# Patient Record
Sex: FEMALE | Race: WHITE | NOT HISPANIC OR LATINO | Employment: OTHER | URBAN - METROPOLITAN AREA
[De-identification: names, ages, dates, MRNs, and addresses within clinical notes are randomized per-mention and may not be internally consistent; named-entity substitution may affect disease eponyms.]

---

## 2017-01-10 ENCOUNTER — PATIENT MESSAGE (OUTPATIENT)
Dept: INTERNAL MEDICINE | Facility: CLINIC | Age: 77
End: 2017-01-10

## 2017-01-10 ENCOUNTER — OFFICE VISIT (OUTPATIENT)
Dept: INTERNAL MEDICINE | Facility: CLINIC | Age: 77
End: 2017-01-10
Payer: MEDICARE

## 2017-01-10 VITALS
HEART RATE: 66 BPM | BODY MASS INDEX: 31.89 KG/M2 | TEMPERATURE: 97 F | WEIGHT: 173.31 LBS | DIASTOLIC BLOOD PRESSURE: 80 MMHG | HEIGHT: 62 IN | SYSTOLIC BLOOD PRESSURE: 120 MMHG

## 2017-01-10 DIAGNOSIS — I10 ESSENTIAL HYPERTENSION: Primary | ICD-10-CM

## 2017-01-10 DIAGNOSIS — R73.09 ABNORMAL GLUCOSE: ICD-10-CM

## 2017-01-10 DIAGNOSIS — E78.5 HYPERLIPIDEMIA, UNSPECIFIED HYPERLIPIDEMIA TYPE: ICD-10-CM

## 2017-01-10 PROCEDURE — 3074F SYST BP LT 130 MM HG: CPT | Mod: S$GLB,,, | Performed by: FAMILY MEDICINE

## 2017-01-10 PROCEDURE — 1126F AMNT PAIN NOTED NONE PRSNT: CPT | Mod: S$GLB,,, | Performed by: FAMILY MEDICINE

## 2017-01-10 PROCEDURE — 1159F MED LIST DOCD IN RCRD: CPT | Mod: S$GLB,,, | Performed by: FAMILY MEDICINE

## 2017-01-10 PROCEDURE — 99999 PR PBB SHADOW E&M-EST. PATIENT-LVL III: CPT | Mod: PBBFAC,,, | Performed by: FAMILY MEDICINE

## 2017-01-10 PROCEDURE — 1160F RVW MEDS BY RX/DR IN RCRD: CPT | Mod: S$GLB,,, | Performed by: FAMILY MEDICINE

## 2017-01-10 PROCEDURE — 3079F DIAST BP 80-89 MM HG: CPT | Mod: S$GLB,,, | Performed by: FAMILY MEDICINE

## 2017-01-10 PROCEDURE — 99214 OFFICE O/P EST MOD 30 MIN: CPT | Mod: S$GLB,,, | Performed by: FAMILY MEDICINE

## 2017-01-10 PROCEDURE — 1157F ADVNC CARE PLAN IN RCRD: CPT | Mod: S$GLB,,, | Performed by: FAMILY MEDICINE

## 2017-01-10 RX ORDER — ATORVASTATIN CALCIUM 10 MG/1
10 TABLET, FILM COATED ORAL DAILY
Qty: 90 TABLET | Refills: 3 | Status: SHIPPED | OUTPATIENT
Start: 2017-01-10 | End: 2017-05-23 | Stop reason: SDUPTHER

## 2017-01-10 RX ORDER — NAPROXEN SODIUM 220 MG/1
81 TABLET, FILM COATED ORAL DAILY
Start: 2017-01-10 | End: 2019-01-25 | Stop reason: CLARIF

## 2017-01-10 NOTE — PROGRESS NOTES
Subjective:      Patient ID: Irlanda Lopez is a 76 y.o. female.    Chief Complaint: Follow-up (5w blood pressure, possible TIA)    HPI Comments: Patient is coming in today for a five-week follow-up.  This time she brings her daughter Faiza in.  She's actually been doing very well with the blood pressure medication.  She's not had any problems.  Blood pressures have actually been more controlled now with systolics in the 120s and 130s and diastolics in the 70s.  She's had no problems with the blood pressure medication of the low-dose diuretic.  She wants to come in and explained what happened at the event at [facility].  Most likely it was either a elevated blood pressure event or possibly even a TIA however she's not had any further issues.  She now is willing to start only checking her blood pressure once a week.    Because of the possibility of her having a TIA we discussed her 10 year risk for acute cardiovascular diseases.  We discussed her best regimen reduce this would be to actually get her on a low-dose cholesterol medicine as well as an aspirin.  At this time because of the last year she's willing to try.  Her 10 year risk is currently at 19%.  She reports that her  had a TIA in the past as well.    The 10-year ASCVD risk score (Candy DC Jr, et al., 2013) is: 19.1%    Values used to calculate the score:      Age: 76 years      Sex: Female      Is Non- : No      Diabetic: No      Tobacco smoker: No      Systolic Blood Pressure: 120 mmHg      Is BP treated: Yes      HDL Cholesterol: 41 mg/dL      Total Cholesterol: 259 mg/dL    Otherwise she's been doing very well with the [place] Thyroid.  She's not had any problems with this medication.  Altered Mental Status   This is a new problem. The current episode started yesterday. The problem occurs rarely. The problem has been resolved. Pertinent negatives include no abdominal pain, anorexia, chest pain, chills, congestion,  coughing, fatigue, fever, headaches, myalgias, nausea, neck pain, numbness or weakness. Nothing aggravates the symptoms. She has tried nothing for the symptoms. The treatment provided significant relief.       Lab Results   Component Value Date    WBC 7.02 10/04/2016    HGB 14.8 10/04/2016    HCT 44.1 10/04/2016     10/04/2016    CHOL 259 (H) 10/04/2016    TRIG 201 (H) 10/04/2016    HDL 41 10/04/2016    ALT 19 10/04/2016    AST 24 10/04/2016     10/04/2016    K 4.0 10/04/2016     10/04/2016    CREATININE 0.9 10/04/2016    BUN 14 10/04/2016    CO2 27 10/04/2016    TSH 1.644 10/04/2016       Review of Systems   Constitutional: Negative for chills, fatigue and fever.   HENT: Negative for congestion.    Respiratory: Negative for cough.    Cardiovascular: Negative for chest pain.   Gastrointestinal: Negative for abdominal pain, anorexia and nausea.   Musculoskeletal: Negative for myalgias and neck pain.   Neurological: Negative for weakness, numbness and headaches.   Psychiatric/Behavioral: Positive for confusion. Negative for decreased concentration and dysphoric mood. The patient is nervous/anxious.      Objective:     Physical Exam   Constitutional: She appears well-developed and well-nourished.   HENT:   Head: Normocephalic and atraumatic.   Right Ear: Tympanic membrane normal.   Left Ear: Tympanic membrane normal.   Mouth/Throat: Oropharynx is clear and moist.   Eyes: Conjunctivae and EOM are normal.   Neck: Normal range of motion. Neck supple.   Cardiovascular: Normal rate and regular rhythm.    Pulmonary/Chest: Effort normal and breath sounds normal.   Psychiatric: She has a normal mood and affect. Her behavior is normal.   Nursing note and vitals reviewed.    Assessment:     1. Essential hypertension    2. Hyperlipidemia, unspecified hyperlipidemia type    3. Abnormal glucose      Plan:   Irlanda was seen today for follow-up.    Diagnoses and all orders for this visit:    Essential  hypertension  Comments:  improved with hctz 12.5mg daily. add asa 81mg daily. suspect prior event was tia.   Orders:  -     Lipid panel; Future  -     Hemoglobin A1c; Future  -     ALT (SGPT); Future  -     AST (SGOT); Future  -     Basic metabolic panel; Future    Hyperlipidemia, unspecified hyperlipidemia type  Comments:  high risk of 19%. start statin.   Orders:  -     Lipid panel; Future  -     Hemoglobin A1c; Future  -     ALT (SGPT); Future  -     AST (SGOT); Future  -     Basic metabolic panel; Future    Abnormal glucose  Comments:  to screen for diabetes due to high risk. Will do prior to next visit.   Orders:  -     Lipid panel; Future  -     Hemoglobin A1c; Future  -     ALT (SGPT); Future  -     AST (SGOT); Future  -     Basic metabolic panel; Future    Other orders  -     aspirin 81 MG Chew; Take 1 tablet (81 mg total) by mouth once daily.  -     atorvastatin (LIPITOR) 10 MG tablet; Take 1 tablet (10 mg total) by mouth once daily.               Return in about 4 months (around 5/10/2017) for chol, bp, .

## 2017-01-10 NOTE — MR AVS SNAPSHOT
[facility]  [address] [place] [address]  Phone: [phone]  Fax: [phone]                  Irlanda Lopez   1/10/2017 10:40 AM   Office Visit    Description:  Female : 1940   Provider:  Myla Arias MD   [facility]           Reason for Visit     Follow-up           Diagnoses this Visit        Comments    Essential hypertension    -  Primary improved with hctz 12.5mg daily. add asa 81mg daily. suspect prior event was tia.     Hyperlipidemia, unspecified hyperlipidemia type     high risk of 19%. start statin.     Abnormal glucose     to screen for diabetes due to high risk.            To Do List           Future Appointments        Provider Department Dept Phone    2017 8:30 AM LABORATORY, [place] [facility] [phone]    2017 10:40 AM Myla Arias MD [facility] [phone]      Goals (5 Years of Data)     None      Follow-Up and Disposition     Return in about 4 months (around 5/10/2017) for chol, bp, .       These Medications        Disp Refills Start End    aspirin 81 MG Chew   1/10/2017     Take 1 tablet (81 mg total) by mouth once daily. - Oral    Pharmacy: [facility]/pharmacy #[id number]  [place] - [address] [address] Ph #: [phone]       atorvastatin (LIPITOR) 10 MG tablet 90 tablet 3 1/10/2017 1/10/2018    Take 1 tablet (10 mg total) by mouth once daily. - Oral    Pharmacy: [facility]/pharmacy #[id number]  [place] - [address] [address] Ph #: [phone]         [facility]     [facility] On [facility] Nurse Care Line -  Assistance  Registered nurses in the [facility] On Call Center provide clinical advisement, health education, appointment booking, and other advisory services.  Call for this free service at [phone].             Medications           Message regarding Medications     Verify the changes and/or additions to your medication regime  "listed below are the same as discussed with your clinician today.  If any of these changes or additions are incorrect, please notify your healthcare provider.        START taking these NEW medications        Refills    aspirin 81 MG Chew     Sig: Take 1 tablet (81 mg total) by mouth once daily.    Class: No Print    Route: Oral    atorvastatin (LIPITOR) 10 MG tablet 3    Sig: Take 1 tablet (10 mg total) by mouth once daily.    Class: Normal    Route: Oral           Verify that the below list of medications is an accurate representation of the medications you are currently taking.  If none reported, the list may be blank. If incorrect, please contact your healthcare provider. Carry this list with you in case of emergency.           Current Medications     aspirin 81 MG Chew Take 1 tablet (81 mg total) by mouth once daily.    atorvastatin (LIPITOR) 10 MG tablet Take 1 tablet (10 mg total) by mouth once daily.    hydrochlorothiazide (MICROZIDE) 12.5 mg capsule Take 1 capsule (12.5 mg total) by mouth once daily.    thyroid (ARMOUR THYROID) 30 mg Tab Take 1 tablet (30 mg total) by mouth once daily.           Clinical Reference Information           Vital Signs - Last Recorded  Most recent update: 1/10/2017 10:57 AM by Ken Camacho MA    BP Pulse Temp Ht Wt BMI    120/80 66 97 °F (36.1 °C) 5' 2" (1.575 m) 78.6 kg (173 lb 4.5 oz) 31.69 kg/m2      Blood Pressure          Most Recent Value    BP  120/80      Allergies as of 1/10/2017     No Known Allergies      Immunizations Administered on Date of Encounter - 1/10/2017     None      Orders Placed During Today's Visit     Future Labs/Procedures Expected by Expires    ALT (SGPT)  5/10/2017 (Approximate) 3/11/2018    AST (SGOT)  5/10/2017 1/10/2018    Basic metabolic panel  5/10/2017 1/10/2018    Hemoglobin A1c  5/10/2017 (Approximate) 7/9/2017    Lipid panel  5/10/2017 (Approximate) 1/10/2018      "

## 2017-01-23 ENCOUNTER — TELEPHONE (OUTPATIENT)
Dept: INTERNAL MEDICINE | Facility: CLINIC | Age: 77
End: 2017-01-23

## 2017-01-23 DIAGNOSIS — L98.9 SKIN LESION: Primary | ICD-10-CM

## 2017-01-23 DIAGNOSIS — R41.3 MEMORY LOSS: Primary | ICD-10-CM

## 2017-01-23 NOTE — TELEPHONE ENCOUNTER
Called and left message explaining that she would need to be referred to neuro and that she can call and get scheduled.

## 2017-01-23 NOTE — TELEPHONE ENCOUNTER
Called pts daughter back, she stated that they are noticing that she is having a little bit of memory issues and coping issues since her  has past. She is wanting to know if there is any testing that they can do to determine if this is just age related memory loss or if this is early stages of dementia? She stated that the pt did tell her son at one point that she is concerned with this herself as she seems to be noticing it at some times....

## 2017-01-23 NOTE — TELEPHONE ENCOUNTER
Called pt, she would like to see a Dermatologist, for some red areas around nose and cheeks and inside her nose. Would like to see one out here.

## 2017-01-23 NOTE — TELEPHONE ENCOUNTER
Would need to see neurologist for more formalized testing. Can put in order for Dr Cobian or Dr Keller.

## 2017-01-23 NOTE — TELEPHONE ENCOUNTER
----- Message from Leia Arora sent at 1/23/2017  3:10 PM CST -----  Contact: self [phone]  States that she is calling to get a recommendation for dermatology in the [facility]. Please call back at [phone]//thank you acc

## 2017-01-23 NOTE — TELEPHONE ENCOUNTER
----- Message from Enriqueta Silverio sent at 1/23/2017  8:30 AM CST -----  Contact: zaheer/daughter   Call caller regarding questions about pt getting memory loss.       [phone]

## 2017-01-24 NOTE — TELEPHONE ENCOUNTER
See if she is willing to see derm at [facility] location? If so, I'll put in for there, because I would prefer to send to [facility]

## 2017-01-26 ENCOUNTER — TELEPHONE (OUTPATIENT)
Dept: INTERNAL MEDICINE | Facility: CLINIC | Age: 77
End: 2017-01-26

## 2017-01-26 NOTE — TELEPHONE ENCOUNTER
----- Message from Susanna Ramirez sent at 1/26/2017  1:23 PM CST -----  Contact: patient  Calling concerning a rash on face possibly from taking Hydrochlorothiazide 12.5 mg CP. Please call patient @ [phone]. Thanks, jorge

## 2017-01-26 NOTE — TELEPHONE ENCOUNTER
I don't see a rash on the eyes and face from the statin drugs. She can see derm to evaluate the rash as she requested. Yes the cholesterol medication is to help reduce her cholesterol levels and prevent a stroke.

## 2017-01-26 NOTE — TELEPHONE ENCOUNTER
Pt states that she has been experiencing a raised red area under the eyes and on the side and then around the nose. She states she has been using olive oil on it. She states that it doesn't itch or bother her much since then, but she thinks that it might be from the statin drug that she takes. She states that she feels like it started around that time. When she gave me the name of the medication she told me it was hydrochlorothiazide. Explained to her that, that is not a statin drug that it is for either blood pressure or used for fluid. She then found another bottle and reported that it is the atorvastatin. She reports that she hasn't been on it long and that she wants to know if she has to have a statin drug or can she has something else? She is concerned with taking the statin.

## 2017-02-03 ENCOUNTER — TELEPHONE (OUTPATIENT)
Dept: INTERNAL MEDICINE | Facility: CLINIC | Age: 77
End: 2017-02-03

## 2017-02-03 NOTE — TELEPHONE ENCOUNTER
----- Message from Atiya Thakur sent at 2/3/2017 11:06 AM CST -----  [phone]      Cell: [phone] Patient stopped in clinic today to see either one of the medications would affect the skin.Having redness, pealing, slight burning when cleaning face.  Hydrochlorothiazide 12.5 mg 1 tab daily. Atorvastatin 10 mg 1 tab daily.tc

## 2017-02-03 NOTE — TELEPHONE ENCOUNTER
Informed pt from previous message Dr. Arias recommended her to see Derm. Pt stated she has not seen them yet but she will call for an  Apt.

## 2017-02-10 ENCOUNTER — TELEPHONE (OUTPATIENT)
Dept: INTERNAL MEDICINE | Facility: CLINIC | Age: 77
End: 2017-02-10

## 2017-02-10 NOTE — TELEPHONE ENCOUNTER
----- Message from Jessica Noel sent at 2/10/2017 11:33 AM CST -----  Pt is calling Nurse staff regarding the last medication atorvastatin /calcium . Pt is having a possible allergic reaction to the medication. Pt has developed a rash or skin irritation that is possible from the medication.  Pt call back to be advise  thanks

## 2017-02-11 NOTE — TELEPHONE ENCOUNTER
Patient states she saw a product on tv to help with incont to help with keegle exercise it's something that you insert into the vaginal area. Also she would like to know what you recc for incont at her age.

## 2017-02-11 NOTE — TELEPHONE ENCOUNTER
----- Message from Shakira Borjas sent at 2/10/2017  3:05 PM CST -----  Contact: Pt  Pt is requesting to speak to the nurse regarding treatment for bladder incontinence. Pls call pt back at [phone].

## 2017-05-09 ENCOUNTER — LAB VISIT (OUTPATIENT)
Dept: LAB | Facility: HOSPITAL | Age: 77
End: 2017-05-09
Attending: FAMILY MEDICINE
Payer: MEDICARE

## 2017-05-09 DIAGNOSIS — R73.09 ABNORMAL GLUCOSE: ICD-10-CM

## 2017-05-09 DIAGNOSIS — E78.5 HYPERLIPIDEMIA, UNSPECIFIED HYPERLIPIDEMIA TYPE: ICD-10-CM

## 2017-05-09 DIAGNOSIS — I10 ESSENTIAL HYPERTENSION: ICD-10-CM

## 2017-05-09 LAB
ALT SERPL W/O P-5'-P-CCNC: 20 U/L
ANION GAP SERPL CALC-SCNC: 11 MMOL/L
AST SERPL-CCNC: 23 U/L
BUN SERPL-MCNC: 17 MG/DL
CALCIUM SERPL-MCNC: 9.6 MG/DL
CHLORIDE SERPL-SCNC: 103 MMOL/L
CHOLEST/HDLC SERPL: 3.1 {RATIO}
CO2 SERPL-SCNC: 26 MMOL/L
CREAT SERPL-MCNC: 0.9 MG/DL
EST. GFR  (AFRICAN AMERICAN): >60 ML/MIN/1.73 M^2
EST. GFR  (NON AFRICAN AMERICAN): >60 ML/MIN/1.73 M^2
GLUCOSE SERPL-MCNC: 103 MG/DL
HDL/CHOLESTEROL RATIO: 32.4 %
HDLC SERPL-MCNC: 170 MG/DL
HDLC SERPL-MCNC: 55 MG/DL
LDLC SERPL CALC-MCNC: 95.4 MG/DL
NONHDLC SERPL-MCNC: 115 MG/DL
POTASSIUM SERPL-SCNC: 4 MMOL/L
SODIUM SERPL-SCNC: 140 MMOL/L
TRIGL SERPL-MCNC: 98 MG/DL

## 2017-05-09 PROCEDURE — 84450 TRANSFERASE (AST) (SGOT): CPT

## 2017-05-09 PROCEDURE — 36415 COLL VENOUS BLD VENIPUNCTURE: CPT | Mod: PO

## 2017-05-09 PROCEDURE — 83036 HEMOGLOBIN GLYCOSYLATED A1C: CPT

## 2017-05-09 PROCEDURE — 80048 BASIC METABOLIC PNL TOTAL CA: CPT

## 2017-05-09 PROCEDURE — 80061 LIPID PANEL: CPT

## 2017-05-09 PROCEDURE — 84460 ALANINE AMINO (ALT) (SGPT): CPT

## 2017-05-10 LAB
ESTIMATED AVG GLUCOSE: 114 MG/DL
HBA1C MFR BLD HPLC: 5.6 %

## 2017-05-11 ENCOUNTER — OFFICE VISIT (OUTPATIENT)
Dept: INTERNAL MEDICINE | Facility: CLINIC | Age: 77
End: 2017-05-11
Payer: MEDICARE

## 2017-05-11 VITALS
SYSTOLIC BLOOD PRESSURE: 140 MMHG | DIASTOLIC BLOOD PRESSURE: 80 MMHG | BODY MASS INDEX: 31 KG/M2 | TEMPERATURE: 98 F | HEIGHT: 62 IN | WEIGHT: 168.44 LBS | HEART RATE: 64 BPM

## 2017-05-11 DIAGNOSIS — I10 ESSENTIAL HYPERTENSION: ICD-10-CM

## 2017-05-11 DIAGNOSIS — E07.9 THYROID DISORDER: ICD-10-CM

## 2017-05-11 DIAGNOSIS — R41.89 COGNITIVE DECLINE: Primary | ICD-10-CM

## 2017-05-11 DIAGNOSIS — E78.5 HYPERLIPIDEMIA, UNSPECIFIED HYPERLIPIDEMIA TYPE: ICD-10-CM

## 2017-05-11 PROCEDURE — 99215 OFFICE O/P EST HI 40 MIN: CPT | Mod: S$GLB,,, | Performed by: FAMILY MEDICINE

## 2017-05-11 PROCEDURE — 1126F AMNT PAIN NOTED NONE PRSNT: CPT | Mod: S$GLB,,, | Performed by: FAMILY MEDICINE

## 2017-05-11 PROCEDURE — 3077F SYST BP >= 140 MM HG: CPT | Mod: S$GLB,,, | Performed by: FAMILY MEDICINE

## 2017-05-11 PROCEDURE — 1157F ADVNC CARE PLAN IN RCRD: CPT | Mod: 8P,S$GLB,, | Performed by: FAMILY MEDICINE

## 2017-05-11 PROCEDURE — 99999 PR PBB SHADOW E&M-EST. PATIENT-LVL III: CPT | Mod: PBBFAC,,, | Performed by: FAMILY MEDICINE

## 2017-05-11 PROCEDURE — 1160F RVW MEDS BY RX/DR IN RCRD: CPT | Mod: S$GLB,,, | Performed by: FAMILY MEDICINE

## 2017-05-11 PROCEDURE — 3079F DIAST BP 80-89 MM HG: CPT | Mod: S$GLB,,, | Performed by: FAMILY MEDICINE

## 2017-05-11 PROCEDURE — 99499 UNLISTED E&M SERVICE: CPT | Mod: S$GLB,,, | Performed by: FAMILY MEDICINE

## 2017-05-11 PROCEDURE — 1159F MED LIST DOCD IN RCRD: CPT | Mod: S$GLB,,, | Performed by: FAMILY MEDICINE

## 2017-05-11 NOTE — PROGRESS NOTES
"Subjective:      Patient ID: Irlanda Lopez is a 76 y.o. female.    Chief Complaint: Follow-up (4m)    HPI Comments: Patient is coming in today for a 4 month follow-up for probable TIA.  This time she brings her daughter Faiza in.  She's actually been doing very well with the blood pressure medication.  She's not had any problems.  Blood pressures have actually been more controlled now with systolics in the 120s and 130s and diastolics in the 70s.  She's had no problems with the blood pressure medication of the low-dose diuretic.She now is checking her blood pressure once a week.    Because of the possibility of her having a TIA we discussed her 10 year risk for acute cardiovascular diseases.  We discussed her best regimen reduce this would be to actually get her on a low-dose cholesterol medicine as well as an aspirin.  At this time because of the last year she's willing to try and done well with atorvastatin 10mg daily.  Her 10 year risk was  at 19%.   Her numbers appear better as well with the meds. lfts are normal.     Otherwise she's been doing very well with the [place] Thyroid.  She's not had any problems with this medication.    Her daughter is very concerned today about her mother's memory loss.  She reports that she is extremely forgetful even with her measles recent and remote events.  She does a lot of it still stems from the fact that she still grieving the loss of her  from 2 years ago.  Patient's enrolled in counseling but is not willing to take medicines at this time.  She is morbidly .  The patient herself feels that she's doing fairly well.  In this conversation today she reported within the first 5 minutes of the conversation how her has been "took very good care of her aunts pulled her".  She repeats things multiple times.  Her daughter Faiza is getting frustrated because she is the one that the patient leans on a lot and she is noticing that her memory is getting extremely " bad.  The patient's mother had Alzheimer's.  We have not done any imaging studies of her head at this time thus far.  They were interested in possibly pursuing a workup for the memory loss as well.  I discussed the most likely the best all inclusive care would be performed at the [facility] and this would include possibly doing an MRI, carotid ultrasounds possibly a heart echo in seeing a neurologist in doing some additional lab work.  They be interested in doing so.  Today we did perform a Mini-Mental Status exam which showed that she was 26 out of 30.  She did not know today's date and she had no object recall.    Altered Mental Status   This is a new problem. The current episode started yesterday. The problem occurs rarely. The problem has been resolved. Pertinent negatives include no abdominal pain, anorexia, chest pain, chills, congestion, coughing, fatigue, fever, headaches, myalgias, nausea, neck pain, numbness or weakness. Nothing aggravates the symptoms. She has tried nothing for the symptoms. The treatment provided significant relief.       Lab Results   Component Value Date    WBC 7.02 10/04/2016    HGB 14.8 10/04/2016    HCT 44.1 10/04/2016     10/04/2016    CHOL 170 05/09/2017    TRIG 98 05/09/2017    HDL 55 05/09/2017    ALT 20 05/09/2017    AST 23 05/09/2017     05/09/2017    K 4.0 05/09/2017     05/09/2017    CREATININE 0.9 05/09/2017    BUN 17 05/09/2017    CO2 26 05/09/2017    TSH 1.644 10/04/2016    HGBA1C 5.6 05/09/2017       Review of Systems   Constitutional: Negative for activity change, appetite change, chills, fatigue and fever.   HENT: Negative for congestion.    Respiratory: Negative for cough.    Cardiovascular: Negative for chest pain.   Gastrointestinal: Negative for abdominal pain, anorexia and nausea.   Musculoskeletal: Negative for myalgias and neck pain.   Neurological: Negative for weakness, numbness and headaches.   Psychiatric/Behavioral: Positive for  confusion, decreased concentration and dysphoric mood. Negative for sleep disturbance. The patient is not nervous/anxious.      Objective:     Physical Exam   Constitutional: She is oriented to person, place, and time. She appears well-developed and well-nourished.   HENT:   Head: Normocephalic and atraumatic.   Right Ear: Tympanic membrane normal.   Left Ear: Tympanic membrane normal.   Mouth/Throat: Oropharynx is clear and moist.   Eyes: Conjunctivae and EOM are normal.   Neck: Normal range of motion. Neck supple.   Cardiovascular: Normal rate and regular rhythm.    Pulmonary/Chest: Effort normal and breath sounds normal.   Neurological: She is alert and oriented to person, place, and time. No cranial nerve deficit or sensory deficit.   Psychiatric: Her speech is normal and behavior is normal. Judgment and thought content normal. Cognition and memory are impaired. She exhibits a depressed mood. She exhibits abnormal recent memory and abnormal remote memory.   Nursing note and vitals reviewed.    Assessment:     1. Cognitive decline    2. Essential hypertension    3. Thyroid disorder    4. Hyperlipidemia, unspecified hyperlipidemia type      Plan:   Irlanda was seen today for follow-up.    Diagnoses and all orders for this visit:    Cognitive decline-new problem, discussed with her medications as well as the initial workup.  This was most likely can involve doing MRI of the brain, carotid ultrasounds echo and additional lab work.  At this time recommend she go to see the [facility] to see a neurologist to have this further evaluated for treatable causes of cognitive decline.  If there are none that I highly recommend she get involved with Dr. Osuna or the neuropsych team over there to consider not only the counseling but also medication management.  In the meantime she's willing to try Louis's wort for some of the depression symptoms.  -     Ambulatory referral to Neurology    Essential hypertension -  stable, Continue with current medications and interventions. Labs reviewed.     -     Ambulatory referral to Neurology    Thyroid disorder- stable, Continue with current medications and interventions. Labs reviewed.   -     Ambulatory referral to Neurology    Hyperlipidemia, unspecified hyperlipidemia type- stable, Continue with current medications and interventions. Labs reviewed.   -     Ambulatory referral to Neurology      Time spent: 45 minutes in face to face discussion concerning diagnosis, prognosis, review of lab and test results, benefits of treatment as well as management of disease, counseling of patient and coordination of care between various health care providers . Greater than half the time spent was used for coordination of care and counseling of patient.         Return in about 4 months (around 9/11/2017) for neuro followup.

## 2017-05-11 NOTE — MR AVS SNAPSHOT
[facility]  [address] [place] [address]  Phone: [phone]  Fax: [phone]                  Irlanda Lopez   2017 10:40 AM   Office Visit    Description:  Female : 1940   Provider:  Myla Arias MD   [facility]           Reason for Visit     Follow-up           Diagnoses this Visit        Comments    Cognitive decline    -  Primary     Essential hypertension         Thyroid disorder         Hyperlipidemia, unspecified hyperlipidemia type                To Do List           Future Appointments        Provider Department Dept Phone    2017 10:00 AM Myla Arias MD [facility] [phone]      Goals (5 Years of Data)     None      Follow-Up and Disposition     Return in about 4 months (around 2017) for neuro followup.      [facility] On [facility]     [facility] On Call Nurse Care Line -  Assistance  Unless otherwise directed by your provider, please contact [facility], our nurse care line that is available for  assistance.     Registered nurses in the [facility] provide: appointment scheduling, clinical advisement, health education, and other advisory services.  Call: [phone] (toll free)               Medications           Message regarding Medications     Verify the changes and/or additions to your medication regime listed below are the same as discussed with your clinician today.  If any of these changes or additions are incorrect, please notify your healthcare provider.             Verify that the below list of medications is an accurate representation of the medications you are currently taking.  If none reported, the list may be blank. If incorrect, please contact your healthcare provider. Carry this list with you in case of emergency.           Current Medications     aspirin 81 MG Chew Take 1 tablet (81 mg total) by mouth once daily.    atorvastatin (LIPITOR) 10  "MG tablet Take 1 tablet (10 mg total) by mouth once daily.    hydrochlorothiazide (MICROZIDE) 12.5 mg capsule Take 1 capsule (12.5 mg total) by mouth once daily.    thyroid (ARMOUR THYROID) 30 mg Tab Take 1 tablet (30 mg total) by mouth once daily.           Clinical Reference Information           Your Vitals Were     BP Pulse Temp Height Weight BMI    140/80 64 98 °F (36.7 °C) 5' 2" (1.575 m) 76.4 kg (168 lb 6.9 oz) 30.81 kg/m2      Blood Pressure          Most Recent Value    BP  (!)  140/80      Allergies as of 5/11/2017     No Known Allergies      Immunizations Administered on Date of Encounter - 5/11/2017     None      Orders Placed During Today's Visit      Normal Orders This Visit    Ambulatory referral to Neurology       [employer] Sign-Up     Activating your [facility] account is as easy as 1-2-3!     1) Visit [email or web address], select Sign Up Now, enter this activation code and your date of birth, then select Next.  Activation code not generated  Current Patient Portal Status: Account disabled      2) Create a username and password to use when you visit [facility] in the future and select a security question in case you lose your password and select Next.    3) Enter your e-mail address and click Sign Up!    Additional Information  If you have questions, please e-mail [email or web address] or call [phone] to talk to our [facility] staff. Remember, [facility] is NOT to be used for urgent needs. For medical emergencies, dial 911.         Instructions    Dr Caren Holloway, Dr Emely Bonilla,        Language Assistance Services     ATTENTION: Language assistance services are available, free of charge. Please call [phone].      ATENCIÓN: Si habla español, tiene a mosquera disposición servicios gratuitos de asistencia lingüística. Llame al [phone].     CHÚ Ý: N?u b?n nói Ti?ng Vi?t, có các d?ch v? h? tr? ngôn ng? mi?n phí dành cho b?n. G?i s? [phone].         [facility] complies " with applicable Federal civil rights laws and does not discriminate on the basis of race, color, national origin, age, disability, or sex.

## 2017-05-16 ENCOUNTER — TELEPHONE (OUTPATIENT)
Dept: INTERNAL MEDICINE | Facility: CLINIC | Age: 77
End: 2017-05-16

## 2017-05-23 DIAGNOSIS — I10 ESSENTIAL HYPERTENSION: ICD-10-CM

## 2017-05-23 RX ORDER — HYDROCHLOROTHIAZIDE 12.5 MG/1
12.5 CAPSULE ORAL DAILY
Qty: 90 CAPSULE | Refills: 3 | Status: SHIPPED | OUTPATIENT
Start: 2017-05-23 | End: 2017-09-14

## 2017-05-23 RX ORDER — ATORVASTATIN CALCIUM 10 MG/1
10 TABLET, FILM COATED ORAL DAILY
Qty: 90 TABLET | Refills: 3 | Status: SHIPPED | OUTPATIENT
Start: 2017-05-23 | End: 2018-04-30 | Stop reason: SDUPTHER

## 2017-06-15 ENCOUNTER — TELEPHONE (OUTPATIENT)
Dept: INTERNAL MEDICINE | Facility: CLINIC | Age: 77
End: 2017-06-15

## 2017-06-15 NOTE — TELEPHONE ENCOUNTER
----- Message from Karen Clarke sent at 6/15/2017  8:50 AM CDT -----  Contact: pt  She's calling to get further instructions after her referral, pt stated that dr that she was referred to would like her to complete lab work, please advise, [phone] (home)  [phone] (cell)

## 2017-06-15 NOTE — TELEPHONE ENCOUNTER
----- Message from Zack Osorio sent at 6/15/2017  9:00 AM CDT -----  Contact: Pt daughter - zaheer [phone]  states she is calling rg being given some labs to have done but wants to know if they're any duplicate labs that are being ordered by the Neurologist from labs recently done and can be reached at [phone]//anny/dbw

## 2017-06-16 ENCOUNTER — LAB VISIT (OUTPATIENT)
Dept: LAB | Facility: HOSPITAL | Age: 77
End: 2017-06-16
Attending: PSYCHIATRY & NEUROLOGY
Payer: MEDICARE

## 2017-06-16 DIAGNOSIS — F01.50 VASCULAR DEMENTIA, UNCOMPLICATED: Primary | ICD-10-CM

## 2017-06-16 LAB
25(OH)D3+25(OH)D2 SERPL-MCNC: 129 NG/ML
FOLATE SERPL-MCNC: 11.2 NG/ML
VIT B12 SERPL-MCNC: 905 PG/ML

## 2017-06-16 PROCEDURE — 82607 VITAMIN B-12: CPT

## 2017-06-16 PROCEDURE — 82746 ASSAY OF FOLIC ACID SERUM: CPT

## 2017-06-16 PROCEDURE — 82306 VITAMIN D 25 HYDROXY: CPT

## 2017-06-16 PROCEDURE — 84425 ASSAY OF VITAMIN B-1: CPT

## 2017-06-16 PROCEDURE — 36415 COLL VENOUS BLD VENIPUNCTURE: CPT | Mod: PO

## 2017-06-16 PROCEDURE — 86592 SYPHILIS TEST NON-TREP QUAL: CPT

## 2017-06-17 LAB — RPR SER QL: NORMAL

## 2017-06-19 LAB — VIT B1 SERPL-MCNC: 58 UG/L (ref 38–122)

## 2017-07-06 ENCOUNTER — TELEPHONE (OUTPATIENT)
Dept: INTERNAL MEDICINE | Facility: CLINIC | Age: 77
End: 2017-07-06

## 2017-07-06 NOTE — TELEPHONE ENCOUNTER
----- Message from Alley Ramos LPN sent at 7/6/2017 10:05 AM CDT -----  Contact: Ntorqetf-sjrugrw-[phone]      ----- Message -----  From: Kahlil Menard  Sent: 7/6/2017   9:48 AM  To: Lindsay CARLSON [facility] Staff    Pt would like to know test results, please call back at [phone].  Thanks-[facility]

## 2017-07-12 ENCOUNTER — TELEPHONE (OUTPATIENT)
Dept: INTERNAL MEDICINE | Facility: CLINIC | Age: 77
End: 2017-07-12

## 2017-07-12 NOTE — TELEPHONE ENCOUNTER
----- Message from Blaire Mitchell sent at 7/12/2017  3:27 PM CDT -----  Contact: daughter  Request a call concerning a MRI appt, can be reached at [phone]///thxMW

## 2017-07-18 ENCOUNTER — TELEPHONE (OUTPATIENT)
Dept: INTERNAL MEDICINE | Facility: CLINIC | Age: 77
End: 2017-07-18

## 2017-07-18 NOTE — TELEPHONE ENCOUNTER
----- Message from Zack Osorio sent at 7/18/2017  8:10 AM CDT -----  Contact: Pt daughter - zaheer saini   States she's calling to be referred to an imaging center for pt to have her MRI and can be reached at [phone]//thanks/dbw

## 2017-07-28 ENCOUNTER — HOSPITAL ENCOUNTER (OUTPATIENT)
Dept: RADIOLOGY | Facility: HOSPITAL | Age: 77
Discharge: HOME | End: 2017-07-28
Attending: PSYCHIATRY & NEUROLOGY
Payer: MEDICARE

## 2017-07-28 DIAGNOSIS — F01.50 VASCULAR DEMENTIA WITHOUT BEHAVIORAL DISTURBANCE: ICD-10-CM

## 2017-07-28 PROCEDURE — 70551 MRI BRAIN STEM W/O DYE: CPT | Mod: TC

## 2017-08-29 ENCOUNTER — OFFICE VISIT (OUTPATIENT)
Dept: INTERNAL MEDICINE | Facility: CLINIC | Age: 77
End: 2017-08-29
Payer: MEDICARE

## 2017-08-29 ENCOUNTER — TELEPHONE (OUTPATIENT)
Dept: INTERNAL MEDICINE | Facility: CLINIC | Age: 77
End: 2017-08-29

## 2017-08-29 VITALS
HEIGHT: 62 IN | SYSTOLIC BLOOD PRESSURE: 110 MMHG | DIASTOLIC BLOOD PRESSURE: 78 MMHG | WEIGHT: 175.5 LBS | HEART RATE: 70 BPM | TEMPERATURE: 98 F | BODY MASS INDEX: 32.3 KG/M2

## 2017-08-29 DIAGNOSIS — L30.9 DERMATITIS: ICD-10-CM

## 2017-08-29 DIAGNOSIS — T78.40XA ALLERGIC REACTION CAUSED BY A DRUG, INITIAL ENCOUNTER: Primary | ICD-10-CM

## 2017-08-29 PROCEDURE — 96372 THER/PROPH/DIAG INJ SC/IM: CPT | Mod: S$GLB,,, | Performed by: FAMILY MEDICINE

## 2017-08-29 PROCEDURE — 3078F DIAST BP <80 MM HG: CPT | Mod: S$GLB,,, | Performed by: FAMILY MEDICINE

## 2017-08-29 PROCEDURE — 3008F BODY MASS INDEX DOCD: CPT | Mod: S$GLB,,, | Performed by: FAMILY MEDICINE

## 2017-08-29 PROCEDURE — 99214 OFFICE O/P EST MOD 30 MIN: CPT | Mod: 25,S$GLB,, | Performed by: FAMILY MEDICINE

## 2017-08-29 PROCEDURE — 99999 PR PBB SHADOW E&M-EST. PATIENT-LVL IV: CPT | Mod: PBBFAC,,, | Performed by: FAMILY MEDICINE

## 2017-08-29 PROCEDURE — 1159F MED LIST DOCD IN RCRD: CPT | Mod: S$GLB,,, | Performed by: FAMILY MEDICINE

## 2017-08-29 PROCEDURE — 3074F SYST BP LT 130 MM HG: CPT | Mod: S$GLB,,, | Performed by: FAMILY MEDICINE

## 2017-08-29 PROCEDURE — 1126F AMNT PAIN NOTED NONE PRSNT: CPT | Mod: S$GLB,,, | Performed by: FAMILY MEDICINE

## 2017-08-29 RX ORDER — MEMANTINE HYDROCHLORIDE 10 MG/1
TABLET ORAL
COMMUNITY
Start: 2017-08-21 | End: 2017-08-29

## 2017-08-29 RX ORDER — LORATADINE 10 MG/1
10 TABLET ORAL 2 TIMES DAILY
Qty: 10 TABLET | Refills: 0 | Status: SHIPPED | OUTPATIENT
Start: 2017-08-29 | End: 2017-10-12 | Stop reason: ALTCHOICE

## 2017-08-29 RX ORDER — BETAMETHASONE SODIUM PHOSPHATE AND BETAMETHASONE ACETATE 3; 3 MG/ML; MG/ML
12 INJECTION, SUSPENSION INTRA-ARTICULAR; INTRALESIONAL; INTRAMUSCULAR; SOFT TISSUE
Status: COMPLETED | OUTPATIENT
Start: 2017-08-29 | End: 2017-08-29

## 2017-08-29 RX ORDER — TRIAMCINOLONE ACETONIDE 1 MG/G
OINTMENT TOPICAL 4 TIMES DAILY
Qty: 80 G | Refills: 1 | Status: SHIPPED | OUTPATIENT
Start: 2017-08-29 | End: 2017-09-08

## 2017-08-29 RX ADMIN — BETAMETHASONE SODIUM PHOSPHATE AND BETAMETHASONE ACETATE 12 MG: 3; 3 INJECTION, SUSPENSION INTRA-ARTICULAR; INTRALESIONAL; INTRAMUSCULAR; SOFT TISSUE at 11:08

## 2017-08-29 NOTE — TELEPHONE ENCOUNTER
----- Message from Carmen Evans sent at 8/29/2017 11:35 AM CDT -----  Contact: daughter/Myla Mora  States her mom was seen this morning, she was having an allergic reaction to a medicine. States she didn't know her mother picked up the medicine and started taking it. Please call Myla Mora at [phone]. Thank you

## 2017-08-29 NOTE — PROGRESS NOTES
Subjective:      Patient ID: Irlanda Lopez is a 76 y.o. female.    Chief Complaint: Allergic Reaction    Patient reports she started breaking out with a rash on her face on both sides of her temples around her eyes and in her nose and even on her chest.  She states she was just trying to cover it up she was uncertain if it might of been a medication she has taken are not.  She believes is been ongoing now for the last 7 days.  The newest medication is Namenda.  This was prescribed by the neurologist Dr. Lopez for cognitive decline.  She is supposed be taking a half a tablet twice a day however she's uncertain if she's actually been taking a half or if she's been taking a full tablet twice a day.  She reports that it is itchy.  She basically which is allowing it to continue until this morning when she looked in the mirror she was very frustrated with the way it looked and decided to come on in to get it checked out.  She is not been putting anything on it thus far.  Of note the medication was filled on August 21.  That is 6 days of medication currently on board for the patient.  All of her other medications have been in place for several months now.  No new exposures to detergents or facial creams.  No new makeups either.  She has not taken any Benadryl .  No trouble breathing.  No chest pain.      Allergic Reaction   This is a new problem. The current episode started 5 to 7 days ago. The problem has been gradually worsening since onset. The problem is severe. The patient was exposed to a prescription drug. Time of exposure: last 7 days. Associated symptoms include itching, a rash and skin blistering. Pertinent negatives include no abdominal pain, chest pain, chest pressure, coughing, diarrhea, difficulty breathing, drooling, eye itching, eye redness, eye watering, globus sensation, hyperventilation, trouble swallowing or wheezing. Her rash is characterized by: diffuse, raised, pruritic and blistering.Past  treatments include nothing. The treatment provided no relief. Swelling is present on the face.       Lab Results   Component Value Date    WBC 7.02 10/04/2016    HGB 14.8 10/04/2016    HCT 44.1 10/04/2016     10/04/2016    CHOL 170 05/09/2017    TRIG 98 05/09/2017    HDL 55 05/09/2017    ALT 20 05/09/2017    AST 23 05/09/2017     05/09/2017    K 4.0 05/09/2017     05/09/2017    CREATININE 0.9 05/09/2017    BUN 17 05/09/2017    CO2 26 05/09/2017    TSH 1.644 10/04/2016    HGBA1C 5.6 05/09/2017       Review of Systems   Constitutional: Negative for activity change, appetite change and chills.   HENT: Negative for drooling and trouble swallowing.    Eyes: Negative for redness and itching.   Respiratory: Negative for cough, shortness of breath and wheezing.    Cardiovascular: Negative for chest pain.   Gastrointestinal: Negative for abdominal pain and diarrhea.   Skin: Positive for color change, itching and rash.     Objective:     Physical Exam   Constitutional: She appears well-developed and well-nourished. She appears distressed.   HENT:   Head: Normocephalic and atraumatic.       Mouth/Throat: Oropharynx is clear and moist and mucous membranes are normal. No oropharyngeal exudate, posterior oropharyngeal edema or posterior oropharyngeal erythema.   Cardiovascular: Normal rate, regular rhythm and normal heart sounds.    Pulmonary/Chest: Effort normal and breath sounds normal.   Skin: Skin is warm. Rash noted. Rash is urticarial. There is erythema.        Vitals reviewed.    Assessment:     1. Allergic reaction caused by a drug, initial encounter    2. Dermatitis      Plan:   Irlanda was seen today for allergic reaction.    Diagnoses and all orders for this visit:    Allergic reaction caused by a drug, initial encounter- suspected at time of visit related to namenda, however based on additional information from daughter most likely a topical cream.   Comments:  newest medication was namenda started by  Dr Omalley on 8/21.17 will stop. celestone 12mg, claritin bid and steroid ointment today. Will inform daughter and dr omalley. Pt denied any shortness of breath or chest pain. No lip swelling. If not improving in 24-48 hrs will need to go to ED.     Dermatitis- new, suspect drug reaction. Stopping namenda stop topical creams.  celestone 12mg, claritin bid and steroid ointment today.     Other orders  -     betamethasone acetate-betamethasone sodium phosphate injection 12 mg; Inject 2 mLs (12 mg total) into the muscle one time.  -     triamcinolone acetonide 0.1% (KENALOG) 0.1 % ointment; Apply topically 4 (four) times daily. To rash on face and chest and nose  -     loratadine (CLARITIN) 10 mg tablet; Take 1 tablet (10 mg total) by mouth 2 (two) times daily. Use for 3-5 days for allergic reaction            Return if symptoms worsen or fail to improve.        Spoke with patient's daughter. Pt's daughter reports that the rash has been there for months. Her daughter reports that she has been using topical creams from some Internet cream. Advised to stop creams. Continue with the treatments. Still can hold the namenda for 1 week, then once rash is gone, can restart at half dosing. Pt's daughter is in agreement with plan.

## 2017-08-29 NOTE — TELEPHONE ENCOUNTER
"Called and spoke with daughter. Adjusted note. Can give retry to namenda in 1 week once rash is gone. Pt was using some "internet' cream.   "

## 2017-08-29 NOTE — PATIENT INSTRUCTIONS
You should take claritin twice a day for 3-5 days to help reduce the itching/rash/reaction as well.

## 2017-08-30 ENCOUNTER — TELEPHONE (OUTPATIENT)
Dept: INTERNAL MEDICINE | Facility: CLINIC | Age: 77
End: 2017-08-30

## 2017-08-30 NOTE — TELEPHONE ENCOUNTER
----- Message from Irlanda Adamson sent at 8/30/2017 10:52 AM CDT -----  Contact: Patients daughter, Myla Lanza [facility] please call Ms Lanza back at [phone]. Thank you

## 2017-09-14 ENCOUNTER — OFFICE VISIT (OUTPATIENT)
Dept: INTERNAL MEDICINE | Facility: CLINIC | Age: 77
End: 2017-09-14
Payer: MEDICARE

## 2017-09-14 VITALS
TEMPERATURE: 97 F | HEIGHT: 62 IN | SYSTOLIC BLOOD PRESSURE: 138 MMHG | DIASTOLIC BLOOD PRESSURE: 86 MMHG | BODY MASS INDEX: 31.28 KG/M2 | WEIGHT: 170 LBS | HEART RATE: 70 BPM

## 2017-09-14 DIAGNOSIS — I10 ESSENTIAL HYPERTENSION: ICD-10-CM

## 2017-09-14 DIAGNOSIS — R79.89 HIGH SERUM VITAMIN D: ICD-10-CM

## 2017-09-14 DIAGNOSIS — L72.3 SEBACEOUS CYST: ICD-10-CM

## 2017-09-14 DIAGNOSIS — Z00.00 ROUTINE GENERAL MEDICAL EXAMINATION AT A HEALTH CARE FACILITY: Primary | ICD-10-CM

## 2017-09-14 DIAGNOSIS — E07.9 THYROID DISORDER: ICD-10-CM

## 2017-09-14 DIAGNOSIS — F01.50 VASCULAR DEMENTIA WITHOUT BEHAVIORAL DISTURBANCE: ICD-10-CM

## 2017-09-14 PROCEDURE — 3075F SYST BP GE 130 - 139MM HG: CPT | Mod: S$GLB,,, | Performed by: FAMILY MEDICINE

## 2017-09-14 PROCEDURE — 99999 PR PBB SHADOW E&M-EST. PATIENT-LVL IV: CPT | Mod: PBBFAC,,, | Performed by: FAMILY MEDICINE

## 2017-09-14 PROCEDURE — 3079F DIAST BP 80-89 MM HG: CPT | Mod: S$GLB,,, | Performed by: FAMILY MEDICINE

## 2017-09-14 PROCEDURE — 99499 UNLISTED E&M SERVICE: CPT | Mod: S$GLB,,, | Performed by: FAMILY MEDICINE

## 2017-09-14 PROCEDURE — 1126F AMNT PAIN NOTED NONE PRSNT: CPT | Mod: S$GLB,,, | Performed by: FAMILY MEDICINE

## 2017-09-14 PROCEDURE — 3008F BODY MASS INDEX DOCD: CPT | Mod: S$GLB,,, | Performed by: FAMILY MEDICINE

## 2017-09-14 PROCEDURE — 99397 PER PM REEVAL EST PAT 65+ YR: CPT | Mod: 25,S$GLB,, | Performed by: FAMILY MEDICINE

## 2017-09-14 PROCEDURE — 99214 OFFICE O/P EST MOD 30 MIN: CPT | Mod: 25,S$GLB,, | Performed by: FAMILY MEDICINE

## 2017-09-14 PROCEDURE — 1159F MED LIST DOCD IN RCRD: CPT | Mod: S$GLB,,, | Performed by: FAMILY MEDICINE

## 2017-09-14 RX ORDER — LISINOPRIL AND HYDROCHLOROTHIAZIDE 10; 12.5 MG/1; MG/1
1 TABLET ORAL DAILY
Qty: 30 TABLET | Refills: 0 | Status: SHIPPED | OUTPATIENT
Start: 2017-09-14 | End: 2017-10-11 | Stop reason: SDUPTHER

## 2017-09-14 RX ORDER — MEMANTINE HYDROCHLORIDE 10 MG/1
TABLET ORAL
Qty: 180 TABLET | Refills: 3
Start: 2017-09-14 | End: 2018-12-21 | Stop reason: SDUPTHER

## 2017-09-14 RX ORDER — LISINOPRIL AND HYDROCHLOROTHIAZIDE 10; 12.5 MG/1; MG/1
1 TABLET ORAL DAILY
Qty: 90 TABLET | Refills: 3 | Status: SHIPPED | OUTPATIENT
Start: 2017-09-14 | End: 2017-09-14 | Stop reason: SDUPTHER

## 2017-09-14 NOTE — PROGRESS NOTES
"Irlanda Lopez presented for a follow-up Medicare AWV today. The following components were reviewed and updated:    · Medical history  · Family History  · Social history  · Allergies and Current Medications  · Health Maintenance  · Patient Care Team:  · Myla Arias MD as PCP - General (Family Medicine)  · Dr. Yumi Kevin neurology [facility]  ·     **See Completed Assessments for Annual Wellness visit with in the encounter summary    · Medicare wellness assessment:  ·   Depression screening  · 1. In the past 2 weeks she has the patient felt down, depressed or hopeless? No  · 2. Over the past 2 weeks as the patient felt little interest or pleasure in doing things?  No  ·  Functional Ability/ Safety Screening  · 1. Was the  Patient's timed up and go test unsteady or longer than 30 seconds?  No   · 2. Has the patient needed help with transportation shopping preparing meals housework laundry medications are managing money?  Yes, daughter assists due to memory issues.   · 3.  If the patient's home have rugs in the hallway, back grab bars in the bathroom, or poor lighting? No, not necessary per patient.   · 4.has there been any hearing difficulties?  No  · Advance Directives:  · 1. Patient does not have a living will in place.     Vitals:    09/14/17 1018 09/14/17 1025   BP: 112/88 138/86   Pulse: (!) 56 70   Temp: 96.5 °F (35.8 °C)    TempSrc: Tympanic    Weight: 77.1 kg (169 lb 15.6 oz)    Height: 5' 2" (1.575 m)      Body mass index is 31.09 kg/m².   ]      Medicare Annual Wellness Visit, Subsequent   Patient Active Problem List   Diagnosis    Thyroid disorder    Essential hypertension    Vascular dementia without behavioral disturbance    High serum vitamin D    Sebaceous cyst         Provided Irlanda with a 5-10 year written screening schedule and personal prevention plan. Recommendations were developed using the USPSTF age appropriate recommendations. Education, counseling, and referrals were " provided as needed.  After Visit Summary printed and given to patient which includes a list of additional screenings\tests needed.    Health Maintenance   Topic Date Due    Influenza Vaccine  08/01/2017    Lipid Panel  05/09/2018    DEXA SCAN  10/04/2019    TETANUS VACCINE  10/04/2026    Colonoscopy  10/04/2026    Zoster Vaccine  Addressed    Pneumococcal (65+)  Excluded       Return in about 4 months (around 1/14/2018), or with Dr Juana cortes, for F/u STEFFANIE MAY , blood pressure 4 week.  Patient will obtain flu shot at the local pharmacy.  She'll continue daily exercises this is benefiting her.  She declines getting pneumonia vaccines.    Myla Arias MD

## 2017-09-14 NOTE — PROGRESS NOTES
Subjective:      Patient ID: Irlanda Lopez is a 76 y.o. female.    Chief Complaint: labs, BP Memory Loss; and Medication Problem    Patient is coming in today for a  follow-up for chronic since I last saw her she thought initially she was having a drug reaction to the Namenda however after discussing with her daughter it ended up being a topical cream.  Since stopping the topical cream and using only steroid cream for her rash is improved.  She does have a large sebaceous cyst proximal by 2 cm on her anterior chest that she like to have removed.  For this reason I believe she needs to see the dermatologist.    She's actually doing well with the atorvastatin 10 mg.  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  For this reason she needs to stay on this medication at least to the age of 86.  She's also currently on aspirin 81 mg therapy.    Today her blood pressure is elevated today.  She just recent started exercising about 3 times a week doing walking on the treadmill and some machines.  She was uncertain if she might ever be able to get off medication however today we actually need to increase her medication.  I believe lisinopril would be a good choice with her hydrochlorothiazide to help reduce her risk for heart attacks and strokes.    Her daughter also wants her to consider getting back on the Namenda.  They find that her short-term memory is very poor.  Being more schedule has helped some but she is met with the neurologist Dr. Holloway and that is still medication that they recommended the most.  They're going to go see a  later today but mainly discussed with them that she needs to stay on the aspirin she needs sample cholesterol medicine she needs to stay on her thyroid medication she needs to stay on her blood pressure medicine and get that better under control she is to work on her exercise and activity levels and consider getting back on  Franndzane.  She's willing to do so.     From a thyroid standpoint she's doing well with the [place] Thyroid.              Lab Results   Component Value Date    WBC 7.02 10/04/2016    HGB 14.8 10/04/2016    HCT 44.1 10/04/2016     10/04/2016    CHOL 170 05/09/2017    TRIG 98 05/09/2017    HDL 55 05/09/2017    ALT 20 05/09/2017    AST 23 05/09/2017     05/09/2017    K 4.0 05/09/2017     05/09/2017    CREATININE 0.9 05/09/2017    BUN 17 05/09/2017    CO2 26 05/09/2017    TSH 1.644 10/04/2016    HGBA1C 5.6 05/09/2017       Review of Systems   Constitutional: Positive for activity change. Negative for chills, fatigue and fever.   HENT: Negative for ear pain and trouble swallowing.    Eyes: Negative for pain and visual disturbance.   Respiratory: Negative for cough and shortness of breath.    Cardiovascular: Negative for chest pain and leg swelling.   Gastrointestinal: Negative for abdominal pain, blood in stool, nausea and vomiting.   Endocrine: Negative for cold intolerance and heat intolerance.   Genitourinary: Negative for dysuria and frequency.   Musculoskeletal: Negative for joint swelling, myalgias and neck pain.   Skin: Negative for color change and rash.        Skin lesion on chest   Neurological: Negative for dizziness, weakness, light-headedness and headaches.   Psychiatric/Behavioral: Positive for confusion. Negative for behavioral problems, decreased concentration, dysphoric mood and sleep disturbance. The patient is not nervous/anxious.      Objective:     Physical Exam   Constitutional: She is oriented to person, place, and time. She appears well-developed and well-nourished.   HENT:   Head: Normocephalic and atraumatic.   Right Ear: External ear normal.   Left Ear: External ear normal.   Mouth/Throat: Oropharynx is clear and moist.   Eyes: EOM are normal.   Neck: Normal range of motion. Neck supple. No thyromegaly present.   Cardiovascular: Normal rate and regular rhythm.  Exam reveals no  gallop and no friction rub.    No murmur heard.  Pulmonary/Chest: Effort normal. No respiratory distress. She has no wheezes. She has no rales.   Abdominal: Soft. Bowel sounds are normal. She exhibits no distension. There is no tenderness. There is no rebound.   Musculoskeletal: Normal range of motion. She exhibits no edema.   Lymphadenopathy:     She has no cervical adenopathy.   Neurological: She is alert and oriented to person, place, and time.   Skin: Skin is warm and dry. Lesion noted. No rash noted.        Psychiatric: She has a normal mood and affect. Her speech is normal and behavior is normal. Judgment and thought content normal. Cognition and memory are impaired. She exhibits abnormal recent memory and abnormal remote memory.     Assessment:          Essential hypertension     Thyroid disorder     Vascular dementia without behavioral disturbance     Sebaceous cyst     High serum vitamin D      Plan:   Irlanda was seen today for  memory loss and medication , high BP, referral for derm.    Diagnoses and all orders for this visit:    Essential hypertension-not controlled will add in lisinopril 10 with Hydrocort thiazide 12.5.  Follow-up with my physician assistant in 4 weeks for recheck of blood pressure.  Discussed benefits of the lisinopril to assist and reoccurrences of TIAs.  -     Lipid panel; Future  -     Comprehensive metabolic panel; Future  -     TSH; Future  -     T4, free; Future  -     CBC auto differential; Future  -     Vitamin D; Future    Thyroid disorder-controlled with [place] Thyroid at 30 mg continue labs reviewed  -     Lipid panel; Future  -     Comprehensive metabolic panel; Future  -     TSH; Future  -     T4, free; Future  -     CBC auto differential; Future  -     Vitamin D; Future    Vascular dementia without behavioral disturbance-continue with aspirin continue with atorvastatin 10, add lisinopril, begin back with Namenda at 5 mg twice daily for one month then 10 mg twice a day  -     " Lipid panel; Future  -     Comprehensive metabolic panel; Future  -     TSH; Future  -     T4, free; Future  -     CBC auto differential; Future  -     Vitamin D; Future    Sebaceous cyst-will refer to [facility] for possible removal  -     Ambulatory referral to Dermatology  -     Lipid panel; Future  -     Comprehensive metabolic panel; Future  -     TSH; Future  -     T4, free; Future  -     CBC auto differential; Future  -     Vitamin D; Future    High serum vitamin D-patient is now only on 1000 IUs daily.  Repeat labs next month  -     Vitamin D; Future    Other orders  -     memantine (NAMENDA) 10 MG Tab; 5mg po bid x 1 month, then 10mg po bid.  -     Discontinue: lisinopril-hydrochlorothiazide (PRINZIDE,ZESTORETIC) 10-12.5 mg per tablet; Take 1 tablet by mouth once daily.  -     lisinopril-hydrochlorothiazide (PRINZIDE,ZESTORETIC) 10-12.5 mg per tablet; Take 1 tablet by mouth once daily. For blood pressure "new one"            Return in about 4 months (around 1/14/2018), or with Dr Juana cortes, for F/u STEFFANIE IGNACIO , blood pressure 4 week.          "

## 2017-09-19 ENCOUNTER — TELEPHONE (OUTPATIENT)
Dept: INTERNAL MEDICINE | Facility: CLINIC | Age: 77
End: 2017-09-19

## 2017-09-19 DIAGNOSIS — R73.09 ABNORMAL GLUCOSE: Primary | ICD-10-CM

## 2017-09-21 ENCOUNTER — LAB VISIT (OUTPATIENT)
Dept: LAB | Facility: HOSPITAL | Age: 77
End: 2017-09-21
Attending: FAMILY MEDICINE
Payer: MEDICARE

## 2017-09-21 DIAGNOSIS — R73.09 ABNORMAL GLUCOSE: ICD-10-CM

## 2017-09-21 LAB
ANION GAP SERPL CALC-SCNC: 13 MMOL/L
BUN SERPL-MCNC: 17 MG/DL
CALCIUM SERPL-MCNC: 9.1 MG/DL
CHLORIDE SERPL-SCNC: 107 MMOL/L
CO2 SERPL-SCNC: 24 MMOL/L
CREAT SERPL-MCNC: 0.9 MG/DL
EST. GFR  (AFRICAN AMERICAN): >60 ML/MIN/1.73 M^2
EST. GFR  (NON AFRICAN AMERICAN): >60 ML/MIN/1.73 M^2
GLUCOSE SERPL-MCNC: 81 MG/DL
POTASSIUM SERPL-SCNC: 4.2 MMOL/L
SODIUM SERPL-SCNC: 144 MMOL/L

## 2017-09-21 PROCEDURE — 36415 COLL VENOUS BLD VENIPUNCTURE: CPT | Mod: PO

## 2017-09-21 PROCEDURE — 80048 BASIC METABOLIC PNL TOTAL CA: CPT

## 2017-09-21 PROCEDURE — 83036 HEMOGLOBIN GLYCOSYLATED A1C: CPT

## 2017-09-21 PROCEDURE — 83525 ASSAY OF INSULIN: CPT

## 2017-09-22 LAB
ESTIMATED AVG GLUCOSE: 111 MG/DL
HBA1C MFR BLD HPLC: 5.5 %
INSULIN COLLECTION INTERVAL: NORMAL
INSULIN SERPL-ACNC: 12.1 UU/ML

## 2017-10-11 RX ORDER — LISINOPRIL AND HYDROCHLOROTHIAZIDE 10; 12.5 MG/1; MG/1
TABLET ORAL
Qty: 30 TABLET | Refills: 11 | Status: SHIPPED | OUTPATIENT
Start: 2017-10-11 | End: 2018-10-22 | Stop reason: SDUPTHER

## 2017-10-12 ENCOUNTER — OFFICE VISIT (OUTPATIENT)
Dept: INTERNAL MEDICINE | Facility: CLINIC | Age: 77
End: 2017-10-12
Payer: MEDICARE

## 2017-10-12 VITALS
HEART RATE: 60 BPM | HEIGHT: 62 IN | TEMPERATURE: 98 F | DIASTOLIC BLOOD PRESSURE: 60 MMHG | BODY MASS INDEX: 31.65 KG/M2 | SYSTOLIC BLOOD PRESSURE: 106 MMHG | WEIGHT: 172 LBS

## 2017-10-12 DIAGNOSIS — F01.50 VASCULAR DEMENTIA WITHOUT BEHAVIORAL DISTURBANCE: ICD-10-CM

## 2017-10-12 DIAGNOSIS — I10 ESSENTIAL HYPERTENSION: Primary | ICD-10-CM

## 2017-10-12 PROCEDURE — 99213 OFFICE O/P EST LOW 20 MIN: CPT | Mod: S$GLB,,, | Performed by: PHYSICIAN ASSISTANT

## 2017-10-12 PROCEDURE — 99999 PR PBB SHADOW E&M-EST. PATIENT-LVL III: CPT | Mod: PBBFAC,,, | Performed by: PHYSICIAN ASSISTANT

## 2017-10-12 PROCEDURE — 99499 UNLISTED E&M SERVICE: CPT | Mod: S$GLB,,, | Performed by: PHYSICIAN ASSISTANT

## 2017-10-12 NOTE — PROGRESS NOTES
"Subjective:       Patient ID: Irlanda Lopez is a 77 y.o. female.    Chief Complaint: Follow-up    Patient comes in today for follow up BP. BP elevated at last visit.   Doing well, exercising regularly at home. She enjoys the exercise.    BP is well controlled today. Still having memory issues. Her daughter is not with her today.  She is cheerful today.    She is taking her BP medications regularly.  She has no complaints today.           Past Medical History:   Diagnosis Date    Hypertension     Thyroid disease        Current Outpatient Prescriptions   Medication Sig Dispense Refill    aspirin 81 MG Chew Take 1 tablet (81 mg total) by mouth once daily.      atorvastatin (LIPITOR) 10 MG tablet Take 1 tablet (10 mg total) by mouth once daily. 90 tablet 3    lisinopril-hydrochlorothiazide (PRINZIDE,ZESTORETIC) 10-12.5 mg per tablet TAKE 1 TABLET BY MOUTH DAILY FOR BLOOD PRESSURE 30 tablet 11    memantine (NAMENDA) 10 MG Tab 5mg po bid x 1 month, then 10mg po bid. 180 tablet 3    thyroid (ARMOUR THYROID) 30 mg Tab Take 1 tablet (30 mg total) by mouth once daily. 90 tablet 3    triamcinolone acetonide 0.1% (KENALOG) 0.1 % ointment Apply topically 4 (four) times daily. To rash on face and chest and nose 80 g 1     No current facility-administered medications for this visit.        Review of Systems   Constitutional: Negative.    HENT: Negative.    Eyes: Negative.    Respiratory: Negative.    Endocrine: Negative.    Genitourinary: Negative.    Neurological: Negative for dizziness, seizures, facial asymmetry and weakness.   Psychiatric/Behavioral: Positive for confusion. Negative for agitation and behavioral problems.       Objective:   /60   Pulse 60   Temp 97.9 °F (36.6 °C) (Tympanic)   Ht 5' 1.5" (1.562 m)   Wt 78 kg (172 lb)   BMI 31.97 kg/m²      Physical Exam   Constitutional: She is oriented to person, place, and time. She appears well-developed and well-nourished. No distress.   HENT:   Head: " Normocephalic and atraumatic.   Right Ear: Hearing, tympanic membrane and ear canal normal.   Left Ear: Hearing, tympanic membrane and ear canal normal.   Nose: No sinus tenderness. Right sinus exhibits no maxillary sinus tenderness and no frontal sinus tenderness. Left sinus exhibits no maxillary sinus tenderness and no frontal sinus tenderness.   Mouth/Throat: Uvula is midline and mucous membranes are normal. No posterior oropharyngeal edema, posterior oropharyngeal erythema or tonsillar abscesses.   Eyes: Conjunctivae are normal. Pupils are equal, round, and reactive to light.   Neck: Normal range of motion. Neck supple.   Cardiovascular: Normal rate, regular rhythm and normal heart sounds.    Neurological: She is alert and oriented to person, place, and time.   Skin: Skin is warm.   Psychiatric: She has a normal mood and affect. Her behavior is normal.   Vitals reviewed.        Lab Results   Component Value Date    WBC 7.02 10/04/2016    HGB 14.8 10/04/2016    HCT 44.1 10/04/2016     10/04/2016    CHOL 170 05/09/2017    TRIG 98 05/09/2017    HDL 55 05/09/2017    ALT 20 05/09/2017    AST 23 05/09/2017     09/21/2017    K 4.2 09/21/2017     09/21/2017    CREATININE 0.9 09/21/2017    BUN 17 09/21/2017    CO2 24 09/21/2017    TSH 1.644 10/04/2016    HGBA1C 5.5 09/21/2017       Assessment:       1. Essential hypertension    2. Vascular dementia without behavioral disturbance        Plan:   Essential hypertension- BP doing great. Patient seems to be taking medication   No changes today     Vascular dementia without behavioral disturbance- in good spirits, no acute issues

## 2017-10-13 ENCOUNTER — TELEPHONE (OUTPATIENT)
Dept: INTERNAL MEDICINE | Facility: CLINIC | Age: 77
End: 2017-10-13

## 2017-10-13 NOTE — TELEPHONE ENCOUNTER
----- Message from Susanna Ramirez sent at 10/13/2017  9:01 AM CDT -----  Contact: daughter Myla  Calling concerning patient's BP. states patient does not remember and caller wants an update on patient. Please call Myla @ [phone]. Thanks, jorge

## 2017-10-13 NOTE — TELEPHONE ENCOUNTER
"BP is ok on current meds. I can't recommend the "natural supplement" because I would need to see what are the ingredients in it.   "

## 2017-10-13 NOTE — TELEPHONE ENCOUNTER
pts daughter called to find out how visit on yesterday went for the follow up with her blood pressure. I have message into Angela about that.     She wanted me to also ask you if you think it is okay for her to take this natural medicine along with the namenda. It is ReVen - natural supplement? She stated that pt did not want to get back on the namenda after we agreed to put her back on it and she got this natural supplement from someone that she is seeing and has been using it. The daughter wants her to get back on the namenda and wanted to know your thoughts on this and if okay to take together? The reven is something that is suppose to help with her memory.

## 2017-10-13 NOTE — TELEPHONE ENCOUNTER
pts daughter would like to know how the visit went on yesterday. Stated that her mom came home very confused stating she didn't know what her blood pressure readings were and that she was told that she wasn't taking one of the meds that she should be.     I gave her the reading of blood pressure and that per note everything at visit was fine.     She stated that she does not take the namenda as pt declined wanting to get on this again.

## 2017-10-17 NOTE — TELEPHONE ENCOUNTER
Spoke with daughter   Explained BP looked great at visit   daughter states mom is now taking a supplement for memory. She cannot recall the name but will give us a call back with the name

## 2017-10-20 ENCOUNTER — TELEPHONE (OUTPATIENT)
Dept: INTERNAL MEDICINE | Facility: CLINIC | Age: 77
End: 2017-10-20

## 2017-10-20 NOTE — TELEPHONE ENCOUNTER
I called daughter back and advised her of dr colon say, and she states that she will bring a copy of whats in the supplement so  can see.aa

## 2017-11-04 RX ORDER — THYROID, PORCINE 30 MG/1
30 TABLET ORAL DAILY
Qty: 90 TABLET | Refills: 3 | Status: SHIPPED | OUTPATIENT
Start: 2017-11-04 | End: 2017-11-14 | Stop reason: SDUPTHER

## 2017-11-14 RX ORDER — THYROID 30 MG/1
30 TABLET ORAL DAILY
Qty: 90 TABLET | Refills: 3 | Status: SHIPPED | OUTPATIENT
Start: 2017-11-14 | End: 2017-11-30 | Stop reason: SDUPTHER

## 2017-11-14 NOTE — TELEPHONE ENCOUNTER
----- Message from Enriqueta Silverio sent at 11/14/2017  3:45 PM CST -----  Contact: zaheer /byran daughter   Call caller regarding if Tyroid med can be sent to [facility] so it can be sent to pt house.   ..[phone]

## 2017-11-30 RX ORDER — THYROID 30 MG/1
30 TABLET ORAL DAILY
Qty: 90 TABLET | Refills: 3 | Status: SHIPPED | OUTPATIENT
Start: 2017-11-30 | End: 2018-10-16 | Stop reason: SDUPTHER

## 2017-11-30 NOTE — TELEPHONE ENCOUNTER
----- Message from Blaire Mitchell sent at 11/30/2017 11:55 AM CST -----  Contact: pt  The pt states [employer] is asking that a response is sent for the pt thyroid medication, pt can be reached at [phone]///[id number]

## 2018-01-04 DIAGNOSIS — E07.9 THYROID DISORDER: Primary | ICD-10-CM

## 2018-01-08 ENCOUNTER — TELEPHONE (OUTPATIENT)
Dept: INTERNAL MEDICINE | Facility: CLINIC | Age: 78
End: 2018-01-08

## 2018-01-08 NOTE — TELEPHONE ENCOUNTER
----- Message from Linda Birch MA sent at 1/8/2018  4:18 PM CST -----  Pt. Would like to make sure she has Power of /Living Will in her file...Thanks for signing up for [employer]!    To access your account, go to [email or web address] and enter your [facility] Username and password.    If you have forgotten your Username and/or password, click the Forgot listedplaces Username? or Forgot Password? links to recover your login information.    If you have any further difficulties accessing your account, please call [employer]PL[employer] REAL # or email [email or web address].    listedplaces Username: _____________________    listedplaces Password: _____________________   you

## 2018-01-11 ENCOUNTER — LAB VISIT (OUTPATIENT)
Dept: LAB | Facility: HOSPITAL | Age: 78
End: 2018-01-11
Attending: FAMILY MEDICINE
Payer: MEDICARE

## 2018-01-11 DIAGNOSIS — Z00.00 ROUTINE GENERAL MEDICAL EXAMINATION AT A HEALTH CARE FACILITY: ICD-10-CM

## 2018-01-11 DIAGNOSIS — E07.9 THYROID DISORDER: ICD-10-CM

## 2018-01-11 DIAGNOSIS — R79.89 HIGH SERUM VITAMIN D: ICD-10-CM

## 2018-01-11 DIAGNOSIS — I10 ESSENTIAL HYPERTENSION: ICD-10-CM

## 2018-01-11 DIAGNOSIS — F01.50 VASCULAR DEMENTIA WITHOUT BEHAVIORAL DISTURBANCE: ICD-10-CM

## 2018-01-11 DIAGNOSIS — L72.3 SEBACEOUS CYST: ICD-10-CM

## 2018-01-11 LAB
25(OH)D3+25(OH)D2 SERPL-MCNC: 57 NG/ML
ALBUMIN SERPL BCP-MCNC: 3.6 G/DL
ALP SERPL-CCNC: 64 U/L
ALT SERPL W/O P-5'-P-CCNC: 20 U/L
ANION GAP SERPL CALC-SCNC: 12 MMOL/L
AST SERPL-CCNC: 23 U/L
BASOPHILS # BLD AUTO: 0.05 K/UL
BASOPHILS NFR BLD: 0.6 %
BILIRUB SERPL-MCNC: 1 MG/DL
BUN SERPL-MCNC: 15 MG/DL
CALCIUM SERPL-MCNC: 9.8 MG/DL
CHLORIDE SERPL-SCNC: 109 MMOL/L
CHOLEST SERPL-MCNC: 166 MG/DL
CHOLEST/HDLC SERPL: 3.4 {RATIO}
CO2 SERPL-SCNC: 23 MMOL/L
CREAT SERPL-MCNC: 0.9 MG/DL
DIFFERENTIAL METHOD: NORMAL
EOSINOPHIL # BLD AUTO: 0.4 K/UL
EOSINOPHIL NFR BLD: 5.6 %
ERYTHROCYTE [DISTWIDTH] IN BLOOD BY AUTOMATED COUNT: 14.1 %
EST. GFR  (AFRICAN AMERICAN): >60 ML/MIN/1.73 M^2
EST. GFR  (NON AFRICAN AMERICAN): >60 ML/MIN/1.73 M^2
GLUCOSE SERPL-MCNC: 89 MG/DL
HCT VFR BLD AUTO: 43.4 %
HDLC SERPL-MCNC: 49 MG/DL
HDLC SERPL: 29.5 %
HGB BLD-MCNC: 14.1 G/DL
IMM GRANULOCYTES # BLD AUTO: 0.04 K/UL
IMM GRANULOCYTES NFR BLD AUTO: 0.5 %
LDLC SERPL CALC-MCNC: 100.6 MG/DL
LYMPHOCYTES # BLD AUTO: 2.4 K/UL
LYMPHOCYTES NFR BLD: 30.8 %
MCH RBC QN AUTO: 28.3 PG
MCHC RBC AUTO-ENTMCNC: 32.5 G/DL
MCV RBC AUTO: 87 FL
MONOCYTES # BLD AUTO: 0.8 K/UL
MONOCYTES NFR BLD: 9.5 %
NEUTROPHILS # BLD AUTO: 4.2 K/UL
NEUTROPHILS NFR BLD: 53 %
NONHDLC SERPL-MCNC: 117 MG/DL
NRBC BLD-RTO: 0 /100 WBC
PLATELET # BLD AUTO: 234 K/UL
PMV BLD AUTO: 10.7 FL
POTASSIUM SERPL-SCNC: 4 MMOL/L
PROT SERPL-MCNC: 6.9 G/DL
RBC # BLD AUTO: 4.99 M/UL
SODIUM SERPL-SCNC: 144 MMOL/L
T3FREE SERPL-MCNC: 2.7 PG/ML
T4 FREE SERPL-MCNC: 1.01 NG/DL
TRIGL SERPL-MCNC: 82 MG/DL
TSH SERPL DL<=0.005 MIU/L-ACNC: 1.54 UIU/ML
WBC # BLD AUTO: 7.92 K/UL

## 2018-01-11 PROCEDURE — 85025 COMPLETE CBC W/AUTO DIFF WBC: CPT

## 2018-01-11 PROCEDURE — 80061 LIPID PANEL: CPT

## 2018-01-11 PROCEDURE — 82306 VITAMIN D 25 HYDROXY: CPT

## 2018-01-11 PROCEDURE — 84439 ASSAY OF FREE THYROXINE: CPT

## 2018-01-11 PROCEDURE — 36415 COLL VENOUS BLD VENIPUNCTURE: CPT | Mod: PO

## 2018-01-11 PROCEDURE — 84481 FREE ASSAY (FT-3): CPT

## 2018-01-11 PROCEDURE — 80053 COMPREHEN METABOLIC PANEL: CPT

## 2018-01-11 PROCEDURE — 84443 ASSAY THYROID STIM HORMONE: CPT

## 2018-01-18 ENCOUNTER — OFFICE VISIT (OUTPATIENT)
Dept: INTERNAL MEDICINE | Facility: CLINIC | Age: 78
End: 2018-01-18
Payer: MEDICARE

## 2018-01-18 VITALS
BODY MASS INDEX: 31.16 KG/M2 | SYSTOLIC BLOOD PRESSURE: 130 MMHG | HEIGHT: 62 IN | TEMPERATURE: 98 F | WEIGHT: 169.31 LBS | DIASTOLIC BLOOD PRESSURE: 82 MMHG

## 2018-01-18 DIAGNOSIS — E07.9 THYROID DISORDER: ICD-10-CM

## 2018-01-18 DIAGNOSIS — I10 ESSENTIAL HYPERTENSION: ICD-10-CM

## 2018-01-18 DIAGNOSIS — L30.9 DERMATITIS: ICD-10-CM

## 2018-01-18 DIAGNOSIS — Z00.00 ROUTINE GENERAL MEDICAL EXAMINATION AT A HEALTH CARE FACILITY: Primary | ICD-10-CM

## 2018-01-18 DIAGNOSIS — F01.50 VASCULAR DEMENTIA WITHOUT BEHAVIORAL DISTURBANCE: ICD-10-CM

## 2018-01-18 PROCEDURE — 99499 UNLISTED E&M SERVICE: CPT | Mod: S$GLB,,, | Performed by: FAMILY MEDICINE

## 2018-01-18 PROCEDURE — 99999 PR PBB SHADOW E&M-EST. PATIENT-LVL III: CPT | Mod: PBBFAC,,, | Performed by: FAMILY MEDICINE

## 2018-01-18 PROCEDURE — 99397 PER PM REEVAL EST PAT 65+ YR: CPT | Mod: S$GLB,,, | Performed by: FAMILY MEDICINE

## 2018-01-18 NOTE — PROGRESS NOTES
Subjective:      Patient ID: Irlanda Lopez is a 77 y.o. female.    Chief Complaint: Annual Exam    Disclaimer:  This note is prepared using voice recognition software and as such is likely to have errors and has not been proof read. Please contact me for questions.     Patient is coming in today for a  follow-up for chronic issues.  Since I last saw her she is now back on the Namenda 10 mg twice daily.  Does once again have a rash but the daughter swears it's not related to this medication.  They believe that she was already on the Namenda before the rash started last time.  This time she has a rash mainly behind her ears on her temples and on her extensor surfaces of her hands.  It's not itchy at this time.  They're uncertain however she might be using some new hand cream.  She does still have some steroid cream at home.  She was referred in the past to the dermatologist but did not go.    From a memory standpoint she is on the Namenda 10 mg twice daily.  They did see Dr. Lopez at [facility].  They currently don't have any follow-up.  The daughter Faiza is frustrated because the patient's very forgetful about even appointments about taking her medicine or even if she eats.  At this time I did discuss with her that basically the next step is to consider some interventions from the family side.  This would be either having accompany such as visiting [place], out and assess her or help out each and every day or possibly having some rotation of family members to come to help the check in on the patient.  Also was advise to do exercise get good rest heat good well and is socializing as she can to help prevent further memory loss.  We also discussed medication additions such as Aricept.  I'm happy to initiate this for her but I want the rash to be cleared before starting another new medication.  For this reason they were okay with that.      She's actually doing well with the atorvastatin 10 mg.  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  For this reason she needs to stay on this medication at least to the age of 86.  She's also currently on aspirin 81 mg therapy.    Today her blood pressure is controlled.  She's on the lisinopril hydrochlorothiazide    From a thyroid standpoint she's doing well with [place] Thyroid and labs are normal.    From a vitamin D standpoint she is now not over supplemented.  Only taking thousand IUs daily.             Lab Results   Component Value Date    WBC 7.92 01/11/2018    HGB 14.1 01/11/2018    HCT 43.4 01/11/2018     01/11/2018    CHOL 166 01/11/2018    TRIG 82 01/11/2018    HDL 49 01/11/2018    ALT 20 01/11/2018    AST 23 01/11/2018     01/11/2018    K 4.0 01/11/2018     01/11/2018    CREATININE 0.9 01/11/2018    BUN 15 01/11/2018    CO2 23 01/11/2018    TSH 1.539 01/11/2018    HGBA1C 5.5 09/21/2017       Review of Systems   Constitutional: Negative for activity change, appetite change, fatigue and fever.   Respiratory: Negative for cough and shortness of breath.    Cardiovascular: Negative for chest pain and palpitations.   Gastrointestinal: Negative for abdominal distention and abdominal pain.   Skin: Positive for color change and rash.   Neurological: Negative for tremors and headaches.   Psychiatric/Behavioral: Positive for confusion. Negative for behavioral problems, decreased concentration, dysphoric mood and sleep disturbance.     Objective:     Vitals:    01/18/18 1417   BP: 130/82   Temp: 97.9 °F (36.6 °C)   TempSrc: Tympanic   Weight: 76.8 kg (169 lb 5 oz)   Height: 5' 2" (1.575 m)     Physical Exam   Constitutional: She appears well-developed and well-nourished.   HENT:   Head: Normocephalic and atraumatic.   Right Ear: Tympanic membrane normal.   Left Ear: Tympanic membrane normal.   Mouth/Throat: Oropharynx is clear and moist.   Eyes: Conjunctivae and EOM are normal.   Neck: Normal range of motion. " Neck supple.   Cardiovascular: Normal rate and regular rhythm.    Pulmonary/Chest: Effort normal and breath sounds normal.   Skin: Skin is warm and dry. Rash noted. Rash is macular.        Psychiatric: She has a normal mood and affect. Her speech is normal and behavior is normal. Cognition and memory are impaired. She exhibits abnormal recent memory and abnormal remote memory.   Nursing note and vitals reviewed.         1. Routine general medical examination at a health care facility    2. Dermatitis    3. Essential hypertension    4. Thyroid disorder    5. Vascular dementia without behavioral disturbance      Plan:   Irlanda was seen today for annual exam.    Diagnoses and all orders for this visit:    Routine general medical examination at a health care facility- - labs reviewed. Discussed Health Maintenance issues.       Dermatitis- new.   Comments:  stop all topicals. add steroid and lubriderm. if not improved in 1 week, then see [facility].   Orders:  -     Ambulatory referral to Dermatology    Essential hypertension - stable, Continue with current medications and interventions. Labs reviewed.       Thyroid disorder - stable, Continue with current medications and interventions. Labs reviewed.       Vascular dementia without behavioral disturbance  Comments:  on namenda, once dermatitis is cleared, will call in aricept 5mg to start at night.  Discussed ways for her family to intervene such as including a sitter or possibly even getting some additional aid.  Also did advise exercise.  Can continue to follow-up with the neurologist as scheduled.            Follow-up in about 6 months (around 7/18/2018) for chronic issues Dr Arias.

## 2018-02-28 ENCOUNTER — OFFICE VISIT (OUTPATIENT)
Dept: URGENT CARE | Facility: CLINIC | Age: 78
End: 2018-02-28
Payer: MEDICARE

## 2018-02-28 ENCOUNTER — LAB VISIT (OUTPATIENT)
Dept: LAB | Facility: HOSPITAL | Age: 78
End: 2018-02-28
Attending: NURSE PRACTITIONER
Payer: MEDICARE

## 2018-02-28 VITALS
HEART RATE: 60 BPM | BODY MASS INDEX: 31.64 KG/M2 | RESPIRATION RATE: 16 BRPM | OXYGEN SATURATION: 98 % | HEIGHT: 62 IN | WEIGHT: 171.94 LBS | TEMPERATURE: 98 F

## 2018-02-28 DIAGNOSIS — T14.8XXA BRUISING: ICD-10-CM

## 2018-02-28 DIAGNOSIS — T14.8XXA BRUISING: Primary | ICD-10-CM

## 2018-02-28 LAB
ALBUMIN SERPL BCP-MCNC: 4 G/DL
ALP SERPL-CCNC: 61 U/L
ALT SERPL W/O P-5'-P-CCNC: 25 U/L
ANION GAP SERPL CALC-SCNC: 12 MMOL/L
AST SERPL-CCNC: 24 U/L
BASOPHILS # BLD AUTO: 0.03 K/UL
BASOPHILS NFR BLD: 0.3 %
BILIRUB SERPL-MCNC: 0.5 MG/DL
BUN SERPL-MCNC: 13 MG/DL
CALCIUM SERPL-MCNC: 9.5 MG/DL
CHLORIDE SERPL-SCNC: 104 MMOL/L
CO2 SERPL-SCNC: 22 MMOL/L
CREAT SERPL-MCNC: 0.9 MG/DL
DIFFERENTIAL METHOD: ABNORMAL
EOSINOPHIL # BLD AUTO: 0.4 K/UL
EOSINOPHIL NFR BLD: 4.4 %
ERYTHROCYTE [DISTWIDTH] IN BLOOD BY AUTOMATED COUNT: 13.8 %
EST. GFR  (AFRICAN AMERICAN): >60 ML/MIN/1.73 M^2
EST. GFR  (NON AFRICAN AMERICAN): >60 ML/MIN/1.73 M^2
GLUCOSE SERPL-MCNC: 94 MG/DL
HCT VFR BLD AUTO: 44.1 %
HGB BLD-MCNC: 14.5 G/DL
LYMPHOCYTES # BLD AUTO: 3.4 K/UL
LYMPHOCYTES NFR BLD: 35.1 %
MCH RBC QN AUTO: 28.8 PG
MCHC RBC AUTO-ENTMCNC: 32.9 G/DL
MCV RBC AUTO: 88 FL
MONOCYTES # BLD AUTO: 1.1 K/UL
MONOCYTES NFR BLD: 11.1 %
NEUTROPHILS # BLD AUTO: 4.7 K/UL
NEUTROPHILS NFR BLD: 49.1 %
PLATELET # BLD AUTO: 218 K/UL
PMV BLD AUTO: 10.4 FL
POTASSIUM SERPL-SCNC: 3.7 MMOL/L
PROT SERPL-MCNC: 6.9 G/DL
RBC # BLD AUTO: 5.04 M/UL
SODIUM SERPL-SCNC: 138 MMOL/L
WBC # BLD AUTO: 9.54 K/UL

## 2018-02-28 PROCEDURE — 99214 OFFICE O/P EST MOD 30 MIN: CPT | Mod: S$GLB,,, | Performed by: NURSE PRACTITIONER

## 2018-02-28 PROCEDURE — 99999 PR PBB SHADOW E&M-EST. PATIENT-LVL III: CPT | Mod: PBBFAC,,, | Performed by: NURSE PRACTITIONER

## 2018-02-28 PROCEDURE — 85025 COMPLETE CBC W/AUTO DIFF WBC: CPT | Mod: PO

## 2018-02-28 PROCEDURE — 80053 COMPREHEN METABOLIC PANEL: CPT | Mod: PO

## 2018-02-28 PROCEDURE — 36415 COLL VENOUS BLD VENIPUNCTURE: CPT | Mod: PO

## 2018-02-28 NOTE — PROGRESS NOTES
Subjective:       Patient ID: Irlanda Lopez is a 77 y.o. female.    Chief Complaint: Bleeding/Bruising    Pt is a 77 year old female to clinic today with complaints of bruising to her bilateral upper extremities that began 2-3 months ago.       Review of Systems   Constitutional: Negative for chills, diaphoresis, fatigue and fever.   HENT: Negative for congestion, sinus pressure and sore throat.    Eyes: Negative for pain.   Respiratory: Negative for cough, chest tightness, shortness of breath and wheezing.    Cardiovascular: Negative for chest pain and palpitations.   Gastrointestinal: Negative for abdominal pain, diarrhea, nausea and vomiting.   Genitourinary: Negative for dysuria.   Musculoskeletal: Negative for back pain, myalgias and neck pain.   Skin: Positive for color change (bilateral UE). Negative for rash.   Neurological: Negative for dizziness, light-headedness and headaches.       Objective:      Physical Exam   Constitutional: She is oriented to person, place, and time. She appears well-developed and well-nourished. No distress.   HENT:   Head: Normocephalic.   Right Ear: External ear normal.   Left Ear: External ear normal.   Nose: Nose normal.   Eyes: Pupils are equal, round, and reactive to light.   Neurological: She is alert and oriented to person, place, and time.   Skin: Skin is warm and dry. Capillary refill takes less than 2 seconds. Bruising noted. No rash noted. She is not diaphoretic. No erythema.        Psychiatric: She has a normal mood and affect. Her speech is normal and behavior is normal. Thought content normal.   Confusion noted as pts asks same question multiple times during visit.    Nursing note and vitals reviewed.      Assessment:       1. Bruising        Plan:   Bruising  -     CBC auto differential; Future; Expected date: 02/28/2018  -     Comprehensive metabolic panel; Future; Expected date: 02/28/2018      Therapeutic CBC/CMP. Will notify of abnormal results.   Recommend  warm compresses to affected area.    Follow prescribed treatment plan as directed.  Stay hydrated and rest.  Report to ER if symptoms worsen.  Follow up with PCP in 2-3 days or sooner if symptoms do not improve.

## 2018-02-28 NOTE — PATIENT INSTRUCTIONS
Using Blood Thinners (Anticoagulants)  Blood thinners or anticoagulants are medicines that help prevent blood clots from forming. They include warfarin, heparin, dabigatran, rivaroxaban, apixaban,and edoxaban. Your health care provider will help you decide which medicine is best for you.    Taking an anticoagulant safely  When you are taking a blood thinner, you will need to take certain steps to stay safe. Too much blood thinner puts you at risk for bleeding. Too little puts you at risk for stroke. Follow these guidelines. Also follow any others that your health care provider gives you.  · You may be told you need regular lab testing while taking these medicines. Warfarin requires routine testing to blood-thinning level testing while the other medicines do not.  · Tell your doctor about all medicines you take. This includes over the counter medicines, supplements, or herbal remedies. Do not take any medicines (including ones you buy over the counter) that your doctor doesnt know about. Some medicines can interact with blood thinners and cause serious problems.  · Tell any health care provider that you see for care (such as doctors, dentists, chiropractors, home health nurses) that you take a blood thinner.  · Carry a medical ID card or wear a medical-alert bracelet that says you take an anticoagulant.  · Before taking aspirin, check with your doctor. Aspirin can significantly increase your risk of bleeding.  · This medicine makes bleeding harder to stop. To protect yourself:  ¨ Avoid activities that may cause injury. If you fall or are injured, contact your health care provider right away. Blood thinners prevent clotting, so you could be bleeding inside without realizing it.  ¨ Use a soft-bristle toothbrush and waxed dental floss. Shave with an electric razor rather than a blade.  ¨ Dont go barefoot. Dont trim corns or calluses yourself.  Warfarin: Other important information  Several precautions are  especially important when you are taking warfarin. Always keep these points in mind:  · Be sure to follow your health care provider's instructions for taking warfarin.  · Take this medicine at the same time each day. Take it with a full glass of water, with or without food. If you miss a dose, contact your doctor to find out how much to take. Avoid takinga double dose.  · Warfarin is an effective drug, but it can be dangerous if not taken properly. It makes your blood less likely to form clots. If you take too much, it can cause serious internal or external bleeding.  · You will need to have regular monitoring while you are taking warfarin. This includes blood tests to check your international normalized ratio (INR) and prothrombin time (PT). These tests show how quickly your blood clots. You will also have a complete blood count (CBC) periodically. This looks at your blood and platelet levels. Both of these need to be followed while you're on warfarin. Talk with your health care provider about whether you need to visit the clinic every week, or if services are available for monitoring in your home.  · Certain medicines can affect your INR and PT levels. Tell your health care provider if there are any changes in your medicines. This includes any over-the-counter medicines, supplements like vitamin K, or herbal remedies.  · Your diet can also affect your INR and PT levels. Because of this, it's important to eat a consistent diet. It is especially important to eat a consistent amount of foods that are high in vitamin K. Be sure to talk with your health care provider before making any big changes in your diet.  · Remember that warfarin increases your risk of bleeding. Be careful not to injure yourself. If you have a significant injury, contact your health care provider right away. It's important to alert your doctor if you've fallen or hurt yourself, even if you don't break your skin. You could be bleeding inside your  body without realizing it.     Warfarin: Watch your INR/PT blood levels  Two tests are used to find out how your blood is clotting. One is protime (PT), the other is the international normalized ratio (INR).  · Go for your blood tests (INR/PT) as often as directed. Your diet and the other medicines you take can affect your INR/PT levels.  · Your INR was between ___ and ___.  · Ask your doctor what your goal INR is. My goal INR is between ___ and ___.  · My next INR/PT blood draw is due on _____________ (date) at ___________ (time) by ___________ (name of doctor or clinic).  · The name of the doctor who is monitoring my anticoagulation therapy is _____________________ and the phone number is _________________.  · Follow up with your doctor or as advised by his or her staff. It usually takes a few hours for your doctor to get the results of your clotting tests. Call to get your lab results to find out if your doctor needs to make further changes to your warfarin dose.  · If your blood is drawn for these tests at a location other than your doctor's office, tell your doctor as soon as you get your lab results.   Warfarin: Watch what you eat  Vitamin K helps your blood clot. So you have to watch how much you eat of foods that contain vitamin K. These foods can affect the way warfarin works. They do not affect the other non-warfarin blood thinners.Here are some specific tips:  · Try to keep your diet about the same each day. If you change your diet for any reason, such as for illness or to lose weight, be sure to tell your doctor.  · Each day, eat the same amount of foods that are high in vitamin K. These include asparagus, avocado, broccoli, cabbage, kale, spinach, and some other leafy green vegetables. Oils, such as soybean, canola, and olive oils, are also high in vitamin K.  · Limit fats to 2 to 4 tablespoons a day.  · Ask your health care provider if you should avoid alcohol while you are taking a blood  thinner.  · Avoid teas that contain sweet clover, sweet luciano, or tonka beans. These can affect how your medicine works.  · Talk with your health care provider and pharmacist about specific foods or special diets that can affect anticoagulant levels. These include grapefruit juice, cranberries and cranberry juice, fish oil supplements, garlic, trey, licorice, turmeric, and herbal teas and supplements.  Talk with your health care provider if you have concerns about these or other food products and their effects on warfarin.     When to call your doctor if youre taking an anticoagulant  Call your doctor right away if you have any of these:  · Bleeding that doesnt stop in 10 minutes  · A heavier-than-normal menstrual period or bleeding between periods  · Coughing or throwing up blood  · Bloody diarrhea or bleeding hemorrhoids   · Dark-colored urine or black stools  · Red or black-and-blue marks on the skin that get larger  · Dizziness or fatigue  · Chest pain or trouble breathing  Allergic reactions:  · Rash  · Itching  · Swelling  · Trouble swallowing or breathing   Medical conditions and anticoagulants  Before starting a blood thinner, be sure your doctor knows if you have any of these conditions:  · Stomach ulcer now or in the past  · Vomited blood or had bloody stools (black or red color)  · Aneurysm, pericarditis, or pericardial effusion  · Blood disorder  · Recent surgery, stroke, mini-stroke, or spinal puncture  · Kidney or liver disease, uncontrolled high blood pressure, diabetes, vasculitis, heart failure, lupus, or other collagen-vascular disease, or high cholesterol  · Pregnancy or breastfeeding  · Younger than 18 years old  · Recent or planned dental procedure  Drug interactions and anticoagulants  Many medicines interfere with the effect of blood thinners. Before starting these medicines, be sure your doctor knows about any prescription, over-the-counter, or herbal supplements you take. In  particular, tell your provider about:  · Antibiotics  · Heart medicines  · Cimetidine  · Aspirin or other anti-inflammatory drugs such as ibuprofen, naproxen, ketoprofen, or other arthritis medicines  · Drugs for depression, cancer HIV (protease inhibitors), diabetes, seizures, gout, high cholesterol, or thyroid replacement  · Vitamins containing vitamin K or herbal products such as ginkgo, Co-Q10, garlic, or [place]'s wort     Note: This information topic may not include all directions, precautions, medical conditions, drug/food interactions, and warnings for these medicines. Check with your doctor, nurse, or pharmacist for any questions you have.    Date Last Reviewed: 6/8/2015  © 2076-3063 [employer]. [address] [place] [address], [place] [address]. All rights reserved. This information is not intended as a substitute for professional medical care. Always follow your healthcare professional's instructions.

## 2018-03-01 ENCOUNTER — TELEPHONE (OUTPATIENT)
Dept: URGENT CARE | Facility: CLINIC | Age: 78
End: 2018-03-01

## 2018-03-01 NOTE — TELEPHONE ENCOUNTER
Spoke with pt daughter informed her that pt labs is normal. Pt daughter zaheer stated understanding. Thanks for the call.

## 2018-03-01 NOTE — TELEPHONE ENCOUNTER
----- Message from Mana Mosqueda sent at 3/1/2018 10:17 AM CST -----  Contact: pt  Calling in regards to lab results and please advise [phone]

## 2018-05-01 RX ORDER — ATORVASTATIN CALCIUM 10 MG/1
TABLET, FILM COATED ORAL
Qty: 90 TABLET | Refills: 3 | Status: SHIPPED | OUTPATIENT
Start: 2018-05-01 | End: 2019-01-10 | Stop reason: SDUPTHER

## 2018-06-27 ENCOUNTER — PES CALL (OUTPATIENT)
Dept: ADMINISTRATIVE | Facility: CLINIC | Age: 78
End: 2018-06-27

## 2018-07-17 ENCOUNTER — TELEPHONE (OUTPATIENT)
Dept: INTERNAL MEDICINE | Facility: CLINIC | Age: 78
End: 2018-07-17

## 2018-07-17 NOTE — TELEPHONE ENCOUNTER
Would need to go to ED for further evaluation which would include ct scan or MRI Brain which we cannot do here urgently. Also I don't have any availabilities the rest of this week, so ED is recommended.

## 2018-07-17 NOTE — TELEPHONE ENCOUNTER
Spoke with phone desk rep. Stated that pt daughter was on the line think pt had a stroke. Spoke with pt daughter stated that she was calling to speak with dr colon. Ms saini stated that her mom was driving going to [employer] and end up at the [facility] in [place]. Ms saini stated that her mother called her son to come pick her up because she do not remember how she got to the [facility]. Ms saini stated that her mom was confused and could not remember anything. Ms saini stated that they think there mom had a stroke. Ms saini stated that she did not know what to do. Ms saini stated that her brother is with her mom at this time. Ms saini is in route to her mothers house. Ms saini stated that he mom have an appt tomorrow with dr colon. informed ms saini if they think her mom had a stroke to bring her mother to the nearest emergency room. heriberto stated that she is not sure if she need to go to the er at this point. Informed ms saini that if this is a stroke the er is where the pt would need to be. Ms saini informed that are different test that can be done for her mother.ms saini was informed that time is as very important with these diagnosis.  Informed ms saini that message will be sent to dr colon. Ms saini can be reach at  [phone]

## 2018-07-18 ENCOUNTER — OFFICE VISIT (OUTPATIENT)
Dept: INTERNAL MEDICINE | Facility: CLINIC | Age: 78
End: 2018-07-18
Payer: MEDICARE

## 2018-07-18 ENCOUNTER — LAB VISIT (OUTPATIENT)
Dept: LAB | Facility: HOSPITAL | Age: 78
End: 2018-07-18
Attending: FAMILY MEDICINE
Payer: MEDICARE

## 2018-07-18 VITALS
SYSTOLIC BLOOD PRESSURE: 122 MMHG | HEIGHT: 62 IN | TEMPERATURE: 98 F | WEIGHT: 167.56 LBS | DIASTOLIC BLOOD PRESSURE: 68 MMHG | HEART RATE: 64 BPM | BODY MASS INDEX: 30.83 KG/M2

## 2018-07-18 DIAGNOSIS — R41.0 CONFUSION: ICD-10-CM

## 2018-07-18 DIAGNOSIS — F01.50 VASCULAR DEMENTIA WITHOUT BEHAVIORAL DISTURBANCE: Primary | ICD-10-CM

## 2018-07-18 DIAGNOSIS — R73.09 ABNORMAL GLUCOSE: ICD-10-CM

## 2018-07-18 DIAGNOSIS — E07.9 THYROID DISORDER: ICD-10-CM

## 2018-07-18 DIAGNOSIS — I10 ESSENTIAL HYPERTENSION: ICD-10-CM

## 2018-07-18 DIAGNOSIS — F01.50 VASCULAR DEMENTIA WITHOUT BEHAVIORAL DISTURBANCE: ICD-10-CM

## 2018-07-18 DIAGNOSIS — E55.9 VITAMIN D DEFICIENCY: ICD-10-CM

## 2018-07-18 LAB
25(OH)D3+25(OH)D2 SERPL-MCNC: 70 NG/ML
ALBUMIN SERPL BCP-MCNC: 3.8 G/DL
ALP SERPL-CCNC: 66 U/L
ALT SERPL W/O P-5'-P-CCNC: 17 U/L
ANION GAP SERPL CALC-SCNC: 10 MMOL/L
AST SERPL-CCNC: 22 U/L
BASOPHILS # BLD AUTO: 0.05 K/UL
BASOPHILS NFR BLD: 0.5 %
BILIRUB SERPL-MCNC: 0.7 MG/DL
BUN SERPL-MCNC: 19 MG/DL
CALCIUM SERPL-MCNC: 9.8 MG/DL
CHLORIDE SERPL-SCNC: 103 MMOL/L
CHOLEST SERPL-MCNC: 212 MG/DL
CHOLEST/HDLC SERPL: 4.6 {RATIO}
CO2 SERPL-SCNC: 26 MMOL/L
CREAT SERPL-MCNC: 1.4 MG/DL
DIFFERENTIAL METHOD: ABNORMAL
EOSINOPHIL # BLD AUTO: 0.4 K/UL
EOSINOPHIL NFR BLD: 4.3 %
ERYTHROCYTE [DISTWIDTH] IN BLOOD BY AUTOMATED COUNT: 13.4 %
EST. GFR  (AFRICAN AMERICAN): 42.1 ML/MIN/1.73 M^2
EST. GFR  (NON AFRICAN AMERICAN): 36.5 ML/MIN/1.73 M^2
GLUCOSE SERPL-MCNC: 89 MG/DL
HCT VFR BLD AUTO: 44.9 %
HDLC SERPL-MCNC: 46 MG/DL
HDLC SERPL: 21.7 %
HGB BLD-MCNC: 14.7 G/DL
IMM GRANULOCYTES # BLD AUTO: 0.08 K/UL
IMM GRANULOCYTES NFR BLD AUTO: 0.9 %
LDLC SERPL CALC-MCNC: 122.2 MG/DL
LYMPHOCYTES # BLD AUTO: 2.5 K/UL
LYMPHOCYTES NFR BLD: 27.5 %
MCH RBC QN AUTO: 29.3 PG
MCHC RBC AUTO-ENTMCNC: 32.7 G/DL
MCV RBC AUTO: 89 FL
MONOCYTES # BLD AUTO: 0.7 K/UL
MONOCYTES NFR BLD: 7.5 %
NEUTROPHILS # BLD AUTO: 5.5 K/UL
NEUTROPHILS NFR BLD: 59.3 %
NONHDLC SERPL-MCNC: 166 MG/DL
NRBC BLD-RTO: 0 /100 WBC
PLATELET # BLD AUTO: 255 K/UL
PMV BLD AUTO: 10.7 FL
POTASSIUM SERPL-SCNC: 3.9 MMOL/L
PROT SERPL-MCNC: 6.8 G/DL
RBC # BLD AUTO: 5.02 M/UL
SODIUM SERPL-SCNC: 139 MMOL/L
T3FREE SERPL-MCNC: 2.9 PG/ML
T4 FREE SERPL-MCNC: 0.98 NG/DL
TRIGL SERPL-MCNC: 219 MG/DL
TSH SERPL DL<=0.005 MIU/L-ACNC: 1.21 UIU/ML
WBC # BLD AUTO: 9.2 K/UL

## 2018-07-18 PROCEDURE — 85025 COMPLETE CBC W/AUTO DIFF WBC: CPT

## 2018-07-18 PROCEDURE — 80061 LIPID PANEL: CPT

## 2018-07-18 PROCEDURE — 80053 COMPREHEN METABOLIC PANEL: CPT

## 2018-07-18 PROCEDURE — 84443 ASSAY THYROID STIM HORMONE: CPT

## 2018-07-18 PROCEDURE — 99999 PR PBB SHADOW E&M-EST. PATIENT-LVL III: CPT | Mod: PBBFAC,,, | Performed by: FAMILY MEDICINE

## 2018-07-18 PROCEDURE — 36415 COLL VENOUS BLD VENIPUNCTURE: CPT | Mod: PO

## 2018-07-18 PROCEDURE — 99215 OFFICE O/P EST HI 40 MIN: CPT | Mod: S$GLB,,, | Performed by: FAMILY MEDICINE

## 2018-07-18 PROCEDURE — 3074F SYST BP LT 130 MM HG: CPT | Mod: CPTII,S$GLB,, | Performed by: FAMILY MEDICINE

## 2018-07-18 PROCEDURE — 84481 FREE ASSAY (FT-3): CPT

## 2018-07-18 PROCEDURE — 83036 HEMOGLOBIN GLYCOSYLATED A1C: CPT

## 2018-07-18 PROCEDURE — 84439 ASSAY OF FREE THYROXINE: CPT

## 2018-07-18 PROCEDURE — 82306 VITAMIN D 25 HYDROXY: CPT

## 2018-07-18 PROCEDURE — 3078F DIAST BP <80 MM HG: CPT | Mod: CPTII,S$GLB,, | Performed by: FAMILY MEDICINE

## 2018-07-18 NOTE — PROGRESS NOTES
Subjective:      Patient ID: Irlanda Lopez is a 77 y.o. female.    Chief Complaint: Follow-up (6 months )    Disclaimer:  This note is prepared using voice recognition software and as such is likely to have errors and has not been proof read. Please contact me for questions.     Patient is coming in today for a  follow-up for chronic issues.  She is here with her daughter Faiza.  We did receive a phone call yesterday regarding the patient getting disoriented and having difficulty when driving.  She got confused and fell out of it.  Got mixed up on her directions [place].  Her son was made aware and got to the patient.  He brought her home.  Then the daughter Faiza stay with her from 1-4.  Asked her several questions.  She did not seem disoriented.  She did not have any focal weakness or motor changes.  No neurologic symptoms at that time.  Basically just got confused in her directions.  They decided not to go to the emergency room.    Of note over the past 1 month she had not been taking her atorvastatin.  She had spoken with her herbalist who had recommended that she get off the cholesterol medicine per report from the patient's daughter.  They discussed as a family and decided to take her off her medication.  This is on a started noticing that she has been having more these episodes.  She has had a few times where she has gotten distracted or easily confused with driving outside of her normal [place].  She also has extensive short-term memory loss.  At times she will forget to take her medicines as well.  Her daughter Faiza just recently started her back on a cholesterol medicine last night.  Blood pressure is very well controlled today.  She is still on [place] Thyroid.  She is also on a baby aspirin.  Her son has given her an herbal supplement for memory but she has not yet taken it.    We reviewed extensively her MRI findings from prior.  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  We discussed the microvascular changes.  She has known issues with vascular dementia.  She is currently on the Namenda tolerating 10 mg twice a day.  We did discuss the potential possibly starting Aricept that like to hold off.  I did explain in detail how the [place] Aricept work.  We discussed in detail treatment options for vascular dementia.  We did discuss in detail about increasing cholesterol medicines increasing anti-platelet medicines and controlling blood pressure.  We discussed signs and symptoms of a stroke or mini stroke.  At this time they prefer not to proceed for with doing any imaging studies since she is not having any neurologic deficits.  They would like to do blood work today to make sure there has not been any abnormalities noted with her electrolytes were thyroid.  She is still taking oral liquid vitamin-D but only 1 drop per day.    In the past she has seen Dr. Kevin at [facility] but not recently.  She confirmed the vascular dementia diagnosis.        Lab Results   Component Value Date    WBC 9.54 02/28/2018    HGB 14.5 02/28/2018    HCT 44.1 02/28/2018     02/28/2018    CHOL 166 01/11/2018    TRIG 82 01/11/2018    HDL 49 01/11/2018    ALT 25 02/28/2018    AST 24 02/28/2018     02/28/2018    K 3.7 02/28/2018     02/28/2018    CREATININE 0.9 02/28/2018    BUN 13 02/28/2018    CO2 22 (L) 02/28/2018    TSH 1.539 01/11/2018    HGBA1C 5.5 09/21/2017       Review of Systems   Constitutional: Positive for activity change. Negative for appetite change, fatigue and unexpected weight change.   HENT: Negative for trouble swallowing and voice change.    Respiratory: Negative for cough and shortness of breath.    Cardiovascular: Negative for chest pain and palpitations.   Gastrointestinal: Negative for abdominal distention, abdominal pain, constipation and diarrhea.   Genitourinary: Negative for difficulty urinating and dysuria.   Musculoskeletal: Negative for  "arthralgias and back pain.   Neurological: Negative for dizziness, syncope, speech difficulty, weakness, light-headedness and numbness.   Psychiatric/Behavioral: Positive for confusion. Negative for agitation, decreased concentration and sleep disturbance. The patient is nervous/anxious.      Objective:     Vitals:    07/18/18 1259   BP: 122/68   Pulse: 64   Temp: 97.6 °F (36.4 °C)   TempSrc: Tympanic   Weight: 76 kg (167 lb 8.8 oz)   Height: 5' 2" (1.575 m)     Physical Exam   Constitutional: She appears well-developed and well-nourished.   HENT:   Head: Normocephalic and atraumatic.   Right Ear: Tympanic membrane normal.   Left Ear: Tympanic membrane normal.   Mouth/Throat: Oropharynx is clear and moist.   Eyes: Conjunctivae and EOM are normal.   Neck: Normal range of motion. Neck supple.   Cardiovascular: Normal rate and regular rhythm.    Pulmonary/Chest: Effort normal and breath sounds normal.   Abdominal: Soft. Bowel sounds are normal. She exhibits no distension. There is no tenderness.   Psychiatric: Her speech is normal and behavior is normal. Judgment and thought content normal. Her mood appears anxious. Cognition and memory are impaired. She exhibits abnormal recent memory. She exhibits normal remote memory.   Repeated multiple times that she drives okay if she stays within her small [place] around airline.  She repeated this 4 times during the office visit today. She is attentive.   Nursing note and vitals reviewed.    Assessment:     1. Vascular dementia without behavioral disturbance    2. Essential hypertension    3. Thyroid disorder    4. Vitamin D deficiency    5. Confusion    6. Abnormal glucose      Plan:   Irlanda was seen today for follow-up.    Diagnoses and all orders for this visit:    Vascular dementia without behavioral disturbance-with recent disorientation and confusion with driving.  Patient and family members had recently stopped her atorvastatin for 1 month.  They restarted yesterday.  " Obtain blood work to rule out any electrolyte abnormalities.  She is still on her baby aspirin.  Blood pressure is controlled.  At this time the decline wanting to do further imaging.  Will check lab work to see about any etiologies for possible causes for acute delirium.  If not then recommend continue with aspirin atorvastatin 10 and lisinopril hydrochlorothiazide.  We discussed the potential of also adding Aricept but they would like to hold off at this time.  We discussed the potential she has any worsening symptoms over the next 1 month that consideration for an additional imaging study may be warranted as it may encourage us to do either higher dose aspirin or Plavix or higher dose cholesterol medicine or all of the above.  Annie,  the daughter was in agreement with the plan of action.  -     TSH; Future  -     T4, free; Future  -     Lipid panel; Future  -     Comprehensive metabolic panel; Future  -     CBC auto differential; Future  -     Vitamin D; Future  -     Hemoglobin A1c; Future    Essential hypertension - stable, Continue with current medications and interventions. Labs ordered.     -     TSH; Future  -     T4, free; Future  -     Lipid panel; Future  -     Comprehensive metabolic panel; Future  -     CBC auto differential; Future  -     Vitamin D; Future  -     Hemoglobin A1c; Future    Thyroid disorder - stable, Continue with current medications and interventions. Labs ordered. -     TSH; Future  -     T4, free; Future  -     Lipid panel; Future  -     Comprehensive metabolic panel; Future  -     CBC auto differential; Future  -     Vitamin D; Future  -     Hemoglobin A1c; Future  -     T3, free; Future    Vitamin D deficiency - stable, Continue with current medications and interventions. Labs ordered.   -     TSH; Future  -     T4, free; Future  -     Lipid panel; Future  -     Comprehensive metabolic panel; Future  -     CBC auto differential; Future  -     Vitamin D; Future  -     Hemoglobin  A1c; Future    Confusion- new. Check labs. Suspect tia vs worsening of vascular dementia. Restart statin.   -     TSH; Future  -     T4, free; Future  -     Lipid panel; Future  -     Comprehensive metabolic panel; Future  -     CBC auto differential; Future  -     Vitamin D; Future  -     Hemoglobin A1c; Future    Abnormal glucose- screen for diabetes due to pt being on atorvastatin.   -     TSH; Future  -     T4, free; Future  -     Lipid panel; Future  -     Comprehensive metabolic panel; Future  -     CBC auto differential; Future  -     Vitamin D; Future  -     Hemoglobin A1c; Future            Follow-up in about 1 month (around 8/18/2018) for f/u labs, .        Time spent: 45 minutes in face to face discussion concerning diagnosis, prognosis, review of lab and test results, benefits of treatment as well as management of disease, counseling of patient and coordination of care between various health care providers . Greater than half the time spent was used for coordination of care and counseling of patient.

## 2018-07-19 LAB
ESTIMATED AVG GLUCOSE: 111 MG/DL
HBA1C MFR BLD HPLC: 5.5 %

## 2018-08-15 ENCOUNTER — OFFICE VISIT (OUTPATIENT)
Dept: INTERNAL MEDICINE | Facility: CLINIC | Age: 78
End: 2018-08-15
Payer: MEDICARE

## 2018-08-15 ENCOUNTER — LAB VISIT (OUTPATIENT)
Dept: LAB | Facility: HOSPITAL | Age: 78
End: 2018-08-15
Attending: FAMILY MEDICINE
Payer: MEDICARE

## 2018-08-15 VITALS
HEIGHT: 62 IN | BODY MASS INDEX: 31.48 KG/M2 | SYSTOLIC BLOOD PRESSURE: 104 MMHG | DIASTOLIC BLOOD PRESSURE: 50 MMHG | WEIGHT: 171.06 LBS | HEART RATE: 58 BPM | TEMPERATURE: 97 F

## 2018-08-15 DIAGNOSIS — N17.9 AKI (ACUTE KIDNEY INJURY): ICD-10-CM

## 2018-08-15 DIAGNOSIS — I95.9 HYPOTENSION, UNSPECIFIED HYPOTENSION TYPE: ICD-10-CM

## 2018-08-15 DIAGNOSIS — N17.9 AKI (ACUTE KIDNEY INJURY): Primary | ICD-10-CM

## 2018-08-15 DIAGNOSIS — F01.50 VASCULAR DEMENTIA WITHOUT BEHAVIORAL DISTURBANCE: ICD-10-CM

## 2018-08-15 DIAGNOSIS — E07.9 THYROID DISORDER: ICD-10-CM

## 2018-08-15 LAB
ANION GAP SERPL CALC-SCNC: 12 MMOL/L
BUN SERPL-MCNC: 18 MG/DL
CALCIUM SERPL-MCNC: 9.1 MG/DL
CHLORIDE SERPL-SCNC: 106 MMOL/L
CO2 SERPL-SCNC: 22 MMOL/L
CREAT SERPL-MCNC: 0.9 MG/DL
EST. GFR  (AFRICAN AMERICAN): >60 ML/MIN/1.73 M^2
EST. GFR  (NON AFRICAN AMERICAN): >60 ML/MIN/1.73 M^2
GLUCOSE SERPL-MCNC: 121 MG/DL
POTASSIUM SERPL-SCNC: 3.7 MMOL/L
SODIUM SERPL-SCNC: 140 MMOL/L

## 2018-08-15 PROCEDURE — 99214 OFFICE O/P EST MOD 30 MIN: CPT | Mod: S$GLB,,, | Performed by: FAMILY MEDICINE

## 2018-08-15 PROCEDURE — 80048 BASIC METABOLIC PNL TOTAL CA: CPT

## 2018-08-15 PROCEDURE — 3074F SYST BP LT 130 MM HG: CPT | Mod: CPTII,S$GLB,, | Performed by: FAMILY MEDICINE

## 2018-08-15 PROCEDURE — 99999 PR PBB SHADOW E&M-EST. PATIENT-LVL III: CPT | Mod: PBBFAC,,, | Performed by: FAMILY MEDICINE

## 2018-08-15 PROCEDURE — 99499 UNLISTED E&M SERVICE: CPT | Mod: S$GLB,,, | Performed by: FAMILY MEDICINE

## 2018-08-15 PROCEDURE — 36415 COLL VENOUS BLD VENIPUNCTURE: CPT | Mod: PO

## 2018-08-15 PROCEDURE — 3078F DIAST BP <80 MM HG: CPT | Mod: CPTII,S$GLB,, | Performed by: FAMILY MEDICINE

## 2018-08-15 RX ORDER — DONEPEZIL HYDROCHLORIDE 5 MG/1
5 TABLET, FILM COATED ORAL NIGHTLY
Qty: 30 TABLET | Refills: 11 | Status: SHIPPED | OUTPATIENT
Start: 2018-08-15 | End: 2018-09-26

## 2018-08-15 NOTE — PROGRESS NOTES
Subjective:      Patient ID: Irlanda Lopez is a 77 y.o. female.    Chief Complaint: Follow-up (1 month )    Disclaimer:  This note is prepared using voice recognition software and as such is likely to have errors and has not been proof read. Please contact me for questions.     Patient is coming in today for a 1mo follow-up for chronic issues.  She is here with her daughter Faiza.  Since I have last seen her she has been doing much better.  No issues with getting confused.  She has not seemed disoriented.  She is sleeping well.  She does report that she back in this morning.  Her blood pressure however is on the lower side.  She is currently taking lisinopril hydrochlorothiazide low dose as well as now back on the Lipitor 10 mg.    Cholesterol levels do show where her numbers were higher when she was off atorvastatin.  She is back on it now.    Creatinine was at 1.4 but BUN was normal.  This was a change from her as previously was 1.1.  Only new change was that she was doing some type of powder to help with memory that with some type of herbal supplement but since then she stopped it.  She does report that she does not drink enough water.  She is willing to try to improve this.  She believes she has a blood pressure cuff at home just needs to find it.    Is bruising some but is now taking aspirin 81 mg daily.    Is currently on the Namenda 10 mg b.i.d..  They did discuss with her family and they are willing to add on the Aricept if it will help her.  We discussed the benefits of the medication.  We discussed that it will slow down some of the memory loss in the rate of the memory loss.  Does potentially have a side effect of nausea or vivid dreams.  Willing to start at 5 mg dosing initially.  Does not need to be renally adjusted.    Still on [place] Thyroid.  No problems with the medication.  Thyroid labs are normal.            Lab Results   Component Value Date    WBC 9.20 07/18/2018    HGB 14.7 07/18/2018     "HCT 44.9 07/18/2018     07/18/2018    CHOL 212 (H) 07/18/2018    TRIG 219 (H) 07/18/2018    HDL 46 07/18/2018    ALT 17 07/18/2018    AST 22 07/18/2018     07/18/2018    K 3.9 07/18/2018     07/18/2018    CREATININE 1.4 07/18/2018    BUN 19 07/18/2018    CO2 26 07/18/2018    TSH 1.208 07/18/2018    HGBA1C 5.5 07/18/2018       Review of Systems   Constitutional: Negative for activity change, appetite change, fatigue and unexpected weight change.   Respiratory: Negative for cough and shortness of breath.    Cardiovascular: Negative for chest pain and palpitations.   Genitourinary: Negative for flank pain and frequency.   Skin: Positive for color change.   Psychiatric/Behavioral: Negative for agitation, confusion, decreased concentration and dysphoric mood.     Objective:     Vitals:    08/15/18 1255 08/15/18 1327   BP: (!) 100/50 (!) 104/50   Pulse: (!) 58    Temp: 97.2 °F (36.2 °C)    TempSrc: Tympanic    Weight: 77.6 kg (171 lb 1.2 oz)    Height: 5' 2" (1.575 m)      Physical Exam   Constitutional: She appears well-developed and well-nourished.   HENT:   Head: Normocephalic and atraumatic.   Right Ear: Tympanic membrane normal.   Left Ear: Tympanic membrane normal.   Mouth/Throat: Oropharynx is clear and moist.   Eyes: Conjunctivae and EOM are normal.   Neck: Normal range of motion. Neck supple.   Cardiovascular: Normal rate and regular rhythm.   Pulmonary/Chest: Effort normal and breath sounds normal.   Skin:        Psychiatric: She has a normal mood and affect. Her behavior is normal.   Nursing note and vitals reviewed.    Assessment:     1. JUAN (acute kidney injury)    2. Hypotension, unspecified hypotension type    3. Thyroid disorder    4. Vascular dementia without behavioral disturbance      Plan:   Irlanda was seen today for follow-up.    Diagnoses and all orders for this visit:    JUAN (acute kidney injury)  Comments:  new from july labs repeat today. may need to adjust bp. on lisinopril " /hctz. lower bp readings today in office. check daily in am. Creatinine last at 1.4. Repeat today. Message in AM.   Orders:  -     Basic metabolic panel; Future    Hypotension, unspecified hypotension type  Comments:  new from july labs repeat today. may need to adjust bp. on lisinopril /hctz. lower bp readings today in office. check daily in am. May need to decrease from lisinopril 10/hctz 12.5 , however labs are stable from prior when meds were started back in OCT 2017.   Orders:  -     Basic metabolic panel; Future    Thyroid disorder  Comments:  on armour, stable. Labs reviewed.     Vascular dementia without behavioral disturbance  Comments:  adding aricept 5mg to namenda 10mg bid.   Orders:  -     Basic metabolic panel; Future  -     donepezil (ARICEPT) 5 MG tablet; Take 1 tablet (5 mg total) by mouth every evening.            Follow-up in about 6 weeks (around 9/26/2018) for f/u aricept, and kidney function, bp.

## 2018-08-16 ENCOUNTER — PATIENT MESSAGE (OUTPATIENT)
Dept: INTERNAL MEDICINE | Facility: CLINIC | Age: 78
End: 2018-08-16

## 2018-08-24 ENCOUNTER — TELEPHONE (OUTPATIENT)
Dept: INTERNAL MEDICINE | Facility: CLINIC | Age: 78
End: 2018-08-24

## 2018-08-24 NOTE — TELEPHONE ENCOUNTER
Called and notified pts daughter of this and she verbalized understanding. She will get back with us about the readings after next week.

## 2018-08-24 NOTE — TELEPHONE ENCOUNTER
"pts daughter called. She stated that she was asked to call in with blood pressure readings.     Wednesday  118/79  63  Thursday       131/77  62  Friday    130/81  61  Saturday   140/88  58  Sunday   163/93  57  Monday   149/88  58  Tuesday   131/79  59  Wednesday   142/99  62    Those are readings from the pt herself taking her blood pressure. While daughter was there now at her house she took her blood pressure and got 143/89 and pulse at 63. When she put the cuff on the pt the pt made the comment "oh I didn't realize it had to go up on my arm so far, I don't remember if I did it that away".   "

## 2018-09-05 ENCOUNTER — TELEPHONE (OUTPATIENT)
Dept: INTERNAL MEDICINE | Facility: CLINIC | Age: 78
End: 2018-09-05

## 2018-09-05 NOTE — TELEPHONE ENCOUNTER
Called and spoke with pts daughter. Notified of what Dr. Arias stated. She has appt scheduled with Dr. Arias on 26th. They want to keep that appt.

## 2018-09-05 NOTE — TELEPHONE ENCOUNTER
Those readings are much higher than what we got in the clinic. Please put her on the schedule for Dipti for when she comes in to see about adjusting the BP or we could sign her up for digital htn program whichever they prefer.

## 2018-09-05 NOTE — TELEPHONE ENCOUNTER
----- Message from Linda Silverio sent at 9/5/2018  9:01 AM CDT -----  Contact: Myla/daughter  Myla is calling to leave the blood pressure readings for this week: on 8/25 -139/89, 8/26- 138/82, 8/27- 148/88, 8/28- 142/83, 8/29- 156/93, 8/30- 135/86, 8/31- 146/83, and 9/1-134/79. Please call her at [phone].     Thanks  Td

## 2018-09-26 ENCOUNTER — OFFICE VISIT (OUTPATIENT)
Dept: INTERNAL MEDICINE | Facility: CLINIC | Age: 78
End: 2018-09-26
Payer: MEDICARE

## 2018-09-26 VITALS
HEART RATE: 60 BPM | DIASTOLIC BLOOD PRESSURE: 64 MMHG | WEIGHT: 168 LBS | SYSTOLIC BLOOD PRESSURE: 122 MMHG | TEMPERATURE: 98 F | HEIGHT: 62 IN | BODY MASS INDEX: 30.91 KG/M2

## 2018-09-26 DIAGNOSIS — I10 ESSENTIAL HYPERTENSION: ICD-10-CM

## 2018-09-26 DIAGNOSIS — F01.50 VASCULAR DEMENTIA WITHOUT BEHAVIORAL DISTURBANCE: ICD-10-CM

## 2018-09-26 DIAGNOSIS — R73.09 ABNORMAL GLUCOSE: ICD-10-CM

## 2018-09-26 DIAGNOSIS — F01.50 VASCULAR DEMENTIA WITHOUT BEHAVIORAL DISTURBANCE: Primary | ICD-10-CM

## 2018-09-26 DIAGNOSIS — E07.9 THYROID DISORDER: ICD-10-CM

## 2018-09-26 PROCEDURE — 99214 OFFICE O/P EST MOD 30 MIN: CPT | Mod: S$PBB,,, | Performed by: FAMILY MEDICINE

## 2018-09-26 PROCEDURE — 90662 IIV NO PRSV INCREASED AG IM: CPT | Mod: PBBFAC,PO

## 2018-09-26 PROCEDURE — 99999 PR PBB SHADOW E&M-EST. PATIENT-LVL IV: CPT | Mod: PBBFAC,,, | Performed by: FAMILY MEDICINE

## 2018-09-26 PROCEDURE — 99214 OFFICE O/P EST MOD 30 MIN: CPT | Mod: PBBFAC,PO,25 | Performed by: FAMILY MEDICINE

## 2018-09-26 PROCEDURE — 1101F PT FALLS ASSESS-DOCD LE1/YR: CPT | Mod: CPTII,,, | Performed by: FAMILY MEDICINE

## 2018-09-26 PROCEDURE — 3078F DIAST BP <80 MM HG: CPT | Mod: CPTII,,, | Performed by: FAMILY MEDICINE

## 2018-09-26 PROCEDURE — 3074F SYST BP LT 130 MM HG: CPT | Mod: CPTII,,, | Performed by: FAMILY MEDICINE

## 2018-09-26 RX ORDER — DONEPEZIL HYDROCHLORIDE 10 MG/1
10 TABLET, FILM COATED ORAL NIGHTLY
Qty: 90 TABLET | Refills: 3 | Status: SHIPPED | OUTPATIENT
Start: 2018-09-26 | End: 2019-01-10 | Stop reason: SDUPTHER

## 2018-09-26 NOTE — PROGRESS NOTES
Subjective:      Patient ID: Irlanda Lopez is a 78 y.o. female.    Chief Complaint: Follow-up (6 week follow up medication and kidney function and blood pressure )    Disclaimer:  This note is prepared using voice recognition software and as such is likely to have errors and has not been proof read. Please contact me for questions.     Patient is coming in today for a 6 wk follow-up for chronic issues.  She is here with her daughter Faiza.  Since I have last seen her she has been doing better from her standpoint however the daughter seems to think she might be getting slightly worse.  The patient does not have much insight into her memory loss.  She does feel like her family seems to neck her about doing exercise.  She consistently states how they think she just watches TV all day long.  She does have a bicycle.  It is near her computer.  She does not recall knowing how to turn on the computer to watch anything.  She is interested when it gets cooler outside about possibly walking her rescue dog.  Since we last saw her she has been on the Namenda at the 10 mg and we started low-dose Aricept 5 mg.  She has had some appetite suppression with 4 lb of weight loss but otherwise doing well.  No nausea.  Last time she saw a neurologist was Dr. Valdez at [facility].  They were uncertain how soon they would see any improvement.  Once again I explained to them that these medications helped to slow down the rate of memory loss and not improved her actual ability for memory retention.  They would be willing to see a new neurologist.    Blood pressure at this time is currently controlled.  Kidney function is now back to normal.  Still doing well with lisinopril hydrochlorothiazide.    Is also on aspirin and Lipitor due to the vascular dementia issues.  No problems with these medications.    Normally does not take a flu shot but is willing to do so at this time today.    Is still on [place] Thyroid.  Doing well with  "the current medication.            Lab Results   Component Value Date    WBC 9.20 07/18/2018    HGB 14.7 07/18/2018    HCT 44.9 07/18/2018     07/18/2018    CHOL 212 (H) 07/18/2018    TRIG 219 (H) 07/18/2018    HDL 46 07/18/2018    ALT 17 07/18/2018    AST 22 07/18/2018     08/15/2018    K 3.7 08/15/2018     08/15/2018    CREATININE 0.9 08/15/2018    BUN 18 08/15/2018    CO2 22 (L) 08/15/2018    TSH 1.208 07/18/2018    HGBA1C 5.5 07/18/2018       Review of Systems   Constitutional: Positive for appetite change. Negative for activity change, fatigue and unexpected weight change.   Respiratory: Negative for cough and shortness of breath.    Cardiovascular: Negative for chest pain and palpitations.   Gastrointestinal: Negative for abdominal distention and abdominal pain.   Psychiatric/Behavioral: Positive for confusion and decreased concentration. Negative for dysphoric mood and sleep disturbance. The patient is not nervous/anxious.      Objective:     Vitals:    09/26/18 1228   BP: 122/64   Pulse: 60   Temp: 97.7 °F (36.5 °C)   TempSrc: Tympanic   Weight: 76.2 kg (167 lb 15.9 oz)   Height: 5' 2" (1.575 m)     Physical Exam   Constitutional: She appears well-developed and well-nourished.   HENT:   Head: Normocephalic and atraumatic.   Right Ear: Tympanic membrane normal.   Left Ear: Tympanic membrane normal.   Mouth/Throat: Oropharynx is clear and moist.   Eyes: Conjunctivae and EOM are normal.   Neck: Normal range of motion. Neck supple.   Cardiovascular: Normal rate and regular rhythm.   Pulmonary/Chest: Effort normal and breath sounds normal.   Neurological: She is alert. No cranial nerve deficit or sensory deficit. Coordination and gait normal.   Psychiatric: She has a normal mood and affect. Her speech is normal and behavior is normal. Judgment normal. Cognition and memory are impaired. She exhibits abnormal recent memory.   Nursing note and vitals reviewed.    Assessment:     1. Vascular " dementia without behavioral disturbance    2. Vascular dementia without behavioral disturbance    3. Essential hypertension    4. Thyroid disorder    5. Abnormal glucose      Plan:   Irlanda was seen today for follow-up.    Diagnoses and all orders for this visit:      Vascular dementia without behavioral disturbance  Comments:  Not worse, needing increase in medications,  increase aricept 10mg to namenda 10mg bid. followup with dr omalley at 1 yr or refer to new neurologist Dr Friedman at [facility] for cognitive assessment.  Discussed importance of daily exercise, socialization, and blood pressure control.  Orders:  -     donepezil (ARICEPT) 10 MG tablet; Take 1 tablet (10 mg total) by mouth every evening.  -     Cancel: Ambulatory referral to Neurology  -     Comprehensive metabolic panel; Future  -     Hemoglobin A1c; Future  -     CBC auto differential; Future  -     TSH; Future  -     T4, free; Future  -     Lipid panel; Future  -     Ambulatory Referral to Neurology    Essential hypertension-controlled kidney function stable continue lisinopril hydrochlorothiazide  -     Comprehensive metabolic panel; Future  -     Hemoglobin A1c; Future  -     CBC auto differential; Future  -     TSH; Future  -     T4, free; Future  -     Lipid panel; Future    Thyroid disorder-stable with [place] thyroid labs reviewed  -     Comprehensive metabolic panel; Future  -     Hemoglobin A1c; Future  -     CBC auto differential; Future  -     TSH; Future  -     T4, free; Future  -     Lipid panel; Future    Abnormal glucose-stable continue with  exercise  -     Comprehensive metabolic panel; Future  -     Hemoglobin A1c; Future  -     CBC auto differential; Future  -     TSH; Future  -     T4, free; Future  -     Lipid panel; Future    Other orders  -     Influenza - High Dose (65+) (PF) (IM)            Follow-up in about 6 months (around 3/26/2019) for physical with Dr MANCIA.

## 2018-10-03 ENCOUNTER — OFFICE VISIT (OUTPATIENT)
Dept: URGENT CARE | Facility: CLINIC | Age: 78
End: 2018-10-03
Payer: MEDICARE

## 2018-10-03 ENCOUNTER — HOSPITAL ENCOUNTER (OUTPATIENT)
Dept: RADIOLOGY | Facility: HOSPITAL | Age: 78
Discharge: HOME | End: 2018-10-03
Attending: NURSE PRACTITIONER
Payer: MEDICARE

## 2018-10-03 VITALS
RESPIRATION RATE: 18 BRPM | DIASTOLIC BLOOD PRESSURE: 60 MMHG | WEIGHT: 164.44 LBS | OXYGEN SATURATION: 98 % | TEMPERATURE: 98 F | HEART RATE: 54 BPM | SYSTOLIC BLOOD PRESSURE: 122 MMHG | BODY MASS INDEX: 30.08 KG/M2

## 2018-10-03 DIAGNOSIS — R93.5 ABNORMAL X-RAY OF ABDOMEN: ICD-10-CM

## 2018-10-03 DIAGNOSIS — N39.0 URINARY TRACT INFECTION WITH HEMATURIA, SITE UNSPECIFIED: ICD-10-CM

## 2018-10-03 DIAGNOSIS — R10.9 ABDOMINAL PAIN, UNSPECIFIED ABDOMINAL LOCATION: Primary | ICD-10-CM

## 2018-10-03 DIAGNOSIS — R10.9 ABDOMINAL PAIN, UNSPECIFIED ABDOMINAL LOCATION: ICD-10-CM

## 2018-10-03 DIAGNOSIS — R31.9 URINARY TRACT INFECTION WITH HEMATURIA, SITE UNSPECIFIED: ICD-10-CM

## 2018-10-03 LAB
BILIRUB SERPL-MCNC: ABNORMAL MG/DL
BLOOD URINE, POC: ABNORMAL
COLOR, POC UA: ABNORMAL
GLUCOSE UR QL STRIP: NORMAL
KETONES UR QL STRIP: NEGATIVE
LEUKOCYTE ESTERASE URINE, POC: ABNORMAL
NITRITE, POC UA: POSITIVE
PH, POC UA: 7
PROTEIN, POC: 30
SPECIFIC GRAVITY, POC UA: 1
UROBILINOGEN, POC UA: 1

## 2018-10-03 PROCEDURE — 74019 RADEX ABDOMEN 2 VIEWS: CPT | Mod: TC,FY,PO

## 2018-10-03 PROCEDURE — 3074F SYST BP LT 130 MM HG: CPT | Mod: CPTII,,, | Performed by: NURSE PRACTITIONER

## 2018-10-03 PROCEDURE — 99215 OFFICE O/P EST HI 40 MIN: CPT | Mod: PBBFAC,25,PO | Performed by: NURSE PRACTITIONER

## 2018-10-03 PROCEDURE — 1101F PT FALLS ASSESS-DOCD LE1/YR: CPT | Mod: CPTII,,, | Performed by: NURSE PRACTITIONER

## 2018-10-03 PROCEDURE — 74019 RADEX ABDOMEN 2 VIEWS: CPT | Mod: 26,,, | Performed by: RADIOLOGY

## 2018-10-03 PROCEDURE — 87147 CULTURE TYPE IMMUNOLOGIC: CPT

## 2018-10-03 PROCEDURE — 99214 OFFICE O/P EST MOD 30 MIN: CPT | Mod: S$PBB,,, | Performed by: NURSE PRACTITIONER

## 2018-10-03 PROCEDURE — 99999 PR PBB SHADOW E&M-EST. PATIENT-LVL V: CPT | Mod: PBBFAC,,, | Performed by: NURSE PRACTITIONER

## 2018-10-03 PROCEDURE — 81001 URINALYSIS AUTO W/SCOPE: CPT | Mod: PBBFAC,PO | Performed by: NURSE PRACTITIONER

## 2018-10-03 PROCEDURE — 3078F DIAST BP <80 MM HG: CPT | Mod: CPTII,,, | Performed by: NURSE PRACTITIONER

## 2018-10-03 PROCEDURE — 87086 URINE CULTURE/COLONY COUNT: CPT

## 2018-10-03 PROCEDURE — 87088 URINE BACTERIA CULTURE: CPT

## 2018-10-03 RX ORDER — SULFAMETHOXAZOLE AND TRIMETHOPRIM 800; 160 MG/1; MG/1
1 TABLET ORAL 2 TIMES DAILY
Qty: 14 TABLET | Refills: 0 | Status: SHIPPED | OUTPATIENT
Start: 2018-10-03 | End: 2018-10-04 | Stop reason: ALTCHOICE

## 2018-10-03 RX ORDER — TRIAMCINOLONE ACETONIDE 1 MG/G
OINTMENT TOPICAL
Refills: 1 | COMMUNITY
Start: 2018-08-23 | End: 2019-10-09 | Stop reason: SDUPTHER

## 2018-10-03 NOTE — PATIENT INSTRUCTIONS
· Increase fluids (avoid caffeine or sugary beverages as these will irritate the bladder).   · Take your antibiotics exactly as prescribed and make sure to complete entire course even if you begin to feel better. This will prevent recurrence of infection and antibiotic resistance.   · We have prescribed an antibiotic that covers most bacteria that cause urinary tract infections, however, if your urine culture shows that you have any bacterial resistance to the antibiotic you were prescribed or an unusual bacterial strain, we will notify you and make any necessary changes. Urine cultures usually result in about three days.   · Please follow up with your primary care provider if your symptoms do not improve within 2-3 days or sooner for any new or worsening symptoms.  · For any severe pain, bright red blood in urine, difficulty urinating, fever that does not improve with Tylenol or Ibuprofen, inability to urinate, incontinence of urine or bowels, or for any other urgent concerns, please go to the ER for immediate evaluation.     Urinary Tract Infections in Women    Urinary tract infections (UTIs) are most often caused by bacteria (germs). These bacteria enter the urinary tract. The bacteria may come from outside the body. Or they may travel from the skin outside the rectum or vagina into the urethra. Female anatomy makes it easy for bacteria from the bowel to enter a womans urinary tract, which is the most common source of UTI. This means women develop UTIs more often than men. Pain in or around the urinary tract is a common UTI symptom. But the only way to know for sure if you have a UTI for the healthcare provider to test your urine. The two tests that may be done are the urinalysis and urine culture.  Types of UTIs  · Cystitis: A bladder infection (cystitis) is the most common UTI in women. You may have urgent or frequent urination. You may also have pain, burning when you urinate, and bloody urine.  · Urethritis:  This is an inflamed urethra, which is the tube that carries urine from the bladder to outside the body. You may have lower stomach or back pain. You may also have urgent or frequent urination.  · Pyelonephritis: This is a kidney infection. If not treated, it can be serious and damage your kidneys. In severe cases, you may be hospitalized. You may have a fever and lower back pain.  Medicines to treat a UTI  Most UTIs are treated with antibiotics. These kill the bacteria. The length of time you need to take them depends on the type of infection. It may be as short as 3 days. If you have repeated UTIs, a low-dose antibiotic may be needed for several months. Take antibiotics exactly as directed. Dont stop taking them until all of the medicine is gone. If you stop taking the antibiotic too soon, the infection may not go away, and you may develop a resistance to the antibiotic. This can make it much harder to treat.  Lifestyle changes to treat and prevent UTIs  The lifestyle changes below will help get rid of your UTI. They may also help prevent future UTIs.  · Drink plenty of fluids. This includes water, juice, or other caffeine-free drinks. Fluids help flush bacteria out of your body.  · Empty your bladder. Always empty your bladder when you feel the urge to urinate. And always urinate before going to sleep. Urine that stays in your bladder can lead to infection. Try to urinate before and after sex as well.  · Practice good personal hygiene. Wipe yourself from front to back after using the toilet. This helps keep bacteria from getting into the urethra.  · Use condoms during sex. These help prevent UTIs caused by sexually transmitted bacteria. Also, avoid using spermicides during sex. These can increase the risk of UTIs. Choose other forms of birth control instead. For women who tend to get UTIs after sex, a low-dose of a preventive antibiotic may be used. Be sure to discuss this option with your healthcare  provider.  · Follow up with your healthcare provider as directed. He or she may test to make sure the infection has cleared. If needed, more treatment may be started.  Date Last Reviewed: 1/1/2017 © 2000-2017 [employer]. [address] [place] [address], [place] [address]. All rights reserved. This information is not intended as a substitute for professional medical care. Always follow your healthcare professional's instructions.        Abdominal Pain    Abdominal pain is pain in the stomach or belly area. Everyone has this pain from time to time. In many cases it goes away on its own. But abdominal pain can sometimes be due to a serious problem, such as appendicitis. So its important to know when to seek help.  Causes of abdominal pain  There are many possible causes of abdominal pain. Common causes in adults include:  · Constipation, diarrhea, or gas  · Stomach acid flowing back up into the esophagus (acid reflux or heartburn)  · Severe acid reflux, called GERD (gastroesophageal reflux disease)  · A sore in the lining of the stomach or small intestine (peptic ulcer)  · Inflammation of the gallbladder, liver, or pancreas  · Gallstones or kidney stones  · Appendicitis   · Intestinal blockage   · An internal organ pushing through a muscle or other tissue (hernia)  · Urinary tract infections  · In women, menstrual cramps, fibroids, or endometriosis  · Inflammation or infection of the intestines  Diagnosing the cause of abdominal pain  Your healthcare provider will do a physical exam help find the cause of your pain. If needed, tests will be ordered. Belly pain has many possible causes. So it can be hard to find the reason for your pain. Giving details about your pain can help. Tell your provider where and when you feel the pain, and what makes it better or worse. Also let your provider know if you have other symptoms such as:  · Fever  · Tiredness  · Upset stomach (nausea)  · Vomiting  · Changes in bathroom  habits  Treating abdominal pain  Some causes of pain need emergency medical treatment right away. These include appendicitis or a bowel blockage. Other problems can be treated with rest, fluids, or medicines. Your healthcare provider can give you specific instructions for treatment or self-care based on what is causing your pain.  If you have vomiting or diarrhea, sip water or other clear fluids. When you are ready to eat solid foods again, start with small amounts of easy-to-digest, low-fat foods. These include apple sauce, toast, or crackers.   When to seek medical care  Call 911 or go to the hospital right away if you:  · Cant pass stool and are vomiting  · Are vomiting blood or have bloody diarrhea or black, tarry diarrhea  · Have chest, neck, or shoulder pain  · Feel like you might pass out  · Have pain in your shoulder blades with nausea  · Have sudden, severe belly pain  · Have new, severe pain unlike any you have felt before  · Have a belly that is rigid, hard, and tender to touch  Call your healthcare provider if you have:  · Pain for more than 5 days  · Bloating for more than 2 days  · Diarrhea for more than 5 days  · A fever of 100.4°F (38.0°C) or higher, or as directed by your provider  · Pain that gets worse  · Weight loss for no reason  · Continued lack of appetite  · Blood in your stool  How to prevent abdominal pain  Here are some tips to help prevent abdominal pain:  · Eat smaller amounts of food at one time.  · Avoid greasy, fried, or other high-fat foods.  · Avoid foods that give you gas.  · Exercise regularly.  · Drink plenty of fluids.  To help prevent GERD symptoms:  · Quit smoking.  · Reduce alcohol and certain foods that increase stomach acid.  · Avoid aspirin and over-the-counter pain and fever medicines (NSAIDS or nonsteroidal anti-inflammatory drugs), if possible  · Lose extra weight.  · Finish eating at least 2 hours before you go to bed or lie down.  · Raise the head of your bed.  Date  Last Reviewed: 7/1/2016  © 6207-4508 [employer]. [address] [place] [address], [place] [address]. All rights reserved. This information is not intended as a substitute for professional medical care. Always follow your healthcare professional's instructions.

## 2018-10-03 NOTE — PROGRESS NOTES
Subjective:      Patient ID: Irlanda Lopez is a 78 y.o. female.    Chief Complaint: Abdominal Pain    Ms. Lopez presents to Urgent Care today with complaints of lower abdominal pain. Pain started yesterday. She is unsure when her last bowel movement was as she has problems with memory. Thinks that she might be constipated, but wanted to come get checked before taking any over the counter medications. No fever or chills. Appetite is slightly decreased. She admits to not drinking enough fluid as she forgets to drink water. No diarrhea, nausea, vomiting, or urinary symptoms.       Abdominal Pain   This is a new problem. The current episode started yesterday. The onset quality is gradual. The problem occurs constantly. The problem has been unchanged. The pain is located in the suprapubic region. The pain is moderate. The quality of the pain is aching and cramping. The abdominal pain does not radiate. Pertinent negatives include no diarrhea, dysuria, fever, frequency, headaches, hematochezia, hematuria, myalgias, nausea or vomiting. Constipation: can't remember last BM, definitely hadn't had a BM yesterday or today. Nothing aggravates the pain. The pain is relieved by nothing. She has tried nothing for the symptoms. The treatment provided no relief.     Review of Systems   Constitutional: Negative for fever.   HENT: Negative.    Respiratory: Negative.    Cardiovascular: Negative.    Gastrointestinal: Positive for abdominal pain. Negative for diarrhea, hematochezia, nausea and vomiting. Constipation: can't remember last BM, definitely hadn't had a BM yesterday or today.   Genitourinary: Negative.  Negative for dysuria, flank pain, frequency and hematuria.   Musculoskeletal: Negative.  Negative for myalgias.   Skin: Negative.    Neurological: Negative.  Negative for headaches.   Hematological: Negative.        Objective:   /60 (BP Location: Right arm, Patient Position: Sitting)   Pulse (!) 54   Temp 98 °F (36.7  °C) (Tympanic)   Resp 18   Wt 74.6 kg (164 lb 7.4 oz)   SpO2 98%   BMI 30.08 kg/m²   Physical Exam   Constitutional: She is oriented to person, place, and time. She appears well-developed and well-nourished. No distress.   Neck: Normal range of motion. Neck supple.   Cardiovascular: Regular rhythm, S1 normal and S2 normal.   Pulmonary/Chest: Effort normal and breath sounds normal. No respiratory distress.   Abdominal: Soft. Normal appearance and bowel sounds are normal. There is no hepatosplenomegaly. There is no tenderness. There is no rigidity, no rebound, no guarding, no CVA tenderness, no tenderness at McBurney's point and negative Rojas's sign.   Neurological: She is alert and oriented to person, place, and time.   Skin: Skin is warm and dry. She is not diaphoretic.   Psychiatric: She has a normal mood and affect. Her behavior is normal. Cognition and memory are impaired.   Nursing note and vitals reviewed.    Assessment:      1. Abdominal pain, unspecified abdominal location    2. Urinary tract infection with hematuria, site unspecified       Plan:   Abdominal pain, unspecified abdominal location  -     X-Ray Abdomen Flat And Erect; Future; Expected date: 10/03/2018  -     POCT urinalysis, dipstick or tablet reag    Urinary tract infection with hematuria, site unspecified  -     sulfamethoxazole-trimethoprim 800-160mg (BACTRIM DS) 800-160 mg Tab; Take 1 tablet by mouth 2 (two) times daily. for 7 days  Dispense: 14 tablet; Refill: 0  -     Urine culture    Instructions, follow up, and supportive care as per AVS.  Follow up with PCP if not improved or for any new or worsening symptoms.  Discussed adequate fluid intake. Write down a check list and check off every glass to help keep track since memory has been an issue. Also could fill a large pitcher of 64 oz of water each morning and drink that throughout the day.  Write down bowel movements too keep track. If no bowel movement in the next 24 hrs, try  Miralax (1 capful in 8 oz juice or water daily as needed until soft daily BM's).   PCP if abdominal pain or bloating doesn't resolve with treatment of the UTI or for any new or worsening symptoms.    NOTE: X-ray shows a round object just right of midline in abdomen, possible foreign body ingestion. Ms. Lopez has trouble with memory and didn't recall eating anything out of the ordinary. Discussed with Dr. Arias. Will have Ms. Lopez drink one bottle of Magnesium Citrate today and repeat x-ray tomorrow to see if there has been any change. ER for any worsening abdominal pain, fever, vomiting, etc. I discussed instructions with Ms. Lopez's daughter, Myla Mora, since Ms. Lopez has difficulty with memory.

## 2018-10-04 ENCOUNTER — HOSPITAL ENCOUNTER (OUTPATIENT)
Dept: RADIOLOGY | Facility: HOSPITAL | Age: 78
Discharge: HOME | End: 2018-10-04
Attending: NURSE PRACTITIONER
Payer: MEDICARE

## 2018-10-04 ENCOUNTER — TELEPHONE (OUTPATIENT)
Dept: INTERNAL MEDICINE | Facility: CLINIC | Age: 78
End: 2018-10-04

## 2018-10-04 ENCOUNTER — PATIENT MESSAGE (OUTPATIENT)
Dept: INTERNAL MEDICINE | Facility: CLINIC | Age: 78
End: 2018-10-04

## 2018-10-04 DIAGNOSIS — K57.92 DIVERTICULITIS: Primary | ICD-10-CM

## 2018-10-04 DIAGNOSIS — R10.84 GENERALIZED ABDOMINAL PAIN: ICD-10-CM

## 2018-10-04 DIAGNOSIS — N20.0 KIDNEY STONE ON RIGHT SIDE: ICD-10-CM

## 2018-10-04 DIAGNOSIS — R31.9 URINARY TRACT INFECTION WITH HEMATURIA, SITE UNSPECIFIED: ICD-10-CM

## 2018-10-04 DIAGNOSIS — R93.5 ABNORMAL X-RAY OF ABDOMEN: ICD-10-CM

## 2018-10-04 DIAGNOSIS — N39.0 URINARY TRACT INFECTION WITH HEMATURIA, SITE UNSPECIFIED: ICD-10-CM

## 2018-10-04 PROCEDURE — 25500020 PHARM REV CODE 255: Performed by: NURSE PRACTITIONER

## 2018-10-04 PROCEDURE — 74178 CT ABD&PLV WO CNTR FLWD CNTR: CPT | Mod: TC

## 2018-10-04 PROCEDURE — 74019 RADEX ABDOMEN 2 VIEWS: CPT | Mod: 26,,, | Performed by: RADIOLOGY

## 2018-10-04 PROCEDURE — 74019 RADEX ABDOMEN 2 VIEWS: CPT | Mod: TC,FY,PO

## 2018-10-04 RX ORDER — SULFAMETHOXAZOLE AND TRIMETHOPRIM 800; 160 MG/1; MG/1
1 TABLET ORAL 2 TIMES DAILY
Qty: 6 TABLET | Refills: 0 | Status: SHIPPED | OUTPATIENT
Start: 2018-10-04 | End: 2018-10-14

## 2018-10-04 RX ORDER — METRONIDAZOLE 500 MG/1
500 TABLET ORAL 3 TIMES DAILY
Qty: 30 TABLET | Refills: 0 | Status: SHIPPED | OUTPATIENT
Start: 2018-10-04 | End: 2018-10-14

## 2018-10-04 RX ADMIN — IOHEXOL 30 ML: 350 INJECTION, SOLUTION INTRAVENOUS at 05:10

## 2018-10-04 RX ADMIN — IOHEXOL 75 ML: 350 INJECTION, SOLUTION INTRAVENOUS at 06:10

## 2018-10-04 NOTE — PROGRESS NOTES
I called and spoke with Ms. Lopez's daughter regarding repeat x-ray results. No change on x-ray despite having 2-3 bowel movements with the magnesium citrate. Ms. Lopez complained of an upset stomach this morning and daughter realized that she had taken 2-3 extra doses of the Bactrim that was prescribed yesterday. She ate some yogurt and crackers earlier today, but nothing recently. CT scan with contrast and BMP orders placed since the possible foreign body on x-ray is still present and hasn't shown any change in location. Nursing staff attempted to schedule as soon as possible (today) as Ms. Lopez's daughter lives about an hour away, however, next soonest appointment was midday tomorrow which isn't feasible for them. Discussed ER for completion of imaging as an alternative option. Ms. Mora states that she will call back to reschedule at a different time. I will follow up with them tomorrow when I'm back in the clinic to check on the progress of symptoms and CT appointment.

## 2018-10-05 ENCOUNTER — PATIENT MESSAGE (OUTPATIENT)
Dept: INTERNAL MEDICINE | Facility: CLINIC | Age: 78
End: 2018-10-05

## 2018-10-05 LAB — BACTERIA UR CULT: NORMAL

## 2018-10-05 NOTE — PROGRESS NOTES
Spoke with Ms. Myla Mora regarding CT findings and new plan as follows:      CT shows a very large kidney stone in the right kidney (3 cm in diameter). There is also significant scarring in the right kidney, however, no swelling of the kidney or ureter (passage tube that connects the bladder and the kidneys). We are going to call urology first thing in the morning to obtain a close follow up appointment.     There is also evidence of severe diverticulitis (an infection that can develop in small pouches of the intestinal walls). There doesn't appear to be any evidence of perforation. The diverticulitis is treated with two different antibiotics: continue the Bactrim twice daily as prescribed, however, instead of 7 days total, we'll have you take the Bactrim for 10 days total. I sent the additional doses to [facility] in [place]. We will also add an antibiotic called Medronidazole (Flagyl). This medication should be taken three times daily for 10 days and I sent this one as well to the [facility] in [place]. A liquid diet may be helpful for the first 48-72 hours. Advance slowly to soft, bland foods as tolerated.  If there are any worsening symptoms (fever, increased confusion, severe abdominal pain, inability to tolerate fluids due to vomiting, decreased urine output, or weakness) please go to the ER as these symptoms could indicate treatment failure and necessitate IV antibiotics, or these symptoms could indicate complications that can occur with diverticulitis such as perforation of the colon, which is a surgical emergency.     Ensure that you are drinking plenty of fluids. This is important to flush the kidneys after the contrast dye from the CT scan and is also important to prevent dehydration with the presence of the kidney stone and the medications used to treat the UTI and diverticulitis.      Will call Mrs. Lopez in the morning to check her status and nursing staff will see about urology follow up appointment.

## 2018-10-05 NOTE — TELEPHONE ENCOUNTER
"I have reviewed the CT scan findings. Ms. Fournier did present to urgent care initially because of abdominal pain. I believe the pain is from the diverticulitis which is affecting the lower left part of her abdomen/ belly. The kidney stone is rather large, 3 cm (1.5inches) and is possibly the cause of the "abnormality" on the xray. The right kidney is reported as "rather scarred" so it appears that urology is going to work her in for Monday. I do want to have her continue with the antibiotics, and Latanya, the NP from urgent care did add metronidazole to the bactrim ( so total of two antibiotics) to cover her for the diverticulitis.  She needs to take these two antibiotics and eat a bland diet for the rest of the weekend.  Please let me know if you have further questions. I am out of the office today, but will be checking messages this Monday.  Myla Arias MD  "

## 2018-10-08 ENCOUNTER — OFFICE VISIT (OUTPATIENT)
Dept: UROLOGY | Facility: CLINIC | Age: 78
End: 2018-10-08
Payer: MEDICARE

## 2018-10-08 VITALS
DIASTOLIC BLOOD PRESSURE: 78 MMHG | WEIGHT: 164.25 LBS | BODY MASS INDEX: 30.22 KG/M2 | SYSTOLIC BLOOD PRESSURE: 116 MMHG | HEIGHT: 62 IN

## 2018-10-08 DIAGNOSIS — N20.0 RENAL STONE: Primary | ICD-10-CM

## 2018-10-08 LAB
BILIRUB SERPL-MCNC: NORMAL MG/DL
BLOOD URINE, POC: NORMAL
COLOR, POC UA: NORMAL
GLUCOSE UR QL STRIP: NORMAL
KETONES UR QL STRIP: NORMAL
LEUKOCYTE ESTERASE URINE, POC: NORMAL
NITRITE, POC UA: NORMAL
PH, POC UA: 5
PROTEIN, POC: NORMAL
SPECIFIC GRAVITY, POC UA: 1.02
UROBILINOGEN, POC UA: NORMAL

## 2018-10-08 PROCEDURE — 3074F SYST BP LT 130 MM HG: CPT | Mod: CPTII,,, | Performed by: UROLOGY

## 2018-10-08 PROCEDURE — 87086 URINE CULTURE/COLONY COUNT: CPT

## 2018-10-08 PROCEDURE — 99204 OFFICE O/P NEW MOD 45 MIN: CPT | Mod: S$PBB,,, | Performed by: UROLOGY

## 2018-10-08 PROCEDURE — 99999 PR PBB SHADOW E&M-EST. PATIENT-LVL III: CPT | Mod: PBBFAC,,, | Performed by: UROLOGY

## 2018-10-08 PROCEDURE — 99213 OFFICE O/P EST LOW 20 MIN: CPT | Mod: PBBFAC | Performed by: UROLOGY

## 2018-10-08 PROCEDURE — 1101F PT FALLS ASSESS-DOCD LE1/YR: CPT | Mod: CPTII,,, | Performed by: UROLOGY

## 2018-10-08 PROCEDURE — 81001 URINALYSIS AUTO W/SCOPE: CPT

## 2018-10-08 PROCEDURE — 3078F DIAST BP <80 MM HG: CPT | Mod: CPTII,,, | Performed by: UROLOGY

## 2018-10-08 PROCEDURE — 81002 URINALYSIS NONAUTO W/O SCOPE: CPT | Mod: PBBFAC | Performed by: UROLOGY

## 2018-10-08 NOTE — PROGRESS NOTES
Chief Complaint: Left renal stone    HPI:   10/8/18: 77 yo woman referred for renal stone after workup for UTI/diverticulitis.  Having diarrhea on meds for diverticulitis.  No abd/pelvic pain and no exac/rel factors.  No hematuria.  No prior urolithiasis.  No urinary bother.  No  history.  Normal sexual function but inactive.  Some memory problems but functional at home.    Allergies:  Patient has no known allergies.    Medications: has a current medication list which includes the following prescription(s): aspirin, atorvastatin, donepezil, lisinopril-hydrochlorothiazide, memantine, metronidazole, sulfamethoxazole-trimethoprim 800-160mg, thyroid (pork), and triamcinolone acetonide 0.1%.    Review of Systems:  General: No fever, chills, fatigability, or weight loss.  Skin: No rashes, itching, or changes in color or texture of skin.  Chest: Denies JESUS, cyanosis, wheezing, cough, and sputum production.  Abdomen: Appetite fine. No weight loss. Denies diarrhea, abdominal pain, hematemesis, or blood in stool.  Musculoskeletal: No joint stiffness or swelling. Denies back pain.  : As above.  All other review of systems negative.    PMH:   has a past medical history of Hypertension and Thyroid disease.    PSH:   has a past surgical history that includes Cataract extraction.    FamHx: family history includes Alzheimer's disease in her mother; Aneurysm in her father; Stomach cancer in her brother.    SocHx:  reports that she has quit smoking. she has never used smokeless tobacco. She reports that she does not drink alcohol or use drugs.     Physical Exam:  Vitals:   Vitals:    10/08/18 1502   BP: 116/78     General: A&Ox3. No apparent distress. No deformities.  Neck: No masses. Normal thyroid.  Lungs: normal inspiration. No use of accessory muscles.  Heart: normal pulse. No arrhythmias.  Abdomen: Soft. NT. ND. No masses. No hernias. No hepatosplenomegaly.  Lymphatic: Neck and groin nodes negative.  Skin: The skin is warm  and dry. No jaundice.  Ext: No c/c/e.  : External genitalia normal.    Labs/Studies: Urinalysis performed in clinic, summary: UA normal    Impression/Plan:   1. Cath UA/UCx today to confirm UTI resolved  2. Right kidney severely atrophied and stone management not indicated most likely.  Mag3 and RTC.

## 2018-10-08 NOTE — PROGRESS NOTES
Patient's daughter states she will call our office once she gets home to look at her calendar to schedule patient's Mag3.

## 2018-10-08 NOTE — PROGRESS NOTES
Cath u/a and c/s ordered per Dr. Montoya. Using sterile technique, inserted pediatric catheter into bladder and obtained 10ml of clear, yellow urine. Patient tolerated well. Specimen sent to lab.

## 2018-10-09 ENCOUNTER — PATIENT MESSAGE (OUTPATIENT)
Dept: UROLOGY | Facility: CLINIC | Age: 78
End: 2018-10-09

## 2018-10-09 LAB
HYALINE CASTS UR QL AUTO: 1 /LPF
MICROSCOPIC COMMENT: ABNORMAL
RBC #/AREA URNS AUTO: 1 /HPF (ref 0–4)
SQUAMOUS #/AREA URNS AUTO: 4 /HPF
WBC #/AREA URNS AUTO: 7 /HPF (ref 0–5)

## 2018-10-10 ENCOUNTER — PATIENT MESSAGE (OUTPATIENT)
Dept: UROLOGY | Facility: CLINIC | Age: 78
End: 2018-10-10

## 2018-10-10 LAB — BACTERIA UR CULT: NO GROWTH

## 2018-10-11 ENCOUNTER — TELEPHONE (OUTPATIENT)
Dept: UROLOGY | Facility: CLINIC | Age: 78
End: 2018-10-11

## 2018-10-11 ENCOUNTER — PATIENT MESSAGE (OUTPATIENT)
Dept: INTERNAL MEDICINE | Facility: CLINIC | Age: 78
End: 2018-10-11

## 2018-10-11 ENCOUNTER — HOSPITAL ENCOUNTER (OUTPATIENT)
Dept: RADIOLOGY | Facility: HOSPITAL | Age: 78
Discharge: HOME | End: 2018-10-11
Attending: UROLOGY
Payer: MEDICARE

## 2018-10-11 DIAGNOSIS — N20.0 RENAL STONE: ICD-10-CM

## 2018-10-11 PROCEDURE — A9562 TC99M MERTIATIDE: HCPCS | Mod: PO

## 2018-10-11 PROCEDURE — 78708 K FLOW/FUNCT IMAGE W/DRUG: CPT | Mod: 26,,, | Performed by: RADIOLOGY

## 2018-10-11 NOTE — TELEPHONE ENCOUNTER
Spoke with patient's daughter, Myla, and scheduled appt with Dr. Montoya to discuss Mag 3 results.

## 2018-10-12 ENCOUNTER — PATIENT MESSAGE (OUTPATIENT)
Dept: UROLOGY | Facility: CLINIC | Age: 78
End: 2018-10-12

## 2018-10-16 ENCOUNTER — TELEPHONE (OUTPATIENT)
Dept: INTERNAL MEDICINE | Facility: CLINIC | Age: 78
End: 2018-10-16

## 2018-10-16 NOTE — TELEPHONE ENCOUNTER
Called Myla and she said that pt took her Atorvastatin 10 mg, lisinopril/hctz 10/12.5mg and Thyroid twice yesterday    She didn't know if she needed to do anything about this, or is this okay?  Please advise?

## 2018-10-16 NOTE — TELEPHONE ENCOUNTER
----- Message from Karen Clarke sent at 10/16/2018  8:20 AM CDT -----  Contact: Myla, pt's daughter  She's calling stating that the pt has set pills that she takes everyday/morning and she thinks the pt took pills set up for Monday and Tuesday all at one time yesterday, she wants to know what she should do, please advise [phone] (home)

## 2018-10-16 NOTE — TELEPHONE ENCOUNTER
Called Myla and let her know that Dr. Mc said Should not cause any major issues.  Just stay well hydrated for possible low blood pressure.

## 2018-10-17 ENCOUNTER — TELEPHONE (OUTPATIENT)
Dept: UROLOGY | Facility: CLINIC | Age: 78
End: 2018-10-17

## 2018-10-17 ENCOUNTER — OFFICE VISIT (OUTPATIENT)
Dept: UROLOGY | Facility: CLINIC | Age: 78
End: 2018-10-17
Payer: MEDICARE

## 2018-10-17 ENCOUNTER — PATIENT MESSAGE (OUTPATIENT)
Dept: INTERNAL MEDICINE | Facility: CLINIC | Age: 78
End: 2018-10-17

## 2018-10-17 ENCOUNTER — PATIENT MESSAGE (OUTPATIENT)
Dept: UROLOGY | Facility: CLINIC | Age: 78
End: 2018-10-17

## 2018-10-17 VITALS
HEART RATE: 59 BPM | SYSTOLIC BLOOD PRESSURE: 126 MMHG | DIASTOLIC BLOOD PRESSURE: 76 MMHG | BODY MASS INDEX: 29.62 KG/M2 | HEIGHT: 62 IN | WEIGHT: 160.94 LBS

## 2018-10-17 DIAGNOSIS — N20.1 OBSTRUCTION OF RIGHT URETEROPELVIC JUNCTION (UPJ) DUE TO STONE: Primary | ICD-10-CM

## 2018-10-17 PROCEDURE — 99214 OFFICE O/P EST MOD 30 MIN: CPT | Mod: S$PBB,,, | Performed by: UROLOGY

## 2018-10-17 PROCEDURE — 3074F SYST BP LT 130 MM HG: CPT | Mod: CPTII,,, | Performed by: UROLOGY

## 2018-10-17 PROCEDURE — 3078F DIAST BP <80 MM HG: CPT | Mod: CPTII,,, | Performed by: UROLOGY

## 2018-10-17 PROCEDURE — 99212 OFFICE O/P EST SF 10 MIN: CPT | Mod: PBBFAC | Performed by: UROLOGY

## 2018-10-17 PROCEDURE — 99999 PR PBB SHADOW E&M-EST. PATIENT-LVL II: CPT | Mod: PBBFAC,,, | Performed by: UROLOGY

## 2018-10-17 PROCEDURE — 1101F PT FALLS ASSESS-DOCD LE1/YR: CPT | Mod: CPTII,,, | Performed by: UROLOGY

## 2018-10-17 RX ORDER — THYROID, PORCINE 30 MG/1
TABLET ORAL
Qty: 90 TABLET | Refills: 3 | Status: SHIPPED | OUTPATIENT
Start: 2018-10-17 | End: 2018-10-23 | Stop reason: SDUPTHER

## 2018-10-17 NOTE — PROGRESS NOTES
Chief Complaint: Left renal stone    HPI:   10/17/18: Mag3 shows 72/38 L/R function. -UCx last visit.  PCNL is the only viable treatment option; observation not recommended.  10/8/18: 79 yo woman referred for renal stone after workup for UTI/diverticulitis.  Having diarrhea on meds for diverticulitis.  No abd/pelvic pain and no exac/rel factors.  No hematuria.  No prior urolithiasis.  No urinary bother.  No  history.  Normal sexual function but inactive.  Some memory problems but functional at home.    Allergies:  Patient has no known allergies.    Medications: has a current medication list which includes the following prescription(s): aspirin, atorvastatin, donepezil, lisinopril-hydrochlorothiazide, memantine, thyroid (pork), and triamcinolone acetonide 0.1%.    Review of Systems:  General: No fever, chills, fatigability, or weight loss.  Skin: No rashes, itching, or changes in color or texture of skin.  Chest: Denies JESUS, cyanosis, wheezing, cough, and sputum production.  Abdomen: Appetite fine. No weight loss. Denies diarrhea, abdominal pain, hematemesis, or blood in stool.  Musculoskeletal: No joint stiffness or swelling. Denies back pain.  : As above.  All other review of systems negative.    PMH:   has a past medical history of Hypertension and Thyroid disease.    PSH:   has a past surgical history that includes Cataract extraction.    FamHx: family history includes Alzheimer's disease in her mother; Aneurysm in her father; Stomach cancer in her brother.    SocHx:  reports that she has quit smoking. she has never used smokeless tobacco. She reports that she does not drink alcohol or use drugs.     Physical Exam:  Vitals:   Vitals:    10/17/18 1046   BP: 126/76   Pulse: (!) 59     General: A&Ox3. No apparent distress. No deformities.  Neck: No masses. Normal thyroid.  Lungs: normal inspiration. No use of accessory muscles.  Heart: normal pulse. No arrhythmias.  Abdomen: Soft. NT. ND.   Skin: The skin is  warm and dry. No jaundice.  Ext: No c/c/e.  :   10/8/18: External genitalia normal.    Labs/Studies:     Impression/Plan:   1. Recc pt have PCNL and all risks/benefits reviewed.  Will RTC 2 wks to discuss again.   2. PCP clearance for surgery.

## 2018-10-17 NOTE — LETTER
October 17, 2018      Latanya Davis, NP  [address] [facility] [address]           [facility]  [address] [place] [address]  Phone: [phone]  Fax: [phone]          Patient: Irlanda Lopez   MR Number: [id number]   YOB: 1940   Date of Visit: 10/17/2018       Dear Latanya Davis:    Thank you for referring Irlanda Lopez to me for evaluation. Attached you will find relevant portions of my assessment and plan of care.    If you have questions, please do not hesitate to call me. I look forward to following Irlanda Lopez along with you.    Sincerely,    Robbin Montoya IV, MD    Enclosure  CC:  No Recipients    If you would like to receive this communication electronically, please contact [email or web address] or [phone] to request more information on [employer] Link access.    For providers and/or their staff who would like to refer a patient to [facility], please contact us through our one-stop-shop provider referral line, [facility], at [phone].    If you feel you have received this communication in error or would no longer like to receive these types of communications, please e-mail [email or web address]

## 2018-10-17 NOTE — TELEPHONE ENCOUNTER
Please schedule appt prior to Nov 8, 2018 to see Dr. Arias; Dr. Montoya is requesting surgery clearance; then notify pt please.  Thanks

## 2018-10-17 NOTE — TELEPHONE ENCOUNTER
pts daughter sent a [facility] regarding the surgery. Before I scheduled the pre op I wanted to make sure the pt was going to go through with the surgery.

## 2018-10-18 ENCOUNTER — PATIENT MESSAGE (OUTPATIENT)
Dept: UROLOGY | Facility: CLINIC | Age: 78
End: 2018-10-18

## 2018-10-18 ENCOUNTER — PATIENT MESSAGE (OUTPATIENT)
Dept: INTERNAL MEDICINE | Facility: CLINIC | Age: 78
End: 2018-10-18

## 2018-10-22 ENCOUNTER — PATIENT MESSAGE (OUTPATIENT)
Dept: INTERNAL MEDICINE | Facility: CLINIC | Age: 78
End: 2018-10-22

## 2018-10-22 RX ORDER — LISINOPRIL AND HYDROCHLOROTHIAZIDE 10; 12.5 MG/1; MG/1
1 TABLET ORAL DAILY
Qty: 90 TABLET | Refills: 3 | Status: SHIPPED | OUTPATIENT
Start: 2018-10-22 | End: 2018-10-22 | Stop reason: SDUPTHER

## 2018-10-23 ENCOUNTER — HOSPITAL ENCOUNTER (OUTPATIENT)
Dept: RADIOLOGY | Facility: HOSPITAL | Age: 78
Discharge: HOME | End: 2018-10-23
Attending: PHYSICIAN ASSISTANT
Payer: MEDICARE

## 2018-10-23 ENCOUNTER — HOSPITAL ENCOUNTER (EMERGENCY)
Facility: HOSPITAL | Age: 78
Discharge: HOME | End: 2018-10-23
Attending: EMERGENCY MEDICINE
Payer: MEDICARE

## 2018-10-23 ENCOUNTER — OFFICE VISIT (OUTPATIENT)
Dept: INTERNAL MEDICINE | Facility: CLINIC | Age: 78
End: 2018-10-23
Payer: MEDICARE

## 2018-10-23 VITALS
DIASTOLIC BLOOD PRESSURE: 94 MMHG | HEART RATE: 64 BPM | SYSTOLIC BLOOD PRESSURE: 157 MMHG | RESPIRATION RATE: 18 BRPM | OXYGEN SATURATION: 98 % | HEIGHT: 63 IN | TEMPERATURE: 99 F | BODY MASS INDEX: 29.44 KG/M2 | WEIGHT: 166.13 LBS

## 2018-10-23 VITALS
BODY MASS INDEX: 29.98 KG/M2 | SYSTOLIC BLOOD PRESSURE: 110 MMHG | DIASTOLIC BLOOD PRESSURE: 72 MMHG | HEIGHT: 62 IN | TEMPERATURE: 98 F | HEART RATE: 56 BPM | WEIGHT: 162.94 LBS

## 2018-10-23 DIAGNOSIS — F01.50 VASCULAR DEMENTIA WITHOUT BEHAVIORAL DISTURBANCE: ICD-10-CM

## 2018-10-23 DIAGNOSIS — K57.92 DIVERTICULITIS: Primary | ICD-10-CM

## 2018-10-23 DIAGNOSIS — R10.32 LLQ PAIN: ICD-10-CM

## 2018-10-23 DIAGNOSIS — Z01.818 PRE-OP EVALUATION: ICD-10-CM

## 2018-10-23 DIAGNOSIS — R19.7 DIARRHEA, UNSPECIFIED TYPE: ICD-10-CM

## 2018-10-23 DIAGNOSIS — K57.32 SIGMOID DIVERTICULITIS: Primary | ICD-10-CM

## 2018-10-23 PROCEDURE — 96375 TX/PRO/DX INJ NEW DRUG ADDON: CPT

## 2018-10-23 PROCEDURE — 99999 PR PBB SHADOW E&M-EST. PATIENT-LVL III: CPT | Mod: PBBFAC,,, | Performed by: PHYSICIAN ASSISTANT

## 2018-10-23 PROCEDURE — 74177 CT ABD & PELVIS W/CONTRAST: CPT | Mod: TC

## 2018-10-23 PROCEDURE — S0030 INJECTION, METRONIDAZOLE: HCPCS | Performed by: EMERGENCY MEDICINE

## 2018-10-23 PROCEDURE — 99213 OFFICE O/P EST LOW 20 MIN: CPT | Mod: PBBFAC,PO,25 | Performed by: PHYSICIAN ASSISTANT

## 2018-10-23 PROCEDURE — 1101F PT FALLS ASSESS-DOCD LE1/YR: CPT | Mod: CPTII,,, | Performed by: PHYSICIAN ASSISTANT

## 2018-10-23 PROCEDURE — 3078F DIAST BP <80 MM HG: CPT | Mod: CPTII,,, | Performed by: PHYSICIAN ASSISTANT

## 2018-10-23 PROCEDURE — 99284 EMERGENCY DEPT VISIT MOD MDM: CPT | Mod: 27

## 2018-10-23 PROCEDURE — 3074F SYST BP LT 130 MM HG: CPT | Mod: CPTII,,, | Performed by: PHYSICIAN ASSISTANT

## 2018-10-23 PROCEDURE — 25500020 PHARM REV CODE 255: Performed by: PHYSICIAN ASSISTANT

## 2018-10-23 PROCEDURE — 63600175 PHARM REV CODE 636 W HCPCS: Performed by: EMERGENCY MEDICINE

## 2018-10-23 PROCEDURE — 25000003 PHARM REV CODE 250: Performed by: EMERGENCY MEDICINE

## 2018-10-23 PROCEDURE — 99215 OFFICE O/P EST HI 40 MIN: CPT | Mod: S$PBB,,, | Performed by: PHYSICIAN ASSISTANT

## 2018-10-23 PROCEDURE — 96374 THER/PROPH/DIAG INJ IV PUSH: CPT

## 2018-10-23 RX ORDER — MAGNESIUM CITRATE
POWDER (GRAM) MISCELLANEOUS
COMMUNITY
End: 2019-01-25 | Stop reason: CLARIF

## 2018-10-23 RX ORDER — SULFAMETHOXAZOLE AND TRIMETHOPRIM 800; 160 MG/1; MG/1
1 TABLET ORAL
Status: COMPLETED | OUTPATIENT
Start: 2018-10-23 | End: 2018-10-23

## 2018-10-23 RX ORDER — SULFAMETHOXAZOLE AND TRIMETHOPRIM 800; 160 MG/1; MG/1
1 TABLET ORAL 2 TIMES DAILY
Qty: 14 TABLET | Refills: 0 | Status: SHIPPED | OUTPATIENT
Start: 2018-10-23 | End: 2018-10-24

## 2018-10-23 RX ORDER — THYROID 30 MG/1
30 TABLET ORAL DAILY
Qty: 90 TABLET | Refills: 3 | Status: SHIPPED | OUTPATIENT
Start: 2018-10-23 | End: 2019-01-31 | Stop reason: SDUPTHER

## 2018-10-23 RX ORDER — CIPROFLOXACIN 2 MG/ML
400 INJECTION, SOLUTION INTRAVENOUS
Status: DISCONTINUED | OUTPATIENT
Start: 2018-10-23 | End: 2018-10-23

## 2018-10-23 RX ORDER — LISINOPRIL AND HYDROCHLOROTHIAZIDE 10; 12.5 MG/1; MG/1
TABLET ORAL
Qty: 90 TABLET | Refills: 3 | Status: ON HOLD | OUTPATIENT
Start: 2018-10-23 | End: 2019-01-02 | Stop reason: HOSPADM

## 2018-10-23 RX ORDER — METRONIDAZOLE 500 MG/1
500 TABLET ORAL 3 TIMES DAILY
Qty: 30 TABLET | Refills: 0 | Status: SHIPPED | OUTPATIENT
Start: 2018-10-23 | End: 2018-10-24

## 2018-10-23 RX ORDER — METRONIDAZOLE 500 MG/100ML
500 INJECTION, SOLUTION INTRAVENOUS
Status: COMPLETED | OUTPATIENT
Start: 2018-10-23 | End: 2018-10-23

## 2018-10-23 RX ORDER — LOPERAMIDE HYDROCHLORIDE 2 MG/1
2 CAPSULE ORAL 4 TIMES DAILY PRN
COMMUNITY
End: 2018-11-15

## 2018-10-23 RX ADMIN — SULFAMETHOXAZOLE AND TRIMETHOPRIM 1 TABLET: 800; 160 TABLET ORAL at 08:10

## 2018-10-23 RX ADMIN — IOHEXOL 75 ML: 350 INJECTION, SOLUTION INTRAVENOUS at 06:10

## 2018-10-23 RX ADMIN — IOHEXOL 30 ML: 350 INJECTION, SOLUTION INTRAVENOUS at 04:10

## 2018-10-23 RX ADMIN — METRONIDAZOLE 500 MG: 500 INJECTION, SOLUTION INTRAVENOUS at 08:10

## 2018-10-23 RX ADMIN — CIPROFLOXACIN 400 MG: 2 INJECTION, SOLUTION INTRAVENOUS at 07:10

## 2018-10-23 NOTE — PROGRESS NOTES
Subjective:       Patient ID: Irlanda Lopez is a 78 y.o. female.    Chief Complaint: Pre-op Exam    HPI  Patient comes in today for pre op exam   She has a large renal stone that is possibly causing UPJ obstruction     On 10.4 had CT done for abd pain and was shown to have severe pocket of sigmoid divertiuclitis without abscess or perferation   Placed on ABX    Today as she is coming in for pre op she also complains of diarrhea x 3 days with abdominal pain, bloating, cramping, malaise, chills, and loss of appetite   Patient was on combo of bactrim/flagyl x 10 days         There are no preventive care reminders to display for this patient.    Past Medical History:   Diagnosis Date    Hypertension     Thyroid disease        Current Outpatient Medications   Medication Sig Dispense Refill    aspirin 81 MG Chew Take 1 tablet (81 mg total) by mouth once daily.      atorvastatin (LIPITOR) 10 MG tablet TAKE 1 TABLET EVERY DAY 90 tablet 3    donepezil (ARICEPT) 10 MG tablet Take 1 tablet (10 mg total) by mouth every evening. 90 tablet 3    lisinopril-hydrochlorothiazide (PRINZIDE,ZESTORETIC) 10-12.5 mg per tablet TAKE 1 TABLET EVERY DAY 90 tablet 3    loperamide (IMODIUM) 2 mg capsule Take 2 mg by mouth 4 (four) times daily as needed for Diarrhea.      magnesium citrate, bulk, Powd by Misc.(Non-Drug; Combo Route) route.      memantine (NAMENDA) 10 MG Tab 5mg po bid x 1 month, then 10mg po bid. 180 tablet 3    PEPPERMINT OIL MISC by Misc.(Non-Drug; Combo Route) route.      triamcinolone acetonide 0.1% (KENALOG) 0.1 % ointment APPLY TOPICALLY 4 (FOUR) TIMES DAILY. TO RASH ON FACE AND CHEST AND NOSE  1    amoxicillin-clavulanate 875-125mg (AUGMENTIN) 875-125 mg per tablet Take 1 tablet by mouth 2 (two) times daily. for 10 days 20 tablet 0    thyroid, pork, (ARMOUR THYROID) 30 mg Tab Take 1 tablet (30 mg total) by mouth once daily. 90 tablet 3     No current facility-administered medications for this visit.   "      Review of Systems   Gastrointestinal: Positive for abdominal distention, abdominal pain, diarrhea and nausea. Negative for anal bleeding, blood in stool, constipation, rectal pain and vomiting.   Psychiatric/Behavioral: Positive for confusion (dementia ).       Objective:   /72   Pulse (!) 56   Temp 98.4 °F (36.9 °C) (Oral)   Ht 5' 2" (1.575 m)   Wt 73.9 kg (162 lb 14.7 oz)   BMI 29.80 kg/m²      Physical Exam   Constitutional: She is oriented to person, place, and time. She appears well-developed and well-nourished. No distress.   HENT:   Head: Normocephalic and atraumatic.   Right Ear: External ear normal.   Left Ear: External ear normal.   Eyes: EOM are normal. Pupils are equal, round, and reactive to light.   Neck: Normal range of motion. Neck supple.   Cardiovascular: Normal rate and regular rhythm.   Pulmonary/Chest: Effort normal and breath sounds normal.   Abdominal: Soft. She exhibits distension. She exhibits no mass. There is tenderness (mid lower, radiating to LLQ ). There is no rebound and no guarding. No hernia.   Musculoskeletal: She exhibits no edema.   Neurological: She is alert and oriented to person, place, and time.   Skin: Skin is warm. Capillary refill takes less than 2 seconds.   Psychiatric: She has a normal mood and affect. Her behavior is normal.         Lab Results   Component Value Date    WBC 15.11 (H) 10/23/2018    HGB 13.9 10/23/2018    HCT 41.5 10/23/2018     10/23/2018    CHOL 212 (H) 07/18/2018    TRIG 219 (H) 07/18/2018    HDL 46 07/18/2018    ALT 17 07/18/2018    AST 22 07/18/2018     10/23/2018    K 3.8 10/23/2018     10/23/2018    CREATININE 0.7 10/23/2018    BUN 16 10/23/2018    CO2 24 10/23/2018    TSH 1.208 07/18/2018    HGBA1C 5.5 07/18/2018       Assessment:       1. Sigmoid diverticulitis    2. Pre-op evaluation    3. Diarrhea, unspecified type    4. Vascular dementia without behavioral disturbance    5. LLQ pain        Plan:       Pre-op " evaluation  -     Basic metabolic panel; Future; Expected date: 10/23/2018    Diarrhea, unspecified type  Likely still having diverticulitis   Vascular dementia without behavioral disturbance  See below  LLQ pain  -     CT Abdomen Pelvis With Contrast; Future; Expected date: 10/23/2018    Other orders  -     amoxicillin-clavulanate 875-125mg (AUGMENTIN) 875-125 mg per tablet; Take 1 tablet by mouth 2 (two) times daily. for 10 days  Dispense: 20 tablet; Refill: 0      Given upcoming proposed surgical procedure, and pts continual colitis symptoms, want to re scan abd promptly and also get labs, it is likely she has failed abx tx. Also patient is poor historian due to symptoms, daugther is with her today   If still present will need her to see GI prior to surgery, as we need to make sure surgical intervention is not needed for diverticulitis     Patient ambulatory in [facility]    No Follow-up on file.

## 2018-10-24 ENCOUNTER — TELEPHONE (OUTPATIENT)
Dept: URGENT CARE | Facility: CLINIC | Age: 78
End: 2018-10-24

## 2018-10-24 ENCOUNTER — TELEPHONE (OUTPATIENT)
Dept: EMERGENCY MEDICINE | Facility: HOSPITAL | Age: 78
End: 2018-10-24

## 2018-10-24 RX ORDER — AMOXICILLIN AND CLAVULANATE POTASSIUM 875; 125 MG/1; MG/1
1 TABLET, FILM COATED ORAL 2 TIMES DAILY
Qty: 20 TABLET | Refills: 0 | Status: SHIPPED | OUTPATIENT
Start: 2018-10-24 | End: 2018-11-03

## 2018-10-24 NOTE — TELEPHONE ENCOUNTER
Called and notified pts daughter. Did not see a note about med change. She verbalized understanding.

## 2018-10-24 NOTE — ED PROVIDER NOTES
SCRIBE #1 NOTE: I, Hakeem Kincaid, am scribing for, and in the presence of, Govind Roberson MD. I have scribed the HPI, ROS, and PEx.         History      Chief Complaint   Patient presents with    Abdominal Pain     pt had outpt CT and was told to come to ER for admission       Review of patient's allergies indicates:  No Known Allergies     HPI   HPI    10/23/2018, 7:19 PM   History obtained from the patient      History of Present Illness: Irlanda Lopez is a 78 y.o. female patient with a PMHx of HTN and thyroid disease who presents to the Emergency Department for an evaluation of intermittent LLQ abdominal pain which onset suddenly x2 weeks ago. Pt reports she was advised to come in for further evaluation and admission following an out pt CT of her abdomen and pelvis which showed diverticulosis of the sigmoid colon with surrounding inflammatory changes. Pt reports she recently completed a x10 day course of bactrim and flagyl after being dx with diverticulitis. Symptoms are intermittent and moderate in severity. Exacerbated by nothing and relieved by nothing. No associated sxs. Patient denies any fever, chills, CP, SOB, N/V/D, blood in stool, dysuria, hematuria, HA, weakness, and all other sxs at this time. Pt denies tx PTA. No further complaints or concerns at this time.     Arrival mode: Personal vehicle    PCP: Myla Arias MD       Past Medical History:  Past Medical History:   Diagnosis Date    Hypertension     Thyroid disease        Past Surgical History:  Past Surgical History:   Procedure Laterality Date    CATARACT EXTRACTION      Dr Raygoza         Family History:  Family History   Problem Relation Age of Onset    Alzheimer's disease Mother     Aneurysm Father         brain    Stomach cancer Brother         50s       Social History:  Social History     Tobacco Use    Smoking status: Former Smoker    Smokeless tobacco: Never Used   Substance and Sexual Activity    Alcohol use: No     Drug use: No    Sexual activity: No     Comment:        ROS   Review of Systems   Constitutional: Negative for appetite change, chills and fever.   HENT: Negative for congestion, sore throat and trouble swallowing.    Respiratory: Negative for cough and shortness of breath.    Cardiovascular: Negative for chest pain and leg swelling.   Gastrointestinal: Positive for abdominal pain. Negative for blood in stool, constipation, diarrhea, nausea and vomiting.   Genitourinary: Negative for dysuria, frequency, hematuria, urgency, vaginal bleeding and vaginal discharge.   Musculoskeletal: Negative for back pain and neck pain.   Skin: Negative for rash.   Neurological: Negative for dizziness, weakness, light-headedness and headaches.   Hematological: Does not bruise/bleed easily.   All other systems reviewed and are negative.    Physical Exam      Initial Vitals [10/23/18 1855]   BP Pulse Resp Temp SpO2   (!) 157/94 64 18 98.6 °F (37 °C) 98 %      MAP       --          Physical Exam  Nursing Notes and Vital Signs Reviewed.  Constitutional: Patient is in no acute distress. Well-developed and well-nourished.  Head: Atraumatic. Normocephalic.  Eyes: PERRL. EOM intact. Conjunctivae are not pale. No scleral icterus.  ENT: Mucous membranes are moist. Oropharynx is clear and symmetric.    Neck: Supple. Full ROM. No lymphadenopathy.  Cardiovascular: Regular rate. Regular rhythm.  Pulmonary/Chest: No respiratory distress. Clear to auscultation bilaterally. No wheezing or rales.  Abdominal: Soft and non-distended.  There is no tenderness.  No rebound, guarding, or rigidity.  Genitourinary: No CVA tenderness  Musculoskeletal: Moves all extremities. No obvious deformities. No edema. No calf tenderness.  Skin: Warm and dry.  Neurological:  Alert, awake, and appropriate.  Normal speech.  No acute focal neurological deficits are appreciated.  Psychiatric: Normal affect. Good eye contact. Appropriate in content.    ED Course   "  Procedures  ED Vital Signs:  Vitals:    10/23/18 1855   BP: (!) 157/94   Pulse: 64   Resp: 18   Temp: 98.6 °F (37 °C)   TempSrc: Oral   SpO2: 98%   Weight: 75.4 kg (166 lb 2 oz)   Height: 5' 2.5" (1.588 m)       Imaging Results:  CT Abdomen Pelvis With Contrast   Order: [id number]   Status:  Final result   Visible to patient:  No (Not Released) Next appt:  11/08/2018 at 01:20 PM in Urology (Robbin Montoya IV, MD) Dx:  LLQ pain   Details     Reading Physician Reading Date Result Priority   Brandon Gabriel MD 10/23/2018       Narrative     EXAMINATION:  CT ABDOMEN PELVIS WITH CONTRAST    CLINICAL HISTORY:  Diverticulitis, known, follow up;LLQ pain, suspect diverticulitis; Left lower quadrant pain    TECHNIQUE:  Low dose axial images, sagittal and coronal reformations were obtained from the lung bases to the pubic symphysis following the IV administration of 75 mL of Omnipaque 350.  Oral contrast was also administered.    COMPARISON:  10/04/2018    FINDINGS:  Lung bases are clear.    The liver is normal.  The gallbladder is normal.    The spleen is normal in size and appearance.  The pancreas is normal.  The adrenal glands are normal.  The aorta and IVC are normal.  No retroperitoneal adenopathy.    Scarring of the lateral and upper left kidney is present but otherwise the left kidney is normal.  Left ureter is normal.    Scarring and cortical thinning identified throughout the right kidney.  Large calculus is identified within the left renal pelvis measuring    3.0 cm.  Mild right hydronephrosis which is stable.  The right ureter is nondilated and normal.    Small hiatal hernia otherwise the stomach is normal.  The small intestine is normal.  Appendix surgically absent.    Diverticulosis of the sigmoid colon is identified with colonic wall thickening and surrounding inflammatory changes.  Findings compatible acute diverticulitis.  Negative for pneumoperitoneum or abscess.  The rectum is normal.    The pelvis " demonstrates normal appearing bladder.  Uterus is unremarkable.  Again calcifications are seen associated with the right ovary.  Nonemergent pelvic ultrasound is recommended for further characterization.    Bones demonstrate a stable compression fracture of T11.  No suspicious bony abnormality detected.  Abdominal and pelvic wall soft tissues are normal.      Impression       Diverticulosis of the sigmoid colon is identified with colonic wall thickening and surrounding inflammatory changes.  Findings compatible acute diverticulitis.  Negative for pneumoperitoneum or abscess.  The patient was referred to the emergency room given CT findings.    All CT scans at this facility use dose modulation, iterative reconstruction, and/or weight based dosing when appropriate to reduce radiation dose to as low as reasonable achievable.      Electronically signed by: Brandon Gabriel MD  Date: 10/23/2018  Time: 18:34             Last Resulted: 10/23/18 18:34                      The Emergency Provider reviewed the vital signs and test results, which are outlined above.    ED Discussion     7:51 PM: Dr. Roberson discussed the pt's case with Dr. Maier (GI) who recommends sending pt home if she looks good in ED. She also recommends advising pt to practice supportive care of her sxs and adopt a low fiber diet.    7:53 PM: Reassessed pt at this time. Pt is awake, alert, and in NAD. Discussed with pt all pertinent ED information and results. Discussed pt dx and plan of tx. Gave pt all f/u and return to the ED instructions. All questions and concerns were addressed at this time. Pt expresses understanding of information and instructions, and is comfortable with plan to discharge. Pt is stable for discharge.    I discussed with patient and/or family/caretaker that evaluation in the ED does not suggest any emergent or life threatening medical conditions requiring immediate intervention beyond what was provided in the ED, and I believe  patient is safe for discharge.  Regardless, an unremarkable evaluation in the ED does not preclude the development or presence of a serious of life threatening condition. As such, patient was instructed to return immediately for any worsening or change in current symptoms.      ED Medication(s):  Medications   metronidazole IVPB 500 mg (500 mg Intravenous New Bag 10/23/18 2003)   sulfamethoxazole-trimethoprim 800-160mg per tablet 1 tablet (1 tablet Oral Given 10/23/18 2017)       Follow-up Information     Myla Arias MD. Call in 2 days.    Specialty:  Family Medicine  Contact information:  [address]  [address]  [facility] [address]  [phone]             [facility] - .    Specialty:  Emergency Medicine  Why:  If symptoms worsen  Contact information:  [address]  [facility] [address] [phone]           GI. Call in 1 week.    Why:  [phone]                   Medical Decision Making    Medical Decision Making:   Clinical Tests:   Radiological Study: Reviewed           Scribe Attestation:   Scribe #1: I performed the above scribed service and the documentation accurately describes the services I performed. I attest to the accuracy of the note.    Attending:   Physician Attestation Statement for Scribe #1: I, Govind Roberson,*, personally performed the services described in this documentation, as scribed by Hakeem Kincaid, in my presence, and it is both accurate and complete.          Clinical Impression       ICD-10-CM ICD-9-CM   1. Diverticulitis K57.92 562.11       Disposition:   Disposition: Discharged  Condition: Stable         Govind Roberson MD  10/24/18 1617

## 2018-10-24 NOTE — TELEPHONE ENCOUNTER
Per my result note,  Augmentin was called in bc bactrim and flaygl were given to her earlier this month and she still has diverticulitis, so they are not working it seems.  I do not want to put her on the exact same medication that did not help her infection so I am changing the abx

## 2018-10-24 NOTE — TELEPHONE ENCOUNTER
Patients daughter called. She is very upset and concerned about pt and her medicine. Pt was at ED last night. They prescribed two medicines for her. Flagyl and then bactrim. They got to the pharmacy and they did not have flagyl so the pharmacy is trying to find it for her but in the meantime when trying to get the abx the pharmacy gave her Augmentin stating that BOUBACAR Gutiérrez sent in for her, so they did not give her the bactrim that the ED stated that she needed. So, now they don't have either med that the ED prescribed but have an abx that was sent in this morning. She is very confused and upset about this. She stated that she lives 35 minutes away and that her brother is trying to help by going  medicines and such but now they aren't even getting the right medicines. pts daughter stated that pt has appt tomorrow with us, is it okay for her to just not take any medicine until tomorrow? And she would like to know why the Augmentin was called in this morning?

## 2018-10-24 NOTE — TELEPHONE ENCOUNTER
Daughter Myla requesting prescription be called into [facility] pharmacy for convince purposes. [facility] states flagyl on back order. Call placed to daughter and new pharmacy selected to [facility]  [address] [place] [address] [phone]. Prescription called to [facility].

## 2018-10-24 NOTE — ADDENDUM NOTE
Addended by: STEFFANIE IGNACIO on: 10/24/2018 09:52 AM     Modules accepted: Orders, Level of Service

## 2018-10-25 ENCOUNTER — TELEPHONE (OUTPATIENT)
Dept: UROLOGY | Facility: CLINIC | Age: 78
End: 2018-10-25

## 2018-10-25 ENCOUNTER — OFFICE VISIT (OUTPATIENT)
Dept: INTERNAL MEDICINE | Facility: CLINIC | Age: 78
End: 2018-10-25
Payer: MEDICARE

## 2018-10-25 VITALS
TEMPERATURE: 98 F | HEIGHT: 63 IN | DIASTOLIC BLOOD PRESSURE: 94 MMHG | SYSTOLIC BLOOD PRESSURE: 140 MMHG | BODY MASS INDEX: 28.95 KG/M2 | HEART RATE: 62 BPM | WEIGHT: 163.38 LBS | OXYGEN SATURATION: 98 %

## 2018-10-25 DIAGNOSIS — F01.50 VASCULAR DEMENTIA WITHOUT BEHAVIORAL DISTURBANCE: Primary | ICD-10-CM

## 2018-10-25 DIAGNOSIS — K57.32 SIGMOID DIVERTICULITIS: ICD-10-CM

## 2018-10-25 PROCEDURE — 1101F PT FALLS ASSESS-DOCD LE1/YR: CPT | Mod: CPTII,HCNC,, | Performed by: PHYSICIAN ASSISTANT

## 2018-10-25 PROCEDURE — 3077F SYST BP >= 140 MM HG: CPT | Mod: CPTII,HCNC,, | Performed by: PHYSICIAN ASSISTANT

## 2018-10-25 PROCEDURE — 99214 OFFICE O/P EST MOD 30 MIN: CPT | Mod: PBBFAC,HCNC,PO | Performed by: PHYSICIAN ASSISTANT

## 2018-10-25 PROCEDURE — 99999 PR PBB SHADOW E&M-EST. PATIENT-LVL IV: CPT | Mod: PBBFAC,HCNC,, | Performed by: PHYSICIAN ASSISTANT

## 2018-10-25 PROCEDURE — 99213 OFFICE O/P EST LOW 20 MIN: CPT | Mod: S$PBB,HCNC,, | Performed by: PHYSICIAN ASSISTANT

## 2018-10-25 PROCEDURE — 3080F DIAST BP >= 90 MM HG: CPT | Mod: CPTII,HCNC,, | Performed by: PHYSICIAN ASSISTANT

## 2018-10-25 NOTE — TELEPHONE ENCOUNTER
----- Message from Humphrey Kern LPN sent at 10/25/2018  3:16 PM CDT -----  Contact: Pt Daughter Myla Mora   Pt daughter requested to be called back to discuss is pt still need the appointment on 11- callback number is [phone]

## 2018-10-25 NOTE — PROGRESS NOTES
Subjective:       Patient ID: Irlanda Lopez is a 78 y.o. female.    Chief Complaint: Hospital Follow Up    HPI  Patient comes in today for ER follow up   Had diverticulitis earlier this month and she was still having symptoms after oral abx   New CT showed significant diverticulitis still present with white count   ER placed patient on abx however these were the same abx she had prior so I sent in Augmentin     Patient remains afebrile   Still has some cramping but notes BMs are less frequent and pain is improving some   There are no preventive care reminders to display for this patient.    Past Medical History:   Diagnosis Date    Hypertension     Thyroid disease        Current Outpatient Medications   Medication Sig Dispense Refill    amoxicillin-clavulanate 875-125mg (AUGMENTIN) 875-125 mg per tablet Take 1 tablet by mouth 2 (two) times daily. for 10 days 20 tablet 0    aspirin 81 MG Chew Take 1 tablet (81 mg total) by mouth once daily.      atorvastatin (LIPITOR) 10 MG tablet TAKE 1 TABLET EVERY DAY 90 tablet 3    donepezil (ARICEPT) 10 MG tablet Take 1 tablet (10 mg total) by mouth every evening. 90 tablet 3    lisinopril-hydrochlorothiazide (PRINZIDE,ZESTORETIC) 10-12.5 mg per tablet TAKE 1 TABLET EVERY DAY 90 tablet 3    loperamide (IMODIUM) 2 mg capsule Take 2 mg by mouth 4 (four) times daily as needed for Diarrhea.      magnesium citrate, bulk, Powd by Misc.(Non-Drug; Combo Route) route.      memantine (NAMENDA) 10 MG Tab 5mg po bid x 1 month, then 10mg po bid. 180 tablet 3    PEPPERMINT OIL MISC by Misc.(Non-Drug; Combo Route) route.      thyroid, pork, (ARMOUR THYROID) 30 mg Tab Take 1 tablet (30 mg total) by mouth once daily. 90 tablet 3    triamcinolone acetonide 0.1% (KENALOG) 0.1 % ointment APPLY TOPICALLY 4 (FOUR) TIMES DAILY. TO RASH ON FACE AND CHEST AND NOSE  1     No current facility-administered medications for this visit.        Review of Systems   Constitutional: Negative for  "fatigue, fever and unexpected weight change.   HENT: Negative for sore throat and trouble swallowing.    Respiratory: Negative for cough and shortness of breath.    Cardiovascular: Negative for chest pain.   Gastrointestinal: Negative for abdominal pain.        Loose stools, intermittent abd cramping    Hematological: Negative for adenopathy. Does not bruise/bleed easily.   Psychiatric/Behavioral: Positive for agitation, confusion and dysphoric mood.       Objective:   BP (!) 140/94   Pulse 62   Temp 98.2 °F (36.8 °C) (Oral)   Ht 5' 2.5" (1.588 m)   Wt 74.1 kg (163 lb 5.8 oz)   SpO2 98%   BMI 29.40 kg/m²      Physical Exam   Constitutional: She is oriented to person, place, and time. She appears well-developed and well-nourished. No distress.   HENT:   Head: Normocephalic and atraumatic.   Right Ear: External ear normal.   Left Ear: External ear normal.   Eyes: EOM are normal. Pupils are equal, round, and reactive to light.   Neck: Normal range of motion. Neck supple.   Cardiovascular: Normal rate and regular rhythm.   Pulmonary/Chest: Effort normal and breath sounds normal.   Abdominal: Soft. She exhibits no distension and no mass. There is tenderness (mid lower, radiating to LLQ ). There is no rebound and no guarding. No hernia.   Musculoskeletal: She exhibits no edema.   Neurological: She is alert and oriented to person, place, and time.   Skin: Skin is warm. Capillary refill takes less than 2 seconds.   Psychiatric: She has a normal mood and affect. Her behavior is normal.   Patient crying and upset about daughter, she feels like a burden          Lab Results   Component Value Date    WBC 15.11 (H) 10/23/2018    HGB 13.9 10/23/2018    HCT 41.5 10/23/2018     10/23/2018    CHOL 212 (H) 07/18/2018    TRIG 219 (H) 07/18/2018    HDL 46 07/18/2018    ALT 17 07/18/2018    AST 22 07/18/2018     10/23/2018    K 3.8 10/23/2018     10/23/2018    CREATININE 0.7 10/23/2018    BUN 16 10/23/2018    " CO2 24 10/23/2018    TSH 1.208 07/18/2018    HGBA1C 5.5 07/18/2018       Assessment:       1. Vascular dementia without behavioral disturbance    2. Sigmoid diverticulitis        Plan:   Vascular dementia without behavioral disturbance    Sigmoid diverticulitis    CT again shows infection, patient was seen in ER last night   I changed abx in hopes that a different abx helps   She is also to see GI   ER if pain worsens     No Follow-up on file.

## 2018-10-27 ENCOUNTER — PATIENT MESSAGE (OUTPATIENT)
Dept: UROLOGY | Facility: CLINIC | Age: 78
End: 2018-10-27

## 2018-10-29 ENCOUNTER — PATIENT MESSAGE (OUTPATIENT)
Dept: INTERNAL MEDICINE | Facility: CLINIC | Age: 78
End: 2018-10-29

## 2018-11-07 ENCOUNTER — OFFICE VISIT (OUTPATIENT)
Dept: INTERNAL MEDICINE | Facility: CLINIC | Age: 78
DRG: 392 | End: 2018-11-07
Payer: MEDICARE

## 2018-11-07 ENCOUNTER — PATIENT MESSAGE (OUTPATIENT)
Dept: INTERNAL MEDICINE | Facility: CLINIC | Age: 78
End: 2018-11-07

## 2018-11-07 ENCOUNTER — HOSPITAL ENCOUNTER (INPATIENT)
Facility: HOSPITAL | Age: 78
LOS: 4 days | Discharge: HOME | DRG: 392 | End: 2018-11-11
Attending: EMERGENCY MEDICINE | Admitting: INTERNAL MEDICINE
Payer: MEDICARE

## 2018-11-07 ENCOUNTER — HOSPITAL ENCOUNTER (OUTPATIENT)
Dept: RADIOLOGY | Facility: HOSPITAL | Age: 78
Discharge: HOME | DRG: 392 | End: 2018-11-07
Attending: FAMILY MEDICINE
Payer: MEDICARE

## 2018-11-07 VITALS
HEIGHT: 63 IN | SYSTOLIC BLOOD PRESSURE: 110 MMHG | DIASTOLIC BLOOD PRESSURE: 80 MMHG | BODY MASS INDEX: 28.13 KG/M2 | TEMPERATURE: 100 F | HEART RATE: 72 BPM | WEIGHT: 158.75 LBS

## 2018-11-07 DIAGNOSIS — K57.32 DIVERTICULITIS OF SIGMOID COLON: Primary | ICD-10-CM

## 2018-11-07 DIAGNOSIS — K57.92 DIVERTICULITIS: ICD-10-CM

## 2018-11-07 DIAGNOSIS — R50.9 FEVER, UNSPECIFIED FEVER CAUSE: Primary | ICD-10-CM

## 2018-11-07 DIAGNOSIS — N20.0 KIDNEY STONE: ICD-10-CM

## 2018-11-07 DIAGNOSIS — R50.9 FEVER, UNSPECIFIED FEVER CAUSE: ICD-10-CM

## 2018-11-07 PROBLEM — E87.1 HYPONATREMIA: Status: ACTIVE | Noted: 2018-11-07

## 2018-11-07 LAB
ALBUMIN SERPL BCP-MCNC: 3.7 G/DL
ALP SERPL-CCNC: 61 U/L
ALT SERPL W/O P-5'-P-CCNC: 16 U/L
ANION GAP SERPL CALC-SCNC: 11 MMOL/L
AST SERPL-CCNC: 26 U/L
BASOPHILS # BLD AUTO: 0.02 K/UL
BASOPHILS NFR BLD: 0.2 %
BILIRUB SERPL-MCNC: 1.9 MG/DL
BILIRUB SERPL-MCNC: NORMAL MG/DL
BILIRUB UR QL STRIP: NEGATIVE
BLOOD URINE, POC: 250
BUN SERPL-MCNC: 10 MG/DL
CALCIUM SERPL-MCNC: 9.4 MG/DL
CHLORIDE SERPL-SCNC: 97 MMOL/L
CLARITY UR: CLEAR
CO2 SERPL-SCNC: 21 MMOL/L
COLOR UR: YELLOW
COLOR, POC UA: YELLOW
CREAT SERPL-MCNC: 0.8 MG/DL
DIFFERENTIAL METHOD: ABNORMAL
EOSINOPHIL # BLD AUTO: 0 K/UL
EOSINOPHIL NFR BLD: 0.2 %
ERYTHROCYTE [DISTWIDTH] IN BLOOD BY AUTOMATED COUNT: 13.7 %
EST. GFR  (AFRICAN AMERICAN): >60 ML/MIN/1.73 M^2
EST. GFR  (NON AFRICAN AMERICAN): >60 ML/MIN/1.73 M^2
GLUCOSE SERPL-MCNC: 107 MG/DL
GLUCOSE UR QL STRIP: NEGATIVE
GLUCOSE UR QL STRIP: NORMAL
HCT VFR BLD AUTO: 42.1 %
HGB BLD-MCNC: 14.5 G/DL
HGB UR QL STRIP: ABNORMAL
KETONES UR QL STRIP: NEGATIVE
KETONES UR QL STRIP: NORMAL
LACTATE SERPL-SCNC: 1.2 MMOL/L
LEUKOCYTE ESTERASE UR QL STRIP: ABNORMAL
LEUKOCYTE ESTERASE URINE, POC: NORMAL
LIPASE SERPL-CCNC: 19 U/L
LYMPHOCYTES # BLD AUTO: 1.3 K/UL
LYMPHOCYTES NFR BLD: 9.9 %
MCH RBC QN AUTO: 29.4 PG
MCHC RBC AUTO-ENTMCNC: 34.4 G/DL
MCV RBC AUTO: 85 FL
MICROSCOPIC COMMENT: NORMAL
MONOCYTES # BLD AUTO: 1.2 K/UL
MONOCYTES NFR BLD: 9.3 %
NEUTROPHILS # BLD AUTO: 10.6 K/UL
NEUTROPHILS NFR BLD: 80.4 %
NITRITE UR QL STRIP: NEGATIVE
NITRITE, POC UA: NORMAL
PH UR STRIP: 6 [PH] (ref 5–8)
PH, POC UA: 6
PLATELET # BLD AUTO: 243 K/UL
PMV BLD AUTO: 9.7 FL
POTASSIUM SERPL-SCNC: 3.9 MMOL/L
PROT SERPL-MCNC: 7.5 G/DL
PROT UR QL STRIP: NEGATIVE
PROTEIN, POC: NORMAL
RBC # BLD AUTO: 4.93 M/UL
RBC #/AREA URNS HPF: 2 /HPF (ref 0–4)
SODIUM SERPL-SCNC: 129 MMOL/L
SP GR UR STRIP: 1.01 (ref 1–1.03)
SPECIFIC GRAVITY, POC UA: 1.01
SQUAMOUS #/AREA URNS HPF: 5 /HPF
URN SPEC COLLECT METH UR: ABNORMAL
UROBILINOGEN UR STRIP-ACNC: NEGATIVE EU/DL
UROBILINOGEN, POC UA: NORMAL
WBC # BLD AUTO: 13.13 K/UL
WBC #/AREA URNS HPF: 5 /HPF (ref 0–5)
WBC CLUMPS URNS QL MICRO: NORMAL

## 2018-11-07 PROCEDURE — 96375 TX/PRO/DX INJ NEW DRUG ADDON: CPT | Mod: HCNC

## 2018-11-07 PROCEDURE — 81000 URINALYSIS NONAUTO W/SCOPE: CPT | Mod: HCNC

## 2018-11-07 PROCEDURE — G0378 HOSPITAL OBSERVATION PER HR: HCPCS | Mod: HCNC

## 2018-11-07 PROCEDURE — 25000003 PHARM REV CODE 250: Mod: HCNC | Performed by: INTERNAL MEDICINE

## 2018-11-07 PROCEDURE — 87040 BLOOD CULTURE FOR BACTERIA: CPT | Mod: HCNC

## 2018-11-07 PROCEDURE — 3074F SYST BP LT 130 MM HG: CPT | Mod: CPTII,HCNC,S$GLB, | Performed by: FAMILY MEDICINE

## 2018-11-07 PROCEDURE — 83690 ASSAY OF LIPASE: CPT | Mod: HCNC

## 2018-11-07 PROCEDURE — 63600175 PHARM REV CODE 636 W HCPCS: Mod: HCNC | Performed by: INTERNAL MEDICINE

## 2018-11-07 PROCEDURE — 21400001 HC TELEMETRY ROOM: Mod: HCNC

## 2018-11-07 PROCEDURE — 3079F DIAST BP 80-89 MM HG: CPT | Mod: CPTII,HCNC,S$GLB, | Performed by: FAMILY MEDICINE

## 2018-11-07 PROCEDURE — 74019 RADEX ABDOMEN 2 VIEWS: CPT | Mod: TC,FY,HCNC,PO

## 2018-11-07 PROCEDURE — 85025 COMPLETE CBC W/AUTO DIFF WBC: CPT | Mod: HCNC

## 2018-11-07 PROCEDURE — 36415 COLL VENOUS BLD VENIPUNCTURE: CPT | Mod: HCNC

## 2018-11-07 PROCEDURE — 25000003 PHARM REV CODE 250: Mod: HCNC | Performed by: NURSE PRACTITIONER

## 2018-11-07 PROCEDURE — 99999 PR PBB SHADOW E&M-EST. PATIENT-LVL III: CPT | Mod: PBBFAC,HCNC,, | Performed by: FAMILY MEDICINE

## 2018-11-07 PROCEDURE — 87088 URINE BACTERIA CULTURE: CPT | Mod: HCNC

## 2018-11-07 PROCEDURE — 99215 OFFICE O/P EST HI 40 MIN: CPT | Mod: HCNC,25,S$GLB, | Performed by: FAMILY MEDICINE

## 2018-11-07 PROCEDURE — 87086 URINE CULTURE/COLONY COUNT: CPT | Mod: HCNC

## 2018-11-07 PROCEDURE — 87186 SC STD MICRODIL/AGAR DIL: CPT | Mod: HCNC

## 2018-11-07 PROCEDURE — 80053 COMPREHEN METABOLIC PANEL: CPT | Mod: HCNC

## 2018-11-07 PROCEDURE — 96365 THER/PROPH/DIAG IV INF INIT: CPT | Mod: HCNC

## 2018-11-07 PROCEDURE — 87077 CULTURE AEROBIC IDENTIFY: CPT | Mod: 59,HCNC

## 2018-11-07 PROCEDURE — 25500020 PHARM REV CODE 255: Mod: HCNC | Performed by: EMERGENCY MEDICINE

## 2018-11-07 PROCEDURE — 74019 RADEX ABDOMEN 2 VIEWS: CPT | Mod: 26,HCNC,, | Performed by: RADIOLOGY

## 2018-11-07 PROCEDURE — 96367 TX/PROPH/DG ADDL SEQ IV INF: CPT | Mod: HCNC

## 2018-11-07 PROCEDURE — S0030 INJECTION, METRONIDAZOLE: HCPCS | Mod: HCNC | Performed by: NURSE PRACTITIONER

## 2018-11-07 PROCEDURE — 96366 THER/PROPH/DIAG IV INF ADDON: CPT | Mod: HCNC

## 2018-11-07 PROCEDURE — 1101F PT FALLS ASSESS-DOCD LE1/YR: CPT | Mod: CPTII,HCNC,S$GLB, | Performed by: FAMILY MEDICINE

## 2018-11-07 PROCEDURE — 99285 EMERGENCY DEPT VISIT HI MDM: CPT | Mod: 25,HCNC

## 2018-11-07 PROCEDURE — 83605 ASSAY OF LACTIC ACID: CPT | Mod: HCNC

## 2018-11-07 PROCEDURE — 63600175 PHARM REV CODE 636 W HCPCS: Mod: HCNC | Performed by: NURSE PRACTITIONER

## 2018-11-07 PROCEDURE — 81002 URINALYSIS NONAUTO W/O SCOPE: CPT | Mod: HCNC,S$GLB,, | Performed by: FAMILY MEDICINE

## 2018-11-07 RX ORDER — SODIUM CHLORIDE 9 MG/ML
500 INJECTION, SOLUTION INTRAVENOUS
Status: COMPLETED | OUTPATIENT
Start: 2018-11-07 | End: 2018-11-07

## 2018-11-07 RX ORDER — ONDANSETRON 2 MG/ML
4 INJECTION INTRAMUSCULAR; INTRAVENOUS
Status: COMPLETED | OUTPATIENT
Start: 2018-11-07 | End: 2018-11-07

## 2018-11-07 RX ORDER — THYROID 30 MG/1
30 TABLET ORAL DAILY
Status: DISCONTINUED | OUTPATIENT
Start: 2018-11-08 | End: 2018-11-07

## 2018-11-07 RX ORDER — MORPHINE SULFATE 4 MG/ML
2 INJECTION, SOLUTION INTRAMUSCULAR; INTRAVENOUS EVERY 6 HOURS PRN
Status: DISCONTINUED | OUTPATIENT
Start: 2018-11-07 | End: 2018-11-08

## 2018-11-07 RX ORDER — MORPHINE SULFATE 4 MG/ML
2 INJECTION, SOLUTION INTRAMUSCULAR; INTRAVENOUS
Status: COMPLETED | OUTPATIENT
Start: 2018-11-07 | End: 2018-11-07

## 2018-11-07 RX ORDER — METRONIDAZOLE 500 MG/100ML
500 INJECTION, SOLUTION INTRAVENOUS
Status: COMPLETED | OUTPATIENT
Start: 2018-11-07 | End: 2018-11-07

## 2018-11-07 RX ORDER — CIPROFLOXACIN 2 MG/ML
400 INJECTION, SOLUTION INTRAVENOUS ONCE
Status: COMPLETED | OUTPATIENT
Start: 2018-11-07 | End: 2018-11-07

## 2018-11-07 RX ORDER — POLYETHYLENE GLYCOL 3350 17 G/17G
17 POWDER, FOR SOLUTION ORAL DAILY
Status: DISCONTINUED | OUTPATIENT
Start: 2018-11-08 | End: 2018-11-08

## 2018-11-07 RX ORDER — CIPROFLOXACIN 2 MG/ML
400 INJECTION, SOLUTION INTRAVENOUS
Status: DISCONTINUED | OUTPATIENT
Start: 2018-11-07 | End: 2018-11-07

## 2018-11-07 RX ORDER — SODIUM CHLORIDE 9 MG/ML
INJECTION, SOLUTION INTRAVENOUS CONTINUOUS
Status: DISCONTINUED | OUTPATIENT
Start: 2018-11-07 | End: 2018-11-11 | Stop reason: HOSPADM

## 2018-11-07 RX ORDER — CIPROFLOXACIN 2 MG/ML
400 INJECTION, SOLUTION INTRAVENOUS
Status: DISCONTINUED | OUTPATIENT
Start: 2018-11-08 | End: 2018-11-11 | Stop reason: HOSPADM

## 2018-11-07 RX ORDER — SYRING-NEEDL,DISP,INSUL,0.3 ML 29 G X1/2"
148 SYRINGE, EMPTY DISPOSABLE MISCELLANEOUS ONCE
Status: COMPLETED | OUTPATIENT
Start: 2018-11-07 | End: 2018-11-07

## 2018-11-07 RX ORDER — METRONIDAZOLE 500 MG/100ML
500 INJECTION, SOLUTION INTRAVENOUS
Status: DISCONTINUED | OUTPATIENT
Start: 2018-11-08 | End: 2018-11-10

## 2018-11-07 RX ORDER — ATORVASTATIN CALCIUM 10 MG/1
10 TABLET, FILM COATED ORAL DAILY
Status: DISCONTINUED | OUTPATIENT
Start: 2018-11-08 | End: 2018-11-11 | Stop reason: HOSPADM

## 2018-11-07 RX ORDER — METRONIDAZOLE 500 MG/100ML
500 INJECTION, SOLUTION INTRAVENOUS
Status: DISCONTINUED | OUTPATIENT
Start: 2018-11-07 | End: 2018-11-07 | Stop reason: SDUPTHER

## 2018-11-07 RX ORDER — NAPROXEN SODIUM 220 MG/1
81 TABLET, FILM COATED ORAL DAILY
Status: DISCONTINUED | OUTPATIENT
Start: 2018-11-08 | End: 2018-11-11 | Stop reason: HOSPADM

## 2018-11-07 RX ORDER — THYROID 30 MG/1
30 TABLET ORAL DAILY
Status: DISCONTINUED | OUTPATIENT
Start: 2018-11-07 | End: 2018-11-08

## 2018-11-07 RX ADMIN — ONDANSETRON 4 MG: 2 INJECTION, SOLUTION INTRAMUSCULAR; INTRAVENOUS at 02:11

## 2018-11-07 RX ADMIN — Medication 148 ML: at 08:11

## 2018-11-07 RX ADMIN — METRONIDAZOLE 500 MG: 500 INJECTION, SOLUTION INTRAVENOUS at 03:11

## 2018-11-07 RX ADMIN — SODIUM CHLORIDE: 0.9 INJECTION, SOLUTION INTRAVENOUS at 07:11

## 2018-11-07 RX ADMIN — SODIUM CHLORIDE 500 ML: 0.9 INJECTION, SOLUTION INTRAVENOUS at 03:11

## 2018-11-07 RX ADMIN — CIPROFLOXACIN 400 MG: 2 INJECTION, SOLUTION INTRAVENOUS at 05:11

## 2018-11-07 RX ADMIN — MORPHINE SULFATE 2 MG: 4 INJECTION INTRAVENOUS at 10:11

## 2018-11-07 RX ADMIN — IOHEXOL 75 ML: 350 INJECTION, SOLUTION INTRAVENOUS at 04:11

## 2018-11-07 RX ADMIN — MORPHINE SULFATE 2 MG: 4 INJECTION INTRAVENOUS at 02:11

## 2018-11-07 NOTE — PROGRESS NOTES
Subjective:      Patient ID: Irlanda Lopez is a 78 y.o. female.    Chief Complaint: Abdominal Pain (seen for divertic 10/23/18 by jossy menjivar)    Disclaimer:  This note is prepared using voice recognition software and as such is likely to have errors and has not been proof read. Please contact me for questions.     Patient presents urgently with walking in today describing that she is having the most severe diverticulitis flare.  She has been seen multiple times recently in the past few weeks due to abdominal pain.  She has had 2 CT scans both which confirmed diverticulitis which were new for the patient.  She just initially was placed on Cipro and Flagyl for the initial course and then had worsening abdominal pain without improvement and with the 2nd CT scan showing still persistence of the diverticulitis with some slight worsening was changed to Augmentin.  She completed her last course of the antibiotics approximately 1 week ago.  She woke up today with severe abdominal pain severe.  Because of her dementia and living alone she does not really remember if she had any diarrhea this a.m..  She has had very limited appetite.  She is presenting with a fever today of 101.4.  Her son is with her today.  Normally it is her daughter will come with her.  She is actually scheduled to see GI tomorrow for the diverticulitis with Dr. Perez but she states she could not make it.  She has a very large known kidney stone present on the CT scan that is noted to be on the left kidney.  The pain today is more suprapubic and in the right lower quadrant.  She is nauseated but she is not vomiting today.  She has had little to eat in the past few days as well.  Just not feeling well at all.  Is sweaty with chills with her fever also.        Lab Results   Component Value Date    WBC 15.11 (H) 10/23/2018    HGB 13.9 10/23/2018    HCT 41.5 10/23/2018     10/23/2018    CHOL 212 (H) 07/18/2018    TRIG 219 (H) 07/18/2018    HDL 46  07/18/2018    ALT 17 07/18/2018    AST 22 07/18/2018     10/23/2018    K 3.8 10/23/2018     10/23/2018    CREATININE 0.7 10/23/2018    BUN 16 10/23/2018    CO2 24 10/23/2018    TSH 1.208 07/18/2018    HGBA1C 5.5 07/18/2018       CT Abdomen Pelvis With Contrast  Narrative: EXAMINATION:  CT ABDOMEN PELVIS WITH CONTRAST    CLINICAL HISTORY:  Diverticulitis, known, follow up;LLQ pain, suspect diverticulitis; Left lower quadrant pain    TECHNIQUE:  Low dose axial images, sagittal and coronal reformations were obtained from the lung bases to the pubic symphysis following the IV administration of 75 mL of Omnipaque 350.  Oral contrast was also administered.    COMPARISON:  10/04/2018    FINDINGS:  Lung bases are clear.    The liver is normal.  The gallbladder is normal.    The spleen is normal in size and appearance.  The pancreas is normal.  The adrenal glands are normal.  The aorta and IVC are normal.  No retroperitoneal adenopathy.    Scarring of the lateral and upper left kidney is present but otherwise the left kidney is normal.  Left ureter is normal.    Scarring and cortical thinning identified throughout the right kidney.  Large calculus is identified within the left renal pelvis measuring    3.0 cm.  Mild right hydronephrosis which is stable.  The right ureter is nondilated and normal.    Small hiatal hernia otherwise the stomach is normal.  The small intestine is normal.  Appendix surgically absent.    Diverticulosis of the sigmoid colon is identified with colonic wall thickening and surrounding inflammatory changes.  Findings compatible acute diverticulitis.  Negative for pneumoperitoneum or abscess.  The rectum is normal.    The pelvis demonstrates normal appearing bladder.  Uterus is unremarkable.  Again calcifications are seen associated with the right ovary.  Nonemergent pelvic ultrasound is recommended for further characterization.    Bones demonstrate a stable compression fracture of T11.  No  "suspicious bony abnormality detected.  Abdominal and pelvic wall soft tissues are normal.  Impression: Diverticulosis of the sigmoid colon is identified with colonic wall thickening and surrounding inflammatory changes.  Findings compatible acute diverticulitis.  Negative for pneumoperitoneum or abscess.  The patient was referred to the emergency room given CT findings.    All CT scans at this facility use dose modulation, iterative reconstruction, and/or weight based dosing when appropriate to reduce radiation dose to as low as reasonable achievable.    Electronically signed by: Brandon Gabriel MD  Date:    10/23/2018  Time:    18:34        Review of Systems   Constitutional: Positive for activity change, appetite change, chills, diaphoresis, fatigue and fever.   Respiratory: Negative for cough and shortness of breath.    Cardiovascular: Negative for chest pain and palpitations.   Gastrointestinal: Positive for abdominal distention, abdominal pain, nausea and vomiting. Negative for anal bleeding, blood in stool, constipation and diarrhea.   Genitourinary: Positive for flank pain.   Psychiatric/Behavioral: Positive for sleep disturbance.     Objective:     Vitals:    11/07/18 1041 11/07/18 1127   BP: 110/80    Pulse: 72    Temp: (!) 101.4 °F (38.6 °C)  Comment: tympanic 99.5 °F (37.5 °C)  Comment: oral   Weight: 72 kg (158 lb 11.7 oz)    Height: 5' 2.5" (1.588 m)      Physical Exam   Constitutional: She appears well-developed and well-nourished. She appears distressed.   HENT:   Head: Normocephalic and atraumatic.   Right Ear: Tympanic membrane normal.   Left Ear: Tympanic membrane normal.   Mouth/Throat: Oropharynx is clear and moist.   Eyes: Conjunctivae and EOM are normal.   Neck: Normal range of motion. Neck supple.   Cardiovascular: Normal rate and regular rhythm.   Pulmonary/Chest: Effort normal and breath sounds normal.   Abdominal: Bowel sounds are decreased. There is tenderness in the right lower quadrant, " suprapubic area and left lower quadrant. There is guarding. There is no rigidity, no rebound and no CVA tenderness.   Distended abdomen patient rocking and grimacing due to the discomfort 8/10 more on the suprapubic and left lower quadrant today.   Skin: She is diaphoretic.   Psychiatric: She has a normal mood and affect. Her behavior is normal.   Nursing note and vitals reviewed.    Assessment:     1. Fever, unspecified fever cause    2. Diverticulitis    3. Kidney stone      Plan:   Irlanda was seen today for abdominal pain.    Diagnoses and all orders for this visit:    Fever, unspecified fever cause-new suspect related to the diverticulitis with worsening symptoms.  Now failed to antibiotics.  Will obtain point of care urine today in the office while here however my concerns are is for worsening of the diverticulitis.  Repeat temp orally is 99.7 so will do poct urine and kub initially in office.     At this time I have recommended most likely the patient go on into the emergency room because she has failed 2 outpatient antibiotics including Bactrim, Flagyl, and now Augmentin.  Will most likely need repeat CT scan additional blood work and probable IV antibiotics and IV fluids.  The son said was in agreement with the plan.    Diverticulitis- failed outpt therapy.   Comments:  fever, severe abd pain, with recent diverticulitis s/p 2 CT scans and failure of multi regimen oral antibiotics, large known kidney stone on left; failed Bactrim, flagyl, and augmentin.  Point of care urine testing today positive for blood secondary to the kidney stone only trace leukocytes positive for protein.  Negative for nitrates.  X-ray done in the office today shows no evidence of free air however significant amounts of stool backing up with some nonspecific bowel distention.    Kidney stone  Comments:  fever, severe abd pain, with recent diverticulitis s/p 2 oral abx, large known kidney stone on left    Other orders  -     POCT URINE  DIPSTICK WITHOUT MICROSCOPE  -     Urine culture; Future  -     X-Ray Abdomen Flat And Erect; Future    At this time I have spoken with the son and the daughter Faiza her the primary caregivers as well as the patient.  She is visibly uncomfortable.  I believe the best case at this time is to go on into the emergency room which I will place a referral.  She has receive IV antibiotics as well as observation.  Most likely she will need another CT scan of the abdomen to evaluate this further versus GI and or general surgery consultation since she has failed outpatient therapy x2 within the past month.  Family members are agreeable to the plan. They will go by private vehicle to the emergency room.        No Follow-up on file.    There are no Patient Instructions on file for this visit.

## 2018-11-07 NOTE — ED PROVIDER NOTES
Encounter Date: 11/7/2018       History     Chief Complaint   Patient presents with    Abdominal Pain     pt c/o abd pain for several days, was sent by PCP for further workup     78 year old female with complaint of left lower abdominal pain that has worsened over the past few days.  Reports fever of 101 this morning.  Pt diagnosed with diverticulitis recently and finished Flagyl and Bactrim last week.  No vomiting.  Reports occasional diarrhea.   Moderate pain.  Pt reports pain comes and goes.            Review of patient's allergies indicates:  No Known Allergies  Past Medical History:   Diagnosis Date    Hypertension     Thyroid disease      Past Surgical History:   Procedure Laterality Date    CATARACT EXTRACTION      Dr Raygoza     Family History   Problem Relation Age of Onset    Alzheimer's disease Mother     Aneurysm Father         brain    Stomach cancer Brother         50s     Social History     Tobacco Use    Smoking status: Former Smoker    Smokeless tobacco: Never Used   Substance Use Topics    Alcohol use: No    Drug use: No     Review of Systems   Constitutional: Negative for fever.   HENT: Negative for sore throat.    Respiratory: Negative for shortness of breath.    Cardiovascular: Negative for chest pain.   Gastrointestinal: Positive for abdominal pain. Negative for nausea.   Genitourinary: Negative for dysuria.   Musculoskeletal: Negative for back pain.   Skin: Negative for rash.   Neurological: Negative for weakness.   Hematological: Does not bruise/bleed easily.       Physical Exam     Initial Vitals [11/07/18 1302]   BP Pulse Resp Temp SpO2   124/67 70 18 98.2 °F (36.8 °C) 96 %      MAP       --         Physical Exam    Nursing note and vitals reviewed.  Constitutional: She appears well-developed and well-nourished.   HENT:   Head: Normocephalic and atraumatic.   Eyes: Conjunctivae and EOM are normal. Pupils are equal, round, and reactive to light.   Neck: Normal range of motion. Neck  supple.   Cardiovascular: Normal rate, regular rhythm, normal heart sounds and intact distal pulses.   Pulmonary/Chest: Breath sounds normal.   Abdominal: Soft. There is tenderness. There is no rebound and no guarding.   Mild tenderness left lower abdomen, no guarding or rebound   Musculoskeletal: Normal range of motion.   Neurological: She is alert and oriented to person, place, and time. She has normal strength and normal reflexes.   Skin: Skin is warm and dry.   Psychiatric: She has a normal mood and affect. Her behavior is normal. Thought content normal.         ED Course   Procedures  Labs Reviewed   URINALYSIS - Abnormal; Notable for the following components:       Result Value    Occult Blood UA Trace (*)     Leukocytes, UA 1+ (*)     All other components within normal limits   CBC W/ AUTO DIFFERENTIAL - Abnormal; Notable for the following components:    WBC 13.13 (*)     Gran # (ANC) 10.6 (*)     Mono # 1.2 (*)     Gran% 80.4 (*)     Lymph% 9.9 (*)     All other components within normal limits   COMPREHENSIVE METABOLIC PANEL - Abnormal; Notable for the following components:    Sodium 129 (*)     CO2 21 (*)     Total Bilirubin 1.9 (*)     All other components within normal limits   CULTURE, BLOOD   CULTURE, BLOOD   URINALYSIS MICROSCOPIC   LIPASE   LACTIC ACID, PLASMA   URINALYSIS     Labs Reviewed   URINALYSIS - Abnormal; Notable for the following components:       Result Value    Occult Blood UA Trace (*)     Leukocytes, UA 1+ (*)     All other components within normal limits   CBC W/ AUTO DIFFERENTIAL - Abnormal; Notable for the following components:    WBC 13.13 (*)     Gran # (ANC) 10.6 (*)     Mono # 1.2 (*)     Gran% 80.4 (*)     Lymph% 9.9 (*)     All other components within normal limits   COMPREHENSIVE METABOLIC PANEL - Abnormal; Notable for the following components:    Sodium 129 (*)     CO2 21 (*)     Total Bilirubin 1.9 (*)     All other components within normal limits   CULTURE, BLOOD   CULTURE,  BLOOD   URINALYSIS MICROSCOPIC   LIPASE   LACTIC ACID, PLASMA   URINALYSIS     Imaging Results          CT Abdomen Pelvis With Contrast (Final result)  Result time 11/07/18 16:38:03    Final result by Katie Oliver MD (Timothy) (11/07/18 16:38:03)                 Impression:      Stable inflammatory changes involving the sigmoid colon.  No evidence of perforation or abscess.    All CT scans at this facility use dose modulation, iterative reconstructions, and/or weight base dosing when appropriate to reduce radiation dose to as low as reasonably achievable      Electronically signed by: Katie Oliver MD  Date:    11/07/2018  Time:    16:38             Narrative:    EXAMINATION:  CT ABDOMEN PELVIS WITH CONTRAST    CLINICAL HISTORY:  LLQ pain, suspect diverticulitis;    COMPARISON:  Comparison 10/23/2018.    FINDINGS:  Lung bases are clear.  Small hiatal hernia.    The liver, the spleen, and the pancreas appear normal.    There is chronic scarring of the right kidney.  Large narrowed formed calculus is seen as before.  Left kidney is unremarkable.    Mild atherosclerosis of the abdominal aorta.  No evidence of aneurysm.  Once again there are inflammatory changes seen involving the sigmoid colon.  No evidence of perforation or abscess.  Findings are similar to previous exam.    Bladder is normal.No abnormal masses involving the pelvis.    Stable compression fracture of the lower thoracic spine.  Degenerative changes of the lumbar spine.                                     Imaging Results          CT Abdomen Pelvis With Contrast (Final result)  Result time 11/07/18 16:38:03    Final result by Katie Oliver MD (Timothy) (11/07/18 16:38:03)                 Impression:      Stable inflammatory changes involving the sigmoid colon.  No evidence of perforation or abscess.    All CT scans at this facility use dose modulation, iterative reconstructions, and/or weight base dosing when appropriate to reduce radiation dose to  as low as reasonably achievable      Electronically signed by: Katie Oliver MD  Date:    2018  Time:    16:38             Narrative:    EXAMINATION:  CT ABDOMEN PELVIS WITH CONTRAST    CLINICAL HISTORY:  LLQ pain, suspect diverticulitis;    COMPARISON:  Comparison 10/23/2018.    FINDINGS:  Lung bases are clear.  Small hiatal hernia.    The liver, the spleen, and the pancreas appear normal.    There is chronic scarring of the right kidney.  Large narrowed formed calculus is seen as before.  Left kidney is unremarkable.    Mild atherosclerosis of the abdominal aorta.  No evidence of aneurysm.  Once again there are inflammatory changes seen involving the sigmoid colon.  No evidence of perforation or abscess.  Findings are similar to previous exam.    Bladder is normal.No abnormal masses involving the pelvis.    Stable compression fracture of the lower thoracic spine.  Degenerative changes of the lumbar spine.                                 Medical Decision Makin:50 PM  Case discussed with Dr. Arias.  Dr. Arias requesting admission to observation for IV antibiotics.                       Clinical Impression:   The encounter diagnosis was Diverticulitis of sigmoid colon.                             Eliel Meng NP  [id number]

## 2018-11-07 NOTE — ED NOTES
C/o generalized lower abd pain. Hx of diverticulitis. States just completed ABX. Denies fever; denies urinary symptoms. abd tender to palpation

## 2018-11-07 NOTE — ED NOTES
Bed: R 03  Expected date:   Expected time:   Means of arrival:   Comments:     Cynthia Baeza, INGRID  11/07/18 [id number]

## 2018-11-07 NOTE — ED NOTES
Daughter argumentative about bed status. Explained that there were no beds available and that a workup had been performed. Told daughter that once a bed becomes available then we will pull patients back by acuity. Daughter then walked away arguing and waving me off with her arm not listening.

## 2018-11-07 NOTE — PROGRESS NOTES
I called the er at [facility] and spoke with aries Garza and advised that Dr Arias was sending patient to the er for divertic and she wanted to have one of the physicians contact her via personal cell and gave him cell and he expressed understanding. Dr Aviles and Dr Paulino are te Er [facility]

## 2018-11-08 ENCOUNTER — TELEPHONE (OUTPATIENT)
Dept: GASTROENTEROLOGY | Facility: CLINIC | Age: 78
End: 2018-11-08

## 2018-11-08 PROBLEM — E44.0 MODERATE PROTEIN-CALORIE MALNUTRITION: Status: ACTIVE | Noted: 2018-11-08

## 2018-11-08 PROBLEM — E43 SEVERE MALNUTRITION: Status: ACTIVE | Noted: 2018-11-08

## 2018-11-08 PROBLEM — B37.89 CANDIDIASIS OF ANUS: Status: ACTIVE | Noted: 2018-11-08

## 2018-11-08 LAB
ANION GAP SERPL CALC-SCNC: 10 MMOL/L
BASOPHILS # BLD AUTO: 0.02 K/UL
BASOPHILS NFR BLD: 0.2 %
BUN SERPL-MCNC: 10 MG/DL
CALCIUM SERPL-MCNC: 8.5 MG/DL
CHLORIDE SERPL-SCNC: 103 MMOL/L
CO2 SERPL-SCNC: 22 MMOL/L
CREAT SERPL-MCNC: 0.8 MG/DL
DIFFERENTIAL METHOD: ABNORMAL
EOSINOPHIL # BLD AUTO: 0 K/UL
EOSINOPHIL NFR BLD: 0.2 %
ERYTHROCYTE [DISTWIDTH] IN BLOOD BY AUTOMATED COUNT: 13.7 %
EST. GFR  (AFRICAN AMERICAN): >60 ML/MIN/1.73 M^2
EST. GFR  (NON AFRICAN AMERICAN): >60 ML/MIN/1.73 M^2
GLUCOSE SERPL-MCNC: 108 MG/DL
HCT VFR BLD AUTO: 36.7 %
HGB BLD-MCNC: 12.2 G/DL
LYMPHOCYTES # BLD AUTO: 1.4 K/UL
LYMPHOCYTES NFR BLD: 11.1 %
MCH RBC QN AUTO: 28.8 PG
MCHC RBC AUTO-ENTMCNC: 33.2 G/DL
MCV RBC AUTO: 87 FL
MONOCYTES # BLD AUTO: 1.5 K/UL
MONOCYTES NFR BLD: 12.1 %
NEUTROPHILS # BLD AUTO: 9.3 K/UL
NEUTROPHILS NFR BLD: 76.4 %
PLATELET # BLD AUTO: 228 K/UL
PMV BLD AUTO: 9.8 FL
POTASSIUM SERPL-SCNC: 3.2 MMOL/L
RBC # BLD AUTO: 4.24 M/UL
SODIUM SERPL-SCNC: 135 MMOL/L
WBC # BLD AUTO: 12.19 K/UL

## 2018-11-08 PROCEDURE — 36415 COLL VENOUS BLD VENIPUNCTURE: CPT | Mod: HCNC

## 2018-11-08 PROCEDURE — 25000003 PHARM REV CODE 250: Mod: HCNC | Performed by: NURSE PRACTITIONER

## 2018-11-08 PROCEDURE — 99900035 HC TECH TIME PER 15 MIN (STAT): Mod: HCNC

## 2018-11-08 PROCEDURE — 21400001 HC TELEMETRY ROOM: Mod: HCNC

## 2018-11-08 PROCEDURE — 87046 STOOL CULTR AEROBIC BACT EA: CPT | Mod: HCNC

## 2018-11-08 PROCEDURE — 89055 LEUKOCYTE ASSESSMENT FECAL: CPT | Mod: HCNC

## 2018-11-08 PROCEDURE — 27000221 HC OXYGEN, UP TO 24 HOURS: Mod: HCNC

## 2018-11-08 PROCEDURE — S0030 INJECTION, METRONIDAZOLE: HCPCS | Mod: HCNC | Performed by: NURSE PRACTITIONER

## 2018-11-08 PROCEDURE — 85025 COMPLETE CBC W/AUTO DIFF WBC: CPT | Mod: HCNC

## 2018-11-08 PROCEDURE — 87427 SHIGA-LIKE TOXIN AG IA: CPT | Mod: 59,HCNC

## 2018-11-08 PROCEDURE — 63600175 PHARM REV CODE 636 W HCPCS: Mod: HCNC | Performed by: INTERNAL MEDICINE

## 2018-11-08 PROCEDURE — 87045 FECES CULTURE AEROBIC BACT: CPT | Mod: HCNC

## 2018-11-08 PROCEDURE — 97802 MEDICAL NUTRITION INDIV IN: CPT | Mod: HCNC

## 2018-11-08 PROCEDURE — 94761 N-INVAS EAR/PLS OXIMETRY MLT: CPT | Mod: HCNC

## 2018-11-08 PROCEDURE — 87449 NOS EACH ORGANISM AG IA: CPT | Mod: HCNC

## 2018-11-08 PROCEDURE — 99900031 HC PATIENT EDUCATION (STAT): Mod: HCNC

## 2018-11-08 PROCEDURE — 25000003 PHARM REV CODE 250: Mod: HCNC | Performed by: HOSPITALIST

## 2018-11-08 PROCEDURE — 94799 UNLISTED PULMONARY SVC/PX: CPT | Mod: HCNC

## 2018-11-08 PROCEDURE — 25000003 PHARM REV CODE 250: Mod: HCNC | Performed by: INTERNAL MEDICINE

## 2018-11-08 PROCEDURE — 63600175 PHARM REV CODE 636 W HCPCS: Mod: HCNC | Performed by: NURSE PRACTITIONER

## 2018-11-08 PROCEDURE — 80048 BASIC METABOLIC PNL TOTAL CA: CPT | Mod: HCNC

## 2018-11-08 RX ORDER — ACETAMINOPHEN 10 MG/ML
1000 INJECTION, SOLUTION INTRAVENOUS EVERY 8 HOURS
Status: DISCONTINUED | OUTPATIENT
Start: 2018-11-08 | End: 2018-11-08

## 2018-11-08 RX ORDER — PANTOPRAZOLE SODIUM 40 MG/1
40 TABLET, DELAYED RELEASE ORAL DAILY
Status: DISCONTINUED | OUTPATIENT
Start: 2018-11-09 | End: 2018-11-11 | Stop reason: HOSPADM

## 2018-11-08 RX ORDER — HYDROCORTISONE 1 %
CREAM (GRAM) TOPICAL 4 TIMES DAILY PRN
Status: DISCONTINUED | OUTPATIENT
Start: 2018-11-08 | End: 2018-11-11 | Stop reason: HOSPADM

## 2018-11-08 RX ORDER — HEPARIN SODIUM 5000 [USP'U]/ML
5000 INJECTION, SOLUTION INTRAVENOUS; SUBCUTANEOUS EVERY 8 HOURS
Status: DISCONTINUED | OUTPATIENT
Start: 2018-11-08 | End: 2018-11-11 | Stop reason: HOSPADM

## 2018-11-08 RX ORDER — HYDROCODONE BITARTRATE AND ACETAMINOPHEN 5; 325 MG/1; MG/1
1 TABLET ORAL EVERY 6 HOURS PRN
Status: DISCONTINUED | OUTPATIENT
Start: 2018-11-08 | End: 2018-11-11 | Stop reason: HOSPADM

## 2018-11-08 RX ORDER — ONDANSETRON 2 MG/ML
4 INJECTION INTRAMUSCULAR; INTRAVENOUS EVERY 6 HOURS PRN
Status: DISCONTINUED | OUTPATIENT
Start: 2018-11-08 | End: 2018-11-11 | Stop reason: HOSPADM

## 2018-11-08 RX ORDER — POTASSIUM CHLORIDE 20 MEQ/1
40 TABLET, EXTENDED RELEASE ORAL ONCE
Status: COMPLETED | OUTPATIENT
Start: 2018-11-08 | End: 2018-11-08

## 2018-11-08 RX ORDER — NYSTATIN AND TRIAMCINOLONE ACETONIDE 100000; 1 [USP'U]/G; MG/G
OINTMENT TOPICAL 2 TIMES DAILY
Status: DISCONTINUED | OUTPATIENT
Start: 2018-11-08 | End: 2018-11-11 | Stop reason: HOSPADM

## 2018-11-08 RX ORDER — THYROID 30 MG/1
30 TABLET ORAL NIGHTLY
Status: DISCONTINUED | OUTPATIENT
Start: 2018-11-08 | End: 2018-11-11 | Stop reason: HOSPADM

## 2018-11-08 RX ORDER — POTASSIUM CHLORIDE 20 MEQ/1
20 TABLET, EXTENDED RELEASE ORAL ONCE
Status: COMPLETED | OUTPATIENT
Start: 2018-11-08 | End: 2018-11-08

## 2018-11-08 RX ADMIN — ATORVASTATIN CALCIUM 10 MG: 10 TABLET, FILM COATED ORAL at 09:11

## 2018-11-08 RX ADMIN — POTASSIUM CHLORIDE 40 MEQ: 1500 TABLET, EXTENDED RELEASE ORAL at 05:11

## 2018-11-08 RX ADMIN — METRONIDAZOLE 500 MG: 500 INJECTION, SOLUTION INTRAVENOUS at 04:11

## 2018-11-08 RX ADMIN — ASPIRIN 81 MG CHEWABLE TABLET 81 MG: 81 TABLET CHEWABLE at 09:11

## 2018-11-08 RX ADMIN — CIPROFLOXACIN 400 MG: 2 INJECTION, SOLUTION INTRAVENOUS at 04:11

## 2018-11-08 RX ADMIN — METRONIDAZOLE 500 MG: 500 INJECTION, SOLUTION INTRAVENOUS at 09:11

## 2018-11-08 RX ADMIN — CIPROFLOXACIN 400 MG: 2 INJECTION, SOLUTION INTRAVENOUS at 05:11

## 2018-11-08 RX ADMIN — NYSTATIN AND TRIAMCINOLONE ACETONIDE: 100000; 1 OINTMENT TOPICAL at 12:11

## 2018-11-08 RX ADMIN — HEPARIN SODIUM 5000 UNITS: 5000 INJECTION, SOLUTION INTRAVENOUS; SUBCUTANEOUS at 09:11

## 2018-11-08 RX ADMIN — METRONIDAZOLE 500 MG: 500 INJECTION, SOLUTION INTRAVENOUS at 12:11

## 2018-11-08 RX ADMIN — NYSTATIN AND TRIAMCINOLONE ACETONIDE: 100000; 1 OINTMENT TOPICAL at 09:11

## 2018-11-08 RX ADMIN — ACETAMINOPHEN 1000 MG: 10 INJECTION, SOLUTION INTRAVENOUS at 12:11

## 2018-11-08 RX ADMIN — POTASSIUM CHLORIDE 20 MEQ: 1500 TABLET, EXTENDED RELEASE ORAL at 12:11

## 2018-11-08 RX ADMIN — THYROID, PORCINE 30 MG: 30 TABLET ORAL at 12:11

## 2018-11-08 RX ADMIN — HYDROCORTISONE: 1 CREAM TOPICAL at 09:11

## 2018-11-08 RX ADMIN — METRONIDAZOLE 500 MG: 500 INJECTION, SOLUTION INTRAVENOUS at 11:11

## 2018-11-08 RX ADMIN — ONDANSETRON 4 MG: 2 INJECTION, SOLUTION INTRAMUSCULAR; INTRAVENOUS at 12:11

## 2018-11-08 RX ADMIN — THYROID, PORCINE 30 MG: 30 TABLET ORAL at 09:11

## 2018-11-08 NOTE — PROGRESS NOTES
" [facility] -   Adult Nutrition  Progress Note    SUMMARY       Recommendations    Recommendation/Intervention:   1. When medically able, slowly ADAT to low fiber/GI soft diet.   2. If unable to advance diet within 72 hrs, consider PPN:      -Clinimix E 4.25/5 @ 85 mL/hr goal rate w/ 250 mL 20% fat emulsion. Per 24 hrs provides 1194 kcal, 87 gm protein, 2040 mL fluid, GIR=0.99  3. Will continue to monitor    Goals: Diet advancement or initiation of nutrition support by RD f/u  Nutrition Goal Status: new  Communication of RD Recs: (POC review, sticky note)    Reason for Assessment    Reason for Assessment: identified at risk by screening criteria(MST Score & dysphagia/difficulty swallowing)  Dx:  1. Diverticulitis of sigmoid colon      Past Medical History:   Diagnosis Date    Hypertension     Thyroid disease        General Information Comments: Pt reports decreased PO intake (<50% EEN) for 1 week d/t pain, not able to hold down food. Per H&P, pt diagnosed w/ diverticulitis approximately 4 weeks ago. Per Epic records, pt weighed 74.5kg on 10/8/18, 71.3kg on 11/8/18 ( -4.3% body wt in 1 month). Mild muscle wasting noted to temples, clavicles, interosseous muscles. Fluid accumulation noted to abdomen. Pt meets criteria for malnutrition. Pt currently NPO for bowel rest. RD explained the purpose of gradual advancement of the diet. Pt and family verbalized understanding. Per nursing dysphagia screen, pt states she felt her medications getting stuck in her throat. No SLP eval in orders at this time  Nutrition Discharge Planning: Pending medical course    Nutrition Risk Screen    Nutrition Risk Screen: dysphagia or difficulty swallowing(reported by pt )    Nutrition/Diet History    Do you have any cultural, spiritual, Christianity conflicts, given your current situation?: Sabianist  Food Allergies: NKFA    Anthropometrics    Temp: 99.5 °F (37.5 °C)  Height Method: Stated  Height: 5' 2" (157.5 cm)  Height " (inches): 62 in  Weight Method: Bed Scale  Weight: 71.3 kg (157 lb 3 oz)  Weight (lb): 157.19 lb  Ideal Body Weight (IBW), Female: 110 lb  % Ideal Body Weight, Female (lb): 142.9 lb  BMI (Calculated): 28.8  BMI Grade: 25 - 29.9 - overweight       Lab/Procedures/Meds    Pertinent Labs Reviewed: reviewed  BMP  Lab Results   Component Value Date     (L) 11/08/2018    K 3.2 (L) 11/08/2018     11/08/2018    CO2 22 (L) 11/08/2018    BUN 10 11/08/2018    CREATININE 0.8 11/08/2018    CALCIUM 8.5 (L) 11/08/2018    ANIONGAP 10 11/08/2018    ESTGFRAFRICA >60 11/08/2018    EGFRNONAA >60 11/08/2018     Lab Results   Component Value Date    ALBUMIN 3.7 11/07/2018     Lab Results   Component Value Date    HGBA1C 5.5 07/18/2018     Pertinent Medications Reviewed: reviewed  Scheduled Meds:   acetaminophen  1,000 mg Intravenous Q8H    aspirin  81 mg Oral Daily    atorvastatin  10 mg Oral Daily    ciprofloxacin (CIPRO)400mg/200ml D5W IVPB  400 mg Intravenous Q12H    metronidazole  500 mg Intravenous Q8H    nystatin-triamcinolone   Topical (Top) BID    potassium chloride  20 mEq Oral Once    thyroid (pork)  30 mg Oral QHS     Continuous Infusions:   sodium chloride 0.9% 75 mL/hr at 11/08/18 1013     PRN Meds:.hydrocortisone, ondansetron, promethazine (PHENERGAN) IVPB    Physical Findings/Assessment    Overall Physical Appearance: overweight  Oral/Mouth Cavity: dental applicance present (specify)  Skin: ([place]=18)    Estimated/Assessed Needs    Weight Used For Calorie Calculations: 71.3 kg (157 lb 3 oz)  Energy Calorie Requirements (kcal): 1382  Energy Need Method: [place]-St Jeor(x1.2)  Protein Requirements: 57-71  Weight Used For Protein Calculations: 71.3 kg (157 lb 3 oz)(x0.8-1)     Fluid Need Method: RDA Method(or per MD)  RDA Method (mL): 1382  CHO Requirement: n/a      Nutrition Prescription Ordered    Current Diet Order: NPO    Evaluation of Received Nutrient/Fluid Intake       % Intake of Estimated Energy  Needs: 0 - 25 %  % Meal Intake: NPO    Nutrition Risk    Level of Risk/Frequency of Follow-up: (f/u x2 weekly)     Assessment and Plan    Severe malnutrition    Malnutrition in the context of Acute Illness/Injury    Related to (etiology):  Decreased PO intake 2/2 abd pain    Signs and Symptoms (as evidenced by):  Energy Intake: <50% of estimated energy requirement for 1 week  Muscle Mass Depletion: mild depletion of temples and interosseous muscle   Weight Loss: -8.1% x 3 months   Fluid Accumulation: moderate    Interventions/Recommendations (treatment strategy):  Recommend ADAT to low fiber/GI soft when medically able  Consider alternate nutrition support if pt remains NPO for 72 hrs    Nutrition Diagnosis Status:  New              Monitor and Evaluation    Food and Nutrient Intake: energy intake  Food and Nutrient Adminstration: diet order  Anthropometric Measurements: weight  Biochemical Data, Medical Tests and Procedures: electrolyte and renal panel, lipid profile, gastrointestinal profile, glucose/endocrine profile  Nutrition-Focused Physical Findings: overall appearance     Nutrition Follow-Up    RD Follow-up?: Yes

## 2018-11-08 NOTE — ASSESSMENT & PLAN NOTE
-Acute diverticulitis   -Pt failed out pt tx  Cont IV cipro and IV metronidazol  -Cont IVF  -NPO   Stool culture  May need general surgery consult if no improvement

## 2018-11-08 NOTE — ASSESSMENT & PLAN NOTE
-Acute diverticulitis   -Pt failed out pt tx  - start IV cipro and IV metronidazol  -Cont IVF  -NPO

## 2018-11-08 NOTE — PROGRESS NOTES
[facility] Hospital Medicine  Progress Note    Patient Name: Irlanda Lopez  MRN: [id number]  Patient Class: IP- Inpatient   Admission Date: 11/7/2018  Length of Stay: 0 days  Attending Physician: Tyson Maldonado, *  Primary Care Provider: Myla Arias MD        Subjective:     Principal Problem:Diverticulitis of sigmoid colon    HPI:  77 y/o wf with a PMHx of Dementia , Hypothyroidism , HTN and HLD presented to the ER from Her PCP office with a c/o LLQ abdominal pain for the last 4 weeks which has getting worse. She was dx 4 week ago with acute diverticulitis per ct abd  And treated  With bactrim and metronidazole po  For 10 .The pt sx did no improved and was treated  With Augmentin po for 10 days .She describe The pain as intermittent , pressure like , rated 10/10 , radiated to  Middle  Lower abdomen  Wit no aggravating or alleviating sx . She states the pain is associated with  Nausea and  subjective fever . She denies any sob , chest pain , diarrhea , sick contact  Or gu sx . She has a H/O appendectomy .  ER course : CT abdomen show stable inflammatory changes . No abscess or perforation. WBC 13.13 , H/H 14.5/42/1 , Na 129 , co2 21 . Cipro 400 mg iv x1 , metronidazole  500 mg iv x 1 .  ER VS : bp 110/80  Hr 72  rr  18 o2sat  96%    Hospital Course:  The pt is a 77 yo female with Dementia who was placed in observation for acute diverticulitis on IV Cipro and Flagyl. The pt was seen by PCP for diarrhea and LLQ pain. She had a CT scan 10/4/18 showing acute diverticulitis and 3.0 cm calculus right renal pelvis. She was placed on Bactrim and Flagyl for acute diverticulitis. She was evaluated by Urology regarding the right renal stone who recommended Lithotripsy in the near future after PCP clearance. However, the pt had persistent symptoms even after completing antibiotics- per family report (pt has dementia). She had another CT abd on 10/23/18 which showed persistent acute  diverticulitis. Negative for pneumoperitoneum or abscess. She was then given a course of Augmentin which she completed. During this presentation, the pt reports the LLQ pain never went away- it got acutely worse PTA accompanied by 101F fever. Pt was afebrile on arrival Tmax 100.0F.      Today, Currently afebrile.WBC 13>12. Pt still complaining of constant LLQ pain. Mild abd distention. +diarrhea. Xray abd today showed nothing acute. Blood cultures show NGTD. Urine culture pending. Stool culture pending.     Interval History: + diarrhea, abdominal pain   Review of Systems   Unable to perform ROS: Dementia     Objective:     Vital Signs (Most Recent):  Temp: 97.6 °F (36.4 °C) (11/08/18 1430)  Pulse: (!) 54 (11/08/18 1430)  Resp: 18 (11/08/18 1430)  BP: 109/64 (11/08/18 1430)  SpO2: 98 % (11/08/18 1430) Vital Signs (24h Range):  Temp:  [97.2 °F (36.2 °C)-100 °F (37.8 °C)] 97.6 °F (36.4 °C)  Pulse:  [54-72] 54  Resp:  [16-20] 18  SpO2:  [90 %-98 %] 98 %  BP: ()/(51-82) 109/64     Weight: 71.3 kg (157 lb 3 oz)  Body mass index is 28.75 kg/m².    Intake/Output Summary (Last 24 hours) at 11/8/2018 1553  Last data filed at 11/8/2018 0300  Gross per 24 hour   Intake 630 ml   Output --   Net 630 ml      Physical Exam   Constitutional: She is oriented to person, place, and time. She appears well-developed and well-nourished. No distress.   HENT:   Head: Normocephalic and atraumatic.   Nose: Nose normal.   Mouth/Throat: Oropharynx is clear and moist.   Eyes: Conjunctivae and EOM are normal. No scleral icterus.   Neck: Normal range of motion. Neck supple. No tracheal deviation present.   Cardiovascular: Normal rate, regular rhythm, normal heart sounds and intact distal pulses. Exam reveals no gallop and no friction rub.   No murmur heard.  Pulmonary/Chest: Effort normal and breath sounds normal. No stridor. No respiratory distress. She has no wheezes. She has no rales. She exhibits no tenderness.   Abdominal: Soft. Bowel  sounds are normal. She exhibits distension (mild). She exhibits no mass. There is tenderness (LLQ ). There is no rebound.   Musculoskeletal: Normal range of motion. She exhibits no edema, tenderness or deformity.   Neurological: She is alert and oriented to person, place, and time. No cranial nerve deficit. She exhibits normal muscle tone. Coordination normal.   Able to state the year but not the month, day.   Pt has very poor recall   Disoriented to situation at times      Skin: Skin is warm and dry. No rash noted. She is not diaphoretic. No erythema. No pallor.   Psychiatric: She has a normal mood and affect. Her behavior is normal. Thought content normal.   Nursing note and vitals reviewed.      Significant Labs:   BMP:   Recent Labs   Lab 11/08/18  0756      *   K 3.2*      CO2 22*   BUN 10   CREATININE 0.8   CALCIUM 8.5*     CBC:   Recent Labs   Lab 11/07/18  1340 11/08/18  0756   WBC 13.13* 12.19   HGB 14.5 12.2   HCT 42.1 36.7*    228     CMP:   Recent Labs   Lab 11/07/18  1340 11/08/18  0756   * 135*   K 3.9 3.2*   CL 97 103   CO2 21* 22*    108   BUN 10 10   CREATININE 0.8 0.8   CALCIUM 9.4 8.5*   PROT 7.5  --    ALBUMIN 3.7  --    BILITOT 1.9*  --    ALKPHOS 61  --    AST 26  --    ALT 16  --    ANIONGAP 11 10   EGFRNONAA >60 >60     All pertinent labs within the past 24 hours have been reviewed.    Significant Imaging:   Imaging Results          CT Abdomen Pelvis With Contrast (Final result)  Result time 11/07/18 16:38:03    Final result by Katie Oliver MD (Timothy) (11/07/18 16:38:03)                 Impression:      Stable inflammatory changes involving the sigmoid colon.  No evidence of perforation or abscess.    All CT scans at this facility use dose modulation, iterative reconstructions, and/or weight base dosing when appropriate to reduce radiation dose to as low as reasonably achievable      Electronically signed by: Katie Oliver  MD  Date:    11/07/2018  Time:    16:38             Narrative:    EXAMINATION:  CT ABDOMEN PELVIS WITH CONTRAST    CLINICAL HISTORY:  LLQ pain, suspect diverticulitis;    COMPARISON:  Comparison 10/23/2018.    FINDINGS:  Lung bases are clear.  Small hiatal hernia.    The liver, the spleen, and the pancreas appear normal.    There is chronic scarring of the right kidney.  Large narrowed formed calculus is seen as before.  Left kidney is unremarkable.    Mild atherosclerosis of the abdominal aorta.  No evidence of aneurysm.  Once again there are inflammatory changes seen involving the sigmoid colon.  No evidence of perforation or abscess.  Findings are similar to previous exam.    Bladder is normal.No abnormal masses involving the pelvis.    Stable compression fracture of the lower thoracic spine.  Degenerative changes of the lumbar spine.                              Assessment/Plan:      * Diverticulitis of sigmoid colon    -Acute diverticulitis   -Pt failed out pt tx  Cont IV cipro and IV metronidazol  -Cont IVF  -NPO   Stool culture  May need general surgery consult if no improvement        Hyponatremia    - most likely due to volume depletion   -cont IVF       Candidiasis of anus    Nystatin triamcinolone cream        Moderate protein-calorie malnutrition    NPO for now  Boost supplements when able to tolerate po       Vascular dementia without behavioral disturbance    Supportive tx        Essential hypertension    Hold Zestoretic for now       Thyroid disorder    Cont Thyroid pork        VTE Risk Mitigation (From admission, onward)        Ordered     IP VTE HIGH RISK PATIENT  Once      11/07/18 1802     Place SANDIE hose  Until discontinued      11/07/18 1802              Kayley Russell NP  Department of Hospital Medicine   [facility]

## 2018-11-08 NOTE — HPI
79 y/o wf with a PMHx of Dementia , Hypothyroidism , HTN and HLD presented to the ER from Her PCP office with a c/o LLQ abdominal pain for the last 4 weeks which has getting worse. She was dx 4 week ago with acute diverticulitis per ct abd  And treated  With bactrim and metronidazole po  For 10 .The pt sx did no improved and was treated  With Augmentin po for 10 days .She describe The pain as intermittent , pressure like , rated 10/10 , radiated to  Middle  Lower abdomen  Wit no aggravating or alleviating sx . She states the pain is associated with  Nausea and  subjective fever . She denies any sob , chest pain , diarrhea , sick contact  Or gu sx . She has a H/O appendectomy .  ER course : CT abdomen show stable inflammatory changes . No abscess or perforation. WBC 13.13 , H/H 14.5/42/1 , Na 129 , co2 21 . Cipro 400 mg iv x1 , metronidazole  500 mg iv x 1 .  ER VS : bp 110/80  Hr 72  rr  18 o2sat  96%

## 2018-11-08 NOTE — PLAN OF CARE
"CM met with patient and family at bedside to assess discharge needs. Patient lives at home with alone but family calls and visits daily. Daughter Myla usually takes her to the doctor and sets up weekly pill box. Family calls 2 to 4 times per day to check on her. Patient has memory loss due to dementia. CM discussed possible short and long term plans with family. Family is considering options (family living with patient, sitters, long term placement, etc.,) and agrees with discharging home with home health.  Patient ambulates safely independently and denies any recent falls. CM assessment and MOON completed, placed in chart, and copy given to patient.     DC Plan: Home health    Contacts:  Myla Mora, daughter, [phone]  Keri Miranda, daughter, [phone]     11/08/18 1236   Discharge Assessment   Assessment Type Discharge Planning Assessment   Confirmed/corrected address and phone number on facesheet? Yes   Assessment information obtained from? Patient;Caregiver   Communicated expected length of stay with patient/caregiver yes   Prior to hospitilization cognitive status: (Forgetful at times, has dementia. )   Prior to hospitalization functional status: Needs Assistance   Current Functional Status: Needs Assistance   Lives With alone  (Family visits daily and speaks to her several times per day. )   Able to Return to Prior Arrangements yes   Is patient able to care for self after discharge? Unable to determine at this time (comments)   Who are your caregiver(s) and their phone number(s)? (Myla "Faiza" Morgan, daughter, [phone])   Patient's perception of discharge disposition home health   Readmission Within The Last 30 Days no previous admission in last 30 days   Patient currently being followed by outpatient case management? No   Patient currently receives any other outside agency services? No   Equipment Currently Used at Home none   Do you have any problems affording any of your prescribed " medications? No   Is the patient taking medications as prescribed? yes  (Daughter, Myla, prepares pill box weekly and checks on her via phone throughout the day to ensure she has taken meds. Other family members also check on her and visit daily to ensure medication compliance.  )   Does the patient have transportation home? Yes   Transportation Available family or friend will provide   Does the patient receive services at the [facility]? No   Patient/Family In Agreement With Plan yes

## 2018-11-08 NOTE — PLAN OF CARE
11/08/18 1236   GARCIA Message   Medicare Outpatient and Observation Notification regarding financial responsibility Given to patient/caregiver;Explained to patient/caregiver;Signed/date by patient/caregiver   Date GARCIA was signed 11/08/18   Time GARCIA was signed 1237

## 2018-11-08 NOTE — H&P
[facility] Hospital Medicine  History & Physical    Patient Name: Irlanda Lopez  MRN: [id number]  Admission Date: 11/7/2018  Attending Physician: No att. providers found   Primary Care Provider: Myla Arias MD         Patient information was obtained from patient, relative(s) and ER records.     Subjective:     Principal Problem:Diverticulitis of sigmoid colon    Chief Complaint:   Chief Complaint   Patient presents with    Abdominal Pain     pt c/o abd pain for several days, was sent by PCP for further workup        HPI: 79 y/o wf with a PMHx of Dementia , Hypothyroidism , HTN and HLD presented to the ER from Her PCP office with a c/o LLQ abdominal pain for the last 4 weeks which has getting worse. She was dx 4 week ago with acute diverticulitis per ct abd  And treated  With bactrim and metronidazole po  For 10 .The pt sx did no improved and was treated  With Augmentin po for 10 days .She describe The pain as intermittent , pressure like , rated 10/10 , radiated to  Middle  Lower abdomen  Wit no aggravating or alleviating sx . She states the pain is associated with  Nausea and  subjective fever . She denies any sob , chest pain , diarrhea , sick contact  Or gu sx . She has a H/O appendectomy .  ER course : CT abdomen show stable inflammatory changes . No abscess or perforation. WBC 13.13 , H/H 14.5/42/1 , Na 129 , co2 21 . Cipro 400 mg iv x1 , metronidazole  500 mg iv x 1 .  ER VS : bp 110/80  Hr 72  rr  18 o2sat  96%    Past Medical History:   Diagnosis Date    Hypertension     Thyroid disease        Past Surgical History:   Procedure Laterality Date    CATARACT EXTRACTION      Dr Raygoza       Review of patient's allergies indicates:  No Known Allergies    No current facility-administered medications on file prior to encounter.      Current Outpatient Medications on File Prior to Encounter   Medication Sig    aspirin 81 MG Chew Take 1 tablet (81 mg total) by mouth once daily.     atorvastatin (LIPITOR) 10 MG tablet TAKE 1 TABLET EVERY DAY    donepezil (ARICEPT) 10 MG tablet Take 1 tablet (10 mg total) by mouth every evening.    lisinopril-hydrochlorothiazide (PRINZIDE,ZESTORETIC) 10-12.5 mg per tablet TAKE 1 TABLET EVERY DAY    loperamide (IMODIUM) 2 mg capsule Take 2 mg by mouth 4 (four) times daily as needed for Diarrhea.    magnesium citrate, bulk, Powd by Misc.(Non-Drug; Combo Route) route.    memantine (NAMENDA) 10 MG Tab 5mg po bid x 1 month, then 10mg po bid.    PEPPERMINT OIL MISC by Misc.(Non-Drug; Combo Route) route.    thyroid, pork, (ARMOUR THYROID) 30 mg Tab Take 1 tablet (30 mg total) by mouth once daily.    triamcinolone acetonide 0.1% (KENALOG) 0.1 % ointment APPLY TOPICALLY 4 (FOUR) TIMES DAILY. TO RASH ON FACE AND CHEST AND NOSE     Family History     Problem Relation (Age of Onset)    Alzheimer's disease Mother    Aneurysm Father    Stomach cancer Brother        Tobacco Use    Smoking status: Former Smoker    Smokeless tobacco: Never Used   Substance and Sexual Activity    Alcohol use: No    Drug use: No    Sexual activity: No     Comment:      Review of Systems   Constitutional: Positive for activity change, appetite change and fever. Negative for chills, diaphoresis, fatigue and unexpected weight change.   HENT: Negative for congestion, dental problem, drooling, facial swelling, hearing loss, nosebleeds, postnasal drip and rhinorrhea.    Eyes: Negative for photophobia, pain, discharge, redness and itching.   Respiratory: Negative for apnea, cough, choking, chest tightness, shortness of breath and wheezing.    Cardiovascular: Negative for chest pain, palpitations and leg swelling.   Gastrointestinal: Positive for abdominal distention, abdominal pain and nausea. Negative for anal bleeding, blood in stool, constipation, diarrhea, rectal pain and vomiting.   Endocrine: Negative for cold intolerance, heat intolerance, polydipsia, polyphagia and polyuria.    Genitourinary: Negative for decreased urine volume, difficulty urinating, dyspareunia, dysuria, enuresis, flank pain, frequency, genital sores, hematuria, menstrual problem, pelvic pain and urgency.   Musculoskeletal: Negative for arthralgias, back pain, gait problem, joint swelling and myalgias.   Allergic/Immunologic: Negative for environmental allergies, food allergies and immunocompromised state.   Neurological: Negative for dizziness, tremors, seizures, facial asymmetry, speech difficulty, light-headedness, numbness and headaches.   Hematological: Negative.  Negative for adenopathy. Does not bruise/bleed easily.   Psychiatric/Behavioral: Negative for agitation, behavioral problems, confusion, decreased concentration, dysphoric mood and hallucinations. The patient is not nervous/anxious and is not hyperactive.      Objective:     Vital Signs (Most Recent):  Temp: 98.5 °F (36.9 °C) (11/07/18 1735)  Pulse: 72 (11/07/18 1735)  Resp: 19 (11/07/18 1735)  BP: 116/78 (11/07/18 1735)  SpO2: 97 % (11/07/18 1735) Vital Signs (24h Range):  Temp:  [98.2 °F (36.8 °C)-101.4 °F (38.6 °C)] 98.5 °F (36.9 °C)  Pulse:  [59-72] 72  Resp:  [18-19] 19  SpO2:  [96 %-99 %] 97 %  BP: (105-124)/(52-82) 116/78     Weight: 71.9 kg (158 lb 8.2 oz)  Body mass index is 28.99 kg/m².    Physical Exam   Constitutional: She is oriented to person, place, and time. She appears well-developed and well-nourished. No distress.   HENT:   Head: Normocephalic and atraumatic.   Right Ear: External ear normal.   Left Ear: External ear normal.   Eyes: Conjunctivae and EOM are normal. Pupils are equal, round, and reactive to light.   Neck: Normal range of motion. Neck supple. No JVD present. No tracheal deviation present. No thyromegaly present.   Cardiovascular: Normal rate and regular rhythm. Exam reveals no gallop and no friction rub.   No murmur heard.  Pulmonary/Chest: Effort normal and breath sounds normal. No stridor. No respiratory distress. She  has no wheezes. She has no rales.   Abdominal: Soft. Bowel sounds are normal. She exhibits no distension and no mass. There is no tenderness. There is no rebound and no guarding. No hernia.   Musculoskeletal: Normal range of motion. She exhibits no edema, tenderness or deformity.   Neurological: She is alert and oriented to person, place, and time. She displays normal reflexes. No cranial nerve deficit. She exhibits normal muscle tone. Coordination normal.   Skin: Skin is warm. No erythema.   Psychiatric: She has a normal mood and affect. Her behavior is normal. Thought content normal.   Nursing note and vitals reviewed.        CRANIAL NERVES     CN III, IV, VI   Pupils are equal, round, and reactive to light.  Extraocular motions are normal.        Significant Labs:   BMP:   Recent Labs   Lab 11/07/18  1340      *   K 3.9   CL 97   CO2 21*   BUN 10   CREATININE 0.8   CALCIUM 9.4     CBC:   Recent Labs   Lab 11/07/18  1340   WBC 13.13*   HGB 14.5   HCT 42.1        Coagulation: No results for input(s): PT, INR, APTT in the last 48 hours.  Lipase:   Recent Labs   Lab 11/07/18  1340   LIPASE 19     Urine Studies:   Recent Labs   Lab 11/07/18  1305   COLORU Yellow   APPEARANCEUA Clear   PHUR 6.0   SPECGRAV 1.010   PROTEINUA Negative   GLUCUA Negative   KETONESU Negative   BILIRUBINUA Negative   OCCULTUA Trace*   NITRITE Negative   UROBILINOGEN Negative   LEUKOCYTESUR 1+*   RBCUA 2   WBCUA 5   SQUAMEPITHEL 5       Significant Imaging: I have reviewed all pertinent imaging results/findings within the past 24 hours.    Assessment/Plan:     * Diverticulitis of sigmoid colon    -Acute diverticulitis   -Pt failed out pt tx  - start IV cipro and IV metronidazol  -Cont IVF  -NPO        Hyponatremia    - most likely due to volume depletion   -cont IVF       Vascular dementia without behavioral disturbance    Supportive tx        Essential hypertension    Resume home medication      Thyroid disorder    Resume  home medication          VTE Risk Mitigation (From admission, onward)        Ordered     IP VTE HIGH RISK PATIENT  Once      11/07/18 1802     Place SANDIE hose  Until discontinued      11/07/18 1802             Tyson Maldonado MD  Department of Hospital Medicine   [facility]

## 2018-11-08 NOTE — PLAN OF CARE
Problem: Patient Care Overview  Goal: Plan of Care Review  Outcome: Ongoing (interventions implemented as appropriate)  Dx diverticulitis  poc instructed on dbc 10 x q1h wa, instructed on turning q2h. Instructed on iv antibiotics, iv tylenol for pain. Instructed on fall risk and precautions. Bed alarm on. Pt monitor. Family at bedside. Ivf decreased. Ice chips and sips of water with meds.

## 2018-11-08 NOTE — ASSESSMENT & PLAN NOTE
Malnutrition in the context of Acute Illness/Injury    Related to (etiology):  Decreased PO intake 2/2 abd pain    Signs and Symptoms (as evidenced by):  Energy Intake: <50% of estimated energy requirement for 1 week  Muscle Mass Depletion: mild depletion of temples and interosseous muscle   Weight Loss: -8.1% x 3 months   Fluid Accumulation: moderate    Interventions/Recommendations (treatment strategy):  Recommend ADAT to low fiber/GI soft when medically able  Consider alternate nutrition support if pt remains NPO for 72 hrs    Nutrition Diagnosis Status:  New

## 2018-11-08 NOTE — PLAN OF CARE
Problem: Patient Care Overview  Goal: Plan of Care Review  Outcome: Ongoing (interventions implemented as appropriate)  POC reviewed with patient, verbalized understanding. Pt remains free from falls. Fall precautions in place. NS on cardiac monitor. Blood pressure has been low and O2 saturation dipped into the 90's early in the am . Oxygen applied to keep sats above 92.  Pt son is at the bedside. Divya alarm in use. Call bell w/in reach. Reminded to call for assistance. Pt still having severe abdominal cramping unable to get IV pain medication at this time due to low BP. Applied heat packs to help with discomfort. Pt repositioning every 2 hours to prevent skin breakdown. PT complaining of hemoroid  pain when having BM's . Cream was ordered to help with the inflammation and discomfort.

## 2018-11-08 NOTE — HOSPITAL COURSE
The pt is a 77 yo female with Dementia who was placed in observation for acute diverticulitis on IV Cipro and Flagyl. The pt was seen by PCP for diarrhea and LLQ pain. She had a CT scan 10/4/18 showing acute diverticulitis and 3.0 cm calculus right renal pelvis. She was placed on Bactrim and Flagyl for acute diverticulitis. She was evaluated by Urology regarding the right renal stone who recommended Lithotripsy in the near future after PCP clearance. However, the pt had persistent symptoms even after completing antibiotics- per family report (pt has dementia). She had another CT abd on 10/23/18 which showed persistent acute diverticulitis. Negative for pneumoperitoneum or abscess. She was then given a course of Augmentin which she completed. During this presentation, the pt reports the LLQ pain never went away- it got acutely worse PTA accompanied by 101F fever. Pt was afebrile on arrival Tmax 100.0F.    11/9/18 Pt was seen and examined at bedside . She  Cont with  abdominal pain  But better since admission . The Pt states has been having diarrhea on and off .  C.diff and stool culture were collected .There was no acute event overnight     11/10-she reports better .  Son is by bedside .  CT scan of abdomen -Stable inflammatory changes involving the sigmoid colon.  No evidence of perforation or abscess..  Urine culture-   ESCHERICHIA COLI   >100,000 cfu/ml     Urine Culture, Routine --    ENTEROBACTER CLOACAE   50,000 - 99,999 cfu/ml        11/11- 78 year old woman admitted with diverticulitis and UTI.  She had a CT scan 10/4/18 showing acute diverticulitis and 3.0 cm calculus right renal pelvis. She was placed on Bactrim and Flagyl for acute diverticulitis. She had another CT abd on 10/23/18 which showed persistent acute diverticulitis.She was given Augumentin for this .  Since admission,   CT scan of the abdomen done on 11/07- stable inflammatory changes involving the sigmoid colon.    Urine culture done on  11/07-showed  E coli and enterobacter .  She was started on IV flagyl and Cipro with good clinical response. She will be discharged to complete 10 days of cipro and Flagyl but she will need out patient colonoscopy .     The need for outpatient colonoscopy was emphasized to the family . She was seen and examined on day of discharge and deemed suitable for discharge .   She was also  seen by Gen surgery prior to discharge and she will be followed in the clinic by Gen Surgery

## 2018-11-08 NOTE — SUBJECTIVE & OBJECTIVE
Interval History: + diarrhea, abdominal pain   Review of Systems   Unable to perform ROS: Dementia     Objective:     Vital Signs (Most Recent):  Temp: 97.6 °F (36.4 °C) (11/08/18 1430)  Pulse: (!) 54 (11/08/18 1430)  Resp: 18 (11/08/18 1430)  BP: 109/64 (11/08/18 1430)  SpO2: 98 % (11/08/18 1430) Vital Signs (24h Range):  Temp:  [97.2 °F (36.2 °C)-100 °F (37.8 °C)] 97.6 °F (36.4 °C)  Pulse:  [54-72] 54  Resp:  [16-20] 18  SpO2:  [90 %-98 %] 98 %  BP: ()/(51-82) 109/64     Weight: 71.3 kg (157 lb 3 oz)  Body mass index is 28.75 kg/m².    Intake/Output Summary (Last 24 hours) at 11/8/2018 1553  Last data filed at 11/8/2018 0300  Gross per 24 hour   Intake 630 ml   Output --   Net 630 ml      Physical Exam   Constitutional: She is oriented to person, place, and time. She appears well-developed and well-nourished. No distress.   HENT:   Head: Normocephalic and atraumatic.   Nose: Nose normal.   Mouth/Throat: Oropharynx is clear and moist.   Eyes: Conjunctivae and EOM are normal. No scleral icterus.   Neck: Normal range of motion. Neck supple. No tracheal deviation present.   Cardiovascular: Normal rate, regular rhythm, normal heart sounds and intact distal pulses. Exam reveals no gallop and no friction rub.   No murmur heard.  Pulmonary/Chest: Effort normal and breath sounds normal. No stridor. No respiratory distress. She has no wheezes. She has no rales. She exhibits no tenderness.   Abdominal: Soft. Bowel sounds are normal. She exhibits distension (mild). She exhibits no mass. There is tenderness (LLQ ). There is no rebound.   Musculoskeletal: Normal range of motion. She exhibits no edema, tenderness or deformity.   Neurological: She is alert and oriented to person, place, and time. No cranial nerve deficit. She exhibits normal muscle tone. Coordination normal.   Able to state the year but not the month, day.   Pt has very poor recall   Disoriented to situation at times      Skin: Skin is warm and dry. No rash  noted. She is not diaphoretic. No erythema. No pallor.   Psychiatric: She has a normal mood and affect. Her behavior is normal. Thought content normal.   Nursing note and vitals reviewed.      Significant Labs:   BMP:   Recent Labs   Lab 11/08/18  0756      *   K 3.2*      CO2 22*   BUN 10   CREATININE 0.8   CALCIUM 8.5*     CBC:   Recent Labs   Lab 11/07/18  1340 11/08/18  0756   WBC 13.13* 12.19   HGB 14.5 12.2   HCT 42.1 36.7*    228     CMP:   Recent Labs   Lab 11/07/18  1340 11/08/18  0756   * 135*   K 3.9 3.2*   CL 97 103   CO2 21* 22*    108   BUN 10 10   CREATININE 0.8 0.8   CALCIUM 9.4 8.5*   PROT 7.5  --    ALBUMIN 3.7  --    BILITOT 1.9*  --    ALKPHOS 61  --    AST 26  --    ALT 16  --    ANIONGAP 11 10   EGFRNONAA >60 >60     All pertinent labs within the past 24 hours have been reviewed.    Significant Imaging:   Imaging Results          CT Abdomen Pelvis With Contrast (Final result)  Result time 11/07/18 16:38:03    Final result by Katie Oliver MD (Timothy) (11/07/18 16:38:03)                 Impression:      Stable inflammatory changes involving the sigmoid colon.  No evidence of perforation or abscess.    All CT scans at this facility use dose modulation, iterative reconstructions, and/or weight base dosing when appropriate to reduce radiation dose to as low as reasonably achievable      Electronically signed by: Katie Oliver MD  Date:    11/07/2018  Time:    16:38             Narrative:    EXAMINATION:  CT ABDOMEN PELVIS WITH CONTRAST    CLINICAL HISTORY:  LLQ pain, suspect diverticulitis;    COMPARISON:  Comparison 10/23/2018.    FINDINGS:  Lung bases are clear.  Small hiatal hernia.    The liver, the spleen, and the pancreas appear normal.    There is chronic scarring of the right kidney.  Large narrowed formed calculus is seen as before.  Left kidney is unremarkable.    Mild atherosclerosis of the abdominal aorta.  No evidence of aneurysm.  Once again  there are inflammatory changes seen involving the sigmoid colon.  No evidence of perforation or abscess.  Findings are similar to previous exam.    Bladder is normal.No abnormal masses involving the pelvis.    Stable compression fracture of the lower thoracic spine.  Degenerative changes of the lumbar spine.

## 2018-11-08 NOTE — PLAN OF CARE
Problem: Patient Care Overview  Goal: Plan of Care Review  Outcome: Ongoing (interventions implemented as appropriate)  Recommendations    Recommendation/Intervention:   1. When medically able, slowly ADAT to low fiber/GI soft diet.   2. If unable to advance diet within 72 hrs, consider PPN:      -Clinimix E 4.25/5 @ 85 mL/hr goal rate w/ 250 mL 20% fat emulsion. Per 24 hrs provides 1194 kcal, 87 gm protein, 2040 mL fluid, GIR=0.99  3. Will continue to monitor    Goals: Diet advancement or initiation of nutrition support by RD f/u  Nutrition Goal Status: new  Communication of RD Recs: (POC review, sticky note)

## 2018-11-08 NOTE — SUBJECTIVE & OBJECTIVE
Past Medical History:   Diagnosis Date    Hypertension     Thyroid disease        Past Surgical History:   Procedure Laterality Date    CATARACT EXTRACTION      Dr Raygoza       Review of patient's allergies indicates:  No Known Allergies    No current facility-administered medications on file prior to encounter.      Current Outpatient Medications on File Prior to Encounter   Medication Sig    aspirin 81 MG Chew Take 1 tablet (81 mg total) by mouth once daily.    atorvastatin (LIPITOR) 10 MG tablet TAKE 1 TABLET EVERY DAY    donepezil (ARICEPT) 10 MG tablet Take 1 tablet (10 mg total) by mouth every evening.    lisinopril-hydrochlorothiazide (PRINZIDE,ZESTORETIC) 10-12.5 mg per tablet TAKE 1 TABLET EVERY DAY    loperamide (IMODIUM) 2 mg capsule Take 2 mg by mouth 4 (four) times daily as needed for Diarrhea.    magnesium citrate, bulk, Powd by Misc.(Non-Drug; Combo Route) route.    memantine (NAMENDA) 10 MG Tab 5mg po bid x 1 month, then 10mg po bid.    PEPPERMINT OIL MISC by Misc.(Non-Drug; Combo Route) route.    thyroid, pork, (ARMOUR THYROID) 30 mg Tab Take 1 tablet (30 mg total) by mouth once daily.    triamcinolone acetonide 0.1% (KENALOG) 0.1 % ointment APPLY TOPICALLY 4 (FOUR) TIMES DAILY. TO RASH ON FACE AND CHEST AND NOSE     Family History     Problem Relation (Age of Onset)    Alzheimer's disease Mother    Aneurysm Father    Stomach cancer Brother        Tobacco Use    Smoking status: Former Smoker    Smokeless tobacco: Never Used   Substance and Sexual Activity    Alcohol use: No    Drug use: No    Sexual activity: No     Comment:      Review of Systems   Constitutional: Positive for activity change, appetite change and fever. Negative for chills, diaphoresis, fatigue and unexpected weight change.   HENT: Negative for congestion, dental problem, drooling, facial swelling, hearing loss, nosebleeds, postnasal drip and rhinorrhea.    Eyes: Negative for photophobia, pain, discharge,  redness and itching.   Respiratory: Negative for apnea, cough, choking, chest tightness, shortness of breath and wheezing.    Cardiovascular: Negative for chest pain, palpitations and leg swelling.   Gastrointestinal: Positive for abdominal distention, abdominal pain and nausea. Negative for anal bleeding, blood in stool, constipation, diarrhea, rectal pain and vomiting.   Endocrine: Negative for cold intolerance, heat intolerance, polydipsia, polyphagia and polyuria.   Genitourinary: Negative for decreased urine volume, difficulty urinating, dyspareunia, dysuria, enuresis, flank pain, frequency, genital sores, hematuria, menstrual problem, pelvic pain and urgency.   Musculoskeletal: Negative for arthralgias, back pain, gait problem, joint swelling and myalgias.   Allergic/Immunologic: Negative for environmental allergies, food allergies and immunocompromised state.   Neurological: Negative for dizziness, tremors, seizures, facial asymmetry, speech difficulty, light-headedness, numbness and headaches.   Hematological: Negative.  Negative for adenopathy. Does not bruise/bleed easily.   Psychiatric/Behavioral: Negative for agitation, behavioral problems, confusion, decreased concentration, dysphoric mood and hallucinations. The patient is not nervous/anxious and is not hyperactive.      Objective:     Vital Signs (Most Recent):  Temp: 98.5 °F (36.9 °C) (11/07/18 1735)  Pulse: 72 (11/07/18 1735)  Resp: 19 (11/07/18 1735)  BP: 116/78 (11/07/18 1735)  SpO2: 97 % (11/07/18 1735) Vital Signs (24h Range):  Temp:  [98.2 °F (36.8 °C)-101.4 °F (38.6 °C)] 98.5 °F (36.9 °C)  Pulse:  [59-72] 72  Resp:  [18-19] 19  SpO2:  [96 %-99 %] 97 %  BP: (105-124)/(52-82) 116/78     Weight: 71.9 kg (158 lb 8.2 oz)  Body mass index is 28.99 kg/m².    Physical Exam   Constitutional: She is oriented to person, place, and time. She appears well-developed and well-nourished. No distress.   HENT:   Head: Normocephalic and atraumatic.   Right Ear:  External ear normal.   Left Ear: External ear normal.   Eyes: Conjunctivae and EOM are normal. Pupils are equal, round, and reactive to light.   Neck: Normal range of motion. Neck supple. No JVD present. No tracheal deviation present. No thyromegaly present.   Cardiovascular: Normal rate and regular rhythm. Exam reveals no gallop and no friction rub.   No murmur heard.  Pulmonary/Chest: Effort normal and breath sounds normal. No stridor. No respiratory distress. She has no wheezes. She has no rales.   Abdominal: Soft. Bowel sounds are normal. She exhibits no distension and no mass. There is no tenderness. There is no rebound and no guarding. No hernia.   Musculoskeletal: Normal range of motion. She exhibits no edema, tenderness or deformity.   Neurological: She is alert and oriented to person, place, and time. She displays normal reflexes. No cranial nerve deficit. She exhibits normal muscle tone. Coordination normal.   Skin: Skin is warm. No erythema.   Psychiatric: She has a normal mood and affect. Her behavior is normal. Thought content normal.   Nursing note and vitals reviewed.        CRANIAL NERVES     CN III, IV, VI   Pupils are equal, round, and reactive to light.  Extraocular motions are normal.        Significant Labs:   BMP:   Recent Labs   Lab 11/07/18  1340      *   K 3.9   CL 97   CO2 21*   BUN 10   CREATININE 0.8   CALCIUM 9.4     CBC:   Recent Labs   Lab 11/07/18  1340   WBC 13.13*   HGB 14.5   HCT 42.1        Coagulation: No results for input(s): PT, INR, APTT in the last 48 hours.  Lipase:   Recent Labs   Lab 11/07/18  1340   LIPASE 19     Urine Studies:   Recent Labs   Lab 11/07/18  1305   COLORU Yellow   APPEARANCEUA Clear   PHUR 6.0   SPECGRAV 1.010   PROTEINUA Negative   GLUCUA Negative   KETONESU Negative   BILIRUBINUA Negative   OCCULTUA Trace*   NITRITE Negative   UROBILINOGEN Negative   LEUKOCYTESUR 1+*   RBCUA 2   WBCUA 5   SQUAMEPITHEL 5       Significant Imaging: I  have reviewed all pertinent imaging results/findings within the past 24 hours.

## 2018-11-08 NOTE — TELEPHONE ENCOUNTER
Returned patients daughter call, Patients daughter was calling to cancel scheduled appt due to her mother being in the hospital.

## 2018-11-09 ENCOUNTER — PATIENT MESSAGE (OUTPATIENT)
Dept: INTERNAL MEDICINE | Facility: CLINIC | Age: 78
End: 2018-11-09

## 2018-11-09 LAB
ALBUMIN SERPL BCP-MCNC: 2.8 G/DL
ALP SERPL-CCNC: 55 U/L
ALT SERPL W/O P-5'-P-CCNC: 11 U/L
ANION GAP SERPL CALC-SCNC: 9 MMOL/L
AST SERPL-CCNC: 16 U/L
BASOPHILS # BLD AUTO: 0.02 K/UL
BASOPHILS NFR BLD: 0.2 %
BILIRUB SERPL-MCNC: 0.8 MG/DL
BUN SERPL-MCNC: 9 MG/DL
C DIFF GDH STL QL: NEGATIVE
C DIFF TOX A+B STL QL IA: NEGATIVE
CALCIUM SERPL-MCNC: 8.6 MG/DL
CHLORIDE SERPL-SCNC: 108 MMOL/L
CO2 SERPL-SCNC: 19 MMOL/L
CREAT SERPL-MCNC: 0.7 MG/DL
DIFFERENTIAL METHOD: ABNORMAL
EOSINOPHIL # BLD AUTO: 0.2 K/UL
EOSINOPHIL NFR BLD: 1.6 %
ERYTHROCYTE [DISTWIDTH] IN BLOOD BY AUTOMATED COUNT: 13.8 %
EST. GFR  (AFRICAN AMERICAN): >60 ML/MIN/1.73 M^2
EST. GFR  (NON AFRICAN AMERICAN): >60 ML/MIN/1.73 M^2
GLUCOSE SERPL-MCNC: 82 MG/DL
HCT VFR BLD AUTO: 36.2 %
HGB BLD-MCNC: 12.1 G/DL
LYMPHOCYTES # BLD AUTO: 1.3 K/UL
LYMPHOCYTES NFR BLD: 14.3 %
MCH RBC QN AUTO: 28.9 PG
MCHC RBC AUTO-ENTMCNC: 33.4 G/DL
MCV RBC AUTO: 87 FL
MONOCYTES # BLD AUTO: 0.9 K/UL
MONOCYTES NFR BLD: 10.1 %
NEUTROPHILS # BLD AUTO: 6.8 K/UL
NEUTROPHILS NFR BLD: 73.8 %
PLATELET # BLD AUTO: 232 K/UL
PMV BLD AUTO: 10.3 FL
POTASSIUM SERPL-SCNC: 3.6 MMOL/L
PROT SERPL-MCNC: 5.8 G/DL
RBC # BLD AUTO: 4.18 M/UL
SODIUM SERPL-SCNC: 136 MMOL/L
WBC # BLD AUTO: 9.15 K/UL

## 2018-11-09 PROCEDURE — 85025 COMPLETE CBC W/AUTO DIFF WBC: CPT | Mod: HCNC

## 2018-11-09 PROCEDURE — 25000003 PHARM REV CODE 250: Mod: HCNC | Performed by: HOSPITALIST

## 2018-11-09 PROCEDURE — 25000003 PHARM REV CODE 250: Mod: HCNC | Performed by: NURSE PRACTITIONER

## 2018-11-09 PROCEDURE — 36415 COLL VENOUS BLD VENIPUNCTURE: CPT | Mod: HCNC

## 2018-11-09 PROCEDURE — 63600175 PHARM REV CODE 636 W HCPCS: Mod: HCNC | Performed by: NURSE PRACTITIONER

## 2018-11-09 PROCEDURE — 80053 COMPREHEN METABOLIC PANEL: CPT | Mod: HCNC

## 2018-11-09 PROCEDURE — 94799 UNLISTED PULMONARY SVC/PX: CPT | Mod: HCNC

## 2018-11-09 PROCEDURE — S0030 INJECTION, METRONIDAZOLE: HCPCS | Mod: HCNC | Performed by: NURSE PRACTITIONER

## 2018-11-09 PROCEDURE — 25000003 PHARM REV CODE 250: Mod: HCNC | Performed by: INTERNAL MEDICINE

## 2018-11-09 PROCEDURE — 94761 N-INVAS EAR/PLS OXIMETRY MLT: CPT | Mod: HCNC

## 2018-11-09 PROCEDURE — 63600175 PHARM REV CODE 636 W HCPCS: Mod: HCNC | Performed by: INTERNAL MEDICINE

## 2018-11-09 PROCEDURE — 21400001 HC TELEMETRY ROOM: Mod: HCNC

## 2018-11-09 RX ORDER — RAMELTEON 8 MG/1
8 TABLET ORAL NIGHTLY PRN
Status: DISCONTINUED | OUTPATIENT
Start: 2018-11-09 | End: 2018-11-11 | Stop reason: HOSPADM

## 2018-11-09 RX ORDER — ACETAMINOPHEN 650 MG/20.3ML
650 LIQUID ORAL EVERY 4 HOURS PRN
Status: DISCONTINUED | OUTPATIENT
Start: 2018-11-09 | End: 2018-11-11 | Stop reason: HOSPADM

## 2018-11-09 RX ADMIN — RAMELTEON 8 MG: 8 TABLET, FILM COATED ORAL at 11:11

## 2018-11-09 RX ADMIN — ACETAMINOPHEN 650 MG: 650 SOLUTION ORAL at 05:11

## 2018-11-09 RX ADMIN — NYSTATIN AND TRIAMCINOLONE ACETONIDE: 100000; 1 OINTMENT TOPICAL at 10:11

## 2018-11-09 RX ADMIN — METRONIDAZOLE 500 MG: 500 INJECTION, SOLUTION INTRAVENOUS at 11:11

## 2018-11-09 RX ADMIN — METRONIDAZOLE 500 MG: 500 INJECTION, SOLUTION INTRAVENOUS at 05:11

## 2018-11-09 RX ADMIN — RAMELTEON 8 MG: 8 TABLET, FILM COATED ORAL at 01:11

## 2018-11-09 RX ADMIN — CIPROFLOXACIN 400 MG: 2 INJECTION, SOLUTION INTRAVENOUS at 05:11

## 2018-11-09 RX ADMIN — ATORVASTATIN CALCIUM 10 MG: 10 TABLET, FILM COATED ORAL at 09:11

## 2018-11-09 RX ADMIN — PANTOPRAZOLE SODIUM 40 MG: 40 TABLET, DELAYED RELEASE ORAL at 09:11

## 2018-11-09 RX ADMIN — HEPARIN SODIUM 5000 UNITS: 5000 INJECTION, SOLUTION INTRAVENOUS; SUBCUTANEOUS at 05:11

## 2018-11-09 RX ADMIN — THYROID, PORCINE 30 MG: 30 TABLET ORAL at 10:11

## 2018-11-09 RX ADMIN — METRONIDAZOLE 500 MG: 500 INJECTION, SOLUTION INTRAVENOUS at 09:11

## 2018-11-09 RX ADMIN — HEPARIN SODIUM 5000 UNITS: 5000 INJECTION, SOLUTION INTRAVENOUS; SUBCUTANEOUS at 10:11

## 2018-11-09 RX ADMIN — ASPIRIN 81 MG CHEWABLE TABLET 81 MG: 81 TABLET CHEWABLE at 09:11

## 2018-11-09 RX ADMIN — NYSTATIN AND TRIAMCINOLONE ACETONIDE: 100000; 1 OINTMENT TOPICAL at 09:11

## 2018-11-09 NOTE — PLAN OF CARE
Problem: Patient Care Overview  Goal: Plan of Care Review  Outcome: Ongoing (interventions implemented as appropriate)  IVF/ antibiotics administered as ordered. Patient reports intermittent abdominal cramping.  Continues with diarrhea. Stool collected for C.diff, WBC, and culture. Son at bedside. Remains free of injury. Fall precautions maintained with bed low and wheels locked. Chart review for 24 hours completed. Will continue to monitor till discharge.

## 2018-11-09 NOTE — SUBJECTIVE & OBJECTIVE
Interval History:     Review of Systems   Unable to perform ROS: Dementia     Objective:     Vital Signs (Most Recent):  Temp: 98.1 °F (36.7 °C) (11/09/18 0737)  Pulse: (!) 59 (11/09/18 0832)  Resp: 18 (11/09/18 0832)  BP: 109/61 (11/09/18 0737)  SpO2: (S) 97 %(ambulating) (11/09/18 0832) Vital Signs (24h Range):  Temp:  [96.7 °F (35.9 °C)-99.1 °F (37.3 °C)] 98.1 °F (36.7 °C)  Pulse:  [54-60] 59  Resp:  [18-20] 18  SpO2:  [94 %-98 %] 97 %  BP: ()/(45-64) 109/61     Weight: 72.4 kg (159 lb 9.8 oz)  Body mass index is 29.19 kg/m².    Intake/Output Summary (Last 24 hours) at 11/9/2018 1056  Last data filed at 11/9/2018 0600  Gross per 24 hour   Intake 2645.42 ml   Output --   Net 2645.42 ml      Physical Exam   Constitutional: She is oriented to person, place, and time. She appears well-developed and well-nourished. No distress.   HENT:   Head: Normocephalic and atraumatic.   Nose: Nose normal.   Mouth/Throat: Oropharynx is clear and moist.   Eyes: Conjunctivae and EOM are normal. No scleral icterus.   Neck: Normal range of motion. Neck supple. No tracheal deviation present.   Cardiovascular: Normal rate, regular rhythm, normal heart sounds and intact distal pulses. Exam reveals no gallop and no friction rub.   No murmur heard.  Pulmonary/Chest: Effort normal and breath sounds normal. No stridor. No respiratory distress. She has no wheezes. She has no rales. She exhibits no tenderness.   Abdominal: Soft. Bowel sounds are normal. She exhibits distension (mild). She exhibits no mass. There is tenderness (LLQ ). There is no rebound.   Musculoskeletal: Normal range of motion. She exhibits no edema, tenderness or deformity.   Neurological: She is alert and oriented to person, place, and time. No cranial nerve deficit. She exhibits normal muscle tone. Coordination normal.   Able to state the year but not the month, day.   Pt has very poor recall   Disoriented to situation at times      Skin: Skin is warm and dry. No  rash noted. She is not diaphoretic. No erythema. No pallor.   Psychiatric: She has a normal mood and affect. Her behavior is normal. Thought content normal.   Nursing note and vitals reviewed.      Significant Labs: All pertinent labs within the past 24 hours have been reviewed.    Significant Imaging: I have reviewed all pertinent imaging results/findings within the past 24 hours.

## 2018-11-09 NOTE — ASSESSMENT & PLAN NOTE
-Acute diverticulitis   -Pt failed out pt tx  Cont IV cipro and IV metronidazol  -Cont IVF  -full liquid diet    - F/U Stool culture and c diff pending   May need general surgery consult if no improvement

## 2018-11-09 NOTE — NURSING
BP 89/45 while patient resting in bed. Asymptomatic. Notified Cary Wong NP.  No new orders given at this time.

## 2018-11-09 NOTE — PROGRESS NOTES
Instructed pt and family on special contact isolation precautions. Instructed on proper hand washing. Will monitor. Instructed on need for stool specimen. Hat in room.

## 2018-11-09 NOTE — PLAN OF CARE
Problem: Patient Care Overview  Goal: Plan of Care Review  Outcome: Ongoing (interventions implemented as appropriate)  POC reviewed with pt, verbalized understanding. Pt remained free from falls, fall precautions in place. Pt is not on monitor. VS monitored. Pt independent and able to reposition self.  Pt IV intact, infusing saline and flagyl. No other c/o at this time. Call bell and personal belongings within reach. Hourly rounding complete. Reminded to call for assistance. Will continue to monitor.

## 2018-11-09 NOTE — PROGRESS NOTES
[facility] Hospital Medicine  Progress Note    Patient Name: Irlanda Lopez  MRN: [id number]  Patient Class: IP- Inpatient   Admission Date: 11/7/2018  Length of Stay: 1 days  Attending Physician: Tyson Maldonado, *  Primary Care Provider: Myla Arias MD        Subjective:     Principal Problem:Diverticulitis of sigmoid colon    HPI:  79 y/o wf with a PMHx of Dementia , Hypothyroidism , HTN and HLD presented to the ER from Her PCP office with a c/o LLQ abdominal pain for the last 4 weeks which has getting worse. She was dx 4 week ago with acute diverticulitis per ct abd  And treated  With bactrim and metronidazole po  For 10 .The pt sx did no improved and was treated  With Augmentin po for 10 days .She describe The pain as intermittent , pressure like , rated 10/10 , radiated to  Middle  Lower abdomen  Wit no aggravating or alleviating sx . She states the pain is associated with  Nausea and  subjective fever . She denies any sob , chest pain , diarrhea , sick contact  Or gu sx . She has a H/O appendectomy .  ER course : CT abdomen show stable inflammatory changes . No abscess or perforation. WBC 13.13 , H/H 14.5/42/1 , Na 129 , co2 21 . Cipro 400 mg iv x1 , metronidazole  500 mg iv x 1 .  ER VS : bp 110/80  Hr 72  rr  18 o2sat  96%    Hospital Course:  The pt is a 77 yo female with Dementia who was placed in observation for acute diverticulitis on IV Cipro and Flagyl. The pt was seen by PCP for diarrhea and LLQ pain. She had a CT scan 10/4/18 showing acute diverticulitis and 3.0 cm calculus right renal pelvis. She was placed on Bactrim and Flagyl for acute diverticulitis. She was evaluated by Urology regarding the right renal stone who recommended Lithotripsy in the near future after PCP clearance. However, the pt had persistent symptoms even after completing antibiotics- per family report (pt has dementia). She had another CT abd on 10/23/18 which showed persistent acute  diverticulitis. Negative for pneumoperitoneum or abscess. She was then given a course of Augmentin which she completed. During this presentation, the pt reports the LLQ pain never went away- it got acutely worse PTA accompanied by 101F fever. Pt was afebrile on arrival Tmax 100.0F.    11/9/18 Pt was seen and examined at bedside . She  Cont with  abdominal pain  But better since admission . The Pt states has been having diarrhea on and off .  C.diff and stool culture were collected .There was no acute event overnight     Interval History:     Review of Systems   Unable to perform ROS: Dementia     Objective:     Vital Signs (Most Recent):  Temp: 98.1 °F (36.7 °C) (11/09/18 0737)  Pulse: (!) 59 (11/09/18 0832)  Resp: 18 (11/09/18 0832)  BP: 109/61 (11/09/18 0737)  SpO2: (S) 97 %(ambulating) (11/09/18 0832) Vital Signs (24h Range):  Temp:  [96.7 °F (35.9 °C)-99.1 °F (37.3 °C)] 98.1 °F (36.7 °C)  Pulse:  [54-60] 59  Resp:  [18-20] 18  SpO2:  [94 %-98 %] 97 %  BP: ()/(45-64) 109/61     Weight: 72.4 kg (159 lb 9.8 oz)  Body mass index is 29.19 kg/m².    Intake/Output Summary (Last 24 hours) at 11/9/2018 1056  Last data filed at 11/9/2018 0600  Gross per 24 hour   Intake 2645.42 ml   Output --   Net 2645.42 ml      Physical Exam   Constitutional: She is oriented to person, place, and time. She appears well-developed and well-nourished. No distress.   HENT:   Head: Normocephalic and atraumatic.   Nose: Nose normal.   Mouth/Throat: Oropharynx is clear and moist.   Eyes: Conjunctivae and EOM are normal. No scleral icterus.   Neck: Normal range of motion. Neck supple. No tracheal deviation present.   Cardiovascular: Normal rate, regular rhythm, normal heart sounds and intact distal pulses. Exam reveals no gallop and no friction rub.   No murmur heard.  Pulmonary/Chest: Effort normal and breath sounds normal. No stridor. No respiratory distress. She has no wheezes. She has no rales. She exhibits no tenderness.    Abdominal: Soft. Bowel sounds are normal. She exhibits distension (mild). She exhibits no mass. There is tenderness (LLQ ). There is no rebound.   Musculoskeletal: Normal range of motion. She exhibits no edema, tenderness or deformity.   Neurological: She is alert and oriented to person, place, and time. No cranial nerve deficit. She exhibits normal muscle tone. Coordination normal.   Able to state the year but not the month, day.   Pt has very poor recall   Disoriented to situation at times      Skin: Skin is warm and dry. No rash noted. She is not diaphoretic. No erythema. No pallor.   Psychiatric: She has a normal mood and affect. Her behavior is normal. Thought content normal.   Nursing note and vitals reviewed.      Significant Labs: All pertinent labs within the past 24 hours have been reviewed.    Significant Imaging: I have reviewed all pertinent imaging results/findings within the past 24 hours.    Assessment/Plan:      * Diverticulitis of sigmoid colon    -Acute diverticulitis   -Pt failed out pt tx  Cont IV cipro and IV metronidazol  -Cont IVF  -full liquid diet    - F/U Stool culture and c diff pending   May need general surgery consult if no improvement        Candidiasis of anus    Nystatin triamcinolone cream        Moderate protein-calorie malnutrition    Advance diet as tolerate        Hyponatremia    - most likely due to volume depletion   -cont IVF       Vascular dementia without behavioral disturbance    Supportive tx        Essential hypertension    Hold Zestoretic for now       Thyroid disorder    Cont Thyroid pork        VTE Risk Mitigation (From admission, onward)        Ordered     heparin (porcine) injection 5,000 Units  Every 8 hours      11/08/18 1600     IP VTE HIGH RISK PATIENT  Once      11/07/18 1802     Place SANDIE hose  Until discontinued      11/07/18 1802              Tyson Maldonado MD  Department of Hospital Medicine   [facility]

## 2018-11-10 ENCOUNTER — PATIENT MESSAGE (OUTPATIENT)
Dept: INTERNAL MEDICINE | Facility: CLINIC | Age: 78
End: 2018-11-10

## 2018-11-10 PROBLEM — E78.5 HYPERLIPIDEMIA: Status: ACTIVE | Noted: 2018-11-10

## 2018-11-10 LAB
ALBUMIN SERPL BCP-MCNC: 2.7 G/DL
ALP SERPL-CCNC: 44 U/L
ALT SERPL W/O P-5'-P-CCNC: 9 U/L
ANION GAP SERPL CALC-SCNC: 10 MMOL/L
AST SERPL-CCNC: 16 U/L
BACTERIA UR CULT: NORMAL
BACTERIA UR CULT: NORMAL
BILIRUB SERPL-MCNC: 0.4 MG/DL
BUN SERPL-MCNC: 6 MG/DL
CALCIUM SERPL-MCNC: 8.3 MG/DL
CHLORIDE SERPL-SCNC: 110 MMOL/L
CO2 SERPL-SCNC: 18 MMOL/L
CREAT SERPL-MCNC: 0.7 MG/DL
EST. GFR  (AFRICAN AMERICAN): >60 ML/MIN/1.73 M^2
EST. GFR  (NON AFRICAN AMERICAN): >60 ML/MIN/1.73 M^2
GLUCOSE SERPL-MCNC: 104 MG/DL
POTASSIUM SERPL-SCNC: 3.8 MMOL/L
PROT SERPL-MCNC: 5.5 G/DL
SODIUM SERPL-SCNC: 138 MMOL/L
WBC #/AREA STL HPF: ABNORMAL /[HPF]

## 2018-11-10 PROCEDURE — 80053 COMPREHEN METABOLIC PANEL: CPT | Mod: HCNC

## 2018-11-10 PROCEDURE — 63600175 PHARM REV CODE 636 W HCPCS: Mod: HCNC | Performed by: NURSE PRACTITIONER

## 2018-11-10 PROCEDURE — 25000003 PHARM REV CODE 250: Mod: HCNC | Performed by: NURSE PRACTITIONER

## 2018-11-10 PROCEDURE — 94761 N-INVAS EAR/PLS OXIMETRY MLT: CPT | Mod: HCNC

## 2018-11-10 PROCEDURE — 94799 UNLISTED PULMONARY SVC/PX: CPT | Mod: HCNC

## 2018-11-10 PROCEDURE — 63600175 PHARM REV CODE 636 W HCPCS: Mod: HCNC | Performed by: INTERNAL MEDICINE

## 2018-11-10 PROCEDURE — 36415 COLL VENOUS BLD VENIPUNCTURE: CPT | Mod: HCNC

## 2018-11-10 PROCEDURE — 21400001 HC TELEMETRY ROOM: Mod: HCNC

## 2018-11-10 PROCEDURE — 25000003 PHARM REV CODE 250: Mod: HCNC | Performed by: HOSPITALIST

## 2018-11-10 PROCEDURE — S0030 INJECTION, METRONIDAZOLE: HCPCS | Mod: HCNC | Performed by: NURSE PRACTITIONER

## 2018-11-10 PROCEDURE — 27000221 HC OXYGEN, UP TO 24 HOURS: Mod: HCNC

## 2018-11-10 RX ORDER — DIPHENHYDRAMINE HYDROCHLORIDE 50 MG/ML
25 INJECTION INTRAMUSCULAR; INTRAVENOUS ONCE
Status: DISCONTINUED | OUTPATIENT
Start: 2018-11-10 | End: 2018-11-10

## 2018-11-10 RX ORDER — METRONIDAZOLE 500 MG/100ML
500 INJECTION, SOLUTION INTRAVENOUS
Status: DISCONTINUED | OUTPATIENT
Start: 2018-11-11 | End: 2018-11-11 | Stop reason: HOSPADM

## 2018-11-10 RX ORDER — DICYCLOMINE HYDROCHLORIDE 10 MG/1
10 CAPSULE ORAL 3 TIMES DAILY PRN
Status: DISCONTINUED | OUTPATIENT
Start: 2018-11-10 | End: 2018-11-11 | Stop reason: HOSPADM

## 2018-11-10 RX ADMIN — CIPROFLOXACIN 400 MG: 2 INJECTION, SOLUTION INTRAVENOUS at 06:11

## 2018-11-10 RX ADMIN — HEPARIN SODIUM 5000 UNITS: 5000 INJECTION, SOLUTION INTRAVENOUS; SUBCUTANEOUS at 09:11

## 2018-11-10 RX ADMIN — NYSTATIN AND TRIAMCINOLONE ACETONIDE: 100000; 1 OINTMENT TOPICAL at 09:11

## 2018-11-10 RX ADMIN — LORAZEPAM 0.5 MG: 2 INJECTION INTRAMUSCULAR; INTRAVENOUS at 12:11

## 2018-11-10 RX ADMIN — DICYCLOMINE HYDROCHLORIDE 10 MG: 10 CAPSULE ORAL at 05:11

## 2018-11-10 RX ADMIN — THYROID, PORCINE 30 MG: 30 TABLET ORAL at 09:11

## 2018-11-10 RX ADMIN — RAMELTEON 8 MG: 8 TABLET, FILM COATED ORAL at 09:11

## 2018-11-10 RX ADMIN — CIPROFLOXACIN 400 MG: 2 INJECTION, SOLUTION INTRAVENOUS at 05:11

## 2018-11-10 RX ADMIN — ONDANSETRON 4 MG: 2 INJECTION, SOLUTION INTRAMUSCULAR; INTRAVENOUS at 08:11

## 2018-11-10 RX ADMIN — HEPARIN SODIUM 5000 UNITS: 5000 INJECTION, SOLUTION INTRAVENOUS; SUBCUTANEOUS at 03:11

## 2018-11-10 RX ADMIN — HEPARIN SODIUM 5000 UNITS: 5000 INJECTION, SOLUTION INTRAVENOUS; SUBCUTANEOUS at 07:11

## 2018-11-10 NOTE — ASSESSMENT & PLAN NOTE
-Acute diverticulitis   -Pt failed out pt tx  Cont IV cipro and IV metronidazol  -Cont IVF  -full liquid diet    - F/U Stool culture and c diff pending   May need general surgery consult if no improvement     11/10 will continue Cipro/flagyl.   Ct scan of the abdomen /pelvis done on 10/04-  1. Severe acute diverticulitis sigmoid colon.  2. 3.0 cm calculus right renal pelvis.  Extensive scarring of the right kidney  Repeat CT scan 11/07-Stable inflammatory changes involving the sigmoid colon.  No evidence of perforation or abscess.    Will continue IV Cipro and flagyl.  She has not had previous colonoscopy and will need to get colonoscopy after resolution of acute episode.

## 2018-11-10 NOTE — PROGRESS NOTES
[facility] Hospital Medicine  Progress Note    Patient Name: Irlanda Lopez  MRN: [id number]  Patient Class: IP- Inpatient   Admission Date: 11/7/2018  Length of Stay: 2 days  Attending Physician: Bull Lopez MD  Primary Care Provider: Myla Arias MD        Subjective:     Principal Problem:Diverticulitis of sigmoid colon    HPI:  77 y/o wf with a PMHx of Dementia , Hypothyroidism , HTN and HLD presented to the ER from Her PCP office with a c/o LLQ abdominal pain for the last 4 weeks which has getting worse. She was dx 4 week ago with acute diverticulitis per ct abd  And treated  With bactrim and metronidazole po  For 10 .The pt sx did no improved and was treated  With Augmentin po for 10 days .She describe The pain as intermittent , pressure like , rated 10/10 , radiated to  Middle  Lower abdomen  Wit no aggravating or alleviating sx . She states the pain is associated with  Nausea and  subjective fever . She denies any sob , chest pain , diarrhea , sick contact  Or gu sx . She has a H/O appendectomy .  ER course : CT abdomen show stable inflammatory changes . No abscess or perforation. WBC 13.13 , H/H 14.5/42/1 , Na 129 , co2 21 . Cipro 400 mg iv x1 , metronidazole  500 mg iv x 1 .  ER VS : bp 110/80  Hr 72  rr  18 o2sat  96%    Hospital Course:  The pt is a 79 yo female with Dementia who was placed in observation for acute diverticulitis on IV Cipro and Flagyl. The pt was seen by PCP for diarrhea and LLQ pain. She had a CT scan 10/4/18 showing acute diverticulitis and 3.0 cm calculus right renal pelvis. She was placed on Bactrim and Flagyl for acute diverticulitis. She was evaluated by Urology regarding the right renal stone who recommended Lithotripsy in the near future after PCP clearance. However, the pt had persistent symptoms even after completing antibiotics- per family report (pt has dementia). She had another CT abd on 10/23/18 which showed persistent acute diverticulitis.  Negative for pneumoperitoneum or abscess. She was then given a course of Augmentin which she completed. During this presentation, the pt reports the LLQ pain never went away- it got acutely worse PTA accompanied by 101F fever. Pt was afebrile on arrival Tmax 100.0F.    11/9/18 Pt was seen and examined at bedside . She  Cont with  abdominal pain  But better since admission . The Pt states has been having diarrhea on and off .  C.diff and stool culture were collected .There was no acute event overnight     11/10-she reports better .  Son is by bedside .  CT scan of abdomen -Stable inflammatory changes involving the sigmoid colon.  No evidence of perforation or abscess..  Urine culture-   ESCHERICHIA COLI   >100,000 cfu/ml     Urine Culture, Routine --    ENTEROBACTER CLOACAE   50,000 - 99,999 cfu/ml            Interval History:   11/10  She was seen at bedside with her son.  Urine culture -  11/10-   ESCHERICHIA COLI   >100,000 cfu/ml     Urine Culture, Routine --    ENTEROBACTER CLOACAE   50,000 - 99,999 cfu/ml          Review of Systems   Constitutional: Positive for activity change, appetite change and fever. Negative for chills, diaphoresis, fatigue and unexpected weight change.   HENT: Negative for congestion, dental problem, drooling, facial swelling, hearing loss, nosebleeds, postnasal drip and rhinorrhea.    Eyes: Negative for photophobia, pain, discharge, redness and itching.   Respiratory: Negative for apnea, cough, choking, chest tightness, shortness of breath and wheezing.    Cardiovascular: Negative for chest pain, palpitations and leg swelling.   Gastrointestinal: Positive for abdominal distention, abdominal pain and nausea. Negative for anal bleeding, blood in stool, constipation, diarrhea, rectal pain and vomiting.   Endocrine: Negative for cold intolerance, heat intolerance, polydipsia, polyphagia and polyuria.   Genitourinary: Negative for decreased urine volume, difficulty urinating, dyspareunia,  dysuria, enuresis, flank pain, frequency, genital sores, hematuria, menstrual problem, pelvic pain and urgency.   Musculoskeletal: Negative for arthralgias, back pain, gait problem, joint swelling and myalgias.   Allergic/Immunologic: Negative for environmental allergies, food allergies and immunocompromised state.   Neurological: Negative for dizziness, tremors, seizures, facial asymmetry, speech difficulty, light-headedness, numbness and headaches.   Hematological: Negative.  Negative for adenopathy. Does not bruise/bleed easily.   Psychiatric/Behavioral: Negative for agitation, behavioral problems, confusion, decreased concentration, dysphoric mood and hallucinations. The patient is not nervous/anxious and is not hyperactive.      Objective:     Vital Signs (Most Recent):  Temp: 98.1 °F (36.7 °C) (11/10/18 1125)  Pulse: (!) 51 (11/10/18 1125)  Resp: 18 (11/10/18 1125)  BP: 115/71 (11/10/18 1125)  SpO2: (!) 94 % (11/10/18 1125) Vital Signs (24h Range):  Temp:  [96.3 °F (35.7 °C)-98.1 °F (36.7 °C)] 98.1 °F (36.7 °C)  Pulse:  [48-54] 51  Resp:  [16-20] 18  SpO2:  [94 %-97 %] 94 %  BP: ()/(57-71) 115/71     Weight: 72.4 kg (159 lb 9.8 oz)  Body mass index is 29.19 kg/m².  No intake or output data in the 24 hours ending 11/10/18 1526   Physical Exam   Constitutional: She is oriented to person, place, and time. She appears well-developed and well-nourished. No distress.   HENT:   Head: Normocephalic and atraumatic.   Nose: Nose normal.   Mouth/Throat: Oropharynx is clear and moist.   Eyes: Conjunctivae and EOM are normal. No scleral icterus.   Neck: Normal range of motion. Neck supple. No tracheal deviation present.   Cardiovascular: Normal rate, regular rhythm, normal heart sounds and intact distal pulses. Exam reveals no gallop and no friction rub.   No murmur heard.  Pulmonary/Chest: Effort normal and breath sounds normal. No stridor. No respiratory distress. She has no wheezes. She has no rales. She exhibits  no tenderness.   Abdominal: Soft. Bowel sounds are normal. She exhibits distension (mild). She exhibits no mass. There is tenderness (LLQ ). There is no rebound.   Musculoskeletal: Normal range of motion. She exhibits no edema, tenderness or deformity.   Neurological: She is alert and oriented to person, place, and time. No cranial nerve deficit. She exhibits normal muscle tone. Coordination normal.   Able to state the year but not the month, day.   Pt has very poor recall   Disoriented to situation at times      Skin: Skin is warm and dry. No rash noted. She is not diaphoretic. No erythema. No pallor.   Psychiatric: She has a normal mood and affect. Her behavior is normal. Thought content normal.   Nursing note and vitals reviewed.      Significant Labs:   Blood Culture: No results for input(s): LABBLOO in the last 48 hours.  BMP:   Recent Labs   Lab 11/10/18  1124         K 3.8      CO2 18*   BUN 6*   CREATININE 0.7   CALCIUM 8.3*     CBC:   Recent Labs   Lab 11/09/18  0434   WBC 9.15   HGB 12.1   HCT 36.2*        Urine Culture: No results for input(s): LABURIN in the last 48 hours.  Urine Studies: No results for input(s): COLORU, APPEARANCEUA, PHUR, SPECGRAV, PROTEINUA, GLUCUA, KETONESU, BILIRUBINUA, OCCULTUA, NITRITE, UROBILINOGEN, LEUKOCYTESUR, RBCUA, WBCUA, BACTERIA, SQUAMEPITHEL, HYALINECASTS in the last 48 hours.    Invalid input(s): WRIGHTSUR  All pertinent labs within the past 24 hours have been reviewed.    Significant Imaging: I have reviewed all pertinent imaging results/findings within the past 24 hours.    Assessment/Plan:      * Diverticulitis of sigmoid colon    -Acute diverticulitis   -Pt failed out pt tx  Cont IV cipro and IV metronidazol  -Cont IVF  -full liquid diet    - F/U Stool culture and c diff pending   May need general surgery consult if no improvement     11/10 will continue Cipro/flagyl.   Ct scan of the abdomen /pelvis done on 10/04-  1. Severe acute  diverticulitis sigmoid colon.  2. 3.0 cm calculus right renal pelvis.  Extensive scarring of the right kidney  Repeat CT scan 11/07-Stable inflammatory changes involving the sigmoid colon.  No evidence of perforation or abscess.    Will continue IV Cipro and flagyl.  She has not had previous colonoscopy and will need to get colonoscopy after resolution of acute episode.      Hyperlipidemia      11/10 -will continue lipitor      Candidiasis of anus    Nystatin triamcinolone cream     11/10- will continue topical therapy        Moderate protein-calorie malnutrition    Advance diet as tolerate        Hyponatremia    - most likely due to volume depletion   -cont IVF       Vascular dementia without behavioral disturbance    Supportive tx     11/10- will continue supportive care        Essential hypertension    Hold Zestoretic for now      11/10- will continue zestoretic      Thyroid disorder    Cont Thyroid pork     11/10- will continue thyroid pork, continue TSH.       VTE Risk Mitigation (From admission, onward)        Ordered     heparin (porcine) injection 5,000 Units  Every 8 hours      11/08/18 1600     IP VTE HIGH RISK PATIENT  Once      11/07/18 1802     Place SANDIE hose  Until discontinued      11/07/18 1802              Bull Lopez MD  Department of Hospital Medicine   [facility]

## 2018-11-10 NOTE — PLAN OF CARE
Problem: Patient Care Overview  Goal: Plan of Care Review  Outcome: Ongoing (interventions implemented as appropriate)  POC reviewed with pt, verbalized understanding. Pt remained free from falls, fall precautions in place. Pt is not on the monitor. VS monitored. Pt independent and able to reposition self. Pt IV intact, infusing saline. No other c/o at this time. Call bell and personal belongings within reach. Hourly rounding complete. Reminded to call for assistance. Will continue to monitor.

## 2018-11-10 NOTE — PLAN OF CARE
Pt disoriented to situation, daughter was at bedside. Son: Raffi at bedside this morning. POC discussed w/patient, verbalized understanding. No heart monitor. VSS. IV infusing continuously NS@75 with iv antibiotics delivered. Voids per BSC or BRP with +1 assist. Patient turns independently in bed. Fall precautions in place, bed alarm on, bed in lowest, call light and personal items within reach. SKIN: generalized bruising.

## 2018-11-10 NOTE — SUBJECTIVE & OBJECTIVE
Interval History:   11/10  She was seen at bedside with her son.  Urine culture -  11/10-   ESCHERICHIA COLI   >100,000 cfu/ml     Urine Culture, Routine --    ENTEROBACTER CLOACAE   50,000 - 99,999 cfu/ml          Review of Systems   Constitutional: Positive for activity change, appetite change and fever. Negative for chills, diaphoresis, fatigue and unexpected weight change.   HENT: Negative for congestion, dental problem, drooling, facial swelling, hearing loss, nosebleeds, postnasal drip and rhinorrhea.    Eyes: Negative for photophobia, pain, discharge, redness and itching.   Respiratory: Negative for apnea, cough, choking, chest tightness, shortness of breath and wheezing.    Cardiovascular: Negative for chest pain, palpitations and leg swelling.   Gastrointestinal: Positive for abdominal distention, abdominal pain and nausea. Negative for anal bleeding, blood in stool, constipation, diarrhea, rectal pain and vomiting.   Endocrine: Negative for cold intolerance, heat intolerance, polydipsia, polyphagia and polyuria.   Genitourinary: Negative for decreased urine volume, difficulty urinating, dyspareunia, dysuria, enuresis, flank pain, frequency, genital sores, hematuria, menstrual problem, pelvic pain and urgency.   Musculoskeletal: Negative for arthralgias, back pain, gait problem, joint swelling and myalgias.   Allergic/Immunologic: Negative for environmental allergies, food allergies and immunocompromised state.   Neurological: Negative for dizziness, tremors, seizures, facial asymmetry, speech difficulty, light-headedness, numbness and headaches.   Hematological: Negative.  Negative for adenopathy. Does not bruise/bleed easily.   Psychiatric/Behavioral: Negative for agitation, behavioral problems, confusion, decreased concentration, dysphoric mood and hallucinations. The patient is not nervous/anxious and is not hyperactive.      Objective:     Vital Signs (Most Recent):  Temp: 98.1 °F (36.7 °C) (11/10/18  1125)  Pulse: (!) 51 (11/10/18 1125)  Resp: 18 (11/10/18 1125)  BP: 115/71 (11/10/18 1125)  SpO2: (!) 94 % (11/10/18 1125) Vital Signs (24h Range):  Temp:  [96.3 °F (35.7 °C)-98.1 °F (36.7 °C)] 98.1 °F (36.7 °C)  Pulse:  [48-54] 51  Resp:  [16-20] 18  SpO2:  [94 %-97 %] 94 %  BP: ()/(57-71) 115/71     Weight: 72.4 kg (159 lb 9.8 oz)  Body mass index is 29.19 kg/m².  No intake or output data in the 24 hours ending 11/10/18 1526   Physical Exam   Constitutional: She is oriented to person, place, and time. She appears well-developed and well-nourished. No distress.   HENT:   Head: Normocephalic and atraumatic.   Nose: Nose normal.   Mouth/Throat: Oropharynx is clear and moist.   Eyes: Conjunctivae and EOM are normal. No scleral icterus.   Neck: Normal range of motion. Neck supple. No tracheal deviation present.   Cardiovascular: Normal rate, regular rhythm, normal heart sounds and intact distal pulses. Exam reveals no gallop and no friction rub.   No murmur heard.  Pulmonary/Chest: Effort normal and breath sounds normal. No stridor. No respiratory distress. She has no wheezes. She has no rales. She exhibits no tenderness.   Abdominal: Soft. Bowel sounds are normal. She exhibits distension (mild). She exhibits no mass. There is tenderness (LLQ ). There is no rebound.   Musculoskeletal: Normal range of motion. She exhibits no edema, tenderness or deformity.   Neurological: She is alert and oriented to person, place, and time. No cranial nerve deficit. She exhibits normal muscle tone. Coordination normal.   Able to state the year but not the month, day.   Pt has very poor recall   Disoriented to situation at times      Skin: Skin is warm and dry. No rash noted. She is not diaphoretic. No erythema. No pallor.   Psychiatric: She has a normal mood and affect. Her behavior is normal. Thought content normal.   Nursing note and vitals reviewed.      Significant Labs:   Blood Culture: No results for input(s): LABBLOO in  the last 48 hours.  BMP:   Recent Labs   Lab 11/10/18  1124         K 3.8      CO2 18*   BUN 6*   CREATININE 0.7   CALCIUM 8.3*     CBC:   Recent Labs   Lab 11/09/18  0434   WBC 9.15   HGB 12.1   HCT 36.2*        Urine Culture: No results for input(s): LABURIN in the last 48 hours.  Urine Studies: No results for input(s): COLORU, APPEARANCEUA, PHUR, SPECGRAV, PROTEINUA, GLUCUA, KETONESU, BILIRUBINUA, OCCULTUA, NITRITE, UROBILINOGEN, LEUKOCYTESUR, RBCUA, WBCUA, BACTERIA, SQUAMEPITHEL, HYALINECASTS in the last 48 hours.    Invalid input(s): CHARY  All pertinent labs within the past 24 hours have been reviewed.    Significant Imaging: I have reviewed all pertinent imaging results/findings within the past 24 hours.

## 2018-11-10 NOTE — NURSING
Realized that I gave pt 4x the ordered dose of Flagyl yesterday due to packaging issue. Informed JERZY Cortez. Informed pt with Dr. Lopez. John determined to hold today's doses.

## 2018-11-10 NOTE — PROGRESS NOTES
"Patient not observed resting. Episodes of crying. Noted that daughter at bedside is tearful, states "we have both been up since 5am. No sleep all day". Rozerem given to patient has not provided effective relief from insomnia. Reported findings and vitals/results to hospital medicine.    New orders: one time dose of Ativan 0.5mg iv. Continue to monitor patient.   "

## 2018-11-11 ENCOUNTER — PATIENT MESSAGE (OUTPATIENT)
Dept: SURGERY | Facility: CLINIC | Age: 78
End: 2018-11-11

## 2018-11-11 VITALS
TEMPERATURE: 98 F | SYSTOLIC BLOOD PRESSURE: 120 MMHG | OXYGEN SATURATION: 95 % | DIASTOLIC BLOOD PRESSURE: 59 MMHG | WEIGHT: 158.75 LBS | HEIGHT: 62 IN | RESPIRATION RATE: 18 BRPM | HEART RATE: 51 BPM | BODY MASS INDEX: 29.21 KG/M2

## 2018-11-11 PROCEDURE — 25000003 PHARM REV CODE 250: Mod: HCNC | Performed by: INTERNAL MEDICINE

## 2018-11-11 PROCEDURE — 25000003 PHARM REV CODE 250: Mod: HCNC | Performed by: NURSE PRACTITIONER

## 2018-11-11 PROCEDURE — 63600175 PHARM REV CODE 636 W HCPCS: Mod: HCNC | Performed by: INTERNAL MEDICINE

## 2018-11-11 PROCEDURE — S0030 INJECTION, METRONIDAZOLE: HCPCS | Mod: HCNC | Performed by: INTERNAL MEDICINE

## 2018-11-11 PROCEDURE — 94761 N-INVAS EAR/PLS OXIMETRY MLT: CPT | Mod: HCNC

## 2018-11-11 PROCEDURE — 63600175 PHARM REV CODE 636 W HCPCS: Mod: HCNC | Performed by: NURSE PRACTITIONER

## 2018-11-11 RX ORDER — CIPROFLOXACIN 500 MG/1
500 TABLET ORAL 2 TIMES DAILY
Qty: 20 TABLET | Refills: 0 | Status: SHIPPED | OUTPATIENT
Start: 2018-11-11 | End: 2018-11-21

## 2018-11-11 RX ORDER — METRONIDAZOLE 500 MG/1
500 TABLET ORAL 3 TIMES DAILY
Qty: 30 TABLET | Refills: 0 | Status: SHIPPED | OUTPATIENT
Start: 2018-11-11 | End: 2018-11-11 | Stop reason: HOSPADM

## 2018-11-11 RX ORDER — METRONIDAZOLE 500 MG/1
500 TABLET ORAL 3 TIMES DAILY
Qty: 30 TABLET | Refills: 0 | Status: SHIPPED | OUTPATIENT
Start: 2018-11-11 | End: 2018-11-21

## 2018-11-11 RX ADMIN — NYSTATIN AND TRIAMCINOLONE ACETONIDE: 100000; 1 OINTMENT TOPICAL at 08:11

## 2018-11-11 RX ADMIN — PANTOPRAZOLE SODIUM 40 MG: 40 TABLET, DELAYED RELEASE ORAL at 08:11

## 2018-11-11 RX ADMIN — METRONIDAZOLE 500 MG: 500 INJECTION, SOLUTION INTRAVENOUS at 08:11

## 2018-11-11 RX ADMIN — ASPIRIN 81 MG CHEWABLE TABLET 81 MG: 81 TABLET CHEWABLE at 08:11

## 2018-11-11 RX ADMIN — HEPARIN SODIUM 5000 UNITS: 5000 INJECTION, SOLUTION INTRAVENOUS; SUBCUTANEOUS at 05:11

## 2018-11-11 RX ADMIN — CIPROFLOXACIN 400 MG: 2 INJECTION, SOLUTION INTRAVENOUS at 05:11

## 2018-11-11 RX ADMIN — ATORVASTATIN CALCIUM 10 MG: 10 TABLET, FILM COATED ORAL at 08:11

## 2018-11-11 NOTE — PLAN OF CARE
Pt disoriented to situation, daughter was at bedside. POC discussed w/patient, verbalized understanding. No heart monitor. VSS. IV infusing continuously NS@75 with iv antibiotics delivered. Voids per BSC or BRP with +1 assist. Patient turns independently in bed. Fall precautions in place, bed alarm REFUSED, bed in lowest, call light and personal items within reach. SKIN: generalized bruising.

## 2018-11-11 NOTE — PROGRESS NOTES
SW met with patient with son and daughter in law at the bedside.  Family requested information on providers that might answer questions about medicare enrollment, products to assist with medication reminders, and meals on wheels information.    FARZANEH provided the following resources:  Information on [place] Senior [employer] Insurance Information Program obtained from Medicare.gov website; [employer] medication dispensing products ; and a senior resource guide which has contact information for each [place] [place] [employer]/Meals on Wheels.    Yin Booker LMSW, DEDE-FARZANEH, [facility]  11/11/2018

## 2018-11-11 NOTE — PLAN OF CARE
11/11/18 1220   Final Note   Assessment Type Final Discharge Note   Anticipated Discharge Disposition Home   Right Care Referral Info   Post Acute Recommendation No Care   Yin Booker LMSW, ACCARSON-FARZANEH, [facility]  11/11/2018

## 2018-11-11 NOTE — PLAN OF CARE
11/11/18 1130   Medicare Message   Important Message from Medicare regarding Discharge Appeal Rights Given to patient/caregiver;Explained to patient/caregiver;Signed/date by patient/caregiver   Date IMM was signed 11/11/18   Time IMM was signed 1134

## 2018-11-12 LAB
BACTERIA BLD CULT: NORMAL
BACTERIA BLD CULT: NORMAL

## 2018-11-12 NOTE — DISCHARGE SUMMARY
[facility] Hospital Medicine  Discharge Summary      Patient Name: Irlanda Lopez  MRN: [id number]  Admission Date: 11/7/2018  Hospital Length of Stay: 3 days  Discharge Date and Time: 11/11/2018  6:20 PM  Attending Physician: No att. providers found   Discharging Provider: Bull Lopez MD  Primary Care Provider: Myla Arias MD      HPI:   79 y/o wf with a PMHx of Dementia , Hypothyroidism , HTN and HLD presented to the ER from Her PCP office with a c/o LLQ abdominal pain for the last 4 weeks which has getting worse. She was dx 4 week ago with acute diverticulitis per ct abd  And treated  With bactrim and metronidazole po  For 10 .The pt sx did no improved and was treated  With Augmentin po for 10 days .She describe The pain as intermittent , pressure like , rated 10/10 , radiated to  Middle  Lower abdomen  Wit no aggravating or alleviating sx . She states the pain is associated with  Nausea and  subjective fever . She denies any sob , chest pain , diarrhea , sick contact  Or gu sx . She has a H/O appendectomy .  ER course : CT abdomen show stable inflammatory changes . No abscess or perforation. WBC 13.13 , H/H 14.5/42/1 , Na 129 , co2 21 . Cipro 400 mg iv x1 , metronidazole  500 mg iv x 1 .  ER VS : bp 110/80  Hr 72  rr  18 o2sat  96%    * No surgery found *      Hospital Course:   The pt is a 77 yo female with Dementia who was placed in observation for acute diverticulitis on IV Cipro and Flagyl. The pt was seen by PCP for diarrhea and LLQ pain. She had a CT scan 10/4/18 showing acute diverticulitis and 3.0 cm calculus right renal pelvis. She was placed on Bactrim and Flagyl for acute diverticulitis. She was evaluated by Urology regarding the right renal stone who recommended Lithotripsy in the near future after PCP clearance. However, the pt had persistent symptoms even after completing antibiotics- per family report (pt has dementia). She had another CT abd on 10/23/18 which showed  persistent acute diverticulitis. Negative for pneumoperitoneum or abscess. She was then given a course of Augmentin which she completed. During this presentation, the pt reports the LLQ pain never went away- it got acutely worse PTA accompanied by 101F fever. Pt was afebrile on arrival Tmax 100.0F.    11/9/18 Pt was seen and examined at bedside . She  Cont with  abdominal pain  But better since admission . The Pt states has been having diarrhea on and off .  C.diff and stool culture were collected .There was no acute event overnight     11/10-she reports better .  Son is by bedside .  CT scan of abdomen -Stable inflammatory changes involving the sigmoid colon.  No evidence of perforation or abscess..  Urine culture-   ESCHERICHIA COLI   >100,000 cfu/ml     Urine Culture, Routine --    ENTEROBACTER CLOACAE   50,000 - 99,999 cfu/ml        11/11- 78 year old woman admitted with diverticulitis and UTI.  She had a CT scan 10/4/18 showing acute diverticulitis and 3.0 cm calculus right renal pelvis. She was placed on Bactrim and Flagyl for acute diverticulitis. She had another CT abd on 10/23/18 which showed persistent acute diverticulitis.She was given Augumentin for this .  Since admission,   CT scan of the abdomen done on 11/07- stable inflammatory changes involving the sigmoid colon.    Urine culture done on 11/07-showed  E coli and enterobacter .  She was started on IV flagyl and Cipro with good clinical response. She will be discharged to complete 10 days of cipro and Flagyl but she will need out patient colonoscopy .     The need for outpatient colonoscopy was emphasized to the family . She was seen and examined on day of discharge and deemed suitable for discharge .   She was also  seen by Gen surgery prior to discharge and she will be followed in the clinic by Gen Surgery             Consults:   Consults (From admission, onward)        Status Ordering Provider     Inpatient consult to Social Work/Case Management   Once     Provider:  (Not yet assigned)    Completed TARAS PERSON          No new Assessment & Plan notes have been filed under this hospital service since the last note was generated.  Service: Hospital Medicine    Final Active Diagnoses:    Diagnosis Date Noted POA    PRINCIPAL PROBLEM:  Diverticulitis of sigmoid colon [K57.32] 11/07/2018 Yes    Hyperlipidemia [E78.5] 11/10/2018 Yes    Moderate protein-calorie malnutrition [E44.0] 11/08/2018 Yes    Candidiasis of anus [B37.89] 11/08/2018 Yes    Hyponatremia [E87.1] 11/07/2018 Yes    Vascular dementia without behavioral disturbance [F01.50] 09/14/2017 Yes    Essential hypertension [I10] 01/10/2017 Yes    Thyroid disorder [E07.9] 10/04/2016 Yes      Problems Resolved During this Admission:       Discharged Condition: stable    Disposition: Home or Self Care    Follow Up:  Follow-up Information     Myla Arias MD In 3 days.    Specialty:  Family Medicine  Contact information:  [address]  [address]  [place] [address]  [phone]             Zay Perez Iii, MD In 2 weeks.    Specialty:  Gastroenterology  Contact information:  [address] [facility] [place] [id number] [phone]             Reece Hogan MD In 1 week.    Specialties:  General Surgery, Bariatrics  Contact information:  [address] [place] [address]  [phone]                 Patient Instructions:      Diet Cardiac     Activity as tolerated       Significant Diagnostic Studies: Microbiology:     Pending Diagnostic Studies:     Procedure Component Value Units Date/Time    Urinalysis [[id number]] Collected:  11/07/18 1340    Order Status:  Sent Lab Status:  In process Updated:  11/07/18 1359    Specimen:  Urine          Medications:  Reconciled Home Medications:      Medication List      START taking these medications    ciprofloxacin HCl 500 MG tablet  Commonly known as:  CIPRO  Take 1 tablet (500 mg total) by mouth 2 (two) times daily. for 10 days      metroNIDAZOLE 500 MG tablet  Commonly known as:  FLAGYL  Take 1 tablet (500 mg total) by mouth 3 (three) times daily. for 10 days        CONTINUE taking these medications    aspirin 81 MG Chew  Take 1 tablet (81 mg total) by mouth once daily.     atorvastatin 10 MG tablet  Commonly known as:  LIPITOR  TAKE 1 TABLET EVERY DAY     donepezil 10 MG tablet  Commonly known as:  ARICEPT  Take 1 tablet (10 mg total) by mouth every evening.     lisinopril-hydrochlorothiazide 10-12.5 mg per tablet  Commonly known as:  PRINZIDE,ZESTORETIC  TAKE 1 TABLET EVERY DAY     loperamide 2 mg capsule  Commonly known as:  IMODIUM  Take 2 mg by mouth 4 (four) times daily as needed for Diarrhea.     magnesium citrate (bulk) Powd  by Misc.(Non-Drug; Combo Route) route.     memantine 10 MG Tab  Commonly known as:  NAMENDA  5mg po bid x 1 month, then 10mg po bid.     PEPPERMINT OIL MISC  by Misc.(Non-Drug; Combo Route) route.        ASK your doctor about these medications    thyroid (pork) 30 mg Tab  Commonly known as:  ARMOUR THYROID  Take 1 tablet (30 mg total) by mouth once daily.     triamcinolone acetonide 0.1% 0.1 % ointment  Commonly known as:  KENALOG  APPLY TOPICALLY 4 (FOUR) TIMES DAILY. TO RASH ON FACE AND CHEST AND NOSE            Indwelling Lines/Drains at time of discharge:   Lines/Drains/Airways          None          Time spent on the discharge of patient:  45 minutes  Patient was seen and examined on the date of discharge and determined to be suitable for discharge.         Bull Lopez MD  Department of Hospital Medicine  [facility]

## 2018-11-12 NOTE — NURSING
Pt discharged per MD order. Pt instructions and handout given to pt's family. Pt instructed to schedule and keep all f/u appointments. Pt verbalizes understanding. IV removed, tele box returned to monitor tech. Pt taken in a wheelchair with all pt belongings.

## 2018-11-13 ENCOUNTER — TELEPHONE (OUTPATIENT)
Dept: SURGERY | Facility: CLINIC | Age: 78
End: 2018-11-13

## 2018-11-13 ENCOUNTER — OUTPATIENT CASE MANAGEMENT (OUTPATIENT)
Dept: ADMINISTRATIVE | Facility: OTHER | Age: 78
End: 2018-11-13

## 2018-11-13 DIAGNOSIS — K57.40 DIVERTICULITIS OF BOTH LARGE AND SMALL INTESTINE WITH PERFORATION WITHOUT BLEEDING: Primary | ICD-10-CM

## 2018-11-13 LAB — BACTERIA STL CULT: NORMAL

## 2018-11-13 NOTE — TELEPHONE ENCOUNTER
Called the number provided in regards to upcoming procedure (Colonoscopy) per Dr. Hogan, no ans left msg via vm to call back, and call back number was provided.

## 2018-11-13 NOTE — TELEPHONE ENCOUNTER
S/W Myla Mora/Daughter in regards to [facility] Daughter concern about why mother needs to see a surgeon before consult with GI provider. Informed daughter it is okay to keep upcoming appt with GI provider, per Dr. Hogan. Encouraged daughter to call our office with any other questions/concerns. Daughter voiced an understanding

## 2018-11-14 ENCOUNTER — NURSE TRIAGE (OUTPATIENT)
Dept: ADMINISTRATIVE | Facility: CLINIC | Age: 78
End: 2018-11-14

## 2018-11-14 ENCOUNTER — OFFICE VISIT (OUTPATIENT)
Dept: INTERNAL MEDICINE | Facility: CLINIC | Age: 78
End: 2018-11-14
Payer: MEDICARE

## 2018-11-14 ENCOUNTER — TELEPHONE (OUTPATIENT)
Dept: NEUROLOGY | Facility: CLINIC | Age: 78
End: 2018-11-14

## 2018-11-14 ENCOUNTER — OFFICE VISIT (OUTPATIENT)
Dept: URGENT CARE | Facility: CLINIC | Age: 78
End: 2018-11-14
Payer: MEDICARE

## 2018-11-14 ENCOUNTER — PATIENT MESSAGE (OUTPATIENT)
Dept: INTERNAL MEDICINE | Facility: CLINIC | Age: 78
End: 2018-11-14

## 2018-11-14 VITALS
HEIGHT: 62 IN | SYSTOLIC BLOOD PRESSURE: 132 MMHG | WEIGHT: 168.63 LBS | HEART RATE: 57 BPM | TEMPERATURE: 97 F | BODY MASS INDEX: 31.03 KG/M2 | DIASTOLIC BLOOD PRESSURE: 78 MMHG | OXYGEN SATURATION: 98 %

## 2018-11-14 VITALS
BODY MASS INDEX: 30.59 KG/M2 | SYSTOLIC BLOOD PRESSURE: 126 MMHG | TEMPERATURE: 98 F | HEIGHT: 62 IN | WEIGHT: 166.25 LBS | DIASTOLIC BLOOD PRESSURE: 78 MMHG | HEART RATE: 56 BPM

## 2018-11-14 DIAGNOSIS — L53.9 ERYTHEMA OF FACE: ICD-10-CM

## 2018-11-14 DIAGNOSIS — I10 ESSENTIAL HYPERTENSION: ICD-10-CM

## 2018-11-14 DIAGNOSIS — R60.9 EDEMA, UNSPECIFIED TYPE: ICD-10-CM

## 2018-11-14 DIAGNOSIS — R60.0 BILATERAL EDEMA OF LOWER EXTREMITY: Primary | ICD-10-CM

## 2018-11-14 DIAGNOSIS — K57.92 DIVERTICULITIS: Primary | ICD-10-CM

## 2018-11-14 PROCEDURE — 3078F DIAST BP <80 MM HG: CPT | Mod: CPTII,HCNC,S$GLB, | Performed by: NURSE PRACTITIONER

## 2018-11-14 PROCEDURE — 99999 PR PBB SHADOW E&M-EST. PATIENT-LVL IV: CPT | Mod: PBBFAC,HCNC,, | Performed by: NURSE PRACTITIONER

## 2018-11-14 PROCEDURE — 1101F PT FALLS ASSESS-DOCD LE1/YR: CPT | Mod: CPTII,HCNC,S$GLB, | Performed by: NURSE PRACTITIONER

## 2018-11-14 PROCEDURE — 99999 PR PBB SHADOW E&M-EST. PATIENT-LVL III: CPT | Mod: PBBFAC,HCNC,, | Performed by: PHYSICIAN ASSISTANT

## 2018-11-14 PROCEDURE — 99214 OFFICE O/P EST MOD 30 MIN: CPT | Mod: HCNC,S$GLB,, | Performed by: NURSE PRACTITIONER

## 2018-11-14 PROCEDURE — 3075F SYST BP GE 130 - 139MM HG: CPT | Mod: CPTII,HCNC,S$GLB, | Performed by: NURSE PRACTITIONER

## 2018-11-14 PROCEDURE — 99214 OFFICE O/P EST MOD 30 MIN: CPT | Mod: HCNC,S$GLB,, | Performed by: PHYSICIAN ASSISTANT

## 2018-11-14 RX ORDER — FUROSEMIDE 20 MG/1
20 TABLET ORAL DAILY
Qty: 3 TABLET | Refills: 0 | Status: SHIPPED | OUTPATIENT
Start: 2018-11-14 | End: 2018-11-15 | Stop reason: SINTOL

## 2018-11-14 RX ORDER — POTASSIUM CHLORIDE 750 MG/1
10 TABLET, EXTENDED RELEASE ORAL DAILY
Qty: 3 TABLET | Refills: 0 | Status: SHIPPED | OUTPATIENT
Start: 2018-11-14 | End: 2018-11-17

## 2018-11-14 NOTE — TELEPHONE ENCOUNTER
Daughter called to report the following:     -seen in clinic today  -prescribed lasix and potassium for fluid retention   -gave patient Lasix at noon, cheeks got red   -face is red, swollen, and warm to touch   -eating and able to swallow  -not sure about fever   -advised to report to ED     Reason for Disposition   Taking an ACE Inhibitor medication  (e.g., benazepril/LOTENSIN, captopril/CAPOTEN, enalapril/VASOTEC, lisinopril/ZESTRIL)    Protocols used: ST FACE SWELLING-A-AH

## 2018-11-14 NOTE — PROGRESS NOTES
Subjective:       Patient ID: Irlanda Lopez is a 78 y.o. female.    Chief Complaint: Hospital Follow Up    HPI   Patient comes in today for hosp follow up   She was in observation for diverticulitis, IV abx   Pain improved   She is set for scope soon.      She did gain about 5-7 lbs from hosp stay   Not monitoring salt, activity is lower, patient fatigued.     No fever/c/ns   No n/v   Loose BMs, no constipation     There are no preventive care reminders to display for this patient.    Past Medical History:   Diagnosis Date    Hypertension     Thyroid disease        Current Outpatient Medications   Medication Sig Dispense Refill    aspirin 81 MG Chew Take 1 tablet (81 mg total) by mouth once daily.      atorvastatin (LIPITOR) 10 MG tablet TAKE 1 TABLET EVERY DAY 90 tablet 3    ciprofloxacin HCl (CIPRO) 500 MG tablet Take 1 tablet (500 mg total) by mouth 2 (two) times daily. for 10 days 20 tablet 0    donepezil (ARICEPT) 10 MG tablet Take 1 tablet (10 mg total) by mouth every evening. 90 tablet 3    lisinopril-hydrochlorothiazide (PRINZIDE,ZESTORETIC) 10-12.5 mg per tablet TAKE 1 TABLET EVERY DAY 90 tablet 3    magnesium citrate, bulk, Powd by Misc.(Non-Drug; Combo Route) route.      memantine (NAMENDA) 10 MG Tab 5mg po bid x 1 month, then 10mg po bid. 180 tablet 3    metroNIDAZOLE (FLAGYL) 500 MG tablet Take 1 tablet (500 mg total) by mouth 3 (three) times daily. for 10 days 30 tablet 0    PEPPERMINT OIL MISC by Misc.(Non-Drug; Combo Route) route.      thyroid, pork, (ARMOUR THYROID) 30 mg Tab Take 1 tablet (30 mg total) by mouth once daily. 90 tablet 3    triamcinolone acetonide 0.1% (KENALOG) 0.1 % ointment APPLY TOPICALLY 4 (FOUR) TIMES DAILY. TO RASH ON FACE AND CHEST AND NOSE  1    hydroCHLOROthiazide (HYDRODIURIL) 12.5 MG Tab Take 1 tablet (12.5 mg total) by mouth once daily. 30 tablet 11     No current facility-administered medications for this visit.        Review of Systems    Objective:  "  /78   Pulse (!) 56   Temp 97.8 °F (36.6 °C) (Tympanic)   Ht 5' 2" (1.575 m)   Wt 75.4 kg (166 lb 3.6 oz)   BMI 30.40 kg/m²      Physical Exam   Constitutional: She appears well-developed and well-nourished. No distress.   HENT:   Head: Normocephalic and atraumatic.   Right Ear: External ear normal.   Left Ear: External ear normal.   Nose: Nose normal.   Mouth/Throat: Oropharynx is clear and moist. No oropharyngeal exudate.   Cardiovascular: Normal rate, regular rhythm, normal heart sounds and intact distal pulses.   Pulmonary/Chest: Effort normal and breath sounds normal. No stridor.   Abdominal: Soft. Bowel sounds are normal. She exhibits no mass. There is no tenderness. There is no rebound and no guarding.   Musculoskeletal: She exhibits edema.   Skin: Capillary refill takes less than 2 seconds.         Lab Results   Component Value Date    WBC 9.15 11/09/2018    HGB 12.1 11/09/2018    HCT 36.2 (L) 11/09/2018     11/09/2018    CHOL 212 (H) 07/18/2018    TRIG 219 (H) 07/18/2018    HDL 46 07/18/2018    ALT 9 (L) 11/10/2018    AST 16 11/10/2018     11/10/2018    K 3.8 11/10/2018     11/10/2018    CREATININE 0.7 11/10/2018    BUN 6 (L) 11/10/2018    CO2 18 (L) 11/10/2018    TSH 1.208 07/18/2018    HGBA1C 5.5 07/18/2018       Assessment:       1. Diverticulitis    2. Essential hypertension    3. Edema, unspecified type        Plan:   Irlanda was seen today for hospital follow up.    Diagnoses and all orders for this visit:    Diverticulitis  Slowly improving   Gradually increase diet   Essential hypertension  Doing well, without issues   Edema, unspecified type   furosemide (LASIX) 20 MG tablet; Take 1 tablet (20 mg total) by mouth once daily. for 3 days  3 day dose of lasix and follow up in 3 days. . Fluid likely from hosp stay with fluids and immobilization       No Follow-up on file.  "

## 2018-11-15 ENCOUNTER — CLINICAL SUPPORT (OUTPATIENT)
Dept: CARDIOLOGY | Facility: CLINIC | Age: 78
End: 2018-11-15
Attending: INTERNAL MEDICINE
Payer: MEDICARE

## 2018-11-15 ENCOUNTER — TELEPHONE (OUTPATIENT)
Dept: INTERNAL MEDICINE | Facility: CLINIC | Age: 78
End: 2018-11-15

## 2018-11-15 ENCOUNTER — CLINICAL SUPPORT (OUTPATIENT)
Dept: CARDIOLOGY | Facility: CLINIC | Age: 78
End: 2018-11-15
Payer: MEDICARE

## 2018-11-15 ENCOUNTER — PATIENT MESSAGE (OUTPATIENT)
Dept: CARDIOLOGY | Facility: CLINIC | Age: 78
End: 2018-11-15

## 2018-11-15 ENCOUNTER — TELEPHONE (OUTPATIENT)
Dept: URGENT CARE | Facility: CLINIC | Age: 78
End: 2018-11-15

## 2018-11-15 ENCOUNTER — OFFICE VISIT (OUTPATIENT)
Dept: CARDIOLOGY | Facility: CLINIC | Age: 78
End: 2018-11-15
Payer: MEDICARE

## 2018-11-15 VITALS
WEIGHT: 162.69 LBS | HEART RATE: 54 BPM | SYSTOLIC BLOOD PRESSURE: 136 MMHG | BODY MASS INDEX: 29.94 KG/M2 | DIASTOLIC BLOOD PRESSURE: 76 MMHG | HEIGHT: 62 IN

## 2018-11-15 DIAGNOSIS — E78.00 PURE HYPERCHOLESTEROLEMIA: ICD-10-CM

## 2018-11-15 DIAGNOSIS — R63.5 WEIGHT GAIN: ICD-10-CM

## 2018-11-15 DIAGNOSIS — I10 ESSENTIAL HYPERTENSION: Primary | ICD-10-CM

## 2018-11-15 DIAGNOSIS — R60.1 GENERALIZED EDEMA: ICD-10-CM

## 2018-11-15 DIAGNOSIS — I10 ESSENTIAL HYPERTENSION: ICD-10-CM

## 2018-11-15 DIAGNOSIS — R60.9 PITTING EDEMA: Primary | ICD-10-CM

## 2018-11-15 LAB
DIASTOLIC DYSFUNCTION: NO
ESTIMATED PA SYSTOLIC PRESSURE: 31.3
RETIRED EF AND QEF - SEE NOTES: 60 (ref 55–65)
TRICUSPID VALVE REGURGITATION: ABNORMAL

## 2018-11-15 PROCEDURE — 3078F DIAST BP <80 MM HG: CPT | Mod: CPTII,HCNC,S$GLB, | Performed by: INTERNAL MEDICINE

## 2018-11-15 PROCEDURE — 93005 ELECTROCARDIOGRAM TRACING: CPT | Mod: HCNC,S$GLB,, | Performed by: INTERNAL MEDICINE

## 2018-11-15 PROCEDURE — 99999 PR PBB SHADOW E&M-EST. PATIENT-LVL III: CPT | Mod: PBBFAC,HCNC,, | Performed by: INTERNAL MEDICINE

## 2018-11-15 PROCEDURE — 93010 ELECTROCARDIOGRAM REPORT: CPT | Mod: HCNC,S$GLB,, | Performed by: INTERNAL MEDICINE

## 2018-11-15 PROCEDURE — 99204 OFFICE O/P NEW MOD 45 MIN: CPT | Mod: HCNC,S$GLB,, | Performed by: INTERNAL MEDICINE

## 2018-11-15 PROCEDURE — 93306 TTE W/DOPPLER COMPLETE: CPT | Mod: HCNC,S$GLB,, | Performed by: INTERNAL MEDICINE

## 2018-11-15 PROCEDURE — 1101F PT FALLS ASSESS-DOCD LE1/YR: CPT | Mod: CPTII,HCNC,S$GLB, | Performed by: INTERNAL MEDICINE

## 2018-11-15 PROCEDURE — 3075F SYST BP GE 130 - 139MM HG: CPT | Mod: CPTII,HCNC,S$GLB, | Performed by: INTERNAL MEDICINE

## 2018-11-15 RX ORDER — HYDROCHLOROTHIAZIDE 12.5 MG/1
12.5 TABLET ORAL DAILY
Qty: 30 TABLET | Refills: 11 | Status: ON HOLD | OUTPATIENT
Start: 2018-11-15 | End: 2019-01-02 | Stop reason: HOSPADM

## 2018-11-15 NOTE — PROGRESS NOTES
Subjective:   Patient ID:  Irlanda Lopez is a 78 y.o. female who presents for follow-up of Consult (rakan lower ext edema)  Pt with recent LE edema and weight gain ( 6 lbs). Pt started on lasix.Patient denies CP, angina or anginal equivalent.  Pt did take 1 lasix pill and possible reaction.Pt with recent hospitalization for diverticulitis and nephrolithiasis.  EKG is normal    HPI    ROS  Family History   Problem Relation Age of Onset    Alzheimer's disease Mother     Aneurysm Father         brain    Stomach cancer Brother         50s     Past Medical History:   Diagnosis Date    Hypertension     Thyroid disease      Current Outpatient Medications on File Prior to Visit   Medication Sig Dispense Refill    aspirin 81 MG Chew Take 1 tablet (81 mg total) by mouth once daily.      atorvastatin (LIPITOR) 10 MG tablet TAKE 1 TABLET EVERY DAY 90 tablet 3    ciprofloxacin HCl (CIPRO) 500 MG tablet Take 1 tablet (500 mg total) by mouth 2 (two) times daily. for 10 days 20 tablet 0    donepezil (ARICEPT) 10 MG tablet Take 1 tablet (10 mg total) by mouth every evening. 90 tablet 3    lisinopril-hydrochlorothiazide (PRINZIDE,ZESTORETIC) 10-12.5 mg per tablet TAKE 1 TABLET EVERY DAY 90 tablet 3    memantine (NAMENDA) 10 MG Tab 5mg po bid x 1 month, then 10mg po bid. 180 tablet 3    metroNIDAZOLE (FLAGYL) 500 MG tablet Take 1 tablet (500 mg total) by mouth 3 (three) times daily. for 10 days 30 tablet 0    PEPPERMINT OIL MISC by Misc.(Non-Drug; Combo Route) route.      thyroid, pork, (ARMOUR THYROID) 30 mg Tab Take 1 tablet (30 mg total) by mouth once daily. 90 tablet 3    triamcinolone acetonide 0.1% (KENALOG) 0.1 % ointment APPLY TOPICALLY 4 (FOUR) TIMES DAILY. TO RASH ON FACE AND CHEST AND NOSE  1    furosemide (LASIX) 20 MG tablet Take 1 tablet (20 mg total) by mouth once daily. for 3 days 3 tablet 0    magnesium citrate, bulk, Powd by Misc.(Non-Drug; Combo Route) route.      potassium chloride SA  (K-DUR,KLOR-CON) 10 MEQ tablet Take 1 tablet (10 mEq total) by mouth once daily. for 3 days 3 tablet 0    [DISCONTINUED] loperamide (IMODIUM) 2 mg capsule Take 2 mg by mouth 4 (four) times daily as needed for Diarrhea.       No current facility-administered medications on file prior to visit.      Review of patient's allergies indicates:  No Known Allergies    Objective:     Physical Exam    Assessment:     1. Essential hypertension    2. Pure hypercholesterolemia    3. Generalized edema        Plan:     Essential hypertension    Pure hypercholesterolemia    Generalized edema      Continue lisinopril-hctz-htn  Continue statin-hlp

## 2018-11-15 NOTE — TELEPHONE ENCOUNTER
Spoke with Mrs. Lopez daughter. States facial erythema has resolved and weight is down to 162 per her scale. Daughter measured legs and states at least a 1 inch decrease in circumference. Daughter will bring pt to 1100 cards appt today for evaluation. Pt will continue to hold lasix/K until cards visit.

## 2018-11-15 NOTE — TELEPHONE ENCOUNTER
Spoke with Rishabh Varela NP in  this am regarding patient with fluid retention bilateral pitting edema and reaction to lasix/potassium. Pt did receive a substantial amount of IV fluids in the hospital due to diverticulitis and hyponatremia.  Her weight is up 10 lb since discharge. She was also prior to that dehydrated.  Patient does have lisinopril hydrochlorothiazide already.  Based on the urgent care note there was no angioedema in appreciated.  Upon further evaluation she has not had any cardiac evaluation including an echo, EKG or cardiac enzymes.  Discussed case with provider Rishabh.  Appointment is available today with Cardiology Dr. Love here improve ill.  Recommend she see the cardiologist today for further workup.  Rishabh will get in touch with the daughter to inform of the plan.    I put in referral for the cards visit.

## 2018-11-15 NOTE — PROGRESS NOTES
Subjective:       Patient ID: Irlanda Lopez is a 78 y.o. female.    Chief Complaint: Medication Reaction    Pt is a 78 year old female to clinic today with daughter with complaints of bilateral lower extremity swelling and erythema and warmth to her face that began today. Pts daughter states she started both lasix and potassium today after a visit with PCP. Denies anything new other than meds. Lasix was taken at approximately 1200 today. K was taken approximately 30 minutes prior to arrival. Daughter states symptoms began prior to taking K but worsened after.       Review of Systems   Constitutional: Negative for chills, diaphoresis, fatigue and fever.   Respiratory: Negative for cough, chest tightness, shortness of breath and wheezing.    Cardiovascular: Positive for leg swelling (bilateral). Negative for chest pain and palpitations.   Skin: Positive for rash.   Neurological: Negative for dizziness, light-headedness and headaches.       Objective:      Physical Exam   Constitutional: She appears well-developed and well-nourished. No distress.   HENT:   Head: Normocephalic.   Right Ear: External ear normal.   Left Ear: External ear normal.   Nose: Nose normal.   Eyes: Pupils are equal, round, and reactive to light.   Cardiovascular: Normal rate, regular rhythm and normal heart sounds.   Pulmonary/Chest: Effort normal and breath sounds normal. No stridor. No respiratory distress. She has no wheezes. She has no rales.   Musculoskeletal:        Right lower leg: She exhibits edema.        Left lower leg: She exhibits edema.   Right ankle: 12 in  Left ankle: 12 in    Right calf: 17 3/4 in  Left calf: 17 in    Right thigh: 19 1/2 in  Left thigh: 19 1/2 in   Neurological: She is alert.   Skin: Skin is warm and dry. No rash noted. She is not diaphoretic. There is erythema (to left and right cheeks. Right appears more erthematous. ).   Psychiatric: She has a normal mood and affect. Thought content normal.   Nursing note  and vitals reviewed.      Assessment:       1. Bilateral edema of lower extremity    2. Erythema of face        Plan:   Bilateral edema of lower extremity    Erythema of face      Will hold lasix and K until discussion with PCP tomorrow.  Recommend cold compresses to bilateral cheeks.  Recommend monitor pt for further signs of possible drug reaction.   If these signs appear, go to ED immediately for further eval and management.

## 2018-11-15 NOTE — PATIENT INSTRUCTIONS
Leg Swelling in Both Legs    Swelling of the feet, ankles, and legs is called edema. It is caused by excess fluid that has collected in the tissues. Extra fluid in the body settles in the lowest part because of gravity. This is why the legs and feet are most affected.  Some of the causes for edema include:  · Disease of the heart like congestive heart failure  · Standing or sitting for long periods of time  · Infection of the feet or legs  · Blood pooling in the veins of your legs (venous insufficiency)  · Dilated veins in your lower leg (varicose veins)  · Garters or other clothing that is tight on your legs. This will cause blood to pool in your legs because the clothing limits blood flow.  · Some medicines such as hormones like birth control pills, some blood pressure medicines like calcium channel blockers (amlodipine) and steroids, some antidepressants like MAO inhibitors and tricyclics  · Menstrual periods that cause you to retain fluids  · Many types of renal disease  · Liver failure or cirrhosis  · Pregnancy, some swelling is normal, but a sudden increase in leg swelling or weight gain can be a sign of a dangerous complication of pregnancy  · Poor nutrition  · Thyroid disease  Medical treatment will depend on what is causing the swelling in your legs. Your healthcare provider may prescribe water pills (diuretics) to get rid of the extra fluid.  Home care  Follow these guidelines when caring for yourself at home:  · Don't wear clothing like garters that is tight on your legs.  · Keep your legs up while lying or sitting.  · If infection, injury, or recent surgery is causing the swelling, stay off your legs as much as possible until symptoms get better.  · If your healthcare provider says that your leg swelling is caused by venous insufficiency or varicose veins, don't sit or  one place for long periods of time. Take breaks and walk about every few hours. Brisk walking is a good exercise. It helps  circulate the blood that has collected in your leg. Talk with your provider about using support stockings to stop daytime leg swelling.  · If your provider says that heart disease is causing your leg swelling, follow a low-salt diet to stop extra fluid from staying in your body. You may also need medicine.  Follow-up care  Follow up with your healthcare provider, or as advised.  When to seek medical advice  Call your healthcare provider right away if any of these occur:  · New shortness of breath or chest pain  · Shortness of breath or chest pain that gets worse  · Swelling in both legs or ankles that gets worse  · Swelling of the abdomen  · Redness, warmth, or swelling in one leg  · Fever of 100.4ºF (38ºC) or higher, or as directed by your healthcare provider  · Yellow color to your skin or eyes  · Rapid, unexplained weight gain  · Having to sleep upright or use an increased number of pillows  Date Last Reviewed: 3/31/2016  © 1537-8409 [employer]. [address] [place] [address], [place] [address]. All rights reserved. This information is not intended as a substitute for professional medical care. Always follow your healthcare professional's instructions.      Physical Exam   Musculoskeletal:        Right lower leg: She exhibits edema.        Left lower leg: She exhibits edema.   Right ankle: 12 in  Left ankle: 12 in    Right calf: 17 3/4 in  Left calf: 17 in    Right thigh: 19 1/2 in  Left thigh: 19 1/2 in

## 2018-11-15 NOTE — LETTER
November 15, 2018      Myla Arias MD  [address] [facility]  [address]  [place] [address]           [place] - Cardiology  [address] [facility] [address]  Phone: [phone]  Fax: [phone]          Patient: Irlanda Lopez   MR Number: [id number]   YOB: 1940   Date of Visit: 11/15/2018       Dear Dr. Myla Airas:    Thank you for referring Irlanda Lopez to me for evaluation. Attached you will find relevant portions of my assessment and plan of care.    If you have questions, please do not hesitate to call me. I look forward to following Irlanda Lopez along with you.    Sincerely,    Haja Love MD    Enclosure  CC:  No Recipients    If you would like to receive this communication electronically, please contact [email or web address] or [phone] to request more information on [employer] Link access.    For providers and/or their staff who would like to refer a patient to [facility], please contact us through our one-stop-shop provider referral line, [facility], at [phone].    If you feel you have received this communication in error or would no longer like to receive these types of communications, please e-mail [email or web address]

## 2018-11-16 ENCOUNTER — PATIENT MESSAGE (OUTPATIENT)
Dept: CARDIOLOGY | Facility: CLINIC | Age: 78
End: 2018-11-16

## 2018-11-16 ENCOUNTER — PATIENT MESSAGE (OUTPATIENT)
Dept: INTERNAL MEDICINE | Facility: CLINIC | Age: 78
End: 2018-11-16

## 2018-11-20 ENCOUNTER — PATIENT MESSAGE (OUTPATIENT)
Dept: INTERNAL MEDICINE | Facility: CLINIC | Age: 78
End: 2018-11-20

## 2018-11-21 ENCOUNTER — TELEPHONE (OUTPATIENT)
Dept: INTERNAL MEDICINE | Facility: CLINIC | Age: 78
End: 2018-11-21

## 2018-11-21 NOTE — TELEPHONE ENCOUNTER
Spoke with patient's daughter Faiza this am. Explained results of echo and need for daily h tz and lisinopril and hctz. montior weight. If drops 2 lbshold additional hctz 12.5. If bp 110/ on sbp then hold hctz 12.5. followup with Dr Perez to look at gi issues and motility and Dr Newell to review potential for surgery and diverticulitis treatment.     And also spoke about dementia and issues related to the care and treatment plans.

## 2018-11-26 ENCOUNTER — INITIAL CONSULT (OUTPATIENT)
Dept: GASTROENTEROLOGY | Facility: CLINIC | Age: 78
End: 2018-11-26
Payer: MEDICARE

## 2018-11-26 VITALS
SYSTOLIC BLOOD PRESSURE: 128 MMHG | BODY MASS INDEX: 27.99 KG/M2 | WEIGHT: 152.13 LBS | DIASTOLIC BLOOD PRESSURE: 80 MMHG | HEIGHT: 62 IN

## 2018-11-26 DIAGNOSIS — N20.0 NEPHROLITHIASIS: ICD-10-CM

## 2018-11-26 DIAGNOSIS — K57.92 DIVERTICULITIS: Primary | ICD-10-CM

## 2018-11-26 PROCEDURE — 3079F DIAST BP 80-89 MM HG: CPT | Mod: CPTII,HCNC,S$GLB, | Performed by: INTERNAL MEDICINE

## 2018-11-26 PROCEDURE — 99203 OFFICE O/P NEW LOW 30 MIN: CPT | Mod: HCNC,S$GLB,, | Performed by: INTERNAL MEDICINE

## 2018-11-26 PROCEDURE — 3074F SYST BP LT 130 MM HG: CPT | Mod: CPTII,HCNC,S$GLB, | Performed by: INTERNAL MEDICINE

## 2018-11-26 PROCEDURE — 99999 PR PBB SHADOW E&M-EST. PATIENT-LVL II: CPT | Mod: PBBFAC,HCNC,, | Performed by: INTERNAL MEDICINE

## 2018-11-26 PROCEDURE — 1101F PT FALLS ASSESS-DOCD LE1/YR: CPT | Mod: CPTII,HCNC,S$GLB, | Performed by: INTERNAL MEDICINE

## 2018-11-26 RX ORDER — SODIUM, POTASSIUM,MAG SULFATES 17.5-3.13G
SOLUTION, RECONSTITUTED, ORAL ORAL
Qty: 254 ML | Refills: 0 | Status: ON HOLD | OUTPATIENT
Start: 2018-11-26 | End: 2019-01-02 | Stop reason: HOSPADM

## 2018-11-26 NOTE — LETTER
November 29, 2018      No Recipients           [facility]  [address] [place], [place] [address] [place] [address]  Phone: [phone]  Fax: [phone]          Patient: Irlanda Lopez   MR Number: [id number]   YOB: 1940   Date of Visit: 11/26/2018       Dear :    Thank you for referring Irlanda Lopez to me for evaluation. Attached you will find relevant portions of my assessment and plan of care.    If you have questions, please do not hesitate to call me. I look forward to following Irlanda Lopez along with you.    Sincerely,    Dvaon Newton    Enclosure  CC:  No Recipients    If you would like to receive this communication electronically, please contact [email or web address] or [phone] to request more information on [employer] access.    For providers and/or their staff who would like to refer a patient to [facility], please contact us through our one-stop-shop provider referral line, Johnathan Smallwood, at [phone].    If you feel you have received this communication in error or would no longer like to receive these types of communications, please e-mail [email or web address]

## 2018-11-27 ENCOUNTER — PATIENT MESSAGE (OUTPATIENT)
Dept: GASTROENTEROLOGY | Facility: CLINIC | Age: 78
End: 2018-11-27

## 2018-11-27 ENCOUNTER — PATIENT MESSAGE (OUTPATIENT)
Dept: INTERNAL MEDICINE | Facility: CLINIC | Age: 78
End: 2018-11-27

## 2018-11-27 ENCOUNTER — OFFICE VISIT (OUTPATIENT)
Dept: SURGERY | Facility: CLINIC | Age: 78
End: 2018-11-27
Payer: MEDICARE

## 2018-11-27 VITALS
SYSTOLIC BLOOD PRESSURE: 93 MMHG | TEMPERATURE: 98 F | BODY MASS INDEX: 27.59 KG/M2 | DIASTOLIC BLOOD PRESSURE: 59 MMHG | HEART RATE: 65 BPM | HEIGHT: 62 IN | WEIGHT: 149.94 LBS

## 2018-11-27 DIAGNOSIS — K57.20 DIVERTICULITIS OF LARGE INTESTINE WITH PERFORATION WITHOUT ABSCESS OR BLEEDING: Primary | ICD-10-CM

## 2018-11-27 PROCEDURE — 99999 PR PBB SHADOW E&M-EST. PATIENT-LVL IV: CPT | Mod: PBBFAC,HCNC,, | Performed by: SURGERY

## 2018-11-27 PROCEDURE — 1101F PT FALLS ASSESS-DOCD LE1/YR: CPT | Mod: CPTII,HCNC,S$GLB, | Performed by: SURGERY

## 2018-11-27 PROCEDURE — 3074F SYST BP LT 130 MM HG: CPT | Mod: CPTII,HCNC,S$GLB, | Performed by: SURGERY

## 2018-11-27 PROCEDURE — 3078F DIAST BP <80 MM HG: CPT | Mod: CPTII,HCNC,S$GLB, | Performed by: SURGERY

## 2018-11-27 PROCEDURE — 99204 OFFICE O/P NEW MOD 45 MIN: CPT | Mod: HCNC,S$GLB,, | Performed by: SURGERY

## 2018-11-27 RX ORDER — LIDOCAINE HYDROCHLORIDE 10 MG/ML
1 INJECTION, SOLUTION EPIDURAL; INFILTRATION; INTRACAUDAL; PERINEURAL ONCE
Status: DISCONTINUED | OUTPATIENT
Start: 2018-11-27 | End: 2019-06-07 | Stop reason: HOSPADM

## 2018-11-27 RX ORDER — METRONIDAZOLE 500 MG/100ML
500 INJECTION, SOLUTION INTRAVENOUS
Status: CANCELLED | OUTPATIENT
Start: 2018-11-27

## 2018-11-27 RX ORDER — SODIUM CHLORIDE 9 MG/ML
INJECTION, SOLUTION INTRAVENOUS CONTINUOUS
Status: CANCELLED | OUTPATIENT
Start: 2018-11-27

## 2018-11-27 NOTE — H&P
History & Physical    SUBJECTIVE:     History of Present Illness:   Patient is a 78 y.o. female presents with known recurrent diverticulitis.  She had required multiple trips to the emergency room and admitted to the hospital twice.  At each time, the patient was placed on IV antibiotic and the condition improved.  At 1 time she was placed on various antibiotic with some improvement.  She denies of any previous colonoscopy examination.  She was told to follow up for colonoscopy exam but could not complete the procedure because of flare up of diverticulitis.  She was recently seen and admitted for the same issues.  Once she was improved, she was recommended to follow up with me for definitive care.    Chief Complaint   Patient presents with    Follow-up       Review of patient's allergies indicates:  No Known Allergies    Current Outpatient Medications   Medication Sig Dispense Refill    aspirin 81 MG Chew Take 1 tablet (81 mg total) by mouth once daily.      atorvastatin (LIPITOR) 10 MG tablet TAKE 1 TABLET EVERY DAY 90 tablet 3    donepezil (ARICEPT) 10 MG tablet Take 1 tablet (10 mg total) by mouth every evening. 90 tablet 3    lisinopril-hydrochlorothiazide (PRINZIDE,ZESTORETIC) 10-12.5 mg per tablet TAKE 1 TABLET EVERY DAY 90 tablet 3    memantine (NAMENDA) 10 MG Tab 5mg po bid x 1 month, then 10mg po bid. 180 tablet 3    PEPPERMINT OIL MISC by Misc.(Non-Drug; Combo Route) route.      thyroid, pork, (ARMOUR THYROID) 30 mg Tab Take 1 tablet (30 mg total) by mouth once daily. 90 tablet 3    triamcinolone acetonide 0.1% (KENALOG) 0.1 % ointment APPLY TOPICALLY 4 (FOUR) TIMES DAILY. TO RASH ON FACE AND CHEST AND NOSE  1    hydroCHLOROthiazide (HYDRODIURIL) 12.5 MG Tab Take 1 tablet (12.5 mg total) by mouth once daily. 30 tablet 11    magnesium citrate, bulk, Powd by Misc.(Non-Drug; Combo Route) route.      sodium,potassium,mag sulfates (SUPREP BOWEL PREP KIT) 17.5-3.13-1.6 gram SolR As directed for  "colonoscopy 254 mL 0     Current Facility-Administered Medications   Medication Dose Route Frequency Provider Last Rate Last Dose    lidocaine (PF) 10 mg/ml (1%) injection 10 mg  1 mL Intradermal Once Reece Hogan MD           Past Medical History:   Diagnosis Date    Hypertension     Thyroid disease      Past Surgical History:   Procedure Laterality Date    CATARACT EXTRACTION      Dr Raygoza     Family History   Problem Relation Age of Onset    Alzheimer's disease Mother     Aneurysm Father         brain    Stomach cancer Brother         50s     Social History     Tobacco Use    Smoking status: Former Smoker    Smokeless tobacco: Never Used   Substance Use Topics    Alcohol use: No    Drug use: No        Review of Systems:  Review of Systems   Constitutional: Positive for activity change and appetite change. Negative for chills and fever.   HENT: Negative for sore throat and trouble swallowing.    Eyes: Negative.    Respiratory: Negative for cough and shortness of breath.    Cardiovascular: Negative.    Gastrointestinal: Positive for abdominal distention, abdominal pain and diarrhea. Negative for nausea and vomiting.   Endocrine: Negative.    Genitourinary: Negative.    Musculoskeletal: Negative.    Skin: Negative.    Allergic/Immunologic: Negative.    Neurological: Negative.    Hematological: Does not bruise/bleed easily.   Psychiatric/Behavioral: Negative.        OBJECTIVE:     Vital Signs (Most Recent)  Temp: 98.4 °F (36.9 °C) (11/27/18 1435)  Pulse: 65 (11/27/18 1435)  BP: (!) 93/59 (11/27/18 1435)  5' 2" (1.575 m)  68 kg (149 lb 14.6 oz)     Physical Exam:  Physical Exam   Constitutional: She is oriented to person, place, and time. She appears well-developed and well-nourished.   HENT:   Head: Normocephalic.   Right Ear: External ear normal.   Left Ear: External ear normal.   Nose: Nose normal.   Eyes: Pupils are equal, round, and reactive to light. No scleral icterus.   Neck: Normal range of motion. " Neck supple. No thyromegaly present.   Cardiovascular: Normal rate, regular rhythm and normal heart sounds.   No murmur heard.  Pulmonary/Chest: Effort normal and breath sounds normal.   Abdominal: Soft. Bowel sounds are normal. There is no tenderness. There is no guarding.   Musculoskeletal: Normal range of motion.   Lymphadenopathy:     She has no cervical adenopathy.   Neurological: She is alert and oriented to person, place, and time.   Skin: Skin is warm and dry.       Laboratory  Lab Results   Component Value Date    WBC 9.15 11/09/2018    HGB 12.1 11/09/2018    HCT 36.2 (L) 11/09/2018     11/09/2018    CHOL 212 (H) 07/18/2018    TRIG 219 (H) 07/18/2018    HDL 46 07/18/2018    ALT 9 (L) 11/10/2018    AST 16 11/10/2018     11/10/2018    K 3.8 11/10/2018     11/10/2018    CREATININE 0.7 11/10/2018    BUN 6 (L) 11/10/2018    CO2 18 (L) 11/10/2018    TSH 1.208 07/18/2018    HGBA1C 5.5 07/18/2018       Results for orders placed during the hospital encounter of 10/04/18   CT Abdomen Pelvis W Wo Contrast    Narrative EXAMINATION:  CT ABDOMEN PELVIS W WO CONTRAST    CLINICAL HISTORY:  Abdominal pain, unspecified;abnormal abdominal x-ray;.    TECHNIQUE:  Low dose axial images, sagittal and coronal reformations were obtained from the lung bases to the pubic symphysis.  Images were obtained both with and without IV contrast.    COMPARISON:  None    FINDINGS:  Lung bases are clear.    The liver is normal.  The gallbladder is normal.    The spleen is normal in size and appearance.  The pancreas is normal.  The adrenal glands are normal.  The aorta and IVC are normal.  No retroperitoneal adenopathy.    Scarring of the lateral and upper left kidney is present but otherwise the left kidney is normal.  Left ureter is normal.  Severe scarring identified throughout the right kidney.  A very large calculus is identified within the left renal pelvis measuring 3.0 cm.  No significant right hydronephrosis.  The  right ureter is nondilated and normal.    Small hiatal hernia otherwise the stomach is normal.  The small intestine is normal.  Appendix surgically absent.  Diverticulosis of the sigmoid colon is identified with colonic wall thickening and severe surrounding inflammatory changes.  Findings compatible acute diverticulitis.  Negative for pneumoperitoneum or abscess.  The rectum is normal.    The pelvis demonstrates normal appearing bladder.  Uterus is unremarkable.  Left adnexa region is normal.  To check the calcifications identified within an atrophic right ovary, measuring 0.7 cm in maximum dimension.    Regional bones demonstrate a moderate grade compression fracture of T11.  No suspicious bony abnormality detected.  Abdominal and pelvic wall soft tissues are normal.      Impression 1. Severe acute diverticulitis sigmoid colon.  2. 3.0 cm calculus right renal pelvis.  Extensive scarring of the right kidney.  3. Hiatal hernia.  All CT scans at this facility are performed  using dose modulation techniques as appropriate to performed exam including the following:  automated exposure control; adjustment of mA and/or kV according to the patients size (this includes techniques or standardized protocols for targeted exams where dose is matched to indication/reason for exam: i.e. extremities or head);  iterative reconstruction technique.      Electronically signed by: Dangelo Black MD  Date:    10/04/2018  Time:    19:10         Diagnostic Results:  Labs: Reviewed  ECG: Reviewed  X-Ray: Reviewed  CT: Reviewed    None    ASSESSMENT/PLAN:     Recurrent diverticulitis x3.    PLAN:Plan     The recommendation is proceed with sigmoid colon resection in a single stage.  The surgery will be performed by robotic and laparoscopic approach.  The surgery is scheduled for January 2, 2019 at 9:30 a.m..  The risk and the benefit of the procedure were fully addressed with the patient.    Reece Hogan

## 2018-11-27 NOTE — Clinical Note
Irlanda is scheduled for surgery as well as colonoscopy examination on the same day once the colonoscopy exam is done by me, the patient will be taken to the operating room for the procedure. The surgery scheduled for January 2, 2029 theOsmin

## 2018-11-27 NOTE — TELEPHONE ENCOUNTER
I am so sorry to read her message. She needs surgery since she has multiple recurrence or flare ups. I want to give her one surgery as soon as we evaluate her colon fully with scope. Myla, let me know what I could do for the patient?

## 2018-11-28 ENCOUNTER — PATIENT MESSAGE (OUTPATIENT)
Dept: INTERNAL MEDICINE | Facility: CLINIC | Age: 78
End: 2018-11-28

## 2018-12-03 NOTE — PROGRESS NOTES
Subjective:       Patient ID: Irlanda Lopez is a 78 y.o. female.    Chief Complaint: Abdominal Pain (lower)    The patient is here to follow-up regarding to presentation to the hospital with left lower quadrant pain. She was initially diagnosed in early October with diverticulitis.  There was also noted presence of a large right kidney stone along with a scarred right kidney.  She is being followed by Urology for this.    The patient was treated with antibiotics, but she is elderly and she lives alone in a senior housing community, but it is not an assisted care facility.  The patient has some vision limitations and no she is somewhat independent there were concerns with the family that she may not have been able to clearly follow directions and taking her treatment the 1st time around.  She is presently completing the treatment for the 2nd round of therapy for her diverticulitis and seems to be doing much better according to her daughter who is accompanying her for this visit.  Because of the difficulties with the ongoing problems with her infection, the patient was followed in the hospital by the General surgery service and GI was not consulted.  Urology is stated that no intervention will be done regarding her nephrolithiasis until the diverticulitis which has been resolved.    The patient her family had questions regarding a full colonoscopy, the role of surgery in association with diverticular disease complications, in the place of dietary interventions, particularly when high and low-fiber diet should be considered.  All of these were discussed with the patient and her daughter in detail with every question answered to their satisfaction.    All review of the record it appears that the patient already has a scheduled colonoscopy planned with Dr. Hogan.  They had not yet been given a prep for the procedure, so this was taken care of for them today.  We also reviewed the reason for delayed and doing  colonoscopy after the infection, and the actual goal was to rule out the presence of an inflammatory neoplasm and not investigate the diverticulitis itself.  The patient and her family live out of town and are also scheduled for an appointment with Dr. Hogan.  Because all of the above already arranged, they would likely be no need for any further intervention from our department unless requested by Dr. Hogan.      Review of Systems   Constitutional: Positive for unexpected weight change. Negative for activity change, appetite change, chills, diaphoresis, fatigue and fever.   HENT: Negative for congestion, ear discharge, ear pain, hearing loss, nosebleeds, postnasal drip and tinnitus.    Eyes: Negative for photophobia and visual disturbance.   Respiratory: Negative for apnea, cough, choking, chest tightness, shortness of breath and wheezing.    Cardiovascular: Positive for leg swelling. Negative for chest pain and palpitations.   Gastrointestinal: Positive for abdominal pain. Negative for abdominal distention, anal bleeding, blood in stool, constipation, diarrhea, nausea, rectal pain and vomiting.   Genitourinary: Negative for difficulty urinating, dyspareunia, dysuria, flank pain, frequency, hematuria, menstrual problem, pelvic pain, urgency, vaginal bleeding and vaginal discharge.   Musculoskeletal: Negative for arthralgias, back pain, gait problem, joint swelling, myalgias and neck stiffness.   Skin: Negative for pallor and rash.   Neurological: Negative for dizziness, tremors, seizures, syncope, speech difficulty, weakness, numbness and headaches.   Hematological: Negative for adenopathy.   Psychiatric/Behavioral: Negative for agitation, confusion, hallucinations, sleep disturbance and suicidal ideas.       Objective:      Physical Exam    Assessment:       1. Diverticulitis     2.       Nephrolithiasis    Plan:  As outlined above.  Will have the patient keep her appointment as scheduled with General surgery.  The  visit lasted approximately 40 min with 70% of it involved in counseling.

## 2018-12-06 ENCOUNTER — TELEPHONE (OUTPATIENT)
Dept: INTERNAL MEDICINE | Facility: CLINIC | Age: 78
End: 2018-12-06

## 2018-12-06 NOTE — TELEPHONE ENCOUNTER
----- Message from Catrina Lim sent at 12/6/2018  1:56 PM CST -----  Contact: daughter  She's calling in regards to speak with nurse concerning pt loosing weight and low BP pls call pt back at [phone] (cell)

## 2018-12-06 NOTE — TELEPHONE ENCOUNTER
I returned call and daughter states that patient is having some abdominal pain 3/10 pain and still having diarreah and Dr Hogan recc that patient stay lactose free and that the diarreah may continue until after sx.aa

## 2018-12-06 NOTE — TELEPHONE ENCOUNTER
Bp 119/73 pulse 68  At 9am this am weight is 142lbs since being off of the bp meds and she wanted to let you know. Lab appointment rescheduled per daughter.sharon

## 2018-12-06 NOTE — TELEPHONE ENCOUNTER
BP is ok off the meds. Weight going down, not certain if she is not eating due to the diverticulitis issues. Is she having diarrhea anymore? Constipation? Abdominal pain?

## 2018-12-20 ENCOUNTER — LAB VISIT (OUTPATIENT)
Dept: LAB | Facility: HOSPITAL | Age: 78
End: 2018-12-20
Attending: SURGERY
Payer: MEDICARE

## 2018-12-20 DIAGNOSIS — K57.20 DIVERTICULITIS OF LARGE INTESTINE WITH PERFORATION WITHOUT ABSCESS OR BLEEDING: ICD-10-CM

## 2018-12-20 LAB
ANION GAP SERPL CALC-SCNC: 9 MMOL/L
BASOPHILS # BLD AUTO: 0.06 K/UL
BASOPHILS NFR BLD: 0.7 %
BUN SERPL-MCNC: 9 MG/DL
CALCIUM SERPL-MCNC: 9.2 MG/DL
CHLORIDE SERPL-SCNC: 104 MMOL/L
CO2 SERPL-SCNC: 27 MMOL/L
CREAT SERPL-MCNC: 0.7 MG/DL
DIFFERENTIAL METHOD: ABNORMAL
EOSINOPHIL # BLD AUTO: 0.1 K/UL
EOSINOPHIL NFR BLD: 1 %
ERYTHROCYTE [DISTWIDTH] IN BLOOD BY AUTOMATED COUNT: 14.5 %
EST. GFR  (AFRICAN AMERICAN): >60 ML/MIN/1.73 M^2
EST. GFR  (NON AFRICAN AMERICAN): >60 ML/MIN/1.73 M^2
GLUCOSE SERPL-MCNC: 69 MG/DL
HCT VFR BLD AUTO: 39.6 %
HGB BLD-MCNC: 12.2 G/DL
IMM GRANULOCYTES # BLD AUTO: 0.06 K/UL
IMM GRANULOCYTES NFR BLD AUTO: 0.7 %
LYMPHOCYTES # BLD AUTO: 2 K/UL
LYMPHOCYTES NFR BLD: 24.5 %
MCH RBC QN AUTO: 27.2 PG
MCHC RBC AUTO-ENTMCNC: 30.8 G/DL
MCV RBC AUTO: 88 FL
MONOCYTES # BLD AUTO: 0.9 K/UL
MONOCYTES NFR BLD: 10.4 %
NEUTROPHILS # BLD AUTO: 5.1 K/UL
NEUTROPHILS NFR BLD: 62.7 %
NRBC BLD-RTO: 0 /100 WBC
PLATELET # BLD AUTO: 371 K/UL
PMV BLD AUTO: 10 FL
POTASSIUM SERPL-SCNC: 4.1 MMOL/L
RBC # BLD AUTO: 4.49 M/UL
SODIUM SERPL-SCNC: 140 MMOL/L
WBC # BLD AUTO: 8.2 K/UL

## 2018-12-20 PROCEDURE — 85025 COMPLETE CBC W/AUTO DIFF WBC: CPT | Mod: HCNC

## 2018-12-20 PROCEDURE — 80048 BASIC METABOLIC PNL TOTAL CA: CPT | Mod: HCNC

## 2018-12-20 PROCEDURE — 36415 COLL VENOUS BLD VENIPUNCTURE: CPT | Mod: HCNC,PO

## 2018-12-21 RX ORDER — MEMANTINE HYDROCHLORIDE 10 MG/1
TABLET ORAL
Qty: 180 TABLET | Refills: 3
Start: 2018-12-21 | End: 2018-12-28 | Stop reason: SDUPTHER

## 2018-12-21 NOTE — TELEPHONE ENCOUNTER
----- Message from Kim Liao sent at 12/21/2018  9:03 AM CST -----  Contact: pt daughter    1. What is the name of the medication you are requesting? Generic amenda  2. What is the dose?    3. How do you take the medication? Orally, topically, etc?    4. How often do you take this medication?    5. Do you need a 30 day or 90 day supply?    6. How many refills are you requesting?    7. What is your preferred pharmacy and location of the pharmacy?   [facility]/pharmacy #[id number] [place], [place] - [address] [facility]  [address] [address] [address]  Phone: [phone] Fax: [phone]  8. Who can we contact with further questions? [phone] (home)

## 2018-12-26 ENCOUNTER — PATIENT MESSAGE (OUTPATIENT)
Dept: ENDOSCOPY | Facility: HOSPITAL | Age: 78
End: 2018-12-26

## 2018-12-27 ENCOUNTER — TELEPHONE (OUTPATIENT)
Dept: SURGERY | Facility: CLINIC | Age: 78
End: 2018-12-27

## 2018-12-27 ENCOUNTER — PATIENT MESSAGE (OUTPATIENT)
Dept: INTERNAL MEDICINE | Facility: CLINIC | Age: 78
End: 2018-12-27

## 2018-12-27 RX ORDER — PNV NO.95/FERROUS FUM/FOLIC AC 28MG-0.8MG
100 TABLET ORAL DAILY
COMMUNITY
End: 2019-04-24

## 2018-12-27 NOTE — TELEPHONE ENCOUNTER
----- Message from Samira Pickens sent at 12/27/2018  7:29 AM CST -----  Contact: Myla, pts daughter  Myla is calling to discuss pts pain and find out if pt should be seen today.  She can be reached at [phone]

## 2018-12-27 NOTE — PRE-PROCEDURE INSTRUCTIONS
Pre op instructions reviewed with patient's Daughter per phone:    To confirm, Your surgeon has instructed you:  Surgery is scheduled 01/02/19 at 0930.  Pre admit office to call afternoon prior to surgery with final arrival time.  If surgery is on Monday, Pre admit office to call Friday afternoon with with final arrival time      Please report to [facility] 1st floor main lobby by 0800.      INSTRUCTIONS IMPORTANT!!!  ¨ No smoking after 12 midnight, the night before surgery.  ¨ No solid food after 12 midnight, but you may have clear liquids up until 3 hours prior to surgery.  This includes: grape, cranberry, and apple juice (not orange, and no coffee.)   ¨ OK to brush teeth, but no gum, candy or mints!    ¨ Take only these medicines with a small swallow of water-morning of surgery.  Atorvastatin, Aricept, Memantine  Pt's daughter Myla states she has the paperwork with instructions for a clear liquid diet the day prior to surgery and the bowel prep to complete.    ____  Do not wear makeup, including mascara.  ____  No powder, lotions or creams to surgical area.  ____  Please remove all jewelry, including piercings and leave at home.  ____  No money or valuables needed. Please leave at home.  ____  Please bring identification and insurance information to hospital.  ____  If going home the same day, arrange for a ride home. You will not be able to   drive if Anesthesia was used.  ____  Children, under 12 years old, must remain in the waiting room with an adult.  They are not allowed in patient areas.  ____  Wear loose fitting clothing. Allow for dressings, bandages.  ____  Stop Aspirin, Ibuprofen, Motrin and Aleve at least 5-7 days before surgery, unless otherwise instructed by your doctor, or the nurse.   You MAY use Tylenol/acetaminophen until day of surgery.  ____  If you take diabetic medication, do not take am of surgery unless instructed by   Doctor.  ____ Stop taking any Fish Oil  supplement or any Vitamins that contain Vitamin E at least 5 days prior to surgery.          Bathing Instructions-- The night before surgery and the morning prior to coming to the hospital:   -Do not shave the surgical area.   -Shower and wash your hair and body as usual with your regular soap and shampoo.   -Rinse your hair and body completely.   -Use one packet of hibiclens to wash the surgical site (using your hand) gently for 5 minutes.  Do not scrub you skin too hard.   -Do not use hibiclens on your head, face, or genitals.   -Do not wash with regular soap after you use the hibiclens.   -Rinse your body thoroughly.   -Dry with clean, soft towel.  Do not use lotion, cream, deodorant, or powders on   the surgical site.    Use antibacterial soap in place of hibiclens if your surgery is on the head, face or genitals.         Surgical Site Infection    Prevention of surgical site infections:     -Keep incisions clean and dry.   -Do not soak/submerge incisions in water until completely healed.   -Do not apply lotions, powders, creams, or deodorants to site.   -Always make sure hands are cleaned with antibacterial soap/ alcohol-based   prior to touching the surgical site.  (This includes doctors, nurses, staff, and yourself.)    Signs and symptoms:   -Redness and pain around the area where you had surgery   -Drainage of cloudy fluid from your surgical wound   -Fever over 100.4  I have read or had read and explained to me, and understand the above information.

## 2018-12-27 NOTE — TELEPHONE ENCOUNTER
Patient is experiencing more pain. Possibly has been eating some yogurt and has been eating bananas. Daughter is going to get her back on her diet. She will call if she feels that her mother needs to be seen tomorrow

## 2018-12-31 ENCOUNTER — PATIENT MESSAGE (OUTPATIENT)
Dept: INTERNAL MEDICINE | Facility: CLINIC | Age: 78
End: 2018-12-31

## 2018-12-31 ENCOUNTER — ANESTHESIA EVENT (OUTPATIENT)
Dept: SURGERY | Facility: HOSPITAL | Age: 78
DRG: 331 | End: 2018-12-31
Payer: MEDICARE

## 2018-12-31 RX ORDER — MEMANTINE HYDROCHLORIDE 10 MG/1
TABLET ORAL
Qty: 180 TABLET | Refills: 3 | Status: SHIPPED | OUTPATIENT
Start: 2018-12-31 | End: 2019-01-10 | Stop reason: SDUPTHER

## 2019-01-01 ENCOUNTER — NURSE TRIAGE (OUTPATIENT)
Dept: ADMINISTRATIVE | Facility: CLINIC | Age: 79
End: 2019-01-01

## 2019-01-02 ENCOUNTER — ANESTHESIA EVENT (OUTPATIENT)
Dept: ENDOSCOPY | Facility: HOSPITAL | Age: 79
DRG: 331 | End: 2019-01-02
Payer: MEDICARE

## 2019-01-02 ENCOUNTER — ANESTHESIA (OUTPATIENT)
Dept: SURGERY | Facility: HOSPITAL | Age: 79
DRG: 331 | End: 2019-01-02
Payer: MEDICARE

## 2019-01-02 ENCOUNTER — ANESTHESIA (OUTPATIENT)
Dept: ENDOSCOPY | Facility: HOSPITAL | Age: 79
DRG: 331 | End: 2019-01-02
Payer: MEDICARE

## 2019-01-02 ENCOUNTER — HOSPITAL ENCOUNTER (INPATIENT)
Facility: HOSPITAL | Age: 79
LOS: 4 days | Discharge: HOME HEALTH | DRG: 331 | End: 2019-01-06
Attending: SURGERY | Admitting: SURGERY
Payer: MEDICARE

## 2019-01-02 DIAGNOSIS — Z12.11 SPECIAL SCREENING FOR MALIGNANT NEOPLASMS, COLON: ICD-10-CM

## 2019-01-02 DIAGNOSIS — K57.20 DIVERTICULITIS OF LARGE INTESTINE WITH PERFORATION WITHOUT ABSCESS OR BLEEDING: ICD-10-CM

## 2019-01-02 DIAGNOSIS — K57.20 DIVERTICULITIS OF LARGE INTESTINE WITH PERFORATION AND ABSCESS WITHOUT BLEEDING: ICD-10-CM

## 2019-01-02 DIAGNOSIS — K57.32 DIVERTICULITIS OF SIGMOID COLON: Primary | ICD-10-CM

## 2019-01-02 LAB
ABO + RH BLD: NORMAL
BLD GP AB SCN CELLS X3 SERPL QL: NORMAL
GRAM STN SPEC: NORMAL

## 2019-01-02 PROCEDURE — 88307 TISSUE SPECIMEN TO PATHOLOGY - SURGERY: ICD-10-PCS | Mod: 26,,, | Performed by: PATHOLOGY

## 2019-01-02 PROCEDURE — 71000033 HC RECOVERY, INTIAL HOUR: Performed by: SURGERY

## 2019-01-02 PROCEDURE — 87076 CULTURE ANAEROBE IDENT EACH: CPT | Mod: 59

## 2019-01-02 PROCEDURE — 63600175 PHARM REV CODE 636 W HCPCS: Performed by: SURGERY

## 2019-01-02 PROCEDURE — 37000009 HC ANESTHESIA EA ADD 15 MINS: Performed by: SURGERY

## 2019-01-02 PROCEDURE — 25000003 PHARM REV CODE 250: Performed by: ANESTHESIOLOGY

## 2019-01-02 PROCEDURE — 63600175 PHARM REV CODE 636 W HCPCS: Performed by: NURSE ANESTHETIST, CERTIFIED REGISTERED

## 2019-01-02 PROCEDURE — 25000003 PHARM REV CODE 250: Performed by: NURSE ANESTHETIST, CERTIFIED REGISTERED

## 2019-01-02 PROCEDURE — C9290 INJ, BUPIVACAINE LIPOSOME: HCPCS | Performed by: SURGERY

## 2019-01-02 PROCEDURE — 87070 CULTURE OTHR SPECIMN AEROBIC: CPT

## 2019-01-02 PROCEDURE — 25000003 PHARM REV CODE 250: Performed by: SURGERY

## 2019-01-02 PROCEDURE — S0030 INJECTION, METRONIDAZOLE: HCPCS | Performed by: SURGERY

## 2019-01-02 PROCEDURE — 94760 N-INVAS EAR/PLS OXIMETRY 1: CPT

## 2019-01-02 PROCEDURE — 44143 PARTIAL REMOVAL OF COLON: CPT | Mod: 78,,, | Performed by: SURGERY

## 2019-01-02 PROCEDURE — 63600175 PHARM REV CODE 636 W HCPCS: Performed by: ANESTHESIOLOGY

## 2019-01-02 PROCEDURE — 86901 BLOOD TYPING SEROLOGIC RH(D): CPT

## 2019-01-02 PROCEDURE — 36000708 HC OR TIME LEV III 1ST 15 MIN: Performed by: SURGERY

## 2019-01-02 PROCEDURE — 87075 CULTR BACTERIA EXCEPT BLOOD: CPT

## 2019-01-02 PROCEDURE — 37000008 HC ANESTHESIA 1ST 15 MINUTES: Performed by: SURGERY

## 2019-01-02 PROCEDURE — 71000039 HC RECOVERY, EACH ADD'L HOUR: Performed by: SURGERY

## 2019-01-02 PROCEDURE — 45378 PR COLONOSCOPY,DIAGNOSTIC: ICD-10-PCS | Mod: 53,79,, | Performed by: SURGERY

## 2019-01-02 PROCEDURE — C1729 CATH, DRAINAGE: HCPCS | Performed by: SURGERY

## 2019-01-02 PROCEDURE — 45378 DIAGNOSTIC COLONOSCOPY: CPT | Mod: 53,79,, | Performed by: SURGERY

## 2019-01-02 PROCEDURE — 87205 SMEAR GRAM STAIN: CPT

## 2019-01-02 PROCEDURE — 44143 PR PART REMOVAL COLON W END COLOSTOMY: ICD-10-PCS | Mod: 78,,, | Performed by: SURGERY

## 2019-01-02 PROCEDURE — C1765 ADHESION BARRIER: HCPCS | Performed by: SURGERY

## 2019-01-02 PROCEDURE — 11000001 HC ACUTE MED/SURG PRIVATE ROOM

## 2019-01-02 PROCEDURE — 36000709 HC OR TIME LEV III EA ADD 15 MIN: Performed by: SURGERY

## 2019-01-02 PROCEDURE — 25000003 PHARM REV CODE 250: Performed by: NURSE PRACTITIONER

## 2019-01-02 PROCEDURE — 88307 TISSUE EXAM BY PATHOLOGIST: CPT | Mod: 26,,, | Performed by: PATHOLOGY

## 2019-01-02 PROCEDURE — 27201423 OPTIME MED/SURG SUP & DEVICES STERILE SUPPLY: Performed by: SURGERY

## 2019-01-02 PROCEDURE — 45378 DIAGNOSTIC COLONOSCOPY: CPT | Performed by: SURGERY

## 2019-01-02 PROCEDURE — 88307 TISSUE EXAM BY PATHOLOGIST: CPT | Performed by: PATHOLOGY

## 2019-01-02 DEVICE — MEMBRANE SEPRAFILM 5 X 6: Type: IMPLANTABLE DEVICE | Site: ABDOMEN | Status: FUNCTIONAL

## 2019-01-02 RX ORDER — NEOSTIGMINE METHYLSULFATE 1 MG/ML
INJECTION, SOLUTION INTRAVENOUS
Status: DISCONTINUED | OUTPATIENT
Start: 2019-01-02 | End: 2019-01-02

## 2019-01-02 RX ORDER — CIPROFLOXACIN 2 MG/ML
400 INJECTION, SOLUTION INTRAVENOUS
Status: DISCONTINUED | OUTPATIENT
Start: 2019-01-02 | End: 2019-01-05

## 2019-01-02 RX ORDER — HYDROMORPHONE HYDROCHLORIDE 2 MG/ML
0.2 INJECTION, SOLUTION INTRAMUSCULAR; INTRAVENOUS; SUBCUTANEOUS EVERY 5 MIN PRN
Status: DISCONTINUED | OUTPATIENT
Start: 2019-01-02 | End: 2019-01-02 | Stop reason: HOSPADM

## 2019-01-02 RX ORDER — CLONIDINE HYDROCHLORIDE 0.1 MG/1
0.1 TABLET ORAL EVERY 6 HOURS PRN
Status: DISCONTINUED | OUTPATIENT
Start: 2019-01-02 | End: 2019-01-06 | Stop reason: HOSPADM

## 2019-01-02 RX ORDER — PROMETHAZINE HYDROCHLORIDE 25 MG/ML
6.25 INJECTION, SOLUTION INTRAMUSCULAR; INTRAVENOUS ONCE
Status: DISCONTINUED | OUTPATIENT
Start: 2019-01-02 | End: 2019-01-02

## 2019-01-02 RX ORDER — LACTULOSE 10 G/15ML
20 SOLUTION ORAL EVERY 6 HOURS PRN
Status: DISCONTINUED | OUTPATIENT
Start: 2019-01-02 | End: 2019-01-06 | Stop reason: HOSPADM

## 2019-01-02 RX ORDER — ONDANSETRON 2 MG/ML
INJECTION INTRAMUSCULAR; INTRAVENOUS
Status: DISCONTINUED | OUTPATIENT
Start: 2019-01-02 | End: 2019-01-02

## 2019-01-02 RX ORDER — LIDOCAINE HYDROCHLORIDE 10 MG/ML
INJECTION INFILTRATION; PERINEURAL
Status: DISCONTINUED | OUTPATIENT
Start: 2019-01-02 | End: 2019-01-02

## 2019-01-02 RX ORDER — SUCCINYLCHOLINE CHLORIDE 20 MG/ML
INJECTION INTRAMUSCULAR; INTRAVENOUS
Status: DISCONTINUED | OUTPATIENT
Start: 2019-01-02 | End: 2019-01-02

## 2019-01-02 RX ORDER — SODIUM CHLORIDE 0.9 % (FLUSH) 0.9 %
3 SYRINGE (ML) INJECTION
Status: DISCONTINUED | OUTPATIENT
Start: 2019-01-02 | End: 2019-01-02 | Stop reason: HOSPADM

## 2019-01-02 RX ORDER — BUPIVACAINE HYDROCHLORIDE 2.5 MG/ML
INJECTION, SOLUTION EPIDURAL; INFILTRATION; INTRACAUDAL
Status: DISCONTINUED | OUTPATIENT
Start: 2019-01-02 | End: 2019-01-02 | Stop reason: HOSPADM

## 2019-01-02 RX ORDER — SODIUM CHLORIDE 9 MG/ML
INJECTION, SOLUTION INTRAVENOUS CONTINUOUS
Status: DISCONTINUED | OUTPATIENT
Start: 2019-01-02 | End: 2019-01-02

## 2019-01-02 RX ORDER — HYDROCODONE BITARTRATE AND ACETAMINOPHEN 5; 325 MG/1; MG/1
1 TABLET ORAL EVERY 4 HOURS PRN
Status: DISCONTINUED | OUTPATIENT
Start: 2019-01-02 | End: 2019-01-06 | Stop reason: HOSPADM

## 2019-01-02 RX ORDER — HYDROMORPHONE HYDROCHLORIDE 1 MG/ML
1 INJECTION, SOLUTION INTRAMUSCULAR; INTRAVENOUS; SUBCUTANEOUS
Status: DISCONTINUED | OUTPATIENT
Start: 2019-01-02 | End: 2019-01-06 | Stop reason: HOSPADM

## 2019-01-02 RX ORDER — THYROID 30 MG/1
30 TABLET ORAL
Status: DISCONTINUED | OUTPATIENT
Start: 2019-01-03 | End: 2019-01-02

## 2019-01-02 RX ORDER — CHLORHEXIDINE GLUCONATE ORAL RINSE 1.2 MG/ML
10 SOLUTION DENTAL 2 TIMES DAILY
Status: DISCONTINUED | OUTPATIENT
Start: 2019-01-02 | End: 2019-01-06 | Stop reason: HOSPADM

## 2019-01-02 RX ORDER — DIPHENHYDRAMINE HCL 25 MG
25 CAPSULE ORAL EVERY 4 HOURS PRN
Status: DISCONTINUED | OUTPATIENT
Start: 2019-01-02 | End: 2019-01-06 | Stop reason: HOSPADM

## 2019-01-02 RX ORDER — RAMELTEON 8 MG/1
8 TABLET ORAL NIGHTLY PRN
Status: DISCONTINUED | OUTPATIENT
Start: 2019-01-02 | End: 2019-01-06 | Stop reason: HOSPADM

## 2019-01-02 RX ORDER — ACETAMINOPHEN 325 MG/1
650 TABLET ORAL EVERY 6 HOURS PRN
Status: DISCONTINUED | OUTPATIENT
Start: 2019-01-02 | End: 2019-01-06 | Stop reason: HOSPADM

## 2019-01-02 RX ORDER — AMOXICILLIN 250 MG
1 CAPSULE ORAL 2 TIMES DAILY
Status: DISCONTINUED | OUTPATIENT
Start: 2019-01-02 | End: 2019-01-06 | Stop reason: HOSPADM

## 2019-01-02 RX ORDER — HYDROMORPHONE HYDROCHLORIDE 1 MG/ML
1 INJECTION, SOLUTION INTRAMUSCULAR; INTRAVENOUS; SUBCUTANEOUS
Status: DISCONTINUED | OUTPATIENT
Start: 2019-01-02 | End: 2019-01-02

## 2019-01-02 RX ORDER — DONEPEZIL HYDROCHLORIDE 5 MG/1
10 TABLET, FILM COATED ORAL NIGHTLY
Status: DISCONTINUED | OUTPATIENT
Start: 2019-01-02 | End: 2019-01-06 | Stop reason: HOSPADM

## 2019-01-02 RX ORDER — PROPOFOL 10 MG/ML
VIAL (ML) INTRAVENOUS
Status: DISCONTINUED | OUTPATIENT
Start: 2019-01-02 | End: 2019-01-02

## 2019-01-02 RX ORDER — SODIUM CHLORIDE, SODIUM LACTATE, POTASSIUM CHLORIDE, CALCIUM CHLORIDE 600; 310; 30; 20 MG/100ML; MG/100ML; MG/100ML; MG/100ML
INJECTION, SOLUTION INTRAVENOUS CONTINUOUS
Status: DISCONTINUED | OUTPATIENT
Start: 2019-01-02 | End: 2019-01-05

## 2019-01-02 RX ORDER — PHENYLEPHRINE HYDROCHLORIDE 10 MG/ML
INJECTION INTRAVENOUS
Status: DISCONTINUED | OUTPATIENT
Start: 2019-01-02 | End: 2019-01-02

## 2019-01-02 RX ORDER — BISACODYL 10 MG
10 SUPPOSITORY, RECTAL RECTAL DAILY PRN
Status: DISCONTINUED | OUTPATIENT
Start: 2019-01-02 | End: 2019-01-06 | Stop reason: HOSPADM

## 2019-01-02 RX ORDER — THYROID 30 MG/1
30 TABLET ORAL NIGHTLY
Status: DISCONTINUED | OUTPATIENT
Start: 2019-01-02 | End: 2019-01-06 | Stop reason: HOSPADM

## 2019-01-02 RX ORDER — DEXAMETHASONE SODIUM PHOSPHATE 4 MG/ML
INJECTION, SOLUTION INTRA-ARTICULAR; INTRALESIONAL; INTRAMUSCULAR; INTRAVENOUS; SOFT TISSUE
Status: DISCONTINUED | OUTPATIENT
Start: 2019-01-02 | End: 2019-01-02

## 2019-01-02 RX ORDER — GLYCOPYRROLATE 0.2 MG/ML
INJECTION INTRAMUSCULAR; INTRAVENOUS
Status: DISCONTINUED | OUTPATIENT
Start: 2019-01-02 | End: 2019-01-02

## 2019-01-02 RX ORDER — FENTANYL CITRATE 50 UG/ML
INJECTION, SOLUTION INTRAMUSCULAR; INTRAVENOUS
Status: DISCONTINUED | OUTPATIENT
Start: 2019-01-02 | End: 2019-01-02

## 2019-01-02 RX ORDER — HYDROCHLOROTHIAZIDE 12.5 MG/1
12.5 TABLET ORAL DAILY
Status: DISCONTINUED | OUTPATIENT
Start: 2019-01-02 | End: 2019-01-02

## 2019-01-02 RX ORDER — ROCURONIUM BROMIDE 10 MG/ML
INJECTION, SOLUTION INTRAVENOUS
Status: DISCONTINUED | OUTPATIENT
Start: 2019-01-02 | End: 2019-01-02

## 2019-01-02 RX ORDER — ONDANSETRON 8 MG/1
8 TABLET, ORALLY DISINTEGRATING ORAL EVERY 8 HOURS PRN
Status: DISCONTINUED | OUTPATIENT
Start: 2019-01-02 | End: 2019-01-06 | Stop reason: HOSPADM

## 2019-01-02 RX ORDER — METRONIDAZOLE 500 MG/100ML
500 INJECTION, SOLUTION INTRAVENOUS
Status: DISCONTINUED | OUTPATIENT
Start: 2019-01-02 | End: 2019-01-05

## 2019-01-02 RX ORDER — METRONIDAZOLE 500 MG/100ML
500 INJECTION, SOLUTION INTRAVENOUS
Status: COMPLETED | OUTPATIENT
Start: 2019-01-02 | End: 2019-01-02

## 2019-01-02 RX ORDER — SODIUM CHLORIDE, SODIUM LACTATE, POTASSIUM CHLORIDE, CALCIUM CHLORIDE 600; 310; 30; 20 MG/100ML; MG/100ML; MG/100ML; MG/100ML
INJECTION, SOLUTION INTRAVENOUS CONTINUOUS
Status: DISCONTINUED | OUTPATIENT
Start: 2019-01-02 | End: 2019-01-02

## 2019-01-02 RX ADMIN — ROCURONIUM BROMIDE 25 MG: 10 INJECTION, SOLUTION INTRAVENOUS at 11:01

## 2019-01-02 RX ADMIN — METRONIDAZOLE 500 MG: 500 SOLUTION INTRAVENOUS at 11:01

## 2019-01-02 RX ADMIN — SODIUM CHLORIDE, SODIUM LACTATE, POTASSIUM CHLORIDE, AND CALCIUM CHLORIDE: 600; 310; 30; 20 INJECTION, SOLUTION INTRAVENOUS at 11:01

## 2019-01-02 RX ADMIN — RAMELTEON 8 MG: 8 TABLET, FILM COATED ORAL at 08:01

## 2019-01-02 RX ADMIN — PROPOFOL 20 MG: 10 INJECTION, EMULSION INTRAVENOUS at 09:01

## 2019-01-02 RX ADMIN — SODIUM CHLORIDE, SODIUM LACTATE, POTASSIUM CHLORIDE, AND CALCIUM CHLORIDE: .6; .31; .03; .02 INJECTION, SOLUTION INTRAVENOUS at 04:01

## 2019-01-02 RX ADMIN — ROCURONIUM BROMIDE 5 MG: 10 INJECTION, SOLUTION INTRAVENOUS at 11:01

## 2019-01-02 RX ADMIN — ROCURONIUM BROMIDE 10 MG: 10 INJECTION, SOLUTION INTRAVENOUS at 12:01

## 2019-01-02 RX ADMIN — HYDROMORPHONE HYDROCHLORIDE 1 MG: 1 INJECTION, SOLUTION INTRAMUSCULAR; INTRAVENOUS; SUBCUTANEOUS at 09:01

## 2019-01-02 RX ADMIN — FENTANYL CITRATE 50 MCG: 50 INJECTION, SOLUTION INTRAMUSCULAR; INTRAVENOUS at 11:01

## 2019-01-02 RX ADMIN — ONDANSETRON 4 MG: 2 INJECTION, SOLUTION INTRAMUSCULAR; INTRAVENOUS at 01:01

## 2019-01-02 RX ADMIN — CIPROFLOXACIN 400 MG: 2 INJECTION, SOLUTION INTRAVENOUS at 02:01

## 2019-01-02 RX ADMIN — LIDOCAINE HYDROCHLORIDE 50 MG: 10 INJECTION, SOLUTION INFILTRATION; PERINEURAL at 09:01

## 2019-01-02 RX ADMIN — PHENYLEPHRINE HYDROCHLORIDE 100 MCG: 10 INJECTION INTRAVENOUS at 11:01

## 2019-01-02 RX ADMIN — CEFTRIAXONE 2 G: 2 INJECTION, SOLUTION INTRAVENOUS at 11:01

## 2019-01-02 RX ADMIN — ROBINUL 0.4 MG: 0.2 INJECTION INTRAMUSCULAR; INTRAVENOUS at 01:01

## 2019-01-02 RX ADMIN — NEOSTIGMINE METHYLSULFATE 3 MG: 1 INJECTION INTRAVENOUS at 01:01

## 2019-01-02 RX ADMIN — PROPOFOL 80 MG: 10 INJECTION, EMULSION INTRAVENOUS at 09:01

## 2019-01-02 RX ADMIN — PHENYLEPHRINE HYDROCHLORIDE 100 MCG: 10 INJECTION INTRAVENOUS at 12:01

## 2019-01-02 RX ADMIN — STANDARDIZED SENNA CONCENTRATE AND DOCUSATE SODIUM 1 TABLET: 8.6; 5 TABLET, FILM COATED ORAL at 08:01

## 2019-01-02 RX ADMIN — THYROID, PORCINE 30 MG: 30 TABLET ORAL at 09:01

## 2019-01-02 RX ADMIN — PROMETHAZINE HYDROCHLORIDE 6.25 MG: 25 INJECTION INTRAMUSCULAR; INTRAVENOUS at 02:01

## 2019-01-02 RX ADMIN — METRONIDAZOLE 500 MG: 500 INJECTION, SOLUTION INTRAVENOUS at 06:01

## 2019-01-02 RX ADMIN — DEXAMETHASONE SODIUM PHOSPHATE 4 MG: 4 INJECTION, SOLUTION INTRA-ARTICULAR; INTRALESIONAL; INTRAMUSCULAR; INTRAVENOUS; SOFT TISSUE at 11:01

## 2019-01-02 RX ADMIN — LIDOCAINE HYDROCHLORIDE 50 MG: 10 INJECTION, SOLUTION INFILTRATION; PERINEURAL at 11:01

## 2019-01-02 RX ADMIN — BUPIVACAINE 266 MG: 13.3 INJECTION, SUSPENSION, LIPOSOMAL INFILTRATION at 11:01

## 2019-01-02 RX ADMIN — CHLORHEXIDINE GLUCONATE 10 ML: 1.2 RINSE ORAL at 08:01

## 2019-01-02 RX ADMIN — PROPOFOL 80 MG: 10 INJECTION, EMULSION INTRAVENOUS at 11:01

## 2019-01-02 RX ADMIN — DONEPEZIL HYDROCHLORIDE 10 MG: 5 TABLET, FILM COATED ORAL at 08:01

## 2019-01-02 RX ADMIN — SODIUM CHLORIDE, SODIUM LACTATE, POTASSIUM CHLORIDE, AND CALCIUM CHLORIDE: 600; 310; 30; 20 INJECTION, SOLUTION INTRAVENOUS at 08:01

## 2019-01-02 RX ADMIN — SUCCINYLCHOLINE CHLORIDE 120 MG: 20 INJECTION, SOLUTION INTRAMUSCULAR; INTRAVENOUS at 11:01

## 2019-01-02 NOTE — PLAN OF CARE
Escorted to preop via stretcher by lars escobar rn. Report received. Pt moved self from stretcher to stretcher without difficulty.

## 2019-01-02 NOTE — OP NOTE
"Operative Note       SURGERY DATE:  01/02/2019    PRE-OP DIAGNOSIS:  Diverticulitis of large intestine with perforation without abscess or bleeding [K57.20]    POST-OP DIAGNOSIS:  Diverticulitis of large intestine with perforation without abscess or bleeding [K57.20]   Active Hospital Problems    Diagnosis  POA    *Special screening for malignant neoplasms, colon [Z12.11]  Not Applicable    Diverticulitis of large intestine with perforation without abscess or bleeding [K57.20]  Yes      Resolved Hospital Problems   No resolved problems to display.       Procedure(s) (LRB):  COLECTOMY, SIGMOID (Left)  CREATION, COLOSTOMY (Left)  SHAHIDA PROCEDURE (N/A)    Surgeon(s) and Role:     * Reece Hogan MD - Primary     * Zay Londono MD - Assisting    ASSISTANTS: None  ANESTHESIA: General    FINDINGS: Posterior rectal abscess with cristine pus was noted with perforated diverticulum of distal sigmoid colon.     ESTIMATED BLOOD LOSS: 10 mL              COMPLICATIONS:  None    SPECIMEN:  distal sigmoid colon.    Implants: None    INDICATION:  Diverticulitis with perforation and abscess.    DESCRIPTION OF PROCEDURE:    The patient was taken to the operating room and under went general anesthesia with orotracheal intubation. Once the patient was sleep, the patient was prepped and draped in the sterile manner. Once the patient was sleep, a "time-out" was called, the patient's ID, the site of surgery, the names of participants, and the planned surgery were confirmed prior to making the incision. A long midline incision was used obtain access into the peritoneal cavity. Mobilization of adhesion and lysis to gain access into the peritoneal cavity. Mobilization of the sigmoid medially by taking down the lateral adhesion along the left paracolic gutter was completed which was also extended into the pelvis at the confluence of the teniae coli. A dilated loop of sigmoid colon was then completely visualized below the stricture. A " small mesenteric window was then created, and a penrose drain inserted for retraction. Then a curved stapler was inserted and divided the colon distally. Then this repeated above the lesion at the sigmoid colon. Same procedure was repeated to divide the sigmoid colon proximally. Then after the hemostasis good, and irrigated. The distal end of descending colon was then prepped for an end colostomy. The distal end of rectum was then dropped into the pelvic floor which was later covered with Seprafilm and omentum. Then after complete mobilization for colostomy, an opening was made for the stoma in the usual fashion. The colostomy was then brought out through the stoma, then held in place with two wally forceps. The instruments and gauze counts were done to be correct. The abdomen was closed with looped #1 blunt tipped PDS after two Interceed inserted. Prior to closure, total of 100 ml of mixture of Exparel, Marcaine and saline was injected around the incision on both sides. The skin closed with a skin stapler. Once the skin was closed, then colostomy was matured in the usual fashion.           CONDITION: Good    DISPOSITION: PACU - hemodynamically stable.     Reece Hogan

## 2019-01-02 NOTE — ANESTHESIA PREPROCEDURE EVALUATION
01/02/2019  Irlanda Lopez is a 78 y.o., female.    Anesthesia Evaluation    I have reviewed the Patient Summary Reports.    I have reviewed the Nursing Notes.   I have reviewed the Medications.     Review of Systems  Anesthesia Hx:  No problems with previous Anesthesia  Denies Family Hx of Anesthesia complications.   Denies Personal Hx of Anesthesia complications.   Social:  Former Smoker, No Alcohol Use    Hematology/Oncology:  Hematology Normal   Oncology Normal     Cardiovascular:   Hypertension Denies MI.   Denies CABG/stent.      hyperlipidemia ECG has been reviewed. ekg 11/2018:  Sinus bradycardia with Premature supraventricular complexes 54  ST abnormality, possible digitalis effect  Abnormal ECG  No previous ECGs available    Echo 11/2018:    1 - Biatrial enlargement.     2 - Concentric hypertrophy.     3 - Normal left ventricular systolic function (EF 60-65%).     4 - Normal left ventricular diastolic function.     5 - Right ventricular enlargement with normal systolic function.     6 - The estimated PA systolic pressure is 31 mmHg.     7 - Moderate tricuspid regurgitation   Pulmonary:   Denies COPD.  Denies Asthma.  Denies Sleep Apnea.    Renal/:  Renal/ Normal     Hepatic/GI:   Bowel Prep. Denies GERD. Denies Liver Disease.  Denies Hepatitis.    Musculoskeletal:  Musculoskeletal Normal    Neurological:   Denies CVA. Denies Seizures.    Endocrine:   Denies Diabetes. Hypothyroidism    Psych:   Vascular dementia without behavioral disturbance         Physical Exam  General:  Well nourished    Airway/Jaw/Neck:  Airway Findings: Mouth Opening: Normal Tongue: Normal  General Airway Assessment: Adult  Mallampati: II      Dental:  Dental Findings: In tact   Chest/Lungs:  Chest/Lungs Findings: Clear to auscultation, Normal Respiratory Rate     Heart/Vascular:  Heart Findings: Rate: Normal  Rhythm:  Regular Rhythm  Sounds: Normal             Anesthesia Plan  Type of Anesthesia, risks & benefits discussed:  Anesthesia Type:  MAC  Patient's Preference:   Intra-op Monitoring Plan: standard ASA monitors  Intra-op Monitoring Plan Comments:   Post Op Pain Control Plan:   Post Op Pain Control Plan Comments:   Induction:   IV  Beta Blocker:  Patient is not currently on a Beta-Blocker (No further documentation required).       Informed Consent: Patient understands risks and agrees with Anesthesia plan.  Questions answered. Anesthesia consent signed with patient.  ASA Score: 2     Day of Surgery Review of History & Physical: I have interviewed and examined the patient. I have reviewed the patient's H&P dated:  There are no significant changes.  H&P update referred to the surgeon.         Ready For Surgery From Anesthesia Perspective.

## 2019-01-02 NOTE — ASSESSMENT & PLAN NOTE
-S/P sigmoid colon resection.   -Continue IV Flagyl and Cipro.   -Surgical management per General Surgery.

## 2019-01-02 NOTE — ANESTHESIA RELEASE NOTE
"Anesthesia Release from PACU Note    Patient: Irlanda Lopez    Procedure(s) Performed: Procedure(s) (LRB):  COLONOSCOPY (N/A)    Anesthesia type: MAC    Post pain: Adequate analgesia    Post assessment: no apparent anesthetic complications, tolerated procedure well and no evidence of recall    Last Vitals:   Visit Vitals  /69 (BP Location: Left arm, Patient Position: Lying)   Pulse 76   Temp 37.4 °C (99.3 °F) (Temporal)   Resp 18   Ht 5' 4" (1.626 m)   Wt 63 kg (139 lb)   SpO2 (!) 94%   Breastfeeding? No   BMI 23.86 kg/m²       Post vital signs: stable    Level of consciousness: sedated    Nausea/Vomiting: no nausea/no vomiting    Complications: none    Airway Patency: patent    Respiratory: unassisted, spontaneous ventilation, room air    Cardiovascular: stable and blood pressure at baseline    Hydration: euvolemic  "

## 2019-01-02 NOTE — H&P
History & Physical    SUBJECTIVE:     History of Present Illness:   Patient is a 78 y.o. female presents with known recurrent diverticulitis.  She had required multiple trips to the emergency room and admitted to the hospital twice.  At each time, the patient was placed on IV antibiotic and the condition improved.  At 1 time she was placed on various antibiotic with some improvement.  She denies of any previous colonoscopy examination.  She was told to follow up for colonoscopy exam but could not complete the procedure because of flare up of diverticulitis.  She was recently seen and admitted for the same issues.  Once she was improved, she was recommended to follow up with me for definitive care.    No chief complaint on file.      Review of patient's allergies indicates:  No Known Allergies    No current facility-administered medications for this encounter.        Past Medical History:   Diagnosis Date    High cholesterol     Hypertension     Thyroid disease      Past Surgical History:   Procedure Laterality Date    APPENDECTOMY  2008    CATARACT EXTRACTION      Dr Raygoza     Family History   Problem Relation Age of Onset    Alzheimer's disease Mother     Aneurysm Father         brain    Stomach cancer Brother         50s     Social History     Tobacco Use    Smoking status: Former Smoker    Smokeless tobacco: Never Used   Substance Use Topics    Alcohol use: No    Drug use: No        Review of Systems:  Review of Systems   Constitutional: Positive for activity change and appetite change. Negative for chills and fever.   HENT: Negative for sore throat and trouble swallowing.    Eyes: Negative.    Respiratory: Negative for cough and shortness of breath.    Cardiovascular: Negative.    Gastrointestinal: Positive for abdominal distention, abdominal pain and diarrhea. Negative for nausea and vomiting.   Endocrine: Negative.    Genitourinary: Negative.    Musculoskeletal: Negative.    Skin: Negative.   "  Allergic/Immunologic: Negative.    Neurological: Negative.    Hematological: Does not bruise/bleed easily.   Psychiatric/Behavioral: Negative.        OBJECTIVE:     Vital Signs (Most Recent)     5' 2" (1.575 m)  63 kg (139 lb)     Physical Exam:  Physical Exam   Constitutional: She is oriented to person, place, and time. She appears well-developed and well-nourished.   HENT:   Head: Normocephalic.   Right Ear: External ear normal.   Left Ear: External ear normal.   Nose: Nose normal.   Eyes: Pupils are equal, round, and reactive to light. No scleral icterus.   Neck: Normal range of motion. Neck supple. No thyromegaly present.   Cardiovascular: Normal rate, regular rhythm and normal heart sounds.   No murmur heard.  Pulmonary/Chest: Effort normal and breath sounds normal.   Abdominal: Soft. Bowel sounds are normal. There is no tenderness. There is no guarding.   Musculoskeletal: Normal range of motion.   Lymphadenopathy:     She has no cervical adenopathy.   Neurological: She is alert and oriented to person, place, and time.   Skin: Skin is warm and dry.       Laboratory  Lab Results   Component Value Date    WBC 8.20 12/20/2018    HGB 12.2 12/20/2018    HCT 39.6 12/20/2018     (H) 12/20/2018    CHOL 212 (H) 07/18/2018    TRIG 219 (H) 07/18/2018    HDL 46 07/18/2018    ALT 9 (L) 11/10/2018    AST 16 11/10/2018     12/20/2018    K 4.1 12/20/2018     12/20/2018    CREATININE 0.7 12/20/2018    BUN 9 12/20/2018    CO2 27 12/20/2018    TSH 1.208 07/18/2018    HGBA1C 5.5 07/18/2018       Results for orders placed during the hospital encounter of 10/04/18   CT Abdomen Pelvis W Wo Contrast    Narrative EXAMINATION:  CT ABDOMEN PELVIS W WO CONTRAST    CLINICAL HISTORY:  Abdominal pain, unspecified;abnormal abdominal x-ray;.    TECHNIQUE:  Low dose axial images, sagittal and coronal reformations were obtained from the lung bases to the pubic symphysis.  Images were obtained both with and without IV " contrast.    COMPARISON:  None    FINDINGS:  Lung bases are clear.    The liver is normal.  The gallbladder is normal.    The spleen is normal in size and appearance.  The pancreas is normal.  The adrenal glands are normal.  The aorta and IVC are normal.  No retroperitoneal adenopathy.    Scarring of the lateral and upper left kidney is present but otherwise the left kidney is normal.  Left ureter is normal.  Severe scarring identified throughout the right kidney.  A very large calculus is identified within the left renal pelvis measuring 3.0 cm.  No significant right hydronephrosis.  The right ureter is nondilated and normal.    Small hiatal hernia otherwise the stomach is normal.  The small intestine is normal.  Appendix surgically absent.  Diverticulosis of the sigmoid colon is identified with colonic wall thickening and severe surrounding inflammatory changes.  Findings compatible acute diverticulitis.  Negative for pneumoperitoneum or abscess.  The rectum is normal.    The pelvis demonstrates normal appearing bladder.  Uterus is unremarkable.  Left adnexa region is normal.  To check the calcifications identified within an atrophic right ovary, measuring 0.7 cm in maximum dimension.    Regional bones demonstrate a moderate grade compression fracture of T11.  No suspicious bony abnormality detected.  Abdominal and pelvic wall soft tissues are normal.      Impression 1. Severe acute diverticulitis sigmoid colon.  2. 3.0 cm calculus right renal pelvis.  Extensive scarring of the right kidney.  3. Hiatal hernia.  All CT scans at this facility are performed  using dose modulation techniques as appropriate to performed exam including the following:  automated exposure control; adjustment of mA and/or kV according to the patients size (this includes techniques or standardized protocols for targeted exams where dose is matched to indication/reason for exam: i.e. extremities or head);  iterative reconstruction  technique.      Electronically signed by: Dangelo Black MD  Date:    10/04/2018  Time:    19:10         Diagnostic Results:  Labs: Reviewed  ECG: Reviewed  X-Ray: Reviewed  CT: Reviewed    None    ASSESSMENT/PLAN:     Recurrent diverticulitis x3.    PLAN:Plan     The recommendation is proceed with sigmoid colon resection in a single stage.  The surgery will be performed by robotic and laparoscopic approach.  The surgery is scheduled for January 2, 2019 at 9:30 a.m..  The risk and the benefit of the procedure were fully addressed with the patient.    Reece Hogan       No changes noted since seen last.    Reece Hogan

## 2019-01-02 NOTE — PLAN OF CARE
Problem: Adult Inpatient Plan of Care  Goal: Plan of Care Review  Outcome: Ongoing (interventions implemented as appropriate)  Plan of care reviewed with patient; verbalized understanding. Fall precautions maintained, patient remains free from injury. Pain being managed appropriately. Medications being administered as ordered. Vital signs stable with no signs of distress noted. Bed low and locked with call light in reach. Will continue monitor.    Received patient from PACU, vital signs stable. Patient denies pain. Dressing to abdomen is clean, dry and intact. Colostomy to LLQ draining scant amount of bloody drainage.

## 2019-01-02 NOTE — OP NOTE
See my note in Provation: Incomplete due to severe stricture in the sigmoid colon.    Reece Hogan

## 2019-01-02 NOTE — H&P
Short Stay Endoscopy History and Physical    PCP - Myla Arias MD    Procedure - diagnostic colonoscopy for diverticulitis.  ASA - 2  Mallampati - per anesthesia  History of Anesthesia problems - no  Family history Anesthesia problems -  no     HPI:  This is a 78 y.o.female here for evaluation of : diagnostic colonoscopy.    Reflux - no  Dysphagia - no  Abdominal pain - no  Diarrhea - no  Anemia - no  GI bleeding - no  Nausea and vomiting-no  Early satiety-no  aversion to sight or smell of food-no    ROS:  Constitutional: No fevers, chills, No weight loss  ENT: No allergies  CV: No chest pain  Pulm: No cough, No shortness of breath  Ophtho: No vision changes  GI: see HPI  Derm: No rash  Heme: No lymphadenopathy, No bruising  MSK: No arthritis  : No dysuria, No hematuria  Endo: No hot or cold intolerance  Neuro: No syncope, No seizure  Psych: No anxiety, No depression    Medical History:  Past Medical History:   Diagnosis Date    High cholesterol     Hypertension     Thyroid disease        Surgical History:  Past Surgical History:   Procedure Laterality Date    APPENDECTOMY  2008    CATARACT EXTRACTION      Dr Raygoza       Family History:  Family History   Problem Relation Age of Onset    Alzheimer's disease Mother     Aneurysm Father         brain    Stomach cancer Brother         50s       Social History:  Social History     Socioeconomic History    Marital status:      Spouse name: Not on file    Number of children: Not on file    Years of education: Not on file    Highest education level: Not on file   Social Needs    Financial resource strain: Not on file    Food insecurity - worry: Not on file    Food insecurity - inability: Not on file    Transportation needs - medical: Not on file    Transportation needs - non-medical: Not on file   Occupational History     Comment: [employer]   Tobacco Use    Smoking status: Former Smoker    Smokeless tobacco: Never Used   Substance and  Sexual Activity    Alcohol use: No    Drug use: No    Sexual activity: No     Comment:    Other Topics Concern    Not on file   Social History Narrative    Not on file       Allergies: Review of patient's allergies indicates:  No Known Allergies    Medications:   No current facility-administered medications on file prior to encounter.      Current Outpatient Medications on File Prior to Encounter   Medication Sig Dispense Refill    aspirin 81 MG Chew Take 1 tablet (81 mg total) by mouth once daily.      atorvastatin (LIPITOR) 10 MG tablet TAKE 1 TABLET EVERY DAY 90 tablet 3    cyanocobalamin (VITAMIN B-12) 100 MCG tablet Take 100 mcg by mouth once daily.      donepezil (ARICEPT) 10 MG tablet Take 1 tablet (10 mg total) by mouth every evening. 90 tablet 3    PEPPERMINT OIL MISC by Misc.(Non-Drug; Combo Route) route.      thyroid, pork, (ARMOUR THYROID) 30 mg Tab Take 1 tablet (30 mg total) by mouth once daily. 90 tablet 3    lisinopril-hydrochlorothiazide (PRINZIDE,ZESTORETIC) 10-12.5 mg per tablet TAKE 1 TABLET EVERY DAY 90 tablet 3    magnesium citrate, bulk, Powd by Misc.(Non-Drug; Combo Route) route.      triamcinolone acetonide 0.1% (KENALOG) 0.1 % ointment APPLY TOPICALLY 4 (FOUR) TIMES DAILY. TO RASH ON FACE AND CHEST AND NOSE  1       Objective Findings:    Vital Signs:There were no vitals filed for this visit.        Physical Exam:  General Appearance: Well appearing in no acute distress  Eyes:    No scleral icterus  ENT: Neck supple, Lips, mucosa, and tongue normal; teeth and gums normal  Lungs: CTA bilaterally in anterior and posterior fields, no wheezes, no crackles.  Heart:  Regular rate, S1, S2 normal, no murmurs heard.  Abdomen: Soft, non tender, non distended with normal bowel sounds. No hepatosplenomegaly, ascites, or mass.  Extremities: No clubbing, cyanosis or edema  Skin: No rash    Labs:  Reviewed    Plan:  I have explained the risks and benefits of endoscopy procedures to  the patient including but not limited to bleeding, perforation, infection, and death. The patient wishes to proceed.     Reece Hogan

## 2019-01-02 NOTE — TELEPHONE ENCOUNTER
Reason for Disposition   Caller has URGENT medication question about med that PCP prescribed and triager unable to answer question    Protocols used: ST MEDICATION QUESTION CALL-A-AH    Irlanda's daughter is wondering if her mother has to take the second round of prep for scope in the morning if her pain is severe. Irlanda states her stools are brownish yellow right now and pain is 7-8/10 after taking prep. Earlier today before taking prep her pain was minimal and has been minimal this last week due to diverticulitis. She is also scheduled for colectomy tomorrow. On call MD Dr. Connelly advised she should try to take it if she can but it's her judgement call if she can tolerate it. He does not recommend any other prep either. Advised daughter and patient.

## 2019-01-02 NOTE — [employer] PATIENT INSTRUCTIONS
Discharge Summary/Instructions after an Endoscopic Procedure  Patient Name: Irlanda Lopez  Patient MRN: [id number]  Patient YOB: 1940 Wednesday, January 02, 2019 Reece Hogan MD  RESTRICTIONS:  During your procedure today, you received medications for sedation.  These   medications may affect your judgment, balance and coordination.  Therefore,   for 24 hours, you have the following restrictions:   - DO NOT drive a car, operate machinery, make legal/financial decisions,   sign important papers or drink alcohol.    ACTIVITY:  Today: no heavy lifting, straining or running due to procedural   sedation/anesthesia.  The following day: return to full activity including work.  DIET:  Eat and drink normally unless instructed otherwise.     TREATMENT FOR COMMON SIDE EFFECTS:  - Mild abdominal pain, nausea, belching, bloating or excessive gas:  rest,   eat lightly and use a heating pad.  - Sore Throat: treat with throat lozenges and/or gargle with warm salt   water.  - Because air was used during the procedure, expelling large amounts of air   from your rectum or belching is normal.  - If a bowel prep was taken, you may not have a bowel movement for 1-3 days.    This is normal.  SYMPTOMS TO WATCH FOR AND REPORT TO YOUR PHYSICIAN:  1. Abdominal pain or bloating, other than gas cramps.  2. Chest pain.  3. Back pain.  4. Signs of infection such as: chills or fever occurring within 24 hours   after the procedure.  5. Rectal bleeding, which would show as bright red, maroon, or black stools.   (A tablespoon of blood from the rectum is not serious, especially if   hemorrhoids are present.)  6. Vomiting.  7. Weakness or dizziness.  GO DIRECTLY TO THE NEAREST EMERGENCY ROOM IF YOU HAVE ANY OF THE FOLLOWING:      Difficulty breathing              Chills and/or fever over 101 F   Persistent vomiting and/or vomiting blood   Severe abdominal pain   Severe chest pain   Black, tarry stools   Bleeding- more than one tablespoon   Any  other symptom or condition that you feel may need urgent attention  Your doctor recommends these additional instructions:  If any biopsies were taken, your doctors clinic will contact you in 1 to 2   weeks with any results.  - Observe patient in PACU prior to surgery.   - Transport patient to OR.   - No recommendation at this time regarding repeat colonoscopy.  For questions, problems or results please call your physician Reece Hogan MD   at Work:  [phone]  If you have any questions about the above instructions, call the GI   department at [phone] or call the endoscopy unit at [phone]   from 7am until 3 pm.  [facility] [place], EMERGENCY ROOM PHONE NUMBER:   [phone]  IF A COMPLICATION OR EMERGENCY SITUATION ARISES AND YOU ARE UNABLE TO REACH   YOUR PHYSICIAN - GO DIRECTLY TO THE EMERGENCY ROOM.  I have read or have had read to me these discharge instructions for my   procedure and have received a written copy.  I understand these   instructions and will follow-up with my physician if I have any questions.     __________________________________       _____________________________________  Nurse Signature                                          Patient/Designated   Responsible Party Signature  Reece Hogan MD  1/2/2019 9:45:42 AM  This report has been verified and signed electronically.  [employer]

## 2019-01-02 NOTE — DISCHARGE INSTRUCTIONS
Colostomy Pouch Change Instructions:  1. Change once per week and if leaking  2. Remove pouch and discard  3. Clean stoma and surrounding skin with water and clean soft washcloth  4. Pat dry skin around stoma  5. Spray skin around stoma with skin barrier film spray Cavilon  6. Apply Brava Moldable Ring around stoma, covering edge where stoma and skin meet  7. Cut pouch barrier to 45mm, peel backing, and apply to stoma from bottom to top, pressing gently to seal  8. Ensure drainable end of pouch is closed and secured.

## 2019-01-02 NOTE — HPI
Irlanda Lopez is a 78 year old female with hyperlipidemia, hypothyroidism and short term memory difficulties who presented for colonoscopy and colon resection due to recent diverticulitis. The patient was found to have a posterior rectal abscess with cristine pus associated with a perforated diverticulum of the distal sigmoid colon. She underwent left sigmoid colectomy with a Maribel procedure. The patient denies complaint prior to surgery and reports that her pain is well controlled following surgery. Family is concerned about her reaction to having an ostomy pouch and management of the pouch upon discharge. The patient lives alone and performed her ADLs independently prior to presentation. However, family expresses concern regarding short term memory prior to surgery. Code status was discussed with the patient and her family. All are in agreement. She is a full code. Her son, Mundo Moreland, is her surrogate medical decision maker.

## 2019-01-02 NOTE — TRANSFER OF CARE
"Anesthesia Transfer of Care Note    Patient: Irlanda Lopez    Procedure(s) Performed: Procedure(s) (LRB):  COLECTOMY, SIGMOID (Left)  CREATION, COLOSTOMY (Left)  SHAHIDA PROCEDURE (N/A)    Patient location: PACU    Anesthesia Type: general    Transport from OR: Transported from OR on room air with adequate spontaneous ventilation    Post pain: adequate analgesia    Post assessment: no apparent anesthetic complications    Post vital signs: stable    Level of consciousness: awake, alert and oriented    Nausea/Vomiting: no nausea/vomiting    Complications: none    Transfer of care protocol was followed      Last vitals:   Visit Vitals  /87   Pulse 76   Temp 36.8 °C (98.3 °F) (Temporal)   Resp 18   Ht 5' 4" (1.626 m)   Wt 63 kg (139 lb)   SpO2 97%   Breastfeeding? No   BMI 23.86 kg/m²     "

## 2019-01-02 NOTE — ANESTHESIA POSTPROCEDURE EVALUATION
"Anesthesia Post Evaluation    Patient: Irlanda Lopez    Procedure(s) Performed: Procedure(s) (LRB):  COLECTOMY, SIGMOID (Left)  CREATION, COLOSTOMY (Left)  SHAHIDA PROCEDURE (N/A)    Final Anesthesia Type: general  Patient location during evaluation: PACU  Patient participation: Yes- Able to Participate  Level of consciousness: awake and alert and oriented  Post-procedure vital signs: reviewed and stable  Pain management: adequate  Airway patency: patent  PONV status at discharge: No PONV  Anesthetic complications: no      Cardiovascular status: hemodynamically stable  Respiratory status: unassisted, room air and spontaneous ventilation  Hydration status: euvolemic  Follow-up not needed.        Visit Vitals  BP (!) 149/73 (BP Location: Right arm, Patient Position: Lying)   Pulse 60   Temp 36.9 °C (98.5 °F) (Oral)   Resp 14   Ht 5' 4" (1.626 m)   Wt 63 kg (138 lb 14.2 oz)   SpO2 95%   Breastfeeding? No   BMI 23.84 kg/m²       Pain/Babar Score: Babar Score: 9 (1/2/2019  3:00 PM)        "

## 2019-01-02 NOTE — TRANSFER OF CARE
"Anesthesia Transfer of Care Note    Patient: Irlanda Lopez    Procedure(s) Performed: Procedure(s) (LRB):  COLONOSCOPY (N/A)    Patient location: PACU    Anesthesia Type: MAC    Transport from OR: Transported from OR on room air with adequate spontaneous ventilation    Post pain: adequate analgesia    Post assessment: no apparent anesthetic complications and tolerated procedure well    Post vital signs: stable    Level of consciousness: sedated    Nausea/Vomiting: no nausea/vomiting    Complications: none    Transfer of care protocol was followed      Last vitals:   Visit Vitals  /69 (BP Location: Left arm, Patient Position: Lying)   Pulse 76   Temp 37.4 °C (99.3 °F) (Temporal)   Resp 18   Ht 5' 4" (1.626 m)   Wt 63 kg (139 lb)   SpO2 (!) 94%   Breastfeeding? No   BMI 23.86 kg/m²     "

## 2019-01-02 NOTE — DISCHARGE SUMMARY
[facility]  Short Stay  Discharge Summary    Admit Date: 1/2/2019    Discharge Date and Time: 1/2/2019      Discharge Attending Physician: Reece Hogan MD     Reason for Admission: diagnostic colonoscopy.    Hospital Course (synopsis of major diagnoses, care, treatment, and services provided during the course of the hospital stay): Uneventful and full recovery    Final Diagnoses:    Principal Problem: Special screening for malignant neoplasms, colon   Secondary Diagnoses: Special screening for malignant neoplasms, colon    Procedures Performed: Procedure(s) (LRB):  COLONOSCOPY (N/A)      Discharged Condition: good    Disposition: Admitted as an Inpatient     Diet: NPO for surgery.    Discharge Condition: Stable    Follow up/Patient Instructions:      Medications:      Medication List      CONTINUE taking these medications    aspirin 81 MG Chew  Take 1 tablet (81 mg total) by mouth once daily.     atorvastatin 10 MG tablet  Commonly known as:  LIPITOR  TAKE 1 TABLET EVERY DAY     cyanocobalamin 100 MCG tablet  Commonly known as:  VITAMIN B-12     donepezil 10 MG tablet  Commonly known as:  ARICEPT  Take 1 tablet (10 mg total) by mouth every evening.     magnesium citrate (bulk) Powd     memantine 10 MG Tab  Commonly known as:  NAMENDA  10mg po bid.     PEPPERMINT OIL MISC     thyroid (pork) 30 mg Tab  Commonly known as:  ARMOUR THYROID  Take 1 tablet (30 mg total) by mouth once daily.     triamcinolone acetonide 0.1% 0.1 % ointment  Commonly known as:  KENALOG        STOP taking these medications    hydroCHLOROthiazide 12.5 MG Tab  Commonly known as:  HYDRODIURIL     lisinopril-hydrochlorothiazide 10-12.5 mg per tablet  Commonly known as:  PRINZIDE,ZESTORETIC     sodium,potassium,mag sulfates 17.5-3.13-1.6 gram Solr  Commonly known as:  SUPREP BOWEL PREP KIT          Discharge Procedure Orders   Diet general     Call MD for:  temperature >100.4     Call MD for:  persistent nausea and vomiting     Call MD  for:  severe uncontrolled pain     Call MD for:  difficulty breathing, headache or visual disturbances     Call MD for:  redness, tenderness, or signs of infection (pain, swelling, redness, odor or green/yellow discharge around incision site)     Call MD for:  hives     Call MD for:  persistent dizziness or light-headedness     No dressing needed     Reece Hogan

## 2019-01-02 NOTE — CONSULTS
[facility] Hospital Medicine  Consult Note    Patient Name: Irlanda Lopez  MRN: [id number]  Admission Date: 1/2/2019  Hospital Length of Stay: 0 days  Attending Physician: Reece Hogan MD   Primary Care Provider: Myla Arias MD           Patient information was obtained from patient, past medical records and ER records.     Inpatient consult to Internal Medicine  Consult performed by: BOUBACAR Casiano  Consult ordered by: Reece Hogan MD        Subjective:     Principal Problem: Diverticulitis of large intestine with perforation and abscess without bleeding    Chief Complaint:   Chief Complaint   Patient presents with    Abdominal Pain        HPI: Irlanda Lopez is a 78 year old female with hyperlipidemia, hypothyroidism and short term memory difficulties who presented for colonoscopy and colon resection due to recent diverticulitis. The patient was found to have a posterior rectal abscess with cristine pus associated with a perforated diverticulum of the distal sigmoid colon. She underwent left sigmoid colectomy with a Maribel procedure. The patient denies complaint prior to surgery and reports that her pain is well controlled following surgery. Family is concerned about her reaction to having an ostomy pouch and management of the pouch upon discharge. The patient lives alone and performed her ADLs independently prior to presentation. However, family expresses concern regarding short term memory prior to surgery. Code status was discussed with the patient and her family. All are in agreement. She is a full code. Her son, Mundo Moreland, is her surrogate medical decision maker.     Past Medical History:   Diagnosis Date    High cholesterol     Hypertension     Hypothyroidism        Past Surgical History:   Procedure Laterality Date    APPENDECTOMY  2008    CATARACT EXTRACTION      Dr Raygoza       Review of patient's allergies indicates:  No Known Allergies    No current facility-administered  medications on file prior to encounter.      Current Outpatient Medications on File Prior to Encounter   Medication Sig    aspirin 81 MG Chew Take 1 tablet (81 mg total) by mouth once daily.    atorvastatin (LIPITOR) 10 MG tablet TAKE 1 TABLET EVERY DAY    cyanocobalamin (VITAMIN B-12) 100 MCG tablet Take 100 mcg by mouth once daily.    donepezil (ARICEPT) 10 MG tablet Take 1 tablet (10 mg total) by mouth every evening.    PEPPERMINT OIL MISC by Misc.(Non-Drug; Combo Route) route.    thyroid, pork, (ARMOUR THYROID) 30 mg Tab Take 1 tablet (30 mg total) by mouth once daily.    triamcinolone acetonide 0.1% (KENALOG) 0.1 % ointment APPLY TOPICALLY 4 (FOUR) TIMES DAILY. TO RASH ON FACE AND CHEST AND NOSE    magnesium citrate, bulk, Powd by Misc.(Non-Drug; Combo Route) route.    [DISCONTINUED] lisinopril-hydrochlorothiazide (PRINZIDE,ZESTORETIC) 10-12.5 mg per tablet TAKE 1 TABLET EVERY DAY     Family History     Problem Relation (Age of Onset)    Alzheimer's disease Mother    Aneurysm Father    Stomach cancer Brother        Tobacco Use    Smoking status: Former Smoker    Smokeless tobacco: Never Used   Substance and Sexual Activity    Alcohol use: No    Drug use: No    Sexual activity: No     Comment:      Review of Systems   Constitutional: Negative for appetite change, chills, diaphoresis, fatigue and fever.   HENT: Negative for congestion, ear pain, mouth sores, sore throat and trouble swallowing.    Eyes: Negative for pain and visual disturbance.   Respiratory: Negative for cough, chest tightness and shortness of breath.    Cardiovascular: Negative for chest pain, palpitations and leg swelling.   Gastrointestinal: Positive for abdominal pain. Negative for constipation and nausea.   Endocrine: Negative for cold intolerance, heat intolerance, polydipsia and polyuria.   Genitourinary: Negative for dysuria, frequency and hematuria.   Musculoskeletal: Negative for arthralgias, back pain, myalgias and  neck pain.   Skin: Negative for pallor, rash and wound.   Allergic/Immunologic: Negative for environmental allergies and immunocompromised state.   Neurological: Negative for dizziness, seizures, syncope, weakness, numbness and headaches.   Hematological: Negative for adenopathy. Does not bruise/bleed easily.   Psychiatric/Behavioral: Negative for agitation, confusion and sleep disturbance.     Objective:     Vital Signs (Most Recent):  Temp: 98.5 °F (36.9 °C) (01/02/19 1525)  Pulse: 60 (01/02/19 1525)  Resp: 14 (01/02/19 1525)  BP: (!) 149/73 (01/02/19 1525)  SpO2: 95 % (01/02/19 1525) Vital Signs (24h Range):  Temp:  [98.3 °F (36.8 °C)-99.3 °F (37.4 °C)] 98.5 °F (36.9 °C)  Pulse:  [60-78] 60  Resp:  [14-32] 14  SpO2:  [94 %-100 %] 95 %  BP: (113-149)/(69-88) 149/73     Weight: 63 kg (138 lb 14.2 oz)  Body mass index is 23.84 kg/m².    Physical Exam   Constitutional: She is oriented to person, place, and time. She appears well-developed and well-nourished. No distress.   HENT:   Head: Normocephalic and atraumatic.   Eyes: Conjunctivae are normal.   PERRL   Neck: Neck supple. No JVD present.   Cardiovascular: Normal rate, regular rhythm and normal heart sounds.   Pulmonary/Chest: Effort normal and breath sounds normal.   Abdominal: Soft. Bowel sounds are normal. She exhibits no distension. There is tenderness.   Dressing and ostomy in tact to left lower abdomen.    Musculoskeletal: Normal range of motion.   Neurological: She is alert and oriented to person, place, and time.   Skin: Skin is warm and dry.   Psychiatric: She has a normal mood and affect. Her behavior is normal. Thought content normal.   Nursing note and vitals reviewed.      Significant Labs: All pertinent labs within the past 24 hours have been reviewed.    Significant Imaging: I have reviewed all pertinent imaging results/findings within the past 24 hours.    Assessment/Plan:     * Diverticulitis of large intestine with perforation and abscess  without bleeding    -S/P sigmoid colon resection.   -Continue IV Flagyl and Cipro.   -Surgical management per General Surgery.        Hyperlipidemia    Resume statin, once appropriate.        Hypothyroidism    Resume thyroid replacement, once appropriate.          VTE Risk Mitigation (From admission, onward)        Ordered     IP VTE HIGH RISK PATIENT  Once      01/02/19 1528     Place sequential compression device  Until discontinued      01/02/19 1528              Thank you for your consult. I will follow-up with patient. Please contact us if you have any additional questions.    BOUBACAR Casiano  Department of Hospital Medicine   [facility]

## 2019-01-02 NOTE — SUBJECTIVE & OBJECTIVE
Past Medical History:   Diagnosis Date    High cholesterol     Hypertension     Hypothyroidism        Past Surgical History:   Procedure Laterality Date    APPENDECTOMY  2008    CATARACT EXTRACTION      Dr Raygoza       Review of patient's allergies indicates:  No Known Allergies    No current facility-administered medications on file prior to encounter.      Current Outpatient Medications on File Prior to Encounter   Medication Sig    aspirin 81 MG Chew Take 1 tablet (81 mg total) by mouth once daily.    atorvastatin (LIPITOR) 10 MG tablet TAKE 1 TABLET EVERY DAY    cyanocobalamin (VITAMIN B-12) 100 MCG tablet Take 100 mcg by mouth once daily.    donepezil (ARICEPT) 10 MG tablet Take 1 tablet (10 mg total) by mouth every evening.    PEPPERMINT OIL MISC by Misc.(Non-Drug; Combo Route) route.    thyroid, pork, (ARMOUR THYROID) 30 mg Tab Take 1 tablet (30 mg total) by mouth once daily.    triamcinolone acetonide 0.1% (KENALOG) 0.1 % ointment APPLY TOPICALLY 4 (FOUR) TIMES DAILY. TO RASH ON FACE AND CHEST AND NOSE    magnesium citrate, bulk, Powd by Misc.(Non-Drug; Combo Route) route.    [DISCONTINUED] lisinopril-hydrochlorothiazide (PRINZIDE,ZESTORETIC) 10-12.5 mg per tablet TAKE 1 TABLET EVERY DAY     Family History     Problem Relation (Age of Onset)    Alzheimer's disease Mother    Aneurysm Father    Stomach cancer Brother        Tobacco Use    Smoking status: Former Smoker    Smokeless tobacco: Never Used   Substance and Sexual Activity    Alcohol use: No    Drug use: No    Sexual activity: No     Comment:      Review of Systems   Constitutional: Negative for appetite change, chills, diaphoresis, fatigue and fever.   HENT: Negative for congestion, ear pain, mouth sores, sore throat and trouble swallowing.    Eyes: Negative for pain and visual disturbance.   Respiratory: Negative for cough, chest tightness and shortness of breath.    Cardiovascular: Negative for chest pain, palpitations and  leg swelling.   Gastrointestinal: Positive for abdominal pain. Negative for constipation and nausea.   Endocrine: Negative for cold intolerance, heat intolerance, polydipsia and polyuria.   Genitourinary: Negative for dysuria, frequency and hematuria.   Musculoskeletal: Negative for arthralgias, back pain, myalgias and neck pain.   Skin: Negative for pallor, rash and wound.   Allergic/Immunologic: Negative for environmental allergies and immunocompromised state.   Neurological: Negative for dizziness, seizures, syncope, weakness, numbness and headaches.   Hematological: Negative for adenopathy. Does not bruise/bleed easily.   Psychiatric/Behavioral: Negative for agitation, confusion and sleep disturbance.     Objective:     Vital Signs (Most Recent):  Temp: 98.5 °F (36.9 °C) (01/02/19 1525)  Pulse: 60 (01/02/19 1525)  Resp: 14 (01/02/19 1525)  BP: (!) 149/73 (01/02/19 1525)  SpO2: 95 % (01/02/19 1525) Vital Signs (24h Range):  Temp:  [98.3 °F (36.8 °C)-99.3 °F (37.4 °C)] 98.5 °F (36.9 °C)  Pulse:  [60-78] 60  Resp:  [14-32] 14  SpO2:  [94 %-100 %] 95 %  BP: (113-149)/(69-88) 149/73     Weight: 63 kg (138 lb 14.2 oz)  Body mass index is 23.84 kg/m².    Physical Exam   Constitutional: She is oriented to person, place, and time. She appears well-developed and well-nourished. No distress.   HENT:   Head: Normocephalic and atraumatic.   Eyes: Conjunctivae are normal.   PERRL   Neck: Neck supple. No JVD present.   Cardiovascular: Normal rate, regular rhythm and normal heart sounds.   Pulmonary/Chest: Effort normal and breath sounds normal.   Abdominal: Soft. Bowel sounds are normal. She exhibits no distension. There is tenderness.   Dressing and ostomy in tact to left lower abdomen.    Musculoskeletal: Normal range of motion.   Neurological: She is alert and oriented to person, place, and time.   Skin: Skin is warm and dry.   Psychiatric: She has a normal mood and affect. Her behavior is normal. Thought content normal.    Nursing note and vitals reviewed.      Significant Labs: All pertinent labs within the past 24 hours have been reviewed.    Significant Imaging: I have reviewed all pertinent imaging results/findings within the past 24 hours.

## 2019-01-02 NOTE — ANESTHESIA POSTPROCEDURE EVALUATION
"Anesthesia Post Evaluation    Patient: Irlanda Lopez    Procedure(s) Performed: Procedure(s) (LRB):  COLONOSCOPY (N/A)    Final Anesthesia Type: MAC  Patient location during evaluation: PACU  Patient participation: No - Unable to Participate, Sedation  Level of consciousness: sedated  Post-procedure vital signs: reviewed and stable  Pain management: adequate  Airway patency: patent  PONV status at discharge: No PONV  Anesthetic complications: no      Cardiovascular status: blood pressure returned to baseline and hemodynamically stable  Respiratory status: unassisted, room air and spontaneous ventilation  Hydration status: euvolemic  Follow-up not needed.        Visit Vitals  /69 (BP Location: Left arm, Patient Position: Lying)   Pulse 76   Temp 37.4 °C (99.3 °F) (Temporal)   Resp 18   Ht 5' 4" (1.626 m)   Wt 63 kg (139 lb)   SpO2 (!) 94%   Breastfeeding? No   BMI 23.86 kg/m²       Pain/Babar Score: No Data Recorded      "

## 2019-01-03 LAB
ANION GAP SERPL CALC-SCNC: 9 MMOL/L
BUN SERPL-MCNC: 9 MG/DL
CALCIUM SERPL-MCNC: 8.7 MG/DL
CHLORIDE SERPL-SCNC: 101 MMOL/L
CO2 SERPL-SCNC: 26 MMOL/L
CREAT SERPL-MCNC: 0.7 MG/DL
ERYTHROCYTE [DISTWIDTH] IN BLOOD BY AUTOMATED COUNT: 14.6 %
EST. GFR  (AFRICAN AMERICAN): >60 ML/MIN/1.73 M^2
EST. GFR  (NON AFRICAN AMERICAN): >60 ML/MIN/1.73 M^2
GLUCOSE SERPL-MCNC: 131 MG/DL
HCT VFR BLD AUTO: 32.9 %
HGB BLD-MCNC: 10.6 G/DL
MCH RBC QN AUTO: 27.1 PG
MCHC RBC AUTO-ENTMCNC: 32.2 G/DL
MCV RBC AUTO: 84 FL
PLATELET # BLD AUTO: 320 K/UL
PMV BLD AUTO: 9.3 FL
POTASSIUM SERPL-SCNC: 3.8 MMOL/L
RBC # BLD AUTO: 3.91 M/UL
SODIUM SERPL-SCNC: 136 MMOL/L
WBC # BLD AUTO: 14.32 K/UL

## 2019-01-03 PROCEDURE — S0030 INJECTION, METRONIDAZOLE: HCPCS | Performed by: SURGERY

## 2019-01-03 PROCEDURE — 36415 COLL VENOUS BLD VENIPUNCTURE: CPT

## 2019-01-03 PROCEDURE — 25000003 PHARM REV CODE 250: Performed by: NURSE PRACTITIONER

## 2019-01-03 PROCEDURE — 25000003 PHARM REV CODE 250: Performed by: FAMILY MEDICINE

## 2019-01-03 PROCEDURE — 99900031 HC PATIENT EDUCATION (STAT)

## 2019-01-03 PROCEDURE — 94799 UNLISTED PULMONARY SVC/PX: CPT

## 2019-01-03 PROCEDURE — 25000003 PHARM REV CODE 250: Performed by: SURGERY

## 2019-01-03 PROCEDURE — 80048 BASIC METABOLIC PNL TOTAL CA: CPT

## 2019-01-03 PROCEDURE — G8979 MOBILITY GOAL STATUS: HCPCS | Mod: CI

## 2019-01-03 PROCEDURE — G8978 MOBILITY CURRENT STATUS: HCPCS | Mod: CJ

## 2019-01-03 PROCEDURE — 97161 PT EVAL LOW COMPLEX 20 MIN: CPT

## 2019-01-03 PROCEDURE — 63600175 PHARM REV CODE 636 W HCPCS: Performed by: SURGERY

## 2019-01-03 PROCEDURE — 85027 COMPLETE CBC AUTOMATED: CPT

## 2019-01-03 PROCEDURE — 94760 N-INVAS EAR/PLS OXIMETRY 1: CPT

## 2019-01-03 PROCEDURE — 97116 GAIT TRAINING THERAPY: CPT

## 2019-01-03 PROCEDURE — 11000001 HC ACUTE MED/SURG PRIVATE ROOM

## 2019-01-03 RX ORDER — ATORVASTATIN CALCIUM 10 MG/1
10 TABLET, FILM COATED ORAL DAILY
Status: DISCONTINUED | OUTPATIENT
Start: 2019-01-03 | End: 2019-01-06 | Stop reason: HOSPADM

## 2019-01-03 RX ORDER — MEMANTINE HYDROCHLORIDE 10 MG/1
10 TABLET ORAL DAILY
Status: DISCONTINUED | OUTPATIENT
Start: 2019-01-03 | End: 2019-01-06 | Stop reason: HOSPADM

## 2019-01-03 RX ADMIN — MEMANTINE HYDROCHLORIDE 10 MG: 10 TABLET, FILM COATED ORAL at 10:01

## 2019-01-03 RX ADMIN — STANDARDIZED SENNA CONCENTRATE AND DOCUSATE SODIUM 1 TABLET: 8.6; 5 TABLET, FILM COATED ORAL at 08:01

## 2019-01-03 RX ADMIN — DONEPEZIL HYDROCHLORIDE 10 MG: 5 TABLET, FILM COATED ORAL at 08:01

## 2019-01-03 RX ADMIN — THYROID, PORCINE 30 MG: 30 TABLET ORAL at 08:01

## 2019-01-03 RX ADMIN — HYDROCODONE BITARTRATE AND ACETAMINOPHEN 1 TABLET: 5; 325 TABLET ORAL at 08:01

## 2019-01-03 RX ADMIN — METRONIDAZOLE 500 MG: 500 INJECTION, SOLUTION INTRAVENOUS at 07:01

## 2019-01-03 RX ADMIN — SODIUM CHLORIDE 250 ML: 0.9 INJECTION, SOLUTION INTRAVENOUS at 02:01

## 2019-01-03 RX ADMIN — SODIUM CHLORIDE, SODIUM LACTATE, POTASSIUM CHLORIDE, AND CALCIUM CHLORIDE: .6; .31; .03; .02 INJECTION, SOLUTION INTRAVENOUS at 01:01

## 2019-01-03 RX ADMIN — CHLORHEXIDINE GLUCONATE 10 ML: 1.2 RINSE ORAL at 08:01

## 2019-01-03 RX ADMIN — SODIUM CHLORIDE, SODIUM LACTATE, POTASSIUM CHLORIDE, AND CALCIUM CHLORIDE: .6; .31; .03; .02 INJECTION, SOLUTION INTRAVENOUS at 04:01

## 2019-01-03 RX ADMIN — ATORVASTATIN CALCIUM 10 MG: 10 TABLET, FILM COATED ORAL at 10:01

## 2019-01-03 RX ADMIN — RAMELTEON 8 MG: 8 TABLET, FILM COATED ORAL at 10:01

## 2019-01-03 RX ADMIN — HYDROCODONE BITARTRATE AND ACETAMINOPHEN 1 TABLET: 5; 325 TABLET ORAL at 10:01

## 2019-01-03 RX ADMIN — CIPROFLOXACIN 400 MG: 2 INJECTION, SOLUTION INTRAVENOUS at 02:01

## 2019-01-03 RX ADMIN — CIPROFLOXACIN 400 MG: 2 INJECTION, SOLUTION INTRAVENOUS at 01:01

## 2019-01-03 RX ADMIN — METRONIDAZOLE 500 MG: 500 INJECTION, SOLUTION INTRAVENOUS at 10:01

## 2019-01-03 RX ADMIN — METRONIDAZOLE 500 MG: 500 INJECTION, SOLUTION INTRAVENOUS at 03:01

## 2019-01-03 NOTE — PLAN OF CARE
CM met with pt and family at the bedside. Cm explained role/purpose of visit. Pt reports she lives alone and is independent with ADL's. Pt family reports when she is dc'd she will have a sitter at the bedside. Patient denies any post hospital needs or services at this time. Transitional Care Folder, Discharge Planning Begins on Admission pamphlet, [facility], Advance Directive information given to patient along with the contact information given.Instructed patient or family to call with any questions or concerns. CM updated pt white board with dee Hager contact information. CM encouraged pt to phone cm with any discharge questions.     Pharmacy: [facility] in [place]   PCP: Myla Mora  Transportation: family     01/03/19 1132   Discharge Assessment   Assessment Type Discharge Planning Assessment   Confirmed/corrected address and phone number on facesheet? Yes   Assessment information obtained from? Patient   Prior to hospitilization cognitive status: Alert/Oriented   Prior to hospitalization functional status: Independent   Current cognitive status: Alert/Oriented   Current Functional Status: Independent   Lives With alone   Able to Return to Prior Arrangements yes   Is patient able to care for self after discharge? Yes   Who are your caregiver(s) and their phone number(s)? Myla Mora; [facility] [phone]   Equipment Currently Used at Home none   Does the patient have transportation home? Yes   Transportation Anticipated family or friend will provide   Does the patient receive services at the [facility]? No   Discharge Plan A Home   Discharge Plan B Home with family   Patient/Family in Agreement with Plan yes

## 2019-01-03 NOTE — HOSPITAL COURSE
Patient admitted for diverticulitis of large intestine with perforation and abscess. S/p left sigmoid colectomy with a Maribel procedure on 1/2/19 by Dr. Hogan. 1/3/19- POD #1, pain controlled. WBCs elevated most likely reactive. Will continue IV abx. Blood pressure marginally low, will give 250ml NS bolus and continue maintenance fluids. 1/4/19- Blood pressure has stabilized. WBCs normalized. K+3.2, will replete. 1/5/19- Pt verbalized symptom improvement.  Pt able to ambulate successfully without distress in hallways.  H/H stable with Potassium and WBCs normalized.  Colostomy site intanct with stoma pink and liquid stool present.  Diet advanced as tolerated.  Pt seen and examined and noted to be stable for discharge per primary team. 1/6/19- Home health verified per case management. Discharge anticipated today per primary team.

## 2019-01-03 NOTE — CONSULTS
"Consulted on this 77 y/o F patient due to new colostomy. Patient admitted due to perforation of diverticulitis with abscess, and underwent a bowel resection with placement of colostomy overnight. She is currently awake and alert, denies pain and nausea.  Daughter present at bedside.  Abdomen assessed. LLQ colostomy noted with intact one piece flat pouch placed in surgery noted, no leak.  Bowel sweat noted in pouch. Stoma is moist and red and mildly engorged.  Surgical dressing intact to ML abdomen. Patient allowed to view abdomen in mirror at this time.  Colostomy education initiated using teach back method.  Topics covered include but not limited to pouching system, when to change pouch, how to change pouch, when to empty pouch, how to empty pouch, stoma care, dietary recommendations, activity restrictions, and hygiene.  Printed educational material left at bedside.  Demonstrated opening, emptying, and securing pouch to patient, and sample left at bedside for practice.  Supplies left at bedside include 1 box sensura tree pouches, 1 box brava moldable rings, 1 cavilon spray. Will continue to follow closely during hospitalization for continued education and reinforcement.     Please review Maddison's procedure "Pouching : Colostomy or Ileostomy" for instructions on ostomy.stoma care. Change ostomy appliance weekly or IMMEDIATELY IF LEAKING. All ostomy care supplies can be requested from materials management.    OSTOMY CARE SUPPLIES:  One Piece Pouch #1040    Brava Stoma Ring #[id number]    Cavilon Spray #[id number]          "

## 2019-01-03 NOTE — PROGRESS NOTES
[facility] Hospital Medicine  Progress Note    Patient Name: Irlanda Lopez  MRN: [id number]  Patient Class: IP- Inpatient   Admission Date: 1/2/2019  Length of Stay: 1 days  Attending Physician: Reece Hogan MD  Primary Care Provider: Myla Arias MD        Subjective:     Principal Problem:Diverticulitis of large intestine with perforation and abscess without bleeding    HPI:  Irlanda Lopez is a 78 year old female with hyperlipidemia, hypothyroidism and short term memory difficulties who presented for colonoscopy and colon resection due to recent diverticulitis. The patient was found to have a posterior rectal abscess with cristine pus associated with a perforated diverticulum of the distal sigmoid colon. She underwent left sigmoid colectomy with a Maribel procedure. The patient denies complaint prior to surgery and reports that her pain is well controlled following surgery. Family is concerned about her reaction to having an ostomy pouch and management of the pouch upon discharge. The patient lives alone and performed her ADLs independently prior to presentation. However, family expresses concern regarding short term memory prior to surgery. Code status was discussed with the patient and her family. All are in agreement. She is a full code. Her son, Mundo Moreland, is her surrogate medical decision maker.     Hospital Course:  Patient admitted for diverticulitis of large intestine with perforation and abscess. S/p left sigmoid colectomy with a Maribel procedure on 1/2/19 by Dr. Hogan. 1/3/19- POD #1, pain controlled. WBCs elevated most likely reactive. Will continue IV abx. Blood pressure marginally low, will give 250ml NS bolus and continue maintenance fluids.          Interval History: No acute events overnight. S/p sigmoid colon resection and colostomy placement. POD#1, pain controlled. WBCs elevated most likely reactive. Will continue IV abx.       Review of Systems   Constitutional: Negative  for appetite change, chills, diaphoresis, fatigue and fever.   HENT: Negative for congestion, ear pain, mouth sores, sore throat and trouble swallowing.    Eyes: Negative for pain and visual disturbance.   Respiratory: Negative for cough, chest tightness and shortness of breath.    Cardiovascular: Negative for chest pain, palpitations and leg swelling.   Gastrointestinal: Positive for abdominal pain. Negative for constipation and nausea.   Endocrine: Negative for cold intolerance, heat intolerance, polydipsia and polyuria.   Genitourinary: Negative for dysuria, frequency and hematuria.   Musculoskeletal: Negative for arthralgias, back pain, myalgias and neck pain.   Skin: Negative for pallor, rash and wound.   Allergic/Immunologic: Negative for environmental allergies and immunocompromised state.   Neurological: Negative for dizziness, seizures, syncope, weakness, numbness and headaches.   Hematological: Negative for adenopathy. Does not bruise/bleed easily.   Psychiatric/Behavioral: Negative for agitation, confusion and sleep disturbance.     Objective:     Vital Signs (Most Recent):  Temp: 98.4 °F (36.9 °C) (01/03/19 1135)  Pulse: (!) 54 (01/03/19 1135)  Resp: 18 (01/03/19 1135)  BP: 99/62 (01/03/19 1135)  SpO2: 96 % (01/03/19 1135) Vital Signs (24h Range):  Temp:  [97.3 °F (36.3 °C)-99.3 °F (37.4 °C)] 98.4 °F (36.9 °C)  Pulse:  [52-67] 54  Resp:  [14-32] 18  SpO2:  [90 %-100 %] 96 %  BP: ()/(54-80) 99/62     Weight: 63 kg (138 lb 14.2 oz)  Body mass index is 23.84 kg/m².    Intake/Output Summary (Last 24 hours) at 1/3/2019 1342  Last data filed at 1/3/2019 0800  Gross per 24 hour   Intake 2046.67 ml   Output --   Net 2046.67 ml      Physical Exam   Constitutional: She is oriented to person, place, and time. She appears well-developed and well-nourished. No distress.   HENT:   Head: Normocephalic and atraumatic.   Eyes: Conjunctivae are normal.   Neck: Neck supple. No JVD present.   Cardiovascular: Normal  rate, regular rhythm and normal heart sounds.   Pulmonary/Chest: Effort normal and breath sounds normal.   Abdominal: Soft. Bowel sounds are normal. She exhibits no distension. There is tenderness (incisional ).   Dressing and ostomy in tact to left lower abdomen.    Musculoskeletal: Normal range of motion.   Neurological: She is alert and oriented to person, place, and time.   Skin: Skin is warm and dry.   Psychiatric: She has a normal mood and affect. Her behavior is normal. Thought content normal.   Nursing note and vitals reviewed.      Significant Labs:   BMP:   Recent Labs   Lab 01/03/19  0439   *      K 3.8      CO2 26   BUN 9   CREATININE 0.7   CALCIUM 8.7     CBC:   Recent Labs   Lab 01/03/19 0439   WBC 14.32*   HGB 10.6*   HCT 32.9*        CMP:   Recent Labs   Lab 01/03/19  0439      K 3.8      CO2 26   *   BUN 9   CREATININE 0.7   CALCIUM 8.7   ANIONGAP 9   EGFRNONAA >60     All pertinent labs within the past 24 hours have been reviewed.    Significant Imaging:       Assessment/Plan:      * Diverticulitis of large intestine with perforation and abscess without bleeding    -S/P sigmoid colon resection.   -Continue IV Flagyl and Cipro.   -Surgical management per General Surgery.   1/3/1-  -POD #1, pain controlled   -Wound care consult for stoma care        Hyperlipidemia    Resume atorvastatin        Hypothyroidism    Resume thyroid replacement, once appropriate.            VTE Risk Mitigation (From admission, onward)        Ordered     IP VTE HIGH RISK PATIENT  Once      01/02/19 1528     Place sequential compression device  Until discontinued      01/02/19 1528              Vidhi Wooten NP  Department of Hospital Medicine   [facility]

## 2019-01-03 NOTE — ASSESSMENT & PLAN NOTE
-S/P sigmoid colon resection.   -Continue IV Flagyl and Cipro.   -Surgical management per General Surgery.   1/3/1-  -POD #1, pain controlled   -Wound care consult for stoma care

## 2019-01-03 NOTE — PLAN OF CARE
Problem: Adult Inpatient Plan of Care  Goal: Plan of Care Review  Outcome: Ongoing (interventions implemented as appropriate)  Dressing CDI. Ostomy intact. No output. Patient voided within the appropriate time following saldana catheter removal. She ambulated down the seth 3 times today and to the bathroom. Patient's pain is managed with relaxation methods and PRN medication. Safety measures maintained. Call button in reach. Chart check complete. Will continue to monitor.

## 2019-01-03 NOTE — PLAN OF CARE
Problem: Adult Inpatient Plan of Care  Goal: Plan of Care Review  Outcome: Ongoing (interventions implemented as appropriate)  Plan of care discussed with patient . Possible side effects of medications was discussed. No concerns voiced. Pt denies pain at this time. Pain controled with PO medications, positioning, and relaxation techniques. Ambulating well with assistance. Remains free from injuries. Side rails up, nonskid socks placed, call bell within reach, personal area free from clutter. Will continue to monitor

## 2019-01-03 NOTE — PT/OT/SLP EVAL
Physical Therapy Evaluation    Patient Name:  Irlanda Lopez   MRN:  [id number]    Recommendations:     Discharge Recommendations:  other (see comments)(HOME WITH  P.T.)   Discharge Equipment Recommendations: none   Barriers to discharge: None    Assessment:     Irlanda Lopez is a 78 y.o. female admitted with a medical diagnosis of Diverticulitis of large intestine with perforation and abscess without bleeding.  She presents with the following impairments/functional limitations:  weakness, impaired balance, impaired functional mobilty, impaired endurance, gait instability, decreased lower extremity function, pain, decreased ROM .    Rehab Prognosis: Good; patient would benefit from acute skilled PT services to address these deficits and reach maximum level of function.    Recent Surgery: Procedure(s) (LRB):  COLECTOMY, SIGMOID (Left)  CREATION, COLOSTOMY (Left)  SHAHIDA PROCEDURE (N/A)  LYSIS, ADHESIONS (N/A) 1 Day Post-Op    Plan:     During this hospitalization, patient to be seen   to address the identified rehab impairments via gait training, therapeutic activities, therapeutic exercises and progress toward the following goals:    · Plan of Care Expires:  01/10/19    Subjective     Chief Complaint: PAIN  Patient/Family Comments/goals: INC STRENGTH  Pain/Comfort:  · Pain Rating 1: 0/10  · Location 1: abdomen  · Pain Rating Post-Intervention 1: 3/10    Patients cultural, spiritual, Restorationist conflicts given the current situation:      Living Environment:  PT LIVES AT HOME ALONE AND WAS IND   Prior to admission, patients level of function was IND AND DRIVES.  Equipment used at home: none.  DME owned (not currently used): none.  Upon discharge, patient will have assistance from FAMILY.    Objective:     Communicated with NURSE AND Epic CHART REVIEW prior to session.  Patient found SUP IN BED peripheral IV, colostomy  upon PT entry to room.    General Precautions: Standard,     Orthopedic Precautions:N/A    Braces: N/A     Exams:  · RLE ROM: WFL  · RLE Strength: WFL  · LLE ROM: WFL  · LLE Strength: WFL    Functional Mobility:  PT MET IN RM LOG ROLLED TO LEFT AND SEATED EOB WITH SBA. PT STOOD WITH RW AND SBA FOR GT X 150' WITH RW AND SBA PT RETURNED TO RM T/F TO CHAIR WITH RW AND SBA. PT EDUCATED ON ROLE OF P.T. AND LEFT WITH ALL NEEDS MET     AM-PAC 6 CLICK MOBILITY  Total Score:19     Patient left up in chair with call button in reach.    GOALS:   Multidisciplinary Problems     Physical Therapy Goals        Problem: Physical Therapy Goal    Goal Priority Disciplines Outcome Goal Variances Interventions   Physical Therapy Goal     PT, PT/OT      Description:  PT WILL BE SEEN FOR P.T. FOR A MIN OF 5 OUT OF 7 DAYS A WEEK  LT/10/19  1. PT WILL COMPLETE BED MOBILITY IND.  2. PT WILL T/F TO CHAIR WITH OR WITHOUT RW AND S  3. PT WILL GT TRAIN WITH OR WITHOUT RW X 250' AND S  4. PT WILL COMPLETE B LE TE X 20 REPS                      History:     Past Medical History:   Diagnosis Date    High cholesterol     Hypertension     Hypothyroidism        Past Surgical History:   Procedure Laterality Date    APPENDECTOMY      CATARACT EXTRACTION      Dr Raygoza    COLECTOMY, SIGMOID Left 2019    [facility]    COLONOSCOPY N/A 2019    [facility] ENDO    CREATION, COLOSTOMY Left 2019    [facility]    SHAHIDA PROCEDURE N/A 2019    [facility]    LYSIS, ADHESIONS N/A 2019    [facility] at [facility]       Clinical Decision Making:     History  Co-morbidities and personal factors that may impact the plan of care Examination  Body Structures and Functions, activity limitations and participation restrictions that may impact the plan of care Clinical Presentation   Decision Making/ Complexity Score   Co-morbidities:   [] Time since onset of injury / illness / exacerbation  [] Status of  current condition  []Patient's cognitive status and safety concerns    [] Multiple Medical Problems (see med hx)  Personal Factors:   [] Patient's age  [] Prior Level of function   [] Patient's home situation (environment and family support)  [] Patient's level of motivation  [] Expected progression of patient      HISTORY:(criteria)    [] 20631 - no personal factors/history    [] 29958 - has 1-2 personal factor/comorbidity     [] 55751 - has >3 personal factor/comorbidity     Body Regions:  [] Objective examination findings  [] Head     []  Neck  [] Trunk   [] Upper Extremity  [] Lower Extremity    Body Systems:  [] For communication ability, affect, cognition, language, and learning style: the assessment of the ability to make needs known, consciousness, orientation (person, place, and time), expected emotional /behavioral responses, and learning preferences (eg, learning barriers, education  needs)  [] For the neuromuscular system: a general assessment of gross coordinated movement (eg, balance, gait, locomotion, transfers, and transitions) and motor function  (motor control and motor learning)  [] For the musculoskeletal system: the assessment of gross symmetry, gross range of motion, gross strength, height, and weight  [] For the integumentary system: the assessment of pliability(texture), presence of scar formation, skin color, and skin integrity  [] For cardiovascular/pulmonary system: the assessment of heart rate, respiratory rate, blood pressure, and edema     Activity limitations:    [] Patient's cognitive status and saf ety concerns          [] Status of current condition      [] Weight bearing restriction  [] Cardiopulmunary Restriction    Participation Restrictions:   [] Goals and goal agreement with the patient     [] Rehab potential (prognosis) and probable outcome      Examination of Body System: (criteria)    [] 46094 - addressing 1-2 elements    [] 53127 - addressing a total of 3 or more elements      [] 56797 -  Addressing a total of 4 or more elements         Clinical Presentation: (criteria)  Choose one     On examination of body system using standardized tests and measures patient presents with (CHOOSE ONE) elements from any of the following: body structures and functions, activity limitations, and/or participation restrictions.  Leading to a clinical presentation that is considered (CHOOSE ONE)                              Clinical Decision Making  (Eval Complexity):  Choose One     Time Tracking:     PT Received On: 01/03/19  PT Start Time: 1115     PT Stop Time: 1140  PT Total Time (min): 25 min     Billable Minutes: Evaluation 15 and Gait Training 10      Shanice Thapa, PT  01/03/2019

## 2019-01-03 NOTE — SUBJECTIVE & OBJECTIVE
Interval History: No acute events overnight. S/p sigmoid colon resection and colostomy placement. POD#1, pain controlled. WBCs elevated most likely reactive. Will continue IV abx.       Review of Systems   Constitutional: Negative for appetite change, chills, diaphoresis, fatigue and fever.   HENT: Negative for congestion, ear pain, mouth sores, sore throat and trouble swallowing.    Eyes: Negative for pain and visual disturbance.   Respiratory: Negative for cough, chest tightness and shortness of breath.    Cardiovascular: Negative for chest pain, palpitations and leg swelling.   Gastrointestinal: Positive for abdominal pain. Negative for constipation and nausea.   Endocrine: Negative for cold intolerance, heat intolerance, polydipsia and polyuria.   Genitourinary: Negative for dysuria, frequency and hematuria.   Musculoskeletal: Negative for arthralgias, back pain, myalgias and neck pain.   Skin: Negative for pallor, rash and wound.   Allergic/Immunologic: Negative for environmental allergies and immunocompromised state.   Neurological: Negative for dizziness, seizures, syncope, weakness, numbness and headaches.   Hematological: Negative for adenopathy. Does not bruise/bleed easily.   Psychiatric/Behavioral: Negative for agitation, confusion and sleep disturbance.     Objective:     Vital Signs (Most Recent):  Temp: 98.4 °F (36.9 °C) (01/03/19 1135)  Pulse: (!) 54 (01/03/19 1135)  Resp: 18 (01/03/19 1135)  BP: 99/62 (01/03/19 1135)  SpO2: 96 % (01/03/19 1135) Vital Signs (24h Range):  Temp:  [97.3 °F (36.3 °C)-99.3 °F (37.4 °C)] 98.4 °F (36.9 °C)  Pulse:  [52-67] 54  Resp:  [14-32] 18  SpO2:  [90 %-100 %] 96 %  BP: ()/(54-80) 99/62     Weight: 63 kg (138 lb 14.2 oz)  Body mass index is 23.84 kg/m².    Intake/Output Summary (Last 24 hours) at 1/3/2019 1342  Last data filed at 1/3/2019 0800  Gross per 24 hour   Intake 2046.67 ml   Output --   Net 2046.67 ml      Physical Exam   Constitutional: She is oriented  to person, place, and time. She appears well-developed and well-nourished. No distress.   HENT:   Head: Normocephalic and atraumatic.   Eyes: Conjunctivae are normal.   Neck: Neck supple. No JVD present.   Cardiovascular: Normal rate, regular rhythm and normal heart sounds.   Pulmonary/Chest: Effort normal and breath sounds normal.   Abdominal: Soft. Bowel sounds are normal. She exhibits no distension. There is tenderness (incisional ).   Dressing and ostomy in tact to left lower abdomen.    Musculoskeletal: Normal range of motion.   Neurological: She is alert and oriented to person, place, and time.   Skin: Skin is warm and dry.   Psychiatric: She has a normal mood and affect. Her behavior is normal. Thought content normal.   Nursing note and vitals reviewed.      Significant Labs:   BMP:   Recent Labs   Lab 01/03/19  0439   *      K 3.8      CO2 26   BUN 9   CREATININE 0.7   CALCIUM 8.7     CBC:   Recent Labs   Lab 01/03/19 0439   WBC 14.32*   HGB 10.6*   HCT 32.9*        CMP:   Recent Labs   Lab 01/03/19  0439      K 3.8      CO2 26   *   BUN 9   CREATININE 0.7   CALCIUM 8.7   ANIONGAP 9   EGFRNONAA >60     All pertinent labs within the past 24 hours have been reviewed.    Significant Imaging:

## 2019-01-04 ENCOUNTER — PATIENT MESSAGE (OUTPATIENT)
Dept: SURGERY | Facility: CLINIC | Age: 79
End: 2019-01-04

## 2019-01-04 PROBLEM — E87.6 HYPOKALEMIA: Status: ACTIVE | Noted: 2019-01-04

## 2019-01-04 LAB
ANION GAP SERPL CALC-SCNC: 8 MMOL/L
BUN SERPL-MCNC: 7 MG/DL
CALCIUM SERPL-MCNC: 8.3 MG/DL
CHLORIDE SERPL-SCNC: 104 MMOL/L
CO2 SERPL-SCNC: 25 MMOL/L
CREAT SERPL-MCNC: 0.6 MG/DL
ERYTHROCYTE [DISTWIDTH] IN BLOOD BY AUTOMATED COUNT: 15 %
EST. GFR  (AFRICAN AMERICAN): >60 ML/MIN/1.73 M^2
EST. GFR  (NON AFRICAN AMERICAN): >60 ML/MIN/1.73 M^2
GLUCOSE SERPL-MCNC: 92 MG/DL
HCT VFR BLD AUTO: 31.9 %
HGB BLD-MCNC: 10.2 G/DL
MCH RBC QN AUTO: 26.7 PG
MCHC RBC AUTO-ENTMCNC: 32 G/DL
MCV RBC AUTO: 84 FL
PLATELET # BLD AUTO: 318 K/UL
PMV BLD AUTO: 9.4 FL
POTASSIUM SERPL-SCNC: 3.2 MMOL/L
RBC # BLD AUTO: 3.82 M/UL
SODIUM SERPL-SCNC: 137 MMOL/L
WBC # BLD AUTO: 9.73 K/UL

## 2019-01-04 PROCEDURE — 25000003 PHARM REV CODE 250: Performed by: SURGERY

## 2019-01-04 PROCEDURE — 85027 COMPLETE CBC AUTOMATED: CPT

## 2019-01-04 PROCEDURE — 25000003 PHARM REV CODE 250: Performed by: FAMILY MEDICINE

## 2019-01-04 PROCEDURE — 99900035 HC TECH TIME PER 15 MIN (STAT)

## 2019-01-04 PROCEDURE — S0030 INJECTION, METRONIDAZOLE: HCPCS | Performed by: SURGERY

## 2019-01-04 PROCEDURE — 80048 BASIC METABOLIC PNL TOTAL CA: CPT

## 2019-01-04 PROCEDURE — 99024 POSTOP FOLLOW-UP VISIT: CPT | Mod: ,,, | Performed by: SURGERY

## 2019-01-04 PROCEDURE — 25000003 PHARM REV CODE 250: Performed by: NURSE PRACTITIONER

## 2019-01-04 PROCEDURE — 97530 THERAPEUTIC ACTIVITIES: CPT

## 2019-01-04 PROCEDURE — 11000001 HC ACUTE MED/SURG PRIVATE ROOM

## 2019-01-04 PROCEDURE — 36415 COLL VENOUS BLD VENIPUNCTURE: CPT

## 2019-01-04 PROCEDURE — 94799 UNLISTED PULMONARY SVC/PX: CPT

## 2019-01-04 PROCEDURE — 97116 GAIT TRAINING THERAPY: CPT

## 2019-01-04 PROCEDURE — 63600175 PHARM REV CODE 636 W HCPCS: Performed by: SURGERY

## 2019-01-04 PROCEDURE — 99024 PR POST-OP FOLLOW-UP VISIT: ICD-10-PCS | Mod: ,,, | Performed by: SURGERY

## 2019-01-04 RX ORDER — ALPRAZOLAM 0.25 MG/1
0.25 TABLET ORAL 2 TIMES DAILY PRN
Status: DISCONTINUED | OUTPATIENT
Start: 2019-01-04 | End: 2019-01-06 | Stop reason: HOSPADM

## 2019-01-04 RX ORDER — POTASSIUM CHLORIDE 20 MEQ/1
40 TABLET, EXTENDED RELEASE ORAL EVERY 4 HOURS
Status: COMPLETED | OUTPATIENT
Start: 2019-01-04 | End: 2019-01-04

## 2019-01-04 RX ADMIN — METRONIDAZOLE 500 MG: 500 INJECTION, SOLUTION INTRAVENOUS at 06:01

## 2019-01-04 RX ADMIN — HYDROCODONE BITARTRATE AND ACETAMINOPHEN 1 TABLET: 5; 325 TABLET ORAL at 04:01

## 2019-01-04 RX ADMIN — DONEPEZIL HYDROCHLORIDE 10 MG: 5 TABLET, FILM COATED ORAL at 09:01

## 2019-01-04 RX ADMIN — POTASSIUM CHLORIDE 40 MEQ: 1500 TABLET, EXTENDED RELEASE ORAL at 03:01

## 2019-01-04 RX ADMIN — ATORVASTATIN CALCIUM 10 MG: 10 TABLET, FILM COATED ORAL at 09:01

## 2019-01-04 RX ADMIN — CHLORHEXIDINE GLUCONATE 10 ML: 1.2 RINSE ORAL at 09:01

## 2019-01-04 RX ADMIN — HYDROCODONE BITARTRATE AND ACETAMINOPHEN 1 TABLET: 5; 325 TABLET ORAL at 11:01

## 2019-01-04 RX ADMIN — SODIUM CHLORIDE, SODIUM LACTATE, POTASSIUM CHLORIDE, AND CALCIUM CHLORIDE: .6; .31; .03; .02 INJECTION, SOLUTION INTRAVENOUS at 03:01

## 2019-01-04 RX ADMIN — STANDARDIZED SENNA CONCENTRATE AND DOCUSATE SODIUM 1 TABLET: 8.6; 5 TABLET, FILM COATED ORAL at 09:01

## 2019-01-04 RX ADMIN — HYDROCODONE BITARTRATE AND ACETAMINOPHEN 1 TABLET: 5; 325 TABLET ORAL at 09:01

## 2019-01-04 RX ADMIN — CIPROFLOXACIN 400 MG: 2 INJECTION, SOLUTION INTRAVENOUS at 03:01

## 2019-01-04 RX ADMIN — CIPROFLOXACIN 400 MG: 2 INJECTION, SOLUTION INTRAVENOUS at 02:01

## 2019-01-04 RX ADMIN — MEMANTINE HYDROCHLORIDE 10 MG: 10 TABLET, FILM COATED ORAL at 09:01

## 2019-01-04 RX ADMIN — RAMELTEON 8 MG: 8 TABLET, FILM COATED ORAL at 09:01

## 2019-01-04 RX ADMIN — THYROID, PORCINE 30 MG: 30 TABLET ORAL at 09:01

## 2019-01-04 RX ADMIN — METRONIDAZOLE 500 MG: 500 INJECTION, SOLUTION INTRAVENOUS at 03:01

## 2019-01-04 RX ADMIN — METRONIDAZOLE 500 MG: 500 INJECTION, SOLUTION INTRAVENOUS at 11:01

## 2019-01-04 RX ADMIN — POTASSIUM CHLORIDE 40 MEQ: 1500 TABLET, EXTENDED RELEASE ORAL at 09:01

## 2019-01-04 NOTE — PROGRESS NOTES
[facility] Hospital Medicine  Progress Note    Patient Name: Irlanda Lopez  MRN: [id number]  Patient Class: IP- Inpatient   Admission Date: 1/2/2019  Length of Stay: 2 days  Attending Physician: Reece Hogan MD  Primary Care Provider: Myla Arias MD        Subjective:     Principal Problem:Diverticulitis of large intestine with perforation and abscess without bleeding    HPI:  Irlanda Lopez is a 78 year old female with hyperlipidemia, hypothyroidism and short term memory difficulties who presented for colonoscopy and colon resection due to recent diverticulitis. The patient was found to have a posterior rectal abscess with cristine pus associated with a perforated diverticulum of the distal sigmoid colon. She underwent left sigmoid colectomy with a Maribel procedure. The patient denies complaint prior to surgery and reports that her pain is well controlled following surgery. Family is concerned about her reaction to having an ostomy pouch and management of the pouch upon discharge. The patient lives alone and performed her ADLs independently prior to presentation. However, family expresses concern regarding short term memory prior to surgery. Code status was discussed with the patient and her family. All are in agreement. She is a full code. Her son, Mundo Moreland, is her surrogate medical decision maker.     Hospital Course:  Patient admitted for diverticulitis of large intestine with perforation and abscess. S/p left sigmoid colectomy with a Maribel procedure on 1/2/19 by Dr. Hogan. 1/3/19- POD #1, pain controlled. WBCs elevated most likely reactive. Will continue IV abx. Blood pressure marginally low, will give 250ml NS bolus and continue maintenance fluids. 1/4/19- Blood pressure has stabilized. WBCs normalized. K+3.2, will replete.         Interval History: Patient sitting up in chair, pain controlled. BP stable. K+ 3.2, will replete. WBCs normal.     Review of Systems   Constitutional:  Negative for appetite change, chills, diaphoresis, fatigue and fever.   HENT: Negative for congestion, ear pain, mouth sores, sore throat and trouble swallowing.    Eyes: Negative for pain and visual disturbance.   Respiratory: Negative for cough, chest tightness and shortness of breath.    Cardiovascular: Negative for chest pain, palpitations and leg swelling.   Gastrointestinal: Positive for abdominal pain. Negative for constipation and nausea.   Endocrine: Negative for cold intolerance, heat intolerance, polydipsia and polyuria.   Genitourinary: Negative for dysuria, frequency and hematuria.   Musculoskeletal: Negative for arthralgias, back pain, myalgias and neck pain.   Skin: Negative for pallor, rash and wound.   Allergic/Immunologic: Negative for environmental allergies and immunocompromised state.   Neurological: Negative for dizziness, seizures, syncope, weakness, numbness and headaches.   Hematological: Negative for adenopathy. Does not bruise/bleed easily.   Psychiatric/Behavioral: Negative for agitation, confusion and sleep disturbance.     Objective:     Vital Signs (Most Recent):  Temp: 97.5 °F (36.4 °C) (01/04/19 0706)  Pulse: (!) 54 (01/04/19 0706)  Resp: 18 (01/04/19 0706)  BP: 137/79 (01/04/19 0706)  SpO2: 96 % (01/04/19 0706) Vital Signs (24h Range):  Temp:  [97.5 °F (36.4 °C)-98.5 °F (36.9 °C)] 97.5 °F (36.4 °C)  Pulse:  [54-60] 54  Resp:  [14-18] 18  SpO2:  [90 %-96 %] 96 %  BP: (103-137)/(56-79) 137/79     Weight: 63 kg (138 lb 14.2 oz)  Body mass index is 23.84 kg/m².    Intake/Output Summary (Last 24 hours) at 1/4/2019 1402  Last data filed at 1/4/2019 1300  Gross per 24 hour   Intake 3676.67 ml   Output 125 ml   Net 3551.67 ml      Physical Exam   Constitutional: She is oriented to person, place, and time. She appears well-developed and well-nourished. No distress.   Elderly    HENT:   Head: Normocephalic and atraumatic.   Eyes: Conjunctivae are normal.   Neck: Neck supple. No JVD present.    Cardiovascular: Normal rate, regular rhythm and normal heart sounds.   Pulmonary/Chest: Effort normal and breath sounds normal.   Abdominal: Soft. Bowel sounds are normal. She exhibits no distension. There is tenderness (incisional ).   Midline abdominal incision with staples intact, no s/s of infection. Colostomy intact to left lower abdomen.    Musculoskeletal: Normal range of motion.   Neurological: She is alert and oriented to person, place, and time.   Skin: Skin is warm and dry.   Psychiatric: She has a normal mood and affect. Her behavior is normal. Thought content normal.   Nursing note and vitals reviewed.      Significant Labs:   BMP:   Recent Labs   Lab 01/04/19 0457   GLU 92      K 3.2*      CO2 25   BUN 7*   CREATININE 0.6   CALCIUM 8.3*     CBC:   Recent Labs   Lab 01/03/19 0439 01/04/19 0457   WBC 14.32* 9.73   HGB 10.6* 10.2*   HCT 32.9* 31.9*    318       Significant Imaging:       Assessment/Plan:      * Diverticulitis of large intestine with perforation and abscess without bleeding    -S/P sigmoid colon resection.   -Continue IV Flagyl and Cipro.   -Surgical management per General Surgery.   1/3/1-  -POD #1, pain controlled   -Wound care consult for stoma care   1/4/19  -POD #2, WBCs normal   -Continue PT        Hypokalemia    Most likely due to GI loss  K+3.2  Will replete        Hyperlipidemia    Resume atorvastatin        Hypothyroidism    Resume thyroid replacement         VTE Risk Mitigation (From admission, onward)        Ordered     IP VTE HIGH RISK PATIENT  Once      01/02/19 1528     Place sequential compression device  Until discontinued      01/02/19 1528              Vidhi Wooten NP  Department of Hospital Medicine   [facility]

## 2019-01-04 NOTE — PROGRESS NOTES
"Follow up visit with Ms. Lopez for continued ostomy education and management.  Patient's daughter at bedside excused herself when I entered room.  Printed educational material provided to patient. Patient states "I don't think I can do this", and "do I have to look?" when I told her I was going to assess her colostomy and teach her how to change the pouch.  She states many times throughout my visit "If I knew I would end up like this, I would have not had surgery", "I just don't know how I will be able to go anywhere or do anything now", " How am I going to wear clothes", "I won't be able to remember how to do any of this".  Encouragement and assurances provided.  LLQ colostomy noted with pouch intact with moderate amount bilious output noted.  Reviewed steps to empty pouch with patient at this time.  She states she did not practice with clean pouch that I left at bedside yesterday.  She refuses to empty pouch at this time, states "I'll try later".  Educated patient again on pouch changing and stoma care. She at first refused to look today, but Hand mirror provided to patient to watch and she did look, though she continued to refuse to touch or assist despite frequent multiple attempts. I also attempted to get daughter back into room to view and assist with ostomy care, however, daughter stated multiple times to primary nurse INGRID Hager "I just can't"..."I can't see that".."I can't do that".." I do not want to be in there" and refused to come in to view, learn, or assist myself and patient. Pouch removed and caridad stomal skin cleansed with water and 4x4 gauze, patted dry.  Caridad stomal skin sprayed with cavilon.  Stoma is moist and kindra red, measures 43mm.  Brava moldable ring applied around stoma. One piece sensure [place] pouch cut to size and applied, pressing gently to seal. Patient tolerated care well and did watch with mirror, but would not participate or assist with care. She repeated the same questions about the " "care several times and has trouble with short term recall.  Will type out steps for stoma care and appliance change in large print type for patient to refer to at discharge.  After completing care, steps again reviewed. Patient continues to states over an over "  "If I knew I would end up like this, I would have not had surgery", "I just don't know how I will be able to go anywhere or do anything now", " How am I going to wear clothes", "I won't be able to remember how to do any of this".    I discussed with patient how this was temporary, and that she can do it. That it will get easier with time.  I offered tips for clothing to minimize look of the pouch including loose blouses and elastic waist pants and skirts.  I encouraged patient to speak with her Sunday  or  regarding her anxieties and fears as she states she is active in her [employer] and now thinks she cannot even go there because of her ostomy.    Patient seems to have some dementia (with her frequent repeating of questions and short term memory issues). She lives alone currently, and  is being set up, however, patient needs STRONG family support which she does NOT have. She will greatly benefit from skilled care at discharge for continued ostomy care as I am unsure if she is able to recall and perform own care and her family seems unwilling to step in and help.  She also seems depressed over her colostomy.  I made plans to visit patient Monday again and walk her through the steps to change her own ostomy pouch, and she consented at this time.  Will continue to follow.   "

## 2019-01-04 NOTE — PLAN OF CARE
Problem: Adult Inpatient Plan of Care  Goal: Plan of Care Review  Outcome: Ongoing (interventions implemented as appropriate)  Plan of care discussed with patient and daughter. Possible side effects of medications was discussed. No concerns voiced. Pt denies pain at this time. Pain controled with PO medications, positioning, and relaxation techniques. Ambulating well with assistance. Remains free from injuries. Side rails up, nonskid socks placed, call bell within reach, personal area free from clutter. Will continue to monitor

## 2019-01-04 NOTE — SUBJECTIVE & OBJECTIVE
Interval History: Patient sitting up in chair, pain controlled. BP stable. K+ 3.2, will replete. WBCs normal.     Review of Systems   Constitutional: Negative for appetite change, chills, diaphoresis, fatigue and fever.   HENT: Negative for congestion, ear pain, mouth sores, sore throat and trouble swallowing.    Eyes: Negative for pain and visual disturbance.   Respiratory: Negative for cough, chest tightness and shortness of breath.    Cardiovascular: Negative for chest pain, palpitations and leg swelling.   Gastrointestinal: Positive for abdominal pain. Negative for constipation and nausea.   Endocrine: Negative for cold intolerance, heat intolerance, polydipsia and polyuria.   Genitourinary: Negative for dysuria, frequency and hematuria.   Musculoskeletal: Negative for arthralgias, back pain, myalgias and neck pain.   Skin: Negative for pallor, rash and wound.   Allergic/Immunologic: Negative for environmental allergies and immunocompromised state.   Neurological: Negative for dizziness, seizures, syncope, weakness, numbness and headaches.   Hematological: Negative for adenopathy. Does not bruise/bleed easily.   Psychiatric/Behavioral: Negative for agitation, confusion and sleep disturbance.     Objective:     Vital Signs (Most Recent):  Temp: 97.5 °F (36.4 °C) (01/04/19 0706)  Pulse: (!) 54 (01/04/19 0706)  Resp: 18 (01/04/19 0706)  BP: 137/79 (01/04/19 0706)  SpO2: 96 % (01/04/19 0706) Vital Signs (24h Range):  Temp:  [97.5 °F (36.4 °C)-98.5 °F (36.9 °C)] 97.5 °F (36.4 °C)  Pulse:  [54-60] 54  Resp:  [14-18] 18  SpO2:  [90 %-96 %] 96 %  BP: (103-137)/(56-79) 137/79     Weight: 63 kg (138 lb 14.2 oz)  Body mass index is 23.84 kg/m².    Intake/Output Summary (Last 24 hours) at 1/4/2019 1402  Last data filed at 1/4/2019 1300  Gross per 24 hour   Intake 3676.67 ml   Output 125 ml   Net 3551.67 ml      Physical Exam   Constitutional: She is oriented to person, place, and time. She appears well-developed and  well-nourished. No distress.   Elderly    HENT:   Head: Normocephalic and atraumatic.   Eyes: Conjunctivae are normal.   Neck: Neck supple. No JVD present.   Cardiovascular: Normal rate, regular rhythm and normal heart sounds.   Pulmonary/Chest: Effort normal and breath sounds normal.   Abdominal: Soft. Bowel sounds are normal. She exhibits no distension. There is tenderness (incisional ).   Midline abdominal incision with staples intact, no s/s of infection. Colostomy intact to left lower abdomen.    Musculoskeletal: Normal range of motion.   Neurological: She is alert and oriented to person, place, and time.   Skin: Skin is warm and dry.   Psychiatric: She has a normal mood and affect. Her behavior is normal. Thought content normal.   Nursing note and vitals reviewed.      Significant Labs:   BMP:   Recent Labs   Lab 01/04/19  0457   GLU 92      K 3.2*      CO2 25   BUN 7*   CREATININE 0.6   CALCIUM 8.3*     CBC:   Recent Labs   Lab 01/03/19  0439 01/04/19  0457   WBC 14.32* 9.73   HGB 10.6* 10.2*   HCT 32.9* 31.9*    318       Significant Imaging:

## 2019-01-04 NOTE — PT/OT/SLP PROGRESS
Physical Therapy  Treatment    Irlanda Lopez   MRN: [id number]   Admitting Diagnosis: Diverticulitis of large intestine with perforation and abscess without bleeding    PT Received On: 01/04/19  PT Start Time: 1055     PT Stop Time: 1120    PT Total Time (min): 25 min       Billable Minutes:  Gait Training 15 and Therapeutic Activity 10    Treatment Type: Treatment  PT/PTA: PT             General Precautions: Standard, fall  Orthopedic Precautions: N/A   Braces: N/A         Subjective:  Communicated with NURSE HECTOR AND Epic CHART REVIEW prior to session.   PT AGREED TO TX     Pain/Comfort  Pain Rating 1: 0/10  Location 1: abdomen  Pain Rating Post-Intervention 1: 4/10    Objective:   Patient found with: peripheral IV, colostomy    Functional Mobility:  PT MET IN RM SUP>SIT EOB WITH MIN A AND HOB ELEVATED. PT SCOOTED TO EOB AND STOOD WITH NO AD AND GT TRAINED WITH CGA TO BATHROOM FOR TOILETING. PT ON/OFF COMMODE WITH CGA. PT GT TRAINED WITH IV POLE ASSIST X 450' WITH NO REST BREAK. PT RETURNED TO RM T/F TO CHAIR WITH CGA. PT LEFT SEATED IN CHAIR WITH FAMILY PRESENT AND ALL NEEDS MET.     AM-PAC 6 CLICK MOBILITY  How much help from another person does this patient currently need?   1 = Unable, Total/Dependent Assistance  2 = A lot, Maximum/Moderate Assistance  3 = A little, Minimum/Contact Guard/Supervision  4 = None, Modified Hawks/Independent    Turning over in bed (including adjusting bedclothes, sheets and blankets)?: 3  Sitting down on and standing up from a chair with arms (e.g., wheelchair, bedside commode, etc.): 3  Moving from lying on back to sitting on the side of the bed?: 3  Moving to and from a bed to a chair (including a wheelchair)?: 3  Need to walk in hospital room?: 3  Climbing 3-5 steps with a railing?: 1  Basic Mobility Total Score: 16    AM-PAC Raw Score CMS G-Code Modifier Level of Impairment Assistance   6 % Total / Unable   7 - 9 CM 80 - 100% Maximal Assist   10 - 14 CL 60 -  80% Moderate Assist   15 - 19 CK 40 - 60% Moderate Assist   20 - 22 CJ 20 - 40% Minimal Assist   23 CI 1-20% SBA / CGA   24 CH 0% Independent/ Mod I     Patient left up in chair with call button in reach.    Assessment:  PT PROGRESSING WITH GT TRAINING.     Rehab identified problem list/impairments: Rehab identified problem list/impairments: weakness, impaired endurance, impaired functional mobilty, gait instability, decreased lower extremity function, impaired balance, pain    Rehab potential is excellent.    Activity tolerance: Good    Discharge recommendations: Discharge Facility/Level of Care Needs: other (see comments)([facility]     Barriers to discharge:      Equipment recommendations: Equipment Needed After Discharge: none     GOALS:   Multidisciplinary Problems     Physical Therapy Goals        Problem: Physical Therapy Goal    Goal Priority Disciplines Outcome Goal Variances Interventions   Physical Therapy Goal     PT, PT/OT Ongoing (interventions implemented as appropriate)     Description:  PT WILL BE SEEN FOR P.T. FOR A MIN OF 5 OUT OF 7 DAYS A WEEK  LT/10/19  1. PT WILL COMPLETE BED MOBILITY IND.  2. PT WILL T/F TO CHAIR WITH OR WITHOUT RW AND S  3. PT WILL GT TRAIN WITH OR WITHOUT RW X 250' AND S  4. PT WILL COMPLETE B LE TE X 20 REPS                      PLAN:    Patient to be seen    to address the above listed problems via gait training, therapeutic activities, therapeutic exercises  Plan of Care expires: 01/10/19  Plan of Care reviewed with: patient, daughter         Shanice Thapa, PT  2019

## 2019-01-04 NOTE — PLAN OF CARE
Problem: Physical Therapy Goal  Goal: Physical Therapy Goal  PT WILL BE SEEN FOR P.T. FOR A MIN OF 5 OUT OF 7 DAYS A WEEK  LT/10/19  1. PT WILL COMPLETE BED MOBILITY IND.  2. PT WILL T/F TO CHAIR WITH OR WITHOUT RW AND S  3. PT WILL GT TRAIN WITH OR WITHOUT RW X 250' AND S  4. PT WILL COMPLETE B LE TE X 20 REPS     Outcome: Ongoing (interventions implemented as appropriate)  PT GT TRAINED X 450' WITH IV POLE ASSIST AND CGA

## 2019-01-04 NOTE — PLAN OF CARE
Problem: Adult Inpatient Plan of Care  Goal: Plan of Care Review  Outcome: Ongoing (interventions implemented as appropriate)  POC reviewed with pt, verbalized understanding. Pt remained free from falls, fall precautions in place. VS monitored. Pt independent and able to turn self. Ambulating in seth frequently.  Pt IV intact, infusing LR. PRN anxiety medication given due to reaction to ostomy. No other c/o at this time. Call bell and personal belongings within reach. Hourly rounding complete. Reminded to call for assistance. Will continue to monitor.

## 2019-01-04 NOTE — HOSPITAL COURSE
1/3/2019:  Postop day 1.  She is tolerating clear liquid and has been ambulating.  She feels much improved compared to the preop days.  Incisions clean.  Colostomy is not functioning yet.  We will continue to current management.    1/4/2019:  Postop day 2.  She is tolerating clear liquid and is ambulating.  Minimal complains of incisional pain.  Colostomy is functioning.  The dressing is intact open (will remove the dressing.) The lab results are pending.  She is likely expected to be discharged tomorrow.    01/05/2019 - POD# 3 s/p Maribel's.  Tolerating clear liquid diet.  Having ostomy function.  Pain is controlled.    01/06/2019 - POD# 4.  Tolerating regular diet.  Still with ostomy function.  Pain is controlled.  Home Health is arranged.  Ready for discharge.

## 2019-01-04 NOTE — ASSESSMENT & PLAN NOTE
-S/P sigmoid colon resection.   -Continue IV Flagyl and Cipro.   -Surgical management per General Surgery.   1/3/1-  -POD #1, pain controlled   -Wound care consult for stoma care   1/4/19  -POD #2, WBCs normal   -Continue PT

## 2019-01-05 LAB
ANION GAP SERPL CALC-SCNC: 8 MMOL/L
BACTERIA SPEC AEROBE CULT: NORMAL
BUN SERPL-MCNC: 4 MG/DL
CALCIUM SERPL-MCNC: 8.2 MG/DL
CHLORIDE SERPL-SCNC: 106 MMOL/L
CO2 SERPL-SCNC: 23 MMOL/L
CREAT SERPL-MCNC: 0.6 MG/DL
ERYTHROCYTE [DISTWIDTH] IN BLOOD BY AUTOMATED COUNT: 15.2 %
EST. GFR  (AFRICAN AMERICAN): >60 ML/MIN/1.73 M^2
EST. GFR  (NON AFRICAN AMERICAN): >60 ML/MIN/1.73 M^2
GLUCOSE SERPL-MCNC: 97 MG/DL
HCT VFR BLD AUTO: 32.5 %
HGB BLD-MCNC: 10.4 G/DL
MCH RBC QN AUTO: 26.8 PG
MCHC RBC AUTO-ENTMCNC: 32 G/DL
MCV RBC AUTO: 84 FL
PLATELET # BLD AUTO: 320 K/UL
PMV BLD AUTO: 8.9 FL
POTASSIUM SERPL-SCNC: 3.9 MMOL/L
RBC # BLD AUTO: 3.88 M/UL
SODIUM SERPL-SCNC: 137 MMOL/L
WBC # BLD AUTO: 6.8 K/UL

## 2019-01-05 PROCEDURE — 99024 POSTOP FOLLOW-UP VISIT: CPT | Mod: ,,, | Performed by: SURGERY

## 2019-01-05 PROCEDURE — 25000003 PHARM REV CODE 250: Performed by: SURGERY

## 2019-01-05 PROCEDURE — 80048 BASIC METABOLIC PNL TOTAL CA: CPT

## 2019-01-05 PROCEDURE — 85027 COMPLETE CBC AUTOMATED: CPT

## 2019-01-05 PROCEDURE — 63600175 PHARM REV CODE 636 W HCPCS: Performed by: SURGERY

## 2019-01-05 PROCEDURE — 94760 N-INVAS EAR/PLS OXIMETRY 1: CPT

## 2019-01-05 PROCEDURE — 11000001 HC ACUTE MED/SURG PRIVATE ROOM

## 2019-01-05 PROCEDURE — S0030 INJECTION, METRONIDAZOLE: HCPCS | Performed by: SURGERY

## 2019-01-05 PROCEDURE — 25000003 PHARM REV CODE 250: Performed by: FAMILY MEDICINE

## 2019-01-05 PROCEDURE — 99024 PR POST-OP FOLLOW-UP VISIT: ICD-10-PCS | Mod: ,,, | Performed by: SURGERY

## 2019-01-05 PROCEDURE — 25000003 PHARM REV CODE 250: Performed by: NURSE PRACTITIONER

## 2019-01-05 PROCEDURE — 36415 COLL VENOUS BLD VENIPUNCTURE: CPT

## 2019-01-05 RX ADMIN — CHLORHEXIDINE GLUCONATE 10 ML: 1.2 RINSE ORAL at 09:01

## 2019-01-05 RX ADMIN — STANDARDIZED SENNA CONCENTRATE AND DOCUSATE SODIUM 1 TABLET: 8.6; 5 TABLET, FILM COATED ORAL at 08:01

## 2019-01-05 RX ADMIN — RAMELTEON 8 MG: 8 TABLET, FILM COATED ORAL at 09:01

## 2019-01-05 RX ADMIN — ONDANSETRON 8 MG: 8 TABLET, ORALLY DISINTEGRATING ORAL at 03:01

## 2019-01-05 RX ADMIN — CIPROFLOXACIN 400 MG: 2 INJECTION, SOLUTION INTRAVENOUS at 02:01

## 2019-01-05 RX ADMIN — CHLORHEXIDINE GLUCONATE 10 ML: 1.2 RINSE ORAL at 08:01

## 2019-01-05 RX ADMIN — MEMANTINE HYDROCHLORIDE 10 MG: 10 TABLET, FILM COATED ORAL at 08:01

## 2019-01-05 RX ADMIN — THYROID, PORCINE 30 MG: 30 TABLET ORAL at 09:01

## 2019-01-05 RX ADMIN — HYDROCODONE BITARTRATE AND ACETAMINOPHEN 1 TABLET: 5; 325 TABLET ORAL at 09:01

## 2019-01-05 RX ADMIN — STANDARDIZED SENNA CONCENTRATE AND DOCUSATE SODIUM 1 TABLET: 8.6; 5 TABLET, FILM COATED ORAL at 09:01

## 2019-01-05 RX ADMIN — DONEPEZIL HYDROCHLORIDE 10 MG: 5 TABLET, FILM COATED ORAL at 09:01

## 2019-01-05 RX ADMIN — HYDROCODONE BITARTRATE AND ACETAMINOPHEN 1 TABLET: 5; 325 TABLET ORAL at 08:01

## 2019-01-05 RX ADMIN — METRONIDAZOLE 500 MG: 500 INJECTION, SOLUTION INTRAVENOUS at 04:01

## 2019-01-05 RX ADMIN — DIPHENHYDRAMINE HYDROCHLORIDE 25 MG: 25 CAPSULE ORAL at 09:01

## 2019-01-05 RX ADMIN — ATORVASTATIN CALCIUM 10 MG: 10 TABLET, FILM COATED ORAL at 08:01

## 2019-01-05 NOTE — SUBJECTIVE & OBJECTIVE
Interval History: pt stable and sitting up in bed during exam. Pt ambulating successfully in hallway.  Colostomy intact with brown liquid stool noted.  Pt denies any additional complaints with diet advanced as tolerated.  Discharge anticipated by primary team.      Review of Systems   Constitutional: Negative for appetite change, chills, diaphoresis, fatigue and fever.   HENT: Negative for congestion, ear pain, mouth sores, sore throat and trouble swallowing.    Eyes: Negative for pain and visual disturbance.   Respiratory: Negative for cough, chest tightness and shortness of breath.    Cardiovascular: Negative for chest pain, palpitations and leg swelling.   Gastrointestinal: Positive for abdominal pain (mild). Negative for constipation and nausea.   Endocrine: Negative for cold intolerance, heat intolerance, polydipsia and polyuria.   Genitourinary: Negative for dysuria, frequency and hematuria.   Musculoskeletal: Negative for arthralgias, back pain, myalgias and neck pain.   Skin: Negative for pallor, rash and wound.   Allergic/Immunologic: Negative for environmental allergies and immunocompromised state.   Neurological: Negative for dizziness, seizures, syncope, weakness, numbness and headaches.   Hematological: Negative for adenopathy. Does not bruise/bleed easily.   Psychiatric/Behavioral: Negative for agitation, confusion and sleep disturbance.     Objective:     Vital Signs (Most Recent):  Temp: 97.8 °F (36.6 °C) (01/05/19 1214)  Pulse: (!) 56 (01/05/19 1214)  Resp: 18 (01/05/19 1214)  BP: (!) 141/79 (01/05/19 1214)  SpO2: (!) 94 % (01/05/19 1214) Vital Signs (24h Range):  Temp:  [97.8 °F (36.6 °C)-98.5 °F (36.9 °C)] 97.8 °F (36.6 °C)  Pulse:  [53-59] 56  Resp:  [17-20] 18  SpO2:  [91 %-97 %] 94 %  BP: (117-141)/(70-82) 141/79     Weight: 63 kg (138 lb 14.2 oz)  Body mass index is 23.84 kg/m².    Intake/Output Summary (Last 24 hours) at 1/5/2019 1319  Last data filed at 1/5/2019 0600  Gross per 24 hour    Intake 3040 ml   Output --   Net 3040 ml      Physical Exam   Constitutional: She is oriented to person, place, and time. She appears well-developed and well-nourished. No distress.   Elderly    HENT:   Head: Normocephalic and atraumatic.   Eyes: Conjunctivae are normal.   Neck: Neck supple. No JVD present.   Cardiovascular: Normal rate, regular rhythm and normal heart sounds.   Pulmonary/Chest: Effort normal and breath sounds normal.   Abdominal: Soft. Bowel sounds are normal. She exhibits no distension. There is tenderness (incisional ).   Midline abdominal incision with staples intact, no s/s of infection. Colostomy intact to left lower abdomen.    Musculoskeletal: Normal range of motion.   Neurological: She is alert and oriented to person, place, and time.   Skin: Skin is warm and dry.   Psychiatric: She has a normal mood and affect. Her behavior is normal. Thought content normal.   Nursing note and vitals reviewed.      Significant Labs:   CBC:   Recent Labs   Lab 01/04/19  0457 01/05/19  0440   WBC 9.73 6.80   HGB 10.2* 10.4*   HCT 31.9* 32.5*    320     CMP:   Recent Labs   Lab 01/04/19  0457 01/05/19  0440    137   K 3.2* 3.9    106   CO2 25 23   GLU 92 97   BUN 7* 4*   CREATININE 0.6 0.6   CALCIUM 8.3* 8.2*   ANIONGAP 8 8   EGFRNONAA >60 >60       Significant Imaging:

## 2019-01-05 NOTE — PLAN OF CARE
Problem: Physical Therapy Goal  Goal: Physical Therapy Goal  PT WILL BE SEEN FOR P.T. FOR A MIN OF 5 OUT OF 7 DAYS A WEEK  LT/10/19  1. PT WILL COMPLETE BED MOBILITY IND.  2. PT WILL T/F TO CHAIR WITH OR WITHOUT RW AND S  3. PT WILL GT TRAIN WITH OR WITHOUT RW X 250' AND S  4. PT WILL COMPLETE B LE TE X 20 REPS     Outcome: Ongoing (interventions implemented as appropriate)  CHECKED WITH PATIENT 2X THIS DATE BOTH TIMES PATIENT WAS VISITING WITH FAMILY. PATIENT STATES SHE IS AMBULATING IN [place] WITH STAFF AS WELL AS WITH FAMILY. NGS CONFIRMED THAT PATIENT IS DOING WELL AND THAT PATIENT AMBULATED IN [place] BEFORE PTA ATTEMPTED TO SEE PATIENT. WITH THE ABOVE INFORMATION IN CONSIDERATION, PATIENT MAY BE A GOOD CANDIDATE FOR THE [employer] [facility] PROGRAM.

## 2019-01-05 NOTE — ASSESSMENT & PLAN NOTE
POD# 3 s/p Maribel's     Advance to regular diet  Consult dietitian at patient's request  Hep-Lock IV  Ambulate  DC IV antibiotics  Awaiting home health approval prior to discharge

## 2019-01-05 NOTE — ASSESSMENT & PLAN NOTE
-S/P sigmoid colon resection.   -Continue IV Flagyl and Cipro.   -Surgical management per General Surgery.   1/3/1-  -POD #1, pain controlled   -Wound care consult for stoma care   1/4/19  -POD #2, WBCs normal   -Continue PT  -1/5/19  -POD#3- H/H stable- WBCs and K normal  -increased ambulation reported- colostomy functioning with +stool- diet advanced as tolerated

## 2019-01-05 NOTE — SUBJECTIVE & OBJECTIVE
Interval History: POD# 3 s/p Maribel's.  Tolerating clear liquid diet.  Having ostomy function.  Pain is controlled.    Medications:  Continuous Infusions:   lactated ringers 100 mL/hr at 01/04/19 0359     Scheduled Meds:   atorvastatin  10 mg Oral Daily    chlorhexidine  10 mL Mouth/Throat BID    ciprofloxacin  400 mg Intravenous Q12H    donepezil  10 mg Oral QHS    memantine  10 mg Oral Daily    metronidazole  500 mg Intravenous Q8H    nozaseptin   Each Nare BID    senna-docusate 8.6-50 mg  1 tablet Oral BID    thyroid (pork)  30 mg Oral QHS     PRN Meds:acetaminophen, ALPRAZolam, bisacodyl, cloNIDine, diphenhydrAMINE, HYDROcodone-acetaminophen, HYDROmorphone, lactulose, ondansetron, promethazine (PHENERGAN) IVPB, ramelteon     Review of patient's allergies indicates:  No Known Allergies  Objective:     Vital Signs (Most Recent):  Temp: 97.9 °F (36.6 °C) (01/05/19 0709)  Pulse: (!) 57 (01/05/19 0818)  Resp: 18 (01/05/19 0818)  BP: 136/82 (01/05/19 0709)  SpO2: 95 % (01/05/19 0818) Vital Signs (24h Range):  Temp:  [97.8 °F (36.6 °C)-98.5 °F (36.9 °C)] 97.9 °F (36.6 °C)  Pulse:  [53-59] 57  Resp:  [17-20] 18  SpO2:  [91 %-97 %] 95 %  BP: (117-138)/(70-82) 136/82     Weight: 63 kg (138 lb 14.2 oz)  Body mass index is 23.84 kg/m².    Intake/Output - Last 3 Shifts       01/03 0700 - 01/04 0659 01/04 0700 - 01/05 0659 01/05 0700 - 01/06 0659    P.O. 1120 840     I.V. (mL/kg) 2326.7 (36.9) 1100 (17.5)     IV Piggyback 950 1100     Total Intake(mL/kg) 4396.7 (69.8) 3040 (48.3)     Urine (mL/kg/hr) 400 (0.3)      Stool  125     Blood       Total Output 400 125     Net +3996.7 +2915            Urine Occurrence 2 x 2 x           Physical Exam   Constitutional: She is oriented to person, place, and time. She appears well-developed and well-nourished. No distress.   HENT:   Head: Normocephalic and atraumatic.   Eyes: EOM are normal.   Neck: Neck supple.   Cardiovascular: Normal rate and regular rhythm.    Pulmonary/Chest: Effort normal.   Abdominal: Soft. She exhibits no distension. There is no tenderness.   Ostomy viable with output  Incision clean dry and intact with staples   Musculoskeletal: Normal range of motion.   Neurological: She is alert and oriented to person, place, and time.   Skin: Skin is warm.   Vitals reviewed.      Significant Labs:  CBC:   Recent Labs   Lab 01/05/19  0440   WBC 6.80   RBC 3.88*   HGB 10.4*   HCT 32.5*      MCV 84   MCH 26.8*   MCHC 32.0     BMP:   Recent Labs   Lab 01/05/19  0440   GLU 97      K 3.9      CO2 23   BUN 4*   CREATININE 0.6   CALCIUM 8.2*       Significant Diagnostics:  I have reviewed all pertinent imaging results/findings within the past 24 hours.

## 2019-01-05 NOTE — PROGRESS NOTES
[facility] -   General Surgery  Progress Note    Subjective:     History of Present Illness:  No notes on file    Post-Op Info:  Procedure(s) (LRB):  COLECTOMY, SIGMOID (Left)  CREATION, COLOSTOMY (Left)  SHAHIDA PROCEDURE (N/A)  LYSIS, ADHESIONS (N/A)   3 Days Post-Op     Interval History: POD# 3 s/p Shahida's.  Tolerating clear liquid diet.  Having ostomy function.  Pain is controlled.    Medications:  Continuous Infusions:   lactated ringers 100 mL/hr at 01/04/19 0359     Scheduled Meds:   atorvastatin  10 mg Oral Daily    chlorhexidine  10 mL Mouth/Throat BID    ciprofloxacin  400 mg Intravenous Q12H    donepezil  10 mg Oral QHS    memantine  10 mg Oral Daily    metronidazole  500 mg Intravenous Q8H    nozaseptin   Each Nare BID    senna-docusate 8.6-50 mg  1 tablet Oral BID    thyroid (pork)  30 mg Oral QHS     PRN Meds:acetaminophen, ALPRAZolam, bisacodyl, cloNIDine, diphenhydrAMINE, HYDROcodone-acetaminophen, HYDROmorphone, lactulose, ondansetron, promethazine (PHENERGAN) IVPB, ramelteon     Review of patient's allergies indicates:  No Known Allergies  Objective:     Vital Signs (Most Recent):  Temp: 97.9 °F (36.6 °C) (01/05/19 0709)  Pulse: (!) 57 (01/05/19 0818)  Resp: 18 (01/05/19 0818)  BP: 136/82 (01/05/19 0709)  SpO2: 95 % (01/05/19 0818) Vital Signs (24h Range):  Temp:  [97.8 °F (36.6 °C)-98.5 °F (36.9 °C)] 97.9 °F (36.6 °C)  Pulse:  [53-59] 57  Resp:  [17-20] 18  SpO2:  [91 %-97 %] 95 %  BP: (117-138)/(70-82) 136/82     Weight: 63 kg (138 lb 14.2 oz)  Body mass index is 23.84 kg/m².    Intake/Output - Last 3 Shifts       01/03 0700 - 01/04 0659 01/04 0700 - 01/05 0659 01/05 0700 - 01/06 0659    P.O. 1120 840     I.V. (mL/kg) 2326.7 (36.9) 1100 (17.5)     IV Piggyback 950 1100     Total Intake(mL/kg) 4396.7 (69.8) 3040 (48.3)     Urine (mL/kg/hr) 400 (0.3)      Stool  125     Blood       Total Output 400 125     Net +3996.7 +2915            Urine Occurrence 2 x 2 x            Physical Exam   Constitutional: She is oriented to person, place, and time. She appears well-developed and well-nourished. No distress.   HENT:   Head: Normocephalic and atraumatic.   Eyes: EOM are normal.   Neck: Neck supple.   Cardiovascular: Normal rate and regular rhythm.   Pulmonary/Chest: Effort normal.   Abdominal: Soft. She exhibits no distension. There is no tenderness.   Ostomy viable with output  Incision clean dry and intact with staples   Musculoskeletal: Normal range of motion.   Neurological: She is alert and oriented to person, place, and time.   Skin: Skin is warm.   Vitals reviewed.      Significant Labs:  CBC:   Recent Labs   Lab 01/05/19  0440   WBC 6.80   RBC 3.88*   HGB 10.4*   HCT 32.5*      MCV 84   MCH 26.8*   MCHC 32.0     BMP:   Recent Labs   Lab 01/05/19  0440   GLU 97      K 3.9      CO2 23   BUN 4*   CREATININE 0.6   CALCIUM 8.2*       Significant Diagnostics:  I have reviewed all pertinent imaging results/findings within the past 24 hours.    Assessment/Plan:     * Diverticulitis of large intestine with perforation and abscess without bleeding    POD# 3 s/p Maribel's     Advance to regular diet  Consult dietitian at patient's request  Hep-Lock IV  Ambulate  DC IV antibiotics  Awaiting home health approval prior to discharge     Hypokalemia    Resolved     Hyperlipidemia    Home meds     Vascular dementia without behavioral disturbance    Home meds     Essential hypertension    Home meds     Hypothyroidism    Home meds         Suad Peace MD  General Surgery  [facility] -

## 2019-01-05 NOTE — PROGRESS NOTES
[facility] Hospital Medicine  Progress Note    Patient Name: Irlanda Lopez  MRN: [id number]  Patient Class: IP- Inpatient   Admission Date: 1/2/2019  Length of Stay: 3 days  Attending Physician: Reece Hogan MD  Primary Care Provider: Myla Arias MD        Subjective:     Principal Problem:Diverticulitis of large intestine with perforation and abscess without bleeding    HPI:  Irlanda Lopez is a 78 year old female with hyperlipidemia, hypothyroidism and short term memory difficulties who presented for colonoscopy and colon resection due to recent diverticulitis. The patient was found to have a posterior rectal abscess with cristine pus associated with a perforated diverticulum of the distal sigmoid colon. She underwent left sigmoid colectomy with a Maribel procedure. The patient denies complaint prior to surgery and reports that her pain is well controlled following surgery. Family is concerned about her reaction to having an ostomy pouch and management of the pouch upon discharge. The patient lives alone and performed her ADLs independently prior to presentation. However, family expresses concern regarding short term memory prior to surgery. Code status was discussed with the patient and her family. All are in agreement. She is a full code. Her son, Mundo Moreland, is her surrogate medical decision maker.     Hospital Course:  Patient admitted for diverticulitis of large intestine with perforation and abscess. S/p left sigmoid colectomy with a Maribel procedure on 1/2/19 by Dr. Hogan. 1/3/19- POD #1, pain controlled. WBCs elevated most likely reactive. Will continue IV abx. Blood pressure marginally low, will give 250ml NS bolus and continue maintenance fluids. 1/4/19- Blood pressure has stabilized. WBCs normalized. K+3.2, will replete. 1/5/19- Pt verbalized symptom improvement.  Pt able to ambulate successfully without distress in hallways.  H/H stable with Potassium and WBCs normalized.  Colostomy  site intanct with stoma pink and liquid stool present.  Diet advanced as tolerated.  Pt seen and examined and noted to be stable for discharge per primary team.         Interval History: pt stable and sitting up in bed during exam. Pt ambulating successfully in hallway.  Colostomy intact with brown liquid stool noted.  Pt denies any additional complaints with diet advanced as tolerated.  Discharge anticipated by primary team.      Review of Systems   Constitutional: Negative for appetite change, chills, diaphoresis, fatigue and fever.   HENT: Negative for congestion, ear pain, mouth sores, sore throat and trouble swallowing.    Eyes: Negative for pain and visual disturbance.   Respiratory: Negative for cough, chest tightness and shortness of breath.    Cardiovascular: Negative for chest pain, palpitations and leg swelling.   Gastrointestinal: Positive for abdominal pain (mild). Negative for constipation and nausea.   Endocrine: Negative for cold intolerance, heat intolerance, polydipsia and polyuria.   Genitourinary: Negative for dysuria, frequency and hematuria.   Musculoskeletal: Negative for arthralgias, back pain, myalgias and neck pain.   Skin: Negative for pallor, rash and wound.   Allergic/Immunologic: Negative for environmental allergies and immunocompromised state.   Neurological: Negative for dizziness, seizures, syncope, weakness, numbness and headaches.   Hematological: Negative for adenopathy. Does not bruise/bleed easily.   Psychiatric/Behavioral: Negative for agitation, confusion and sleep disturbance.     Objective:     Vital Signs (Most Recent):  Temp: 97.8 °F (36.6 °C) (01/05/19 1214)  Pulse: (!) 56 (01/05/19 1214)  Resp: 18 (01/05/19 1214)  BP: (!) 141/79 (01/05/19 1214)  SpO2: (!) 94 % (01/05/19 1214) Vital Signs (24h Range):  Temp:  [97.8 °F (36.6 °C)-98.5 °F (36.9 °C)] 97.8 °F (36.6 °C)  Pulse:  [53-59] 56  Resp:  [17-20] 18  SpO2:  [91 %-97 %] 94 %  BP: (117-141)/(70-82) 141/79     Weight: 63  kg (138 lb 14.2 oz)  Body mass index is 23.84 kg/m².    Intake/Output Summary (Last 24 hours) at 1/5/2019 1319  Last data filed at 1/5/2019 0600  Gross per 24 hour   Intake 3040 ml   Output --   Net 3040 ml      Physical Exam   Constitutional: She is oriented to person, place, and time. She appears well-developed and well-nourished. No distress.   Elderly    HENT:   Head: Normocephalic and atraumatic.   Eyes: Conjunctivae are normal.   Neck: Neck supple. No JVD present.   Cardiovascular: Normal rate, regular rhythm and normal heart sounds.   Pulmonary/Chest: Effort normal and breath sounds normal.   Abdominal: Soft. Bowel sounds are normal. She exhibits no distension. There is tenderness (incisional ).   Midline abdominal incision with staples intact, no s/s of infection. Colostomy intact to left lower abdomen.    Musculoskeletal: Normal range of motion.   Neurological: She is alert and oriented to person, place, and time.   Skin: Skin is warm and dry.   Psychiatric: She has a normal mood and affect. Her behavior is normal. Thought content normal.   Nursing note and vitals reviewed.      Significant Labs:   CBC:   Recent Labs   Lab 01/04/19  0457 01/05/19  0440   WBC 9.73 6.80   HGB 10.2* 10.4*   HCT 31.9* 32.5*    320     CMP:   Recent Labs   Lab 01/04/19  0457 01/05/19  0440    137   K 3.2* 3.9    106   CO2 25 23   GLU 92 97   BUN 7* 4*   CREATININE 0.6 0.6   CALCIUM 8.3* 8.2*   ANIONGAP 8 8   EGFRNONAA >60 >60       Significant Imaging:         Assessment/Plan:      * Diverticulitis of large intestine with perforation and abscess without bleeding    -S/P sigmoid colon resection.   -Continue IV Flagyl and Cipro.   -Surgical management per General Surgery.   1/3/1-  -POD #1, pain controlled   -Wound care consult for stoma care   1/4/19  -POD #2, WBCs normal   -Continue PT  -1/5/19  -POD#3- H/H stable- WBCs and K normal  -increased ambulation reported- colostomy functioning with +stool- diet  advanced as tolerated       Hypokalemia    -normalized on 1/5/19  Most likely due to GI loss  K+3.2-on 1/4/19  Will replete        Hyperlipidemia    -atorvastatin        Vascular dementia without behavioral disturbance    -pt neuro intact and answers questions appropriately   -will monitor        Essential hypertension    -controlled        Hypothyroidism    Resume thyroid replacement         VTE Risk Mitigation (From admission, onward)        Ordered     IP VTE HIGH RISK PATIENT  Once      01/02/19 1528     Place sequential compression device  Until discontinued      01/02/19 1528              Deborah Floyd NP  Department of Hospital Medicine   [facility]

## 2019-01-05 NOTE — PT/OT/SLP PROGRESS
Physical Therapy      Patient Name:  Irlanda Lopez   MRN:  [id number]    Patient not seen today secondary to  . Will follow-up TOMORROW.    Dheeraj Stapleton, PTA

## 2019-01-05 NOTE — PLAN OF CARE
Problem: Adult Inpatient Plan of Care  Goal: Plan of Care Review  Outcome: Ongoing (interventions implemented as appropriate)  Free from falls and injuries this shift. Pain controlled. Family at bedside. Diet advanced to regular. Patient tolerating at this time. Ambulating in hallway multiple times daily. Chart check completed.

## 2019-01-06 VITALS
SYSTOLIC BLOOD PRESSURE: 131 MMHG | WEIGHT: 138.88 LBS | RESPIRATION RATE: 18 BRPM | HEART RATE: 87 BPM | TEMPERATURE: 98 F | HEIGHT: 64 IN | OXYGEN SATURATION: 95 % | BODY MASS INDEX: 23.71 KG/M2 | DIASTOLIC BLOOD PRESSURE: 75 MMHG

## 2019-01-06 LAB
ANION GAP SERPL CALC-SCNC: 8 MMOL/L
BUN SERPL-MCNC: 5 MG/DL
CALCIUM SERPL-MCNC: 8.1 MG/DL
CHLORIDE SERPL-SCNC: 106 MMOL/L
CO2 SERPL-SCNC: 24 MMOL/L
CREAT SERPL-MCNC: 0.6 MG/DL
ERYTHROCYTE [DISTWIDTH] IN BLOOD BY AUTOMATED COUNT: 15.3 %
EST. GFR  (AFRICAN AMERICAN): >60 ML/MIN/1.73 M^2
EST. GFR  (NON AFRICAN AMERICAN): >60 ML/MIN/1.73 M^2
GLUCOSE SERPL-MCNC: 82 MG/DL
HCT VFR BLD AUTO: 33.2 %
HGB BLD-MCNC: 10.5 G/DL
MCH RBC QN AUTO: 26.7 PG
MCHC RBC AUTO-ENTMCNC: 31.6 G/DL
MCV RBC AUTO: 85 FL
PLATELET # BLD AUTO: 337 K/UL
PMV BLD AUTO: 9.3 FL
POTASSIUM SERPL-SCNC: 3.8 MMOL/L
RBC # BLD AUTO: 3.93 M/UL
SODIUM SERPL-SCNC: 138 MMOL/L
WBC # BLD AUTO: 6.34 K/UL

## 2019-01-06 PROCEDURE — 80048 BASIC METABOLIC PNL TOTAL CA: CPT

## 2019-01-06 PROCEDURE — 99239 PR HOSPITAL DISCHARGE DAY,>30 MIN: ICD-10-PCS | Mod: ,,, | Performed by: SURGERY

## 2019-01-06 PROCEDURE — 99239 HOSP IP/OBS DSCHRG MGMT >30: CPT | Mod: ,,, | Performed by: SURGERY

## 2019-01-06 PROCEDURE — 25000003 PHARM REV CODE 250: Performed by: SURGERY

## 2019-01-06 PROCEDURE — 25000003 PHARM REV CODE 250: Performed by: FAMILY MEDICINE

## 2019-01-06 PROCEDURE — 85027 COMPLETE CBC AUTOMATED: CPT

## 2019-01-06 PROCEDURE — 97530 THERAPEUTIC ACTIVITIES: CPT

## 2019-01-06 PROCEDURE — 36415 COLL VENOUS BLD VENIPUNCTURE: CPT

## 2019-01-06 RX ORDER — AMOXICILLIN 250 MG
1 CAPSULE ORAL DAILY
Qty: 5 TABLET | Refills: 0 | COMMUNITY
Start: 2019-01-06 | End: 2019-01-11

## 2019-01-06 RX ORDER — HYDROCODONE BITARTRATE AND ACETAMINOPHEN 7.5; 325 MG/1; MG/1
1 TABLET ORAL EVERY 6 HOURS PRN
Qty: 20 TABLET | Refills: 0 | Status: SHIPPED | OUTPATIENT
Start: 2019-01-06 | End: 2019-01-11

## 2019-01-06 RX ORDER — HYDROCODONE BITARTRATE AND ACETAMINOPHEN 5; 325 MG/1; MG/1
1 TABLET ORAL EVERY 6 HOURS PRN
Qty: 21 TABLET | Refills: 0 | Status: SHIPPED | OUTPATIENT
Start: 2019-01-06 | End: 2019-01-06 | Stop reason: HOSPADM

## 2019-01-06 RX ORDER — AMOXICILLIN 250 MG
1 CAPSULE ORAL 2 TIMES DAILY
Qty: 10 TABLET | Refills: 0 | Status: SHIPPED | OUTPATIENT
Start: 2019-01-06 | End: 2019-01-06 | Stop reason: HOSPADM

## 2019-01-06 RX ADMIN — MEMANTINE HYDROCHLORIDE 10 MG: 10 TABLET, FILM COATED ORAL at 09:01

## 2019-01-06 RX ADMIN — CHLORHEXIDINE GLUCONATE 10 ML: 1.2 RINSE ORAL at 09:01

## 2019-01-06 RX ADMIN — STANDARDIZED SENNA CONCENTRATE AND DOCUSATE SODIUM 1 TABLET: 8.6; 5 TABLET, FILM COATED ORAL at 09:01

## 2019-01-06 RX ADMIN — ATORVASTATIN CALCIUM 10 MG: 10 TABLET, FILM COATED ORAL at 09:01

## 2019-01-06 NOTE — PLAN OF CARE
PENNY contacted [facility] [phone] and spoke to [facility] on-call representative Little [phone] who reports that the Home Health Agency assigned to patient will be [facility]. Noted to patients AVS.  PENNY faxed d/c orders as requested to Little with [facility] to notify of patients d/c @ [phone] and contacted to confirm receipt of orders.

## 2019-01-06 NOTE — SUBJECTIVE & OBJECTIVE
Interval History: pt stable with no additional complaints.  Pt ambulating in hallway without distress.  CM contacted and home health verified.  Discharge to home anticipated today per primary team.     Review of Systems   Constitutional: Negative for appetite change, chills, diaphoresis, fatigue and fever.   HENT: Negative for congestion, ear pain, mouth sores, sore throat and trouble swallowing.    Eyes: Negative for pain and visual disturbance.   Respiratory: Negative for cough, chest tightness and shortness of breath.    Cardiovascular: Negative for chest pain, palpitations and leg swelling.   Gastrointestinal: Positive for abdominal pain (mild). Negative for constipation and nausea.   Endocrine: Negative for cold intolerance, heat intolerance, polydipsia and polyuria.   Genitourinary: Negative for dysuria, frequency and hematuria.   Musculoskeletal: Negative for arthralgias, back pain, myalgias and neck pain.   Skin: Negative for pallor, rash and wound.   Allergic/Immunologic: Negative for environmental allergies and immunocompromised state.   Neurological: Negative for dizziness, seizures, syncope, weakness, numbness and headaches.   Hematological: Negative for adenopathy. Does not bruise/bleed easily.   Psychiatric/Behavioral: Negative for agitation, confusion and sleep disturbance.     Objective:     Vital Signs (Most Recent):  Temp: 97.8 °F (36.6 °C) (01/06/19 0740)  Pulse: 87 (01/06/19 0740)  Resp: 18 (01/06/19 0740)  BP: 131/75 (01/06/19 0740)  SpO2: 95 % (01/06/19 0740) Vital Signs (24h Range):  Temp:  [97.8 °F (36.6 °C)-98.5 °F (36.9 °C)] 97.8 °F (36.6 °C)  Pulse:  [56-87] 87  Resp:  [14-20] 18  SpO2:  [93 %-97 %] 95 %  BP: (110-161)/(56-83) 131/75     Weight: 63 kg (138 lb 14.2 oz)  Body mass index is 23.84 kg/m².    Intake/Output Summary (Last 24 hours) at 1/6/2019 1204  Last data filed at 1/6/2019 1108  Gross per 24 hour   Intake 870 ml   Output 200 ml   Net 670 ml      Physical Exam   Constitutional:  She is oriented to person, place, and time. She appears well-developed and well-nourished. No distress.   Elderly    HENT:   Head: Normocephalic and atraumatic.   Eyes: Conjunctivae are normal.   Neck: Neck supple. No JVD present.   Cardiovascular: Normal rate, regular rhythm and normal heart sounds.   Pulmonary/Chest: Effort normal and breath sounds normal.   Abdominal: Soft. Bowel sounds are normal. She exhibits no distension. There is tenderness (incisional ).   Midline abdominal incision with staples intact, no s/s of infection. Colostomy intact to left lower abdomen.    Musculoskeletal: Normal range of motion.   Neurological: She is alert and oriented to person, place, and time.   Skin: Skin is warm and dry.   Psychiatric: She has a normal mood and affect. Her behavior is normal. Thought content normal.   Nursing note and vitals reviewed.      Significant Labs:   CBC:   Recent Labs   Lab 01/05/19  0440 01/06/19  0510   WBC 6.80 6.34   HGB 10.4* 10.5*   HCT 32.5* 33.2*    337     CMP:   Recent Labs   Lab 01/05/19  0440 01/06/19  0510    138   K 3.9 3.8    106   CO2 23 24   GLU 97 82   BUN 4* 5*   CREATININE 0.6 0.6   CALCIUM 8.2* 8.1*   ANIONGAP 8 8   EGFRNONAA >60 >60       Significant Imaging:

## 2019-01-06 NOTE — PLAN OF CARE
01/06/19 1142   Medicare Message   Important Message from Medicare regarding Discharge Appeal Rights Given to patient/caregiver;Explained to patient/caregiver;Signed/date by patient/caregiver   Date IMM was signed 01/06/19   Time IMM was signed 1136

## 2019-01-06 NOTE — PLAN OF CARE
01/06/19 1626   Final Note   Assessment Type Final Discharge Note   Anticipated Discharge Disposition Home-Health   Hospital Follow Up  Appt(s) scheduled? Yes   Right Care Referral Info   Post Acute Recommendation Home-care   Referral Type Home Health   Facility Name [facility]

## 2019-01-06 NOTE — PLAN OF CARE
Problem: Adult Inpatient Plan of Care  Goal: Plan of Care Review  Outcome: Ongoing (interventions implemented as appropriate)  Remains free from harm, ambulates in seth, tolerates diet, positions self, pain controlled via PO pain meds, no acute distress noted. 24 hour chart check complete.

## 2019-01-06 NOTE — PROGRESS NOTES
[facility] Hospital Medicine  Progress Note    Patient Name: Irlanda Lopez  MRN: [id number]  Patient Class: IP- Inpatient   Admission Date: 1/2/2019  Length of Stay: 4 days  Attending Physician: Reece Hogan MD  Primary Care Provider: Myla Arias MD        Subjective:     Principal Problem:Diverticulitis of large intestine with perforation and abscess without bleeding    HPI:  Irlanda Lopez is a 78 year old female with hyperlipidemia, hypothyroidism and short term memory difficulties who presented for colonoscopy and colon resection due to recent diverticulitis. The patient was found to have a posterior rectal abscess with cristine pus associated with a perforated diverticulum of the distal sigmoid colon. She underwent left sigmoid colectomy with a Maribel procedure. The patient denies complaint prior to surgery and reports that her pain is well controlled following surgery. Family is concerned about her reaction to having an ostomy pouch and management of the pouch upon discharge. The patient lives alone and performed her ADLs independently prior to presentation. However, family expresses concern regarding short term memory prior to surgery. Code status was discussed with the patient and her family. All are in agreement. She is a full code. Her son, Mundo Moreland, is her surrogate medical decision maker.     Hospital Course:  Patient admitted for diverticulitis of large intestine with perforation and abscess. S/p left sigmoid colectomy with a Maribel procedure on 1/2/19 by Dr. Hogan. 1/3/19- POD #1, pain controlled. WBCs elevated most likely reactive. Will continue IV abx. Blood pressure marginally low, will give 250ml NS bolus and continue maintenance fluids. 1/4/19- Blood pressure has stabilized. WBCs normalized. K+3.2, will replete. 1/5/19- Pt verbalized symptom improvement.  Pt able to ambulate successfully without distress in hallways.  H/H stable with Potassium and WBCs normalized.  Colostomy  site intanct with stoma pink and liquid stool present.  Diet advanced as tolerated.  Pt seen and examined and noted to be stable for discharge per primary team. 1/6/19- Home health verified per case management. Discharge anticipated today per primary team.         Interval History: pt stable with no additional complaints.  Pt ambulating in hallway without distress.  CM contacted and home health verified.  Discharge to home anticipated today per primary team.     Review of Systems   Constitutional: Negative for appetite change, chills, diaphoresis, fatigue and fever.   HENT: Negative for congestion, ear pain, mouth sores, sore throat and trouble swallowing.    Eyes: Negative for pain and visual disturbance.   Respiratory: Negative for cough, chest tightness and shortness of breath.    Cardiovascular: Negative for chest pain, palpitations and leg swelling.   Gastrointestinal: Positive for abdominal pain (mild). Negative for constipation and nausea.   Endocrine: Negative for cold intolerance, heat intolerance, polydipsia and polyuria.   Genitourinary: Negative for dysuria, frequency and hematuria.   Musculoskeletal: Negative for arthralgias, back pain, myalgias and neck pain.   Skin: Negative for pallor, rash and wound.   Allergic/Immunologic: Negative for environmental allergies and immunocompromised state.   Neurological: Negative for dizziness, seizures, syncope, weakness, numbness and headaches.   Hematological: Negative for adenopathy. Does not bruise/bleed easily.   Psychiatric/Behavioral: Negative for agitation, confusion and sleep disturbance.     Objective:     Vital Signs (Most Recent):  Temp: 97.8 °F (36.6 °C) (01/06/19 0740)  Pulse: 87 (01/06/19 0740)  Resp: 18 (01/06/19 0740)  BP: 131/75 (01/06/19 0740)  SpO2: 95 % (01/06/19 0740) Vital Signs (24h Range):  Temp:  [97.8 °F (36.6 °C)-98.5 °F (36.9 °C)] 97.8 °F (36.6 °C)  Pulse:  [56-87] 87  Resp:  [14-20] 18  SpO2:  [93 %-97 %] 95 %  BP: (110-161)/(56-83)  131/75     Weight: 63 kg (138 lb 14.2 oz)  Body mass index is 23.84 kg/m².    Intake/Output Summary (Last 24 hours) at 1/6/2019 1204  Last data filed at 1/6/2019 1108  Gross per 24 hour   Intake 870 ml   Output 200 ml   Net 670 ml      Physical Exam   Constitutional: She is oriented to person, place, and time. She appears well-developed and well-nourished. No distress.   Elderly    HENT:   Head: Normocephalic and atraumatic.   Eyes: Conjunctivae are normal.   Neck: Neck supple. No JVD present.   Cardiovascular: Normal rate, regular rhythm and normal heart sounds.   Pulmonary/Chest: Effort normal and breath sounds normal.   Abdominal: Soft. Bowel sounds are normal. She exhibits no distension. There is tenderness (incisional ).   Midline abdominal incision with staples intact, no s/s of infection. Colostomy intact to left lower abdomen.    Musculoskeletal: Normal range of motion.   Neurological: She is alert and oriented to person, place, and time.   Skin: Skin is warm and dry.   Psychiatric: She has a normal mood and affect. Her behavior is normal. Thought content normal.   Nursing note and vitals reviewed.      Significant Labs:   CBC:   Recent Labs   Lab 01/05/19  0440 01/06/19  0510   WBC 6.80 6.34   HGB 10.4* 10.5*   HCT 32.5* 33.2*    337     CMP:   Recent Labs   Lab 01/05/19  0440 01/06/19  0510    138   K 3.9 3.8    106   CO2 23 24   GLU 97 82   BUN 4* 5*   CREATININE 0.6 0.6   CALCIUM 8.2* 8.1*   ANIONGAP 8 8   EGFRNONAA >60 >60       Significant Imaging:         Assessment/Plan:      * Diverticulitis of large intestine with perforation and abscess without bleeding    -S/P sigmoid colon resection.   -Continue IV Flagyl and Cipro.   -Surgical management per General Surgery.   1/3/1-  -POD #1, pain controlled   -Wound care consult for stoma care   1/4/19  -POD #2, WBCs normal   -Continue PT  -1/5/19  -POD#3- H/H stable- WBCs and K normal  -increased ambulation reported- colostomy functioning  with +stool- diet advanced as tolerated  1/6/19- POD #4- pt stable- discharge per primary team anticipated        Hypokalemia    -within normal limits on 1/6/19  -normalized on 1/5/19  Most likely due to GI loss  K+3.2-on 1/4/19  Will replete        Hyperlipidemia    -atorvastatin        Vascular dementia without behavioral disturbance    -pt neuro intact and answers questions appropriately   -Aricept continued   -will monitor        Essential hypertension    -controlled        Hypothyroidism    Resume thyroid replacement         VTE Risk Mitigation (From admission, onward)        Ordered     IP VTE HIGH RISK PATIENT  Once      01/02/19 1528     Place sequential compression device  Until discontinued      01/02/19 1528        Discharge to home anticipated today.  Hospital Medicine will sign-off.        Deborah Floyd NP  Department of Hospital Medicine   [facility]

## 2019-01-06 NOTE — ASSESSMENT & PLAN NOTE
-S/P sigmoid colon resection.   -Continue IV Flagyl and Cipro.   -Surgical management per General Surgery.   1/3/1-  -POD #1, pain controlled   -Wound care consult for stoma care   1/4/19  -POD #2, WBCs normal   -Continue PT  -1/5/19  -POD#3- H/H stable- WBCs and K normal  -increased ambulation reported- colostomy functioning with +stool- diet advanced as tolerated  1/6/19- POD #4- pt stable- discharge per primary team anticipated

## 2019-01-06 NOTE — PLAN OF CARE
Problem: Physical Therapy Goal  Goal: Physical Therapy Goal  PT WILL BE SEEN FOR P.T. FOR A MIN OF 5 OUT OF 7 DAYS A WEEK  LT/10/19  1. PT WILL COMPLETE BED MOBILITY IND.  2. PT WILL T/F TO CHAIR WITH OR WITHOUT RW AND S  3. PT WILL GT TRAIN WITH OR WITHOUT RW X 250' AND S  4. PT WILL COMPLETE B LE TE X 20 REPS     Outcome: Ongoing (interventions implemented as appropriate)  amb >500ft in seth sup no gross lob; sup bed mobility and transfers; D/C PT services at this time; patient is walking in the halls with family multiple times a day

## 2019-01-06 NOTE — PT/OT/SLP PROGRESS
Physical Therapy  Treatment    Irlanda Lopez   MRN: [id number]   Admitting Diagnosis: Diverticulitis of large intestine with perforation and abscess without bleeding       PT Start Time: 0945     PT Stop Time: 1000    PT Total Time (min): 15 min       Billable Minutes:  Therapeutic Activity 15    Treatment Type: Treatment  PT/PTA: PT             General Precautions: Standard, fall  Orthopedic Precautions: N/A   Braces: N/A         Subjective:  Communicated with Meghan QUINTERO prior to session.      Pain/Comfort  Pain Rating 1: 0/10    Objective:   Patient found with: colostomy    Functional Mobility:  Bed Mobility: sup sup to sit       Transfers: sup sit to stand various surfaces       Gait: >500ft sup no gross lob and no AD       Stairs: nt      Balance:   Sit - indep  Stand - sba/sup      Therapeutic Activities and Exercises:  PT educated patient/family on poc - mobility program for amb and d/c skilled PT services at this time; reviewed le exs and safety/fall prec with mobility    AM-PAC 6 CLICK MOBILITY  How much help from another person does this patient currently need?   1 = Unable, Total/Dependent Assistance  2 = A lot, Maximum/Moderate Assistance  3 = A little, Minimum/Contact Guard/Supervision  4 = None, Modified [place]/Independent    Turning over in bed (including adjusting bedclothes, sheets and blankets)?: 4  Sitting down on and standing up from a chair with arms (e.g., wheelchair, bedside commode, etc.): 4  Moving from lying on back to sitting on the side of the bed?: 4  Moving to and from a bed to a chair (including a wheelchair)?: 4  Need to walk in hospital room?: 4  Climbing 3-5 steps with a railing?: 1  Basic Mobility Total Score: 21    AM-PAC Raw Score CMS G-Code Modifier Level of Impairment Assistance   6 % Total / Unable   7 - 9 CM 80 - 100% Maximal Assist   10 - 14 CL 60 - 80% Moderate Assist   15 - 19 CK 40 - 60% Moderate Assist   20 - 22 CJ 20 - 40% Minimal Assist   23 CI 1-20% SBA /  CGA   24 CH 0% Independent/ Mod I     Patient left up in chair with all lines intact, call button in reach, RN notified and family present.    Assessment:  Irlanda Lopez is a 78 y.o. female with a medical diagnosis of Diverticulitis of large intestine with perforation and abscess without bleeding and presents with improved mobility at a supervision lof. Patient is ready to be d/c'd from PT services.    Rehab identified problem list/impairments:      Rehab potential is n/a - d/c PT and defer to people movers program to maintain amb act tolerance    Activity tolerance: Good    Discharge recommendations: Discharge Facility/Level of Care Needs: (home with family)     Barriers to discharge:      Equipment recommendations: Equipment Needed After Discharge: none     GOALS:   Multidisciplinary Problems     Physical Therapy Goals        Problem: Physical Therapy Goal    Goal Priority Disciplines Outcome Goal Variances Interventions   Physical Therapy Goal     PT, PT/OT Ongoing (interventions implemented as appropriate)     Description:  PT WILL BE SEEN FOR P.T. FOR A MIN OF 5 OUT OF 7 DAYS A WEEK  LT/10/19  1. PT WILL COMPLETE BED MOBILITY IND.  2. PT WILL T/F TO CHAIR WITH OR WITHOUT RW AND S  3. PT WILL GT TRAIN WITH OR WITHOUT RW X 250' AND S  4. PT WILL COMPLETE B LE TE X 20 REPS                      PLAN:    Patient to be seen    to address the above listed problems via gait training, therapeutic activities, therapeutic exercises  Plan of Care expires: 01/10/19  Plan of Care reviewed with: patient, family    PT G-Codes  Functional Assessment Tool Used: [facility]  Score: 21    Jakob Luna, PT  2019

## 2019-01-06 NOTE — NURSING
"Attempted to educate patient and family regarding colostomy care. Pt's daughter states "we are not going to deal with that, we have a sitter." Daughter also did not want the patient to "empty the colostomy because there is a sitter." Discharge instructions, medications, and appointments reviewed with patient and daughter. Both verbalized understanding.    "

## 2019-01-06 NOTE — ASSESSMENT & PLAN NOTE
-within normal limits on 1/6/19  -normalized on 1/5/19  Most likely due to GI loss  K+3.2-on 1/4/19  Will replete

## 2019-01-06 NOTE — DISCHARGE SUMMARY
[facility] -   General Surgery  Discharge Summary      Patient Name: Irlanda Lopez  MRN: [id number]  Admission Date: 1/2/2019  Hospital Length of Stay: 4 days  Discharge Date and Time:  01/06/2019 11:40 AM  Attending Physician: Reece Hogan MD   Discharging Provider: Suad Peace MD  Primary Care Provider: Myla Arias MD    HPI:   No notes on file    Procedure(s) (LRB):  COLECTOMY, SIGMOID (Left)  CREATION, COLOSTOMY (Left)  SHAHIDA PROCEDURE (N/A)  LYSIS, ADHESIONS (N/A)      Indwelling Lines/Drains at time of discharge:   Lines/Drains/Airways     Drain                 Colostomy 01/02/19 1306 Descending/sigmoid LLQ 3 days              Hospital Course: 1/3/2019:  Postop day 1.  She is tolerating clear liquid and has been ambulating.  She feels much improved compared to the preop days.  Incisions clean.  Colostomy is not functioning yet.  We will continue to current management.    1/4/2019:  Postop day 2.  She is tolerating clear liquid and is ambulating.  Minimal complains of incisional pain.  Colostomy is functioning.  The dressing is intact open (will remove the dressing.) The lab results are pending.  She is likely expected to be discharged tomorrow.    01/05/2019 - POD# 3 s/p Shahida's.  Tolerating clear liquid diet.  Having ostomy function.  Pain is controlled.    01/06/2019 - POD# 4.  Tolerating regular diet.  Still with ostomy function.  Pain is controlled.  Home Health is arranged.  Ready for discharge.    Consults:   Consults (From admission, onward)        Status Ordering Provider     Consult to Case Management/Social Work  Once     Provider:  (Not yet assigned)    Completed REECE HOGAN     Inpatient consult to Internal Medicine  Once     Provider:  Tyson Maldonado MD    Completed REECE HOGAN     Inpatient consult to Social Work/Case Management  Once     Provider:  (Not yet assigned)    REECE Moe          Significant Diagnostic Studies: Labs:   BMP:   Recent  "Labs   Lab 01/05/19  0440 01/06/19  0510   GLU 97 82    138   K 3.9 3.8    106   CO2 23 24   BUN 4* 5*   CREATININE 0.6 0.6   CALCIUM 8.2* 8.1*    and CMP   Recent Labs   Lab 01/05/19  0440 01/06/19  0510    138   K 3.9 3.8    106   CO2 23 24   GLU 97 82   BUN 4* 5*   CREATININE 0.6 0.6   CALCIUM 8.2* 8.1*   ANIONGAP 8 8   ESTGFRAFRICA >60 >60   EGFRNONAA >60 >60     Endoscopy: Colonoscopy:  See procedure note    Pending Diagnostic Studies:     None        Final Active Diagnoses:    Diagnosis Date Noted POA    PRINCIPAL PROBLEM:  Diverticulitis of large intestine with perforation and abscess without bleeding [K57.20] 11/27/2018 Yes    Hypokalemia [E87.6] 01/04/2019 No    Hyperlipidemia [E78.5] 11/10/2018 Yes    Vascular dementia without behavioral disturbance [F01.50] 09/14/2017 Yes    Essential hypertension [I10] 01/10/2017 Yes    Hypothyroidism [E03.9] 10/04/2016 Yes      Problems Resolved During this Admission:      Discharged Condition: stable    Disposition: Home or Self Care    Follow Up:  Follow-up Information     [facility].    Specialty:  Home Health Services  Why:  Home [facility] assigned by Joppel. [facility] will contact patient upon d/c  Contact information:  [address]  [place] [address]  [phone]             Reece Hogan MD On 1/18/2019.    Specialties:  General Surgery, Bariatrics  Why:  As scheduled  Contact information:  [address]  [place] [facility] [address]  [phone]                 Patient Instructions:      OSTOMY SUPPLIES FOR HOME USE   Order Comments: OSTOMY CARE SUPPLIES:  Coloplast  Sensura Chouteau One Piece Pouch #[id number]  Brava Moldable Ring #[id number]   Brava Skin Barrier Spray #[id number]  Ostomy Scissors #[id number]     Order Specific Question Answer Comments   Height: 5' 4" (1.626 m)    Weight: 63 kg (138 lb 14.2 oz)    Length of need (1-99 months): 12    Diagnosis Colostomy    Does patient have medical equipment at home? none  "     Diet general     Diet Adult Regular     Call MD for:  temperature >100.4     Call MD for:  persistent nausea and vomiting     Call MD for:  severe uncontrolled pain     Call MD for:  difficulty breathing, headache or visual disturbances     Call MD for:  redness, tenderness, or signs of infection (pain, swelling, redness, odor or green/yellow discharge around incision site)     Call MD for:  hives     Call MD for:  persistent dizziness or light-headedness     No dressing needed     Lifting restrictions   Order Comments: No lifting greater than 10 lb for 6 weeks     No driving until:   Order Comments: Off narcotic medication     No dressing needed   Order Comments: Okay to shower today.  No tub baths for 1 week.     Notify your health care provider if you experience any of the following:  temperature >100.4     Notify your health care provider if you experience any of the following:  persistent nausea and vomiting or diarrhea     Notify your health care provider if you experience any of the following:  severe uncontrolled pain     Notify your health care provider if you experience any of the following:  redness, tenderness, or signs of infection (pain, swelling, redness, odor or green/yellow discharge around incision site)     Activity as tolerated     Medications:  Reconciled Home Medications:      Medication List      START taking these medications    HYDROcodone-acetaminophen 5-325 mg per tablet  Commonly known as:  NORCO  Take 1 tablet by mouth every 6 (six) hours as needed for Pain.     senna-docusate 8.6-50 mg 8.6-50 mg per tablet  Commonly known as:  PERICOLACE  Take 1 tablet by mouth 2 (two) times daily. for 5 days        CONTINUE taking these medications    aspirin 81 MG Chew  Take 1 tablet (81 mg total) by mouth once daily.     atorvastatin 10 MG tablet  Commonly known as:  LIPITOR  TAKE 1 TABLET EVERY DAY     cyanocobalamin 100 MCG tablet  Commonly known as:  VITAMIN B-12  Take 100 mcg by mouth once  daily.     donepezil 10 MG tablet  Commonly known as:  ARICEPT  Take 1 tablet (10 mg total) by mouth every evening.     magnesium citrate (bulk) Powd  by Misc.(Non-Drug; Combo Route) route.     memantine 10 MG Tab  Commonly known as:  NAMENDA  10mg po bid.     PEPPERMINT OIL MISC  by Misc.(Non-Drug; Combo Route) route.     thyroid (pork) 30 mg Tab  Commonly known as:  ARMOUR THYROID  Take 1 tablet (30 mg total) by mouth once daily.     triamcinolone acetonide 0.1% 0.1 % ointment  Commonly known as:  KENALOG  APPLY TOPICALLY 4 (FOUR) TIMES DAILY. TO RASH ON FACE AND CHEST AND NOSE        STOP taking these medications    hydroCHLOROthiazide 12.5 MG Tab  Commonly known as:  HYDRODIURIL     lisinopril-hydrochlorothiazide 10-12.5 mg per tablet  Commonly known as:  PRINZIDE,ZESTORETIC     sodium,potassium,mag sulfates 17.5-3.13-1.6 gram Solr  Commonly known as:  SUPREP BOWEL PREP KIT          Time spent on the discharge of patient: 45 minutes    Suad Peace MD  General Surgery  [facility] -

## 2019-01-07 ENCOUNTER — TELEPHONE (OUTPATIENT)
Dept: SURGERY | Facility: HOSPITAL | Age: 79
End: 2019-01-07

## 2019-01-07 LAB
BACTERIA SPEC ANAEROBE CULT: NORMAL

## 2019-01-08 ENCOUNTER — TELEPHONE (OUTPATIENT)
Dept: SURGERY | Facility: CLINIC | Age: 79
End: 2019-01-08

## 2019-01-08 ENCOUNTER — PATIENT OUTREACH (OUTPATIENT)
Dept: ADMINISTRATIVE | Facility: CLINIC | Age: 79
End: 2019-01-08

## 2019-01-08 NOTE — TELEPHONE ENCOUNTER
----- Message from Carmen Evans sent at 1/8/2019 12:37 PM CST -----  Contact: daughter/Myla Mora   States she had surgery on Wednesday last week and she wants to know if she still needs to adhere the lactose free and is there any other dietary restrictions. Please call Myla Mora at [phone]. Thank you

## 2019-01-08 NOTE — PATIENT INSTRUCTIONS
Discharge Instructions for Colostomy  You just underwent a procedure that required a colostomy. This is a life-saving procedure that involves removing or disconnecting part of your colon (large intestine). If your large intestine was diseased, your healthcare provider may have removed it. If it was injured, your healthcare provider may have disconnected it for a short time so that it can heal. After it heals, your healthcare provider may reconnect it. During a colostomy formation, your healthcare provider reroutes your colon through your abdominal wall. Stool and mucus can then pass out of your body through this opening, called a stoma. The following are general guidelines for home care following a colostomy. Your healthcare provider will go over any information that is specific to your condition.  Home care  Suggestions for home care include the following:  · Take care of your stoma as directed. Your healthcare provider and ostomy nurse discussed how to do this with you before you left the hospital.  · Ask your healthcare provider or ostomy nurse for a patient education sheet about colostomy care before you leave the hospital. This will help remind you how to care for yourself.  · If a partner or significant other will be helping you recover, ask the medical team to educate that person on ostomy care as well.   · Dont lift anything heavier than 5 pounds until your healthcare provider says it is OK.  · Dont drive until after your first healthcare providers appointment after your surgery.  · If you ride in a car for more than short trips, stop often to stretch your legs.  · Ask your healthcare provider when you can expect to return to work. Most patients are able to return to work within 4 to 6 weeks after surgery.  · Increase your activity gradually. Take short walks on a level surface.  · Wash your incision site with soap and water and pat it dry.  · Check your incision every day for redness, drainage,  swelling, or separation of the skin.  · Take your medicines exactly as directed. Dont skip doses.  · Dont take any over-the-counter medicine unless your healthcare provider tells you to do so.  Call your healthcare provider  Call your healthcare provider immediately if you have any of the following:  · Excessive bleeding from your stoma  · Blood in your stool  · Stool that is very hard  · No gas or stool  · Change in the color of your stoma  · Bulging skin around your stoma  · A stoma that looks like its getting longer  · Fever of 100.4°F (38°C) or higher, or chills, or as advised by your healthcare provider   · Redness, swelling, bleeding, or drainage from your incision  · Constipation  · Diarrhea  · Nausea or vomiting  · Increased pain in the belly or around the stoma   Date Last Reviewed: 7/1/2016  © 8927-4214 [employer]. [address] [place] [address], [place] [address]. All rights reserved. This information is not intended as a substitute for professional medical care. Always follow your healthcare professional's instructions.

## 2019-01-08 NOTE — TELEPHONE ENCOUNTER
Spoke with daughter and advised that her questions/concerns have been forwarded to Dr. Hogan  In the meantime to stick with restrictions until advised differently

## 2019-01-10 DIAGNOSIS — F01.50 VASCULAR DEMENTIA WITHOUT BEHAVIORAL DISTURBANCE: ICD-10-CM

## 2019-01-10 RX ORDER — MEMANTINE HYDROCHLORIDE 10 MG/1
TABLET ORAL
Qty: 180 TABLET | Refills: 3 | Status: SHIPPED | OUTPATIENT
Start: 2019-01-10 | End: 2019-01-31 | Stop reason: SDUPTHER

## 2019-01-10 RX ORDER — DONEPEZIL HYDROCHLORIDE 10 MG/1
10 TABLET, FILM COATED ORAL NIGHTLY
Qty: 90 TABLET | Refills: 3 | Status: SHIPPED | OUTPATIENT
Start: 2019-01-10 | End: 2019-01-31 | Stop reason: SDUPTHER

## 2019-01-10 RX ORDER — ATORVASTATIN CALCIUM 10 MG/1
10 TABLET, FILM COATED ORAL DAILY
Qty: 90 TABLET | Refills: 3 | Status: SHIPPED | OUTPATIENT
Start: 2019-01-10 | End: 2019-01-25 | Stop reason: CLARIF

## 2019-01-18 ENCOUNTER — PATIENT MESSAGE (OUTPATIENT)
Dept: SURGERY | Facility: CLINIC | Age: 79
End: 2019-01-18

## 2019-01-18 ENCOUNTER — NURSE TRIAGE (OUTPATIENT)
Dept: ADMINISTRATIVE | Facility: CLINIC | Age: 79
End: 2019-01-18

## 2019-01-18 ENCOUNTER — OFFICE VISIT (OUTPATIENT)
Dept: SURGERY | Facility: CLINIC | Age: 79
End: 2019-01-18
Payer: MEDICARE

## 2019-01-18 VITALS
BODY MASS INDEX: 24.94 KG/M2 | HEART RATE: 54 BPM | DIASTOLIC BLOOD PRESSURE: 82 MMHG | TEMPERATURE: 98 F | WEIGHT: 145.31 LBS | SYSTOLIC BLOOD PRESSURE: 128 MMHG

## 2019-01-18 DIAGNOSIS — Z93.3 COLOSTOMY IN PLACE: ICD-10-CM

## 2019-01-18 DIAGNOSIS — K57.20 DIVERTICULITIS OF LARGE INTESTINE WITH PERFORATION AND ABSCESS WITHOUT BLEEDING: ICD-10-CM

## 2019-01-18 DIAGNOSIS — Z12.11 SPECIAL SCREENING FOR MALIGNANT NEOPLASMS, COLON: Primary | ICD-10-CM

## 2019-01-18 PROCEDURE — 99024 POSTOP FOLLOW-UP VISIT: CPT | Mod: S$GLB,,, | Performed by: PHYSICIAN ASSISTANT

## 2019-01-18 PROCEDURE — 99499 RISK ADDL DX/OHS AUDIT: ICD-10-PCS | Mod: S$GLB,,, | Performed by: PHYSICIAN ASSISTANT

## 2019-01-18 PROCEDURE — 99999 PR PBB SHADOW E&M-EST. PATIENT-LVL III: CPT | Mod: PBBFAC,,, | Performed by: PHYSICIAN ASSISTANT

## 2019-01-18 PROCEDURE — 99499 UNLISTED E&M SERVICE: CPT | Mod: S$GLB,,, | Performed by: PHYSICIAN ASSISTANT

## 2019-01-18 PROCEDURE — 99999 PR PBB SHADOW E&M-EST. PATIENT-LVL III: ICD-10-PCS | Mod: PBBFAC,,, | Performed by: PHYSICIAN ASSISTANT

## 2019-01-18 PROCEDURE — 99024 PR POST-OP FOLLOW-UP VISIT: ICD-10-PCS | Mod: S$GLB,,, | Performed by: PHYSICIAN ASSISTANT

## 2019-01-18 NOTE — PROGRESS NOTES
Irlanda Lopez is status post sigmoid colectomy and jose's procedure on 1/2/19. Tx for recurrent acute diverticulitis She presents today for follow-up care.  She is doing well and has no complaints. Her colostomy is functioning well.    PE: Abdomen is soft, non-tender, non-distended.  Incisions clean, dry, and intact. Colostomy is viable. Staples removed.     Pathology: Sigmoid colon and possible rectum:  Diverticulitis with smooth muscle hypertrophy, transmural inflammation, and subserosal microabscess formation;  Surgical resection margins are viable.    A/P:  Normal post-operative course.    Return to clinic in 3 months with Dr. Hogan

## 2019-01-19 NOTE — TELEPHONE ENCOUNTER
"  Reason for Disposition   [1] Caller requesting NON-URGENT health information AND [2] PCP's office is the best resource    Answer Assessment - Initial Assessment Questions  1. REASON FOR CALL or QUESTION: "What is your reason for calling today?" or "How can I best help you?" or "What question do you have that I can help answer?"      Should patient still stay away from foods with seeds?    Protocols used: ST INFORMATION ONLY CALL-A-AH    "

## 2019-01-21 ENCOUNTER — PATIENT MESSAGE (OUTPATIENT)
Dept: SURGERY | Facility: CLINIC | Age: 79
End: 2019-01-21

## 2019-01-22 ENCOUNTER — PATIENT MESSAGE (OUTPATIENT)
Dept: SURGERY | Facility: CLINIC | Age: 79
End: 2019-01-22

## 2019-01-23 ENCOUNTER — HOSPITAL ENCOUNTER (OUTPATIENT)
Dept: RADIOLOGY | Facility: HOSPITAL | Age: 79
Discharge: HOME | DRG: 862 | End: 2019-01-23
Attending: NURSE PRACTITIONER
Payer: MEDICARE

## 2019-01-23 ENCOUNTER — OFFICE VISIT (OUTPATIENT)
Dept: INTERNAL MEDICINE | Facility: CLINIC | Age: 79
DRG: 862 | End: 2019-01-23
Payer: MEDICARE

## 2019-01-23 ENCOUNTER — PATIENT MESSAGE (OUTPATIENT)
Dept: INTERNAL MEDICINE | Facility: CLINIC | Age: 79
End: 2019-01-23

## 2019-01-23 VITALS
RESPIRATION RATE: 16 BRPM | SYSTOLIC BLOOD PRESSURE: 110 MMHG | HEART RATE: 68 BPM | TEMPERATURE: 102 F | BODY MASS INDEX: 24.33 KG/M2 | DIASTOLIC BLOOD PRESSURE: 70 MMHG | WEIGHT: 141.75 LBS

## 2019-01-23 DIAGNOSIS — R50.9 FEVER, UNSPECIFIED FEVER CAUSE: Primary | ICD-10-CM

## 2019-01-23 DIAGNOSIS — G24.5 EYE TWITCH: ICD-10-CM

## 2019-01-23 DIAGNOSIS — R50.9 FEVER, UNSPECIFIED FEVER CAUSE: ICD-10-CM

## 2019-01-23 PROBLEM — I77.1 TORTUOUS AORTA: Status: ACTIVE | Noted: 2019-01-23

## 2019-01-23 LAB
BILIRUB UR QL STRIP: NEGATIVE
CLARITY UR: CLEAR
COLOR UR: YELLOW
CTP QC/QA: YES
GLUCOSE UR QL STRIP: NEGATIVE
HGB UR QL STRIP: ABNORMAL
KETONES UR QL STRIP: ABNORMAL
LEUKOCYTE ESTERASE UR QL STRIP: NEGATIVE
NITRITE UR QL STRIP: NEGATIVE
PH UR STRIP: 6 [PH] (ref 5–8)
POC MOLECULAR INFLUENZA A AGN: NEGATIVE
POC MOLECULAR INFLUENZA B AGN: NEGATIVE
PROT UR QL STRIP: ABNORMAL
SP GR UR STRIP: 1.02 (ref 1–1.03)
URN SPEC COLLECT METH UR: ABNORMAL

## 2019-01-23 PROCEDURE — 74019 RADEX ABDOMEN 2 VIEWS: CPT | Mod: 26,,, | Performed by: RADIOLOGY

## 2019-01-23 PROCEDURE — 87502 POCT INFLUENZA A/B MOLECULAR: ICD-10-PCS | Mod: QW,S$GLB,, | Performed by: NURSE PRACTITIONER

## 2019-01-23 PROCEDURE — 3074F PR MOST RECENT SYSTOLIC BLOOD PRESSURE < 130 MM HG: ICD-10-PCS | Mod: CPTII,S$GLB,, | Performed by: NURSE PRACTITIONER

## 2019-01-23 PROCEDURE — 99215 PR OFFICE/OUTPT VISIT, EST, LEVL V, 40-54 MIN: ICD-10-PCS | Mod: S$GLB,,, | Performed by: NURSE PRACTITIONER

## 2019-01-23 PROCEDURE — 99999 PR PBB SHADOW E&M-EST. PATIENT-LVL V: ICD-10-PCS | Mod: PBBFAC,,, | Performed by: NURSE PRACTITIONER

## 2019-01-23 PROCEDURE — 71046 XR CHEST PA AND LATERAL: ICD-10-PCS | Mod: 26,,, | Performed by: RADIOLOGY

## 2019-01-23 PROCEDURE — 71046 X-RAY EXAM CHEST 2 VIEWS: CPT | Mod: 26,,, | Performed by: RADIOLOGY

## 2019-01-23 PROCEDURE — 71046 X-RAY EXAM CHEST 2 VIEWS: CPT | Mod: TC,FY,PO

## 2019-01-23 PROCEDURE — 1101F PR PT FALLS ASSESS DOC 0-1 FALLS W/OUT INJ PAST YR: ICD-10-PCS | Mod: CPTII,S$GLB,, | Performed by: NURSE PRACTITIONER

## 2019-01-23 PROCEDURE — 1101F PT FALLS ASSESS-DOCD LE1/YR: CPT | Mod: CPTII,S$GLB,, | Performed by: NURSE PRACTITIONER

## 2019-01-23 PROCEDURE — 74019 RADEX ABDOMEN 2 VIEWS: CPT | Mod: TC,FY,PO

## 2019-01-23 PROCEDURE — 3074F SYST BP LT 130 MM HG: CPT | Mod: CPTII,S$GLB,, | Performed by: NURSE PRACTITIONER

## 2019-01-23 PROCEDURE — 3078F DIAST BP <80 MM HG: CPT | Mod: CPTII,S$GLB,, | Performed by: NURSE PRACTITIONER

## 2019-01-23 PROCEDURE — 74019 XR ABDOMEN FLAT AND ERECT: ICD-10-PCS | Mod: 26,,, | Performed by: RADIOLOGY

## 2019-01-23 PROCEDURE — 99999 PR PBB SHADOW E&M-EST. PATIENT-LVL V: CPT | Mod: PBBFAC,,, | Performed by: NURSE PRACTITIONER

## 2019-01-23 PROCEDURE — 81003 URINALYSIS AUTO W/O SCOPE: CPT

## 2019-01-23 PROCEDURE — 87086 URINE CULTURE/COLONY COUNT: CPT

## 2019-01-23 PROCEDURE — 99215 OFFICE O/P EST HI 40 MIN: CPT | Mod: S$GLB,,, | Performed by: NURSE PRACTITIONER

## 2019-01-23 PROCEDURE — 87502 INFLUENZA DNA AMP PROBE: CPT | Mod: QW,S$GLB,, | Performed by: NURSE PRACTITIONER

## 2019-01-23 PROCEDURE — 3078F PR MOST RECENT DIASTOLIC BLOOD PRESSURE < 80 MM HG: ICD-10-PCS | Mod: CPTII,S$GLB,, | Performed by: NURSE PRACTITIONER

## 2019-01-24 ENCOUNTER — PATIENT MESSAGE (OUTPATIENT)
Dept: INTERNAL MEDICINE | Facility: CLINIC | Age: 79
End: 2019-01-24

## 2019-01-24 ENCOUNTER — TELEPHONE (OUTPATIENT)
Dept: INTERNAL MEDICINE | Facility: CLINIC | Age: 79
End: 2019-01-24

## 2019-01-24 ENCOUNTER — NURSE TRIAGE (OUTPATIENT)
Dept: ADMINISTRATIVE | Facility: CLINIC | Age: 79
End: 2019-01-24

## 2019-01-24 NOTE — TELEPHONE ENCOUNTER
----- Message from VINCENZO Maynard sent at 1/24/2019  9:39 AM CST -----  Dr. Arias has reviewed all labs, xrays, and urine.   xrays and urine look good. Flu was Negative. Wbc count on labs show an elevation which can happen with infection or stress. Everything else looks ok. Dr. Arias wants her to monitor her temperature today orally without fever reducers to see if she is still running fever. Dr. Arias would like her to see me again tomorrow to repeat the labs (wbc count) to see if they are trending up or down. Surgeon Dr. Hogan has been made aware of the results as well and wants us to monitor temp for now. Daughter usually has to pick a convenient time for her as she is her mode of transportation.   VINCENZO Miles    ----- Message -----  From: Myla Arias MD  Sent: 1/24/2019   9:20 AM  To: VINCENZO Maynard,   I looked at the labs and the xrays. I would repeat her labs tomorrow of the cbc and the procalcitonin level tomorrow. Have her monitor her temp orally today and I would see her in clinic again to see if any new symptoms have developed. If not, then the urine culture will be back and if she is still having fever on Friday you can also draw blood cultures too.   Faiza

## 2019-01-25 ENCOUNTER — TELEPHONE (OUTPATIENT)
Dept: SURGERY | Facility: CLINIC | Age: 79
End: 2019-01-25

## 2019-01-25 ENCOUNTER — HOSPITAL ENCOUNTER (INPATIENT)
Facility: HOSPITAL | Age: 79
LOS: 3 days | Discharge: HOME HEALTH | DRG: 862 | End: 2019-01-28
Attending: FAMILY MEDICINE | Admitting: SURGERY
Payer: MEDICARE

## 2019-01-25 ENCOUNTER — NURSE TRIAGE (OUTPATIENT)
Dept: ADMINISTRATIVE | Facility: CLINIC | Age: 79
End: 2019-01-25

## 2019-01-25 DIAGNOSIS — A41.9 SEPSIS, DUE TO UNSPECIFIED ORGANISM: Primary | ICD-10-CM

## 2019-01-25 DIAGNOSIS — K65.1 INTRA-ABDOMINAL ABSCESS: ICD-10-CM

## 2019-01-25 LAB
ALBUMIN SERPL BCP-MCNC: 2.8 G/DL
ALP SERPL-CCNC: 86 U/L
ALT SERPL W/O P-5'-P-CCNC: 27 U/L
ANION GAP SERPL CALC-SCNC: 11 MMOL/L
AST SERPL-CCNC: 42 U/L
BACTERIA UR CULT: NORMAL
BASOPHILS # BLD AUTO: 0.02 K/UL
BASOPHILS NFR BLD: 0.1 %
BILIRUB SERPL-MCNC: 0.7 MG/DL
BILIRUB UR QL STRIP: NEGATIVE
BUN SERPL-MCNC: 9 MG/DL
CALCIUM SERPL-MCNC: 9.2 MG/DL
CHLORIDE SERPL-SCNC: 101 MMOL/L
CLARITY UR: CLEAR
CO2 SERPL-SCNC: 24 MMOL/L
COLOR UR: YELLOW
CREAT SERPL-MCNC: 0.7 MG/DL
DIFFERENTIAL METHOD: ABNORMAL
EOSINOPHIL # BLD AUTO: 0.1 K/UL
EOSINOPHIL NFR BLD: 0.5 %
ERYTHROCYTE [DISTWIDTH] IN BLOOD BY AUTOMATED COUNT: 16.6 %
EST. GFR  (AFRICAN AMERICAN): >60 ML/MIN/1.73 M^2
EST. GFR  (NON AFRICAN AMERICAN): >60 ML/MIN/1.73 M^2
GLUCOSE SERPL-MCNC: 106 MG/DL
GLUCOSE UR QL STRIP: NEGATIVE
HCT VFR BLD AUTO: 37.7 %
HGB BLD-MCNC: 12.2 G/DL
HGB UR QL STRIP: ABNORMAL
KETONES UR QL STRIP: NEGATIVE
LACTATE SERPL-SCNC: 2.2 MMOL/L
LEUKOCYTE ESTERASE UR QL STRIP: NEGATIVE
LIPASE SERPL-CCNC: 40 U/L
LYMPHOCYTES # BLD AUTO: 2.1 K/UL
LYMPHOCYTES NFR BLD: 15.3 %
MCH RBC QN AUTO: 26.9 PG
MCHC RBC AUTO-ENTMCNC: 32.4 G/DL
MCV RBC AUTO: 83 FL
MONOCYTES # BLD AUTO: 1.4 K/UL
MONOCYTES NFR BLD: 9.8 %
NEUTROPHILS # BLD AUTO: 10.3 K/UL
NEUTROPHILS NFR BLD: 74.3 %
NITRITE UR QL STRIP: NEGATIVE
PH UR STRIP: 6 [PH] (ref 5–8)
PLATELET # BLD AUTO: 317 K/UL
PMV BLD AUTO: 9.5 FL
POTASSIUM SERPL-SCNC: 4.3 MMOL/L
PROT SERPL-MCNC: 6.8 G/DL
PROT UR QL STRIP: ABNORMAL
RBC # BLD AUTO: 4.53 M/UL
SODIUM SERPL-SCNC: 136 MMOL/L
SP GR UR STRIP: 1.01 (ref 1–1.03)
URN SPEC COLLECT METH UR: ABNORMAL
UROBILINOGEN UR STRIP-ACNC: NEGATIVE EU/DL
WBC # BLD AUTO: 13.86 K/UL

## 2019-01-25 PROCEDURE — S5010 5% DEXTROSE AND 0.45% SALINE: HCPCS | Performed by: EMERGENCY MEDICINE

## 2019-01-25 PROCEDURE — 96366 THER/PROPH/DIAG IV INF ADDON: CPT

## 2019-01-25 PROCEDURE — 96367 TX/PROPH/DG ADDL SEQ IV INF: CPT

## 2019-01-25 PROCEDURE — 63600175 PHARM REV CODE 636 W HCPCS: Performed by: EMERGENCY MEDICINE

## 2019-01-25 PROCEDURE — 11000001 HC ACUTE MED/SURG PRIVATE ROOM

## 2019-01-25 PROCEDURE — 83605 ASSAY OF LACTIC ACID: CPT

## 2019-01-25 PROCEDURE — 87040 BLOOD CULTURE FOR BACTERIA: CPT | Mod: 59

## 2019-01-25 PROCEDURE — 83690 ASSAY OF LIPASE: CPT

## 2019-01-25 PROCEDURE — 81003 URINALYSIS AUTO W/O SCOPE: CPT

## 2019-01-25 PROCEDURE — 85025 COMPLETE CBC W/AUTO DIFF WBC: CPT

## 2019-01-25 PROCEDURE — 25500020 PHARM REV CODE 255: Performed by: EMERGENCY MEDICINE

## 2019-01-25 PROCEDURE — 99285 EMERGENCY DEPT VISIT HI MDM: CPT | Mod: 25

## 2019-01-25 PROCEDURE — 25000003 PHARM REV CODE 250: Performed by: EMERGENCY MEDICINE

## 2019-01-25 PROCEDURE — 80053 COMPREHEN METABOLIC PANEL: CPT

## 2019-01-25 PROCEDURE — 25000003 PHARM REV CODE 250: Performed by: FAMILY MEDICINE

## 2019-01-25 PROCEDURE — 36415 COLL VENOUS BLD VENIPUNCTURE: CPT

## 2019-01-25 PROCEDURE — 96365 THER/PROPH/DIAG IV INF INIT: CPT

## 2019-01-25 RX ORDER — VANCOMYCIN HCL IN 5 % DEXTROSE 1G/250ML
1000 PLASTIC BAG, INJECTION (ML) INTRAVENOUS
Status: DISCONTINUED | OUTPATIENT
Start: 2019-01-26 | End: 2019-01-26

## 2019-01-25 RX ORDER — VANCOMYCIN HCL IN 5 % DEXTROSE 1G/250ML
1000 PLASTIC BAG, INJECTION (ML) INTRAVENOUS
Status: COMPLETED | OUTPATIENT
Start: 2019-01-25 | End: 2019-01-25

## 2019-01-25 RX ORDER — DEXTROSE MONOHYDRATE, SODIUM CHLORIDE, AND POTASSIUM CHLORIDE 50; .745; 4.5 G/1000ML; G/1000ML; G/1000ML
1000 INJECTION, SOLUTION INTRAVENOUS CONTINUOUS
Status: DISCONTINUED | OUTPATIENT
Start: 2019-01-25 | End: 2019-01-25

## 2019-01-25 RX ORDER — THYROID 30 MG/1
30 TABLET ORAL NIGHTLY
Status: DISCONTINUED | OUTPATIENT
Start: 2019-01-25 | End: 2019-01-28 | Stop reason: HOSPADM

## 2019-01-25 RX ORDER — ACETAMINOPHEN 325 MG/1
650 TABLET ORAL
Status: COMPLETED | OUTPATIENT
Start: 2019-01-25 | End: 2019-01-25

## 2019-01-25 RX ADMIN — PIPERACILLIN AND TAZOBACTAM 4.5 G: 4; .5 INJECTION, POWDER, LYOPHILIZED, FOR SOLUTION INTRAVENOUS; PARENTERAL at 07:01

## 2019-01-25 RX ADMIN — IOHEXOL 75 ML: 350 INJECTION, SOLUTION INTRAVENOUS at 05:01

## 2019-01-25 RX ADMIN — THYROID, PORCINE 30 MG: 30 TABLET ORAL at 11:01

## 2019-01-25 RX ADMIN — VANCOMYCIN HYDROCHLORIDE 1000 MG: 1 INJECTION, POWDER, LYOPHILIZED, FOR SOLUTION INTRAVENOUS at 08:01

## 2019-01-25 RX ADMIN — POTASSIUM CHLORIDE: 2 INJECTION, SOLUTION, CONCENTRATE INTRAVENOUS at 11:01

## 2019-01-25 RX ADMIN — SODIUM CHLORIDE 1000 ML: 0.9 INJECTION, SOLUTION INTRAVENOUS at 03:01

## 2019-01-25 RX ADMIN — ACETAMINOPHEN 650 MG: 325 TABLET ORAL at 06:01

## 2019-01-25 RX ADMIN — SODIUM CHLORIDE 1000 ML: 0.9 INJECTION, SOLUTION INTRAVENOUS at 07:01

## 2019-01-25 NOTE — TELEPHONE ENCOUNTER
----- Message from Padmini Finch sent at 1/25/2019 11:59 AM CST -----  Contact: daughter  caller needs call back rg patient surgery [phone]

## 2019-01-25 NOTE — TELEPHONE ENCOUNTER
Reason for Disposition   Caller has medication question, adult has minor symptoms, caller declines triage, and triager answers question    Protocols used: ST MEDICATION QUESTION CALL-A-AH    Daughter called- states that she was told to hold fever medication today to see if patient developed a fever throughout the day. All day patient has been fever free and then around 1830 patient developed a fever of 101. Was calling to find if they can give her medicine. Advised to go ahead and give her something. Explained that MD probably was asking them to hold off on fever medications to see if patient was still running a fever. Pt has follow up scheduled for tomorrow. Contact daughter to further advise

## 2019-01-25 NOTE — TELEPHONE ENCOUNTER
Reason for Disposition   Sounds like a life-threatening emergency to the triager    Protocols used: ST ABDOMINAL PAIN - FEMALE-A-OH    Mrs. Mora called with concerns her mother is complaining of left thigh pain, eye twitching, abdominal pain, she has a colostomy, and running fever of 100 to 101 last couple of days. bp 90/50 HR 53, not weak, and urinating fine. Advised them to call 911. The patient refused and wants the daughter to bring her to the ED instead. Ms. Mora informed that if her mother becomes symptomatic she should immediately call 911.

## 2019-01-25 NOTE — ED PROVIDER NOTES
SCRIBE #1 NOTE: I, Yesi Mcknight, am scribing for, and in the presence of, Kim Santamaria MD. I have scribed the HPI, ROS, and PEx.     SCRIBE #2 NOTE: I, Dipti Gamboa, am scribing for, and in the presence of,  Brandon Mendez MD. I have scribed the remaining portions of the note not scribed by Scribe #1.     History      Chief Complaint   Patient presents with    Post-op Problem     colon surgery on 1/2, staples removed on 1/18; c/o intermittent fever, abdominal pain, low blood pressure, left leg pain       Review of patient's allergies indicates:  No Known Allergies     HPI   HPI    1/25/2019, 2:19 PM   History obtained from the patient and daughter  HPI limited, pt is a poor historian      History of Present Illness: Irlanda Lopez is a 78 y.o. female patient with a PMHx of HTN, Hypothyroidism who presents to the Emergency Department s/p sigmoid colectomy on 1/2/19 for intermittent fever which onset gradually 2 days ago. Daughter reports a Tmax of 101 F. Symptoms are  moderate in severity. No mitigating or exacerbating factors reported. Associated sxs include chills, lower abd pain, L inner thigh pain. Daughter reports pt's caretaker called her this AM stating pt's BP was low, 90/50. They were told to report to the ED secondary to primary care being unable to fit pt in until 1330 today. Daughter reports pt was evaluated by her PCP 2 days ago for the same sxs. Patient/daughter denies any cough, constipation, hematochezia, melena, n/v/d, CP, SOB, urinary sxs, and all other sxs at this time. Daughter reports they are supposed to schedule a f/u with Dr. Hogan (General Surgery) in mid to late March. Prior Tx includes Tylenol with relief of fever. No further complaints or concerns at this time.       Arrival mode: Personal vehicle    PCP: Myla Arias MD       Past Medical History:  Past Medical History:   Diagnosis Date    High cholesterol     Hypertension     Hypothyroidism        Past Surgical History:  Past  Surgical History:   Procedure Laterality Date    APPENDECTOMY  2008    CATARACT EXTRACTION      Dr Raygoza    COLECTOMY, SIGMOID Left 1/2/2019    [facility]    COLON SURGERY      COLONOSCOPY N/A 1/2/2019    [facility] ENDO    CREATION, COLOSTOMY Left 1/2/2019    [facility] OR    SHAHIDA PROCEDURE N/A 1/2/2019    [facility] OR    LYSIS, ADHESIONS N/A 1/2/2019    [facility] OR    VITRECTOMY Right 09/2013         Family History:  Family History   Problem Relation Age of Onset    Alzheimer's disease Mother     Aneurysm Father         brain    Stomach cancer Brother         50s       Social History:  Social History     Tobacco Use    Smoking status: Former Smoker    Smokeless tobacco: Never Used   Substance and Sexual Activity    Alcohol use: No    Drug use: No    Sexual activity: No     Comment:        ROS   Review of Systems   Constitutional: Positive for chills and fever.   HENT: Negative for congestion and sore throat.    Respiratory: Negative for cough and shortness of breath.    Cardiovascular: Negative for chest pain.   Gastrointestinal: Positive for abdominal pain (lower). Negative for blood in stool, constipation, diarrhea, nausea and vomiting.   Genitourinary: Negative for dysuria, frequency and hematuria.   Musculoskeletal: Positive for myalgias (L inner thigh). Negative for back pain.   Skin: Negative for rash.   Neurological: Negative for weakness.   Hematological: Does not bruise/bleed easily.   All other systems reviewed and are negative.    Physical Exam      Initial Vitals [01/25/19 1255]   BP Pulse Resp Temp SpO2   136/72 68 18 97.9 °F (36.6 °C) 96 %      MAP       --          Physical Exam  Nursing Notes and Vital Signs Reviewed.  Constitutional: Patient is in no acute distress. Well-developed and well-nourished.  Head: Atraumatic. Normocephalic.  Eyes:  PERRL. EOM intact. Conjunctivae are not pale. No scleral icterus.  ENT: Mucous membranes are moist. Oropharynx is clear and symmetric.    Neck: Supple. Full ROM. No lymphadenopathy.  Cardiovascular: Regular rate. Regular rhythm. No murmurs, rubs, or gallops. Distal pulses are 2+ and symmetric.  Pulmonary/Chest: No respiratory distress. Clear to auscultation bilaterally. No wheezing or rales.  Abdominal: Soft and non-distended.  There is generalized tenderness. Well healed midline incision. Ostomy bag site is clean, dry, and intact. No exudate or surrounding erythema noted. No rebound, guarding, or rigidity. Good bowel sounds.  Genitourinary: No CVA tenderness  Musculoskeletal: Moves all extremities. No obvious deformities. No edema. No calf tenderness.  Skin: Warm and dry.  Neurological:  Alert, awake, and appropriate.  Normal speech.  No acute focal neurological deficits are appreciated.  Psychiatric: Normal affect. Good eye contact. Appropriate in content.    ED Course    Procedures  ED Vital Signs:  Vitals:    01/25/19 1255 01/25/19 1814 01/25/19 1825 01/25/19 1844   BP: 136/72  112/68    Pulse: 68  72    Resp: 18  18    Temp: 97.9 °F (36.6 °C) (!) 102 °F (38.9 °C)  (!) 102 °F (38.9 °C)   TempSrc: Oral Oral     SpO2: 96%  96%    Weight: 64.7 kg (142 lb 10.2 oz)          Abnormal Lab Results:  Labs Reviewed   CBC W/ AUTO DIFFERENTIAL - Abnormal; Notable for the following components:       Result Value    WBC 13.86 (*)     MCH 26.9 (*)     RDW 16.6 (*)     Gran # (ANC) 10.3 (*)     Mono # 1.4 (*)     Gran% 74.3 (*)     Lymph% 15.3 (*)     All other components within normal limits   COMPREHENSIVE METABOLIC PANEL - Abnormal; Notable for the following components:    Albumin 2.8 (*)     AST 42 (*)     All other components within normal limits   URINALYSIS, REFLEX TO URINE CULTURE - Abnormal; Notable for the following components:    Protein, UA Trace (*)     Occult Blood UA Trace (*)     All other components within  normal limits    Narrative:     Preferred Collection Type->Urine, Clean Catch   CULTURE, BLOOD   CULTURE, BLOOD   LIPASE   LACTIC ACID, PLASMA        All Lab Results:  Results for orders placed or performed during the hospital encounter of 01/25/19   CBC W/ AUTO DIFFERENTIAL   Result Value Ref Range    WBC 13.86 (H) 3.90 - 12.70 K/uL    RBC 4.53 4.00 - 5.40 M/uL    Hemoglobin 12.2 12.0 - 16.0 g/dL    Hematocrit 37.7 37.0 - 48.5 %    MCV 83 82 - 98 fL    MCH 26.9 (L) 27.0 - 31.0 pg    MCHC 32.4 32.0 - 36.0 g/dL    RDW 16.6 (H) 11.5 - 14.5 %    Platelets 317 150 - 350 K/uL    MPV 9.5 9.2 - 12.9 fL    Gran # (ANC) 10.3 (H) 1.8 - 7.7 K/uL    Lymph # 2.1 1.0 - 4.8 K/uL    Mono # 1.4 (H) 0.3 - 1.0 K/uL    Eos # 0.1 0.0 - 0.5 K/uL    Baso # 0.02 0.00 - 0.20 K/uL    Gran% 74.3 (H) 38.0 - 73.0 %    Lymph% 15.3 (L) 18.0 - 48.0 %    Mono% 9.8 4.0 - 15.0 %    Eosinophil% 0.5 0.0 - 8.0 %    Basophil% 0.1 0.0 - 1.9 %    Differential Method Automated    Comp. Metabolic Panel   Result Value Ref Range    Sodium 136 136 - 145 mmol/L    Potassium 4.3 3.5 - 5.1 mmol/L    Chloride 101 95 - 110 mmol/L    CO2 24 23 - 29 mmol/L    Glucose 106 70 - 110 mg/dL    BUN, Bld 9 8 - 23 mg/dL    Creatinine 0.7 0.5 - 1.4 mg/dL    Calcium 9.2 8.7 - 10.5 mg/dL    Total Protein 6.8 6.0 - 8.4 g/dL    Albumin 2.8 (L) 3.5 - 5.2 g/dL    Total Bilirubin 0.7 0.1 - 1.0 mg/dL    Alkaline Phosphatase 86 55 - 135 U/L    AST 42 (H) 10 - 40 U/L    ALT 27 10 - 44 U/L    Anion Gap 11 8 - 16 mmol/L    eGFR if African American >60 >60 mL/min/1.73 m^2    eGFR if non African American >60 >60 mL/min/1.73 m^2   Lipase   Result Value Ref Range    Lipase 40 4 - 60 U/L   Urinalysis, Reflex to Urine Culture Urine, Clean Catch   Result Value Ref Range    Specimen UA Urine, Clean Catch     Color, UA Yellow Yellow, Straw, Gina    Appearance, UA Clear Clear    pH, UA 6.0 5.0 - 8.0    Specific Gravity, UA 1.015 1.005 - 1.030    Protein, UA Trace (A) Negative    Glucose, UA  Negative Negative    Ketones, UA Negative Negative    Bilirubin (UA) Negative Negative    Occult Blood UA Trace (A) Negative    Nitrite, UA Negative Negative    Urobilinogen, UA Negative <2.0 EU/dL    Leukocytes, UA Negative Negative       Imaging Results:  Imaging Results          CT Abdomen Pelvis With Contrast (Final result)  Result time 01/25/19 17:25:39    Final result by Zoran Banks Jr., MD (01/25/19 17:25:39)                 Impression:      1. Interval partial colonic resection with ostomy placement.  There is an abscess within the central left pelvis as discussed above.  Tiny foci of air adjacent laterally to the collection are not definitely within a loop of bowel.  2. Unchanged large stone within the right renal pelvis with atrophy of the right kidney.      Electronically signed by: Zoran Banks Jr., MD  Date:    01/25/2019  Time:    17:25             Narrative:    EXAMINATION:  CT ABDOMEN PELVIS WITH CONTRAST    CLINICAL HISTORY:  Abd pain, fever, abscess suspected;    TECHNIQUE:  Low dose axial images, sagittal and coronal reformations were obtained from the lung bases to the pubic symphysis following the IV administration of 75 mL of Omnipaque 350 .  Oral contrast was not administered. All CT scans at this facility use dose modulation, iterative reconstruction and/or weight based dosing when appropriate to reduce radiation dose to as low as reasonably achievable.    COMPARISON:  CT from November 7, 2018 was reviewed.    FINDINGS:  Abdomen:    - Lower thorax:Normal heart size.    - Lung bases: No infiltrates and no nodules.    - Liver: No focal mass.    - Gallbladder: No calcified gallstones.    - Bile Ducts: No evidence of intra or extra hepatic biliary ductal dilation.    - Spleen: Negative.    - Kidneys: There is persistent right renal atrophy with a large stone lying within the renal pelvis.  The stone measures up to 3 cm.  No hydronephrosis.    - Adrenals: Unremarkable.    - Pancreas: No mass  or peripancreatic fat stranding.    - Retroperitoneum:  No significant adenopathy.    - Vascular: No abdominal aortic aneurysm.    - Abdominal wall:  Left-sided ostomy is new compared to prior.    Bowel/Mesentery:    There has been an interval partial colon resection.  There is a surgical suture line at the appear aspect of a Maribel's pouch.  Just adjacent superiorly is a fluid collection that contains air consistent with abscess.  It measures 7.3 cm craniocaudal by 6.2 cm transverse by 4.2 cm AP.  There are additional tiny foci of air just adjacent laterally that are not definitely within a loop of bowel.  This is best demonstrated on axial series 2, image 93.    Bones:  Mild compression fracture of T11 is unchanged.                                        The Emergency Provider reviewed the vital signs and test results, which are outlined above.    ED Discussion     4:02 PM: Dr. Santamaria transfers care of pt to Dr. Mendez, pending lab/imaging results.    6:40 PM: Patient handed off from Dr. Santamaria. Reviewed results. CT showed abscess and labs showed leukocytosis. Reviewing orders, will add on fluids 30 mL/kg bolus, cultures, lactic acid, and consult surgery. Will start Vancomycin and zosyn.     6:57 PM: Dr. Mendez evaluated pt. Patient had sigmoid colectomy with Dr. Hogan (General Surgery) on 1/2/19. Daughter reports patient had f/u with PCP 2 days ago and was found to have fever, but workup at that time was negative. Daughter reports this morning patient began c/o abd pain and BP was low.  I have discussed test results, shared treatment plan, and the need for admission with patient and family at bedside. Pt and family express understanding at this time and agree with all information. All questions answered. Pt and family have no further questions or concerns at this time. Pt is ready for admit. Consult has been placed to general surgery; awaiting their call.    7:07 PM: Discussed case with Dr. Garcia (General Surgery)  who agrees with current care and management of pt and accepts admission. Dr. Garcia recommends continue Zosyn and patient remain NPO. Dr. Garcia states possible drainage by IR.  Admitting Service: General Surgery  Admitting Physician: Dr. Garcia  Admit to: Med/surg        ED Medication(s):  Medications   vancomycin in dextrose 5 % 1 gram/250 mL IVPB 1,000 mg (not administered)   piperacillin-tazobactam 4.5 g in dextrose 5 % 100 mL IVPB (ready to mix system) (not administered)   sodium chloride 0.9% bolus 1,000 mL (not administered)   sodium chloride 0.9% bolus 1,000 mL (0 mLs Intravenous Stopped 1/25/19 1819)   omnipaque 350 iohexol 75 mL (75 mLs Intravenous Given 1/25/19 1700)   acetaminophen tablet 650 mg (650 mg Oral Given 1/25/19 1844)             Medical Decision Making    Medical Decision Making:   History:   Old Records Summarized: records from clinic visits.       <> Summary of Records: Results for MARITZA JAMISON (MRN [id number]) as of 1/25/2019 14:47    1/23/2019 15:54  Specimen UA: Urine, Clean Catch  Color, UA: Yellow  pH, UA: 6.0  Specific Gravity, UA: 1.020  Appearance, UA: Clear  Protein, UA: Trace (A)  Glucose, UA: Negative  Ketones, UA: Trace (A)  Occult Blood UA: Trace (A)  Nitrite, UA: Negative  Bilirubin (UA): Negative  Leukocytes, UA: Negative  CULTURE, URINE: Rpt  Urine Culture, Routine: No significant gr...    1/23/2019 16:05  MCH: 26.1 (L)  MCHC: 30.8 (L)  RDW: 16.3 (H)  Platelets: 326  MPV: 10.1  Gran%: 67.2  Gran # (ANC): 9.9 (H)  Immature Granulocytes: 0.5  Immature Grans (Abs): 0.08 (H)  Lymph%: 22.9  Lymph #: 3.4  Mono%: 8.6  Mono #: 1.3 (H)  Eosinophil%: 1.0  Eos #: 0.2  Basophil%: 0.3  Baso #: 0.04  nRBC: 0  Differential Method: Automated  Sodium: 136  Potassium: 4.1  Chloride: 101  CO2: 24  Anion Gap: 11  BUN, Bld: 12  Creatinine: 0.8  eGFR if non : >60  eGFR if African American: >60  Glucose: 107  Calcium: 9.1  Alkaline Phosphatase: 70  Total Protein: 6.9  Albumin: 2.9  (L)  Total Bilirubin: 0.8  AST: 23  ALT: 19  Procalcitonin: 0.05    Flu swab was negative.     Details       Reading Physician Reading Date Result Priority  Lonny Sanders, DO 1/23/2019     Narrative    EXAMINATION:  XR CHEST PA AND LATERAL    CLINICAL HISTORY:  Fever, unspecified    TECHNIQUE:  PA and lateral views of the chest were performed.    COMPARISON:  None    FINDINGS:  The lungs are clear and free of infiltrate.  No pleural effusion or pneumothorax. The heart is borderline enlarged.  There is mild tortuosity of the descending thoracic aorta.  Mild biapical pleural thickening noted.    Impression      1.  No acute cardiopulmonary process.      Electronically signed by: Lonny Sanders,   Date: 01/23/2019  Time: 16:46         Last Resulted: 01/23/19 16:46      Reading Physician Reading Date Result Priority  Lonny ANNABELLEOsmin Sanders, DO 1/23/2019     Narrative    EXAMINATION:  XR ABDOMEN FLAT AND ERECT    CLINICAL HISTORY:  Fever, unspecified    TECHNIQUE:  Flat and erect AP views of the abdomen were preformed.    COMPARISON:  None    FINDINGS:  The bowel gas pattern is nonspecific and nonobstructive.  There is a large calcification seen overlying the right side of the abdomen most consistent with a large staghorn type calculus within the right kidney.  The appearance is stable when compared to the prior exam.  No evidence for free air.    Impression      1.  As above      Electronically signed by: Lonny Sanders,   Date: 01/23/2019  Time: 16:46         Last Resulted: 01/23/19 16:46        Clinical Tests:   Lab Tests: Ordered and Reviewed  Radiological Study: Ordered and Reviewed  Sepsis from intra-abdominal postoperative abscess; patient covered with broad-spectrum antibiotics and given 30 ml per kg of IV fluids; general surgery consulted and patient admitted with orders for Zosyn q6h           Scribe Attestation:   Scribe #1: I performed the above scribed service and the documentation accurately describes the  services I performed. I attest to the accuracy of the note.    Attending:   Physician Attestation Statement for Scribe #1: I, Kim Santamaria MD, personally performed the services described in this documentation, as scribed by Yesi Mcknight, in my presence, and it is both accurate and complete.       Scribe Attestation:   Scribe #2: I performed the above scribed service and the documentation accurately describes the services I performed. I attest to the accuracy of the note.    Attending Attestation:           Physician Attestation for Scribe:    Physician Attestation Statement for Scribe #2: I, Brandon Mendez MD, reviewed documentation, as scribed by Dipti Gamboa in my presence, and it is both accurate and complete. I also acknowledge and confirm the content of the note done by Scribe #1.          Clinical Impression       ICD-10-CM ICD-9-CM   1. Sepsis, due to unspecified organism A41.9 038.9     995.91   2. Intra-abdominal abscess K65.1 567.22       Disposition:   Disposition: Admitted (Med/surg)  Condition: Fair         Brandon Mendez MD  01/26/19 0105

## 2019-01-26 LAB
APTT BLDCRRT: 29.3 SEC
BASOPHILS # BLD AUTO: 0.02 K/UL
BASOPHILS NFR BLD: 0.1 %
DIFFERENTIAL METHOD: ABNORMAL
EOSINOPHIL # BLD AUTO: 0.1 K/UL
EOSINOPHIL NFR BLD: 0.3 %
ERYTHROCYTE [DISTWIDTH] IN BLOOD BY AUTOMATED COUNT: 16.8 %
HCT VFR BLD AUTO: 35.5 %
HGB BLD-MCNC: 11 G/DL
INR PPP: 1.1
LYMPHOCYTES # BLD AUTO: 2.3 K/UL
LYMPHOCYTES NFR BLD: 16.1 %
MCH RBC QN AUTO: 25.8 PG
MCHC RBC AUTO-ENTMCNC: 31 G/DL
MCV RBC AUTO: 83 FL
MONOCYTES # BLD AUTO: 1.4 K/UL
MONOCYTES NFR BLD: 9.7 %
NEUTROPHILS # BLD AUTO: 10.6 K/UL
NEUTROPHILS NFR BLD: 73.8 %
PLATELET # BLD AUTO: 304 K/UL
PMV BLD AUTO: 9.8 FL
PROTHROMBIN TIME: 11.7 SEC
RBC # BLD AUTO: 4.27 M/UL
WBC # BLD AUTO: 14.41 K/UL

## 2019-01-26 PROCEDURE — 87070 CULTURE OTHR SPECIMN AEROBIC: CPT

## 2019-01-26 PROCEDURE — 25000003 PHARM REV CODE 250: Performed by: SURGERY

## 2019-01-26 PROCEDURE — 63600175 PHARM REV CODE 636 W HCPCS: Performed by: RADIOLOGY

## 2019-01-26 PROCEDURE — 36415 COLL VENOUS BLD VENIPUNCTURE: CPT

## 2019-01-26 PROCEDURE — 85730 THROMBOPLASTIN TIME PARTIAL: CPT

## 2019-01-26 PROCEDURE — 63600175 PHARM REV CODE 636 W HCPCS: Performed by: EMERGENCY MEDICINE

## 2019-01-26 PROCEDURE — 25000003 PHARM REV CODE 250: Performed by: EMERGENCY MEDICINE

## 2019-01-26 PROCEDURE — 25000003 PHARM REV CODE 250: Performed by: NURSE PRACTITIONER

## 2019-01-26 PROCEDURE — 11000001 HC ACUTE MED/SURG PRIVATE ROOM

## 2019-01-26 PROCEDURE — S5010 5% DEXTROSE AND 0.45% SALINE: HCPCS | Performed by: EMERGENCY MEDICINE

## 2019-01-26 PROCEDURE — 85610 PROTHROMBIN TIME: CPT

## 2019-01-26 PROCEDURE — 87075 CULTR BACTERIA EXCEPT BLOOD: CPT

## 2019-01-26 PROCEDURE — 87076 CULTURE ANAEROBE IDENT EACH: CPT

## 2019-01-26 PROCEDURE — 63600175 PHARM REV CODE 636 W HCPCS: Performed by: SURGERY

## 2019-01-26 PROCEDURE — 85025 COMPLETE CBC W/AUTO DIFF WBC: CPT

## 2019-01-26 RX ORDER — VANCOMYCIN HCL IN 5 % DEXTROSE 1G/250ML
1000 PLASTIC BAG, INJECTION (ML) INTRAVENOUS
Status: DISCONTINUED | OUTPATIENT
Start: 2019-01-26 | End: 2019-01-27

## 2019-01-26 RX ORDER — FENTANYL CITRATE 50 UG/ML
INJECTION, SOLUTION INTRAMUSCULAR; INTRAVENOUS CODE/TRAUMA/SEDATION MEDICATION
Status: COMPLETED | OUTPATIENT
Start: 2019-01-26 | End: 2019-01-26

## 2019-01-26 RX ORDER — ACETAMINOPHEN 325 MG/1
650 TABLET ORAL EVERY 6 HOURS PRN
Status: DISCONTINUED | OUTPATIENT
Start: 2019-01-26 | End: 2019-01-28 | Stop reason: HOSPADM

## 2019-01-26 RX ORDER — ONDANSETRON 8 MG/1
8 TABLET, ORALLY DISINTEGRATING ORAL EVERY 8 HOURS PRN
Status: DISCONTINUED | OUTPATIENT
Start: 2019-01-26 | End: 2019-01-28 | Stop reason: HOSPADM

## 2019-01-26 RX ORDER — DIPHENHYDRAMINE HYDROCHLORIDE 50 MG/ML
25 INJECTION INTRAMUSCULAR; INTRAVENOUS EVERY 4 HOURS PRN
Status: DISCONTINUED | OUTPATIENT
Start: 2019-01-26 | End: 2019-01-26

## 2019-01-26 RX ORDER — DONEPEZIL HYDROCHLORIDE 5 MG/1
10 TABLET, FILM COATED ORAL NIGHTLY
Status: DISCONTINUED | OUTPATIENT
Start: 2019-01-26 | End: 2019-01-28 | Stop reason: HOSPADM

## 2019-01-26 RX ORDER — THYROID 30 MG/1
30 TABLET ORAL DAILY
Status: DISCONTINUED | OUTPATIENT
Start: 2019-01-26 | End: 2019-01-26 | Stop reason: SDUPTHER

## 2019-01-26 RX ORDER — MIDAZOLAM HYDROCHLORIDE 1 MG/ML
INJECTION INTRAMUSCULAR; INTRAVENOUS CODE/TRAUMA/SEDATION MEDICATION
Status: COMPLETED | OUTPATIENT
Start: 2019-01-26 | End: 2019-01-26

## 2019-01-26 RX ORDER — MEMANTINE HYDROCHLORIDE 10 MG/1
10 TABLET ORAL 2 TIMES DAILY
Status: DISCONTINUED | OUTPATIENT
Start: 2019-01-26 | End: 2019-01-28 | Stop reason: HOSPADM

## 2019-01-26 RX ORDER — RAMELTEON 8 MG/1
8 TABLET ORAL NIGHTLY PRN
Status: DISCONTINUED | OUTPATIENT
Start: 2019-01-26 | End: 2019-01-28 | Stop reason: HOSPADM

## 2019-01-26 RX ADMIN — THYROID, PORCINE 30 MG: 30 TABLET ORAL at 09:01

## 2019-01-26 RX ADMIN — MIDAZOLAM HYDROCHLORIDE 0.5 MG: 1 INJECTION, SOLUTION INTRAMUSCULAR; INTRAVENOUS at 10:01

## 2019-01-26 RX ADMIN — MEMANTINE HYDROCHLORIDE 10 MG: 10 TABLET, FILM COATED ORAL at 09:01

## 2019-01-26 RX ADMIN — PIPERACILLIN AND TAZOBACTAM 4.5 G: 4; .5 INJECTION, POWDER, LYOPHILIZED, FOR SOLUTION INTRAVENOUS; PARENTERAL at 09:01

## 2019-01-26 RX ADMIN — PIPERACILLIN AND TAZOBACTAM 4.5 G: 4; .5 INJECTION, POWDER, LYOPHILIZED, FOR SOLUTION INTRAVENOUS; PARENTERAL at 03:01

## 2019-01-26 RX ADMIN — VANCOMYCIN HYDROCHLORIDE 1000 MG: 1 INJECTION, POWDER, LYOPHILIZED, FOR SOLUTION INTRAVENOUS at 03:01

## 2019-01-26 RX ADMIN — ACETAMINOPHEN 650 MG: 325 TABLET ORAL at 09:01

## 2019-01-26 RX ADMIN — MEMANTINE HYDROCHLORIDE 10 MG: 10 TABLET, FILM COATED ORAL at 01:01

## 2019-01-26 RX ADMIN — FENTANYL CITRATE 25 MCG: 50 INJECTION, SOLUTION INTRAMUSCULAR; INTRAVENOUS at 10:01

## 2019-01-26 RX ADMIN — ACETAMINOPHEN 650 MG: 325 TABLET ORAL at 03:01

## 2019-01-26 RX ADMIN — POTASSIUM CHLORIDE: 2 INJECTION, SOLUTION, CONCENTRATE INTRAVENOUS at 09:01

## 2019-01-26 RX ADMIN — PIPERACILLIN AND TAZOBACTAM 4.5 G: 4; .5 INJECTION, POWDER, LYOPHILIZED, FOR SOLUTION INTRAVENOUS; PARENTERAL at 11:01

## 2019-01-26 RX ADMIN — ACETAMINOPHEN 650 MG: 325 TABLET ORAL at 12:01

## 2019-01-26 RX ADMIN — DONEPEZIL HYDROCHLORIDE 10 MG: 5 TABLET, FILM COATED ORAL at 09:01

## 2019-01-26 NOTE — PROGRESS NOTES
Irlanda Lopez [id number] is a 78 y.o. female who has been consulted for vancomycin dosing for intraabdominal abscess    The patient has the following labs:     Date Creatinine (mg/dl)    BUN WBC Count   1/25/2019 Estimated Creatinine Clearance: 59.4 mL/min (based on SCr of 0.7 mg/dL). Lab Results   Component Value Date    BUN 9 01/25/2019     Lab Results   Component Value Date    WBC 13.86 (H) 01/25/2019      which calculates to an Estimated Creatinine Clearance: 59.4 mL/min (based on SCr of 0.7 mg/dL)..       Current weight is 64.7 kg (142 lb 10.2 oz)    The patient will be started on vancomycin at a dose of 1000 mg iv every 12 hours. Patient will be followed by pharmacy for changes in renal function, toxicity, and efficacy.  The vancomycin trough has been ordered for 1/27 @ 5220    Thank you for allowing us to participate in this patient's care.     Kenyatta Mendoza [facility]

## 2019-01-26 NOTE — SEDATION DOCUMENTATION
Pt in ct on table supine with bilateral arms above head, vs monitor in place, vss.  Pt verbalized understanding of procedure.

## 2019-01-26 NOTE — ED NOTES
Pharmacy called to verify if hospital carries [place] Thyroid.  Per Brandon in pharmacy, hosp does carry this medication.  Dr Mendez informed to place order for the medication

## 2019-01-26 NOTE — SEDATION DOCUMENTATION
Procedure complete, pt tolerated well.  Vss.  fatmata bulb  Placed to left abdomen, secured with sutures, gauze and tegaderm.  Pt denied pain or discomfort at this time.

## 2019-01-26 NOTE — ED NOTES
Patient complains of fever at home since Weds. Patients daughter states that pt has short term memory loss.   Level of Consciousness: The patient is awake, alert, and oriented with appropriate affect and speech; oriented to person, place and time.  Appearance: Sitting up in ED stretcher with no acute distress noted. Clothing and hygiene are clean and worn appropriately.  Skin: Skin is intact; color consistent with ethnicity.  Midline abd incision scar noted, healing well. Colostomy to abd present.  Musculoskeletal: Moves all extremities well in full range of motion. No obvious deformities or swelling noted.  Respiratory: Airway open and patent, respirations spontaneous, even and unlabored. No accessory muscles in use.   Cardiac: Regular rate, no peripheral edema noted..  Abdomen:  No distention noted.  Neurologic: PERRLA, face exhibits symmetrical expression, reports normal sensation to all extremities and face.    Patient verbalized understanding of status and plan of care.

## 2019-01-26 NOTE — HPI
78-year-old female referred for pelvic abscess status post colon resection with Maribel procedure and end colostomy 01/02/2019 with Dr. Hogan.  Patient was having pain and fever at home.  CT done in ER showed pelvic fluid collection.

## 2019-01-26 NOTE — ED NOTES
Pt voiding on bedpan with no difficulty.  Colostomy emptied of liquid yellow brown stool. Pt placed in hospital bed - positioned to comfort with SR up x 2, call light in reach. Son at bedside.

## 2019-01-26 NOTE — ED NOTES
Pt lying in bed AAO x 4. Resp even and unlabored with equal chest rise and fall. Skin warm and dry. Pt reports that she feels extremely hot. Temp 98.1. 20G PIV noted to Right Forearm. Flushes well, drg CDI. Side rails up x 2. Call light within reach. No distress noted.  Pt denies any needs or assist at this time. Family at bedside.

## 2019-01-26 NOTE — HOSPITAL COURSE
HD 1 antibiotics, IR drainage pelvic abscess    HD 2 patient complaining of pain at the IV site, ZINA drain with purulent fluid, good ostomy function    HD 3 pain improving, leukocytosis

## 2019-01-26 NOTE — SUBJECTIVE & OBJECTIVE
No current facility-administered medications on file prior to encounter.      Current Outpatient Medications on File Prior to Encounter   Medication Sig    cyanocobalamin (VITAMIN B-12) 100 MCG tablet Take 100 mcg by mouth once daily.    donepezil (ARICEPT) 10 MG tablet Take 1 tablet (10 mg total) by mouth every evening.    melatonin (MELATIN ORAL) Take 2 tablets by mouth every evening.    memantine (NAMENDA) 10 MG Tab 10mg po bid.    thyroid, pork, (ARMOUR THYROID) 30 mg Tab Take 1 tablet (30 mg total) by mouth once daily.    triamcinolone acetonide 0.1% (KENALOG) 0.1 % ointment APPLY TOPICALLY 4 (FOUR) TIMES DAILY. TO RASH ON FACE AND CHEST AND NOSE       Review of patient's allergies indicates:  No Known Allergies    Past Medical History:   Diagnosis Date    High cholesterol     Hypertension     Hypothyroidism      Past Surgical History:   Procedure Laterality Date    APPENDECTOMY  2008    CATARACT EXTRACTION      Dr Raygoza    COLECTOMY, SIGMOID Left 1/2/2019    [facility]    COLON SURGERY      COLONOSCOPY N/A 1/2/2019    [facility] ENDO    CREATION, COLOSTOMY Left 1/2/2019    [facility]    SHAHIDA PROCEDURE N/A 1/2/2019    [facility]    LYSIS, ADHESIONS N/A 1/2/2019    [facility]    VITRECTOMY Right 09/2013     Family History     Problem Relation (Age of Onset)    Alzheimer's disease Mother    Aneurysm Father    Stomach cancer Brother        Tobacco Use    Smoking status: Former Smoker    Smokeless tobacco: Never Used   Substance and Sexual Activity    Alcohol use: No    Drug use: No    Sexual activity: No     Comment:      Review of Systems   Constitutional: Positive for fever. Negative for chills, fatigue and unexpected weight change.   Respiratory: Negative for cough, shortness of breath, wheezing and stridor.    Cardiovascular: Negative for chest  pain, palpitations and leg swelling.   Gastrointestinal: Positive for abdominal pain. Negative for abdominal distention, constipation, diarrhea, nausea and vomiting.   Genitourinary: Negative for difficulty urinating, dysuria, frequency, hematuria and urgency.   Skin: Negative for color change, pallor, rash and wound.   Hematological: Does not bruise/bleed easily.     Objective:     Vital Signs (Most Recent):  Temp: 98.2 °F (36.8 °C) (01/26/19 0802)  Pulse: 66 (01/26/19 1033)  Resp: (!) 27 (01/26/19 1033)  BP: (!) 104/51 (01/26/19 1033)  SpO2: 99 % (01/26/19 1033) Vital Signs (24h Range):  Temp:  [97.8 °F (36.6 °C)-102.5 °F (39.2 °C)] 98.2 °F (36.8 °C)  Pulse:  [61-80] 66  Resp:  [12-28] 27  SpO2:  [95 %-100 %] 99 %  BP: ()/(43-75) 104/51     Weight: 64.7 kg (142 lb 10.2 oz)  Body mass index is 26.09 kg/m².    Physical Exam   Constitutional: She is oriented to person, place, and time. She appears well-developed and well-nourished.   HENT:   Head: Normocephalic and atraumatic.   Eyes: EOM are normal.   Neck: Neck supple.   Cardiovascular: Normal rate and regular rhythm.   Pulmonary/Chest: Effort normal and breath sounds normal.   Abdominal: Soft. Bowel sounds are normal. She exhibits no distension. There is tenderness (Mild lower).   Well-healed surgical incision colostomy pink viable   Neurological: She is alert and oriented to person, place, and time.   Skin: Skin is warm and dry.   Vitals reviewed.      Significant Labs:  CBC:   Recent Labs   Lab 01/26/19  0835   WBC 14.41*   RBC 4.27   HGB 11.0*   HCT 35.5*      MCV 83   MCH 25.8*   MCHC 31.0*     BMP:   Recent Labs   Lab 01/25/19  1547         K 4.3      CO2 24   BUN 9   CREATININE 0.7   CALCIUM 9.2       Significant Diagnostics:  I have reviewed all pertinent imaging results/findings within the past 24 hours.

## 2019-01-26 NOTE — H&P
[facility]  General Surgery  History & Physical    Patient Name: Irlanda Lopez  MRN: [id number]  Admission Date: 1/25/2019  Attending Physician: Ralf Garcia MD   Primary Care Provider: Myla Arias MD    Patient information was obtained from patient    Subjective:     Chief Complaint/Reason for Admission:  Intra-abdominal abscess    History of Present Illness: 78-year-old female referred for pelvic abscess status post colon resection with Shahida procedure and end colostomy 01/02/2019 with Dr. Hogan.  Patient was having pain and fever at home.  CT done in ER showed pelvic fluid collection.    No current facility-administered medications on file prior to encounter.      Current Outpatient Medications on File Prior to Encounter   Medication Sig    cyanocobalamin (VITAMIN B-12) 100 MCG tablet Take 100 mcg by mouth once daily.    donepezil (ARICEPT) 10 MG tablet Take 1 tablet (10 mg total) by mouth every evening.    melatonin (MELATIN ORAL) Take 2 tablets by mouth every evening.    memantine (NAMENDA) 10 MG Tab 10mg po bid.    thyroid, pork, (ARMOUR THYROID) 30 mg Tab Take 1 tablet (30 mg total) by mouth once daily.    triamcinolone acetonide 0.1% (KENALOG) 0.1 % ointment APPLY TOPICALLY 4 (FOUR) TIMES DAILY. TO RASH ON FACE AND CHEST AND NOSE       Review of patient's allergies indicates:  No Known Allergies    Past Medical History:   Diagnosis Date    High cholesterol     Hypertension     Hypothyroidism      Past Surgical History:   Procedure Laterality Date    APPENDECTOMY  2008    CATARACT EXTRACTION      Dr Raygoza    COLECTOMY, SIGMOID Left 1/2/2019    [facility]    COLON SURGERY      COLONOSCOPY N/A 1/2/2019    [facility] ENDO    CREATION, COLOSTOMY Left 1/2/2019    [facility] OR    SHAHIDA PROCEDURE N/A 1/2/2019    [facility] OR    LYSIS, ADHESIONS N/A 1/2/2019    [facility]    VITRECTOMY Right 09/2013     Family History     Problem Relation (Age of Onset)    Alzheimer's disease Mother    Aneurysm Father    Stomach cancer Brother        Tobacco Use    Smoking status: Former Smoker    Smokeless tobacco: Never Used   Substance and Sexual Activity    Alcohol use: No    Drug use: No    Sexual activity: No     Comment:      Review of Systems   Constitutional: Positive for fever. Negative for chills, fatigue and unexpected weight change.   Respiratory: Negative for cough, shortness of breath, wheezing and stridor.    Cardiovascular: Negative for chest pain, palpitations and leg swelling.   Gastrointestinal: Positive for abdominal pain. Negative for abdominal distention, constipation, diarrhea, nausea and vomiting.   Genitourinary: Negative for difficulty urinating, dysuria, frequency, hematuria and urgency.   Skin: Negative for color change, pallor, rash and wound.   Hematological: Does not bruise/bleed easily.     Objective:     Vital Signs (Most Recent):  Temp: 98.2 °F (36.8 °C) (01/26/19 0802)  Pulse: 66 (01/26/19 1033)  Resp: (!) 27 (01/26/19 1033)  BP: (!) 104/51 (01/26/19 1033)  SpO2: 99 % (01/26/19 1033) Vital Signs (24h Range):  Temp:  [97.8 °F (36.6 °C)-102.5 °F (39.2 °C)] 98.2 °F (36.8 °C)  Pulse:  [61-80] 66  Resp:  [12-28] 27  SpO2:  [95 %-100 %] 99 %  BP: ()/(43-75) 104/51     Weight: 64.7 kg (142 lb 10.2 oz)  Body mass index is 26.09 kg/m².    Physical Exam   Constitutional: She is oriented to person, place, and time. She appears well-developed and well-nourished.   HENT:   Head: Normocephalic and atraumatic.   Eyes: EOM are normal.   Neck: Neck supple.   Cardiovascular: Normal rate and regular rhythm.   Pulmonary/Chest: Effort normal and breath sounds normal.   Abdominal: Soft. Bowel sounds are normal. She exhibits no distension. There is tenderness (Mild lower).   Well-healed surgical incision colostomy pink viable    Neurological: She is alert and oriented to person, place, and time.   Skin: Skin is warm and dry.   Vitals reviewed.      Significant Labs:  CBC:   Recent Labs   Lab 01/26/19  0835   WBC 14.41*   RBC 4.27   HGB 11.0*   HCT 35.5*      MCV 83   MCH 25.8*   MCHC 31.0*     BMP:   Recent Labs   Lab 01/25/19  1547         K 4.3      CO2 24   BUN 9   CREATININE 0.7   CALCIUM 9.2       Significant Diagnostics:  I have reviewed all pertinent imaging results/findings within the past 24 hours.    Assessment/Plan:     Intra-abdominal abscess    IV antibiotics  IR drainage day     Hyperlipidemia    Home medications     Diverticulitis of sigmoid colon    Status post colon resection and Maribel procedure with end colostomy  Healing well       Essential hypertension    Home antihypertensives     Hypothyroidism    Restart home medications       VTE Risk Mitigation (From admission, onward)        Ordered     Place sequential compression device  Until discontinued      01/26/19 0822     IP VTE HIGH RISK PATIENT  Once      01/26/19 0822          Ralf Garcia MD  General Surgery  [facility]

## 2019-01-26 NOTE — PLAN OF CARE
Problem: Adult Inpatient Plan of Care  Goal: Plan of Care Review  Outcome: Ongoing (interventions implemented as appropriate)  Patient remained free from injury. No c/o pain at this time. Calm. Watching TV. Family at bedside.  No distress noted. Oriented x3. Respirations even and non labored. IV infusing patent and infusing. ZINA drain in place. Changed Colostomy 1/26. Bed locked and low. Call light in reach. Safety measures in place. Will continue to monitor. Reviewed plan of care. Patient verbalized understanding and teach back.    12hr chart check complete.

## 2019-01-27 LAB
ANION GAP SERPL CALC-SCNC: 8 MMOL/L
BASOPHILS # BLD AUTO: 0.02 K/UL
BASOPHILS NFR BLD: 0.2 %
BUN SERPL-MCNC: 12 MG/DL
CALCIUM SERPL-MCNC: 9.3 MG/DL
CHLORIDE SERPL-SCNC: 108 MMOL/L
CO2 SERPL-SCNC: 20 MMOL/L
CREAT SERPL-MCNC: 1 MG/DL
DIFFERENTIAL METHOD: ABNORMAL
EOSINOPHIL # BLD AUTO: 0.2 K/UL
EOSINOPHIL NFR BLD: 1.9 %
ERYTHROCYTE [DISTWIDTH] IN BLOOD BY AUTOMATED COUNT: 16.8 %
EST. GFR  (AFRICAN AMERICAN): >60 ML/MIN/1.73 M^2
EST. GFR  (NON AFRICAN AMERICAN): 54 ML/MIN/1.73 M^2
GLUCOSE SERPL-MCNC: 94 MG/DL
HCT VFR BLD AUTO: 34.3 %
HGB BLD-MCNC: 10.9 G/DL
LYMPHOCYTES # BLD AUTO: 2.2 K/UL
LYMPHOCYTES NFR BLD: 21.6 %
MAGNESIUM SERPL-MCNC: 1.6 MG/DL
MCH RBC QN AUTO: 26.2 PG
MCHC RBC AUTO-ENTMCNC: 31.8 G/DL
MCV RBC AUTO: 83 FL
MONOCYTES # BLD AUTO: 0.6 K/UL
MONOCYTES NFR BLD: 6.3 %
NEUTROPHILS # BLD AUTO: 7 K/UL
NEUTROPHILS NFR BLD: 71.1 %
PLATELET # BLD AUTO: 308 K/UL
PLATELET BLD QL SMEAR: ABNORMAL
PMV BLD AUTO: 9.6 FL
POTASSIUM SERPL-SCNC: 3.5 MMOL/L
RBC # BLD AUTO: 4.16 M/UL
SODIUM SERPL-SCNC: 136 MMOL/L
VANCOMYCIN TROUGH SERPL-MCNC: 9.9 UG/ML
WBC # BLD AUTO: 10.01 K/UL

## 2019-01-27 PROCEDURE — 25000003 PHARM REV CODE 250: Performed by: SURGERY

## 2019-01-27 PROCEDURE — 83735 ASSAY OF MAGNESIUM: CPT

## 2019-01-27 PROCEDURE — 36415 COLL VENOUS BLD VENIPUNCTURE: CPT

## 2019-01-27 PROCEDURE — 80202 ASSAY OF VANCOMYCIN: CPT

## 2019-01-27 PROCEDURE — 85025 COMPLETE CBC W/AUTO DIFF WBC: CPT

## 2019-01-27 PROCEDURE — 63600175 PHARM REV CODE 636 W HCPCS: Performed by: EMERGENCY MEDICINE

## 2019-01-27 PROCEDURE — 25000003 PHARM REV CODE 250: Performed by: EMERGENCY MEDICINE

## 2019-01-27 PROCEDURE — 11000001 HC ACUTE MED/SURG PRIVATE ROOM

## 2019-01-27 PROCEDURE — 80048 BASIC METABOLIC PNL TOTAL CA: CPT

## 2019-01-27 PROCEDURE — 25000003 PHARM REV CODE 250: Performed by: NURSE PRACTITIONER

## 2019-01-27 RX ORDER — CIPROFLOXACIN 500 MG/1
500 TABLET ORAL EVERY 12 HOURS
Status: DISCONTINUED | OUTPATIENT
Start: 2019-01-27 | End: 2019-01-28 | Stop reason: HOSPADM

## 2019-01-27 RX ORDER — SIMETHICONE 80 MG
1 TABLET,CHEWABLE ORAL 3 TIMES DAILY PRN
Status: DISCONTINUED | OUTPATIENT
Start: 2019-01-27 | End: 2019-01-28 | Stop reason: HOSPADM

## 2019-01-27 RX ORDER — ATORVASTATIN CALCIUM 10 MG/1
10 TABLET, FILM COATED ORAL DAILY
Status: DISCONTINUED | OUTPATIENT
Start: 2019-01-27 | End: 2019-01-28 | Stop reason: HOSPADM

## 2019-01-27 RX ORDER — METRONIDAZOLE 500 MG/1
500 TABLET ORAL EVERY 8 HOURS
Status: DISCONTINUED | OUTPATIENT
Start: 2019-01-27 | End: 2019-01-28 | Stop reason: HOSPADM

## 2019-01-27 RX ORDER — IBUPROFEN 600 MG/1
600 TABLET ORAL EVERY 6 HOURS PRN
Status: DISCONTINUED | OUTPATIENT
Start: 2019-01-27 | End: 2019-01-28

## 2019-01-27 RX ORDER — LANOLIN ALCOHOL/MO/W.PET/CERES
800 CREAM (GRAM) TOPICAL ONCE
Status: COMPLETED | OUTPATIENT
Start: 2019-01-27 | End: 2019-01-27

## 2019-01-27 RX ADMIN — DONEPEZIL HYDROCHLORIDE 10 MG: 5 TABLET, FILM COATED ORAL at 09:01

## 2019-01-27 RX ADMIN — METRONIDAZOLE 500 MG: 500 TABLET, FILM COATED ORAL at 09:01

## 2019-01-27 RX ADMIN — IBUPROFEN 600 MG: 600 TABLET ORAL at 12:01

## 2019-01-27 RX ADMIN — THYROID, PORCINE 30 MG: 30 TABLET ORAL at 09:01

## 2019-01-27 RX ADMIN — MEMANTINE HYDROCHLORIDE 10 MG: 10 TABLET, FILM COATED ORAL at 09:01

## 2019-01-27 RX ADMIN — ACETAMINOPHEN 650 MG: 325 TABLET ORAL at 02:01

## 2019-01-27 RX ADMIN — CIPROFLOXACIN HYDROCHLORIDE 500 MG: 500 TABLET, FILM COATED ORAL at 09:01

## 2019-01-27 RX ADMIN — ACETAMINOPHEN 650 MG: 325 TABLET ORAL at 09:01

## 2019-01-27 RX ADMIN — MAGNESIUM OXIDE TAB 400 MG (241.3 MG ELEMENTAL MG) 800 MG: 400 (241.3 MG) TAB at 09:01

## 2019-01-27 RX ADMIN — RAMELTEON 8 MG: 8 TABLET, FILM COATED ORAL at 09:01

## 2019-01-27 RX ADMIN — METRONIDAZOLE 500 MG: 500 TABLET, FILM COATED ORAL at 03:01

## 2019-01-27 RX ADMIN — ATORVASTATIN CALCIUM 10 MG: 10 TABLET, FILM COATED ORAL at 11:01

## 2019-01-27 RX ADMIN — CIPROFLOXACIN HYDROCHLORIDE 500 MG: 500 TABLET, FILM COATED ORAL at 11:01

## 2019-01-27 RX ADMIN — PIPERACILLIN AND TAZOBACTAM 4.5 G: 4; .5 INJECTION, POWDER, LYOPHILIZED, FOR SOLUTION INTRAVENOUS; PARENTERAL at 05:01

## 2019-01-27 RX ADMIN — SIMETHICONE CHEW TAB 80 MG 80 MG: 80 TABLET ORAL at 12:01

## 2019-01-27 RX ADMIN — SODIUM CHLORIDE, SODIUM LACTATE, POTASSIUM CHLORIDE, AND CALCIUM CHLORIDE 1000 ML: .6; .31; .03; .02 INJECTION, SOLUTION INTRAVENOUS at 03:01

## 2019-01-27 NOTE — PLAN OF CARE
01/27/19 1030   Discharge Assessment   Assessment Type Discharge Planning Assessment   Confirmed/corrected address and phone number on facesheet? Yes   Assessment information obtained from? Patient;Other  (Son, Mundo Moreland.)   Expected Length of Stay (days) 2   Communicated expected length of stay with patient/caregiver yes   Prior to hospitilization cognitive status: (Mild to moderate dementia.)   Prior to hospitalization functional status: Partially Dependent   Current cognitive status: (Mild to moderate dementia.)   Current Functional Status: Partially Dependent   Facility Arrived From: Home.   Lives With other (see comments)  (Pt currently lives alone but friends and family are rotating staying with her.)   Able to Return to Prior Arrangements yes   Is patient able to care for self after discharge? No   Who are your caregiver(s) and their phone number(s)? Mundo Moreland (son) [phone]   Patient's perception of discharge disposition home health   Readmission Within the Last 30 Days other (see comments)  (Infection secondary to surgical procedure.)   If yes, most recent facility name: [facility].   Patient currently being followed by outpatient case management? Yes   If yes, name of outpatient case management following: other (comments)  ([facility] )   Patient currently receives any other outside agency services? Yes  ([facility] .)   Is it the patient/care giver preference to resume care with the current outside agency? Yes   Equipment Currently Used at Home none   Do you have any problems affording any of your prescribed medications? No   Is the patient taking medications as prescribed? yes   Does the patient have transportation home? Yes   Discharge Plan A Home Health   DME Needed Upon Discharge  none   Patient/Family in Agreement with Plan yes   Readmission Questionnaire   At the time of your discharge, did someone talk to you about what your health problems were? Yes   At the time of discharge, did  someone talk to you about what to watch out for regarding worsening of your health problem? Yes   At the time of discharge, did someone talk to you about what to do if you experienced worsening of your health problem? Yes   At the time of discharge, did someone talk to you about which medication to take when you left the hospital and which ones to stop taking? Yes   At the time of discharge, did someone talk to you about when and where to follow up with a doctor after you left the hospital? Yes   How often do you need to have someone help you when you read instructions, pamphlets, or other written material from your doctor or pharmacy? Never   Do you have problems taking your medications as prescribed? No   Do you have any problems affording any of  your prescribed medications? No   Do you have problems obtaining/receiving your medications? No   Does the patient have transportation to healthcare appointments? Yes   Living Arrangements house   Does the patient have family/friends to help with healtcare needs after discharge? yes   Does your caregiver provide all the help you need? Yes   Are you currently feeling confused? Yes   Are you currently having problems thinking? Yes   Are you currently having memory problems? Yes   Have you felt down, depressed, or hopeless? 0   Have you felt little interest or pleasure in doing things? 1   In the last 7 days, my sleep quality was: good   Met with pt and her son Mundo Moreland at bedside to perform DC assessment. Pt c/o mild to moderate dementia which made it difficult for her to answer questions about her previous DC. Pt's son Mundo was able to provide answers that pt could not. Pt is current with [facility] and would like to resume with them on DC. Requested resume order from Dr. Garcia. Pt reported that her daughter lives on the same block as her and that she has been making use of sitters since she was last DCed. Although she lives alone, pt's son reported that she has  had someone else at her house 24/7 to assist pt. Pt reported independence with ADLs.

## 2019-01-27 NOTE — SUBJECTIVE & OBJECTIVE
Interval History: patient complaining of pain at the IV site, ZINA drain with purulent fluid, good ostomy function    Medications:  Continuous Infusions:   custom IV infusion builder (for pharmacist use only) 100 mL/hr at 01/26/19 2110     Scheduled Meds:   donepezil  10 mg Oral QHS    memantine  10 mg Oral BID    piperacillin-tazobactam (ZOSYN) IVPB  4.5 g Intravenous Q8H    thyroid (pork)  30 mg Oral QHS     PRN Meds:acetaminophen, ondansetron, promethazine (PHENERGAN) IVPB, ramelteon     Review of patient's allergies indicates:  No Known Allergies  Objective:     Vital Signs (Most Recent):  Temp: 98.9 °F (37.2 °C) (01/27/19 0914)  Pulse: (!) 52 (01/27/19 0914)  Resp: 17 (01/27/19 0914)  BP: 113/61 (01/27/19 0914)  SpO2: 98 % (01/27/19 0914) Vital Signs (24h Range):  Temp:  [97.6 °F (36.4 °C)-100.5 °F (38.1 °C)] 98.9 °F (37.2 °C)  Pulse:  [52-73] 52  Resp:  [17-18] 17  SpO2:  [92 %-99 %] 98 %  BP: (100-130)/(55-65) 113/61     Weight: 64.7 kg (142 lb 10.2 oz)  Body mass index is 26.09 kg/m².    Intake/Output - Last 3 Shifts       01/25 0700 - 01/26 0659 01/26 0700 - 01/27 0659 01/27 0700 - 01/28 0659    P.O.  540     I.V. (mL/kg)  1821 (28.1)     IV Piggyback 1450 700     Total Intake(mL/kg) 1450 (22.4) 3061 (47.3)     Urine (mL/kg/hr) 350 350 (0.2)     Drains  100     Stool 350 1570     Total Output 700 2020     Net +750 +1041            Urine Occurrence  3 x           Physical Exam   Constitutional: She is oriented to person, place, and time. She appears well-nourished.   Cardiovascular: Normal rate and regular rhythm.   Pulmonary/Chest: Effort normal.   Abdominal: Soft. She exhibits no distension. There is no tenderness.   ZINA intact serosanguineous an purulent fluid  Colostomy with large volume air and liquid stool   Neurological: She is alert and oriented to person, place, and time.   Skin: Skin is warm and dry.   Vitals reviewed.      Significant Labs:  CBC:   Recent Labs   Lab 01/27/19  0447   WBC 10.01    RBC 4.16   HGB 10.9*   HCT 34.3*      MCV 83   MCH 26.2*   MCHC 31.8*     BMP:   Recent Labs   Lab 01/27/19  0447   GLU 94      K 3.5      CO2 20*   BUN 12   CREATININE 1.0   CALCIUM 9.3   MG 1.6       Significant Diagnostics:  I have reviewed all pertinent imaging results/findings within the past 24 hours.

## 2019-01-27 NOTE — PLAN OF CARE
Problem: Adult Inpatient Plan of Care  Goal: Plan of Care Review  No acute distress noted. Iv fluids infusing. Antibiotics infusing.Colostomy in place draining watery stool. ZINA drain in place.  Safety measures are in place. Call bell within reach. Pain being managed. Pt free of falls. Will continue to monitor. Chart check complete.

## 2019-01-27 NOTE — PROGRESS NOTES
[facility]   General Surgery  Progress Note    Subjective:     History of Present Illness:  78-year-old female referred for pelvic abscess status post colon resection with Maribel procedure and end colostomy 01/02/2019 with Dr. Hogan.  Patient was having pain and fever at home.  CT done in ER showed pelvic fluid collection.    Post-Op Info:  * No surgery found *         Interval History: patient complaining of pain at the IV site, ZINA drain with purulent fluid, good ostomy function    Medications:  Continuous Infusions:   custom IV infusion builder (for pharmacist use only) 100 mL/hr at 01/26/19 2110     Scheduled Meds:   donepezil  10 mg Oral QHS    memantine  10 mg Oral BID    piperacillin-tazobactam (ZOSYN) IVPB  4.5 g Intravenous Q8H    thyroid (pork)  30 mg Oral QHS     PRN Meds:acetaminophen, ondansetron, promethazine (PHENERGAN) IVPB, ramelteon     Review of patient's allergies indicates:  No Known Allergies  Objective:     Vital Signs (Most Recent):  Temp: 98.9 °F (37.2 °C) (01/27/19 0914)  Pulse: (!) 52 (01/27/19 0914)  Resp: 17 (01/27/19 0914)  BP: 113/61 (01/27/19 0914)  SpO2: 98 % (01/27/19 0914) Vital Signs (24h Range):  Temp:  [97.6 °F (36.4 °C)-100.5 °F (38.1 °C)] 98.9 °F (37.2 °C)  Pulse:  [52-73] 52  Resp:  [17-18] 17  SpO2:  [92 %-99 %] 98 %  BP: (100-130)/(55-65) 113/61     Weight: 64.7 kg (142 lb 10.2 oz)  Body mass index is 26.09 kg/m².    Intake/Output - Last 3 Shifts       01/25 0700 - 01/26 0659 01/26 0700 - 01/27 0659 01/27 0700 - 01/28 0659    P.O.  540     I.V. (mL/kg)  1821 (28.1)     IV Piggyback 1450 700     Total Intake(mL/kg) 1450 (22.4) 3061 (47.3)     Urine (mL/kg/hr) 350 350 (0.2)     Drains  100     Stool 350 1570     Total Output 700 2020     Net +750 +1041            Urine Occurrence  3 x           Physical Exam   Constitutional: She is oriented to person, place, and time. She appears well-nourished.   Cardiovascular: Normal rate and regular rhythm.    Pulmonary/Chest: Effort normal.   Abdominal: Soft. She exhibits no distension. There is no tenderness.   ZINA intact serosanguineous an purulent fluid  Colostomy with large volume air and liquid stool   Neurological: She is alert and oriented to person, place, and time.   Skin: Skin is warm and dry.   Vitals reviewed.      Significant Labs:  CBC:   Recent Labs   Lab 01/27/19  0447   WBC 10.01   RBC 4.16   HGB 10.9*   HCT 34.3*      MCV 83   MCH 26.2*   MCHC 31.8*     BMP:   Recent Labs   Lab 01/27/19  0447   GLU 94      K 3.5      CO2 20*   BUN 12   CREATININE 1.0   CALCIUM 9.3   MG 1.6       Significant Diagnostics:  I have reviewed all pertinent imaging results/findings within the past 24 hours.    Assessment/Plan:     * Intra-abdominal abscess    Status post IR drainage yesterday  Transition from IV to p.o. antibiotics     Hyperlipidemia    Home medications     Diverticulitis of sigmoid colon    Status post colon resection and Maribel procedure with end colostomy  Large amount of liquid colostomy output yesterday monitor and replete with IV fluids if continues to avoid dehydration       Essential hypertension    Home antihypertensives     Hypothyroidism    Restart home medications         Ralf Garcia MD  General Surgery  [facility]

## 2019-01-27 NOTE — ASSESSMENT & PLAN NOTE
Status post colon resection and Maribel procedure with end colostomy  Large amount of liquid colostomy output yesterday monitor and replete with IV fluids if continues to avoid dehydration

## 2019-01-27 NOTE — CONSULTS
HH orders and demographics sent to [facility] and [facility].  Case management team to f/u with [facility] to advise of patient's discharge date. Per discharge planning assessment completed by , Patient lives alone but has friends and family rotating staying with her.    Yin Booker LMSw, ACM-SW, CCM

## 2019-01-28 ENCOUNTER — PATIENT MESSAGE (OUTPATIENT)
Dept: SURGERY | Facility: CLINIC | Age: 79
End: 2019-01-28

## 2019-01-28 VITALS
DIASTOLIC BLOOD PRESSURE: 70 MMHG | BODY MASS INDEX: 26.25 KG/M2 | HEIGHT: 62 IN | OXYGEN SATURATION: 98 % | WEIGHT: 142.63 LBS | SYSTOLIC BLOOD PRESSURE: 129 MMHG | HEART RATE: 58 BPM | RESPIRATION RATE: 18 BRPM | TEMPERATURE: 98 F

## 2019-01-28 DIAGNOSIS — K65.1 INTRA-ABDOMINAL ABSCESS: Primary | ICD-10-CM

## 2019-01-28 LAB
BASOPHILS # BLD AUTO: 0.03 K/UL
BASOPHILS NFR BLD: 0.4 %
BURR CELLS BLD QL SMEAR: ABNORMAL
DACRYOCYTES BLD QL SMEAR: ABNORMAL
DIFFERENTIAL METHOD: ABNORMAL
EOSINOPHIL # BLD AUTO: 0.3 K/UL
EOSINOPHIL NFR BLD: 3.8 %
ERYTHROCYTE [DISTWIDTH] IN BLOOD BY AUTOMATED COUNT: 16.9 %
HCT VFR BLD AUTO: 34.9 %
HGB BLD-MCNC: 11.2 G/DL
LYMPHOCYTES # BLD AUTO: 1.9 K/UL
LYMPHOCYTES NFR BLD: 25.9 %
MCH RBC QN AUTO: 26 PG
MCHC RBC AUTO-ENTMCNC: 32.1 G/DL
MCV RBC AUTO: 81 FL
MONOCYTES # BLD AUTO: 0.7 K/UL
MONOCYTES NFR BLD: 9.1 %
NEUTROPHILS # BLD AUTO: 4.5 K/UL
NEUTROPHILS NFR BLD: 62.5 %
PLATELET # BLD AUTO: 344 K/UL
PMV BLD AUTO: 9.3 FL
POIKILOCYTOSIS BLD QL SMEAR: SLIGHT
RBC # BLD AUTO: 4.3 M/UL
WBC # BLD AUTO: 7.44 K/UL

## 2019-01-28 PROCEDURE — 85025 COMPLETE CBC W/AUTO DIFF WBC: CPT

## 2019-01-28 PROCEDURE — 36415 COLL VENOUS BLD VENIPUNCTURE: CPT

## 2019-01-28 PROCEDURE — 25000003 PHARM REV CODE 250: Performed by: SURGERY

## 2019-01-28 RX ORDER — METRONIDAZOLE 500 MG/1
500 TABLET ORAL EVERY 8 HOURS
Qty: 42 TABLET | Refills: 0 | Status: SHIPPED | OUTPATIENT
Start: 2019-01-28 | End: 2019-02-11

## 2019-01-28 RX ORDER — CIPROFLOXACIN 500 MG/1
500 TABLET ORAL EVERY 12 HOURS
Qty: 28 TABLET | Refills: 0 | Status: SHIPPED | OUTPATIENT
Start: 2019-01-28 | End: 2019-02-11

## 2019-01-28 RX ORDER — IBUPROFEN 600 MG/1
600 TABLET ORAL EVERY 6 HOURS PRN
Status: DISCONTINUED | OUTPATIENT
Start: 2019-01-28 | End: 2019-01-28 | Stop reason: HOSPADM

## 2019-01-28 RX ADMIN — METRONIDAZOLE 500 MG: 500 TABLET, FILM COATED ORAL at 06:01

## 2019-01-28 RX ADMIN — CIPROFLOXACIN HYDROCHLORIDE 500 MG: 500 TABLET, FILM COATED ORAL at 08:01

## 2019-01-28 RX ADMIN — METRONIDAZOLE 500 MG: 500 TABLET, FILM COATED ORAL at 03:01

## 2019-01-28 RX ADMIN — MEMANTINE HYDROCHLORIDE 10 MG: 10 TABLET, FILM COATED ORAL at 08:01

## 2019-01-28 RX ADMIN — ATORVASTATIN CALCIUM 10 MG: 10 TABLET, FILM COATED ORAL at 08:01

## 2019-01-28 NOTE — NURSING
Patient received written and verbal discharge instructions. Patient verbalized understanding. Discharging home with home health.

## 2019-01-28 NOTE — PLAN OF CARE
Discharge paperwork faxed to [employer] and [facility].       01/28/19 1600   Post-Acute Status   Post-Acute Authorization Home Health/Hospice

## 2019-01-28 NOTE — DISCHARGE INSTRUCTIONS
Discharge Instructions: Caring for Your Aidan-Mejias Drainage Tube  Your doctor discharges you with a Aidan-Mejias drainage tube. Doctors commonly leave this drain within the abdominal cavity after surgery. It helps drain and collect blood and body fluid after surgery. This can prevent swelling and reduces the risk for infection. The tube is held in place by a few stitches. It is covered with a bandage. Your doctor will remove the drain when he or she determines you no longer need it.  Home care  · Dont sleep on the same side as the tube.  · Secure the tube and bag inside your clothing with a safety pin. This helps keep the tube from being pulled out.  · Empty your drain at least twice a day. Empty it more often if the drain is full. Wash  and dry your hands before emptying the drain.  ¨ Lift the opening on the drain.  ¨ Drain the fluid into a measuring cup.  ¨ Record the amount of fluid each time you empty the drain. Include the date and time it was emptied. Share this information with your doctor on your next visit.  ¨ Squeeze the bulb with your hands until you hear air coming out of the bulb if your doctor has instructed you to do so (sometimes the bulb is used as a reservoir without suction). Check with your doctor about specific drain instructions.  ¨ Close the opening.  · Change the dressing around the tube every day.  ¨ Wash your hands.  ¨ Remove the old bandage.  ¨ Wash your hands again.  ¨ Wet a cotton swab and clean the skin around the incision and tube site. Use normal saline solution (salt and water). Or, you can use warm, soapy water.  ¨ Put a new bandage on the incision and tube site. Make the bandage large enough to cover the whole incision area.  ¨ Tape the bandage in place.  · Keep the bandage and tube site dry when you shower. Ask your healthcare provider about the best way to do this.  · Stripping the tube helps keep blood clots from blocking the tube. Ask your nurse how often you should  strip the tube. Stripping may not be needed, depending on where and why your doctor placed the tube. It may even be dangerous in some cases.   ¨ Hold the tubing where it leaves the skin, with one hand. This keeps it from pulling on the skin.  ¨ Pinch the tubing with the thumb and first finger of your other hand.  ¨ Slowly and firmly pull your thumb and first finger down the tubing. You may find it helpful to hold an alcohol swab between your fingers and the tube to lubricate the tubing.  ¨ If the pulling hurts or feels like its coming out of the skin, stop. Begin again more gently.  Follow-up care  Make a follow-up appointment as directed by our staff.     When to seek medical care  Call your healthcare provider right away if you have any of the following:  · New or increased pain around the tube  · Redness, swelling, or warmth around the incision or tube  · Drainage that is foul-smelling  · Vomiting  · Fever of 100.4°F (38°C)  · Fluid leaking around the tube  · Incision seems not to be healing  · Stitches become loose  · Tube falls out or breaks  · Drainage that changes from light pink to dark red  · Blood clots in the drainage bulb  · A sudden increase or decrease in the amount of drainage (over 30 mL)   Date Last Reviewed: 2/1/2017  © 2159-1704 [employer]. [address] [place] [address], [place] [address]. All rights reserved. This information is not intended as a substitute for professional medical care. Always follow your healthcare professional's instructions.

## 2019-01-28 NOTE — PLAN OF CARE
Problem: Adult Inpatient Plan of Care  Goal: Plan of Care Review  Outcome: Ongoing (interventions implemented as appropriate)  Fall precautions maintained. Pt free from falls/injuries.  Patient denies any complaints of pain at this time.   Antibiotics given as prescribed.  Ambulates and repositions independently.   Plan of care and medications discussed with patient.  Patient verbalized understanding.  Bed locked and low, call bell within reach.  Chart check done. Will continue to monitor.

## 2019-01-28 NOTE — DISCHARGE SUMMARY
[facility] -   General Surgery  Discharge Summary      Patient Name: Irlanda Lopez  MRN: [id number]  Admission Date: 1/25/2019  Hospital Length of Stay: 3 days  Discharge Date and Time:  01/28/2019 3:21 PM  Attending Physician: Brooks Flores MD   Discharging Provider: Brooks Flores MD  Primary Care Provider: Myla Arias MD    HPI:   78-year-old female referred for pelvic abscess status post colon resection with Maribel procedure and end colostomy 01/02/2019 with Dr. Hogan.  Patient was having pain and fever at home.  CT done in ER showed pelvic fluid collection.    * No surgery found *      Indwelling Lines/Drains at time of discharge:   Lines/Drains/Airways     Drain                 Colostomy 01/02/19 1306 Descending/sigmoid LLQ 26 days         Colostomy 01/02/19 Descending/sigmoid LLQ 26 days         Closed/Suction Drain 01/26/19 1034 Left Abdomen Bulb 8.5 Fr. 2 days              Hospital Course: HD 1 antibiotics, IR drainage pelvic abscess    HD 2 patient complaining of pain at the IV site, ZINA drain with purulent fluid, good ostomy function    HD 3 pain improving, leukocytosis    Consults:   Consults (From admission, onward)        Status Ordering Provider     Consult to Case Management/Social Work  Once     Provider:  (Not yet assigned)    Completed BROOKS FLORES     Inpatient consult to Registered Dietitian/Nutritionist  Once     Provider:  (Not yet assigned)    Acknowledged BROOKS FLORES          Significant Diagnostic Studies: none    Pending Diagnostic Studies:     None        Final Active Diagnoses:    Diagnosis Date Noted POA    PRINCIPAL PROBLEM:  Intra-abdominal abscess [K65.1] 01/25/2019 Yes    Hyperlipidemia [E78.5] 11/10/2018 Yes    Diverticulitis of sigmoid colon [K57.32] 11/07/2018 Yes    Essential hypertension [I10] 01/10/2017 Yes    Hypothyroidism [E03.9] 10/04/2016 Yes      Problems Resolved During this Admission:      Discharged Condition: good    Disposition: Home or  Self Care    Follow Up:  Follow-up Information     Reece Hogan MD In 1 week.    Specialties:  General Surgery, Bariatrics  Why:  drain check  Contact information:  [address] [address] [phone]                 Patient Instructions:   No discharge procedures on file.  Medications:  Reconciled Home Medications:      Medication List      START taking these medications    ciprofloxacin HCl 500 MG tablet  Commonly known as:  CIPRO  Take 1 tablet (500 mg total) by mouth every 12 (twelve) hours. for 14 days     metroNIDAZOLE 500 MG tablet  Commonly known as:  FLAGYL  Take 1 tablet (500 mg total) by mouth every 8 (eight) hours. for 14 days        CONTINUE taking these medications    cyanocobalamin 100 MCG tablet  Commonly known as:  VITAMIN B-12  Take 100 mcg by mouth once daily.     donepezil 10 MG tablet  Commonly known as:  ARICEPT  Take 1 tablet (10 mg total) by mouth every evening.     MELATIN ORAL  Take 2 tablets by mouth every evening.     memantine 10 MG Tab  Commonly known as:  NAMENDA  10mg po bid.     thyroid (pork) 30 mg Tab  Commonly known as:  ARMOUR THYROID  Take 1 tablet (30 mg total) by mouth once daily.     triamcinolone acetonide 0.1% 0.1 % ointment  Commonly known as:  KENALOG  APPLY TOPICALLY 4 (FOUR) TIMES DAILY. TO RASH ON FACE AND CHEST AND NOSE          Time spent on the discharge of patient: 30 minutes    Ralf Garcia MD  General Surgery  [facility] -

## 2019-01-28 NOTE — PLAN OF CARE
01/28/19 1530   Medicare Message   Important Message from Medicare regarding Discharge Appeal Rights Given to patient/caregiver;Signed/date by patient/caregiver;Explained to patient/caregiver   Date IMM was signed 01/28/19   Time IMM was signed [id number]

## 2019-01-28 NOTE — NURSING
Was not able to make patient appointment. Messaged 's team to make appointment and call patient back with date and time.

## 2019-01-28 NOTE — PLAN OF CARE
Problem: Adult Inpatient Plan of Care  Goal: Plan of Care Review  Outcome: Ongoing (interventions implemented as appropriate)  Patient remained free from injury. No c/o pain at this time. Calm. Family at bedside. No distress noted. Oriented x3. Respirations even and non labored. IV patent and saline locked. Bed locked and low. Call light in reach. Safety measures in place. Will continue to monitor. Reviewed plan of care. Patient verbalized understanding and teach back.    12hr chart check complete.

## 2019-01-28 NOTE — PLAN OF CARE
Problem: Adult Inpatient Plan of Care  Goal: Plan of Care Review  Outcome: Ongoing (interventions implemented as appropriate)  No acute distress noted.  PO antibiotics administered. IV saline locked. Colostomy draining. Safety measures are in place. Call bell within reach. Pain being managed. Pt free of falls. Will continue to monitor. Chart check complete.

## 2019-01-29 LAB — BACTERIA SPEC AEROBE CULT: NORMAL

## 2019-01-30 ENCOUNTER — PATIENT OUTREACH (OUTPATIENT)
Dept: ADMINISTRATIVE | Facility: CLINIC | Age: 79
End: 2019-01-30

## 2019-01-30 RX ORDER — ATORVASTATIN CALCIUM 10 MG/1
10 TABLET, FILM COATED ORAL DAILY
COMMUNITY
End: 2019-01-31 | Stop reason: SDUPTHER

## 2019-01-30 NOTE — PLAN OF CARE
01/30/19 1623   Final Note   Assessment Type Final Discharge Note   Anticipated Discharge Disposition Home-Health   Right Care Referral Info   Post Acute Recommendation Home-care   Facility Name [facility]

## 2019-01-30 NOTE — PATIENT INSTRUCTIONS
Understanding Sepsis  Sepsis is a severe response the body has to an infection. It is most often caused by bacteria. It is also known as septicemia, or systemic inflammatory response syndrome (SIRS). Sepsis is a medical emergency. It needs to be treated right away.  What is sepsis?  Sepsis is when the body reacts to an infection with a severe inflammatory response. It can be caused by bacteria, fungus, or a virus. Sepsis can cause many kinds of problems around the body. It can lead severe low blood pressure (shock) and organ failure. This can lead to death if not treated.  Sepsis is most common in:  · Infants and older adults  · People with an infection such as pneumonia, meningitis, or a urinary tract infection  · People who have an illness such as cancer, AIDS, or diabetes  · People being treated with chemotherapy medications or radiation  · People who have had a transplant  Symptoms of sepsis  Symptoms of sepsis can include:  · Chills and shaking  · Rapid heartbeat  · Rapid breathing  · Shortness of breath  · Severe nausea or uncontrolled vomiting  · Confusion  · Dizziness  · Decreased urination  · Severe pain, including in the back or joints   Diagnosing sepsis  If your health care provider thinks you may have sepsis, you will be given tests. You may have blood and urine tests. These are done to look for bacteria, viruses, or fungus. You may also have X-rays or other imaging tests. These may be done to look at your organs to locate the source of infection.  Treating sepsis  If you have sepsis, your health care provider will give you antibiotics through a thin, flexible tube put into a vein in your arm (IV). You will also be given fluids through the IV. You may also be given nutrition or other medications through your IV. Your health care provider will talk with you about other treatments you may need. These may include using an oxygen mask or a ventilator to help with breathing. Treatment may last at least 7  to 10 days. Sepsis must be treated in the hospital.  Date Last Reviewed: 7/15/2015  © 0724-6393 [employer]. [address] [place] [address], [place] [address]. All rights reserved. This information is not intended as a substitute for professional medical care. Always follow your healthcare professional's instructions.

## 2019-01-31 ENCOUNTER — TELEPHONE (OUTPATIENT)
Dept: ADMINISTRATIVE | Facility: CLINIC | Age: 79
End: 2019-01-31

## 2019-01-31 ENCOUNTER — PATIENT MESSAGE (OUTPATIENT)
Dept: INTERNAL MEDICINE | Facility: CLINIC | Age: 79
End: 2019-01-31

## 2019-01-31 DIAGNOSIS — F01.50 VASCULAR DEMENTIA WITHOUT BEHAVIORAL DISTURBANCE: ICD-10-CM

## 2019-01-31 LAB
BACTERIA BLD CULT: NORMAL
BACTERIA BLD CULT: NORMAL
BACTERIA SPEC ANAEROBE CULT: NORMAL

## 2019-01-31 RX ORDER — MEMANTINE HYDROCHLORIDE 10 MG/1
TABLET ORAL
Qty: 180 TABLET | Refills: 3 | Status: SHIPPED | OUTPATIENT
Start: 2019-01-31 | End: 2020-01-16 | Stop reason: SDUPTHER

## 2019-01-31 RX ORDER — ATORVASTATIN CALCIUM 10 MG/1
10 TABLET, FILM COATED ORAL DAILY
Qty: 90 TABLET | Refills: 3 | Status: SHIPPED | OUTPATIENT
Start: 2019-01-31 | End: 2020-01-16 | Stop reason: SDUPTHER

## 2019-01-31 RX ORDER — DONEPEZIL HYDROCHLORIDE 10 MG/1
10 TABLET, FILM COATED ORAL NIGHTLY
Qty: 90 TABLET | Refills: 3 | Status: SHIPPED | OUTPATIENT
Start: 2019-01-31 | End: 2019-09-18 | Stop reason: SDUPTHER

## 2019-01-31 RX ORDER — THYROID 30 MG/1
30 TABLET ORAL DAILY
Qty: 90 TABLET | Refills: 3 | Status: SHIPPED | OUTPATIENT
Start: 2019-01-31 | End: 2019-02-18 | Stop reason: SDUPTHER

## 2019-02-07 ENCOUNTER — TELEPHONE (OUTPATIENT)
Dept: RADIOLOGY | Facility: HOSPITAL | Age: 79
End: 2019-02-07

## 2019-02-08 ENCOUNTER — HOSPITAL ENCOUNTER (OUTPATIENT)
Dept: RADIOLOGY | Facility: HOSPITAL | Age: 79
Discharge: HOME | End: 2019-02-08
Attending: SURGERY
Payer: MEDICARE

## 2019-02-08 ENCOUNTER — OFFICE VISIT (OUTPATIENT)
Dept: SURGERY | Facility: CLINIC | Age: 79
End: 2019-02-08
Payer: MEDICARE

## 2019-02-08 VITALS
TEMPERATURE: 98 F | WEIGHT: 142.19 LBS | SYSTOLIC BLOOD PRESSURE: 143 MMHG | DIASTOLIC BLOOD PRESSURE: 75 MMHG | HEART RATE: 48 BPM | BODY MASS INDEX: 26.17 KG/M2 | HEIGHT: 62 IN

## 2019-02-08 DIAGNOSIS — Z93.3 STATUS POST COLOSTOMY: Primary | ICD-10-CM

## 2019-02-08 DIAGNOSIS — K65.1 INTRA-ABDOMINAL ABSCESS: ICD-10-CM

## 2019-02-08 PROCEDURE — 99999 PR PBB SHADOW E&M-EST. PATIENT-LVL III: CPT | Mod: PBBFAC,,, | Performed by: SURGERY

## 2019-02-08 PROCEDURE — 99024 POSTOP FOLLOW-UP VISIT: CPT | Mod: S$GLB,,, | Performed by: SURGERY

## 2019-02-08 PROCEDURE — 99024 PR POST-OP FOLLOW-UP VISIT: ICD-10-PCS | Mod: S$GLB,,, | Performed by: SURGERY

## 2019-02-08 PROCEDURE — 25500020 PHARM REV CODE 255: Performed by: SURGERY

## 2019-02-08 PROCEDURE — 74177 CT ABDOMEN PELVIS WITH CONTRAST: ICD-10-PCS | Mod: 26,,, | Performed by: RADIOLOGY

## 2019-02-08 PROCEDURE — 74177 CT ABD & PELVIS W/CONTRAST: CPT | Mod: 26,,, | Performed by: RADIOLOGY

## 2019-02-08 PROCEDURE — 74177 CT ABD & PELVIS W/CONTRAST: CPT | Mod: TC

## 2019-02-08 PROCEDURE — 99999 PR PBB SHADOW E&M-EST. PATIENT-LVL III: ICD-10-PCS | Mod: PBBFAC,,, | Performed by: SURGERY

## 2019-02-08 RX ADMIN — IOHEXOL 75 ML: 350 INJECTION, SOLUTION INTRAVENOUS at 12:02

## 2019-02-08 RX ADMIN — IOHEXOL 16 ML: 350 INJECTION, SOLUTION INTRAVENOUS at 11:02

## 2019-02-08 NOTE — PROGRESS NOTES
Irlanda Lopez 78 y.o.female  This patient was seen for postoperative visit. Irlanda Lopez has no specific complaints and denies of any issues relevant to the surgery. The wound has healed without any complications.  I have discussed the pathology result with the patient.  The percutaneous drainage was removed today after the evaluation of CT scan of the abdomen and pelvis today.  She is expected to follow up with me in 3-4 months to reverse the colostomy.    Reece Hogan

## 2019-02-12 ENCOUNTER — TELEPHONE (OUTPATIENT)
Dept: INTERNAL MEDICINE | Facility: CLINIC | Age: 79
End: 2019-02-12

## 2019-02-12 NOTE — TELEPHONE ENCOUNTER
Called and spoke with Gladys with [facility]. They received the initial HH referral from Dr. Samira Newell, it was at that time for skilled nursing and PT. She ended up back in the hospital and when d/c they only did new order for skilled nursing. She would like to start the PT back up bc she states pt is in need of the PT. I did go ahead and give verbal to start PT back up through HH.

## 2019-02-12 NOTE — TELEPHONE ENCOUNTER
----- Message from Neyda Goff sent at 2/12/2019  9:50 AM CST -----  Contact: Gladys--[facility]  Patient requesting call back regarding the continuation of physical therapy after hospital stay. Please call back at [phone]/Gladys.      Thanks,  Neyda Goff

## 2019-02-16 ENCOUNTER — PATIENT MESSAGE (OUTPATIENT)
Dept: INTERNAL MEDICINE | Facility: CLINIC | Age: 79
End: 2019-02-16

## 2019-02-18 ENCOUNTER — PATIENT MESSAGE (OUTPATIENT)
Dept: INTERNAL MEDICINE | Facility: CLINIC | Age: 79
End: 2019-02-18

## 2019-02-18 RX ORDER — THYROID 30 MG/1
30 TABLET ORAL DAILY
Qty: 90 TABLET | Refills: 3 | Status: SHIPPED | OUTPATIENT
Start: 2019-02-18 | End: 2020-04-14 | Stop reason: SDUPTHER

## 2019-02-18 NOTE — TELEPHONE ENCOUNTER
Do you need a follow up scheduled with pt? Daughter is asking when you would like to see her back in office?

## 2019-02-19 NOTE — TELEPHONE ENCOUNTER
I could see her In march for followup or after she sees Ivan in April. Whichever is more convienent for them.

## 2019-02-26 ENCOUNTER — PATIENT MESSAGE (OUTPATIENT)
Dept: INTERNAL MEDICINE | Facility: CLINIC | Age: 79
End: 2019-02-26

## 2019-02-26 ENCOUNTER — TELEPHONE (OUTPATIENT)
Dept: INTERNAL MEDICINE | Facility: CLINIC | Age: 79
End: 2019-02-26

## 2019-02-26 NOTE — TELEPHONE ENCOUNTER
We did not cancel the [place] Thyroid prescription.  If anything I just sent it back in recently.  Please contact [facility] to see why they submitted this.  They do not want her on it because of her age based on Medicare guidelines and there is a warning about that but I over-rided the order.  I did not cancel it.

## 2019-03-12 ENCOUNTER — TELEPHONE (OUTPATIENT)
Dept: SURGERY | Facility: CLINIC | Age: 79
End: 2019-03-12

## 2019-03-12 NOTE — TELEPHONE ENCOUNTER
S/W Myla Mora/abril in regards to pt's has small bumps present on stoma, informed her it is okay to send a pic of the stoma via [email or web address]. Daughter voiced an understanding

## 2019-03-12 NOTE — TELEPHONE ENCOUNTER
----- Message from Blaire Mitchell sent at 3/12/2019 12:44 PM CDT -----  Contact: Daughter  States the pt has bump around  her stoma, the pt daughter can be reached at [phone] or [phone]///[email or web address]

## 2019-03-12 NOTE — TELEPHONE ENCOUNTER
Returned call and spoke with Myla Mora/daughter, informed me to pt's home number and speak with caregiver for more information about the stoma area. Called the number provided no ans left msg via vm to call back, and call back number was provided.

## 2019-03-12 NOTE — TELEPHONE ENCOUNTER
----- Message from Jacky Lenz sent at 3/12/2019  3:14 PM CDT -----  Contact: Myla, pt's daughter  She's calling in regards to a missed call, pls call [phone] and and speak with lGadys concerning the pt

## 2019-03-13 ENCOUNTER — TELEPHONE (OUTPATIENT)
Dept: SURGERY | Facility: CLINIC | Age: 79
End: 2019-03-13

## 2019-03-13 NOTE — TELEPHONE ENCOUNTER
Returned call in regards to pic sent via email/Dr Hogan but upload failed. No ans left msg via vm to call back, and call back number was provided.

## 2019-03-13 NOTE — TELEPHONE ENCOUNTER
Patient's caregiver called stating that she is unable to send a picture. She is wanting to know if she can come up here with the patient to show you the picture and if she doesn't need to be seen then they will go home. I explained to her that you are not in the office today. She has the picture on her phone and is wanting to know if she text you a picture of the bumps on the stoma.

## 2019-03-14 ENCOUNTER — OFFICE VISIT (OUTPATIENT)
Dept: SURGERY | Facility: CLINIC | Age: 79
End: 2019-03-14
Payer: MEDICARE

## 2019-03-14 VITALS — WEIGHT: 145.5 LBS | BODY MASS INDEX: 26.78 KG/M2 | HEIGHT: 62 IN

## 2019-03-14 DIAGNOSIS — Z98.890 POST-OPERATIVE STATE: Primary | ICD-10-CM

## 2019-03-14 DIAGNOSIS — Z93.3 COLOSTOMY IN PLACE: ICD-10-CM

## 2019-03-14 PROCEDURE — 99999 PR PBB SHADOW E&M-EST. PATIENT-LVL III: ICD-10-PCS | Mod: PBBFAC,,, | Performed by: PHYSICIAN ASSISTANT

## 2019-03-14 PROCEDURE — 99999 PR PBB SHADOW E&M-EST. PATIENT-LVL III: CPT | Mod: PBBFAC,,, | Performed by: PHYSICIAN ASSISTANT

## 2019-03-14 PROCEDURE — 99024 POSTOP FOLLOW-UP VISIT: CPT | Mod: S$GLB,,, | Performed by: PHYSICIAN ASSISTANT

## 2019-03-14 PROCEDURE — 99024 PR POST-OP FOLLOW-UP VISIT: ICD-10-PCS | Mod: S$GLB,,, | Performed by: PHYSICIAN ASSISTANT

## 2019-03-14 NOTE — PROGRESS NOTES
"Irlanda Lopez is status post sigmoid colectomy and jose's procedure on 1/2/19. She presents today because her caregiver expressed concern about the shape of her stoma. She noticed "bumps" on the stoma. The patient denies bleeding, diarrhea, constipation, fever, chills, abdominal pain, etc. She reports the colostomy is functioning well and has no issues.     PE: Abdomen is soft, non-tender, non-distended. The stoma is pink viable, with no abnormalities. There are small ridges of mucosa which do not appear concerning. The stoma is patent and well healed.     Pathology: Sigmoid colon and possible rectum:  Diverticulitis with smooth muscle hypertrophy, transmural inflammation, and subserosal microabscess formation;  Surgical resection margins are viable.    A/P:  Normal post-operative course.    Return to clinic in 3 months with Dr. Hogan  "

## 2019-03-20 ENCOUNTER — PES CALL (OUTPATIENT)
Dept: ADMINISTRATIVE | Facility: CLINIC | Age: 79
End: 2019-03-20

## 2019-03-25 ENCOUNTER — PATIENT MESSAGE (OUTPATIENT)
Dept: INTERNAL MEDICINE | Facility: CLINIC | Age: 79
End: 2019-03-25

## 2019-03-30 ENCOUNTER — OFFICE VISIT (OUTPATIENT)
Dept: URGENT CARE | Facility: CLINIC | Age: 79
End: 2019-03-30
Payer: MEDICARE

## 2019-03-30 VITALS
HEIGHT: 62 IN | BODY MASS INDEX: 27.47 KG/M2 | TEMPERATURE: 97 F | SYSTOLIC BLOOD PRESSURE: 130 MMHG | WEIGHT: 149.25 LBS | DIASTOLIC BLOOD PRESSURE: 80 MMHG | HEART RATE: 58 BPM | OXYGEN SATURATION: 98 %

## 2019-03-30 DIAGNOSIS — K94.00 COLOSTOMY COMPLICATION, UNSPECIFIED: Primary | ICD-10-CM

## 2019-03-30 PROCEDURE — 99999 PR PBB SHADOW E&M-EST. PATIENT-LVL IV: CPT | Mod: PBBFAC,,, | Performed by: NURSE PRACTITIONER

## 2019-03-30 PROCEDURE — 3079F DIAST BP 80-89 MM HG: CPT | Mod: CPTII,S$GLB,, | Performed by: NURSE PRACTITIONER

## 2019-03-30 PROCEDURE — 3075F PR MOST RECENT SYSTOLIC BLOOD PRESS GE 130-139MM HG: ICD-10-PCS | Mod: CPTII,S$GLB,, | Performed by: NURSE PRACTITIONER

## 2019-03-30 PROCEDURE — 3079F PR MOST RECENT DIASTOLIC BLOOD PRESSURE 80-89 MM HG: ICD-10-PCS | Mod: CPTII,S$GLB,, | Performed by: NURSE PRACTITIONER

## 2019-03-30 PROCEDURE — 1101F PT FALLS ASSESS-DOCD LE1/YR: CPT | Mod: CPTII,S$GLB,, | Performed by: NURSE PRACTITIONER

## 2019-03-30 PROCEDURE — 99214 OFFICE O/P EST MOD 30 MIN: CPT | Mod: S$GLB,,, | Performed by: NURSE PRACTITIONER

## 2019-03-30 PROCEDURE — 99999 PR PBB SHADOW E&M-EST. PATIENT-LVL IV: ICD-10-PCS | Mod: PBBFAC,,, | Performed by: NURSE PRACTITIONER

## 2019-03-30 PROCEDURE — 99214 PR OFFICE/OUTPT VISIT, EST, LEVL IV, 30-39 MIN: ICD-10-PCS | Mod: S$GLB,,, | Performed by: NURSE PRACTITIONER

## 2019-03-30 PROCEDURE — 3075F SYST BP GE 130 - 139MM HG: CPT | Mod: CPTII,S$GLB,, | Performed by: NURSE PRACTITIONER

## 2019-03-30 PROCEDURE — 1101F PR PT FALLS ASSESS DOC 0-1 FALLS W/OUT INJ PAST YR: ICD-10-PCS | Mod: CPTII,S$GLB,, | Performed by: NURSE PRACTITIONER

## 2019-03-30 NOTE — PATIENT INSTRUCTIONS
Discharge Instructions for Colostomy  You just underwent a procedure that required a colostomy. This is a life-saving procedure that involves removing or disconnecting part of your colon (large intestine). If your large intestine was diseased, your healthcare provider may have removed it. If it was injured, your healthcare provider may have disconnected it for a short time so that it can heal. After it heals, your healthcare provider may reconnect it. During a colostomy formation, your healthcare provider reroutes your colon through your abdominal wall. Stool and mucus can then pass out of your body through this opening, called a stoma. The following are general guidelines for home care following a colostomy. Your healthcare provider will go over any information that is specific to your condition.  Home care  Suggestions for home care include the following:  · Take care of your stoma as directed. Your healthcare provider and ostomy nurse discussed how to do this with you before you left the hospital.  · Ask your healthcare provider or ostomy nurse for a patient education sheet about colostomy care before you leave the hospital. This will help remind you how to care for yourself.  · If a partner or significant other will be helping you recover, ask the medical team to educate that person on ostomy care as well.   · Dont lift anything heavier than 5 pounds until your healthcare provider says it is OK.  · Dont drive until after your first healthcare providers appointment after your surgery.  · If you ride in a car for more than short trips, stop often to stretch your legs.  · Ask your healthcare provider when you can expect to return to work. Most patients are able to return to work within 4 to 6 weeks after surgery.  · Increase your activity gradually. Take short walks on a level surface.  · Wash your incision site with soap and water and pat it dry.  · Check your incision every day for redness, drainage,  swelling, or separation of the skin.  · Take your medicines exactly as directed. Dont skip doses.  · Dont take any over-the-counter medicine unless your healthcare provider tells you to do so.  Call your healthcare provider  Call your healthcare provider immediately if you have any of the following:  · Excessive bleeding from your stoma  · Blood in your stool  · Stool that is very hard  · No gas or stool  · Change in the color of your stoma  · Bulging skin around your stoma  · A stoma that looks like its getting longer  · Fever of 100.4°F (38°C) or higher, or chills, or as advised by your healthcare provider   · Redness, swelling, bleeding, or drainage from your incision  · Constipation  · Diarrhea  · Nausea or vomiting  · Increased pain in the belly or around the stoma   Date Last Reviewed: 7/1/2016  © 1755-6234 [employer]. [address] [place] [address], [place] [address]. All rights reserved. This information is not intended as a substitute for professional medical care. Always follow your healthcare professional's instructions.

## 2019-03-30 NOTE — PROGRESS NOTES
Subjective:       Patient ID: Irlanda Lopez is a 78 y.o. female.    Chief Complaint: Colostomy (Patient is having trouble with her colostomy bag.)    Pt is a 78 year old female to clinic today with daughter with complaints of difficulty changing her colostomy bag. Pt states home health changed bag earlier today but pt has had trouble since.     Review of Systems   Constitutional: Negative for chills, diaphoresis, fatigue and fever.   Skin: Negative for rash.   Neurological: Negative for dizziness, light-headedness and headaches.       Objective:      Physical Exam   Constitutional: She is oriented to person, place, and time. She appears well-developed and well-nourished. No distress.   HENT:   Head: Normocephalic.   Right Ear: External ear normal.   Left Ear: External ear normal.   Nose: Nose normal.   Eyes: Pupils are equal, round, and reactive to light.   Abdominal: Soft. Bowel sounds are normal. She exhibits no distension. There is no tenderness.       Neurological: She is alert and oriented to person, place, and time.   Skin: Skin is warm and dry. No rash noted. She is not diaphoretic.   Psychiatric: She has a normal mood and affect. Her behavior is normal. Thought content normal.   Nursing note and vitals reviewed.      Assessment:       1. Colostomy complication, unspecified        Plan:   Colostomy complication, unspecified      Able to seal colostomy bag to skin.  Recommend notify home health of issues and advice.    Follow prescribed treatment plan as directed.  Stay hydrated and rest.  Report to ER if symptoms worsen.  Follow up with PCP in 2-3 days or sooner if symptoms do not improve.

## 2019-04-01 ENCOUNTER — LAB VISIT (OUTPATIENT)
Dept: LAB | Facility: HOSPITAL | Age: 79
End: 2019-04-01
Attending: FAMILY MEDICINE
Payer: MEDICARE

## 2019-04-01 DIAGNOSIS — E07.9 THYROID DISORDER: ICD-10-CM

## 2019-04-01 DIAGNOSIS — F01.50 VASCULAR DEMENTIA WITHOUT BEHAVIORAL DISTURBANCE: ICD-10-CM

## 2019-04-01 DIAGNOSIS — I10 ESSENTIAL HYPERTENSION: ICD-10-CM

## 2019-04-01 DIAGNOSIS — R73.09 ABNORMAL GLUCOSE: ICD-10-CM

## 2019-04-01 LAB
ALBUMIN SERPL BCP-MCNC: 3.6 G/DL (ref 3.5–5.2)
ALP SERPL-CCNC: 61 U/L (ref 55–135)
ALT SERPL W/O P-5'-P-CCNC: 46 U/L (ref 10–44)
ANION GAP SERPL CALC-SCNC: 9 MMOL/L (ref 8–16)
AST SERPL-CCNC: 37 U/L (ref 10–40)
BASOPHILS # BLD AUTO: 0.05 K/UL (ref 0–0.2)
BASOPHILS NFR BLD: 0.7 % (ref 0–1.9)
BILIRUB SERPL-MCNC: 0.7 MG/DL (ref 0.1–1)
BUN SERPL-MCNC: 15 MG/DL (ref 8–23)
CALCIUM SERPL-MCNC: 9.4 MG/DL (ref 8.7–10.5)
CHLORIDE SERPL-SCNC: 109 MMOL/L (ref 95–110)
CHOLEST SERPL-MCNC: 165 MG/DL (ref 120–199)
CHOLEST/HDLC SERPL: 2.7 {RATIO} (ref 2–5)
CO2 SERPL-SCNC: 24 MMOL/L (ref 23–29)
CREAT SERPL-MCNC: 0.8 MG/DL (ref 0.5–1.4)
DIFFERENTIAL METHOD: ABNORMAL
EOSINOPHIL # BLD AUTO: 0.3 K/UL (ref 0–0.5)
EOSINOPHIL NFR BLD: 4.5 % (ref 0–8)
ERYTHROCYTE [DISTWIDTH] IN BLOOD BY AUTOMATED COUNT: 17.4 % (ref 11.5–14.5)
EST. GFR  (AFRICAN AMERICAN): >60 ML/MIN/1.73 M^2
EST. GFR  (NON AFRICAN AMERICAN): >60 ML/MIN/1.73 M^2
GLUCOSE SERPL-MCNC: 79 MG/DL (ref 70–110)
HCT VFR BLD AUTO: 42.8 % (ref 37–48.5)
HDLC SERPL-MCNC: 61 MG/DL (ref 40–75)
HDLC SERPL: 37 % (ref 20–50)
HGB BLD-MCNC: 13.4 G/DL (ref 12–16)
IMM GRANULOCYTES # BLD AUTO: 0.04 K/UL (ref 0–0.04)
IMM GRANULOCYTES NFR BLD AUTO: 0.6 % (ref 0–0.5)
LDLC SERPL CALC-MCNC: 91.6 MG/DL (ref 63–159)
LYMPHOCYTES # BLD AUTO: 2.5 K/UL (ref 1–4.8)
LYMPHOCYTES NFR BLD: 36.9 % (ref 18–48)
MCH RBC QN AUTO: 27.3 PG (ref 27–31)
MCHC RBC AUTO-ENTMCNC: 31.3 G/DL (ref 32–36)
MCV RBC AUTO: 87 FL (ref 82–98)
MONOCYTES # BLD AUTO: 0.7 K/UL (ref 0.3–1)
MONOCYTES NFR BLD: 10.4 % (ref 4–15)
NEUTROPHILS # BLD AUTO: 3.2 K/UL (ref 1.8–7.7)
NEUTROPHILS NFR BLD: 46.9 % (ref 38–73)
NONHDLC SERPL-MCNC: 104 MG/DL
NRBC BLD-RTO: 0 /100 WBC
PLATELET # BLD AUTO: 213 K/UL (ref 150–350)
PMV BLD AUTO: 10.6 FL (ref 9.2–12.9)
POTASSIUM SERPL-SCNC: 4.5 MMOL/L (ref 3.5–5.1)
PROT SERPL-MCNC: 6.7 G/DL (ref 6–8.4)
RBC # BLD AUTO: 4.91 M/UL (ref 4–5.4)
SODIUM SERPL-SCNC: 142 MMOL/L (ref 136–145)
T4 FREE SERPL-MCNC: 0.66 NG/DL (ref 0.71–1.51)
TRIGL SERPL-MCNC: 62 MG/DL (ref 30–150)
TSH SERPL DL<=0.005 MIU/L-ACNC: 1.63 UIU/ML (ref 0.4–4)
WBC # BLD AUTO: 6.85 K/UL (ref 3.9–12.7)

## 2019-04-01 PROCEDURE — 80061 LIPID PANEL: CPT

## 2019-04-01 PROCEDURE — 36415 COLL VENOUS BLD VENIPUNCTURE: CPT | Mod: PO

## 2019-04-01 PROCEDURE — 84443 ASSAY THYROID STIM HORMONE: CPT

## 2019-04-01 PROCEDURE — 85025 COMPLETE CBC W/AUTO DIFF WBC: CPT

## 2019-04-01 PROCEDURE — 80053 COMPREHEN METABOLIC PANEL: CPT

## 2019-04-01 PROCEDURE — 83036 HEMOGLOBIN GLYCOSYLATED A1C: CPT

## 2019-04-01 PROCEDURE — 84439 ASSAY OF FREE THYROXINE: CPT

## 2019-04-02 ENCOUNTER — OFFICE VISIT (OUTPATIENT)
Dept: CARDIOLOGY | Facility: CLINIC | Age: 79
End: 2019-04-02
Payer: MEDICARE

## 2019-04-02 ENCOUNTER — OFFICE VISIT (OUTPATIENT)
Dept: INTERNAL MEDICINE | Facility: CLINIC | Age: 79
End: 2019-04-02
Payer: MEDICARE

## 2019-04-02 VITALS
SYSTOLIC BLOOD PRESSURE: 134 MMHG | BODY MASS INDEX: 27.87 KG/M2 | TEMPERATURE: 98 F | HEART RATE: 68 BPM | DIASTOLIC BLOOD PRESSURE: 70 MMHG | WEIGHT: 151.44 LBS | HEIGHT: 62 IN

## 2019-04-02 VITALS
HEIGHT: 62 IN | HEART RATE: 60 BPM | BODY MASS INDEX: 27.87 KG/M2 | WEIGHT: 151.44 LBS | SYSTOLIC BLOOD PRESSURE: 146 MMHG | DIASTOLIC BLOOD PRESSURE: 78 MMHG

## 2019-04-02 DIAGNOSIS — I10 ESSENTIAL HYPERTENSION: Primary | ICD-10-CM

## 2019-04-02 DIAGNOSIS — R60.1 GENERALIZED EDEMA: ICD-10-CM

## 2019-04-02 DIAGNOSIS — Z93.3 COLOSTOMY IN PLACE: ICD-10-CM

## 2019-04-02 DIAGNOSIS — R79.89 HIGH SERUM VITAMIN D: ICD-10-CM

## 2019-04-02 DIAGNOSIS — I10 ESSENTIAL HYPERTENSION: ICD-10-CM

## 2019-04-02 DIAGNOSIS — E78.00 PURE HYPERCHOLESTEROLEMIA: ICD-10-CM

## 2019-04-02 DIAGNOSIS — L72.3 SEBACEOUS CYST: ICD-10-CM

## 2019-04-02 DIAGNOSIS — I77.1 TORTUOUS AORTA: ICD-10-CM

## 2019-04-02 DIAGNOSIS — E44.0 MODERATE PROTEIN-CALORIE MALNUTRITION: ICD-10-CM

## 2019-04-02 DIAGNOSIS — F01.50 VASCULAR DEMENTIA WITHOUT BEHAVIORAL DISTURBANCE: ICD-10-CM

## 2019-04-02 DIAGNOSIS — E03.9 HYPOTHYROIDISM, UNSPECIFIED TYPE: ICD-10-CM

## 2019-04-02 DIAGNOSIS — K57.20 DIVERTICULITIS OF LARGE INTESTINE WITH PERFORATION AND ABSCESS WITHOUT BLEEDING: Primary | ICD-10-CM

## 2019-04-02 LAB
ESTIMATED AVG GLUCOSE: 97 MG/DL (ref 68–131)
HBA1C MFR BLD HPLC: 5 % (ref 4–5.6)

## 2019-04-02 PROCEDURE — 3075F SYST BP GE 130 - 139MM HG: CPT | Mod: CPTII,S$GLB,, | Performed by: FAMILY MEDICINE

## 2019-04-02 PROCEDURE — 3078F DIAST BP <80 MM HG: CPT | Mod: CPTII,S$GLB,, | Performed by: FAMILY MEDICINE

## 2019-04-02 PROCEDURE — 3074F SYST BP LT 130 MM HG: CPT | Mod: CPTII,S$GLB,, | Performed by: INTERNAL MEDICINE

## 2019-04-02 PROCEDURE — 99214 PR OFFICE/OUTPT VISIT, EST, LEVL IV, 30-39 MIN: ICD-10-PCS | Mod: S$GLB,,, | Performed by: INTERNAL MEDICINE

## 2019-04-02 PROCEDURE — 99215 PR OFFICE/OUTPT VISIT, EST, LEVL V, 40-54 MIN: ICD-10-PCS | Mod: S$GLB,,, | Performed by: FAMILY MEDICINE

## 2019-04-02 PROCEDURE — 99215 OFFICE O/P EST HI 40 MIN: CPT | Mod: S$GLB,,, | Performed by: FAMILY MEDICINE

## 2019-04-02 PROCEDURE — 99999 PR PBB SHADOW E&M-EST. PATIENT-LVL III: ICD-10-PCS | Mod: PBBFAC,,, | Performed by: INTERNAL MEDICINE

## 2019-04-02 PROCEDURE — 3078F PR MOST RECENT DIASTOLIC BLOOD PRESSURE < 80 MM HG: ICD-10-PCS | Mod: CPTII,S$GLB,, | Performed by: FAMILY MEDICINE

## 2019-04-02 PROCEDURE — 99499 RISK ADDL DX/OHS AUDIT: ICD-10-PCS | Mod: S$GLB,,, | Performed by: FAMILY MEDICINE

## 2019-04-02 PROCEDURE — 99999 PR PBB SHADOW E&M-EST. PATIENT-LVL III: ICD-10-PCS | Mod: PBBFAC,,, | Performed by: FAMILY MEDICINE

## 2019-04-02 PROCEDURE — 99214 OFFICE O/P EST MOD 30 MIN: CPT | Mod: S$GLB,,, | Performed by: INTERNAL MEDICINE

## 2019-04-02 PROCEDURE — 3078F DIAST BP <80 MM HG: CPT | Mod: CPTII,S$GLB,, | Performed by: INTERNAL MEDICINE

## 2019-04-02 PROCEDURE — 3074F PR MOST RECENT SYSTOLIC BLOOD PRESSURE < 130 MM HG: ICD-10-PCS | Mod: CPTII,S$GLB,, | Performed by: INTERNAL MEDICINE

## 2019-04-02 PROCEDURE — 99999 PR PBB SHADOW E&M-EST. PATIENT-LVL III: CPT | Mod: PBBFAC,,, | Performed by: FAMILY MEDICINE

## 2019-04-02 PROCEDURE — 1101F PR PT FALLS ASSESS DOC 0-1 FALLS W/OUT INJ PAST YR: ICD-10-PCS | Mod: CPTII,S$GLB,, | Performed by: INTERNAL MEDICINE

## 2019-04-02 PROCEDURE — 99999 PR PBB SHADOW E&M-EST. PATIENT-LVL III: CPT | Mod: PBBFAC,,, | Performed by: INTERNAL MEDICINE

## 2019-04-02 PROCEDURE — 1101F PT FALLS ASSESS-DOCD LE1/YR: CPT | Mod: CPTII,S$GLB,, | Performed by: FAMILY MEDICINE

## 2019-04-02 PROCEDURE — 3075F PR MOST RECENT SYSTOLIC BLOOD PRESS GE 130-139MM HG: ICD-10-PCS | Mod: CPTII,S$GLB,, | Performed by: FAMILY MEDICINE

## 2019-04-02 PROCEDURE — 3078F PR MOST RECENT DIASTOLIC BLOOD PRESSURE < 80 MM HG: ICD-10-PCS | Mod: CPTII,S$GLB,, | Performed by: INTERNAL MEDICINE

## 2019-04-02 PROCEDURE — 99499 UNLISTED E&M SERVICE: CPT | Mod: S$GLB,,, | Performed by: FAMILY MEDICINE

## 2019-04-02 PROCEDURE — 1101F PT FALLS ASSESS-DOCD LE1/YR: CPT | Mod: CPTII,S$GLB,, | Performed by: INTERNAL MEDICINE

## 2019-04-02 PROCEDURE — 1101F PR PT FALLS ASSESS DOC 0-1 FALLS W/OUT INJ PAST YR: ICD-10-PCS | Mod: CPTII,S$GLB,, | Performed by: FAMILY MEDICINE

## 2019-04-02 RX ORDER — FUROSEMIDE 20 MG/1
20 TABLET ORAL DAILY PRN
Qty: 30 TABLET | Refills: 11 | Status: SHIPPED | OUTPATIENT
Start: 2019-04-02 | End: 2020-01-16

## 2019-04-02 NOTE — PROGRESS NOTES
Subjective:      Patient ID: Irlanda Lopez is a 78 y.o. female.    Chief Complaint: Follow-up (surgeries, diverticulitis)    Disclaimer:  This note is prepared using voice recognition software and as such is likely to have errors and has not been proof read. Please contact me for questions.     Patient presents today with her daughter Faiza for followup.     Since I last saw her she has undergone quite some substantial medical treatments.  She ended up having significant amounts of diverticulitis which ultimately ended up needing surgery.  She ended up having portions of her colon removed however afterwards she developed a postop fever which ended up with an abscess.  She ultimately ended up with a colostomy.  She has been seeing Dr. Newell the general surgeon for follow-up.  She is actually doing much better now.  During the incidence of the diverticulitis she ultimately lost about 30 lb.  Since that time our she has gained about 10 back.  She is actually much more clear today however she deal it is still with dementia.  She is eating better.  She does not have home health at this time any further assisting with the colostomy bag.  She was uncertain how long she would need to be using a colostomy bags as well.  She has not had any further infections since that time.  They do have scheduled follow-up once again with Dr. Newell as he is managing the colostomy.    The daughter Faiza is frustrated about the wave vet all of the events took place.  She feels as if there was possibly some type of intervention sooner instead of waiting that some of this could been avoided.  I had a lengthy discussion with the patient and the daughter today in regards to miss Fournier's unfortunate case.  She did have a complicated issue with the diverticulitis but what also complicated the matter was miss Fournier's cognition and mental state of her ability to recall events.  In the setting of the amount of antibiotic treatments and a course  it was never clear if miss Fournier was fully following an oral regimen as instructed.  She does not have caretakers are family members living with her.  Her memory was so substantially declined at that time that she was at times forgetting to take medicines.  We also were not always getting the full picture as well.  It was unfortunate that she did have such a complicated course that did not follow the typical pattern of the diverticulitis however she appears to be much better now.  Her daughter Faiza mainly states that she is just frustrated with the number specialist she had to see in regards to all of the events.  She feels like some of them may have been not needed however at the time it was the information that we had.  Looking back at her situation of course hind site is better and were able to look back and say it would have been nice to have intervened sooner however unfortunately there are some complications as well with surgery.  I did advise them to once again bring up the concerns with the general surgeon as well.  They seem to be appreciative of the discussion of her care.    At this time she is still scheduled to follow up with Dr. Love as well.  This was in relation to during the workup she had significant edema this was after hospitalization for IV fluids and antibiotics during the course of her diverticulitis she ended up with an echo which showed some changes related to the heart related to blood pressure uses but overall normal systolic and diastolic functions.  Her blood pressure is very well controlled today.    She is still on the Lipitor 10 as well.  Her cholesterol levels look good.    From a thyroid standpoint she is on Vanzant Thyroid.  She found out that her [facility] is not going to be covering this brand.  Miss Fournier did come to me on Armor Thyroid and wants to stay with the Armor Thyroid as previously she did not like the way she felt when she was on generic levothyroxine.  For  this reason they want to continue with it at this dosing.    She does still have the fairly large significant kidney stone but has not had any further UTIs.    She is still on the Aricept and the Namenda for her dementia.          Lab Results   Component Value Date    WBC 6.85 04/01/2019    HGB 13.4 04/01/2019    HCT 42.8 04/01/2019     04/01/2019    CHOL 165 04/01/2019    TRIG 62 04/01/2019    HDL 61 04/01/2019    ALT 46 (H) 04/01/2019    AST 37 04/01/2019     04/01/2019    K 4.5 04/01/2019     04/01/2019    CREATININE 0.8 04/01/2019    BUN 15 04/01/2019    CO2 24 04/01/2019    TSH 1.629 04/01/2019    INR 1.1 01/26/2019    HGBA1C 5.0 04/01/2019       CT Abdomen Pelvis With Contrast  Narrative: EXAMINATION:  CT ABDOMEN PELVIS WITH CONTRAST    CLINICAL HISTORY:  Infection, abdomen-pelvis;interval eval abscess s/p IR drainage abscess; Peritoneal abscess    TECHNIQUE:  Routine imaging through the abdomen/pelvis performed post 75 cc Omnipaque IV contrast.    COMPARISON:  01/25/2019 and 11/07/2018 CT exams    FINDINGS:  Visualized lower chest is unremarkable.    Within the abdomen, the liver and spleen are unremarkable. Pancreas is normal. Adrenal glands are unremarkable. Gallbladder normal. No biliary dilatation.    Kidneys are unchanged with large calculus within the right renal pelvis.  Right renal atrophy noted..    Stomach is unremarkable.  Small bowel is nonobstructive.  No free air present.  Surgical drain terminates within the upper midline pelvis near the Ayala pouch with essentially complete resolution of the previously noted fluid collection/abscess.  No significant change in the presacral/perirectal non rim enhancing fluid.  No rim enhancing drainable fluid collection present.  Left lower quadrant ostomy present.  Remaining colon demonstrates no focal abnormality.  No concerning adenopathy.    Intrapelvic structures are otherwise unchanged.    Osseous structures are  "unremarkable.  Impression: Interval drain placement within the upper pelvic midline fluid collection with essentially complete resolution of the abscess/fluid collection.  No detrimental change.    Electronically signed by: Beto Light MD  Date:    02/08/2019  Time:    13:05        Review of Systems   Constitutional: Positive for activity change and appetite change. Negative for chills, fatigue, fever and unexpected weight change.   HENT: Negative for congestion, ear pain, sore throat and trouble swallowing.    Eyes: Negative for pain and visual disturbance.   Respiratory: Negative for cough and shortness of breath.    Cardiovascular: Negative for chest pain and leg swelling.   Gastrointestinal: Negative for abdominal distention, abdominal pain, blood in stool, constipation, diarrhea, nausea, rectal pain and vomiting.        Colostomy present   Endocrine: Negative for cold intolerance and heat intolerance.   Genitourinary: Negative for dysuria and frequency.   Musculoskeletal: Negative for joint swelling, myalgias and neck pain.   Skin: Negative for color change and rash.   Neurological: Negative for dizziness, weakness and headaches.   Psychiatric/Behavioral: Positive for confusion. Negative for behavioral problems, decreased concentration and sleep disturbance. The patient is not nervous/anxious.      Objective:     Vitals:    04/02/19 1204   BP: 134/70   Pulse: 68   Temp: 97.6 °F (36.4 °C)   TempSrc: Oral   Weight: 68.7 kg (151 lb 7.3 oz)   Height: 5' 2" (1.575 m)     Physical Exam   Constitutional: She is oriented to person, place, and time. She appears well-developed and well-nourished.   HENT:   Head: Normocephalic and atraumatic.   Right Ear: Tympanic membrane and external ear normal.   Left Ear: Tympanic membrane and external ear normal.   Mouth/Throat: Oropharynx is clear and moist.   Eyes: EOM are normal.   Neck: Normal range of motion. Neck supple. Carotid bruit is not present. No thyroid mass and " no thyromegaly present.   Cardiovascular: Normal rate and regular rhythm. Exam reveals no gallop and no friction rub.   No murmur heard.  Pulmonary/Chest: Effort normal. No respiratory distress. She has no wheezes. She has no rales.   Abdominal: Soft. Bowel sounds are normal. She exhibits no distension. There is no tenderness. There is no rebound.       Musculoskeletal: Normal range of motion. She exhibits no edema.   Lymphadenopathy:     She has no cervical adenopathy.   Neurological: She is alert and oriented to person, place, and time.   Skin: Skin is warm and dry. No rash noted.   Psychiatric: She has a normal mood and affect. Her speech is normal and behavior is normal. Judgment and thought content normal. Cognition and memory are impaired. She exhibits abnormal recent memory and abnormal remote memory.   Vitals reviewed.    Assessment:     1. Diverticulitis of large intestine with perforation and abscess without bleeding    2. Hypothyroidism, unspecified type    3. Essential hypertension    4. Vascular dementia without behavioral disturbance    5. Moderate protein-calorie malnutrition    6. Pure hypercholesterolemia    7. Colostomy in place    8. Tortuous aorta    9. High serum vitamin D    10. Sebaceous cyst      Plan:   Irlanda was seen today for follow-up.    Diagnoses and all orders for this visit:    Diverticulitis of large intestine with perforation and abscess without bleeding-I had a lengthy discussion today with the patient and her daughter regarding her care her current regimen at this time under current follow-up.  They are appreciative of this summarization of what went down with her treatments her infections and her course of care.  At this time I recommended her to continue to follow up with Dr. Newell in regards to her colostomy bag and any potential surgeries to reverse this and at the timing of when this would occur.  She has not had any further infections at this time.    Hypothyroidism,  unspecified type-at this time we reviewed her thyroid studies which are within goal ranges she would like to stay on the Armor Thyroid.  This is not covered by her [employer] health insurance but she is appreciative of continuation of doing this.    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 Greater than half the time spent was used for coordination of care and counseling of patient.     Follow up in about 6 months (around 10/2/2019) for chronic issues Dr Arias.    There are no Patient Instructions on file for this visit.

## 2019-04-02 NOTE — PROGRESS NOTES
Subjective:   Patient ID:  Irlanda Lopez is a 78 y.o. female who presents for follow-up of Follow-up (6 week)  Echo- nml lv function. BP at home at goal. Pt lost 30 lbs with diverticulitis and has stopped BP meds since then.    Hyperlipidemia   This is a chronic problem. The current episode started more than 1 year ago. The problem is controlled. Recent lipid tests were reviewed and are variable. Pertinent negatives include no chest pain or shortness of breath. Current antihyperlipidemic treatment includes statins. The current treatment provides moderate improvement of lipids. There are no compliance problems.    Hypertension   This is a chronic problem. The current episode started more than 1 year ago. The problem has been resolved since onset. The problem is controlled. Pertinent negatives include no chest pain, palpitations or shortness of breath. Past treatments include calcium channel blockers. The current treatment provides mild improvement. There are no compliance problems.        Review of Systems   Constitution: Negative. Negative for weight gain.   HENT: Negative.    Eyes: Negative.    Cardiovascular: Negative.  Negative for chest pain, leg swelling and palpitations.   Respiratory: Negative.  Negative for shortness of breath.    Endocrine: Negative.    Hematologic/Lymphatic: Negative.    Skin: Negative.    Musculoskeletal: Negative for muscle weakness.   Gastrointestinal: Negative.    Genitourinary: Negative.    Neurological: Negative.  Negative for dizziness.   Psychiatric/Behavioral: Negative.    Allergic/Immunologic: Negative.      Family History   Problem Relation Age of Onset    Alzheimer's disease Mother     Aneurysm Father         brain    Stomach cancer Brother         50s     Past Medical History:   Diagnosis Date    High cholesterol     Hypertension     Hypothyroidism      Social History     Socioeconomic History    Marital status:      Spouse name: Not on file    Number of  children: Not on file    Years of education: Not on file    Highest education level: Not on file   Occupational History     Comment: [employer]   Social Needs    Financial resource strain: Not on file    Food insecurity:     Worry: Not on file     Inability: Not on file    Transportation needs:     Medical: Not on file     Non-medical: Not on file   Tobacco Use    Smoking status: Former Smoker    Smokeless tobacco: Never Used   Substance and Sexual Activity    Alcohol use: No    Drug use: No    Sexual activity: Never     Comment:    Lifestyle    Physical activity:     Days per week: Not on file     Minutes per session: Not on file    Stress: Not on file   Relationships    Social connections:     Talks on phone: Not on file     Gets together: Not on file     Attends Yarsanism service: Not on file     Active member of club or organization: Not on file     Attends meetings of clubs or organizations: Not on file     Relationship status: Not on file    Intimate partner violence:     Fear of current or ex partner: Not on file     Emotionally abused: Not on file     Physically abused: Not on file     Forced sexual activity: Not on file   Other Topics Concern    Not on file   Social History Narrative    Not on file     Current Outpatient Medications on File Prior to Visit   Medication Sig Dispense Refill    atorvastatin (LIPITOR) 10 MG tablet Take 1 tablet (10 mg total) by mouth once daily. 90 tablet 3    cyanocobalamin (VITAMIN B-12) 100 MCG tablet Take 100 mcg by mouth once daily.      donepezil (ARICEPT) 10 MG tablet Take 1 tablet (10 mg total) by mouth every evening. 90 tablet 3    melatonin (MELATIN ORAL) Take 2 tablets by mouth every evening.      memantine (NAMENDA) 10 MG Tab 10mg po bid. 180 tablet 3    thyroid, pork, (ARMOUR THYROID) 30 mg Tab Take 1 tablet (30 mg total) by mouth once daily. 90 tablet 3    triamcinolone acetonide 0.1% (KENALOG) 0.1 % ointment APPLY TOPICALLY 4  (FOUR) TIMES DAILY. TO RASH ON FACE AND CHEST AND NOSE  1     Current Facility-Administered Medications on File Prior to Visit   Medication Dose Route Frequency Provider Last Rate Last Dose    lidocaine (PF) 10 mg/ml (1%) injection 10 mg  1 mL Intradermal Once Reece Hogan MD         Review of patient's allergies indicates:  No Known Allergies    Objective:     Physical Exam   Constitutional: She is oriented to person, place, and time. She appears well-developed and well-nourished.   HENT:   Head: Normocephalic and atraumatic.   Eyes: Pupils are equal, round, and reactive to light. Conjunctivae and EOM are normal.   Neck: Normal range of motion. Neck supple.   Cardiovascular: Normal rate, regular rhythm, normal heart sounds and intact distal pulses.   Pulmonary/Chest: Effort normal and breath sounds normal.   Abdominal: Soft. Bowel sounds are normal.   Musculoskeletal: Normal range of motion.   Neurological: She is alert and oriented to person, place, and time.   Skin: Skin is warm and dry.   Psychiatric: She has a normal mood and affect.   Nursing note and vitals reviewed.      Assessment:     1. Essential hypertension    2. Pure hypercholesterolemia    3. Generalized edema        Plan:     Essential hypertension    Pure hypercholesterolemia    Generalized edema    lasix prn  Continue statin-hlp

## 2019-04-23 ENCOUNTER — OFFICE VISIT (OUTPATIENT)
Dept: SURGERY | Facility: CLINIC | Age: 79
End: 2019-04-23
Payer: MEDICARE

## 2019-04-23 VITALS
HEART RATE: 63 BPM | BODY MASS INDEX: 27.43 KG/M2 | WEIGHT: 149.06 LBS | SYSTOLIC BLOOD PRESSURE: 117 MMHG | TEMPERATURE: 98 F | HEIGHT: 62 IN | DIASTOLIC BLOOD PRESSURE: 66 MMHG

## 2019-04-23 DIAGNOSIS — Z93.3 STATUS POST COLOSTOMY: Primary | ICD-10-CM

## 2019-04-23 PROCEDURE — 99999 PR PBB SHADOW E&M-EST. PATIENT-LVL III: ICD-10-PCS | Mod: PBBFAC,,, | Performed by: SURGERY

## 2019-04-23 PROCEDURE — 99499 UNLISTED E&M SERVICE: CPT | Mod: S$GLB,,, | Performed by: SURGERY

## 2019-04-23 PROCEDURE — 99499 RISK ADDL DX/OHS AUDIT: ICD-10-PCS | Mod: S$GLB,,, | Performed by: SURGERY

## 2019-04-23 PROCEDURE — 3078F PR MOST RECENT DIASTOLIC BLOOD PRESSURE < 80 MM HG: ICD-10-PCS | Mod: CPTII,S$GLB,, | Performed by: SURGERY

## 2019-04-23 PROCEDURE — 3078F DIAST BP <80 MM HG: CPT | Mod: CPTII,S$GLB,, | Performed by: SURGERY

## 2019-04-23 PROCEDURE — 1101F PR PT FALLS ASSESS DOC 0-1 FALLS W/OUT INJ PAST YR: ICD-10-PCS | Mod: CPTII,S$GLB,, | Performed by: SURGERY

## 2019-04-23 PROCEDURE — 1101F PT FALLS ASSESS-DOCD LE1/YR: CPT | Mod: CPTII,S$GLB,, | Performed by: SURGERY

## 2019-04-23 PROCEDURE — 99999 PR PBB SHADOW E&M-EST. PATIENT-LVL III: CPT | Mod: PBBFAC,,, | Performed by: SURGERY

## 2019-04-23 PROCEDURE — 99213 OFFICE O/P EST LOW 20 MIN: CPT | Mod: S$GLB,,, | Performed by: SURGERY

## 2019-04-23 PROCEDURE — 3074F SYST BP LT 130 MM HG: CPT | Mod: CPTII,S$GLB,, | Performed by: SURGERY

## 2019-04-23 PROCEDURE — 99213 PR OFFICE/OUTPT VISIT, EST, LEVL III, 20-29 MIN: ICD-10-PCS | Mod: S$GLB,,, | Performed by: SURGERY

## 2019-04-23 PROCEDURE — 3074F PR MOST RECENT SYSTOLIC BLOOD PRESSURE < 130 MM HG: ICD-10-PCS | Mod: CPTII,S$GLB,, | Performed by: SURGERY

## 2019-04-23 NOTE — PROGRESS NOTES
Irlanda is well known to me from previous encounters.  She is status post colostomy for perforated diverticulitis.  Her course of recovery was complicated with the development of intra-abdominal abscess.  The abscess was managed with percutaneous drainage.  She is doing quite well. She is about 3 months following the surgery. Due to her previous multiple abscesses, my recommendation for the reversal is in June of 2019.  That would be about 5 months following the surgery. Due to severe scarring and dense adhesion, my preference is to delayed time to reverse as much as needed.  The patient understands the plan. But she is expected to follow up with me in June 11, 2019 to discuss the reversal process.  The patient's daughter also asked me several questions about her recent illness which I have no clue what really happened other than she had nausea.  The patient also complained pin pricking sensation at the stoma site.  That is quite common due to skin irritation.  She also complained of passing mucus through her anus which is also expected.  All the questions were properly answered.  I also expressed my failure to deliver the level of care as expected by the family.  Total time approximately 20 min was spent with this patient, more than 50% of that was spent for treatment planning and counseling.    Reece Hogan

## 2019-04-24 ENCOUNTER — TELEPHONE (OUTPATIENT)
Dept: INTERNAL MEDICINE | Facility: CLINIC | Age: 79
End: 2019-04-24

## 2019-04-24 ENCOUNTER — OFFICE VISIT (OUTPATIENT)
Dept: URGENT CARE | Facility: CLINIC | Age: 79
End: 2019-04-24
Payer: MEDICARE

## 2019-04-24 ENCOUNTER — TELEPHONE (OUTPATIENT)
Dept: SURGERY | Facility: CLINIC | Age: 79
End: 2019-04-24

## 2019-04-24 ENCOUNTER — HOSPITAL ENCOUNTER (OUTPATIENT)
Dept: RADIOLOGY | Facility: HOSPITAL | Age: 79
Discharge: HOME | End: 2019-04-24
Attending: FAMILY MEDICINE
Payer: MEDICARE

## 2019-04-24 ENCOUNTER — PATIENT MESSAGE (OUTPATIENT)
Dept: INTERNAL MEDICINE | Facility: CLINIC | Age: 79
End: 2019-04-24

## 2019-04-24 VITALS
BODY MASS INDEX: 26.94 KG/M2 | DIASTOLIC BLOOD PRESSURE: 64 MMHG | OXYGEN SATURATION: 97 % | WEIGHT: 146.38 LBS | TEMPERATURE: 96 F | HEART RATE: 67 BPM | SYSTOLIC BLOOD PRESSURE: 130 MMHG | HEIGHT: 62 IN

## 2019-04-24 DIAGNOSIS — R14.0 ABDOMINAL DISTENSION: ICD-10-CM

## 2019-04-24 DIAGNOSIS — R14.0 ABDOMINAL DISTENSION: Primary | ICD-10-CM

## 2019-04-24 DIAGNOSIS — R11.2 NON-INTRACTABLE VOMITING WITH NAUSEA, UNSPECIFIED VOMITING TYPE: ICD-10-CM

## 2019-04-24 PROCEDURE — 99499 UNLISTED E&M SERVICE: CPT | Mod: S$GLB,,, | Performed by: FAMILY MEDICINE

## 2019-04-24 PROCEDURE — 99499 RISK ADDL DX/OHS AUDIT: ICD-10-PCS | Mod: S$GLB,,, | Performed by: FAMILY MEDICINE

## 2019-04-24 PROCEDURE — 74018 XR ABDOMEN AP 1 VIEW: ICD-10-PCS | Mod: 26,,, | Performed by: RADIOLOGY

## 2019-04-24 PROCEDURE — 74018 RADEX ABDOMEN 1 VIEW: CPT | Mod: TC,FY,PO

## 2019-04-24 PROCEDURE — 1101F PR PT FALLS ASSESS DOC 0-1 FALLS W/OUT INJ PAST YR: ICD-10-PCS | Mod: CPTII,S$GLB,, | Performed by: FAMILY MEDICINE

## 2019-04-24 PROCEDURE — 99999 PR PBB SHADOW E&M-EST. PATIENT-LVL III: CPT | Mod: PBBFAC,,, | Performed by: FAMILY MEDICINE

## 2019-04-24 PROCEDURE — 99214 PR OFFICE/OUTPT VISIT, EST, LEVL IV, 30-39 MIN: ICD-10-PCS | Mod: S$GLB,,, | Performed by: FAMILY MEDICINE

## 2019-04-24 PROCEDURE — 3075F SYST BP GE 130 - 139MM HG: CPT | Mod: CPTII,S$GLB,, | Performed by: FAMILY MEDICINE

## 2019-04-24 PROCEDURE — 3075F PR MOST RECENT SYSTOLIC BLOOD PRESS GE 130-139MM HG: ICD-10-PCS | Mod: CPTII,S$GLB,, | Performed by: FAMILY MEDICINE

## 2019-04-24 PROCEDURE — 1101F PT FALLS ASSESS-DOCD LE1/YR: CPT | Mod: CPTII,S$GLB,, | Performed by: FAMILY MEDICINE

## 2019-04-24 PROCEDURE — 3078F DIAST BP <80 MM HG: CPT | Mod: CPTII,S$GLB,, | Performed by: FAMILY MEDICINE

## 2019-04-24 PROCEDURE — 3078F PR MOST RECENT DIASTOLIC BLOOD PRESSURE < 80 MM HG: ICD-10-PCS | Mod: CPTII,S$GLB,, | Performed by: FAMILY MEDICINE

## 2019-04-24 PROCEDURE — 99214 OFFICE O/P EST MOD 30 MIN: CPT | Mod: S$GLB,,, | Performed by: FAMILY MEDICINE

## 2019-04-24 PROCEDURE — 74018 RADEX ABDOMEN 1 VIEW: CPT | Mod: 26,,, | Performed by: RADIOLOGY

## 2019-04-24 PROCEDURE — 99999 PR PBB SHADOW E&M-EST. PATIENT-LVL III: ICD-10-PCS | Mod: PBBFAC,,, | Performed by: FAMILY MEDICINE

## 2019-04-24 RX ORDER — ONDANSETRON 4 MG/1
4 TABLET, FILM COATED ORAL EVERY 8 HOURS PRN
Qty: 20 TABLET | Refills: 0 | Status: SHIPPED | OUTPATIENT
Start: 2019-04-24 | End: 2019-10-01

## 2019-04-24 NOTE — TELEPHONE ENCOUNTER
Spoke to patients daughter Myla, informed of xray results. Per daughter, pt has always had that kidney stone to right kidney. Daughter also informed family was advised to go to ER per provider during her office visit today with urgent care. Pt daughter verbalized understanding. Pt daughter voiced no further questions at this time.

## 2019-04-24 NOTE — ASSESSMENT & PLAN NOTE
Concern for constipation versus early obstruction. If symptoms worsen, go to ED.  Full liquid diet.    Pt family brought her back to UC saying that she is now having a stabbing pain. Ref pt to ED.

## 2019-04-24 NOTE — TELEPHONE ENCOUNTER
----- Message from Dallas Paz sent at 4/24/2019  4:28 PM CDT -----  Contact: Myla (daughter)  Name of Who is Calling: Myla (daughter)      What is the request in detail: Would like to speak with staff in regards to results of the xray. Please advise      Can the clinic reply by [facility]: yes      What Number to Call Back if not in [facility]: [phone]

## 2019-04-24 NOTE — PROGRESS NOTES
Please call pt with abnormal results.  Pt has a Large Kidney stone on the right.  No bowel obstruction noted.

## 2019-04-24 NOTE — PROGRESS NOTES
Subjective:       Patient ID: Irlanda Lopez is a 78 y.o. female.    Chief Complaint: Emesis; Diarrhea; and Fever    Vomiting x2 today    Diarrhea    This is a new problem. The current episode started today. Episode frequency: 5th fill up of colostomy bag in 2.5 hrs. The problem has been gradually worsening. The stool consistency is described as watery. Associated symptoms include abdominal pain and vomiting. Pertinent negatives include no fever. Nothing aggravates the symptoms. There are no known risk factors. Treatments tried: asa. The treatment provided no relief. There is no history of bowel resection.     Review of Systems   Constitutional: Negative for fever.   Respiratory: Negative for shortness of breath.    Cardiovascular: Negative for chest pain.   Gastrointestinal: Positive for abdominal pain, diarrhea and vomiting.       Objective:      Physical Exam   Constitutional: She appears well-developed and well-nourished. She appears distressed.   HENT:   Head: Normocephalic and atraumatic.   Pulmonary/Chest: Effort normal and breath sounds normal. No respiratory distress. She has no wheezes.   Abdominal: Soft. She exhibits distension. Bowel sounds are decreased. There is tenderness in the left upper quadrant. There is guarding. There is no rigidity.   Colostomy in place with watery stool.   Skin: Skin is warm and dry. No rash noted. She is not diaphoretic. No erythema.   Nursing note and vitals reviewed.      Assessment:       1. Abdominal distension    2. Non-intractable vomiting with nausea, unspecified vomiting type        Plan:     Problem List Items Addressed This Visit        GI    Abdominal distension - Primary    Current Assessment & Plan     Concern for constipation versus early obstruction. If symptoms worsen, go to ED.  Full liquid diet.    Pt family brought her back to UC saying that she is now having a stabbing pain. Ref pt to ED.         Relevant Orders    X-Ray Abdomen AP 1 View (Completed)     Non-intractable vomiting with nausea    Relevant Medications    ondansetron (ZOFRAN) 4 MG tablet

## 2019-04-25 ENCOUNTER — HOSPITAL ENCOUNTER (EMERGENCY)
Facility: HOSPITAL | Age: 79
Discharge: HOME | End: 2019-04-25
Attending: FAMILY MEDICINE
Payer: MEDICARE

## 2019-04-25 ENCOUNTER — TELEPHONE (OUTPATIENT)
Dept: SURGERY | Facility: CLINIC | Age: 79
End: 2019-04-25

## 2019-04-25 VITALS
RESPIRATION RATE: 20 BRPM | WEIGHT: 148.25 LBS | HEART RATE: 58 BPM | BODY MASS INDEX: 27.28 KG/M2 | HEIGHT: 62 IN | DIASTOLIC BLOOD PRESSURE: 68 MMHG | TEMPERATURE: 99 F | SYSTOLIC BLOOD PRESSURE: 126 MMHG | OXYGEN SATURATION: 96 %

## 2019-04-25 DIAGNOSIS — R11.10 VOMITING: Primary | ICD-10-CM

## 2019-04-25 LAB
ALBUMIN SERPL BCP-MCNC: 3.2 G/DL (ref 3.5–5.2)
ALP SERPL-CCNC: 64 U/L (ref 55–135)
ALT SERPL W/O P-5'-P-CCNC: 15 U/L (ref 10–44)
ANION GAP SERPL CALC-SCNC: 9 MMOL/L (ref 8–16)
AST SERPL-CCNC: 19 U/L (ref 10–40)
BASOPHILS # BLD AUTO: 0.02 K/UL (ref 0–0.2)
BASOPHILS NFR BLD: 0.2 % (ref 0–1.9)
BILIRUB SERPL-MCNC: 0.4 MG/DL (ref 0.1–1)
BILIRUB UR QL STRIP: NEGATIVE
BUN SERPL-MCNC: 12 MG/DL (ref 8–23)
CALCIUM SERPL-MCNC: 8.6 MG/DL (ref 8.7–10.5)
CHLORIDE SERPL-SCNC: 103 MMOL/L (ref 95–110)
CLARITY UR: CLEAR
CO2 SERPL-SCNC: 25 MMOL/L (ref 23–29)
COLOR UR: YELLOW
CREAT SERPL-MCNC: 0.8 MG/DL (ref 0.5–1.4)
DIFFERENTIAL METHOD: ABNORMAL
EOSINOPHIL # BLD AUTO: 0.3 K/UL (ref 0–0.5)
EOSINOPHIL NFR BLD: 3 % (ref 0–8)
ERYTHROCYTE [DISTWIDTH] IN BLOOD BY AUTOMATED COUNT: 15.2 % (ref 11.5–14.5)
EST. GFR  (AFRICAN AMERICAN): >60 ML/MIN/1.73 M^2
EST. GFR  (NON AFRICAN AMERICAN): >60 ML/MIN/1.73 M^2
GLUCOSE SERPL-MCNC: 94 MG/DL (ref 70–110)
GLUCOSE UR QL STRIP: NEGATIVE
HCT VFR BLD AUTO: 41.4 % (ref 37–48.5)
HGB BLD-MCNC: 13.8 G/DL (ref 12–16)
HGB UR QL STRIP: ABNORMAL
KETONES UR QL STRIP: NEGATIVE
LACTATE SERPL-SCNC: 1 MMOL/L (ref 0.5–2.2)
LEUKOCYTE ESTERASE UR QL STRIP: NEGATIVE
LIPASE SERPL-CCNC: 52 U/L (ref 4–60)
LYMPHOCYTES # BLD AUTO: 2.6 K/UL (ref 1–4.8)
LYMPHOCYTES NFR BLD: 28.9 % (ref 18–48)
MCH RBC QN AUTO: 27.9 PG (ref 27–31)
MCHC RBC AUTO-ENTMCNC: 33.3 G/DL (ref 32–36)
MCV RBC AUTO: 84 FL (ref 82–98)
MONOCYTES # BLD AUTO: 0.8 K/UL (ref 0.3–1)
MONOCYTES NFR BLD: 8.7 % (ref 4–15)
NEUTROPHILS # BLD AUTO: 5.3 K/UL (ref 1.8–7.7)
NEUTROPHILS NFR BLD: 59.2 % (ref 38–73)
NITRITE UR QL STRIP: NEGATIVE
PH UR STRIP: 6 [PH] (ref 5–8)
PLATELET # BLD AUTO: 265 K/UL (ref 150–350)
PMV BLD AUTO: 9.7 FL (ref 9.2–12.9)
POTASSIUM SERPL-SCNC: 3.9 MMOL/L (ref 3.5–5.1)
PROCALCITONIN SERPL IA-MCNC: 0.05 NG/ML
PROT SERPL-MCNC: 6.1 G/DL (ref 6–8.4)
PROT UR QL STRIP: NEGATIVE
RBC # BLD AUTO: 4.94 M/UL (ref 4–5.4)
SODIUM SERPL-SCNC: 137 MMOL/L (ref 136–145)
SP GR UR STRIP: 1.01 (ref 1–1.03)
TROPONIN I SERPL DL<=0.01 NG/ML-MCNC: 0.01 NG/ML (ref 0–0.03)
URN SPEC COLLECT METH UR: ABNORMAL
UROBILINOGEN UR STRIP-ACNC: NEGATIVE EU/DL
WBC # BLD AUTO: 8.9 K/UL (ref 3.9–12.7)

## 2019-04-25 PROCEDURE — 96361 HYDRATE IV INFUSION ADD-ON: CPT

## 2019-04-25 PROCEDURE — 80053 COMPREHEN METABOLIC PANEL: CPT

## 2019-04-25 PROCEDURE — 96360 HYDRATION IV INFUSION INIT: CPT

## 2019-04-25 PROCEDURE — 93005 ELECTROCARDIOGRAM TRACING: CPT

## 2019-04-25 PROCEDURE — 99285 EMERGENCY DEPT VISIT HI MDM: CPT | Mod: 25

## 2019-04-25 PROCEDURE — 36415 COLL VENOUS BLD VENIPUNCTURE: CPT

## 2019-04-25 PROCEDURE — 85025 COMPLETE CBC W/AUTO DIFF WBC: CPT

## 2019-04-25 PROCEDURE — 25000003 PHARM REV CODE 250: Performed by: FAMILY MEDICINE

## 2019-04-25 PROCEDURE — 84484 ASSAY OF TROPONIN QUANT: CPT

## 2019-04-25 PROCEDURE — 93010 EKG 12-LEAD: ICD-10-PCS | Mod: ,,, | Performed by: INTERNAL MEDICINE

## 2019-04-25 PROCEDURE — 84145 PROCALCITONIN (PCT): CPT

## 2019-04-25 PROCEDURE — 83605 ASSAY OF LACTIC ACID: CPT

## 2019-04-25 PROCEDURE — 81003 URINALYSIS AUTO W/O SCOPE: CPT

## 2019-04-25 PROCEDURE — 87040 BLOOD CULTURE FOR BACTERIA: CPT

## 2019-04-25 PROCEDURE — 83690 ASSAY OF LIPASE: CPT

## 2019-04-25 PROCEDURE — 93010 ELECTROCARDIOGRAM REPORT: CPT | Mod: ,,, | Performed by: INTERNAL MEDICINE

## 2019-04-25 RX ADMIN — SODIUM CHLORIDE 2019 ML: 0.9 INJECTION, SOLUTION INTRAVENOUS at 03:04

## 2019-04-25 NOTE — ED PROVIDER NOTES
SCRIBE #1 NOTE: ITracy, am scribing for, and in the presence of, Bella Trujillo MD. I have scribed the entire note.      History      Chief Complaint   Patient presents with    Emesis     pt sent to ED by Dr. Hogan for further eval       Review of patient's allergies indicates:  No Known Allergies     HPI   HPI    4/25/2019, 3:32 PM   History obtained from the patient      History of Present Illness: Irlanda Lopez is a 78 y.o. female patient with PMHx diverticulitis, HLD, and HTN who presents to the Emergency Department for further evaluation after being sent by Dr. Hogan. Patient states she was seen at Urgent Care yesterday for n/v and L sided abd pain. She was given Zofran and had Xray abdomen. Patient is asymptomatic at this time. Patient currently denies any fever, chills, constipation, hematochezia, dysuria, hematuria, urinary frequency, N/V/D, and all other sxs at this time. No further complaints or concerns at this time.       Arrival mode: Personal vehicle    PCP: Myla Arias MD       Past Medical History:  Past Medical History:   Diagnosis Date    Diverticulitis     High cholesterol     Hypertension     Hypothyroidism        Past Surgical History:  Past Surgical History:   Procedure Laterality Date    APPENDECTOMY  2008    CATARACT EXTRACTION      Dr Raygoza    COLECTOMY, SIGMOID Left 1/2/2019    [facility] at [facility]    COLON SURGERY      COLONOSCOPY N/A 1/2/2019    [facility] at [facility] ENDO    CREATION, COLOSTOMY Left 1/2/2019    [facility] at [facility] OR    SHAHIDA PROCEDURE N/A 1/2/2019    [facility] at [facility] OR    LYSIS, ADHESIONS N/A 1/2/2019    [facility] at [facility] OR    VITRECTOMY Right 09/2013         Family History:  Family History   Problem Relation Age of Onset    Alzheimer's disease Mother     Aneurysm Father         brain    Stomach cancer Brother         50s       Social  History:  Social History     Tobacco Use    Smoking status: Former Smoker    Smokeless tobacco: Never Used   Substance and Sexual Activity    Alcohol use: No    Drug use: No    Sexual activity: Never     Comment:        ROS   Review of Systems   Constitutional: Negative for activity change, appetite change, chills, diaphoresis, fatigue and fever.   HENT: Negative for congestion, ear pain, nosebleeds, rhinorrhea, sinus pain, sore throat and trouble swallowing.    Eyes: Negative for pain and discharge.   Respiratory: Negative for cough, chest tightness, shortness of breath, wheezing and stridor.    Cardiovascular: Negative for chest pain, palpitations and leg swelling.   Gastrointestinal: Negative for abdominal distention, abdominal pain, blood in stool, constipation, diarrhea, nausea and vomiting.   Genitourinary: Negative for difficulty urinating, dysuria, flank pain, frequency, hematuria and urgency.   Musculoskeletal: Negative for arthralgias, back pain, myalgias and neck pain.   Skin: Negative for pallor, rash and wound.   Neurological: Negative for dizziness, syncope, weakness, light-headedness, numbness and headaches.   Hematological: Does not bruise/bleed easily.   Psychiatric/Behavioral: Negative for confusion and self-injury.   All other systems reviewed and are negative.    Physical Exam      Initial Vitals [04/25/19 1420]   BP Pulse Resp Temp SpO2   138/70 62 18 98.5 °F (36.9 °C) 96 %      MAP       --          Physical Exam  Nursing Notes and Vital Signs Reviewed.  Constitutional: Patient is in no acute distress. Well-developed and well-nourished.  Head: Atraumatic. Normocephalic.  Eyes: PERRL. EOM intact. Conjunctivae are not pale. No scleral icterus.  ENT: Mucous membranes are moist. Oropharynx is clear and symmetric.    Neck: Supple. Full ROM. No lymphadenopathy.  Cardiovascular: Regular rate. Regular rhythm. No murmurs, rubs, or gallops. Distal pulses are 2+ and  "symmetric.  Pulmonary/Chest: No respiratory distress. Clear to auscultation bilaterally. No wheezing or rales.  Abdominal: Colostomy bad with green liquid stool noted. Soft and non-distended. There is no tenderness. No rebound, guarding, or rigidity.   Musculoskeletal: Moves all extremities. No obvious deformities. No edema. No calf tenderness.  Skin: Warm and dry.  Neurological:  Alert, awake, and appropriate.  Normal speech.  No acute focal neurological deficits are appreciated.  Psychiatric: Normal affect. Good eye contact. Appropriate in content.    ED Course    Procedures  ED Vital Signs:  Vitals:    04/25/19 1420 04/25/19 1540 04/25/19 1601 04/25/19 1748   BP: 138/70  128/62 126/68   Pulse: 62 63 (!) 56 (!) 58   Resp: 18  17 20   Temp: 98.5 °F (36.9 °C)      TempSrc: Oral      SpO2: 96%  96% 96%   Weight: 67.3 kg (148 lb 4.2 oz)      Height: 5' 2" (1.575 m)          Abnormal Lab Results:  Labs Reviewed   CBC W/ AUTO DIFFERENTIAL - Abnormal; Notable for the following components:       Result Value    RDW 15.2 (*)     All other components within normal limits   URINALYSIS, REFLEX TO URINE CULTURE - Abnormal; Notable for the following components:    Occult Blood UA Trace (*)     All other components within normal limits    Narrative:     Preferred Collection Type->Urine, Clean Catch   COMPREHENSIVE METABOLIC PANEL - Abnormal; Notable for the following components:    Calcium 8.6 (*)     Albumin 3.2 (*)     All other components within normal limits   CULTURE, BLOOD    Narrative:     Aerobic and anaerobic   CULTURE, BLOOD    Narrative:     Aerobic and anaerobic   LACTIC ACID, PLASMA   PROCALCITONIN   LIPASE   TROPONIN I        All Lab Results:  Results for orders placed or performed during the hospital encounter of 04/25/19   Blood culture x two cultures. Draw prior to antibiotics.   Result Value Ref Range    Blood Culture, Routine No Growth to date    Blood culture x two cultures. Draw prior to antibiotics. "   Result Value Ref Range    Blood Culture, Routine No Growth to date    CBC auto differential   Result Value Ref Range    WBC 8.90 3.90 - 12.70 K/uL    RBC 4.94 4.00 - 5.40 M/uL    Hemoglobin 13.8 12.0 - 16.0 g/dL    Hematocrit 41.4 37.0 - 48.5 %    MCV 84 82 - 98 fL    MCH 27.9 27.0 - 31.0 pg    MCHC 33.3 32.0 - 36.0 g/dL    RDW 15.2 (H) 11.5 - 14.5 %    Platelets 265 150 - 350 K/uL    MPV 9.7 9.2 - 12.9 fL    Gran # (ANC) 5.3 1.8 - 7.7 K/uL    Lymph # 2.6 1.0 - 4.8 K/uL    Mono # 0.8 0.3 - 1.0 K/uL    Eos # 0.3 0.0 - 0.5 K/uL    Baso # 0.02 0.00 - 0.20 K/uL    Gran% 59.2 38.0 - 73.0 %    Lymph% 28.9 18.0 - 48.0 %    Mono% 8.7 4.0 - 15.0 %    Eosinophil% 3.0 0.0 - 8.0 %    Basophil% 0.2 0.0 - 1.9 %    Differential Method Automated    Lactic acid, plasma #1   Result Value Ref Range    Lactate (Lactic Acid) 1.0 0.5 - 2.2 mmol/L   Urinalysis, Reflex to Urine Culture Urine, Clean Catch   Result Value Ref Range    Specimen UA Urine, Clean Catch     Color, UA Yellow Yellow, Straw, Gina    Appearance, UA Clear Clear    pH, UA 6.0 5.0 - 8.0    Specific Gravity, UA 1.015 1.005 - 1.030    Protein, UA Negative Negative    Glucose, UA Negative Negative    Ketones, UA Negative Negative    Bilirubin (UA) Negative Negative    Occult Blood UA Trace (A) Negative    Nitrite, UA Negative Negative    Urobilinogen, UA Negative <2.0 EU/dL    Leukocytes, UA Negative Negative   Procalcitonin   Result Value Ref Range    Procalcitonin 0.05 <0.25 ng/mL   Comprehensive metabolic panel   Result Value Ref Range    Sodium 137 136 - 145 mmol/L    Potassium 3.9 3.5 - 5.1 mmol/L    Chloride 103 95 - 110 mmol/L    CO2 25 23 - 29 mmol/L    Glucose 94 70 - 110 mg/dL    BUN, Bld 12 8 - 23 mg/dL    Creatinine 0.8 0.5 - 1.4 mg/dL    Calcium 8.6 (L) 8.7 - 10.5 mg/dL    Total Protein 6.1 6.0 - 8.4 g/dL    Albumin 3.2 (L) 3.5 - 5.2 g/dL    Total Bilirubin 0.4 0.1 - 1.0 mg/dL    Alkaline Phosphatase 64 55 - 135 U/L    AST 19 10 - 40 U/L    ALT 15 10 - 44  U/L    Anion Gap 9 8 - 16 mmol/L    eGFR if African American >60 >60 mL/min/1.73 m^2    eGFR if non African American >60 >60 mL/min/1.73 m^2   Lipase   Result Value Ref Range    Lipase 52 4 - 60 U/L   Troponin I   Result Value Ref Range    Troponin I 0.009 0.000 - 0.026 ng/mL         Imaging Results:  Imaging Results          X-Ray Chest AP Portable (Final result)  Result time 04/25/19 17:20:44    Final result by Donny Michel MD (04/25/19 17:20:44)                 Impression:      No acute abnormality.      Electronically signed by: Chas Michel MD  Date:    04/25/2019  Time:    17:20             Narrative:    EXAMINATION:  XR CHEST AP PORTABLE    CLINICAL HISTORY:  Sepsis;.    TECHNIQUE:  Single frontal portable view of the chest was performed.    COMPARISON:  01/23/2019    FINDINGS:  Support devices: None    The lungs are clear, with normal appearance of pulmonary vasculature and no pleural effusion or pneumothorax.    The cardiac silhouette is normal in size. The hilar and mediastinal contours are unremarkable.    Bones are intact.                               CT Abdomen Pelvis  Without Contrast (Final result)  Result time 04/25/19 15:57:21    Final result by Brandon Gabriel MD (04/25/19 15:57:21)                 Impression:      Evaluation of solid organ and vascular pathology is limited due to lack of IV contrast.    Fluid-filled nondilated loops of small bowel are seen within the left upper quadrant with mild mucosal thickening could reflect local inflammation or enteritis.    All CT scans at this facility use dose modulation, iterative reconstruction and/or weight based dosing when appropriate to reduce radiation dose to as low as reasonably achievable.      Electronically signed by: Brandon Gabriel MD  Date:    04/25/2019  Time:    15:57             Narrative:    EXAMINATION:  CT ABDOMEN PELVIS WITHOUT CONTRAST    CLINICAL HISTORY:  Abdominal pain, unspecified;    TECHNIQUE:  Routine 5 mm  non-contrast images of the abdomen and pelvis were done.  Sagittal and coronal reformats were also submitted for interpretation.    COMPARISON:  2/8/19    FINDINGS:  Evaluation of solid organ and vascular pathology is limited due to lack of IV contrast.    Visualized lungs are unremarkable.    Within the abdomen, the liver and spleen are unremarkable. Pancreas is normal. Adrenal glands are unremarkable. Gallbladder normal. No biliary dilatation.    Kidneys are unchanged with large calculus measuring 2.9 x 2.4 cm within the right renal pelvis.  Right renal atrophy noted.  Left kidney is unremarkable.  Bladder is unremarkable..    Stomach is unremarkable with moderate amount of ingested material.  Fluid-filled nondilated loops of small bowel are seen within the left upper quadrant with mild mucosal thickening could reflect local inflammation or enteritis.  No free air present.    Surgical drain within the left hemiabdomen is no longer demonstrated.  Maribel pouch is seen within the pelvis.  No fluid collections or abscess are seen. Left lower quadrant ostomy present.    No significant adenopathy.  Shotty nodes are seen within the mesentery.    Uterus is normal in size.  Small calcified fibroids are suspected.    Diffuse osteopenia and moderate degenerative changes.                               The EKG was ordered, reviewed, and independently interpreted by the ED provider.  Interpretation time: 15:34  Rate: 63 BPM  Rhythm: Sinus rhythm with premature atrial complexes  Interpretation: Incomplete RBBB. Possible anterior infarct. No STEMI.           The Emergency Provider reviewed the vital signs and test results, which are outlined above.    ED Discussion     5:49 PM: Dr. Trujillo discussed the pt's case with Dr. Hogan (general surgery).    5:50 PM: Reassessed pt at this time. Discussed with pt all pertinent ED information and results. Discussed pt dx and plan of tx. Gave pt all f/u and return to the ED instructions. All  questions and concerns were addressed at this time. Pt expresses understanding of information and instructions, and is comfortable with plan to discharge. Pt is stable for discharge.    I discussed with patient and family that evaluation in the ED does not suggest any emergent or life threatening medical conditions requiring immediate intervention beyond what was provided in the ED, and I believe patient is safe for discharge.  Regardless, an unremarkable evaluation in the ED does not preclude the development or presence of a serious of life threatening condition. As such, patient was instructed to return immediately for any worsening or change in current symptoms.        ED Medication(s):  Medications   sodium chloride 0.9% bolus 2,019 mL (0 mL/kg × 67.3 kg Intravenous Stopped 4/25/19 7209)       Follow-up Information     Myla Arias MD. Schedule an appointment as soon as possible for a visit in 1 week.    Specialty:  Family Medicine  Contact information:  [address]  [address]  [facility] [address]  [phone]                   Discharge Medication List as of 4/25/2019  5:47 PM   none       Medical Decision Making    Medical Decision Making:   Clinical Tests:   Lab Tests: Ordered and Reviewed  Radiological Study: Ordered and Reviewed  Medical Tests: Ordered and Reviewed           Scribe Attestation:   Scribe #1: I performed the above scribed service and the documentation accurately describes the services I performed. I attest to the accuracy of the note.    Attending:   Physician Attestation Statement for Scribe #1: I, Bella Trujillo MD, personally performed the services described in this documentation, as scribed by Tracy Uribe, in my presence, and it is both accurate and complete.          Clinical Impression       ICD-10-CM ICD-9-CM   1. Vomiting R11.10 787.03       Disposition:   Disposition: Discharged  Condition: Stable         Bella Trujillo MD  04/26/19 2011

## 2019-04-25 NOTE — TELEPHONE ENCOUNTER
S/w Myla Mora/daughter informed her to take patient to ED for evaluation per Dr Hogan. Daughter verbalized an understanding

## 2019-04-25 NOTE — TELEPHONE ENCOUNTER
"----- Message from Reece Hogan MD sent at 4/25/2019 12:31 PM CDT -----  Contact: Myla (abril)  Go to the ED for further evaluation.    Reece Hogan    ----- Message -----  From: Mirtha Carter LPN  Sent: 4/24/2019   2:22 PM  To: Reece Hogan MD    Daughter called and stated, "Mother started vomiting (x2), diarrhea (watery stool), and fever." was seen in urgent care today, was told X-ray of abd which showed, Distension under ribs on left side."  Please Advise!!!  Thanks    "

## 2019-04-25 NOTE — ED NOTES
Pt lying in bed in NAD,VSS,RR equal and unlabored. Bed is low, locked, and call light in reach. Side rails up x 2.  Family at bedside

## 2019-04-26 ENCOUNTER — PATIENT MESSAGE (OUTPATIENT)
Dept: INTERNAL MEDICINE | Facility: CLINIC | Age: 79
End: 2019-04-26

## 2019-04-26 ENCOUNTER — NURSE TRIAGE (OUTPATIENT)
Dept: ADMINISTRATIVE | Facility: CLINIC | Age: 79
End: 2019-04-26

## 2019-04-27 NOTE — TELEPHONE ENCOUNTER
No longer needs assistance    Reason for Disposition   Caller has cancelled the call before the first contact    Protocols used: NO CONTACT OR DUPLICATE CONTACT CALL-A-AH

## 2019-04-30 ENCOUNTER — TELEPHONE (OUTPATIENT)
Dept: SURGERY | Facility: CLINIC | Age: 79
End: 2019-04-30

## 2019-04-30 ENCOUNTER — PATIENT MESSAGE (OUTPATIENT)
Dept: SURGERY | Facility: CLINIC | Age: 79
End: 2019-04-30

## 2019-04-30 LAB
BACTERIA BLD CULT: NORMAL
BACTERIA BLD CULT: NORMAL

## 2019-04-30 NOTE — TELEPHONE ENCOUNTER
----- Message from Ashley Ballard sent at 4/30/2019  4:27 PM CDT -----  Contact: Myla-Daughter  Requesting a call to make an appointment. Please call back at [phone].

## 2019-04-30 NOTE — TELEPHONE ENCOUNTER
----- Message from Rishabh Connelly MD sent at 4/30/2019  3:39 PM CDT -----  Can we get this lady in to see me in the next two weeks or so?    Thanks,  KATHY    ----- Message -----  From: Reece Hogan MD  Sent: 4/30/2019   3:33 PM  To: Rishabh Connelly MD, Maricel Keating [facility], this patient that I spoke you about yesterday continues to have rectal discharge after Maribel's procedure complaining of changing and the colors of the discharge from of begie to dark brown.  Would you be able to schedule an appointment with you and also to discuss colostomy reversal?   Are ready sent the masses to her daughter that year staff might be calling to set up an appointment.    Reece Hogan

## 2019-04-30 NOTE — TELEPHONE ENCOUNTER
Spoke to pt's daughter, Myla - She advised that she would have to call back to schedule this appt as she was currently driving and did not have her calendar in front of her - I gave her the number [phone] and my name after she asked for it.     ----- Message from Rishabh Connelly MD sent at 4/30/2019  3:39 PM CDT -----  Can we get this lady in to see me in the next two weeks or so?    Thanks,  KATHY    ----- Message -----  From: Reece Hogan MD  Sent: 4/30/2019   3:33 PM  To: Rishabh Connelly MD, Maricel Keating [facility], this patient that I spoke you about yesterday continues to have rectal discharge after Maribel's procedure complaining of changing and the colors of the discharge from of begie to dark brown.  Would you be able to schedule an appointment with you and also to discuss colostomy reversal?   Are ready sent the masses to her daughter that year staff might be calling to set up an appointment.    Reece Hogan

## 2019-05-01 ENCOUNTER — TELEPHONE (OUTPATIENT)
Dept: SURGERY | Facility: CLINIC | Age: 79
End: 2019-05-01

## 2019-05-09 ENCOUNTER — PATIENT MESSAGE (OUTPATIENT)
Dept: INTERNAL MEDICINE | Facility: CLINIC | Age: 79
End: 2019-05-09

## 2019-05-13 ENCOUNTER — OFFICE VISIT (OUTPATIENT)
Dept: SURGERY | Facility: CLINIC | Age: 79
End: 2019-05-13
Payer: MEDICARE

## 2019-05-13 ENCOUNTER — TELEPHONE (OUTPATIENT)
Dept: ENDOSCOPY | Facility: HOSPITAL | Age: 79
End: 2019-05-13

## 2019-05-13 VITALS
TEMPERATURE: 98 F | SYSTOLIC BLOOD PRESSURE: 129 MMHG | HEART RATE: 55 BPM | DIASTOLIC BLOOD PRESSURE: 74 MMHG | WEIGHT: 149.94 LBS | BODY MASS INDEX: 27.42 KG/M2

## 2019-05-13 DIAGNOSIS — K57.20 DIVERTICULITIS OF LARGE INTESTINE WITH PERFORATION AND ABSCESS WITHOUT BLEEDING: Primary | ICD-10-CM

## 2019-05-13 DIAGNOSIS — K65.1 INTRA-ABDOMINAL ABSCESS: ICD-10-CM

## 2019-05-13 PROCEDURE — 3078F DIAST BP <80 MM HG: CPT | Mod: CPTII,S$GLB,, | Performed by: COLON & RECTAL SURGERY

## 2019-05-13 PROCEDURE — 99204 OFFICE O/P NEW MOD 45 MIN: CPT | Mod: S$GLB,,, | Performed by: COLON & RECTAL SURGERY

## 2019-05-13 PROCEDURE — 3078F PR MOST RECENT DIASTOLIC BLOOD PRESSURE < 80 MM HG: ICD-10-PCS | Mod: CPTII,S$GLB,, | Performed by: COLON & RECTAL SURGERY

## 2019-05-13 PROCEDURE — 99999 PR PBB SHADOW E&M-EST. PATIENT-LVL III: CPT | Mod: PBBFAC,,, | Performed by: COLON & RECTAL SURGERY

## 2019-05-13 PROCEDURE — 3074F SYST BP LT 130 MM HG: CPT | Mod: CPTII,S$GLB,, | Performed by: COLON & RECTAL SURGERY

## 2019-05-13 PROCEDURE — 99499 UNLISTED E&M SERVICE: CPT | Mod: S$GLB,,, | Performed by: COLON & RECTAL SURGERY

## 2019-05-13 PROCEDURE — 99999 PR PBB SHADOW E&M-EST. PATIENT-LVL III: ICD-10-PCS | Mod: PBBFAC,,, | Performed by: COLON & RECTAL SURGERY

## 2019-05-13 PROCEDURE — 99204 PR OFFICE/OUTPT VISIT, NEW, LEVL IV, 45-59 MIN: ICD-10-PCS | Mod: S$GLB,,, | Performed by: COLON & RECTAL SURGERY

## 2019-05-13 PROCEDURE — 99499 RISK ADDL DX/OHS AUDIT: ICD-10-PCS | Mod: S$GLB,,, | Performed by: COLON & RECTAL SURGERY

## 2019-05-13 PROCEDURE — 1101F PR PT FALLS ASSESS DOC 0-1 FALLS W/OUT INJ PAST YR: ICD-10-PCS | Mod: CPTII,S$GLB,, | Performed by: COLON & RECTAL SURGERY

## 2019-05-13 PROCEDURE — 3074F PR MOST RECENT SYSTOLIC BLOOD PRESSURE < 130 MM HG: ICD-10-PCS | Mod: CPTII,S$GLB,, | Performed by: COLON & RECTAL SURGERY

## 2019-05-13 PROCEDURE — 1101F PT FALLS ASSESS-DOCD LE1/YR: CPT | Mod: CPTII,S$GLB,, | Performed by: COLON & RECTAL SURGERY

## 2019-05-13 RX ORDER — SODIUM, POTASSIUM,MAG SULFATES 17.5-3.13G
1 SOLUTION, RECONSTITUTED, ORAL ORAL DAILY
Qty: 1 KIT | Refills: 0 | Status: SHIPPED | OUTPATIENT
Start: 2019-05-13 | End: 2019-05-15

## 2019-05-13 RX ORDER — SODIUM, POTASSIUM,MAG SULFATES 17.5-3.13G
1 SOLUTION, RECONSTITUTED, ORAL ORAL DAILY
Qty: 1 KIT | Refills: 0 | Status: SHIPPED | OUTPATIENT
Start: 2019-05-13 | End: 2019-05-13

## 2019-05-13 NOTE — TELEPHONE ENCOUNTER
Dr. Connelly's nurse called and had the pt in office and asked that I put her on the schedule for 6/7 for a colonoscopy. I will send her instructions via pt portal.

## 2019-05-13 NOTE — LETTER
May 13, 2019      Reece Hogan MD  [address]  [place] [address]            [facility] [place] [facility] General [facility]  [address] [facility] [address]  Phone: [phone]  Fax: [phone]          Patient: Irlanda Lopez   MR Number: [id number]   YOB: 1940   Date of Visit: 5/13/2019       Dear Dr. Reece Hogan:    Thank you for referring Irlanda Lopez to me for evaluation. Attached you will find relevant portions of my assessment and plan of care.    If you have questions, please do not hesitate to call me. I look forward to following Irlanda Lopez along with you.    Sincerely,    Rishabh Connelly MD    Enclosure  CC:  No Recipients    If you would like to receive this communication electronically, please contact [email or web address] or [phone] to request more information on [employer] access.    For providers and/or their staff who would like to refer a patient to [facility], please contact us through our one-stop-shop provider referral line, [facility] , at [phone].    If you feel you have received this communication in error or would no longer like to receive these types of communications, please e-mail [email or web address]

## 2019-05-13 NOTE — PROGRESS NOTES
History & Physical    SUBJECTIVE:     CC: Discuss ostomy reversal  Ref: Dr. Reece Hogan    History of Present Illness:  Patient is a 78 y.o. female presents for discussion of possible ostomy reversal.  Patient previously underwent open sigmoid colectomy with end-colostomy (Maribel's procedure) for perforated sigmoid diverticulitis in January 2019 by Dr. Hogan.  She had a postoperative course complicated by an abscess requiring percutaneous drainage. An initial colonoscopy was attempted be performed in January 2019 prior to surgery but was unable to be completed due to severe inflammation of the sigmoid colon.  She has never had a full colonoscopy and had not had one attempted prior to that one or since that one.  She states that over the past few months since her Maribel's procedure, she has developed some mucus drainage both from the rectum and from the vagina.  She states she wears a pad in her underwear that she sees drainage more from the vagina then from the rectum.  She rarely expelled any mucus from the rectum but does so after sitting on the toilet like a normal bowel movement.  She denies any blood via her stool or via rectum.  Her only previous surgical history prior to this is a laparoscopic appendectomy around 10 years ago.  She has no family history of colorectal cancer or IBD.  She states she has felt well over the past 2 months after recovering from the surgery by eating a regular diet and increasing her physical activity.      Review of patient's allergies indicates:  No Known Allergies    Current Outpatient Medications   Medication Sig Dispense Refill    atorvastatin (LIPITOR) 10 MG tablet Take 1 tablet (10 mg total) by mouth once daily. 90 tablet 3    donepezil (ARICEPT) 10 MG tablet Take 1 tablet (10 mg total) by mouth every evening. 90 tablet 3    furosemide (LASIX) 20 MG tablet Take 1 tablet (20 mg total) by mouth daily as needed. 30 tablet 11    memantine (NAMENDA) 10 MG Tab 10mg po bid. 180  tablet 3    ondansetron (ZOFRAN) 4 MG tablet Take 1 tablet (4 mg total) by mouth every 8 (eight) hours as needed for Nausea. 20 tablet 0    thyroid, pork, (ARMOUR THYROID) 30 mg Tab Take 1 tablet (30 mg total) by mouth once daily. 90 tablet 3    triamcinolone acetonide 0.1% (KENALOG) 0.1 % ointment APPLY TOPICALLY 4 (FOUR) TIMES DAILY. TO RASH ON FACE AND CHEST AND NOSE  1    sodium,potassium,mag sulfates (SUPREP BOWEL PREP KIT) 17.5-3.13-1.6 gram SolR Take 177 mLs by mouth once daily. for 2 days 1 kit 0     Current Facility-Administered Medications   Medication Dose Route Frequency Provider Last Rate Last Dose    lidocaine (PF) 10 mg/ml (1%) injection 10 mg  1 mL Intradermal Once Reece Hogan MD           Past Medical History:   Diagnosis Date    Diverticulitis     High cholesterol     Hypertension     Hypothyroidism      Past Surgical History:   Procedure Laterality Date    APPENDECTOMY  2008    CATARACT EXTRACTION      Dr Raygoza    COLECTOMY, SIGMOID Left 1/2/2019    [facility]    COLON SURGERY      COLONOSCOPY N/A 1/2/2019    [facility] at [facility] ENDO    CREATION, COLOSTOMY Left 1/2/2019    [facility] at [facility] OR    SHAHIDA PROCEDURE N/A 1/2/2019    [facility] at [facility] OR    LYSIS, ADHESIONS N/A 1/2/2019    [facility] at [facility] OR    VITRECTOMY Right 09/2013     Family History   Problem Relation Age of Onset    Alzheimer's disease Mother     Aneurysm Father         brain    Stomach cancer Brother         50s     Social History     Tobacco Use    Smoking status: Former Smoker    Smokeless tobacco: Never Used   Substance Use Topics    Alcohol use: No    Drug use: No        Review of Systems:  Review of Systems   Constitutional: Negative for activity change, appetite change, chills, fatigue, fever and unexpected weight change.   HENT: Negative for congestion, ear pain, sore throat and trouble swallowing.     Eyes: Negative for pain, redness and itching.   Respiratory: Negative for cough, shortness of breath and wheezing.    Cardiovascular: Negative for chest pain, palpitations and leg swelling.   Gastrointestinal: Negative for abdominal distention, abdominal pain, anal bleeding, blood in stool, constipation, diarrhea, nausea, rectal pain and vomiting.        +mucous discharge via anus and vagina   Endocrine: Negative for cold intolerance, heat intolerance and polyuria.   Genitourinary: Negative for dysuria, flank pain, frequency and hematuria.   Musculoskeletal: Negative for gait problem, joint swelling and neck pain.   Skin: Negative for color change, rash and wound.   Allergic/Immunologic: Negative for environmental allergies and immunocompromised state.   Neurological: Negative for dizziness, speech difficulty, weakness and numbness.   Psychiatric/Behavioral: Negative for agitation, confusion and hallucinations.       OBJECTIVE:     Vital Signs (Most Recent)  Temp: 98.1 °F (36.7 °C) (05/13/19 1353)  Pulse: (!) 55 (05/13/19 1353)  BP: 129/74 (05/13/19 1353)     68 kg (149 lb 14.6 oz)     Physical Exam:  Physical Exam   Constitutional: She is oriented to person, place, and time. She appears well-developed.   HENT:   Head: Normocephalic and atraumatic.   Eyes: Conjunctivae and EOM are normal.   Neck: Normal range of motion. No thyromegaly present.   Cardiovascular: Normal rate and regular rhythm.   Pulmonary/Chest: Effort normal. No respiratory distress.   Abdominal: Soft. She exhibits no distension and no mass. There is no tenderness.   Midline incision well-healed; ostomy pink and viable with soft stool in bag   Genitourinary:   Genitourinary Comments: Anorectal: no external lesions; MARIVEL with good tone, no blood, +mucous drainage; no palpable masses or lesions or defects    Vaginal: +mucous drainage in vaginal vault; no posterior mucosal defects or abnormalities   Musculoskeletal: Normal range of motion. She  exhibits no edema or tenderness.   Neurological: She is alert and oriented to person, place, and time.   Skin: Skin is warm and dry. Capillary refill takes less than 2 seconds. No rash noted.   Psychiatric: She has a normal mood and affect.     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 Immediately after anoscope was inserted the 1st time was a large volume of thin mucus discharge the required suctioning and remained this way throughout the examination making visualization difficult.  No definitive anterior or posterior or lateral base defect indicating a rectovaginal fistula, but this was not completely excluded due to the poor visualization. Mildly enlarged internal hemorrhoids.  No other lesions or defects noted.    Patient tolerated procedure well.      Laboratory  Lab Results   Component Value Date    WBC 8.90 04/25/2019    HGB 13.8 04/25/2019    HCT 41.4 04/25/2019     04/25/2019    CHOL 165 04/01/2019    TRIG 62 04/01/2019    HDL 61 04/01/2019    ALT 15 04/25/2019    AST 19 04/25/2019     04/25/2019    K 3.9 04/25/2019     04/25/2019    CREATININE 0.8 04/25/2019    BUN 12 04/25/2019    CO2 25 04/25/2019    TSH 1.629 04/01/2019    INR 1.1 01/26/2019    HGBA1C 5.0 04/01/2019       Results for orders placed during the hospital encounter of 10/04/18   CT Abdomen Pelvis W Wo Contrast    Narrative EXAMINATION:  CT ABDOMEN PELVIS W WO CONTRAST    CLINICAL HISTORY:  Abdominal pain, unspecified;abnormal abdominal x-ray;.    TECHNIQUE:  Low dose axial images, sagittal and coronal reformations were obtained from the lung  bases to the pubic symphysis.  Images were obtained both with and without IV contrast.    COMPARISON:  None    FINDINGS:  Lung bases are clear.    The liver is normal.  The gallbladder is normal.    The spleen is normal in size and appearance.  The pancreas is normal.  The adrenal glands are normal.  The aorta and IVC are normal.  No retroperitoneal adenopathy.    Scarring of the lateral and upper left kidney is present but otherwise the left kidney is normal.  Left ureter is normal.  Severe scarring identified throughout the right kidney.  A very large calculus is identified within the left renal pelvis measuring 3.0 cm.  No significant right hydronephrosis.  The right ureter is nondilated and normal.    Small hiatal hernia otherwise the stomach is normal.  The small intestine is normal.  Appendix surgically absent.  Diverticulosis of the sigmoid colon is identified with colonic wall thickening and severe surrounding inflammatory changes.  Findings compatible acute diverticulitis.  Negative for pneumoperitoneum or abscess.  The rectum is normal.    The pelvis demonstrates normal appearing bladder.  Uterus is unremarkable.  Left adnexa region is normal.  To check the calcifications identified within an atrophic right ovary, measuring 0.7 cm in maximum dimension.    Regional bones demonstrate a moderate grade compression fracture of T11.  No suspicious bony abnormality detected.  Abdominal and pelvic wall soft tissues are normal.      Impression 1. Severe acute diverticulitis sigmoid colon.  2. 3.0 cm calculus right renal pelvis.  Extensive scarring of the right kidney.  3. Hiatal hernia.  All CT scans at this facility are performed  using dose modulation techniques as appropriate to performed exam including the following:  automated exposure control; adjustment of mA and/or kV according to the patients size (this includes techniques or standardized protocols for targeted exams where dose is matched to  indication/reason for exam: i.e. extremities or head);  iterative reconstruction technique.      Electronically signed by: Dangelo Black MD  Date:    10/04/2018  Time:    19:10         Diagnostic Results:  Labs: Reviewed  CT: Reviewed  Independently reviewed the previous CT scan showing acute sigmoid diverticulitis as well as the postoperative scan indicating the postoperative abscess that was subsequently drained appropriately well        ASSESSMENT/PLAN:     78-year-old female with previous Maribel's procedure for acute sigmoid diverticulitis with resultant abscess as well as postoperative abscess requiring IR drainage in January 2019 presents for evaluation of possible reversal with signs and symptoms worrisome for rectovaginal fistula    - patient will require full colonoscopy both the anus and via stoma prior to any consideration of surgical reversal of her ostomy as she has never had a full colonoscopy in her life.  Will evaluate the rectum at that time for any possible rectovaginal fistula.  - if no fistulous found on that and she continues to drain mucus via her vagina, will likely proceed with further imaging to delineate whether to rectal vaginal fistula is present prior to surgical intervention  - she will return to clinic after this colonoscopy for possible discussion of surgical reversal of her colostomy    Diverticulitis of large intestine with perforation and abscess without bleeding  -     Case request GI: COLONOSCOPY  -     Discontinue: sodium,potassium,mag sulfates (SUPREP BOWEL PREP KIT) 17.5-3.13-1.6 gram SolR; Take 177 mLs by mouth once daily. for 2 days  Dispense: 1 kit; Refill: 0  -     sodium,potassium,mag sulfates (SUPREP BOWEL PREP KIT) 17.5-3.13-1.6 gram SolR; Take 177 mLs by mouth once daily. for 2 days  Dispense: 1 kit; Refill: 0      Rishabh Connelly MD  Colon and Rectal Surgery  [facility]

## 2019-05-30 ENCOUNTER — PATIENT MESSAGE (OUTPATIENT)
Dept: ENDOSCOPY | Facility: HOSPITAL | Age: 79
End: 2019-05-30

## 2019-06-03 ENCOUNTER — TELEPHONE (OUTPATIENT)
Dept: SURGERY | Facility: CLINIC | Age: 79
End: 2019-06-03

## 2019-06-03 NOTE — TELEPHONE ENCOUNTER
Spoke to patient's daughter, she stated that the patient is taking vitamin supplements and was not aware when to stop them, she was advised that she can stop the vitamins today, she voiced understanding.

## 2019-06-05 ENCOUNTER — PATIENT MESSAGE (OUTPATIENT)
Dept: INTERNAL MEDICINE | Facility: CLINIC | Age: 79
End: 2019-06-05

## 2019-06-05 DIAGNOSIS — H91.90 HEARING LOSS, UNSPECIFIED HEARING LOSS TYPE, UNSPECIFIED LATERALITY: Primary | ICD-10-CM

## 2019-06-06 ENCOUNTER — PATIENT MESSAGE (OUTPATIENT)
Dept: ENDOSCOPY | Facility: HOSPITAL | Age: 79
End: 2019-06-06

## 2019-06-07 ENCOUNTER — HOSPITAL ENCOUNTER (OUTPATIENT)
Facility: HOSPITAL | Age: 79
Discharge: HOME | End: 2019-06-07
Attending: COLON & RECTAL SURGERY | Admitting: COLON & RECTAL SURGERY
Payer: MEDICARE

## 2019-06-07 ENCOUNTER — ANESTHESIA EVENT (OUTPATIENT)
Dept: ENDOSCOPY | Facility: HOSPITAL | Age: 79
End: 2019-06-07
Payer: MEDICARE

## 2019-06-07 ENCOUNTER — ANESTHESIA (OUTPATIENT)
Dept: ENDOSCOPY | Facility: HOSPITAL | Age: 79
End: 2019-06-07
Payer: MEDICARE

## 2019-06-07 VITALS
RESPIRATION RATE: 18 BRPM | HEART RATE: 52 BPM | WEIGHT: 141 LBS | TEMPERATURE: 98 F | BODY MASS INDEX: 25.95 KG/M2 | DIASTOLIC BLOOD PRESSURE: 66 MMHG | HEIGHT: 62 IN | SYSTOLIC BLOOD PRESSURE: 114 MMHG | OXYGEN SATURATION: 98 %

## 2019-06-07 DIAGNOSIS — Z12.11 SCREENING FOR COLON CANCER: ICD-10-CM

## 2019-06-07 DIAGNOSIS — Z93.3 COLOSTOMY IN PLACE: Primary | ICD-10-CM

## 2019-06-07 PROCEDURE — 44389 COLONOSCOPY WITH BIOPSY: CPT | Mod: PT,,, | Performed by: COLON & RECTAL SURGERY

## 2019-06-07 PROCEDURE — 88305 TISSUE EXAM BY PATHOLOGIST: CPT | Mod: 26,,, | Performed by: PATHOLOGY

## 2019-06-07 PROCEDURE — 63600175 PHARM REV CODE 636 W HCPCS: Performed by: NURSE ANESTHETIST, CERTIFIED REGISTERED

## 2019-06-07 PROCEDURE — 88305 TISSUE SPECIMEN TO PATHOLOGY - SURGERY: ICD-10-PCS | Mod: 26,,, | Performed by: PATHOLOGY

## 2019-06-07 PROCEDURE — 44389 PR COLONOSCOPY THRU STOMA,BIOPSY: ICD-10-PCS | Mod: PT,,, | Performed by: COLON & RECTAL SURGERY

## 2019-06-07 PROCEDURE — 27201012 HC FORCEPS, HOT/COLD, DISP: Performed by: COLON & RECTAL SURGERY

## 2019-06-07 PROCEDURE — 44389 COLONOSCOPY WITH BIOPSY: CPT | Performed by: COLON & RECTAL SURGERY

## 2019-06-07 PROCEDURE — 25000003 PHARM REV CODE 250: Performed by: COLON & RECTAL SURGERY

## 2019-06-07 PROCEDURE — 88305 TISSUE EXAM BY PATHOLOGIST: CPT | Performed by: PATHOLOGY

## 2019-06-07 PROCEDURE — 37000008 HC ANESTHESIA 1ST 15 MINUTES: Performed by: COLON & RECTAL SURGERY

## 2019-06-07 PROCEDURE — 37000009 HC ANESTHESIA EA ADD 15 MINS: Performed by: COLON & RECTAL SURGERY

## 2019-06-07 PROCEDURE — 25000003 PHARM REV CODE 250: Performed by: NURSE ANESTHETIST, CERTIFIED REGISTERED

## 2019-06-07 RX ORDER — SODIUM CHLORIDE, SODIUM LACTATE, POTASSIUM CHLORIDE, CALCIUM CHLORIDE 600; 310; 30; 20 MG/100ML; MG/100ML; MG/100ML; MG/100ML
INJECTION, SOLUTION INTRAVENOUS CONTINUOUS
Status: DISCONTINUED | OUTPATIENT
Start: 2019-06-07 | End: 2019-06-07 | Stop reason: HOSPADM

## 2019-06-07 RX ORDER — PROPOFOL 10 MG/ML
VIAL (ML) INTRAVENOUS
Status: DISCONTINUED | OUTPATIENT
Start: 2019-06-07 | End: 2019-06-07

## 2019-06-07 RX ORDER — LIDOCAINE HYDROCHLORIDE 10 MG/ML
INJECTION, SOLUTION EPIDURAL; INFILTRATION; INTRACAUDAL; PERINEURAL
Status: DISCONTINUED | OUTPATIENT
Start: 2019-06-07 | End: 2019-06-07

## 2019-06-07 RX ADMIN — PROPOFOL 50 MG: 10 INJECTION, EMULSION INTRAVENOUS at 10:06

## 2019-06-07 RX ADMIN — LIDOCAINE HYDROCHLORIDE 50 MG: 10 INJECTION, SOLUTION EPIDURAL; INFILTRATION; INTRACAUDAL; PERINEURAL at 10:06

## 2019-06-07 RX ADMIN — SODIUM CHLORIDE, SODIUM LACTATE, POTASSIUM CHLORIDE, AND CALCIUM CHLORIDE: 600; 310; 30; 20 INJECTION, SOLUTION INTRAVENOUS at 10:06

## 2019-06-07 NOTE — TRANSFER OF CARE
"Anesthesia Transfer of Care Note    Patient: Irlanda Lopez    Procedure(s) Performed: Procedure(s) (LRB):  COLONOSCOPY (N/A)    Patient location: GI    Anesthesia Type: MAC    Transport from OR: Transported from OR on room air with adequate spontaneous ventilation    Post pain: adequate analgesia    Post assessment: no apparent anesthetic complications    Post vital signs: stable    Level of consciousness: awake, alert and oriented    Nausea/Vomiting: no nausea/vomiting    Complications: none    Transfer of care protocol was followed      Last vitals:   Visit Vitals  BP (!) 142/88 (BP Location: Left arm, Patient Position: Lying)   Pulse (!) 57   Temp 36.7 °C (98.1 °F) (Oral)   Resp 18   Ht 5' 2" (1.575 m)   Wt 64 kg (141 lb)   SpO2 98%   Breastfeeding? No   BMI 25.79 kg/m²     "

## 2019-06-07 NOTE — ANESTHESIA PREPROCEDURE EVALUATION
06/07/2019  Irlanda Lopez is a 78 y.o., female.    Anesthesia Evaluation    I have reviewed the Patient Summary Reports.    I have reviewed the Nursing Notes.   I have reviewed the Medications.     Review of Systems  Anesthesia Hx:  No problems with previous Anesthesia  Denies Family Hx of Anesthesia complications.   Denies Personal Hx of Anesthesia complications.   Social:  Former Smoker, No Alcohol Use    Hematology/Oncology:  Hematology Normal   Oncology Normal     Cardiovascular:   Hypertension Denies MI.   Denies CABG/stent.      hyperlipidemia ECG has been reviewed. ekg 4/2019:  sinus rhythm 64 with Blocked Premature atrial complexes  Incomplete right bundle branch block  Possible Anterior infarct ,age undetermined  Abnormal ECG  When compared with ECG of 15-NOV-2018 11:19,  T wave inversion no longer evident in Inferior leads    Echo 11/2018:   1 - Biatrial enlargement.     2 - Concentric hypertrophy.     3 - Normal left ventricular systolic function (EF 60-65%).     4 - Normal left ventricular diastolic function.     5 - Right ventricular enlargement with normal systolic function.     6 - The estimated PA systolic pressure is 31 mmHg.     7 - Moderate tricuspid regurgitation.    Pulmonary:   Denies COPD.  Denies Asthma.  Denies Sleep Apnea.    Renal/:  Renal/ Normal     Hepatic/GI:   Bowel Prep. Denies GERD. Denies Liver Disease.  Denies Hepatitis. Colostomy in place   Neurological:   Denies CVA. Denies Seizures. Vascular dementia without behavioral disturbance   Endocrine:   Denies Diabetes. Hypothyroidism        Physical Exam  General:  Well nourished    Airway/Jaw/Neck:  Airway Findings: Mouth Opening: Normal Tongue: Normal  General Airway Assessment: Adult  Mallampati: II      Dental:  Dental Findings: In tact   Chest/Lungs:  Chest/Lungs Findings: Clear to auscultation, Normal Respiratory  Rate     Heart/Vascular:  Heart Findings: Rate: Normal  Rhythm: Regular Rhythm  Sounds: Normal             Anesthesia Plan  Type of Anesthesia, risks & benefits discussed:  Anesthesia Type:  MAC  Patient's Preference:   Intra-op Monitoring Plan: standard ASA monitors  Intra-op Monitoring Plan Comments:   Post Op Pain Control Plan:   Post Op Pain Control Plan Comments:   Induction:   IV  Beta Blocker:  Patient is not currently on a Beta-Blocker (No further documentation required).       Informed Consent: Patient understands risks and agrees with Anesthesia plan.  Questions answered. Anesthesia consent signed with patient.  ASA Score: 2     Day of Surgery Review of History & Physical: I have interviewed and examined the patient. I have reviewed the patient's H&P dated:  There are no significant changes.  H&P update referred to the surgeon.         Ready For Surgery From Anesthesia Perspective.

## 2019-06-07 NOTE — DISCHARGE INSTRUCTIONS
Diverticulosis    Diverticulosis means that small pouches have formed in the wall of your large intestine (colon). Most often, this problem causes no symptoms and is common as people age. But the pouches in the colon are at risk of becoming infected. When this happens, the condition is called diverticulitis. Although most people with diverticulosis never develop diverticulitis, it is still not uncommon. Rectal bleeding can also occur and in less common situations, a type of colon inflammation called colitis.  While most people do not have symptoms, some people with diverticulosis may have:  · Abdominal cramps and pain  · Bloating  · Constipation  · Change in bowel habits  Causes  The exact cause of diverticulosis (and diverticulitis) has not been proved, but a few things are associated with the condition:  · Low-fiber diet  · Constipation  · Lack of exercise  Your healthcare provider will talk with you about how to manage your condition. Diet changes may be all that are needed to help control diverticulosis and prevent progression to diverticulitis. If you develop diverticulitis, you will likely need other treatments.  Home care  You may be told to take fiber supplements daily. Fiber adds bulk to the stool so that it passes through the colon more easily. Stool softeners may be recommended. You may also be given medications for pain relief. Be sure to take all medications as directed.  In the past, people were told to avoid corn, nuts, and seeds. This is no longer necessary.  Follow these guidelines when caring for yourself at home:  · Eat unprocessed foods that are high in fiber. Whole grains, fruits, and vegetables are good choices.  · Drink 6 to 8 glasses of water every day unless your healthcare provider has you limit how much fluid you should have.  · Watch for changes in your bowel movements. Tell your provider if you notice any changes.  · Begin an exercise program. Ask your provider how to get started.  Generally, walking is the best.  · Get plenty of rest and sleep.  Follow-up care  Follow up with your healthcare provider, or as advised. Regular visits may be needed to check on your health. Sometimes special procedures such as colonoscopy, are needed after an episode of diverticulitis or blooding. Be sure to keep all your appointments.  If a stool sample was taken, or cultures were done, you should be told if they are positive, or if your treatment needs to be changed. You can call as directed for the results.  If X-rays were done, a radiologist will look at them. You will be told if there is a change in your treatment.  If antibiotics were prescribed, be sure to finish them all.  When to seek medical advice  Call your healthcare provider right away if any of these occur:  · Fever of 100.4°F (38°C) or higher, or as directed by your healthcare provider  · Severe cramps in the lower left side of the abdomen or pain that is getting worse  · Tenderness in the lower left side of the abdomen or worsening pain throughout the abdomen  · Diarrhea or constipation that doesn't get better within 24 hours  · Nausea and vomiting  · Bleeding from the rectum  Call 911  Call emergency services if any of the following occur:  · Trouble breathing  · Confusion  · Very drowsy or trouble awakening  · Fainting or loss of consciousness  · Rapid heart rate  · Chest pain  Date Last Reviewed: 12/30/2015 © 2000-2017 [employer]. [address] [address]. All rights reserved. This information is not intended as a substitute for professional medical care. Always follow your healthcare professional's instructions.        Understanding Colon and Rectal Polyps    The colon (also called the large intestine) is a muscular tube that forms the last part of the digestive tract. It absorbs water and stores food waste. The colon is about 4 to 6 feet long. The rectum is the last 6 inches of the colon. The colon and rectum  have a smooth lining composed of millions of cells. Changes in these cells can lead to growths in the colon that can become cancerous and should be removed. Multiple tests are available to screen for colon cancer, but the colonoscopy is the most recommended test. During colonoscopy, these polyps can be removed. How often you need this test depends on many things including your condition, your family history, symptoms, and what the findings were at the previous colonoscopy.   When the colon lining changes  Changes that happen in the cells that line the colon or rectum can lead to growths called polyps. Over a period of years, polyps can turn cancerous. Removing polyps early may prevent cancer from ever forming.  Polyps  Polyps are fleshy clumps of tissue that form on the lining of the colon or rectum. Small polyps are usually benign (not cancerous). However, over time, cells in a polyp can change and become cancerous. Certain types of polyps known as adenomatous polyps are premalignant. The risk for invasive cancer increases with the size of the polyp and certain cell and gene features. This means that they can become cancerous if they're not removed. Hyperplastic polyps are benign. They can grow quite large and not turn cancerous.   Cancer  Almost all colorectal cancers start when polyp cells begin growing abnormally. As a cancerous tumor grows, it may involve more and more of the colon or rectum. In time, cancer can also grow beyond the colon or rectum and spread to nearby organs or to glands called lymph nodes. The cells can also travel to other parts of the body. This is known as metastasis. The earlier a cancerous tumor is removed, the better the chance of preventing its spread.    Date Last Reviewed: 8/1/2016  © 3587-4727 [employer]. [address] [place] [address], [place] [address]. All rights reserved. This information is not intended as a substitute for professional medical care. Always follow your  healthcare professional's instructions.

## 2019-06-07 NOTE — [employer] PATIENT INSTRUCTIONS
Discharge Summary/Instructions after an Endoscopic Procedure  Patient Name: Irlanda Lopez  Patient MRN: [id number]  Patient YOB: 1940 Friday, June 07, 2019 Rishabh Connelly MD  RESTRICTIONS:  During your procedure today, you received medications for sedation.  These   medications may affect your judgment, balance and coordination.  Therefore,   for 24 hours, you have the following restrictions:   - DO NOT drive a car, operate machinery, make legal/financial decisions,   sign important papers or drink alcohol.    ACTIVITY:  Today: no heavy lifting, straining or running due to procedural   sedation/anesthesia.  The following day: return to full activity including work.  DIET:  Eat and drink normally unless instructed otherwise.     TREATMENT FOR COMMON SIDE EFFECTS:  - Mild abdominal pain, nausea, belching, bloating or excessive gas:  rest,   eat lightly and use a heating pad.  - Sore Throat: treat with throat lozenges and/or gargle with warm salt   water.  - Because air was used during the procedure, expelling large amounts of air   from your rectum or belching is normal.  - If a bowel prep was taken, you may not have a bowel movement for 1-3 days.    This is normal.  SYMPTOMS TO WATCH FOR AND REPORT TO YOUR PHYSICIAN:  1. Abdominal pain or bloating, other than gas cramps.  2. Chest pain.  3. Back pain.  4. Signs of infection such as: chills or fever occurring within 24 hours   after the procedure.  5. Rectal bleeding, which would show as bright red, maroon, or black stools.   (A tablespoon of blood from the rectum is not serious, especially if   hemorrhoids are present.)  6. Vomiting.  7. Weakness or dizziness.  GO DIRECTLY TO THE NEAREST EMERGENCY ROOM IF YOU HAVE ANY OF THE FOLLOWING:      Difficulty breathing              Chills and/or fever over 101 F   Persistent vomiting and/or vomiting blood   Severe abdominal pain   Severe chest pain   Black, tarry stools   Bleeding- more than one  tablespoon   Any other symptom or condition that you feel may need urgent attention  Your doctor recommends these additional instructions:  If any biopsies were taken, your doctors clinic will contact you in 1 to 2   weeks with any results.  - Discharge patient to home.   - Resume previous diet.   - Continue present medications.   - Await pathology results.   - Repeat colonoscopy date to be determined after pending pathology results   are reviewed for surveillance based on pathology results.   - Return to primary care physician PRN.  For questions, problems or results please call your physician Rishabh Connelly MD at Work:  [phone]  If you have any questions about the above instructions, call the GI   department at [phone] or call the endoscopy unit at [phone]   from 7am until 3 pm.  [facility] [place], EMERGENCY ROOM PHONE NUMBER:   [phone]  IF A COMPLICATION OR EMERGENCY SITUATION ARISES AND YOU ARE UNABLE TO REACH   YOUR PHYSICIAN - GO DIRECTLY TO THE EMERGENCY ROOM.  I have read or have had read to me these discharge instructions for my   procedure and have received a written copy.  I understand these   instructions and will follow-up with my physician if I have any questions.     __________________________________       _____________________________________  Nurse Signature                                          Patient/Designated   Responsible Party Signature  MD Rishabh Bright MD  6/7/2019 11:07:56 AM  This report has been verified and signed electronically.  [employer]

## 2019-06-07 NOTE — ANESTHESIA POSTPROCEDURE EVALUATION
Anesthesia Post Evaluation    Patient: Irlanda Lopez    Procedure(s) Performed: Procedure(s) (LRB):  COLONOSCOPY (N/A)    Final Anesthesia Type: MAC  Patient location during evaluation: GI PACU  Patient participation: Yes- Able to Participate  Level of consciousness: awake and alert  Post-procedure vital signs: reviewed and stable  Pain management: adequate  Airway patency: patent  PONV status at discharge: No PONV  Anesthetic complications: no      Cardiovascular status: blood pressure returned to baseline  Respiratory status: unassisted and spontaneous ventilation  Hydration status: euvolemic  Follow-up not needed.          Vitals Value Taken Time   /66 6/7/2019 11:24 AM   Temp 36.4 °C (97.5 °F) 6/7/2019 11:04 AM   Pulse 52 6/7/2019 11:24 AM   Resp 18 6/7/2019 11:24 AM   SpO2 98 % 6/7/2019 11:24 AM         Event Time     Out of Recovery 11:35:43          Pain/Babar Score: Babar Score: 10 (6/7/2019 11:24 AM)         15-Nov-2018 14:08

## 2019-06-07 NOTE — BRIEF OP NOTE
[facility] -   Brief Operative Note     SUMMARY     Surgery Date: 6/7/2019     Surgeon(s) and Role:     * Rishabh Connelly MD - Primary    Assisting Surgeon: None    Pre-op Diagnosis:  Diverticulitis of large intestine with perforation and abscess without bleeding [K57.20]    Post-op Diagnosis:  Post-Op Diagnosis Codes:     * Diverticulitis of large intestine with perforation and abscess without bleeding [K57.20]    Procedure(s) (LRB):  COLONOSCOPY (N/A)    Anesthesia: Choice    Description of the findings of the procedure: See Op Note    Findings/Key Components: See Op Note    Estimated Blood Loss: * No values recorded between 6/7/2019 12:00 AM and 6/7/2019 10:59 AM *         Specimens:   Specimen (12h ago, onward)    Start     Ordered    06/07/19 1046  Specimen to Pathology - Surgery  Once     Comments:  #1 cecal colon polyp     Start Status     06/07/19 1046 Collected (06/07/19 1101) Order ID: [id number]       06/07/19 1059          Discharge Note    SUMMARY     Admit Date: 6/7/2019    Discharge Date and Time: 6/7/2019 11:08 AM    Hospital Course Patient was seen in the preoperative area by both myself and anesthesia. All consents were verified and all questions appropriately answered. All risks, benefits and alternatives explained to patient. Patient proceeded to endoscopy suite for colonoscopy and was discharged home postoperative once cleared by anesthesia.    Final Diagnosis: Post-Op Diagnosis Codes:     * Diverticulitis of large intestine with perforation and abscess without bleeding [K57.20]    Disposition: Home or Self Care    Follow Up/Patient Instructions: See Provation report    Medications:  Reconciled Home Medications:      Medication List      CONTINUE taking these medications    atorvastatin 10 MG tablet  Commonly known as:  LIPITOR  Take 1 tablet (10 mg total) by mouth once daily.     donepezil 10 MG tablet  Commonly known as:  ARICEPT  Take 1 tablet (10 mg total) by mouth every  evening.     furosemide 20 MG tablet  Commonly known as:  LASIX  Take 1 tablet (20 mg total) by mouth daily as needed.     memantine 10 MG Tab  Commonly known as:  NAMENDA  10mg po bid.     ondansetron 4 MG tablet  Commonly known as:  ZOFRAN  Take 1 tablet (4 mg total) by mouth every 8 (eight) hours as needed for Nausea.     thyroid (pork) 30 mg Tab  Commonly known as:  ARMOUR THYROID  Take 1 tablet (30 mg total) by mouth once daily.     triamcinolone acetonide 0.1% 0.1 % ointment  Commonly known as:  KENALOG  APPLY TOPICALLY 4 (FOUR) TIMES DAILY. TO RASH ON FACE AND CHEST AND NOSE          Discharge Procedure Orders   Diet general     Call MD for:  temperature >100.4     Call MD for:  persistent nausea and vomiting     Call MD for:  severe uncontrolled pain     Call MD for:  difficulty breathing, headache or visual disturbances     Call MD for:  redness, tenderness, or signs of infection (pain, swelling, redness, odor or green/yellow discharge around incision site)     Call MD for:  hives     Call MD for:  persistent dizziness or light-headedness     Call MD for:  extreme fatigue     Activity as tolerated     Follow-up Information     Myla Arias MD.    Specialty:  Family Medicine  Why:  As needed  Contact information:  [address]  [address]  [facility] [address] [phone]

## 2019-06-07 NOTE — OR NURSING
Final Time Out. All agree pt is sedated and ready for procedure. See anesthesia for vital signs and medication details.   Patient Information     Patient Name  Mita Gabriel MRN  [id number] Sex  Female   1954      Letter by Ella Baptiste DO at      Author:  Ella Baptiste DO Service:  (none) Author Type:  (none)    Filed:   Encounter Date:  2017 Status:  (Other)           Mita Gabriel  [address] [facility] [address]          May 25, 2017    Dear Ms. Gabriel:    Following are the tests completed during your last clinic visit.  The results of these tests are normal and require no further attention unless otherwise noted.    Your labs all returned normal, except a LOW sodium.  This is actually a normal range for you, as you trend between 128 and 134.    No other changes that need to be made at this time.    Results for orders placed or performed in visit on 17      COMPLETE METABOLIC PANEL      Result  Value Ref Range    SODIUM 130 (L) 133 - 143 mmol/L    POTASSIUM 4.0 3.5 - 5.1 mmol/L    CHLORIDE 99 98 - 107 mmol/L    CO2,TOTAL 26 21 - 31 mmol/L    ANION GAP 5 5 - 18                    GLUCOSE 89 70 - 105 mg/dL    CALCIUM 9.5 8.6 - 10.3 mg/dL    BUN 13 7 - 25 mg/dL    CREATININE 0.92 0.70 - 1.30 mg/dL    BUN/CREAT RATIO           14                    GFR if African American >60 >60 ml/min/1.73m2    GFR if not African American >60 >60 ml/min/1.73m2    ALBUMIN 4.4 3.5 - 5.7 g/dL    PROTEIN,TOTAL 7.1 6.4 - 8.9 g/dL    GLOBULIN                  2.7 2.0 - 3.7 g/dL    A/G RATIO 1.6 1.0 - 2.0                    BILIRUBIN,TOTAL 0.9 0.3 - 1.0 mg/dL    ALK PHOSPHATASE 64 34 - 104 IU/L    ALT (SGPT) 12 7 - 52 IU/L    AST (SGOT) 20 13 - 39 IU/L   TSH      Result  Value Ref Range    TSH 1.23 0.34 - 5.60 uIU/mL   LIPID PANEL      Result  Value Ref Range    CHOLESTEROL,TOTAL 139 <200 mg/dL    TRIGLYCERIDES 132 <150 mg/dL    HDL CHOLESTEROL 41 23 - 92 mg/dL    NON-HDL CHOLESTEROL 98 <145 mg/dl    CHOL/HDL RATIO            3.39 <4.50                    LDL CHOLESTEROL 72 <100 mg/dL    PATIENT STATUS             FASTING                   CBC WITH AUTO DIFFERENTIAL      Result  Value Ref Range    WHITE BLOOD COUNT         6.9 4.5 - 11.0 thou/cu mm    RED BLOOD COUNT           4.56 4.00 - 5.20 mil/cu mm    HEMOGLOBIN                12.7 12.0 - 16.0 g/dL    HEMATOCRIT                38.0 33.0 - 51.0 %    MCV                       83 80 - 100 fL    MCH                       27.9 26.0 - 34.0 pg    MCHC                      33.4 32.0 - 36.0 g/dL    RDW                       12.5 11.5 - 15.5 %    PLATELET COUNT            279 140 - 440 thou/cu mm    MPV                       8.9 6.5 - 11.0 fL    NEUTROPHILS               62.3 42.0 - 72.0 %    LYMPHOCYTES               27.4 20.0 - 44.0 %    MONOCYTES                 6.9 <12.0 %    EOSINOPHILS               2.2 <8.0 %    BASOPHILS                 0.6 <3.0 %    ABSOLUTE NEUTROPHILS      4.3 1.7 - 7.0 thou/cu mm    ABSOLUTE LYMPHOCYTES      1.9 0.9 - 2.9 thou/cu mm    ABSOLUTE MONOCYTES        0.5 <0.9 thou/cu mm    ABSOLUTE EOSINOPHILS      0.2 <0.5 thou/cu mm    ABSOLUTE BASOPHILS        0.0 <0.3 thou/cu mm       If you have any further questions or problems contact my office at  [phone].    Thank you,    RENEE MURRIETA, DO

## 2019-06-07 NOTE — ANESTHESIA RELEASE NOTE
"Anesthesia Release from PACU Note    Patient: Irlanda Lopez    Procedure(s) Performed: Procedure(s) (LRB):  COLONOSCOPY (N/A)    Anesthesia type: MAC    Post pain: Adequate analgesia    Post assessment: no apparent anesthetic complications    Last Vitals:   Visit Vitals  /66 (BP Location: Left arm, Patient Position: Lying)   Pulse (!) 52   Temp 36.4 °C (97.5 °F) (Tympanic)   Resp 18   Ht 5' 2" (1.575 m)   Wt 64 kg (141 lb)   SpO2 98%   Breastfeeding? No   BMI 25.79 kg/m²       Post vital signs: stable    Level of consciousness: awake, alert  and oriented    Nausea/Vomiting: no nausea/no vomiting    Complications: none    Airway Patency: patent    Respiratory: unassisted, spontaneous ventilation, room air    Cardiovascular: stable and blood pressure at baseline    Hydration: euvolemic  "

## 2019-06-07 NOTE — H&P
[facility]  Colon and Rectal Surgery  History & Physical    Patient Name: Irlanda Lopez  MRN: [id number]  Admission Date: 6/7/2019  Attending Physician: Rishabh Connelly MD  Primary Care Provider: Myla Arias MD    Patient information was obtained from patient and medical records.    Subjective:     Chief Complaint/Reason for Admission: Here for Colonoscopy    History of Present Illness:  Patient is a 78 y.o. female presents for colonoscopy. Previous Maribel's last yr for diverticulitis. Never had full colonoscopy prior. No current hematochezia, melena or change in stool caliber via colostomy. No personal or fam hx of CRC or IBD.    No current facility-administered medications on file prior to encounter.      Current Outpatient Medications on File Prior to Encounter   Medication Sig    atorvastatin (LIPITOR) 10 MG tablet Take 1 tablet (10 mg total) by mouth once daily.    donepezil (ARICEPT) 10 MG tablet Take 1 tablet (10 mg total) by mouth every evening.    furosemide (LASIX) 20 MG tablet Take 1 tablet (20 mg total) by mouth daily as needed.    memantine (NAMENDA) 10 MG Tab 10mg po bid.    ondansetron (ZOFRAN) 4 MG tablet Take 1 tablet (4 mg total) by mouth every 8 (eight) hours as needed for Nausea.    thyroid, pork, (ARMOUR THYROID) 30 mg Tab Take 1 tablet (30 mg total) by mouth once daily.    triamcinolone acetonide 0.1% (KENALOG) 0.1 % ointment APPLY TOPICALLY 4 (FOUR) TIMES DAILY. TO RASH ON FACE AND CHEST AND NOSE       Review of patient's allergies indicates:  No Known Allergies    Past Medical History:   Diagnosis Date    Diverticulitis     High cholesterol     Hypertension     Hypothyroidism      Past Surgical History:   Procedure Laterality Date    APPENDECTOMY  2008    CATARACT EXTRACTION      Dr Raygoza    COLECTOMY, SIGMOID Left 1/2/2019    [facility]    COLON SURGERY      COLONOSCOPY N/A 1/2/2019    [facility] ENDO     CREATION, COLOSTOMY Left 1/2/2019    [facility] OR    SHAHIDA PROCEDURE N/A 1/2/2019    [facility] OR    LYSIS, ADHESIONS N/A 1/2/2019    [facility] OR    VITRECTOMY Right 09/2013     Family History     Problem Relation (Age of Onset)    Alzheimer's disease Mother    Aneurysm Father    Stomach cancer Brother        Tobacco Use    Smoking status: Former Smoker    Smokeless tobacco: Never Used   Substance and Sexual Activity    Alcohol use: No    Drug use: No    Sexual activity: Never     Comment:      Review of Systems   Constitutional: Negative for activity change, appetite change, chills, fatigue, fever and unexpected weight change.   HENT: Negative for congestion, ear pain, sore throat and trouble swallowing.    Eyes: Negative for pain, redness and itching.   Respiratory: Negative for cough, shortness of breath and wheezing.    Cardiovascular: Negative for chest pain, palpitations and leg swelling.   Gastrointestinal: Negative for abdominal distention, abdominal pain, anal bleeding, blood in stool, constipation, diarrhea, nausea, rectal pain and vomiting.   Endocrine: Negative for cold intolerance, heat intolerance and polyuria.   Genitourinary: Negative for dysuria, flank pain, frequency and hematuria.   Musculoskeletal: Negative for gait problem, joint swelling and neck pain.   Skin: Negative for color change, rash and wound.   Allergic/Immunologic: Negative for environmental allergies and immunocompromised state.   Neurological: Negative for dizziness, speech difficulty, weakness and numbness.   Psychiatric/Behavioral: Negative for agitation, confusion and hallucinations.     Objective:     Vital Signs (Most Recent):  Temp: 98.1 °F (36.7 °C) (06/07/19 0855)  Pulse: (!) 57 (06/07/19 0855)  Resp: 18 (06/07/19 0855)  BP: (!) 142/88 (06/07/19 0855)  SpO2: 98 % (06/07/19 0855) Vital Signs (24h Range):  Temp:  [98.1 °F  (36.7 °C)] 98.1 °F (36.7 °C)  Pulse:  [57] 57  Resp:  [18] 18  SpO2:  [98 %] 98 %  BP: (142)/(88) 142/88     Weight: 64 kg (141 lb)  Body mass index is 25.79 kg/m².    Physical Exam   Constitutional: She is oriented to person, place, and time. She appears well-developed.   HENT:   Head: Normocephalic and atraumatic.   Eyes: Conjunctivae and EOM are normal.   Neck: Normal range of motion. No thyromegaly present.   Cardiovascular: Normal rate and regular rhythm.   Pulmonary/Chest: Effort normal. No respiratory distress.   Abdominal: Soft. She exhibits no distension and no mass. There is no tenderness.   Musculoskeletal: Normal range of motion. She exhibits no edema or tenderness.   Neurological: She is alert and oriented to person, place, and time.   Skin: Skin is warm and dry. Capillary refill takes less than 2 seconds. No rash noted.   Psychiatric: She has a normal mood and affect.         Assessment/Plan:     Patient is a 78 y.o. female who presents for colonoscopy     - Ok to proceed to endoscopy suite for colonoscopy  - Consent obtained. All risks, benefits and alternatives fully explained to patient, including but not limited to bleeding, infection, perforation, and missed polyps. All questions appropriately answered to patient's satisfaction. Consent signed and placed on chart.    Active Diagnoses:    Diagnosis Date Noted POA    Screening for colon cancer [Z12.11] 06/07/2019 Not Applicable      Problems Resolved During this Admission:     VTE Risk Mitigation (From admission, onward)    None          Rishabh Connelly MD  Colon and Rectal Surgery  [facility]

## 2019-06-10 ENCOUNTER — OFFICE VISIT (OUTPATIENT)
Dept: SURGERY | Facility: CLINIC | Age: 79
End: 2019-06-10
Payer: MEDICARE

## 2019-06-10 ENCOUNTER — LAB VISIT (OUTPATIENT)
Dept: LAB | Facility: HOSPITAL | Age: 79
End: 2019-06-10
Attending: COLON & RECTAL SURGERY
Payer: MEDICARE

## 2019-06-10 VITALS
DIASTOLIC BLOOD PRESSURE: 76 MMHG | WEIGHT: 150.56 LBS | HEART RATE: 55 BPM | BODY MASS INDEX: 27.54 KG/M2 | TEMPERATURE: 97 F | SYSTOLIC BLOOD PRESSURE: 156 MMHG

## 2019-06-10 DIAGNOSIS — K57.20 DIVERTICULITIS OF LARGE INTESTINE WITH PERFORATION AND ABSCESS WITHOUT BLEEDING: ICD-10-CM

## 2019-06-10 DIAGNOSIS — K57.20 DIVERTICULITIS OF LARGE INTESTINE WITH PERFORATION AND ABSCESS WITHOUT BLEEDING: Primary | ICD-10-CM

## 2019-06-10 LAB
ALBUMIN SERPL BCP-MCNC: 3.7 G/DL (ref 3.5–5.2)
ALP SERPL-CCNC: 66 U/L (ref 55–135)
ALT SERPL W/O P-5'-P-CCNC: 15 U/L (ref 10–44)
ANION GAP SERPL CALC-SCNC: 10 MMOL/L (ref 8–16)
AST SERPL-CCNC: 26 U/L (ref 10–40)
BASOPHILS # BLD AUTO: 0.03 K/UL (ref 0–0.2)
BASOPHILS NFR BLD: 0.4 % (ref 0–1.9)
BILIRUB DIRECT SERPL-MCNC: 0.1 MG/DL (ref 0.1–0.3)
BILIRUB SERPL-MCNC: 0.3 MG/DL (ref 0.1–1)
BUN SERPL-MCNC: 13 MG/DL (ref 8–23)
CALCIUM SERPL-MCNC: 9.5 MG/DL (ref 8.7–10.5)
CHLORIDE SERPL-SCNC: 103 MMOL/L (ref 95–110)
CO2 SERPL-SCNC: 23 MMOL/L (ref 23–29)
CREAT SERPL-MCNC: 0.8 MG/DL (ref 0.5–1.4)
DIFFERENTIAL METHOD: NORMAL
EOSINOPHIL # BLD AUTO: 0.3 K/UL (ref 0–0.5)
EOSINOPHIL NFR BLD: 4 % (ref 0–8)
ERYTHROCYTE [DISTWIDTH] IN BLOOD BY AUTOMATED COUNT: 14.2 % (ref 11.5–14.5)
EST. GFR  (AFRICAN AMERICAN): >60 ML/MIN/1.73 M^2
EST. GFR  (NON AFRICAN AMERICAN): >60 ML/MIN/1.73 M^2
GLUCOSE SERPL-MCNC: 96 MG/DL (ref 70–110)
HCT VFR BLD AUTO: 40.4 % (ref 37–48.5)
HGB BLD-MCNC: 13.4 G/DL (ref 12–16)
INR PPP: 1 (ref 0.8–1.2)
LYMPHOCYTES # BLD AUTO: 2.9 K/UL (ref 1–4.8)
LYMPHOCYTES NFR BLD: 42.1 % (ref 18–48)
MCH RBC QN AUTO: 28.4 PG (ref 27–31)
MCHC RBC AUTO-ENTMCNC: 33.2 G/DL (ref 32–36)
MCV RBC AUTO: 86 FL (ref 82–98)
MONOCYTES # BLD AUTO: 0.6 K/UL (ref 0.3–1)
MONOCYTES NFR BLD: 8.7 % (ref 4–15)
NEUTROPHILS # BLD AUTO: 3.1 K/UL (ref 1.8–7.7)
NEUTROPHILS NFR BLD: 44.8 % (ref 38–73)
PLATELET # BLD AUTO: 270 K/UL (ref 150–350)
PMV BLD AUTO: 9.8 FL (ref 9.2–12.9)
POTASSIUM SERPL-SCNC: 4.2 MMOL/L (ref 3.5–5.1)
PROT SERPL-MCNC: 7.1 G/DL (ref 6–8.4)
PROTHROMBIN TIME: 10.8 SEC (ref 9–12.5)
RBC # BLD AUTO: 4.72 M/UL (ref 4–5.4)
SODIUM SERPL-SCNC: 136 MMOL/L (ref 136–145)
WBC # BLD AUTO: 6.8 K/UL (ref 3.9–12.7)

## 2019-06-10 PROCEDURE — 1101F PR PT FALLS ASSESS DOC 0-1 FALLS W/OUT INJ PAST YR: ICD-10-PCS | Mod: CPTII,S$GLB,, | Performed by: COLON & RECTAL SURGERY

## 2019-06-10 PROCEDURE — 36415 COLL VENOUS BLD VENIPUNCTURE: CPT

## 2019-06-10 PROCEDURE — 84134 ASSAY OF PREALBUMIN: CPT

## 2019-06-10 PROCEDURE — 99214 OFFICE O/P EST MOD 30 MIN: CPT | Mod: S$GLB,,, | Performed by: COLON & RECTAL SURGERY

## 2019-06-10 PROCEDURE — 80048 BASIC METABOLIC PNL TOTAL CA: CPT

## 2019-06-10 PROCEDURE — 3078F PR MOST RECENT DIASTOLIC BLOOD PRESSURE < 80 MM HG: ICD-10-PCS | Mod: CPTII,S$GLB,, | Performed by: COLON & RECTAL SURGERY

## 2019-06-10 PROCEDURE — 85610 PROTHROMBIN TIME: CPT

## 2019-06-10 PROCEDURE — 1101F PT FALLS ASSESS-DOCD LE1/YR: CPT | Mod: CPTII,S$GLB,, | Performed by: COLON & RECTAL SURGERY

## 2019-06-10 PROCEDURE — 80076 HEPATIC FUNCTION PANEL: CPT

## 2019-06-10 PROCEDURE — 99999 PR PBB SHADOW E&M-EST. PATIENT-LVL III: ICD-10-PCS | Mod: PBBFAC,,, | Performed by: COLON & RECTAL SURGERY

## 2019-06-10 PROCEDURE — 99999 PR PBB SHADOW E&M-EST. PATIENT-LVL III: CPT | Mod: PBBFAC,,, | Performed by: COLON & RECTAL SURGERY

## 2019-06-10 PROCEDURE — 99214 PR OFFICE/OUTPT VISIT, EST, LEVL IV, 30-39 MIN: ICD-10-PCS | Mod: S$GLB,,, | Performed by: COLON & RECTAL SURGERY

## 2019-06-10 PROCEDURE — 99499 RISK ADDL DX/OHS AUDIT: ICD-10-PCS | Mod: S$GLB,,, | Performed by: COLON & RECTAL SURGERY

## 2019-06-10 PROCEDURE — 99499 UNLISTED E&M SERVICE: CPT | Mod: S$GLB,,, | Performed by: COLON & RECTAL SURGERY

## 2019-06-10 PROCEDURE — 3077F SYST BP >= 140 MM HG: CPT | Mod: CPTII,S$GLB,, | Performed by: COLON & RECTAL SURGERY

## 2019-06-10 PROCEDURE — 3078F DIAST BP <80 MM HG: CPT | Mod: CPTII,S$GLB,, | Performed by: COLON & RECTAL SURGERY

## 2019-06-10 PROCEDURE — 85025 COMPLETE CBC W/AUTO DIFF WBC: CPT

## 2019-06-10 PROCEDURE — 3077F PR MOST RECENT SYSTOLIC BLOOD PRESSURE >= 140 MM HG: ICD-10-PCS | Mod: CPTII,S$GLB,, | Performed by: COLON & RECTAL SURGERY

## 2019-06-10 RX ORDER — LIDOCAINE HYDROCHLORIDE 10 MG/ML
1 INJECTION, SOLUTION EPIDURAL; INFILTRATION; INTRACAUDAL; PERINEURAL ONCE
Status: DISCONTINUED | OUTPATIENT
Start: 2019-06-10 | End: 2021-06-02

## 2019-06-10 RX ORDER — NEOMYCIN SULFATE 500 MG/1
1000 TABLET ORAL 3 TIMES DAILY
Qty: 6 TABLET | Refills: 0 | Status: SHIPPED | OUTPATIENT
Start: 2019-06-10 | End: 2019-10-01

## 2019-06-10 RX ORDER — ONDANSETRON 2 MG/ML
4 INJECTION INTRAMUSCULAR; INTRAVENOUS EVERY 12 HOURS PRN
Status: CANCELLED | OUTPATIENT
Start: 2019-06-10

## 2019-06-10 RX ORDER — METRONIDAZOLE 500 MG/1
500 TABLET ORAL 3 TIMES DAILY
Qty: 3 TABLET | Refills: 0 | Status: SHIPPED | OUTPATIENT
Start: 2019-06-10 | End: 2019-10-01

## 2019-06-10 RX ORDER — ACETAMINOPHEN 325 MG/1
1000 TABLET ORAL ONCE
Status: CANCELLED | OUTPATIENT
Start: 2019-06-10 | End: 2019-06-10

## 2019-06-10 RX ORDER — METRONIDAZOLE 500 MG/100ML
500 INJECTION, SOLUTION INTRAVENOUS
Status: CANCELLED | OUTPATIENT
Start: 2019-06-10

## 2019-06-10 RX ORDER — GABAPENTIN 100 MG/1
800 CAPSULE ORAL
Status: SHIPPED | OUTPATIENT
Start: 2019-06-10 | End: 2019-06-11

## 2019-06-10 RX ORDER — SODIUM CHLORIDE, SODIUM LACTATE, POTASSIUM CHLORIDE, CALCIUM CHLORIDE 600; 310; 30; 20 MG/100ML; MG/100ML; MG/100ML; MG/100ML
INJECTION, SOLUTION INTRAVENOUS CONTINUOUS
Status: CANCELLED | OUTPATIENT
Start: 2019-06-10

## 2019-06-10 RX ORDER — CELECOXIB 100 MG/1
400 CAPSULE ORAL
Status: SHIPPED | OUTPATIENT
Start: 2019-06-10 | End: 2019-06-11

## 2019-06-10 RX ORDER — POLYETHYLENE GLYCOL 3350 17 G/17G
238 POWDER, FOR SOLUTION ORAL DAILY
Qty: 1 BOTTLE | Refills: 0 | Status: SHIPPED | OUTPATIENT
Start: 2019-06-10 | End: 2019-10-01

## 2019-06-10 RX ORDER — HEPARIN SODIUM 5000 [USP'U]/ML
5000 INJECTION, SOLUTION INTRAVENOUS; SUBCUTANEOUS
Status: CANCELLED | OUTPATIENT
Start: 2019-06-10 | End: 2019-06-11

## 2019-06-10 NOTE — PROGRESS NOTES
History & Physical    SUBJECTIVE:     CC: Discuss ostomy reversal  Ref: Dr. Reece Hogan    History of Present Illness:  Patient is a 78 y.o. female presents for discussion of possible ostomy reversal.  Patient previously underwent open sigmoid colectomy with end-colostomy (Maribel's procedure) for perforated sigmoid diverticulitis in January 2019 by Dr. Hogan.  She had a postoperative course complicated by an abscess requiring percutaneous drainage. An initial colonoscopy was attempted be performed in January 2019 prior to surgery but was unable to be completed due to severe inflammation of the sigmoid colon.  She has never had a full colonoscopy and had not had one attempted prior to that one or since that one.  She states that over the past few months since her Maribel's procedure, she has developed some mucus drainage both from the rectum and from the vagina.  She states she wears a pad in her underwear that she sees drainage more from the vagina then from the rectum.  She rarely expelled any mucus from the rectum but does so after sitting on the toilet like a normal bowel movement.  She denies any blood via her stool or via rectum.  Her only previous surgical history prior to this is a laparoscopic appendectomy around 10 years ago.  She has no family history of colorectal cancer or IBD.  She states she has felt well over the past 2 months after recovering from the surgery by eating a regular diet and increasing her physical activity.    Interval history:  Since last clinic visit the patient has undergone a colonoscopy which did show some narrowing at the rectosigmoid junction that was from an anoscope from passing to the end of the sigmoid colon.  Some small diverticulum in the descending colon by the ostomy.  No large lesions seen.  Continues to have normal bowel function.  Denies any fever, chills, nausea, vomiting.  No further mucus via her vagina.    Review of patient's allergies indicates:  No Known  Allergies    Current Outpatient Medications   Medication Sig Dispense Refill    atorvastatin (LIPITOR) 10 MG tablet Take 1 tablet (10 mg total) by mouth once daily. 90 tablet 3    donepezil (ARICEPT) 10 MG tablet Take 1 tablet (10 mg total) by mouth every evening. 90 tablet 3    furosemide (LASIX) 20 MG tablet Take 1 tablet (20 mg total) by mouth daily as needed. 30 tablet 11    memantine (NAMENDA) 10 MG Tab 10mg po bid. 180 tablet 3    ondansetron (ZOFRAN) 4 MG tablet Take 1 tablet (4 mg total) by mouth every 8 (eight) hours as needed for Nausea. 20 tablet 0    thyroid, pork, (ARMOUR THYROID) 30 mg Tab Take 1 tablet (30 mg total) by mouth once daily. 90 tablet 3    triamcinolone acetonide 0.1% (KENALOG) 0.1 % ointment APPLY TOPICALLY 4 (FOUR) TIMES DAILY. TO RASH ON FACE AND CHEST AND NOSE  1    metroNIDAZOLE (FLAGYL) 500 MG tablet Take 1 tablet (500 mg total) by mouth 3 (three) times daily. Take initial dose@2pm, second dose@3pm and final dose@10pm day before surgery 3 tablet 0    neomycin (MYCIFRADIN) 500 mg Tab Take 2 tablets (1,000 mg total) by mouth 3 (three) times daily. Take 1st dose@2pm,take 2nd dose@3pm, take last dose@10pm day before surgery 6 tablet 0    polyethylene glycol (GLYCOLAX) 17 gram/dose powder Take 238 g by mouth once daily. Mix with 2L of gatorade, drink all mixed solution starting@12pm day before surgery, finish by 10pm 1 Bottle 0     Current Facility-Administered Medications   Medication Dose Route Frequency Provider Last Rate Last Dose    celecoxib capsule 400 mg  400 mg Oral Once Pre-Op Rishabh Connelly MD        gabapentin capsule 800 mg  800 mg Oral Once Pre-Op Rishabh Connelly MD        lidocaine (PF) 10 mg/ml (1%) injection 10 mg  1 mL Intradermal Once Rishabh Connelly MD           Past Medical History:   Diagnosis Date    Diverticulitis     High cholesterol     Hypertension     Hypothyroidism      Past Surgical History:   Procedure Laterality Date     APPENDECTOMY  2008    CATARACT EXTRACTION      Dr Raygoza    COLECTOMY, SIGMOID Left 1/2/2019    [facility]    COLON SURGERY      COLONOSCOPY N/A 6/7/2019    [facility] at [facility] ENDO    COLONOSCOPY N/A 1/2/2019    [facility] at [facility] END[facility]    CREATION, COLOSTOMY Left 1/2/2019    [facility] OR    SHAHIDA PROCEDURE N/A 1/2/2019    [facility]    LYSIS, ADHESIONS N/A 1/2/2019    [facility] OR    VITRECTOMY Right 09/2013     Family History   Problem Relation Age of Onset    Alzheimer's disease Mother     Aneurysm Father         brain    Stomach cancer Brother         50s     Social History     Tobacco Use    Smoking status: Former Smoker    Smokeless tobacco: Never Used   Substance Use Topics    Alcohol use: No    Drug use: No        Review of Systems:  Review of Systems   Constitutional: Negative for activity change, appetite change, chills, fatigue, fever and unexpected weight change.   HENT: Negative for congestion, ear pain, sore throat and trouble swallowing.    Eyes: Negative for pain, redness and itching.   Respiratory: Negative for cough, shortness of breath and wheezing.    Cardiovascular: Negative for chest pain, palpitations and leg swelling.   Gastrointestinal: Negative for abdominal distention, abdominal pain, anal bleeding, blood in stool, constipation, diarrhea, nausea, rectal pain and vomiting.   Endocrine: Negative for cold intolerance, heat intolerance and polyuria.   Genitourinary: Negative for dysuria, flank pain, frequency and hematuria.   Musculoskeletal: Negative for gait problem, joint swelling and neck pain.   Skin: Negative for color change, rash and wound.   Allergic/Immunologic: Negative for environmental allergies and immunocompromised state.   Neurological: Negative for dizziness, speech difficulty, weakness and numbness.    Psychiatric/Behavioral: Negative for agitation, confusion and hallucinations.       OBJECTIVE:     Vital Signs (Most Recent)  Temp: 96.9 °F (36.1 °C) (06/10/19 1342)  Pulse: (!) 55 (06/10/19 1342)  BP: (!) 156/76 (06/10/19 1342)     68.3 kg (150 lb 9.2 oz)     Physical Exam:  Physical Exam   Constitutional: She is oriented to person, place, and time. She appears well-developed.   HENT:   Head: Normocephalic and atraumatic.   Eyes: Conjunctivae and EOM are normal.   Neck: Normal range of motion. No thyromegaly present.   Cardiovascular: Normal rate and regular rhythm.   Pulmonary/Chest: Effort normal. No respiratory distress.   Abdominal: Soft. She exhibits no distension and no mass. There is no tenderness.   Midline incision well-healed; ostomy pink and viable with soft stool in bag   Genitourinary:   Genitourinary Comments: Anorectal: no external lesions; MARIVEL with good tone, no blood, +mucous drainage; no palpable masses or lesions or defects   Musculoskeletal: Normal range of motion. She exhibits no edema or tenderness.   Neurological: She is alert and oriented to person, place, and time.   Skin: Skin is warm and dry. Capillary refill takes less than 2 seconds. No rash noted.   Psychiatric: She has a normal mood and affect.       Laboratory  Lab Results   Component Value Date    WBC 8.90 04/25/2019    HGB 13.8 04/25/2019    HCT 41.4 04/25/2019     04/25/2019    CHOL 165 04/01/2019    TRIG 62 04/01/2019    HDL 61 04/01/2019    ALT 15 04/25/2019    AST 19 04/25/2019     04/25/2019    K 3.9 04/25/2019     04/25/2019    CREATININE 0.8 04/25/2019    BUN 12 04/25/2019    CO2 25 04/25/2019    TSH 1.629 04/01/2019    INR 1.1 01/26/2019    HGBA1C 5.0 04/01/2019       Results for orders placed during the hospital encounter of 10/04/18   CT Abdomen Pelvis W Wo Contrast    Narrative EXAMINATION:  CT ABDOMEN PELVIS W WO CONTRAST    CLINICAL HISTORY:  Abdominal pain, unspecified;abnormal abdominal  x-ray;.    TECHNIQUE:  Low dose axial images, sagittal and coronal reformations were obtained from the lung bases to the pubic symphysis.  Images were obtained both with and without IV contrast.    COMPARISON:  None    FINDINGS:  Lung bases are clear.    The liver is normal.  The gallbladder is normal.    The spleen is normal in size and appearance.  The pancreas is normal.  The adrenal glands are normal.  The aorta and IVC are normal.  No retroperitoneal adenopathy.    Scarring of the lateral and upper left kidney is present but otherwise the left kidney is normal.  Left ureter is normal.  Severe scarring identified throughout the right kidney.  A very large calculus is identified within the left renal pelvis measuring 3.0 cm.  No significant right hydronephrosis.  The right ureter is nondilated and normal.    Small hiatal hernia otherwise the stomach is normal.  The small intestine is normal.  Appendix surgically absent.  Diverticulosis of the sigmoid colon is identified with colonic wall thickening and severe surrounding inflammatory changes.  Findings compatible acute diverticulitis.  Negative for pneumoperitoneum or abscess.  The rectum is normal.    The pelvis demonstrates normal appearing bladder.  Uterus is unremarkable.  Left adnexa region is normal.  To check the calcifications identified within an atrophic right ovary, measuring 0.7 cm in maximum dimension.    Regional bones demonstrate a moderate grade compression fracture of T11.  No suspicious bony abnormality detected.  Abdominal and pelvic wall soft tissues are normal.      Impression 1. Severe acute diverticulitis sigmoid colon.  2. 3.0 cm calculus right renal pelvis.  Extensive scarring of the right kidney.  3. Hiatal hernia.  All CT scans at this facility are performed  using dose modulation techniques as appropriate to performed exam including the following:  automated exposure control; adjustment of mA and/or kV according to the patients size  (this includes techniques or standardized protocols for targeted exams where dose is matched to indication/reason for exam: i.e. extremities or head);  iterative reconstruction technique.      Electronically signed by: Dangelo Black MD  Date:    10/04/2018  Time:    19:10         Diagnostic Results:  Labs: Reviewed  CT: Reviewed  Independently reviewed the previous CT scan showing acute sigmoid diverticulitis as well as the postoperative scan indicating the postoperative abscess that was subsequently drained appropriately well  Colonoscopy:  Reviewed      ASSESSMENT/PLAN:     78-year-old female with previous Maribel's procedure for acute sigmoid diverticulitis with resultant abscess as well as postoperative abscess requiring IR drainage in January 2019 presents for evaluation of possible reversal     - will obtain CT abdomen pelvis with rectal contrast to evaluate for possible colovaginal or rectovaginal fistula  - assuming CT is negative, will plan for laparoscopic versus open colostomy reversal in the near future.  - All risks, benefits and alternatives fully explained to patient. Risks include, but are not limited to, bleeding, infection, anastomotic leak, damage to ureter, damage to other intra-abdominal organs such as colon, rectum, small bowel, stomach, liver, bladder, reproductive organs, sexual dysfunction, urinary dysfunction, postoperative abscess, conversion to open operation, perioperative MI, CVA and death.  All questions field and appropriately answered to patient's satisfaction.  Consent signed and placed on chart.  - will obtain preoperative lab work today to include CBC, CMP, prealbumin and coags    Diverticulitis of large intestine with perforation and abscess without bleeding  -     metroNIDAZOLE (FLAGYL) 500 MG tablet; Take 1 tablet (500 mg total) by mouth 3 (three) times daily. Take initial dose@2pm, second dose@3pm and final dose@10pm day before surgery  Dispense: 3 tablet; Refill: 0  -      neomycin (MYCIFRADIN) 500 mg Tab; Take 2 tablets (1,000 mg total) by mouth 3 (three) times daily. Take 1st dose@2pm,take 2nd dose@3pm, take last dose@10pm day before surgery  Dispense: 6 tablet; Refill: 0  -     polyethylene glycol (GLYCOLAX) 17 gram/dose powder; Take 238 g by mouth once daily. Mix with 2L of gatorade, drink all mixed solution starting@12pm day before surgery, finish by 10pm  Dispense: 1 Bottle; Refill: 0  -     Basic metabolic panel; Future; Expected date: 06/10/2019  -     CBC auto differential; Future; Expected date: 06/10/2019  -     Hepatic function panel; Future; Expected date: 06/10/2019  -     Protime-INR; Future; Expected date: 06/10/2019  -     Prealbumin; Future; Expected date: 06/10/2019  -     Insert peripheral IV; Standing  -     Case Request Operating Room: LAPAROSCOPIC VERSUS OPEN COLOSTOMY CLOSURE  -     Full code; Standing  -     CT Abdomen Pelvis With Contrast; Future; Expected date: 06/10/2019    Other orders  -     lidocaine (PF) 10 mg/ml (1%) injection 10 mg  -     Progressive Mobility Protocol (mobilize patient to their highest level of functioning at least twice daily); Standing  -     Ambulate; Standing  -     celecoxib capsule 400 mg  -     gabapentin capsule 800 mg      Rishabh Connelly MD  Colon and Rectal Surgery  [facility]

## 2019-06-11 ENCOUNTER — PATIENT MESSAGE (OUTPATIENT)
Dept: SURGERY | Facility: HOSPITAL | Age: 79
End: 2019-06-11

## 2019-06-11 ENCOUNTER — TELEPHONE (OUTPATIENT)
Dept: SURGERY | Facility: CLINIC | Age: 79
End: 2019-06-11

## 2019-06-11 LAB — PREALB SERPL-MCNC: 18 MG/DL (ref 20–43)

## 2019-06-12 ENCOUNTER — OFFICE VISIT (OUTPATIENT)
Dept: OPHTHALMOLOGY | Facility: CLINIC | Age: 79
End: 2019-06-12
Payer: MEDICARE

## 2019-06-12 ENCOUNTER — CLINICAL SUPPORT (OUTPATIENT)
Dept: AUDIOLOGY | Facility: CLINIC | Age: 79
End: 2019-06-12
Payer: MEDICARE

## 2019-06-12 DIAGNOSIS — H90.3 SENSORINEURAL HEARING LOSS, BILATERAL: Primary | ICD-10-CM

## 2019-06-12 DIAGNOSIS — Z96.1 PSEUDOPHAKIA OF BOTH EYES: Primary | ICD-10-CM

## 2019-06-12 DIAGNOSIS — Z13.5 GLAUCOMA SCREENING: ICD-10-CM

## 2019-06-12 DIAGNOSIS — H52.7 REFRACTIVE ERROR: ICD-10-CM

## 2019-06-12 PROCEDURE — 92567 TYMPANOMETRY: CPT | Mod: S$GLB,,, | Performed by: AUDIOLOGIST-HEARING AID FITTER

## 2019-06-12 PROCEDURE — 99999 PR PBB SHADOW E&M-EST. PATIENT-LVL I: ICD-10-PCS | Mod: PBBFAC,,, | Performed by: OPTOMETRIST

## 2019-06-12 PROCEDURE — 92557 COMPREHENSIVE HEARING TEST: CPT | Mod: S$GLB,,, | Performed by: AUDIOLOGIST-HEARING AID FITTER

## 2019-06-12 PROCEDURE — 92004 COMPRE OPH EXAM NEW PT 1/>: CPT | Mod: S$GLB,,, | Performed by: OPTOMETRIST

## 2019-06-12 PROCEDURE — 92557 PR COMPREHENSIVE HEARING TEST: ICD-10-PCS | Mod: S$GLB,,, | Performed by: AUDIOLOGIST-HEARING AID FITTER

## 2019-06-12 PROCEDURE — 92015 DETERMINE REFRACTIVE STATE: CPT | Mod: S$GLB,,, | Performed by: OPTOMETRIST

## 2019-06-12 PROCEDURE — 92567 PR TYMPA2METRY: ICD-10-PCS | Mod: S$GLB,,, | Performed by: AUDIOLOGIST-HEARING AID FITTER

## 2019-06-12 PROCEDURE — 99999 PR PBB SHADOW E&M-EST. PATIENT-LVL I: CPT | Mod: PBBFAC,,, | Performed by: OPTOMETRIST

## 2019-06-12 PROCEDURE — 92015 PR REFRACTION: ICD-10-PCS | Mod: S$GLB,,, | Performed by: OPTOMETRIST

## 2019-06-12 PROCEDURE — 92004 PR EYE EXAM, NEW PATIENT,COMPREHESV: ICD-10-PCS | Mod: S$GLB,,, | Performed by: OPTOMETRIST

## 2019-06-12 NOTE — PROGRESS NOTES
HPI     New Patient  Pseudophakia, OU  Screening for glaucoma  RE  No visual complaints   Left glasses at home     Last edited by Case Silverio MA on 6/12/2019  8:52 AM. (History)            Assessment /Plan     For exam results, see Encounter Report.    Pseudophakia of both eyes    Glaucoma screening    Refractive error      Stable PIOL OU.  OH ok ou.  Spec Rx given.  RTC one year.

## 2019-06-12 NOTE — PROGRESS NOTES
Referring Provider:Dr. Juana Lopez was seen 06/12/2019 for an audiological evaluation.  Patient complains of hearing loss that has become more noticeable within the last year. She denies hearing loss, otalgia, aural fullness, and vertigo. She denies noise exposure and family hx of hearing loss. She has dementia w/o behavioral disturbance.     Otoscopy was unremarkable bilaterally. Results reveal a mild-to-severe sensorineural hearing loss 250-8000 Hz for the right ear, and a mild-to-severe sensorineural hearing loss 250-8000 Hz for the left ear.   Speech Reception Thresholds were  35 dBHL for the right ear and 35 dBHL for the left ear.   Word recognition scores were excellent for the right ear and excellent for the left ear.   Tympanograms were Type A, normal for the right ear and Type A, normal for the left ear.    Patient was counseled on the above findings.    Recommendations:  1. Binaural hearing aids. Patient was counseled regarding potential hearing aid benefits and was encouraged to contact [facility] [employer] regarding hearing aids.She was provided a copy of her audiogram.  2. Annual Audiograms.

## 2019-06-25 ENCOUNTER — TELEPHONE (OUTPATIENT)
Dept: RADIOLOGY | Facility: HOSPITAL | Age: 79
End: 2019-06-25

## 2019-06-26 ENCOUNTER — HOSPITAL ENCOUNTER (OUTPATIENT)
Dept: RADIOLOGY | Facility: HOSPITAL | Age: 79
Discharge: HOME | End: 2019-06-26
Attending: COLON & RECTAL SURGERY
Payer: MEDICARE

## 2019-06-26 ENCOUNTER — PATIENT MESSAGE (OUTPATIENT)
Dept: SURGERY | Facility: HOSPITAL | Age: 79
End: 2019-06-26

## 2019-06-26 DIAGNOSIS — K57.20 DIVERTICULITIS OF LARGE INTESTINE WITH PERFORATION AND ABSCESS WITHOUT BLEEDING: ICD-10-CM

## 2019-06-26 PROCEDURE — 74177 CT ABDOMEN PELVIS WITH CONTRAST: ICD-10-PCS | Mod: 26,,, | Performed by: RADIOLOGY

## 2019-06-26 PROCEDURE — 25500020 PHARM REV CODE 255: Performed by: COLON & RECTAL SURGERY

## 2019-06-26 PROCEDURE — 74177 CT ABD & PELVIS W/CONTRAST: CPT | Mod: 26,,, | Performed by: RADIOLOGY

## 2019-06-26 PROCEDURE — 74177 CT ABD & PELVIS W/CONTRAST: CPT | Mod: TC

## 2019-06-26 RX ADMIN — IOHEXOL 75 ML: 350 INJECTION, SOLUTION INTRAVENOUS at 02:06

## 2019-06-26 RX ADMIN — IOHEXOL 50 ML: 350 INJECTION, SOLUTION INTRAVENOUS at 02:06

## 2019-06-27 ENCOUNTER — PATIENT MESSAGE (OUTPATIENT)
Dept: INTERNAL MEDICINE | Facility: CLINIC | Age: 79
End: 2019-06-27

## 2019-06-28 ENCOUNTER — PATIENT MESSAGE (OUTPATIENT)
Dept: INTERNAL MEDICINE | Facility: CLINIC | Age: 79
End: 2019-06-28

## 2019-06-28 ENCOUNTER — PATIENT MESSAGE (OUTPATIENT)
Dept: SURGERY | Facility: HOSPITAL | Age: 79
End: 2019-06-28

## 2019-07-05 ENCOUNTER — PATIENT MESSAGE (OUTPATIENT)
Dept: SURGERY | Facility: HOSPITAL | Age: 79
End: 2019-07-05

## 2019-07-05 ENCOUNTER — NURSE TRIAGE (OUTPATIENT)
Dept: ADMINISTRATIVE | Facility: CLINIC | Age: 79
End: 2019-07-05

## 2019-07-05 NOTE — TELEPHONE ENCOUNTER
Reason for Disposition   Seeing, hearing, or feeling things that are not there (i.e., visual, auditory, or tactile hallucinations)    Protocols used: CONFUSION - DELIRIUM-A-AH    Daughter was concerned because pt is starting to talk to people that are not present and she believes what she is saying is true. Daughter states pt has dementia but states it has never been this bad, she was curious to see if pt could be placed on a mild sedative, I advised her that she would have to be assessed before any medications could be added and providers usually do not put geriatrics with dementia on sedatives because it increases the risk for falls. Advised her to have pt assessed in the ER, caller states she is going to call the sitter and advise her to take her in if it worsens, advised her to call back with any needs, concerns, or questions, caller agrees

## 2019-07-08 ENCOUNTER — PATIENT MESSAGE (OUTPATIENT)
Dept: SURGERY | Facility: HOSPITAL | Age: 79
End: 2019-07-08

## 2019-07-11 ENCOUNTER — TELEPHONE (OUTPATIENT)
Dept: SURGERY | Facility: CLINIC | Age: 79
End: 2019-07-11

## 2019-07-11 NOTE — TELEPHONE ENCOUNTER
"Spoke to Myla, pt's daughter - Advised Myla that Dr Connelly stated that pt has to be present at the appt for Monday 7/15 - Myla asked "Even though my mom has dementia and won't remember?" - Advised that Dr Connelly is aware and still states that pt has to be present at appt. - Myla then asked "Even though I have medical POA" -Advised that Dr Connelly is also aware of this and still states that pt has to be present during an appt to discuss her care - Myla stated "Okay I will let my brothers and sister know and I guess I will keep the appt for now and will call back if they decide they want me to cancel it." - Advised of understanding  "

## 2019-07-15 ENCOUNTER — OFFICE VISIT (OUTPATIENT)
Dept: SURGERY | Facility: CLINIC | Age: 79
End: 2019-07-15
Payer: MEDICARE

## 2019-07-15 VITALS
SYSTOLIC BLOOD PRESSURE: 142 MMHG | WEIGHT: 153 LBS | DIASTOLIC BLOOD PRESSURE: 80 MMHG | BODY MASS INDEX: 27.98 KG/M2 | HEART RATE: 58 BPM | TEMPERATURE: 98 F

## 2019-07-15 DIAGNOSIS — N82.3 RECTOVAGINAL FISTULA: ICD-10-CM

## 2019-07-15 DIAGNOSIS — N82.4 COLOVAGINAL FISTULA: ICD-10-CM

## 2019-07-15 DIAGNOSIS — K57.20 DIVERTICULITIS OF LARGE INTESTINE WITH PERFORATION AND ABSCESS WITHOUT BLEEDING: Primary | ICD-10-CM

## 2019-07-15 PROCEDURE — 99499 UNLISTED E&M SERVICE: CPT | Mod: S$GLB,,, | Performed by: COLON & RECTAL SURGERY

## 2019-07-15 PROCEDURE — 99999 PR PBB SHADOW E&M-EST. PATIENT-LVL III: ICD-10-PCS | Mod: PBBFAC,,, | Performed by: COLON & RECTAL SURGERY

## 2019-07-15 PROCEDURE — 1101F PT FALLS ASSESS-DOCD LE1/YR: CPT | Mod: CPTII,S$GLB,, | Performed by: COLON & RECTAL SURGERY

## 2019-07-15 PROCEDURE — 99499 RISK ADDL DX/OHS AUDIT: ICD-10-PCS | Mod: S$GLB,,, | Performed by: COLON & RECTAL SURGERY

## 2019-07-15 PROCEDURE — 3079F DIAST BP 80-89 MM HG: CPT | Mod: CPTII,S$GLB,, | Performed by: COLON & RECTAL SURGERY

## 2019-07-15 PROCEDURE — 3077F PR MOST RECENT SYSTOLIC BLOOD PRESSURE >= 140 MM HG: ICD-10-PCS | Mod: CPTII,S$GLB,, | Performed by: COLON & RECTAL SURGERY

## 2019-07-15 PROCEDURE — 3077F SYST BP >= 140 MM HG: CPT | Mod: CPTII,S$GLB,, | Performed by: COLON & RECTAL SURGERY

## 2019-07-15 PROCEDURE — 99214 OFFICE O/P EST MOD 30 MIN: CPT | Mod: S$GLB,,, | Performed by: COLON & RECTAL SURGERY

## 2019-07-15 PROCEDURE — 99214 PR OFFICE/OUTPT VISIT, EST, LEVL IV, 30-39 MIN: ICD-10-PCS | Mod: S$GLB,,, | Performed by: COLON & RECTAL SURGERY

## 2019-07-15 PROCEDURE — 3079F PR MOST RECENT DIASTOLIC BLOOD PRESSURE 80-89 MM HG: ICD-10-PCS | Mod: CPTII,S$GLB,, | Performed by: COLON & RECTAL SURGERY

## 2019-07-15 PROCEDURE — 99999 PR PBB SHADOW E&M-EST. PATIENT-LVL III: CPT | Mod: PBBFAC,,, | Performed by: COLON & RECTAL SURGERY

## 2019-07-15 PROCEDURE — 1101F PR PT FALLS ASSESS DOC 0-1 FALLS W/OUT INJ PAST YR: ICD-10-PCS | Mod: CPTII,S$GLB,, | Performed by: COLON & RECTAL SURGERY

## 2019-07-16 NOTE — PROGRESS NOTES
History & Physical    SUBJECTIVE:     CC: Discuss ostomy reversal  Ref: Dr. Reece Hogan    History of Present Illness:  Patient is a 78 y.o. female presents for discussion of possible ostomy reversal.  Patient previously underwent open sigmoid colectomy with end-colostomy (Maribel's procedure) for perforated sigmoid diverticulitis in January 2019 by Dr. Hogan.  She had a postoperative course complicated by an abscess requiring percutaneous drainage. An initial colonoscopy was attempted be performed in January 2019 prior to surgery but was unable to be completed due to severe inflammation of the sigmoid colon.  She has never had a full colonoscopy and had not had one attempted prior to that one or since that one.  She states that over the past few months since her Maribel's procedure, she has developed some mucus drainage both from the rectum and from the vagina.  She states she wears a pad in her underwear that she sees drainage more from the vagina then from the rectum.  She rarely expelled any mucus from the rectum but does so after sitting on the toilet like a normal bowel movement.  She denies any blood via her stool or via rectum.  Her only previous surgical history prior to this is a laparoscopic appendectomy around 10 years ago.  She has no family history of colorectal cancer or IBD.  She states she has felt well over the past 2 months after recovering from the surgery by eating a regular diet and increasing her physical activity.    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 Pt continues with mucous drainage from vagina. Still with good ostomy output. Returns today with all of her adult children to discuss future management and possible surgical intervention. Does report some issues with diarrhea and difficulty with bowel control prior to diverticutlitis episode prior to ostomy construction.    Review of patient's allergies  indicates:  No Known Allergies    Current Outpatient Medications   Medication Sig Dispense Refill    atorvastatin (LIPITOR) 10 MG tablet Take 1 tablet (10 mg total) by mouth once daily. 90 tablet 3    donepezil (ARICEPT) 10 MG tablet Take 1 tablet (10 mg total) by mouth every evening. 90 tablet 3    furosemide (LASIX) 20 MG tablet Take 1 tablet (20 mg total) by mouth daily as needed. 30 tablet 11    memantine (NAMENDA) 10 MG Tab 10mg po bid. 180 tablet 3    metroNIDAZOLE (FLAGYL) 500 MG tablet Take 1 tablet (500 mg total) by mouth 3 (three) times daily. Take initial dose@2pm, second dose@3pm and final dose@10pm day before surgery 3 tablet 0    neomycin (MYCIFRADIN) 500 mg Tab Take 2 tablets (1,000 mg total) by mouth 3 (three) times daily. Take 1st dose@2pm,take 2nd dose@3pm, take last dose@10pm day before surgery 6 tablet 0    ondansetron (ZOFRAN) 4 MG tablet Take 1 tablet (4 mg total) by mouth every 8 (eight) hours as needed for Nausea. 20 tablet 0    polyethylene glycol (GLYCOLAX) 17 gram/dose powder Take 238 g by mouth once daily. Mix with 2L of gatorade, drink all mixed solution starting@12pm day before surgery, finish by 10pm 1 Bottle 0    thyroid, pork, (ARMOUR THYROID) 30 mg Tab Take 1 tablet (30 mg total) by mouth once daily. 90 tablet 3    triamcinolone acetonide 0.1% (KENALOG) 0.1 % ointment APPLY TOPICALLY 4 (FOUR) TIMES DAILY. TO RASH ON FACE AND CHEST AND NOSE  1     Current Facility-Administered Medications   Medication Dose Route Frequency Provider Last Rate Last Dose    lidocaine (PF) 10 mg/ml (1%) injection 10 mg  1 mL Intradermal Once Rishabh Connelly MD           Past Medical History:   Diagnosis Date    Diverticulitis     High cholesterol     Hypertension     Hypothyroidism      Past Surgical History:   Procedure Laterality Date    APPENDECTOMY  2008    CATARACT EXTRACTION      Dr Raygoza    COLECTOMY, SIGMOID Left 1/2/2019    [facility]    COLON  SURGERY      COLONOSCOPY N/A 6/7/2019    [facility] ENDO    COLONOSCOPY N/A 1/2/2019    [facility] at [facility] ENDO    CREATION, COLOSTOMY Left 1/2/2019    [facility] at [facility] OR    SHAHIDA PROCEDURE N/A 1/2/2019    [facility] at [facility] OR    LYSIS, ADHESIONS N/A 1/2/2019    [facility] at [facility] OR    VITRECTOMY Right 09/2013     Family History   Problem Relation Age of Onset    Alzheimer's disease Mother     Aneurysm Father         brain    Stomach cancer Brother         50s     Social History     Tobacco Use    Smoking status: Former Smoker    Smokeless tobacco: Never Used   Substance Use Topics    Alcohol use: No    Drug use: No        Review of Systems:  Review of Systems   Constitutional: Negative for activity change, appetite change, chills, fatigue, fever and unexpected weight change.   HENT: Negative for congestion, ear pain, sore throat and trouble swallowing.    Eyes: Negative for pain, redness and itching.   Respiratory: Negative for cough, shortness of breath and wheezing.    Cardiovascular: Negative for chest pain, palpitations and leg swelling.   Gastrointestinal: Negative for abdominal distention, abdominal pain, anal bleeding, blood in stool, constipation, diarrhea, nausea, rectal pain and vomiting.   Endocrine: Negative for cold intolerance, heat intolerance and polyuria.   Genitourinary: Positive for vaginal discharge. Negative for dysuria, flank pain, frequency and hematuria.   Musculoskeletal: Negative for gait problem, joint swelling and neck pain.   Skin: Negative for color change, rash and wound.   Allergic/Immunologic: Negative for environmental allergies and immunocompromised state.   Neurological: Negative for dizziness, speech difficulty, weakness and numbness.   Psychiatric/Behavioral: Negative for agitation, confusion and hallucinations.       OBJECTIVE:     Vital Signs (Most  Recent)  Temp: 97.7 °F (36.5 °C) (07/15/19 1628)  Pulse: (!) 58 (07/15/19 1628)  BP: (!) 142/80 (07/15/19 1628)     69.4 kg (153 lb)     Physical Exam:  Physical Exam   Constitutional: She is oriented to person, place, and time. She appears well-developed.   HENT:   Head: Normocephalic and atraumatic.   Eyes: Conjunctivae and EOM are normal.   Neck: Normal range of motion. No thyromegaly present.   Cardiovascular: Normal rate and regular rhythm.   Pulmonary/Chest: Effort normal. No respiratory distress.   Abdominal: Soft. She exhibits no distension and no mass. There is no tenderness.   Midline incision well-healed; ostomy pink and viable with soft stool in bag   Musculoskeletal: Normal range of motion. She exhibits no edema or tenderness.   Neurological: She is alert and oriented to person, place, and time.   Skin: Skin is warm and dry. Capillary refill takes less than 2 seconds. No rash noted.   Psychiatric: She has a normal mood and affect.       Laboratory  Lab Results   Component Value Date    WBC 6.80 06/10/2019    HGB 13.4 06/10/2019    HCT 40.4 06/10/2019     06/10/2019    CHOL 165 04/01/2019    TRIG 62 04/01/2019    HDL 61 04/01/2019    ALT 15 06/10/2019    AST 26 06/10/2019     06/10/2019    K 4.2 06/10/2019     06/10/2019    CREATININE 0.8 06/10/2019    BUN 13 06/10/2019    CO2 23 06/10/2019    TSH 1.629 04/01/2019    INR 1.0 06/10/2019    HGBA1C 5.0 04/01/2019       Results for orders placed during the hospital encounter of 10/04/18   CT Abdomen Pelvis W Wo Contrast    Narrative EXAMINATION:  CT ABDOMEN PELVIS W WO CONTRAST    CLINICAL HISTORY:  Abdominal pain, unspecified;abnormal abdominal x-ray;.    TECHNIQUE:  Low dose axial images, sagittal and coronal reformations were obtained from the lung bases to the pubic symphysis.  Images were obtained both with and without IV contrast.    COMPARISON:  None    FINDINGS:  Lung bases are clear.    The liver is normal.  The gallbladder is  normal.    The spleen is normal in size and appearance.  The pancreas is normal.  The adrenal glands are normal.  The aorta and IVC are normal.  No retroperitoneal adenopathy.    Scarring of the lateral and upper left kidney is present but otherwise the left kidney is normal.  Left ureter is normal.  Severe scarring identified throughout the right kidney.  A very large calculus is identified within the left renal pelvis measuring 3.0 cm.  No significant right hydronephrosis.  The right ureter is nondilated and normal.    Small hiatal hernia otherwise the stomach is normal.  The small intestine is normal.  Appendix surgically absent.  Diverticulosis of the sigmoid colon is identified with colonic wall thickening and severe surrounding inflammatory changes.  Findings compatible acute diverticulitis.  Negative for pneumoperitoneum or abscess.  The rectum is normal.    The pelvis demonstrates normal appearing bladder.  Uterus is unremarkable.  Left adnexa region is normal.  To check the calcifications identified within an atrophic right ovary, measuring 0.7 cm in maximum dimension.    Regional bones demonstrate a moderate grade compression fracture of T11.  No suspicious bony abnormality detected.  Abdominal and pelvic wall soft tissues are normal.      Impression 1. Severe acute diverticulitis sigmoid colon.  2. 3.0 cm calculus right renal pelvis.  Extensive scarring of the right kidney.  3. Hiatal hernia.  All CT scans at this facility are performed  using dose modulation techniques as appropriate to performed exam including the following:  automated exposure control; adjustment of mA and/or kV according to the patients size (this includes techniques or standardized protocols for targeted exams where dose is matched to indication/reason for exam: i.e. extremities or head);  iterative reconstruction technique.      Electronically signed by: Dangelo Black MD  Date:    10/04/2018  Time:    19:10         Diagnostic  Results:  Labs: Reviewed  CT: Reviewed  Independently reviewed the previous CT scan showing acute sigmoid diverticulitis as well as the postoperative scan indicating the postoperative abscess that was subsequently drained appropriately well  Colonoscopy:  Reviewed    CT abd/pelvis with rectal contrast 6/26/19:   FINDINGS:  Visualized Chest: Lung bases are clear    Abdomen:    Liver: Within normal limits.    Gallbladder and biliary: Within normal limits.    Spleen: Within normal limits.    Pancreas: Within normal limits.    Adrenals: Within normal limits.    Kidneys: Large laminated stone again noted in the right renal pelvis measuring up to 3 cm.  Atrophy of the right kidney again noted.  Left kidney and left-sided collecting system appear within normal limits.    Bowel: Postoperative findings related to prior sigmoid colectomy with left lower quadrant colostomy and Ayala's pouch again noted.  The rectum is filled with contrast.  There is some contrast seen extending into the vagina via a small fistulous tract arising from the anterior wall of the mid rectum.  There also appears to be a 2nd fistulous tract arising from the proximal margins of the Maribel's pouch which extends into adjacent loops of small bowel.  Some mild inflammatory stranding noted in the adjacent fat.  No findings to suggest obstruction.    Peritoneum: No ascites or pneumoperitoneum.    Abdominal adenopathy: None.    Vasculature: Within normal limits.    Pelvis:    Urinary bladder: Unremarkable.    Uterus and adnexa: Uterus and left adnexa appear grossly unremarkable.  There are 2 adjacent calcific densities in the right adnexa each measuring approximately 9 mm in diameter which appear closely associated with the right ovary and likely represent phleboliths present within the gonadal vein.  No definite associated fat to suggest dermoid cyst.    Pelvic adenopathy: None.    Bones: Chronic T11 superior endplate compression fracture deformity is  unchanged.    Miscellaneous: None.      Impression       1. 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  2. 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 Postoperative findings related to prior sigmoidectomy with left lower quadrant colostomy.  4. Unchanged atrophic appearance of the right kidney with a large stone present at the renal pelvis.  5. Unchanged calcifications in the right adnexal region, likely representing phleboliths within the gonadal vein.  No definite associated fat to suggest dermoid cyst.         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 Discussed with the patient and her children that resecting the sigmoid and part of the rectum will likely increase the frequency of her bowel movements and may make her preoperative control problems worse, especially with the likelihood of more liquid stool. Also discussed that the likelihood of a diverting loop ileostomy would increase the further down the rectum dissection and resection would be necessary and that loop ileostomy have thinner output that are more prone to leaks than her current colostomy.  - After discussion, the patient and her children would like to postpone any surgical intervention and stay with her current anatomy of an end colostomy as the risk of having an outcome with surgical  intervention that would prevent her from enjoying her current lifestyle is too high to accept, which I think is very reasonable.  - RTC PRN    Rishabh Connelly MD  Colon and Rectal Surgery  [facility]

## 2019-07-17 ENCOUNTER — PATIENT MESSAGE (OUTPATIENT)
Dept: SURGERY | Facility: CLINIC | Age: 79
End: 2019-07-17

## 2019-07-19 ENCOUNTER — PATIENT MESSAGE (OUTPATIENT)
Dept: INTERNAL MEDICINE | Facility: CLINIC | Age: 79
End: 2019-07-19

## 2019-07-19 ENCOUNTER — TELEPHONE (OUTPATIENT)
Dept: INTERNAL MEDICINE | Facility: CLINIC | Age: 79
End: 2019-07-19

## 2019-07-19 DIAGNOSIS — Z93.3 COLOSTOMY IN PLACE: ICD-10-CM

## 2019-07-19 DIAGNOSIS — K57.20 DIVERTICULITIS OF LARGE INTESTINE WITH PERFORATION AND ABSCESS WITHOUT BLEEDING: Primary | ICD-10-CM

## 2019-07-19 NOTE — TELEPHONE ENCOUNTER
----- Message from Chacho Triana sent at 7/19/2019  3:25 PM CDT -----  Contact: colon and rectal Dr. Nuzhat Ken([facility])  Please give Isis a call at [phone] if there are any questions and  she is requesting any imaging notes to be faxed over to [phone]

## 2019-07-23 ENCOUNTER — PATIENT MESSAGE (OUTPATIENT)
Dept: SURGERY | Facility: CLINIC | Age: 79
End: 2019-07-23

## 2019-07-25 ENCOUNTER — PATIENT MESSAGE (OUTPATIENT)
Dept: INTERNAL MEDICINE | Facility: CLINIC | Age: 79
End: 2019-07-25

## 2019-08-12 ENCOUNTER — PATIENT MESSAGE (OUTPATIENT)
Dept: SURGERY | Facility: CLINIC | Age: 79
End: 2019-08-12

## 2019-09-17 ENCOUNTER — PATIENT MESSAGE (OUTPATIENT)
Dept: INTERNAL MEDICINE | Facility: CLINIC | Age: 79
End: 2019-09-17

## 2019-09-17 DIAGNOSIS — F01.50 VASCULAR DEMENTIA WITHOUT BEHAVIORAL DISTURBANCE: ICD-10-CM

## 2019-09-18 ENCOUNTER — PATIENT MESSAGE (OUTPATIENT)
Dept: INTERNAL MEDICINE | Facility: CLINIC | Age: 79
End: 2019-09-18

## 2019-09-18 RX ORDER — DONEPEZIL HYDROCHLORIDE 10 MG/1
10 TABLET, FILM COATED ORAL NIGHTLY
Qty: 30 TABLET | Refills: 0 | Status: SHIPPED | OUTPATIENT
Start: 2019-09-18 | End: 2019-09-19 | Stop reason: SDUPTHER

## 2019-09-18 NOTE — TELEPHONE ENCOUNTER
Sent 30 day today to [facility]. Please send me rx request for 90 day supply for mail order tomorrow so it will not cancel out the local 30 day script.

## 2019-09-19 RX ORDER — DONEPEZIL HYDROCHLORIDE 10 MG/1
10 TABLET, FILM COATED ORAL NIGHTLY
Qty: 90 TABLET | Refills: 3 | Status: SHIPPED | OUTPATIENT
Start: 2019-09-19 | End: 2019-12-26 | Stop reason: SDUPTHER

## 2019-10-01 ENCOUNTER — LAB VISIT (OUTPATIENT)
Dept: LAB | Facility: HOSPITAL | Age: 79
End: 2019-10-01
Attending: FAMILY MEDICINE
Payer: MEDICARE

## 2019-10-01 ENCOUNTER — OFFICE VISIT (OUTPATIENT)
Dept: INTERNAL MEDICINE | Facility: CLINIC | Age: 79
End: 2019-10-01
Payer: MEDICARE

## 2019-10-01 ENCOUNTER — OFFICE VISIT (OUTPATIENT)
Dept: CARDIOLOGY | Facility: CLINIC | Age: 79
End: 2019-10-01
Payer: MEDICARE

## 2019-10-01 VITALS
BODY MASS INDEX: 30.44 KG/M2 | OXYGEN SATURATION: 98 % | HEIGHT: 62 IN | DIASTOLIC BLOOD PRESSURE: 80 MMHG | HEART RATE: 49 BPM | SYSTOLIC BLOOD PRESSURE: 132 MMHG | WEIGHT: 165.38 LBS

## 2019-10-01 VITALS
HEART RATE: 60 BPM | DIASTOLIC BLOOD PRESSURE: 80 MMHG | BODY MASS INDEX: 30.44 KG/M2 | SYSTOLIC BLOOD PRESSURE: 110 MMHG | WEIGHT: 165.38 LBS | HEIGHT: 62 IN | TEMPERATURE: 98 F

## 2019-10-01 DIAGNOSIS — K57.32 DIVERTICULITIS OF SIGMOID COLON: ICD-10-CM

## 2019-10-01 DIAGNOSIS — I77.1 TORTUOUS AORTA: ICD-10-CM

## 2019-10-01 DIAGNOSIS — E78.00 PURE HYPERCHOLESTEROLEMIA: ICD-10-CM

## 2019-10-01 DIAGNOSIS — Z93.3 COLOSTOMY IN PLACE: ICD-10-CM

## 2019-10-01 DIAGNOSIS — E03.9 HYPOTHYROIDISM, UNSPECIFIED TYPE: Primary | ICD-10-CM

## 2019-10-01 DIAGNOSIS — F01.50 VASCULAR DEMENTIA WITHOUT BEHAVIORAL DISTURBANCE: ICD-10-CM

## 2019-10-01 DIAGNOSIS — I10 ESSENTIAL HYPERTENSION: ICD-10-CM

## 2019-10-01 DIAGNOSIS — I10 ESSENTIAL HYPERTENSION: Primary | ICD-10-CM

## 2019-10-01 DIAGNOSIS — E03.9 HYPOTHYROIDISM, UNSPECIFIED TYPE: ICD-10-CM

## 2019-10-01 PROBLEM — E44.0 MODERATE PROTEIN-CALORIE MALNUTRITION: Status: RESOLVED | Noted: 2018-11-08 | Resolved: 2019-10-01

## 2019-10-01 PROCEDURE — 36415 COLL VENOUS BLD VENIPUNCTURE: CPT | Mod: PO

## 2019-10-01 PROCEDURE — G0008 ADMIN INFLUENZA VIRUS VAC: HCPCS | Mod: S$GLB,,, | Performed by: FAMILY MEDICINE

## 2019-10-01 PROCEDURE — 3079F DIAST BP 80-89 MM HG: CPT | Mod: CPTII,S$GLB,, | Performed by: FAMILY MEDICINE

## 2019-10-01 PROCEDURE — 3079F DIAST BP 80-89 MM HG: CPT | Mod: CPTII,S$GLB,, | Performed by: INTERNAL MEDICINE

## 2019-10-01 PROCEDURE — 99999 PR PBB SHADOW E&M-EST. PATIENT-LVL IV: CPT | Mod: PBBFAC,,, | Performed by: INTERNAL MEDICINE

## 2019-10-01 PROCEDURE — 99499 UNLISTED E&M SERVICE: CPT | Mod: S$GLB,,, | Performed by: FAMILY MEDICINE

## 2019-10-01 PROCEDURE — 99214 PR OFFICE/OUTPT VISIT, EST, LEVL IV, 30-39 MIN: ICD-10-PCS | Mod: 25,S$GLB,, | Performed by: FAMILY MEDICINE

## 2019-10-01 PROCEDURE — 99214 PR OFFICE/OUTPT VISIT, EST, LEVL IV, 30-39 MIN: ICD-10-PCS | Mod: S$GLB,,, | Performed by: INTERNAL MEDICINE

## 2019-10-01 PROCEDURE — 99999 PR PBB SHADOW E&M-EST. PATIENT-LVL III: CPT | Mod: PBBFAC,,, | Performed by: FAMILY MEDICINE

## 2019-10-01 PROCEDURE — 99999 PR PBB SHADOW E&M-EST. PATIENT-LVL IV: ICD-10-PCS | Mod: PBBFAC,,, | Performed by: INTERNAL MEDICINE

## 2019-10-01 PROCEDURE — 90662 IIV NO PRSV INCREASED AG IM: CPT | Mod: S$GLB,,, | Performed by: FAMILY MEDICINE

## 2019-10-01 PROCEDURE — 99214 OFFICE O/P EST MOD 30 MIN: CPT | Mod: S$GLB,,, | Performed by: INTERNAL MEDICINE

## 2019-10-01 PROCEDURE — 1101F PT FALLS ASSESS-DOCD LE1/YR: CPT | Mod: CPTII,S$GLB,, | Performed by: FAMILY MEDICINE

## 2019-10-01 PROCEDURE — 99214 OFFICE O/P EST MOD 30 MIN: CPT | Mod: 25,S$GLB,, | Performed by: FAMILY MEDICINE

## 2019-10-01 PROCEDURE — 3075F PR MOST RECENT SYSTOLIC BLOOD PRESS GE 130-139MM HG: ICD-10-PCS | Mod: CPTII,S$GLB,, | Performed by: INTERNAL MEDICINE

## 2019-10-01 PROCEDURE — 3075F SYST BP GE 130 - 139MM HG: CPT | Mod: CPTII,S$GLB,, | Performed by: INTERNAL MEDICINE

## 2019-10-01 PROCEDURE — 3074F SYST BP LT 130 MM HG: CPT | Mod: CPTII,S$GLB,, | Performed by: FAMILY MEDICINE

## 2019-10-01 PROCEDURE — 3079F PR MOST RECENT DIASTOLIC BLOOD PRESSURE 80-89 MM HG: ICD-10-PCS | Mod: CPTII,S$GLB,, | Performed by: FAMILY MEDICINE

## 2019-10-01 PROCEDURE — 1101F PR PT FALLS ASSESS DOC 0-1 FALLS W/OUT INJ PAST YR: ICD-10-PCS | Mod: CPTII,S$GLB,, | Performed by: INTERNAL MEDICINE

## 2019-10-01 PROCEDURE — 1101F PT FALLS ASSESS-DOCD LE1/YR: CPT | Mod: CPTII,S$GLB,, | Performed by: INTERNAL MEDICINE

## 2019-10-01 PROCEDURE — 84443 ASSAY THYROID STIM HORMONE: CPT

## 2019-10-01 PROCEDURE — 84439 ASSAY OF FREE THYROXINE: CPT

## 2019-10-01 PROCEDURE — G0008 FLU VACCINE - HIGH DOSE (65+) PRESERVATIVE FREE IM: ICD-10-PCS | Mod: S$GLB,,, | Performed by: FAMILY MEDICINE

## 2019-10-01 PROCEDURE — 3074F PR MOST RECENT SYSTOLIC BLOOD PRESSURE < 130 MM HG: ICD-10-PCS | Mod: CPTII,S$GLB,, | Performed by: FAMILY MEDICINE

## 2019-10-01 PROCEDURE — 1101F PR PT FALLS ASSESS DOC 0-1 FALLS W/OUT INJ PAST YR: ICD-10-PCS | Mod: CPTII,S$GLB,, | Performed by: FAMILY MEDICINE

## 2019-10-01 PROCEDURE — 99999 PR PBB SHADOW E&M-EST. PATIENT-LVL III: ICD-10-PCS | Mod: PBBFAC,,, | Performed by: FAMILY MEDICINE

## 2019-10-01 PROCEDURE — 3079F PR MOST RECENT DIASTOLIC BLOOD PRESSURE 80-89 MM HG: ICD-10-PCS | Mod: CPTII,S$GLB,, | Performed by: INTERNAL MEDICINE

## 2019-10-01 PROCEDURE — 99499 RISK ADDL DX/OHS AUDIT: ICD-10-PCS | Mod: S$GLB,,, | Performed by: FAMILY MEDICINE

## 2019-10-01 PROCEDURE — 90662 FLU VACCINE - HIGH DOSE (65+) PRESERVATIVE FREE IM: ICD-10-PCS | Mod: S$GLB,,, | Performed by: FAMILY MEDICINE

## 2019-10-01 NOTE — PROGRESS NOTES
Subjective:      Patient ID: Irlanda Lopez is a 79 y.o. female.    Chief Complaint: Follow-up (6m)    Disclaimer:  This note is prepared using voice recognition software and as such is likely to have errors and has not been proof read. Please contact me for questions.     Irlanda Lopez is a 79 y.o. female who presents today for 6 month followup.   Since our last visit she has been meeting with the colorectal surgeon along with her entire family.  Ultimately based on the visit in July they decided to forego any additional surgical interventions at this time in looking for a potential to reverse the colostomy.  They felt that it would affect her quality life at this time.    She does have dementia vascular type which she was  followed by [facility] doctor Jewel. Was having a problem getting [facility] prescription of the aricept.  Now do not think it is an issue.  Is on Namenda 10 mg twice daily.  Not actively seeing the neurologist at this time.  Does have caregivers coming to the house each day to help change her colostomy bag as well as to sit with her for social     thyroid: junior thyroid doing well.  Needing labs today.    HTN- BP is doing well.  Is going to see the cardiologist later today.  Is on Lipitor 10.  Does not have any chest pain. No leg swelling today.    She is willing to do the flu shot today.    In the past she has declined DEXA scans.  Once again brought this up again today as there may be options to treat for any osteopenia or osteoporosis in the event of a fall this would help to reduce any hip fractures are lumbar compression fractures however she declines at this time.  She does report that occasionally she has difficulty bending her right middle finger due to an injury before but she usually squeezes a tennis ball and it seems to help.  She repeated the same comment 3 times during the exam.    Patient Active Problem List:     Hypothyroidism     Essential hypertension      Vascular dementia without behavioral disturbance     High serum vitamin D     Sebaceous cyst     Diverticulitis of sigmoid colon     Hyponatremia     Moderate protein-calorie malnutrition     Candidiasis of anus     Hyperlipidemia     Generalized edema     Diverticulitis of large intestine with perforation and abscess without bleeding     Hypokalemia     Colostomy in place     Tortuous aorta     Intra-abdominal abscess     Abdominal distension     Non-intractable vomiting with nausea     Screening for colon cancer  Wt Readings from Last 5 Encounters:  10/01/19 : 75 kg (165 lb 5.5 oz)  07/15/19 : 69.4 kg (153 lb)  06/10/19 : 68.3 kg (150 lb 9.2 oz)  06/07/19 : 64 kg (141 lb)  05/13/19 : 68 kg (149 lb 14.6 oz)          Lab Results   Component Value Date    WBC 6.80 06/10/2019    HGB 13.4 06/10/2019    HCT 40.4 06/10/2019     06/10/2019    CHOL 165 04/01/2019    TRIG 62 04/01/2019    HDL 61 04/01/2019    ALT 15 06/10/2019    AST 26 06/10/2019     06/10/2019    K 4.2 06/10/2019     06/10/2019    CREATININE 0.8 06/10/2019    BUN 13 06/10/2019    CO2 23 06/10/2019    TSH 1.629 04/01/2019    INR 1.0 06/10/2019    HGBA1C 5.0 04/01/2019       CT Abdomen Pelvis With Contrast  Narrative: EXAMINATION:  CT ABDOMEN PELVIS WITH CONTRAST    CLINICAL HISTORY:  eval for rectovaginal fistula; Diverticulitis of large intestine with perforation and abscess without bleeding    TECHNIQUE:  Low dose axial images, sagittal and coronal reformations were obtained from the lung bases to the pubic symphysis following the IV administration of 75  ML of Omnipaque 350 .  Rectal contrast was also administered.    COMPARISON:  04/25/2019    FINDINGS:  Visualized Chest: Lung bases are clear    Abdomen:    Liver: Within normal limits.    Gallbladder and biliary: Within normal limits.    Spleen: Within normal limits.    Pancreas: Within normal limits.    Adrenals: Within normal limits.    Kidneys: Large laminated stone again noted  in the right renal pelvis measuring up to 3 cm.  Atrophy of the right kidney again noted.  Left kidney and left-sided collecting system appear within normal limits.    Bowel: Postoperative findings related to prior sigmoid colectomy with left lower quadrant colostomy and Ayala's pouch again noted.  The rectum is filled with contrast.  There is some contrast seen extending into the vagina via a small fistulous tract arising from the anterior wall of the mid rectum.  There also appears to be a 2nd fistulous tract arising from the proximal margins of the Maribel's pouch which extends into adjacent loops of small bowel.  Some mild inflammatory stranding noted in the adjacent fat.  No findings to suggest obstruction.    Peritoneum: No ascites or pneumoperitoneum.    Abdominal adenopathy: None.    Vasculature: Within normal limits.    Pelvis:    Urinary bladder: Unremarkable.    Uterus and adnexa: Uterus and left adnexa appear grossly unremarkable.  There are 2 adjacent calcific densities in the right adnexa each measuring approximately 9 mm in diameter which appear closely associated with the right ovary and likely represent phleboliths present within the gonadal vein.  No definite associated fat to suggest dermoid cyst.    Pelvic adenopathy: None.    Bones: Chronic T11 superior endplate compression fracture deformity is unchanged.    Miscellaneous: None.  Impression: 1. 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  2. 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 Postoperative findings related to prior sigmoidectomy with left lower quadrant colostomy.  4. Unchanged atrophic appearance of the right kidney with a large stone present at the renal pelvis.  5. Unchanged calcifications in the right adnexal region, likely representing phleboliths within the gonadal vein.  No definite associated  "fat to suggest dermoid cyst.    Electronically signed by: Rishabh Dougherty MD  Date:    06/26/2019  Time:    15:57        Review of Systems   Constitutional: Positive for appetite change. Negative for activity change, fatigue and unexpected weight change.   Respiratory: Negative for cough and shortness of breath.    Cardiovascular: Negative for chest pain and palpitations.   Gastrointestinal: Negative for abdominal distention and abdominal pain.   Musculoskeletal: Positive for arthralgias.   Psychiatric/Behavioral: Positive for confusion and decreased concentration. Negative for sleep disturbance. The patient is not nervous/anxious.      Objective:     Vitals:    10/01/19 1104   BP: 110/80   Pulse: 60   Temp: 98 °F (36.7 °C)   Weight: 75 kg (165 lb 5.5 oz)   Height: 5' 2" (1.575 m)     Physical Exam   Constitutional: She is oriented to person, place, and time. She appears well-developed and well-nourished.   HENT:   Head: Normocephalic and atraumatic.   Right Ear: Tympanic membrane and external ear normal.   Left Ear: Tympanic membrane and external ear normal.   Mouth/Throat: Oropharynx is clear and moist.   Eyes: EOM are normal.   Neck: Normal range of motion. Neck supple. Carotid bruit is not present. No thyroid mass and no thyromegaly present.   Cardiovascular: Normal rate and regular rhythm. Exam reveals no gallop and no friction rub.   No murmur heard.  Pulmonary/Chest: Effort normal. No respiratory distress. She has no wheezes. She has no rales.   Abdominal: Soft. Bowel sounds are normal. She exhibits no distension. There is no tenderness. There is no rebound.       Musculoskeletal: Normal range of motion. She exhibits no edema.   Lymphadenopathy:     She has no cervical adenopathy.   Neurological: She is alert and oriented to person, place, and time.   Skin: Skin is warm and dry. No rash noted.   Psychiatric: She has a normal mood and affect. Her speech is normal and behavior is normal. Judgment and thought " content normal. Cognition and memory are impaired. She exhibits abnormal recent memory and abnormal remote memory.   Vitals reviewed.    Assessment:     1. Hypothyroidism, unspecified type    2. Essential hypertension    3. Vascular dementia without behavioral disturbance    4. Pure hypercholesterolemia    5. Diverticulitis of sigmoid colon    6. Tortuous aorta    7. Colostomy in place      Plan:   Irlanda was seen today for follow-up.    Diagnoses and all orders for this visit:    Hypothyroidism, unspecified type-on Armor Thyroid repeating labs today.  -     TSH; Future  -     T4, free; Future    Essential hypertension stable at this time off all medications not using Lasix    Vascular dementia without behavioral disturbance no significant change on Aricept and Namenda max dose.  Not currently seeing the neurologist at this time.  Does have daily sitters.  Mood is appropriate    Pure hypercholesterolemia on Lipitor 10 currently seeing Cardiology today    Diverticulitis of sigmoid colon status post colectomy    Tortuous aorta on statin therapy stable    Colostomy in place-currently managed through colorectal surgeon and home health.    Flu shot today.  Declines DEXA scan today.            Follow up in about 6 months (around 4/1/2020) for physical with Dr MANCIA.    There are no Patient Instructions on file for this visit.

## 2019-10-01 NOTE — PROGRESS NOTES
Subjective:   Patient ID:  Irlanda Lopez is a 79 y.o. female who presents for follow-up of Hypertension  Pt has gained her 30 lbs back.Patient denies CP, angina or anginal equivalent.    Hyperlipidemia   This is a chronic problem. The current episode started more than 1 year ago. The problem is controlled. Recent lipid tests were reviewed and are variable. Pertinent negatives include no chest pain or shortness of breath. Current antihyperlipidemic treatment includes statins. The current treatment provides moderate improvement of lipids. There are no compliance problems.  Risk factors for coronary artery disease include dyslipidemia, a sedentary lifestyle and post-menopausal.   Hypertension   This is a chronic problem. The current episode started more than 1 year ago. The problem has been resolved since onset. Pertinent negatives include no chest pain, palpitations or shortness of breath. Risk factors for coronary artery disease include post-menopausal state and family history. The current treatment provides mild improvement.       Review of Systems   Constitution: Negative. Negative for weight gain.   HENT: Negative.    Eyes: Negative.    Cardiovascular: Negative.  Negative for chest pain, leg swelling and palpitations.   Respiratory: Negative.  Negative for shortness of breath.    Endocrine: Negative.    Hematologic/Lymphatic: Negative.    Skin: Negative.    Musculoskeletal: Negative for muscle weakness.   Gastrointestinal: Negative.    Genitourinary: Negative.    Neurological: Negative.  Negative for dizziness.   Psychiatric/Behavioral: Negative.    Allergic/Immunologic: Negative.      Family History   Problem Relation Age of Onset    Alzheimer's disease Mother     Aneurysm Father         brain    Stomach cancer Brother         50s     Past Medical History:   Diagnosis Date    Diverticulitis     High cholesterol     Hypertension     Hypothyroidism      Social History     Socioeconomic History     Marital status:      Spouse name: Not on file    Number of children: Not on file    Years of education: Not on file    Highest education level: Not on file   Occupational History     Comment: [employer]   Social Needs    Financial resource strain: Not on file    Food insecurity:     Worry: Not on file     Inability: Not on file    Transportation needs:     Medical: Not on file     Non-medical: Not on file   Tobacco Use    Smoking status: Former Smoker    Smokeless tobacco: Never Used   Substance and Sexual Activity    Alcohol use: No    Drug use: No    Sexual activity: Never     Comment:    Lifestyle    Physical activity:     Days per week: Not on file     Minutes per session: Not on file    Stress: Not on file   Relationships    Social connections:     Talks on phone: Not on file     Gets together: Not on file     Attends Jewish service: Not on file     Active member of club or organization: Not on file     Attends meetings of clubs or organizations: Not on file     Relationship status: Not on file   Other Topics Concern    Not on file   Social History Narrative    Not on file     Current Outpatient Medications on File Prior to Visit   Medication Sig Dispense Refill    atorvastatin (LIPITOR) 10 MG tablet Take 1 tablet (10 mg total) by mouth once daily. 90 tablet 3    donepezil (ARICEPT) 10 MG tablet Take 1 tablet (10 mg total) by mouth every evening. 90 tablet 3    memantine (NAMENDA) 10 MG Tab 10mg po bid. 180 tablet 3    thyroid, pork, (ARMOUR THYROID) 30 mg Tab Take 1 tablet (30 mg total) by mouth once daily. 90 tablet 3    triamcinolone acetonide 0.1% (KENALOG) 0.1 % ointment APPLY TOPICALLY 4 (FOUR) TIMES DAILY. TO RASH ON FACE AND CHEST AND NOSE  1    furosemide (LASIX) 20 MG tablet Take 1 tablet (20 mg total) by mouth daily as needed. (Patient not taking: Reported on 10/1/2019) 30 tablet 11    [DISCONTINUED] metroNIDAZOLE (FLAGYL) 500 MG tablet Take 1 tablet  (500 mg total) by mouth 3 (three) times daily. Take initial dose@2pm, second dose@3pm and final dose@10pm day before surgery 3 tablet 0    [DISCONTINUED] neomycin (MYCIFRADIN) 500 mg Tab Take 2 tablets (1,000 mg total) by mouth 3 (three) times daily. Take 1st dose@2pm,take 2nd dose@3pm, take last dose@10pm day before surgery 6 tablet 0    [DISCONTINUED] ondansetron (ZOFRAN) 4 MG tablet Take 1 tablet (4 mg total) by mouth every 8 (eight) hours as needed for Nausea. 20 tablet 0    [DISCONTINUED] polyethylene glycol (GLYCOLAX) 17 gram/dose powder Take 238 g by mouth once daily. Mix with 2L of gatorade, drink all mixed solution starting@12pm day before surgery, finish by 10pm 1 Bottle 0     Current Facility-Administered Medications on File Prior to Visit   Medication Dose Route Frequency Provider Last Rate Last Dose    lidocaine (PF) 10 mg/ml (1%) injection 10 mg  1 mL Intradermal Once Rishabh Connelly MD         Review of patient's allergies indicates:  No Known Allergies    Objective:     Physical Exam   Constitutional: She is oriented to person, place, and time. She appears well-developed and well-nourished.   HENT:   Head: Normocephalic and atraumatic.   Eyes: Pupils are equal, round, and reactive to light. Conjunctivae and EOM are normal.   Neck: Normal range of motion. Neck supple.   Cardiovascular: Normal rate, regular rhythm, normal heart sounds and intact distal pulses.   Pulmonary/Chest: Effort normal and breath sounds normal.   Abdominal: Soft. Bowel sounds are normal.   Musculoskeletal: Normal range of motion.   Neurological: She is alert and oriented to person, place, and time.   Skin: Skin is warm and dry.   Psychiatric: She has a normal mood and affect.   Nursing note and vitals reviewed.      Assessment:   1. HLP  2. Hx of HTN      Plan:     Low Na   BP diary

## 2019-10-02 ENCOUNTER — PATIENT MESSAGE (OUTPATIENT)
Dept: INTERNAL MEDICINE | Facility: CLINIC | Age: 79
End: 2019-10-02

## 2019-10-02 LAB
T4 FREE SERPL-MCNC: 0.89 NG/DL (ref 0.71–1.51)
TSH SERPL DL<=0.005 MIU/L-ACNC: 1.09 UIU/ML (ref 0.4–4)

## 2019-10-04 ENCOUNTER — PATIENT MESSAGE (OUTPATIENT)
Dept: INTERNAL MEDICINE | Facility: CLINIC | Age: 79
End: 2019-10-04

## 2019-10-07 ENCOUNTER — PATIENT MESSAGE (OUTPATIENT)
Dept: INTERNAL MEDICINE | Facility: CLINIC | Age: 79
End: 2019-10-07

## 2019-10-09 ENCOUNTER — PATIENT MESSAGE (OUTPATIENT)
Dept: INTERNAL MEDICINE | Facility: CLINIC | Age: 79
End: 2019-10-09

## 2019-10-09 RX ORDER — TRIAMCINOLONE ACETONIDE 1 MG/G
OINTMENT TOPICAL
Qty: 80 G | Refills: 1 | Status: SHIPPED | OUTPATIENT
Start: 2019-10-09 | End: 2021-05-05 | Stop reason: SDUPTHER

## 2019-10-11 DIAGNOSIS — F01.50 VASCULAR DEMENTIA WITHOUT BEHAVIORAL DISTURBANCE: ICD-10-CM

## 2019-10-11 RX ORDER — DONEPEZIL HYDROCHLORIDE 10 MG/1
TABLET, FILM COATED ORAL
Qty: 30 TABLET | Refills: 0 | Status: SHIPPED | OUTPATIENT
Start: 2019-10-11 | End: 2019-12-26

## 2019-12-26 ENCOUNTER — OFFICE VISIT (OUTPATIENT)
Dept: INTERNAL MEDICINE | Facility: CLINIC | Age: 79
End: 2019-12-26
Payer: MEDICARE

## 2019-12-26 ENCOUNTER — PATIENT MESSAGE (OUTPATIENT)
Dept: SURGERY | Facility: CLINIC | Age: 79
End: 2019-12-26

## 2019-12-26 VITALS
SYSTOLIC BLOOD PRESSURE: 120 MMHG | WEIGHT: 173.5 LBS | HEART RATE: 60 BPM | TEMPERATURE: 98 F | DIASTOLIC BLOOD PRESSURE: 80 MMHG | BODY MASS INDEX: 31.93 KG/M2 | HEIGHT: 62 IN

## 2019-12-26 DIAGNOSIS — R60.0 BILATERAL LEG EDEMA: Primary | ICD-10-CM

## 2019-12-26 DIAGNOSIS — M25.562 PAIN IN BOTH KNEES, UNSPECIFIED CHRONICITY: ICD-10-CM

## 2019-12-26 DIAGNOSIS — G56.01 CARPAL TUNNEL SYNDROME ON RIGHT: ICD-10-CM

## 2019-12-26 DIAGNOSIS — F01.50 VASCULAR DEMENTIA WITHOUT BEHAVIORAL DISTURBANCE: ICD-10-CM

## 2019-12-26 DIAGNOSIS — M25.561 PAIN IN BOTH KNEES, UNSPECIFIED CHRONICITY: ICD-10-CM

## 2019-12-26 DIAGNOSIS — Z93.3 COLOSTOMY IN PLACE: ICD-10-CM

## 2019-12-26 PROCEDURE — 3074F SYST BP LT 130 MM HG: CPT | Mod: CPTII,S$GLB,, | Performed by: FAMILY MEDICINE

## 2019-12-26 PROCEDURE — 3074F PR MOST RECENT SYSTOLIC BLOOD PRESSURE < 130 MM HG: ICD-10-PCS | Mod: CPTII,S$GLB,, | Performed by: FAMILY MEDICINE

## 2019-12-26 PROCEDURE — 3079F DIAST BP 80-89 MM HG: CPT | Mod: CPTII,S$GLB,, | Performed by: FAMILY MEDICINE

## 2019-12-26 PROCEDURE — 1101F PR PT FALLS ASSESS DOC 0-1 FALLS W/OUT INJ PAST YR: ICD-10-PCS | Mod: CPTII,S$GLB,, | Performed by: FAMILY MEDICINE

## 2019-12-26 PROCEDURE — 1101F PT FALLS ASSESS-DOCD LE1/YR: CPT | Mod: CPTII,S$GLB,, | Performed by: FAMILY MEDICINE

## 2019-12-26 PROCEDURE — 1159F PR MEDICATION LIST DOCUMENTED IN MEDICAL RECORD: ICD-10-PCS | Mod: S$GLB,,, | Performed by: FAMILY MEDICINE

## 2019-12-26 PROCEDURE — 1126F PR PAIN SEVERITY QUANTIFIED, NO PAIN PRESENT: ICD-10-PCS | Mod: S$GLB,,, | Performed by: FAMILY MEDICINE

## 2019-12-26 PROCEDURE — 99999 PR PBB SHADOW E&M-EST. PATIENT-LVL III: CPT | Mod: PBBFAC,,, | Performed by: FAMILY MEDICINE

## 2019-12-26 PROCEDURE — 3079F PR MOST RECENT DIASTOLIC BLOOD PRESSURE 80-89 MM HG: ICD-10-PCS | Mod: CPTII,S$GLB,, | Performed by: FAMILY MEDICINE

## 2019-12-26 PROCEDURE — 1159F MED LIST DOCD IN RCRD: CPT | Mod: S$GLB,,, | Performed by: FAMILY MEDICINE

## 2019-12-26 PROCEDURE — 99214 OFFICE O/P EST MOD 30 MIN: CPT | Mod: S$GLB,,, | Performed by: FAMILY MEDICINE

## 2019-12-26 PROCEDURE — 1126F AMNT PAIN NOTED NONE PRSNT: CPT | Mod: S$GLB,,, | Performed by: FAMILY MEDICINE

## 2019-12-26 PROCEDURE — 99999 PR PBB SHADOW E&M-EST. PATIENT-LVL III: ICD-10-PCS | Mod: PBBFAC,,, | Performed by: FAMILY MEDICINE

## 2019-12-26 PROCEDURE — 99214 PR OFFICE/OUTPT VISIT, EST, LEVL IV, 30-39 MIN: ICD-10-PCS | Mod: S$GLB,,, | Performed by: FAMILY MEDICINE

## 2019-12-26 RX ORDER — DONEPEZIL HYDROCHLORIDE 10 MG/1
10 TABLET, FILM COATED ORAL NIGHTLY
Qty: 90 TABLET | Refills: 3 | Status: SHIPPED | OUTPATIENT
Start: 2019-12-26 | End: 2020-08-21

## 2019-12-26 NOTE — PATIENT INSTRUCTIONS
"Need to get weight down to a "baseline" which should be around 168lbs. Begin furosemide 20mg every day till then or till leg swelling and knee swelling is better. Then stop the furosemide on a daily basis and start to use on a "as needed" basis based on daily weight on scale next.     Step on scale each am and weigh yourself.   If weight goes up by 2-3 lbs in a 24 hour period the take a furosemide 20mg that am.     Right tingling and hand issues is related to  Arthritis, and additional fluid affecting the median nerve and swelling related to carpal tunnel region and causing tingling into the middle finger and tips of fingers.     To stoma site, with cleaning it, use rubbing alcohol and then let it dry. Then apply lite moisturizer for about 2-3 minutes and then can wash off with water and cloth. Then apply the bag/adhesive again.           "

## 2019-12-26 NOTE — PROGRESS NOTES
Subjective:      Patient ID: Irlanda Lopez is a 79 y.o. female.    Chief Complaint: Knee Pain    Disclaimer:  This note is prepared using voice recognition software and as such is likely to have errors and has not been proof read. Please contact me for questions.     Irlanda Lopez is a 79 y.o. female who presents today for knee pain and swelling.  Weight is fully back up.  In some.  Has a very good appetite.  Eats a good bit now for her meals.  Lost a good bit of weight prior when she had to have a colostomy and all the diverticulitis issues.    Wt Readings from Last 10 Encounters:  12/26/19 : 78.7 kg (173 lb 8 oz)  10/01/19 : 75 kg (165 lb 5.5 oz)  10/01/19 : 75 kg (165 lb 5.5 oz)  07/15/19 : 69.4 kg (153 lb)  06/10/19 : 68.3 kg (150 lb 9.2 oz)  06/07/19 : 64 kg (141 lb)  05/13/19 : 68 kg (149 lb 14.6 oz)  04/25/19 : 67.3 kg (148 lb 4.2 oz)  04/24/19 : 66.4 kg (146 lb 6.2 oz)  04/23/19 : 67.6 kg (149 lb 0.5 oz)    Is wanting to get in touch with Dr Connelly for her stoma area.  She reports that they sent to appointment request but have not yet heard back this was done on December 7th.  However I reviewed with the patient's daughter today using her appetite and we sent message directly today in someone's already responded.  Appears that the type of appointment request message was not yet received from the patient's phone.  She is appreciative of this time and additional information offered to her.    She does have Lasix 20 mg at home but is not currently using.  Does have a scale at home as well.  Able to also measure her blood pressure as well.  Blood pressure today looks great.    Also reports a lot of tingling and numbness at times in the fingertips of the right hand.  She has a lot of arthritis.  Injured it in the past but was concerned that may be was injury that was causing some the numbness and tingling and she also describes that at times as decreased circulation.  We discussed ways to help improve  this we also discussed monitoring her weight and with the weight reduction with some of the decreased swelling then it may help her symptoms further.  If not been they can return back and we can discuss other options as I suspect this is more related to carpal tunnel issues.    Needing a refill of her Aricept as well.  Currently on [facility] that they will be changing to using [facility] next year for their mail order request.          Lab Results   Component Value Date    WBC 6.80 06/10/2019    HGB 13.4 06/10/2019    HCT 40.4 06/10/2019     06/10/2019    CHOL 165 04/01/2019    TRIG 62 04/01/2019    HDL 61 04/01/2019    ALT 15 06/10/2019    AST 26 06/10/2019     06/10/2019    K 4.2 06/10/2019     06/10/2019    CREATININE 0.8 06/10/2019    BUN 13 06/10/2019    CO2 23 06/10/2019    TSH 1.089 10/01/2019    INR 1.0 06/10/2019    HGBA1C 5.0 04/01/2019       CT Abdomen Pelvis With Contrast  Narrative: EXAMINATION:  CT ABDOMEN PELVIS WITH CONTRAST    CLINICAL HISTORY:  eval for rectovaginal fistula; Diverticulitis of large intestine with perforation and abscess without bleeding    TECHNIQUE:  Low dose axial images, sagittal and coronal reformations were obtained from the lung bases to the pubic symphysis following the IV administration of 75  ML of Omnipaque 350 .  Rectal contrast was also administered.    COMPARISON:  04/25/2019    FINDINGS:  Visualized Chest: Lung bases are clear    Abdomen:    Liver: Within normal limits.    Gallbladder and biliary: Within normal limits.    Spleen: Within normal limits.    Pancreas: Within normal limits.    Adrenals: Within normal limits.    Kidneys: Large laminated stone again noted in the right renal pelvis measuring up to 3 cm.  Atrophy of the right kidney again noted.  Left kidney and left-sided collecting system appear within normal limits.    Bowel: Postoperative findings related to prior sigmoid colectomy with left lower quadrant colostomy and Ayala's  pouch again noted.  The rectum is filled with contrast.  There is some contrast seen extending into the vagina via a small fistulous tract arising from the anterior wall of the mid rectum.  There also appears to be a 2nd fistulous tract arising from the proximal margins of the Maribel's pouch which extends into adjacent loops of small bowel.  Some mild inflammatory stranding noted in the adjacent fat.  No findings to suggest obstruction.    Peritoneum: No ascites or pneumoperitoneum.    Abdominal adenopathy: None.    Vasculature: Within normal limits.    Pelvis:    Urinary bladder: Unremarkable.    Uterus and adnexa: Uterus and left adnexa appear grossly unremarkable.  There are 2 adjacent calcific densities in the right adnexa each measuring approximately 9 mm in diameter which appear closely associated with the right ovary and likely represent phleboliths present within the gonadal vein.  No definite associated fat to suggest dermoid cyst.    Pelvic adenopathy: None.    Bones: Chronic T11 superior endplate compression fracture deformity is unchanged.    Miscellaneous: None.  Impression: 1. 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  2. 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 Postoperative findings related to prior sigmoidectomy with left lower quadrant colostomy.  4. Unchanged atrophic appearance of the right kidney with a large stone present at the renal pelvis.  5. Unchanged calcifications in the right adnexal region, likely representing phleboliths within the gonadal vein.  No definite associated fat to suggest dermoid cyst.    Electronically signed by: Rishabh Dougherty MD  Date:    06/26/2019  Time:    15:57        Review of Systems   Constitutional: Positive for unexpected weight change. Negative for activity change, appetite change and fatigue.   Respiratory: Negative for  "cough and shortness of breath.    Cardiovascular: Positive for leg swelling. Negative for chest pain and palpitations.   Gastrointestinal: Negative for abdominal distention, abdominal pain, constipation, diarrhea and nausea.   Musculoskeletal: Positive for gait problem and joint swelling.   Skin: Positive for color change.     Objective:     Vitals:    12/26/19 0935   BP: 120/80   Pulse: 60   Temp: 97.5 °F (36.4 °C)   Weight: 78.7 kg (173 lb 8 oz)   Height: 5' 2" (1.575 m)     Physical Exam   Constitutional: She appears well-developed and well-nourished.   HENT:   Head: Normocephalic and atraumatic.   Cardiovascular:   Plus two pitting leg edema bilaterally to the knees with hyperpigmentation related to venous stasis   Abdominal: Soft. There is no tenderness.       Neurological:   Positive Tinel sign on the right wrist.  Arthritic changes throughout the hand   Psychiatric: She has a normal mood and affect. Her speech is normal and behavior is normal. Cognition and memory are impaired. She exhibits abnormal recent memory and abnormal remote memory.     Assessment:     1. Bilateral leg edema    2. Colostomy in place    3. Pain in both knees, unspecified chronicity    4. Vascular dementia without behavioral disturbance    5. Carpal tunnel syndrome on right      Plan:   Irlanda was seen today for knee pain.    Diagnoses and all orders for this visit:    Bilateral leg edema-suspect related due to increased salt and fluid retention also with venous stasis changes.  Okay to resume back on furosemide 20 mg into weight is at baseline at 168 then can monitor for every 2-3 lb weight adjustment up will restart back on Lasix 20 mg otherwise once at 168 will hold the Lasix    Colostomy in place-reviewed her stoma reviewed instructions on cleaning also helped her send a message to her colorectal surgeon as well    Pain in both knees, unspecified chronicity-suspect related more due to fluid retention written discussed ways to use " "Lasix and written out instructions    Vascular dementia without behavioral disturbance  Comments:  stable, refilled Aricept 10 mg today for the patient.  Has appointment with new neurologist in February  Orders:  -     donepezil (ARICEPT) 10 MG tablet; Take 1 tablet (10 mg total) by mouth every evening.    Carpal tunnel syndrome on right-suspect related more to fluid overload at this time positive Tinel sign okay to use Lasix okay for movement also with arthritic changes as well            Follow up in about 3 months (around 3/26/2020) for chronic issues Dr Arias.    Patient Instructions   Need to get weight down to a "baseline" which should be around 168lbs. Begin furosemide 20mg every day till then or till leg swelling and knee swelling is better. Then stop the furosemide on a daily basis and start to use on a "as needed" basis based on daily weight on scale next.     Step on scale each am and weigh yourself.   If weight goes up by 2-3 lbs in a 24 hour period the take a furosemide 20mg that am.     Right tingling and hand issues is related to  Arthritis, and additional fluid affecting the median nerve and swelling related to carpal tunnel region and causing tingling into the middle finger and tips of fingers.     To stoma site, with cleaning it, use rubbing alcohol and then let it dry. Then apply lite moisturizer for about 2-3 minutes and then can wash off with water and cloth. Then apply the bag/adhesive again.                             "

## 2019-12-28 ENCOUNTER — PATIENT MESSAGE (OUTPATIENT)
Dept: SURGERY | Facility: CLINIC | Age: 79
End: 2019-12-28

## 2019-12-30 ENCOUNTER — TELEPHONE (OUTPATIENT)
Dept: SURGERY | Facility: CLINIC | Age: 79
End: 2019-12-30

## 2019-12-30 NOTE — TELEPHONE ENCOUNTER
Left a message for Faiza the patient's daughter to call us back. Called per Dr Connelly to follow up on patient and see if they need to come in.

## 2020-01-11 ENCOUNTER — PATIENT MESSAGE (OUTPATIENT)
Dept: INTERNAL MEDICINE | Facility: CLINIC | Age: 80
End: 2020-01-11

## 2020-01-13 ENCOUNTER — PATIENT MESSAGE (OUTPATIENT)
Dept: INTERNAL MEDICINE | Facility: CLINIC | Age: 80
End: 2020-01-13

## 2020-01-16 ENCOUNTER — OFFICE VISIT (OUTPATIENT)
Dept: CARDIOLOGY | Facility: CLINIC | Age: 80
End: 2020-01-16
Payer: MEDICARE

## 2020-01-16 VITALS
HEIGHT: 62 IN | WEIGHT: 169.75 LBS | OXYGEN SATURATION: 95 % | HEART RATE: 69 BPM | DIASTOLIC BLOOD PRESSURE: 64 MMHG | SYSTOLIC BLOOD PRESSURE: 118 MMHG | BODY MASS INDEX: 31.24 KG/M2

## 2020-01-16 DIAGNOSIS — R60.1 GENERALIZED EDEMA: ICD-10-CM

## 2020-01-16 DIAGNOSIS — I10 ESSENTIAL HYPERTENSION: ICD-10-CM

## 2020-01-16 DIAGNOSIS — E78.00 PURE HYPERCHOLESTEROLEMIA: Primary | ICD-10-CM

## 2020-01-16 PROCEDURE — 3078F PR MOST RECENT DIASTOLIC BLOOD PRESSURE < 80 MM HG: ICD-10-PCS | Mod: CPTII,S$GLB,, | Performed by: INTERNAL MEDICINE

## 2020-01-16 PROCEDURE — 1101F PT FALLS ASSESS-DOCD LE1/YR: CPT | Mod: CPTII,S$GLB,, | Performed by: INTERNAL MEDICINE

## 2020-01-16 PROCEDURE — 1101F PR PT FALLS ASSESS DOC 0-1 FALLS W/OUT INJ PAST YR: ICD-10-PCS | Mod: CPTII,S$GLB,, | Performed by: INTERNAL MEDICINE

## 2020-01-16 PROCEDURE — 3078F DIAST BP <80 MM HG: CPT | Mod: CPTII,S$GLB,, | Performed by: INTERNAL MEDICINE

## 2020-01-16 PROCEDURE — 99999 PR PBB SHADOW E&M-EST. PATIENT-LVL III: CPT | Mod: PBBFAC,,, | Performed by: INTERNAL MEDICINE

## 2020-01-16 PROCEDURE — 99214 OFFICE O/P EST MOD 30 MIN: CPT | Mod: S$GLB,,, | Performed by: INTERNAL MEDICINE

## 2020-01-16 PROCEDURE — 99214 PR OFFICE/OUTPT VISIT, EST, LEVL IV, 30-39 MIN: ICD-10-PCS | Mod: S$GLB,,, | Performed by: INTERNAL MEDICINE

## 2020-01-16 PROCEDURE — 99999 PR PBB SHADOW E&M-EST. PATIENT-LVL III: ICD-10-PCS | Mod: PBBFAC,,, | Performed by: INTERNAL MEDICINE

## 2020-01-16 PROCEDURE — 1126F PR PAIN SEVERITY QUANTIFIED, NO PAIN PRESENT: ICD-10-PCS | Mod: S$GLB,,, | Performed by: INTERNAL MEDICINE

## 2020-01-16 PROCEDURE — 1159F MED LIST DOCD IN RCRD: CPT | Mod: S$GLB,,, | Performed by: INTERNAL MEDICINE

## 2020-01-16 PROCEDURE — 1159F PR MEDICATION LIST DOCUMENTED IN MEDICAL RECORD: ICD-10-PCS | Mod: S$GLB,,, | Performed by: INTERNAL MEDICINE

## 2020-01-16 PROCEDURE — 1126F AMNT PAIN NOTED NONE PRSNT: CPT | Mod: S$GLB,,, | Performed by: INTERNAL MEDICINE

## 2020-01-16 PROCEDURE — 3074F PR MOST RECENT SYSTOLIC BLOOD PRESSURE < 130 MM HG: ICD-10-PCS | Mod: CPTII,S$GLB,, | Performed by: INTERNAL MEDICINE

## 2020-01-16 PROCEDURE — 3074F SYST BP LT 130 MM HG: CPT | Mod: CPTII,S$GLB,, | Performed by: INTERNAL MEDICINE

## 2020-01-16 RX ORDER — MEMANTINE HYDROCHLORIDE 10 MG/1
TABLET ORAL
Qty: 180 TABLET | Refills: 3 | Status: SHIPPED | OUTPATIENT
Start: 2020-01-16 | End: 2020-03-12 | Stop reason: SDUPTHER

## 2020-01-16 RX ORDER — ATORVASTATIN CALCIUM 10 MG/1
TABLET, FILM COATED ORAL
Qty: 90 TABLET | Refills: 3 | Status: SHIPPED | OUTPATIENT
Start: 2020-01-16 | End: 2020-03-12 | Stop reason: SDUPTHER

## 2020-01-16 RX ORDER — FUROSEMIDE 20 MG/1
20 TABLET ORAL DAILY
Qty: 30 TABLET | Refills: 0 | Status: SHIPPED | OUTPATIENT
Start: 2020-01-16 | End: 2020-01-16 | Stop reason: SDUPTHER

## 2020-01-16 RX ORDER — FUROSEMIDE 20 MG/1
20 TABLET ORAL DAILY
Qty: 90 TABLET | Refills: 3 | Status: SHIPPED | OUTPATIENT
Start: 2020-01-16 | End: 2020-10-27

## 2020-01-16 NOTE — PROGRESS NOTES
Subjective:   Patient ID:  Irlanda Lopez is a 79 y.o. female who presents for follow-up of Essential Hypertension (3 mo f/u, leg swelling)  Pt did lose weight with diuretics- lasix.    Hypertension   This is a chronic problem. The current episode started more than 1 year ago. The problem has been gradually improving since onset. The problem is controlled. Pertinent negatives include no chest pain, palpitations or shortness of breath. Past treatments include diuretics. The current treatment provides moderate improvement. There are no compliance problems.    Hyperlipidemia   This is a chronic problem. The current episode started more than 1 year ago. The problem is controlled. Pertinent negatives include no chest pain or shortness of breath. Current antihyperlipidemic treatment includes statins. The current treatment provides moderate improvement of lipids. There are no compliance problems.  Risk factors for coronary artery disease include hypertension, post-menopausal and dyslipidemia.       Review of Systems   Constitution: Negative. Negative for weight gain.   HENT: Negative.    Eyes: Negative.    Cardiovascular: Negative.  Negative for chest pain, leg swelling and palpitations.   Respiratory: Negative.  Negative for shortness of breath.    Endocrine: Negative.    Hematologic/Lymphatic: Negative.    Skin: Negative.    Musculoskeletal: Negative for muscle weakness.   Gastrointestinal: Negative.    Genitourinary: Negative.    Neurological: Negative.  Negative for dizziness.   Psychiatric/Behavioral: Negative.    Allergic/Immunologic: Negative.      Family History   Problem Relation Age of Onset    Alzheimer's disease Mother     Aneurysm Father         brain    Stomach cancer Brother         50s     Past Medical History:   Diagnosis Date    Diverticulitis     High cholesterol     Hypertension     Hypothyroidism      Social History     Socioeconomic History    Marital status:      Spouse name: Not on  file    Number of children: Not on file    Years of education: Not on file    Highest education level: Not on file   Occupational History     Comment: [employer]   Social Needs    Financial resource strain: Not on file    Food insecurity:     Worry: Not on file     Inability: Not on file    Transportation needs:     Medical: Not on file     Non-medical: Not on file   Tobacco Use    Smoking status: Former Smoker    Smokeless tobacco: Never Used   Substance and Sexual Activity    Alcohol use: No    Drug use: No    Sexual activity: Never     Comment:    Lifestyle    Physical activity:     Days per week: Not on file     Minutes per session: Not on file    Stress: Not on file   Relationships    Social connections:     Talks on phone: Not on file     Gets together: Not on file     Attends [employer] service: Not on file     Active member of club or organization: Not on file     Attends meetings of clubs or organizations: Not on file     Relationship status: Not on file   Other Topics Concern    Not on file   Social History Narrative    Not on file     Current Outpatient Medications on File Prior to Visit   Medication Sig Dispense Refill    donepezil (ARICEPT) 10 MG tablet Take 1 tablet (10 mg total) by mouth every evening. 90 tablet 3    furosemide (LASIX) 20 MG tablet Take 1 tablet (20 mg total) by mouth daily as needed. 30 tablet 11    thyroid, pork, (ARMOUR THYROID) 30 mg Tab Take 1 tablet (30 mg total) by mouth once daily. 90 tablet 3    triamcinolone acetonide 0.1% (KENALOG) 0.1 % ointment APPLY TOPICALLY 4 (FOUR) TIMES DAILY. TO RASH ON FACE AND CHEST AND NOSE 80 g 1    [DISCONTINUED] atorvastatin (LIPITOR) 10 MG tablet Take 1 tablet (10 mg total) by mouth once daily. 90 tablet 3    [DISCONTINUED] memantine (NAMENDA) 10 MG Tab 10mg po bid. 180 tablet 3     Current Facility-Administered Medications on File Prior to Visit   Medication Dose Route Frequency Provider Last Rate Last  Dose    lidocaine (PF) 10 mg/ml (1%) injection 10 mg  1 mL Intradermal Once Rishabh Connelly MD         Review of patient's allergies indicates:  No Known Allergies    Objective:     Physical Exam   Constitutional: She is oriented to person, place, and time. She appears well-developed and well-nourished.   HENT:   Head: Normocephalic and atraumatic.   Eyes: Pupils are equal, round, and reactive to light. Conjunctivae and EOM are normal.   Neck: Normal range of motion. Neck supple.   Cardiovascular: Normal rate, regular rhythm, normal heart sounds and intact distal pulses.   Pulmonary/Chest: Effort normal and breath sounds normal.   Abdominal: Soft. Bowel sounds are normal.   Musculoskeletal: Normal range of motion.   Neurological: She is alert and oriented to person, place, and time.   Skin: Skin is warm and dry.   Psychiatric: She has a normal mood and affect.   Nursing note and vitals reviewed.      Assessment:     1. Pure hypercholesterolemia    2. Essential hypertension    3. Generalized edema        Plan:     Pure hypercholesterolemia    Essential hypertension    Generalized edema      Continue lasix-htn/edema  Continue statin-hlp

## 2020-01-28 ENCOUNTER — PATIENT MESSAGE (OUTPATIENT)
Dept: CARDIOLOGY | Facility: CLINIC | Age: 80
End: 2020-01-28

## 2020-01-30 ENCOUNTER — LAB VISIT (OUTPATIENT)
Dept: LAB | Facility: HOSPITAL | Age: 80
End: 2020-01-30
Attending: INTERNAL MEDICINE
Payer: MEDICARE

## 2020-01-30 DIAGNOSIS — R60.1 GENERALIZED EDEMA: ICD-10-CM

## 2020-01-30 LAB
ANION GAP SERPL CALC-SCNC: 8 MMOL/L (ref 8–16)
BUN SERPL-MCNC: 15 MG/DL (ref 8–23)
CALCIUM SERPL-MCNC: 9.3 MG/DL (ref 8.7–10.5)
CHLORIDE SERPL-SCNC: 106 MMOL/L (ref 95–110)
CO2 SERPL-SCNC: 29 MMOL/L (ref 23–29)
CREAT SERPL-MCNC: 0.8 MG/DL (ref 0.5–1.4)
EST. GFR  (AFRICAN AMERICAN): >60 ML/MIN/1.73 M^2
EST. GFR  (NON AFRICAN AMERICAN): >60 ML/MIN/1.73 M^2
GLUCOSE SERPL-MCNC: 87 MG/DL (ref 70–110)
POTASSIUM SERPL-SCNC: 3.7 MMOL/L (ref 3.5–5.1)
SODIUM SERPL-SCNC: 143 MMOL/L (ref 136–145)

## 2020-01-30 PROCEDURE — 80048 BASIC METABOLIC PNL TOTAL CA: CPT

## 2020-01-30 PROCEDURE — 36415 COLL VENOUS BLD VENIPUNCTURE: CPT | Mod: PO

## 2020-01-31 ENCOUNTER — PATIENT MESSAGE (OUTPATIENT)
Dept: CARDIOLOGY | Facility: CLINIC | Age: 80
End: 2020-01-31

## 2020-02-10 ENCOUNTER — OFFICE VISIT (OUTPATIENT)
Dept: NEUROLOGY | Facility: CLINIC | Age: 80
End: 2020-02-10
Payer: MEDICARE

## 2020-02-10 VITALS
HEART RATE: 53 BPM | SYSTOLIC BLOOD PRESSURE: 116 MMHG | HEIGHT: 62 IN | BODY MASS INDEX: 31.4 KG/M2 | DIASTOLIC BLOOD PRESSURE: 80 MMHG | WEIGHT: 170.63 LBS

## 2020-02-10 DIAGNOSIS — Z93.3 COLOSTOMY IN PLACE: ICD-10-CM

## 2020-02-10 DIAGNOSIS — I10 ESSENTIAL HYPERTENSION: ICD-10-CM

## 2020-02-10 DIAGNOSIS — G31.84 MILD COGNITIVE IMPAIRMENT WITH MEMORY LOSS: Primary | ICD-10-CM

## 2020-02-10 PROCEDURE — 3079F PR MOST RECENT DIASTOLIC BLOOD PRESSURE 80-89 MM HG: ICD-10-PCS | Mod: CPTII,S$GLB,, | Performed by: PSYCHIATRY & NEUROLOGY

## 2020-02-10 PROCEDURE — 99999 PR PBB SHADOW E&M-EST. PATIENT-LVL III: ICD-10-PCS | Mod: PBBFAC,,, | Performed by: PSYCHIATRY & NEUROLOGY

## 2020-02-10 PROCEDURE — 3079F DIAST BP 80-89 MM HG: CPT | Mod: CPTII,S$GLB,, | Performed by: PSYCHIATRY & NEUROLOGY

## 2020-02-10 PROCEDURE — 99204 PR OFFICE/OUTPT VISIT, NEW, LEVL IV, 45-59 MIN: ICD-10-PCS | Mod: S$GLB,,, | Performed by: PSYCHIATRY & NEUROLOGY

## 2020-02-10 PROCEDURE — 99499 UNLISTED E&M SERVICE: CPT | Mod: S$GLB,,, | Performed by: PSYCHIATRY & NEUROLOGY

## 2020-02-10 PROCEDURE — 3074F SYST BP LT 130 MM HG: CPT | Mod: CPTII,S$GLB,, | Performed by: PSYCHIATRY & NEUROLOGY

## 2020-02-10 PROCEDURE — 1159F PR MEDICATION LIST DOCUMENTED IN MEDICAL RECORD: ICD-10-PCS | Mod: S$GLB,,, | Performed by: PSYCHIATRY & NEUROLOGY

## 2020-02-10 PROCEDURE — 3074F PR MOST RECENT SYSTOLIC BLOOD PRESSURE < 130 MM HG: ICD-10-PCS | Mod: CPTII,S$GLB,, | Performed by: PSYCHIATRY & NEUROLOGY

## 2020-02-10 PROCEDURE — 1126F AMNT PAIN NOTED NONE PRSNT: CPT | Mod: S$GLB,,, | Performed by: PSYCHIATRY & NEUROLOGY

## 2020-02-10 PROCEDURE — 1101F PT FALLS ASSESS-DOCD LE1/YR: CPT | Mod: CPTII,S$GLB,, | Performed by: PSYCHIATRY & NEUROLOGY

## 2020-02-10 PROCEDURE — 99499 RISK ADDL DX/OHS AUDIT: ICD-10-PCS | Mod: S$GLB,,, | Performed by: PSYCHIATRY & NEUROLOGY

## 2020-02-10 PROCEDURE — 1101F PR PT FALLS ASSESS DOC 0-1 FALLS W/OUT INJ PAST YR: ICD-10-PCS | Mod: CPTII,S$GLB,, | Performed by: PSYCHIATRY & NEUROLOGY

## 2020-02-10 PROCEDURE — 1126F PR PAIN SEVERITY QUANTIFIED, NO PAIN PRESENT: ICD-10-PCS | Mod: S$GLB,,, | Performed by: PSYCHIATRY & NEUROLOGY

## 2020-02-10 PROCEDURE — 99999 PR PBB SHADOW E&M-EST. PATIENT-LVL III: CPT | Mod: PBBFAC,,, | Performed by: PSYCHIATRY & NEUROLOGY

## 2020-02-10 PROCEDURE — 1159F MED LIST DOCD IN RCRD: CPT | Mod: S$GLB,,, | Performed by: PSYCHIATRY & NEUROLOGY

## 2020-02-10 PROCEDURE — 99204 OFFICE O/P NEW MOD 45 MIN: CPT | Mod: S$GLB,,, | Performed by: PSYCHIATRY & NEUROLOGY

## 2020-02-10 NOTE — PROGRESS NOTES
Subjective:      Patient ID: Irlanda Lopez is a 79 y.o. female.   informant:  Patient's daughter and personal caregiver    Chief Complaint: I have difficulty with my memory    The patient and her daughter are in the clinic today but bring with them a note from the caregiver detail inguinal events that have occurred over the past few months.  As an example, the patient has for gotten where things are and her own home.  She frequently will panic as she forgets where she placed her purse last.  The caregiver notes that she seems to be sleeping more during the day than she had been at night and that she is occasionally waking up in the early morning hours.  Occasionally, she has been somewhat irritable.  She also has her times of voicing depression over the loss of her .  She is not totally aware of all of her medications at times and will ask the same questions repetitively.  The caregiver indicates that in the past the patient would wash her own clothing but now depends upon the caregiver for that task.    The patient's daughter it confirms this history in indicates that the patient does have a tendency to be very repetitious in her comments.  She is also aware of some difficulty with her mood at times.  According to the daughter, the patient is independent for dressing and bathing.  She is independent for eating after food preparation has been completed but that the family and the caregiver do all the cooking.  When asked if she does do any walking type of exercise the patient indicates that she does do so, but the daughter indicates that this is not accurate history.    The daughter is the primary decision maker in her care in the there are 2 sons who are in the family but do not actively participate.  The daughter notes increasing apathy with the patient becoming more withdrawn from her usual activities.          ROS:  GENERAL: NO FEVER, CHILLS, FATIGABILITY OR WEIGHT LOSS.  SKIN: NO RASHES, ITCHING OR  CHANGES IN COLOR OR TEXTURE OF SKIN.  HEAD: NO HEADACHES OR RECENT HEAD TRAUMA.  EYES: VISUAL ACUITY FINE. NO PHOTOPHOBIA, OCULAR PAIN OR DIPLOPIA.  EARS: DENIES EAR PAIN, DISCHARGE OR VERTIGO.  NOSE: NO LOSS OF SMELL, NO EPISTAXIS OR POSTNASAL DRIP.  MOUTH & THROAT: NO HOARSENESS OR CHANGE IN VOICE. NO EXCESSIVE GUM BLEEDING.  NODES: DENIES SWOLLEN GLANDS.  CHEST: DENIES JESUS, CYANOSIS, WHEEZING, COUGH AND SPUTUM PRODUCTION.  CARDIOVASCULAR: DENIES CHEST PAIN, PND, ORTHOPNEA OR REDUCED EXERCISE TOLERANCE.  ABDOMEN: APPETITE FINE. NO WEIGHT LOSS.   THE PATIENT HAS A COLOSTOMY.  URINARY: NO FLANK PAIN, DYSURIA OR HEMATURIA.  PERIPHERAL VASCULAR: NO CLAUDICATION OR CYANOSIS.  MUSCULOSKELETAL: NO JOINT STIFFNESS OR SWELLING. DENIES BACK PAIN.  NEUROLOGIC: NO HISTORY OF SEIZURES, PARALYSIS, ALTERATION OF GAIT OR COORDINATION.    Past Medical History:   Diagnosis Date    Diverticulitis     High cholesterol     Hypertension     Hypothyroidism      Past Surgical History:   Procedure Laterality Date    APPENDECTOMY  2008    CATARACT EXTRACTION      Dr Raygoza    COLON SURGERY      COLONOSCOPY N/A 1/2/2019    Procedure: COLONOSCOPY;  Surgeon: Reece Hogan MD;  Location: [facility] ENDO;  Service: Endoscopy;  Laterality: N/A;    COLONOSCOPY N/A 6/7/2019    Procedure: COLONOSCOPY;  Surgeon: Rishabh Connelly MD;  Location: [facility] ENDO;  Service: General;  Laterality: N/A;    COLOSTOMY Left 1/2/2019    Procedure: CREATION, COLOSTOMY;  Surgeon: Reece Hogan MD;  Location: [facility] OR;  Service: General;  Laterality: Left;    SHAHIDA PROCEDURE N/A 1/2/2019    Procedure: SHAHIDA PROCEDURE;  Surgeon: Reece Hogan MD;  Location: [facility] OR;  Service: General;  Laterality: N/A;    LYSIS OF ADHESIONS N/A 1/2/2019    Procedure: LYSIS, ADHESIONS;  Surgeon: Reece Hogan MD;  Location: [facility] OR;  Service: General;  Laterality: N/A;    VITRECTOMY Right 09/2013     Family History   Problem Relation Age of Onset    Alzheimer's disease Mother      Aneurysm Father         brain    Stomach cancer Brother         50s     Social History     Socioeconomic History    Marital status:      Spouse name: Not on file    Number of children: Not on file    Years of education: Not on file    Highest education level: Not on file   Occupational History     Comment: [employer]   Social Needs    Financial resource strain: Not on file    Food insecurity:     Worry: Not on file     Inability: Not on file    Transportation needs:     Medical: Not on file     Non-medical: Not on file   Tobacco Use    Smoking status: Former Smoker    Smokeless tobacco: Never Used   Substance and Sexual Activity    Alcohol use: No    Drug use: No    Sexual activity: Never     Comment:    Lifestyle    Physical activity:     Days per week: Not on file     Minutes per session: Not on file    Stress: Not on file   Relationships    Social connections:     Talks on phone: Not on file     Gets together: Not on file     Attends [employer] service: Not on file     Active member of club or organization: Not on file     Attends meetings of clubs or organizations: Not on file     Relationship status: Not on file   Other Topics Concern    Not on file   Social History Narrative    Not on file         Objective:   PE:   VITAL SIGNS:   HEIGHT 5 FT 2 IN, WEIGHT 77.4 KG, BMI 31.21  Vitals:    02/10/20 1013   BP: 116/80   Pulse: (!) 53     APPEARANCE: WELL NOURISHED, WELL DEVELOPED, IN NO ACUTE DISTRESS.    HEAD: NORMOCEPHALIC, ATRAUMATIC.  EYES: PERRL. EOMI.  NON-ICTERIC SCLERAE.   NOSE: MUCOSA PINK. AIRWAY CLEAR.  MOUTH & THROAT: NO TONSILLAR ENLARGEMENT. NO PHARYNGEAL ERYTHEMA OR EXUDATE. NO STRIDOR.  NECK: SUPPLE. NO BRUITS.  CHEST: LUNGS CLEAR TO AUSCULTATION.  CARDIOVASCULAR: REGULAR RHYTHM WITH  A SOFT SYSTOLIC MURMUR HEARD ALONG THE RIGHT STERNAL BORDER.  ABDOMEN: BOWEL SOUNDS NORMAL.  COLOSTOMY IN PLACE WITHOUT EVIDENCE OF ERYTHEMA.  MUSCULOSKELETAL:  NO BONY DEFORMITY  SEEN.  MUSCLE TONE IS NORMAL.  MUSCLE MASS IS NORMAL.    NEUROLOGIC:   MENTAL STATUS:  THE PATIENT IS WELL ORIENTED TO PERSON, TIME, PLACE, AND SITUATION.  THE PATIENT WAS ABLE TO NAME THE MONTH AND YEAR AS WELL AS LOCATION CORRECTLY.  SHE WAS UNABLE TO NAME THE DATE OR DAY OF THE WEEK.  SHE WAS ABLE TO REPEAT 3 DIGITS BACKWARDS.  SHE WAS ABLE TO REPEAT SENTENCES VERBATIM WITHOUT DIFFICULTY.  SHE COULD NOT RECALL ANY OF 5 WORDS ON DELAYED RECALL.  SHE WAS REPETITIOUS IN HER COMMENTS.  THE PATIENT IS ATTENTIVE TO THE ENVIRONMENT AND COOPERATIVE FOR THE EXAM.  CRANIAL NERVES: II-XII GROSSLY INTACT. FUNDOSCOPIC EXAM IS NORMAL.  NO HEMORRHAGE, EXUDATE OR PAPILLEDEMA IS PRESENT. THE EXTRAOCULAR MUSCLES ARE INTACT IN THE CARDINAL DIRECTIONS OF GAZE.  NO PTOSIS IS PRESENT. FACIAL FEATURES ARE SYMMETRICAL.  SPEECH IS NORMAL IN FLUENCY, DICTION, AND PHRASING.  TONGUE PROTRUDES IN THE MIDLINE.    GAIT AND STATION:  ROMBERG IS NEGATIVE.  GOOD ALTERNATE ARMSWING WITH NORMAL GAIT.  MOTOR:  NO DOWNDRIFT OF EITHER ARM WHEN HELD AT SHOULDER LEVEL.  MANUAL MUSCLE TESTING OF PROXIMAL AND DISTAL MUSCLES OF BOTH UPPER AND LOWER EXTREMITIES IS NORMAL. MUSCLE MASS IS NORMAL.  MUSCLE TONE IS NORMAL.  SENSORY:  INTACT BOTH UPPER AND LOWER EXTREMITIES TO PIN PRICK, TOUCH, AND VIBRATION.  CEREBELLAR:  FINGER TO NOSE DONE WELL.  ALTERNATING MOVEMENTS INTACT.  NO INVOLUNTARY MOVEMENTS OR TREMOR SEEN.  REFLEXES:  STRETCH REFLEXES ARE 2+ BOTH UPPER AND LOWER EXTREMITIES.  PLANTAR STIMULATION IS FLEXOR BILATERALLY AND NO PATHOLOGICAL REFLEXES ARE SEEN              Assessment:     Encounter Diagnoses   Name Primary?    Mild cognitive impairment with memory loss Yes    Essential hypertension     Colostomy in place        Discussion:  From my evaluation today I do not think the patient has a not features of cognitive dysfunction to label this as dementia.  She certainly does have difficulty with delayed recall but is largely independent for  instrumental activities of daily living.  She does have a caregiver who is assuming more responsibilities time goes by however.  The patient is developing increasing apathy and may benefit from a low-dose SSRI , such as Celexa.  I did not write that prescription today but would indicate to the daughter that if the symptoms of depression appeared to be more severe that this medicine would be added.      Plan:     1.  Continue current medications as prescribed including Aricept and Namenda  2.  Routine follow-up with Neurology in 6 months.      This was a 55 min visit with the patient with over 50% of the time spent counseling the patient and the daughter regarding the findings on today's examination and discussion of mild cognitive impairment and its relationship to dementia.  This note is generated with speech recognition software and is subject to transcription error and sound alike phrases that may be missed by proofreading.

## 2020-02-10 NOTE — LETTER
February 10, 2020      Myla Arias MD  [address]  [address]  [place] [address]           [facility]  [address]  [place] [address]  Phone: [phone]  Fax: [phone]          Patient: Irlanda Lopez   MR Number: [id number]   YOB: 1940   Date of Visit: 2/10/2020       Dear Dr. Myla Arias:    Thank you for referring Irladna Lopez to me for evaluation. Attached you will find relevant portions of my assessment and plan of care.    If you have questions, please do not hesitate to call me. I look forward to following Irlanda Lopez along with you.    Sincerely,    Dawit Cobian MD    Enclosure  CC:  No Recipients    If you would like to receive this communication electronically, please contact [email or web address] or [phone] to request more information on [employer] access.    For providers and/or their staff who would like to refer a patient to [facility], please contact us through our one-stop-shop provider referral line, [facility] , at [phone].    If you feel you have received this communication in error or would no longer like to receive these types of communications, please e-mail [email or web address]

## 2020-03-06 ENCOUNTER — TELEPHONE (OUTPATIENT)
Dept: SURGERY | Facility: CLINIC | Age: 80
End: 2020-03-06

## 2020-03-06 NOTE — TELEPHONE ENCOUNTER
"Spoke to Myla - States  nurse has advised her, skin around pt's stoma is "discolored and from time to time has some blood spots" - Advised Myla to take a picture and upload it to pt's Portal. - Myla stated, "No, No, I just can't deal with doing that. It's just too much. Can't the nurse send it to your phone and you show Dr Connelly?" - Advised Myla that I do not have a company phone that she can send a picture to. Asked did she want me to schedule pt to see Dr Connelly. Myla stated, "No I think she needs to be seen before that" Offered to schedule pt to see Alley Oseguera PA-C today - Myla stated "No I don't want my mother seeing Dr Hogan!" Advised Myla that I wouldn't schedule her to see Dr Hogan, I was asking if she wanted me to schedule pt to see Alley Oseguera PA-C. - Myla stated "That's fine" Scheduled pt to see Alley today at 1420.     Alley has been notified.     ----- Message from Georgette Dunn sent at 3/6/2020 10:21 AM CST -----  Contact: Daughter-Myla Lanza is requesting call back in regards to pt's skin around procedure is looking discolored and has a lot of blood around area per pt's caregiver.         Pls call back at [phone]    "

## 2020-03-09 ENCOUNTER — TELEPHONE (OUTPATIENT)
Dept: SURGERY | Facility: CLINIC | Age: 80
End: 2020-03-09

## 2020-03-09 ENCOUNTER — OFFICE VISIT (OUTPATIENT)
Dept: SURGERY | Facility: CLINIC | Age: 80
End: 2020-03-09
Payer: MEDICARE

## 2020-03-09 VITALS
TEMPERATURE: 98 F | HEART RATE: 55 BPM | SYSTOLIC BLOOD PRESSURE: 161 MMHG | DIASTOLIC BLOOD PRESSURE: 86 MMHG | WEIGHT: 172.19 LBS | BODY MASS INDEX: 31.49 KG/M2

## 2020-03-09 DIAGNOSIS — K43.5 PARASTOMAL HERNIA WITHOUT OBSTRUCTION OR GANGRENE: ICD-10-CM

## 2020-03-09 DIAGNOSIS — L30.9 PERISTOMAL DERMATITIS: Primary | ICD-10-CM

## 2020-03-09 PROCEDURE — 99214 OFFICE O/P EST MOD 30 MIN: CPT | Mod: S$GLB,,, | Performed by: COLON & RECTAL SURGERY

## 2020-03-09 PROCEDURE — 1126F AMNT PAIN NOTED NONE PRSNT: CPT | Mod: S$GLB,,, | Performed by: COLON & RECTAL SURGERY

## 2020-03-09 PROCEDURE — 1126F PR PAIN SEVERITY QUANTIFIED, NO PAIN PRESENT: ICD-10-PCS | Mod: S$GLB,,, | Performed by: COLON & RECTAL SURGERY

## 2020-03-09 PROCEDURE — 3079F PR MOST RECENT DIASTOLIC BLOOD PRESSURE 80-89 MM HG: ICD-10-PCS | Mod: CPTII,S$GLB,, | Performed by: COLON & RECTAL SURGERY

## 2020-03-09 PROCEDURE — 3079F DIAST BP 80-89 MM HG: CPT | Mod: CPTII,S$GLB,, | Performed by: COLON & RECTAL SURGERY

## 2020-03-09 PROCEDURE — 99499 RISK ADDL DX/OHS AUDIT: ICD-10-PCS | Mod: S$GLB,,, | Performed by: COLON & RECTAL SURGERY

## 2020-03-09 PROCEDURE — 1101F PR PT FALLS ASSESS DOC 0-1 FALLS W/OUT INJ PAST YR: ICD-10-PCS | Mod: CPTII,S$GLB,, | Performed by: COLON & RECTAL SURGERY

## 2020-03-09 PROCEDURE — 1159F MED LIST DOCD IN RCRD: CPT | Mod: S$GLB,,, | Performed by: COLON & RECTAL SURGERY

## 2020-03-09 PROCEDURE — 1101F PT FALLS ASSESS-DOCD LE1/YR: CPT | Mod: CPTII,S$GLB,, | Performed by: COLON & RECTAL SURGERY

## 2020-03-09 PROCEDURE — 99499 UNLISTED E&M SERVICE: CPT | Mod: S$GLB,,, | Performed by: COLON & RECTAL SURGERY

## 2020-03-09 PROCEDURE — 99214 PR OFFICE/OUTPT VISIT, EST, LEVL IV, 30-39 MIN: ICD-10-PCS | Mod: S$GLB,,, | Performed by: COLON & RECTAL SURGERY

## 2020-03-09 PROCEDURE — 1159F PR MEDICATION LIST DOCUMENTED IN MEDICAL RECORD: ICD-10-PCS | Mod: S$GLB,,, | Performed by: COLON & RECTAL SURGERY

## 2020-03-09 PROCEDURE — 99999 PR PBB SHADOW E&M-EST. PATIENT-LVL III: ICD-10-PCS | Mod: PBBFAC,,, | Performed by: COLON & RECTAL SURGERY

## 2020-03-09 PROCEDURE — 3077F SYST BP >= 140 MM HG: CPT | Mod: CPTII,S$GLB,, | Performed by: COLON & RECTAL SURGERY

## 2020-03-09 PROCEDURE — 3077F PR MOST RECENT SYSTOLIC BLOOD PRESSURE >= 140 MM HG: ICD-10-PCS | Mod: CPTII,S$GLB,, | Performed by: COLON & RECTAL SURGERY

## 2020-03-09 PROCEDURE — 99999 PR PBB SHADOW E&M-EST. PATIENT-LVL III: CPT | Mod: PBBFAC,,, | Performed by: COLON & RECTAL SURGERY

## 2020-03-09 NOTE — PROGRESS NOTES
History & Physical    SUBJECTIVE:     CC: Discuss ostomy reversal  Ref: Dr. Reece Hogan    History of Present Illness:  Patient is a 79 y.o. female presents for discussion of possible ostomy reversal.  Patient previously underwent open sigmoid colectomy with end-colostomy (Maribel's procedure) for perforated sigmoid diverticulitis in January 2019 by Dr. Hogan.  She had a postoperative course complicated by an abscess requiring percutaneous drainage. An initial colonoscopy was attempted be performed in January 2019 prior to surgery but was unable to be completed due to severe inflammation of the sigmoid colon.  She has never had a full colonoscopy and had not had one attempted prior to that one or since that one.  She states that over the past few months since her Maribel's procedure, she has developed some mucus drainage both from the rectum and from the vagina.  She states she wears a pad in her underwear that she sees drainage more from the vagina then from the rectum.  She rarely expelled any mucus from the rectum but does so after sitting on the toilet like a normal bowel movement.  She denies any blood via her stool or via rectum.  Her only previous surgical history prior to this is a laparoscopic appendectomy around 10 years ago.  She has no family history of colorectal cancer or IBD.  She states she has felt well over the past 2 months after recovering from the surgery by eating a regular diet and increasing her physical activity.    Interval history:  Since last clinic visit, patient has done well.  Continues to have good colostomy function.  However recently, she has developed a peristomal rash.  She denies any fever, chills, nausea, vomiting.  She reports some intermittent stool leakage on her skin as well.    Review of patient's allergies indicates:  No Known Allergies    Current Outpatient Medications   Medication Sig Dispense Refill    atorvastatin (LIPITOR) 10 MG tablet TAKE 1 TABLET ONCE DAILY 90  tablet 3    donepezil (ARICEPT) 10 MG tablet Take 1 tablet (10 mg total) by mouth every evening. 90 tablet 3    furosemide (LASIX) 20 MG tablet Take 1 tablet (20 mg total) by mouth once daily. 90 tablet 3    memantine (NAMENDA) 10 MG Tab TAKE 1 TABLET TWICE A  tablet 3    thyroid, pork, (ARMOUR THYROID) 30 mg Tab Take 1 tablet (30 mg total) by mouth once daily. 90 tablet 3    triamcinolone acetonide 0.1% (KENALOG) 0.1 % ointment APPLY TOPICALLY 4 (FOUR) TIMES DAILY. TO RASH ON FACE AND CHEST AND NOSE 80 g 1     Current Facility-Administered Medications   Medication Dose Route Frequency Provider Last Rate Last Dose    lidocaine (PF) 10 mg/ml (1%) injection 10 mg  1 mL Intradermal Once Rishabh Connelly MD           Past Medical History:   Diagnosis Date    Diverticulitis     High cholesterol     Hypertension     Hypothyroidism      Past Surgical History:   Procedure Laterality Date    APPENDECTOMY  2008    CATARACT EXTRACTION      Dr Raygoza    COLON SURGERY      COLONOSCOPY N/A 1/2/2019    Procedure: COLONOSCOPY;  Surgeon: Reece Hogan MD;  Location: [facility] ENDO;  Service: Endoscopy;  Laterality: N/A;    COLONOSCOPY N/A 6/7/2019    Procedure: COLONOSCOPY;  Surgeon: Rishabh Connelly MD;  Location: [facility] ENDO;  Service: General;  Laterality: N/A;    COLOSTOMY Left 1/2/2019    Procedure: CREATION, COLOSTOMY;  Surgeon: Reece Hogan MD;  Location: [facility] OR;  Service: General;  Laterality: Left;    SHAHIDA PROCEDURE N/A 1/2/2019    Procedure: SHAHIDA PROCEDURE;  Surgeon: Reece Hogan MD;  Location: [facility] OR;  Service: General;  Laterality: N/A;    LYSIS OF ADHESIONS N/A 1/2/2019    Procedure: LYSIS, ADHESIONS;  Surgeon: Reece Hogan MD;  Location: [facility] OR;  Service: General;  Laterality: N/A;    VITRECTOMY Right 09/2013     Family History   Problem Relation Age of Onset    Alzheimer's disease Mother     Aneurysm Father         brain    Stomach cancer Brother         50s     Social History     Tobacco  Use    Smoking status: Former Smoker    Smokeless tobacco: Never Used   Substance Use Topics    Alcohol use: No    Drug use: No        Review of Systems:  Review of Systems   Constitutional: Negative for activity change, appetite change, chills, fatigue, fever and unexpected weight change.   HENT: Negative for congestion, ear pain, sore throat and trouble swallowing.    Eyes: Negative for pain, redness and itching.   Respiratory: Negative for cough, shortness of breath and wheezing.    Cardiovascular: Negative for chest pain, palpitations and leg swelling.   Gastrointestinal: Negative for abdominal distention, abdominal pain, anal bleeding, blood in stool, constipation, diarrhea, nausea, rectal pain and vomiting.        +peristomal skin irritation   Endocrine: Negative for cold intolerance, heat intolerance and polyuria.   Genitourinary: Positive for vaginal discharge. Negative for dysuria, flank pain, frequency and hematuria.   Musculoskeletal: Negative for gait problem, joint swelling and neck pain.   Skin: Negative for color change, rash and wound.   Allergic/Immunologic: Negative for environmental allergies and immunocompromised state.   Neurological: Negative for dizziness, speech difficulty, weakness and numbness.   Psychiatric/Behavioral: Negative for agitation, confusion and hallucinations.       OBJECTIVE:     Vital Signs (Most Recent)  Temp: 97.8 °F (36.6 °C) (03/09/20 1132)  Pulse: (!) 55 (03/09/20 1132)  BP: (!) 161/86 (03/09/20 1132)     78.1 kg (172 lb 2.9 oz)     Physical Exam:  Physical Exam   Constitutional: She is oriented to person, place, and time. She appears well-developed.   HENT:   Head: Normocephalic and atraumatic.   Eyes: Conjunctivae and EOM are normal.   Neck: Normal range of motion. No thyromegaly present.   Cardiovascular: Normal rate and regular rhythm.   Pulmonary/Chest: Effort normal. No respiratory distress.   Abdominal: Soft. She exhibits no distension and no mass. There is no  tenderness.   Midline incision well-healed; ostomy pink and viable with soft stool in bag and +parastomal hernia; peristomal skin with some mild circumferential erythema that extends 1cm circumferentially in all directions on the skin   Musculoskeletal: Normal range of motion. She exhibits no edema or tenderness.   Neurological: She is alert and oriented to person, place, and time.   Skin: Skin is warm and dry. Capillary refill takes less than 2 seconds. No rash noted.   Psychiatric: She has a normal mood and affect.       Laboratory  Lab Results   Component Value Date    WBC 6.80 06/10/2019    HGB 13.4 06/10/2019    HCT 40.4 06/10/2019     06/10/2019    CHOL 165 04/01/2019    TRIG 62 04/01/2019    HDL 61 04/01/2019    ALT 15 06/10/2019    AST 26 06/10/2019     01/30/2020    K 3.7 01/30/2020     01/30/2020    CREATININE 0.8 01/30/2020    BUN 15 01/30/2020    CO2 29 01/30/2020    TSH 1.089 10/01/2019    INR 1.0 06/10/2019    HGBA1C 5.0 04/01/2019       Results for orders placed during the hospital encounter of 10/04/18   CT Abdomen Pelvis W Wo Contrast    Narrative EXAMINATION:  CT ABDOMEN PELVIS W WO CONTRAST    CLINICAL HISTORY:  Abdominal pain, unspecified;abnormal abdominal x-ray;.    TECHNIQUE:  Low dose axial images, sagittal and coronal reformations were obtained from the lung bases to the pubic symphysis.  Images were obtained both with and without IV contrast.    COMPARISON:  None    FINDINGS:  Lung bases are clear.    The liver is normal.  The gallbladder is normal.    The spleen is normal in size and appearance.  The pancreas is normal.  The adrenal glands are normal.  The aorta and IVC are normal.  No retroperitoneal adenopathy.    Scarring of the lateral and upper left kidney is present but otherwise the left kidney is normal.  Left ureter is normal.  Severe scarring identified throughout the right kidney.  A very large calculus is identified within the left renal pelvis measuring 3.0  cm.  No significant right hydronephrosis.  The right ureter is nondilated and normal.    Small hiatal hernia otherwise the stomach is normal.  The small intestine is normal.  Appendix surgically absent.  Diverticulosis of the sigmoid colon is identified with colonic wall thickening and severe surrounding inflammatory changes.  Findings compatible acute diverticulitis.  Negative for pneumoperitoneum or abscess.  The rectum is normal.    The pelvis demonstrates normal appearing bladder.  Uterus is unremarkable.  Left adnexa region is normal.  To check the calcifications identified within an atrophic right ovary, measuring 0.7 cm in maximum dimension.    Regional bones demonstrate a moderate grade compression fracture of T11.  No suspicious bony abnormality detected.  Abdominal and pelvic wall soft tissues are normal.      Impression 1. Severe acute diverticulitis sigmoid colon.  2. 3.0 cm calculus right renal pelvis.  Extensive scarring of the right kidney.  3. Hiatal hernia.  All CT scans at this facility are performed  using dose modulation techniques as appropriate to performed exam including the following:  automated exposure control; adjustment of mA and/or kV according to the patients size (this includes techniques or standardized protocols for targeted exams where dose is matched to indication/reason for exam: i.e. extremities or head);  iterative reconstruction technique.      Electronically signed by: Dangelo Black MD  Date:    10/04/2018  Time:    19:10         Diagnostic Results:  Labs: Reviewed  CT: Reviewed  Independently reviewed the previous CT scan showing acute sigmoid diverticulitis as well as the postoperative scan indicating the postoperative abscess that was subsequently drained appropriately well  Colonoscopy:  Reviewed    CT abd/pelvis with rectal contrast 6/26/19:   FINDINGS:  Visualized Chest: Lung bases are clear    Abdomen:    Liver: Within normal limits.    Gallbladder and biliary:  Within normal limits.    Spleen: Within normal limits.    Pancreas: Within normal limits.    Adrenals: Within normal limits.    Kidneys: Large laminated stone again noted in the right renal pelvis measuring up to 3 cm.  Atrophy of the right kidney again noted.  Left kidney and left-sided collecting system appear within normal limits.    Bowel: Postoperative findings related to prior sigmoid colectomy with left lower quadrant colostomy and Ayala's pouch again noted.  The rectum is filled with contrast.  There is some contrast seen extending into the vagina via a small fistulous tract arising from the anterior wall of the mid rectum.  There also appears to be a 2nd fistulous tract arising from the proximal margins of the Maribel's pouch which extends into adjacent loops of small bowel.  Some mild inflammatory stranding noted in the adjacent fat.  No findings to suggest obstruction.    Peritoneum: No ascites or pneumoperitoneum.    Abdominal adenopathy: None.    Vasculature: Within normal limits.    Pelvis:    Urinary bladder: Unremarkable.    Uterus and adnexa: Uterus and left adnexa appear grossly unremarkable.  There are 2 adjacent calcific densities in the right adnexa each measuring approximately 9 mm in diameter which appear closely associated with the right ovary and likely represent phleboliths present within the gonadal vein.  No definite associated fat to suggest dermoid cyst.    Pelvic adenopathy: None.    Bones: Chronic T11 superior endplate compression fracture deformity is unchanged.    Miscellaneous: None.      Impression       1. 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  2. 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 Postoperative findings related to prior sigmoidectomy with left lower quadrant colostomy.  4. Unchanged atrophic appearance of the right kidney with a  large stone present at the renal pelvis.  5. Unchanged calcifications in the right adnexal region, likely representing phleboliths within the gonadal vein.  No definite associated fat to suggest dermoid cyst.         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

## 2020-03-09 NOTE — TELEPHONE ENCOUNTER
Spoke to Myla - She states pt is still having a lot of pain around her stoma - States she was sick last week and pt's care giver was unable to take pt to her appt with Alley Oseguera PA-C to be checked out. - Asked that she please be scheduled to see Dr Connelly today - Scheduled pt for today at 1120, [facility] [place] [facility] [address] [facility] - Myla stated she will let the Caregiver know so she can assist in getting pt here today.     ----- Message from Taco Ott sent at 3/9/2020  7:59 AM CDT -----  Contact: Pt daughter Myla  Pt daughter Myla called and said that the pt needs to make an appt    Myla can be reached at [phone]

## 2020-03-09 NOTE — TELEPHONE ENCOUNTER
Spoke to caregiver  Advised that [facility] nurse has given me the supply needed, [employer] B[employer] Moldable Rings Item # [id number]    ----- Message from Kathryn Nieto sent at 3/9/2020  3:07 PM CDT -----  Contact: adi-caregiver  needs call back from [facility] regarding pt, previously discussed...[phone]

## 2020-03-12 ENCOUNTER — PATIENT MESSAGE (OUTPATIENT)
Dept: INTERNAL MEDICINE | Facility: CLINIC | Age: 80
End: 2020-03-12

## 2020-03-12 RX ORDER — MEMANTINE HYDROCHLORIDE 10 MG/1
TABLET ORAL
Qty: 180 TABLET | Refills: 3 | Status: SHIPPED | OUTPATIENT
Start: 2020-03-12 | End: 2021-03-05

## 2020-03-12 RX ORDER — ATORVASTATIN CALCIUM 10 MG/1
10 TABLET, FILM COATED ORAL DAILY
Qty: 90 TABLET | Refills: 3 | Status: SHIPPED | OUTPATIENT
Start: 2020-03-12 | End: 2020-06-23 | Stop reason: SDUPTHER

## 2020-03-16 NOTE — PLAN OF CARE
Addended by: JORDAN PACHECO on: 3/16/2020 12:03 PM     Modules accepted: Orders     Problem: Adult Inpatient Plan of Care  Goal: Plan of Care Review  Outcome: Ongoing (interventions implemented as appropriate)  Remains free from harm, tolerating diet, pain controlled via PO pain meds, positions self, ambulates in seth, no acute distress noted. 24 hour chart check complete.

## 2020-03-22 ENCOUNTER — PATIENT MESSAGE (OUTPATIENT)
Dept: INTERNAL MEDICINE | Facility: CLINIC | Age: 80
End: 2020-03-22

## 2020-04-12 ENCOUNTER — PATIENT MESSAGE (OUTPATIENT)
Dept: INTERNAL MEDICINE | Facility: CLINIC | Age: 80
End: 2020-04-12

## 2020-04-12 ENCOUNTER — HOSPITAL ENCOUNTER (OUTPATIENT)
Facility: HOSPITAL | Age: 80
Discharge: HOME | End: 2020-04-13
Attending: EMERGENCY MEDICINE | Admitting: INTERNAL MEDICINE
Payer: MEDICARE

## 2020-04-12 ENCOUNTER — PATIENT MESSAGE (OUTPATIENT)
Dept: SURGERY | Facility: CLINIC | Age: 80
End: 2020-04-12

## 2020-04-12 ENCOUNTER — NURSE TRIAGE (OUTPATIENT)
Dept: ADMINISTRATIVE | Facility: CLINIC | Age: 80
End: 2020-04-12

## 2020-04-12 DIAGNOSIS — N39.0 URINARY TRACT INFECTION WITHOUT HEMATURIA, SITE UNSPECIFIED: Primary | ICD-10-CM

## 2020-04-12 DIAGNOSIS — R11.2 NAUSEA & VOMITING: ICD-10-CM

## 2020-04-12 DIAGNOSIS — E87.1 HYPONATREMIA: ICD-10-CM

## 2020-04-12 DIAGNOSIS — K52.9 COLITIS: ICD-10-CM

## 2020-04-12 DIAGNOSIS — N30.00 ACUTE CYSTITIS WITHOUT HEMATURIA: ICD-10-CM

## 2020-04-12 LAB
ALBUMIN SERPL BCP-MCNC: 3.2 G/DL (ref 3.5–5.2)
ALP SERPL-CCNC: 77 U/L (ref 55–135)
ALT SERPL W/O P-5'-P-CCNC: 30 U/L (ref 10–44)
ANION GAP SERPL CALC-SCNC: 12 MMOL/L (ref 8–16)
AST SERPL-CCNC: 32 U/L (ref 10–40)
BACTERIA #/AREA URNS HPF: ABNORMAL /HPF
BASOPHILS # BLD AUTO: 0.04 K/UL (ref 0–0.2)
BASOPHILS NFR BLD: 0.4 % (ref 0–1.9)
BILIRUB SERPL-MCNC: 0.7 MG/DL (ref 0.1–1)
BILIRUB UR QL STRIP: NEGATIVE
BUN SERPL-MCNC: 9 MG/DL (ref 8–23)
CALCIUM SERPL-MCNC: 8.2 MG/DL (ref 8.7–10.5)
CHLORIDE SERPL-SCNC: 96 MMOL/L (ref 95–110)
CLARITY UR: CLEAR
CO2 SERPL-SCNC: 21 MMOL/L (ref 23–29)
COLOR UR: YELLOW
CREAT SERPL-MCNC: 0.7 MG/DL (ref 0.5–1.4)
DACRYOCYTES BLD QL SMEAR: ABNORMAL
DIFFERENTIAL METHOD: ABNORMAL
EOSINOPHIL # BLD AUTO: 0.3 K/UL (ref 0–0.5)
EOSINOPHIL NFR BLD: 3 % (ref 0–8)
ERYTHROCYTE [DISTWIDTH] IN BLOOD BY AUTOMATED COUNT: 13.2 % (ref 11.5–14.5)
EST. GFR  (AFRICAN AMERICAN): >60 ML/MIN/1.73 M^2
EST. GFR  (NON AFRICAN AMERICAN): >60 ML/MIN/1.73 M^2
GLUCOSE SERPL-MCNC: 111 MG/DL (ref 70–110)
GLUCOSE UR QL STRIP: NEGATIVE
HCT VFR BLD AUTO: 38.3 % (ref 37–48.5)
HGB BLD-MCNC: 12.7 G/DL (ref 12–16)
HGB UR QL STRIP: ABNORMAL
HYPOCHROMIA BLD QL SMEAR: ABNORMAL
IMM GRANULOCYTES # BLD AUTO: 0.08 K/UL (ref 0–0.04)
IMM GRANULOCYTES NFR BLD AUTO: 0.8 % (ref 0–0.5)
KETONES UR QL STRIP: NEGATIVE
LACTATE SERPL-SCNC: 0.9 MMOL/L (ref 0.5–2.2)
LEUKOCYTE ESTERASE UR QL STRIP: ABNORMAL
LIPASE SERPL-CCNC: 23 U/L (ref 4–60)
LYMPHOCYTES # BLD AUTO: 2.4 K/UL (ref 1–4.8)
LYMPHOCYTES NFR BLD: 22.6 % (ref 18–48)
MCH RBC QN AUTO: 27.7 PG (ref 27–31)
MCHC RBC AUTO-ENTMCNC: 33.2 G/DL (ref 32–36)
MCV RBC AUTO: 84 FL (ref 82–98)
MICROSCOPIC COMMENT: ABNORMAL
MONOCYTES # BLD AUTO: 1 K/UL (ref 0.3–1)
MONOCYTES NFR BLD: 9 % (ref 4–15)
NEUTROPHILS # BLD AUTO: 6.8 K/UL (ref 1.8–7.7)
NEUTROPHILS NFR BLD: 64.2 % (ref 38–73)
NITRITE UR QL STRIP: NEGATIVE
NRBC BLD-RTO: 0 /100 WBC
OVALOCYTES BLD QL SMEAR: ABNORMAL
PH UR STRIP: 7 [PH] (ref 5–8)
PLATELET # BLD AUTO: 252 K/UL (ref 150–350)
PLATELET BLD QL SMEAR: ABNORMAL
PMV BLD AUTO: 9.9 FL (ref 9.2–12.9)
POIKILOCYTOSIS BLD QL SMEAR: SLIGHT
POTASSIUM SERPL-SCNC: 3.8 MMOL/L (ref 3.5–5.1)
PROT SERPL-MCNC: 6.9 G/DL (ref 6–8.4)
PROT UR QL STRIP: NEGATIVE
RBC # BLD AUTO: 4.58 M/UL (ref 4–5.4)
RBC #/AREA URNS HPF: 10 /HPF (ref 0–4)
SODIUM SERPL-SCNC: 129 MMOL/L (ref 136–145)
SP GR UR STRIP: 1.01 (ref 1–1.03)
SQUAMOUS #/AREA URNS HPF: 2 /HPF
TROPONIN I SERPL DL<=0.01 NG/ML-MCNC: 0.02 NG/ML (ref 0–0.03)
URN SPEC COLLECT METH UR: ABNORMAL
UROBILINOGEN UR STRIP-ACNC: NEGATIVE EU/DL
WBC # BLD AUTO: 10.57 K/UL (ref 3.9–12.7)
WBC #/AREA URNS HPF: >100 /HPF (ref 0–5)

## 2020-04-12 PROCEDURE — 83605 ASSAY OF LACTIC ACID: CPT

## 2020-04-12 PROCEDURE — 87186 SC STD MICRODIL/AGAR DIL: CPT

## 2020-04-12 PROCEDURE — 63600175 PHARM REV CODE 636 W HCPCS: Performed by: EMERGENCY MEDICINE

## 2020-04-12 PROCEDURE — 99285 EMERGENCY DEPT VISIT HI MDM: CPT | Mod: 25

## 2020-04-12 PROCEDURE — 93010 ELECTROCARDIOGRAM REPORT: CPT | Mod: ,,, | Performed by: INTERNAL MEDICINE

## 2020-04-12 PROCEDURE — 87088 URINE BACTERIA CULTURE: CPT

## 2020-04-12 PROCEDURE — 25000003 PHARM REV CODE 250: Performed by: EMERGENCY MEDICINE

## 2020-04-12 PROCEDURE — 84484 ASSAY OF TROPONIN QUANT: CPT

## 2020-04-12 PROCEDURE — 25500020 PHARM REV CODE 255: Performed by: EMERGENCY MEDICINE

## 2020-04-12 PROCEDURE — 87086 URINE CULTURE/COLONY COUNT: CPT

## 2020-04-12 PROCEDURE — 83690 ASSAY OF LIPASE: CPT

## 2020-04-12 PROCEDURE — 87077 CULTURE AEROBIC IDENTIFY: CPT

## 2020-04-12 PROCEDURE — 96361 HYDRATE IV INFUSION ADD-ON: CPT

## 2020-04-12 PROCEDURE — 96375 TX/PRO/DX INJ NEW DRUG ADDON: CPT

## 2020-04-12 PROCEDURE — 80053 COMPREHEN METABOLIC PANEL: CPT

## 2020-04-12 PROCEDURE — 93010 EKG 12-LEAD: ICD-10-PCS | Mod: ,,, | Performed by: INTERNAL MEDICINE

## 2020-04-12 PROCEDURE — 81000 URINALYSIS NONAUTO W/SCOPE: CPT

## 2020-04-12 PROCEDURE — 85025 COMPLETE CBC W/AUTO DIFF WBC: CPT

## 2020-04-12 PROCEDURE — 93005 ELECTROCARDIOGRAM TRACING: CPT

## 2020-04-12 RX ORDER — ONDANSETRON 2 MG/ML
4 INJECTION INTRAMUSCULAR; INTRAVENOUS
Status: COMPLETED | OUTPATIENT
Start: 2020-04-12 | End: 2020-04-12

## 2020-04-12 RX ORDER — METRONIDAZOLE 500 MG/1
500 TABLET ORAL 3 TIMES DAILY
Qty: 30 TABLET | Refills: 0 | Status: SHIPPED | OUTPATIENT
Start: 2020-04-12 | End: 2020-04-12 | Stop reason: CLARIF

## 2020-04-12 RX ORDER — CIPROFLOXACIN 500 MG/1
500 TABLET ORAL 2 TIMES DAILY
Qty: 20 TABLET | Refills: 0 | Status: SHIPPED | OUTPATIENT
Start: 2020-04-12 | End: 2020-04-12 | Stop reason: CLARIF

## 2020-04-12 RX ADMIN — IOHEXOL 30 ML: 350 INJECTION, SOLUTION INTRAVENOUS at 10:04

## 2020-04-12 RX ADMIN — ONDANSETRON 4 MG: 2 INJECTION INTRAMUSCULAR; INTRAVENOUS at 10:04

## 2020-04-12 RX ADMIN — IOHEXOL 75 ML: 350 INJECTION, SOLUTION INTRAVENOUS at 11:04

## 2020-04-12 RX ADMIN — SODIUM CHLORIDE 1000 ML: 0.9 INJECTION, SOLUTION INTRAVENOUS at 10:04

## 2020-04-13 VITALS
HEIGHT: 62 IN | RESPIRATION RATE: 14 BRPM | SYSTOLIC BLOOD PRESSURE: 104 MMHG | HEART RATE: 46 BPM | TEMPERATURE: 98 F | OXYGEN SATURATION: 93 % | WEIGHT: 179.25 LBS | BODY MASS INDEX: 32.99 KG/M2 | DIASTOLIC BLOOD PRESSURE: 59 MMHG

## 2020-04-13 PROBLEM — E86.0 DEHYDRATION WITH HYPONATREMIA: Status: RESOLVED | Noted: 2018-11-07 | Resolved: 2020-04-13

## 2020-04-13 PROBLEM — E87.1 DEHYDRATION WITH HYPONATREMIA: Status: RESOLVED | Noted: 2018-11-07 | Resolved: 2020-04-13

## 2020-04-13 PROBLEM — R00.1 BRADYCARDIA: Status: ACTIVE | Noted: 2020-04-13

## 2020-04-13 PROBLEM — K52.9 COLITIS: Status: ACTIVE | Noted: 2020-04-13

## 2020-04-13 PROBLEM — N39.0 URINARY TRACT INFECTION WITHOUT HEMATURIA: Status: ACTIVE | Noted: 2020-04-13

## 2020-04-13 PROBLEM — N20.0 NEPHROLITHIASIS: Status: ACTIVE | Noted: 2020-04-13

## 2020-04-13 PROBLEM — E86.0 DEHYDRATION WITH HYPONATREMIA: Status: ACTIVE | Noted: 2018-11-07

## 2020-04-13 LAB
ALBUMIN SERPL BCP-MCNC: 2.8 G/DL (ref 3.5–5.2)
ANION GAP SERPL CALC-SCNC: 11 MMOL/L (ref 8–16)
BASOPHILS # BLD AUTO: 0.03 K/UL (ref 0–0.2)
BASOPHILS NFR BLD: 0.3 % (ref 0–1.9)
BUN SERPL-MCNC: 6 MG/DL (ref 8–23)
CALCIUM SERPL-MCNC: 7.9 MG/DL (ref 8.7–10.5)
CHLORIDE SERPL-SCNC: 96 MMOL/L (ref 95–110)
CO2 SERPL-SCNC: 23 MMOL/L (ref 23–29)
CREAT SERPL-MCNC: 0.6 MG/DL (ref 0.5–1.4)
DIFFERENTIAL METHOD: ABNORMAL
EOSINOPHIL # BLD AUTO: 0.2 K/UL (ref 0–0.5)
EOSINOPHIL NFR BLD: 1.6 % (ref 0–8)
ERYTHROCYTE [DISTWIDTH] IN BLOOD BY AUTOMATED COUNT: 13 % (ref 11.5–14.5)
EST. GFR  (AFRICAN AMERICAN): >60 ML/MIN/1.73 M^2
EST. GFR  (NON AFRICAN AMERICAN): >60 ML/MIN/1.73 M^2
GLUCOSE SERPL-MCNC: 116 MG/DL (ref 70–110)
HCT VFR BLD AUTO: 36.9 % (ref 37–48.5)
HGB BLD-MCNC: 12 G/DL (ref 12–16)
IMM GRANULOCYTES # BLD AUTO: 0.07 K/UL (ref 0–0.04)
IMM GRANULOCYTES NFR BLD AUTO: 0.7 % (ref 0–0.5)
LYMPHOCYTES # BLD AUTO: 1.7 K/UL (ref 1–4.8)
LYMPHOCYTES NFR BLD: 18 % (ref 18–48)
MCH RBC QN AUTO: 27.6 PG (ref 27–31)
MCHC RBC AUTO-ENTMCNC: 32.5 G/DL (ref 32–36)
MCV RBC AUTO: 85 FL (ref 82–98)
MONOCYTES # BLD AUTO: 0.8 K/UL (ref 0.3–1)
MONOCYTES NFR BLD: 8.2 % (ref 4–15)
NEUTROPHILS # BLD AUTO: 6.8 K/UL (ref 1.8–7.7)
NEUTROPHILS NFR BLD: 71.2 % (ref 38–73)
NRBC BLD-RTO: 0 /100 WBC
PHOSPHATE SERPL-MCNC: 2.6 MG/DL (ref 2.7–4.5)
PLATELET # BLD AUTO: 240 K/UL (ref 150–350)
PMV BLD AUTO: 10.1 FL (ref 9.2–12.9)
POTASSIUM SERPL-SCNC: 3.1 MMOL/L (ref 3.5–5.1)
PROCALCITONIN SERPL IA-MCNC: 0.02 NG/ML
RBC # BLD AUTO: 4.34 M/UL (ref 4–5.4)
SODIUM SERPL-SCNC: 130 MMOL/L (ref 136–145)
WBC # BLD AUTO: 9.54 K/UL (ref 3.9–12.7)
WBC #/AREA STL HPF: NORMAL /[HPF]

## 2020-04-13 PROCEDURE — G0378 HOSPITAL OBSERVATION PER HR: HCPCS

## 2020-04-13 PROCEDURE — S0030 INJECTION, METRONIDAZOLE: HCPCS | Performed by: INTERNAL MEDICINE

## 2020-04-13 PROCEDURE — 87427 SHIGA-LIKE TOXIN AG IA: CPT | Mod: 59

## 2020-04-13 PROCEDURE — 99213 PR OFFICE/OUTPT VISIT, EST, LEVL III, 20-29 MIN: ICD-10-PCS | Mod: ,,, | Performed by: COLON & RECTAL SURGERY

## 2020-04-13 PROCEDURE — 96375 TX/PRO/DX INJ NEW DRUG ADDON: CPT | Performed by: INTERNAL MEDICINE

## 2020-04-13 PROCEDURE — 85025 COMPLETE CBC W/AUTO DIFF WBC: CPT

## 2020-04-13 PROCEDURE — 96365 THER/PROPH/DIAG IV INF INIT: CPT | Performed by: INTERNAL MEDICINE

## 2020-04-13 PROCEDURE — 25000003 PHARM REV CODE 250: Performed by: INTERNAL MEDICINE

## 2020-04-13 PROCEDURE — 80069 RENAL FUNCTION PANEL: CPT

## 2020-04-13 PROCEDURE — 87045 FECES CULTURE AEROBIC BACT: CPT

## 2020-04-13 PROCEDURE — 99214 PR OFFICE/OUTPT VISIT, EST, LEVL IV, 30-39 MIN: ICD-10-PCS | Mod: ,,, | Performed by: INTERNAL MEDICINE

## 2020-04-13 PROCEDURE — 84145 PROCALCITONIN (PCT): CPT

## 2020-04-13 PROCEDURE — 63600175 PHARM REV CODE 636 W HCPCS: Performed by: INTERNAL MEDICINE

## 2020-04-13 PROCEDURE — 87046 STOOL CULTR AEROBIC BACT EA: CPT

## 2020-04-13 PROCEDURE — U0002 COVID-19 LAB TEST NON-CDC: HCPCS

## 2020-04-13 PROCEDURE — 99214 OFFICE O/P EST MOD 30 MIN: CPT | Mod: ,,, | Performed by: INTERNAL MEDICINE

## 2020-04-13 PROCEDURE — 99213 OFFICE O/P EST LOW 20 MIN: CPT | Mod: ,,, | Performed by: COLON & RECTAL SURGERY

## 2020-04-13 PROCEDURE — 87040 BLOOD CULTURE FOR BACTERIA: CPT

## 2020-04-13 PROCEDURE — 89055 LEUKOCYTE ASSESSMENT FECAL: CPT

## 2020-04-13 PROCEDURE — 36415 COLL VENOUS BLD VENIPUNCTURE: CPT

## 2020-04-13 PROCEDURE — 63600175 PHARM REV CODE 636 W HCPCS: Performed by: EMERGENCY MEDICINE

## 2020-04-13 RX ORDER — ATORVASTATIN CALCIUM 10 MG/1
10 TABLET, FILM COATED ORAL DAILY
Status: DISCONTINUED | OUTPATIENT
Start: 2020-04-13 | End: 2020-04-13 | Stop reason: HOSPADM

## 2020-04-13 RX ORDER — CIPROFLOXACIN 500 MG/1
500 TABLET ORAL EVERY 12 HOURS
Qty: 6 TABLET | Refills: 0 | Status: SHIPPED | OUTPATIENT
Start: 2020-04-13 | End: 2020-05-12

## 2020-04-13 RX ORDER — CEFEPIME HYDROCHLORIDE 1 G/50ML
1 INJECTION, SOLUTION INTRAVENOUS
Status: DISCONTINUED | OUTPATIENT
Start: 2020-04-13 | End: 2020-04-13 | Stop reason: HOSPADM

## 2020-04-13 RX ORDER — METRONIDAZOLE 500 MG/100ML
500 INJECTION, SOLUTION INTRAVENOUS
Status: DISCONTINUED | OUTPATIENT
Start: 2020-04-13 | End: 2020-04-13 | Stop reason: HOSPADM

## 2020-04-13 RX ORDER — ACETAMINOPHEN 325 MG/1
650 TABLET ORAL EVERY 4 HOURS PRN
Status: DISCONTINUED | OUTPATIENT
Start: 2020-04-13 | End: 2020-04-13 | Stop reason: HOSPADM

## 2020-04-13 RX ORDER — CEFEPIME HYDROCHLORIDE 1 G/50ML
1 INJECTION, SOLUTION INTRAVENOUS
Status: DISCONTINUED | OUTPATIENT
Start: 2020-04-13 | End: 2020-04-13 | Stop reason: DRUGHIGH

## 2020-04-13 RX ORDER — ENOXAPARIN SODIUM 100 MG/ML
40 INJECTION SUBCUTANEOUS EVERY 24 HOURS
Status: DISCONTINUED | OUTPATIENT
Start: 2020-04-13 | End: 2020-04-13 | Stop reason: HOSPADM

## 2020-04-13 RX ORDER — MEMANTINE HYDROCHLORIDE 10 MG/1
10 TABLET ORAL DAILY
Status: DISCONTINUED | OUTPATIENT
Start: 2020-04-13 | End: 2020-04-13 | Stop reason: HOSPADM

## 2020-04-13 RX ORDER — SODIUM CHLORIDE 9 MG/ML
INJECTION, SOLUTION INTRAVENOUS CONTINUOUS
Status: DISCONTINUED | OUTPATIENT
Start: 2020-04-13 | End: 2020-04-13 | Stop reason: HOSPADM

## 2020-04-13 RX ORDER — SODIUM CHLORIDE 0.9 % (FLUSH) 0.9 %
10 SYRINGE (ML) INJECTION
Status: DISCONTINUED | OUTPATIENT
Start: 2020-04-13 | End: 2020-04-13 | Stop reason: HOSPADM

## 2020-04-13 RX ORDER — FUROSEMIDE 20 MG/1
20 TABLET ORAL DAILY
Status: DISCONTINUED | OUTPATIENT
Start: 2020-04-13 | End: 2020-04-13 | Stop reason: HOSPADM

## 2020-04-13 RX ORDER — DONEPEZIL HYDROCHLORIDE 5 MG/1
10 TABLET, FILM COATED ORAL NIGHTLY
Status: DISCONTINUED | OUTPATIENT
Start: 2020-04-13 | End: 2020-04-13 | Stop reason: HOSPADM

## 2020-04-13 RX ORDER — HYDROCODONE BITARTRATE AND ACETAMINOPHEN 5; 325 MG/1; MG/1
1 TABLET ORAL EVERY 6 HOURS PRN
Status: DISCONTINUED | OUTPATIENT
Start: 2020-04-13 | End: 2020-04-13 | Stop reason: HOSPADM

## 2020-04-13 RX ORDER — THYROID 30 MG/1
30 TABLET ORAL
Status: DISCONTINUED | OUTPATIENT
Start: 2020-04-13 | End: 2020-04-13 | Stop reason: HOSPADM

## 2020-04-13 RX ADMIN — HYDROCODONE BITARTRATE AND ACETAMINOPHEN 1 TABLET: 5; 325 TABLET ORAL at 06:04

## 2020-04-13 RX ADMIN — ATORVASTATIN CALCIUM 10 MG: 10 TABLET, FILM COATED ORAL at 08:04

## 2020-04-13 RX ADMIN — SODIUM CHLORIDE: 0.9 INJECTION, SOLUTION INTRAVENOUS at 06:04

## 2020-04-13 RX ADMIN — METRONIDAZOLE 500 MG: 500 INJECTION, SOLUTION INTRAVENOUS at 07:04

## 2020-04-13 RX ADMIN — CEFEPIME HYDROCHLORIDE 1 G: 1 INJECTION, SOLUTION INTRAVENOUS at 06:04

## 2020-04-13 RX ADMIN — MEMANTINE 10 MG: 10 TABLET ORAL at 08:04

## 2020-04-13 RX ADMIN — FUROSEMIDE 20 MG: 20 TABLET ORAL at 08:04

## 2020-04-13 RX ADMIN — CEFTRIAXONE 2 G: 2 INJECTION, SOLUTION INTRAVENOUS at 12:04

## 2020-04-13 RX ADMIN — PROMETHAZINE HYDROCHLORIDE 12.5 MG: 25 INJECTION INTRAMUSCULAR; INTRAVENOUS at 12:04

## 2020-04-13 RX ADMIN — THYROID, PORCINE 30 MG: 30 TABLET ORAL at 06:04

## 2020-04-13 NOTE — PROGRESS NOTES
Pharmacist Renal Dose Adjustment Note    Irlanda Lopez is a 79 y.o. female being treated with the medication Cefepime    Patient Data:    Vital Signs (Most Recent):  Temp: 97.8 °F (36.6 °C) (04/13/20 0807)  Pulse: (!) 46 (04/13/20 0807)  Resp: 18 (04/13/20 0807)  BP: (!) 110/57 (04/13/20 0807)  SpO2: 96 % (04/13/20 0807)   Vital Signs (72h Range):  Temp:  [97.8 °F (36.6 °C)-98.5 °F (36.9 °C)]   Pulse:  [46-62]   Resp:  [15-20]   BP: (110-171)/(57-82)   SpO2:  [96 %-100 %]      Recent Labs   Lab 04/12/20 2153 04/13/20  0542   CREATININE 0.7 0.6     Serum creatinine: 0.6 mg/dL 04/13/20 0542  Estimated creatinine clearance: 76.4 mL/min    Medication:Cefepime dose: 1 gm frequency q12h will be changed to medication:Cefepime dose:1 gm frequency:q8h    Pharmacist's Name: Krystal Vaca  Pharmacist's Extension: [phone]

## 2020-04-13 NOTE — ASSESSMENT & PLAN NOTE
-HR in 40's-50's this AM  -Patient asymptomatic  -HR has been chronically on low side on prior EKGs  -Suspect vagal response/immobility/pain medication exacerbating bradycardia as well  -Monitor for now  -Consider OP Holter if needed

## 2020-04-13 NOTE — CONSULTS
[facility]  Cardiology  Consult Note    Patient Name: Irlanda Lopez  MRN: [id number]  Admission Date: 4/12/2020  Hospital Length of Stay: 0 days  Code Status: Full Code   Attending Provider: Shankar Bowen MD   Consulting Provider: Fatoumata Rowe PA-C  Primary Care Physician: Myla Arias MD  Principal Problem:Colitis    Patient information was obtained from patient, relative(s), past medical records and ER records.     Inpatient consult to Cardiology  Consult performed by: Fatoumata Rowe PA-C  Consult ordered by: Joseluis Tran MD        Subjective:     Chief Complaint: Abdominal cramping    HPI:   Ms. 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 ED yesterday with a chief complaint of worsening right-sided abdominal cramps over the past few days. Associated symptoms included decreased colostomy output and an episode of emesis. Patient denied any associated fever, chills, chest pain, or SOB. Initial workup in ED revealed colitis, dehydration, and +UTI and patient subsequently admitted for further evaluation treatment. This AM, patient's HR was noted to be in upper 40's-50's and cardiology was consulted to assist with management of bradycardia. Patient seen and examined today. Daughter present and at bedside during exam. Seems to feel ok. No cardiac complaints. Remains chest pain free. No lightheadedness, dizziness, near syncope, or syncope. Followed on OP basis by Dr. Love. Review of EKGs shows HR typically in 50's. Chart reviewed. Troponin x 1 negative. Echo 11/18 showed normal EF, moderate MR.      Past Medical History:   Diagnosis Date    Diverticulitis     High cholesterol     Hypertension     Hypothyroidism        Past Surgical History:   Procedure Laterality Date    APPENDECTOMY  2008    CATARACT  EXTRACTION      Dr Raygoza    COLON SURGERY      COLONOSCOPY N/A 1/2/2019    Procedure: COLONOSCOPY;  Surgeon: Reece Hogan MD;  Location: [facility] ENDO;  Service: Endoscopy;  Laterality: N/A;    COLONOSCOPY N/A 6/7/2019    Procedure: COLONOSCOPY;  Surgeon: Rishabh Connelly MD;  Location: [facility] ENDO;  Service: General;  Laterality: N/A;    COLOSTOMY Left 1/2/2019    Procedure: CREATION, COLOSTOMY;  Surgeon: Reece Hogan MD;  Location: [facility] OR;  Service: General;  Laterality: Left;    SHAHIDA PROCEDURE N/A 1/2/2019    Procedure: SHAHIDA PROCEDURE;  Surgeon: Reece Hogan MD;  Location: [facility] OR;  Service: General;  Laterality: N/A;    LYSIS OF ADHESIONS N/A 1/2/2019    Procedure: LYSIS, ADHESIONS;  Surgeon: Reece Hogan MD;  Location: [facility] OR;  Service: General;  Laterality: N/A;    VITRECTOMY Right 09/2013       Review of patient's allergies indicates:  No Known Allergies    No current facility-administered medications on file prior to encounter.      Current Outpatient Medications on File Prior to Encounter   Medication Sig    atorvastatin (LIPITOR) 10 MG tablet Take 1 tablet (10 mg total) by mouth once daily.    donepezil (ARICEPT) 10 MG tablet Take 1 tablet (10 mg total) by mouth every evening.    furosemide (LASIX) 20 MG tablet Take 1 tablet (20 mg total) by mouth once daily.    memantine (NAMENDA) 10 MG Tab TAKE 1 TABLET TWICE A DAY    thyroid, pork, (ARMOUR THYROID) 30 mg Tab Take 1 tablet (30 mg total) by mouth once daily.    triamcinolone acetonide 0.1% (KENALOG) 0.1 % ointment APPLY TOPICALLY 4 (FOUR) TIMES DAILY. TO RASH ON FACE AND CHEST AND NOSE     Family History     Problem Relation (Age of Onset)    Alzheimer's disease Mother    Aneurysm Father    Stomach cancer Brother        Tobacco Use    Smoking status: Former Smoker    Smokeless tobacco: Never Used   Substance and Sexual Activity    Alcohol use: No    Drug use: No    Sexual activity: Never     Comment:      Review of Systems    Constitution: Negative.   Eyes: Negative.    Cardiovascular: Negative.    Respiratory: Negative.    Endocrine: Negative.    Hematologic/Lymphatic: Negative.    Skin: Negative.    Musculoskeletal: Negative.    Gastrointestinal: Positive for bloating, abdominal pain, nausea and vomiting.   Genitourinary: Negative.    Neurological: Negative.    Psychiatric/Behavioral: Negative.    Allergic/Immunologic: Negative.      Objective:     Vital Signs (Most Recent):  Temp: 98 °F (36.7 °C) (04/13/20 1202)  Pulse: (!) 46 (04/13/20 1202)  Resp: 14 (04/13/20 1202)  BP: (!) 104/59 (04/13/20 1202)  SpO2: (!) 93 % (04/13/20 1202) Vital Signs (24h Range):  Temp:  [97.8 °F (36.6 °C)-98.5 °F (36.9 °C)] 98 °F (36.7 °C)  Pulse:  [42-62] 46  Resp:  [14-20] 14  SpO2:  [93 %-100 %] 93 %  BP: (104-171)/(57-82) 104/59     Weight: 81.3 kg (179 lb 3.7 oz)  Body mass index is 32.78 kg/m².    SpO2: (!) 93 %  O2 Device (Oxygen Therapy): room air      Intake/Output Summary (Last 24 hours) at 4/13/2020 1219  Last data filed at 4/13/2020 1044  Gross per 24 hour   Intake 1050 ml   Output 602 ml   Net 448 ml       Lines/Drains/Airways     Drain                 Colostomy 01/02/19 Descending/sigmoid  days          Peripheral Intravenous Line                 Peripheral IV - Single Lumen 04/12/20 2150 20 G Right Hand less than 1 day                Physical Exam   Constitutional: She is oriented to person, place, and time. She appears well-developed and well-nourished. No distress.   HENT:   Head: Normocephalic and atraumatic.   Eyes: Pupils are equal, round, and reactive to light. Right eye exhibits no discharge. Left eye exhibits no discharge.   Neck: Neck supple. No JVD present.   Cardiovascular: Regular rhythm, S1 normal and S2 normal. Bradycardia present.   No murmur heard.  Pulmonary/Chest: Effort normal and breath sounds normal. No respiratory distress. She has no wheezes. She has no rales.   Abdominal: Soft. She exhibits no distension.    colostomy   Musculoskeletal: She exhibits no edema.   Neurological: She is alert and oriented to person, place, and time.   Skin: Skin is warm and dry. She is not diaphoretic. No erythema.   Psychiatric: She has a normal mood and affect. Her behavior is normal. Thought content normal.   Nursing note and vitals reviewed.      Significant Labs:   CMP   Recent Labs   Lab 04/12/20 2153 04/13/20  0542   * 130*   K 3.8 3.1*   CL 96 96   CO2 21* 23   * 116*   BUN 9 6*   CREATININE 0.7 0.6   CALCIUM 8.2* 7.9*   PROT 6.9  --    ALBUMIN 3.2* 2.8*   BILITOT 0.7  --    ALKPHOS 77  --    AST 32  --    ALT 30  --    ANIONGAP 12 11   ESTGFRAFRICA >60 >60   EGFRNONAA >60 >60   , CBC   Recent Labs   Lab 04/12/20 2153 04/13/20  0542   WBC 10.57 9.54   HGB 12.7 12.0   HCT 38.3 36.9*    240   , Troponin   Recent Labs   Lab 04/12/20 2153   TROPONINI 0.017    and All pertinent lab results from the last 24 hours have been reviewed.    Significant Imaging: Echocardiogram:   2D echo with color flow doppler:   Results for orders placed or performed in visit on 11/15/18   2D echo with color flow doppler   Result Value Ref Range    QEF 60 55 - 65    Diastolic Dysfunction No     Est. PA Systolic Pressure 31.3     Tricuspid Valve Regurgitation MODERATE (A)     Narrative    Date of Procedure: 11/15/2018        TEST DESCRIPTION   Technical Quality: This is a technically adequate study.     Aorta: The aortic root is normal in size, measuring 2.5 cm at sinotubular junction and 2.4 cm at Sinuses of Valsalva. The proximal ascending aorta is normal in size, measuring 3.1 cm across.     Left Atrium: The left atrial volume index is mildly enlarged, measuring 34.80 cc/m2.     Left Ventricle: The left ventricle is normal in size, with an end-diastolic diameter of 4.4 cm, and an end-systolic diameter of 3.3 cm. LV wall thickness is normal, with the septum measuring 1.0 cm and the posterior wall measuring 1.1 cm across. Relative    wall thickness was increased at 0.50, and the LV mass index was increased at 104.8 g/m2 consistent with concentric left ventricular hypertrophy. There are no regional wall motion abnormalities. Left ventricular systolic function appears normal. Visually   estimated ejection fraction is 60-65%. The LV Doppler derived stroke volume equals 59.0 ccs.     Diastolic indices: E wave velocity 0.9 m/s, E/A ratio 2.0,  msec., E/e' ratio(avg) 10. Diastolic function is normal.     Right Atrium: The right atrium is enlarged, measuring 5.4 cm in length and 4.1 cm in width in the apical view.     Right Ventricle: The right ventricle is mildly enlarged in size. Global right ventricular systolic function appears normal. The estimated PA systolic pressure is 31 mmHg.     Aortic Valve:  The aortic valve is normal in structure. The aortic valve is tri-leaflet in structure.     Mitral Valve:  The mitral valve is normal in structure.     Tricuspid Valve:  The tricuspid valve is normal in structure. There is moderate tricuspid regurgitation.     Pulmonary Valve:  The pulmonic valve is not well seen.     IVC: IVC is normal in size and collapses > 50% with a sniff, suggesting normal right atrial pressure of 3 mmHg.     Intracavitary: There is no evidence of pericardial effusion, intracavity mass, thrombi, or vegetation.         CONCLUSIONS     1 - Biatrial enlargement.     2 - Concentric hypertrophy.     3 - Normal left ventricular systolic function (EF 60-65%).     4 - Normal left ventricular diastolic function.     5 - Right ventricular enlargement with normal systolic function.     6 - The estimated PA systolic pressure is 31 mmHg.     7 - Moderate tricuspid regurgitation.             This document has been electronically    SIGNED BY: Joseph Hillman MD On: 11/15/2018 18:10   , EKG: Reviewed and X-Ray: CXR: X-Ray Chest 1 View (CXR): No results found for this visit on 04/12/20. and X-Ray Chest PA and Lateral (CXR): No results found for  this visit on 04/12/20.    Assessment and Plan:   Patient who presents with colitis/UTI. HR chronically low and likely exacerbated by vagal pain response, pain medication, and immobility. Monitor for now. Follow-up in clinic.    * Colitis  -Mgmt as per primary team    Bradycardia  -HR in 40's-50's this AM  -Patient asymptomatic  -HR has been chronically on low side on prior EKGs  -Suspect vagal response/immobility/pain medication exacerbating bradycardia as well  -Monitor for now  -Consider OP Holter if needed    Urinary tract infection without hematuria  -Mgmt as per hospital medicine  -On abx    Dehydration with hyponatremia  -Improved post IV hydration        VTE Risk Mitigation (From admission, onward)         Ordered     enoxaparin injection 40 mg  Daily      04/13/20 0404     IP VTE HIGH RISK PATIENT  Once      04/13/20 0404                Thank you for your consult. I will follow-up with patient. Please contact us if you have any additional questions.    Fatoumata Rowe PA-C  Cardiology   [facility]

## 2020-04-13 NOTE — DISCHARGE SUMMARY
[facility] Hospital Medicine  Discharge Summary      Patient Name: Irlanda Lopez  MRN: [id number]  Admission Date: 4/12/2020  Hospital Length of Stay: 0 days  Discharge Date and Time:  04/13/2020 3:01 PM  Attending Physician: Shankar Bowen MD   Discharging Provider: Joseluis Tran MD  Primary Care Provider: Myla Arias MD      HPI:   Came to the Emergency Department for worsening right sided abdominal cramping.  Cramping is becoming more frequent and severe.  It started Saturday and she was relatively well before that.  Denies fevers or chills.  Today she had some nausea and vomiting but her appetite has remained good and she is tolerating meals.  One episode of vomiting today and her colostomy output has decreased.  She denied any bloody colostomy output.  She had a sigmoid colectomy in January 2019 with Maribel's pouch and left lower quadrant colostomy.  It was for perforated sigmoid diverticulitis and performed by Dr. Hogan.  Complicated by abscess requiring percutaneous drainage.  She has recently followed up with Dr. Connelly for possible colostomy reversal.  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  Accompanied by her live-in caregiver.    * No surgery found *      Hospital Course:   4/13/20  Day of Discharge  Patient seen at bedside, evaluated, and deemed stable for discharge.  Pain has resolved, she is in no acute distress. Has had significant stool output into bag. Evaluated by colorectal surgery - suspects gastroenteritis that seems to be resolving. No indication for surgical intervention. During morning hours patient found to be significantly bradycardic ( 40s ). Cardiology evaluated - deemed chronic with potential slowing 2'2 sedation from pain medication and resting nature. No symptoms of shortness of breath or chest pain. Daughter at bedside given patient's cognitive impairment at baseline - aware of plan. Aware of need to follow up with PCP.  Patient's urine indicative of UTI. Suspect chronic abnormalities on urine studies -but will treat with oral abx to cover for potential UTI. Given comfortable, stable, eating with stool output and the high risk environment  We are in given COVID in hospital patient deemed safe for discharge rather than continued observation.  Patient tested for COVID as just protocol that all patients tested currently - complained of no cough or SOB.     Consults:   Consults (From admission, onward)        Status Ordering Provider     Inpatient consult to Cardiology  Once     Provider:  (Not yet assigned)    Completed MAURA SEGOVIA     Inpatient consult to General Surgery  Once     Provider:  Reece Hogan MD    Completed FRANCISCA CALVO          No new Assessment & Plan notes have been filed under this hospital service since the last note was generated.  Service: Hospital Medicine    Final Active Diagnoses:    Diagnosis Date Noted POA    PRINCIPAL PROBLEM:  Colitis [K52.9] 04/13/2020 Yes    Urinary tract infection without hematuria [N39.0] 04/13/2020 Yes    Nephrolithiasis [N20.0] 04/13/2020 Yes    Bradycardia [R00.1] 04/13/2020 Yes    Dehydration with hyponatremia [E87.1] 11/07/2018 Yes    Mild cognitive impairment with memory loss [G31.84] 09/14/2017 Yes    Essential hypertension [I10] 01/10/2017 Yes    Hypothyroidism [E03.9] 10/04/2016 Yes      Problems Resolved During this Admission:       Discharged Condition: good    Disposition:     Follow Up:  Follow-up Information     Myla Arias MD. Call in 1 day.    Specialty:  Family Medicine  Why:  For re-evaluation and further treatment  Contact information:  [address]  [address]  [facility] [address]  [phone]             [facility]. Go today.    Specialty:  Emergency Medicine  Why:  If symptoms worsen, For re-evaluation and further treatment, As needed  Contact information:  [address]  [facility]  [address]  [phone]               Patient Instructions:   No discharge procedures on file.    Significant Diagnostic Studies:     Results for orders placed or performed during the hospital encounter of 04/12/20   Troponin I   Result Value Ref Range    Troponin I 0.017 0.000 - 0.026 ng/mL   CBC auto differential   Result Value Ref Range    WBC 10.57 3.90 - 12.70 K/uL    RBC 4.58 4.00 - 5.40 M/uL    Hemoglobin 12.7 12.0 - 16.0 g/dL    Hematocrit 38.3 37.0 - 48.5 %    Mean Corpuscular Volume 84 82 - 98 fL    Mean Corpuscular Hemoglobin 27.7 27.0 - 31.0 pg    Mean Corpuscular Hemoglobin Conc 33.2 32.0 - 36.0 g/dL    RDW 13.2 11.5 - 14.5 %    Platelets 252 150 - 350 K/uL    MPV 9.9 9.2 - 12.9 fL    Immature Granulocytes 0.8 (H) 0.0 - 0.5 %    Gran # (ANC) 6.8 1.8 - 7.7 K/uL    Immature Grans (Abs) 0.08 (H) 0.00 - 0.04 K/uL    Lymph # 2.4 1.0 - 4.8 K/uL    Mono # 1.0 0.3 - 1.0 K/uL    Eos # 0.3 0.0 - 0.5 K/uL    Baso # 0.04 0.00 - 0.20 K/uL    nRBC 0 0 /100 WBC    Gran% 64.2 38.0 - 73.0 %    Lymph% 22.6 18.0 - 48.0 %    Mono% 9.0 4.0 - 15.0 %    Eosinophil% 3.0 0.0 - 8.0 %    Basophil% 0.4 0.0 - 1.9 %    Platelet Estimate Appears normal     Poik Slight     Hypo Occasional     Ovalocytes Occasional     Tear Drop Cells Occasional     Differential Method Automated    Comprehensive metabolic panel   Result Value Ref Range    Sodium 129 (L) 136 - 145 mmol/L    Potassium 3.8 3.5 - 5.1 mmol/L    Chloride 96 95 - 110 mmol/L    CO2 21 (L) 23 - 29 mmol/L    Glucose 111 (H) 70 - 110 mg/dL    BUN, Bld 9 8 - 23 mg/dL    Creatinine 0.7 0.5 - 1.4 mg/dL    Calcium 8.2 (L) 8.7 - 10.5 mg/dL    Total Protein 6.9 6.0 - 8.4 g/dL    Albumin 3.2 (L) 3.5 - 5.2 g/dL    Total Bilirubin 0.7 0.1 - 1.0 mg/dL    Alkaline Phosphatase 77 55 - 135 U/L    AST 32 10 - 40 U/L    ALT 30 10 - 44 U/L    Anion Gap 12 8 - 16 mmol/L    eGFR if African American >60 >60 mL/min/1.73 m^2    eGFR if non African American >60 >60 mL/min/1.73 m^2   Urinalysis,  Reflex to Urine Culture Urine, Clean Catch   Result Value Ref Range    Specimen UA Urine, Clean Catch     Color, UA Yellow Yellow, Straw, Gina    Appearance, UA Clear Clear    pH, UA 7.0 5.0 - 8.0    Specific Gravity, UA 1.010 1.005 - 1.030    Protein, UA Negative Negative    Glucose, UA Negative Negative    Ketones, UA Negative Negative    Bilirubin (UA) Negative Negative    Occult Blood UA 1+ (A) Negative    Nitrite, UA Negative Negative    Urobilinogen, UA Negative <2.0 EU/dL    Leukocytes, UA 3+ (A) Negative   Lactic acid, plasma   Result Value Ref Range    Lactate (Lactic Acid) 0.9 0.5 - 2.2 mmol/L   Lipase   Result Value Ref Range    Lipase 23 4 - 60 U/L   Urinalysis Microscopic   Result Value Ref Range    RBC, UA 10 (H) 0 - 4 /hpf    WBC, UA >100 (H) 0 - 5 /hpf    Bacteria Moderate (A) None-Occ /hpf    Squam Epithel, UA 2 /hpf    Microscopic Comment SEE COMMENT    Renal function panel   Result Value Ref Range    Glucose 116 (H) 70 - 110 mg/dL    Sodium 130 (L) 136 - 145 mmol/L    Potassium 3.1 (L) 3.5 - 5.1 mmol/L    Chloride 96 95 - 110 mmol/L    CO2 23 23 - 29 mmol/L    BUN, Bld 6 (L) 8 - 23 mg/dL    Calcium 7.9 (L) 8.7 - 10.5 mg/dL    Creatinine 0.6 0.5 - 1.4 mg/dL    Albumin 2.8 (L) 3.5 - 5.2 g/dL    Phosphorus 2.6 (L) 2.7 - 4.5 mg/dL    eGFR if African American >60 >60 mL/min/1.73 m^2    eGFR if non African American >60 >60 mL/min/1.73 m^2    Anion Gap 11 8 - 16 mmol/L   CBC auto differential   Result Value Ref Range    WBC 9.54 3.90 - 12.70 K/uL    RBC 4.34 4.00 - 5.40 M/uL    Hemoglobin 12.0 12.0 - 16.0 g/dL    Hematocrit 36.9 (L) 37.0 - 48.5 %    Mean Corpuscular Volume 85 82 - 98 fL    Mean Corpuscular Hemoglobin 27.6 27.0 - 31.0 pg    Mean Corpuscular Hemoglobin Conc 32.5 32.0 - 36.0 g/dL    RDW 13.0 11.5 - 14.5 %    Platelets 240 150 - 350 K/uL    MPV 10.1 9.2 - 12.9 fL    Immature Granulocytes 0.7 (H) 0.0 - 0.5 %    Gran # (ANC) 6.8 1.8 - 7.7 K/uL    Immature Grans (Abs) 0.07 (H) 0.00 - 0.04 K/uL     Lymph # 1.7 1.0 - 4.8 K/uL    Mono # 0.8 0.3 - 1.0 K/uL    Eos # 0.2 0.0 - 0.5 K/uL    Baso # 0.03 0.00 - 0.20 K/uL    nRBC 0 0 /100 WBC    Gran% 71.2 38.0 - 73.0 %    Lymph% 18.0 18.0 - 48.0 %    Mono% 8.2 4.0 - 15.0 %    Eosinophil% 1.6 0.0 - 8.0 %    Basophil% 0.3 0.0 - 1.9 %    Differential Method Automated    Procalcitonin   Result Value Ref Range    Procalcitonin 0.02 <0.25 ng/mL         Radiology: CT scan: CT ABDOMEN PELVIS WITH CONTRAST:   Results for orders placed or performed during the hospital encounter of 04/12/20   CT Abdomen Pelvis With Contrast    Narrative    EXAMINATION:  CT ABDOMEN PELVIS WITH CONTRAST    CLINICAL HISTORY:  Bowel obstruction, high-grade;    TECHNIQUE:  Abdominal and pelvic CT performed with intravenous and oral contrast with imaging during the portal venous phase following 75 cc Omnipaque 350.  Coronal and sagittal reformations.    COMPARISON:  06/26/2019    FINDINGS:  Abdominal CT.  Lung bases are clear.    Liver, spleen, pancreas, adrenal glands, and right kidney are normal.  Again, there is a 3.3 cm right renal pelvic calculus with stable pelvicaliectasis/minimal hydronephrosis.  Right renal cortical scarring is again noted.    Normal caliber atherosclerotic aorta.  There is no lymphadenopathy.  Normal gallbladder.  No bile duct dilatation.    No bowel obstruction.  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  There is semi-solid stool right colon, hepatic flexure, and proximal transverse colon with minimal contrast within the central bowel lumen simulating bowel wall thickening at the hepatic flexure.  There does appear to be a degree of transverse colon bowel wall thickening with adjacent pericolonic stranding.  This is suggestive of a nonspecific colitis in the appropriate clinical setting.  Sigmoid colon surgical staple line noted.  Questionable small hiatal hernia.  Status post appendectomy.  The GI tract is otherwise unremarkable.    Degenerative  spine.    Pelvic CT.  Pelvic ring intact.  Pelvic bowel loops are otherwise unremarkable.    There is intravesicular air.  Correlate for recent catheterization.  Bladder and ureters are otherwise unremarkable.    Normal size uterus and ovaries.  Again, rectovaginal fistula is noted.  There is no pelvic mass, free fluid, or significant lymphadenopathy.    Again, multiple subcentimeter sigmoid mesenteric/perirectal lymph nodes are again noted.      Impression    1. No bowel obstruction.  2. 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 There is semi-solid stool right colon, hepatic flexure, and proximal transverse colon with minimal contrast within the central bowel lumen simulating bowel wall thickening at the hepatic flexure. There does appear to be a degree of transverse colon bowel wall thickening with adjacent pericolonic stranding. This is suggestive of a nonspecific colitis in the appropriate clinical setting.  3. 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  Appendectomy.  Question small hiatal hernia.  4. Stable right renal pelvic 3.3 cm calculus with pelvicaliectasis/minimal hydronephrosis.  Multifocal right renal cortical scarring.  5. Intravesicular air, correlate for recent catheterization.  Otherwise, unremarkable bladder.  All CT scans at this facility are performed  using dose modulation techniques as appropriate to performed exam including the following:  automated exposure control; adjustment of mA and/or kV according to the patients size (this includes techniques or standardized protocols for targeted exams where dose is matched to indication/reason for exam: i.e. extremities or head);  iterative reconstruction technique.      Electronically signed by: Jakob Gailndo MD  Date:    04/12/2020  Time:    23:38       Pending Diagnostic Studies:     Procedure Component Value Units Date/Time    COVID-19 Routine Screening [[id number]] Collected:  04/13/20 1138    Order Status:  Sent Lab Status:  In  process Updated:  04/13/20 1146    Specimen:  Nasopharyngeal     Stool Exam-Ova,Cysts,Parasites [[id number]] Collected:  04/13/20 0915    Order Status:  Sent Lab Status:  In process Updated:  04/13/20 0915    Specimen:  Stool     WBC, Stool [[id number]] Collected:  04/13/20 0915    Order Status:  Sent Lab Status:  In process Updated:  04/13/20 0933    Specimen:  Stool          Medications:  Reconciled Home Medications:      Medication List      ASK your doctor about these medications    atorvastatin 10 MG tablet  Commonly known as:  LIPITOR  Take 1 tablet (10 mg total) by mouth once daily.     donepeziL 10 MG tablet  Commonly known as:  ARICEPT  Take 1 tablet (10 mg total) by mouth every evening.     furosemide 20 MG tablet  Commonly known as:  LASIX  Take 1 tablet (20 mg total) by mouth once daily.     memantine 10 MG Tab  Commonly known as:  NAMENDA  TAKE 1 TABLET TWICE A DAY     thyroid (pork) 30 mg Tab  Commonly known as:  ARMOUR THYROID  Take 1 tablet (30 mg total) by mouth once daily.     triamcinolone acetonide 0.1% 0.1 % ointment  Commonly known as:  KENALOG  APPLY TOPICALLY 4 (FOUR) TIMES DAILY. TO RASH ON FACE AND CHEST AND NOSE            Indwelling Lines/Drains at time of discharge:   Lines/Drains/Airways     Drain                 Colostomy 01/02/19 Descending/sigmoid  days                Time spent on the discharge of patient: 30 minutes  Patient was seen and examined on the date of discharge and determined to be suitable for discharge.         Joseluis Tran MD  Department of Hospital Medicine  [facility]

## 2020-04-13 NOTE — CONSULTS
[facility]   Colorectal Surgery  Consult Note    Patient Name: Irlanda Lopez  MRN: [id number]  Code Status: Full Code  Admission Date: 4/12/2020  Hospital Length of Stay: 0 days  Attending Physician: Shankar Bowen MD  Primary Care Provider: Myla Arias MD    Patient information was obtained from patient, relative(s), past medical records and ER records.     Inpatient consult to General Surgery  Consult performed by: Rishabh Connelly MD  Consult ordered by: Shankar Bowen MD        Subjective:     Principal Problem: Colitis    History of Present Illness: 79-year-old female well known to me with previous Maribel's procedure for acute sigmoid diverticulitis with resultant abscess as well as postoperative abscess requiring IR drainage in January 2019 with imaging and symptoms consistent with colovaginal and rectovaginal fistula and peristomal irritation who is admitted for the medical service for abdominal cramping, and some intermittent nausea with emesis.  Patient developed right-sided abdominal cramping 2 days ago which became more frequent and severe in nature.  Patient continues to have colostomy output although on the report that was less over the past 2 days.  Since admission to the hospital, she has improved and has had resolution of her abdominal pain is having good colostomy output.  CT scan showed no evidence of bowel obstruction but continued rectovaginal fistula.  She did have a UTI on admission.  Surgery is consulted for evaluation.    No current facility-administered medications on file prior to encounter.      Current Outpatient Medications on File Prior to Encounter   Medication Sig    atorvastatin (LIPITOR) 10 MG tablet Take 1 tablet (10 mg total) by mouth once daily.    donepezil (ARICEPT) 10 MG tablet Take 1 tablet (10 mg total) by mouth every evening.    furosemide (LASIX) 20 MG tablet Take 1 tablet (20 mg total) by mouth once daily.    memantine (NAMENDA)  10 MG Tab TAKE 1 TABLET TWICE A DAY    thyroid, pork, (ARMOUR THYROID) 30 mg Tab Take 1 tablet (30 mg total) by mouth once daily.    triamcinolone acetonide 0.1% (KENALOG) 0.1 % ointment APPLY TOPICALLY 4 (FOUR) TIMES DAILY. TO RASH ON FACE AND CHEST AND NOSE       Review of patient's allergies indicates:  No Known Allergies    Past Medical History:   Diagnosis Date    Diverticulitis     High cholesterol     Hypertension     Hypothyroidism      Past Surgical History:   Procedure Laterality Date    APPENDECTOMY  2008    CATARACT EXTRACTION      Dr Raygoza    COLON SURGERY      COLONOSCOPY N/A 1/2/2019    Procedure: COLONOSCOPY;  Surgeon: Reece Hogan MD;  Location: [facility];  Service: Endoscopy;  Laterality: N/A;    COLONOSCOPY N/A 6/7/2019    Procedure: COLONOSCOPY;  Surgeon: Rishabh Connelly MD;  Location: [facility];  Service: General;  Laterality: N/A;    COLOSTOMY Left 1/2/2019    Procedure: CREATION, COLOSTOMY;  Surgeon: Reece Hogan MD;  Location: [facility];  Service: General;  Laterality: Left;    SHAHIDA PROCEDURE N/A 1/2/2019    Procedure: SHAHIDA PROCEDURE;  Surgeon: Reece Hogan MD;  Location: [facility];  Service: General;  Laterality: N/A;    LYSIS OF ADHESIONS N/A 1/2/2019    Procedure: LYSIS, ADHESIONS;  Surgeon: Reece Hogan MD;  Location: [facility];  Service: General;  Laterality: N/A;    VITRECTOMY Right 09/2013     Family History     Problem Relation (Age of Onset)    Alzheimer's disease Mother    Aneurysm Father    Stomach cancer Brother        Tobacco Use    Smoking status: Former Smoker    Smokeless tobacco: Never Used   Substance and Sexual Activity    Alcohol use: No    Drug use: No    Sexual activity: Never     Comment:      Review of Systems   Constitutional: Negative for activity change, appetite change, chills, fatigue, fever and unexpected weight change.   HENT: Negative for congestion, ear pain, sore throat and trouble swallowing.    Eyes: Negative for pain, redness,  itching and visual disturbance.   Respiratory: Negative for cough, shortness of breath and wheezing.    Cardiovascular: Negative for chest pain, palpitations and leg swelling.   Gastrointestinal: Positive for abdominal pain, nausea and vomiting. Negative for blood in stool, constipation and diarrhea.   Endocrine: Negative for cold intolerance, heat intolerance and polyuria.   Genitourinary: Negative for dysuria, flank pain, frequency and hematuria.   Musculoskeletal: Negative for arthralgias, back pain, gait problem, joint swelling and neck pain.   Skin: Negative for color change, rash and wound.   Allergic/Immunologic: Negative for environmental allergies and immunocompromised state.   Neurological: Negative for dizziness, speech difficulty, weakness, light-headedness and numbness.   Hematological: Negative for adenopathy.   Psychiatric/Behavioral: Negative for agitation, confusion and hallucinations.     Objective:     Vital Signs (Most Recent):  Temp: 98 °F (36.7 °C) (04/13/20 1202)  Pulse: (!) 46 (04/13/20 1202)  Resp: 14 (04/13/20 1202)  BP: (!) 104/59 (04/13/20 1202)  SpO2: (!) 93 % (04/13/20 1202) Vital Signs (24h Range):  Temp:  [97.8 °F (36.6 °C)-98.5 °F (36.9 °C)] 98 °F (36.7 °C)  Pulse:  [42-62] 46  Resp:  [14-20] 14  SpO2:  [93 %-100 %] 93 %  BP: (104-171)/(57-82) 104/59     Weight: 81.3 kg (179 lb 3.7 oz)  Body mass index is 32.78 kg/m².    Physical Exam   Constitutional: She is oriented to person, place, and time. She appears well-developed.   HENT:   Head: Normocephalic and atraumatic.   Eyes: Conjunctivae and EOM are normal.   Neck: Normal range of motion. No thyromegaly present.   Cardiovascular: Normal rate and regular rhythm.   Pulmonary/Chest: Effort normal. No respiratory distress.   Abdominal:   Soft, nondistended, nontender; ostomy pink and viable with semisolid stool in bag; well-healed previous midline incision   Musculoskeletal: Normal range of motion. She exhibits no edema or tenderness.    Neurological: She is alert and oriented to person, place, and time.   Skin: Skin is warm and dry. Capillary refill takes less than 2 seconds. No rash noted.   Psychiatric: She has a normal mood and affect.       Significant Labs:  CBC:   Recent Labs   Lab 04/13/20  0542   WBC 9.54   RBC 4.34   HGB 12.0   HCT 36.9*      MCV 85   MCH 27.6   MCHC 32.5     BMP:   Recent Labs   Lab 04/13/20  0542   *   *   K 3.1*   CL 96   CO2 23   BUN 6*   CREATININE 0.6   CALCIUM 7.9*       Significant Diagnostics:  I have reviewed all pertinent imaging results/findings within the past 24 hours.     CT:  FINDINGS:  Abdominal CT.  Lung bases are clear.    Liver, spleen, pancreas, adrenal glands, and right kidney are normal.  Again, there is a 3.3 cm right renal pelvic calculus with stable pelvicaliectasis/minimal hydronephrosis.  Right renal cortical scarring is again noted.    Normal caliber atherosclerotic aorta.  There is no lymphadenopathy.  Normal gallbladder.  No bile duct dilatation.    No bowel obstruction.  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  There is semi-solid stool right colon, hepatic flexure, and proximal transverse colon with minimal contrast within the central bowel lumen simulating bowel wall thickening at the hepatic flexure.  There does appear to be a degree of transverse colon bowel wall thickening with adjacent pericolonic stranding.  This is suggestive of a nonspecific colitis in the appropriate clinical setting.  Sigmoid colon surgical staple line noted.  Questionable small hiatal hernia.  Status post appendectomy.  The GI tract is otherwise unremarkable.    Degenerative spine.    Pelvic CT.  Pelvic ring intact.  Pelvic bowel loops are otherwise unremarkable.    There is intravesicular air.  Correlate for recent catheterization.  Bladder and ureters are otherwise unremarkable.    Normal size uterus and ovaries.  Again, rectovaginal fistula is noted.  There is no  pelvic mass, free fluid, or significant lymphadenopathy.    Again, multiple subcentimeter sigmoid mesenteric/perirectal lymph nodes are again noted.      Impression       1. No bowel obstruction.  2. 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 There is semi-solid stool right colon, hepatic flexure, and proximal transverse colon with minimal contrast within the central bowel lumen simulating bowel wall thickening at the hepatic flexure. There does appear to be a degree of transverse colon bowel wall thickening with adjacent pericolonic stranding. This is suggestive of a nonspecific colitis in the appropriate clinical setting.  3. 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  Appendectomy.  Question small hiatal hernia.  4. Stable right renal pelvic 3.3 cm calculus with pelvicaliectasis/minimal hydronephrosis.  Multifocal right renal cortical scarring.  5. Intravesicular air, correlate for recent catheterization.  Otherwise, unremarkable bladder.     Assessment/Plan:     * Colitis  Appears to be self-limited gastroenteritis as pt is currently asymptomatic, having stool per ostomy and hungry    - No acute surgical intervention required at this time  - Okay for reg diet. Given COVID19 outbreak, would discharge home if tolerates diet given asymptomatic nature now and no acute interventions planned  - Rest of care per primary team    Bradycardia  Management per primary team    Nephrolithiasis  Management per primary team    Urinary tract infection without hematuria  Management per primary team    Dehydration with hyponatremia  Management per primary team    Mild cognitive impairment with memory loss  Management per primary team    Essential hypertension  Management per primary team    Hypothyroidism  Management per primary team      VTE Risk Mitigation (From admission, onward)         Ordered     enoxaparin injection 40 mg  Daily      04/13/20 0404     IP VTE HIGH RISK PATIENT  Once      04/13/20 0405                 Thank you for your consult.     Rishabh Connelly MD  Colorectal Surgery  [facility]

## 2020-04-13 NOTE — PLAN OF CARE
Pt. In bed c/o abdominal pain. Sitter present at bedside. Informed pt. That the MD will be notified and we will go from there. Alert and oriented x3 at this time but some difficulty remembering things are noted.Sitter was able to answer admit questions. Safety measures intact and ongoing. No acute distress noted. Call light within reach. Bed alarm on. Will continue to monitor. POC reviewed. Chart check complete

## 2020-04-13 NOTE — ASSESSMENT & PLAN NOTE
Appears to be self-limited gastroenteritis as pt is currently asymptomatic, having stool per ostomy and hungry    - No acute surgical intervention required at this time  - Okay for reg diet. Given COVID19 outbreak, would discharge home if tolerates diet given asymptomatic nature now and no acute interventions planned  - Rest of care per primary team

## 2020-04-13 NOTE — ASSESSMENT & PLAN NOTE
Urinalysis with pattern of bacterial cystitis but no dysuria, fever, or elevated WBC.  Empirically started Cefepime and she received Ceftriaxone in the ED.  Urine sent for culture.  She has a non-obstructing 3.3 cm stone in the right renal pelvis.

## 2020-04-13 NOTE — HPI
Ms. Lopez is a 79 year old female patient whose current medical conditions include HTN, hyperlipidemia, s/p colectomy in 1/19 with LLQ colostomy, and mild cognitive impairment with memory loss who presented to [facility] ED yesterday with a chief complaint of worsening right-sided abdominal cramps over the past few days. Associated symptoms included decreased colostomy output and an episode of emesis. Patient denied any associated fever, chills, chest pain, or SOB. Initial workup in ED revealed colitis, dehydration, and +UTI and patient subsequently admitted for further evaluation treatment. This AM, patient's HR was noted to be in upper 40's-50's and cardiology was consulted to assist with management of bradycardia. Patient seen and examined today. Daughter present and at bedside during exam. Seems to feel ok. No cardiac complaints. Remains chest pain free. No lightheadedness, dizziness, near syncope, or syncope. Followed on OP basis by Dr. Love. Review of EKGs shows HR typically in 50's. Chart reviewed. Troponin x 1 negative. Echo 11/18 showed normal EF, moderate MR.

## 2020-04-13 NOTE — SUBJECTIVE & OBJECTIVE
No current facility-administered medications on file prior to encounter.      Current Outpatient Medications on File Prior to Encounter   Medication Sig    atorvastatin (LIPITOR) 10 MG tablet Take 1 tablet (10 mg total) by mouth once daily.    donepezil (ARICEPT) 10 MG tablet Take 1 tablet (10 mg total) by mouth every evening.    furosemide (LASIX) 20 MG tablet Take 1 tablet (20 mg total) by mouth once daily.    memantine (NAMENDA) 10 MG Tab TAKE 1 TABLET TWICE A DAY    thyroid, pork, (ARMOUR THYROID) 30 mg Tab Take 1 tablet (30 mg total) by mouth once daily.    triamcinolone acetonide 0.1% (KENALOG) 0.1 % ointment APPLY TOPICALLY 4 (FOUR) TIMES DAILY. TO RASH ON FACE AND CHEST AND NOSE       Review of patient's allergies indicates:  No Known Allergies    Past Medical History:   Diagnosis Date    Diverticulitis     High cholesterol     Hypertension     Hypothyroidism      Past Surgical History:   Procedure Laterality Date    APPENDECTOMY  2008    CATARACT EXTRACTION      Dr Raygoza    COLON SURGERY      COLONOSCOPY N/A 1/2/2019    Procedure: COLONOSCOPY;  Surgeon: Reece Hogan MD;  Location: [facility];  Service: Endoscopy;  Laterality: N/A;    COLONOSCOPY N/A 6/7/2019    Procedure: COLONOSCOPY;  Surgeon: Rishabh Connelly MD;  Location: [facility];  Service: General;  Laterality: N/A;    COLOSTOMY Left 1/2/2019    Procedure: CREATION, COLOSTOMY;  Surgeon: Reece Hogan MD;  Location: [facility];  Service: General;  Laterality: Left;    SHAHIDA PROCEDURE N/A 1/2/2019    Procedure: SHAHIDA PROCEDURE;  Surgeon: Reece Hogan MD;  Location: [facility];  Service: General;  Laterality: N/A;    LYSIS OF ADHESIONS N/A 1/2/2019    Procedure: LYSIS, ADHESIONS;  Surgeon: Reece Hogan MD;  Location: [facility];  Service: General;  Laterality: N/A;    VITRECTOMY Right 09/2013     Family History     Problem Relation (Age of Onset)    Alzheimer's disease Mother    Aneurysm Father    Stomach cancer Brother        Tobacco Use     Smoking status: Former Smoker    Smokeless tobacco: Never Used   Substance and Sexual Activity    Alcohol use: No    Drug use: No    Sexual activity: Never     Comment:      Review of Systems   Constitutional: Negative for activity change, appetite change, chills, fatigue, fever and unexpected weight change.   HENT: Negative for congestion, ear pain, sore throat and trouble swallowing.    Eyes: Negative for pain, redness, itching and visual disturbance.   Respiratory: Negative for cough, shortness of breath and wheezing.    Cardiovascular: Negative for chest pain, palpitations and leg swelling.   Gastrointestinal: Positive for abdominal pain, nausea and vomiting. Negative for blood in stool, constipation and diarrhea.   Endocrine: Negative for cold intolerance, heat intolerance and polyuria.   Genitourinary: Negative for dysuria, flank pain, frequency and hematuria.   Musculoskeletal: Negative for arthralgias, back pain, gait problem, joint swelling and neck pain.   Skin: Negative for color change, rash and wound.   Allergic/Immunologic: Negative for environmental allergies and immunocompromised state.   Neurological: Negative for dizziness, speech difficulty, weakness, light-headedness and numbness.   Hematological: Negative for adenopathy.   Psychiatric/Behavioral: Negative for agitation, confusion and hallucinations.     Objective:     Vital Signs (Most Recent):  Temp: 98 °F (36.7 °C) (04/13/20 1202)  Pulse: (!) 46 (04/13/20 1202)  Resp: 14 (04/13/20 1202)  BP: (!) 104/59 (04/13/20 1202)  SpO2: (!) 93 % (04/13/20 1202) Vital Signs (24h Range):  Temp:  [97.8 °F (36.6 °C)-98.5 °F (36.9 °C)] 98 °F (36.7 °C)  Pulse:  [42-62] 46  Resp:  [14-20] 14  SpO2:  [93 %-100 %] 93 %  BP: (104-171)/(57-82) 104/59     Weight: 81.3 kg (179 lb 3.7 oz)  Body mass index is 32.78 kg/m².    Physical Exam   Constitutional: She is oriented to person, place, and time. She appears well-developed.   HENT:   Head: Normocephalic  and atraumatic.   Eyes: Conjunctivae and EOM are normal.   Neck: Normal range of motion. No thyromegaly present.   Cardiovascular: Normal rate and regular rhythm.   Pulmonary/Chest: Effort normal. No respiratory distress.   Abdominal:   Soft, nondistended, nontender; ostomy pink and viable with semisolid stool in bag; well-healed previous midline incision   Musculoskeletal: Normal range of motion. She exhibits no edema or tenderness.   Neurological: She is alert and oriented to person, place, and time.   Skin: Skin is warm and dry. Capillary refill takes less than 2 seconds. No rash noted.   Psychiatric: She has a normal mood and affect.       Significant Labs:  CBC:   Recent Labs   Lab 04/13/20  0542   WBC 9.54   RBC 4.34   HGB 12.0   HCT 36.9*      MCV 85   MCH 27.6   MCHC 32.5     BMP:   Recent Labs   Lab 04/13/20  0542   *   *   K 3.1*   CL 96   CO2 23   BUN 6*   CREATININE 0.6   CALCIUM 7.9*       Significant Diagnostics:  I have reviewed all pertinent imaging results/findings within the past 24 hours.     CT:  FINDINGS:  Abdominal CT.  Lung bases are clear.    Liver, spleen, pancreas, adrenal glands, and right kidney are normal.  Again, there is a 3.3 cm right renal pelvic calculus with stable pelvicaliectasis/minimal hydronephrosis.  Right renal cortical scarring is again noted.    Normal caliber atherosclerotic aorta.  There is no lymphadenopathy.  Normal gallbladder.  No bile duct dilatation.    No bowel obstruction.  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  There is semi-solid stool right colon, hepatic flexure, and proximal transverse colon with minimal contrast within the central bowel lumen simulating bowel wall thickening at the hepatic flexure.  There does appear to be a degree of transverse colon bowel wall thickening with adjacent pericolonic stranding.  This is suggestive of a nonspecific colitis in the appropriate clinical setting.  Sigmoid colon surgical  staple line noted.  Questionable small hiatal hernia.  Status post appendectomy.  The GI tract is otherwise unremarkable.    Degenerative spine.    Pelvic CT.  Pelvic ring intact.  Pelvic bowel loops are otherwise unremarkable.    There is intravesicular air.  Correlate for recent catheterization.  Bladder and ureters are otherwise unremarkable.    Normal size uterus and ovaries.  Again, rectovaginal fistula is noted.  There is no pelvic mass, free fluid, or significant lymphadenopathy.    Again, multiple subcentimeter sigmoid mesenteric/perirectal lymph nodes are again noted.      Impression       1. No bowel obstruction.  2. 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 There is semi-solid stool right colon, hepatic flexure, and proximal transverse colon with minimal contrast within the central bowel lumen simulating bowel wall thickening at the hepatic flexure. There does appear to be a degree of transverse colon bowel wall thickening with adjacent pericolonic stranding. This is suggestive of a nonspecific colitis in the appropriate clinical setting.  3. 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  Appendectomy.  Question small hiatal hernia.  4. Stable right renal pelvic 3.3 cm calculus with pelvicaliectasis/minimal hydronephrosis.  Multifocal right renal cortical scarring.  5. Intravesicular air, correlate for recent catheterization.  Otherwise, unremarkable bladder.

## 2020-04-13 NOTE — HPI
Came to the Emergency Department for worsening right sided abdominal cramping.  Cramping is becoming more frequent and severe.  It started Saturday and she was relatively well before that.  Denies fevers or chills.  Today she had some nausea and vomiting but her appetite has remained good and she is tolerating meals.  One episode of vomiting today and her colostomy output has decreased.  She denied any bloody colostomy output.  She had a sigmoid colectomy in January 2019 with Maribel's pouch and left lower quadrant colostomy.  It was for perforated sigmoid diverticulitis and performed by Dr. Hogan.  Complicated by abscess requiring percutaneous drainage.  She has recently followed up with Dr. Connelly for possible colostomy reversal.  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  Accompanied by her live-in caregiver.

## 2020-04-13 NOTE — HOSPITAL COURSE
4/13/20  Day of Discharge  Patient seen at bedside, evaluated, and deemed stable for discharge.  Pain has resolved, she is in no acute distress. Has had significant stool output into bag. Evaluated by colorectal surgery - suspects gastroenteritis that seems to be resolving. No indication for surgical intervention. During morning hours patient found to be significantly bradycardic ( 40s ). Cardiology evaluated - deemed chronic with potential slowing 2'2 sedation from pain medication and resting nature. No symptoms of shortness of breath or chest pain. Daughter at bedside given patient's cognitive impairment at baseline - aware of plan. Aware of need to follow up with PCP. Patient's urine indicative of UTI. Suspect chronic abnormalities on urine studies -but will treat with oral abx to cover for potential UTI.    0

## 2020-04-13 NOTE — ASSESSMENT & PLAN NOTE
Right sided colitis by contrasted CT scan correlating with symptoms of right sided pain and cramping.  Also with nausea and infrequent vomiting.  WBC is normal and no fever or tachycardia.  Oral contrast appears to be collecting in the cecum and none appears to have passed distally and none has collected in the stoma appliance.  Empirically started Cefepime and Metronidazole.  Check stool studies.  Consult General Surgery for evaluation - Possible bowel obstruction.

## 2020-04-13 NOTE — SUBJECTIVE & OBJECTIVE
Past Medical History:   Diagnosis Date    Diverticulitis     High cholesterol     Hypertension     Hypothyroidism        Past Surgical History:   Procedure Laterality Date    APPENDECTOMY  2008    CATARACT EXTRACTION      Dr Raygoza    COLON SURGERY      COLONOSCOPY N/A 1/2/2019    Procedure: COLONOSCOPY;  Surgeon: Reece Hogan MD;  Location: [facility];  Service: Endoscopy;  Laterality: N/A;    COLONOSCOPY N/A 6/7/2019    Procedure: COLONOSCOPY;  Surgeon: Rishabh Connelly MD;  Location: [facility];  Service: General;  Laterality: N/A;    COLOSTOMY Left 1/2/2019    Procedure: CREATION, COLOSTOMY;  Surgeon: Reece Hogan MD;  Location: [facility] OR;  Service: General;  Laterality: Left;    SHAHIDA PROCEDURE N/A 1/2/2019    Procedure: SHAHIDA PROCEDURE;  Surgeon: Reece Hogan MD;  Location: [facility] OR;  Service: General;  Laterality: N/A;    LYSIS OF ADHESIONS N/A 1/2/2019    Procedure: LYSIS, ADHESIONS;  Surgeon: Reece Hogan MD;  Location: [facility] OR;  Service: General;  Laterality: N/A;    VITRECTOMY Right 09/2013       Review of patient's allergies indicates:  No Known Allergies    No current facility-administered medications on file prior to encounter.      Current Outpatient Medications on File Prior to Encounter   Medication Sig    atorvastatin (LIPITOR) 10 MG tablet Take 1 tablet (10 mg total) by mouth once daily.    donepezil (ARICEPT) 10 MG tablet Take 1 tablet (10 mg total) by mouth every evening.    furosemide (LASIX) 20 MG tablet Take 1 tablet (20 mg total) by mouth once daily.    memantine (NAMENDA) 10 MG Tab TAKE 1 TABLET TWICE A DAY    thyroid, pork, (ARMOUR THYROID) 30 mg Tab Take 1 tablet (30 mg total) by mouth once daily.    triamcinolone acetonide 0.1% (KENALOG) 0.1 % ointment APPLY TOPICALLY 4 (FOUR) TIMES DAILY. TO RASH ON FACE AND CHEST AND NOSE     Family History     Problem Relation (Age of Onset)    Alzheimer's disease Mother    Aneurysm Father    Stomach cancer Brother        Tobacco Use     Smoking status: Former Smoker    Smokeless tobacco: Never Used   Substance and Sexual Activity    Alcohol use: No    Drug use: No    Sexual activity: Never     Comment:      Review of Systems   Constitutional: Negative for chills and fever.   HENT: Negative for congestion and sore throat.    Eyes: Negative for visual disturbance.   Respiratory: Negative for cough, shortness of breath and wheezing.    Cardiovascular: Negative for chest pain, palpitations and leg swelling.   Gastrointestinal: Positive for abdominal pain, nausea and vomiting. Negative for blood in stool, constipation and diarrhea.   Genitourinary: Negative for dysuria and hematuria.   Musculoskeletal: Negative for arthralgias and back pain.   Skin: Negative for rash and wound.   Neurological: Negative for dizziness, weakness, light-headedness and numbness.   Hematological: Negative for adenopathy.     Objective:     Vital Signs (Most Recent):  Temp: 98 °F (36.7 °C) (04/13/20 0343)  Pulse: (!) 50 (04/13/20 0343)  Resp: 18 (04/13/20 0343)  BP: 129/66 (04/13/20 0343)  SpO2: 96 % (04/13/20 0343) Vital Signs (24h Range):  Temp:  [98 °F (36.7 °C)-98.5 °F (36.9 °C)] 98 °F (36.7 °C)  Pulse:  [50-62] 50  Resp:  [15-20] 18  SpO2:  [96 %-100 %] 96 %  BP: (129-171)/(66-82) 129/66     Weight: 81.3 kg (179 lb 3.7 oz)  Body mass index is 32.78 kg/m².    Physical Exam   Constitutional: She is oriented to person, place, and time. She appears well-developed and well-nourished. No distress.   HENT:   Head: Normocephalic and atraumatic.   Mouth/Throat: Oropharynx is clear and moist.   Eyes: Pupils are equal, round, and reactive to light. Conjunctivae and EOM are normal.   Neck: Neck supple. No JVD present. No thyromegaly present.   Cardiovascular: Normal rate and regular rhythm. Exam reveals no gallop and no friction rub.   No murmur heard.  Pulmonary/Chest: Effort normal and breath sounds normal. She has no wheezes. She has no rales.   Abdominal: Soft.  Bowel sounds are normal. She exhibits no distension. There is tenderness. There is guarding. There is no rebound.   Right upper and lower quadrant tenderness and guarding.  Firm to palpation on the right side and most tender in the mid to lower fraction.  Stoma appliance secure and intact.  Stoma pink with semi-solid stool.  Not dusky and no blood.   Musculoskeletal: Normal range of motion. She exhibits no edema or deformity.   Lymphadenopathy:     She has no cervical adenopathy.   Neurological: She is alert and oriented to person, place, and time. She has normal reflexes.   Skin: Skin is warm and dry. No rash noted.   Psychiatric: She has a normal mood and affect. Her behavior is normal. Judgment and thought content normal.   Nursing note and vitals reviewed.        CRANIAL NERVES     CN III, IV, VI   Pupils are equal, round, and reactive to light.  Extraocular motions are normal.        Significant Labs: All pertinent labs within the past 24 hours have been reviewed.    Significant Imaging: I have reviewed all pertinent imaging results/findings within the past 24 hours.

## 2020-04-13 NOTE — SUBJECTIVE & OBJECTIVE
Past Medical History:   Diagnosis Date    Diverticulitis     High cholesterol     Hypertension     Hypothyroidism        Past Surgical History:   Procedure Laterality Date    APPENDECTOMY  2008    CATARACT EXTRACTION      Dr Raygoza    COLON SURGERY      COLONOSCOPY N/A 1/2/2019    Procedure: COLONOSCOPY;  Surgeon: Reece Hogan MD;  Location: [facility] ENDO;  Service: Endoscopy;  Laterality: N/A;    COLONOSCOPY N/A 6/7/2019    Procedure: COLONOSCOPY;  Surgeon: Rishabh Connelly MD;  Location: [facility] ENDO;  Service: General;  Laterality: N/A;    COLOSTOMY Left 1/2/2019    Procedure: CREATION, COLOSTOMY;  Surgeon: Reece Hogan MD;  Location: [facility] OR;  Service: General;  Laterality: Left;    SHAHIDA PROCEDURE N/A 1/2/2019    Procedure: SHAHIDA PROCEDURE;  Surgeon: Reece Hogan MD;  Location: [facility] OR;  Service: General;  Laterality: N/A;    LYSIS OF ADHESIONS N/A 1/2/2019    Procedure: LYSIS, ADHESIONS;  Surgeon: Reece Hogan MD;  Location: [facility] OR;  Service: General;  Laterality: N/A;    VITRECTOMY Right 09/2013       Review of patient's allergies indicates:  No Known Allergies    No current facility-administered medications on file prior to encounter.      Current Outpatient Medications on File Prior to Encounter   Medication Sig    atorvastatin (LIPITOR) 10 MG tablet Take 1 tablet (10 mg total) by mouth once daily.    donepezil (ARICEPT) 10 MG tablet Take 1 tablet (10 mg total) by mouth every evening.    furosemide (LASIX) 20 MG tablet Take 1 tablet (20 mg total) by mouth once daily.    memantine (NAMENDA) 10 MG Tab TAKE 1 TABLET TWICE A DAY    thyroid, pork, (ARMOUR THYROID) 30 mg Tab Take 1 tablet (30 mg total) by mouth once daily.    triamcinolone acetonide 0.1% (KENALOG) 0.1 % ointment APPLY TOPICALLY 4 (FOUR) TIMES DAILY. TO RASH ON FACE AND CHEST AND NOSE     Family History     Problem Relation (Age of Onset)    Alzheimer's disease Mother    Aneurysm Father    Stomach cancer Brother        Tobacco Use     Smoking status: Former Smoker    Smokeless tobacco: Never Used   Substance and Sexual Activity    Alcohol use: No    Drug use: No    Sexual activity: Never     Comment:      Review of Systems   Constitution: Negative.   Eyes: Negative.    Cardiovascular: Negative.    Respiratory: Negative.    Endocrine: Negative.    Hematologic/Lymphatic: Negative.    Skin: Negative.    Musculoskeletal: Negative.    Gastrointestinal: Positive for bloating, abdominal pain, nausea and vomiting.   Genitourinary: Negative.    Neurological: Negative.    Psychiatric/Behavioral: Negative.    Allergic/Immunologic: Negative.      Objective:     Vital Signs (Most Recent):  Temp: 98 °F (36.7 °C) (04/13/20 1202)  Pulse: (!) 46 (04/13/20 1202)  Resp: 14 (04/13/20 1202)  BP: (!) 104/59 (04/13/20 1202)  SpO2: (!) 93 % (04/13/20 1202) Vital Signs (24h Range):  Temp:  [97.8 °F (36.6 °C)-98.5 °F (36.9 °C)] 98 °F (36.7 °C)  Pulse:  [42-62] 46  Resp:  [14-20] 14  SpO2:  [93 %-100 %] 93 %  BP: (104-171)/(57-82) 104/59     Weight: 81.3 kg (179 lb 3.7 oz)  Body mass index is 32.78 kg/m².    SpO2: (!) 93 %  O2 Device (Oxygen Therapy): room air      Intake/Output Summary (Last 24 hours) at 4/13/2020 1219  Last data filed at 4/13/2020 1044  Gross per 24 hour   Intake 1050 ml   Output 602 ml   Net 448 ml       Lines/Drains/Airways     Drain                 Colostomy 01/02/19 Descending/sigmoid  days          Peripheral Intravenous Line                 Peripheral IV - Single Lumen 04/12/20 2150 20 G Right Hand less than 1 day                Physical Exam   Constitutional: She is oriented to person, place, and time. She appears well-developed and well-nourished. No distress.   HENT:   Head: Normocephalic and atraumatic.   Eyes: Pupils are equal, round, and reactive to light. Right eye exhibits no discharge. Left eye exhibits no discharge.   Neck: Neck supple. No JVD present.   Cardiovascular: Regular rhythm, S1 normal and S2 normal.  Bradycardia present.   No murmur heard.  Pulmonary/Chest: Effort normal and breath sounds normal. No respiratory distress. She has no wheezes. She has no rales.   Abdominal: Soft. She exhibits no distension.   colostomy   Musculoskeletal: She exhibits no edema.   Neurological: She is alert and oriented to person, place, and time.   Skin: Skin is warm and dry. She is not diaphoretic. No erythema.   Psychiatric: She has a normal mood and affect. Her behavior is normal. Thought content normal.   Nursing note and vitals reviewed.      Significant Labs:   CMP   Recent Labs   Lab 04/12/20 2153 04/13/20  0542   * 130*   K 3.8 3.1*   CL 96 96   CO2 21* 23   * 116*   BUN 9 6*   CREATININE 0.7 0.6   CALCIUM 8.2* 7.9*   PROT 6.9  --    ALBUMIN 3.2* 2.8*   BILITOT 0.7  --    ALKPHOS 77  --    AST 32  --    ALT 30  --    ANIONGAP 12 11   ESTGFRAFRICA >60 >60   EGFRNONAA >60 >60   , CBC   Recent Labs   Lab 04/12/20 2153 04/13/20  0542   WBC 10.57 9.54   HGB 12.7 12.0   HCT 38.3 36.9*    240   , Troponin   Recent Labs   Lab 04/12/20 2153   TROPONINI 0.017    and All pertinent lab results from the last 24 hours have been reviewed.    Significant Imaging: Echocardiogram:   2D echo with color flow doppler:   Results for orders placed or performed in visit on 11/15/18   2D echo with color flow doppler   Result Value Ref Range    QEF 60 55 - 65    Diastolic Dysfunction No     Est. PA Systolic Pressure 31.3     Tricuspid Valve Regurgitation MODERATE (A)     Narrative    Date of Procedure: 11/15/2018        TEST DESCRIPTION   Technical Quality: This is a technically adequate study.     Aorta: The aortic root is normal in size, measuring 2.5 cm at sinotubular junction and 2.4 cm at Sinuses of Valsalva. The proximal ascending aorta is normal in size, measuring 3.1 cm across.     Left Atrium: The left atrial volume index is mildly enlarged, measuring 34.80 cc/m2.     Left Ventricle: The left ventricle is normal in  size, with an end-diastolic diameter of 4.4 cm, and an end-systolic diameter of 3.3 cm. LV wall thickness is normal, with the septum measuring 1.0 cm and the posterior wall measuring 1.1 cm across. Relative   wall thickness was increased at 0.50, and the LV mass index was increased at 104.8 g/m2 consistent with concentric left ventricular hypertrophy. There are no regional wall motion abnormalities. Left ventricular systolic function appears normal. Visually   estimated ejection fraction is 60-65%. The LV Doppler derived stroke volume equals 59.0 ccs.     Diastolic indices: E wave velocity 0.9 m/s, E/A ratio 2.0,  msec., E/e' ratio(avg) 10. Diastolic function is normal.     Right Atrium: The right atrium is enlarged, measuring 5.4 cm in length and 4.1 cm in width in the apical view.     Right Ventricle: The right ventricle is mildly enlarged in size. Global right ventricular systolic function appears normal. The estimated PA systolic pressure is 31 mmHg.     Aortic Valve:  The aortic valve is normal in structure. The aortic valve is tri-leaflet in structure.     Mitral Valve:  The mitral valve is normal in structure.     Tricuspid Valve:  The tricuspid valve is normal in structure. There is moderate tricuspid regurgitation.     Pulmonary Valve:  The pulmonic valve is not well seen.     IVC: IVC is normal in size and collapses > 50% with a sniff, suggesting normal right atrial pressure of 3 mmHg.     Intracavitary: There is no evidence of pericardial effusion, intracavity mass, thrombi, or vegetation.         CONCLUSIONS     1 - Biatrial enlargement.     2 - Concentric hypertrophy.     3 - Normal left ventricular systolic function (EF 60-65%).     4 - Normal left ventricular diastolic function.     5 - Right ventricular enlargement with normal systolic function.     6 - The estimated PA systolic pressure is 31 mmHg.     7 - Moderate tricuspid regurgitation.             This document has been electronically     SIGNED BY: Joseph Hillman MD On: 11/15/2018 18:10   , EKG: Reviewed and X-Ray: CXR: X-Ray Chest 1 View (CXR): No results found for this visit on 04/12/20. and X-Ray Chest PA and Lateral (CXR): No results found for this visit on 04/12/20.

## 2020-04-13 NOTE — HPI
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  Patient developed right-sided abdominal cramping 2 days ago which became more frequent and severe in nature.  Patient continues to have colostomy output although on the report that was less over the past 2 days.  Since admission to the hospital, she has improved and has had resolution of her abdominal pain is having good colostomy output.  CT scan showed no evidence of bowel obstruction but continued rectovaginal fistula.  She did have a UTI on admission.  Surgery is consulted for evaluation.

## 2020-04-13 NOTE — H&P
[facility] Hospital Medicine  History & Physical    Patient Name: Irlanda Lopez  MRN: [id number]  Admission Date: 4/12/2020  Attending Physician: Shankar Bowen MD   Primary Care Provider: Myla Arias MD         Patient information was obtained from patient, caregiver / friend, past medical records and ER records.     Subjective:     Principal Problem:Colitis    Chief Complaint:   Chief Complaint   Patient presents with    Abdominal Pain     abdominal cramps in RUQ/RLQ onset yesterday; states has colostomy bag and has had less stool production that usual since yesterday with swelling around the bag. assocaited N/V with one episode of emesis at 6pm today        HPI: Came to the Emergency Department for worsening right sided abdominal cramping.  Cramping is becoming more frequent and severe.  It started Saturday and she was relatively well before that.  Denies fevers or chills.  Today she had some nausea and vomiting but her appetite has remained good and she is tolerating meals.  One episode of vomiting today and her colostomy output has decreased.  She denied any bloody colostomy output.  She had a sigmoid colectomy in January 2019 with Maribel's pouch and left lower quadrant colostomy.  It was for perforated sigmoid diverticulitis and performed by Dr. Hogan.  Complicated by abscess requiring percutaneous drainage.  She has recently followed up with Dr. Connelly for possible colostomy reversal.  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  Accompanied by her live-in caregiver.    Past Medical History:   Diagnosis Date    Diverticulitis     High cholesterol     Hypertension     Hypothyroidism        Past Surgical History:   Procedure Laterality Date    APPENDECTOMY  2008    CATARACT EXTRACTION      Dr Raygoza    COLON SURGERY      COLONOSCOPY N/A 1/2/2019    Procedure: COLONOSCOPY;  Surgeon: Reece Hogan MD;  Location: [facility];  Service: Endoscopy;  Laterality:  N/A;    COLONOSCOPY N/A 6/7/2019    Procedure: COLONOSCOPY;  Surgeon: Rishabh Connelly MD;  Location: [facility] ENDO;  Service: General;  Laterality: N/A;    COLOSTOMY Left 1/2/2019    Procedure: CREATION, COLOSTOMY;  Surgeon: Reece Hogan MD;  Location: [facility] OR;  Service: General;  Laterality: Left;    SHAHIDA PROCEDURE N/A 1/2/2019    Procedure: SHAHIDA PROCEDURE;  Surgeon: Reece Hogan MD;  Location: [facility] OR;  Service: General;  Laterality: N/A;    LYSIS OF ADHESIONS N/A 1/2/2019    Procedure: LYSIS, ADHESIONS;  Surgeon: Reece Hogan MD;  Location: [facility] OR;  Service: General;  Laterality: N/A;    VITRECTOMY Right 09/2013       Review of patient's allergies indicates:  No Known Allergies    No current facility-administered medications on file prior to encounter.      Current Outpatient Medications on File Prior to Encounter   Medication Sig    atorvastatin (LIPITOR) 10 MG tablet Take 1 tablet (10 mg total) by mouth once daily.    donepezil (ARICEPT) 10 MG tablet Take 1 tablet (10 mg total) by mouth every evening.    furosemide (LASIX) 20 MG tablet Take 1 tablet (20 mg total) by mouth once daily.    memantine (NAMENDA) 10 MG Tab TAKE 1 TABLET TWICE A DAY    thyroid, pork, (ARMOUR THYROID) 30 mg Tab Take 1 tablet (30 mg total) by mouth once daily.    triamcinolone acetonide 0.1% (KENALOG) 0.1 % ointment APPLY TOPICALLY 4 (FOUR) TIMES DAILY. TO RASH ON FACE AND CHEST AND NOSE     Family History     Problem Relation (Age of Onset)    Alzheimer's disease Mother    Aneurysm Father    Stomach cancer Brother        Tobacco Use    Smoking status: Former Smoker    Smokeless tobacco: Never Used   Substance and Sexual Activity    Alcohol use: No    Drug use: No    Sexual activity: Never     Comment:      Review of Systems   Constitutional: Negative for chills and fever.   HENT: Negative for congestion and sore throat.    Eyes: Negative for visual disturbance.   Respiratory: Negative for cough, shortness of  breath and wheezing.    Cardiovascular: Negative for chest pain, palpitations and leg swelling.   Gastrointestinal: Positive for abdominal pain, nausea and vomiting. Negative for blood in stool, constipation and diarrhea.   Genitourinary: Negative for dysuria and hematuria.   Musculoskeletal: Negative for arthralgias and back pain.   Skin: Negative for rash and wound.   Neurological: Negative for dizziness, weakness, light-headedness and numbness.   Hematological: Negative for adenopathy.     Objective:     Vital Signs (Most Recent):  Temp: 98 °F (36.7 °C) (04/13/20 0343)  Pulse: (!) 50 (04/13/20 0343)  Resp: 18 (04/13/20 0343)  BP: 129/66 (04/13/20 0343)  SpO2: 96 % (04/13/20 0343) Vital Signs (24h Range):  Temp:  [98 °F (36.7 °C)-98.5 °F (36.9 °C)] 98 °F (36.7 °C)  Pulse:  [50-62] 50  Resp:  [15-20] 18  SpO2:  [96 %-100 %] 96 %  BP: (129-171)/(66-82) 129/66     Weight: 81.3 kg (179 lb 3.7 oz)  Body mass index is 32.78 kg/m².    Physical Exam   Constitutional: She is oriented to person, place, and time. She appears well-developed and well-nourished. No distress.   HENT:   Head: Normocephalic and atraumatic.   Mouth/Throat: Oropharynx is clear and moist.   Eyes: Pupils are equal, round, and reactive to light. Conjunctivae and EOM are normal.   Neck: Neck supple. No JVD present. No thyromegaly present.   Cardiovascular: Normal rate and regular rhythm. Exam reveals no gallop and no friction rub.   No murmur heard.  Pulmonary/Chest: Effort normal and breath sounds normal. She has no wheezes. She has no rales.   Abdominal: Soft. Bowel sounds are normal. She exhibits no distension. There is tenderness. There is guarding. There is no rebound.   Right upper and lower quadrant tenderness and guarding.  Firm to palpation on the right side and most tender in the mid to lower fraction.  Stoma appliance secure and intact.  Stoma pink with semi-solid stool.  Not dusky and no blood.   Musculoskeletal: Normal range of motion. She  exhibits no edema or deformity.   Lymphadenopathy:     She has no cervical adenopathy.   Neurological: She is alert and oriented to person, place, and time. She has normal reflexes.   Skin: Skin is warm and dry. No rash noted.   Psychiatric: She has a normal mood and affect. Her behavior is normal. Judgment and thought content normal.   Nursing note and vitals reviewed.        CRANIAL NERVES     CN III, IV, VI   Pupils are equal, round, and reactive to light.  Extraocular motions are normal.        Significant Labs: All pertinent labs within the past 24 hours have been reviewed.    Significant Imaging: I have reviewed all pertinent imaging results/findings within the past 24 hours.    Assessment/Plan:     * Colitis  Right sided colitis by contrasted CT scan correlating with symptoms of right sided pain and cramping.  Also with nausea and infrequent vomiting.  WBC is normal and no fever or tachycardia.  Oral contrast appears to be collecting in the cecum and none appears to have passed distally and none has collected in the stoma appliance.  Empirically started Cefepime and Metronidazole.  Check stool studies.  Consult General Surgery for evaluation - Possible bowel obstruction.    Dehydration with hyponatremia  IV fluid resuscitation with normal saline.  She received 1 L in the ED continuing with NS at 100 mL/h.  Follow serial chemistries.    Urinary tract infection without hematuria  Urinalysis with pattern of bacterial cystitis but no dysuria, fever, or elevated WBC.  Empirically started Cefepime and she received Ceftriaxone in the ED.  Urine sent for culture.  She has a non-obstructing 3.3 cm stone in the right renal pelvis.      Hypothyroidism  Check TSH and continue thyroid hormone replacement.    Essential hypertension  Not on medical management.  Continue to monitor vital signs.    Nephrolithiasis  Stable non-obstructing stone in the right kidney.  May have chronic asymptomatic bacteriuria.    Mild cognitive  impairment with memory loss  Continue Memantine and Donepezil.      VTE Risk Mitigation (From admission, onward)         Ordered     enoxaparin injection 40 mg  Daily      04/13/20 0404     IP VTE HIGH RISK PATIENT  Once      04/13/20 0404                   Shankar Bowen MD  Department of Hospital Medicine   [facility]

## 2020-04-13 NOTE — ASSESSMENT & PLAN NOTE
IV fluid resuscitation with normal saline.  She received 1 L in the ED continuing with NS at 100 mL/h.  Follow serial chemistries.

## 2020-04-14 ENCOUNTER — PATIENT MESSAGE (OUTPATIENT)
Dept: INTERNAL MEDICINE | Facility: CLINIC | Age: 80
End: 2020-04-14

## 2020-04-14 LAB
E COLI SXT1 STL QL IA: NEGATIVE
E COLI SXT2 STL QL IA: NEGATIVE
SARS-COV-2 RNA RESP QL NAA+PROBE: NOT DETECTED

## 2020-04-14 RX ORDER — THYROID 30 MG/1
30 TABLET ORAL DAILY
Qty: 90 TABLET | Refills: 3 | Status: SHIPPED | OUTPATIENT
Start: 2020-04-14 | End: 2020-04-17 | Stop reason: SDUPTHER

## 2020-04-14 NOTE — TELEPHONE ENCOUNTER
So COVID test is negative.   That is good.   Electrolytes are just a little bit better, so need to try to stick with regular hydration, possibly with some gatorade zero or electrolytes if she can. pedialyte is ok as well or powerade also.   Yes please continue with the cipro. If it seems that it is too harsh for her, then you can break it in half and take it throughout the day but goal is to get in the 500mg twice a day.     I am out of the office this week by my NP Dipti Rios, and BOUBACAR Gutiérrez are available via telemedicine or even in the [facility] in the [facility] this week. We can set her up for a visit as well and to do repeat labs unless she was setup with home health on discharge. If she was setup with home health then we can do labs again in a few days at home.     On the [facility] always is going to require prior auth and in the past they haven't approved it and so it is usually just more expensive. I'm happy to send the prescription to our [facility] pharmacy instead and they can do the prior authorization and then mail the prescription to her once it is approved.     Myla Arias MD

## 2020-04-15 ENCOUNTER — PATIENT MESSAGE (OUTPATIENT)
Dept: INTERNAL MEDICINE | Facility: CLINIC | Age: 80
End: 2020-04-15

## 2020-04-15 ENCOUNTER — TELEPHONE (OUTPATIENT)
Dept: PHARMACY | Facility: CLINIC | Age: 80
End: 2020-04-15

## 2020-04-15 LAB — BACTERIA UR CULT: ABNORMAL

## 2020-04-15 NOTE — TELEPHONE ENCOUNTER
Daughter of pt stated that pt's caregiver is concerned about pt because she was bought to the ED on Sunday for abdominal pain, she was diagnosed with gastroenteritis and UTI. She was discharged on Monday. Pt was prescribed cipro for UTI but pt's caregiver is concerned because pt is on a bland diet but is having watery stool, abd cramping, but denies having any fever. Please advise.

## 2020-04-15 NOTE — TELEPHONE ENCOUNTER
The prior authorization for [facility]land[facility] ARMOUR THYROID has been submitted on 4/15/2020 @ 3:05PM.  It can take up to 72 hours for a decision to be rendered from the insurance.  Patient is aware of PA and process.  Please let me know if you have any questions.    Thank You!   Shagufta Meyers CPhT, B.A  Patient Care Advocate   [facility]  [email or web address]  Phone: [phone] Ext [phone]  Fax: [phone]

## 2020-04-15 NOTE — TELEPHONE ENCOUNTER
Call and find out what their concerns are.  Let them know Angela is out of the office this afternoon.

## 2020-04-16 ENCOUNTER — PATIENT MESSAGE (OUTPATIENT)
Dept: INTERNAL MEDICINE | Facility: CLINIC | Age: 80
End: 2020-04-16

## 2020-04-16 LAB — BACTERIA STL CULT: NORMAL

## 2020-04-17 ENCOUNTER — TELEPHONE (OUTPATIENT)
Dept: INTERNAL MEDICINE | Facility: CLINIC | Age: 80
End: 2020-04-17

## 2020-04-17 LAB
BACTERIA BLD CULT: NORMAL
BACTERIA BLD CULT: NORMAL

## 2020-04-17 RX ORDER — THYROID 30 MG/1
30 TABLET ORAL DAILY
Qty: 30 TABLET | Refills: 0 | Status: SHIPPED | OUTPATIENT
Start: 2020-04-17 | End: 2020-05-12 | Stop reason: SDUPTHER

## 2020-04-17 NOTE — TELEPHONE ENCOUNTER
----- Message from Georgette Dunn sent at 4/17/2020  9:14 AM CDT -----  Contact: Daughter-Myla Frausto is requesting call back in regards to questions about medication that were giving to pt by the ER provider.          Pls call back at [phone]

## 2020-04-17 NOTE — TELEPHONE ENCOUNTER
Called and spoke with pts daughter regarding this medicine that she is reporting that the pharmacy filled and gave her.

## 2020-05-10 ENCOUNTER — PATIENT MESSAGE (OUTPATIENT)
Dept: SURGERY | Facility: CLINIC | Age: 80
End: 2020-05-10

## 2020-05-11 ENCOUNTER — PATIENT MESSAGE (OUTPATIENT)
Dept: SURGERY | Facility: CLINIC | Age: 80
End: 2020-05-11

## 2020-05-11 ENCOUNTER — PATIENT MESSAGE (OUTPATIENT)
Dept: INTERNAL MEDICINE | Facility: CLINIC | Age: 80
End: 2020-05-11

## 2020-05-12 ENCOUNTER — TELEPHONE (OUTPATIENT)
Dept: INTERNAL MEDICINE | Facility: CLINIC | Age: 80
End: 2020-05-12

## 2020-05-12 ENCOUNTER — HOSPITAL ENCOUNTER (OUTPATIENT)
Dept: RADIOLOGY | Facility: HOSPITAL | Age: 80
Discharge: HOME | End: 2020-05-12
Attending: FAMILY MEDICINE
Payer: MEDICARE

## 2020-05-12 ENCOUNTER — PATIENT MESSAGE (OUTPATIENT)
Dept: INTERNAL MEDICINE | Facility: CLINIC | Age: 80
End: 2020-05-12

## 2020-05-12 ENCOUNTER — OFFICE VISIT (OUTPATIENT)
Dept: INTERNAL MEDICINE | Facility: CLINIC | Age: 80
End: 2020-05-12
Payer: MEDICARE

## 2020-05-12 ENCOUNTER — HOSPITAL ENCOUNTER (OUTPATIENT)
Facility: HOSPITAL | Age: 80
Discharge: HOME | End: 2020-05-16
Attending: EMERGENCY MEDICINE | Admitting: EMERGENCY MEDICINE
Payer: MEDICARE

## 2020-05-12 VITALS
HEIGHT: 63 IN | SYSTOLIC BLOOD PRESSURE: 120 MMHG | BODY MASS INDEX: 29.84 KG/M2 | HEART RATE: 72 BPM | TEMPERATURE: 98 F | WEIGHT: 168.44 LBS | DIASTOLIC BLOOD PRESSURE: 70 MMHG

## 2020-05-12 DIAGNOSIS — R14.0 ABDOMINAL DISTENSION: ICD-10-CM

## 2020-05-12 DIAGNOSIS — K52.9 COLITIS: Primary | ICD-10-CM

## 2020-05-12 DIAGNOSIS — K52.9 COLITIS: ICD-10-CM

## 2020-05-12 DIAGNOSIS — E78.5 HYPERLIPIDEMIA, UNSPECIFIED HYPERLIPIDEMIA TYPE: ICD-10-CM

## 2020-05-12 DIAGNOSIS — N39.0 ACUTE UTI: ICD-10-CM

## 2020-05-12 DIAGNOSIS — R10.9 ABDOMINAL PAIN, UNSPECIFIED ABDOMINAL LOCATION: ICD-10-CM

## 2020-05-12 DIAGNOSIS — R73.09 ABNORMAL GLUCOSE: ICD-10-CM

## 2020-05-12 DIAGNOSIS — Z93.3 COLOSTOMY IN PLACE: ICD-10-CM

## 2020-05-12 DIAGNOSIS — I10 ESSENTIAL HYPERTENSION: ICD-10-CM

## 2020-05-12 DIAGNOSIS — R14.0 ABDOMINAL DISTENSION: Primary | ICD-10-CM

## 2020-05-12 DIAGNOSIS — R00.1 BRADYCARDIA: ICD-10-CM

## 2020-05-12 DIAGNOSIS — N39.0 RECURRENT UTI: ICD-10-CM

## 2020-05-12 DIAGNOSIS — K56.51 INTESTINAL ADHESIONS WITH PARTIAL OBSTRUCTION: ICD-10-CM

## 2020-05-12 DIAGNOSIS — R14.2 BELCHING: ICD-10-CM

## 2020-05-12 DIAGNOSIS — E78.00 PURE HYPERCHOLESTEROLEMIA: ICD-10-CM

## 2020-05-12 DIAGNOSIS — E03.9 HYPOTHYROIDISM, UNSPECIFIED TYPE: ICD-10-CM

## 2020-05-12 LAB
BILIRUB SERPL-MCNC: NEGATIVE MG/DL
BLOOD URINE, POC: ABNORMAL
COLOR, POC UA: YELLOW
GLUCOSE UR QL STRIP: NORMAL
KETONES UR QL STRIP: NEGATIVE
LEUKOCYTE ESTERASE URINE, POC: ABNORMAL
NITRITE, POC UA: POSITIVE
PH, POC UA: 5
PROTEIN, POC: ABNORMAL
SARS-COV-2 RDRP RESP QL NAA+PROBE: NEGATIVE
SPECIFIC GRAVITY, POC UA: 1.01
UROBILINOGEN, POC UA: NORMAL

## 2020-05-12 PROCEDURE — 1101F PR PT FALLS ASSESS DOC 0-1 FALLS W/OUT INJ PAST YR: ICD-10-PCS | Mod: CPTII,S$GLB,, | Performed by: FAMILY MEDICINE

## 2020-05-12 PROCEDURE — 87449 NOS EACH ORGANISM AG IA: CPT

## 2020-05-12 PROCEDURE — 96372 THER/PROPH/DIAG INJ SC/IM: CPT | Mod: 59

## 2020-05-12 PROCEDURE — 87088 URINE BACTERIA CULTURE: CPT

## 2020-05-12 PROCEDURE — 99499 RISK ADDL DX/OHS AUDIT: ICD-10-PCS | Mod: S$GLB,,, | Performed by: FAMILY MEDICINE

## 2020-05-12 PROCEDURE — 1159F MED LIST DOCD IN RCRD: CPT | Mod: S$GLB,,, | Performed by: FAMILY MEDICINE

## 2020-05-12 PROCEDURE — 25000003 PHARM REV CODE 250: Performed by: PHYSICIAN ASSISTANT

## 2020-05-12 PROCEDURE — 25500020 PHARM REV CODE 255: Performed by: FAMILY MEDICINE

## 2020-05-12 PROCEDURE — S0030 INJECTION, METRONIDAZOLE: HCPCS | Performed by: PHYSICIAN ASSISTANT

## 2020-05-12 PROCEDURE — 93010 EKG 12-LEAD: ICD-10-PCS | Mod: ,,, | Performed by: INTERNAL MEDICINE

## 2020-05-12 PROCEDURE — 96375 TX/PRO/DX INJ NEW DRUG ADDON: CPT

## 2020-05-12 PROCEDURE — 3074F SYST BP LT 130 MM HG: CPT | Mod: CPTII,S$GLB,, | Performed by: FAMILY MEDICINE

## 2020-05-12 PROCEDURE — 87086 URINE CULTURE/COLONY COUNT: CPT

## 2020-05-12 PROCEDURE — 87324 CLOSTRIDIUM AG IA: CPT | Mod: 59

## 2020-05-12 PROCEDURE — 1126F PR PAIN SEVERITY QUANTIFIED, NO PAIN PRESENT: ICD-10-PCS | Mod: S$GLB,,, | Performed by: FAMILY MEDICINE

## 2020-05-12 PROCEDURE — S0030 INJECTION, METRONIDAZOLE: HCPCS | Performed by: EMERGENCY MEDICINE

## 2020-05-12 PROCEDURE — 96365 THER/PROPH/DIAG IV INF INIT: CPT | Mod: 59

## 2020-05-12 PROCEDURE — 81002 POCT URINE DIPSTICK WITHOUT MICROSCOPE: ICD-10-PCS | Mod: S$GLB,,, | Performed by: FAMILY MEDICINE

## 2020-05-12 PROCEDURE — U0002 COVID-19 LAB TEST NON-CDC: HCPCS

## 2020-05-12 PROCEDURE — 99215 OFFICE O/P EST HI 40 MIN: CPT | Mod: 25,S$GLB,, | Performed by: FAMILY MEDICINE

## 2020-05-12 PROCEDURE — 1101F PT FALLS ASSESS-DOCD LE1/YR: CPT | Mod: CPTII,S$GLB,, | Performed by: FAMILY MEDICINE

## 2020-05-12 PROCEDURE — 1126F AMNT PAIN NOTED NONE PRSNT: CPT | Mod: S$GLB,,, | Performed by: FAMILY MEDICINE

## 2020-05-12 PROCEDURE — 63600175 PHARM REV CODE 636 W HCPCS: Performed by: EMERGENCY MEDICINE

## 2020-05-12 PROCEDURE — 87040 BLOOD CULTURE FOR BACTERIA: CPT | Mod: 59

## 2020-05-12 PROCEDURE — 74177 CT ABD & PELVIS W/CONTRAST: CPT | Mod: TC

## 2020-05-12 PROCEDURE — 1159F PR MEDICATION LIST DOCUMENTED IN MEDICAL RECORD: ICD-10-PCS | Mod: S$GLB,,, | Performed by: FAMILY MEDICINE

## 2020-05-12 PROCEDURE — 81002 URINALYSIS NONAUTO W/O SCOPE: CPT | Mod: S$GLB,,, | Performed by: FAMILY MEDICINE

## 2020-05-12 PROCEDURE — 93010 ELECTROCARDIOGRAM REPORT: CPT | Mod: ,,, | Performed by: INTERNAL MEDICINE

## 2020-05-12 PROCEDURE — 3078F PR MOST RECENT DIASTOLIC BLOOD PRESSURE < 80 MM HG: ICD-10-PCS | Mod: CPTII,S$GLB,, | Performed by: FAMILY MEDICINE

## 2020-05-12 PROCEDURE — 99999 PR PBB SHADOW E&M-EST. PATIENT-LVL IV: CPT | Mod: PBBFAC,,, | Performed by: FAMILY MEDICINE

## 2020-05-12 PROCEDURE — 99215 PR OFFICE/OUTPT VISIT, EST, LEVL V, 40-54 MIN: ICD-10-PCS | Mod: 25,S$GLB,, | Performed by: FAMILY MEDICINE

## 2020-05-12 PROCEDURE — G0378 HOSPITAL OBSERVATION PER HR: HCPCS

## 2020-05-12 PROCEDURE — 87493 C DIFF AMPLIFIED PROBE: CPT

## 2020-05-12 PROCEDURE — 74177 CT ABD & PELVIS W/CONTRAST: CPT | Mod: 26,,, | Performed by: RADIOLOGY

## 2020-05-12 PROCEDURE — 87077 CULTURE AEROBIC IDENTIFY: CPT

## 2020-05-12 PROCEDURE — 99999 PR PBB SHADOW E&M-EST. PATIENT-LVL IV: ICD-10-PCS | Mod: PBBFAC,,, | Performed by: FAMILY MEDICINE

## 2020-05-12 PROCEDURE — 96376 TX/PRO/DX INJ SAME DRUG ADON: CPT

## 2020-05-12 PROCEDURE — 87186 SC STD MICRODIL/AGAR DIL: CPT

## 2020-05-12 PROCEDURE — 25000003 PHARM REV CODE 250: Performed by: EMERGENCY MEDICINE

## 2020-05-12 PROCEDURE — 99285 EMERGENCY DEPT VISIT HI MDM: CPT | Mod: 25

## 2020-05-12 PROCEDURE — 3078F DIAST BP <80 MM HG: CPT | Mod: CPTII,S$GLB,, | Performed by: FAMILY MEDICINE

## 2020-05-12 PROCEDURE — 93005 ELECTROCARDIOGRAM TRACING: CPT

## 2020-05-12 PROCEDURE — 3074F PR MOST RECENT SYSTOLIC BLOOD PRESSURE < 130 MM HG: ICD-10-PCS | Mod: CPTII,S$GLB,, | Performed by: FAMILY MEDICINE

## 2020-05-12 PROCEDURE — 99499 UNLISTED E&M SERVICE: CPT | Mod: S$GLB,,, | Performed by: FAMILY MEDICINE

## 2020-05-12 PROCEDURE — 74177 CT ABDOMEN PELVIS WITH CONTRAST: ICD-10-PCS | Mod: 26,,, | Performed by: RADIOLOGY

## 2020-05-12 RX ORDER — ATORVASTATIN CALCIUM 10 MG/1
10 TABLET, FILM COATED ORAL DAILY
Status: DISCONTINUED | OUTPATIENT
Start: 2020-05-13 | End: 2020-05-16 | Stop reason: HOSPADM

## 2020-05-12 RX ORDER — ACETAMINOPHEN 325 MG/1
650 TABLET ORAL EVERY 6 HOURS PRN
Status: DISCONTINUED | OUTPATIENT
Start: 2020-05-12 | End: 2020-05-16 | Stop reason: HOSPADM

## 2020-05-12 RX ORDER — METRONIDAZOLE 500 MG/100ML
500 INJECTION, SOLUTION INTRAVENOUS
Status: DISCONTINUED | OUTPATIENT
Start: 2020-05-12 | End: 2020-05-14

## 2020-05-12 RX ORDER — SIMETHICONE 80 MG
2 TABLET,CHEWABLE ORAL 3 TIMES DAILY PRN
Status: DISCONTINUED | OUTPATIENT
Start: 2020-05-12 | End: 2020-05-16 | Stop reason: HOSPADM

## 2020-05-12 RX ORDER — CHLORPROMAZINE HYDROCHLORIDE 25 MG/1
25 TABLET, FILM COATED ORAL 3 TIMES DAILY PRN
Status: DISCONTINUED | OUTPATIENT
Start: 2020-05-12 | End: 2020-05-12

## 2020-05-12 RX ORDER — METRONIDAZOLE 500 MG/100ML
500 INJECTION, SOLUTION INTRAVENOUS
Status: COMPLETED | OUTPATIENT
Start: 2020-05-12 | End: 2020-05-12

## 2020-05-12 RX ORDER — DONEPEZIL HYDROCHLORIDE 5 MG/1
10 TABLET, FILM COATED ORAL NIGHTLY
Status: DISCONTINUED | OUTPATIENT
Start: 2020-05-12 | End: 2020-05-12

## 2020-05-12 RX ORDER — MEMANTINE HYDROCHLORIDE 10 MG/1
10 TABLET ORAL 2 TIMES DAILY
Status: DISCONTINUED | OUTPATIENT
Start: 2020-05-12 | End: 2020-05-16 | Stop reason: HOSPADM

## 2020-05-12 RX ORDER — ONDANSETRON 2 MG/ML
8 INJECTION INTRAMUSCULAR; INTRAVENOUS
Status: COMPLETED | OUTPATIENT
Start: 2020-05-12 | End: 2020-05-12

## 2020-05-12 RX ORDER — CIPROFLOXACIN 2 MG/ML
400 INJECTION, SOLUTION INTRAVENOUS
Status: DISCONTINUED | OUTPATIENT
Start: 2020-05-12 | End: 2020-05-14

## 2020-05-12 RX ORDER — TALC
6 POWDER (GRAM) TOPICAL NIGHTLY PRN
Status: DISCONTINUED | OUTPATIENT
Start: 2020-05-12 | End: 2020-05-15

## 2020-05-12 RX ORDER — CIPROFLOXACIN 250 MG/5ML
500 KIT ORAL 2 TIMES DAILY
Qty: 200 ML | Refills: 0 | Status: ON HOLD | OUTPATIENT
Start: 2020-05-12 | End: 2020-05-16 | Stop reason: HOSPADM

## 2020-05-12 RX ORDER — DIPHENHYDRAMINE HYDROCHLORIDE 50 MG/ML
12.5 INJECTION INTRAMUSCULAR; INTRAVENOUS ONCE
Status: COMPLETED | OUTPATIENT
Start: 2020-05-12 | End: 2020-05-12

## 2020-05-12 RX ORDER — HYDROCODONE BITARTRATE AND ACETAMINOPHEN 5; 325 MG/1; MG/1
1 TABLET ORAL EVERY 6 HOURS PRN
Status: DISCONTINUED | OUTPATIENT
Start: 2020-05-12 | End: 2020-05-16 | Stop reason: HOSPADM

## 2020-05-12 RX ORDER — ONDANSETRON 2 MG/ML
4 INJECTION INTRAMUSCULAR; INTRAVENOUS EVERY 6 HOURS PRN
Status: DISCONTINUED | OUTPATIENT
Start: 2020-05-12 | End: 2020-05-16 | Stop reason: HOSPADM

## 2020-05-12 RX ORDER — HYDROCODONE BITARTRATE AND ACETAMINOPHEN 10; 325 MG/1; MG/1
1 TABLET ORAL EVERY 6 HOURS PRN
Status: DISCONTINUED | OUTPATIENT
Start: 2020-05-12 | End: 2020-05-16 | Stop reason: HOSPADM

## 2020-05-12 RX ORDER — CHLORPROMAZINE HYDROCHLORIDE 25 MG/1
25 TABLET, FILM COATED ORAL 3 TIMES DAILY PRN
Status: DISCONTINUED | OUTPATIENT
Start: 2020-05-12 | End: 2020-05-16 | Stop reason: HOSPADM

## 2020-05-12 RX ORDER — THYROID 30 MG/1
30 TABLET ORAL DAILY
Qty: 30 TABLET | Refills: 11 | Status: SHIPPED | OUTPATIENT
Start: 2020-05-12 | End: 2021-04-07

## 2020-05-12 RX ORDER — THYROID 30 MG/1
30 TABLET ORAL NIGHTLY
Status: DISCONTINUED | OUTPATIENT
Start: 2020-05-12 | End: 2020-05-16 | Stop reason: HOSPADM

## 2020-05-12 RX ORDER — DICYCLOMINE HYDROCHLORIDE 10 MG/ML
20 INJECTION INTRAMUSCULAR
Status: COMPLETED | OUTPATIENT
Start: 2020-05-12 | End: 2020-05-12

## 2020-05-12 RX ADMIN — METRONIDAZOLE 500 MG: 500 INJECTION, SOLUTION INTRAVENOUS at 10:05

## 2020-05-12 RX ADMIN — SIMETHICONE CHEW TAB 80 MG 160 MG: 80 TABLET ORAL at 10:05

## 2020-05-12 RX ADMIN — ONDANSETRON 8 MG: 2 INJECTION INTRAMUSCULAR; INTRAVENOUS at 04:05

## 2020-05-12 RX ADMIN — MEMANTINE 10 MG: 10 TABLET ORAL at 09:05

## 2020-05-12 RX ADMIN — IOHEXOL 30 ML: 350 INJECTION, SOLUTION INTRAVENOUS at 11:05

## 2020-05-12 RX ADMIN — DIPHENHYDRAMINE HYDROCHLORIDE 12.5 MG: 50 INJECTION INTRAMUSCULAR; INTRAVENOUS at 07:05

## 2020-05-12 RX ADMIN — DICYCLOMINE HYDROCHLORIDE 20 MG: 20 INJECTION INTRAMUSCULAR at 05:05

## 2020-05-12 RX ADMIN — LEVOTHYROXINE, LIOTHYRONINE 30 MG: 19; 4.5 TABLET ORAL at 10:05

## 2020-05-12 RX ADMIN — IOHEXOL 100 ML: 350 INJECTION, SOLUTION INTRAVENOUS at 11:05

## 2020-05-12 RX ADMIN — CIPROFLOXACIN 400 MG: 2 INJECTION, SOLUTION INTRAVENOUS at 06:05

## 2020-05-12 RX ADMIN — METRONIDAZOLE 500 MG: 500 INJECTION, SOLUTION INTRAVENOUS at 07:05

## 2020-05-12 RX ADMIN — HYDROCODONE BITARTRATE AND ACETAMINOPHEN 1 TABLET: 5; 325 TABLET ORAL at 09:05

## 2020-05-12 RX ADMIN — Medication 6 MG: at 10:05

## 2020-05-12 NOTE — TELEPHONE ENCOUNTER
----- Message from Mirtha Carter LPN sent at 5/12/2020  4:19 PM CDT -----  Contact: Myla       ----- Message -----  From: Jayce Goddard  Sent: 5/12/2020   3:50 PM CDT  To: [facility]ic[facility] Staff, [facility] Staff    Pt daughter is upset that she was unable to accompany her Mom into the ER.       [phone]

## 2020-05-12 NOTE — TELEPHONE ENCOUNTER
----- Message from Jayce Goddard sent at 5/12/2020  3:50 PM CDT -----  Contact: Myla   Pt daughter is upset that she was unable to accompany her Mom into the ER.       [phone]

## 2020-05-12 NOTE — TELEPHONE ENCOUNTER
Rocky,   Please contact ed nurse triage to allow daughter Faiza to go into ED because her mom has Dementia and can't give history accurately.    Dr Bullard seeing patient. I will forward to him as well.

## 2020-05-12 NOTE — TELEPHONE ENCOUNTER
Dr. Arias spoke with daughter.  Contacted the emergency room  Per Dr. Arias to inform them Dr. Arias said that daughter needs to be required to go back to to patient being demented.   Intake nurse verbalized understanding to let her back but it would ultimately be up to Dr. Bullard the treating physician once she goes back.

## 2020-05-12 NOTE — TELEPHONE ENCOUNTER
I called and directly spoke with her daughter Faiza the direct caregiver and advised them to go to the emergency room.  I was finally able to get in touch with Dr. banda the colorectal surgeon as he was in surgery.  He also agreed with her being referred to the emergency room due to the vomiting and the CT scan findings.  Patient was advised to go to the [facility] emergency room.  Medication can be sent in to a different pharmacy if needed but for now suspect she will receive IV fluids and antibiotics and possible admission due to the partial obstruction on CT scan.

## 2020-05-12 NOTE — TELEPHONE ENCOUNTER
Contacted the emergency room  Per Dr. Arias to inform them Dr. Arias said that daughter needs to be required to go back to to patient being demented.   Intake nurse verbalized understanding to let her back but it would ultimately be up to Dr. Bullard the treating physician once she goes back.

## 2020-05-12 NOTE — TELEPHONE ENCOUNTER
Patient has been vomiting since she returned home from the hospital.  Would like to know what to do next.      Also we may have to call the cipro into a different pharmacy.  Let me know once you speak with the patient.

## 2020-05-12 NOTE — TELEPHONE ENCOUNTER
----- Message from Hanane Osorio sent at 5/12/2020 12:39 PM CDT -----  Contact: Myla- Daughter   Requesting a call back regarding  CT scan. Patient has been vomiting since being at home , she would like to know if that is normal. Also the medication prescribed her pharmacy will be out until tomorrow afternoon.Please call back at [phone].      Thanks,  Hanane Osorio

## 2020-05-12 NOTE — ED PROVIDER NOTES
SCRIBE #1 NOTE: I, Rishabh Telles, am scribing for, and in the presence of, Siva Bullard Jr., MD. I have scribed the entire note.       History     Chief Complaint   Patient presents with    Colostomy problem     Arrives to ER due to possible blockage in colostomy sent by PCP for CT scan, pt. only complaint, some abdominal discomfort intermittent nausea. No change in bowel consitency per pt.      Review of patient's allergies indicates:  No Known Allergies      History of Present Illness     HPI    5/12/2020, 3:45 PM  History obtained from the daughter and patient      History of Present Illness: Irlanda Lopez is a 79 y.o. female patient with a PMHx of Alzheimer's disease, diverticulitis, HLD, HTN, and hypothyroidism who presents to the Emergency Department for evaluation of a colostomy problem which onset gradually 5 days PTA. Pt was sent by their PCP for further workup following a CT scan at [place] to r/o a possible colostomy blockage. Symptoms are constant and moderate in severity. No mitigating or exacerbating factors reported. Associated sxs include abdominal cramping and reduced output. Patient denies any fever, cough, SOB, N/V, hematochezia, and all other sxs at this time. No prior Tx reported. No further complaints or concerns at this time.    Arrival mode: Personal vehicle    PCP: Myla Arias MD        Past Medical History:  Past Medical History:   Diagnosis Date    Diverticulitis     High cholesterol     Hypertension     Hypothyroidism        Past Surgical History:  Past Surgical History:   Procedure Laterality Date    APPENDECTOMY  2008    CATARACT EXTRACTION      Dr Raygoza    COLON SURGERY      COLONOSCOPY N/A 1/2/2019    Procedure: COLONOSCOPY;  Surgeon: Reece Hogan MD;  Location: [facility] ENDO;  Service: Endoscopy;  Laterality: N/A;    COLONOSCOPY N/A 6/7/2019    Procedure: COLONOSCOPY;  Surgeon: Rishabh Connelly MD;  Location: [facility] ENDO;  Service: General;  Laterality: N/A;     COLOSTOMY Left 1/2/2019    Procedure: CREATION, COLOSTOMY;  Surgeon: Reece Hogan MD;  Location: [facility] OR;  Service: General;  Laterality: Left;    SHAHIDA PROCEDURE N/A 1/2/2019    Procedure: SHAHIDA PROCEDURE;  Surgeon: Reece Hogan MD;  Location: [facility] OR;  Service: General;  Laterality: N/A;    LYSIS OF ADHESIONS N/A 1/2/2019    Procedure: LYSIS, ADHESIONS;  Surgeon: Reece Hogan MD;  Location: [facility] OR;  Service: General;  Laterality: N/A;    VITRECTOMY Right 09/2013         Family History:  Family History   Problem Relation Age of Onset    Alzheimer's disease Mother     Aneurysm Father         brain    Stomach cancer Brother         50s       Social History:  Social History     Tobacco Use    Smoking status: Former Smoker    Smokeless tobacco: Never Used   Substance and Sexual Activity    Alcohol use: No    Drug use: No    Sexual activity: Never     Comment:         Review of Systems     Review of Systems   Constitutional: Negative for chills and fever.   HENT: Negative for sore throat.    Respiratory: Negative for cough and shortness of breath.    Cardiovascular: Negative for chest pain and leg swelling.   Gastrointestinal: Positive for abdominal pain (Cramping). Negative for blood in stool, diarrhea, nausea and vomiting.        (+) Reduced colostomy output   Genitourinary: Negative for dysuria.   Musculoskeletal: Negative for back pain, neck pain and neck stiffness.   Skin: Negative for rash and wound.   Neurological: Negative for dizziness, weakness, light-headedness, numbness and headaches.   Hematological: Does not bruise/bleed easily.   All other systems reviewed and are negative.     Physical Exam     Initial Vitals [05/12/20 1539]   BP Pulse Resp Temp SpO2   (!) 161/73 (!) 58 16 97.9 °F (36.6 °C) 97 %      MAP       --          Physical Exam  Nursing Notes and Vital Signs Reviewed.  Constitutional: Patient is in no acute distress. Well-developed and well-nourished.  Head: Atraumatic.  "Normocephalic.  Eyes: . EOM intact. Conjunctivae are not pale. No scleral icterus.  ENT: Mucous membranes are moist. Oropharynx is clear and symmetric.  nares are clear  Neck: Supple.  Trachea midline.  Cardiovascular: Regular rate. Regular rhythm. No murmurs, rubs, or gallops. Distal pulses are 2+ and symmetric.  Pulmonary/Chest: No respiratory distress. Clear to auscultation bilaterally. No wheezing or rales.  Abdominal: Soft and non-distended. There is diffuse abdominal tenderness to palpation. No rebound or guarding. Colostomy in place.  Genitourinary: No CVA tenderness.  No suprapubic tenderness  Musculoskeletal: Moves all extremities. No obvious deformities. No edema. No calf tenderness.  Skin: Warm and dry.  Neurological:  Alert, awake, and appropriate.  Normal speech.  No acute focal neurological deficits are appreciated.  Psychiatric: Normal affect. Good eye contact. Appropriate in content.     ED Course   Procedures  ED Vital Signs:  Vitals:    05/12/20 1539   BP: (!) 161/73   Pulse: (!) 58   Resp: 16   Temp: 97.9 °F (36.6 °C)   TempSrc: Oral   SpO2: 97%   Weight: 76.4 kg (168 lb 6.9 oz)   Height: 5' 2.5" (1.588 m)       Abnormal Lab Results:  Labs Reviewed   SARS-COV-2 RNA AMPLIFICATION, QUAL        All Lab Results:  Results for orders placed or performed during the hospital encounter of 05/12/20   COVID-19 Rapid Screening   Result Value Ref Range    SARS-CoV-2 RNA, Amplification, Qual Negative Negative       Imaging Results:  Imaging Results    None       Beto Light MD 5/12/2020 STAT      Narrative     EXAMINATION:  CT ABDOMEN PELVIS WITH CONTRAST    CLINICAL HISTORY:  Abdominal distension;Diverticulitis, known, follow up;rule out partial obstruction, 5 days, n/v known colostomy; Abdominal distension (gaseous)    TECHNIQUE:  Low dose axial images, sagittal and coronal reformations were obtained from the lung bases to the pubic symphysis following the IV administration of 100 mL of Omnipaque 350 " and the oral administration of 30 mL of Omnipaque 350.    COMPARISON:  Multiple prior CT exams, most recent 04/12/2020    FINDINGS:  Visualized lower chest is unremarkable.    Within the abdomen, the liver and spleen are unremarkable. Pancreas is normal. Adrenal glands are unremarkable. Gallbladder normal. No biliary dilatation.    Kidneys are unchanged.  Large right renal pelvic calcification again noted with bilateral renal parenchymal scarring..    Stomach is unremarkable.  Small bowel is nonobstructive.  Oral contrast extends throughout the small bowel and into the proximal colon.  Left lower quadrant colostomy noted with peristomal herniated bowel loops.  There is persistent fat stranding surrounding the colon within the left lower quadrant.  Focal caliber change is similar to prior study suggesting at least partial obstruction or stenosis from likely postsurgical adhesion.  Similar soft tissue attenuation, presumed scarring noted interposed between small bowel loops in the uterine fundus which is associated with suspected fistula on the 06/26/2019 study.  No drainable abscess appreciated.    Urinary bladder demonstrates small foci of air anteriorly, correlate for recent catheterization.  Intrapelvic structures otherwise unchanged.    No acute osseous abnormality.      Impression       1. No evidence of small-bowel obstruction.  2. There is prominence of the proximal/mid colon with focal caliber change noted within the left lower quadrant, similar to prior study.  Persistent pericolonic fat stranding and wall thickening suggesting nonspecific colitis most prevalent at the area of caliber change.  Partial obstruction from adhesion possible.  Correlate with colostomy output.  No evidence of drainable abscess.  3. Other findings within the abdomen/pelvis similar to prior studies.      Electronically signed by: Beto Light MD  Date: 05/12/2020  Time: 12:37              The Emergency Provider reviewed the  vital signs and test results, which are outlined above.     ED Discussion     4:11 PM: Spoke with charge nurse regarding daughter being in distress and requesting to be at pt's bedside in ED. Charge nurse informed pt's daughter that it was against policy and states that the situation is now handled.    4:19 PM: Discussed pt's case with Dr. Connelly (colon and rectal surgery) who states that there is no small bowel obstruction, and that the CT looks similar to previous CT. Dr. Connelly states that it looks like colitis of transverse colon, but not obstructed. Dr. Connelly recommends admission to medicine for further workup.    4:23 PM: Upon presentation, I had reviewed the patient's chart noted the daughter's concerns over the patient's dementia and that she was not allowed back to the patient the hospital policy.  My immediate step was to discussed the case with the daughter outside.  The daughter was inconsolable in berated me throughout the conversation.  I tried to get as much history as I could from the daughter who stated that they were worried about a bowel obstruction and that she needed an NG tube.  She stated that I should have got my orders from Dr. Arias .  I attempted to assuage the patient's daughter as best I could.  I notified the administrative personnel in the emergency department that included Roula Mendoza and my charge nurse Jared Phillip.  Ms. Phillip spoke with the patient's daughter out front and discussed with her hospital policy.  She is obtained the daughter's cellphone number in noted that she would notify her of any changes.  Both were aware of Dr. carballo request that the daughter be allowed back with the patient as well as my okay of this you have hospital policy would allow.     4:46 PM: I have had a long discussion with the patient's daughter regarding her diagnosis and plan of care.  She is aware and is still requesting to be brought back.  [facility] menstru[facility]  is aware and is discussing the case with our charge nurse.  Patient is aware of her diagnosis as well.    5:23 PM: The daughter has been allowed back with the patient in consultation with demonstration.  The daughter is very appreciative have the ability of the staff here to accommodate her wishes.    6:15 PM: Discussed case with Vanessa Shipley MD ([facility]). Dr. Shipley agrees with current care and management of pt and accepts admission.   Admitting Service: [facility]  Admitting Physician: Dr. Shipley  Admit to: Observation/Med-Surg    6:16 PM: Re-evaluated pt. I have discussed test results, shared treatment plan, and the need for admission with patient and family at bedside. Pt and family express understanding at this time and agree with all information. All questions answered. Pt and family have no further questions or concerns at this time. Pt is ready for admit.    Medical Decision Making:   Clinical Tests:   Lab Tests: Ordered and Reviewed  Radiological Study: Reviewed           ED Medication(s):  Medications   dicyclomine injection 20 mg (has no administration in time range)   ondansetron injection 8 mg (has no administration in time range)       New Prescriptions    No medications on file               Scribe Attestation:   Scribe #1: I performed the above scribed service and the documentation accurately describes the services I performed. I attest to the accuracy of the note.     Attending:   Physician Attestation Statement for Scribe #1: I, Siva Bullard Jr., MD, personally performed the services described in this documentation, as scribed by Rishabh Telles, in my presence, and it is both accurate and complete.           Clinical Impression     No diagnosis found.    Disposition:   Disposition: Placed in Observation  Condition: Tamara Bullard Jr., MD  05/12/20 2030

## 2020-05-12 NOTE — PROGRESS NOTES
Subjective:      Patient ID: Irlanda Lopez is a 79 y.o. female.    Chief Complaint: Abdominal Pain    Disclaimer:  This note is prepared using voice recognition software and as such is likely to have errors and has not been proof read. Please contact me for questions.     Irlanda Lopez is a 79 y.o. female who presents today for abdominal pains and gas.  Here today with her caregiver and her daughter.  Reports that over the last 4-5 days having a lot of abdominal discomfort distension a lot of recurrent belching with limited p.o. intake.  She has had some nausea vomiting as well.  Has a known colostomy.  Nothing but liquid into the bag and inconsistently putting out.  No form stools.. Having a lot of cramping. Pain for last 3-4 days.  No fever however.  Burping a lot and usually has gas in the bag. Was admitted easter April 12, 2020 for similar issues ended up with a CT scan which did not show obstruction but early colitis picture and was treated with Cipro.  Was kept in the hospital for few days treated with IV antibiotics and IV fluids and was discharged home due to risk for COVID of hospital that time.  Since then she has been having series episodes of nausea vomiting bag cramps and low output into her back.  They touch base with her colorectal surgeon in the advised her to follow-up with either PCP or GI.  She does not have a GI specialist at this time.  She reports a lot of distention today.  She has not eaten yet today.  No frequency with urination.  Has had UTIs in the past.  Her daughter and the caregiver or noticing now very low output into the bag getting worried.  Did antibiotics after the hospitalization with cipro only did 3 days of antibiotics and then done. No ng tube during that time.     I she has had recurrent diverticulitis issues as well as abscess formation the past.  There is a high risk for adhesions as well.    The D reports he has been eating some sugar free candies ensuring on gum as  well.    She is wanting to do lab work today as well.  Willing to do urine test as well.    Needing a refill of her Armor Thyroid as well.  Needing to get set up with a GI specialist as well.  They live here in [place].         Lab Results   Component Value Date    WBC 10.83 05/12/2020    HGB 13.4 05/12/2020    HCT 41.3 05/12/2020     05/12/2020    CHOL 165 04/01/2019    TRIG 62 04/01/2019    HDL 61 04/01/2019    ALT 17 05/12/2020    AST 17 05/12/2020     05/12/2020    K 4.4 05/12/2020     05/12/2020    CREATININE 0.8 05/12/2020    BUN 7 (L) 05/12/2020    CO2 27 05/12/2020    TSH 1.089 10/01/2019    INR 1.0 06/10/2019    HGBA1C 5.0 04/01/2019       CT Abdomen Pelvis With Contrast  Narrative: EXAMINATION:  CT ABDOMEN PELVIS WITH CONTRAST    CLINICAL HISTORY:  Abdominal distension;Diverticulitis, known, follow up;rule out partial obstruction, 5 days, n/v known colostomy; Abdominal distension (gaseous)    TECHNIQUE:  Low dose axial images, sagittal and coronal reformations were obtained from the lung bases to the pubic symphysis following the IV administration of 100 mL of Omnipaque 350 and the oral administration of 30 mL of Omnipaque 350.    COMPARISON:  Multiple prior CT exams, most recent 04/12/2020    FINDINGS:  Visualized lower chest is unremarkable.    Within the abdomen, the liver and spleen are unremarkable. Pancreas is normal. Adrenal glands are unremarkable. Gallbladder normal. No biliary dilatation.    Kidneys are unchanged.  Large right renal pelvic calcification again noted with bilateral renal parenchymal scarring..    Stomach is unremarkable.  Small bowel is nonobstructive.  Oral contrast extends throughout the small bowel and into the proximal colon.  Left lower quadrant colostomy noted with peristomal herniated bowel loops.  There is persistent fat stranding surrounding the colon within the left lower quadrant.  Focal caliber change is similar to prior study suggesting at least  partial obstruction or stenosis from likely postsurgical adhesion.  Similar soft tissue attenuation, presumed scarring noted interposed between small bowel loops in the uterine fundus which is associated with suspected fistula on the 06/26/2019 study.  No drainable abscess appreciated.    Urinary bladder demonstrates small foci of air anteriorly, correlate for recent catheterization.  Intrapelvic structures otherwise unchanged.    No acute osseous abnormality.  Impression: 1. No evidence of small-bowel obstruction.  2. There is prominence of the proximal/mid colon with focal caliber change noted within the left lower quadrant, similar to prior study.  Persistent pericolonic fat stranding and wall thickening suggesting nonspecific colitis most prevalent at the area of caliber change.  Partial obstruction from adhesion possible.  Correlate with colostomy output.  No evidence of drainable abscess.  3. Other findings within the abdomen/pelvis similar to prior studies.    Electronically signed by: Beto Light MD  Date:    05/12/2020  Time:    12:37        Review of Systems   Constitutional: Positive for activity change, appetite change, chills and fatigue. Negative for fever and unexpected weight change.   HENT: Positive for trouble swallowing. Negative for voice change.    Respiratory: Negative for cough and shortness of breath.    Cardiovascular: Negative for chest pain and palpitations.   Gastrointestinal: Positive for abdominal distention, abdominal pain, diarrhea, nausea and vomiting. Negative for anal bleeding, blood in stool, constipation and rectal pain.   Genitourinary: Negative for difficulty urinating and dysuria.   Musculoskeletal: Positive for gait problem.   Neurological: Positive for weakness. Negative for light-headedness and headaches.   Psychiatric/Behavioral: Positive for sleep disturbance.     Objective:     Vitals:    05/12/20 0905   BP: 120/70   Pulse: 72   Temp: 97.9 °F (36.6 °C)   Weight:  "76.4 kg (168 lb 6.9 oz)   Height: 5' 2.5" (1.588 m)     Physical Exam   Constitutional: She appears well-developed. She appears distressed.   HENT:   Head: Normocephalic and atraumatic.   Right Ear: External ear normal.   Left Ear: External ear normal.   Eyes: Conjunctivae are normal.   Cardiovascular: Normal rate, regular rhythm and normal heart sounds.   Pulmonary/Chest: Effort normal and breath sounds normal. She has no wheezes.   Abdominal: She exhibits distension. There is tenderness in the left upper quadrant. There is guarding. There is no rigidity, no rebound and no CVA tenderness. A hernia is present.       Neurological: She is alert.   Skin: Skin is warm. No erythema.   Vitals reviewed.    Assessment:     1. Abdominal distension    2. Colitis    3. Colostomy in place    4. Essential hypertension    5. Pure hypercholesterolemia    6. Hypothyroidism, unspecified type    7. Abnormal glucose    8. Hyperlipidemia, unspecified hyperlipidemia type    9. Recurrent UTI    10. Belching      Plan:   Irlanda was seen today for abdominal pain.    Diagnoses and all orders for this visit:    Abdominal distension  Comments:  New worse over the last 4-5 days with concern for colitis known colostomy with low output will schedule for stat CT scan stat labs start Cipro liquid estab w/GI  Orders:  -     CT Abdomen Pelvis With Contrast; Future  -     TSH; Future  -     T4, free; Future  -     Lipid Panel; Future  -     Hemoglobin A1C; Future  -     Comprehensive metabolic panel; Future  -     CBC auto differential; Future  -     Urine culture; Future  -     POCT URINE DIPSTICK WITHOUT MICROSCOPE  -     Ambulatory referral/consult to Gastroenterology; Future  -     Urine culture    Colitis  Comments:  Recurrent last episode on April 12 will go ahead and initiate Cipro liquid obtain CT scan today lab work  Orders:  -     TSH; Future  -     T4, free; Future  -     Lipid Panel; Future  -     Hemoglobin A1C; Future  -     " Comprehensive metabolic panel; Future  -     CBC auto differential; Future  -     Urine culture; Future  -     POCT URINE DIPSTICK WITHOUT MICROSCOPE  -     Ambulatory referral/consult to Gastroenterology; Future  -     Urine culture    Colostomy in place  Comments:  With low output concern for partial obstruction versus colitis obtain CT scan set up with GI specialist due to recurrent issues  Orders:  -     TSH; Future  -     T4, free; Future  -     Lipid Panel; Future  -     Hemoglobin A1C; Future  -     Comprehensive metabolic panel; Future  -     CBC auto differential; Future  -     Urine culture; Future  -     POCT URINE DIPSTICK WITHOUT MICROSCOPE  -     Ambulatory referral/consult to Gastroenterology; Future  -     Urine culture    Essential hypertension  Comments:  Lab work today  Orders:  -     TSH; Future  -     T4, free; Future  -     Lipid Panel; Future  -     Hemoglobin A1C; Future  -     Comprehensive metabolic panel; Future  -     CBC auto differential; Future  -     Urine culture; Future  -     POCT URINE DIPSTICK WITHOUT MICROSCOPE  -     Urine culture    Pure hypercholesterolemia  Comments:  labs Work schedule for today since fasting  Orders:  -     TSH; Future  -     T4, free; Future  -     Lipid Panel; Future  -     Hemoglobin A1C; Future  -     Comprehensive metabolic panel; Future  -     CBC auto differential; Future  -     Urine culture; Future  -     POCT URINE DIPSTICK WITHOUT MICROSCOPE  -     Urine culture    Hypothyroidism, unspecified type  Comments:  labs Work schedule for today since fasting  Orders:  -     TSH; Future  -     T4, free; Future  -     Lipid Panel; Future  -     Hemoglobin A1C; Future  -     Comprehensive metabolic panel; Future  -     CBC auto differential; Future  -     Urine culture; Future  -     POCT URINE DIPSTICK WITHOUT MICROSCOPE  -     Urine culture    Abnormal glucose  Comments:  labs Work schedule for today since fasting  Orders:  -     Hemoglobin A1C;  Future    Hyperlipidemia, unspecified hyperlipidemia type  -     Lipid Panel; Future    Recurrent UTI  Comments:  labs Work schedule for today since fasting, start cipro  Orders:  -     Urine culture; Future  -     Urine culture    Belching  Comments:  recurrent. eval with ct scan. avoid gum and sugar free candies.   Orders:  -     TSH; Future  -     T4, free; Future  -     Lipid Panel; Future  -     Hemoglobin A1C; Future  -     Comprehensive metabolic panel; Future  -     CBC auto differential; Future  -     ciprofloxacin 250 mg/5 ml (CIPRO); Take 10 mLs (500 mg total) by mouth 2 (two) times daily. for 20 doses  -     Urine culture; Future  -     POCT URINE DIPSTICK WITHOUT MICROSCOPE  -     Ambulatory referral/consult to Gastroenterology; Future  -     Urine culture    Other orders  -     thyroid, pork, (ARMOUR THYROID) 30 mg Tab; Take 1 tablet (30 mg total) by mouth once daily.        Addendum.  CT scan of the abdomen pelvis with contrast obtained and reviewed.  Evidence of possible colitis versus potential for partial obstruction due to an adhesion.  CBC shows white count 10309 but slightly elevated absolute granulocyte elevated kidney function liver function normal.  Daughter called stating that after CT scan she vomited up a significant amount of the contrast is visually uncomfortable in a lot of pain and discomfort.  Still having now minimal output also [facility] was unable to get the liquid version of the Cipro for the patient thus I am recommending she go ahead and proceed for to the emergency room to have further evaluation for pain control as well as administration of IV antibiotics for the potential colitis such as Cipro and potential possibly for nasogastric tube versus colorectal evaluation.  Attempt was made to get in touch with her colorectal surgeon however unable to do so at this time so will be sending patient on to the emergency room.    Follow up if symptoms worsen or fail to  improve.    There are no Patient Instructions on file for this visit.

## 2020-05-12 NOTE — ED NOTES
"RN to bedside to evaluate pt and obtain lab specimens.  Pt states she does not consent to testing, pt states she wants her daughter at bedside.  Pt states, "I have dementia, I need my daughter.  She's out there, she's very upset.  Please call her."  Charge nurse Jared and Dr. Bullard notified pt refuses evaluation/labs at this time.    "

## 2020-05-13 PROBLEM — E44.0 MODERATE MALNUTRITION: Status: ACTIVE | Noted: 2020-05-13

## 2020-05-13 LAB
ANION GAP SERPL CALC-SCNC: 12 MMOL/L (ref 8–16)
BASOPHILS # BLD AUTO: 0.03 K/UL (ref 0–0.2)
BASOPHILS NFR BLD: 0.4 % (ref 0–1.9)
BUN SERPL-MCNC: 9 MG/DL (ref 8–23)
C DIFF GDH STL QL: POSITIVE
C DIFF TOX A+B STL QL IA: NEGATIVE
CALCIUM SERPL-MCNC: 8.9 MG/DL (ref 8.7–10.5)
CHLORIDE SERPL-SCNC: 103 MMOL/L (ref 95–110)
CO2 SERPL-SCNC: 24 MMOL/L (ref 23–29)
CREAT SERPL-MCNC: 0.8 MG/DL (ref 0.5–1.4)
DIFFERENTIAL METHOD: ABNORMAL
EOSINOPHIL # BLD AUTO: 0.2 K/UL (ref 0–0.5)
EOSINOPHIL NFR BLD: 2.2 % (ref 0–8)
ERYTHROCYTE [DISTWIDTH] IN BLOOD BY AUTOMATED COUNT: 13.6 % (ref 11.5–14.5)
EST. GFR  (AFRICAN AMERICAN): >60 ML/MIN/1.73 M^2
EST. GFR  (NON AFRICAN AMERICAN): >60 ML/MIN/1.73 M^2
GLUCOSE SERPL-MCNC: 91 MG/DL (ref 70–110)
HCT VFR BLD AUTO: 37.6 % (ref 37–48.5)
HGB BLD-MCNC: 11.7 G/DL (ref 12–16)
IMM GRANULOCYTES # BLD AUTO: 0.05 K/UL (ref 0–0.04)
IMM GRANULOCYTES NFR BLD AUTO: 0.6 % (ref 0–0.5)
LYMPHOCYTES # BLD AUTO: 2.2 K/UL (ref 1–4.8)
LYMPHOCYTES NFR BLD: 28.4 % (ref 18–48)
MAGNESIUM SERPL-MCNC: 1.7 MG/DL (ref 1.6–2.6)
MCH RBC QN AUTO: 26.7 PG (ref 27–31)
MCHC RBC AUTO-ENTMCNC: 31.1 G/DL (ref 32–36)
MCV RBC AUTO: 86 FL (ref 82–98)
MONOCYTES # BLD AUTO: 0.9 K/UL (ref 0.3–1)
MONOCYTES NFR BLD: 11.6 % (ref 4–15)
NEUTROPHILS # BLD AUTO: 4.4 K/UL (ref 1.8–7.7)
NEUTROPHILS NFR BLD: 56.8 % (ref 38–73)
NRBC BLD-RTO: 0 /100 WBC
PHOSPHATE SERPL-MCNC: 4 MG/DL (ref 2.7–4.5)
PLATELET # BLD AUTO: 254 K/UL (ref 150–350)
PMV BLD AUTO: 10.3 FL (ref 9.2–12.9)
POTASSIUM SERPL-SCNC: 3.6 MMOL/L (ref 3.5–5.1)
RBC # BLD AUTO: 4.38 M/UL (ref 4–5.4)
SODIUM SERPL-SCNC: 139 MMOL/L (ref 136–145)
WBC # BLD AUTO: 7.7 K/UL (ref 3.9–12.7)

## 2020-05-13 PROCEDURE — 83735 ASSAY OF MAGNESIUM: CPT

## 2020-05-13 PROCEDURE — 84100 ASSAY OF PHOSPHORUS: CPT

## 2020-05-13 PROCEDURE — 25000003 PHARM REV CODE 250: Performed by: PHYSICIAN ASSISTANT

## 2020-05-13 PROCEDURE — 85025 COMPLETE CBC W/AUTO DIFF WBC: CPT

## 2020-05-13 PROCEDURE — 99213 OFFICE O/P EST LOW 20 MIN: CPT | Mod: ,,, | Performed by: COLON & RECTAL SURGERY

## 2020-05-13 PROCEDURE — 63600175 PHARM REV CODE 636 W HCPCS: Performed by: PHYSICIAN ASSISTANT

## 2020-05-13 PROCEDURE — 80048 BASIC METABOLIC PNL TOTAL CA: CPT

## 2020-05-13 PROCEDURE — S0030 INJECTION, METRONIDAZOLE: HCPCS | Performed by: PHYSICIAN ASSISTANT

## 2020-05-13 PROCEDURE — 96376 TX/PRO/DX INJ SAME DRUG ADON: CPT

## 2020-05-13 PROCEDURE — 99213 PR OFFICE/OUTPT VISIT, EST, LEVL III, 20-29 MIN: ICD-10-PCS | Mod: ,,, | Performed by: COLON & RECTAL SURGERY

## 2020-05-13 PROCEDURE — G0378 HOSPITAL OBSERVATION PER HR: HCPCS

## 2020-05-13 PROCEDURE — 36415 COLL VENOUS BLD VENIPUNCTURE: CPT

## 2020-05-13 RX ADMIN — METRONIDAZOLE 500 MG: 500 INJECTION, SOLUTION INTRAVENOUS at 08:05

## 2020-05-13 RX ADMIN — Medication 6 MG: at 08:05

## 2020-05-13 RX ADMIN — METRONIDAZOLE 500 MG: 500 INJECTION, SOLUTION INTRAVENOUS at 05:05

## 2020-05-13 RX ADMIN — MEMANTINE 10 MG: 10 TABLET ORAL at 08:05

## 2020-05-13 RX ADMIN — CIPROFLOXACIN 400 MG: 2 INJECTION, SOLUTION INTRAVENOUS at 04:05

## 2020-05-13 RX ADMIN — ATORVASTATIN CALCIUM 10 MG: 10 TABLET, FILM COATED ORAL at 08:05

## 2020-05-13 RX ADMIN — LEVOTHYROXINE, LIOTHYRONINE 30 MG: 19; 4.5 TABLET ORAL at 08:05

## 2020-05-13 RX ADMIN — METRONIDAZOLE 500 MG: 500 INJECTION, SOLUTION INTRAVENOUS at 12:05

## 2020-05-13 NOTE — ASSESSMENT & PLAN NOTE
-Empiric Cipro and Flagyl.   -Check C. difficile.  -Supportive care.   -General Surgery consult due to recurrent nature of symptoms and abnormal CT findings.    Acute non specific Colitis- getting better, no bowel obstruction  Will continue present care  Advance diet as tolerated  D/c soon

## 2020-05-13 NOTE — ASSESSMENT & PLAN NOTE
-Continue Namenda.   -Hold Aricept due to side effect of increased QT.     Stable, has 24 hours care giver at home

## 2020-05-13 NOTE — SUBJECTIVE & OBJECTIVE
No current facility-administered medications on file prior to encounter.      Current Outpatient Medications on File Prior to Encounter   Medication Sig    atorvastatin (LIPITOR) 10 MG tablet Take 1 tablet (10 mg total) by mouth once daily.    ciprofloxacin 250 mg/5 ml (CIPRO) Take 10 mLs (500 mg total) by mouth 2 (two) times daily. for 20 doses    donepezil (ARICEPT) 10 MG tablet Take 1 tablet (10 mg total) by mouth every evening.    furosemide (LASIX) 20 MG tablet Take 1 tablet (20 mg total) by mouth once daily.    memantine (NAMENDA) 10 MG Tab TAKE 1 TABLET TWICE A DAY    thyroid, pork, (ARMOUR THYROID) 30 mg Tab Take 1 tablet (30 mg total) by mouth once daily. (Patient taking differently: Take 30 mg by mouth nightly. )    triamcinolone acetonide 0.1% (KENALOG) 0.1 % ointment APPLY TOPICALLY 4 (FOUR) TIMES DAILY. TO RASH ON FACE AND CHEST AND NOSE (Patient taking differently: Apply topically 4 (four) times daily as needed. APPLY TOPICALLY 4 (FOUR) TIMES DAILY. TO RASH ON FACE AND CHEST AND NOSE)       Review of patient's allergies indicates:  No Known Allergies    Past Medical History:   Diagnosis Date    Diverticulitis     High cholesterol     Hypertension     Hypothyroidism      Past Surgical History:   Procedure Laterality Date    APPENDECTOMY  2008    CATARACT EXTRACTION      Dr Raygoza    COLON SURGERY      COLONOSCOPY N/A 1/2/2019    Procedure: COLONOSCOPY;  Surgeon: Reece Hogan MD;  Location: [facility] ENDO;  Service: Endoscopy;  Laterality: N/A;    COLONOSCOPY N/A 6/7/2019    Procedure: COLONOSCOPY;  Surgeon: Rishabh Connelly MD;  Location: [facility] ENDO;  Service: General;  Laterality: N/A;    COLOSTOMY Left 1/2/2019    Procedure: CREATION, COLOSTOMY;  Surgeon: Reece Hogan MD;  Location: [facility] OR;  Service: General;  Laterality: Left;    SHAHIDA PROCEDURE N/A 1/2/2019    Procedure: SHAHIDA PROCEDURE;  Surgeon: Reece Hogan MD;  Location: [facility] OR;  Service: General;  Laterality: N/A;    LYSIS OF  ADHESIONS N/A 1/2/2019    Procedure: LYSIS, ADHESIONS;  Surgeon: Reece Hogan MD;  Location: [facility];  Service: General;  Laterality: N/A;    VITRECTOMY Right 09/2013     Family History     Problem Relation (Age of Onset)    Alzheimer's disease Mother    Aneurysm Father    Stomach cancer Brother        Tobacco Use    Smoking status: Former Smoker    Smokeless tobacco: Never Used   Substance and Sexual Activity    Alcohol use: No    Drug use: No    Sexual activity: Never     Comment:      Review of Systems   Constitutional: Negative for activity change, appetite change, chills, fatigue, fever and unexpected weight change.   HENT: Negative for congestion, ear pain, sore throat and trouble swallowing.    Eyes: Negative for pain, redness and itching.   Respiratory: Negative for cough, choking, shortness of breath and wheezing.    Cardiovascular: Negative for chest pain, palpitations and leg swelling.   Gastrointestinal: Positive for abdominal distention, abdominal pain, constipation, nausea and vomiting. Negative for anal bleeding, blood in stool, diarrhea and rectal pain.   Endocrine: Negative for cold intolerance, heat intolerance and polyuria.   Genitourinary: Negative for dysuria, flank pain, frequency and hematuria.   Musculoskeletal: Negative for gait problem, joint swelling and neck pain.   Skin: Negative for color change, rash and wound.   Allergic/Immunologic: Negative for environmental allergies and immunocompromised state.   Neurological: Negative for dizziness, speech difficulty, weakness and numbness.   Psychiatric/Behavioral: Negative for agitation, confusion and hallucinations.     Objective:     Vital Signs (Most Recent):  Temp: 96.5 °F (35.8 °C) (05/13/20 1538)  Pulse: (!) 48 (05/13/20 1538)  Resp: 16 (05/13/20 1538)  BP: 108/60 (05/13/20 1538)  SpO2: 97 % (05/13/20 1538) Vital Signs (24h Range):  Temp:  [96.5 °F (35.8 °C)-98.9 °F (37.2 °C)] 96.5 °F (35.8 °C)  Pulse:  [45-60] 48  Resp:   [16-22] 16  SpO2:  [92 %-99 %] 97 %  BP: (100-162)/(53-79) 108/60     Weight: 76.3 kg (168 lb 3.4 oz)  Body mass index is 30.28 kg/m².    Physical Exam   Constitutional: She is oriented to person, place, and time. She appears well-developed.   HENT:   Head: Normocephalic and atraumatic.   Eyes: Conjunctivae and EOM are normal.   Neck: Normal range of motion. No thyromegaly present.   Cardiovascular: Normal rate and regular rhythm.   Pulmonary/Chest: Effort normal. No respiratory distress.   Abdominal: Soft. She exhibits no distension and no mass. There is no tenderness.   Ostomy pink and viable with soft stool in bag; well-healed midline incision; +soft, reducible parastomal hernia   Musculoskeletal: Normal range of motion. She exhibits no edema or tenderness.   Neurological: She is alert and oriented to person, place, and time.   Skin: Skin is warm and dry. Capillary refill takes less than 2 seconds. No rash noted.   Psychiatric: She has a normal mood and affect.       Significant Labs:  CBC:   Recent Labs   Lab 05/13/20  0439   WBC 7.70   RBC 4.38   HGB 11.7*   HCT 37.6      MCV 86   MCH 26.7*   MCHC 31.1*     BMP:   Recent Labs   Lab 05/13/20  0439   GLU 91      K 3.6      CO2 24   BUN 9   CREATININE 0.8   CALCIUM 8.9   MG 1.7     CMP:   Recent Labs   Lab 05/12/20  1015 05/13/20  0439    91   CALCIUM 9.4 8.9   ALBUMIN 3.5  --    PROT 7.4  --     139   K 4.4 3.6   CO2 27 24    103   BUN 7* 9   CREATININE 0.8 0.8   ALKPHOS 77  --    ALT 17  --    AST 17  --    BILITOT 0.7  --      LFTs:   Recent Labs   Lab 05/12/20  1015   ALT 17   AST 17   ALKPHOS 77   BILITOT 0.7   PROT 7.4   ALBUMIN 3.5       Significant Diagnostics:  I have reviewed all pertinent imaging results/findings within the past 24 hours.     CT:  FINDINGS:  Visualized lower chest is unremarkable.    Within the abdomen, the liver and spleen are unremarkable. Pancreas is normal. Adrenal glands are unremarkable.  Gallbladder normal. No biliary dilatation.    Kidneys are unchanged.  Large right renal pelvic calcification again noted with bilateral renal parenchymal scarring..    Stomach is unremarkable.  Small bowel is nonobstructive.  Oral contrast extends throughout the small bowel and into the proximal colon.  Left lower quadrant colostomy noted with peristomal herniated bowel loops.  There is persistent fat stranding surrounding the colon within the left lower quadrant.  Focal caliber change is similar to prior study suggesting at least partial obstruction or stenosis from likely postsurgical adhesion.  Similar soft tissue attenuation, presumed scarring noted interposed between small bowel loops in the uterine fundus which is associated with suspected fistula on the 06/26/2019 study.  No drainable abscess appreciated.    Urinary bladder demonstrates small foci of air anteriorly, correlate for recent catheterization.  Intrapelvic structures otherwise unchanged.    No acute osseous abnormality.      Impression       1. No evidence of small-bowel obstruction.  2. There is prominence of the proximal/mid colon with focal caliber change noted within the left lower quadrant, similar to prior study.  Persistent pericolonic fat stranding and wall thickening suggesting nonspecific colitis most prevalent at the area of caliber change.  Partial obstruction from adhesion possible.  Correlate with colostomy output.  No evidence of drainable abscess.  3. Other findings within the abdomen/pelvis similar to prior studies.

## 2020-05-13 NOTE — SUBJECTIVE & OBJECTIVE
Past Medical History:   Diagnosis Date    Diverticulitis     High cholesterol     Hypertension     Hypothyroidism        Past Surgical History:   Procedure Laterality Date    APPENDECTOMY  2008    CATARACT EXTRACTION      Dr Raygoza    COLON SURGERY      COLONOSCOPY N/A 1/2/2019    Procedure: COLONOSCOPY;  Surgeon: Reece Hogan MD;  Location: [facility] ENDO;  Service: Endoscopy;  Laterality: N/A;    COLONOSCOPY N/A 6/7/2019    Procedure: COLONOSCOPY;  Surgeon: Rishabh Connelly MD;  Location: [facility] ENDO;  Service: General;  Laterality: N/A;    COLOSTOMY Left 1/2/2019    Procedure: CREATION, COLOSTOMY;  Surgeon: Reece Hogan MD;  Location: [facility] OR;  Service: General;  Laterality: Left;    SHAHIDA PROCEDURE N/A 1/2/2019    Procedure: SHAHIDA PROCEDURE;  Surgeon: Reece Hogan MD;  Location: [facility] OR;  Service: General;  Laterality: N/A;    LYSIS OF ADHESIONS N/A 1/2/2019    Procedure: LYSIS, ADHESIONS;  Surgeon: Reece Hogan MD;  Location: [facility] OR;  Service: General;  Laterality: N/A;    VITRECTOMY Right 09/2013       Review of patient's allergies indicates:  No Known Allergies    Current Facility-Administered Medications on File Prior to Encounter   Medication    [COMPLETED] iohexoL (OMNIPAQUE 350) injection 100 mL    [COMPLETED] iohexoL (OMNIPAQUE 350) injection 30 mL     Current Outpatient Medications on File Prior to Encounter   Medication Sig    atorvastatin (LIPITOR) 10 MG tablet Take 1 tablet (10 mg total) by mouth once daily.    ciprofloxacin 250 mg/5 ml (CIPRO) Take 10 mLs (500 mg total) by mouth 2 (two) times daily. for 20 doses    donepezil (ARICEPT) 10 MG tablet Take 1 tablet (10 mg total) by mouth every evening.    furosemide (LASIX) 20 MG tablet Take 1 tablet (20 mg total) by mouth once daily.    memantine (NAMENDA) 10 MG Tab TAKE 1 TABLET TWICE A DAY    thyroid, pork, (ARMOUR THYROID) 30 mg Tab Take 1 tablet (30 mg total) by mouth once daily. (Patient taking differently: Take 30 mg by mouth  nightly. )    triamcinolone acetonide 0.1% (KENALOG) 0.1 % ointment APPLY TOPICALLY 4 (FOUR) TIMES DAILY. TO RASH ON FACE AND CHEST AND NOSE (Patient taking differently: Apply topically 4 (four) times daily as needed. APPLY TOPICALLY 4 (FOUR) TIMES DAILY. TO RASH ON FACE AND CHEST AND NOSE)    [DISCONTINUED] ciprofloxacin HCl (CIPRO) 500 MG tablet Take 1 tablet (500 mg total) by mouth every 12 (twelve) hours.    [DISCONTINUED] thyroid, pork, (ARMOUR THYROID) 30 mg Tab Take 1 tablet (30 mg total) by mouth once daily.     Family History     Problem Relation (Age of Onset)    Alzheimer's disease Mother    Aneurysm Father    Stomach cancer Brother        Tobacco Use    Smoking status: Former Smoker    Smokeless tobacco: Never Used   Substance and Sexual Activity    Alcohol use: No    Drug use: No    Sexual activity: Never     Comment:      Review of Systems   Constitutional: Negative for appetite change, chills, diaphoresis, fatigue and fever.   HENT: Negative for congestion, ear pain, mouth sores, sore throat and trouble swallowing.    Eyes: Negative for pain and visual disturbance.   Respiratory: Negative for cough, chest tightness and shortness of breath.    Cardiovascular: Negative for chest pain, palpitations and leg swelling.   Gastrointestinal: Positive for abdominal pain, nausea and vomiting. Negative for constipation.        Positive for belching, hiccups and decreased ostomy output.   Endocrine: Negative for cold intolerance, heat intolerance, polydipsia and polyuria.   Genitourinary: Negative for dysuria, frequency and hematuria.   Musculoskeletal: Negative for arthralgias, back pain, myalgias and neck pain.   Skin: Negative for pallor, rash and wound.   Allergic/Immunologic: Negative for environmental allergies and immunocompromised state.   Neurological: Negative for dizziness, seizures, syncope, weakness, numbness and headaches.   Hematological: Negative for adenopathy. Does not bruise/bleed  easily.   Psychiatric/Behavioral: Negative for agitation, confusion and sleep disturbance.     Objective:     Vital Signs (Most Recent):  Temp: 98.9 °F (37.2 °C) (05/12/20 2150)  Pulse: (!) 53 (05/12/20 2150)  Resp: 18 (05/12/20 2150)  BP: 135/67 (05/12/20 2150)  SpO2: 95 % (05/12/20 2150) Vital Signs (24h Range):  Temp:  [97.9 °F (36.6 °C)-98.9 °F (37.2 °C)] 98.9 °F (37.2 °C)  Pulse:  [50-72] 53  Resp:  [16-22] 18  SpO2:  [95 %-99 %] 95 %  BP: (120-162)/(67-79) 135/67     Weight: 76.4 kg (168 lb 6.9 oz)  Body mass index is 30.32 kg/m².    Physical Exam   Constitutional: She is oriented to person, place, and time. She appears well-developed and well-nourished. No distress.   HENT:   Head: Normocephalic and atraumatic.   Eyes: Conjunctivae are normal.   PERRL   Neck: Neck supple. No JVD present.   Cardiovascular: Normal rate, regular rhythm and normal heart sounds.   Pulmonary/Chest: Effort normal and breath sounds normal.   Abdominal: Soft. Bowel sounds are normal. She exhibits no distension. There is tenderness (right lower quadrant).   Ostomy in tact with small amount of green stool.    Musculoskeletal: Normal range of motion.   Neurological: She is alert and oriented to person, place, and time.   Skin: Skin is warm and dry.   Psychiatric: She has a normal mood and affect. Her behavior is normal. Thought content normal.   Nursing note and vitals reviewed.          Significant Labs:   CBC:   Recent Labs   Lab 05/12/20  1015   WBC 10.83   HGB 13.4   HCT 41.3        CMP:   Recent Labs   Lab 05/12/20  1015      K 4.4      CO2 27      BUN 7*   CREATININE 0.8   CALCIUM 9.4   PROT 7.4   ALBUMIN 3.5   BILITOT 0.7   ALKPHOS 77   AST 17   ALT 17   ANIONGAP 11   EGFRNONAA >60     All pertinent labs within the past 24 hours have been reviewed.    Significant Imaging: I have reviewed all pertinent imaging results/findings within the past 24 hours.

## 2020-05-13 NOTE — ASSESSMENT & PLAN NOTE
-Appears chronic, but may be exacerbated by medications.   -Will monitor and avoid mediations with a side effect of bradycardia.

## 2020-05-13 NOTE — ASSESSMENT & PLAN NOTE
-Empiric Cipro and Flagyl.   -Check C. difficile.  -Supportive care.   -General Surgery consult due to recurrent nature of symptoms and abnormal CT findings.

## 2020-05-13 NOTE — PLAN OF CARE
POC reviewed with pt. Pt verbalizes understanding of POC. No questions at this time.  AAOx3 with forgetfulness. NADN.  Bradycardic on cardiac monitor.  Pt c/o abdominal cramping, belching, and hiccups at admit but no complaints at this time.  Pt forgetful this morning as to why she is here.  Pt receiving IV cipro and flagyl.  Daughter at bedside. States pt has short term memory loss and suggests she will do better with a caregiver at bedside.  Awaiting C. Diff result of stool sample collected last night.  Pt has colostomy.  Pt remains free of falls tonight. Bed alarm on.  Safety measures in place. Will continue to monitor.  Informed pt to call for assistance before getting up. Pt verbalizes understanding.

## 2020-05-13 NOTE — PLAN OF CARE
FARZANEH spoke with patient's caregiver to assess for discharge planning and obtained the following information:  Patient lives at home and a live in sitter provides care for her 5 days a week.  The patient was not utilizing any medical/respiratory assistive equipment before coming to the hospital.  Patient's help at home and management of her healthcare is provided by the in-home sitter along with her two daughters.  Patient's caretaker is requesting that a PT evaluation be completed to determine if the patient will qualify for HH PT.  It should be noted that the patient's caretaker could not remember the name of the HH provider.  Patient's caretaker also requested a menu for bland diets to help her identify meals for the patient.  No additional discharge needs were  Identified.  FARZANEH discharge information on advanced directives, information on pharmacy bedside delivery, and discharge planning begins on admission with contact information for any needs/questions.     D/C Plan:  Home  PCP:  Dr. Myla Mora  Preferred Pharmacy:    [facility]/pharmacy #[id number] [place], [place] - [address] [address]  Phone: [phone] Fax: [phone]    [facility] - [address]  [address] [place] [address]  [address]  [facility] [address]  Phone: [phone] Fax: [phone]    Discharge transportation: Family/Sitter  [facility]:  Pharmacy Bedside Delivery: yes       05/13/20 1403   Discharge Assessment   Assessment Type Discharge Planning Assessment   Confirmed/corrected address and phone number on facesheet? Yes   Assessment information obtained from? Caregiver   Expected Length of Stay (days)   (TBD)   Communicated expected length of stay with patient/caregiver yes   Prior to hospitilization cognitive status: Alert/Oriented   Prior to hospitalization functional status: Independent;Needs Assistance   Current cognitive status: Alert/Oriented   Current Functional  Status: Independent;Needs Assistance   Facility Arrived From: Home   Lives With other (see comments)  (Private live in sitter)   Able to Return to Prior Arrangements yes   Is patient able to care for self after discharge? No   Who are your caregiver(s) and their phone number(s)? Myla Mora (daughter) [phone] and Keri Jean (daughter) [phone]   Patient's perception of discharge disposition home or selfcare   Readmission Within the Last 30 Days no previous admission in last 30 days  (AT the time of this assessment the patient had not been admitted in the last 30 days.  )   Patient currently being followed by outpatient case management? No   Patient currently receives any other outside agency services? Yes   How many hours a day does the patient receive services? 40   Name and contact number of agency or person providing outside services Private Live in Sitter    Equipment Currently Used at Home none   Do you have any problems affording any of your prescribed medications? No   Is the patient taking medications as prescribed? yes   Does the patient have transportation home? Yes   Transportation Anticipated family or friend will provide   Dialysis Name and Scheduled days N/A   Discharge Plan A Home;Other  (Private Sitter)   Discharge Plan B Other;Home   DME Needed Upon Discharge  none   Patient/Family in Agreement with Plan yes

## 2020-05-13 NOTE — HOSPITAL COURSE
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 Pt started on IV Rocephin and Flagyl. She responded very well and had a large BM early this am and again a few hrs later with gas in the colostomy bag. She had not had a BM x 4 days before. She is tolerating CLD well and walking around well- so diet advanced this evening. Dr. Connelly also following the pt.   5/14- looks and feels lot better, wants to go home. Daughter stayed with her overnight, having regular BM into her colostomy bag. Eating drinking well, walking around well. Stool tested positive overnite for C diff and her Urine Cx also growing GNR. She had ESBL positive E coli last month treated with Abx. Which likely gave her the C diff. Started on oral Vanc and Cipro and Flagyl both d/david. ID and GI consulted and probiotics started.   5/15- Appreciate Dr. Lopez, pt doing well, remains afebrile, eating drinking well. No abd pain, NVD, no fever or chills, no Leukocytosis. Urine Cx growing ESBL Positive E coli again, ID has started her on Invanz once daily and continued oral Vanc. Will need home infusion services to complete the course of Invanz but will d/w Dr. Lopez about it.     5/16:  Reviewed note from Infectious Disease and recommendation was to complete course vancomycin for 10 days.  She has 8 days remaining.  Also confirmed that fosfomycin is effective against the pathogens in her urine culture.  So will discharge with 1 time dose of fosfomycin.

## 2020-05-13 NOTE — CONSULTS
[facility] -   General Surgery  Consult Note    Patient Name: Irlanda Lopez  MRN: [id number]  Code Status: Full Code  Admission Date: 5/12/2020  Hospital Length of Stay: 0 days  Attending Physician: Vanessa Shipley MD  Primary Care Provider: Myla Arias MD    Patient information was obtained from patient, relative(s), past medical records and ER records.     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  Of note, the patient was admitted last month with similar symptoms.  Over the past few days, the patient has abdominal pain and cramping with associated abdominal distention and decreased colostomy output.  She saw her PCP who ordered a CT scan which was concern for possible colitis.  She was admitted to the hospital to the medical service after presenting to the ER following the CT scan.  Since admission, she has started having colostomy output again is now tolerating clear liquids well.  Surgery is consulted for evaluation.    No current facility-administered medications on file prior to encounter.      Current Outpatient Medications on File Prior to Encounter   Medication Sig    atorvastatin (LIPITOR) 10 MG tablet Take 1 tablet (10 mg total) by mouth once daily.    ciprofloxacin 250 mg/5 ml (CIPRO) Take 10 mLs (500 mg total) by mouth 2 (two) times daily. for 20 doses    donepezil (ARICEPT) 10 MG tablet Take 1 tablet (10 mg total)  by mouth every evening.    furosemide (LASIX) 20 MG tablet Take 1 tablet (20 mg total) by mouth once daily.    memantine (NAMENDA) 10 MG Tab TAKE 1 TABLET TWICE A DAY    thyroid, pork, (ARMOUR THYROID) 30 mg Tab Take 1 tablet (30 mg total) by mouth once daily. (Patient taking differently: Take 30 mg by mouth nightly. )    triamcinolone acetonide 0.1% (KENALOG) 0.1 % ointment APPLY TOPICALLY 4 (FOUR) TIMES DAILY. TO RASH ON FACE AND CHEST AND NOSE (Patient taking differently: Apply topically 4 (four) times daily as needed. APPLY TOPICALLY 4 (FOUR) TIMES DAILY. TO RASH ON FACE AND CHEST AND NOSE)       Review of patient's allergies indicates:  No Known Allergies    Past Medical History:   Diagnosis Date    Diverticulitis     High cholesterol     Hypertension     Hypothyroidism      Past Surgical History:   Procedure Laterality Date    APPENDECTOMY  2008    CATARACT EXTRACTION      Dr Raygoza    COLON SURGERY      COLONOSCOPY N/A 1/2/2019    Procedure: COLONOSCOPY;  Surgeon: Reece Hogan MD;  Location: [facility] ENDO;  Service: Endoscopy;  Laterality: N/A;    COLONOSCOPY N/A 6/7/2019    Procedure: COLONOSCOPY;  Surgeon: Rishabh Connelly MD;  Location: [facility] ENDO;  Service: General;  Laterality: N/A;    COLOSTOMY Left 1/2/2019    Procedure: CREATION, COLOSTOMY;  Surgeon: Reece Hogan MD;  Location: [facility] OR;  Service: General;  Laterality: Left;    SHAHIDA PROCEDURE N/A 1/2/2019    Procedure: SHAHIDA PROCEDURE;  Surgeon: Reece Hogan MD;  Location: [facility] OR;  Service: General;  Laterality: N/A;    LYSIS OF ADHESIONS N/A 1/2/2019    Procedure: LYSIS, ADHESIONS;  Surgeon: Reece Hogan MD;  Location: [facility] OR;  Service: General;  Laterality: N/A;    VITRECTOMY Right 09/2013     Family History     Problem Relation (Age of Onset)    Alzheimer's disease Mother    Aneurysm Father    Stomach cancer Brother        Tobacco Use    Smoking status: Former Smoker    Smokeless tobacco: Never Used   Substance and Sexual Activity     Alcohol use: No    Drug use: No    Sexual activity: Never     Comment:      Review of Systems   Constitutional: Negative for activity change, appetite change, chills, fatigue, fever and unexpected weight change.   HENT: Negative for congestion, ear pain, sore throat and trouble swallowing.    Eyes: Negative for pain, redness and itching.   Respiratory: Negative for cough, choking, shortness of breath and wheezing.    Cardiovascular: Negative for chest pain, palpitations and leg swelling.   Gastrointestinal: Positive for abdominal distention, abdominal pain, constipation, nausea and vomiting. Negative for anal bleeding, blood in stool, diarrhea and rectal pain.   Endocrine: Negative for cold intolerance, heat intolerance and polyuria.   Genitourinary: Negative for dysuria, flank pain, frequency and hematuria.   Musculoskeletal: Negative for gait problem, joint swelling and neck pain.   Skin: Negative for color change, rash and wound.   Allergic/Immunologic: Negative for environmental allergies and immunocompromised state.   Neurological: Negative for dizziness, speech difficulty, weakness and numbness.   Psychiatric/Behavioral: Negative for agitation, confusion and hallucinations.     Objective:     Vital Signs (Most Recent):  Temp: 96.5 °F (35.8 °C) (05/13/20 1538)  Pulse: (!) 48 (05/13/20 1538)  Resp: 16 (05/13/20 1538)  BP: 108/60 (05/13/20 1538)  SpO2: 97 % (05/13/20 1538) Vital Signs (24h Range):  Temp:  [96.5 °F (35.8 °C)-98.9 °F (37.2 °C)] 96.5 °F (35.8 °C)  Pulse:  [45-60] 48  Resp:  [16-22] 16  SpO2:  [92 %-99 %] 97 %  BP: (100-162)/(53-79) 108/60     Weight: 76.3 kg (168 lb 3.4 oz)  Body mass index is 30.28 kg/m².    Physical Exam   Constitutional: She is oriented to person, place, and time. She appears well-developed.   HENT:   Head: Normocephalic and atraumatic.   Eyes: Conjunctivae and EOM are normal.   Neck: Normal range of motion. No thyromegaly present.   Cardiovascular: Normal rate and  regular rhythm.   Pulmonary/Chest: Effort normal. No respiratory distress.   Abdominal: Soft. She exhibits no distension and no mass. There is no tenderness.   Ostomy pink and viable with soft stool in bag; well-healed midline incision; +soft, reducible parastomal hernia   Musculoskeletal: Normal range of motion. She exhibits no edema or tenderness.   Neurological: She is alert and oriented to person, place, and time.   Skin: Skin is warm and dry. Capillary refill takes less than 2 seconds. No rash noted.   Psychiatric: She has a normal mood and affect.       Significant Labs:  CBC:   Recent Labs   Lab 05/13/20  0439   WBC 7.70   RBC 4.38   HGB 11.7*   HCT 37.6      MCV 86   MCH 26.7*   MCHC 31.1*     BMP:   Recent Labs   Lab 05/13/20  0439   GLU 91      K 3.6      CO2 24   BUN 9   CREATININE 0.8   CALCIUM 8.9   MG 1.7     CMP:   Recent Labs   Lab 05/12/20  1015 05/13/20  0439    91   CALCIUM 9.4 8.9   ALBUMIN 3.5  --    PROT 7.4  --     139   K 4.4 3.6   CO2 27 24    103   BUN 7* 9   CREATININE 0.8 0.8   ALKPHOS 77  --    ALT 17  --    AST 17  --    BILITOT 0.7  --      LFTs:   Recent Labs   Lab 05/12/20  1015   ALT 17   AST 17   ALKPHOS 77   BILITOT 0.7   PROT 7.4   ALBUMIN 3.5       Significant Diagnostics:  I have reviewed all pertinent imaging results/findings within the past 24 hours.     CT:  FINDINGS:  Visualized lower chest is unremarkable.    Within the abdomen, the liver and spleen are unremarkable. Pancreas is normal. Adrenal glands are unremarkable. Gallbladder normal. No biliary dilatation.    Kidneys are unchanged.  Large right renal pelvic calcification again noted with bilateral renal parenchymal scarring..    Stomach is unremarkable.  Small bowel is nonobstructive.  Oral contrast extends throughout the small bowel and into the proximal colon.  Left lower quadrant colostomy noted with peristomal herniated bowel loops.  There is persistent fat stranding  surrounding the colon within the left lower quadrant.  Focal caliber change is similar to prior study suggesting at least partial obstruction or stenosis from likely postsurgical adhesion.  Similar soft tissue attenuation, presumed scarring noted interposed between small bowel loops in the uterine fundus which is associated with suspected fistula on the 06/26/2019 study.  No drainable abscess appreciated.    Urinary bladder demonstrates small foci of air anteriorly, correlate for recent catheterization.  Intrapelvic structures otherwise unchanged.    No acute osseous abnormality.      Impression       1. No evidence of small-bowel obstruction.  2. There is prominence of the proximal/mid colon with focal caliber change noted within the left lower quadrant, similar to prior study.  Persistent pericolonic fat stranding and wall thickening suggesting nonspecific colitis most prevalent at the area of caliber change.  Partial obstruction from adhesion possible.  Correlate with colostomy output.  No evidence of drainable abscess.  3. Other findings within the abdomen/pelvis similar to prior studies.     Assessment/Plan:     * Colitis  Exam, history and imaging consistent with colitis, not bowel obstruction    - Given pt's improvement with supportive measures, no surgical intervention warranted at this time as pt is not clinically obstructed  - Okay for diet from surgical perspective  - Continue medical workup of colitis etiology  - May require GI consult. If discharging soon, likely needs GI followup as outpatient (previous scheduled to see Sulema Ferris NP but had to cancel due to hospital admission)  - Surgery will be available as needed during this admission but will follow from a distance    Moderate malnutrition  Management per primary team    Bradycardia  Management per primary team    Hyperlipidemia  Management per primary team    Mild cognitive impairment with memory loss  Management per primary  team    Hypothyroidism  Management per primary team      VTE Risk Mitigation (From admission, onward)         Ordered     Place sequential compression device  Until discontinued      05/12/20 2027                Thank you for your consult. We will continue to follow patient from a distance.    Rishabh Connelly MD  General Surgery  [facility]

## 2020-05-13 NOTE — ASSESSMENT & PLAN NOTE
Nutrition Problem:  Moderate Protein-Calorie Malnutrition  Malnutrition in the context of Acute Illness/Injury    Related to (etiology):  Physiological causes resulting in anorexia or diminished intake    Signs and Symptoms (as evidenced by):  Energy Intake: <75% of estimated energy requirement for 1 week  Weight Loss: 6% x 1 month (179lbs on 4/12/20, 168lbs currently)    Interventions(treatment strategy):  Collaboration with other providers    Nutrition Diagnosis Status:  New

## 2020-05-13 NOTE — H&P
[facility] Hospital Medicine  History & Physical    Patient Name: Irlanda Lopez  MRN: [id number]  Admission Date: 5/12/2020  Attending Physician: Vanessa Shipley MD   Primary Care Provider: Myla Arias MD         Patient information was obtained from patient, past medical records and ER records.     Subjective:     Principal Problem:Colitis    Chief Complaint:   Chief Complaint   Patient presents with    Colostomy problem     Arrives to ER due to possible blockage in colostomy sent by PCP for CT scan, pt. only complaint, some abdominal discomfort intermittent nausea. No change in bowel consitency per pt.         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 Arias due to complaints abdominal pain and decreased output from her ostomy that began four days prior to presentation. There is associated belching, hiccups, nausea and one episode of emesis following contrast ingestion on the date of presentation. The patient denies fever, chills and blood in stool. The patient's daughter reports two prior episodes with similar symptoms. The patient was hospitalized from 4/12/20 to 4/13/20 for similar symptoms which were thought to be due to gastroenteritis and symptoms improved with supportive care. Stool culture and E. Coli were negative on Today, CT scan shows similar findings to one performed on 4/12/20 including prominence of the proximal to mid colon with focal caliber changed noted within the left lower quadrant as well as persistent pericolonoic fat stranding and wall thickening suggestive of nonspecific colitis. Labs were unremarkable. Code status was discussed with the patient and her daughter. She is a full code. Her four children are her surrogate medical decision makers.     Past Medical History:   Diagnosis Date    Diverticulitis     High  cholesterol     Hypertension     Hypothyroidism        Past Surgical History:   Procedure Laterality Date    APPENDECTOMY  2008    CATARACT EXTRACTION      Dr Raygoza    COLON SURGERY      COLONOSCOPY N/A 1/2/2019    Procedure: COLONOSCOPY;  Surgeon: Reece Hogan MD;  Location: [facility] ENDO;  Service: Endoscopy;  Laterality: N/A;    COLONOSCOPY N/A 6/7/2019    Procedure: COLONOSCOPY;  Surgeon: Rishabh Connelly MD;  Location: [facility] ENDO;  Service: General;  Laterality: N/A;    COLOSTOMY Left 1/2/2019    Procedure: CREATION, COLOSTOMY;  Surgeon: Reece Hogan MD;  Location: [facility] OR;  Service: General;  Laterality: Left;    SHAHIDA PROCEDURE N/A 1/2/2019    Procedure: SHAHIDA PROCEDURE;  Surgeon: Reece Hogan MD;  Location: [facility] OR;  Service: General;  Laterality: N/A;    LYSIS OF ADHESIONS N/A 1/2/2019    Procedure: LYSIS, ADHESIONS;  Surgeon: Reece Hogan MD;  Location: [facility] OR;  Service: General;  Laterality: N/A;    VITRECTOMY Right 09/2013       Review of patient's allergies indicates:  No Known Allergies    Current Facility-Administered Medications on File Prior to Encounter   Medication    [COMPLETED] iohexoL (OMNIPAQUE 350) injection 100 mL    [COMPLETED] iohexoL (OMNIPAQUE 350) injection 30 mL     Current Outpatient Medications on File Prior to Encounter   Medication Sig    atorvastatin (LIPITOR) 10 MG tablet Take 1 tablet (10 mg total) by mouth once daily.    ciprofloxacin 250 mg/5 ml (CIPRO) Take 10 mLs (500 mg total) by mouth 2 (two) times daily. for 20 doses    donepezil (ARICEPT) 10 MG tablet Take 1 tablet (10 mg total) by mouth every evening.    furosemide (LASIX) 20 MG tablet Take 1 tablet (20 mg total) by mouth once daily.    memantine (NAMENDA) 10 MG Tab TAKE 1 TABLET TWICE A DAY    thyroid, pork, (ARMOUR THYROID) 30 mg Tab Take 1 tablet (30 mg total) by mouth once daily. (Patient taking differently: Take 30 mg by mouth nightly. )    triamcinolone acetonide 0.1% (KENALOG) 0.1 % ointment  APPLY TOPICALLY 4 (FOUR) TIMES DAILY. TO RASH ON FACE AND CHEST AND NOSE (Patient taking differently: Apply topically 4 (four) times daily as needed. APPLY TOPICALLY 4 (FOUR) TIMES DAILY. TO RASH ON FACE AND CHEST AND NOSE)    [DISCONTINUED] ciprofloxacin HCl (CIPRO) 500 MG tablet Take 1 tablet (500 mg total) by mouth every 12 (twelve) hours.    [DISCONTINUED] thyroid, pork, (ARMOUR THYROID) 30 mg Tab Take 1 tablet (30 mg total) by mouth once daily.     Family History     Problem Relation (Age of Onset)    Alzheimer's disease Mother    Aneurysm Father    Stomach cancer Brother        Tobacco Use    Smoking status: Former Smoker    Smokeless tobacco: Never Used   Substance and Sexual Activity    Alcohol use: No    Drug use: No    Sexual activity: Never     Comment:      Review of Systems   Constitutional: Negative for appetite change, chills, diaphoresis, fatigue and fever.   HENT: Negative for congestion, ear pain, mouth sores, sore throat and trouble swallowing.    Eyes: Negative for pain and visual disturbance.   Respiratory: Negative for cough, chest tightness and shortness of breath.    Cardiovascular: Negative for chest pain, palpitations and leg swelling.   Gastrointestinal: Positive for abdominal pain, nausea and vomiting. Negative for constipation.        Positive for belching, hiccups and decreased ostomy output.   Endocrine: Negative for cold intolerance, heat intolerance, polydipsia and polyuria.   Genitourinary: Negative for dysuria, frequency and hematuria.   Musculoskeletal: Negative for arthralgias, back pain, myalgias and neck pain.   Skin: Negative for pallor, rash and wound.   Allergic/Immunologic: Negative for environmental allergies and immunocompromised state.   Neurological: Negative for dizziness, seizures, syncope, weakness, numbness and headaches.   Hematological: Negative for adenopathy. Does not bruise/bleed easily.   Psychiatric/Behavioral: Negative for agitation, confusion  and sleep disturbance.     Objective:     Vital Signs (Most Recent):  Temp: 98.9 °F (37.2 °C) (05/12/20 2150)  Pulse: (!) 53 (05/12/20 2150)  Resp: 18 (05/12/20 2150)  BP: 135/67 (05/12/20 2150)  SpO2: 95 % (05/12/20 2150) Vital Signs (24h Range):  Temp:  [97.9 °F (36.6 °C)-98.9 °F (37.2 °C)] 98.9 °F (37.2 °C)  Pulse:  [50-72] 53  Resp:  [16-22] 18  SpO2:  [95 %-99 %] 95 %  BP: (120-162)/(67-79) 135/67     Weight: 76.4 kg (168 lb 6.9 oz)  Body mass index is 30.32 kg/m².    Physical Exam   Constitutional: She is oriented to person, place, and time. She appears well-developed and well-nourished. No distress.   HENT:   Head: Normocephalic and atraumatic.   Eyes: Conjunctivae are normal.   PERRL   Neck: Neck supple. No JVD present.   Cardiovascular: Normal rate, regular rhythm and normal heart sounds.   Pulmonary/Chest: Effort normal and breath sounds normal.   Abdominal: Soft. Bowel sounds are normal. She exhibits no distension. There is tenderness (right lower quadrant).   Ostomy in tact with small amount of green stool.    Musculoskeletal: Normal range of motion.   Neurological: She is alert and oriented to person, place, and time.   Skin: Skin is warm and dry.   Psychiatric: She has a normal mood and affect. Her behavior is normal. Thought content normal.   Nursing note and vitals reviewed.          Significant Labs:   CBC:   Recent Labs   Lab 05/12/20  1015   WBC 10.83   HGB 13.4   HCT 41.3        CMP:   Recent Labs   Lab 05/12/20  1015      K 4.4      CO2 27      BUN 7*   CREATININE 0.8   CALCIUM 9.4   PROT 7.4   ALBUMIN 3.5   BILITOT 0.7   ALKPHOS 77   AST 17   ALT 17   ANIONGAP 11   EGFRNONAA >60     All pertinent labs within the past 24 hours have been reviewed.    Significant Imaging: I have reviewed all pertinent imaging results/findings within the past 24 hours.    Assessment/Plan:     * Colitis  -Empiric Cipro and Flagyl.   -Check C. difficile.  -Supportive care.   -General Surgery  consult due to recurrent nature of symptoms and abnormal CT findings.      Bradycardia  -Appears chronic, but may be exacerbated by medications.   -Will monitor and avoid mediations with a side effect of bradycardia.       Hyperlipidemia  Continue statin.       Mild cognitive impairment with memory loss  -Continue Namenda.   -Hold Aricept due to side effect of increased QT.       Hypothyroidism  -TSH WNL.   -Continue Petrolia.       VTE Risk Mitigation (From admission, onward)         Ordered     Place sequential compression device  Until discontinued      05/12/20 2027                   BOUBACAR Casiano  Department of Hospital Medicine   [facility]

## 2020-05-13 NOTE — ASSESSMENT & PLAN NOTE
Exam, history and imaging consistent with colitis, not bowel obstruction    - Given pt's improvement with supportive measures, no surgical intervention warranted at this time as pt is not clinically obstructed  - Okay for diet from surgical perspective  - Continue medical workup of colitis etiology  - May require GI consult. If discharging soon, likely needs GI followup as outpatient (previous scheduled to see Sulema Ferris NP but had to cancel due to hospital admission)  - Surgery will be available as needed during this admission but will follow from a distance

## 2020-05-13 NOTE — PROGRESS NOTES
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 Hogan- Complicated by abscess requiring percutaneous drainage. She has recently followed up with Dr. Connelly for possible colostomy reversal.  She presented to the EDt the request of Dr. Arias due to complaints abdominal pain and decreased output from her ostomy that began four days prior to presentation. There is associated belching, hiccups, nausea and one episode of emesis following contrast ingestion on the date of presentation. The patient denies fever, chills and blood in stool. The patient's daughter reports two prior episodes with similar symptoms. The patient was hospitalized from 4/12/20 to 4/13/20 for similar symptoms which were thought to be due to gastroenteritis and symptoms improved with supportive care. Stool culture and E. Coli were negative on Today, CT scan shows similar findings to one performed on 4/12/20 including prominence of the proximal to mid colon with focal caliber changed noted within the left lower quadrant as well as persistent pericolonoic fat stranding and wall thickening suggestive of nonspecific colitis. Labs were unremarkable. Code status was discussed with the patient and her daughter. She is a full code. Her four children are her surrogate medical decision makers.     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 Pt started on IV Rocephin and Flagyl. She responded very well and had a large BM early this am and again a few hrs later with gas in the colostomy bag. She had not had a BM x 4 days before. She is tolerating CLD well and walking around well- so diet advanced this evening. Dr. Connelly also following the pt.      Interval History: Pt started on IV Rocephin and Flagyl. She responded very well and had a large BM early this am and again a few hrs later with gas in the colostomy bag. She had not had a BM x 4 days before. She is tolerating CLD well and walking around well- so diet advanced this evening. Dr. Connelly also following the pt.      Review of Systems   Constitutional: Negative for appetite change, chills, diaphoresis, fatigue and fever.   HENT: Negative for congestion, ear pain, mouth sores, sore throat and trouble swallowing.    Eyes: Negative for pain and visual disturbance.   Respiratory: Negative for cough, chest tightness and shortness of breath.    Cardiovascular: Negative for chest pain, palpitations and leg swelling.   Gastrointestinal: Positive for abdominal pain. Negative for constipation, nausea and vomiting.        Positive for belching, hiccups and decreased ostomy output.   Endocrine: Negative for cold intolerance, heat intolerance, polydipsia and polyuria.   Genitourinary: Negative for dysuria, frequency and hematuria.   Musculoskeletal: Negative for arthralgias, back pain, myalgias and neck pain.   Skin: Negative for pallor, rash and wound.   Allergic/Immunologic: Negative for environmental allergies and immunocompromised state.   Neurological: Negative for dizziness,  seizures, syncope, weakness, numbness and headaches.   Hematological: Negative for adenopathy. Does not bruise/bleed easily.   Psychiatric/Behavioral: Negative for agitation, confusion and sleep disturbance.     Objective:     Vital Signs (Most Recent):  Temp: 98 °F (36.7 °C) (05/13/20 1130)  Pulse: (!) 45 (05/13/20 1130)  Resp: 18 (05/13/20 1130)  BP: (!) 104/55 (05/13/20 1130)  SpO2: 97 % (05/13/20 1130) Vital Signs (24h Range):  Temp:  [96.6 °F (35.9 °C)-98.9 °F (37.2 °C)] 98 °F (36.7 °C)  Pulse:  [45-60] 45  Resp:  [16-22] 18  SpO2:  [92 %-99 %] 97 %  BP: (100-162)/(53-79) 104/55     Weight: 76.3 kg (168 lb 3.4 oz)  Body mass index is 30.28 kg/m².    Intake/Output Summary (Last 24 hours) at 5/13/2020 1349  Last data filed at 5/13/2020 0600  Gross per 24 hour   Intake 640 ml   Output 170 ml   Net 470 ml      Physical Exam   Constitutional: She is oriented to person, place, and time. She appears well-developed and well-nourished. No distress.   HENT:   Head: Normocephalic and atraumatic.   Eyes: Conjunctivae are normal.   PERRL   Neck: Neck supple. No JVD present.   Cardiovascular: Normal rate, regular rhythm and normal heart sounds.   Pulmonary/Chest: Effort normal and breath sounds normal.   Abdominal: Soft. Bowel sounds are normal. She exhibits no distension. There is tenderness (right lower quadrant).   Ostomy in tact with small amount of green stool.    Musculoskeletal: Normal range of motion.   Neurological: She is alert and oriented to person, place, and time.   Skin: Skin is warm and dry.   Psychiatric: She has a normal mood and affect. Her behavior is normal. Thought content normal.   Nursing note and vitals reviewed.      Significant Labs:   BMP:   Recent Labs   Lab 05/13/20  0439   GLU 91      K 3.6      CO2 24   BUN 9   CREATININE 0.8   CALCIUM 8.9   MG 1.7     CBC:   Recent Labs   Lab 05/12/20  1015 05/13/20  0439   WBC 10.83 7.70   HGB 13.4 11.7*   HCT 41.3 37.6    254     CMP:    Recent Labs   Lab 05/12/20  1015 05/13/20  0439    139   K 4.4 3.6    103   CO2 27 24    91   BUN 7* 9   CREATININE 0.8 0.8   CALCIUM 9.4 8.9   PROT 7.4  --    ALBUMIN 3.5  --    BILITOT 0.7  --    ALKPHOS 77  --    AST 17  --    ALT 17  --    ANIONGAP 11 12   EGFRNONAA >60 >60     Lipase: No results for input(s): LIPASE in the last 48 hours.  Magnesium:   Recent Labs   Lab 05/13/20  0439   MG 1.7     All pertinent labs within the past 24 hours have been reviewed.    Significant Imaging: I have reviewed all pertinent imaging results/findings within the past 24 hours.      Assessment/Plan:      * Colitis  -Empiric Cipro and Flagyl.   -Check C. difficile.  -Supportive care.   -General Surgery consult due to recurrent nature of symptoms and abnormal CT findings.    Acute non specific Colitis- getting better, no bowel obstruction  Will continue present care  Advance diet as tolerated  D/c soon    Bradycardia  -Appears chronic, but may be exacerbated by medications.   -Will monitor and avoid mediations with a side effect of bradycardia.       Hyperlipidemia  Continue statin.       Mild cognitive impairment with memory loss  -Continue Namenda.   -Hold Aricept due to side effect of increased QT.     Stable, has 24 hours care giver at home      Hypothyroidism  -TSH WNL.   -Continue Ty Ty.         VTE Risk Mitigation (From admission, onward)         Ordered     Place sequential compression device  Until discontinued      05/12/20 2027                      Vanessa Shipley MD  Department of Hospital Medicine   [facility]

## 2020-05-13 NOTE — ED NOTES
"Pt c/o redness and tenderness at IV site. This RN assess LAC, site appears red with small amount of localized whelp. IV flushed with NS,  blood return noted. MD notified and call to bedside to assess. Pt stated, " I don't want to be stuck again."New order for IV Benadryl and OK to use IV for Flagyl. Will continue to monitor.   "

## 2020-05-13 NOTE — SUBJECTIVE & OBJECTIVE
Interval History: Pt started on IV Rocephin and Flagyl. She responded very well and had a large BM early this am and again a few hrs later with gas in the colostomy bag. She had not had a BM x 4 days before. She is tolerating CLD well and walking around well- so diet advanced this evening. Dr. Connelly also following the pt.      Review of Systems   Constitutional: Negative for appetite change, chills, diaphoresis, fatigue and fever.   HENT: Negative for congestion, ear pain, mouth sores, sore throat and trouble swallowing.    Eyes: Negative for pain and visual disturbance.   Respiratory: Negative for cough, chest tightness and shortness of breath.    Cardiovascular: Negative for chest pain, palpitations and leg swelling.   Gastrointestinal: Positive for abdominal pain. Negative for constipation, nausea and vomiting.        Positive for belching, hiccups and decreased ostomy output.   Endocrine: Negative for cold intolerance, heat intolerance, polydipsia and polyuria.   Genitourinary: Negative for dysuria, frequency and hematuria.   Musculoskeletal: Negative for arthralgias, back pain, myalgias and neck pain.   Skin: Negative for pallor, rash and wound.   Allergic/Immunologic: Negative for environmental allergies and immunocompromised state.   Neurological: Negative for dizziness, seizures, syncope, weakness, numbness and headaches.   Hematological: Negative for adenopathy. Does not bruise/bleed easily.   Psychiatric/Behavioral: Negative for agitation, confusion and sleep disturbance.     Objective:     Vital Signs (Most Recent):  Temp: 98 °F (36.7 °C) (05/13/20 1130)  Pulse: (!) 45 (05/13/20 1130)  Resp: 18 (05/13/20 1130)  BP: (!) 104/55 (05/13/20 1130)  SpO2: 97 % (05/13/20 1130) Vital Signs (24h Range):  Temp:  [96.6 °F (35.9 °C)-98.9 °F (37.2 °C)] 98 °F (36.7 °C)  Pulse:  [45-60] 45  Resp:  [16-22] 18  SpO2:  [92 %-99 %] 97 %  BP: (100-162)/(53-79) 104/55     Weight: 76.3 kg (168 lb 3.4 oz)  Body mass index is  30.28 kg/m².    Intake/Output Summary (Last 24 hours) at 5/13/2020 1349  Last data filed at 5/13/2020 0600  Gross per 24 hour   Intake 640 ml   Output 170 ml   Net 470 ml      Physical Exam   Constitutional: She is oriented to person, place, and time. She appears well-developed and well-nourished. No distress.   HENT:   Head: Normocephalic and atraumatic.   Eyes: Conjunctivae are normal.   PERRL   Neck: Neck supple. No JVD present.   Cardiovascular: Normal rate, regular rhythm and normal heart sounds.   Pulmonary/Chest: Effort normal and breath sounds normal.   Abdominal: Soft. Bowel sounds are normal. She exhibits no distension. There is tenderness (right lower quadrant).   Ostomy in tact with small amount of green stool.    Musculoskeletal: Normal range of motion.   Neurological: She is alert and oriented to person, place, and time.   Skin: Skin is warm and dry.   Psychiatric: She has a normal mood and affect. Her behavior is normal. Thought content normal.   Nursing note and vitals reviewed.      Significant Labs:   BMP:   Recent Labs   Lab 05/13/20  0439   GLU 91      K 3.6      CO2 24   BUN 9   CREATININE 0.8   CALCIUM 8.9   MG 1.7     CBC:   Recent Labs   Lab 05/12/20  1015 05/13/20  0439   WBC 10.83 7.70   HGB 13.4 11.7*   HCT 41.3 37.6    254     CMP:   Recent Labs   Lab 05/12/20  1015 05/13/20  0439    139   K 4.4 3.6    103   CO2 27 24    91   BUN 7* 9   CREATININE 0.8 0.8   CALCIUM 9.4 8.9   PROT 7.4  --    ALBUMIN 3.5  --    BILITOT 0.7  --    ALKPHOS 77  --    AST 17  --    ALT 17  --    ANIONGAP 11 12   EGFRNONAA >60 >60     Lipase: No results for input(s): LIPASE in the last 48 hours.  Magnesium:   Recent Labs   Lab 05/13/20  0439   MG 1.7     All pertinent labs within the past 24 hours have been reviewed.    Significant Imaging: I have reviewed all pertinent imaging results/findings within the past 24 hours.

## 2020-05-13 NOTE — CONSULTS
"  [facility]  Adult Nutrition  Consult Note    SUMMARY     Recommendations    Recommendation:   1. Continue bland diet, adding Boost Breeze with meals if intake is poor.  2. RD to f/u   Goals: Meet >85% EEN/EPN by RD f/u  Nutrition Goal Status: new  Communication of RD Recs: (POC, sticky note)    Reason for Assessment    Reason For Assessment: consult  Diagnosis: infection/sepsis(ESBL colitis)  Relevant Medical History: Ostomy  General Information Comments: RD consulted due to weight loss. Pt presented yesterday with poor intake, constipation, abdominal pain and nausea. She has had a BM since being given medical care, stool is green in ostomy bag. MD initiated liquid diet- well tolerated, and progressed to bland today. Due to recent hospital wide restrictions to limit the transfer of (COVID-19), we are not performing any physical exams at this time. All S/S will be observational; NFPE to be performed at a future date. Pt meets observation criteria for moderate malnutrition due to poor intake, weight loss. Documented below.  Nutrition Discharge Planning: bland diet    Nutrition Risk Screen    Nutrition Risk Screen: no indicators present    Nutrition/Diet History     Pt reports poor intake for approx one week    Anthropometrics    Temp: 96.5 °F (35.8 °C)  Height Method: Stated  Height: 5' 2.5" (158.8 cm)  Height (inches): 62.5 in  Weight Method: Bed Scale  Weight: 76.3 kg (168 lb 3.4 oz)  Weight (lb): 168.21 lb  Ideal Body Weight (IBW), Female: 112.5 lb  % Ideal Body Weight, Female (lb): 149.72 %  BMI (Calculated): 30.3       Lab/Procedures/Meds    Pertinent Labs Reviewed: reviewed  BMP  Lab Results   Component Value Date     05/13/2020    K 3.6 05/13/2020     05/13/2020    CO2 24 05/13/2020    BUN 9 05/13/2020    CREATININE 0.8 05/13/2020    CALCIUM 8.9 05/13/2020    ANIONGAP 12 05/13/2020    ESTGFRAFRICA >60 05/13/2020    EGFRNONAA >60 05/13/2020     Lab Results   Component Value Date    " CALCIUM 8.9 05/13/2020    PHOS 4.0 05/13/2020     Lab Results   Component Value Date    ALBUMIN 3.5 05/12/2020     Pertinent Medications Reviewed: reviewed    Physical Findings/Assessment     pt has ostomy bag- has had some gas, belching, abdominal discomfort    Estimated/Assessed Needs    Weight Used For Calorie Calculations: 76.3 kg (168 lb 3.4 oz)  Energy Calorie Requirements (kcal): 1444  Energy Need Method: Huntington-St Jeor(x 1.2)  Protein Requirements: 76- 84g  Weight Used For Protein Calculations: 76.3 kg (168 lb 3.4 oz)     Estimated Fluid Requirement Method: RDA Method(or per MD)  RDA Method (mL): 1444       Nutrition Prescription Ordered    Current Diet Order: [place] diet    Evaluation of Received Nutrient/Fluid Intake          Intake/Output Summary (Last 24 hours) at 5/13/2020 1602  Last data filed at 5/13/2020 0600  Gross per 24 hour   Intake 640 ml   Output 170 ml   Net 470 ml       % Intake of Estimated Energy Needs: 50 - 75 %  % Meal Intake: 50 - 75 %    Nutrition Risk    2x week    Assessment and Plan    Moderate malnutrition  Nutrition Problem:  Moderate Protein-Calorie Malnutrition  Malnutrition in the context of Acute Illness/Injury    Related to (etiology):  Physiological causes resulting in anorexia or diminished intake    Signs and Symptoms (as evidenced by):  Energy Intake: <75% of estimated energy requirement for 1 week  Weight Loss: 6% x 1 month (179lbs on 4/12/20, 168lbs currently)    Interventions(treatment strategy):  Collaboration with other providers    Nutrition Diagnosis Status:  New          Monitor and Evaluation    Food and Nutrient Intake: food and beverage intake  Food and Nutrient Adminstration: diet order  Anthropometric Measurements: weight, weight change  Biochemical Data, Medical Tests and Procedures: electrolyte and renal panel, gastrointestinal profile, glucose/endocrine profile  Nutrition-Focused Physical Findings: overall appearance     Malnutrition Assessment         As  documented above  Nutrition Follow-Up    RD Follow-up?: Yes

## 2020-05-13 NOTE — PLAN OF CARE
POC reviewed with pt. Pt verbalizes understanding of POC. .  AAOx3 with forgetfulness. NADN.  Bradycardic on cardiac monitor. HR ranges from 46-51, pt daughter stated this has been her baseline for the past year.   Admitted for colitis, receiving IV cipro and flagyl.  Daughter at bedside. States pt has short term memory loss and suggests she will do better with a caregiver at bedside.   Pt caregiver is present during the shift to answer questions  Stool for c-diff negative  Pt has colostomy draining brown liquid stool.  Pt remains free of falls during the shift. Bed alarm on.  Safety measures in place. Will continue to monitor.  Informed pt to call for assistance before getting up. Pt verbalizes understanding.

## 2020-05-13 NOTE — PLAN OF CARE
Recommendations    Recommendation:   1. Continue bland diet, adding Boost Breeze with meals if intake is poor.  2. RD to f/u   Goals: Meet >85% EEN/EPN by RD f/u  Nutrition Goal Status: new  Communication of RD Recs: (POC, sticky note)

## 2020-05-14 PROBLEM — R00.1 BRADYCARDIA: Status: RESOLVED | Noted: 2020-04-13 | Resolved: 2020-05-14

## 2020-05-14 LAB
ANION GAP SERPL CALC-SCNC: 8 MMOL/L (ref 8–16)
BASOPHILS # BLD AUTO: 0.03 K/UL (ref 0–0.2)
BASOPHILS NFR BLD: 0.5 % (ref 0–1.9)
BUN SERPL-MCNC: 6 MG/DL (ref 8–23)
C DIFF TOX GENS STL QL NAA+PROBE: POSITIVE
CALCIUM SERPL-MCNC: 8.6 MG/DL (ref 8.7–10.5)
CHLORIDE SERPL-SCNC: 106 MMOL/L (ref 95–110)
CO2 SERPL-SCNC: 25 MMOL/L (ref 23–29)
CREAT SERPL-MCNC: 0.8 MG/DL (ref 0.5–1.4)
DIFFERENTIAL METHOD: ABNORMAL
EOSINOPHIL # BLD AUTO: 0.3 K/UL (ref 0–0.5)
EOSINOPHIL NFR BLD: 4 % (ref 0–8)
ERYTHROCYTE [DISTWIDTH] IN BLOOD BY AUTOMATED COUNT: 13.6 % (ref 11.5–14.5)
EST. GFR  (AFRICAN AMERICAN): >60 ML/MIN/1.73 M^2
EST. GFR  (NON AFRICAN AMERICAN): >60 ML/MIN/1.73 M^2
GLUCOSE SERPL-MCNC: 95 MG/DL (ref 70–110)
HCT VFR BLD AUTO: 35.4 % (ref 37–48.5)
HGB BLD-MCNC: 11.3 G/DL (ref 12–16)
IMM GRANULOCYTES # BLD AUTO: 0.05 K/UL (ref 0–0.04)
IMM GRANULOCYTES NFR BLD AUTO: 0.8 % (ref 0–0.5)
LYMPHOCYTES # BLD AUTO: 2 K/UL (ref 1–4.8)
LYMPHOCYTES NFR BLD: 29.9 % (ref 18–48)
MAGNESIUM SERPL-MCNC: 1.6 MG/DL (ref 1.6–2.6)
MCH RBC QN AUTO: 27.3 PG (ref 27–31)
MCHC RBC AUTO-ENTMCNC: 31.9 G/DL (ref 32–36)
MCV RBC AUTO: 86 FL (ref 82–98)
MONOCYTES # BLD AUTO: 0.8 K/UL (ref 0.3–1)
MONOCYTES NFR BLD: 12.2 % (ref 4–15)
NEUTROPHILS # BLD AUTO: 3.5 K/UL (ref 1.8–7.7)
NEUTROPHILS NFR BLD: 52.6 % (ref 38–73)
NRBC BLD-RTO: 0 /100 WBC
PHOSPHATE SERPL-MCNC: 2.9 MG/DL (ref 2.7–4.5)
PLATELET # BLD AUTO: 234 K/UL (ref 150–350)
PMV BLD AUTO: 10.4 FL (ref 9.2–12.9)
POTASSIUM SERPL-SCNC: 3.3 MMOL/L (ref 3.5–5.1)
RBC # BLD AUTO: 4.14 M/UL (ref 4–5.4)
SODIUM SERPL-SCNC: 139 MMOL/L (ref 136–145)
WBC # BLD AUTO: 6.58 K/UL (ref 3.9–12.7)

## 2020-05-14 PROCEDURE — 80048 BASIC METABOLIC PNL TOTAL CA: CPT

## 2020-05-14 PROCEDURE — 97161 PT EVAL LOW COMPLEX 20 MIN: CPT

## 2020-05-14 PROCEDURE — 96376 TX/PRO/DX INJ SAME DRUG ADON: CPT

## 2020-05-14 PROCEDURE — 97535 SELF CARE MNGMENT TRAINING: CPT

## 2020-05-14 PROCEDURE — 97116 GAIT TRAINING THERAPY: CPT

## 2020-05-14 PROCEDURE — 83735 ASSAY OF MAGNESIUM: CPT

## 2020-05-14 PROCEDURE — 97165 OT EVAL LOW COMPLEX 30 MIN: CPT

## 2020-05-14 PROCEDURE — 63600175 PHARM REV CODE 636 W HCPCS: Performed by: PHYSICIAN ASSISTANT

## 2020-05-14 PROCEDURE — S0030 INJECTION, METRONIDAZOLE: HCPCS | Performed by: PHYSICIAN ASSISTANT

## 2020-05-14 PROCEDURE — 85025 COMPLETE CBC W/AUTO DIFF WBC: CPT

## 2020-05-14 PROCEDURE — 36415 COLL VENOUS BLD VENIPUNCTURE: CPT

## 2020-05-14 PROCEDURE — 63700000 PHARM REV CODE 250 ALT 637 W/O HCPCS: Performed by: EMERGENCY MEDICINE

## 2020-05-14 PROCEDURE — 25000003 PHARM REV CODE 250: Performed by: PHYSICIAN ASSISTANT

## 2020-05-14 PROCEDURE — G0378 HOSPITAL OBSERVATION PER HR: HCPCS

## 2020-05-14 PROCEDURE — 25000003 PHARM REV CODE 250: Performed by: EMERGENCY MEDICINE

## 2020-05-14 PROCEDURE — 84100 ASSAY OF PHOSPHORUS: CPT

## 2020-05-14 RX ORDER — VANCOMYCIN HCL 50 MG/ML
250 SOLUTION, RECONSTITUTED, ORAL ORAL EVERY 6 HOURS
Status: DISCONTINUED | OUTPATIENT
Start: 2020-05-14 | End: 2020-05-16 | Stop reason: HOSPADM

## 2020-05-14 RX ORDER — POTASSIUM CHLORIDE 20 MEQ/15ML
20 SOLUTION ORAL 2 TIMES DAILY
Status: COMPLETED | OUTPATIENT
Start: 2020-05-14 | End: 2020-05-15

## 2020-05-14 RX ADMIN — LEVOTHYROXINE, LIOTHYRONINE 30 MG: 19; 4.5 TABLET ORAL at 09:05

## 2020-05-14 RX ADMIN — POTASSIUM CHLORIDE 20 MEQ: 20 SOLUTION ORAL at 09:05

## 2020-05-14 RX ADMIN — CIPROFLOXACIN 400 MG: 2 INJECTION, SOLUTION INTRAVENOUS at 05:05

## 2020-05-14 RX ADMIN — VANCOMYCIN HYDROCHLORIDE 250 MG: 250 POWDER, FOR SOLUTION ORAL at 11:05

## 2020-05-14 RX ADMIN — VANCOMYCIN HYDROCHLORIDE 250 MG: 250 POWDER, FOR SOLUTION ORAL at 12:05

## 2020-05-14 RX ADMIN — MEMANTINE 10 MG: 10 TABLET ORAL at 08:05

## 2020-05-14 RX ADMIN — VANCOMYCIN HYDROCHLORIDE 250 MG: 250 POWDER, FOR SOLUTION ORAL at 05:05

## 2020-05-14 RX ADMIN — Medication 6 MG: at 09:05

## 2020-05-14 RX ADMIN — MEMANTINE 10 MG: 10 TABLET ORAL at 09:05

## 2020-05-14 RX ADMIN — METRONIDAZOLE 500 MG: 500 INJECTION, SOLUTION INTRAVENOUS at 06:05

## 2020-05-14 RX ADMIN — LACTOBACILLUS TAB 4 TABLET: TAB at 04:05

## 2020-05-14 RX ADMIN — ATORVASTATIN CALCIUM 10 MG: 10 TABLET, FILM COATED ORAL at 08:05

## 2020-05-14 NOTE — PT/OT/SLP EVAL
Occupational Therapy   Evaluation and Discharge Note    Name: Irlanda Lopez  MRN: [id number]  Admitting Diagnosis:  Colitis      Recommendations:     Discharge Recommendations: home health OT  Discharge Equipment Recommendations:  none  Barriers to discharge:  None    Assessment:     Irlanda Lopez is a 79 y.o. female with a medical diagnosis of Colitis. At this time, patient is functioning at their prior level of function and does not require further acute OT services.     Plan:     During this hospitalization, patient does not require further acute OT services.  Please re-consult if situation changes.    · Plan of Care Reviewed with: patient, daughter    Subjective     Chief Complaint: PATIENT STATED SHE MAY NEED (A) AT TIMES WITH SOCKS DUE TO ABD DISCOMFORT.  Patient/Family Comments/goals: NONE STATED.    Occupational Profile:  Living Environment: PATIENT LIVES IN 1-STORY HOME WITH THRESHHOLD WITH 24/7 (A) PRN.  Previous level of function: PATIENT MOD (I) WITH ADL'S & T/F'S.  DTR STATED PATIENT PERFORMS HER OWN COLOSTOMY CARE.  Roles and Routines: CAREGIVER OR DTR DRIVES PATIENT AS NEEDED. DTR STATED PATIENT MAINLY HAS A CAREGIVER DUE TO PATIENT'S POOR STM AND DEMENTIA.  Equipment Used at home:  colostomy/ostomy supplies  Assistance upon Discharge: DTR OR CAREGIVER 24/7.    Pain/Comfort:  · Pain Rating 1: 0/10    Patients cultural, spiritual, Alevism conflicts given the current situation: no    Objective:     Communicated with: NURSE prior to session.  Patient found HOB elevated with telemetry, peripheral IV upon OT entry to room.    General Precautions: Standard, fall   Orthopedic Precautions:N/A   Braces: N/A     Occupational Performance:    Bed Mobility:    · Patient completed Rolling/Turning to Left with  modified independence  · Patient completed Rolling/Turning to Right with modified independence  · Patient completed Scooting/Bridging with modified independence  · Patient completed Supine to Sit  with modified independence  · Patient completed Sit to Supine with modified independence    Functional Mobility/Transfers:  · Patient completed Sit <> Stand Transfer with modified independence  with  no assistive device   · Patient completed Bed <> Chair Transfer using Stand Pivot technique with modified independence with no assistive device  · Patient completed Toilet Transfer Stand Pivot technique with modified independence with  no AD  · Functional Mobility: MOD (I) WITH NO AD.    Activities of Daily Living:  · Feeding:  modified independence      · Grooming: independence TO COMB HAIR  · Upper Body Dressing: modified independence      · Lower Body Dressing: modified independence BUT PATIENT STATED SHE MAY NEED (A) AT TIMES DUE TO STOMACH DISCOMFORT  · Toileting: modified independence        Cognitive/Visual Perceptual:  DTR STATED THAT PATIENT HAS 24/7 (S) DUE TO POOR STM.    Physical Exam:  Balance:    -       NO LOB WITH SITTING NOR STANDING BALANCE.  Postural examination/scapula alignment:    -       No postural abnormalities identified  Motor Planning:    -       INTACT  Dominant hand:    -       RIGHT  Upper Extremity Range of Motion:     -       Right Upper Extremity: WFL  -       Left Upper Extremity: WFL  Fine Motor Coordination:    -       Intact  Gross motor coordination:   WFL    AMPAC 6 Click ADL:  AMPAC Total Score: 22    Treatment & Education:  OT EVAL PERFORMED.   PATIENT AND DTR EDUCATED RE: PURPOSE OF OT AND PEOPLE MOVERS PROGRAM.  PATIENT PRACTICED LB DRESSING, TOILETING AND TOILET T/F AT MOD (I) LEVEL.  Education:    Patient left up in chair with all lines intact, call button in reach, chair alarm on and DTR present    GOALS:   Multidisciplinary Problems     Occupational Therapy Goals     Not on file                History:     Past Medical History:   Diagnosis Date    Diverticulitis     High cholesterol     Hypertension     Hypothyroidism        Past Surgical History:   Procedure  Laterality Date    APPENDECTOMY  2008    CATARACT EXTRACTION      Dr Raygoza    COLON SURGERY      COLONOSCOPY N/A 1/2/2019    Procedure: COLONOSCOPY;  Surgeon: Reece Hogan MD;  Location: [facility] ENDO;  Service: Endoscopy;  Laterality: N/A;    COLONOSCOPY N/A 6/7/2019    Procedure: COLONOSCOPY;  Surgeon: Rishabh Connelly MD;  Location: [facility] ENDO;  Service: General;  Laterality: N/A;    COLOSTOMY Left 1/2/2019    Procedure: CREATION, COLOSTOMY;  Surgeon: Reece Hogan MD;  Location: [facility] OR;  Service: General;  Laterality: Left;    SHAHIDA PROCEDURE N/A 1/2/2019    Procedure: SHAHIDA PROCEDURE;  Surgeon: Reece Hogan MD;  Location: [facility] OR;  Service: General;  Laterality: N/A;    LYSIS OF ADHESIONS N/A 1/2/2019    Procedure: LYSIS, ADHESIONS;  Surgeon: Reece Hogan MD;  Location: [facility] OR;  Service: General;  Laterality: N/A;    VITRECTOMY Right 09/2013       Time Tracking:     OT Date of Treatment: 05/14/20  OT Start Time: 0740  OT Stop Time: 0803  OT Total Time (min): 23 min    Billable Minutes:Evaluation 10  Self Care/Home Management 13    Pam Marie OT  5/14/2020

## 2020-05-14 NOTE — SUBJECTIVE & OBJECTIVE
Interval History: looks and feels lot better, wants to go home. Daughter stayed with her overnight, having regular BM into her colostomy bag. Eating drinking well, walking around well. Stool tested positive overnite for C diff and her Urine Cx also growing GNR. She had ESBL positive E coli last month treated with Abx. Which likely gave her the C diff. Started on oral Vanc and Cipro and Flagyl both d/david. ID and GI consulted and probiotics started.     Review of Systems   Constitutional: Negative for activity change, appetite change, chills, fatigue, fever and unexpected weight change.   HENT: Negative for congestion, ear pain, sore throat and trouble swallowing.    Eyes: Negative for pain, redness and itching.   Respiratory: Negative for cough, choking, shortness of breath and wheezing.    Cardiovascular: Negative for chest pain, palpitations and leg swelling.   Gastrointestinal: Negative for abdominal distention, abdominal pain, anal bleeding, blood in stool, constipation, diarrhea, nausea, rectal pain and vomiting.   Endocrine: Negative for cold intolerance, heat intolerance and polyuria.   Genitourinary: Negative for dysuria, flank pain, frequency and hematuria.   Musculoskeletal: Negative for gait problem, joint swelling and neck pain.   Skin: Negative for color change, rash and wound.   Allergic/Immunologic: Negative for environmental allergies and immunocompromised state.   Neurological: Negative for dizziness, speech difficulty, weakness and numbness.   Psychiatric/Behavioral: Negative for agitation, confusion and hallucinations.     Objective:     Vital Signs (Most Recent):  Temp: 97.7 °F (36.5 °C) (05/14/20 1255)  Pulse: (!) 53 (05/14/20 1255)  Resp: 18 (05/14/20 1255)  BP: 111/62 (05/14/20 1255)  SpO2: 98 % (05/14/20 1255) Vital Signs (24h Range):  Temp:  [96.4 °F (35.8 °C)-98.3 °F (36.8 °C)] 97.7 °F (36.5 °C)  Pulse:  [48-56] 53  Resp:  [16-20] 18  SpO2:  [94 %-98 %] 98 %  BP: (104-111)/(56-62) 111/62      Weight: 77.6 kg (171 lb 1.2 oz)  Body mass index is 30.79 kg/m².    Intake/Output Summary (Last 24 hours) at 5/14/2020 1343  Last data filed at 5/14/2020 0355  Gross per 24 hour   Intake 779 ml   Output 950 ml   Net -171 ml      Physical Exam   Constitutional: She is oriented to person, place, and time. She appears well-developed.   HENT:   Head: Normocephalic and atraumatic.   Eyes: Conjunctivae and EOM are normal.   Neck: Normal range of motion. No thyromegaly present.   Cardiovascular: Normal rate and regular rhythm.   Pulmonary/Chest: Effort normal. No respiratory distress.   Abdominal: Soft. She exhibits no distension and no mass. There is no tenderness.   Ostomy pink and viable with soft stool in bag; well-healed midline incision; +soft, reducible parastomal hernia   Musculoskeletal: Normal range of motion. She exhibits no edema or tenderness.   Neurological: She is alert and oriented to person, place, and time.   Skin: Skin is warm and dry. Capillary refill takes less than 2 seconds. No rash noted.   Psychiatric: She has a normal mood and affect.   Nursing note and vitals reviewed.      Significant Labs:   BMP:   Recent Labs   Lab 05/14/20  0449   GLU 95      K 3.3*      CO2 25   BUN 6*   CREATININE 0.8   CALCIUM 8.6*   MG 1.6     CBC:   Recent Labs   Lab 05/13/20  0439 05/14/20  0449   WBC 7.70 6.58   HGB 11.7* 11.3*   HCT 37.6 35.4*    234     All pertinent labs within the past 24 hours have been reviewed.    Significant Imaging: I have reviewed all pertinent imaging results/findings within the past 24 hours.

## 2020-05-14 NOTE — ASSESSMENT & PLAN NOTE
-Empiric Cipro and Flagyl.   -Check C. difficile.  -Supportive care.   -General Surgery consult due to recurrent nature of symptoms and abnormal CT findings.    Acute non specific Colitis- getting better, no bowel obstruction  Will continue present care  Advance diet as tolerated  D/c soon    Placed in isolation  C Diff likely sec to Abx for ESBL E Coli last month  Start Oral vanc, ID consulted

## 2020-05-14 NOTE — PLAN OF CARE
Pt AAO x 4, short term memory loss at times.  VSS.  Pt remained afebrile throughout this shift.   IV ABX administered per order.   Pt remained free of falls this shift.   Pt c/o of no pain this shift.  Plan of care reviewed. Patient verbalizes understanding.   Pt moving/turing independently. Frequent weight shifting encouraged.  Patient SB on monitor.   Bed low, side rails up x 2, wheels locked, call light in reach.   Bed alarm maintained for safety.   Patient instructed to call for assistance.   Hourly rounding completed.   24 hour chart check completed.  Will continue to monitor.

## 2020-05-14 NOTE — PROGRESS NOTES
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 Arias due to complaints abdominal pain and decreased output from her ostomy that began four days prior to presentation. There is associated belching, hiccups, nausea and one episode of emesis following contrast ingestion on the date of presentation. The patient denies fever, chills and blood in stool. The patient's daughter reports two prior episodes with similar symptoms. The patient was hospitalized from 4/12/20 to 4/13/20 for similar symptoms which were thought to be due to gastroenteritis and symptoms improved with supportive care. Stool culture and E. Coli were negative on Today, CT scan shows similar findings to one performed on 4/12/20 including prominence of the proximal to mid colon with focal caliber changed noted within the left lower quadrant as well as persistent pericolonoic fat stranding and wall thickening suggestive of nonspecific colitis. Labs were unremarkable. Code status was discussed with the patient and her daughter. She is a full code. Her four children are her surrogate medical decision makers.     Overview/Hospital Course:  Irlanda Lopez is a 79 year old female who is s/p Maribel's pouch and left lower quadrant colostomy in January of 2019 for diverticulitis sent to ER with c/o abdominal pain and decreased output from her ostomy that began four days PTA, associated w belching, hiccups, nausea and one episode of emesis following contrast ingestion to mid colon with focal  caliber changed noted within the left lower quadrant as well as persistent pericolonoic fat stranding and wall thickening suggestive of nonspecific colitis. Pt started on IV Rocephin and Flagyl. She responded very well and had a large BM early this am and again a few hrs later with gas in the colostomy bag. She had not had a BM x 4 days before. She is tolerating CLD well and walking around well- so diet advanced this evening. Dr. Connelly also following the pt.   5/14- looks and feels lot better, wants to go home. Daughter stayed with her overnight, having regular BM into her colostomy bag. Eating drinking well, walking around well. Stool tested positive overnite for C diff and her Urine Cx also growing GNR. She had ESBL positive E coli last month treated with Abx. Which likely gave her the C diff. Started on oral Vanc and Cipro and Flagyl both d/david. ID and GI consulted and probiotics started.         Interval History: looks and feels lot better, wants to go home. Daughter stayed with her overnight, having regular BM into her colostomy bag. Eating drinking well, walking around well. Stool tested positive overnite for C diff and her Urine Cx also growing GNR. She had ESBL positive E coli last month treated with Abx. Which likely gave her the C diff. Started on oral Vanc and Cipro and Flagyl both d/david. ID and GI consulted and probiotics started.     Review of Systems   Constitutional: Negative for activity change, appetite change, chills, fatigue, fever and unexpected weight change.   HENT: Negative for congestion, ear pain, sore throat and trouble swallowing.    Eyes: Negative for pain, redness and itching.   Respiratory: Negative for cough, choking, shortness of breath and wheezing.    Cardiovascular: Negative for chest pain, palpitations and leg swelling.   Gastrointestinal: Negative for abdominal distention, abdominal pain, anal bleeding, blood in stool, constipation, diarrhea, nausea, rectal pain and vomiting.    Endocrine: Negative for cold intolerance, heat intolerance and polyuria.   Genitourinary: Negative for dysuria, flank pain, frequency and hematuria.   Musculoskeletal: Negative for gait problem, joint swelling and neck pain.   Skin: Negative for color change, rash and wound.   Allergic/Immunologic: Negative for environmental allergies and immunocompromised state.   Neurological: Negative for dizziness, speech difficulty, weakness and numbness.   Psychiatric/Behavioral: Negative for agitation, confusion and hallucinations.     Objective:     Vital Signs (Most Recent):  Temp: 97.7 °F (36.5 °C) (05/14/20 1255)  Pulse: (!) 53 (05/14/20 1255)  Resp: 18 (05/14/20 1255)  BP: 111/62 (05/14/20 1255)  SpO2: 98 % (05/14/20 1255) Vital Signs (24h Range):  Temp:  [96.4 °F (35.8 °C)-98.3 °F (36.8 °C)] 97.7 °F (36.5 °C)  Pulse:  [48-56] 53  Resp:  [16-20] 18  SpO2:  [94 %-98 %] 98 %  BP: (104-111)/(56-62) 111/62     Weight: 77.6 kg (171 lb 1.2 oz)  Body mass index is 30.79 kg/m².    Intake/Output Summary (Last 24 hours) at 5/14/2020 1343  Last data filed at 5/14/2020 0355  Gross per 24 hour   Intake 779 ml   Output 950 ml   Net -171 ml      Physical Exam   Constitutional: She is oriented to person, place, and time. She appears well-developed.   HENT:   Head: Normocephalic and atraumatic.   Eyes: Conjunctivae and EOM are normal.   Neck: Normal range of motion. No thyromegaly present.   Cardiovascular: Normal rate and regular rhythm.   Pulmonary/Chest: Effort normal. No respiratory distress.   Abdominal: Soft. She exhibits no distension and no mass. There is no tenderness.   Ostomy pink and viable with soft stool in bag; well-healed midline incision; +soft, reducible parastomal hernia   Musculoskeletal: Normal range of motion. She exhibits no edema or tenderness.   Neurological: She is alert and oriented to person, place, and time.   Skin: Skin is warm and dry. Capillary refill takes less than 2 seconds. No rash noted.    Psychiatric: She has a normal mood and affect.   Nursing note and vitals reviewed.      Significant Labs:   BMP:   Recent Labs   Lab 05/14/20  0449   GLU 95      K 3.3*      CO2 25   BUN 6*   CREATININE 0.8   CALCIUM 8.6*   MG 1.6     CBC:   Recent Labs   Lab 05/13/20  0439 05/14/20  0449   WBC 7.70 6.58   HGB 11.7* 11.3*   HCT 37.6 35.4*    234     All pertinent labs within the past 24 hours have been reviewed.    Significant Imaging: I have reviewed all pertinent imaging results/findings within the past 24 hours.      Assessment/Plan:      * C Diff Colitis  -Empiric Cipro and Flagyl.   -Check C. difficile.  -Supportive care.   -General Surgery consult due to recurrent nature of symptoms and abnormal CT findings.    Acute non specific Colitis- getting better, no bowel obstruction  Will continue present care  Advance diet as tolerated  D/c soon    Placed in isolation  C Diff likely sec to Abx for ESBL E Coli last month  Start Oral vanc, ID consulted    Acute UTI  Urine Cx growing GNR, last month had E Coli which was ESBL positive, and resistant to Cipro  Await C/S  ID consulted  Pt on oral Vanc and probiotics         Moderate malnutrition  Improving,       Hyperlipidemia  Continue statin.       Mild cognitive impairment with memory loss  -Continue Namenda.   -Hold Aricept due to side effect of increased QT.     Stable, has 24 hours care giver at home      Hypothyroidism  -TSH WNL.   -Continue Carver.         VTE Risk Mitigation (From admission, onward)         Ordered     Place sequential compression device  Until discontinued      05/12/20 2027                      Vanessa Shipley MD  Department of Hospital Medicine   [facility]

## 2020-05-14 NOTE — PT/OT/SLP EVAL
Physical Therapy Evaluation and Discharge Note    Patient Name:  Irlanda Lopez   MRN:  [id number]    Recommendations:     Discharge Recommendations:  home   Discharge Equipment Recommendations: none   Barriers to discharge: None    Assessment:     Irlanda Lopez is a 79 y.o. female admitted with a medical diagnosis of Colitis. .  At this time, patient is functioning at their prior level of function and does not require further acute PT services.     Recent Surgery: * No surgery found *      Plan:     During this hospitalization, patient does not require further acute PT services.  Please re-consult if situation changes.      Subjective     Chief Complaint: NONE  Patient/Family Comments/goals: GO HOME  Pain/Comfort:  · Pain Rating 1: 0/10  · Pain Rating Post-Intervention 1: 0/10    Patients cultural, spiritual, Oriental orthodox conflicts given the current situation:      Living Environment:  PT LIVES AT HOME ALONE WITH 24 HOUR CAREGIVERS  Prior to admission, patients level of function was IND.  Equipment used at home:  .  DME owned (not currently used): none.  Upon discharge, patient will have assistance from SITTERS AND FAMILY.    Objective:     Communicated with NURSE HADLEY AND Epic CHART REVIEW prior to session.  Patient found supine with peripheral IV, telemetry upon PT entry to room.    General Precautions: Standard, fall   Orthopedic Precautions:N/A   Braces: N/A     Exams:  · Cognitive Exam:  Patient is oriented to Person, Time and Situation  · RLE ROM: WNL  · RLE Strength: WNL  · LLE ROM: WNL  · LLE Strength: WNL    Functional Mobility:  PT MET IN RM SUP.SIT EOB IND. PT SCOOTED TO EOB AND STOOD WITH NO AD AND GT TRAINED TO BATHROOM FOR TOILETING IND. PT STOOD FROM COMMODE AND GT TRAINED X 400' WITH NO LOB. PT RETURNED TO RM T/F TO CHAIR AND LEFT SEATED WITH ALL NEEDS MET AND DAUGHTER PRESENT.     AM-PAC 6 CLICK MOBILITY  Total Score:21     Patient left up in chair with call button in reach, chair alarm on  and FAMILY] present.    GOALS:   Multidisciplinary Problems     Physical Therapy Goals     Not on file                History:     Past Medical History:   Diagnosis Date    Diverticulitis     High cholesterol     Hypertension     Hypothyroidism        Past Surgical History:   Procedure Laterality Date    APPENDECTOMY  2008    CATARACT EXTRACTION      Dr Raygoza    COLON SURGERY      COLONOSCOPY N/A 1/2/2019    Procedure: COLONOSCOPY;  Surgeon: Reece Hogan MD;  Location: [facility] ENDO;  Service: Endoscopy;  Laterality: N/A;    COLONOSCOPY N/A 6/7/2019    Procedure: COLONOSCOPY;  Surgeon: Rishabh Connelly MD;  Location: [facility] ENDO;  Service: General;  Laterality: N/A;    COLOSTOMY Left 1/2/2019    Procedure: CREATION, COLOSTOMY;  Surgeon: Reece Hogan MD;  Location: [facility] OR;  Service: General;  Laterality: Left;    SHAHIDA PROCEDURE N/A 1/2/2019    Procedure: SHAHIDA PROCEDURE;  Surgeon: Reece Hogan MD;  Location: [facility] OR;  Service: General;  Laterality: N/A;    LYSIS OF ADHESIONS N/A 1/2/2019    Procedure: LYSIS, ADHESIONS;  Surgeon: Reece Hogan MD;  Location: [facility] OR;  Service: General;  Laterality: N/A;    VITRECTOMY Right 09/2013       Time Tracking:     PT Received On: 05/14/20  PT Start Time: 0710     PT Stop Time: 0740  PT Total Time (min): 30 min     Billable Minutes: Evaluation 15 and Gait Training 15      Shanice Thapa, PT  05/14/2020

## 2020-05-14 NOTE — PLAN OF CARE
CM received a call from [facility].  Patient set up with [facility][note 2].     05/14/20 6032   Post-Acute Status   Post-Acute Authorization Home [facility]   Home Health Status Set-up Complete

## 2020-05-14 NOTE — PLAN OF CARE
POC reviewed with pt. Pt verbalizes understanding of POC. No questions at this time.  AAOx3. NADN.  Sinus jeff on cardiac monitor.  Pt denies any belching or pain tonight.  Pt on clear liquid diet.  Pt remains free of falls tonight.  No complaints at this time.  Safety measures in place. Will continue to monitor.   Daughter at bedside.  Informed pt to call for assistance before getting up. Pt verbalizes understanding.

## 2020-05-14 NOTE — PLAN OF CARE
CM met with patient and caregiver (Gladys Stuart) at the bedside to discuss the discharge plan.  Patient is interested in having home health services.  CM explained that [facility] assigns the home health provider.  Choice form completed and placed in patient blue folder.  [employer] medical necessity form and order faxed to [facility] for assignment.  Patient does have 24/7 caregivers at home.  Her main caregiver is Gladys Stuart and can be reached at [phone].      CM provided a transitional care folder, information on advanced directives, information on pharmacy bedside delivery, and discharge planning begins on admission with contact information for any needs/questions.     05/14/20 1117   Discharge Reassessment   Assessment Type Discharge Planning Reassessment   Provided patient/caregiver education on the expected discharge date and the discharge plan Yes   Do you have any problems affording any of your prescribed medications? No   Discharge Plan A Home Health;Home   DME Needed Upon Discharge  none   Patient choice form signed by patient/caregiver Yes   Anticipated Discharge Disposition Home-Health   Can the patient/caregiver answer the patient profile reliably? Yes, cognitively intact

## 2020-05-15 LAB
ANION GAP SERPL CALC-SCNC: 8 MMOL/L (ref 8–16)
BASOPHILS # BLD AUTO: 0.04 K/UL (ref 0–0.2)
BASOPHILS NFR BLD: 0.6 % (ref 0–1.9)
BUN SERPL-MCNC: 11 MG/DL (ref 8–23)
CALCIUM SERPL-MCNC: 8.7 MG/DL (ref 8.7–10.5)
CHLORIDE SERPL-SCNC: 108 MMOL/L (ref 95–110)
CO2 SERPL-SCNC: 25 MMOL/L (ref 23–29)
CREAT SERPL-MCNC: 0.8 MG/DL (ref 0.5–1.4)
DIFFERENTIAL METHOD: ABNORMAL
EOSINOPHIL # BLD AUTO: 0.3 K/UL (ref 0–0.5)
EOSINOPHIL NFR BLD: 4.6 % (ref 0–8)
ERYTHROCYTE [DISTWIDTH] IN BLOOD BY AUTOMATED COUNT: 13.7 % (ref 11.5–14.5)
EST. GFR  (AFRICAN AMERICAN): >60 ML/MIN/1.73 M^2
EST. GFR  (NON AFRICAN AMERICAN): >60 ML/MIN/1.73 M^2
GLUCOSE SERPL-MCNC: 100 MG/DL (ref 70–110)
HCT VFR BLD AUTO: 36.1 % (ref 37–48.5)
HGB BLD-MCNC: 11.1 G/DL (ref 12–16)
IMM GRANULOCYTES # BLD AUTO: 0.03 K/UL (ref 0–0.04)
IMM GRANULOCYTES NFR BLD AUTO: 0.4 % (ref 0–0.5)
LYMPHOCYTES # BLD AUTO: 2 K/UL (ref 1–4.8)
LYMPHOCYTES NFR BLD: 29.9 % (ref 18–48)
MAGNESIUM SERPL-MCNC: 1.6 MG/DL (ref 1.6–2.6)
MCH RBC QN AUTO: 26.7 PG (ref 27–31)
MCHC RBC AUTO-ENTMCNC: 30.7 G/DL (ref 32–36)
MCV RBC AUTO: 87 FL (ref 82–98)
MONOCYTES # BLD AUTO: 0.8 K/UL (ref 0.3–1)
MONOCYTES NFR BLD: 11.3 % (ref 4–15)
NEUTROPHILS # BLD AUTO: 3.6 K/UL (ref 1.8–7.7)
NEUTROPHILS NFR BLD: 53.2 % (ref 38–73)
NRBC BLD-RTO: 0 /100 WBC
PHOSPHATE SERPL-MCNC: 2.9 MG/DL (ref 2.7–4.5)
PLATELET # BLD AUTO: 219 K/UL (ref 150–350)
PMV BLD AUTO: 10.2 FL (ref 9.2–12.9)
POTASSIUM SERPL-SCNC: 4 MMOL/L (ref 3.5–5.1)
RBC # BLD AUTO: 4.16 M/UL (ref 4–5.4)
SODIUM SERPL-SCNC: 141 MMOL/L (ref 136–145)
WBC # BLD AUTO: 6.72 K/UL (ref 3.9–12.7)

## 2020-05-15 PROCEDURE — G0378 HOSPITAL OBSERVATION PER HR: HCPCS

## 2020-05-15 PROCEDURE — 84100 ASSAY OF PHOSPHORUS: CPT

## 2020-05-15 PROCEDURE — 96375 TX/PRO/DX INJ NEW DRUG ADDON: CPT

## 2020-05-15 PROCEDURE — 25000003 PHARM REV CODE 250: Performed by: EMERGENCY MEDICINE

## 2020-05-15 PROCEDURE — 63700000 PHARM REV CODE 250 ALT 637 W/O HCPCS: Performed by: EMERGENCY MEDICINE

## 2020-05-15 PROCEDURE — 25000003 PHARM REV CODE 250: Performed by: NURSE PRACTITIONER

## 2020-05-15 PROCEDURE — 83735 ASSAY OF MAGNESIUM: CPT

## 2020-05-15 PROCEDURE — 25000003 PHARM REV CODE 250: Performed by: INTERNAL MEDICINE

## 2020-05-15 PROCEDURE — 25000003 PHARM REV CODE 250: Performed by: PHYSICIAN ASSISTANT

## 2020-05-15 PROCEDURE — 85025 COMPLETE CBC W/AUTO DIFF WBC: CPT

## 2020-05-15 PROCEDURE — 96376 TX/PRO/DX INJ SAME DRUG ADON: CPT

## 2020-05-15 PROCEDURE — 63600175 PHARM REV CODE 636 W HCPCS: Performed by: INTERNAL MEDICINE

## 2020-05-15 PROCEDURE — 80048 BASIC METABOLIC PNL TOTAL CA: CPT

## 2020-05-15 PROCEDURE — 36415 COLL VENOUS BLD VENIPUNCTURE: CPT

## 2020-05-15 RX ORDER — DIPHENHYDRAMINE HCL 25 MG
25 CAPSULE ORAL NIGHTLY PRN
Status: DISCONTINUED | OUTPATIENT
Start: 2020-05-15 | End: 2020-05-16 | Stop reason: HOSPADM

## 2020-05-15 RX ADMIN — DIPHENHYDRAMINE HYDROCHLORIDE 25 MG: 25 CAPSULE ORAL at 09:05

## 2020-05-15 RX ADMIN — POTASSIUM CHLORIDE 20 MEQ: 20 SOLUTION ORAL at 08:05

## 2020-05-15 RX ADMIN — LACTOBACILLUS TAB 4 TABLET: TAB at 06:05

## 2020-05-15 RX ADMIN — MEMANTINE 10 MG: 10 TABLET ORAL at 09:05

## 2020-05-15 RX ADMIN — ERTAPENEM 1 G: 1 INJECTION INTRAMUSCULAR; INTRAVENOUS at 08:05

## 2020-05-15 RX ADMIN — VANCOMYCIN HYDROCHLORIDE 250 MG: 250 POWDER, FOR SOLUTION ORAL at 05:05

## 2020-05-15 RX ADMIN — VANCOMYCIN HYDROCHLORIDE 250 MG: 250 POWDER, FOR SOLUTION ORAL at 12:05

## 2020-05-15 RX ADMIN — POTASSIUM CHLORIDE 20 MEQ: 20 SOLUTION ORAL at 09:05

## 2020-05-15 RX ADMIN — VANCOMYCIN HYDROCHLORIDE 250 MG: 250 POWDER, FOR SOLUTION ORAL at 11:05

## 2020-05-15 RX ADMIN — MEMANTINE 10 MG: 10 TABLET ORAL at 08:05

## 2020-05-15 RX ADMIN — VANCOMYCIN HYDROCHLORIDE 250 MG: 250 POWDER, FOR SOLUTION ORAL at 06:05

## 2020-05-15 RX ADMIN — LACTOBACILLUS TAB 4 TABLET: TAB at 12:05

## 2020-05-15 RX ADMIN — LACTOBACILLUS TAB 4 TABLET: TAB at 08:05

## 2020-05-15 RX ADMIN — ATORVASTATIN CALCIUM 10 MG: 10 TABLET, FILM COATED ORAL at 08:05

## 2020-05-15 RX ADMIN — LEVOTHYROXINE, LIOTHYRONINE 30 MG: 19; 4.5 TABLET ORAL at 09:05

## 2020-05-15 NOTE — NURSING
Discussed Cdiff results with both charge nurses, Jessica, which shows Antigen (+) but Toxin (-). They stated the patient does not need to be on enteric isolation precautions because the toxin portion of the test is negative. Will maintain standard precautions with this patient.

## 2020-05-15 NOTE — ASSESSMENT & PLAN NOTE
Urine Cx growing GNR, last month had E Coli which was ESBL positive, and resistant to Cipro  Await C/S  ID consulted  Pt on oral Vanc and probiotics     Continue Invanz, ID following

## 2020-05-15 NOTE — PROGRESS NOTES
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 Arias due to complaints abdominal pain and decreased output from her ostomy that began four days prior to presentation. There is associated belching, hiccups, nausea and one episode of emesis following contrast ingestion on the date of presentation. The patient denies fever, chills and blood in stool. The patient's daughter reports two prior episodes with similar symptoms. The patient was hospitalized from 4/12/20 to 4/13/20 for similar symptoms which were thought to be due to gastroenteritis and symptoms improved with supportive care. Stool culture and E. Coli were negative on Today, CT scan shows similar findings to one performed on 4/12/20 including prominence of the proximal to mid colon with focal caliber changed noted within the left lower quadrant as well as persistent pericolonoic fat stranding and wall thickening suggestive of nonspecific colitis. Labs were unremarkable. Code status was discussed with the patient and her daughter. She is a full code. Her four children are her surrogate medical decision makers.     Overview/Hospital Course:  Irlanda Lopez is a 79 year old female who is s/p Maribel's pouch and left lower quadrant colostomy in January of 2019 for diverticulitis sent to ER with c/o abdominal pain and decreased output from her ostomy that began four days PTA, associated w belching, hiccups, nausea and one episode of emesis following contrast ingestion to mid colon with focal  caliber changed noted within the left lower quadrant as well as persistent pericolonoic fat stranding and wall thickening suggestive of nonspecific colitis. Pt started on IV Rocephin and Flagyl. She responded very well and had a large BM early this am and again a few hrs later with gas in the colostomy bag. She had not had a BM x 4 days before. She is tolerating CLD well and walking around well- so diet advanced this evening. Dr. Connelly also following the pt.   5/14- looks and feels lot better, wants to go home. Daughter stayed with her overnight, having regular BM into her colostomy bag. Eating drinking well, walking around well. Stool tested positive overnite for C diff and her Urine Cx also growing GNR. She had ESBL positive E coli last month treated with Abx. Which likely gave her the C diff. Started on oral Vanc and Cipro and Flagyl both d/david. ID and GI consulted and probiotics started.   5/15- Appreciate Dr. Lopez, pt doing well, remains afebrile, eating drinking well. No abd pain, NVD, no fever or chills, no Leukocytosis. Urine Cx growing ESBL Positive E coli again, ID has started her on Invanz once daily and continued oral Vanc. Will need home infusion services to complete the course of Invanz but will d/w Dr. Lopez about it.     Interval History: Appreciate Dr. Lopez, pt doing well, remains afebrile, eating drinking well. No abd pain, NVD, no fever or chills, no Leukocytosis. Urine Cx growing ESBL Positive E coli again, ID has started her on Invanz once daily and continued oral Vanc. Will need home infusion services to complete the course of Invanz but will d/w Dr. Lopez about it.     Review of Systems   Constitutional: Negative for activity change, appetite change, chills, fatigue, fever and unexpected weight change.   HENT: Negative for congestion, ear pain, sore throat and trouble swallowing.    Eyes: Negative for pain, redness and itching.   Respiratory: Negative for cough, choking, shortness of breath  and wheezing.    Cardiovascular: Negative for chest pain, palpitations and leg swelling.   Gastrointestinal: Negative for abdominal distention, abdominal pain, anal bleeding, blood in stool, constipation, diarrhea, nausea, rectal pain and vomiting.   Endocrine: Negative for cold intolerance, heat intolerance and polyuria.   Genitourinary: Negative for dysuria, flank pain, frequency and hematuria.   Musculoskeletal: Negative for gait problem, joint swelling and neck pain.   Skin: Negative for color change, rash and wound.   Allergic/Immunologic: Negative for environmental allergies and immunocompromised state.   Neurological: Negative for dizziness, speech difficulty, weakness and numbness.   Psychiatric/Behavioral: Negative for agitation, confusion and hallucinations.     Objective:     Vital Signs (Most Recent):  Temp: 98.1 °F (36.7 °C) (05/15/20 1149)  Pulse: (!) 50 (05/15/20 1149)  Resp: 18 (05/15/20 1149)  BP: (!) 140/69 (05/15/20 1149)  SpO2: 95 % (05/15/20 1149) Vital Signs (24h Range):  Temp:  [97.6 °F (36.4 °C)-98.4 °F (36.9 °C)] 98.1 °F (36.7 °C)  Pulse:  [47-66] 50  Resp:  [16-18] 18  SpO2:  [93 %-95 %] 95 %  BP: (112-140)/(59-70) 140/69     Weight: 79.1 kg (174 lb 6.1 oz)  Body mass index is 31.39 kg/m².    Intake/Output Summary (Last 24 hours) at 5/15/2020 1538  Last data filed at 5/15/2020 0500  Gross per 24 hour   Intake 358 ml   Output 850 ml   Net -492 ml      Physical Exam   Constitutional: She is oriented to person, place, and time. She appears well-developed.   HENT:   Head: Normocephalic and atraumatic.   Eyes: Conjunctivae and EOM are normal.   Neck: Normal range of motion. No thyromegaly present.   Cardiovascular: Normal rate and regular rhythm.   Pulmonary/Chest: Effort normal. No respiratory distress.   Abdominal: Soft. She exhibits no distension and no mass. There is no tenderness.   Ostomy pink and viable with soft stool in bag; well-healed midline incision; +soft, reducible parastomal  hernia   Musculoskeletal: Normal range of motion. She exhibits no edema or tenderness.   Neurological: She is alert and oriented to person, place, and time.   Skin: Skin is warm and dry. Capillary refill takes less than 2 seconds. No rash noted.   Psychiatric: She has a normal mood and affect.   Nursing note and vitals reviewed.      Significant Labs:   Blood Culture: No results for input(s): LABBLOO in the last 48 hours.  BMP:   Recent Labs   Lab 05/15/20  0417         K 4.0      CO2 25   BUN 11   CREATININE 0.8   CALCIUM 8.7   MG 1.6     CBC:   Recent Labs   Lab 05/14/20  0449 05/15/20  0417   WBC 6.58 6.72   HGB 11.3* 11.1*   HCT 35.4* 36.1*    219     Urine Culture:  Urine culture [[id number]] (Abnormal)  Collected: 05/12/20 0952   Order Status: Completed Specimen: Urine, Clean Catch Updated: 05/15/20 1459    Urine Culture, Routine ESCHERICHIA COLI ESBL   >100,000 cfu/ml   Abnormal    Susceptibility      Escherichia coli esbl     CULTURE, URINE     Amikacin <=16 mcg/mL Sensitive     Amox/K Clav'ate >16/8 mcg/mL Resistant     Amp/Sulbactam >16/8 mcg/mL Resistant     Ampicillin >16 mcg/mL Resistant     Cefazolin >16 mcg/mL Resistant     Cefepime >16 mcg/mL Resistant     Ceftriaxone >32 mcg/mL Resistant     Ciprofloxacin >2 mcg/mL Resistant     Ertapenem <=0.5 mcg/mL Sensitive     Gentamicin >8 mcg/mL Resistant     Levofloxacin >4 mcg/mL Resistant     Meropenem <=1 mcg/mL Sensitive     Nitrofurantoin <=32 mcg/mL Sensitive     Piperacillin/Tazo 64 mcg/mL Intermediate     Tetracycline >8 mcg/mL Resistant     Tobramycin >8 mcg/mL Resistant     Trimeth/Sulfa <=2/38 mcg/mL Sensitive                         All pertinent labs within the past 24 hours have been reviewed.  Myla Arias MD on 5/12/2020 12:57   CT Abdomen Pelvis With Contrast   Order: [id number]   Status:  Final result   Visible to patient:  Yes (Patient Portal) Next appt:  05/28/2020 at 11:20 AM in Internal Medicine (Myla MAC  MD [place]) Dx:  Abdominal distension   Details     Reading Physician Reading Date Result Priority   Beto Light MD 5/12/2020 STAT      Narrative     EXAMINATION:  CT ABDOMEN PELVIS WITH CONTRAST    CLINICAL HISTORY:  Abdominal distension;Diverticulitis, known, follow up;rule out partial obstruction, 5 days, n/v known colostomy; Abdominal distension (gaseous)    TECHNIQUE:  Low dose axial images, sagittal and coronal reformations were obtained from the lung bases to the pubic symphysis following the IV administration of 100 mL of Omnipaque 350 and the oral administration of 30 mL of Omnipaque 350.    COMPARISON:  Multiple prior CT exams, most recent 04/12/2020    FINDINGS:  Visualized lower chest is unremarkable.    Within the abdomen, the liver and spleen are unremarkable. Pancreas is normal. Adrenal glands are unremarkable. Gallbladder normal. No biliary dilatation.    Kidneys are unchanged.  Large right renal pelvic calcification again noted with bilateral renal parenchymal scarring..    Stomach is unremarkable.  Small bowel is nonobstructive.  Oral contrast extends throughout the small bowel and into the proximal colon.  Left lower quadrant colostomy noted with peristomal herniated bowel loops.  There is persistent fat stranding surrounding the colon within the left lower quadrant.  Focal caliber change is similar to prior study suggesting at least partial obstruction or stenosis from likely postsurgical adhesion.  Similar soft tissue attenuation, presumed scarring noted interposed between small bowel loops in the uterine fundus which is associated with suspected fistula on the 06/26/2019 study.  No drainable abscess appreciated.    Urinary bladder demonstrates small foci of air anteriorly, correlate for recent catheterization.  Intrapelvic structures otherwise unchanged.    No acute osseous abnormality.      Impression       1. No evidence of small-bowel obstruction.  2. There is prominence of the  proximal/mid colon with focal caliber change noted within the left lower quadrant, similar to prior study.  Persistent pericolonic fat stranding and wall thickening suggesting nonspecific colitis most prevalent at the area of caliber change.  Partial obstruction from adhesion possible.  Correlate with colostomy output.  No evidence of drainable abscess.  3. Other findings within the abdomen/pelvis similar to prior studies.      Electronically signed by: Beto Light MD  Date: 05/12/2020             Significant Imaging: I have reviewed all pertinent imaging results/findings within the past 24 hours.      Assessment/Plan:      * C Diff Colitis  -Empiric Cipro and Flagyl.   -Check C. difficile.  -Supportive care.   -General Surgery consult due to recurrent nature of symptoms and abnormal CT findings.    Acute non specific Colitis- getting better, no bowel obstruction  Will continue present care  Advance diet as tolerated  D/c soon    Placed in isolation  C Diff likely sec to Abx for ESBL E Coli last month  Start Oral vanc, ID consulted    Now on oral Vanc and IV Invanz    Acute ESBL Positive E Coli UTI  Urine Cx growing GNR, last month had E Coli which was ESBL positive, and resistant to Cipro  Await C/S  ID consulted  Pt on oral Vanc and probiotics     Continue Invanz, ID following        Moderate malnutrition  Improving,     Eating drinking well    Hyperlipidemia  Continue statin.       Mild cognitive impairment with memory loss  -Continue Namenda.   -Hold Aricept due to side effect of increased QT.     Stable, has 24 hours care giver at home      Hypothyroidism  -TSH WNL.   -Continue [place].         VTE Risk Mitigation (From admission, onward)         Ordered     Place sequential compression device  Until discontinued      05/12/20 2027                      Vanessa Shipley MD  Department of Hospital Medicine   [facility]

## 2020-05-15 NOTE — SUBJECTIVE & OBJECTIVE
Past Medical History:   Diagnosis Date    Diverticulitis     High cholesterol     Hypertension     Hypothyroidism        Past Surgical History:   Procedure Laterality Date    APPENDECTOMY  2008    CATARACT EXTRACTION      Dr Raygoza    COLON SURGERY      COLONOSCOPY N/A 1/2/2019    Procedure: COLONOSCOPY;  Surgeon: Reece Hogan MD;  Location: [facility] ENDO;  Service: Endoscopy;  Laterality: N/A;    COLONOSCOPY N/A 6/7/2019    Procedure: COLONOSCOPY;  Surgeon: Rishabh Connelly MD;  Location: [facility] ENDO;  Service: General;  Laterality: N/A;    COLOSTOMY Left 1/2/2019    Procedure: CREATION, COLOSTOMY;  Surgeon: Reece Hogan MD;  Location: [facility] OR;  Service: General;  Laterality: Left;    SHAHIDA PROCEDURE N/A 1/2/2019    Procedure: SHAHIDA PROCEDURE;  Surgeon: Reece Hogan MD;  Location: [facility] OR;  Service: General;  Laterality: N/A;    LYSIS OF ADHESIONS N/A 1/2/2019    Procedure: LYSIS, ADHESIONS;  Surgeon: Reeec Hogan MD;  Location: [facility] OR;  Service: General;  Laterality: N/A;    VITRECTOMY Right 09/2013       Review of patient's allergies indicates:  No Known Allergies    Medications:  Facility-Administered Medications Prior to Admission   Medication    lidocaine (PF) 10 mg/ml (1%) injection 10 mg     Medications Prior to Admission   Medication Sig    atorvastatin (LIPITOR) 10 MG tablet Take 1 tablet (10 mg total) by mouth once daily.    ciprofloxacin 250 mg/5 ml (CIPRO) Take 10 mLs (500 mg total) by mouth 2 (two) times daily. for 20 doses    donepezil (ARICEPT) 10 MG tablet Take 1 tablet (10 mg total) by mouth every evening.    furosemide (LASIX) 20 MG tablet Take 1 tablet (20 mg total) by mouth once daily.    memantine (NAMENDA) 10 MG Tab TAKE 1 TABLET TWICE A DAY    thyroid, pork, (ARMOUR THYROID) 30 mg Tab Take 1 tablet (30 mg total) by mouth once daily. (Patient taking differently: Take 30 mg by mouth nightly. )    triamcinolone acetonide 0.1% (KENALOG) 0.1 % ointment APPLY TOPICALLY 4 (FOUR)  TIMES DAILY. TO RASH ON FACE AND CHEST AND NOSE (Patient taking differently: Apply topically 4 (four) times daily as needed. APPLY TOPICALLY 4 (FOUR) TIMES DAILY. TO RASH ON FACE AND CHEST AND NOSE)     Antibiotics (From admission, onward)    Start     Stop Route Frequency Ordered    05/15/20 0745  ertapenem (INVANZ) 1 g in sodium chloride 0.9 % 100 mL IVPB (ready to mix system)      -- IV Every 24 hours (non-standard times) 05/15/20 0634    05/14/20 1200  vancomycin oral solution 250 mg/5 mL      -- Oral Every 6 hours 05/14/20 0928        Antifungals (From admission, onward)    None        Antivirals (From admission, onward)    None           Immunization History   Administered Date(s) Administered    Influenza - High Dose - PF (65 years and older) 09/26/2018, 10/01/2019       Family History     Problem Relation (Age of Onset)    Alzheimer's disease Mother    Aneurysm Father    Stomach cancer Brother        Social History     Socioeconomic History    Marital status:      Spouse name: Not on file    Number of children: Not on file    Years of education: Not on file    Highest education level: Not on file   Occupational History     Comment: [employer]   Social Needs    Financial resource strain: Not on file    Food insecurity:     Worry: Not on file     Inability: Not on file    Transportation needs:     Medical: Not on file     Non-medical: Not on file   Tobacco Use    Smoking status: Former Smoker    Smokeless tobacco: Never Used   Substance and Sexual Activity    Alcohol use: No    Drug use: No    Sexual activity: Never     Comment:    Lifestyle    Physical activity:     Days per week: Not on file     Minutes per session: Not on file    Stress: Not on file   Relationships    Social connections:     Talks on phone: Not on file     Gets together: Not on file     Attends Buddhism service: Not on file     Active member of club or organization: Not on file     Attends meetings of  clubs or organizations: Not on file     Relationship status: Not on file   Other Topics Concern    Not on file   Social History Narrative    Not on file     Review of Systems   Constitutional: Negative for activity change, appetite change, chills, fatigue, fever and unexpected weight change.   HENT: Negative for congestion, ear pain, sore throat and trouble swallowing.    Eyes: Negative for pain, redness and itching.   Respiratory: Negative for cough, choking, shortness of breath and wheezing.    Cardiovascular: Negative for chest pain, palpitations and leg swelling.   Gastrointestinal: Negative for abdominal distention, abdominal pain, anal bleeding, blood in stool, constipation, diarrhea, nausea, rectal pain and vomiting.   Endocrine: Negative for cold intolerance, heat intolerance and polyuria.   Genitourinary: Negative for dysuria, flank pain, frequency and hematuria.   Musculoskeletal: Negative for gait problem, joint swelling and neck pain.   Skin: Negative for color change, rash and wound.   Allergic/Immunologic: Negative for environmental allergies and immunocompromised state.   Neurological: Negative for dizziness, speech difficulty, weakness and numbness.   Psychiatric/Behavioral: Negative for agitation, confusion and hallucinations.     Objective:     Vital Signs (Most Recent):  Temp: 97.8 °F (36.6 °C) (05/15/20 0306)  Pulse: (!) 47 (05/15/20 0500)  Resp: 18 (05/15/20 0306)  BP: 131/68 (05/15/20 0306)  SpO2: (!) 94 % (05/15/20 0306) Vital Signs (24h Range):  Temp:  [96.4 °F (35.8 °C)-98.4 °F (36.9 °C)] 97.8 °F (36.6 °C)  Pulse:  [47-66] 47  Resp:  [18] 18  SpO2:  [93 %-98 %] 94 %  BP: (105-131)/(59-68) 131/68     Weight: 79.1 kg (174 lb 6.1 oz)  Body mass index is 31.39 kg/m².    Estimated Creatinine Clearance: 57.1 mL/min (based on SCr of 0.8 mg/dL).    Physical Exam   Constitutional: She is oriented to person, place, and time. She appears well-developed.   HENT:   Head: Normocephalic and atraumatic.    Eyes: Conjunctivae and EOM are normal.   Neck: Normal range of motion. No thyromegaly present.   Cardiovascular: Normal rate and regular rhythm.   Pulmonary/Chest: Effort normal. No respiratory distress.   Abdominal: Soft. She exhibits no distension and no mass. There is no tenderness.   Ostomy pink and viable with soft stool in bag; well-healed midline incision; +soft, reducible parastomal hernia   Musculoskeletal: Normal range of motion. She exhibits no edema or tenderness.   Neurological: She is alert and oriented to person, place, and time.   Skin: Skin is warm and dry. Capillary refill takes less than 2 seconds. No rash noted.   Psychiatric: She has a normal mood and affect.   Nursing note and vitals reviewed.      Significant Labs:   Blood Culture:   Recent Labs   Lab 04/13/20  0020 04/13/20  0040 05/12/20  1727 05/12/20  1757   LABBLOO No Growth after 4 days.  No Growth after 4 days.  No Growth to date  No Growth to date  No Growth to date No Growth to date  No Growth to date  No Growth to date     BMP:   Recent Labs   Lab 05/15/20  0417         K 4.0      CO2 25   BUN 11   CREATININE 0.8   CALCIUM 8.7   MG 1.6     Urine Culture:   Recent Labs   Lab 04/12/20 2207 05/12/20  0952   LABURIN ESCHERICHIA COLI ESBL  >100,000 cfu/ml  * GRAM NEGATIVE AXEL  >100,000 cfu/ml  Identification and susceptibility pending  *     Urine Studies:   Recent Labs   Lab 04/12/20 2207 05/12/20  0957   COLORU Yellow yellow   APPEARANCEUA Clear  --    PHUR 7.0 5   SPECGRAV 1.010 1.015   PROTEINUA Negative  --    GLUCUA Negative  --    KETONESU Negative negative   BILIRUBINUA Negative  --    OCCULTUA 1+*  --    NITRITE Negative positive   UROBILINOGEN Negative normal   LEUKOCYTESUR 3+*  --    RBCUA 10*  --    WBCUA >100*  --    BACTERIA Moderate*  --    SQUAMEPITHEL 2  --      All pertinent labs within the past 24 hours have been reviewed.    Significant Imaging: I have reviewed all pertinent imaging  results/findings within the past 24 hours.

## 2020-05-15 NOTE — PLAN OF CARE
Recommendations    Recommendation:     1. Continue Cardiac diet. Recommend Low fiber, [place] Diet if symptoms of colitis worsen.  2. Consider ordering Boost Breeze if client intake declines.   3. RD to f/u  Goals: Meet % energy needs by RD f/u  Nutrition Goal Status: New  Communication of RD Recs: KRISTAL Kaur RD, LDN

## 2020-05-15 NOTE — PROGRESS NOTES
"[facility]   Adult Nutrition  Progress Note    SUMMARY       Recommendations    Recommendation:   1. Continue Cardiac diet. Recommend Low fiber, [place] Diet if symptoms of colitis worsen.  2. Consider ordering Boost Breeze if client intake declines.   3. RD to f/u  Goals: Meet % energy needs by RD f/u  Nutrition Goal Status: New  Communication of RD Recs: POC    Reason for Assessment    Reason For Assessment: consult  Diagnosis: C. Diff Colitis, Acute UTI, Mild cognitive impairment with memory loss, hypothyroidism  Relevant Medical History: Ostomy  General Information Comments:  5/15/20  Patient with good PO intake; 100% of meals and maintaining weight throughout medical course. Will continue to follow.    Nutrition Discharge Planning: Low fiber, [place] Diet    Nutrition Risk Screen    Nutrition Risk Screen: no indicators present    Nutrition/Diet History    Spiritual, Cultural Beliefs, Jewish Practices, Values that Affect Care: no    Anthropometrics    Temp: 98.1 °F (36.7 °C)  Height Method: Stated  Height: 5' 2.5" (158.8 cm)  Height (inches): 62.5 in  Weight Method: Bed Scale  Weight: 79.1 kg (174 lb 6.1 oz)  Weight (lb): 174.39 lb  Ideal Body Weight (IBW), Female: 112.5 lb  % Ideal Body Weight, Female (lb): 149.72 %  BMI (Calculated): 31.4       Lab/Procedures/Meds    Pertinent Labs Reviewed: reviewed  BMP  Lab Results   Component Value Date     05/15/2020    K 4.0 05/15/2020     05/15/2020    CO2 25 05/15/2020    BUN 11 05/15/2020    CREATININE 0.8 05/15/2020    CALCIUM 8.7 05/15/2020    ANIONGAP 8 05/15/2020    ESTGFRAFRICA >60 05/15/2020    EGFRNONAA >60 05/15/2020     Lab Results   Component Value Date    CALCIUM 8.7 05/15/2020    PHOS 2.9 05/15/2020     Lab Results   Component Value Date    ALBUMIN 3.5 05/12/2020       Pertinent Medications Reviewed: reviewed    Physical Findings/Assessment     ASIF    Estimated/Assessed Needs    Weight Used For Calorie Calculations: 76.3 kg " (168 lb 3.4 oz)  Energy Calorie Requirements (kcal): 1444  Energy Need Method: [place]-St Jeor(x 1.2)  Protein Requirements: 76- 84g  Weight Used For Protein Calculations: 76.3 kg (168 lb 3.4 oz)     Estimated Fluid Requirement Method: RDA Method(or per MD)  RDA Method (mL): 1444     Nutrition Prescription Ordered    Current Diet Order: Cardiac diet    Evaluation of Received Nutrient/Fluid Intake     % Intake of Estimated Energy Needs: 75 - 100 %  % Meal Intake: 75 - 100 %     Intake/Output Summary (Last 24 hours) at 5/15/2020 1343  Last data filed at 5/15/2020 0500  Gross per 24 hour   Intake 358 ml   Output 850 ml   Net -492 ml       Nutrition Risk    Level of Risk/Frequency of Follow-up: high     Assessment and Plan    Moderate malnutrition  Nutrition Problem:  Moderate Protein-Calorie Malnutrition  Malnutrition in the context of Acute Illness/Injury    Related to (etiology):  Physiological causes resulting in anorexia or diminished intake    Signs and Symptoms (as evidenced by):  Energy Intake: <75% of estimated energy requirement for 1 week  Weight Loss: 6% x 1 month (179lbs on 4/12/20, 168lbs currently)    Interventions(treatment strategy):  Collaboration with other providers    Nutrition Diagnosis Status:  Resolved    Monitor and Evaluation    Food and Nutrient Intake: food and beverage intake  Food and Nutrient Adminstration: diet order  Anthropometric Measurements: weight, weight change  Biochemical Data, Medical Tests and Procedures: electrolyte and renal panel, gastrointestinal profile, glucose/endocrine profile  Nutrition-Focused Physical Findings: overall appearance     Malnutrition Assessment        Nutrition Follow-Up    RD Follow-up?: Yes    Danielle Kaur RD, LDN

## 2020-05-15 NOTE — CONSULTS
[facility]  Infectious Disease  Consult Note    Patient Name: Irlanda Lopez  MRN: [id number]  Admission Date: 5/12/2020  Hospital Length of Stay: 0 days  Attending Physician: Vanessa Shipley MD  Primary Care Provider: Myla Arias MD     Isolation Status: No active isolations    Patient information was obtained from patient, relative(s), past medical records and ER records.      Consults  Assessment/Plan:     * C Diff Colitis  Will continue po vancomycin for 10 days .  Contact isolation      Acute UTI  Will use IV Ertapenem based on historic cultures.  Follow final ID of GNR in urine    Mild cognitive impairment with memory loss  Continue memantine    Hypothyroidism  Will continue thyroid tablets         Thank you for your consult. I will follow-up with patient. Please contact us if you have any additional questions.    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 Arias due to complaints abdominal pain and decreased output from her ostomy that began four days prior to presentation. CT scan of the abdomen showed-prominence of the proximal/mid colon with focal caliber change noted within the left lower quadrant, similar to prior study.  Persistent pericolonic fat stranding and wall thickening suggesting nonspecific colitis most prevalent at the area of caliber change.  All cultures and lab results reviewed.  05/12- urine -GNR  C difficile -positive  04/12- ESBL -UTI    Past Medical History:   Diagnosis Date    Diverticulitis     High cholesterol     Hypertension     Hypothyroidism        Past Surgical History:   Procedure Laterality Date    APPENDECTOMY  2008    CATARACT EXTRACTION      Dr Raygoza    COLON SURGERY      COLONOSCOPY N/A 1/2/2019     Procedure: COLONOSCOPY;  Surgeon: Reece Hogan MD;  Location: [facility] ENDO;  Service: Endoscopy;  Laterality: N/A;    COLONOSCOPY N/A 6/7/2019    Procedure: COLONOSCOPY;  Surgeon: Rishabh Connelly MD;  Location: [facility] ENDO;  Service: General;  Laterality: N/A;    COLOSTOMY Left 1/2/2019    Procedure: CREATION, COLOSTOMY;  Surgeon: Reece Hogan MD;  Location: [facility] OR;  Service: General;  Laterality: Left;    SHAHIDA PROCEDURE N/A 1/2/2019    Procedure: SHAHIDA PROCEDURE;  Surgeon: Reece Hogan MD;  Location: [facility] OR;  Service: General;  Laterality: N/A;    LYSIS OF ADHESIONS N/A 1/2/2019    Procedure: LYSIS, ADHESIONS;  Surgeon: Reece Hogan MD;  Location: [facility] OR;  Service: General;  Laterality: N/A;    VITRECTOMY Right 09/2013       Review of patient's allergies indicates:  No Known Allergies    Medications:  Facility-Administered Medications Prior to Admission   Medication    lidocaine (PF) 10 mg/ml (1%) injection 10 mg     Medications Prior to Admission   Medication Sig    atorvastatin (LIPITOR) 10 MG tablet Take 1 tablet (10 mg total) by mouth once daily.    ciprofloxacin 250 mg/5 ml (CIPRO) Take 10 mLs (500 mg total) by mouth 2 (two) times daily. for 20 doses    donepezil (ARICEPT) 10 MG tablet Take 1 tablet (10 mg total) by mouth every evening.    furosemide (LASIX) 20 MG tablet Take 1 tablet (20 mg total) by mouth once daily.    memantine (NAMENDA) 10 MG Tab TAKE 1 TABLET TWICE A DAY    thyroid, pork, (ARMOUR THYROID) 30 mg Tab Take 1 tablet (30 mg total) by mouth once daily. (Patient taking differently: Take 30 mg by mouth nightly. )    triamcinolone acetonide 0.1% (KENALOG) 0.1 % ointment APPLY TOPICALLY 4 (FOUR) TIMES DAILY. TO RASH ON FACE AND CHEST AND NOSE (Patient taking differently: Apply topically 4 (four) times daily as needed. APPLY TOPICALLY 4 (FOUR) TIMES DAILY. TO RASH ON FACE AND CHEST AND NOSE)     Antibiotics (From admission, onward)    Start     Stop Route Frequency Ordered     05/15/20 0745  ertapenem (INVANZ) 1 g in sodium chloride 0.9 % 100 mL IVPB (ready to mix system)      -- IV Every 24 hours (non-standard times) 05/15/20 0634    05/14/20 1200  vancomycin oral solution 250 mg/5 mL      -- Oral Every 6 hours 05/14/20 0928        Antifungals (From admission, onward)    None        Antivirals (From admission, onward)    None           Immunization History   Administered Date(s) Administered    Influenza - High Dose - PF (65 years and older) 09/26/2018, 10/01/2019       Family History     Problem Relation (Age of Onset)    Alzheimer's disease Mother    Aneurysm Father    Stomach cancer Brother        Social History     Socioeconomic History    Marital status:      Spouse name: Not on file    Number of children: Not on file    Years of education: Not on file    Highest education level: Not on file   Occupational History     Comment: [employer]   Social Needs    Financial resource strain: Not on file    Food insecurity:     Worry: Not on file     Inability: Not on file    Transportation needs:     Medical: Not on file     Non-medical: Not on file   Tobacco Use    Smoking status: Former Smoker    Smokeless tobacco: Never Used   Substance and Sexual Activity    Alcohol use: No    Drug use: No    Sexual activity: Never     Comment:    Lifestyle    Physical activity:     Days per week: Not on file     Minutes per session: Not on file    Stress: Not on file   Relationships    Social connections:     Talks on phone: Not on file     Gets together: Not on file     Attends Faith service: Not on file     Active member of club or organization: Not on file     Attends meetings of clubs or organizations: Not on file     Relationship status: Not on file   Other Topics Concern    Not on file   Social History Narrative    Not on file     Review of Systems   Constitutional: Negative for activity change, appetite change, chills, fatigue, fever and unexpected  weight change.   HENT: Negative for congestion, ear pain, sore throat and trouble swallowing.    Eyes: Negative for pain, redness and itching.   Respiratory: Negative for cough, choking, shortness of breath and wheezing.    Cardiovascular: Negative for chest pain, palpitations and leg swelling.   Gastrointestinal: Negative for abdominal distention, abdominal pain, anal bleeding, blood in stool, constipation, diarrhea, nausea, rectal pain and vomiting.   Endocrine: Negative for cold intolerance, heat intolerance and polyuria.   Genitourinary: Negative for dysuria, flank pain, frequency and hematuria.   Musculoskeletal: Negative for gait problem, joint swelling and neck pain.   Skin: Negative for color change, rash and wound.   Allergic/Immunologic: Negative for environmental allergies and immunocompromised state.   Neurological: Negative for dizziness, speech difficulty, weakness and numbness.   Psychiatric/Behavioral: Negative for agitation, confusion and hallucinations.     Objective:     Vital Signs (Most Recent):  Temp: 97.8 °F (36.6 °C) (05/15/20 0306)  Pulse: (!) 47 (05/15/20 0500)  Resp: 18 (05/15/20 0306)  BP: 131/68 (05/15/20 0306)  SpO2: (!) 94 % (05/15/20 0306) Vital Signs (24h Range):  Temp:  [96.4 °F (35.8 °C)-98.4 °F (36.9 °C)] 97.8 °F (36.6 °C)  Pulse:  [47-66] 47  Resp:  [18] 18  SpO2:  [93 %-98 %] 94 %  BP: (105-131)/(59-68) 131/68     Weight: 79.1 kg (174 lb 6.1 oz)  Body mass index is 31.39 kg/m².    Estimated Creatinine Clearance: 57.1 mL/min (based on SCr of 0.8 mg/dL).    Physical Exam   Constitutional: She is oriented to person, place, and time. She appears well-developed.   HENT:   Head: Normocephalic and atraumatic.   Eyes: Conjunctivae and EOM are normal.   Neck: Normal range of motion. No thyromegaly present.   Cardiovascular: Normal rate and regular rhythm.   Pulmonary/Chest: Effort normal. No respiratory distress.   Abdominal: Soft. She exhibits no distension and no mass. There is no  tenderness.   Ostomy pink and viable with soft stool in bag; well-healed midline incision; +soft, reducible parastomal hernia   Musculoskeletal: Normal range of motion. She exhibits no edema or tenderness.   Neurological: She is alert and oriented to person, place, and time.   Skin: Skin is warm and dry. Capillary refill takes less than 2 seconds. No rash noted.   Psychiatric: She has a normal mood and affect.   Nursing note and vitals reviewed.      Significant Labs:   Blood Culture:   Recent Labs   Lab 04/13/20  0020 04/13/20  0040 05/12/20  1727 05/12/20  1757   LABBLOO No Growth after 4 days.  No Growth after 4 days.  No Growth to date  No Growth to date  No Growth to date No Growth to date  No Growth to date  No Growth to date     BMP:   Recent Labs   Lab 05/15/20  0417         K 4.0      CO2 25   BUN 11   CREATININE 0.8   CALCIUM 8.7   MG 1.6     Urine Culture:   Recent Labs   Lab 04/12/20 2207 05/12/20  0952   LABURIN ESCHERICHIA COLI ESBL  >100,000 cfu/ml  * GRAM NEGATIVE AXEL  >100,000 cfu/ml  Identification and susceptibility pending  *     Urine Studies:   Recent Labs   Lab 04/12/20 2207 05/12/20  0957   COLORU Yellow yellow   APPEARANCEUA Clear  --    PHUR 7.0 5   SPECGRAV 1.010 1.015   PROTEINUA Negative  --    GLUCUA Negative  --    KETONESU Negative negative   BILIRUBINUA Negative  --    OCCULTUA 1+*  --    NITRITE Negative positive   UROBILINOGEN Negative normal   LEUKOCYTESUR 3+*  --    RBCUA 10*  --    WBCUA >100*  --    BACTERIA Moderate*  --    SQUAMEPITHEL 2  --      All pertinent labs within the past 24 hours have been reviewed.    Significant Imaging: I have reviewed all pertinent imaging results/findings within the past 24 hours.

## 2020-05-15 NOTE — PLAN OF CARE
NEURO: AAO. Short term memory loss at times  PAIN: No c/o this shift  CARDIAC:  RESP: SB on monitor #8636  MUSCULO: no deficits  GI/: Continent of bladder. Pt with colostomy  SKIN: Intact  SAFETY: Bed low/locked, SR up x2, CB in reach, Daughter at bedside  POC: Reviewed with pt and daughter  HOURLY ROUNDING COMPLETE  WILL CONTINUE TO MONITOR

## 2020-05-15 NOTE — ASSESSMENT & PLAN NOTE
-Empiric Cipro and Flagyl.   -Check C. difficile.  -Supportive care.   -General Surgery consult due to recurrent nature of symptoms and abnormal CT findings.    Acute non specific Colitis- getting better, no bowel obstruction  Will continue present care  Advance diet as tolerated  D/c soon    Placed in isolation  C Diff likely sec to Abx for ESBL E Coli last month  Start Oral vanc, ID consulted    Now on oral Vanc and IV Invanz

## 2020-05-15 NOTE — SUBJECTIVE & OBJECTIVE
Interval History: Appreciate Dr. Lopez, pt doing well, remains afebrile, eating drinking well. No abd pain, NVD, no fever or chills, no Leukocytosis. Urine Cx growing ESBL Positive E coli again, ID has started her on Invanz once daily and continued oral Vanc. Will need home infusion services to complete the course of Invanz but will d/w Dr. Lopez about it.     Review of Systems   Constitutional: Negative for activity change, appetite change, chills, fatigue, fever and unexpected weight change.   HENT: Negative for congestion, ear pain, sore throat and trouble swallowing.    Eyes: Negative for pain, redness and itching.   Respiratory: Negative for cough, choking, shortness of breath and wheezing.    Cardiovascular: Negative for chest pain, palpitations and leg swelling.   Gastrointestinal: Negative for abdominal distention, abdominal pain, anal bleeding, blood in stool, constipation, diarrhea, nausea, rectal pain and vomiting.   Endocrine: Negative for cold intolerance, heat intolerance and polyuria.   Genitourinary: Negative for dysuria, flank pain, frequency and hematuria.   Musculoskeletal: Negative for gait problem, joint swelling and neck pain.   Skin: Negative for color change, rash and wound.   Allergic/Immunologic: Negative for environmental allergies and immunocompromised state.   Neurological: Negative for dizziness, speech difficulty, weakness and numbness.   Psychiatric/Behavioral: Negative for agitation, confusion and hallucinations.     Objective:     Vital Signs (Most Recent):  Temp: 98.1 °F (36.7 °C) (05/15/20 1149)  Pulse: (!) 50 (05/15/20 1149)  Resp: 18 (05/15/20 1149)  BP: (!) 140/69 (05/15/20 1149)  SpO2: 95 % (05/15/20 1149) Vital Signs (24h Range):  Temp:  [97.6 °F (36.4 °C)-98.4 °F (36.9 °C)] 98.1 °F (36.7 °C)  Pulse:  [47-66] 50  Resp:  [16-18] 18  SpO2:  [93 %-95 %] 95 %  BP: (112-140)/(59-70) 140/69     Weight: 79.1 kg (174 lb 6.1 oz)  Body mass index is 31.39 kg/m².    Intake/Output  Summary (Last 24 hours) at 5/15/2020 1538  Last data filed at 5/15/2020 0500  Gross per 24 hour   Intake 358 ml   Output 850 ml   Net -492 ml      Physical Exam   Constitutional: She is oriented to person, place, and time. She appears well-developed.   HENT:   Head: Normocephalic and atraumatic.   Eyes: Conjunctivae and EOM are normal.   Neck: Normal range of motion. No thyromegaly present.   Cardiovascular: Normal rate and regular rhythm.   Pulmonary/Chest: Effort normal. No respiratory distress.   Abdominal: Soft. She exhibits no distension and no mass. There is no tenderness.   Ostomy pink and viable with soft stool in bag; well-healed midline incision; +soft, reducible parastomal hernia   Musculoskeletal: Normal range of motion. She exhibits no edema or tenderness.   Neurological: She is alert and oriented to person, place, and time.   Skin: Skin is warm and dry. Capillary refill takes less than 2 seconds. No rash noted.   Psychiatric: She has a normal mood and affect.   Nursing note and vitals reviewed.      Significant Labs:   Blood Culture: No results for input(s): LABBLOO in the last 48 hours.  BMP:   Recent Labs   Lab 05/15/20  0417         K 4.0      CO2 25   BUN 11   CREATININE 0.8   CALCIUM 8.7   MG 1.6     CBC:   Recent Labs   Lab 05/14/20  0449 05/15/20  0417   WBC 6.58 6.72   HGB 11.3* 11.1*   HCT 35.4* 36.1*    219     Urine Culture:  Urine culture [[id number]] (Abnormal)  Collected: 05/12/20 3535   Order Status: Completed Specimen: Urine, Clean Catch Updated: 05/15/20 1459    Urine Culture, Routine ESCHERICHIA COLI ESBL   >100,000 cfu/ml   Abnormal    Susceptibility      Escherichia coli esbl     CULTURE, URINE     Amikacin <=16 mcg/mL Sensitive     Amox/K Clav'ate >16/8 mcg/mL Resistant     Amp/Sulbactam >16/8 mcg/mL Resistant     Ampicillin >16 mcg/mL Resistant     Cefazolin >16 mcg/mL Resistant     Cefepime >16 mcg/mL Resistant     Ceftriaxone >32 mcg/mL Resistant      Ciprofloxacin >2 mcg/mL Resistant     Ertapenem <=0.5 mcg/mL Sensitive     Gentamicin >8 mcg/mL Resistant     Levofloxacin >4 mcg/mL Resistant     Meropenem <=1 mcg/mL Sensitive     Nitrofurantoin <=32 mcg/mL Sensitive     Piperacillin/Tazo 64 mcg/mL Intermediate     Tetracycline >8 mcg/mL Resistant     Tobramycin >8 mcg/mL Resistant     Trimeth/Sulfa <=2/38 mcg/mL Sensitive                         All pertinent labs within the past 24 hours have been reviewed.  Myla Arias MD on 5/12/2020 12:57   CT Abdomen Pelvis With Contrast   Order: [id number]   Status:  Final result   Visible to patient:  Yes (Patient Portal) Next appt:  05/28/2020 at 11:20 AM in Internal Medicine (Myla Arias MD) Dx:  Abdominal distension   Details     Reading Physician Reading Date Result Priority   Beto Light MD 5/12/2020 STAT      Narrative     EXAMINATION:  CT ABDOMEN PELVIS WITH CONTRAST    CLINICAL HISTORY:  Abdominal distension;Diverticulitis, known, follow up;rule out partial obstruction, 5 days, n/v known colostomy; Abdominal distension (gaseous)    TECHNIQUE:  Low dose axial images, sagittal and coronal reformations were obtained from the lung bases to the pubic symphysis following the IV administration of 100 mL of Omnipaque 350 and the oral administration of 30 mL of Omnipaque 350.    COMPARISON:  Multiple prior CT exams, most recent 04/12/2020    FINDINGS:  Visualized lower chest is unremarkable.    Within the abdomen, the liver and spleen are unremarkable. Pancreas is normal. Adrenal glands are unremarkable. Gallbladder normal. No biliary dilatation.    Kidneys are unchanged.  Large right renal pelvic calcification again noted with bilateral renal parenchymal scarring..    Stomach is unremarkable.  Small bowel is nonobstructive.  Oral contrast extends throughout the small bowel and into the proximal colon.  Left lower quadrant colostomy noted with peristomal herniated bowel loops.  There is persistent fat  stranding surrounding the colon within the left lower quadrant.  Focal caliber change is similar to prior study suggesting at least partial obstruction or stenosis from likely postsurgical adhesion.  Similar soft tissue attenuation, presumed scarring noted interposed between small bowel loops in the uterine fundus which is associated with suspected fistula on the 06/26/2019 study.  No drainable abscess appreciated.    Urinary bladder demonstrates small foci of air anteriorly, correlate for recent catheterization.  Intrapelvic structures otherwise unchanged.    No acute osseous abnormality.      Impression       1. No evidence of small-bowel obstruction.  2. There is prominence of the proximal/mid colon with focal caliber change noted within the left lower quadrant, similar to prior study.  Persistent pericolonic fat stranding and wall thickening suggesting nonspecific colitis most prevalent at the area of caliber change.  Partial obstruction from adhesion possible.  Correlate with colostomy output.  No evidence of drainable abscess.  3. Other findings within the abdomen/pelvis similar to prior studies.      Electronically signed by: Beto Light MD  Date: 05/12/2020             Significant Imaging: I have reviewed all pertinent imaging results/findings within the past 24 hours.

## 2020-05-16 ENCOUNTER — PATIENT MESSAGE (OUTPATIENT)
Dept: INTERNAL MEDICINE | Facility: CLINIC | Age: 80
End: 2020-05-16

## 2020-05-16 VITALS
BODY MASS INDEX: 29.54 KG/M2 | WEIGHT: 166.69 LBS | TEMPERATURE: 98 F | SYSTOLIC BLOOD PRESSURE: 154 MMHG | HEART RATE: 44 BPM | HEIGHT: 63 IN | DIASTOLIC BLOOD PRESSURE: 76 MMHG | OXYGEN SATURATION: 96 % | RESPIRATION RATE: 20 BRPM

## 2020-05-16 PROBLEM — E44.0 MODERATE MALNUTRITION: Status: RESOLVED | Noted: 2020-05-13 | Resolved: 2020-05-16

## 2020-05-16 LAB
ANION GAP SERPL CALC-SCNC: 7 MMOL/L (ref 8–16)
BACTERIA UR CULT: ABNORMAL
BASOPHILS # BLD AUTO: 0.04 K/UL (ref 0–0.2)
BASOPHILS NFR BLD: 0.6 % (ref 0–1.9)
BUN SERPL-MCNC: 10 MG/DL (ref 8–23)
CALCIUM SERPL-MCNC: 8.9 MG/DL (ref 8.7–10.5)
CHLORIDE SERPL-SCNC: 107 MMOL/L (ref 95–110)
CO2 SERPL-SCNC: 27 MMOL/L (ref 23–29)
CREAT SERPL-MCNC: 0.8 MG/DL (ref 0.5–1.4)
DIFFERENTIAL METHOD: ABNORMAL
EOSINOPHIL # BLD AUTO: 0.4 K/UL (ref 0–0.5)
EOSINOPHIL NFR BLD: 5.9 % (ref 0–8)
ERYTHROCYTE [DISTWIDTH] IN BLOOD BY AUTOMATED COUNT: 13.9 % (ref 11.5–14.5)
EST. GFR  (AFRICAN AMERICAN): >60 ML/MIN/1.73 M^2
EST. GFR  (NON AFRICAN AMERICAN): >60 ML/MIN/1.73 M^2
GLUCOSE SERPL-MCNC: 89 MG/DL (ref 70–110)
HCT VFR BLD AUTO: 38.7 % (ref 37–48.5)
HGB BLD-MCNC: 12 G/DL (ref 12–16)
IMM GRANULOCYTES # BLD AUTO: 0.04 K/UL (ref 0–0.04)
IMM GRANULOCYTES NFR BLD AUTO: 0.6 % (ref 0–0.5)
LYMPHOCYTES # BLD AUTO: 2.2 K/UL (ref 1–4.8)
LYMPHOCYTES NFR BLD: 32.4 % (ref 18–48)
MAGNESIUM SERPL-MCNC: 1.7 MG/DL (ref 1.6–2.6)
MCH RBC QN AUTO: 26.8 PG (ref 27–31)
MCHC RBC AUTO-ENTMCNC: 31 G/DL (ref 32–36)
MCV RBC AUTO: 86 FL (ref 82–98)
MONOCYTES # BLD AUTO: 0.7 K/UL (ref 0.3–1)
MONOCYTES NFR BLD: 10.1 % (ref 4–15)
NEUTROPHILS # BLD AUTO: 3.3 K/UL (ref 1.8–7.7)
NEUTROPHILS NFR BLD: 50.4 % (ref 38–73)
NRBC BLD-RTO: 0 /100 WBC
PHOSPHATE SERPL-MCNC: 3.3 MG/DL (ref 2.7–4.5)
PLATELET # BLD AUTO: 260 K/UL (ref 150–350)
PMV BLD AUTO: 10.3 FL (ref 9.2–12.9)
POTASSIUM SERPL-SCNC: 4.3 MMOL/L (ref 3.5–5.1)
RBC # BLD AUTO: 4.48 M/UL (ref 4–5.4)
SODIUM SERPL-SCNC: 141 MMOL/L (ref 136–145)
WBC # BLD AUTO: 6.63 K/UL (ref 3.9–12.7)

## 2020-05-16 PROCEDURE — G0378 HOSPITAL OBSERVATION PER HR: HCPCS

## 2020-05-16 PROCEDURE — 25000003 PHARM REV CODE 250: Performed by: EMERGENCY MEDICINE

## 2020-05-16 PROCEDURE — 36415 COLL VENOUS BLD VENIPUNCTURE: CPT

## 2020-05-16 PROCEDURE — 85025 COMPLETE CBC W/AUTO DIFF WBC: CPT

## 2020-05-16 PROCEDURE — 83735 ASSAY OF MAGNESIUM: CPT

## 2020-05-16 PROCEDURE — 80048 BASIC METABOLIC PNL TOTAL CA: CPT

## 2020-05-16 PROCEDURE — 96376 TX/PRO/DX INJ SAME DRUG ADON: CPT

## 2020-05-16 PROCEDURE — 25000003 PHARM REV CODE 250: Performed by: PHYSICIAN ASSISTANT

## 2020-05-16 PROCEDURE — 63600175 PHARM REV CODE 636 W HCPCS: Performed by: INTERNAL MEDICINE

## 2020-05-16 PROCEDURE — 84100 ASSAY OF PHOSPHORUS: CPT

## 2020-05-16 RX ORDER — VANCOMYCIN HYDROCHLORIDE 125 MG/1
125 CAPSULE ORAL 4 TIMES DAILY
Qty: 32 CAPSULE | Refills: 0 | Status: SHIPPED | OUTPATIENT
Start: 2020-05-16 | End: 2020-05-24

## 2020-05-16 RX ORDER — GRANULES FOR ORAL 3 G/1
3 POWDER ORAL ONCE
Qty: 3 G | Refills: 0 | Status: SHIPPED | OUTPATIENT
Start: 2020-05-16 | End: 2020-05-16

## 2020-05-16 RX ORDER — NITROFURANTOIN 25; 75 MG/1; MG/1
100 CAPSULE ORAL 2 TIMES DAILY
Qty: 10 CAPSULE | Refills: 0 | Status: SHIPPED | OUTPATIENT
Start: 2020-05-16 | End: 2020-05-21

## 2020-05-16 RX ADMIN — ATORVASTATIN CALCIUM 10 MG: 10 TABLET, FILM COATED ORAL at 08:05

## 2020-05-16 RX ADMIN — LACTOBACILLUS TAB 4 TABLET: TAB at 12:05

## 2020-05-16 RX ADMIN — MEMANTINE 10 MG: 10 TABLET ORAL at 08:05

## 2020-05-16 RX ADMIN — LACTOBACILLUS TAB 4 TABLET: TAB at 08:05

## 2020-05-16 RX ADMIN — VANCOMYCIN HYDROCHLORIDE 250 MG: 250 POWDER, FOR SOLUTION ORAL at 05:05

## 2020-05-16 RX ADMIN — VANCOMYCIN HYDROCHLORIDE 250 MG: 250 POWDER, FOR SOLUTION ORAL at 12:05

## 2020-05-16 RX ADMIN — ERTAPENEM 1 G: 1 INJECTION INTRAMUSCULAR; INTRAVENOUS at 08:05

## 2020-05-16 NOTE — ASSESSMENT & PLAN NOTE
Per ID recommendations, discharging on fosfomycin.    Will need to use caution in future testing of urine.  Per report of patient and caretaker she did not have any urinary symptoms.

## 2020-05-16 NOTE — NURSING
Reviewed discharge instructions and medications with patient and her daughter, as well as f/u appts and when to seek medical attention. Patient and her daughter verbalized understanding. RN expressed the need for follow up appointments per physicians recommendations. Patient and patient's daughter were given the opportunity for questions and all were answered to satisfaction. Telemetry monitor removed and returned to monitor room. IV removed without incident. Compression dressing applied and patient instructed to leave on no longer than 30 minutes. Pt transported via w/c to personal vehicle at  main entrance. All belongings and paperwork left with patient. Pt stable on DC from hospital.

## 2020-05-16 NOTE — ASSESSMENT & PLAN NOTE
Discharging with oral vancomycin.  She will need to complete 8 more days for total of 10 days.

## 2020-05-16 NOTE — PROGRESS NOTES
[facility]  Infectious Disease  Progress Note    Patient Name: Irlanda Lopez  MRN: [id number]  Admission Date: 5/12/2020  Length of Stay: 0 days  Attending Physician: Vanessa Shipley MD  Primary Care Provider: Myla Arias MD    Isolation Status: No active isolations  Assessment/Plan:      * C Diff Colitis  Will continue po vancomycin for 10 days .  Contact isolation      Acute ESBL Positive E Coli UTI  Will use IV Ertapenem based on historic cultures.  Follow final ID of GNR in urine    05/15- discussed with microlab about testing for fosfomycin susceptibility in urine-Po fosfomycin can be outpatient option        Anticipated Disposition:     Thank you for your consult. I will follow-up with patient. Please contact us if you have any additional questions.    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 Arias due to complaints abdominal pain and decreased output from her ostomy that began four days prior to presentation. CT scan of the abdomen showed-prominence of the proximal/mid colon with focal caliber change noted within the left lower quadrant, similar to prior study.  Persistent pericolonic fat stranding and wall thickening suggesting nonspecific colitis most prevalent at the area of caliber change.  All cultures and lab results reviewed.  05/12- urine -GNR  C difficile -positive  04/12- ESBL -UTI  Interval History:  79 year old woman with ESBL UTI/C dificle.  She is afebrile    Review of Systems   Constitutional: Negative for activity change, appetite change, chills, fatigue, fever and unexpected weight change.   HENT: Negative for congestion, ear pain, sore throat and trouble swallowing.    Eyes: Negative for pain, redness and itching.    Respiratory: Negative for cough, choking, shortness of breath and wheezing.    Cardiovascular: Negative for chest pain, palpitations and leg swelling.   Gastrointestinal: Negative for abdominal distention, abdominal pain, anal bleeding, blood in stool, constipation, diarrhea, nausea, rectal pain and vomiting.   Endocrine: Negative for cold intolerance, heat intolerance and polyuria.   Genitourinary: Negative for dysuria, flank pain, frequency and hematuria.   Musculoskeletal: Negative for gait problem, joint swelling and neck pain.   Skin: Negative for color change, rash and wound.   Allergic/Immunologic: Negative for environmental allergies and immunocompromised state.   Neurological: Negative for dizziness, speech difficulty, weakness and numbness.   Psychiatric/Behavioral: Negative for agitation, confusion and hallucinations.     Objective:     Vital Signs (Most Recent):  Temp: 98.6 °F (37 °C) (05/15/20 2321)  Pulse: (!) 50 (05/15/20 2321)  Resp: 18 (05/15/20 2321)  BP: 138/64 (05/15/20 2321)  SpO2: 97 % (05/15/20 2321) Vital Signs (24h Range):  Temp:  [97.5 °F (36.4 °C)-98.6 °F (37 °C)] 98.6 °F (37 °C)  Pulse:  [46-57] 50  Resp:  [16-18] 18  SpO2:  [94 %-97 %] 97 %  BP: (131-153)/(64-74) 138/64     Weight: 79.1 kg (174 lb 6.1 oz)  Body mass index is 31.39 kg/m².    Estimated Creatinine Clearance: 57.1 mL/min (based on SCr of 0.8 mg/dL).    Physical Exam   Constitutional: She is oriented to person, place, and time. She appears well-developed.   HENT:   Head: Normocephalic and atraumatic.   Eyes: Conjunctivae and EOM are normal.   Neck: Normal range of motion. No thyromegaly present.   Cardiovascular: Normal rate and regular rhythm.   Pulmonary/Chest: Effort normal. No respiratory distress.   Abdominal: Soft. She exhibits no distension and no mass. There is no tenderness.   Ostomy pink and viable with soft stool in bag; well-healed midline incision; +soft, reducible parastomal hernia   Musculoskeletal: Normal  range of motion. She exhibits no edema or tenderness.   Neurological: She is alert and oriented to person, place, and time.   Skin: Skin is warm and dry. Capillary refill takes less than 2 seconds. No rash noted.   Psychiatric: She has a normal mood and affect.   Nursing note and vitals reviewed.       Significant Labs:   BMP:   Recent Labs   Lab 05/15/20  0417         K 4.0      CO2 25   BUN 11   CREATININE 0.8   CALCIUM 8.7   MG 1.6     CBC:   Recent Labs   Lab 05/14/20  0449 05/15/20  0417   WBC 6.58 6.72   HGB 11.3* 11.1*   HCT 35.4* 36.1*    219     Urine Culture:   Recent Labs   Lab 04/12/20 2207 05/12/20  0952   LABURIN ESCHERICHIA COLI ESBL  >100,000 cfu/ml  * ESCHERICHIA COLI ESBL  >100,000 cfu/ml  *       Significant Imaging: I have reviewed all pertinent imaging results/findings within the past 24 hours.

## 2020-05-16 NOTE — ASSESSMENT & PLAN NOTE
Will use IV Ertapenem based on historic cultures.  Follow final ID of GNR in urine    05/15- discussed with microlab about testing for fosfomycin susceptibility in urine-Po fosfomycin can be outpatient option

## 2020-05-16 NOTE — PLAN OF CARE
05/16/20 1305   Final Note   Assessment Type Final Discharge Note   Anticipated Discharge Disposition Home-Health   Right Care Referral Info   Post Acute Recommendation Home-care   Facility Name [place] [place]   Post-Acute Status   Post-Acute Authorization Home Health   Home Health Status Set-up Complete   Discharge Delays None known at this time

## 2020-05-16 NOTE — PLAN OF CARE
called [facility] to inform of pt's discharge. Josette spoke with on call nurse Alley. Josette faxed orders to Alley via [employer]. No further needs at this time.        05/16/20 1302   Post-Acute Status   Post-Acute Authorization [facility] Status Set-up Complete

## 2020-05-16 NOTE — DISCHARGE INSTRUCTIONS
Extended-Spectrum Beta-Lactamase-Producing Bacteria  Extended-spectrum beta-lactamases are enzymes made by certain kinds of germs (bacteria). These germs (or ESBL for short) break down several types of antibiotic medicine. So when you get sick because of ESBL, the infection is harder to treat and you may need different antibiotics. Infections caused by ESBL usually affect the urinary tract and gut (intestine). They can also infect wounds and the blood. ESBL is mainly spread among people in hospitals and long-term care facilities. Sometimes you can carry ESBL and not be sick. This is called being colonized with ESBL. You can spread ESBL to others. But because you aren't sick, you don't need treatment. But if ESBL enters the body and causes an infection, this can be fatal if not treated properly. This sheet tells you more about ESBL and what hospitals are doing to control this serious problem. It also tells you how you can help in this effort.  Who is at risk for ESBL Infection?  Healthy people usually are not colonized or infected with ESBL. But certain things can make colonization or infection more likely. These are called risk factors. They include:  · A current or recent stay in a hospital or long-term care facility  · A current or recent stay in the intensive care unit (ICU) or  intensive care unit (NICU)  · A recent surgery or wound treatment  · Having a urinary catheter, feeding tube, or other tube placed in the body  · Older age  · Having a weakened immune system, such as after an organ transplant  · Having long-term antibiotic treatment or having it often  · Close and prolonged contact with a person who is colonized or sick from an ESBL, such as caring for a friend or relative who has returned home from a hospital  How does ESBL spread?  Ways that ESBL can spread include:   · Someone who is colonized or infected with ESBL touches you with unwashed hands.  · You touch objects or surfaces that have  the germs.  · Healthcare workers touch you without washing their hands properly after contact with an infected person, object, or surface.  ESBL can enter your body in the following ways:  · Through the mouth. This happens if you have the germs on your hands and then touch your mouth, such as when you eat. The germs are then swallowed and live in your intestine.  · Through the urinary tract. This occurs if you already have ESBL in your bowel and do not cleanse properly after a bowel movement. ESBL can also enter the urinary tract through a urinary catheter, if you have one.  · Through a wound.  · Through the airway. This happens if the person with the ESBL coughs or sneezes on you.  What are the symptoms of ESBL infection?  ESBL causes different symptoms, depending on where the infection is. Common places and symptoms include:  · Urinary tract: pain and burning when urinating, the need to urinate more often, fever  · Intestine: diarrhea (may be bloody), pain in the belly (abdomen), stomach cramps, gas, fever, loss of appetite  · Skin wound: redness of the skin around the wound and oozing of fluid from the wound  · Blood: high fever, chills, nausea and vomiting, shortness of breath, confusion  How is ESBL infection diagnosed?  Your healthcare provider will take a sample of urine, stool, infected tissue, or blood. He or she may also take a swab of the area around the rectum or of another place in the body. The sample, swab, or both are sent to a lab and tested for ESBL. The results usually take 2 to 5 days. But newer techniques may make this information available in a few hours.  How is ESBL colonization treated?  If you test positive for ESBL colonization, you usually will not get treated. This is because no treatment is necessary. Any treatment could cause more antibiotic resistance. In some cases, your body can get rid of the germs on its own. This can be true even after having been colonized for many months.  How  is ESBL infection treated?  Because ESBL germs are resistant to many kinds of antibiotics, your healthcare provider will tell you how youll be treated. You may be given a combination of several antibiotics.  Preventing ESBL: What hospitals are doing  Many hospitals and long-term care facilities take steps to help prevent ESBL:  · Handwashing. This is the single most important way to help prevent the spread of germs. Healthcare workers wash their hands with soap and water or use an alcohol-based hand cleanser before and after treating each person. They also clean their hands after touching any surface that may be contaminated with germs.  · Protective clothing. Healthcare workers and visitors will likely wear gloves and a gown when entering the room of a person with ESBL. Before leaving the room, they remove these items and clean their hands.  · Private rooms. People with ESBL are put in private rooms.  · Personal care items. People with ESBL may have their own patient care items, such as thermometers and stethoscopes. If these items are shared, they are fully cleaned and disinfected before reuse.  · Monitoring. Hospitals and long-term care facilities monitor the presence and spread of ESBL. They teach caregivers the best ways to prevent it.  Preventing ESBL: What you as a patient can do  · Ask all hospital staff to wash their hands before touching you. Dont be afraid to speak up!  · Wash your own hands often with soap and warm water. Or use an alcohol-based hand gel that is at least 60% alcohol.  · Ask that stethoscopes and other instruments be cleaned with alcohol before they are used on you.  · Ask visitors to wash their hands before they enter and right after they leave your room. Visitors also may be told by hospital staff to put on protective gloves and a hospital gown when theyre in your room. Just before leaving your room, they will take these off.  · Stay in your room. Do not go into the hallway or other  places, such as the visitor waiting area, hanks kitchen, hospital cafeteria, or other people's rooms.  · If you need to have a test done, such as an X-ray, follow instructions from staff. You may need to change into a clean hospital gown and wash your hands just before leaving your room.  Preventing ESBL: What you can do after leaving the healthcare facility  · Wash your hands often with soap and warm water. Or use an alcohol-based hand gel that is at least 60% alcohol. Hand cleaning is especially important after going to the bathroom and before preparing and eating food.  · Follow instructions that youve been given for caring for surgical wounds or any tubes that you have, such as a catheter or dialysis port.  · Keep cuts and scrapes clean and covered until they heal.  · Do not share towels, razors, clothing, or athletic equipment.  · Tell your other healthcare providers that you have ESBL, so that they can take precautions to prevent its spread.  Tips for good handwashing  · Use warm water and plenty of soap. Work up a good lather.  · Clean the whole hand, under your nails, between your fingers, and up the wrists.  · Wash for at least 15 seconds. Dont just wipe. Scrub well.  · Rinse, letting the water run down your fingers, not up your wrists.  · Dry your hands well. In a public restroom, use a paper towel to turn off the faucet and open the door.  Using alcohol-based hand gels  Alcohol-based hand gels can be used when your hands arent visibly dirty.  · Use enough gel to get your hands completely wet.  · Rub your hands together briskly, cleaning the backs of your hands, the palms, between your fingers and up the wrists.  · Rub until the gel is gone and your hands are completely dry.  Precautions when caring for someone with ESBL  · Wash your hands well with soap and warm water before and after any contact with the person. Use an alcohol-based hand gel that is least 60% alcohol if your hands arent visibly  dirty.  · Wear disposable gloves when changing a bandage or touching an infected wound. Throw away the gloves after each use. Then wash your hands well.  · Wash and dry the persons bed linen, towels, and clothing using the warmest temperatures recommended on the labels. Use liquid bleach during the wash cycle if the label permits.  Understanding drug resistance  Hard-to-kill (resistant) germs, such as ESBL, often develop when antibiotics are taken. They can also develop when antibiotics are taken when they aren't needed, or are not taken exactly as directed. This might mean not taking the full prescribed course. This is because any germs that survive treatment with an antibiotic can go on to reproduce and create more resistant germs. The more often antibiotics are used, the more chances resistant germs have to develop. This is why your healthcare provider may hesitate to prescribe antibiotics unless he or she is certain that they are needed.  Date Last Reviewed: 2/1/2017  © 7664-3537 [employer]. [address] [place] [address], [place] [address]. All rights reserved. This information is not intended as a substitute for professional medical care. Always follow your healthcare professional's instructions.        Clostridium Difficile Infection  Clostridium difficile (C. diff) bacteria can be very harmful. They affect the intestinal tract. They can cause symptoms ranging from mild diarrhea to severe inflammation of the large intestine (colon). C. diff infection is most common during the days and weeks after treatment with antibiotics. Anyone can become infected. But the risk is greatly increased for people in hospitals and for people living in nursing homes or long-term care facilities. This is because antibiotic use is common there. Germs also spread easily in these places.    What causes C. diff infection?  The stomach and intestines have hundreds of kinds of bacteria. Many of these bacteria actually help keep  harmful bacteria like C. diff from causing problems. Small amounts of C. diff are normal in the intestine and dont cause problems. When you take an antibiotic, the normal balance of good and bad bacteria may be affected. There may be too few good bacteria and too many harmful bacteria like C diff. In hospitals and nursing homes, C. diff may be spread from an infected person to others. This can happen when staff or visitors touch infected people or objects such as bed rails, stethoscopes, or bedpans and then touch other people or surfaces.  What are the symptoms of C. diff infection?  About half of people with C. diff infection have no symptoms. Yet they can still pass the infection to others. Others do have symptoms. These include:  · Watery diarrhea, which may contain mucus  · Pain and cramping  · Fever  Some who are infected develop serious problems. Symptoms include:  · Belly (abdominal) pain  · Abdominal swelling  · Nausea and vomiting  · Little or no diarrhea  How is C. diff infection diagnosed?  To confirm the infection, a sample of stool is tested for the bacteria or the toxins made by the bacteria.  How is C. diff infection treated?  Your healthcare provider will tell you to stop taking any antibiotics you have been prescribed, based on your healthcare needs. He or she may prescribe different medicines as needed. In certain cases, you may be given an antibiotic directed at the C. diff infection. Talk with your healthcare provider before stopping or starting any medicines.  · Fluids are often given by IV (intravenously) through a vein. This helps replace fluids lost through diarrhea.  · In rare cases you may need surgery if treatment doesnt cure severe symptoms  To lessen symptoms:  · Drink plenty of fluids to replace water lost through diarrhea. Talk with your healthcare provider or nurse about which fluids are best.  · Follow your healthcare providers instructions for when and what to eat.  · Unless your  healthcare provider tells you to do so, don't take medicines for diarrhea.  · Tell your healthcare provider if symptoms return. Even after treatment, C. diff may come back.  Your doctor may give you an additional medicine if your symptoms come back or you are at risk for another C. diff infection. This medicine is called bezlotoxumab. It is not an antibiotic, but it can help keep your C. diff symptoms from returning.  What are the complications of C. diff infection?  Complications include:  · Dehydration  · Electrolyte imbalances  · Low protein in the blood  · Severe widening (dilation) of the large intestine  · A hole (perforation) in the bowel  · Low blood pressure  · Kidney failure  · Inflammation or infection all over the body  · Death  How is C. diff prevented?  Hospitals and nursing homes take these steps to help prevent C. diff infections:  · Limiting use of antibiotics. Giving antibiotics only when needed can help reduce C. diff infections.  · Handwashing. Hospital staff should wash their hands before and after treating each person. They should also wash their hands after touching any surface in someone's' room. Soap and water work better than alcohol-based hand .  · Protective clothing. Healthcare workers should wear gloves and a gown when entering the room of someone with C. diff infection. They should remove these items before leaving and then wash their hands.  · Private rooms. People with C. diff should be in private rooms. Or they may share rooms with others who have the same infection.  · Thorough cleaning. Equipment and rooms should be cleaned and disinfected every day.  · Education. Everyone should be shown the best ways to avoid infection.  You can do the following to help prevent C. diff:  · Take antibiotics only when you really need them. Antibiotics dont help treat illnesses caused by viruses. This includes colds and the flu. Dont ask for antibiotics from your healthcare provider if he  or she says they wont work.  · When you are given antibiotics, take them as directed. Dont take more or less than the dosage prescribed. Do not take them for shorter or longer than your provider tells you to, even if you feel better.  · Wash your hands carefully. Do this after using the bathroom and before eating. Use plenty of soap and warm water. Alcohol-based hand  may not work against C. diff germs.  Everyone can help prevent C. diff:  In a hospital or care facility:  · Wash your hands well before and after visiting someone who has C. diff infection. Use soap and water. Alcohol-based hand  may not work against C. diff.  · If the staff asks you to, wear gloves. Take any other steps you are asked to follow to help prevent infection.  At home:  · If instructed, wear gloves when caring for a family member with C. diff infection. Throw the gloves away after each use. Then wash your hands well.  · Wash the persons clothes, bed linen, and towels separately. Use hot water. Use both detergent and liquid bleach.  · Disinfect surfaces in the persons room. This includes the phone, light switches, and remote controls.  Practice good handwashing:  · Use warm water and plenty of soap. Rub your hands together well.  · Clean your whole hand. Wash under nails, between fingers, and up your wrists.  · Wash for at least 15 seconds to 20 seconds.   · Rinse. Let the water run down your fingers, not up your wrists.  · Dry your hands well. Then use a paper towel to turn off the faucet and open the door.  Date Last Reviewed: 1/1/2017  © 9282-0613 [employer]. [address] [place] [address], [place] [address]. All rights reserved. This information is not intended as a substitute for professional medical care. Always follow your healthcare professional's instructions.

## 2020-05-16 NOTE — SUBJECTIVE & OBJECTIVE
Interval History:  79 year old woman with ESBL UTI/C dificle.  She is afebrile    Review of Systems   Constitutional: Negative for activity change, appetite change, chills, fatigue, fever and unexpected weight change.   HENT: Negative for congestion, ear pain, sore throat and trouble swallowing.    Eyes: Negative for pain, redness and itching.   Respiratory: Negative for cough, choking, shortness of breath and wheezing.    Cardiovascular: Negative for chest pain, palpitations and leg swelling.   Gastrointestinal: Negative for abdominal distention, abdominal pain, anal bleeding, blood in stool, constipation, diarrhea, nausea, rectal pain and vomiting.   Endocrine: Negative for cold intolerance, heat intolerance and polyuria.   Genitourinary: Negative for dysuria, flank pain, frequency and hematuria.   Musculoskeletal: Negative for gait problem, joint swelling and neck pain.   Skin: Negative for color change, rash and wound.   Allergic/Immunologic: Negative for environmental allergies and immunocompromised state.   Neurological: Negative for dizziness, speech difficulty, weakness and numbness.   Psychiatric/Behavioral: Negative for agitation, confusion and hallucinations.     Objective:     Vital Signs (Most Recent):  Temp: 98.6 °F (37 °C) (05/15/20 2321)  Pulse: (!) 50 (05/15/20 2321)  Resp: 18 (05/15/20 2321)  BP: 138/64 (05/15/20 2321)  SpO2: 97 % (05/15/20 2321) Vital Signs (24h Range):  Temp:  [97.5 °F (36.4 °C)-98.6 °F (37 °C)] 98.6 °F (37 °C)  Pulse:  [46-57] 50  Resp:  [16-18] 18  SpO2:  [94 %-97 %] 97 %  BP: (131-153)/(64-74) 138/64     Weight: 79.1 kg (174 lb 6.1 oz)  Body mass index is 31.39 kg/m².    Estimated Creatinine Clearance: 57.1 mL/min (based on SCr of 0.8 mg/dL).    Physical Exam   Constitutional: She is oriented to person, place, and time. She appears well-developed.   HENT:   Head: Normocephalic and atraumatic.   Eyes: Conjunctivae and EOM are normal.   Neck: Normal range of motion. No  thyromegaly present.   Cardiovascular: Normal rate and regular rhythm.   Pulmonary/Chest: Effort normal. No respiratory distress.   Abdominal: Soft. She exhibits no distension and no mass. There is no tenderness.   Ostomy pink and viable with soft stool in bag; well-healed midline incision; +soft, reducible parastomal hernia   Musculoskeletal: Normal range of motion. She exhibits no edema or tenderness.   Neurological: She is alert and oriented to person, place, and time.   Skin: Skin is warm and dry. Capillary refill takes less than 2 seconds. No rash noted.   Psychiatric: She has a normal mood and affect.   Nursing note and vitals reviewed.       Significant Labs:   BMP:   Recent Labs   Lab 05/15/20  0417         K 4.0      CO2 25   BUN 11   CREATININE 0.8   CALCIUM 8.7   MG 1.6     CBC:   Recent Labs   Lab 05/14/20  0449 05/15/20  0417   WBC 6.58 6.72   HGB 11.3* 11.1*   HCT 35.4* 36.1*    219     Urine Culture:   Recent Labs   Lab 04/12/20  2207 05/12/20  0952   LABURIN ESCHERICHIA COLI ESBL  >100,000 cfu/ml  * ESCHERICHIA COLI ESBL  >100,000 cfu/ml  *       Significant Imaging: I have reviewed all pertinent imaging results/findings within the past 24 hours.

## 2020-05-16 NOTE — NURSING
"Received call from patient's daughter, Myla, at 17:37. Secure chat message sent to Dr. Lopez and Dr. Bailey:    "This patient has already discharged, but the daughter called and said the insurance does not cover the 1x dose of fosfomycin ($300 out of pocket). They want to know if there is an alternative medication that can be prescribed in place of this. "    Waiting on response from MD. Call back # for Myla Mora (patient's daughter)=  [phone]      ** Dr. Bailey replied that he would send a prescription for nitrofurantoin to pt's pharmacy. Notified Myla Mora, pt's daughter of new prescription, by phone call, @ 19:00.  " Pt needs follow up with Velma this week per CC chart.     Pt has appt in pulm on 3/11 at 11am.   Ok to double book 11:30am to follow per Velma - please offer/confirm with pt.    Thank you.

## 2020-05-16 NOTE — DISCHARGE SUMMARY
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 Arias due to complaints abdominal pain and decreased output from her ostomy that began four days prior to presentation. There is associated belching, hiccups, nausea and one episode of emesis following contrast ingestion on the date of presentation. The patient denies fever, chills and blood in stool. The patient's daughter reports two prior episodes with similar symptoms. The patient was hospitalized from 4/12/20 to 4/13/20 for similar symptoms which were thought to be due to gastroenteritis and symptoms improved with supportive care. Stool culture and E. Coli were negative on Today, CT scan shows similar findings to one performed on 4/12/20 including prominence of the proximal to mid colon with focal caliber changed noted within the left lower quadrant as well as persistent pericolonoic fat stranding and wall thickening suggestive of nonspecific colitis. Labs were unremarkable. Code status was discussed with the patient and her daughter. She is a full code. Her four children are her surrogate medical decision makers.     * No surgery found *      Hospital Course:   Irlanda Lopez is a 79 year old female who is s/p Maribel's pouch and left lower quadrant colostomy in January of 2019 for diverticulitis sent to ER with c/o abdominal pain and decreased output from her ostomy that began four days PTA, associated w belching, hiccups, nausea and one episode of emesis following  contrast ingestion to mid colon with focal caliber changed noted within the left lower quadrant as well as persistent pericolonoic fat stranding and wall thickening suggestive of nonspecific colitis. Pt started on IV Rocephin and Flagyl. She responded very well and had a large BM early this am and again a few hrs later with gas in the colostomy bag. She had not had a BM x 4 days before. She is tolerating CLD well and walking around well- so diet advanced this evening. Dr. Connelly also following the pt.   5/14- looks and feels lot better, wants to go home. Daughter stayed with her overnight, having regular BM into her colostomy bag. Eating drinking well, walking around well. Stool tested positive overnite for C diff and her Urine Cx also growing GNR. She had ESBL positive E coli last month treated with Abx. Which likely gave her the C diff. Started on oral Vanc and Cipro and Flagyl both d/david. ID and GI consulted and probiotics started.   5/15- Appreciate Dr. Lopez, pt doing well, remains afebrile, eating drinking well. No abd pain, NVD, no fever or chills, no Leukocytosis. Urine Cx growing ESBL Positive E coli again, ID has started her on Invanz once daily and continued oral Vanc. Will need home infusion services to complete the course of Invanz but will d/w Dr. Lopez about it.     5/16:  Reviewed note from Infectious Disease and recommendation was to complete course vancomycin for 10 days.  She has 8 days remaining.  Also confirmed that fosfomycin is effective against the pathogens in her urine culture.  So will discharge with 1 time dose of fosfomycin.     Consults:   Consults (From admission, onward)        Status Ordering Provider     Inpatient consult to General Surgery  Once     Provider:  Zay Londono MD    Completed KHARI MICHELLE     Inpatient consult to Infectious Diseases  Once     Provider:  Bull Lopez MD    Acknowledged ERROL DUNN     Inpatient consult to Registered Dietitian/Nutritionist   Once     Provider:  (Not yet assigned)    Completed ERROL DUNN.          * C Diff Colitis  Discharging with oral vancomycin.  She will need to complete 8 more days for total of 10 days.        Acute ESBL Positive E Coli UTI  Per ID recommendations, discharging on fosfomycin.    Will need to use caution in future testing of urine.  Per report of patient and caretaker she did not have any urinary symptoms.        Hyperlipidemia  Continue statin.       Mild cognitive impairment with memory loss  -Continue Namenda.   -Hold Aricept due to side effect of increased QT.     Stable, has 24 hours care giver at home      Hypothyroidism  -TSH WNL.   -Continue [place].         Final Active Diagnoses:    Diagnosis Date Noted POA    PRINCIPAL PROBLEM:  C Diff Colitis [K52.9] 04/13/2020 Yes    Acute ESBL Positive E Coli UTI [N39.0] 04/13/2020 Yes    Hyperlipidemia [E78.5] 11/10/2018 Yes    Mild cognitive impairment with memory loss [G31.84] 09/14/2017 Yes    Hypothyroidism [E03.9] 10/04/2016 Yes      Problems Resolved During this Admission:    Diagnosis Date Noted Date Resolved POA    Moderate malnutrition [E44.0] 05/13/2020 05/16/2020 Yes    Bradycardia [R00.1] 04/13/2020 05/14/2020 Yes       Discharged Condition: good    Disposition:     Follow Up:  Follow-up Information     [facility].    Specialty:  Home Health Services  Why:  Home Health  Contact information:  [address] [facility] [address] [phone]             Myla Arias MD In 3 days.    Specialty:  Family Medicine  Contact information:  [address]  [address]  [facility] [address] [phone]                 Patient Instructions:   No discharge procedures on file.    Significant Diagnostic Studies: Labs:   BMP:   Recent Labs   Lab 05/15/20  0417 05/16/20  0534    89    141   K 4.0 4.3    107   CO2 25 27   BUN 11 10   CREATININE 0.8 0.8   CALCIUM 8.7 8.9   MG 1.6 1.7    and CBC   Recent Labs   Lab  05/15/20  0417 05/16/20  0534   WBC 6.72 6.63   HGB 11.1* 12.0   HCT 36.1* 38.7    260       Pending Diagnostic Studies:     None         Medications:  Reconciled Home Medications:      Medication List      START taking these medications    fosfomycin 3 gram Pack  Commonly known as:  MONUROL  Take 3 g by mouth once. for 1 dose     vancomycin 125 MG capsule  Commonly known as:  VANCOCIN  Take 1 capsule (125 mg total) by mouth 4 (four) times daily. for 8 days        CHANGE how you take these medications    thyroid (pork) 30 mg Tab  Commonly known as:  ARMOUR THYROID  Take 1 tablet (30 mg total) by mouth once daily.  What changed:  when to take this     triamcinolone acetonide 0.1% 0.1 % ointment  Commonly known as:  KENALOG  APPLY TOPICALLY 4 (FOUR) TIMES DAILY. TO RASH ON FACE AND CHEST AND NOSE  What changed:    · how to take this  · when to take this  · reasons to take this        CONTINUE taking these medications    atorvastatin 10 MG tablet  Commonly known as:  LIPITOR  Take 1 tablet (10 mg total) by mouth once daily.     donepeziL 10 MG tablet  Commonly known as:  ARICEPT  Take 1 tablet (10 mg total) by mouth every evening.     furosemide 20 MG tablet  Commonly known as:  LASIX  Take 1 tablet (20 mg total) by mouth once daily.     memantine 10 MG Tab  Commonly known as:  NAMENDA  TAKE 1 TABLET TWICE A DAY        STOP taking these medications    ciprofloxacin 250 mg/5 ml  Commonly known as:  CIPRO        ASK your doctor about these medications    ciprofloxacin HCl 500 MG tablet  Commonly known as:  CIPRO  Take 1 tablet (500 mg total) by mouth every 12 (twelve) hours.            Indwelling Lines/Drains at time of discharge:   Lines/Drains/Airways     Drain                 Colostomy 01/02/19 Descending/sigmoid  days         Colostomy 05/12/20 LLQ 4 days                Time spent on the discharge of patient: 30 minutes  Patient was seen and examined on the date of discharge and determined to be  suitable for discharge.         Anselmo Bailey DO  Department of Hospital Medicine  [facility]

## 2020-05-16 NOTE — PLAN OF CARE
NEURO: AAO with forgetfulness at times  PAIN: No c/o this shift  CARDIAC: SB on monitor #8636  RESP: Room air. No distress  MUSCULO: No deficits noted  GI/: Colostomy CDI with brown stool. Continent of bladder  SKIN: Intact  SAFETY: Bed low/locked, SR up x2, CB in reach, sitter at bedside  HOURLY ROUNDING COMPLETE  WILL CONTINUE TO MONITOR

## 2020-05-17 LAB
BACTERIA BLD CULT: NORMAL
BACTERIA BLD CULT: NORMAL

## 2020-05-18 ENCOUNTER — LAB VISIT (OUTPATIENT)
Dept: LAB | Facility: HOSPITAL | Age: 80
End: 2020-05-18
Attending: INTERNAL MEDICINE
Payer: MEDICARE

## 2020-05-18 ENCOUNTER — TELEPHONE (OUTPATIENT)
Dept: INTERNAL MEDICINE | Facility: CLINIC | Age: 80
End: 2020-05-18

## 2020-05-18 ENCOUNTER — OFFICE VISIT (OUTPATIENT)
Dept: GASTROENTEROLOGY | Facility: CLINIC | Age: 80
End: 2020-05-18
Payer: MEDICARE

## 2020-05-18 VITALS
HEIGHT: 62 IN | SYSTOLIC BLOOD PRESSURE: 110 MMHG | DIASTOLIC BLOOD PRESSURE: 80 MMHG | BODY MASS INDEX: 31.2 KG/M2 | WEIGHT: 169.56 LBS | HEART RATE: 53 BPM

## 2020-05-18 DIAGNOSIS — Z93.3 COLOSTOMY IN PLACE: ICD-10-CM

## 2020-05-18 DIAGNOSIS — A04.72 C. DIFFICILE COLITIS: Primary | ICD-10-CM

## 2020-05-18 DIAGNOSIS — A04.72 C. DIFFICILE COLITIS: ICD-10-CM

## 2020-05-18 DIAGNOSIS — R14.2 BELCHING: ICD-10-CM

## 2020-05-18 DIAGNOSIS — R14.0 ABDOMINAL DISTENSION: ICD-10-CM

## 2020-05-18 DIAGNOSIS — K52.9 COLITIS: ICD-10-CM

## 2020-05-18 LAB
BASOPHILS # BLD AUTO: 0.04 K/UL (ref 0–0.2)
BASOPHILS NFR BLD: 0.4 % (ref 0–1.9)
DIFFERENTIAL METHOD: ABNORMAL
EOSINOPHIL # BLD AUTO: 0.2 K/UL (ref 0–0.5)
EOSINOPHIL NFR BLD: 2 % (ref 0–8)
ERYTHROCYTE [DISTWIDTH] IN BLOOD BY AUTOMATED COUNT: 13.7 % (ref 11.5–14.5)
HCT VFR BLD AUTO: 44.9 % (ref 37–48.5)
HGB BLD-MCNC: 13.9 G/DL (ref 12–16)
IMM GRANULOCYTES # BLD AUTO: 0.05 K/UL (ref 0–0.04)
IMM GRANULOCYTES NFR BLD AUTO: 0.5 % (ref 0–0.5)
LYMPHOCYTES # BLD AUTO: 2.7 K/UL (ref 1–4.8)
LYMPHOCYTES NFR BLD: 28.6 % (ref 18–48)
MCH RBC QN AUTO: 26.7 PG (ref 27–31)
MCHC RBC AUTO-ENTMCNC: 31 G/DL (ref 32–36)
MCV RBC AUTO: 86 FL (ref 82–98)
MONOCYTES # BLD AUTO: 0.7 K/UL (ref 0.3–1)
MONOCYTES NFR BLD: 7.6 % (ref 4–15)
NEUTROPHILS # BLD AUTO: 5.8 K/UL (ref 1.8–7.7)
NEUTROPHILS NFR BLD: 60.9 % (ref 38–73)
NRBC BLD-RTO: 0 /100 WBC
PLATELET # BLD AUTO: 329 K/UL (ref 150–350)
PMV BLD AUTO: 10.4 FL (ref 9.2–12.9)
RBC # BLD AUTO: 5.2 M/UL (ref 4–5.4)
WBC # BLD AUTO: 9.58 K/UL (ref 3.9–12.7)

## 2020-05-18 PROCEDURE — 1125F AMNT PAIN NOTED PAIN PRSNT: CPT | Mod: S$GLB,,, | Performed by: INTERNAL MEDICINE

## 2020-05-18 PROCEDURE — 99499 RISK ADDL DX/OHS AUDIT: ICD-10-PCS | Mod: S$GLB,,, | Performed by: INTERNAL MEDICINE

## 2020-05-18 PROCEDURE — 3074F PR MOST RECENT SYSTOLIC BLOOD PRESSURE < 130 MM HG: ICD-10-PCS | Mod: CPTII,S$GLB,, | Performed by: INTERNAL MEDICINE

## 2020-05-18 PROCEDURE — 85025 COMPLETE CBC W/AUTO DIFF WBC: CPT

## 2020-05-18 PROCEDURE — 1101F PT FALLS ASSESS-DOCD LE1/YR: CPT | Mod: CPTII,S$GLB,, | Performed by: INTERNAL MEDICINE

## 2020-05-18 PROCEDURE — 1125F PR PAIN SEVERITY QUANTIFIED, PAIN PRESENT: ICD-10-PCS | Mod: S$GLB,,, | Performed by: INTERNAL MEDICINE

## 2020-05-18 PROCEDURE — 1159F MED LIST DOCD IN RCRD: CPT | Mod: S$GLB,,, | Performed by: INTERNAL MEDICINE

## 2020-05-18 PROCEDURE — 99999 PR PBB SHADOW E&M-EST. PATIENT-LVL III: CPT | Mod: PBBFAC,,, | Performed by: INTERNAL MEDICINE

## 2020-05-18 PROCEDURE — 1159F PR MEDICATION LIST DOCUMENTED IN MEDICAL RECORD: ICD-10-PCS | Mod: S$GLB,,, | Performed by: INTERNAL MEDICINE

## 2020-05-18 PROCEDURE — 1101F PR PT FALLS ASSESS DOC 0-1 FALLS W/OUT INJ PAST YR: ICD-10-PCS | Mod: CPTII,S$GLB,, | Performed by: INTERNAL MEDICINE

## 2020-05-18 PROCEDURE — 99499 UNLISTED E&M SERVICE: CPT | Mod: S$GLB,,, | Performed by: INTERNAL MEDICINE

## 2020-05-18 PROCEDURE — 99999 PR PBB SHADOW E&M-EST. PATIENT-LVL III: ICD-10-PCS | Mod: PBBFAC,,, | Performed by: INTERNAL MEDICINE

## 2020-05-18 PROCEDURE — 3079F PR MOST RECENT DIASTOLIC BLOOD PRESSURE 80-89 MM HG: ICD-10-PCS | Mod: CPTII,S$GLB,, | Performed by: INTERNAL MEDICINE

## 2020-05-18 PROCEDURE — 36415 COLL VENOUS BLD VENIPUNCTURE: CPT

## 2020-05-18 PROCEDURE — 3074F SYST BP LT 130 MM HG: CPT | Mod: CPTII,S$GLB,, | Performed by: INTERNAL MEDICINE

## 2020-05-18 PROCEDURE — 80053 COMPREHEN METABOLIC PANEL: CPT

## 2020-05-18 PROCEDURE — 99213 OFFICE O/P EST LOW 20 MIN: CPT | Mod: S$GLB,,, | Performed by: INTERNAL MEDICINE

## 2020-05-18 PROCEDURE — 99213 PR OFFICE/OUTPT VISIT, EST, LEVL III, 20-29 MIN: ICD-10-PCS | Mod: S$GLB,,, | Performed by: INTERNAL MEDICINE

## 2020-05-18 PROCEDURE — 3079F DIAST BP 80-89 MM HG: CPT | Mod: CPTII,S$GLB,, | Performed by: INTERNAL MEDICINE

## 2020-05-18 NOTE — PROGRESS NOTES
Clinic Consult:  [facility] Consultation Note    Reason for Consult:  Diagnoses of Abdominal distension, Colostomy in place, Colitis, and Belching were pertinent to this visit.    PCP: Myla Arias   [address] / [place] [address]    HPI:  This is a 79 y.o. female here for evaluation of C. Diff colitis.  She was admitted on 5/12 with colitis, was started on IV antibiotics and immediately started feeling better.  She was started on cipro and flagyl then C diff came back positive and switched to oral vanc.  Her urine cultures in the hospital grew E. Coli for which she was given fosfomycin.   Discharged on Saturday on vanc, felt ok, did well on Sunday without vomiting.  This morning woke up feeling poorly and vomiting.  Has a caregiver who gives meds and takes temp.  She has not had a temp since discharged.  Does endorse abdominal pain.  Has to change ostomy bag more frequently.  States her appetite is poor but she is able to keep down bland food, with the exception of vomiting this morning.      GI history:  Endoscopy:  None   Colonoscopy: 2019  Family history: negative   Surgeries:  Colostomy in Jan 2019, 2/2 diverticulitis; 2nd opinion with  who recommended not attempting reversal    ROS:  CONSTITUTIONAL: Denies weight change,  fatigue, fevers, chills, night sweats.  EYES: No changes in vision.   ENT: No oral lesions or sore throat.  HEMATOLOGICAL/Lymph: Denies bleeding tendency, bruising tendency. No swellings or enlarged lymph nodes.  CARDIOVASCULAR: Denies chest pain, shortness of breath, orthopnea and edema.  RESPIRATORY: Denies cough, hemoptysis, dyspnea, and wheezing.  GI: See HPI.  : Denies dysuria and hematuria  MUSCULOSKELETAL: Denies joint pain or swelling, back pain and muscle pain.  SKIN: Denies rashes.  NEUROLOGIC: Denies headaches, seizures and numbness.  PSYCHIATRIC: Denies depression or anxiety.  ENDOCRINE: Denies heat or cold intolerance and excessive  thirst or urination.    Medical History:   Past Medical History:   Diagnosis Date    Diverticulitis     High cholesterol     Hypertension     Hypothyroidism        Surgical History:  Past Surgical History:   Procedure Laterality Date    APPENDECTOMY  2008    CATARACT EXTRACTION      Dr Raygoza    COLON SURGERY      COLONOSCOPY N/A 1/2/2019    Procedure: COLONOSCOPY;  Surgeon: Reece Hogan MD;  Location: [facility] ENDO;  Service: Endoscopy;  Laterality: N/A;    COLONOSCOPY N/A 6/7/2019    Procedure: COLONOSCOPY;  Surgeon: Rishabh Connelly MD;  Location: [facility] ENDO;  Service: General;  Laterality: N/A;    COLOSTOMY Left 1/2/2019    Procedure: CREATION, COLOSTOMY;  Surgeon: Reece Hogan MD;  Location: [facility] OR;  Service: General;  Laterality: Left;    SHAHIDA PROCEDURE N/A 1/2/2019    Procedure: SHAHIDA PROCEDURE;  Surgeon: Reece Hogan MD;  Location: [facility] OR;  Service: General;  Laterality: N/A;    LYSIS OF ADHESIONS N/A 1/2/2019    Procedure: LYSIS, ADHESIONS;  Surgeon: Reece Hogan MD;  Location: [facility] OR;  Service: General;  Laterality: N/A;    VITRECTOMY Right 09/2013       Family History:   Family History   Problem Relation Age of Onset    Alzheimer's disease Mother     Aneurysm Father         brain    Stomach cancer Brother         50s       Social History:   Social History     Tobacco Use    Smoking status: Former Smoker    Smokeless tobacco: Never Used   Substance Use Topics    Alcohol use: No    Drug use: No       Allergies: Reviewed    Home Medications:   Medication List with Changes/Refills   Current Medications    ATORVASTATIN (LIPITOR) 10 MG TABLET    Take 1 tablet (10 mg total) by mouth once daily.    DONEPEZIL (ARICEPT) 10 MG TABLET    Take 1 tablet (10 mg total) by mouth every evening.    FUROSEMIDE (LASIX) 20 MG TABLET    Take 1 tablet (20 mg total) by mouth once daily.    MEMANTINE (NAMENDA) 10 MG TAB    TAKE 1 TABLET TWICE A DAY    NITROFURANTOIN, MACROCRYSTAL-MONOHYDRATE, (MACROBID) 100 MG  "CAPSULE    Take 1 capsule (100 mg total) by mouth 2 (two) times daily. for 5 days    THYROID, PORK, (ARMOUR THYROID) 30 MG TAB    Take 1 tablet (30 mg total) by mouth once daily.    TRIAMCINOLONE ACETONIDE 0.1% (KENALOG) 0.1 % OINTMENT    APPLY TOPICALLY 4 (FOUR) TIMES DAILY. TO RASH ON FACE AND CHEST AND NOSE    VANCOMYCIN (VANCOCIN) 125 MG CAPSULE    Take 1 capsule (125 mg total) by mouth 4 (four) times daily. for 8 days         Physical Exam:  Vital Signs:  /80   Pulse (!) 53   Ht 5' 2" (1.575 m)   Wt 76.9 kg (169 lb 8.5 oz)   BMI 31.01 kg/m²   Body mass index is 31.01 kg/m².      GENERAL: No acute distress, A&Ox3  EYES: Anicteric, no pallor noted.  ENT: OP clear  NECK: Supple, no masses, no thyromegally.  CHEST: Equal breath sounds bilaterally, no wheezing.  CARDIOVASCULAR: Regular rate and rhythm. Murmurs, rubs and gallops absent.  ABDOMEN: soft, mildly tender diffusely, non-distended, normal bowel sounds, no hepatosplenomegaly   EXTREMITIES: No clubbing, cyanosis or edema.  SKIN: Without lesion or erythema.  LYMPH: No cervical, axillary lymphadenopathy palpable.   NEUROLOGICAL: Grossly normal, no asterixis present.    Labs: Pertinent labs reviewed.    Assessment and Plan:  C. Diff colitis on oral vanc.  She initially improved over two days post discharge but feeling worse today.  She is afebrile and abdomen is soft although mildly tender. Still early in course of antibiotics so I expect continued improvement.  Will check CBC today.  Continue oral vanc.  Depending on CBC and how she progresses over the next few days, may need to consider escalating therapy.        Pt to call in 24 hours to update me on how she is doing.        Thank you so much for allowing me to participate in the care of Irlanda RoblesMaximus Maier MD  "

## 2020-05-18 NOTE — TELEPHONE ENCOUNTER
Called and spoke with pts daughter Myla. She is stating that the patient was released from Hospital on Saturday. The caregiver text daughter and told her that patient has been up early (however this is normal for her) and is complaining of burning and cramping in stomach. She is also feeling nauseated. She reported that she has been fine until now. That she was doing well at hospital, eating and such. Then she comes home and now this. She is not sure if this is the medicines that she was placed on or what is going on.

## 2020-05-18 NOTE — TELEPHONE ENCOUNTER
Called and spoke with Myla, pts daughter. Got her scheduled for GI.     Yes she is taking abx. Pt would not eat this morning. So all they could get her to do was eat a little bit of yogurt and then she took medicines.

## 2020-05-18 NOTE — TELEPHONE ENCOUNTER
----- Message from Willow Dunacn sent at 5/18/2020 10:56 AM CDT -----  Contact: Pt daughter Myla   Caller called in regards to speaking with the staff regarding her mother. Mother woke up with bad cramps and vomiting up all of her medication. Caller can be reached at [phone].

## 2020-05-18 NOTE — LETTER
May 19, 2020      Myla Arias MD  [address] [facility]  [address]  [place] [address]           [facility]  [address] [facility]  [place] [address]  Phone: [phone]  Fax: [phone]          Patient: Irlanda Lopez   MR Number: [id number]   YOB: 1940   Date of Visit: 5/18/2020       Dear Dr. Myla Arias:    Thank you for referring Irlanda Lopez to me for evaluation. Attached you will find relevant portions of my assessment and plan of care.    If you have questions, please do not hesitate to call me. I look forward to following Irlanda Lopez along with you.    Sincerely,    Layne Maier MD    Enclosure  CC:  No Recipients    If you would like to receive this communication electronically, please contact [email or web address] or [phone] to request more information on [email or web address] Link access.    For providers and/or their staff who would like to refer a patient to [facility], please contact us through our one-stop-shop provider referral line, [facility], at [phone].    If you feel you have received this communication in error or would no longer like to receive these types of communications, please e-mail [email or web address]

## 2020-05-18 NOTE — TELEPHONE ENCOUNTER
----- Message from Krissy Ramirez sent at 5/18/2020  9:29 AM CDT -----  Contact: Daughter  Daughter calling in regards to her mother-patient, she stated her mother was in hospital for almost 6 days. Care Taker told daughter her mother do not want to eat, cramping, feels as if she have to vomit.      Daughter needs SAME DAY APPT please for her mother, or what should she do at this point for her Mother     =Her BP- 136/83     =Temp 97.6     =Pulse-50    Please advise pt can be contact at by Daughter at [phone]

## 2020-05-18 NOTE — TELEPHONE ENCOUNTER
Needs to be setup with GI as outpt. Can see if they have availability today.     Also, is she doing the oral vancomycin for the UTI? That was sent in. This needs to be taken with food.

## 2020-05-19 ENCOUNTER — PATIENT MESSAGE (OUTPATIENT)
Dept: INTERNAL MEDICINE | Facility: CLINIC | Age: 80
End: 2020-05-19

## 2020-05-19 ENCOUNTER — PATIENT MESSAGE (OUTPATIENT)
Dept: GASTROENTEROLOGY | Facility: CLINIC | Age: 80
End: 2020-05-19

## 2020-05-19 LAB
ALBUMIN SERPL BCP-MCNC: 3.7 G/DL (ref 3.5–5.2)
ALP SERPL-CCNC: 89 U/L (ref 55–135)
ALT SERPL W/O P-5'-P-CCNC: 15 U/L (ref 10–44)
ANION GAP SERPL CALC-SCNC: 9 MMOL/L (ref 8–16)
AST SERPL-CCNC: 19 U/L (ref 10–40)
BILIRUB SERPL-MCNC: 0.8 MG/DL (ref 0.1–1)
BUN SERPL-MCNC: 9 MG/DL (ref 8–23)
CALCIUM SERPL-MCNC: 9.7 MG/DL (ref 8.7–10.5)
CHLORIDE SERPL-SCNC: 99 MMOL/L (ref 95–110)
CO2 SERPL-SCNC: 27 MMOL/L (ref 23–29)
CREAT SERPL-MCNC: 0.9 MG/DL (ref 0.5–1.4)
EST. GFR  (AFRICAN AMERICAN): >60 ML/MIN/1.73 M^2
EST. GFR  (NON AFRICAN AMERICAN): >60 ML/MIN/1.73 M^2
GLUCOSE SERPL-MCNC: 109 MG/DL (ref 70–110)
POTASSIUM SERPL-SCNC: 4.5 MMOL/L (ref 3.5–5.1)
PROT SERPL-MCNC: 7.7 G/DL (ref 6–8.4)
SODIUM SERPL-SCNC: 135 MMOL/L (ref 136–145)

## 2020-05-27 ENCOUNTER — EXTERNAL HOME HEALTH (OUTPATIENT)
Dept: HOME HEALTH SERVICES | Facility: HOSPITAL | Age: 80
End: 2020-05-27
Payer: MEDICARE

## 2020-06-06 ENCOUNTER — NURSE TRIAGE (OUTPATIENT)
Dept: ADMINISTRATIVE | Facility: CLINIC | Age: 80
End: 2020-06-06

## 2020-06-06 ENCOUNTER — HOSPITAL ENCOUNTER (EMERGENCY)
Facility: HOSPITAL | Age: 80
Discharge: HOME | End: 2020-06-06
Attending: EMERGENCY MEDICINE
Payer: MEDICARE

## 2020-06-06 VITALS
SYSTOLIC BLOOD PRESSURE: 157 MMHG | WEIGHT: 166.56 LBS | BODY MASS INDEX: 30.65 KG/M2 | TEMPERATURE: 98 F | HEART RATE: 50 BPM | OXYGEN SATURATION: 98 % | HEIGHT: 62 IN | DIASTOLIC BLOOD PRESSURE: 73 MMHG | RESPIRATION RATE: 20 BRPM

## 2020-06-06 DIAGNOSIS — R10.84 ABDOMINAL CRAMPING, GENERALIZED: Primary | ICD-10-CM

## 2020-06-06 DIAGNOSIS — Z86.19 HISTORY OF CLOSTRIDIUM DIFFICILE COLITIS: ICD-10-CM

## 2020-06-06 DIAGNOSIS — Z93.3 COLOSTOMY IN PLACE: ICD-10-CM

## 2020-06-06 DIAGNOSIS — R19.7 DIARRHEA: ICD-10-CM

## 2020-06-06 LAB
ALBUMIN SERPL BCP-MCNC: 3.7 G/DL (ref 3.5–5.2)
ALP SERPL-CCNC: 87 U/L (ref 55–135)
ALT SERPL W/O P-5'-P-CCNC: 19 U/L (ref 10–44)
ANION GAP SERPL CALC-SCNC: 11 MMOL/L (ref 8–16)
AST SERPL-CCNC: 22 U/L (ref 10–40)
BASOPHILS # BLD AUTO: 0.03 K/UL (ref 0–0.2)
BASOPHILS NFR BLD: 0.3 % (ref 0–1.9)
BILIRUB SERPL-MCNC: 0.9 MG/DL (ref 0.1–1)
BUN SERPL-MCNC: 9 MG/DL (ref 8–23)
C DIFF GDH STL QL: POSITIVE
C DIFF TOX A+B STL QL IA: POSITIVE
CALCIUM SERPL-MCNC: 9.5 MG/DL (ref 8.7–10.5)
CHLORIDE SERPL-SCNC: 105 MMOL/L (ref 95–110)
CO2 SERPL-SCNC: 23 MMOL/L (ref 23–29)
CREAT SERPL-MCNC: 0.9 MG/DL (ref 0.5–1.4)
DIFFERENTIAL METHOD: ABNORMAL
EOSINOPHIL # BLD AUTO: 0.1 K/UL (ref 0–0.5)
EOSINOPHIL NFR BLD: 0.8 % (ref 0–8)
ERYTHROCYTE [DISTWIDTH] IN BLOOD BY AUTOMATED COUNT: 14.1 % (ref 11.5–14.5)
EST. GFR  (AFRICAN AMERICAN): >60 ML/MIN/1.73 M^2
EST. GFR  (NON AFRICAN AMERICAN): >60 ML/MIN/1.73 M^2
GLUCOSE SERPL-MCNC: 101 MG/DL (ref 70–110)
HCT VFR BLD AUTO: 45 % (ref 37–48.5)
HGB BLD-MCNC: 14.3 G/DL (ref 12–16)
IMM GRANULOCYTES # BLD AUTO: 0.04 K/UL (ref 0–0.04)
IMM GRANULOCYTES NFR BLD AUTO: 0.4 % (ref 0–0.5)
LYMPHOCYTES # BLD AUTO: 2.4 K/UL (ref 1–4.8)
LYMPHOCYTES NFR BLD: 26.5 % (ref 18–48)
MAGNESIUM SERPL-MCNC: 1.7 MG/DL (ref 1.6–2.6)
MCH RBC QN AUTO: 27 PG (ref 27–31)
MCHC RBC AUTO-ENTMCNC: 31.8 G/DL (ref 32–36)
MCV RBC AUTO: 85 FL (ref 82–98)
MONOCYTES # BLD AUTO: 0.8 K/UL (ref 0.3–1)
MONOCYTES NFR BLD: 8.1 % (ref 4–15)
NEUTROPHILS # BLD AUTO: 5.9 K/UL (ref 1.8–7.7)
NEUTROPHILS NFR BLD: 63.9 % (ref 38–73)
NRBC BLD-RTO: 0 /100 WBC
PLATELET # BLD AUTO: 238 K/UL (ref 150–350)
PMV BLD AUTO: 10 FL (ref 9.2–12.9)
POTASSIUM SERPL-SCNC: 3.5 MMOL/L (ref 3.5–5.1)
PROT SERPL-MCNC: 7.5 G/DL (ref 6–8.4)
RBC # BLD AUTO: 5.29 M/UL (ref 4–5.4)
SODIUM SERPL-SCNC: 139 MMOL/L (ref 136–145)
WBC # BLD AUTO: 9.21 K/UL (ref 3.9–12.7)

## 2020-06-06 PROCEDURE — 87449 NOS EACH ORGANISM AG IA: CPT

## 2020-06-06 PROCEDURE — 85025 COMPLETE CBC W/AUTO DIFF WBC: CPT

## 2020-06-06 PROCEDURE — 80053 COMPREHEN METABOLIC PANEL: CPT

## 2020-06-06 PROCEDURE — 83735 ASSAY OF MAGNESIUM: CPT

## 2020-06-06 PROCEDURE — 87324 CLOSTRIDIUM AG IA: CPT

## 2020-06-06 PROCEDURE — 99284 EMERGENCY DEPT VISIT MOD MDM: CPT | Mod: 25

## 2020-06-06 PROCEDURE — 36415 COLL VENOUS BLD VENIPUNCTURE: CPT

## 2020-06-06 RX ORDER — VANCOMYCIN HYDROCHLORIDE 125 MG/1
125 CAPSULE ORAL 4 TIMES DAILY
Qty: 56 CAPSULE | Refills: 0 | Status: SHIPPED | OUTPATIENT
Start: 2020-06-06 | End: 2020-06-20

## 2020-06-06 NOTE — TELEPHONE ENCOUNTER
"Burning, cramping, swollen abdomen and nausea and vomiting.   Increase output at the colostomy.  Patients daughter is requesting another round of C-Diff treatment for her mom.  Reason for Disposition   Pain is mainly in upper abdomen  (if needed ask: "is it mainly above the belly button?")   [1] Pain lasts > 10 minutes AND [2] age > 50    Additional Information   Negative: Shock suspected (e.g., cold/pale/clammy skin, too weak to stand, low BP, rapid pulse)   Negative: Difficult to awaken or acting confused (e.g., disoriented, slurred speech)   Negative: Passed out (i.e., lost consciousness, collapsed and was not responding)   Negative: Sounds like a life-threatening emergency to the triager   Negative: Chest pain   Negative: Followed an abdomen (stomach) injury   Negative: [1] Abdominal pain AND [2] pregnant < 20 weeks   Negative: [1] Abdominal pain AND [2] pregnant > 20 weeks   Negative: [1] Abdominal pain AND [2] postpartum < 1 month since delivery   Negative: [1] SEVERE pain (e.g., excruciating) AND [2] present > 1 hour   Negative: [1] SEVERE pain AND [2] age > 60   Negative: [1] Vomiting AND [2] contains red blood or black ("coffee ground") material  (Exception: few red streaks in vomit that only happened once)   Negative: Blood in bowel movements   (Exception: blood on surface of BM with constipation)   Negative: Black or tarry bowel movements  (Exception: chronic-unchanged  black-grey bowel movements AND is taking iron pills or Pepto-bismol)   Negative: Patient sounds very sick or weak to the triager   Negative: Severe difficulty breathing (e.g., struggling for each breath, speaks in single words)   Negative: Shock suspected (e.g., cold/pale/clammy skin, too weak to stand, low BP, rapid pulse)   Negative: Difficult to awaken or acting confused (e.g., disoriented, slurred speech)   Negative: Passed out (i.e., lost consciousness, collapsed and was not responding)   Negative: Visible sweat " on face or sweat dripping down face   Negative: Sounds like a life-threatening emergency to the triager   Negative: Followed an abdomen (stomach) injury   Negative: Chest pain   Negative: [1] SEVERE pain (e.g., excruciating) AND [2] present > 1 hour     Gripe water didn't help    Protocols used: ABDOMINAL PAIN - FEMALE-A-AH, ABDOMINAL PAIN - UPPER-A-AH

## 2020-06-06 NOTE — ED PROVIDER NOTES
"SCRIBE #1 NOTE: I, Diamond Rojas, am scribing for, and in the presence of, Gin Paulino MD. I have scribed the entire note.       History     Chief Complaint   Patient presents with    Abdominal Pain     Pt states, "I am having pain in my abdomen and I also have swelling." PT has hx of c-diff     Review of patient's allergies indicates:  No Known Allergies      History of Present Illness     HPI    6/6/2020, 12:39 PM  History obtained from the daughter and patient      History of Present Illness: Irlanda Lopez is a 79 y.o. female patient with a PMHx of diverticulitis, high cholesterol, HTN, and hypothyroidism who presents to the Emergency Department for evaluation of abdominal pain (described as cramping) which onset gradually several days ago. Symptoms are constant and moderate in severity. No mitigating or exacerbating factors reported. No associated sxs reported. Patient denies any fever, chills, constipation, dysuria, hematuria, frequency, urgency, n/v, and all other sxs at this time. Pt has a colostomy bag and reports minimal output. Pt daughter reports that pt has had similar sxs several weeks ago and was diagnosed with C.diff. Pt recently finished prescribed vancomycin but now sxs have returned. No further complaints or concerns at this time.       Arrival mode: Personal vehicle     PCP: Myla Arias MD        Past Medical History:  Past Medical History:   Diagnosis Date    Clostridium difficile colitis     Diverticulitis     High cholesterol     Hypertension     Hypothyroidism        Past Surgical History:  Past Surgical History:   Procedure Laterality Date    APPENDECTOMY  2008    CATARACT EXTRACTION      Dr Raygoza    COLON SURGERY      COLONOSCOPY N/A 1/2/2019    Procedure: COLONOSCOPY;  Surgeon: Reece Hogan MD;  Location: [facility];  Service: Endoscopy;  Laterality: N/A;    COLONOSCOPY N/A 6/7/2019    Procedure: COLONOSCOPY;  Surgeon: Rishabh Connelly MD;  Location: [facility];  " Service: General;  Laterality: N/A;    COLOSTOMY Left 1/2/2019    Procedure: CREATION, COLOSTOMY;  Surgeon: Reece Hogan MD;  Location: [facility] OR;  Service: General;  Laterality: Left;    SHAHIDA PROCEDURE N/A 1/2/2019    Procedure: SHAHIDA PROCEDURE;  Surgeon: Reece Hogan MD;  Location: [facility] OR;  Service: General;  Laterality: N/A;    LYSIS OF ADHESIONS N/A 1/2/2019    Procedure: LYSIS, ADHESIONS;  Surgeon: Reece Hogan MD;  Location: [facility] OR;  Service: General;  Laterality: N/A;    VITRECTOMY Right 09/2013         Family History:  Family History   Problem Relation Age of Onset    Alzheimer's disease Mother     Aneurysm Father         brain    Stomach cancer Brother         50s       Social History:  Social History     Tobacco Use    Smoking status: Former Smoker    Smokeless tobacco: Never Used   Substance and Sexual Activity    Alcohol use: No    Drug use: No    Sexual activity: Never     Comment:         Review of Systems     Review of Systems   Constitutional: Negative for chills and fever.   HENT: Negative for sore throat.    Respiratory: Negative for shortness of breath.    Cardiovascular: Negative for chest pain.   Gastrointestinal: Positive for abdominal pain. Negative for constipation, nausea and vomiting.   Genitourinary: Negative for dysuria, frequency, hematuria and urgency.   Musculoskeletal: Negative for back pain.   Skin: Negative for rash.   Neurological: Negative for weakness.   Hematological: Does not bruise/bleed easily.   All other systems reviewed and are negative.       Physical Exam     Initial Vitals [06/06/20 1229]   BP Pulse Resp Temp SpO2   (!) 164/74 (!) 53 20 97.8 °F (36.6 °C) 98 %      MAP       --          Physical Exam  Nursing Notes and Vital Signs Reviewed.  Constitutional: Patient is in no acute distress. Well-developed and well-nourished.  Head: Atraumatic. Normocephalic.  Eyes: PERRL. EOM intact. Conjunctivae are not pale. No scleral icterus.  ENT: Mucous  "membranes are moist. Oropharynx is clear and symmetric.    Neck: Supple. Full ROM. No lymphadenopathy.  Cardiovascular: Regular rate. Regular rhythm. No murmurs, rubs, or gallops. Distal pulses are 2+ and symmetric.  Pulmonary/Chest: No respiratory distress. Clear to auscultation bilaterally. No wheezing or rales.  Abdominal: Colostomy bag with stool noted. Soft and non-distended.  There is no tenderness.  No rebound, guarding, or rigidity. Good bowel sounds.  Genitourinary: No CVA tenderness  Musculoskeletal: Moves all extremities. No obvious deformities. No edema. No calf tenderness.  Skin: Warm and dry.  Neurological:  Alert, awake, and appropriate.  Normal speech.  No acute focal neurological deficits are appreciated.  Psychiatric: Normal affect. Good eye contact. Appropriate in content.     ED Course   Procedures  ED Vital Signs:  Vitals:    06/06/20 1229 06/06/20 1253 06/06/20 1254 06/06/20 1329   BP: (!) 164/74 (!) 168/78     Pulse: (!) 53 (!) 51 (!) 52 (!) 50   Resp: 20  19 17   Temp: 97.8 °F (36.6 °C)      TempSrc: Oral      SpO2: 98% 97% 97% 97%   Weight: 75.5 kg (166 lb 8.9 oz)      Height: 5' 2" (1.575 m)       06/06/20 1331 06/06/20 1402 06/06/20 1410 06/06/20 1432   BP: (!) 153/73 (!) 142/68  (!) 171/74   Pulse:  (!) 48 (!) 48    Resp:  19 19    Temp:       TempSrc:       SpO2:   98%    Weight:       Height:        06/06/20 1433 06/06/20 1444   BP:     Pulse: (!) 50    Resp: 20    Temp:  98.3 °F (36.8 °C)   TempSrc:  Oral   SpO2: 97%    Weight:     Height:         Abnormal Lab Results:  Labs Reviewed   CBC W/ AUTO DIFFERENTIAL - Abnormal; Notable for the following components:       Result Value    Mean Corpuscular Hemoglobin Conc 31.8 (*)     All other components within normal limits   CLOSTRIDIUM DIFFICILE   COMPREHENSIVE METABOLIC PANEL   MAGNESIUM        All Lab Results:  Results for orders placed or performed during the hospital encounter of 06/06/20   CBC auto differential   Result Value Ref Range "    WBC 9.21 3.90 - 12.70 K/uL    RBC 5.29 4.00 - 5.40 M/uL    Hemoglobin 14.3 12.0 - 16.0 g/dL    Hematocrit 45.0 37.0 - 48.5 %    Mean Corpuscular Volume 85 82 - 98 fL    Mean Corpuscular Hemoglobin 27.0 27.0 - 31.0 pg    Mean Corpuscular Hemoglobin Conc 31.8 (L) 32.0 - 36.0 g/dL    RDW 14.1 11.5 - 14.5 %    Platelets 238 150 - 350 K/uL    MPV 10.0 9.2 - 12.9 fL    Immature Granulocytes 0.4 0.0 - 0.5 %    Gran # (ANC) 5.9 1.8 - 7.7 K/uL    Immature Grans (Abs) 0.04 0.00 - 0.04 K/uL    Lymph # 2.4 1.0 - 4.8 K/uL    Mono # 0.8 0.3 - 1.0 K/uL    Eos # 0.1 0.0 - 0.5 K/uL    Baso # 0.03 0.00 - 0.20 K/uL    nRBC 0 0 /100 WBC    Gran% 63.9 38.0 - 73.0 %    Lymph% 26.5 18.0 - 48.0 %    Mono% 8.1 4.0 - 15.0 %    Eosinophil% 0.8 0.0 - 8.0 %    Basophil% 0.3 0.0 - 1.9 %    Differential Method Automated    Comprehensive metabolic panel   Result Value Ref Range    Sodium 139 136 - 145 mmol/L    Potassium 3.5 3.5 - 5.1 mmol/L    Chloride 105 95 - 110 mmol/L    CO2 23 23 - 29 mmol/L    Glucose 101 70 - 110 mg/dL    BUN, Bld 9 8 - 23 mg/dL    Creatinine 0.9 0.5 - 1.4 mg/dL    Calcium 9.5 8.7 - 10.5 mg/dL    Total Protein 7.5 6.0 - 8.4 g/dL    Albumin 3.7 3.5 - 5.2 g/dL    Total Bilirubin 0.9 0.1 - 1.0 mg/dL    Alkaline Phosphatase 87 55 - 135 U/L    AST 22 10 - 40 U/L    ALT 19 10 - 44 U/L    Anion Gap 11 8 - 16 mmol/L    eGFR if African American >60 >60 mL/min/1.73 m^2    eGFR if non African American >60 >60 mL/min/1.73 m^2   Magnesium   Result Value Ref Range    Magnesium 1.7 1.6 - 2.6 mg/dL       Imaging Results:  Imaging Results          X-Ray Abdomen Flat And Erect (Final result)  Result time 06/06/20 14:04:08    Final result by Kemal Castle MD (06/06/20 14:04:08)                 Impression:      As above.      Electronically signed by: Kemal Castle MD  Date:    06/06/2020  Time:    14:04             Narrative:    EXAMINATION:  XR ABDOMEN FLAT AND ERECT    CLINICAL HISTORY:  Diarrhea, unspecified    TECHNIQUE:  Flat and erect  AP views of the abdomen were performed.    COMPARISON:  05/13/2020    FINDINGS:  Large right renal pelvis stone in stable position.  The bowel gas pattern is within normal limits.                                        The Emergency Provider reviewed the vital signs and test results, which are outlined above.     ED Discussion     C. Diff studies still pending, will empirically tx with po vancomycin as discussed with patient and her daughter, will call with results, labs otherwise unremarkable.     2:16 PM: Reassessed pt at this time. Discussed with pt all pertinent ED information and results. Discussed pt dx and plan of tx. Gave pt all f/u and return to the ED instructions. All questions and concerns were addressed at this time. Pt expresses understanding of information and instructions, and is comfortable with plan to discharge. Pt is stable for discharge.    I discussed with patient and/or family/caretaker that evaluation in the ED does not suggest any emergent or life threatening medical conditions requiring immediate intervention beyond what was provided in the ED, and I believe patient is safe for discharge.  Regardless, an unremarkable evaluation in the ED does not preclude the development or presence of a serious of life threatening condition. As such, patient was instructed to return immediately for any worsening or change in current symptoms.         Medical Decision Making:   Clinical Tests:   Lab Tests: Ordered and Reviewed  Radiological Study: Ordered and Reviewed           ED Medication(s):  Medications - No data to display    New Prescriptions    VANCOMYCIN (VANCOCIN) 125 MG CAPSULE    Take 1 capsule (125 mg total) by mouth 4 (four) times daily. for 14 days       Follow-up Information     Layne Maier MD. Schedule an appointment as soon as possible for a visit in 2 days.    Specialty:  Gastroenterology  Why:  Return to the Emergency Room, If symptoms worsen  Contact information:  [address]  [facility] [address]  [phone]                       Scribe Attestation:   Scribe #1: I performed the above scribed service and the documentation accurately describes the services I performed. I attest to the accuracy of the note.     Attending:   Physician Attestation Statement for Scribe #1: I, Gin Paulino MD, personally performed the services described in this documentation, as scribed by Diamond Rojas, in my presence, and it is both accurate and complete.           Clinical Impression       ICD-10-CM ICD-9-CM   1. Abdominal cramping, generalized R10.84 789.07   2. Diarrhea R19.7 787.91   3. Colostomy in place Z93.3 V44.3   4. History of Clostridium difficile colitis Z86.19 V12.79       Disposition:   Disposition: Discharged  Condition: Stable         Gin Paulino MD  06/06/20 [id number]

## 2020-06-06 NOTE — TELEPHONE ENCOUNTER
Paged on call.  Called back to daughter and tld her the disposition and that I had paged on call Dr. Perez and that I had eft her message has priority for her PCP ad .

## 2020-06-08 ENCOUNTER — TELEPHONE (OUTPATIENT)
Dept: INTERNAL MEDICINE | Facility: CLINIC | Age: 80
End: 2020-06-08

## 2020-06-08 NOTE — TELEPHONE ENCOUNTER
Pts daughter wrote back stating that they went to ED. Received treatment for C - Diff. Has a follow up with Gastro.

## 2020-06-10 ENCOUNTER — TELEPHONE (OUTPATIENT)
Dept: INTERNAL MEDICINE | Facility: CLINIC | Age: 80
End: 2020-06-10

## 2020-06-10 ENCOUNTER — TELEPHONE (OUTPATIENT)
Dept: GASTROENTEROLOGY | Facility: CLINIC | Age: 80
End: 2020-06-10

## 2020-06-10 RX ORDER — ONDANSETRON 4 MG/1
4 TABLET, ORALLY DISINTEGRATING ORAL EVERY 6 HOURS PRN
Qty: 1 TABLET | Refills: 0 | Status: SHIPPED | OUTPATIENT
Start: 2020-06-10 | End: 2020-06-10

## 2020-06-10 RX ORDER — ONDANSETRON 4 MG/1
4 TABLET, ORALLY DISINTEGRATING ORAL EVERY 6 HOURS PRN
Qty: 42 TABLET | Refills: 2 | Status: SHIPPED | OUTPATIENT
Start: 2020-06-10 | End: 2022-08-22

## 2020-06-10 NOTE — TELEPHONE ENCOUNTER
Called and spoke with pts daughter. Gave her information. She stated that she does not know how she is going to try to take it when she isn't able to eat. I asked her if she still wasn't able to per our conversation she was nauseated but they hadn't tried getting her to eat anything yet. I explained if she could try when it was time to take her medicine again she can try it and then we can go from there. That she can try to eat small bland food and then needs to try and stay hydrated. If she still is not able to keep anything down and can't take the medicine without throwing up then we will need to reach out to GI at that time. She then asked if she can take her nausea medicine? I explained that I did not know she had nausea medicine that she did not relay this to me but I would defiently ask and let her know. Daughter also stated that the  nurse was out there for a visit and was checking her out.

## 2020-06-10 NOTE — TELEPHONE ENCOUNTER
Called and spoke with pts daughter. She stated that she is worried about her mom. That she went to ED on Saturday, C-diff, sent home with vancomycin. Had been tolerating well. Woke up nauseated in middle of night, throwing up and still nausea now. They don't know if she is going to be able to keep medicine down. What should she do? Daughter is headed to the pts house now. She had spoken with the pts caregiver.

## 2020-06-10 NOTE — TELEPHONE ENCOUNTER
Can try to give the medication again if tolerating some liquids. Can also advise for her to reach out to Dr Maier. Her GI specialist

## 2020-06-10 NOTE — TELEPHONE ENCOUNTER
Daughter phoned reports that patient had been tolerating the vancomycin since Sunday until today. She was unable to eat breakfast, so she missed am dose, then took one after eating a little bit for lunch and threw it up. She is having a lot of cramping, burping and nausea. It is time to take another dose and they are concerned about the nausea/vomiting. Please advise.

## 2020-06-10 NOTE — TELEPHONE ENCOUNTER
----- Message from Luna Santos sent at 6/10/2020 12:10 PM CDT -----  Contact: daughter  Type:  Sooner Apoointment Request    Caller is requesting a sooner appointment.  Caller declined first available appointment listed below.  Caller will not accept being placed on the waitlist and is requesting a message be sent to doctor.  Name of Caller:zaheer  When is the first available appointment?n/a  Symptoms:nausea  Would the patient rather a call back or a response via [facility]? Call back  Best Call Back Number:[phone]  Additional Information: Please call back

## 2020-06-16 ENCOUNTER — TELEPHONE (OUTPATIENT)
Dept: INTERNAL MEDICINE | Facility: CLINIC | Age: 80
End: 2020-06-16

## 2020-06-16 NOTE — TELEPHONE ENCOUNTER
----- Message from Ashley Ballard sent at 6/16/2020  1:39 PM CDT -----  Contact: pt  Indira with [facility]. She states that the pt's pulse was 48. Please call Indira back at [phone]

## 2020-06-16 NOTE — TELEPHONE ENCOUNTER
She is not currently on any meds that would suppress her pulse. If pulse is above 45 then ok at this time.

## 2020-06-16 NOTE — TELEPHONE ENCOUNTER
Called and spoke with Indira from . She stated that she went out and saw her today. Her pulse was 48. No distress or anything else noted. Pt is still on vancomycin for the c-diff. She is still have a decreased appetite and nausea.  nurse stated that she has medicines to help with that. She just has to make us aware of her pulse since it was low.

## 2020-06-23 RX ORDER — ATORVASTATIN CALCIUM 10 MG/1
10 TABLET, FILM COATED ORAL DAILY
Qty: 90 TABLET | Refills: 3 | Status: SHIPPED | OUTPATIENT
Start: 2020-06-23 | End: 2021-04-18

## 2020-06-23 NOTE — TELEPHONE ENCOUNTER
----- Message from Federico Dorsey sent at 6/23/2020  3:46 PM CDT -----  Regarding: [facility]  Caller is requesting a refill approval for the Rx(atorvastatin (LIPITOR) 10 MG tablet). Please call [facility] back at [phone]    [facility] - [address]  [address]  [facility] [address]  Phone: [phone] Fax: [phone]

## 2020-07-16 ENCOUNTER — OFFICE VISIT (OUTPATIENT)
Dept: CARDIOLOGY | Facility: CLINIC | Age: 80
End: 2020-07-16
Payer: MEDICARE

## 2020-07-16 VITALS
DIASTOLIC BLOOD PRESSURE: 78 MMHG | SYSTOLIC BLOOD PRESSURE: 136 MMHG | BODY MASS INDEX: 30.18 KG/M2 | HEIGHT: 62 IN | HEART RATE: 58 BPM | WEIGHT: 164 LBS

## 2020-07-16 DIAGNOSIS — I10 ESSENTIAL HYPERTENSION: Primary | ICD-10-CM

## 2020-07-16 DIAGNOSIS — E78.00 PURE HYPERCHOLESTEROLEMIA: ICD-10-CM

## 2020-07-16 DIAGNOSIS — I77.1 TORTUOUS AORTA: ICD-10-CM

## 2020-07-16 DIAGNOSIS — R60.1 GENERALIZED EDEMA: ICD-10-CM

## 2020-07-16 PROCEDURE — 3078F PR MOST RECENT DIASTOLIC BLOOD PRESSURE < 80 MM HG: ICD-10-PCS | Mod: CPTII,S$GLB,, | Performed by: INTERNAL MEDICINE

## 2020-07-16 PROCEDURE — 1101F PR PT FALLS ASSESS DOC 0-1 FALLS W/OUT INJ PAST YR: ICD-10-PCS | Mod: CPTII,S$GLB,, | Performed by: INTERNAL MEDICINE

## 2020-07-16 PROCEDURE — 3075F SYST BP GE 130 - 139MM HG: CPT | Mod: CPTII,S$GLB,, | Performed by: INTERNAL MEDICINE

## 2020-07-16 PROCEDURE — 1159F PR MEDICATION LIST DOCUMENTED IN MEDICAL RECORD: ICD-10-PCS | Mod: S$GLB,,, | Performed by: INTERNAL MEDICINE

## 2020-07-16 PROCEDURE — 99214 OFFICE O/P EST MOD 30 MIN: CPT | Mod: S$GLB,,, | Performed by: INTERNAL MEDICINE

## 2020-07-16 PROCEDURE — 3078F DIAST BP <80 MM HG: CPT | Mod: CPTII,S$GLB,, | Performed by: INTERNAL MEDICINE

## 2020-07-16 PROCEDURE — 3075F PR MOST RECENT SYSTOLIC BLOOD PRESS GE 130-139MM HG: ICD-10-PCS | Mod: CPTII,S$GLB,, | Performed by: INTERNAL MEDICINE

## 2020-07-16 PROCEDURE — 1126F AMNT PAIN NOTED NONE PRSNT: CPT | Mod: S$GLB,,, | Performed by: INTERNAL MEDICINE

## 2020-07-16 PROCEDURE — 99214 PR OFFICE/OUTPT VISIT, EST, LEVL IV, 30-39 MIN: ICD-10-PCS | Mod: S$GLB,,, | Performed by: INTERNAL MEDICINE

## 2020-07-16 PROCEDURE — 99499 UNLISTED E&M SERVICE: CPT | Mod: S$GLB,,, | Performed by: INTERNAL MEDICINE

## 2020-07-16 PROCEDURE — 99499 RISK ADDL DX/OHS AUDIT: ICD-10-PCS | Mod: S$GLB,,, | Performed by: INTERNAL MEDICINE

## 2020-07-16 PROCEDURE — 1101F PT FALLS ASSESS-DOCD LE1/YR: CPT | Mod: CPTII,S$GLB,, | Performed by: INTERNAL MEDICINE

## 2020-07-16 PROCEDURE — 99999 PR PBB SHADOW E&M-EST. PATIENT-LVL IV: CPT | Mod: PBBFAC,,, | Performed by: INTERNAL MEDICINE

## 2020-07-16 PROCEDURE — 1159F MED LIST DOCD IN RCRD: CPT | Mod: S$GLB,,, | Performed by: INTERNAL MEDICINE

## 2020-07-16 PROCEDURE — 99999 PR PBB SHADOW E&M-EST. PATIENT-LVL IV: ICD-10-PCS | Mod: PBBFAC,,, | Performed by: INTERNAL MEDICINE

## 2020-07-16 PROCEDURE — 1126F PR PAIN SEVERITY QUANTIFIED, NO PAIN PRESENT: ICD-10-PCS | Mod: S$GLB,,, | Performed by: INTERNAL MEDICINE

## 2020-07-16 NOTE — PROGRESS NOTES
Subjective:   Patient ID:  Irlanda Lopez is a 79 y.o. female who presents for follow-up of Follow-up (6 month)  Patient denies CP, angina or anginal equivalent.  Recent hospitalization for C dif    Hypertension  This is a chronic problem. The current episode started more than 1 year ago. The problem has been gradually improving since onset. The problem is controlled. Past treatments include diuretics. The current treatment provides moderate improvement. There are no compliance problems.    Hyperlipidemia  This is a chronic problem. The current episode started more than 1 year ago. The problem is controlled. Recent lipid tests were reviewed and are variable. Current antihyperlipidemic treatment includes statins. There are no compliance problems.  Risk factors for coronary artery disease include hypertension and dyslipidemia.   Edema  This is a chronic problem. The current episode started more than 1 year ago. The problem occurs intermittently. The problem has been gradually improving. Nothing aggravates the symptoms. She has tried rest for the symptoms. The treatment provided moderate relief.       ROS  Family History   Problem Relation Age of Onset    Alzheimer's disease Mother     Aneurysm Father         brain    Stomach cancer Brother         50s     Past Medical History:   Diagnosis Date    Clostridium difficile colitis     Diverticulitis     High cholesterol     Hypertension     Hypothyroidism      Social History     Socioeconomic History    Marital status:      Spouse name: Not on file    Number of children: Not on file    Years of education: Not on file    Highest education level: Not on file   Occupational History     Comment: [employer]   Social Needs    Financial resource strain: Not on file    Food insecurity     Worry: Not on file     Inability: Not on file    Transportation needs     Medical: Not on file     Non-medical: Not on file   Tobacco Use    Smoking status:  Former Smoker    Smokeless tobacco: Never Used   Substance and Sexual Activity    Alcohol use: No    Drug use: No    Sexual activity: Never     Comment:    Lifestyle    Physical activity     Days per week: Not on file     Minutes per session: Not on file    Stress: Not on file   Relationships    Social connections     Talks on phone: Not on file     Gets together: Not on file     Attends [employer] service: Not on file     Active member of club or organization: Not on file     Attends meetings of clubs or organizations: Not on file     Relationship status: Not on file   Other Topics Concern    Not on file   Social History Narrative    Not on file     Current Outpatient Medications on File Prior to Visit   Medication Sig Dispense Refill    atorvastatin (LIPITOR) 10 MG tablet Take 1 tablet (10 mg total) by mouth once daily. 90 tablet 3    donepezil (ARICEPT) 10 MG tablet Take 1 tablet (10 mg total) by mouth every evening. 90 tablet 3    furosemide (LASIX) 20 MG tablet Take 1 tablet (20 mg total) by mouth once daily. 90 tablet 3    memantine (NAMENDA) 10 MG Tab TAKE 1 TABLET TWICE A  tablet 3    thyroid, pork, (ARMOUR THYROID) 30 mg Tab Take 1 tablet (30 mg total) by mouth once daily. (Patient taking differently: Take 30 mg by mouth nightly. ) 30 tablet 11    triamcinolone acetonide 0.1% (KENALOG) 0.1 % ointment APPLY TOPICALLY 4 (FOUR) TIMES DAILY. TO RASH ON FACE AND CHEST AND NOSE (Patient taking differently: Apply topically 4 (four) times daily as needed. APPLY TOPICALLY 4 (FOUR) TIMES DAILY. TO RASH ON FACE AND CHEST AND NOSE) 80 g 1    ondansetron (ZOFRAN-ODT) 4 MG TbDL Take 1 tablet (4 mg total) by mouth every 6 (six) hours as needed (nausea). Take before taking vancomycin (Patient not taking: Reported on 7/16/2020) 42 tablet 2     Current Facility-Administered Medications on File Prior to Visit   Medication Dose Route Frequency Provider Last Rate Last Dose    lidocaine (PF) 10  mg/ml (1%) injection 10 mg  1 mL Intradermal Once Rishabh Connelly MD         Review of patient's allergies indicates:  No Known Allergies    Objective:     Physical Exam    Assessment:     1. Essential hypertension    2. Pure hypercholesterolemia    3. Tortuous aorta    4. Generalized edema        Plan:     Essential hypertension    Pure hypercholesterolemia    Tortuous aorta    Generalized edema    Continue lasix-htn/edema  Continue statin-hlp

## 2020-07-18 ENCOUNTER — NURSE TRIAGE (OUTPATIENT)
Dept: ADMINISTRATIVE | Facility: CLINIC | Age: 80
End: 2020-07-18

## 2020-07-18 ENCOUNTER — HOSPITAL ENCOUNTER (EMERGENCY)
Facility: HOSPITAL | Age: 80
Discharge: HOME | End: 2020-07-18
Attending: FAMILY MEDICINE
Payer: MEDICARE

## 2020-07-18 VITALS
DIASTOLIC BLOOD PRESSURE: 58 MMHG | SYSTOLIC BLOOD PRESSURE: 104 MMHG | OXYGEN SATURATION: 96 % | TEMPERATURE: 98 F | RESPIRATION RATE: 16 BRPM | HEART RATE: 45 BPM

## 2020-07-18 DIAGNOSIS — R00.1 BRADYCARDIA: ICD-10-CM

## 2020-07-18 DIAGNOSIS — Z43.3 ENCOUNTER FOR ATTENTION TO COLOSTOMY: Primary | ICD-10-CM

## 2020-07-18 LAB
ALBUMIN SERPL BCP-MCNC: 3.5 G/DL (ref 3.5–5.2)
ALP SERPL-CCNC: 64 U/L (ref 55–135)
ALT SERPL W/O P-5'-P-CCNC: 19 U/L (ref 10–44)
ANION GAP SERPL CALC-SCNC: 10 MMOL/L (ref 8–16)
AST SERPL-CCNC: 20 U/L (ref 10–40)
BASOPHILS # BLD AUTO: 0.02 K/UL (ref 0–0.2)
BASOPHILS NFR BLD: 0.2 % (ref 0–1.9)
BILIRUB SERPL-MCNC: 0.9 MG/DL (ref 0.1–1)
BUN SERPL-MCNC: 13 MG/DL (ref 8–23)
CALCIUM SERPL-MCNC: 9 MG/DL (ref 8.7–10.5)
CHLORIDE SERPL-SCNC: 103 MMOL/L (ref 95–110)
CO2 SERPL-SCNC: 25 MMOL/L (ref 23–29)
CREAT SERPL-MCNC: 0.8 MG/DL (ref 0.5–1.4)
DIFFERENTIAL METHOD: ABNORMAL
EOSINOPHIL # BLD AUTO: 0.2 K/UL (ref 0–0.5)
EOSINOPHIL NFR BLD: 1.5 % (ref 0–8)
ERYTHROCYTE [DISTWIDTH] IN BLOOD BY AUTOMATED COUNT: 14.5 % (ref 11.5–14.5)
EST. GFR  (AFRICAN AMERICAN): >60 ML/MIN/1.73 M^2
EST. GFR  (NON AFRICAN AMERICAN): >60 ML/MIN/1.73 M^2
GLUCOSE SERPL-MCNC: 139 MG/DL (ref 70–110)
HCT VFR BLD AUTO: 41.5 % (ref 37–48.5)
HGB BLD-MCNC: 13.5 G/DL (ref 12–16)
IMM GRANULOCYTES # BLD AUTO: 0.07 K/UL (ref 0–0.04)
IMM GRANULOCYTES NFR BLD AUTO: 0.7 % (ref 0–0.5)
LIPASE SERPL-CCNC: 80 U/L (ref 4–60)
LYMPHOCYTES # BLD AUTO: 2.1 K/UL (ref 1–4.8)
LYMPHOCYTES NFR BLD: 19.6 % (ref 18–48)
MCH RBC QN AUTO: 27.7 PG (ref 27–31)
MCHC RBC AUTO-ENTMCNC: 32.5 G/DL (ref 32–36)
MCV RBC AUTO: 85 FL (ref 82–98)
MONOCYTES # BLD AUTO: 0.9 K/UL (ref 0.3–1)
MONOCYTES NFR BLD: 7.9 % (ref 4–15)
NEUTROPHILS # BLD AUTO: 7.5 K/UL (ref 1.8–7.7)
NEUTROPHILS NFR BLD: 70.1 % (ref 38–73)
NRBC BLD-RTO: 0 /100 WBC
PLATELET # BLD AUTO: 219 K/UL (ref 150–350)
PMV BLD AUTO: 9.9 FL (ref 9.2–12.9)
POTASSIUM SERPL-SCNC: 3.6 MMOL/L (ref 3.5–5.1)
PROT SERPL-MCNC: 7 G/DL (ref 6–8.4)
RBC # BLD AUTO: 4.87 M/UL (ref 4–5.4)
SARS-COV-2 RDRP RESP QL NAA+PROBE: NEGATIVE
SODIUM SERPL-SCNC: 138 MMOL/L (ref 136–145)
WBC # BLD AUTO: 10.71 K/UL (ref 3.9–12.7)

## 2020-07-18 PROCEDURE — 93010 ELECTROCARDIOGRAM REPORT: CPT | Mod: ,,, | Performed by: INTERNAL MEDICINE

## 2020-07-18 PROCEDURE — 80053 COMPREHEN METABOLIC PANEL: CPT

## 2020-07-18 PROCEDURE — 83690 ASSAY OF LIPASE: CPT

## 2020-07-18 PROCEDURE — 96365 THER/PROPH/DIAG IV INF INIT: CPT

## 2020-07-18 PROCEDURE — 96375 TX/PRO/DX INJ NEW DRUG ADDON: CPT

## 2020-07-18 PROCEDURE — U0002 COVID-19 LAB TEST NON-CDC: HCPCS

## 2020-07-18 PROCEDURE — 99284 PR EMERGENCY DEPT VISIT,LEVEL IV: ICD-10-PCS | Mod: ,,, | Performed by: UROLOGY

## 2020-07-18 PROCEDURE — 93005 ELECTROCARDIOGRAM TRACING: CPT

## 2020-07-18 PROCEDURE — 85025 COMPLETE CBC W/AUTO DIFF WBC: CPT

## 2020-07-18 PROCEDURE — 25000003 PHARM REV CODE 250: Performed by: FAMILY MEDICINE

## 2020-07-18 PROCEDURE — 93010 EKG 12-LEAD: ICD-10-PCS | Mod: ,,, | Performed by: INTERNAL MEDICINE

## 2020-07-18 PROCEDURE — 63600175 PHARM REV CODE 636 W HCPCS: Performed by: FAMILY MEDICINE

## 2020-07-18 PROCEDURE — 96366 THER/PROPH/DIAG IV INF ADDON: CPT

## 2020-07-18 PROCEDURE — 25500020 PHARM REV CODE 255: Performed by: EMERGENCY MEDICINE

## 2020-07-18 PROCEDURE — 99285 EMERGENCY DEPT VISIT HI MDM: CPT | Mod: 25

## 2020-07-18 PROCEDURE — 99284 EMERGENCY DEPT VISIT MOD MDM: CPT | Mod: ,,, | Performed by: UROLOGY

## 2020-07-18 PROCEDURE — 63600175 PHARM REV CODE 636 W HCPCS: Performed by: EMERGENCY MEDICINE

## 2020-07-18 RX ORDER — KETOROLAC TROMETHAMINE 30 MG/ML
15 INJECTION, SOLUTION INTRAMUSCULAR; INTRAVENOUS
Status: COMPLETED | OUTPATIENT
Start: 2020-07-18 | End: 2020-07-18

## 2020-07-18 RX ORDER — ONDANSETRON 2 MG/ML
4 INJECTION INTRAMUSCULAR; INTRAVENOUS
Status: COMPLETED | OUTPATIENT
Start: 2020-07-18 | End: 2020-07-18

## 2020-07-18 RX ADMIN — ONDANSETRON 4 MG: 2 INJECTION INTRAMUSCULAR; INTRAVENOUS at 05:07

## 2020-07-18 RX ADMIN — DIATRIZOATE MEGLUMINE AND DIATRIZOATE SODIUM 480 ML: 660; 100 LIQUID ORAL; RECTAL at 09:07

## 2020-07-18 RX ADMIN — KETOROLAC TROMETHAMINE 15 MG: 30 INJECTION, SOLUTION INTRAMUSCULAR at 08:07

## 2020-07-18 RX ADMIN — PIPERACILLIN AND TAZOBACTAM 4.5 G: 4; .5 INJECTION, POWDER, LYOPHILIZED, FOR SOLUTION INTRAVENOUS; PARENTERAL at 07:07

## 2020-07-18 NOTE — TELEPHONE ENCOUNTER
Reason for Disposition   Shock suspected (e.g., cold/pale/clammy skin, too weak to stand, low BP, rapid pulse)    Protocols used: ABDOMINAL PAIN - FEMALE-A-    Patient's caregiver Kateryna Stuart called with concerns the patient is having severe abdominal pain 8/10, she has dementia and cannot sleep. Stoma and colostomy bag in place. Epigastric pain and across upper abdomen under the breasts. bp 143/101 HR 53 temp 97.7. advised caregiver to call 911 since her abdominal pain is severe, but Ms. Stuart states she will call her daughter and bring her to the ED via private vehicle.

## 2020-07-18 NOTE — CONSULTS
Chief Complaint:  Right renal stone    HPI:  79-year-old female who presents with severe abdominal pain and discomfort, she is being evaluated for colonic impaction diverticulitis.  She is seen to have an extremely large right renal pelvis stone.  This had been seen prior, and she was followed by partner in regards to possible surgery for this.  Patient though it is determined not pursuing not been seen in the last couple of years.  Patient is having upper abdominal pain, no colic at this point.  Patient has had no previous urological history per her and her son's report, her daughter is her primary care take care, she is currently not present at the bedside.  Family history of stones, no urological cancers per the family.    Allergies:  Patient has no known allergies.    Medications:  See MAR    Review of Systems:  General: No fever, chills, fatigability, or weight loss.  Skin: No rashes, itching, or changes in color or texture of skin.  Chest: Denies JESUS, cyanosis, wheezing, cough, and sputum production.  Abdomen: Appetite fine. No weight loss. Denies diarrhea, abdominal pain, hematemesis, or blood in stool.  Musculoskeletal: No joint stiffness or swelling. Denies back pain.  : As above.  All other review of systems negative.    PMH:   has a past medical history of Clostridium difficile colitis, Diverticulitis, High cholesterol, Hypertension, and Hypothyroidism.    PSH:   has a past surgical history that includes Cataract extraction; Appendectomy (2008); Colonoscopy (N/A, 1/2/2019); Colostomy (Left, 1/2/2019); Maribel procedure (N/A, 1/2/2019); Lysis of adhesions (N/A, 1/2/2019); Colon surgery; Vitrectomy (Right, 09/2013); and Colonoscopy (N/A, 6/7/2019).    FamHx: family history includes Alzheimer's disease in her mother; Aneurysm in her father; Stomach cancer in her brother.    SocHx:  reports that she has quit smoking. She has never used smokeless tobacco. She reports that she does not drink alcohol or use  drugs.      Physical Exam:  Vitals:    07/18/20 0830   BP: (!) 165/77   Pulse: (!) 55   Resp: 16   Temp:      General: A&Ox3, no apparent distress, no deformities  Neck: No masses, normal ROM  Lungs: normal inspiration, no use of accessory muscles  Heart: normal pulse, no arrhythmias  Abdomen: Soft, tender right and left upper quadrants, no bilateral costovertebral angle tenderness, ND  Skin: The skin is warm and dry. No jaundice.  Ext: No c/c/e.    Labs/Studies:   CT large right renal pelvis stone 7/20, in comparison from the study from October 2018, no significant change.  No evidence of hydronephrosis or obstructive uropathy.    Impression/Plan:     Right renal stone- at this point I do not believe that the stone is creating her abdominal pain, but this definitely needs to be followed.  A stone of this size can lead to decreased renal function and loss of kidney.  Thus far the parenchyma of the kidney looks sufficient, previous renogram demonstrated decreased function on this side 2 years ago.  Will plan on seeing her back in the office, we can reschedule renogram to assess split function, and determine if surgical management is still feasable.  This would have to be treated by PCNL, as previously scheduled.  Patient patient's son states they will follow up as recommended.

## 2020-07-18 NOTE — ED NOTES
HR 46. Pt resting in bed, denies CP, SOB, or any other symptoms. Dr. Ellison notified. EKG ordered.

## 2020-07-18 NOTE — ED NOTES
Pt c/o abdominal pain, belching, and gas x 2 days. Pain rated 3/10. Has a colostomy, LBM 7/16/20. Denies fever, n/v, CP, SOB, HA, weakness, numbness, tingling, dizziness, or any other symptoms at this time. Resting in bed, side rails up x 2, bed in lowest position, call bell in reach, son at bedside. Will continue to monitor.     Patient identifiers verified and correct for Irlanda Lopez.    LOC: The patient is awake, alert and aware of environment with an appropriate affect, the patient is oriented x 3 and speaking appropriately - mild dementia.  APPEARANCE: Patient resting comfortably and in no acute distress, patient is clean and well groomed, patient's clothing is properly fastened.  SKIN: The skin is warm and dry, color consistent with ethnicity, patient has normal skin turgor and moist mucus membranes, skin intact, no breakdown or bruising noted.  MUSCULOSKELETAL: Patient moving all extremities spontaneously.  RESPIRATORY: Airway is open and patent, respirations are spontaneous.  CARDIAC: Patient bradycardic, no peripheral edema noted, capillary refill < 3 seconds.  ABDOMEN: Soft and non tender to palpation, rounded, bowel sounds active in all four quadrants, colostomy to LLQ - scant amount of yellow/green liquid stool present.

## 2020-07-18 NOTE — ED NOTES
The patient is resting quietly, eyes closed, arouses easily to stimuli. Airway is open and patent, respirations are spontaneous, normal respiratory effort and rate noted, skin warm and dry, appearance: in no acute distress and resting comfortably. Son at bedside. Will continue to monitor.

## 2020-07-18 NOTE — ED NOTES
Dr. Hogan at bedside. Son, Raffi, agrees to allow Dr. Hogan to participate in pt's care a this time.

## 2020-07-20 ENCOUNTER — TELEPHONE (OUTPATIENT)
Dept: GASTROENTEROLOGY | Facility: CLINIC | Age: 80
End: 2020-07-20

## 2020-07-20 ENCOUNTER — TELEPHONE (OUTPATIENT)
Dept: UROLOGY | Facility: CLINIC | Age: 80
End: 2020-07-20

## 2020-07-20 NOTE — TELEPHONE ENCOUNTER
Spoke to pts daughter and scheduled hosp follow up appt with Ms Araujo.  Dr Maier is out of the office on medical leave. Pt agreed to plan and verbalized understanding.

## 2020-07-20 NOTE — TELEPHONE ENCOUNTER
----- Message from Marleen Joseph RN sent at 7/20/2020  4:39 PM CDT -----  Regarding: FW: appt  Contact: pt daughter zaheer [phone]    ----- Message -----  From: Ofelia Garcia  Sent: 7/20/2020   9:06 AM CDT  To: Livier Tillman Staff  Subject: appt                                             Patient need to verify that pt  doesn't need to be seen this week follow weekend ED visit. Please call back Zaheer at [phone].

## 2020-07-22 ENCOUNTER — OFFICE VISIT (OUTPATIENT)
Dept: GASTROENTEROLOGY | Facility: CLINIC | Age: 80
End: 2020-07-22
Payer: MEDICARE

## 2020-07-22 VITALS
WEIGHT: 161.81 LBS | BODY MASS INDEX: 29.78 KG/M2 | HEIGHT: 62 IN | HEART RATE: 64 BPM | DIASTOLIC BLOOD PRESSURE: 66 MMHG | SYSTOLIC BLOOD PRESSURE: 110 MMHG

## 2020-07-22 DIAGNOSIS — K59.00 CONSTIPATION, UNSPECIFIED CONSTIPATION TYPE: ICD-10-CM

## 2020-07-22 DIAGNOSIS — Z93.3 COLOSTOMY IN PLACE: ICD-10-CM

## 2020-07-22 DIAGNOSIS — R10.9 ABDOMINAL PAIN, UNSPECIFIED ABDOMINAL LOCATION: Primary | ICD-10-CM

## 2020-07-22 PROCEDURE — 3074F SYST BP LT 130 MM HG: CPT | Mod: CPTII,S$GLB,, | Performed by: PHYSICIAN ASSISTANT

## 2020-07-22 PROCEDURE — 1126F AMNT PAIN NOTED NONE PRSNT: CPT | Mod: S$GLB,,, | Performed by: PHYSICIAN ASSISTANT

## 2020-07-22 PROCEDURE — 1159F PR MEDICATION LIST DOCUMENTED IN MEDICAL RECORD: ICD-10-PCS | Mod: S$GLB,,, | Performed by: PHYSICIAN ASSISTANT

## 2020-07-22 PROCEDURE — 3078F DIAST BP <80 MM HG: CPT | Mod: CPTII,S$GLB,, | Performed by: PHYSICIAN ASSISTANT

## 2020-07-22 PROCEDURE — 3074F PR MOST RECENT SYSTOLIC BLOOD PRESSURE < 130 MM HG: ICD-10-PCS | Mod: CPTII,S$GLB,, | Performed by: PHYSICIAN ASSISTANT

## 2020-07-22 PROCEDURE — 99499 UNLISTED E&M SERVICE: CPT | Mod: S$GLB,,, | Performed by: PHYSICIAN ASSISTANT

## 2020-07-22 PROCEDURE — 99214 OFFICE O/P EST MOD 30 MIN: CPT | Mod: S$GLB,,, | Performed by: PHYSICIAN ASSISTANT

## 2020-07-22 PROCEDURE — 3078F PR MOST RECENT DIASTOLIC BLOOD PRESSURE < 80 MM HG: ICD-10-PCS | Mod: CPTII,S$GLB,, | Performed by: PHYSICIAN ASSISTANT

## 2020-07-22 PROCEDURE — 1101F PT FALLS ASSESS-DOCD LE1/YR: CPT | Mod: CPTII,S$GLB,, | Performed by: PHYSICIAN ASSISTANT

## 2020-07-22 PROCEDURE — 1126F PR PAIN SEVERITY QUANTIFIED, NO PAIN PRESENT: ICD-10-PCS | Mod: S$GLB,,, | Performed by: PHYSICIAN ASSISTANT

## 2020-07-22 PROCEDURE — 1159F MED LIST DOCD IN RCRD: CPT | Mod: S$GLB,,, | Performed by: PHYSICIAN ASSISTANT

## 2020-07-22 PROCEDURE — 99999 PR PBB SHADOW E&M-EST. PATIENT-LVL III: ICD-10-PCS | Mod: PBBFAC,,, | Performed by: PHYSICIAN ASSISTANT

## 2020-07-22 PROCEDURE — 99499 RISK ADDL DX/OHS AUDIT: ICD-10-PCS | Mod: S$GLB,,, | Performed by: PHYSICIAN ASSISTANT

## 2020-07-22 PROCEDURE — 1101F PR PT FALLS ASSESS DOC 0-1 FALLS W/OUT INJ PAST YR: ICD-10-PCS | Mod: CPTII,S$GLB,, | Performed by: PHYSICIAN ASSISTANT

## 2020-07-22 PROCEDURE — 99999 PR PBB SHADOW E&M-EST. PATIENT-LVL III: CPT | Mod: PBBFAC,,, | Performed by: PHYSICIAN ASSISTANT

## 2020-07-22 PROCEDURE — 99214 PR OFFICE/OUTPT VISIT, EST, LEVL IV, 30-39 MIN: ICD-10-PCS | Mod: S$GLB,,, | Performed by: PHYSICIAN ASSISTANT

## 2020-07-22 NOTE — PROGRESS NOTES
Subjective:      Patient ID: Irlanda Lopez is a 79 y.o. female.    Chief Complaint: Constipation (hospital f/u)    HPI:  Patient reports today for hospital follow up of abdominal pain and constipation. Patient has history of colon resection in 2018 due to diverticulitis. She has colostomy without reversal. Daughter accompanies her today in clinic. She states that a few days ago she began with severe upper abdominal pain in addition to reduced colostomy output. ER completed CT imaging which showed   1. Findings suggestive of at least partial small bowel obstruction located at the mid transverse colon.  Right colonic dilatation has not changed significantly since 05/12/2020 but is now filled with stool and demonstrates mild surrounding inflammatory changes suggesting associated colitis.  2. Left lower quadrant colostomy with peristomal hernia unchanged.    Gastrografin enema was also performed:  Gastrografin enema demonstrates a normal caliber distal transverse colon.  The Gastrografin readily transverses a known abrupt transition point, located at the level of the mid transverse colon, with contrast extending into dilated proximal transverse colon and right colon.    Patient was not given antibiotics at this time. She has significant C diff history. She reports today with resolved abdominal pain, although she still has significantly reduced colostomy output which is not normal for her. Associated symptoms include decrease in appetite and gas/bloating. Denies blood in the stool or black stools.    Last colonoscopy in 2019.      Review of Systems   Constitutional: Positive for appetite change and unexpected weight change (5 lbs). Negative for fatigue and fever.   HENT: Negative for trouble swallowing.    Respiratory: Negative for cough and shortness of breath.    Cardiovascular: Negative for chest pain.   Gastrointestinal: Positive for abdominal pain (per HPI. none currently) and constipation. Negative for  abdominal distention, blood in stool, diarrhea and vomiting.   Neurological: Negative for dizziness and weakness.   Psychiatric/Behavioral: Positive for confusion. Negative for dysphoric mood. The patient is not nervous/anxious.        Medical History: Reviewed    Social History: Reviewed    Allergies: Reviewed    Objective:     Physical Exam  Constitutional:       Appearance: Normal appearance. She is not ill-appearing or diaphoretic.   HENT:      Head: Normocephalic and atraumatic.   Eyes:      Extraocular Movements: Extraocular movements intact.   Neck:      Musculoskeletal: Normal range of motion.   Cardiovascular:      Rate and Rhythm: Normal rate and regular rhythm.   Pulmonary:      Effort: Pulmonary effort is normal. No respiratory distress.      Breath sounds: Normal breath sounds. No wheezing.   Abdominal:      General: Bowel sounds are normal. There is no distension.      Palpations: Abdomen is soft.      Tenderness: There is no abdominal tenderness. There is no guarding.      Comments: Colostomy to LLQ   Skin:     General: Skin is warm and dry.      Coloration: Skin is not pale.      Findings: No erythema.   Neurological:      Mental Status: She is alert and oriented to person, place, and time.   Psychiatric:         Mood and Affect: Mood normal.         Behavior: Behavior normal.         Assessment:     1. Abdominal pain, unspecified abdominal location    2. Colostomy in place    3. Constipation, unspecified constipation type        Plan:     -Begin Miralax daily. Based on CT findings, appears there may be a narrowing to the colon which could be the cause of decreased colostomy output.  -Will send staff message to Dr. Connelly for further recommendations.  -No antibiotics at this time due to resolved abdominal pain and history of C diff.  -Instructed patient to inform provider of any new or worsening signs or symptoms.    Irlanda was seen today for constipation.    Diagnoses and all orders for this  visit:    Abdominal pain, unspecified abdominal location    Colostomy in place    Constipation, unspecified constipation type        No follow-ups on file.    Thank you for the opportunity to participate in the care of this patient.   Rosemary Araujo PA-C.

## 2020-07-23 ENCOUNTER — TELEPHONE (OUTPATIENT)
Dept: GASTROENTEROLOGY | Facility: CLINIC | Age: 80
End: 2020-07-23

## 2020-07-23 ENCOUNTER — HOSPITAL ENCOUNTER (EMERGENCY)
Facility: HOSPITAL | Age: 80
Discharge: HOME | End: 2020-07-23
Attending: EMERGENCY MEDICINE
Payer: MEDICARE

## 2020-07-23 VITALS
BODY MASS INDEX: 29.51 KG/M2 | OXYGEN SATURATION: 97 % | HEIGHT: 62 IN | RESPIRATION RATE: 21 BRPM | TEMPERATURE: 98 F | HEART RATE: 50 BPM | WEIGHT: 160.38 LBS | SYSTOLIC BLOOD PRESSURE: 145 MMHG | DIASTOLIC BLOOD PRESSURE: 72 MMHG

## 2020-07-23 DIAGNOSIS — R11.2 NAUSEA & VOMITING: Primary | ICD-10-CM

## 2020-07-23 PROBLEM — R10.84 GENERALIZED ABDOMINAL PAIN: Status: ACTIVE | Noted: 2020-07-23

## 2020-07-23 LAB
ALBUMIN SERPL BCP-MCNC: 3.7 G/DL (ref 3.5–5.2)
ALP SERPL-CCNC: 77 U/L (ref 55–135)
ALT SERPL W/O P-5'-P-CCNC: 16 U/L (ref 10–44)
ANION GAP SERPL CALC-SCNC: 13 MMOL/L (ref 8–16)
AST SERPL-CCNC: 22 U/L (ref 10–40)
BASOPHILS # BLD AUTO: 0.03 K/UL (ref 0–0.2)
BASOPHILS NFR BLD: 0.4 % (ref 0–1.9)
BILIRUB SERPL-MCNC: 0.7 MG/DL (ref 0.1–1)
BILIRUB UR QL STRIP: NEGATIVE
BUN SERPL-MCNC: 11 MG/DL (ref 8–23)
CALCIUM SERPL-MCNC: 9.4 MG/DL (ref 8.7–10.5)
CHLORIDE SERPL-SCNC: 104 MMOL/L (ref 95–110)
CLARITY UR: CLEAR
CO2 SERPL-SCNC: 22 MMOL/L (ref 23–29)
COLOR UR: YELLOW
CREAT SERPL-MCNC: 0.8 MG/DL (ref 0.5–1.4)
DIFFERENTIAL METHOD: NORMAL
EOSINOPHIL # BLD AUTO: 0.1 K/UL (ref 0–0.5)
EOSINOPHIL NFR BLD: 1.1 % (ref 0–8)
ERYTHROCYTE [DISTWIDTH] IN BLOOD BY AUTOMATED COUNT: 14.2 % (ref 11.5–14.5)
EST. GFR  (AFRICAN AMERICAN): >60 ML/MIN/1.73 M^2
EST. GFR  (NON AFRICAN AMERICAN): >60 ML/MIN/1.73 M^2
GLUCOSE SERPL-MCNC: 101 MG/DL (ref 70–110)
GLUCOSE UR QL STRIP: NEGATIVE
HCT VFR BLD AUTO: 44.1 % (ref 37–48.5)
HGB BLD-MCNC: 14.5 G/DL (ref 12–16)
HGB UR QL STRIP: ABNORMAL
IMM GRANULOCYTES # BLD AUTO: 0.04 K/UL (ref 0–0.04)
IMM GRANULOCYTES NFR BLD AUTO: 0.5 % (ref 0–0.5)
KETONES UR QL STRIP: ABNORMAL
LEUKOCYTE ESTERASE UR QL STRIP: NEGATIVE
LIPASE SERPL-CCNC: 30 U/L (ref 4–60)
LYMPHOCYTES # BLD AUTO: 2 K/UL (ref 1–4.8)
LYMPHOCYTES NFR BLD: 24.5 % (ref 18–48)
MCH RBC QN AUTO: 27.7 PG (ref 27–31)
MCHC RBC AUTO-ENTMCNC: 32.9 G/DL (ref 32–36)
MCV RBC AUTO: 84 FL (ref 82–98)
MONOCYTES # BLD AUTO: 0.6 K/UL (ref 0.3–1)
MONOCYTES NFR BLD: 7.5 % (ref 4–15)
NEUTROPHILS # BLD AUTO: 5.5 K/UL (ref 1.8–7.7)
NEUTROPHILS NFR BLD: 66 % (ref 38–73)
NITRITE UR QL STRIP: NEGATIVE
NRBC BLD-RTO: 0 /100 WBC
PH UR STRIP: 6 [PH] (ref 5–8)
PLATELET # BLD AUTO: 279 K/UL (ref 150–350)
PMV BLD AUTO: 10.4 FL (ref 9.2–12.9)
POTASSIUM SERPL-SCNC: 3.9 MMOL/L (ref 3.5–5.1)
PROT SERPL-MCNC: 7.6 G/DL (ref 6–8.4)
PROT UR QL STRIP: NEGATIVE
RBC # BLD AUTO: 5.23 M/UL (ref 4–5.4)
SODIUM SERPL-SCNC: 139 MMOL/L (ref 136–145)
SP GR UR STRIP: 1.02 (ref 1–1.03)
URN SPEC COLLECT METH UR: ABNORMAL
UROBILINOGEN UR STRIP-ACNC: NEGATIVE EU/DL
WBC # BLD AUTO: 8.26 K/UL (ref 3.9–12.7)

## 2020-07-23 PROCEDURE — 99284 EMERGENCY DEPT VISIT MOD MDM: CPT | Mod: ,,, | Performed by: COLON & RECTAL SURGERY

## 2020-07-23 PROCEDURE — 99284 PR EMERGENCY DEPT VISIT,LEVEL IV: ICD-10-PCS | Mod: ,,, | Performed by: COLON & RECTAL SURGERY

## 2020-07-23 PROCEDURE — 81003 URINALYSIS AUTO W/O SCOPE: CPT

## 2020-07-23 PROCEDURE — 96375 TX/PRO/DX INJ NEW DRUG ADDON: CPT

## 2020-07-23 PROCEDURE — 80053 COMPREHEN METABOLIC PANEL: CPT

## 2020-07-23 PROCEDURE — 99285 EMERGENCY DEPT VISIT HI MDM: CPT | Mod: 25

## 2020-07-23 PROCEDURE — 63600175 PHARM REV CODE 636 W HCPCS: Performed by: EMERGENCY MEDICINE

## 2020-07-23 PROCEDURE — 25000003 PHARM REV CODE 250: Performed by: EMERGENCY MEDICINE

## 2020-07-23 PROCEDURE — 93010 ELECTROCARDIOGRAM REPORT: CPT | Mod: ,,, | Performed by: INTERNAL MEDICINE

## 2020-07-23 PROCEDURE — 93005 ELECTROCARDIOGRAM TRACING: CPT

## 2020-07-23 PROCEDURE — 83690 ASSAY OF LIPASE: CPT

## 2020-07-23 PROCEDURE — 85025 COMPLETE CBC W/AUTO DIFF WBC: CPT

## 2020-07-23 PROCEDURE — 93010 EKG 12-LEAD: ICD-10-PCS | Mod: ,,, | Performed by: INTERNAL MEDICINE

## 2020-07-23 PROCEDURE — 96365 THER/PROPH/DIAG IV INF INIT: CPT

## 2020-07-23 RX ORDER — SUCRALFATE 1 G/1
1 TABLET ORAL 4 TIMES DAILY
Qty: 20 TABLET | Refills: 0 | Status: SHIPPED | OUTPATIENT
Start: 2020-07-23 | End: 2020-07-30

## 2020-07-23 RX ORDER — MORPHINE SULFATE 4 MG/ML
4 INJECTION, SOLUTION INTRAMUSCULAR; INTRAVENOUS
Status: COMPLETED | OUTPATIENT
Start: 2020-07-23 | End: 2020-07-23

## 2020-07-23 RX ORDER — PROMETHAZINE HYDROCHLORIDE 25 MG/1
25 TABLET ORAL EVERY 6 HOURS PRN
Qty: 15 TABLET | Refills: 0 | Status: SHIPPED | OUTPATIENT
Start: 2020-07-23 | End: 2022-06-14 | Stop reason: ALTCHOICE

## 2020-07-23 RX ADMIN — MORPHINE SULFATE 4 MG: 4 INJECTION INTRAVENOUS at 12:07

## 2020-07-23 RX ADMIN — PROMETHAZINE HYDROCHLORIDE 25 MG: 25 INJECTION INTRAMUSCULAR; INTRAVENOUS at 12:07

## 2020-07-23 NOTE — HPI
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  Patient was in the emergency department 5 days ago with similar symptoms with the CT scan showing a possible partial obstruction in the transverse colon but AE subsequent Gastrografin enema showed good flow of contrast through this area without definitive obstruction.  Patient was discharged home and was tolerating regular diet with normal ostomy function until earlier this morning when she woke from sleep with acute abdominal pain and associated nausea and vomiting.  She then re-presented to the emergency department for evaluation.  She denies any fever, chills.  She is still having ostomy output although the patient states it is slightly decreased today.  Plain film in the emergency department showed nonobstructive bowel gas pattern.  Surgery is consulted for evaluation.

## 2020-07-23 NOTE — ED NOTES
Patient's daughter called, saying she is on the way:    CLARE SHYANN    [phone]

## 2020-07-23 NOTE — ED NOTES
Patient complains of abd pain with n/v.   Level of Consciousness: The patient is awake, alert, and oriented with appropriate affect and speech; oriented to person, place and time.  Appearance: Sitting up in ED stretcher with no acute distress noted. Clothing and hygiene are clean and worn appropriately.  Skin: Skin is intact; color consistent with ethnicity.    Musculoskeletal: Moves all extremities well in full range of motion. No obvious deformities or swelling noted.  Respiratory: Airway open and patent, respirations spontaneous, even and unlabored. No accessory muscles in use.   Cardiac: Regular rate, no peripheral edema noted.  Abdomen:  No distention noted.  Neurologic: PERRLA, face exhibits symmetrical expression, reports normal sensation to all extremities and face.    Patient verbalized understanding of status and plan of care.

## 2020-07-23 NOTE — ED PROVIDER NOTES
"SCRIBE #1 NOTE: I, Rishabh Anthony, am scribing for, and in the presence of, Gin Paulino MD. I have scribed the entire note.       History     Chief Complaint   Patient presents with    Abdominal Pain     Caregiver states, "SHe got up in the middle of the night with cramping and pain and then she woke up this morning vomiting."     Review of patient's allergies indicates:  No Known Allergies      History of Present Illness     HPI    7/23/2020, 11:20 AM  History obtained from the patient       History of Present Illness: Irlanda Lopez is a 79 y.o. female patient with a history of c diff, diverticulitis, HTN who presents to the Emergency Department for evaluation of generalized abdominal cramping which onset 10 hours PTA. Symptoms are constant and moderate in severity. No mitigating or exacerbating factors reported. Associated sxs include n/v, decreased colostomy output, excessive belching (x yesterday). Patient denies any fever, chills, blood in stool, dysuria, and all other sxs at this time. Prior Tx includes none. No further complaints or concerns at this time. Patient seen by GI yesterday prior to onset of symptoms.       Arrival mode: Personal transportation    PCP: Myla Arias MD      Past Medical History:  Past Medical History:   Diagnosis Date    Clostridium difficile colitis     Diverticulitis     High cholesterol     Hypertension     Hypothyroidism        Past Surgical History:  Past Surgical History:   Procedure Laterality Date    APPENDECTOMY  2008    CATARACT EXTRACTION      Dr Raygoza    COLON SURGERY      COLONOSCOPY N/A 1/2/2019    Procedure: COLONOSCOPY;  Surgeon: Reece Hogan MD;  Location[facility];  Service: Endoscopy;  Laterality: N/A;    COLONOSCOPY N/A 6/7/2019    Procedure: COLONOSCOPY;  Surgeon: Rishabh Connelly MD;  [facility];  Service: General;  Laterality: N/A;    COLOSTOMY Left 1/2/2019    Procedure: CREATION, COLOSTOMY;  Surgeon: Reece Hogan MD;  " Location: [facility] OR;  Service: General;  Laterality: Left;    SHAHIDA PROCEDURE N/A 1/2/2019    Procedure: SHAHIDA PROCEDURE;  Surgeon: Reece Hogan MD;  Location: [facility] OR;  Service: General;  Laterality: N/A;    LYSIS OF ADHESIONS N/A 1/2/2019    Procedure: LYSIS, ADHESIONS;  Surgeon: Reece Hogan MD;  Location: [facility] OR;  Service: General;  Laterality: N/A;    VITRECTOMY Right 09/2013         Family History:  Family History   Problem Relation Age of Onset    Alzheimer's disease Mother     Aneurysm Father         brain    Stomach cancer Brother         50s       Social History:   Social History     Tobacco Use    Smoking status: Former Smoker    Smokeless tobacco: Never Used   Substance and Sexual Activity    Alcohol use: No    Drug use: No    Sexual activity: Never     Comment:         Review of Systems     Review of Systems   Constitutional: Negative for chills and fever.   HENT: Negative for sore throat.    Respiratory: Negative for shortness of breath.    Cardiovascular: Negative for chest pain.   Gastrointestinal: Positive for abdominal pain, nausea and vomiting. Negative for blood in stool.   Genitourinary: Negative for dysuria.   Musculoskeletal: Negative for back pain.   Skin: Negative for rash.   Neurological: Negative for weakness.   Hematological: Does not bruise/bleed easily.   All other systems reviewed and are negative.       Physical Exam     Initial Vitals [07/23/20 1007]   BP Pulse Resp Temp SpO2   (!) 175/81 (!) 55 20 98.3 °F (36.8 °C) 99 %      MAP       --          Physical Exam  Nursing Notes and Vital Signs Reviewed.  Constitutional: Elderly and frail. Patient is in mild distress.  Head: Atraumatic. Normocephalic.  Eyes: PERRL. EOM intact. Conjunctivae are not pale. No scleral icterus.  ENT: Mucous membranes are moist. Oropharynx is clear and symmetric.    Neck: Supple. Full ROM. No lymphadenopathy.  Cardiovascular: Regular rate. Regular rhythm. No murmurs, rubs, or gallops.  "Distal pulses are 2+ and symmetric.  Pulmonary/Chest: No respiratory distress. Clear to auscultation bilaterally. No wheezing or rales.  Abdominal: Distended and firm.  There is no tenderness.  No rebound, guarding, or rigidity. Colostomy in place with minimal output. Actively belching.  Genitourinary: No CVA tenderness  Musculoskeletal: Moves all extremities. No obvious deformities. No calf tenderness.  Skin: Warm and dry.  Neurological:  Alert, awake, and appropriate.  Normal speech.  No acute focal neurological deficits are appreciated.  Psychiatric: Normal affect. Good eye contact. Appropriate in content.     ED Course   Procedures  ED Vital Signs:  Vitals:    07/23/20 1007 07/23/20 1209 07/23/20 1232 07/23/20 1429   BP: (!) 175/81  (!) 145/72    Pulse: (!) 55  (!) 49 (!) 50   Resp: 20 19 (!) 21    Temp: 98.3 °F (36.8 °C)      TempSrc: Oral      SpO2: 99%  95% 97%   Weight: 72.7 kg (160 lb 6.2 oz)      Height: 5' 2" (1.575 m)          Abnormal Lab Results:  Labs Reviewed   COMPREHENSIVE METABOLIC PANEL - Abnormal; Notable for the following components:       Result Value    CO2 22 (*)     All other components within normal limits   URINALYSIS, REFLEX TO URINE CULTURE - Abnormal; Notable for the following components:    Ketones, UA Trace (*)     Occult Blood UA Trace (*)     All other components within normal limits    Narrative:     Specimen Source->Urine   CBC W/ AUTO DIFFERENTIAL   LIPASE        All Lab Results:  Results for orders placed or performed during the hospital encounter of 07/23/20   CBC W/ AUTO DIFFERENTIAL   Result Value Ref Range    WBC 8.26 3.90 - 12.70 K/uL    RBC 5.23 4.00 - 5.40 M/uL    Hemoglobin 14.5 12.0 - 16.0 g/dL    Hematocrit 44.1 37.0 - 48.5 %    Mean Corpuscular Volume 84 82 - 98 fL    Mean Corpuscular Hemoglobin 27.7 27.0 - 31.0 pg    Mean Corpuscular Hemoglobin Conc 32.9 32.0 - 36.0 g/dL    RDW 14.2 11.5 - 14.5 %    Platelets 279 150 - 350 K/uL    MPV 10.4 9.2 - 12.9 fL    Immature " Granulocytes 0.5 0.0 - 0.5 %    Gran # (ANC) 5.5 1.8 - 7.7 K/uL    Immature Grans (Abs) 0.04 0.00 - 0.04 K/uL    Lymph # 2.0 1.0 - 4.8 K/uL    Mono # 0.6 0.3 - 1.0 K/uL    Eos # 0.1 0.0 - 0.5 K/uL    Baso # 0.03 0.00 - 0.20 K/uL    nRBC 0 0 /100 WBC    Gran% 66.0 38.0 - 73.0 %    Lymph% 24.5 18.0 - 48.0 %    Mono% 7.5 4.0 - 15.0 %    Eosinophil% 1.1 0.0 - 8.0 %    Basophil% 0.4 0.0 - 1.9 %    Differential Method Automated    Comp. Metabolic Panel   Result Value Ref Range    Sodium 139 136 - 145 mmol/L    Potassium 3.9 3.5 - 5.1 mmol/L    Chloride 104 95 - 110 mmol/L    CO2 22 (L) 23 - 29 mmol/L    Glucose 101 70 - 110 mg/dL    BUN, Bld 11 8 - 23 mg/dL    Creatinine 0.8 0.5 - 1.4 mg/dL    Calcium 9.4 8.7 - 10.5 mg/dL    Total Protein 7.6 6.0 - 8.4 g/dL    Albumin 3.7 3.5 - 5.2 g/dL    Total Bilirubin 0.7 0.1 - 1.0 mg/dL    Alkaline Phosphatase 77 55 - 135 U/L    AST 22 10 - 40 U/L    ALT 16 10 - 44 U/L    Anion Gap 13 8 - 16 mmol/L    eGFR if African American >60 >60 mL/min/1.73 m^2    eGFR if non African American >60 >60 mL/min/1.73 m^2   Lipase   Result Value Ref Range    Lipase 30 4 - 60 U/L   Urinalysis, Reflex to Urine Culture Urine, Catheterized    Specimen: Urine   Result Value Ref Range    Specimen UA Urine, Catheterized     Color, UA Yellow Yellow, Straw, Gina    Appearance, UA Clear Clear    pH, UA 6.0 5.0 - 8.0    Specific Gravity, UA 1.025 1.005 - 1.030    Protein, UA Negative Negative    Glucose, UA Negative Negative    Ketones, UA Trace (A) Negative    Bilirubin (UA) Negative Negative    Occult Blood UA Trace (A) Negative    Nitrite, UA Negative Negative    Urobilinogen, UA Negative <2.0 EU/dL    Leukocytes, UA Negative Negative         Imaging Results          X-Ray Abdomen Flat And Erect (Final result)  Result time 07/23/20 12:16:27    Final result by Shankar Christine MD (07/23/20 12:16:27)                 Impression:      No acute radiographic abnormality in the abdomen.      Electronically signed  by: Shankar Christine  Date:    07/23/2020  Time:    12:16             Narrative:    EXAMINATION:  XR ABDOMEN FLAT AND ERECT    CLINICAL HISTORY:  Abdominal Pain;    TECHNIQUE:  Flat and erect AP views of the abdomen were performed.    COMPARISON:  Gastrografin enema 07/18/2020; CT abdomen pelvis 07/18/2020    FINDINGS:  Cardiac leads project over the abdomen.  Large right renal calcification again noted.  No dilated loops of bowel or colon to suggest obstruction or ileus.  No gross intraperitoneal free air.  Calcifications again appreciated within the right pelvis.  Visualized osseous structures appear intact.                                 The EKG was ordered, reviewed, and independently interpreted by the ED provider.  Interpretation time: 1131  Rate: 54 BPM  Rhythm: sinus bradycardia  Interpretation: nonspecific ST and T wave abnormality. No STEMI.           The Emergency Provider reviewed the vital signs and test results, which are outlined above.     ED Discussion     1:36 PM: Discussed pt's case with Dr. Connelly (GI) who states CT appear to show gastroenteritis or colitis. States plain films look fine. States since patient was fine between Saturday until today, obscuration  is unlikely.    1:53 PM  Dr. Connelly at bedside okay for patient to be discharged home, GI follow up.    1:53 PM: Reassessed pt at this time.  Pt states her condition has improved at this time. Discussed with pt all pertinent ED information and results. Discussed pt dx and plan of tx. Gave pt all f/u and return to the ED instructions. All questions and concerns were addressed at this time. Pt expresses understanding of information and instructions, and is comfortable with plan to discharge. Pt is stable for discharge.    I discussed with patient and/or family/caretaker that evaluation in the ED does not suggest any emergent or life threatening medical conditions requiring immediate intervention beyond what was provided in the ED, and I  believe patient is safe for discharge.  Regardless, an unremarkable evaluation in the ED does not preclude the development or presence of a serious of life threatening condition. As such, patient was instructed to return immediately for any worsening or change in current symptoms.       MDM        Medical Decision Making:   Clinical Tests:   Lab Tests: Ordered and Reviewed  Radiological Study: Ordered and Reviewed  Medical Tests: Ordered and Reviewed           ED Medication(s):  Medications   promethazine (PHENERGAN) 25 mg in dextrose 5 % 50 mL IVPB (0 mg Intravenous Stopped 7/23/20 1229)   morphine injection 4 mg (4 mg Intravenous Given 7/23/20 1209)       New Prescriptions    PROMETHAZINE (PHENERGAN) 25 MG TABLET    Take 1 tablet (25 mg total) by mouth every 6 (six) hours as needed.    SUCRALFATE (CARAFATE) 1 GRAM TABLET    Take 1 tablet (1 g total) by mouth 4 (four) times daily.       Follow-up Information     [facility]. Schedule an appointment as soon as possible for a visit in 2 days.    Specialty: Gastroenterology  Why: Return to the Emergency Room, If symptoms worsen  Contact information:  [address] [place] [address] [id number] [phone]                     Scribe Attestation:   Scribe #1: I performed the above scribed service and the documentation accurately describes the services I performed. I attest to the accuracy of the note.     Attending:   Physician Attestation Statement for Scribe #1: I, Gin Paulino MD, personally performed the services described in this documentation, as scribed by Rishabh Anthony, in my presence, and it is both accurate and complete.           Clinical Impression       ICD-10-CM ICD-9-CM   1. Nausea & vomiting  R11.2 787.01             ED Disposition Condition    Discharge Stable             Gin Paulino MD  07/23/20 1439       Gin Paulino MD  07/23/20 1440

## 2020-07-23 NOTE — ASSESSMENT & PLAN NOTE
Abdominal pain with associated nausea and vomiting with normal KUB    - No acute surgical intervention required at this time. Pt with nonobstructive bowel pattern on KUB today with ongoing colostomy output, as well as normal diet toleration with stool output over last 5 days between ED visits  - Concern for colitis vs gastroenteritis as etiology. Can also consider repeating CDiff given her previous infections  - May need further workup with PCP vs GI medicine  - If tolerates diet and pain improves, okay for discharge from surgical perspective

## 2020-07-23 NOTE — TELEPHONE ENCOUNTER
----- Message from Ara Ayala sent at 7/23/2020  8:26 AM CDT -----  Regarding: Pt advice  Type:  Needs Medical Advice    Who Called: Pt's daughter-Myla Mora   Symptoms (please be specific):vomiting(brown stuff)  at 1:00a.m. & abdominal cramping went to the ER last night and was discharged but went home to having these severe symptoms.   How long has patient had these symptoms: last 24 hours   Pharmacy name and phone #:  n/a  Would the patient rather a call back or a response via [facility]? Call back   Best Call Back Number: [phone](cell)  Additional Information: n/a

## 2020-07-23 NOTE — CONSULTS
[facility] -   Colorectal Surgery  Consult Note    Patient Name: Irlanda Lopez  MRN: [id number]  Code Status: Prior  Admission Date: 7/23/2020  Hospital Length of Stay: 0 days  Attending Physician: Gin Paulino MD  Primary Care Provider: Myla Arias MD    Patient information was obtained from patient, relative(s), past medical records and ER records.     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  Patient was in the emergency department 5 days ago with similar symptoms with the CT scan showing a possible partial obstruction in the transverse colon but AE subsequent Gastrografin enema showed good flow of contrast through this area without definitive obstruction.  Patient was discharged home and was tolerating regular diet with normal ostomy function until earlier this morning when she woke from sleep with acute abdominal pain and associated nausea and vomiting.  She then re-presented to the emergency department for evaluation.  She denies any fever, chills.  She is still having ostomy output although the patient states it is slightly decreased today.  Plain film in the emergency department showed nonobstructive bowel gas pattern.  Surgery is consulted for evaluation.    Current Facility-Administered Medications on File Prior to Encounter   Medication    lidocaine (PF) 10 mg/ml (1%) injection 10 mg     Current Outpatient Medications on File Prior to Encounter   Medication Sig    atorvastatin (LIPITOR) 10 MG tablet  Take 1 tablet (10 mg total) by mouth once daily.    donepezil (ARICEPT) 10 MG tablet Take 1 tablet (10 mg total) by mouth every evening.    furosemide (LASIX) 20 MG tablet Take 1 tablet (20 mg total) by mouth once daily.    memantine (NAMENDA) 10 MG Tab TAKE 1 TABLET TWICE A DAY    ondansetron (ZOFRAN-ODT) 4 MG TbDL Take 1 tablet (4 mg total) by mouth every 6 (six) hours as needed (nausea). Take before taking vancomycin    thyroid, pork, (ARMOUR THYROID) 30 mg Tab Take 1 tablet (30 mg total) by mouth once daily. (Patient taking differently: Take 30 mg by mouth nightly. )    triamcinolone acetonide 0.1% (KENALOG) 0.1 % ointment APPLY TOPICALLY 4 (FOUR) TIMES DAILY. TO RASH ON FACE AND CHEST AND NOSE (Patient taking differently: Apply topically 4 (four) times daily as needed. APPLY TOPICALLY 4 (FOUR) TIMES DAILY. TO RASH ON FACE AND CHEST AND NOSE)       Review of patient's allergies indicates:  No Known Allergies    Past Medical History:   Diagnosis Date    Clostridium difficile colitis     Diverticulitis     High cholesterol     Hypertension     Hypothyroidism      Past Surgical History:   Procedure Laterality Date    APPENDECTOMY  2008    CATARACT EXTRACTION      Dr Raygoza    COLON SURGERY      COLONOSCOPY N/A 1/2/2019    Procedure: COLONOSCOPY;  Surgeon: Reece Hogan MD;  Location: [facility];  Service: Endoscopy;  Laterality: N/A;    COLONOSCOPY N/A 6/7/2019    Procedure: COLONOSCOPY;  Surgeon: Rishabh Connelly MD;  Location: [facility] ENDO;  Service: General;  Laterality: N/A;    COLOSTOMY Left 1/2/2019    Procedure: CREATION, COLOSTOMY;  Surgeon: Reece Hogan MD;  Location: [facility] OR;  Service: General;  Laterality: Left;    SHAHIDA PROCEDURE N/A 1/2/2019    Procedure: SHAHIDA PROCEDURE;  Surgeon: Reece Hogan MD;  Location: [facility] OR;  Service: General;  Laterality: N/A;    LYSIS OF ADHESIONS N/A 1/2/2019    Procedure: LYSIS, ADHESIONS;  Surgeon: Reece Hogan MD;  Location: [facility] OR;  Service: General;   Laterality: N/A;    VITRECTOMY Right 09/2013     Family History     Problem Relation (Age of Onset)    Alzheimer's disease Mother    Aneurysm Father    Stomach cancer Brother        Tobacco Use    Smoking status: Former Smoker    Smokeless tobacco: Never Used   Substance and Sexual Activity    Alcohol use: No    Drug use: No    Sexual activity: Never     Comment:      Review of Systems   Constitutional: Negative for activity change, appetite change, chills, fatigue, fever and unexpected weight change.   HENT: Negative for congestion, ear pain, sore throat and trouble swallowing.    Eyes: Negative for pain, redness and itching.   Respiratory: Negative for cough, shortness of breath and wheezing.    Cardiovascular: Negative for chest pain, palpitations and leg swelling.   Gastrointestinal: Positive for abdominal pain, nausea and vomiting. Negative for abdominal distention, anal bleeding, blood in stool, constipation, diarrhea and rectal pain.   Endocrine: Negative for cold intolerance, heat intolerance and polyuria.   Genitourinary: Negative for dysuria, flank pain, frequency and hematuria.   Musculoskeletal: Negative for gait problem, joint swelling and neck pain.   Skin: Negative for color change, rash and wound.   Allergic/Immunologic: Negative for environmental allergies and immunocompromised state.   Neurological: Negative for dizziness, speech difficulty, weakness and numbness.   Psychiatric/Behavioral: Negative for agitation, confusion and hallucinations.     Objective:     Vital Signs (Most Recent):  Temp: 98.3 °F (36.8 °C) (07/23/20 1007)  Pulse: (!) 49 (07/23/20 1232)  Resp: (!) 21 (07/23/20 1232)  BP: (!) 145/72 (07/23/20 1232)  SpO2: 95 % (07/23/20 1232) Vital Signs (24h Range):  Temp:  [98.3 °F (36.8 °C)] 98.3 °F (36.8 °C)  Pulse:  [49-55] 49  Resp:  [19-21] 21  SpO2:  [95 %-99 %] 95 %  BP: (145-175)/(72-81) 145/72     Weight: 72.7 kg (160 lb 6.2 oz)  Body mass index is 29.33  kg/m².    Physical Exam  Constitutional:       Appearance: She is well-developed.   HENT:      Head: Normocephalic and atraumatic.   Eyes:      Conjunctiva/sclera: Conjunctivae normal.   Neck:      Musculoskeletal: Normal range of motion.      Thyroid: No thyromegaly.   Cardiovascular:      Rate and Rhythm: Normal rate and regular rhythm.   Pulmonary:      Effort: Pulmonary effort is normal. No respiratory distress.   Abdominal:      Comments: Soft, nondistended, mild global TTP; no rebound or guarding; well-healed midline previous incision; ostomy pink and viable with liquid stool in bag   Musculoskeletal: Normal range of motion.         General: No tenderness.   Skin:     General: Skin is warm and dry.      Capillary Refill: Capillary refill takes less than 2 seconds.      Findings: No rash.   Neurological:      Mental Status: She is alert and oriented to person, place, and time.         Significant Labs:  CBC:   Recent Labs   Lab 07/23/20  1210   WBC 8.26   RBC 5.23   HGB 14.5   HCT 44.1      MCV 84   MCH 27.7   MCHC 32.9     BMP:   Recent Labs   Lab 07/23/20  1210         K 3.9      CO2 22*   BUN 11   CREATININE 0.8   CALCIUM 9.4       Significant Diagnostics:  I have reviewed all pertinent imaging results/findings within the past 24 hours.   KUB:  FINDINGS:  Cardiac leads project over the abdomen.  Large right renal calcification again noted.  No dilated loops of bowel or colon to suggest obstruction or ileus.  No gross intraperitoneal free air.  Calcifications again appreciated within the right pelvis.  Visualized osseous structures appear intact.     Impression:     No acute radiographic abnormality in the abdomen.    Assessment/Plan:     Generalized abdominal pain  Abdominal pain with associated nausea and vomiting with normal KUB    - No acute surgical intervention required at this time. Pt with nonobstructive bowel pattern on KUB today with ongoing colostomy output, as well as  normal diet toleration with stool output over last 5 days between ED visits  - Concern for colitis vs gastroenteritis as etiology. Can also consider repeating CDiff given her previous infections  - May need further workup with PCP vs GI medicine  - If tolerates diet and pain improves, okay for discharge from surgical perspective      VTE Risk Mitigation (From admission, onward)    None          Thank you for your consult.     Rishabh Connelly MD  Colorectal Surgery  [facility]

## 2020-07-23 NOTE — SUBJECTIVE & OBJECTIVE
Current Facility-Administered Medications on File Prior to Encounter   Medication    lidocaine (PF) 10 mg/ml (1%) injection 10 mg     Current Outpatient Medications on File Prior to Encounter   Medication Sig    atorvastatin (LIPITOR) 10 MG tablet Take 1 tablet (10 mg total) by mouth once daily.    donepezil (ARICEPT) 10 MG tablet Take 1 tablet (10 mg total) by mouth every evening.    furosemide (LASIX) 20 MG tablet Take 1 tablet (20 mg total) by mouth once daily.    memantine (NAMENDA) 10 MG Tab TAKE 1 TABLET TWICE A DAY    ondansetron (ZOFRAN-ODT) 4 MG TbDL Take 1 tablet (4 mg total) by mouth every 6 (six) hours as needed (nausea). Take before taking vancomycin    thyroid, pork, (ARMOUR THYROID) 30 mg Tab Take 1 tablet (30 mg total) by mouth once daily. (Patient taking differently: Take 30 mg by mouth nightly. )    triamcinolone acetonide 0.1% (KENALOG) 0.1 % ointment APPLY TOPICALLY 4 (FOUR) TIMES DAILY. TO RASH ON FACE AND CHEST AND NOSE (Patient taking differently: Apply topically 4 (four) times daily as needed. APPLY TOPICALLY 4 (FOUR) TIMES DAILY. TO RASH ON FACE AND CHEST AND NOSE)       Review of patient's allergies indicates:  No Known Allergies    Past Medical History:   Diagnosis Date    Clostridium difficile colitis     Diverticulitis     High cholesterol     Hypertension     Hypothyroidism      Past Surgical History:   Procedure Laterality Date    APPENDECTOMY  2008    CATARACT EXTRACTION      Dr Raygoza    COLON SURGERY      COLONOSCOPY N/A 1/2/2019    Procedure: COLONOSCOPY;  Surgeon: Reece Hogan MD;  Location: [facility] ENDO;  Service: Endoscopy;  Laterality: N/A;    COLONOSCOPY N/A 6/7/2019    Procedure: COLONOSCOPY;  Surgeon: Rishabh Connelly MD;  Location: [facility] ENDO;  Service: General;  Laterality: N/A;    COLOSTOMY Left 1/2/2019    Procedure: CREATION, COLOSTOMY;  Surgeon: Reece Hogan MD;  Location: [facility] OR;  Service: General;  Laterality: Left;    SHAHIDA PROCEDURE N/A 1/2/2019     Procedure: SHAHIDA PROCEDURE;  Surgeon: Reece Hogan MD;  Location: [facility] OR;  Service: General;  Laterality: N/A;    LYSIS OF ADHESIONS N/A 1/2/2019    Procedure: LYSIS, ADHESIONS;  Surgeon: Reece Hogan MD;  Location: [facility] OR;  Service: General;  Laterality: N/A;    VITRECTOMY Right 09/2013     Family History     Problem Relation (Age of Onset)    Alzheimer's disease Mother    Aneurysm Father    Stomach cancer Brother        Tobacco Use    Smoking status: Former Smoker    Smokeless tobacco: Never Used   Substance and Sexual Activity    Alcohol use: No    Drug use: No    Sexual activity: Never     Comment:      Review of Systems   Constitutional: Negative for activity change, appetite change, chills, fatigue, fever and unexpected weight change.   HENT: Negative for congestion, ear pain, sore throat and trouble swallowing.    Eyes: Negative for pain, redness and itching.   Respiratory: Negative for cough, shortness of breath and wheezing.    Cardiovascular: Negative for chest pain, palpitations and leg swelling.   Gastrointestinal: Positive for abdominal pain, nausea and vomiting. Negative for abdominal distention, anal bleeding, blood in stool, constipation, diarrhea and rectal pain.   Endocrine: Negative for cold intolerance, heat intolerance and polyuria.   Genitourinary: Negative for dysuria, flank pain, frequency and hematuria.   Musculoskeletal: Negative for gait problem, joint swelling and neck pain.   Skin: Negative for color change, rash and wound.   Allergic/Immunologic: Negative for environmental allergies and immunocompromised state.   Neurological: Negative for dizziness, speech difficulty, weakness and numbness.   Psychiatric/Behavioral: Negative for agitation, confusion and hallucinations.     Objective:     Vital Signs (Most Recent):  Temp: 98.3 °F (36.8 °C) (07/23/20 1007)  Pulse: (!) 49 (07/23/20 1232)  Resp: (!) 21 (07/23/20 1232)  BP: (!) 145/72 (07/23/20 1232)  SpO2: 95 % (07/23/20  1232) Vital Signs (24h Range):  Temp:  [98.3 °F (36.8 °C)] 98.3 °F (36.8 °C)  Pulse:  [49-55] 49  Resp:  [19-21] 21  SpO2:  [95 %-99 %] 95 %  BP: (145-175)/(72-81) 145/72     Weight: 72.7 kg (160 lb 6.2 oz)  Body mass index is 29.33 kg/m².    Physical Exam  Constitutional:       Appearance: She is well-developed.   HENT:      Head: Normocephalic and atraumatic.   Eyes:      Conjunctiva/sclera: Conjunctivae normal.   Neck:      Musculoskeletal: Normal range of motion.      Thyroid: No thyromegaly.   Cardiovascular:      Rate and Rhythm: Normal rate and regular rhythm.   Pulmonary:      Effort: Pulmonary effort is normal. No respiratory distress.   Abdominal:      Comments: Soft, nondistended, mild global TTP; no rebound or guarding; well-healed midline previous incision; ostomy pink and viable with liquid stool in bag   Musculoskeletal: Normal range of motion.         General: No tenderness.   Skin:     General: Skin is warm and dry.      Capillary Refill: Capillary refill takes less than 2 seconds.      Findings: No rash.   Neurological:      Mental Status: She is alert and oriented to person, place, and time.         Significant Labs:  CBC:   Recent Labs   Lab 07/23/20  1210   WBC 8.26   RBC 5.23   HGB 14.5   HCT 44.1      MCV 84   MCH 27.7   MCHC 32.9     BMP:   Recent Labs   Lab 07/23/20  1210         K 3.9      CO2 22*   BUN 11   CREATININE 0.8   CALCIUM 9.4       Significant Diagnostics:  I have reviewed all pertinent imaging results/findings within the past 24 hours.   KUB:  FINDINGS:  Cardiac leads project over the abdomen.  Large right renal calcification again noted.  No dilated loops of bowel or colon to suggest obstruction or ileus.  No gross intraperitoneal free air.  Calcifications again appreciated within the right pelvis.  Visualized osseous structures appear intact.     Impression:     No acute radiographic abnormality in the abdomen.

## 2020-07-30 ENCOUNTER — OFFICE VISIT (OUTPATIENT)
Dept: GASTROENTEROLOGY | Facility: CLINIC | Age: 80
End: 2020-07-30
Payer: MEDICARE

## 2020-07-30 VITALS
HEIGHT: 62 IN | HEART RATE: 88 BPM | SYSTOLIC BLOOD PRESSURE: 130 MMHG | BODY MASS INDEX: 29.21 KG/M2 | DIASTOLIC BLOOD PRESSURE: 70 MMHG | WEIGHT: 158.75 LBS

## 2020-07-30 DIAGNOSIS — R11.2 NON-INTRACTABLE VOMITING WITH NAUSEA: ICD-10-CM

## 2020-07-30 DIAGNOSIS — R10.84 GENERALIZED ABDOMINAL PAIN: ICD-10-CM

## 2020-07-30 DIAGNOSIS — Z93.3 COLOSTOMY IN PLACE: Primary | ICD-10-CM

## 2020-07-30 PROCEDURE — 1101F PR PT FALLS ASSESS DOC 0-1 FALLS W/OUT INJ PAST YR: ICD-10-PCS | Mod: CPTII,S$GLB,, | Performed by: INTERNAL MEDICINE

## 2020-07-30 PROCEDURE — 99499 RISK ADDL DX/OHS AUDIT: ICD-10-PCS | Mod: S$GLB,,, | Performed by: INTERNAL MEDICINE

## 2020-07-30 PROCEDURE — 1126F AMNT PAIN NOTED NONE PRSNT: CPT | Mod: S$GLB,,, | Performed by: INTERNAL MEDICINE

## 2020-07-30 PROCEDURE — 99999 PR PBB SHADOW E&M-EST. PATIENT-LVL III: ICD-10-PCS | Mod: PBBFAC,,, | Performed by: INTERNAL MEDICINE

## 2020-07-30 PROCEDURE — 99999 PR PBB SHADOW E&M-EST. PATIENT-LVL III: CPT | Mod: PBBFAC,,, | Performed by: INTERNAL MEDICINE

## 2020-07-30 PROCEDURE — 3078F PR MOST RECENT DIASTOLIC BLOOD PRESSURE < 80 MM HG: ICD-10-PCS | Mod: CPTII,S$GLB,, | Performed by: INTERNAL MEDICINE

## 2020-07-30 PROCEDURE — 1159F MED LIST DOCD IN RCRD: CPT | Mod: S$GLB,,, | Performed by: INTERNAL MEDICINE

## 2020-07-30 PROCEDURE — 99214 OFFICE O/P EST MOD 30 MIN: CPT | Mod: S$GLB,,, | Performed by: INTERNAL MEDICINE

## 2020-07-30 PROCEDURE — 99499 UNLISTED E&M SERVICE: CPT | Mod: S$GLB,,, | Performed by: INTERNAL MEDICINE

## 2020-07-30 PROCEDURE — 3075F SYST BP GE 130 - 139MM HG: CPT | Mod: CPTII,S$GLB,, | Performed by: INTERNAL MEDICINE

## 2020-07-30 PROCEDURE — 1101F PT FALLS ASSESS-DOCD LE1/YR: CPT | Mod: CPTII,S$GLB,, | Performed by: INTERNAL MEDICINE

## 2020-07-30 PROCEDURE — 3075F PR MOST RECENT SYSTOLIC BLOOD PRESS GE 130-139MM HG: ICD-10-PCS | Mod: CPTII,S$GLB,, | Performed by: INTERNAL MEDICINE

## 2020-07-30 PROCEDURE — 1159F PR MEDICATION LIST DOCUMENTED IN MEDICAL RECORD: ICD-10-PCS | Mod: S$GLB,,, | Performed by: INTERNAL MEDICINE

## 2020-07-30 PROCEDURE — 99214 PR OFFICE/OUTPT VISIT, EST, LEVL IV, 30-39 MIN: ICD-10-PCS | Mod: S$GLB,,, | Performed by: INTERNAL MEDICINE

## 2020-07-30 PROCEDURE — 1126F PR PAIN SEVERITY QUANTIFIED, NO PAIN PRESENT: ICD-10-PCS | Mod: S$GLB,,, | Performed by: INTERNAL MEDICINE

## 2020-07-30 PROCEDURE — 3078F DIAST BP <80 MM HG: CPT | Mod: CPTII,S$GLB,, | Performed by: INTERNAL MEDICINE

## 2020-07-30 RX ORDER — SUCRALFATE 1 G/1
1 TABLET ORAL 4 TIMES DAILY
Qty: 120 TABLET | Refills: 1 | Status: SHIPPED | OUTPATIENT
Start: 2020-07-30 | End: 2020-08-29

## 2020-07-30 RX ORDER — DICYCLOMINE HYDROCHLORIDE 10 MG/1
10 CAPSULE ORAL 2 TIMES DAILY PRN
Qty: 120 CAPSULE | Refills: 0 | Status: SHIPPED | OUTPATIENT
Start: 2020-07-30 | End: 2020-09-21

## 2020-07-30 NOTE — PROGRESS NOTES
[facility]  Gastroenterology  PCP: Myla Arias MD    7/30/20    \[facility]\""     Hospital Follow Up      Additional comments: nausea and vomiting          Last edited by Kayley Richter LPN on 7/30/2020 10:53 AM. (History)        Reason for Visit: ED follow-up for N/V    Subjective:   Irlanda Lopez is a 79 y.o. female here for ED follow-up. Patient was seen previously by GI PA Rosemary Araujo. 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 Patient's most recent episode was about 1 month ago and she completed her oral vancomycin taper. Most recently, patient has had about 1 episode per week of severe abdominal cramping resulting in visits to the ED. The cramping is usually accompanied by burping, belching and sometimes nausea/vomiting. Two weeks ago, patient had a visit with CT imaging showing at least a partial obstruction in the transverse colon. A follow-up gastrograffin enema showed contrast flow through this area suggestive of resolution of the obstruction. Patient was again in the ED about 1 week ago with another episode. KUB was negative for any obstruction. Colorectal surgery evaluated patient in the ED with no plans for acute surgical intervention given that patient did not have any evidence of obstruction at the time. Since her ED visit, patient has been doing well. She is here today with her daughter but has a full time caretaker at home. She has been eating well without any further episodes of abdominal cramping. She has been having daily bowel movements and tolerating her diet. No diarrhea, N/V or abdominal pain. Patient was discharged on carafate trial but patient and daughter uncertain if this has helped anything.       Past Medical History:   Diagnosis Date    Clostridium difficile colitis     Diverticulitis     High cholesterol     Hypertension     Hypothyroidism        Past Surgical History:    Procedure Laterality Date    APPENDECTOMY  2008    CATARACT EXTRACTION      Dr Raygoza    COLON SURGERY      COLONOSCOPY N/A 1/2/2019    Procedure: COLONOSCOPY;  Surgeon: Reece Hogan MD;  Location: [facility] ENDO;  Service: Endoscopy;  Laterality: N/A;    COLONOSCOPY N/A 6/7/2019    Procedure: COLONOSCOPY;  Surgeon: Rishabh Connelly MD;  Location: [facility] ENDO;  Service: General;  Laterality: N/A;    COLOSTOMY Left 1/2/2019    Procedure: CREATION, COLOSTOMY;  Surgeon: Reece Hogan MD;  Location: [facility] OR;  Service: General;  Laterality: Left;    SHAHIDA PROCEDURE N/A 1/2/2019    Procedure: SHAHIDA PROCEDURE;  Surgeon: Reece Hogan MD;  Location: [facility] OR;  Service: General;  Laterality: N/A;    LYSIS OF ADHESIONS N/A 1/2/2019    Procedure: LYSIS, ADHESIONS;  Surgeon: Reece Hogan MD;  Location: [facility] OR;  Service: General;  Laterality: N/A;    VITRECTOMY Right 09/2013       Current Outpatient Medications on File Prior to Visit   Medication Sig Dispense Refill    atorvastatin (LIPITOR) 10 MG tablet Take 1 tablet (10 mg total) by mouth once daily. 90 tablet 3    donepezil (ARICEPT) 10 MG tablet Take 1 tablet (10 mg total) by mouth every evening. 90 tablet 3    furosemide (LASIX) 20 MG tablet Take 1 tablet (20 mg total) by mouth once daily. 90 tablet 3    memantine (NAMENDA) 10 MG Tab TAKE 1 TABLET TWICE A  tablet 3    ondansetron (ZOFRAN-ODT) 4 MG TbDL Take 1 tablet (4 mg total) by mouth every 6 (six) hours as needed (nausea). Take before taking vancomycin 42 tablet 2    promethazine (PHENERGAN) 25 MG tablet Take 1 tablet (25 mg total) by mouth every 6 (six) hours as needed. 15 tablet 0    thyroid, pork, (ARMOUR THYROID) 30 mg Tab Take 1 tablet (30 mg total) by mouth once daily. (Patient taking differently: Take 30 mg by mouth nightly. ) 30 tablet 11    triamcinolone acetonide 0.1% (KENALOG) 0.1 % ointment APPLY TOPICALLY 4 (FOUR) TIMES DAILY. TO RASH ON FACE AND CHEST AND NOSE (Patient taking differently:  Apply topically 4 (four) times daily as needed. APPLY TOPICALLY 4 (FOUR) TIMES DAILY. TO RASH ON FACE AND CHEST AND NOSE) 80 g 1    [DISCONTINUED] sucralfate (CARAFATE) 1 gram tablet Take 1 tablet (1 g total) by mouth 4 (four) times daily. 20 tablet 0     Current Facility-Administered Medications on File Prior to Visit   Medication Dose Route Frequency Provider Last Rate Last Dose    lidocaine (PF) 10 mg/ml (1%) injection 10 mg  1 mL Intradermal Once Rishabh Connelly MD           Review of patient's allergies indicates:  No Known Allergies    Social History     Socioeconomic History    Marital status:      Spouse name: Not on file    Number of children: Not on file    Years of education: Not on file    Highest education level: Not on file   Occupational History     Comment: [employer]   Social Needs    Financial resource strain: Not on file    Food insecurity     Worry: Not on file     Inability: Not on file    Transportation needs     Medical: Not on file     Non-medical: Not on file   Tobacco Use    Smoking status: Former Smoker    Smokeless tobacco: Never Used   Substance and Sexual Activity    Alcohol use: No    Drug use: No    Sexual activity: Never     Comment:    Lifestyle    Physical activity     Days per week: Not on file     Minutes per session: Not on file    Stress: Not on file   Relationships    Social connections     Talks on phone: Not on file     Gets together: Not on file     Attends Judaism service: Not on file     Active member of club or organization: Not on file     Attends meetings of clubs or organizations: Not on file     Relationship status: Not on file   Other Topics Concern    Not on file   Social History Narrative    Not on file       Family History   Problem Relation Age of Onset    Alzheimer's disease Mother     Aneurysm Father         brain    Stomach cancer Brother         50s       Review of Systems   Constitutional: Negative for  appetite change, fever and unexpected weight change.   HENT: Negative for postnasal drip, rhinorrhea, sneezing, sore throat and trouble swallowing.    Eyes: Negative for visual disturbance.   Respiratory: Negative for cough, shortness of breath and wheezing.    Cardiovascular: Negative for chest pain, palpitations and leg swelling.   Gastrointestinal: Negative for abdominal pain, blood in stool, constipation, diarrhea, nausea and vomiting.   Genitourinary: Negative for dysuria.   Musculoskeletal: Negative for arthralgias, joint swelling and myalgias.   Skin: Negative for color change, pallor and rash.   Neurological: Negative for weakness, light-headedness, numbness and headaches.   Hematological: Negative for adenopathy. Does not bruise/bleed easily.   Psychiatric/Behavioral: Negative for agitation.           Objective:   Vitals:   Vitals:    07/30/20 1053   BP: 130/70   Pulse: 88       Physical Exam  Vitals signs reviewed.   Constitutional:       General: She is not in acute distress.     Appearance: She is not diaphoretic.   HENT:      Head: Normocephalic and atraumatic.      Mouth/Throat:      Pharynx: No oropharyngeal exudate.   Eyes:      General: No scleral icterus.        Right eye: No discharge.         Left eye: No discharge.      Conjunctiva/sclera: Conjunctivae normal.      Pupils: Pupils are equal, round, and reactive to light.   Neck:      Musculoskeletal: Normal range of motion.   Cardiovascular:      Rate and Rhythm: Normal rate and regular rhythm.      Heart sounds: Normal heart sounds. No murmur. No friction rub. No gallop.    Pulmonary:      Effort: Pulmonary effort is normal. No respiratory distress.      Breath sounds: Normal breath sounds. No stridor. No wheezing or rales.   Abdominal:      General: Bowel sounds are normal. There is no distension.      Palpations: Abdomen is soft. There is no mass.      Tenderness: There is no abdominal tenderness. There is no guarding.   Musculoskeletal:  Normal range of motion.   Skin:     General: Skin is warm and dry.      Coloration: Skin is not pale.      Findings: No erythema or rash.   Neurological:      Mental Status: She is alert and oriented to person, place, and time.           X-ray Abdomen Flat And Erect    Result Date: 7/23/2020  EXAMINATION: XR ABDOMEN FLAT AND ERECT CLINICAL HISTORY: Abdominal Pain; TECHNIQUE: Flat and erect AP views of the abdomen were performed. COMPARISON: Gastrografin enema 07/18/2020; CT abdomen pelvis 07/18/2020 FINDINGS: Cardiac leads project over the abdomen.  Large right renal calcification again noted.  No dilated loops of bowel or colon to suggest obstruction or ileus.  No gross intraperitoneal free air.  Calcifications again appreciated within the right pelvis.  Visualized osseous structures appear intact.     No acute radiographic abnormality in the abdomen. Electronically signed by: Shankar Christine Date:    07/23/2020 Time:    12:16    Fl Gastrografin Enema Water Soluble    Result Date: 7/18/2020  EXAMINATION: FL GASTROGRAFIN ENEMA WATER SOLUBLE CLINICAL HISTORY: colostomy dysfunction; probable chronic partial small bowel obstruction due to stricture at the level of the mid transverse colon.  Left lower quadrant colostomy. TECHNIQUE: Solid column Gastrografin enema was performed via the patient's left lower quadrant colostomy. COMPARISON: None FINDINGS: Images demonstrate retrograde filling of the colon from the patient's left lower quadrant colostomy site.  Normal caliber distal transverse colon he is identified.  An abrupt transition to mild to moderately dilated proximal transverse colon and right colon is noted.  However, the contrast does readily transverse of the transition point with contrast extending into the mildly dilated right colon.  Additionally, there is reflux of contrast through the terminal ileum and into the distal small bowel which is nondilated.     Gastrografin enema demonstrates a normal caliber  distal transverse colon.  The Gastrografin readily transverses a known abrupt transition point, located at the level of the mid transverse colon, with contrast extending into dilated proximal transverse colon and right colon. Electronically signed by: Dangelo Black Date:    07/18/2020 Time:    11:26    Ct Abdomen Pelvis  Without Contrast    Result Date: 7/18/2020  EXAMINATION: CT ABDOMEN PELVIS WITHOUT CONTRAST CLINICAL HISTORY: Abdominal pain, acute, nonlocalized;. TECHNIQUE: Low dose axial images, sagittal and coronal reformations were obtained from the lung bases to the pubic symphysis. COMPARISON: None FINDINGS: Lung bases are clear. The liver is normal.  Gallbladder is collapsed. The spleen is normal in size and appearance.  The pancreas is normal.  The adrenal glands are normal.  Aorta normal in caliber with atherosclerotic calcification. The left kidney is normal.  Left ureter is normal.  Very large right renal pelvis calculus is identified measuring 3.3 cm in maximal dimension which is unchanged compared to the previous exam.  Moderate to severe atrophy of the right renal parenchyma, also unchanged.  Right ureter nondilated. Small hiatal hernia otherwise the stomach is normal.  Small intestine is nondilated and normal.  There is moderate to severe dilatation of the ascending to mid transverse colon which is similar in caliber in comparison the previous exam but now filled with stool with mild surrounding inflammatory changes which could represent associated colitis.  Again identified is a focal transition from the large caliber mid transverse colon to a small-caliber collapsed distal transverse colon located within the left paramidline pelvis.  Findings indicate at least partial small bowel obstruction and is unchanged in comparison the previous exam.  Descending colon leads to a left lower quadrant colostomy with associated peristomal hernia containing bowel loops.  The peristomal hernia is unchanged  since the previous exam. The pelvis demonstrates demonstrates postoperative changes with a blind-ending short rectal loop.  Uterus is atrophic containing a single right-sided focal calcification measuring 1.0 cm.  Adnexal regions are normal. Regional bones demonstrate degenerative changes of the spine.  No suspicious osseous lesions.  Mild compression fracture T11, which is unchanged.  No suspicious osseous lesions.     1. Findings suggestive of at least partial small bowel obstruction located at the mid transverse colon.  Right colonic dilatation has not changed significantly since 05/12/2020 but is now filled with stool and demonstrates mild surrounding inflammatory changes suggesting associated colitis. 2. Left lower quadrant colostomy with peristomal hernia unchanged. All CT scans at this facility are performed  using dose modulation techniques as appropriate to performed exam including the following:  automated exposure control; adjustment of mA and/or kV according to the patients size (this includes techniques or standardized protocols for targeted exams where dose is matched to indication/reason for exam: i.e. extremities or head);  iterative reconstruction technique. Electronically signed by: Dangelo Black Date:    07/18/2020 Time:    09:00      IMPRESSION     Problem List Items Addressed This Visit        GI    Colostomy in place - Primary    Non-intractable vomiting with nausea    Generalized abdominal pain          PLANS:    - Patient has been doing well since ED visit with no cramping episodes, is having daily BMs and tolerating diet  - Etiology unclear- due to findings of partial small bowel obstruction on prior imaging (later resolved), these symptoms could be repeated episodes of intermittent partial obstruction versus an irritable bowel manifestation s/p multiple C diff infections  - No signs of C diff recurrence at this time. If another episode occurs, need to consider fecal transplant as she  has already had 3-4 episodes per the daughter  - Trial of low dose Bentyl PRN for any repeated episodes of cramping  - Dietary precautions and continue with food diary  - Refilled carafate in case this has been helping  - ED precautions given if cramping continues despite the Bentyl and/or for any signs of obstruction (fever, N/V)  - RTC in 2-3 months for f/u    Colostomy in place    Non-intractable vomiting with nausea    Generalized abdominal pain    Other orders  -     dicyclomine (BENTYL) 10 MG capsule; Take 1 capsule (10 mg total) by mouth 2 (two) times daily as needed.  Dispense: 120 capsule; Refill: 0  -     sucralfate (CARAFATE) 1 gram tablet; Take 1 tablet (1 g total) by mouth 4 (four) times daily.  Dispense: 120 tablet; Refill: 1            Lucy Lafleur MD  Gastroenterology and Hepatology

## 2020-08-19 ENCOUNTER — OFFICE VISIT (OUTPATIENT)
Dept: UROLOGY | Facility: CLINIC | Age: 80
End: 2020-08-19
Payer: MEDICARE

## 2020-08-19 VITALS
HEIGHT: 62 IN | SYSTOLIC BLOOD PRESSURE: 136 MMHG | TEMPERATURE: 98 F | BODY MASS INDEX: 29.7 KG/M2 | DIASTOLIC BLOOD PRESSURE: 70 MMHG | WEIGHT: 161.38 LBS | HEART RATE: 52 BPM

## 2020-08-19 DIAGNOSIS — N20.0 RENAL STONE: Primary | ICD-10-CM

## 2020-08-19 PROCEDURE — 99213 PR OFFICE/OUTPT VISIT, EST, LEVL III, 20-29 MIN: ICD-10-PCS | Mod: S$GLB,,, | Performed by: UROLOGY

## 2020-08-19 PROCEDURE — 3078F DIAST BP <80 MM HG: CPT | Mod: CPTII,S$GLB,, | Performed by: UROLOGY

## 2020-08-19 PROCEDURE — 99999 PR PBB SHADOW E&M-EST. PATIENT-LVL III: ICD-10-PCS | Mod: PBBFAC,,, | Performed by: UROLOGY

## 2020-08-19 PROCEDURE — 3075F SYST BP GE 130 - 139MM HG: CPT | Mod: CPTII,S$GLB,, | Performed by: UROLOGY

## 2020-08-19 PROCEDURE — 1101F PR PT FALLS ASSESS DOC 0-1 FALLS W/OUT INJ PAST YR: ICD-10-PCS | Mod: CPTII,S$GLB,, | Performed by: UROLOGY

## 2020-08-19 PROCEDURE — 99213 OFFICE O/P EST LOW 20 MIN: CPT | Mod: S$GLB,,, | Performed by: UROLOGY

## 2020-08-19 PROCEDURE — 3078F PR MOST RECENT DIASTOLIC BLOOD PRESSURE < 80 MM HG: ICD-10-PCS | Mod: CPTII,S$GLB,, | Performed by: UROLOGY

## 2020-08-19 PROCEDURE — 1159F PR MEDICATION LIST DOCUMENTED IN MEDICAL RECORD: ICD-10-PCS | Mod: S$GLB,,, | Performed by: UROLOGY

## 2020-08-19 PROCEDURE — 1159F MED LIST DOCD IN RCRD: CPT | Mod: S$GLB,,, | Performed by: UROLOGY

## 2020-08-19 PROCEDURE — 1126F AMNT PAIN NOTED NONE PRSNT: CPT | Mod: S$GLB,,, | Performed by: UROLOGY

## 2020-08-19 PROCEDURE — 1101F PT FALLS ASSESS-DOCD LE1/YR: CPT | Mod: CPTII,S$GLB,, | Performed by: UROLOGY

## 2020-08-19 PROCEDURE — 99999 PR PBB SHADOW E&M-EST. PATIENT-LVL III: CPT | Mod: PBBFAC,,, | Performed by: UROLOGY

## 2020-08-19 PROCEDURE — 1126F PR PAIN SEVERITY QUANTIFIED, NO PAIN PRESENT: ICD-10-PCS | Mod: S$GLB,,, | Performed by: UROLOGY

## 2020-08-19 PROCEDURE — 3075F PR MOST RECENT SYSTOLIC BLOOD PRESS GE 130-139MM HG: ICD-10-PCS | Mod: CPTII,S$GLB,, | Performed by: UROLOGY

## 2020-08-19 NOTE — PROGRESS NOTES
Chief Complaint:   Encounter Diagnoses   Name Primary?    Obstruction of right ureteropelvic junction (UPJ) due to stone Yes    Renal stone        HPI:   8/19/20- patient has had no pain from her stone, she and her daughter would rather not pursue surgical management.  7/18/20- 79-year-old female who presents with severe abdominal pain and discomfort, she is being evaluated for colonic impaction diverticulitis.  She is seen to have an extremely large right renal pelvis stone.  This had been seen prior, and she was followed by partner in regards to possible surgery for this.  Patient though it is determined not pursuing not been seen in the last couple of years.  Patient is having upper abdominal pain, no colic at this point.  Patient has had no previous urological history per her and her son's report, her daughter is her primary care take care, she is currently not present at the bedside.  Family history of stones, no urological cancers per the family.  10/17/18: Mag3 shows 72/38 L/R function. -UCx last visit.  PCNL is the only viable treatment option; observation not recommended.  10/8/18: 79 yo woman referred for renal stone after workup for UTI/diverticulitis.  Having diarrhea on meds for diverticulitis.  No abd/pelvic pain and no exac/rel factors.  No hematuria.  No prior urolithiasis.  No urinary bother.  No  history.  Normal sexual function but inactive.  Some memory problems but functional at home.    Allergies:  Patient has no known allergies.    Medications:  has a current medication list which includes the following prescription(s): atorvastatin, dicyclomine, donepezil, furosemide, memantine, ondansetron, promethazine, sucralfate, thyroid (pork), and triamcinolone acetonide 0.1%, and the following Facility-Administered Medications: lidocaine (pf) 10 mg/ml (1%).    Review of Systems:  General: No fever, chills, fatigability, or weight loss.  Skin: No rashes, itching, or changes in color or texture of  skin.  Chest: Denies JESUS, cyanosis, wheezing, cough, and sputum production.  Abdomen: Appetite fine. No weight loss. Denies diarrhea, abdominal pain, hematemesis, or blood in stool.  Musculoskeletal: No joint stiffness or swelling. Denies back pain.  : As above.  All other review of systems negative.    PMH:   has a past medical history of Clostridium difficile colitis, Diverticulitis, High cholesterol, Hypertension, and Hypothyroidism.    PSH:   has a past surgical history that includes Cataract extraction; Appendectomy (2008); Colonoscopy (N/A, 1/2/2019); Colostomy (Left, 1/2/2019); Maribel procedure (N/A, 1/2/2019); Lysis of adhesions (N/A, 1/2/2019); Colon surgery; Vitrectomy (Right, 09/2013); and Colonoscopy (N/A, 6/7/2019).    FamHx: family history includes Alzheimer's disease in her mother; Aneurysm in her father; Stomach cancer in her brother.    SocHx:  reports that she has quit smoking. She has never used smokeless tobacco. She reports that she does not drink alcohol or use drugs.      Physical Exam:  Vitals:    08/19/20 1115   BP: 136/70   Pulse: (!) 52   Temp: 98.1 °F (36.7 °C)     General: A&Ox3, no apparent distress, no deformities  Neck: No masses, normal ROM  Lungs: normal inspiration, no use of accessory muscles  Heart: normal pulse, no arrhythmias  Abdomen: Soft, NT, ND, no masses, no hernias, no hepatosplenomegaly  Skin: The skin is warm and dry. No jaundice.  Ext: No c/c/e.  : No pelvic floor prolapse.  Normal introitus, no urethral abnomralities. No Perineal abnormalities.    Labs/Studies:   Creatinine 0.8 7/20  CT large right renal pelvis stone 7/20, in comparison from the study from October 2018, no significant change.  No evidence of hydronephrosis or obstructive uropathy.     Impression/Plan:      Right renal stone- PCNL would be the only viable option, would want to do a Lasix renogram prior to confirm function of this kidney.  At this point due to the fact that the patient is  asymptomatic with a normal creatinine, she and her daughter would rather not pursue surgical management and would rather watch this.  They understand the risks of renal failure and infections, but understanding of this the patient and her daughter would still like to hold.  Therefore I will see her back in 6 months with a KUB to confirm stability, if she is stable at that point then we can go to yearly from there.  She will contact me in the meantime if she has any issues.

## 2020-08-27 ENCOUNTER — HOSPITAL ENCOUNTER (EMERGENCY)
Facility: HOSPITAL | Age: 80
Discharge: HOME | End: 2020-08-27
Attending: EMERGENCY MEDICINE
Payer: MEDICARE

## 2020-08-27 ENCOUNTER — TELEPHONE (OUTPATIENT)
Dept: GASTROENTEROLOGY | Facility: CLINIC | Age: 80
End: 2020-08-27

## 2020-08-27 VITALS
DIASTOLIC BLOOD PRESSURE: 60 MMHG | BODY MASS INDEX: 29.52 KG/M2 | TEMPERATURE: 98 F | SYSTOLIC BLOOD PRESSURE: 126 MMHG | RESPIRATION RATE: 20 BRPM | HEIGHT: 62 IN | OXYGEN SATURATION: 96 % | HEART RATE: 55 BPM

## 2020-08-27 DIAGNOSIS — N39.0 URINARY TRACT INFECTION WITHOUT HEMATURIA, SITE UNSPECIFIED: ICD-10-CM

## 2020-08-27 DIAGNOSIS — R11.2 NON-INTRACTABLE VOMITING WITH NAUSEA, UNSPECIFIED VOMITING TYPE: Primary | ICD-10-CM

## 2020-08-27 LAB
ALBUMIN SERPL BCP-MCNC: 3.8 G/DL (ref 3.5–5.2)
ALP SERPL-CCNC: 85 U/L (ref 55–135)
ALT SERPL W/O P-5'-P-CCNC: 14 U/L (ref 10–44)
ANION GAP SERPL CALC-SCNC: 11 MMOL/L (ref 8–16)
AST SERPL-CCNC: 18 U/L (ref 10–40)
BACTERIA #/AREA URNS HPF: ABNORMAL /HPF
BASOPHILS # BLD AUTO: 0.03 K/UL (ref 0–0.2)
BASOPHILS NFR BLD: 0.3 % (ref 0–1.9)
BILIRUB SERPL-MCNC: 1.2 MG/DL (ref 0.1–1)
BILIRUB UR QL STRIP: NEGATIVE
BUN SERPL-MCNC: 10 MG/DL (ref 8–23)
CALCIUM SERPL-MCNC: 9.4 MG/DL (ref 8.7–10.5)
CHLORIDE SERPL-SCNC: 101 MMOL/L (ref 95–110)
CLARITY UR: CLEAR
CO2 SERPL-SCNC: 26 MMOL/L (ref 23–29)
COLOR UR: YELLOW
CREAT SERPL-MCNC: 0.8 MG/DL (ref 0.5–1.4)
DIFFERENTIAL METHOD: NORMAL
EOSINOPHIL # BLD AUTO: 0.1 K/UL (ref 0–0.5)
EOSINOPHIL NFR BLD: 0.7 % (ref 0–8)
ERYTHROCYTE [DISTWIDTH] IN BLOOD BY AUTOMATED COUNT: 13.8 % (ref 11.5–14.5)
EST. GFR  (AFRICAN AMERICAN): >60 ML/MIN/1.73 M^2
EST. GFR  (NON AFRICAN AMERICAN): >60 ML/MIN/1.73 M^2
GLUCOSE SERPL-MCNC: 104 MG/DL (ref 70–110)
GLUCOSE UR QL STRIP: NEGATIVE
HCT VFR BLD AUTO: 44.7 % (ref 37–48.5)
HGB BLD-MCNC: 14.5 G/DL (ref 12–16)
HGB UR QL STRIP: ABNORMAL
IMM GRANULOCYTES # BLD AUTO: 0.04 K/UL (ref 0–0.04)
IMM GRANULOCYTES NFR BLD AUTO: 0.4 % (ref 0–0.5)
KETONES UR QL STRIP: NEGATIVE
LEUKOCYTE ESTERASE UR QL STRIP: ABNORMAL
LIPASE SERPL-CCNC: 36 U/L (ref 4–60)
LYMPHOCYTES # BLD AUTO: 2.5 K/UL (ref 1–4.8)
LYMPHOCYTES NFR BLD: 26.1 % (ref 18–48)
MCH RBC QN AUTO: 28 PG (ref 27–31)
MCHC RBC AUTO-ENTMCNC: 32.4 G/DL (ref 32–36)
MCV RBC AUTO: 86 FL (ref 82–98)
MICROSCOPIC COMMENT: ABNORMAL
MONOCYTES # BLD AUTO: 0.8 K/UL (ref 0.3–1)
MONOCYTES NFR BLD: 8.1 % (ref 4–15)
NEUTROPHILS # BLD AUTO: 6.1 K/UL (ref 1.8–7.7)
NEUTROPHILS NFR BLD: 64.4 % (ref 38–73)
NITRITE UR QL STRIP: NEGATIVE
NRBC BLD-RTO: 0 /100 WBC
PH UR STRIP: 6 [PH] (ref 5–8)
PLATELET # BLD AUTO: 261 K/UL (ref 150–350)
PMV BLD AUTO: 10.2 FL (ref 9.2–12.9)
POTASSIUM SERPL-SCNC: 3.9 MMOL/L (ref 3.5–5.1)
PROT SERPL-MCNC: 7.6 G/DL (ref 6–8.4)
PROT UR QL STRIP: ABNORMAL
RBC # BLD AUTO: 5.18 M/UL (ref 4–5.4)
RBC #/AREA URNS HPF: 5 /HPF (ref 0–4)
SODIUM SERPL-SCNC: 138 MMOL/L (ref 136–145)
SP GR UR STRIP: <=1.005 (ref 1–1.03)
URN SPEC COLLECT METH UR: ABNORMAL
UROBILINOGEN UR STRIP-ACNC: NEGATIVE EU/DL
WBC # BLD AUTO: 9.42 K/UL (ref 3.9–12.7)
WBC #/AREA URNS HPF: 7 /HPF (ref 0–5)
WBC CLUMPS URNS QL MICRO: ABNORMAL

## 2020-08-27 PROCEDURE — 25500020 PHARM REV CODE 255: Performed by: EMERGENCY MEDICINE

## 2020-08-27 PROCEDURE — 99284 EMERGENCY DEPT VISIT MOD MDM: CPT | Mod: 25

## 2020-08-27 PROCEDURE — 85025 COMPLETE CBC W/AUTO DIFF WBC: CPT

## 2020-08-27 PROCEDURE — 83690 ASSAY OF LIPASE: CPT

## 2020-08-27 PROCEDURE — 63600175 PHARM REV CODE 636 W HCPCS: Performed by: EMERGENCY MEDICINE

## 2020-08-27 PROCEDURE — 96376 TX/PRO/DX INJ SAME DRUG ADON: CPT

## 2020-08-27 PROCEDURE — 63600175 PHARM REV CODE 636 W HCPCS: Performed by: NURSE PRACTITIONER

## 2020-08-27 PROCEDURE — A9698 NON-RAD CONTRAST MATERIALNOC: HCPCS | Performed by: EMERGENCY MEDICINE

## 2020-08-27 PROCEDURE — 80053 COMPREHEN METABOLIC PANEL: CPT

## 2020-08-27 PROCEDURE — 96375 TX/PRO/DX INJ NEW DRUG ADDON: CPT

## 2020-08-27 PROCEDURE — 81000 URINALYSIS NONAUTO W/SCOPE: CPT

## 2020-08-27 PROCEDURE — 96374 THER/PROPH/DIAG INJ IV PUSH: CPT

## 2020-08-27 RX ORDER — NITROFURANTOIN 25; 75 MG/1; MG/1
100 CAPSULE ORAL 2 TIMES DAILY
Qty: 10 CAPSULE | Refills: 0 | Status: SHIPPED | OUTPATIENT
Start: 2020-08-27 | End: 2020-09-01

## 2020-08-27 RX ORDER — ONDANSETRON 2 MG/ML
4 INJECTION INTRAMUSCULAR; INTRAVENOUS
Status: COMPLETED | OUTPATIENT
Start: 2020-08-27 | End: 2020-08-27

## 2020-08-27 RX ORDER — HYOSCYAMINE SULFATE 0.5 MG/ML
0.5 INJECTION, SOLUTION SUBCUTANEOUS
Status: COMPLETED | OUTPATIENT
Start: 2020-08-27 | End: 2020-08-27

## 2020-08-27 RX ADMIN — ONDANSETRON 4 MG: 2 INJECTION INTRAMUSCULAR; INTRAVENOUS at 04:08

## 2020-08-27 RX ADMIN — IOHEXOL 1000 ML: 9 SOLUTION ORAL at 06:08

## 2020-08-27 RX ADMIN — ONDANSETRON 4 MG: 2 INJECTION INTRAMUSCULAR; INTRAVENOUS at 05:08

## 2020-08-27 RX ADMIN — HYOSCYAMINE SULFATE 0.5 MG: 0.5 INJECTION, SOLUTION SUBCUTANEOUS at 05:08

## 2020-08-27 RX ADMIN — IOHEXOL 75 ML: 350 INJECTION, SOLUTION INTRAVENOUS at 06:08

## 2020-08-27 NOTE — TELEPHONE ENCOUNTER
Spoke with patient's daughter and she stated that patient has been to the ER repeatedly for nausea and other stomach issues. She concerned why an upper scope was never ordered on patient to see if there is a reason for her symptoms. She stated that patient is currently at [facility] and would like to know if a scope can be ordered.

## 2020-08-27 NOTE — ED PROVIDER NOTES
"  8/27/2020, 1:43 PM   History obtained from the patient       History of Present Illness: Irlanda Lopez is a 79 y.o. female patient presenting with abdominal pain and n/v    1:43 PM: Pt was placed back in [facility]. I explained to pt that due to lack of available rooms pt was seen by myself to begin the workup. Pt understands they will be seen and dispositioned by the next available physician. Pt voices understanding and agrees with plan. Pt also understands the longer than normal wait time. I am removing myself from the care of pt and pt will now be assigned to the next available physician.     Anselmo Whitaker NP    SCRIBE #1 NOTE: I, Romana Headley, am scribing for, and in the presence of, Anselmo Feliciano MD. I have scribed the entire note.       History     Chief Complaint   Patient presents with    Vomiting     Pt c/o of N/V since yesterday with abd cramping intermittently and "spitting up phlegm". Colostomy.      Review of patient's allergies indicates:  No Known Allergies      History of Present Illness     HPI    8/27/2020, 4:00 PM  History obtained from the patient      History of Present Illness: Irlanda Lopez is a 79 y.o. female patient with a h/o Clostridium difficile colitis, diverticulitis, high cholesterol, HTN, hypothyroidism, who presents to the Emergency Department for evaluation of emesis (looks like phlegm and watery) which onset last night. Pt reports that she didn't have any output from her colostomy bag this morning. Symptoms are episodic and moderate in severity. No mitigating or exacerbating factors reported. Associated sxs include intermittent abd cramping and nausea. Patient denies any blood in stool, dysuria, fever, HA, SOB, CP, and all other sxs at this time. No prior Tx reported. No further complaints or concerns at this time.       Arrival mode: Personal transportation      PCP: Myla Arias MD      Past Medical History:  Past Medical History:   Diagnosis Date    " Clostridium difficile colitis     Diverticulitis     High cholesterol     Hypertension     Hypothyroidism        Past Surgical History:  Past Surgical History:   Procedure Laterality Date    APPENDECTOMY  2008    CATARACT EXTRACTION      Dr Raygoza    COLON SURGERY      COLONOSCOPY N/A 1/2/2019    Procedure: COLONOSCOPY;  Surgeon: Reece Hogan MD;  Location: [facility] ENDO;  Service: Endoscopy;  Laterality: N/A;    COLONOSCOPY N/A 6/7/2019    Procedure: COLONOSCOPY;  Surgeon: Rishabh Connelly MD;  Location: [facility] ENDO;  Service: General;  Laterality: N/A;    COLOSTOMY Left 1/2/2019    Procedure: CREATION, COLOSTOMY;  Surgeon: Reece Hogan MD;  Location: [facility] OR;  Service: General;  Laterality: Left;    SHAHIDA PROCEDURE N/A 1/2/2019    Procedure: SHAHIDA PROCEDURE;  Surgeon: Reece Hogan MD;  Location: [facility] OR;  Service: General;  Laterality: N/A;    LYSIS OF ADHESIONS N/A 1/2/2019    Procedure: LYSIS, ADHESIONS;  Surgeon: Reece Hogan MD;  Location: [facility] OR;  Service: General;  Laterality: N/A;    VITRECTOMY Right 09/2013         Family History:  Family History   Problem Relation Age of Onset    Alzheimer's disease Mother     Aneurysm Father         brain    Stomach cancer Brother         50s       Social History:   Social History     Tobacco Use    Smoking status: Former Smoker    Smokeless tobacco: Never Used   Substance and Sexual Activity    Alcohol use: No    Drug use: No    Sexual activity: Never     Comment:         Review of Systems     Review of Systems   Constitutional: Negative for fever.   HENT: Negative for sore throat.    Respiratory: Negative for shortness of breath.    Cardiovascular: Negative for chest pain.   Gastrointestinal: Positive for nausea and vomiting (watery and phlegm). Negative for blood in stool.        (+) intermittent abd cramping   Genitourinary: Negative for dysuria.   Musculoskeletal: Negative for back pain.   Skin: Negative for rash.   Neurological: Negative for  "weakness and headaches.   Hematological: Does not bruise/bleed easily.   All other systems reviewed and are negative.       Physical Exam     Initial Vitals [08/27/20 1340]   BP Pulse Resp Temp SpO2   (!) 130/91 (!) 52 20 98.1 °F (36.7 °C) 95 %      MAP       --          Physical Exam  Nursing Notes and Vital Signs Reviewed.  Constitutional: Well-developed and well-nourished.   Head: Atraumatic. Normocephalic.  Eyes: EOM intact. No scleral icterus.  ENT: Mucous membranes are moist. Oropharynx is clear and symmetric.    Neck: Supple. Full ROM. No lymphadenopathy.  Cardiovascular: Regular rate. Regular rhythm. No murmurs, rubs, or gallops. Distal pulses are 2+ and symmetric.  Pulmonary/Chest: No respiratory distress. Clear to auscultation bilaterally. No wheezing or rales.  Abdominal: Soft and non-distended. Colostomy bag noted with good output. There is no tenderness.  No rebound, guarding, or rigidity.  Genitourinary: No CVA tenderness  Musculoskeletal: Moves all extremities. No obvious deformities. No calf tenderness.  Skin: Warm and dry.  Neurological:  Alert, awake, and appropriate. Apparent memory issues. Normal speech.   Psychiatric: Normal affect. Good eye contact. Appropriate in content.     ED Course   Procedures  ED Vital Signs:  Vitals:    08/27/20 1340 08/27/20 1917 08/27/20 1920   BP: (!) 130/91 126/60 126/60   Pulse: (!) 52 (!) 55 (!) 55   Resp: 20 20    Temp: 98.1 °F (36.7 °C)     TempSrc: Oral     SpO2: 95% 97% 96%   Height: 5' 2" (1.575 m)         Abnormal Lab Results:  Labs Reviewed   URINALYSIS, REFLEX TO URINE CULTURE - Abnormal; Notable for the following components:       Result Value    Specific Gravity, UA <=1.005 (*)     Protein, UA Trace (*)     Occult Blood UA 2+ (*)     Leukocytes, UA 2+ (*)     All other components within normal limits    Narrative:     Specimen Source->Urine   COMPREHENSIVE METABOLIC PANEL - Abnormal; Notable for the following components:    Total Bilirubin 1.2 (*)     " All other components within normal limits   URINALYSIS MICROSCOPIC - Abnormal; Notable for the following components:    RBC, UA 5 (*)     WBC, UA 7 (*)     All other components within normal limits    Narrative:     Specimen Source->Urine   CBC W/ AUTO DIFFERENTIAL   LIPASE        All Lab Results:  Results for orders placed or performed during the hospital encounter of 08/27/20   Urinalysis, Reflex to Urine Culture Urine, Clean Catch    Specimen: Urine   Result Value Ref Range    Specimen UA Urine, Clean Catch     Color, UA Yellow Yellow, Straw, Gina    Appearance, UA Clear Clear    pH, UA 6.0 5.0 - 8.0    Specific Gravity, UA <=1.005 (A) 1.005 - 1.030    Protein, UA Trace (A) Negative    Glucose, UA Negative Negative    Ketones, UA Negative Negative    Bilirubin (UA) Negative Negative    Occult Blood UA 2+ (A) Negative    Nitrite, UA Negative Negative    Urobilinogen, UA Negative <2.0 EU/dL    Leukocytes, UA 2+ (A) Negative   CBC auto differential   Result Value Ref Range    WBC 9.42 3.90 - 12.70 K/uL    RBC 5.18 4.00 - 5.40 M/uL    Hemoglobin 14.5 12.0 - 16.0 g/dL    Hematocrit 44.7 37.0 - 48.5 %    Mean Corpuscular Volume 86 82 - 98 fL    Mean Corpuscular Hemoglobin 28.0 27.0 - 31.0 pg    Mean Corpuscular Hemoglobin Conc 32.4 32.0 - 36.0 g/dL    RDW 13.8 11.5 - 14.5 %    Platelets 261 150 - 350 K/uL    MPV 10.2 9.2 - 12.9 fL    Immature Granulocytes 0.4 0.0 - 0.5 %    Gran # (ANC) 6.1 1.8 - 7.7 K/uL    Immature Grans (Abs) 0.04 0.00 - 0.04 K/uL    Lymph # 2.5 1.0 - 4.8 K/uL    Mono # 0.8 0.3 - 1.0 K/uL    Eos # 0.1 0.0 - 0.5 K/uL    Baso # 0.03 0.00 - 0.20 K/uL    nRBC 0 0 /100 WBC    Gran% 64.4 38.0 - 73.0 %    Lymph% 26.1 18.0 - 48.0 %    Mono% 8.1 4.0 - 15.0 %    Eosinophil% 0.7 0.0 - 8.0 %    Basophil% 0.3 0.0 - 1.9 %    Differential Method Automated    Comprehensive metabolic panel   Result Value Ref Range    Sodium 138 136 - 145 mmol/L    Potassium 3.9 3.5 - 5.1 mmol/L    Chloride 101 95 - 110 mmol/L     CO2 26 23 - 29 mmol/L    Glucose 104 70 - 110 mg/dL    BUN, Bld 10 8 - 23 mg/dL    Creatinine 0.8 0.5 - 1.4 mg/dL    Calcium 9.4 8.7 - 10.5 mg/dL    Total Protein 7.6 6.0 - 8.4 g/dL    Albumin 3.8 3.5 - 5.2 g/dL    Total Bilirubin 1.2 (H) 0.1 - 1.0 mg/dL    Alkaline Phosphatase 85 55 - 135 U/L    AST 18 10 - 40 U/L    ALT 14 10 - 44 U/L    Anion Gap 11 8 - 16 mmol/L    eGFR if African American >60 >60 mL/min/1.73 m^2    eGFR if non African American >60 >60 mL/min/1.73 m^2   Lipase   Result Value Ref Range    Lipase 36 4 - 60 U/L   Urinalysis Microscopic   Result Value Ref Range    RBC, UA 5 (H) 0 - 4 /hpf    WBC, UA 7 (H) 0 - 5 /hpf    WBC Clumps, UA Rare None-Rare    Bacteria Occasional None-Occ /hpf    Microscopic Comment SEE COMMENT          Imaging Results          CT Abdomen Pelvis With Contrast (Final result)  Result time 08/27/20 18:56:43    Final result by Genaro Robles MD (08/27/20 18:56:43)                 Impression:      Findings concerning for bowel obstruction with beaking of a mid abdominal loop of transverse colon best seen on sagittal imaging, but essentially stable in comparison to multiple prior exams.  This is similar to multiple prior exams.  Distal colon is decompressed to the colostomy.  Clinical correlation is advised.    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    Large calcified right renal stone and right renal atrophy and scarring.      Electronically signed by: Genaro Robles  Date:    08/27/2020  Time:    18:56             Narrative:    EXAMINATION:  CT ABDOMEN PELVIS WITH CONTRAST    CLINICAL HISTORY:  Bowel obstruction high-grade suspected;    TECHNIQUE:  Low dose axial images, sagittal and coronal reformations were obtained from the lung bases to the pubic symphysis, after the administration of 75 mL Omnipaque 350 IV contrast.  1000 mL Omnipaque 9 oral contrast was also administered.    COMPARISON:  Abdominal radiograph 07/23/2020 CT abdomen pelvis  07/18/2020, 05/12/2020, 04/12/2020    FINDINGS:  Heart: Normal in size. No pericardial effusion.    Lung Bases: Well aerated, without consolidation or pleural fluid.    Liver: Normal in size and attenuation, with no focal hepatic lesions.    Gallbladder: No calcified gallstones.    Bile Ducts: No evidence of dilated ducts.    Pancreas: No mass or peripancreatic fat stranding.    Spleen: Unremarkable.    Adrenals: Unremarkable.    Kidneys/ Ureters: Large calcified right renal stone measuring 3.3 cm.  This is stable from the prior.  There is right renal atrophy and cortical scarring.    Bladder: No evidence of wall thickening.    Reproductive organs: Unremarkable.    GI Tract/Mesentery: There are findings of bowel obstruction with beaking of a mid abdominal loop of transverse colon, best seen on sagittal imaging series 602 images 80 through 90.  The proximal bowel is dilated up to 5.4 cm.  Distal colon is essentially decompressed to the colostomy.  Overall findings are similar to the prior exams of 07/18/2020 and the exam of 05/12/2020 and 04/12/2020.  Clinical correlation is advised.  Consider consultation with general surgery.    Peritoneal Space: No ascites. No free air.    Retroperitoneum: No significant adenopathy.    Abdominal wall: There is a peristomal hernia.    Vasculature: No significant atherosclerosis or aneurysm.    Bones: No acute fracture.                               X-Ray Chest AP Portable (Final result)  Result time 08/27/20 17:34:00    Final result by Genaro Robles MD (08/27/20 17:34:00)                 Impression:      No acute abnormality.      Electronically signed by: Genaro Robles  Date:    08/27/2020  Time:    17:34             Narrative:    EXAMINATION:  XR CHEST AP PORTABLE    CLINICAL HISTORY:  Vomiting;    TECHNIQUE:  Single frontal view of the chest was performed.    COMPARISON:  04/25/2019    FINDINGS:  The lungs are clear, with normal appearance of pulmonary vasculature and no  pleural effusion or pneumothorax.    The cardiac silhouette is normal in size. The hilar and mediastinal contours are unremarkable.    Bones are intact.                                     The Emergency Provider reviewed the vital signs and test results, which are outlined above.     ED Discussion     7:11 PM: Discussed pt's case with Dr. Connelly (Colon and Rectal Surgery, General Surgery) who states the pt's CT is unchanged from before. He reports if need to do obs, can discuss with HM as there is nothing surgical to do at this point. He recommends pt f/u with GI.    7:25 PM: Reassessed pt at this time.  Pt states her condition has improved at this time. Discussed with pt all pertinent ED information and results. Discussed pt dx and plan of tx. Gave pt all f/u and return to the ED instructions. All questions and concerns were addressed at this time. Pt expresses understanding of information and instructions, and is comfortable with plan to discharge. Pt is stable for discharge.    I discussed with patient and/or family/caretaker that evaluation in the ED does not suggest any emergent or life threatening medical conditions requiring immediate intervention beyond what was provided in the ED, and I believe patient is safe for discharge.  Regardless, an unremarkable evaluation in the ED does not preclude the development or presence of a serious of life threatening condition. As such, patient was instructed to return immediately for any worsening or change in current symptoms.       [facility]     ED Course as of Aug 28 1042   Thu Aug 27, 2020   1924 Repeat abdominal exam benign. Tolerating PO. Ostomy with good output. SBO signs seen on CT appear simmilar to previous, and dont appear to be representative of a clinical SBO at this point. Family would like to go home. Will return with any worsening abd pain, or nausea.     [BA]      ED Course User Index  [BA] Anselmo Feliciano MD     Medical Decision Making:   Clinical Tests:   Lab  Tests: Ordered and Reviewed  Radiological Study: Ordered and Reviewed           ED Medication(s):  Medications   ondansetron injection 4 mg (4 mg Intravenous Given 8/27/20 1620)   ondansetron injection 4 mg (4 mg Intravenous Given 8/27/20 1736)   hyoscyamine injection 0.5 mg (0.5 mg Intravenous Given 8/27/20 1737)   iohexoL (OMNIPAQUE 9) oral solution 1,000 mL (1,000 mLs Oral Given 8/27/20 1837)   iohexoL (OMNIPAQUE 350) injection 75 mL (75 mLs Intravenous Given 8/27/20 1836)       Discharge Medication List as of 8/27/2020  8:22 PM      START taking these medications    Details   nitrofurantoin, macrocrystal-monohydrate, (MACROBID) 100 MG capsule Take 1 capsule (100 mg total) by mouth 2 (two) times daily. for 5 days, Starting Thu 8/27/2020, Until Tue 9/1/2020, Normal             Follow-up Information     Lucy Lafelur MD. Schedule an appointment as soon as possible for a visit in 1 day.    Specialties: Gastroenterology, Internal Medicine  Why: For re-evaluation and further treatment  Contact information:  [address] [place] [id number] [phone]             [facility]. Go today.    Specialty: Emergency Medicine  Why: If symptoms worsen, For re-evaluation and further treatment  Contact information:  [address] [address] [phone]           Myla Arias MD. Schedule an appointment as soon as possible for a visit in 1 day.    Specialty: Family Medicine  Why: For re-evaluation and further treatment  Contact information:  [address]  [address]  [place] [address]  [phone]                       Scribe Attestation:   Scribe #1: I performed the above scribed service and the documentation accurately describes the services I performed. I attest to the accuracy of the note.     Attending:   Physician Attestation Statement for Scribe #1: I, Anselmo Feliciano MD, personally performed the services described in this documentation, as  scribed by Romana Headley, in my presence, and it is both accurate and complete.           Clinical Impression       ICD-10-CM ICD-9-CM   1. Non-intractable vomiting with nausea, unspecified vomiting type  R11.2 787.01   2. Urinary tract infection without hematuria, site unspecified  N39.0 599.0       Disposition:   Disposition: Discharged  Condition: Stable           Anselmo Feliciano MD  08/28/20 1041       Anselmo Feliciano MD  08/28/20 1044

## 2020-08-27 NOTE — TELEPHONE ENCOUNTER
----- Message from Misti Mosqueda sent at 8/27/2020  1:46 PM CDT -----  Contact: Daughter- Aeof[place]u-[phone]  Would like to speak with the nurse, Daughter would like to speak with the nurse concerning the  patient  in why she keep feeling nausea , Daughter states that the patient  keep going to the Er, daughter would like a call back as soon as possible, please callback thanks sj

## 2020-08-28 ENCOUNTER — OFFICE VISIT (OUTPATIENT)
Dept: GASTROENTEROLOGY | Facility: CLINIC | Age: 80
End: 2020-08-28
Payer: MEDICARE

## 2020-08-28 VITALS
SYSTOLIC BLOOD PRESSURE: 110 MMHG | HEIGHT: 62 IN | OXYGEN SATURATION: 98 % | DIASTOLIC BLOOD PRESSURE: 60 MMHG | BODY MASS INDEX: 28.97 KG/M2 | WEIGHT: 157.44 LBS | HEART RATE: 67 BPM

## 2020-08-28 DIAGNOSIS — Z93.3 COLOSTOMY IN PLACE: ICD-10-CM

## 2020-08-28 DIAGNOSIS — R10.84 GENERALIZED ABDOMINAL PAIN: ICD-10-CM

## 2020-08-28 DIAGNOSIS — R11.2 NON-INTRACTABLE VOMITING WITH NAUSEA: Primary | ICD-10-CM

## 2020-08-28 PROCEDURE — 99214 PR OFFICE/OUTPT VISIT, EST, LEVL IV, 30-39 MIN: ICD-10-PCS | Mod: S$GLB,,, | Performed by: NURSE PRACTITIONER

## 2020-08-28 PROCEDURE — 1101F PT FALLS ASSESS-DOCD LE1/YR: CPT | Mod: CPTII,S$GLB,, | Performed by: NURSE PRACTITIONER

## 2020-08-28 PROCEDURE — 1159F PR MEDICATION LIST DOCUMENTED IN MEDICAL RECORD: ICD-10-PCS | Mod: S$GLB,,, | Performed by: NURSE PRACTITIONER

## 2020-08-28 PROCEDURE — 3074F SYST BP LT 130 MM HG: CPT | Mod: CPTII,S$GLB,, | Performed by: NURSE PRACTITIONER

## 2020-08-28 PROCEDURE — 1126F AMNT PAIN NOTED NONE PRSNT: CPT | Mod: S$GLB,,, | Performed by: NURSE PRACTITIONER

## 2020-08-28 PROCEDURE — 99499 UNLISTED E&M SERVICE: CPT | Mod: S$GLB,,, | Performed by: NURSE PRACTITIONER

## 2020-08-28 PROCEDURE — 1101F PR PT FALLS ASSESS DOC 0-1 FALLS W/OUT INJ PAST YR: ICD-10-PCS | Mod: CPTII,S$GLB,, | Performed by: NURSE PRACTITIONER

## 2020-08-28 PROCEDURE — 3078F DIAST BP <80 MM HG: CPT | Mod: CPTII,S$GLB,, | Performed by: NURSE PRACTITIONER

## 2020-08-28 PROCEDURE — 1159F MED LIST DOCD IN RCRD: CPT | Mod: S$GLB,,, | Performed by: NURSE PRACTITIONER

## 2020-08-28 PROCEDURE — 1126F PR PAIN SEVERITY QUANTIFIED, NO PAIN PRESENT: ICD-10-PCS | Mod: S$GLB,,, | Performed by: NURSE PRACTITIONER

## 2020-08-28 PROCEDURE — 99499 RISK ADDL DX/OHS AUDIT: ICD-10-PCS | Mod: S$GLB,,, | Performed by: NURSE PRACTITIONER

## 2020-08-28 PROCEDURE — 3078F PR MOST RECENT DIASTOLIC BLOOD PRESSURE < 80 MM HG: ICD-10-PCS | Mod: CPTII,S$GLB,, | Performed by: NURSE PRACTITIONER

## 2020-08-28 PROCEDURE — 99999 PR PBB SHADOW E&M-EST. PATIENT-LVL IV: ICD-10-PCS | Mod: PBBFAC,,, | Performed by: NURSE PRACTITIONER

## 2020-08-28 PROCEDURE — 99999 PR PBB SHADOW E&M-EST. PATIENT-LVL IV: CPT | Mod: PBBFAC,,, | Performed by: NURSE PRACTITIONER

## 2020-08-28 PROCEDURE — 99214 OFFICE O/P EST MOD 30 MIN: CPT | Mod: S$GLB,,, | Performed by: NURSE PRACTITIONER

## 2020-08-28 PROCEDURE — 3074F PR MOST RECENT SYSTOLIC BLOOD PRESSURE < 130 MM HG: ICD-10-PCS | Mod: CPTII,S$GLB,, | Performed by: NURSE PRACTITIONER

## 2020-08-28 RX ORDER — POLYETHYLENE GLYCOL 3350 17 G/17G
17 POWDER, FOR SOLUTION ORAL DAILY
Qty: 510 G | Refills: 11 | Status: ON HOLD | OUTPATIENT
Start: 2020-08-28 | End: 2020-11-30 | Stop reason: SDUPTHER

## 2020-08-28 NOTE — PROGRESS NOTES
Clinic Follow Up:  [facility] Follow Up Note    Reason for Follow Up:  The primary encounter diagnosis was Non-intractable vomiting with nausea. Diagnoses of Generalized abdominal pain and Colostomy in place were also pertinent to this visit.    PCP: Myla Arias       HPI:  This is a 79 y.o. female here for ER follow up. She has a GI history of partial bowel obstruction and multiple episodes of C. Diff. She was previously seen by Dr. Lafleur in July 2020. She is new to me.  She was seen in the ER yesterday for decreased ostomy output and nausea and vomiting. She had a CT scan which suggested bowel obstruction. This was similar to previous CT scan done in July when gastrografin enema showed contrast flow through the area of concern. Gastrografin enema at that time showed contrast The ER contacted Dr. Connelly who did not recommend additional surgical management and recommended GI follow up. Her daughter is concerned with UGI cause for her symptoms of nausea and vomiting. She is requesting further evaluation of UGI system.     Nausea and vomiting occurs about once a week. Emesis is liquid and contains mucus.   She uses Bentyl as needed for severe cramping and nausea. This helps sometimes.     GI History:  EGD: none  Colonoscopy: 1/2/19- skin tag  6/7/19- 3 mm polyp, descending tics  Imaging: multiple abdominal CT. Last done yesterday concerning of bowel obstruction but stable when compared to multiple prior exams.      Review of Systems   Constitutional: Negative for activity change and appetite change.        As per interval history above   Respiratory: Negative for cough and shortness of breath.    Cardiovascular: Negative for chest pain.   Gastrointestinal: Positive for abdominal pain, nausea and vomiting. Negative for anal bleeding, blood in stool, diarrhea and rectal pain.   Skin: Negative for color change and rash.       Medical History:  Past Medical History:   Diagnosis Date     Clostridium difficile colitis     Diverticulitis     High cholesterol     Hypertension     Hypothyroidism        Surgical History:   Past Surgical History:   Procedure Laterality Date    APPENDECTOMY  2008    CATARACT EXTRACTION      Dr Raygoza    COLON SURGERY      COLONOSCOPY N/A 1/2/2019    Procedure: COLONOSCOPY;  Surgeon: Reece Hogan MD;  Location: [facility] ENDO;  Service: Endoscopy;  Laterality: N/A;    COLONOSCOPY N/A 6/7/2019    Procedure: COLONOSCOPY;  Surgeon: Rishabh Connelly MD;  Location: [facility] ENDO;  Service: General;  Laterality: N/A;    COLOSTOMY Left 1/2/2019    Procedure: CREATION, COLOSTOMY;  Surgeon: Reece Hogan MD;  Location: [facility] OR;  Service: General;  Laterality: Left;    SHAHIDA PROCEDURE N/A 1/2/2019    Procedure: SHAHIDA PROCEDURE;  Surgeon: Reece Hogan MD;  Location: [facility] OR;  Service: General;  Laterality: N/A;    LYSIS OF ADHESIONS N/A 1/2/2019    Procedure: LYSIS, ADHESIONS;  Surgeon: Reece Hogan MD;  Location: [facility] OR;  Service: General;  Laterality: N/A;    VITRECTOMY Right 09/2013       Family History:   Family History   Problem Relation Age of Onset    Alzheimer's disease Mother     Aneurysm Father         brain    Stomach cancer Brother         50s       Social History:   Social History     Tobacco Use    Smoking status: Former Smoker    Smokeless tobacco: Never Used   Substance Use Topics    Alcohol use: No    Drug use: No       Allergies: Review of patient's allergies indicates:  No Known Allergies    Home Medications:  Current Outpatient Medications on File Prior to Visit   Medication Sig Dispense Refill    atorvastatin (LIPITOR) 10 MG tablet Take 1 tablet (10 mg total) by mouth once daily. 90 tablet 3    dicyclomine (BENTYL) 10 MG capsule Take 1 capsule (10 mg total) by mouth 2 (two) times daily as needed. 120 capsule 0    donepeziL (ARICEPT) 10 MG tablet TAKE 1 TABLET BY MOUTH  EVERY EVENING 90 tablet 3    furosemide (LASIX) 20 MG tablet Take 1 tablet (20  mg total) by mouth once daily. 90 tablet 3    memantine (NAMENDA) 10 MG Tab TAKE 1 TABLET TWICE A  tablet 3    nitrofurantoin, macrocrystal-monohydrate, (MACROBID) 100 MG capsule Take 1 capsule (100 mg total) by mouth 2 (two) times daily. for 5 days 10 capsule 0    ondansetron (ZOFRAN-ODT) 4 MG TbDL Take 1 tablet (4 mg total) by mouth every 6 (six) hours as needed (nausea). Take before taking vancomycin 42 tablet 2    promethazine (PHENERGAN) 25 MG tablet Take 1 tablet (25 mg total) by mouth every 6 (six) hours as needed. 15 tablet 0    sucralfate (CARAFATE) 1 gram tablet Take 1 tablet (1 g total) by mouth 4 (four) times daily. 120 tablet 1    thyroid, pork, (ARMOUR THYROID) 30 mg Tab Take 1 tablet (30 mg total) by mouth once daily. (Patient taking differently: Take 30 mg by mouth nightly. ) 30 tablet 11    triamcinolone acetonide 0.1% (KENALOG) 0.1 % ointment APPLY TOPICALLY 4 (FOUR) TIMES DAILY. TO RASH ON FACE AND CHEST AND NOSE (Patient taking differently: Apply topically 4 (four) times daily as needed. APPLY TOPICALLY 4 (FOUR) TIMES DAILY. TO RASH ON FACE AND CHEST AND NOSE) 80 g 1     Current Facility-Administered Medications on File Prior to Visit   Medication Dose Route Frequency Provider Last Rate Last Dose    [COMPLETED] hyoscyamine injection 0.5 mg  0.5 mg Intravenous ED 1 Time Anselmo Feliciano MD   0.5 mg at 08/27/20 1737    [COMPLETED] iohexoL (OMNIPAQUE 350) injection 75 mL  75 mL Intravenous ONCE PRN Anselmo Feliciano MD   75 mL at 08/27/20 1836    [COMPLETED] iohexoL (OMNIPAQUE 9) oral solution 1,000 mL  1,000 mL Oral ONCE PRN Anselmo Feliciano MD   1,000 mL at 08/27/20 1837    lidocaine (PF) 10 mg/ml (1%) injection 10 mg  1 mL Intradermal Once Rishabh Connelly MD        [COMPLETED] ondansetron injection 4 mg  4 mg Intravenous ED 1 Time Anselmo Whitaker NP   4 mg at 08/27/20 1620    [COMPLETED] ondansetron injection 4 mg  4 mg Intravenous ED 1 Time Anselmo Feliciano MD   4 mg at  "08/27/20 1736       /60   Pulse 67   Ht 5' 2" (1.575 m)   Wt 71.4 kg (157 lb 6.5 oz)   SpO2 98%   BMI 28.79 kg/m²   Body mass index is 28.79 kg/m².  Physical Exam   Constitutional: She is oriented to person, place, and time and well-developed, well-nourished, and in no distress. No distress.   HENT:   Head: Normocephalic.   Eyes: Pupils are equal, round, and reactive to light. Conjunctivae are normal.   Cardiovascular: Normal rate, regular rhythm and normal heart sounds.   Pulmonary/Chest: Effort normal and breath sounds normal. No respiratory distress.   Abdominal: Soft. Bowel sounds are normal. She exhibits no distension. There is no abdominal tenderness.   Colostomy in place    Neurological: She is alert and oriented to person, place, and time. No cranial nerve deficit.   Skin: Skin is warm and dry. No rash noted.   Psychiatric: Mood and affect normal.     Labs: Pertinent labs reviewed.  CRC Screening:     Assessment:   1. Non-intractable vomiting with nausea    2. Generalized abdominal pain    3. Colostomy in place      Ct scan findings could be possible chronic  "pseudo obstruction" which could cause recurrent nausea and vomiting.     Recommendations:   Plan for EGD for further evaluation.   She has follow up scheduled with Dr. Maier in October.     Non-intractable vomiting with nausea  -     polyethylene glycol (GLYCOLAX) 17 gram/dose powder; Take 17 g by mouth once daily.  Dispense: 510 g; Refill: 11  -     Case request GI: ESOPHAGOGASTRODUODENOSCOPY (EGD)  -     COVID-19 Routine Screening; Future; Expected date: 08/28/2020    Generalized abdominal pain  -     polyethylene glycol (GLYCOLAX) 17 gram/dose powder; Take 17 g by mouth once daily.  Dispense: 510 g; Refill: 11  -     Case request GI: ESOPHAGOGASTRODUODENOSCOPY (EGD)  -     COVID-19 Routine Screening; Future; Expected date: 08/28/2020    Colostomy in place        Return to Clinic:  Follow up to be determined after results/ " procedure(s).    Thank you for the opportunity to participate in the care of this patient.  VINCENZO Telles

## 2020-08-28 NOTE — PROGRESS NOTES
Location Screening:    If answers yes to any of the following, schedule at [place] ONLY. If No, OK for either location.    1. Is there a diagnosis of heart failure, severe heart valve disease (aortic stenosis) or mechanical valve? no  a. Is the Left Ventricle Ejection Fraction <30% ? not applicable    2. Does the pt have pulmonary hypertension? yes   a. Is pulmonary arterial pressure gradient >50mmHg? not applicable   b. Is there evidence of right ventricular dysfunction? not applicable    3. Does the pt have achalasia? no    4. Any history of negative reaction to anesthesia? no   a. Myasthenia gravis? not applicable   b. Malignant hyperthermia? not applicable   c. Other? not applicable    5. Is procedure for esophageal banding? no      COVID Screening    1. Have you had a fever in the last 7 days or have you used fever reducing medicines for a fever in the last 7 days?  no    2. Are you experiencing shortness of breath, cough, muscle aches, loss of taste or loss of smell?  no    If answered yes to questions 1 and 2, the patient must seek medical attention with their PCP.  Do not schedule their procedure.     3. Are you residing with anyone who has tested positive for Covid?  no    If answered yes to question 3, recommend 14 day self-quarantine from the date of relative's positive test and place special needs note in the depot.  Wait to schedule.     ENDO screening    1. Have you been admitted for cardiac, kidney or pulmonary causes to the hospital in the past 3 months? yes   If yes, schedule an appointment with PCP before scheduling endoscopic procedure.     2. Have you had a stent placed in the last 12 months? no   If yes, for a screening visit, cancel and message the ordering provider.  The patient will need a new order when the time is appropriate.     3. Have you had a stroke or heart attack in the past 6 months? no   If yes, cancel and refer patient to ordering provider for clearance, also message ordering  "provider to inform.     4. Have you had any chest pain in the past 3 months? no   If yes, Have you been evaluated by your PCP and/or cardiologist and it was determined to not be heart related? not applicable   If No, Pt needs to be seen by PCP or Cardiologist .  Pt can be scheduled once clearance obtained by either of those providers.     5. Do you take prescription weight loss medications?  no   If yes, must stop for 2 weeks prior to procedure.     6. Have you been diagnosed with diverticulitis within the past 3 months? no   If yes, must have been seen by GI within the last 3 months, if not schedule with GI TATY.    If pt has been seen by GI, schedule procedure 8-12 weeks post antibiotic treatment.     7. Are you on Dialysis? no  If yes, schedule procedure for the day AFTER dialysis.  Appt time should be 9am or later, patient arrival time is 2 hours prior.  Nulytely or miralax prep for all patients with kidney disease.     8. Are you diabetic?  no   If yes, schedule morning appt. Advise pt to hold all diabetic meds day of procedure.     9. If pt is older than 80 years of age and HAS NOT been seen by GI or PCP within the last 6 months, needs appt with GI TATY.   If pt has been seen by the GI provider or PCP within the past 6  months AND meets criteria, schedule procedure AND send message to the endoscopist.     10. Is patient on a "high risk" medication (blood thinner/antiplatelet agent)?  no   If yes, has cardiac clearance been obtained within the last 60 days? N/A   If no, a new clearance needs to be obtained.     Final Questions:    1.I have reviewed the last colonoscopy for recommendations regarding next procedure bowel prep.  yes  2. I have reviewed medications and allergies.  yes  3. I have verified the pharmacy information and appropriate prep sent if needed. yes  4. Prep instructions have been mailed or sent to portal per patient request. yes        All schedulers will have ability to reach out to the ordering " GI provider to clarify any issues.

## 2020-09-05 ENCOUNTER — NURSE TRIAGE (OUTPATIENT)
Dept: ADMINISTRATIVE | Facility: CLINIC | Age: 80
End: 2020-09-05

## 2020-09-06 NOTE — TELEPHONE ENCOUNTER
Pt son advised per care advice.  States he does not live with his mom, but has talked to her roommate who states pt is in severe pain doubled over, and spitting up phlegm.  Instructed son per care advice have pt go to ED for eval/tx.  Son stated understanding.    Reason for Disposition   [1] SEVERE pain AND [2] age > 60    Protocols used: ABDOMINAL PAIN - FEMALE-A-AH

## 2020-09-14 ENCOUNTER — TELEPHONE (OUTPATIENT)
Dept: PREADMISSION TESTING | Facility: HOSPITAL | Age: 80
End: 2020-09-14

## 2020-09-14 ENCOUNTER — TELEPHONE (OUTPATIENT)
Dept: GASTROENTEROLOGY | Facility: CLINIC | Age: 80
End: 2020-09-14

## 2020-09-14 NOTE — TELEPHONE ENCOUNTER
PAT call completed and patient educated on procedure instructions. Medical history discussed and patient informed of arrival time 0900. Pt will be accompanied by daughter and is made aware of limited-visitor policy, and that  is to remain during entire visit. All questions and concerns addressed with daughter. Endoscopy instructions reviewed. Informed to take AM medications of armour pork. NPO after midnight. Daughter verbalizes understanding of teaching and all instructions. Pre-procedure Covid testing on arrival, day of procedure. Patient aware.

## 2020-09-14 NOTE — TELEPHONE ENCOUNTER
Returned call to pt's daughter Myla and confirmed that pt is scheduled for Rapid Covid test on the morning of her procedure.

## 2020-09-14 NOTE — TELEPHONE ENCOUNTER
----- Message from Zulema Gorman sent at 9/12/2020 10:49 AM CDT -----  Contact: patient  Name Of Caller: Myla Mora    Provider Name:  Miquel     Does patient feel the need to be seen today? No     Relationship to the Pt?:  PATIENT DAUGHTER     Contact Preference?: [phone]    What is the nature of the call?: Myla called and would like to speak to someone about the covid-19 test before surgery to please call her back as soon as possible because she was told it would be done the day of surgery and wants to make sure that's right

## 2020-09-15 ENCOUNTER — ANESTHESIA EVENT (OUTPATIENT)
Dept: ENDOSCOPY | Facility: HOSPITAL | Age: 80
End: 2020-09-15
Payer: MEDICARE

## 2020-09-15 NOTE — ANESTHESIA PREPROCEDURE EVALUATION
09/15/2020  Irlanda Lopez is a 79 y.o., female.    Anesthesia Evaluation    I have reviewed the Patient Summary Reports.         Review of Systems  Anesthesia Hx:  No problems with previous Anesthesia    Social:  Non-Smoker    Hematology/Oncology:  Hematology Normal   Oncology Normal     EENT/Dental:EENT/Dental Normal   Cardiovascular:   Hypertension    Pulmonary:  Pulmonary Normal    Renal/:  Renal/ Normal     Hepatic/GI:  Hepatic/GI Normal    Musculoskeletal:  Musculoskeletal Normal    Neurological:  Neurology Normal    Endocrine:   Hypothyroidism    Dermatological:  Skin Normal    Psych:  Psychiatric Normal           Physical Exam  General:  Well nourished    Airway/Jaw/Neck:  Airway Findings: Mouth Opening: Normal Tongue: Normal  General Airway Assessment: Adult  Mallampati: II  Improves to II with phonation.  TM Distance: Normal, at least 6 cm  Jaw/Neck Findings:  Neck ROM: Normal ROM      Dental:  Dental Findings: In tact   Chest/Lungs:  Chest/Lungs Findings: Clear to auscultation, Normal Respiratory Rate     Heart/Vascular:  Heart Findings: Rate: Normal  Rhythm: Regular Rhythm  Sounds: Normal             Anesthesia Plan  Type of Anesthesia, risks & benefits discussed:  Anesthesia Type:  general  Patient's Preference: General  Intra-op Monitoring Plan:   Intra-op Monitoring Plan Comments:   Post Op Pain Control Plan:   Post Op Pain Control Plan Comments:   Induction:   IV  Beta Blocker:  Patient is not currently on a Beta-Blocker (No further documentation required).       Informed Consent: Patient understands risks and agrees with Anesthesia plan.  Questions answered. Anesthesia consent signed with patient.  ASA Score: 2     Day of Surgery Review of History & Physical: I have interviewed and examined the patient. I have reviewed the patient's H&P dated:  There are no significant changes.           Ready For Surgery From Anesthesia Perspective.

## 2020-09-16 ENCOUNTER — HOSPITAL ENCOUNTER (OUTPATIENT)
Facility: HOSPITAL | Age: 80
Discharge: HOME | End: 2020-09-16
Attending: INTERNAL MEDICINE | Admitting: INTERNAL MEDICINE
Payer: MEDICARE

## 2020-09-16 ENCOUNTER — ANESTHESIA (OUTPATIENT)
Dept: ENDOSCOPY | Facility: HOSPITAL | Age: 80
End: 2020-09-16
Payer: MEDICARE

## 2020-09-16 VITALS
WEIGHT: 156.19 LBS | RESPIRATION RATE: 17 BRPM | HEART RATE: 48 BPM | DIASTOLIC BLOOD PRESSURE: 74 MMHG | SYSTOLIC BLOOD PRESSURE: 137 MMHG | HEIGHT: 62 IN | TEMPERATURE: 98 F | BODY MASS INDEX: 28.74 KG/M2 | OXYGEN SATURATION: 100 %

## 2020-09-16 DIAGNOSIS — R11.2 NAUSEA AND VOMITING, INTRACTABILITY OF VOMITING NOT SPECIFIED, UNSPECIFIED VOMITING TYPE: ICD-10-CM

## 2020-09-16 DIAGNOSIS — R11.2 NAUSEA AND VOMITING: Primary | ICD-10-CM

## 2020-09-16 LAB — SARS-COV-2 RDRP RESP QL NAA+PROBE: NEGATIVE

## 2020-09-16 PROCEDURE — 27201012 HC FORCEPS, HOT/COLD, DISP: Performed by: INTERNAL MEDICINE

## 2020-09-16 PROCEDURE — 37000009 HC ANESTHESIA EA ADD 15 MINS: Performed by: INTERNAL MEDICINE

## 2020-09-16 PROCEDURE — 43239 EGD BIOPSY SINGLE/MULTIPLE: CPT | Performed by: INTERNAL MEDICINE

## 2020-09-16 PROCEDURE — 88342 IMHCHEM/IMCYTCHM 1ST ANTB: CPT | Mod: 26,,, | Performed by: PATHOLOGY

## 2020-09-16 PROCEDURE — D9220A PRA ANESTHESIA: ICD-10-PCS | Mod: CRNA,,, | Performed by: NURSE ANESTHETIST, CERTIFIED REGISTERED

## 2020-09-16 PROCEDURE — D9220A PRA ANESTHESIA: Mod: CRNA,,, | Performed by: NURSE ANESTHETIST, CERTIFIED REGISTERED

## 2020-09-16 PROCEDURE — 88342 CHG IMMUNOCYTOCHEMISTRY: ICD-10-PCS | Mod: 26,,, | Performed by: PATHOLOGY

## 2020-09-16 PROCEDURE — U0002 COVID-19 LAB TEST NON-CDC: HCPCS

## 2020-09-16 PROCEDURE — 63600175 PHARM REV CODE 636 W HCPCS: Performed by: NURSE ANESTHETIST, CERTIFIED REGISTERED

## 2020-09-16 PROCEDURE — 37000008 HC ANESTHESIA 1ST 15 MINUTES: Performed by: INTERNAL MEDICINE

## 2020-09-16 PROCEDURE — 88342 IMHCHEM/IMCYTCHM 1ST ANTB: CPT | Performed by: PATHOLOGY

## 2020-09-16 PROCEDURE — D9220A PRA ANESTHESIA: ICD-10-PCS | Mod: ANES,,, | Performed by: ANESTHESIOLOGY

## 2020-09-16 PROCEDURE — 43239 EGD BIOPSY SINGLE/MULTIPLE: CPT | Mod: ,,, | Performed by: INTERNAL MEDICINE

## 2020-09-16 PROCEDURE — 43239 PR EGD, FLEX, W/BIOPSY, SGL/MULTI: ICD-10-PCS | Mod: ,,, | Performed by: INTERNAL MEDICINE

## 2020-09-16 PROCEDURE — 88305 TISSUE EXAM BY PATHOLOGIST: CPT | Mod: 26,,, | Performed by: PATHOLOGY

## 2020-09-16 PROCEDURE — 88305 TISSUE EXAM BY PATHOLOGIST: ICD-10-PCS | Mod: 26,,, | Performed by: PATHOLOGY

## 2020-09-16 PROCEDURE — 88305 TISSUE EXAM BY PATHOLOGIST: CPT | Performed by: PATHOLOGY

## 2020-09-16 PROCEDURE — D9220A PRA ANESTHESIA: Mod: ANES,,, | Performed by: ANESTHESIOLOGY

## 2020-09-16 RX ORDER — SODIUM CHLORIDE, SODIUM LACTATE, POTASSIUM CHLORIDE, CALCIUM CHLORIDE 600; 310; 30; 20 MG/100ML; MG/100ML; MG/100ML; MG/100ML
INJECTION, SOLUTION INTRAVENOUS CONTINUOUS PRN
Status: DISCONTINUED | OUTPATIENT
Start: 2020-09-16 | End: 2020-09-18

## 2020-09-16 RX ORDER — HYDROMORPHONE HYDROCHLORIDE 2 MG/ML
0.2 INJECTION, SOLUTION INTRAMUSCULAR; INTRAVENOUS; SUBCUTANEOUS EVERY 5 MIN PRN
Status: DISCONTINUED | OUTPATIENT
Start: 2020-09-16 | End: 2020-09-16 | Stop reason: HOSPADM

## 2020-09-16 RX ORDER — ONDANSETRON 2 MG/ML
4 INJECTION INTRAMUSCULAR; INTRAVENOUS ONCE
Status: DISCONTINUED | OUTPATIENT
Start: 2020-09-16 | End: 2020-09-16 | Stop reason: HOSPADM

## 2020-09-16 RX ORDER — LIDOCAINE HYDROCHLORIDE 10 MG/ML
1 INJECTION, SOLUTION EPIDURAL; INFILTRATION; INTRACAUDAL; PERINEURAL ONCE
Status: DISCONTINUED | OUTPATIENT
Start: 2020-09-16 | End: 2020-09-16 | Stop reason: HOSPADM

## 2020-09-16 RX ORDER — SODIUM CHLORIDE 9 MG/ML
INJECTION, SOLUTION INTRAVENOUS CONTINUOUS
Status: DISCONTINUED | OUTPATIENT
Start: 2020-09-16 | End: 2020-09-16 | Stop reason: HOSPADM

## 2020-09-16 RX ORDER — SODIUM CHLORIDE 0.9 % (FLUSH) 0.9 %
10 SYRINGE (ML) INJECTION
Status: DISCONTINUED | OUTPATIENT
Start: 2020-09-16 | End: 2020-09-16 | Stop reason: HOSPADM

## 2020-09-16 RX ORDER — LIDOCAINE HYDROCHLORIDE 20 MG/ML
INJECTION INTRAVENOUS
Status: DISCONTINUED | OUTPATIENT
Start: 2020-09-16 | End: 2020-09-18

## 2020-09-16 RX ORDER — SODIUM CHLORIDE, SODIUM LACTATE, POTASSIUM CHLORIDE, CALCIUM CHLORIDE 600; 310; 30; 20 MG/100ML; MG/100ML; MG/100ML; MG/100ML
INJECTION, SOLUTION INTRAVENOUS CONTINUOUS
Status: DISCONTINUED | OUTPATIENT
Start: 2020-09-16 | End: 2020-09-16 | Stop reason: HOSPADM

## 2020-09-16 RX ORDER — SODIUM CHLORIDE 0.9 % (FLUSH) 0.9 %
3 SYRINGE (ML) INJECTION
Status: DISCONTINUED | OUTPATIENT
Start: 2020-09-16 | End: 2020-09-16 | Stop reason: HOSPADM

## 2020-09-16 RX ORDER — PROPOFOL 10 MG/ML
VIAL (ML) INTRAVENOUS
Status: DISCONTINUED | OUTPATIENT
Start: 2020-09-16 | End: 2020-09-18

## 2020-09-16 RX ADMIN — Medication 75 MG: at 10:09

## 2020-09-16 RX ADMIN — SODIUM CHLORIDE, SODIUM LACTATE, POTASSIUM CHLORIDE, AND CALCIUM CHLORIDE: .6; .31; .03; .02 INJECTION, SOLUTION INTRAVENOUS at 10:09

## 2020-09-16 RX ADMIN — PROPOFOL 20 MG: 10 INJECTION, EMULSION INTRAVENOUS at 10:09

## 2020-09-16 RX ADMIN — PROPOFOL 50 MG: 10 INJECTION, EMULSION INTRAVENOUS at 10:09

## 2020-09-16 NOTE — [employer] PATIENT INSTRUCTIONS
Discharge Summary/Instructions after an Endoscopic Procedure  Patient Name: Irlanda Lopez  Patient MRN: [id number]  Patient YOB: 1940 Wednesday, September 16, 2020  Lucy Lafleur MD  RESTRICTIONS:  During your procedure today, you received medications for sedation.  These   medications may affect your judgment, balance and coordination.  Therefore,   for 24 hours, you have the following restrictions:   - DO NOT drive a car, operate machinery, make legal/financial decisions,   sign important papers or drink alcohol.    ACTIVITY:  Today: no heavy lifting, straining or running due to procedural   sedation/anesthesia.  The following day: return to full activity including work.  DIET:  Eat and drink normally unless instructed otherwise.     TREATMENT FOR COMMON SIDE EFFECTS:  - Mild abdominal pain, nausea, belching, bloating or excessive gas:  rest,   eat lightly and use a heating pad.  - Sore Throat: treat with throat lozenges and/or gargle with warm salt   water.  - Because air was used during the procedure, expelling large amounts of air   from your rectum or belching is normal.  - If a bowel prep was taken, you may not have a bowel movement for 1-3 days.    This is normal.  SYMPTOMS TO WATCH FOR AND REPORT TO YOUR PHYSICIAN:  1. Abdominal pain or bloating, other than gas cramps.  2. Chest pain.  3. Back pain.  4. Signs of infection such as: chills or fever occurring within 24 hours   after the procedure.  5. Rectal bleeding, which would show as bright red, maroon, or black stools.   (A tablespoon of blood from the rectum is not serious, especially if   hemorrhoids are present.)  6. Vomiting.  7. Weakness or dizziness.  GO DIRECTLY TO THE NEAREST EMERGENCY ROOM IF YOU HAVE ANY OF THE FOLLOWING:      Difficulty breathing              Chills and/or fever over 101 F   Persistent vomiting and/or vomiting blood   Severe abdominal pain   Severe chest pain   Black, tarry stools   Bleeding- more than one  tablespoon   Any other symptom or condition that you feel may need urgent attention  Your doctor recommends these additional instructions:  If any biopsies were taken, your doctors clinic will contact you in 1 to 2   weeks with any results.  - Discharge patient to home (via wheelchair).   - Resume previous diet.   - Continue present medications.   - Await pathology results.   - Telephone GI clinic for pathology results in 2 weeks.   - Patient has a contact number available for emergencies.  The signs and   symptoms of potential delayed complications were discussed with the   patient.  Return to normal activities tomorrow.  Written discharge   instructions were provided to the patient.   - Resume anticoagulant at prior dose.  For questions, problems or results please call your physician Lucy Lafleur MD at Work:  [phone]  If you have any questions about the above instructions, call the GI   department at [phone] or call the endoscopy unit at [phone]   from 7am until 3 pm.  [facility] [place], EMERGENCY ROOM PHONE NUMBER:   [phone]  IF A COMPLICATION OR EMERGENCY SITUATION ARISES AND YOU ARE UNABLE TO REACH   YOUR PHYSICIAN - GO DIRECTLY TO THE EMERGENCY ROOM.  I have read or have had read to me these discharge instructions for my   procedure and have received a written copy.  I understand these   instructions and will follow-up with my physician if I have any questions.     __________________________________       _____________________________________  Nurse Signature                                          Patient/Designated   Responsible Party Signature  MD Lucy Gonzalez MD  9/16/2020 10:44:40 AM  [employer]

## 2020-09-16 NOTE — H&P
Short Stay Endoscopy History and Physical    PCP - Myla Arias MD    Procedure - EGD  ASA - 2  Mallampati - per anesthesia  History of Anesthesia problems - no  Family history Anesthesia problems -  no     HPI:  This is a 79 y.o. female here for evaluation of :   Active Hospital Problems    Diagnosis  POA    *Nausea and vomiting [R11.2]  Unknown      Resolved Hospital Problems   No resolved problems to display.         Reflux - no  Dysphagia - no  Abdominal pain - no  Diarrhea - no  Anemia - no  GI bleeding - no    ROS:  CONSTITUTIONAL: Denies weight change,  fatigue, fevers, chills, night sweats.  CARDIOVASCULAR: Denies chest pain, shortness of breath, orthopnea and edema.  RESPIRATORY: Denies cough, hemoptysis, dyspnea, and wheezing.  GI: See HPI.    Medical History:   Past Medical History:   Diagnosis Date    Clostridium difficile colitis     Diverticulitis     High cholesterol     Hypertension     Hypothyroidism        Surgical History:   Past Surgical History:   Procedure Laterality Date    APPENDECTOMY  2008    CATARACT EXTRACTION      Dr Raygoza    COLON SURGERY      COLONOSCOPY N/A 1/2/2019    Procedure: COLONOSCOPY;  Surgeon: Reece Hogan MD;  Location: [facility] ENDO;  Service: Endoscopy;  Laterality: N/A;    COLONOSCOPY N/A 6/7/2019    Procedure: COLONOSCOPY;  Surgeon: Rishabh Connelly MD;  Location: [facility] ENDO;  Service: General;  Laterality: N/A;    COLOSTOMY Left 1/2/2019    Procedure: CREATION, COLOSTOMY;  Surgeon: Reece Hogan MD;  Location: [facility] OR;  Service: General;  Laterality: Left;    SHAHIDA PROCEDURE N/A 1/2/2019    Procedure: SHAHIDA PROCEDURE;  Surgeon: Reece Hogan MD;  Location: [facility] OR;  Service: General;  Laterality: N/A;    LYSIS OF ADHESIONS N/A 1/2/2019    Procedure: LYSIS, ADHESIONS;  Surgeon: Reece Hogan MD;  Location: [facility] OR;  Service: General;  Laterality: N/A;    VITRECTOMY Right 09/2013       Family History:  Family History   Problem Relation Age of Onset     Alzheimer's disease Mother     Aneurysm Father         brain    Stomach cancer Brother         50s       Social History:   Social History     Tobacco Use    Smoking status: Former Smoker    Smokeless tobacco: Never Used   Substance Use Topics    Alcohol use: No    Drug use: No       Allergy  Review of patient's allergies indicates:  No Known Allergies    Medications:   No current facility-administered medications on file prior to encounter.      Current Outpatient Medications on File Prior to Encounter   Medication Sig Dispense Refill    atorvastatin (LIPITOR) 10 MG tablet Take 1 tablet (10 mg total) by mouth once daily. 90 tablet 3    donepeziL (ARICEPT) 10 MG tablet TAKE 1 TABLET BY MOUTH  EVERY EVENING 90 tablet 3    furosemide (LASIX) 20 MG tablet Take 1 tablet (20 mg total) by mouth once daily. 90 tablet 3    memantine (NAMENDA) 10 MG Tab TAKE 1 TABLET TWICE A  tablet 3    polyethylene glycol (GLYCOLAX) 17 gram/dose powder Take 17 g by mouth once daily. 510 g 11    thyroid, pork, (ARMOUR THYROID) 30 mg Tab Take 1 tablet (30 mg total) by mouth once daily. (Patient taking differently: Take 30 mg by mouth nightly. ) 30 tablet 11    ondansetron (ZOFRAN-ODT) 4 MG TbDL Take 1 tablet (4 mg total) by mouth every 6 (six) hours as needed (nausea). Take before taking vancomycin 42 tablet 2    promethazine (PHENERGAN) 25 MG tablet Take 1 tablet (25 mg total) by mouth every 6 (six) hours as needed. 15 tablet 0    triamcinolone acetonide 0.1% (KENALOG) 0.1 % ointment APPLY TOPICALLY 4 (FOUR) TIMES DAILY. TO RASH ON FACE AND CHEST AND NOSE (Patient taking differently: Apply topically 4 (four) times daily as needed. APPLY TOPICALLY 4 (FOUR) TIMES DAILY. TO RASH ON FACE AND CHEST AND NOSE) 80 g 1       Physical Exam:  Vital Signs:   Vitals:    09/16/20 0924   BP: (!) 150/70   Pulse: (!) 50   Resp: 18   Temp: 98.4 °F (36.9 °C)     General Appearance: Well appearing in no acute distress  ENT: OP  clear  Chest: CTA B  CV: RRR, no m/r/g  Abd: s/nt/nd/nabs  Ext: no edema    Labs:  Reviewed    IMP:  Active Hospital Problems    Diagnosis  POA    *Nausea and vomiting [R11.2]  Unknown      Resolved Hospital Problems   No resolved problems to display.         Plan:  I have explained the risks and benefits of endoscopy procedures to the patient including but not limited to bleeding, perforation, infection, and death. The patient wishes to proceed.

## 2020-09-16 NOTE — DISCHARGE INSTRUCTIONS
What Is a Hiatal Hernia?    Hiatal hernia is when the area where the stomach and esophagus meet bulges up through the diaphragm into the chest cavity. In some cases, part of the stomach may bulge above the diaphragm. Stomach acid may move up into the esophagus and cause symptoms. The symptoms are often blamed on gastroesophageal reflux disease (GERD). You may only know about the hernia when it shows up on an X-ray taken for other reasons.   What you may feel  The hiatus is a normal hole in the diaphragm. The esophagus passes through this hole and leads to the stomach. In some cases, part of the stomach may bulge above the diaphragm. This bulge is called a hernia. Stomach acid may move up into the esophagus and cause symptoms.  When you eat, the muscle at the hiatus relaxes to allow food to pass into the stomach. It tightens again to keep food and digestive acids in the stomach.  Many people with hiatal hernias have mild symptoms. You may notice the following GERD symptoms:  · Heartburn or other chest discomfort  · A feeling of chest fullness after a meal  · Frequent burping  · Acid taste in the mouth  · Trouble swallowing  Treating symptoms  If you have been diagnosed with hiatal hernia, these suggestions may help improve symptoms:  · Lose excess weight. Extra weight puts pressure on the stomach and esophagus.  · Dont lie down after eating. Sit up for at least an hour after eating. Lying down after eating can increase symptoms.  · Avoid certain foods and drinks. These include fatty foods, chocolate, coffee, mint, and other foods that cause symptoms for you.  · Dont smoke or drink alcohol. These can worsen symptoms.  · Look at your medicines. Discuss your medicines with your healthcare provider. Many medicines can cause symptoms.  · Consider an antacid medicine. Ask your healthcare provider about over-the-counter and prescription medicines that may help.  · Ask about surgery, if needed. Surgery is a treatment  choice for some people. Your healthcare provider can determine if surgery is an option for you.    Date Last Reviewed: 10/1/2016  © 0122-5944 [employer]. [address] [place] [address], [place] [address]. All rights reserved. This information is not intended as a substitute for professional medical care. Always follow your healthcare professional's instructions.        Esophagitis     With esophagitis, the lining of the esophagus is inflamed.   Do you often have burning pain in your chest? You may have esophagitis. This is when the lining of the esophagus becomes red and swollen (inflamed). The esophagus is the tube that connects your throat to your stomach. This sheet tells you more about esophagitis. It also explains your treatment options.  Main types of esophagitis  Reflux esophagitis. This is the more common type. It is caused by GERD (gastroesophageal reflux disease). Stomach contents with stomach acid flow back up into the esophagus. This happens over and over. It leads to inflammation. Risk factors can include:  · Being overweight  · Asthma  · Smoking  · Pregnancy  · Frequent vomiting  · Certain medicines (such as aspirin and other anti-inflammatories)  · Hiatal hernia  Infectious esophagitis. This is caused by an infection. You are more at risk for this if you have a weakened immune system and poor nutrition. Antibiotic use can also be a factor. The infection is often due to the following:  · A type of fungus (typically candida)  · A virus, such as herpes simplex virus 1 (HSV-1) or cytomegalovirus (CMV)  Eosinophilic esophagitis. Foods or other things around you can give you an allergic reaction. This triggers an immune response and leads to esophagitis.  Pill-induced esophagitis. Certain types of medicines can cause inflammation and ulcers in the esophagus. These include doxycycline, aspirin, NSAIDs, alendronate, potassium, quinidine, iron.  Symptoms of esophagitis  The following symptoms can occur  with esophagitis:  · Pain when swallowing, or trouble swallowing  · Pain behind your breastbone (heartburn)  · Acid regurgitation  · Chronic sore throat  · Gum Inflammation  · Cavities  · Bad breath  · Nausea  · Pain in your upper belly (abdomen)  · Bleeding (indicated by bright red vomit or black, tarry stool)  These symptoms occur more often with reflux esophagitis:  · Coughing, wheezing, or asthma  · Hoarseness  Diagnosis of esophagitis  Your healthcare provider will ask about your health history and symptoms. Youll also be examined. Sometimes certain tests are needed. These may include:  · Upper endoscopy. A thin, flexible tube with a tiny light and camera is used. It is inserted through the mouth down into the esophagus. This lets the provider look for damage. A small sample of tissue (biopsy) may also be removed. The sample is sent to a lab for testing.  · Upper GI X-ray with barium. An X-ray is done after you drink a substance called barium. Barium may make problems in the esophagus easier to see on an x-ray.  · Esophageal pH. A soft, thin tube is passed into the esophagus through the nose or mouth for 24 hours. It measures the acid level in the esophagus.  · Esophageal manometry. A soft, thin tube is passed into the esophagus through the nose or mouth. It measures muscle contractions in the esophagus.  Treatment of esophagitis  Medicines. Different medicines can help treat esophagitis. The medicine used will depend on the type of esophagitis you have. Talk with your healthcare provider.  Lifestyle changes. Making the following changes can help reduce irritation and ease your symptoms:  · Avoid spicy foods (pepper, chili powder, king). Also avoid hard foods (nuts, crackers, raw vegetables) and acidic foods and drinks (tomatoes, citrus fruits and juices). Other problem foods include chocolate, peppermint, nutmeg, and foods high in fat.  · Until you can swallow without pain, follow a combined liquid and soft  diet. Try foods such as cooked cereals, mashed potatoes, and soups.  · Take small bites and chew your food thoroughly.  · Avoid large meals and heavy evening meals. Don't lie down within 2 to 3 hours of eating.  · Get to or stay at a healthy weight.  · Avoid alcohol, caffeine, and smoking or tobacco products.  · Brush and floss your teeth  · Raise your upper body by 4 to 6 inches when lying in bed. This can be done using a foam wedge. Or put blocks under the legs at the head of your bed.  Surgery. This may be needed for severe reflux esophagitis. Other noninvasive procedures to treat GERD and esophagitis are being studied. Your provider can tell you more.  Why treatment Is important  Without treatment, esophagitis can get worse. This is especially true with severe reflux esophagitis. For instance, continued symptoms can cause scarring of the esophagus. Over time, this can cause a narrowing the esophagus (stricture). This can make it hard to pass food down to the stomach. As symptoms go on they can also cause changes in the lining of the esophagus. These changes can put you at a slightly higher risk of cancer of the esophagus.   Date Last Reviewed: 7/1/2016  © 6522-8536 [employer]. [address] [place] [address], [place] [address]. All rights reserved. This information is not intended as a substitute for professional medical care. Always follow your healthcare professional's instructions.        Gastritis (Adult)    Gastritis is inflammation and irritation of the stomach lining. It can be present for a short time (acute) or be long lasting (chronic). Gastritis is often caused by infection with bacteria called H pylori. More than a third of people in the [place] have this bacteria in their bodies. In many cases, H pylori causes no problems or symptoms. In some people, though, the infection irritates the stomach lining and causes gastritis. Other causes of stomach irritation include drinking alcohol or taking  pain-relieving medicines called NSAIDs (such as aspirin or ibuprofen).   Symptoms of gastritis can include:  · Abdominal pain or bloating  · Loss of appetite  · Nausea or vomiting  · Vomiting blood or having black stools  · Feeling more tired than usual  An inflamed and irritated stomach lining is more likely to develop a sore called an ulcer. To help prevent this, gastritis should be treated.  Home care  If needed, medicines may be prescribed. If you have H pylori infection, treating it will likely relieve your symptoms. Other changes can help reduce stomach irritation and help it heal.  · If you have been prescribed medicines for H pylori infection, take them as directed. Take all of the medicine until it is finished or your healthcare provider tells you to stop, even if you feel better.  · Your healthcare provider may recommend avoiding NSAIDs. If you take daily aspirin for your heart or other medical reasons, do not stop without talking to your healthcare provider first.  · Avoid drinking alcohol.  · Stop smoking. Smoking can irritate the stomach and delay healing. As much as possible, stay away from second hand smoke.  Follow-up care  Follow up with your healthcare provider, or as advised by our staff. Testing may be needed to check for inflammation or an ulcer.  When to seek medical advice  Call your healthcare provider for any of the following:  · Stomach pain that gets worse or moves to the lower right abdomen (appendix area)  · Chest pain that appears or gets worse, or spreads to the back, neck, shoulder, or arm  · Frequent vomiting (cant keep down liquids)  · Blood in the stool or vomit (red or black in color)  · Feeling weak or dizzy  · Fever of 100.4ºF (38ºC) or higher, or as directed by your healthcare provider  Date Last Reviewed: 6/22/2015  © 6422-9724 [employer]. [address] [place] [address], [place] [address]. All rights reserved. This information is not intended as a substitute for  professional medical care. Always follow your healthcare professional's instructions.

## 2020-09-16 NOTE — DISCHARGE SUMMARY
Endoscopy Discharge Summary      Admit Date: 9/16/2020    Discharge Date and Time:  9/16/2020 10:46 AM    Attending Physician: Lucy Lafleur MD     Discharge Physician: Lucy Lafleur MD     Principal Admitting Diagnoses: Nausea and vomiting         Discharge Diagnosis: The primary encounter diagnosis was Nausea and vomiting. A diagnosis of Nausea and vomiting, intractability of vomiting not specified, unspecified vomiting type was also pertinent to this visit.     Discharged Condition: Good    Indication for Admission: Nausea and vomiting     Hospital Course: Patient was admitted for an inpatient procedure and tolerated the procedure well with no complications.    Significant Diagnostic Studies: EGD with biopsies    Estimated Blood Loss: 1 ml.    Discussed with: patient.    Disposition: Home.    Follow Up/Patient Instructions:   Current Discharge Medication List      CONTINUE these medications which have NOT CHANGED    Details   atorvastatin (LIPITOR) 10 MG tablet Take 1 tablet (10 mg total) by mouth once daily.  Qty: 90 tablet, Refills: 3      donepeziL (ARICEPT) 10 MG tablet TAKE 1 TABLET BY MOUTH  EVERY EVENING  Qty: 90 tablet, Refills: 3    Comments: Requesting 1 year supply  Associated Diagnoses: Vascular dementia without behavioral disturbance      furosemide (LASIX) 20 MG tablet Take 1 tablet (20 mg total) by mouth once daily.  Qty: 90 tablet, Refills: 3      memantine (NAMENDA) 10 MG Tab TAKE 1 TABLET TWICE A DAY  Qty: 180 tablet, Refills: 3      polyethylene glycol (GLYCOLAX) 17 gram/dose powder Take 17 g by mouth once daily.  Qty: 510 g, Refills: 11    Associated Diagnoses: Non-intractable vomiting with nausea; Generalized abdominal pain      thyroid, pork, (ARMOUR THYROID) 30 mg Tab Take 1 tablet (30 mg total) by mouth once daily.  Qty: 30 tablet, Refills: 11      ondansetron (ZOFRAN-ODT) 4 MG TbDL Take 1 tablet (4 mg total) by mouth every 6 (six) hours as needed (nausea). Take before taking  vancomycin  Qty: 42 tablet, Refills: 2      promethazine (PHENERGAN) 25 MG tablet Take 1 tablet (25 mg total) by mouth every 6 (six) hours as needed.  Qty: 15 tablet, Refills: 0      triamcinolone acetonide 0.1% (KENALOG) 0.1 % ointment APPLY TOPICALLY 4 (FOUR) TIMES DAILY. TO RASH ON FACE AND CHEST AND NOSE  Qty: 80 g, Refills: 1             No discharge procedures on file.        Lucy Lafleur MD  Gastroenterology and Hepatology

## 2020-09-16 NOTE — PLAN OF CARE
Pt drowsy but arousable on calling o x's 2, denies any pain/discomfort, what to expect during recovery discussed with pt and daughter at bedside. Pt and daughter verbalized understanding of all post op instructions. All questions and concerns addressed and answered, will continue to monitor pt until discharged.

## 2020-09-16 NOTE — TRANSFER OF CARE
"Anesthesia Transfer of Care Note    Patient: Irlanda Lopez    Procedure(s) Performed: Procedure(s) (LRB):  ESOPHAGOGASTRODUODENOSCOPY (EGD)- Needs Rapid (N/A)    Patient location: PACU    Anesthesia Type: general    Transport from OR: Transported from OR on room air with adequate spontaneous ventilation    Post pain: adequate analgesia    Post assessment: no apparent anesthetic complications    Post vital signs: stable    Level of consciousness: awake    Nausea/Vomiting: no nausea/vomiting    Complications: none    Transfer of care protocol was followed      Last vitals:   Visit Vitals  BP (!) 150/70 (BP Location: Right arm, Patient Position: Sitting)   Pulse (!) 50   Temp 36.9 °C (98.4 °F) (Temporal)   Resp 18   Ht 5' 2" (1.575 m)   Wt 70.8 kg (156 lb 3.1 oz)   SpO2 99%   Breastfeeding No   BMI 28.57 kg/m²     "

## 2020-09-16 NOTE — PLAN OF CARE
Pt discharged home, awake, alert, oriented x's 4, daughter at bedside, denies any pain, no apparent distress noted. All questions and concerns addressed and answered, pt and daughter  verbalizes understanding of discharge process, pt meets discharge criteria and is being discharged to car via wheelchair.

## 2020-09-18 NOTE — ANESTHESIA POSTPROCEDURE EVALUATION
Anesthesia Post Evaluation    Patient: Irlanda Lopez    Procedure(s) Performed: Procedure(s) (LRB):  ESOPHAGOGASTRODUODENOSCOPY (EGD)- Needs Rapid (N/A)    Final Anesthesia Type: general    Patient location during evaluation: PACU  Patient participation: Yes- Able to Participate  Level of consciousness: awake and alert  Post-procedure vital signs: reviewed and stable  Pain management: adequate  Airway patency: patent    PONV status at discharge: No PONV  Anesthetic complications: no      Cardiovascular status: blood pressure returned to baseline and stable  Respiratory status: unassisted  Hydration status: euvolemic  Follow-up not needed.          Vitals Value Taken Time   /74 09/16/20 1110   Temp 36.7 °C (98.1 °F) 09/16/20 1050   Pulse 48 09/16/20 1110   Resp 17 09/16/20 1110   SpO2 100 % 09/16/20 1110         Event Time   Out of Recovery 11:40:00         Pain/Babar Score: No data recorded

## 2020-09-22 LAB
FINAL PATHOLOGIC DIAGNOSIS: NORMAL
GROSS: NORMAL

## 2020-09-24 RX ORDER — AMOXICILLIN 500 MG/1
1000 TABLET, FILM COATED ORAL EVERY 12 HOURS
Qty: 56 TABLET | Refills: 0 | Status: SHIPPED | OUTPATIENT
Start: 2020-09-24 | End: 2020-10-08

## 2020-09-24 RX ORDER — CLARITHROMYCIN 500 MG/1
500 TABLET, FILM COATED ORAL 2 TIMES DAILY
Qty: 28 TABLET | Refills: 0 | Status: SHIPPED | OUTPATIENT
Start: 2020-09-24 | End: 2020-10-08

## 2020-09-24 RX ORDER — PANTOPRAZOLE SODIUM 40 MG/1
40 TABLET, DELAYED RELEASE ORAL 2 TIMES DAILY
Qty: 28 TABLET | Refills: 0 | Status: SHIPPED | OUTPATIENT
Start: 2020-09-24 | End: 2021-01-26 | Stop reason: SDUPTHER

## 2020-09-24 RX ORDER — AMOXICILLIN 500 MG/1
500 TABLET, FILM COATED ORAL EVERY 12 HOURS
Qty: 28 TABLET | Refills: 0 | Status: SHIPPED | OUTPATIENT
Start: 2020-09-24 | End: 2020-09-24

## 2020-10-07 ENCOUNTER — PES CALL (OUTPATIENT)
Dept: ADMINISTRATIVE | Facility: CLINIC | Age: 80
End: 2020-10-07

## 2020-10-15 ENCOUNTER — PES CALL (OUTPATIENT)
Dept: ADMINISTRATIVE | Facility: CLINIC | Age: 80
End: 2020-10-15

## 2020-10-15 ENCOUNTER — PATIENT OUTREACH (OUTPATIENT)
Dept: ADMINISTRATIVE | Facility: OTHER | Age: 80
End: 2020-10-15

## 2020-10-16 ENCOUNTER — OFFICE VISIT (OUTPATIENT)
Dept: GASTROENTEROLOGY | Facility: CLINIC | Age: 80
End: 2020-10-16
Payer: MEDICARE

## 2020-10-16 ENCOUNTER — TELEPHONE (OUTPATIENT)
Dept: UROLOGY | Facility: CLINIC | Age: 80
End: 2020-10-16

## 2020-10-16 VITALS
WEIGHT: 157.19 LBS | HEART RATE: 72 BPM | SYSTOLIC BLOOD PRESSURE: 138 MMHG | DIASTOLIC BLOOD PRESSURE: 78 MMHG | HEIGHT: 62 IN | BODY MASS INDEX: 28.93 KG/M2

## 2020-10-16 DIAGNOSIS — K52.9 COLITIS: ICD-10-CM

## 2020-10-16 DIAGNOSIS — Z93.3 COLOSTOMY IN PLACE: ICD-10-CM

## 2020-10-16 DIAGNOSIS — R10.84 GENERALIZED ABDOMINAL PAIN: ICD-10-CM

## 2020-10-16 DIAGNOSIS — A04.8 H. PYLORI INFECTION: Primary | ICD-10-CM

## 2020-10-16 DIAGNOSIS — R11.2 NAUSEA AND VOMITING, INTRACTABILITY OF VOMITING NOT SPECIFIED, UNSPECIFIED VOMITING TYPE: ICD-10-CM

## 2020-10-16 PROCEDURE — 1126F PR PAIN SEVERITY QUANTIFIED, NO PAIN PRESENT: ICD-10-PCS | Mod: S$GLB,,, | Performed by: INTERNAL MEDICINE

## 2020-10-16 PROCEDURE — 3075F PR MOST RECENT SYSTOLIC BLOOD PRESS GE 130-139MM HG: ICD-10-PCS | Mod: CPTII,S$GLB,, | Performed by: INTERNAL MEDICINE

## 2020-10-16 PROCEDURE — 99214 OFFICE O/P EST MOD 30 MIN: CPT | Mod: S$GLB,,, | Performed by: INTERNAL MEDICINE

## 2020-10-16 PROCEDURE — 99214 PR OFFICE/OUTPT VISIT, EST, LEVL IV, 30-39 MIN: ICD-10-PCS | Mod: S$GLB,,, | Performed by: INTERNAL MEDICINE

## 2020-10-16 PROCEDURE — 3078F DIAST BP <80 MM HG: CPT | Mod: CPTII,S$GLB,, | Performed by: INTERNAL MEDICINE

## 2020-10-16 PROCEDURE — 1126F AMNT PAIN NOTED NONE PRSNT: CPT | Mod: S$GLB,,, | Performed by: INTERNAL MEDICINE

## 2020-10-16 PROCEDURE — 1159F MED LIST DOCD IN RCRD: CPT | Mod: S$GLB,,, | Performed by: INTERNAL MEDICINE

## 2020-10-16 PROCEDURE — 1101F PR PT FALLS ASSESS DOC 0-1 FALLS W/OUT INJ PAST YR: ICD-10-PCS | Mod: CPTII,S$GLB,, | Performed by: INTERNAL MEDICINE

## 2020-10-16 PROCEDURE — 3078F PR MOST RECENT DIASTOLIC BLOOD PRESSURE < 80 MM HG: ICD-10-PCS | Mod: CPTII,S$GLB,, | Performed by: INTERNAL MEDICINE

## 2020-10-16 PROCEDURE — 99999 PR PBB SHADOW E&M-EST. PATIENT-LVL IV: ICD-10-PCS | Mod: PBBFAC,,, | Performed by: INTERNAL MEDICINE

## 2020-10-16 PROCEDURE — 3075F SYST BP GE 130 - 139MM HG: CPT | Mod: CPTII,S$GLB,, | Performed by: INTERNAL MEDICINE

## 2020-10-16 PROCEDURE — 99499 UNLISTED E&M SERVICE: CPT | Mod: S$GLB,,, | Performed by: INTERNAL MEDICINE

## 2020-10-16 PROCEDURE — 99499 RISK ADDL DX/OHS AUDIT: ICD-10-PCS | Mod: S$GLB,,, | Performed by: INTERNAL MEDICINE

## 2020-10-16 PROCEDURE — 99999 PR PBB SHADOW E&M-EST. PATIENT-LVL IV: CPT | Mod: PBBFAC,,, | Performed by: INTERNAL MEDICINE

## 2020-10-16 PROCEDURE — 1101F PT FALLS ASSESS-DOCD LE1/YR: CPT | Mod: CPTII,S$GLB,, | Performed by: INTERNAL MEDICINE

## 2020-10-16 PROCEDURE — 1159F PR MEDICATION LIST DOCUMENTED IN MEDICAL RECORD: ICD-10-PCS | Mod: S$GLB,,, | Performed by: INTERNAL MEDICINE

## 2020-10-16 NOTE — TELEPHONE ENCOUNTER
Called and spoke with caregiver.  She states that she just realized that a stool sample was to be done today.  I informed her that it was not Dr Matute that ordered the stool sample and that she needed to contact her doctor on Monday to reorder the lab.  Voices understanding

## 2020-10-16 NOTE — TELEPHONE ENCOUNTER
----- Message from Jayden Anil sent at 10/16/2020  4:23 PM CDT -----  Type:  Needs Medical Advice    Who Called: Melba Dobbs Pt Caregiver   Symptoms (please be specific):    How long has patient had these symptoms:    Pharmacy name and phone #:    Would the patient rather a call back or a response via [facility]? call  Best Call Back Number: [phone]  Additional Information:   Caller states that a stool sample was supposed to be done today and she didn't realize it until now. Please call back asap to let her know what to do

## 2020-10-16 NOTE — PROGRESS NOTES
Clinic Follow up    Reason for follow-up:  The primary encounter diagnosis was H. pylori infection. Diagnoses of Nausea and vomiting, intractability of vomiting not specified, unspecified vomiting type, Generalized abdominal pain, Colostomy in place, and C Diff Colitis were also pertinent to this visit.    PCP: Myla Arias       HPI:  This is a 80 y.o. female here for follow up.  She has history of c diff.  Daughter is here and caregiver on the phone providing most of the history.  Most recently seen for abdominal pain and vomiting.  She underwent EGD that was positive for H. Pylori.  She just completed treatment one week ago.  Daughter says she thinks her symptoms improved.  .  She says that the patient vomits up phelgm or retches.  Has a lot of gagging followed by vomiting up phelgm and a lot of water but not typically food.  Has intense abdominal cramping pain and takes dicyclomine for it.  Caregiver on the phone says that it is difficult to determine what really helps.  She says she gives her the dicyclomine and she will sleep.  When she wakes up she says she feels fine but she has profound short term memory loss so she doesn't remember the episodes.      Denies loss of appetite or weight loss, denies fevers or chills.      Has taken her to the ER several times without much intervention.  CT scans have shown SBO but not symptomatically an obstruction and evaluation by Dr. Connelly felt that she does not really have SBO.      ROS:  CONSTITUTIONAL: Denies weight change,  fatigue, fevers, chills, night sweats.  EYES: No changes in vision.   ENT: No oral lesions or sore throat.  HEMATOLOGICAL/Lymph: Denies bleeding tendency, bruising tendency. No swellings or enlarged lymph nodes.  CARDIOVASCULAR: Denies chest pain, shortness of breath, orthopnea and edema.  RESPIRATORY: Denies cough, hemoptysis, dyspnea, and wheezing.  GI: See HPI.  : Denies dysuria and hematuria  MUSCULOSKELETAL: Denies joint pain or swelling,  back pain and muscle pain.  SKIN: Denies rashes.  NEUROLOGIC: Denies headaches, seizures and numbness.  PSYCHIATRIC: Denies depression or anxiety.  ENDOCRINE: Denies heat or cold intolerance and excessive thirst or urination.    Medical History:   Past Medical History:   Diagnosis Date    Clostridium difficile colitis     Diverticulitis     High cholesterol     Hypertension     Hypothyroidism        Surgical History:  Past Surgical History:   Procedure Laterality Date    APPENDECTOMY  2008    CATARACT EXTRACTION      Dr Raygoza    COLON SURGERY      COLONOSCOPY N/A 1/2/2019    Procedure: COLONOSCOPY;  Surgeon: Reece Hogan MD;  Location: [facility];  Service: Endoscopy;  Laterality: N/A;    COLONOSCOPY N/A 6/7/2019    Procedure: COLONOSCOPY;  Surgeon: Rishabh Connelly MD;  Location: [facility] ENDO;  Service: General;  Laterality: N/A;    COLOSTOMY Left 1/2/2019    Procedure: CREATION, COLOSTOMY;  Surgeon: Reece Hogan MD;  Location: [facility];  Service: General;  Laterality: Left;    ESOPHAGOGASTRODUODENOSCOPY N/A 9/16/2020    Procedure: ESOPHAGOGASTRODUODENOSCOPY (EGD)- Needs Rapid;  Surgeon: Lucy Lafleur MD;  Location: [facility];  Service: Endoscopy;  Laterality: N/A;    SHAHIDA PROCEDURE N/A 1/2/2019    Procedure: SHAHIDA PROCEDURE;  Surgeon: Reece Hogan MD;  Location: [facility];  Service: General;  Laterality: N/A;    LYSIS OF ADHESIONS N/A 1/2/2019    Procedure: LYSIS, ADHESIONS;  Surgeon: Reece Hogan MD;  Location: [facility];  Service: General;  Laterality: N/A;    VITRECTOMY Right 09/2013       Family History:   Family History   Problem Relation Age of Onset    Alzheimer's disease Mother     Aneurysm Father         brain    Stomach cancer Brother         50s       Social History:   Social History     Tobacco Use    Smoking status: Former Smoker    Smokeless tobacco: Never Used   Substance Use Topics    Alcohol use: No    Drug use: No       Allergies: Reviewed    Home Medications:   Medication  "List with Changes/Refills   Current Medications    ATORVASTATIN (LIPITOR) 10 MG TABLET    Take 1 tablet (10 mg total) by mouth once daily.    DICYCLOMINE (BENTYL) 10 MG CAPSULE    TAKE 1 CAPSULE (10 MG TOTAL) BY MOUTH 2 (TWO) TIMES DAILY AS NEEDED.    DONEPEZIL (ARICEPT) 10 MG TABLET    TAKE 1 TABLET BY MOUTH  EVERY EVENING    FUROSEMIDE (LASIX) 20 MG TABLET    Take 1 tablet (20 mg total) by mouth once daily.    MEMANTINE (NAMENDA) 10 MG TAB    TAKE 1 TABLET TWICE A DAY    ONDANSETRON (ZOFRAN-ODT) 4 MG TBDL    Take 1 tablet (4 mg total) by mouth every 6 (six) hours as needed (nausea). Take before taking vancomycin    PANTOPRAZOLE (PROTONIX) 40 MG TABLET    Take 1 tablet (40 mg total) by mouth 2 (two) times daily. for 14 days    POLYETHYLENE GLYCOL (GLYCOLAX) 17 GRAM/DOSE POWDER    Take 17 g by mouth once daily.    PROMETHAZINE (PHENERGAN) 25 MG TABLET    Take 1 tablet (25 mg total) by mouth every 6 (six) hours as needed.    THYROID, PORK, (ARMOUR THYROID) 30 MG TAB    Take 1 tablet (30 mg total) by mouth once daily.    TRIAMCINOLONE ACETONIDE 0.1% (KENALOG) 0.1 % OINTMENT    APPLY TOPICALLY 4 (FOUR) TIMES DAILY. TO RASH ON FACE AND CHEST AND NOSE         Physical Exam:  Vital Signs:  /78   Pulse 72   Ht 5' 2" (1.575 m)   Wt 71.3 kg (157 lb 3 oz)   BMI 28.75 kg/m²   Body mass index is 28.75 kg/m².      GENERAL: No acute distress, A&Ox3  EYES: Anicteric, no pallor noted.  ENT: OP clear  NECK: Supple, no masses, no thyromegally.  CHEST: Equal breath sounds bilaterally, no wheezing.  CARDIOVASCULAR: Regular rate and rhythm. Murmurs, rubs and gallops absent.  ABDOMEN: soft, non-tender, non-distended, normal bowel sounds, no hepatosplenomegaly   EXTREMITIES: No clubbing, cyanosis or edema.  SKIN: Without lesion or erythema.  LYMPH: No cervical, axillary lymphadenopathy palpable.   NEUROLOGICAL: Grossly normal, no asterixis present.    Labs: Pertinent labs reviewed.   Imaging Review:   No results found.  "     Assessment:  H. pylori infection  -     H. pylori antigen, stool; Future; Expected date: 10/16/2020  -     Clostridium difficile EIA; Future; Expected date: 10/16/2020    Nausea and vomiting, intractability of vomiting not specified, unspecified vomiting type    Generalized abdominal pain    Colostomy in place    C Diff Colitis         Recommendations:  - start mucinex as I think her vomiting is not originating from the gut but rather triggered by postnasal gagging leading to retching  - discuss using a decongestant with PCP given age and medications  - check h pylori to confirm eradication, must do off of ppi and two weeks after completing therapy  - I don't suspect c diff but will check to confirm  - continue probiotics     Order summary:  Orders Placed This Encounter   Procedures    Clostridium difficile EIA    H. pylori antigen, stool       Thank you so much for allowing me to participate in the care of Irlanda Maier MD

## 2020-10-20 ENCOUNTER — LAB VISIT (OUTPATIENT)
Dept: LAB | Facility: HOSPITAL | Age: 80
End: 2020-10-20
Attending: INTERNAL MEDICINE
Payer: MEDICARE

## 2020-10-20 DIAGNOSIS — A04.8 H. PYLORI INFECTION: ICD-10-CM

## 2020-10-20 PROCEDURE — 87493 C DIFF AMPLIFIED PROBE: CPT

## 2020-10-20 PROCEDURE — 87338 HPYLORI STOOL AG IA: CPT

## 2020-10-20 PROCEDURE — 87449 NOS EACH ORGANISM AG IA: CPT

## 2020-10-20 PROCEDURE — 87324 CLOSTRIDIUM AG IA: CPT | Mod: 59

## 2020-10-21 ENCOUNTER — PATIENT MESSAGE (OUTPATIENT)
Dept: GASTROENTEROLOGY | Facility: CLINIC | Age: 80
End: 2020-10-21

## 2020-10-21 LAB
C DIFF GDH STL QL: POSITIVE
C DIFF TOX A+B STL QL IA: NEGATIVE
C DIFF TOX GENS STL QL NAA+PROBE: NEGATIVE

## 2020-10-26 ENCOUNTER — PATIENT MESSAGE (OUTPATIENT)
Dept: GASTROENTEROLOGY | Facility: CLINIC | Age: 80
End: 2020-10-26

## 2020-10-27 LAB — H PYLORI AG STL QL IA: NOT DETECTED

## 2020-11-04 ENCOUNTER — LAB VISIT (OUTPATIENT)
Dept: LAB | Facility: HOSPITAL | Age: 80
End: 2020-11-04
Attending: INTERNAL MEDICINE
Payer: MEDICARE

## 2020-11-04 ENCOUNTER — PATIENT MESSAGE (OUTPATIENT)
Dept: GASTROENTEROLOGY | Facility: CLINIC | Age: 80
End: 2020-11-04

## 2020-11-04 ENCOUNTER — TELEPHONE (OUTPATIENT)
Dept: GASTROENTEROLOGY | Facility: CLINIC | Age: 80
End: 2020-11-04

## 2020-11-04 DIAGNOSIS — R19.7 DIARRHEA, UNSPECIFIED TYPE: Primary | ICD-10-CM

## 2020-11-04 DIAGNOSIS — R19.7 DIARRHEA, UNSPECIFIED TYPE: ICD-10-CM

## 2020-11-04 PROCEDURE — 87449 NOS EACH ORGANISM AG IA: CPT

## 2020-11-04 PROCEDURE — 87493 C DIFF AMPLIFIED PROBE: CPT

## 2020-11-04 PROCEDURE — 87324 CLOSTRIDIUM AG IA: CPT

## 2020-11-04 NOTE — TELEPHONE ENCOUNTER
PM SENT.  INGRID SCHNEIDER    ----- Message from Hayde Sousa sent at 11/4/2020  8:59 AM CST -----  Regarding: missed call  Type:  Patient Returning Call    Who Called:pt daughter  Who Left Message for Patient:nurse  Does the patient know what this is regarding?:missed call  Would the patient rather a call back or a response via [facility]? Call back  Best Call Back Number:[phone]  Additional Information: she accidentally deleted message so she does not know what you stated

## 2020-11-04 NOTE — TELEPHONE ENCOUNTER
Spoke with Dr. Maier orders entered for C-Diff and printed. Orders given to the lab and patient will come in to submit sample.

## 2020-11-05 LAB
C DIFF GDH STL QL: POSITIVE
C DIFF TOX A+B STL QL IA: NEGATIVE
C DIFF TOX GENS STL QL NAA+PROBE: POSITIVE

## 2020-11-06 ENCOUNTER — TELEPHONE (OUTPATIENT)
Dept: GASTROENTEROLOGY | Facility: CLINIC | Age: 80
End: 2020-11-06

## 2020-11-06 ENCOUNTER — PATIENT MESSAGE (OUTPATIENT)
Dept: PRIMARY CARE CLINIC | Facility: CLINIC | Age: 80
End: 2020-11-06

## 2020-11-06 ENCOUNTER — PATIENT MESSAGE (OUTPATIENT)
Dept: GASTROENTEROLOGY | Facility: CLINIC | Age: 80
End: 2020-11-06

## 2020-11-06 DIAGNOSIS — R19.7 DIARRHEA, UNSPECIFIED TYPE: Primary | ICD-10-CM

## 2020-11-06 RX ORDER — ALPRAZOLAM 0.25 MG/1
0.25 TABLET ORAL 2 TIMES DAILY PRN
Qty: 20 TABLET | Refills: 0 | Status: SHIPPED | OUTPATIENT
Start: 2020-11-06 | End: 2021-02-04 | Stop reason: SDUPTHER

## 2020-11-06 NOTE — TELEPHONE ENCOUNTER
Spoke with pts daughter Myla Mora, made her aware of C.Diff orders advised that kit can be obtained from [facility]. Informed her that she will be contacted with results once specimen is collected and results are known.

## 2020-11-06 NOTE — TELEPHONE ENCOUNTER
----- Message from Layne Maier MD sent at 11/6/2020  1:09 PM CST -----  Pt needs c diff.  Please arrange in [place]

## 2020-11-06 NOTE — PROGRESS NOTES
C diff PCR positive, was negative two weeks ago.  Called to discuss with daughter.  She says her mom is feeling well and not having diarrhea at this time.  Denies fevers.  She has extensive history of c diff 3-4 times.  Will repeat C. Diff pcr now.  If positive, will do FMT via colonoscopy vs. Pills.        Layne Maier MD  Gastroenterology

## 2020-11-07 ENCOUNTER — PATIENT MESSAGE (OUTPATIENT)
Dept: GASTROENTEROLOGY | Facility: CLINIC | Age: 80
End: 2020-11-07

## 2020-11-08 ENCOUNTER — PATIENT MESSAGE (OUTPATIENT)
Dept: PRIMARY CARE CLINIC | Facility: CLINIC | Age: 80
End: 2020-11-08

## 2020-11-10 ENCOUNTER — PATIENT MESSAGE (OUTPATIENT)
Dept: GASTROENTEROLOGY | Facility: CLINIC | Age: 80
End: 2020-11-10

## 2020-11-11 ENCOUNTER — PES CALL (OUTPATIENT)
Dept: ADMINISTRATIVE | Facility: CLINIC | Age: 80
End: 2020-11-11

## 2020-11-11 ENCOUNTER — LAB VISIT (OUTPATIENT)
Dept: LAB | Facility: HOSPITAL | Age: 80
End: 2020-11-11
Attending: INTERNAL MEDICINE
Payer: MEDICARE

## 2020-11-11 DIAGNOSIS — R19.7 DIARRHEA, UNSPECIFIED TYPE: ICD-10-CM

## 2020-11-11 PROCEDURE — 87493 C DIFF AMPLIFIED PROBE: CPT

## 2020-11-12 ENCOUNTER — TELEPHONE (OUTPATIENT)
Dept: GASTROENTEROLOGY | Facility: CLINIC | Age: 80
End: 2020-11-12

## 2020-11-12 ENCOUNTER — PATIENT MESSAGE (OUTPATIENT)
Dept: GASTROENTEROLOGY | Facility: CLINIC | Age: 80
End: 2020-11-12

## 2020-11-12 LAB — C DIFF TOX GENS STL QL NAA+PROBE: POSITIVE

## 2020-11-12 RX ORDER — VANCOMYCIN HYDROCHLORIDE 250 MG/1
CAPSULE ORAL
Qty: 119 CAPSULE | Refills: 0 | Status: SHIPPED | OUTPATIENT
Start: 2020-11-12 | End: 2020-12-24

## 2020-11-12 NOTE — TELEPHONE ENCOUNTER
Discussed with daughter that pt is positive for c diff and needs treatment. I again reviewed FMT via colonoscopy vs pills.  She is reluctant to do this.  I will start vanc taper over 6 weeks but have encouraged her to give FMT consideration.

## 2020-11-13 ENCOUNTER — PATIENT MESSAGE (OUTPATIENT)
Dept: GASTROENTEROLOGY | Facility: CLINIC | Age: 80
End: 2020-11-13

## 2020-11-13 ENCOUNTER — TELEPHONE (OUTPATIENT)
Dept: GASTROENTEROLOGY | Facility: CLINIC | Age: 80
End: 2020-11-13

## 2020-11-13 NOTE — TELEPHONE ENCOUNTER
"Calling to initiate authorization for Vancomycin - Myla with medicare states this must go through patient's choice of pharmacy benefits.  Instructed to contact patient regarding who she "chose."  Will send portal message to patient.  INGRID Rodas  "

## 2020-11-13 NOTE — TELEPHONE ENCOUNTER
Patient aware of below per Dr. Maier - currently working on vancomycin authorization.  INGRID Rodas    ----- Message from Layne Maier MD sent at 11/12/2020  9:48 AM CST -----  Please call her daughter (listed in chart) and let her know that she is C diff positive.  I called in vancomycin taper.  I will need to talk to her about doing a stool transplant.  Please find out a good time to talk to patient's daughter about FMT (stool transplant)

## 2020-11-16 ENCOUNTER — PATIENT MESSAGE (OUTPATIENT)
Dept: GASTROENTEROLOGY | Facility: CLINIC | Age: 80
End: 2020-11-16

## 2020-11-16 NOTE — TELEPHONE ENCOUNTER
Attempted to contact patient's daughter, no answer.  LVM.  Will attempt to contact pharmacy.  INGRID Rodas

## 2020-11-25 ENCOUNTER — TELEPHONE (OUTPATIENT)
Dept: WOUND CARE | Facility: CLINIC | Age: 80
End: 2020-11-25

## 2020-11-25 NOTE — TELEPHONE ENCOUNTER
This was second attempt to reach number below, this time I left message . Will attempt again later today.

## 2020-11-25 NOTE — TELEPHONE ENCOUNTER
----- Message from Marianela Garcia RN sent at 11/24/2020  5:06 PM CST -----  Regarding: FW: Ostomy issues  Contact: Gladys/caregiver [phone]    ----- Message -----  From: Abbi Roberts  Sent: 11/24/2020   4:38 PM CST  To: Adrienne ALANIS Staff  Subject: Ostomy issues                                    Calling to speak with provider about the care of patient's ostomy. Does provider make house calls if needed? Please call and advise.

## 2020-11-25 NOTE — TELEPHONE ENCOUNTER
Spoke with the maryellen who is looking for someone who can come to house to change her colostomy bag while she is out of town. I told her I do not know of anyone at this time oscar to their location

## 2020-11-28 ENCOUNTER — HOSPITAL ENCOUNTER (INPATIENT)
Facility: HOSPITAL | Age: 80
LOS: 2 days | Discharge: HOME | DRG: 389 | End: 2020-11-30
Attending: EMERGENCY MEDICINE | Admitting: INTERNAL MEDICINE
Payer: MEDICARE

## 2020-11-28 DIAGNOSIS — K56.600 PARTIAL OBSTRUCTION OF COLON: Primary | ICD-10-CM

## 2020-11-28 DIAGNOSIS — R10.84 GENERALIZED ABDOMINAL PAIN: ICD-10-CM

## 2020-11-28 DIAGNOSIS — R11.2 NON-INTRACTABLE VOMITING WITH NAUSEA: ICD-10-CM

## 2020-11-28 DIAGNOSIS — R53.1 WEAKNESS: ICD-10-CM

## 2020-11-28 PROBLEM — E87.6 HYPOKALEMIA: Status: RESOLVED | Noted: 2019-01-04 | Resolved: 2020-11-28

## 2020-11-28 PROBLEM — L72.3 SEBACEOUS CYST: Status: RESOLVED | Noted: 2017-09-14 | Resolved: 2020-11-28

## 2020-11-28 PROBLEM — N39.0 ACUTE UTI: Status: RESOLVED | Noted: 2020-04-13 | Resolved: 2020-11-28

## 2020-11-28 PROBLEM — R60.1 GENERALIZED EDEMA: Status: RESOLVED | Noted: 2018-11-15 | Resolved: 2020-11-28

## 2020-11-28 PROBLEM — B37.89 CANDIDIASIS OF ANUS: Status: RESOLVED | Noted: 2018-11-08 | Resolved: 2020-11-28

## 2020-11-28 PROBLEM — K57.32 DIVERTICULITIS OF SIGMOID COLON: Status: RESOLVED | Noted: 2018-11-07 | Resolved: 2020-11-28

## 2020-11-28 PROBLEM — K57.20 DIVERTICULITIS OF LARGE INTESTINE WITH PERFORATION AND ABSCESS WITHOUT BLEEDING: Status: RESOLVED | Noted: 2018-11-27 | Resolved: 2020-11-28

## 2020-11-28 LAB
ALBUMIN SERPL BCP-MCNC: 3.4 G/DL (ref 3.5–5.2)
ALP SERPL-CCNC: 78 U/L (ref 55–135)
ALT SERPL W/O P-5'-P-CCNC: 21 U/L (ref 10–44)
ANION GAP SERPL CALC-SCNC: 10 MMOL/L (ref 8–16)
AST SERPL-CCNC: 27 U/L (ref 10–40)
BACTERIA #/AREA URNS HPF: ABNORMAL /HPF
BASOPHILS # BLD AUTO: 0.03 K/UL (ref 0–0.2)
BASOPHILS NFR BLD: 0.3 % (ref 0–1.9)
BILIRUB SERPL-MCNC: 0.7 MG/DL (ref 0.1–1)
BILIRUB UR QL STRIP: NEGATIVE
BUN SERPL-MCNC: 11 MG/DL (ref 8–23)
C DIFF GDH STL QL: NEGATIVE
C DIFF TOX A+B STL QL IA: NEGATIVE
CALCIUM SERPL-MCNC: 9 MG/DL (ref 8.7–10.5)
CHLORIDE SERPL-SCNC: 101 MMOL/L (ref 95–110)
CLARITY UR: CLEAR
CO2 SERPL-SCNC: 23 MMOL/L (ref 23–29)
COLOR UR: YELLOW
CREAT SERPL-MCNC: 0.8 MG/DL (ref 0.5–1.4)
DIFFERENTIAL METHOD: ABNORMAL
EOSINOPHIL # BLD AUTO: 0.1 K/UL (ref 0–0.5)
EOSINOPHIL NFR BLD: 0.4 % (ref 0–8)
ERYTHROCYTE [DISTWIDTH] IN BLOOD BY AUTOMATED COUNT: 12.9 % (ref 11.5–14.5)
EST. GFR  (AFRICAN AMERICAN): >60 ML/MIN/1.73 M^2
EST. GFR  (NON AFRICAN AMERICAN): >60 ML/MIN/1.73 M^2
GLUCOSE SERPL-MCNC: 146 MG/DL (ref 70–110)
GLUCOSE UR QL STRIP: NEGATIVE
HCT VFR BLD AUTO: 41.1 % (ref 37–48.5)
HGB BLD-MCNC: 13.4 G/DL (ref 12–16)
HGB UR QL STRIP: ABNORMAL
IMM GRANULOCYTES # BLD AUTO: 0.08 K/UL (ref 0–0.04)
IMM GRANULOCYTES NFR BLD AUTO: 0.7 % (ref 0–0.5)
KETONES UR QL STRIP: NEGATIVE
LEUKOCYTE ESTERASE UR QL STRIP: NEGATIVE
LIPASE SERPL-CCNC: 45 U/L (ref 4–60)
LYMPHOCYTES # BLD AUTO: 2.1 K/UL (ref 1–4.8)
LYMPHOCYTES NFR BLD: 17.7 % (ref 18–48)
MCH RBC QN AUTO: 28.3 PG (ref 27–31)
MCHC RBC AUTO-ENTMCNC: 32.6 G/DL (ref 32–36)
MCV RBC AUTO: 87 FL (ref 82–98)
MICROSCOPIC COMMENT: ABNORMAL
MONOCYTES # BLD AUTO: 0.9 K/UL (ref 0.3–1)
MONOCYTES NFR BLD: 7.8 % (ref 4–15)
NEUTROPHILS # BLD AUTO: 8.7 K/UL (ref 1.8–7.7)
NEUTROPHILS NFR BLD: 73.1 % (ref 38–73)
NITRITE UR QL STRIP: NEGATIVE
NRBC BLD-RTO: 0 /100 WBC
PH UR STRIP: 6 [PH] (ref 5–8)
PLATELET # BLD AUTO: 216 K/UL (ref 150–350)
PMV BLD AUTO: 10.2 FL (ref 9.2–12.9)
POTASSIUM SERPL-SCNC: 4.2 MMOL/L (ref 3.5–5.1)
PROT SERPL-MCNC: 7.2 G/DL (ref 6–8.4)
PROT UR QL STRIP: NEGATIVE
RBC # BLD AUTO: 4.73 M/UL (ref 4–5.4)
RBC #/AREA URNS HPF: 10 /HPF (ref 0–4)
SARS-COV-2 RDRP RESP QL NAA+PROBE: NEGATIVE
SODIUM SERPL-SCNC: 134 MMOL/L (ref 136–145)
SP GR UR STRIP: 1.02 (ref 1–1.03)
URN SPEC COLLECT METH UR: ABNORMAL
UROBILINOGEN UR STRIP-ACNC: NEGATIVE EU/DL
WBC # BLD AUTO: 11.84 K/UL (ref 3.9–12.7)
WBC #/AREA URNS HPF: 6 /HPF (ref 0–5)

## 2020-11-28 PROCEDURE — U0002 COVID-19 LAB TEST NON-CDC: HCPCS

## 2020-11-28 PROCEDURE — 63600175 PHARM REV CODE 636 W HCPCS: Performed by: NURSE PRACTITIONER

## 2020-11-28 PROCEDURE — 96376 TX/PRO/DX INJ SAME DRUG ADON: CPT

## 2020-11-28 PROCEDURE — 93010 EKG 12-LEAD: ICD-10-PCS | Mod: ,,, | Performed by: INTERNAL MEDICINE

## 2020-11-28 PROCEDURE — 96374 THER/PROPH/DIAG INJ IV PUSH: CPT

## 2020-11-28 PROCEDURE — 87449 NOS EACH ORGANISM AG IA: CPT

## 2020-11-28 PROCEDURE — 80053 COMPREHEN METABOLIC PANEL: CPT

## 2020-11-28 PROCEDURE — 93010 ELECTROCARDIOGRAM REPORT: CPT | Mod: ,,, | Performed by: INTERNAL MEDICINE

## 2020-11-28 PROCEDURE — 11000001 HC ACUTE MED/SURG PRIVATE ROOM

## 2020-11-28 PROCEDURE — 85025 COMPLETE CBC W/AUTO DIFF WBC: CPT

## 2020-11-28 PROCEDURE — 25000003 PHARM REV CODE 250: Performed by: NURSE PRACTITIONER

## 2020-11-28 PROCEDURE — 87324 CLOSTRIDIUM AG IA: CPT

## 2020-11-28 PROCEDURE — 81000 URINALYSIS NONAUTO W/SCOPE: CPT

## 2020-11-28 PROCEDURE — 25500020 PHARM REV CODE 255: Performed by: EMERGENCY MEDICINE

## 2020-11-28 PROCEDURE — 99285 EMERGENCY DEPT VISIT HI MDM: CPT | Mod: 25

## 2020-11-28 PROCEDURE — 96361 HYDRATE IV INFUSION ADD-ON: CPT

## 2020-11-28 PROCEDURE — 99223 1ST HOSP IP/OBS HIGH 75: CPT | Mod: ,,, | Performed by: COLON & RECTAL SURGERY

## 2020-11-28 PROCEDURE — 63600175 PHARM REV CODE 636 W HCPCS: Performed by: EMERGENCY MEDICINE

## 2020-11-28 PROCEDURE — 99223 PR INITIAL HOSPITAL CARE,LEVL III: ICD-10-PCS | Mod: ,,, | Performed by: COLON & RECTAL SURGERY

## 2020-11-28 PROCEDURE — 93005 ELECTROCARDIOGRAM TRACING: CPT

## 2020-11-28 PROCEDURE — 96375 TX/PRO/DX INJ NEW DRUG ADDON: CPT

## 2020-11-28 PROCEDURE — 25000003 PHARM REV CODE 250: Performed by: EMERGENCY MEDICINE

## 2020-11-28 PROCEDURE — 83690 ASSAY OF LIPASE: CPT

## 2020-11-28 RX ORDER — ENOXAPARIN SODIUM 100 MG/ML
40 INJECTION SUBCUTANEOUS EVERY 24 HOURS
Status: DISCONTINUED | OUTPATIENT
Start: 2020-11-28 | End: 2020-11-30 | Stop reason: HOSPADM

## 2020-11-28 RX ORDER — MORPHINE SULFATE 4 MG/ML
4 INJECTION, SOLUTION INTRAMUSCULAR; INTRAVENOUS
Status: COMPLETED | OUTPATIENT
Start: 2020-11-28 | End: 2020-11-28

## 2020-11-28 RX ORDER — MORPHINE SULFATE 4 MG/ML
2 INJECTION, SOLUTION INTRAMUSCULAR; INTRAVENOUS EVERY 6 HOURS PRN
Status: DISCONTINUED | OUTPATIENT
Start: 2020-11-28 | End: 2020-11-30 | Stop reason: HOSPADM

## 2020-11-28 RX ORDER — ATORVASTATIN CALCIUM 10 MG/1
10 TABLET, FILM COATED ORAL DAILY
Status: DISCONTINUED | OUTPATIENT
Start: 2020-11-28 | End: 2020-11-30 | Stop reason: HOSPADM

## 2020-11-28 RX ORDER — FUROSEMIDE 20 MG/1
20 TABLET ORAL DAILY
Status: DISCONTINUED | OUTPATIENT
Start: 2020-11-28 | End: 2020-11-30 | Stop reason: HOSPADM

## 2020-11-28 RX ORDER — MAG HYDROX/ALUMINUM HYD/SIMETH 200-200-20
30 SUSPENSION, ORAL (FINAL DOSE FORM) ORAL
Status: DISCONTINUED | OUTPATIENT
Start: 2020-11-28 | End: 2020-11-30 | Stop reason: HOSPADM

## 2020-11-28 RX ORDER — SODIUM CHLORIDE 9 MG/ML
INJECTION, SOLUTION INTRAVENOUS CONTINUOUS
Status: DISCONTINUED | OUTPATIENT
Start: 2020-11-28 | End: 2020-11-30 | Stop reason: HOSPADM

## 2020-11-28 RX ORDER — MEMANTINE HYDROCHLORIDE 5 MG/1
5 TABLET ORAL DAILY
Status: DISCONTINUED | OUTPATIENT
Start: 2020-11-28 | End: 2020-11-30 | Stop reason: HOSPADM

## 2020-11-28 RX ORDER — PANTOPRAZOLE SODIUM 40 MG/1
40 TABLET, DELAYED RELEASE ORAL 2 TIMES DAILY
Status: DISCONTINUED | OUTPATIENT
Start: 2020-11-28 | End: 2020-11-30 | Stop reason: HOSPADM

## 2020-11-28 RX ORDER — DONEPEZIL HYDROCHLORIDE 5 MG/1
10 TABLET, FILM COATED ORAL NIGHTLY
Status: DISCONTINUED | OUTPATIENT
Start: 2020-11-28 | End: 2020-11-30 | Stop reason: HOSPADM

## 2020-11-28 RX ORDER — DICYCLOMINE HYDROCHLORIDE 10 MG/1
10 CAPSULE ORAL
Status: DISCONTINUED | OUTPATIENT
Start: 2020-11-28 | End: 2020-11-30 | Stop reason: HOSPADM

## 2020-11-28 RX ORDER — DICYCLOMINE HYDROCHLORIDE 10 MG/1
10 CAPSULE ORAL
COMMUNITY
End: 2021-02-12 | Stop reason: SDUPTHER

## 2020-11-28 RX ORDER — ONDANSETRON 2 MG/ML
4 INJECTION INTRAMUSCULAR; INTRAVENOUS EVERY 6 HOURS PRN
Status: DISCONTINUED | OUTPATIENT
Start: 2020-11-28 | End: 2020-11-30 | Stop reason: HOSPADM

## 2020-11-28 RX ORDER — SUCRALFATE 1 G/10ML
1 SUSPENSION ORAL EVERY 6 HOURS
Status: DISCONTINUED | OUTPATIENT
Start: 2020-11-28 | End: 2020-11-30 | Stop reason: HOSPADM

## 2020-11-28 RX ORDER — MORPHINE SULFATE 4 MG/ML
2 INJECTION, SOLUTION INTRAMUSCULAR; INTRAVENOUS
Status: COMPLETED | OUTPATIENT
Start: 2020-11-28 | End: 2020-11-28

## 2020-11-28 RX ORDER — ONDANSETRON 2 MG/ML
4 INJECTION INTRAMUSCULAR; INTRAVENOUS
Status: COMPLETED | OUTPATIENT
Start: 2020-11-28 | End: 2020-11-28

## 2020-11-28 RX ORDER — VANCOMYCIN HCL 50 MG/ML
250 SOLUTION, RECONSTITUTED, ORAL ORAL EVERY 6 HOURS
Status: DISCONTINUED | OUTPATIENT
Start: 2020-11-28 | End: 2020-11-29

## 2020-11-28 RX ORDER — THYROID 30 MG/1
30 TABLET ORAL DAILY
Status: DISCONTINUED | OUTPATIENT
Start: 2020-11-28 | End: 2020-11-30 | Stop reason: HOSPADM

## 2020-11-28 RX ADMIN — ALUMINUM HYDROXIDE, MAGNESIUM HYDROXIDE, AND SIMETHICONE 30 ML: 200; 200; 20 SUSPENSION ORAL at 05:11

## 2020-11-28 RX ADMIN — PANTOPRAZOLE SODIUM 40 MG: 40 TABLET, DELAYED RELEASE ORAL at 09:11

## 2020-11-28 RX ADMIN — VANCOMYCIN HYDROCHLORIDE 250 MG: 250 POWDER, FOR SOLUTION ORAL at 05:11

## 2020-11-28 RX ADMIN — ALUMINUM HYDROXIDE, MAGNESIUM HYDROXIDE, AND SIMETHICONE 30 ML: 200; 200; 20 SUSPENSION ORAL at 09:11

## 2020-11-28 RX ADMIN — SUCRALFATE 1 G: 1 SUSPENSION ORAL at 05:11

## 2020-11-28 RX ADMIN — MORPHINE SULFATE 2 MG: 4 INJECTION, SOLUTION INTRAMUSCULAR; INTRAVENOUS at 03:11

## 2020-11-28 RX ADMIN — ONDANSETRON 2 MG: 2 INJECTION INTRAMUSCULAR; INTRAVENOUS at 03:11

## 2020-11-28 RX ADMIN — ENOXAPARIN SODIUM 40 MG: 40 INJECTION SUBCUTANEOUS at 05:11

## 2020-11-28 RX ADMIN — SODIUM CHLORIDE 1000 ML: 0.9 INJECTION, SOLUTION INTRAVENOUS at 03:11

## 2020-11-28 RX ADMIN — VANCOMYCIN HYDROCHLORIDE 250 MG: 250 POWDER, FOR SOLUTION ORAL at 01:11

## 2020-11-28 RX ADMIN — MORPHINE SULFATE 4 MG: 4 INJECTION, SOLUTION INTRAMUSCULAR; INTRAVENOUS at 04:11

## 2020-11-28 RX ADMIN — ATORVASTATIN CALCIUM 10 MG: 10 TABLET, FILM COATED ORAL at 01:11

## 2020-11-28 RX ADMIN — DONEPEZIL HYDROCHLORIDE 10 MG: 5 TABLET, FILM COATED ORAL at 09:11

## 2020-11-28 RX ADMIN — THYROID, PORCINE 30 MG: 30 TABLET ORAL at 01:11

## 2020-11-28 RX ADMIN — DICYCLOMINE HYDROCHLORIDE 10 MG: 10 CAPSULE ORAL at 09:11

## 2020-11-28 RX ADMIN — MORPHINE SULFATE 2 MG: 4 INJECTION INTRAVENOUS at 05:11

## 2020-11-28 RX ADMIN — SUCRALFATE 1 G: 1 SUSPENSION ORAL at 01:11

## 2020-11-28 RX ADMIN — DICYCLOMINE HYDROCHLORIDE 10 MG: 10 CAPSULE ORAL at 05:11

## 2020-11-28 RX ADMIN — PANTOPRAZOLE SODIUM 40 MG: 40 TABLET, DELAYED RELEASE ORAL at 01:11

## 2020-11-28 RX ADMIN — MEMANTINE HYDROCHLORIDE 5 MG: 5 TABLET ORAL at 01:11

## 2020-11-28 RX ADMIN — FUROSEMIDE 20 MG: 20 TABLET ORAL at 01:11

## 2020-11-28 RX ADMIN — IOHEXOL 100 ML: 350 INJECTION, SOLUTION INTRAVENOUS at 05:11

## 2020-11-28 RX ADMIN — SODIUM CHLORIDE: 0.9 INJECTION, SOLUTION INTRAVENOUS at 10:11

## 2020-11-28 NOTE — HPI
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  Pt awoke from sleep earlier today with a severe upper abdominal pain with some nausea and emesis. Still having stool output but family reports slightly less. In ED, no leukocytosis and CT scan showing some colonic distention and partial obstruction similar to CT from Aug 2020. Surgery consulted for evaluation. 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

## 2020-11-28 NOTE — SUBJECTIVE & OBJECTIVE
Past Medical History:   Diagnosis Date    Clostridium difficile colitis     Dementia     Diverticulitis     s/p colostomy    High cholesterol     Hypertension     Hypothyroidism        Past Surgical History:   Procedure Laterality Date    APPENDECTOMY  2008    CATARACT EXTRACTION      Dr Raygoza    COLON SURGERY      COLONOSCOPY N/A 1/2/2019    Procedure: COLONOSCOPY;  Surgeon: Reece Hogan MD;  Location: [facility] ENDO;  Service: Endoscopy;  Laterality: N/A;    COLONOSCOPY N/A 6/7/2019    Procedure: COLONOSCOPY;  Surgeon: Rishabh Connelly MD;  Location: [facility] ENDO;  Service: General;  Laterality: N/A;    COLOSTOMY Left 1/2/2019    Procedure: CREATION, COLOSTOMY;  Surgeon: Reece Hogan MD;  Location: [facility] OR;  Service: General;  Laterality: Left;    ESOPHAGOGASTRODUODENOSCOPY N/A 9/16/2020    Procedure: ESOPHAGOGASTRODUODENOSCOPY (EGD)- Needs Rapid;  Surgeon: Lucy Lafleur MD;  Location: [facility] ENDO;  Service: Endoscopy;  Laterality: N/A;    SHAHIDA PROCEDURE N/A 1/2/2019    Procedure: SHAHIDA PROCEDURE;  Surgeon: Reece Hogan MD;  Location: [facility] OR;  Service: General;  Laterality: N/A;    LYSIS OF ADHESIONS N/A 1/2/2019    Procedure: LYSIS, ADHESIONS;  Surgeon: Reece Hogan MD;  Location: [facility] OR;  Service: General;  Laterality: N/A;    VITRECTOMY Right 09/2013       Review of patient's allergies indicates:  No Known Allergies    Current Facility-Administered Medications on File Prior to Encounter   Medication    lidocaine (PF) 10 mg/ml (1%) injection 10 mg     Current Outpatient Medications on File Prior to Encounter   Medication Sig    atorvastatin (LIPITOR) 10 MG tablet Take 1 tablet (10 mg total) by mouth once daily.    dicyclomine (BENTYL) 10 MG capsule Take 10 mg by mouth 4 (four) times daily before meals and nightly.    donepeziL (ARICEPT) 10 MG tablet TAKE 1 TABLET BY MOUTH  EVERY EVENING    furosemide (LASIX) 20 MG tablet TAKE 1 TABLET BY MOUTH ONCE DAILY    L.acid/B.bifidum/B.animal/FOS  (PROBIOTIC COMPLEX ORAL) Take by mouth.    memantine (NAMENDA) 10 MG Tab TAKE 1 TABLET TWICE A DAY    thyroid, pork, (ARMOUR THYROID) 30 mg Tab Take 1 tablet (30 mg total) by mouth once daily. (Patient taking differently: Take 30 mg by mouth before breakfast. )    vancomycin (VANCOCIN) 250 MG capsule Take 1 capsule (250 mg total) by mouth 4 (four) times daily for 14 days, THEN 1 capsule (250 mg total) 3 (three) times daily for 14 days, THEN 1 capsule (250 mg total) 2 (two) times a day for 7 days, THEN 1 capsule (250 mg total) once daily for 7 days.    ALPRAZolam (XANAX) 0.25 MG tablet Take 1 tablet (0.25 mg total) by mouth 2 (two) times daily as needed for Anxiety.    ondansetron (ZOFRAN-ODT) 4 MG TbDL Take 1 tablet (4 mg total) by mouth every 6 (six) hours as needed (nausea). Take before taking vancomycin    pantoprazole (PROTONIX) 40 MG tablet Take 1 tablet (40 mg total) by mouth 2 (two) times daily. for 14 days    polyethylene glycol (GLYCOLAX) 17 gram/dose powder Take 17 g by mouth once daily. (Patient taking differently: Take 17 g by mouth as needed. )    promethazine (PHENERGAN) 25 MG tablet Take 1 tablet (25 mg total) by mouth every 6 (six) hours as needed.    triamcinolone acetonide 0.1% (KENALOG) 0.1 % ointment APPLY TOPICALLY 4 (FOUR) TIMES DAILY. TO RASH ON FACE AND CHEST AND NOSE (Patient taking differently: Apply topically 4 (four) times daily as needed. APPLY TOPICALLY 4 (FOUR) TIMES DAILY. TO RASH ON FACE AND CHEST AND NOSE)     Family History     Problem Relation (Age of Onset)    Alzheimer's disease Mother    Aneurysm Father    Stomach cancer Brother        Tobacco Use    Smoking status: Former Smoker     Years: 5.00    Smokeless tobacco: Never Used   Substance and Sexual Activity    Alcohol use: No    Drug use: No    Sexual activity: Never     Comment:      Review of Systems   Constitutional: Positive for appetite change and fatigue. Negative for chills, diaphoresis, fever and  unexpected weight change.   HENT: Negative for congestion, nosebleeds, sinus pressure and sore throat.    Eyes: Negative for pain, discharge and visual disturbance.   Respiratory: Negative for cough, chest tightness, shortness of breath, wheezing and stridor.    Cardiovascular: Negative for chest pain, palpitations and leg swelling.   Gastrointestinal: Positive for abdominal distention, abdominal pain, diarrhea (chronic out of colostomy bag ), nausea (intermittent ) and vomiting (intermittent ). Negative for blood in stool and constipation.   Endocrine: Negative for cold intolerance and heat intolerance.   Genitourinary: Negative for difficulty urinating, dysuria, flank pain, frequency and urgency.   Musculoskeletal: Positive for arthralgias. Negative for back pain, joint swelling, myalgias, neck pain and neck stiffness.   Skin: Negative for rash and wound.   Allergic/Immunologic: Negative for food allergies and immunocompromised state.   Neurological: Positive for weakness (generalized ). Negative for dizziness, seizures, syncope, facial asymmetry, speech difficulty, light-headedness, numbness and headaches.   Hematological: Negative for adenopathy.   Psychiatric/Behavioral: Positive for confusion (Dementia ). Negative for agitation and hallucinations.     Objective:     Vital Signs (Most Recent):  Temp: 98.4 °F (36.9 °C) (11/28/20 0241)  Pulse: (!) 57 (11/28/20 0932)  Resp: (!) 23 (11/28/20 0932)  BP: (!) 157/70 (11/28/20 0932)  SpO2: 97 % (11/28/20 0932) Vital Signs (24h Range):  Temp:  [98.4 °F (36.9 °C)] 98.4 °F (36.9 °C)  Pulse:  [51-61] 57  Resp:  [14-23] 23  SpO2:  [92 %-98 %] 97 %  BP: (127-183)/(65-82) 157/70     Weight: 73 kg (160 lb 15 oz)  Body mass index is 28.97 kg/m².    Physical Exam  Vitals signs and nursing note reviewed.   Constitutional:       General: She is not in acute distress.     Appearance: She is well-developed. She is not diaphoretic.   HENT:      Head: Normocephalic and atraumatic.       Nose: Nose normal.   Eyes:      General: No scleral icterus.     Conjunctiva/sclera: Conjunctivae normal.   Neck:      Musculoskeletal: Normal range of motion and neck supple.      Trachea: No tracheal deviation.   Cardiovascular:      Rate and Rhythm: Normal rate and regular rhythm.      Heart sounds: Normal heart sounds. No murmur. No friction rub. No gallop.    Pulmonary:      Effort: Pulmonary effort is normal. No respiratory distress.      Breath sounds: Normal breath sounds. No stridor. No wheezing or rales.   Chest:      Chest wall: No tenderness.   Abdominal:      General: There is distension (mild ).      Palpations: Abdomen is soft. There is no mass.      Tenderness: There is abdominal tenderness (Mild diffuse TTP across upper abdomen ). There is no guarding or rebound.      Comments: Hyperactive bowel sounds through out  Colostomy in place with brown semi-liquid stool output noted    Musculoskeletal: Normal range of motion.         General: No tenderness or deformity.   Skin:     General: Skin is warm and dry.      Coloration: Skin is not pale.      Findings: No erythema or rash.   Neurological:      Mental Status: She is alert and oriented to person, place, and time.      Cranial Nerves: No cranial nerve deficit.      Motor: No abnormal muscle tone.      Coordination: Coordination normal.   Psychiatric:         Behavior: Behavior normal.         Thought Content: Thought content normal.         Cognition and Memory: Memory is impaired. She exhibits impaired recent memory and impaired remote memory.             Significant Labs:   BMP:   Recent Labs   Lab 11/28/20  0325   *   *   K 4.2      CO2 23   BUN 11   CREATININE 0.8   CALCIUM 9.0     CBC:   Recent Labs   Lab 11/28/20  0422   WBC 11.84   HGB 13.4   HCT 41.1        CMP:   Recent Labs   Lab 11/28/20  0325   *   K 4.2      CO2 23   *   BUN 11   CREATININE 0.8   CALCIUM 9.0   PROT 7.2   ALBUMIN 3.4*   BILITOT  0.7   ALKPHOS 78   AST 27   ALT 21   ANIONGAP 10   EGFRNONAA >60     All pertinent labs within the past 24 hours have been reviewed.    Imaging Results          CT Abdomen Pelvis With Contrast (Final result)  Result time 11/28/20 08:57:24    Final result by Luc Alcala MD (11/28/20 08:57:24)                 Impression:      Fluid distention and inflammation of the remaining right colon, suspicious for combination colitis and partial colonic obstruction, with transition point in the upper left pelvis, identical location to prior study of 08/27/2020, likely adhesive etiology. There is decompression of the distal colon leading up to the left lower quadrant colostomy.    No free air or abscess.      Electronically signed by: Luc Alcala  Date:    11/28/2020  Time:    08:57             Narrative:    EXAMINATION:  CT ABDOMEN PELVIS WITH CONTRAST    CLINICAL HISTORY:  Abdominal distension;Nausea/vomiting;    TECHNIQUE:  Low dose axial images, sagittal and coronal reformations were obtained from the lung bases to the pubic symphysis after  mL Omnipaque 350.    All CT scans at this location are performed using dose modulation techniques as appropriate to a performed exam including the following: Automated exposure control; adjustment of the mA and/or kV according to patient size.    COMPARISON:  08/27/2020    FINDINGS:  Heart: Normal in size. No pericardial effusion.    Lung Bases: Well aerated, without consolidation or pleural fluid.    Liver: Normal in size and attenuation, with no focal hepatic lesions.    Gallbladder: No calcified gallstones.    Bile Ducts: No evidence of dilated ducts.    Pancreas: No mass or peripancreatic fat stranding.    Spleen: Unremarkable.    Adrenals: Unremarkable.    Kidneys/ Ureters: Unchanged 3.4 cm right renal pelvic stone.  Associated moderate to severe right renal cortical atrophy.  No hydronephrosis.  Left kidney unremarkable.    Bladder: No evidence of wall  thickening.    Reproductive organs: Unremarkable.    GI Tract/Mesentery: Fluid distention and inflammation of the remaining right colon, suspicious for combination colitis and partial colonic obstruction, with transition point in the upper left pelvis, identical location to prior study of 08/27/2020, likely adhesive etiology (series 2, image 81).  There is decompression of the distal colon leading up to the left lower quadrant colostomy.    Appendix: No evidence of appendictis.    Peritoneal Space: No ascites. No free air.    Retroperitoneum: No significant adenopathy.    Abdominal wall: Unremarkable.    Vasculature: No significant atherosclerosis or aneurysm.    Bones: No acute fracture.

## 2020-11-28 NOTE — ASSESSMENT & PLAN NOTE
General surgery consulted  No N/V, intermittent colic abd pain   NPO except medications   IV Morphine and Zofran prn   IVFs

## 2020-11-28 NOTE — ED NOTES
Assumed care of pt at this time. Pt lying in bed comfortably. AAO x 4. Resp even and unlabored with equal chest rise and fall. Skin warm and dry. 20G PIV noted to R AC. Flushes well, drg CDI. LLQ colostomy noted with minimal brown soft stool. Side rails up x 2. Call light within reach. No distress noted. Pt denies any needs or assist at this time. Family at bedside.   Instructed patient and family when more stool is in collection bag that we will need a sample.

## 2020-11-28 NOTE — ASSESSMENT & PLAN NOTE
Likely chronic with acute flare    - No acute surgical intervention at this time. Pt feels better, is passing stool/flatus via ostomy and no longer has abdominal pain, nausea or vomiting  - Keep NPO for now  - IVF  - Serial abdominal exams  - Pt and family would like to avoid surgery if at all possible. Discussed we may be forced to operate if these partial obstructions keep happening given the continued transition of the colon from likely adhesive disease, but they are not interested at this time. Will continue to monitor and readdress  - Will hopefully avoid operation while here, especially given ongoing CDiff infection. May want to consult GI for possible fecal transplant

## 2020-11-28 NOTE — HPI
"The patient is a 79 yo female with HTN, Hypothyroid, Dementia, Hx Diverticulitis with abscess s/p partial colectomy with colostomy placement 1/2019, C-diff colitis, Frequent episodes of abdominal pain/N&V since partial colectomy who presented to the ED with abdominal pain. Pt reports she woke up in the night with abdominal pain across her upper abdominal area R>L rated approx 8/10 described as cramping sensation that waxes and wanes since then. Pt denies any exacerbating factors. Pain improved with morphine that was given in the ED. Pt reports she has noticed a "slight" decrease in stool from her colostomy.   In the ED, WBC normal, afebrile. CT abd showed Fluid distention and inflammation of the remaining right colon, suspicious for combination colitis and partial colonic obstruction, with transition point in the upper left pelvis, identical location to prior study of 08/27/2020, likely adhesive etiology. There is decompression of the distal colon leading up to the left lower quadrant colostomy.  General surgery has been consulted by the ED.   Of note, the patient saw Dr. Maier with GI on 10/16/20 with N/V, Abdominal pain, and recurrent C-diff colitis. C-diff PCR was positive 11/11/20 and pt was placed on po Vancomycin. However, multiple messages noted in EMR regarding insurance authorization for po Vanc. Pt unsure if the medication was ever authorized or if she was taking this medication or not. (Pt has Dementia)   Dr. Maier also documented that pt has an extensive history of C diff 3-4 times and planned for a possible FMT via colonoscopy.     "

## 2020-11-28 NOTE — ED NOTES
Pt lying quietly in bed, reports intermittent abdominal cramping. abd tender, noted stool in colostomy bag. Skin warm and dry, respirations even non-labored, BBS-CTA, pulses, motion and sensation intact all ext. IV noted to right FA in place. CM in place showing SB rate of 51. Side rails up x 2, call bell in reach, bed in low position, family member at bedside.

## 2020-11-28 NOTE — H&P
"[facility] Medicine  History & Physical    Patient Name: Irlanda Lopez  MRN: [id number]  Admission Date: 11/28/2020  Attending Physician: Robbin Ventrua MD   Primary Care Provider: Myla Arias MD         Patient information was obtained from patient and ER records.     Subjective:     Principal Problem:Partial obstruction of colon    Chief Complaint:   Chief Complaint   Patient presents with    Vomiting     began tonight.  currently being treated for Cdiff        HPI: The patient is a 79 yo female with HTN, Hypothyroid, Dementia, Hx Diverticulitis with abscess s/p partial colectomy with colostomy placement 1/2019, C-diff colitis, and Frequent ED visits/hospitalizations/op clinic visits for abdominal pain/N&V since partial colectomy who presented to the ED with abdominal pain and intermittent N/V. Pt reports she woke up in the night at approx 0230 with abdominal pain across her upper abdominal area R>L rated approx 8/10 described as cramping sensation that waxes and wanes since then. Pt denies any exacerbating factors. Pain improved with morphine that was given in the ED. Pt reports she has noticed a "slight" decrease in stool from her colostomy. No N/V "for a few days".  In the ED, WBC normal, afebrile. CT abd showed Fluid distention and inflammation of the remaining right colon, suspicious for combination colitis and partial colonic obstruction, with transition point in the upper left pelvis, identical location to prior study of 08/27/2020, likely adhesive etiology. There is decompression of the distal colon leading up to the left lower quadrant colostomy.  General surgery has been consulted by the ED.   On exam, No N/V, +stool output from colostomy. Abdomen mildly distended. +Hyperactive BS  Of note, the patient saw Dr. Maier with GI on 10/16/20 with N/V, Abdominal pain, and recurrent C-diff colitis. C-diff PCR was positive 11/11/20 and pt was placed on po Vancomycin. However, " multiple messages noted in EMR regarding insurance authorization for po Vanc. Pt unsure if the medication was ever authorized or if she was taking this medication or not. (Pt has Dementia)   Dr. Maier also documented that pt has an extensive history of C diff 3-4 times and planned for a possible FMT via colonoscopy.       Past Medical History:   Diagnosis Date    Clostridium difficile colitis     Dementia     Diverticulitis     s/p colostomy    High cholesterol     Hypertension     Hypothyroidism        Past Surgical History:   Procedure Laterality Date    APPENDECTOMY  2008    CATARACT EXTRACTION      Dr Raygoza    COLON SURGERY      COLONOSCOPY N/A 1/2/2019    Procedure: COLONOSCOPY;  Surgeon: Reece Hogan MD;  Location: [facility] ENDO;  Service: Endoscopy;  Laterality: N/A;    COLONOSCOPY N/A 6/7/2019    Procedure: COLONOSCOPY;  Surgeon: Rishabh Connelly MD;  Location: [facility] ENDO;  Service: General;  Laterality: N/A;    COLOSTOMY Left 1/2/2019    Procedure: CREATION, COLOSTOMY;  Surgeon: Reece Hogan MD;  Location: [facility] OR;  Service: General;  Laterality: Left;    ESOPHAGOGASTRODUODENOSCOPY N/A 9/16/2020    Procedure: ESOPHAGOGASTRODUODENOSCOPY (EGD)- Needs Rapid;  Surgeon: Lucy Lafleur MD;  Location: [facility];  Service: Endoscopy;  Laterality: N/A;    SHAHIDA PROCEDURE N/A 1/2/2019    Procedure: SHAHIDA PROCEDURE;  Surgeon: Reece Hogan MD;  Location: [facility] OR;  Service: General;  Laterality: N/A;    LYSIS OF ADHESIONS N/A 1/2/2019    Procedure: LYSIS, ADHESIONS;  Surgeon: Reece Hogan MD;  Location: [facility] OR;  Service: General;  Laterality: N/A;    VITRECTOMY Right 09/2013       Review of patient's allergies indicates:  No Known Allergies    Current Facility-Administered Medications on File Prior to Encounter   Medication    lidocaine (PF) 10 mg/ml (1%) injection 10 mg     Current Outpatient Medications on File Prior to Encounter   Medication Sig    atorvastatin (LIPITOR) 10 MG tablet Take 1  tablet (10 mg total) by mouth once daily.    dicyclomine (BENTYL) 10 MG capsule Take 10 mg by mouth 4 (four) times daily before meals and nightly.    donepeziL (ARICEPT) 10 MG tablet TAKE 1 TABLET BY MOUTH  EVERY EVENING    furosemide (LASIX) 20 MG tablet TAKE 1 TABLET BY MOUTH ONCE DAILY    L.acid/B.bifidum/B.animal/FOS (PROBIOTIC COMPLEX ORAL) Take by mouth.    memantine (NAMENDA) 10 MG Tab TAKE 1 TABLET TWICE A DAY    thyroid, pork, (ARMOUR THYROID) 30 mg Tab Take 1 tablet (30 mg total) by mouth once daily. (Patient taking differently: Take 30 mg by mouth before breakfast. )    vancomycin (VANCOCIN) 250 MG capsule Take 1 capsule (250 mg total) by mouth 4 (four) times daily for 14 days, THEN 1 capsule (250 mg total) 3 (three) times daily for 14 days, THEN 1 capsule (250 mg total) 2 (two) times a day for 7 days, THEN 1 capsule (250 mg total) once daily for 7 days.    ALPRAZolam (XANAX) 0.25 MG tablet Take 1 tablet (0.25 mg total) by mouth 2 (two) times daily as needed for Anxiety.    ondansetron (ZOFRAN-ODT) 4 MG TbDL Take 1 tablet (4 mg total) by mouth every 6 (six) hours as needed (nausea). Take before taking vancomycin    pantoprazole (PROTONIX) 40 MG tablet Take 1 tablet (40 mg total) by mouth 2 (two) times daily. for 14 days    polyethylene glycol (GLYCOLAX) 17 gram/dose powder Take 17 g by mouth once daily. (Patient taking differently: Take 17 g by mouth as needed. )    promethazine (PHENERGAN) 25 MG tablet Take 1 tablet (25 mg total) by mouth every 6 (six) hours as needed.    triamcinolone acetonide 0.1% (KENALOG) 0.1 % ointment APPLY TOPICALLY 4 (FOUR) TIMES DAILY. TO RASH ON FACE AND CHEST AND NOSE (Patient taking differently: Apply topically 4 (four) times daily as needed. APPLY TOPICALLY 4 (FOUR) TIMES DAILY. TO RASH ON FACE AND CHEST AND NOSE)     Family History     Problem Relation (Age of Onset)    Alzheimer's disease Mother    Aneurysm Father    Stomach cancer Brother        Tobacco Use     Smoking status: Former Smoker     Years: 5.00    Smokeless tobacco: Never Used   Substance and Sexual Activity    Alcohol use: No    Drug use: No    Sexual activity: Never     Comment:      Review of Systems   Constitutional: Positive for appetite change and fatigue. Negative for chills, diaphoresis, fever and unexpected weight change.   HENT: Negative for congestion, nosebleeds, sinus pressure and sore throat.    Eyes: Negative for pain, discharge and visual disturbance.   Respiratory: Negative for cough, chest tightness, shortness of breath, wheezing and stridor.    Cardiovascular: Negative for chest pain, palpitations and leg swelling.   Gastrointestinal: Positive for abdominal distention, abdominal pain, diarrhea (chronic out of colostomy bag ), nausea (intermittent ) and vomiting (intermittent ). Negative for blood in stool and constipation.   Endocrine: Negative for cold intolerance and heat intolerance.   Genitourinary: Negative for difficulty urinating, dysuria, flank pain, frequency and urgency.   Musculoskeletal: Positive for arthralgias. Negative for back pain, joint swelling, myalgias, neck pain and neck stiffness.   Skin: Negative for rash and wound.   Allergic/Immunologic: Negative for food allergies and immunocompromised state.   Neurological: Positive for weakness (generalized ). Negative for dizziness, seizures, syncope, facial asymmetry, speech difficulty, light-headedness, numbness and headaches.   Hematological: Negative for adenopathy.   Psychiatric/Behavioral: Positive for confusion (Dementia ). Negative for agitation and hallucinations.     Objective:     Vital Signs (Most Recent):  Temp: 98.4 °F (36.9 °C) (11/28/20 0241)  Pulse: (!) 57 (11/28/20 0932)  Resp: (!) 23 (11/28/20 0932)  BP: (!) 157/70 (11/28/20 0932)  SpO2: 97 % (11/28/20 0932) Vital Signs (24h Range):  Temp:  [98.4 °F (36.9 °C)] 98.4 °F (36.9 °C)  Pulse:  [51-61] 57  Resp:  [14-23] 23  SpO2:  [92 %-98 %] 97 %  BP:  (127-183)/(65-82) 157/70     Weight: 73 kg (160 lb 15 oz)  Body mass index is 28.97 kg/m².    Physical Exam  Vitals signs and nursing note reviewed.   Constitutional:       General: She is not in acute distress.     Appearance: She is well-developed. She is not diaphoretic.   HENT:      Head: Normocephalic and atraumatic.      Nose: Nose normal.   Eyes:      General: No scleral icterus.     Conjunctiva/sclera: Conjunctivae normal.   Neck:      Musculoskeletal: Normal range of motion and neck supple.      Trachea: No tracheal deviation.   Cardiovascular:      Rate and Rhythm: Normal rate and regular rhythm.      Heart sounds: Normal heart sounds. No murmur. No friction rub. No gallop.    Pulmonary:      Effort: Pulmonary effort is normal. No respiratory distress.      Breath sounds: Normal breath sounds. No stridor. No wheezing or rales.   Chest:      Chest wall: No tenderness.   Abdominal:      General: There is distension (mild ).      Palpations: Abdomen is soft. There is no mass.      Tenderness: There is abdominal tenderness (Mild diffuse TTP across upper abdomen ). There is no guarding or rebound.      Comments: Hyperactive bowel sounds through out  Colostomy in place with brown semi-liquid stool output noted    Musculoskeletal: Normal range of motion.         General: No tenderness or deformity.   Skin:     General: Skin is warm and dry.      Coloration: Skin is not pale.      Findings: No erythema or rash.   Neurological:      Mental Status: She is alert and oriented to person, place, and time.      Cranial Nerves: No cranial nerve deficit.      Motor: No abnormal muscle tone.      Coordination: Coordination normal.   Psychiatric:         Behavior: Behavior normal.         Thought Content: Thought content normal.         Cognition and Memory: Memory is impaired. She exhibits impaired recent memory and impaired remote memory.             Significant Labs:   BMP:   Recent Labs   Lab 11/28/20  0325   *    *   K 4.2      CO2 23   BUN 11   CREATININE 0.8   CALCIUM 9.0     CBC:   Recent Labs   Lab 11/28/20  0422   WBC 11.84   HGB 13.4   HCT 41.1        CMP:   Recent Labs   Lab 11/28/20  0325   *   K 4.2      CO2 23   *   BUN 11   CREATININE 0.8   CALCIUM 9.0   PROT 7.2   ALBUMIN 3.4*   BILITOT 0.7   ALKPHOS 78   AST 27   ALT 21   ANIONGAP 10   EGFRNONAA >60     All pertinent labs within the past 24 hours have been reviewed.    Imaging Results          CT Abdomen Pelvis With Contrast (Final result)  Result time 11/28/20 08:57:24    Final result by Luc Alcala MD (11/28/20 08:57:24)                 Impression:      Fluid distention and inflammation of the remaining right colon, suspicious for combination colitis and partial colonic obstruction, with transition point in the upper left pelvis, identical location to prior study of 08/27/2020, likely adhesive etiology. There is decompression of the distal colon leading up to the left lower quadrant colostomy.    No free air or abscess.      Electronically signed by: Luc Alcala  Date:    11/28/2020  Time:    08:57             Narrative:    EXAMINATION:  CT ABDOMEN PELVIS WITH CONTRAST    CLINICAL HISTORY:  Abdominal distension;Nausea/vomiting;    TECHNIQUE:  Low dose axial images, sagittal and coronal reformations were obtained from the lung bases to the pubic symphysis after  mL Omnipaque 350.    All CT scans at this location are performed using dose modulation techniques as appropriate to a performed exam including the following: Automated exposure control; adjustment of the mA and/or kV according to patient size.    COMPARISON:  08/27/2020    FINDINGS:  Heart: Normal in size. No pericardial effusion.    Lung Bases: Well aerated, without consolidation or pleural fluid.    Liver: Normal in size and attenuation, with no focal hepatic lesions.    Gallbladder: No calcified gallstones.    Bile Ducts: No evidence of dilated  ducts.    Pancreas: No mass or peripancreatic fat stranding.    Spleen: Unremarkable.    Adrenals: Unremarkable.    Kidneys/ Ureters: Unchanged 3.4 cm right renal pelvic stone.  Associated moderate to severe right renal cortical atrophy.  No hydronephrosis.  Left kidney unremarkable.    Bladder: No evidence of wall thickening.    Reproductive organs: Unremarkable.    GI Tract/Mesentery: Fluid distention and inflammation of the remaining right colon, suspicious for combination colitis and partial colonic obstruction, with transition point in the upper left pelvis, identical location to prior study of 08/27/2020, likely adhesive etiology (series 2, image 81).  There is decompression of the distal colon leading up to the left lower quadrant colostomy.    Appendix: No evidence of appendictis.    Peritoneal Space: No ascites. No free air.    Retroperitoneum: No significant adenopathy.    Abdominal wall: Unremarkable.    Vasculature: No significant atherosclerosis or aneurysm.    Bones: No acute fracture.                              Assessment/Plan:     * Partial obstruction of colon  General surgery consulted  No N/V, intermittent colic abd pain   NPO except medications   IV Morphine and Zofran prn   IVFs      Generalized abdominal pain  See above       C Diff Colitis  Retest for Cdiff  Po Vancomycin for now   May need to consult GI if positive       Colostomy in place  Diverticulitis with abscess s/p partial colectomy with colostomy placement       Essential hypertension  BP stable   Pt takes Lasix for HTN  Will hold Lasix for now and monitor       Hypothyroidism  Last TSH 5/2020 was WNL  Cont Thyroid pork       Mild cognitive impairment with memory loss  Supportive care   Cont Namenda and Aricept       Hyperlipidemia  Cont Lipitor         VTE Risk Mitigation (From admission, onward)    None             Kayley Russell NP  Department of Hospital Medicine   [facility]

## 2020-11-28 NOTE — CONSULTS
[facility] -   Colorectal Surgery  Consult Note    Patient Name: Irlanda Lopez  MRN: [id number]  Code Status: Prior  Admission Date: 11/28/2020  Hospital Length of Stay: 0 days  Attending Physician: Robbin Ventura MD  Primary Care Provider: Myla Arias MD    Patient information was obtained from patient, relative(s), caregiver / friend, past medical records and ER records.     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  Pt awoke from sleep earlier today with a severe upper abdominal pain with some nausea and emesis. Still having stool output but family reports slightly less. In ED, no leukocytosis and CT scan showing some colonic distention and partial obstruction similar to CT from Aug 2020. Surgery consulted for evaluation. 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    No current facility-administered medications on file prior to encounter.      Current Outpatient Medications on File Prior to Encounter   Medication Sig    atorvastatin (LIPITOR) 10 MG tablet Take 1 tablet (10 mg total) by mouth once daily.    dicyclomine (BENTYL) 10 MG capsule Take 10 mg by mouth 4 (four) times daily before meals and nightly.    donepeziL (ARICEPT) 10 MG tablet TAKE 1 TABLET BY MOUTH  EVERY EVENING    furosemide (LASIX) 20 MG tablet TAKE 1 TABLET BY MOUTH ONCE DAILY     L.acid/B.bifidum/B.animal/FOS (PROBIOTIC COMPLEX ORAL) Take by mouth.    memantine (NAMENDA) 10 MG Tab TAKE 1 TABLET TWICE A DAY    thyroid, pork, (ARMOUR THYROID) 30 mg Tab Take 1 tablet (30 mg total) by mouth once daily. (Patient taking differently: Take 30 mg by mouth before breakfast. )    vancomycin (VANCOCIN) 250 MG capsule Take 1 capsule (250 mg total) by mouth 4 (four) times daily for 14 days, THEN 1 capsule (250 mg total) 3 (three) times daily for 14 days, THEN 1 capsule (250 mg total) 2 (two) times a day for 7 days, THEN 1 capsule (250 mg total) once daily for 7 days.    ALPRAZolam (XANAX) 0.25 MG tablet Take 1 tablet (0.25 mg total) by mouth 2 (two) times daily as needed for Anxiety.    ondansetron (ZOFRAN-ODT) 4 MG TbDL Take 1 tablet (4 mg total) by mouth every 6 (six) hours as needed (nausea). Take before taking vancomycin    pantoprazole (PROTONIX) 40 MG tablet Take 1 tablet (40 mg total) by mouth 2 (two) times daily. for 14 days    polyethylene glycol (GLYCOLAX) 17 gram/dose powder Take 17 g by mouth once daily. (Patient taking differently: Take 17 g by mouth as needed. )    promethazine (PHENERGAN) 25 MG tablet Take 1 tablet (25 mg total) by mouth every 6 (six) hours as needed.    triamcinolone acetonide 0.1% (KENALOG) 0.1 % ointment APPLY TOPICALLY 4 (FOUR) TIMES DAILY. TO RASH ON FACE AND CHEST AND NOSE (Patient taking differently: Apply topically 4 (four) times daily as needed. APPLY TOPICALLY 4 (FOUR) TIMES DAILY. TO RASH ON FACE AND CHEST AND NOSE)       Review of patient's allergies indicates:  No Known Allergies    Past Medical History:   Diagnosis Date    Clostridium difficile colitis     Dementia     Diverticulitis     s/p colostomy    High cholesterol     Hypertension     Hypothyroidism      Past Surgical History:   Procedure Laterality Date    APPENDECTOMY  2008    CATARACT EXTRACTION      Dr Raygoza    COLON SURGERY      COLONOSCOPY N/A 1/2/2019    Procedure:  COLONOSCOPY;  Surgeon: Reece Hogan MD;  Location: [facility] ENDO;  Service: Endoscopy;  Laterality: N/A;    COLONOSCOPY N/A 6/7/2019    Procedure: COLONOSCOPY;  Surgeon: Rishabh Connelly MD;  Location: [facility] ENDO;  Service: General;  Laterality: N/A;    COLOSTOMY Left 1/2/2019    Procedure: CREATION, COLOSTOMY;  Surgeon: Reece Hogan MD;  Location: [facility] OR;  Service: General;  Laterality: Left;    ESOPHAGOGASTRODUODENOSCOPY N/A 9/16/2020    Procedure: ESOPHAGOGASTRODUODENOSCOPY (EGD)- Needs Rapid;  Surgeon: Lucy Lafleur MD;  Location[facility] ENDO;  Service: Endoscopy;  Laterality: N/A;    SHAHIDA PROCEDURE N/A 1/2/2019    Procedure: SHAHIDA PROCEDURE;  Surgeon: Reece Hogan MD;  Location: [facility] OR;  Service: General;  Laterality: N/A;    LYSIS OF ADHESIONS N/A 1/2/2019    Procedure: LYSIS, ADHESIONS;  Surgeon: Reece Hogan MD;  Location: [facility] OR;  Service: General;  Laterality: N/A;    VITRECTOMY Right 09/2013     Family History     Problem Relation (Age of Onset)    Alzheimer's disease Mother    Aneurysm Father    Stomach cancer Brother        Tobacco Use    Smoking status: Former Smoker     Years: 5.00    Smokeless tobacco: Never Used   Substance and Sexual Activity    Alcohol use: No    Drug use: No    Sexual activity: Never     Comment:      Review of Systems   Constitutional: Negative for activity change, appetite change, chills, diaphoresis, fatigue, fever and unexpected weight change.   HENT: Negative for congestion, ear pain, nosebleeds, sinus pressure, sore throat and trouble swallowing.    Eyes: Negative for pain, discharge, redness, itching and visual disturbance.   Respiratory: Negative for cough, chest tightness, shortness of breath, wheezing and stridor.    Cardiovascular: Negative for chest pain, palpitations and leg swelling.   Gastrointestinal: Positive for abdominal distention, abdominal pain, nausea and vomiting. Negative for anal bleeding and blood in stool.   Endocrine: Negative  for cold intolerance, heat intolerance and polyuria.   Genitourinary: Negative for difficulty urinating, dysuria, flank pain, frequency, hematuria and urgency.   Musculoskeletal: Positive for arthralgias. Negative for back pain, gait problem, joint swelling, myalgias, neck pain and neck stiffness.   Skin: Negative for color change, rash and wound.   Allergic/Immunologic: Negative for environmental allergies, food allergies and immunocompromised state.   Neurological: Negative for dizziness, seizures, syncope, facial asymmetry, speech difficulty, weakness, light-headedness, numbness and headaches.   Hematological: Negative for adenopathy.   Psychiatric/Behavioral: Negative for agitation, confusion and hallucinations.     Objective:     Vital Signs (Most Recent):  Temp: 97.5 °F (36.4 °C) (11/28/20 1253)  Pulse: (!) 54 (11/28/20 1253)  Resp: 18 (11/28/20 1253)  BP: (!) 167/77 (11/28/20 1253)  SpO2: 96 % (11/28/20 1253) Vital Signs (24h Range):  Temp:  [97.5 °F (36.4 °C)-98.4 °F (36.9 °C)] 97.5 °F (36.4 °C)  Pulse:  [51-61] 54  Resp:  [14-23] 18  SpO2:  [92 %-98 %] 96 %  BP: (127-183)/(65-82) 167/77     Weight: 73 kg (160 lb 15 oz)  Body mass index is 29.44 kg/m².    Physical Exam  Constitutional:       Appearance: She is well-developed.   HENT:      Head: Normocephalic and atraumatic.   Eyes:      Conjunctiva/sclera: Conjunctivae normal.   Neck:      Musculoskeletal: Normal range of motion.      Thyroid: No thyromegaly.   Cardiovascular:      Rate and Rhythm: Regular rhythm.   Pulmonary:      Effort: Pulmonary effort is normal. No respiratory distress.   Abdominal:      Comments: Soft, mild distention, mild global TTP; no rebound or guarding; well-healed midline previous incision; ostomy pink and viable with semi-solid stool in bag   Musculoskeletal: Normal range of motion.         General: No tenderness.   Skin:     General: Skin is warm and dry.      Capillary Refill: Capillary refill takes less than 2 seconds.       Findings: No rash.   Neurological:      Mental Status: She is alert and oriented to person, place, and time.         Significant Labs:  CBC:   Recent Labs   Lab 11/28/20  0422   WBC 11.84   RBC 4.73   HGB 13.4   HCT 41.1      MCV 87   MCH 28.3   MCHC 32.6     BMP:   Recent Labs   Lab 11/28/20  0325   *   *   K 4.2      CO2 23   BUN 11   CREATININE 0.8   CALCIUM 9.0       Significant Diagnostics:  I have reviewed all pertinent imaging results/findings within the past 24 hours.     CT:  FINDINGS:  Heart: Normal in size. No pericardial effusion.     Lung Bases: Well aerated, without consolidation or pleural fluid.     Liver: Normal in size and attenuation, with no focal hepatic lesions.     Gallbladder: No calcified gallstones.     Bile Ducts: No evidence of dilated ducts.     Pancreas: No mass or peripancreatic fat stranding.     Spleen: Unremarkable.     Adrenals: Unremarkable.     Kidneys/ Ureters: Unchanged 3.4 cm right renal pelvic stone.  Associated moderate to severe right renal cortical atrophy.  No hydronephrosis.  Left kidney unremarkable.     Bladder: No evidence of wall thickening.     Reproductive organs: Unremarkable.     GI Tract/Mesentery: Fluid distention and inflammation of the remaining right colon, suspicious for combination colitis and partial colonic obstruction, with transition point in the upper left pelvis, identical location to prior study of 08/27/2020, likely adhesive etiology (series 2, image 81).  There is decompression of the distal colon leading up to the left lower quadrant colostomy.     Appendix: No evidence of appendictis.     Peritoneal Space: No ascites. No free air.     Retroperitoneum: No significant adenopathy.     Abdominal wall: Unremarkable.     Vasculature: No significant atherosclerosis or aneurysm.     Bones: No acute fracture.     Impression:     Fluid distention and inflammation of the remaining right colon, suspicious for combination colitis  and partial colonic obstruction, with transition point in the upper left pelvis, identical location to prior study of 08/27/2020, likely adhesive etiology. There is decompression of the distal colon leading up to the left lower quadrant colostomy.     No free air or abscess.    Assessment/Plan:     * Partial obstruction of colon  Likely chronic with acute flare    - No acute surgical intervention at this time. Pt feels better, is passing stool/flatus via ostomy and no longer has abdominal pain, nausea or vomiting  - Keep NPO for now  - IVF  - Serial abdominal exams  - Pt and family would like to avoid surgery if at all possible. Discussed we may be forced to operate if these partial obstructions keep happening given the continued transition of the colon from likely adhesive disease, but they are not interested at this time. Will continue to monitor and readdress  - Will hopefully avoid operation while here, especially given ongoing CDiff infection. May want to consult GI for possible fecal transplant    C Diff Colitis  Recurrent, may want to consult GI for possible fecal transplant    Hyperlipidemia  Management per hospital medicine    Mild cognitive impairment with memory loss  Management per hospital medicine    Essential hypertension  Management per hospital medicine    Hypothyroidism  Management per hospital medicine      VTE Risk Mitigation (From admission, onward)         Ordered     enoxaparin injection 40 mg  Every 24 hours      11/28/20 1210                Thank you for your consult. I will followup with patient.    Rishabh Connelly MD  Colorectal Surgery  [facility] -

## 2020-11-28 NOTE — SUBJECTIVE & OBJECTIVE
No current facility-administered medications on file prior to encounter.      Current Outpatient Medications on File Prior to Encounter   Medication Sig    atorvastatin (LIPITOR) 10 MG tablet Take 1 tablet (10 mg total) by mouth once daily.    dicyclomine (BENTYL) 10 MG capsule Take 10 mg by mouth 4 (four) times daily before meals and nightly.    donepeziL (ARICEPT) 10 MG tablet TAKE 1 TABLET BY MOUTH  EVERY EVENING    furosemide (LASIX) 20 MG tablet TAKE 1 TABLET BY MOUTH ONCE DAILY    L.acid/B.bifidum/B.animal/FOS (PROBIOTIC COMPLEX ORAL) Take by mouth.    memantine (NAMENDA) 10 MG Tab TAKE 1 TABLET TWICE A DAY    thyroid, pork, (ARMOUR THYROID) 30 mg Tab Take 1 tablet (30 mg total) by mouth once daily. (Patient taking differently: Take 30 mg by mouth before breakfast. )    vancomycin (VANCOCIN) 250 MG capsule Take 1 capsule (250 mg total) by mouth 4 (four) times daily for 14 days, THEN 1 capsule (250 mg total) 3 (three) times daily for 14 days, THEN 1 capsule (250 mg total) 2 (two) times a day for 7 days, THEN 1 capsule (250 mg total) once daily for 7 days.    ALPRAZolam (XANAX) 0.25 MG tablet Take 1 tablet (0.25 mg total) by mouth 2 (two) times daily as needed for Anxiety.    ondansetron (ZOFRAN-ODT) 4 MG TbDL Take 1 tablet (4 mg total) by mouth every 6 (six) hours as needed (nausea). Take before taking vancomycin    pantoprazole (PROTONIX) 40 MG tablet Take 1 tablet (40 mg total) by mouth 2 (two) times daily. for 14 days    polyethylene glycol (GLYCOLAX) 17 gram/dose powder Take 17 g by mouth once daily. (Patient taking differently: Take 17 g by mouth as needed. )    promethazine (PHENERGAN) 25 MG tablet Take 1 tablet (25 mg total) by mouth every 6 (six) hours as needed.    triamcinolone acetonide 0.1% (KENALOG) 0.1 % ointment APPLY TOPICALLY 4 (FOUR) TIMES DAILY. TO RASH ON FACE AND CHEST AND NOSE (Patient taking differently: Apply topically 4 (four) times daily as needed. APPLY TOPICALLY 4  (FOUR) TIMES DAILY. TO RASH ON FACE AND CHEST AND NOSE)       Review of patient's allergies indicates:  No Known Allergies    Past Medical History:   Diagnosis Date    Clostridium difficile colitis     Dementia     Diverticulitis     s/p colostomy    High cholesterol     Hypertension     Hypothyroidism      Past Surgical History:   Procedure Laterality Date    APPENDECTOMY  2008    CATARACT EXTRACTION      Dr Raygoza    COLON SURGERY      COLONOSCOPY N/A 1/2/2019    Procedure: COLONOSCOPY;  Surgeon: Reece Hogan MD;  Location: [facility] ENDO;  Service: Endoscopy;  Laterality: N/A;    COLONOSCOPY N/A 6/7/2019    Procedure: COLONOSCOPY;  Surgeon: Rishabh Connelly MD;  Location: [facility] ENDO;  Service: General;  Laterality: N/A;    COLOSTOMY Left 1/2/2019    Procedure: CREATION, COLOSTOMY;  Surgeon: Reece Hogan MD;  Location: [facility] OR;  Service: General;  Laterality: Left;    ESOPHAGOGASTRODUODENOSCOPY N/A 9/16/2020    Procedure: ESOPHAGOGASTRODUODENOSCOPY (EGD)- Needs Rapid;  Surgeon: Lucy Lafleur MD;  Location: [facility];  Service: Endoscopy;  Laterality: N/A;    SHAHIDA PROCEDURE N/A 1/2/2019    Procedure: SHAHIDA PROCEDURE;  Surgeon: Reece Hogan MD;  Location: [facility] OR;  Service: General;  Laterality: N/A;    LYSIS OF ADHESIONS N/A 1/2/2019    Procedure: LYSIS, ADHESIONS;  Surgeon: Reece Hogan MD;  Location: [facility] OR;  Service: General;  Laterality: N/A;    VITRECTOMY Right 09/2013     Family History     Problem Relation (Age of Onset)    Alzheimer's disease Mother    Aneurysm Father    Stomach cancer Brother        Tobacco Use    Smoking status: Former Smoker     Years: 5.00    Smokeless tobacco: Never Used   Substance and Sexual Activity    Alcohol use: No    Drug use: No    Sexual activity: Never     Comment:      Review of Systems   Constitutional: Negative for activity change, appetite change, chills, diaphoresis, fatigue, fever and unexpected weight change.   HENT: Negative for  congestion, ear pain, nosebleeds, sinus pressure, sore throat and trouble swallowing.    Eyes: Negative for pain, discharge, redness, itching and visual disturbance.   Respiratory: Negative for cough, chest tightness, shortness of breath, wheezing and stridor.    Cardiovascular: Negative for chest pain, palpitations and leg swelling.   Gastrointestinal: Positive for abdominal distention, abdominal pain, nausea and vomiting. Negative for anal bleeding and blood in stool.   Endocrine: Negative for cold intolerance, heat intolerance and polyuria.   Genitourinary: Negative for difficulty urinating, dysuria, flank pain, frequency, hematuria and urgency.   Musculoskeletal: Positive for arthralgias. Negative for back pain, gait problem, joint swelling, myalgias, neck pain and neck stiffness.   Skin: Negative for color change, rash and wound.   Allergic/Immunologic: Negative for environmental allergies, food allergies and immunocompromised state.   Neurological: Negative for dizziness, seizures, syncope, facial asymmetry, speech difficulty, weakness, light-headedness, numbness and headaches.   Hematological: Negative for adenopathy.   Psychiatric/Behavioral: Negative for agitation, confusion and hallucinations.     Objective:     Vital Signs (Most Recent):  Temp: 97.5 °F (36.4 °C) (11/28/20 1253)  Pulse: (!) 54 (11/28/20 1253)  Resp: 18 (11/28/20 1253)  BP: (!) 167/77 (11/28/20 1253)  SpO2: 96 % (11/28/20 1253) Vital Signs (24h Range):  Temp:  [97.5 °F (36.4 °C)-98.4 °F (36.9 °C)] 97.5 °F (36.4 °C)  Pulse:  [51-61] 54  Resp:  [14-23] 18  SpO2:  [92 %-98 %] 96 %  BP: (127-183)/(65-82) 167/77     Weight: 73 kg (160 lb 15 oz)  Body mass index is 29.44 kg/m².    Physical Exam  Constitutional:       Appearance: She is well-developed.   HENT:      Head: Normocephalic and atraumatic.   Eyes:      Conjunctiva/sclera: Conjunctivae normal.   Neck:      Musculoskeletal: Normal range of motion.      Thyroid: No thyromegaly.    Cardiovascular:      Rate and Rhythm: Regular rhythm.   Pulmonary:      Effort: Pulmonary effort is normal. No respiratory distress.   Abdominal:      Comments: Soft, mild distention, mild global TTP; no rebound or guarding; well-healed midline previous incision; ostomy pink and viable with semi-solid stool in bag   Musculoskeletal: Normal range of motion.         General: No tenderness.   Skin:     General: Skin is warm and dry.      Capillary Refill: Capillary refill takes less than 2 seconds.      Findings: No rash.   Neurological:      Mental Status: She is alert and oriented to person, place, and time.         Significant Labs:  CBC:   Recent Labs   Lab 11/28/20  0422   WBC 11.84   RBC 4.73   HGB 13.4   HCT 41.1      MCV 87   MCH 28.3   MCHC 32.6     BMP:   Recent Labs   Lab 11/28/20  0325   *   *   K 4.2      CO2 23   BUN 11   CREATININE 0.8   CALCIUM 9.0       Significant Diagnostics:  I have reviewed all pertinent imaging results/findings within the past 24 hours.     CT:  FINDINGS:  Heart: Normal in size. No pericardial effusion.     Lung Bases: Well aerated, without consolidation or pleural fluid.     Liver: Normal in size and attenuation, with no focal hepatic lesions.     Gallbladder: No calcified gallstones.     Bile Ducts: No evidence of dilated ducts.     Pancreas: No mass or peripancreatic fat stranding.     Spleen: Unremarkable.     Adrenals: Unremarkable.     Kidneys/ Ureters: Unchanged 3.4 cm right renal pelvic stone.  Associated moderate to severe right renal cortical atrophy.  No hydronephrosis.  Left kidney unremarkable.     Bladder: No evidence of wall thickening.     Reproductive organs: Unremarkable.     GI Tract/Mesentery: Fluid distention and inflammation of the remaining right colon, suspicious for combination colitis and partial colonic obstruction, with transition point in the upper left pelvis, identical location to prior study of 08/27/2020, likely adhesive  etiology (series 2, image 81).  There is decompression of the distal colon leading up to the left lower quadrant colostomy.     Appendix: No evidence of appendictis.     Peritoneal Space: No ascites. No free air.     Retroperitoneum: No significant adenopathy.     Abdominal wall: Unremarkable.     Vasculature: No significant atherosclerosis or aneurysm.     Bones: No acute fracture.     Impression:     Fluid distention and inflammation of the remaining right colon, suspicious for combination colitis and partial colonic obstruction, with transition point in the upper left pelvis, identical location to prior study of 08/27/2020, likely adhesive etiology. There is decompression of the distal colon leading up to the left lower quadrant colostomy.     No free air or abscess.

## 2020-11-29 ENCOUNTER — PATIENT MESSAGE (OUTPATIENT)
Dept: GASTROENTEROLOGY | Facility: CLINIC | Age: 80
End: 2020-11-29

## 2020-11-29 PROCEDURE — 11000001 HC ACUTE MED/SURG PRIVATE ROOM

## 2020-11-29 PROCEDURE — 63600175 PHARM REV CODE 636 W HCPCS: Performed by: NURSE PRACTITIONER

## 2020-11-29 PROCEDURE — 25000003 PHARM REV CODE 250: Performed by: NURSE PRACTITIONER

## 2020-11-29 PROCEDURE — 99232 SBSQ HOSP IP/OBS MODERATE 35: CPT | Mod: ,,, | Performed by: COLON & RECTAL SURGERY

## 2020-11-29 PROCEDURE — 99232 PR SUBSEQUENT HOSPITAL CARE,LEVL II: ICD-10-PCS | Mod: ,,, | Performed by: COLON & RECTAL SURGERY

## 2020-11-29 RX ADMIN — ALUMINUM HYDROXIDE, MAGNESIUM HYDROXIDE, AND SIMETHICONE 30 ML: 200; 200; 20 SUSPENSION ORAL at 06:11

## 2020-11-29 RX ADMIN — THYROID, PORCINE 30 MG: 30 TABLET ORAL at 08:11

## 2020-11-29 RX ADMIN — ALUMINUM HYDROXIDE, MAGNESIUM HYDROXIDE, AND SIMETHICONE 30 ML: 200; 200; 20 SUSPENSION ORAL at 09:11

## 2020-11-29 RX ADMIN — DICYCLOMINE HYDROCHLORIDE 10 MG: 10 CAPSULE ORAL at 09:11

## 2020-11-29 RX ADMIN — ALUMINUM HYDROXIDE, MAGNESIUM HYDROXIDE, AND SIMETHICONE 30 ML: 200; 200; 20 SUSPENSION ORAL at 11:11

## 2020-11-29 RX ADMIN — SUCRALFATE 1 G: 1 SUSPENSION ORAL at 12:11

## 2020-11-29 RX ADMIN — MEMANTINE HYDROCHLORIDE 5 MG: 5 TABLET ORAL at 08:11

## 2020-11-29 RX ADMIN — SUCRALFATE 1 G: 1 SUSPENSION ORAL at 06:11

## 2020-11-29 RX ADMIN — SUCRALFATE 1 G: 1 SUSPENSION ORAL at 05:11

## 2020-11-29 RX ADMIN — FUROSEMIDE 20 MG: 20 TABLET ORAL at 08:11

## 2020-11-29 RX ADMIN — ATORVASTATIN CALCIUM 10 MG: 10 TABLET, FILM COATED ORAL at 08:11

## 2020-11-29 RX ADMIN — VANCOMYCIN HYDROCHLORIDE 250 MG: 250 POWDER, FOR SOLUTION ORAL at 07:11

## 2020-11-29 RX ADMIN — DONEPEZIL HYDROCHLORIDE 10 MG: 5 TABLET, FILM COATED ORAL at 09:11

## 2020-11-29 RX ADMIN — VANCOMYCIN HYDROCHLORIDE 250 MG: 250 POWDER, FOR SOLUTION ORAL at 12:11

## 2020-11-29 RX ADMIN — DICYCLOMINE HYDROCHLORIDE 10 MG: 10 CAPSULE ORAL at 05:11

## 2020-11-29 RX ADMIN — PANTOPRAZOLE SODIUM 40 MG: 40 TABLET, DELAYED RELEASE ORAL at 09:11

## 2020-11-29 RX ADMIN — PANTOPRAZOLE SODIUM 40 MG: 40 TABLET, DELAYED RELEASE ORAL at 08:11

## 2020-11-29 RX ADMIN — DICYCLOMINE HYDROCHLORIDE 10 MG: 10 CAPSULE ORAL at 11:11

## 2020-11-29 RX ADMIN — ENOXAPARIN SODIUM 40 MG: 40 INJECTION SUBCUTANEOUS at 05:11

## 2020-11-29 RX ADMIN — ALUMINUM HYDROXIDE, MAGNESIUM HYDROXIDE, AND SIMETHICONE 30 ML: 200; 200; 20 SUSPENSION ORAL at 05:11

## 2020-11-29 RX ADMIN — SUCRALFATE 1 G: 1 SUSPENSION ORAL at 11:11

## 2020-11-29 RX ADMIN — DICYCLOMINE HYDROCHLORIDE 10 MG: 10 CAPSULE ORAL at 06:11

## 2020-11-29 NOTE — PLAN OF CARE
Patient remained free from injury. Bed alarm set. Family at bedside. IVF and Col bag maintained. No s/s of acute distress.12hr chart check complete.

## 2020-11-29 NOTE — SUBJECTIVE & OBJECTIVE
Interval History:C Diff negative. Will stop isolation. Abdominal x-ray today shows dilated bowel loops. Will remain NPO. Surgery following.    Review of Systems   Constitutional: Positive for appetite change. Negative for chills, diaphoresis, fatigue, fever and unexpected weight change.   HENT: Negative for congestion, nosebleeds, sinus pressure and sore throat.    Eyes: Negative for pain, discharge and visual disturbance.   Respiratory: Negative for cough, chest tightness, shortness of breath, wheezing and stridor.    Cardiovascular: Negative for chest pain, palpitations and leg swelling.   Gastrointestinal: Positive for abdominal distention, abdominal pain (improved), nausea (intermittent improved) and vomiting (improved). Negative for blood in stool and constipation. Diarrhea: chronic out of colostomy bag    Endocrine: Negative for cold intolerance and heat intolerance.   Genitourinary: Negative for difficulty urinating, dysuria, flank pain, frequency and urgency.   Musculoskeletal: Positive for arthralgias. Negative for back pain, joint swelling, myalgias, neck pain and neck stiffness.   Skin: Negative for rash and wound.   Allergic/Immunologic: Negative for food allergies and immunocompromised state.   Neurological: Positive for weakness (generalized ). Negative for dizziness, seizures, syncope, facial asymmetry, speech difficulty, light-headedness, numbness and headaches.   Hematological: Negative for adenopathy.   Psychiatric/Behavioral: Negative for agitation and hallucinations. Confusion: Dementia       Objective:     Vital Signs (Most Recent):  Temp: 97.8 °F (36.6 °C) (11/29/20 1148)  Pulse: (!) 48 (11/29/20 1148)  Resp: 18 (11/29/20 1148)  BP: 112/62 (11/29/20 1148)  SpO2: (!) 93 % (11/29/20 1148) Vital Signs (24h Range):  Temp:  [97.5 °F (36.4 °C)-97.8 °F (36.6 °C)] 97.8 °F (36.6 °C)  Pulse:  [45-53] 48  Resp:  [18] 18  SpO2:  [92 %-95 %] 93 %  BP: ()/(53-71) 112/62     Weight: 73 kg (160 lb 15  oz)  Body mass index is 29.44 kg/m².  No intake or output data in the 24 hours ending 11/29/20 1308   Physical Exam    Significant Labs:   CBC:   Recent Labs   Lab 11/28/20  0422   WBC 11.84   HGB 13.4   HCT 41.1        CMP:   Recent Labs   Lab 11/28/20  0325   *   K 4.2      CO2 23   *   BUN 11   CREATININE 0.8   CALCIUM 9.0   PROT 7.2   ALBUMIN 3.4*   BILITOT 0.7   ALKPHOS 78   AST 27   ALT 21   ANIONGAP 10   EGFRNONAA >60       Significant Imaging:   EXAMINATION:  XR ABDOMEN AP 1 VIEW     CLINICAL HISTORY:  eval for bowel obstruction;     TECHNIQUE:  AP View(s) of the abdomen was performed.     COMPARISON:  CT abdomen pelvis 11/28/2020     FINDINGS:  Large right renal calcification again noted.  Prominent air-filled, dilated loop of bowel in the right lower quadrant, possibly small bowel..  No gross intraperitoneal free air.     Impression:     New, mildly dilated loop of air-filled small bowel in the right lower quadrant.  Otherwise no acute radiographic abnormality in the abdomen.     Unchanged right renal calcifications

## 2020-11-29 NOTE — PROGRESS NOTES
"[facility] Hospital Medicine  Progress Note    Patient Name: Irlanda Lopez  MRN: [id number]  Patient Class: IP- Inpatient   Admission Date: 11/28/2020  Length of Stay: 1 days  Attending Physician: Robbin Ventura MD  Primary Care Provider: Myla Arias MD        Subjective:     Principal Problem:Partial obstruction of colon        HPI:  The patient is a 81 yo female with HTN, Hypothyroid, Dementia, Hx Diverticulitis with abscess s/p partial colectomy with colostomy placement 1/2019, C-diff colitis, Frequent episodes of abdominal pain/N&V since partial colectomy who presented to the ED with abdominal pain. Pt reports she woke up in the night with abdominal pain across her upper abdominal area R>L rated approx 8/10 described as cramping sensation that waxes and wanes since then. Pt denies any exacerbating factors. Pain improved with morphine that was given in the ED. Pt reports she has noticed a "slight" decrease in stool from her colostomy.   In the ED, WBC normal, afebrile. CT abd showed Fluid distention and inflammation of the remaining right colon, suspicious for combination colitis and partial colonic obstruction, with transition point in the upper left pelvis, identical location to prior study of 08/27/2020, likely adhesive etiology. There is decompression of the distal colon leading up to the left lower quadrant colostomy.  General surgery has been consulted by the ED.   Of note, the patient saw Dr. Maier with GI on 10/16/20 with N/V, Abdominal pain, and recurrent C-diff colitis. C-diff PCR was positive 11/11/20 and pt was placed on po Vancomycin. However, multiple messages noted in EMR regarding insurance authorization for po Vanc. Pt unsure if the medication was ever authorized or if she was taking this medication or not. (Pt has Dementia)   Dr. Maier also documented that pt has an extensive history of C diff 3-4 times and planned for a possible FMT via colonoscopy. "       Overview/Hospital Course:  No notes on file    Interval History:C Diff negative. Will stop isolation. Abdominal x-ray today shows dilated bowel loops. Will remain NPO. Surgery following.    Review of Systems   Constitutional: Positive for appetite change. Negative for chills, diaphoresis, fatigue, fever and unexpected weight change.   HENT: Negative for congestion, nosebleeds, sinus pressure and sore throat.    Eyes: Negative for pain, discharge and visual disturbance.   Respiratory: Negative for cough, chest tightness, shortness of breath, wheezing and stridor.    Cardiovascular: Negative for chest pain, palpitations and leg swelling.   Gastrointestinal: Positive for abdominal distention, abdominal pain (improved), nausea (intermittent improved) and vomiting (improved). Negative for blood in stool and constipation. Diarrhea: chronic out of colostomy bag    Endocrine: Negative for cold intolerance and heat intolerance.   Genitourinary: Negative for difficulty urinating, dysuria, flank pain, frequency and urgency.   Musculoskeletal: Positive for arthralgias. Negative for back pain, joint swelling, myalgias, neck pain and neck stiffness.   Skin: Negative for rash and wound.   Allergic/Immunologic: Negative for food allergies and immunocompromised state.   Neurological: Positive for weakness (generalized ). Negative for dizziness, seizures, syncope, facial asymmetry, speech difficulty, light-headedness, numbness and headaches.   Hematological: Negative for adenopathy.   Psychiatric/Behavioral: Negative for agitation and hallucinations. Confusion: Dementia       Objective:     Vital Signs (Most Recent):  Temp: 97.8 °F (36.6 °C) (11/29/20 1148)  Pulse: (!) 48 (11/29/20 1148)  Resp: 18 (11/29/20 1148)  BP: 112/62 (11/29/20 1148)  SpO2: (!) 93 % (11/29/20 1148) Vital Signs (24h Range):  Temp:  [97.5 °F (36.4 °C)-97.8 °F (36.6 °C)] 97.8 °F (36.6 °C)  Pulse:  [45-53] 48  Resp:  [18] 18  SpO2:  [92 %-95 %] 93 %  BP:  ()/(53-71) 112/62     Weight: 73 kg (160 lb 15 oz)  Body mass index is 29.44 kg/m².  No intake or output data in the 24 hours ending 11/29/20 1308   Physical Exam    Significant Labs:   CBC:   Recent Labs   Lab 11/28/20  0422   WBC 11.84   HGB 13.4   HCT 41.1        CMP:   Recent Labs   Lab 11/28/20  0325   *   K 4.2      CO2 23   *   BUN 11   CREATININE 0.8   CALCIUM 9.0   PROT 7.2   ALBUMIN 3.4*   BILITOT 0.7   ALKPHOS 78   AST 27   ALT 21   ANIONGAP 10   EGFRNONAA >60       Significant Imaging:   EXAMINATION:  XR ABDOMEN AP 1 VIEW     CLINICAL HISTORY:  eval for bowel obstruction;     TECHNIQUE:  AP View(s) of the abdomen was performed.     COMPARISON:  CT abdomen pelvis 11/28/2020     FINDINGS:  Large right renal calcification again noted.  Prominent air-filled, dilated loop of bowel in the right lower quadrant, possibly small bowel..  No gross intraperitoneal free air.     Impression:     New, mildly dilated loop of air-filled small bowel in the right lower quadrant.  Otherwise no acute radiographic abnormality in the abdomen.     Unchanged right renal calcifications            Assessment/Plan:      * Partial obstruction of colon  General surgery consulted  No N/V, intermittent colic abd pain   NPO except medications   IV Morphine and Zofran prn   IVFs    11/29/2929  X-ray today shows mild dilated loops. Will continue NPO status. Patient does not wish to have surgery is possible. General Surgery input appreciated.    Generalized abdominal pain  See above       C Diff Colitis  Retest for Cdiff  Po Vancomycin for now   May need to consult GI if positive     11/29/2020  Cdiff negative. Will stop isolation and Vancomycin    Colostomy in place  Diverticulitis with abscess s/p partial colectomy with colostomy placement       Hyperlipidemia  Cont Lipitor       Mild cognitive impairment with memory loss  Supportive care   Cont Namenda and Aricept       Essential hypertension  BP stable    Pt takes Lasix for HTN  Will hold Lasix for now and monitor       Hypothyroidism  Last TSH 5/2020 was WNL  Cont Thyroid pork         VTE Risk Mitigation (From admission, onward)         Ordered     enoxaparin injection 40 mg  Every 24 hours      11/28/20 1210                Discharge Planning   MICHAEL:      Code Status: Prior   Is the patient medically ready for discharge?:     Reason for patient still in hospital (select all that apply): Treatment  Discharge Plan A: Home, Other(Home with 24/7 caregivers)          Edelmira Menard NP  Department of Hospital Medicine   [facility]

## 2020-11-29 NOTE — SUBJECTIVE & OBJECTIVE
Interval History: No acute events overnight. No further nausea or vomiting. No abdominal pain currently. Having colostomy function.     Medications:  Continuous Infusions:   sodium chloride 0.9% 50 mL/hr at 11/28/20 1044     Scheduled Meds:   aluminum-magnesium hydroxide-simethicone  30 mL Oral QID (AC & HS)    atorvastatin  10 mg Oral Daily    dicyclomine  10 mg Oral QID (AC & HS)    donepeziL  10 mg Oral QHS    enoxaparin  40 mg Subcutaneous Q24H    furosemide  20 mg Oral Daily    memantine  5 mg Oral Daily    pantoprazole  40 mg Oral BID    sucralfate  1 g Oral Q6H    thyroid (pork)  30 mg Oral Daily     PRN Meds:morphine, ondansetron     Review of patient's allergies indicates:  No Known Allergies  Objective:     Vital Signs (Most Recent):  Temp: 97.8 °F (36.6 °C) (11/29/20 1148)  Pulse: (!) 48 (11/29/20 1148)  Resp: 18 (11/29/20 1148)  BP: 112/62 (11/29/20 1148)  SpO2: (!) 93 % (11/29/20 1148) Vital Signs (24h Range):  Temp:  [97.5 °F (36.4 °C)-97.8 °F (36.6 °C)] 97.8 °F (36.6 °C)  Pulse:  [45-53] 48  Resp:  [18] 18  SpO2:  [92 %-95 %] 93 %  BP: ()/(53-71) 112/62     Weight: 73 kg (160 lb 15 oz)  Body mass index is 29.44 kg/m².    Intake/Output - Last 3 Shifts       11/27 0700 - 11/28 0659 11/28 0700 - 11/29 0659 11/29 0700 - 11/30 0659    IV Piggyback 1000      Total Intake(mL/kg) 1000 (13.7)      Net +1000             Urine Occurrence  3 x 1 x    Stool Occurrence  2 x           Physical Exam  Constitutional:       Appearance: She is well-developed.   HENT:      Head: Normocephalic and atraumatic.   Eyes:      Conjunctiva/sclera: Conjunctivae normal.   Neck:      Musculoskeletal: Normal range of motion.      Thyroid: No thyromegaly.   Cardiovascular:      Rate and Rhythm: Regular rhythm.   Pulmonary:      Effort: Pulmonary effort is normal. No respiratory distress.   Abdominal:      Comments: Soft, no distention, no tenderness; no rebound or guarding; well-healed midline previous incision;  ostomy pink and viable with semi-solid stool in bag   Musculoskeletal: Normal range of motion.         General: No tenderness.   Skin:     General: Skin is warm and dry.      Capillary Refill: Capillary refill takes less than 2 seconds.      Findings: No rash.   Neurological:      Mental Status: She is alert and oriented to person, place, and time.         Significant Labs:  CBC:   Recent Labs   Lab 11/28/20  0422   WBC 11.84   RBC 4.73   HGB 13.4   HCT 41.1      MCV 87   MCH 28.3   MCHC 32.6     BMP:   Recent Labs   Lab 11/28/20  0325   *   *   K 4.2      CO2 23   BUN 11   CREATININE 0.8   CALCIUM 9.0       Significant Diagnostics:  I have reviewed all pertinent imaging results/findings within the past 24 hours.

## 2020-11-29 NOTE — ASSESSMENT & PLAN NOTE
Likely chronic with acute flare but now asymptomatic    - No acute surgical intervention at this time. Pt feels better, is passing stool/flatus via ostomy and no longer has abdominal pain, nausea or vomiting  - Okay to reattempt diet and monitor for tolerance  - Serial abdominal exams  - Pt and family would like to avoid surgery if at all possible. Discussed we may be forced to operate if these partial obstructions keep happening given the continued transition point of the colon from likely adhesive disease, but they are not interested at this time. Will continue to monitor and readdress  - Will hopefully avoid operation while here

## 2020-11-29 NOTE — PLAN OF CARE
Telephone call with pt's dtr, Myla ([phone]), to obtain necessary information to complete discharge assessment. Dtr reports that pt lives at home and has 24/7 sitters. Dtr reports that pt is independent with ADLs/IADLs, but does require occasional assistance. Dtr denies pt's need for DME or the use of assistive equipment. Dtr reports that has used [facility] in the past in which she was last seen by HH approx 3 months ago. Dtr denies any post hospital needs/services at this time. Pt's will return home to previous arrangement and continue care with 24/7 caregivers. SW instructed family to call with any questions or concerns.     [id number] [place] [facility] [address]  [address] [facility] [address]  Phone: [phone] Fax: [phone]    [facility] - [address]  [address]  [facility] [address]  Phone: [phone] Fax: [phone]    D/C Plan: Home with 24/7 caregivers  PCP: Myla Arias MD  Discharge transportation: Family will provide  [facility]: Active     11/29/20 1206   Discharge Assessment   Assessment Type Discharge Planning Assessment   Confirmed/corrected address and phone number on facesheet? Yes   Assessment information obtained from? Caregiver   Expected Length of Stay (days)   (TBD)   Communicated expected length of stay with patient/caregiver yes   Prior to hospitilization cognitive status: Not Oriented to Person;Not Oriented to Place   Prior to hospitalization functional status: Needs Assistance;Independent   Current cognitive status: Not Oriented to Person;Not Oriented to Place   Current Functional Status: Independent;Needs Assistance   Lives With other (see comments)  (Pt lives at home and has 24/7 caregivers.)   Able to Return to Prior Arrangements yes   Is patient able to care for self after discharge? Unable to determine at this time (comments)  (TBD upon d/c.)   Who are your  caregiver(s) and their phone number(s)? Myla Mora (dtr) [phone]; Keri Jean (dtr) [phone]   Patient's perception of discharge disposition other (comments)  (Not assessed)   Readmission Within the Last 30 Days no previous admission in last 30 days   Patient currently being followed by outpatient case management? No   Patient currently receives any other outside agency services? No   Equipment Currently Used at Home none   Do you have any problems affording any of your prescribed medications? No   Is the patient taking medications as prescribed? yes   Does the patient have transportation home? Yes   Transportation Anticipated family or friend will provide   Does the patient receive services at the [facility]? No   Discharge Plan A Home;Other  (Home with 24/7 caregivers)   Discharge Plan B Home;Other  (Home with 24/7 caregivers)   DME Needed Upon Discharge  none   Patient/Family in Agreement with Plan yes

## 2020-11-29 NOTE — ASSESSMENT & PLAN NOTE
General surgery consulted  No N/V, intermittent colic abd pain   NPO except medications   IV Morphine and Zofran prn   IVFs    11/29/2929  X-ray today shows mild dilated loops. Will continue NPO status. Patient does not wish to have surgery is possible. General Surgery input appreciated.

## 2020-11-29 NOTE — PROGRESS NOTES
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  Pt awoke from sleep earlier today with a severe upper abdominal pain with some nausea and emesis. Still having stool output but family reports slightly less. In ED, no leukocytosis and CT scan showing some colonic distention and partial obstruction similar to CT from Aug 2020. Surgery consulted for evaluation. 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    Post-Op Info:  * No surgery found *         Interval History: No acute events overnight. No further nausea or vomiting. No abdominal pain currently. Having colostomy function.     Medications:  Continuous Infusions:   sodium chloride 0.9% 50 mL/hr at 11/28/20 1044     Scheduled Meds:   aluminum-magnesium hydroxide-simethicone  30 mL Oral QID (AC & HS)    atorvastatin  10 mg Oral Daily    dicyclomine  10 mg Oral QID (AC & HS)    donepeziL  10 mg Oral QHS    enoxaparin  40 mg Subcutaneous Q24H    furosemide  20 mg Oral Daily    memantine  5 mg Oral Daily    pantoprazole  40 mg Oral BID    sucralfate  1 g Oral Q6H    thyroid (pork)  30 mg Oral Daily     PRN Meds:morphine, ondansetron     Review of patient's allergies indicates:  No Known Allergies  Objective:     Vital Signs (Most Recent):  Temp: 97.8 °F (36.6 °C) (11/29/20 1148)  Pulse: (!) 48 (11/29/20 1148)  Resp: 18 (11/29/20 1148)  BP: 112/62 (11/29/20 1148)  SpO2: (!) 93 % (11/29/20 1148) Vital Signs (24h Range):  Temp:  [97.5 °F  (36.4 °C)-97.8 °F (36.6 °C)] 97.8 °F (36.6 °C)  Pulse:  [45-53] 48  Resp:  [18] 18  SpO2:  [92 %-95 %] 93 %  BP: ()/(53-71) 112/62     Weight: 73 kg (160 lb 15 oz)  Body mass index is 29.44 kg/m².    Intake/Output - Last 3 Shifts       11/27 0700 - 11/28 0659 11/28 0700 - 11/29 0659 11/29 0700 - 11/30 0659    IV Piggyback 1000      Total Intake(mL/kg) 1000 (13.7)      Net +1000             Urine Occurrence  3 x 1 x    Stool Occurrence  2 x           Physical Exam  Constitutional:       Appearance: She is well-developed.   HENT:      Head: Normocephalic and atraumatic.   Eyes:      Conjunctiva/sclera: Conjunctivae normal.   Neck:      Musculoskeletal: Normal range of motion.      Thyroid: No thyromegaly.   Cardiovascular:      Rate and Rhythm: Regular rhythm.   Pulmonary:      Effort: Pulmonary effort is normal. No respiratory distress.   Abdominal:      Comments: Soft, no distention, no tenderness; no rebound or guarding; well-healed midline previous incision; ostomy pink and viable with semi-solid stool in bag   Musculoskeletal: Normal range of motion.         General: No tenderness.   Skin:     General: Skin is warm and dry.      Capillary Refill: Capillary refill takes less than 2 seconds.      Findings: No rash.   Neurological:      Mental Status: She is alert and oriented to person, place, and time.         Significant Labs:  CBC:   Recent Labs   Lab 11/28/20  0422   WBC 11.84   RBC 4.73   HGB 13.4   HCT 41.1      MCV 87   MCH 28.3   MCHC 32.6     BMP:   Recent Labs   Lab 11/28/20  0325   *   *   K 4.2      CO2 23   BUN 11   CREATININE 0.8   CALCIUM 9.0       Significant Diagnostics:  I have reviewed all pertinent imaging results/findings within the past 24 hours.    Assessment/Plan:     * Partial obstruction of colon  Likely chronic with acute flare but now asymptomatic    - No acute surgical intervention at this time. Pt feels better, is passing stool/flatus via ostomy and no  longer has abdominal pain, nausea or vomiting  - Okay to reattempt diet and monitor for tolerance  - Serial abdominal exams  - Pt and family would like to avoid surgery if at all possible. Discussed we may be forced to operate if these partial obstructions keep happening given the continued transition point of the colon from likely adhesive disease, but they are not interested at this time. Will continue to monitor and readdress  - Will hopefully avoid operation while here    C Diff Colitis  Recurrent, may want to consult GI for possible fecal transplant    Hyperlipidemia  Management per hospital medicine    Mild cognitive impairment with memory loss  Management per hospital medicine    Essential hypertension  Management per hospital medicine    Hypothyroidism  Management per hospital medicine        Rishabh Connelly MD  Colorectal Surgery  [facility]

## 2020-11-29 NOTE — ASSESSMENT & PLAN NOTE
Retest for Cdiff  Po Vancomycin for now   May need to consult GI if positive     11/29/2020  Cdiff negative. Will stop isolation and Vancomycin

## 2020-11-30 VITALS
OXYGEN SATURATION: 95 % | BODY MASS INDEX: 29.62 KG/M2 | WEIGHT: 160.94 LBS | TEMPERATURE: 96 F | HEIGHT: 62 IN | DIASTOLIC BLOOD PRESSURE: 72 MMHG | SYSTOLIC BLOOD PRESSURE: 134 MMHG | HEART RATE: 44 BPM | RESPIRATION RATE: 18 BRPM

## 2020-11-30 LAB
ALBUMIN SERPL BCP-MCNC: 3.3 G/DL (ref 3.5–5.2)
ALP SERPL-CCNC: 69 U/L (ref 55–135)
ALT SERPL W/O P-5'-P-CCNC: 14 U/L (ref 10–44)
ANION GAP SERPL CALC-SCNC: 11 MMOL/L (ref 8–16)
AST SERPL-CCNC: 18 U/L (ref 10–40)
BASOPHILS # BLD AUTO: 0.03 K/UL (ref 0–0.2)
BASOPHILS NFR BLD: 0.5 % (ref 0–1.9)
BILIRUB SERPL-MCNC: 0.5 MG/DL (ref 0.1–1)
BUN SERPL-MCNC: 11 MG/DL (ref 8–23)
CALCIUM SERPL-MCNC: 9.1 MG/DL (ref 8.7–10.5)
CHLORIDE SERPL-SCNC: 114 MMOL/L (ref 95–110)
CO2 SERPL-SCNC: 21 MMOL/L (ref 23–29)
CREAT SERPL-MCNC: 0.8 MG/DL (ref 0.5–1.4)
DIFFERENTIAL METHOD: NORMAL
EOSINOPHIL # BLD AUTO: 0.3 K/UL (ref 0–0.5)
EOSINOPHIL NFR BLD: 4.1 % (ref 0–8)
ERYTHROCYTE [DISTWIDTH] IN BLOOD BY AUTOMATED COUNT: 12.8 % (ref 11.5–14.5)
EST. GFR  (AFRICAN AMERICAN): >60 ML/MIN/1.73 M^2
EST. GFR  (NON AFRICAN AMERICAN): >60 ML/MIN/1.73 M^2
GLUCOSE SERPL-MCNC: 88 MG/DL (ref 70–110)
HCT VFR BLD AUTO: 43.4 % (ref 37–48.5)
HGB BLD-MCNC: 14 G/DL (ref 12–16)
IMM GRANULOCYTES # BLD AUTO: 0.03 K/UL (ref 0–0.04)
IMM GRANULOCYTES NFR BLD AUTO: 0.5 % (ref 0–0.5)
LYMPHOCYTES # BLD AUTO: 2.3 K/UL (ref 1–4.8)
LYMPHOCYTES NFR BLD: 36.3 % (ref 18–48)
MCH RBC QN AUTO: 28.5 PG (ref 27–31)
MCHC RBC AUTO-ENTMCNC: 32.3 G/DL (ref 32–36)
MCV RBC AUTO: 88 FL (ref 82–98)
MONOCYTES # BLD AUTO: 0.6 K/UL (ref 0.3–1)
MONOCYTES NFR BLD: 8.8 % (ref 4–15)
NEUTROPHILS # BLD AUTO: 3.2 K/UL (ref 1.8–7.7)
NEUTROPHILS NFR BLD: 49.8 % (ref 38–73)
NRBC BLD-RTO: 0 /100 WBC
PLATELET # BLD AUTO: 237 K/UL (ref 150–350)
PMV BLD AUTO: 10.1 FL (ref 9.2–12.9)
POTASSIUM SERPL-SCNC: 3.7 MMOL/L (ref 3.5–5.1)
PROT SERPL-MCNC: 6.9 G/DL (ref 6–8.4)
RBC # BLD AUTO: 4.92 M/UL (ref 4–5.4)
SODIUM SERPL-SCNC: 146 MMOL/L (ref 136–145)
WBC # BLD AUTO: 6.34 K/UL (ref 3.9–12.7)

## 2020-11-30 PROCEDURE — 36415 COLL VENOUS BLD VENIPUNCTURE: CPT

## 2020-11-30 PROCEDURE — 99232 SBSQ HOSP IP/OBS MODERATE 35: CPT | Mod: ,,, | Performed by: COLON & RECTAL SURGERY

## 2020-11-30 PROCEDURE — 85025 COMPLETE CBC W/AUTO DIFF WBC: CPT

## 2020-11-30 PROCEDURE — 80053 COMPREHEN METABOLIC PANEL: CPT

## 2020-11-30 PROCEDURE — 25000003 PHARM REV CODE 250: Performed by: NURSE PRACTITIONER

## 2020-11-30 PROCEDURE — 99232 PR SUBSEQUENT HOSPITAL CARE,LEVL II: ICD-10-PCS | Mod: ,,, | Performed by: COLON & RECTAL SURGERY

## 2020-11-30 RX ORDER — POLYETHYLENE GLYCOL 3350 17 G/17G
17 POWDER, FOR SOLUTION ORAL DAILY
Qty: 510 G | Refills: 11
Start: 2020-11-30 | End: 2021-11-30

## 2020-11-30 RX ADMIN — DICYCLOMINE HYDROCHLORIDE 10 MG: 10 CAPSULE ORAL at 06:11

## 2020-11-30 RX ADMIN — SUCRALFATE 1 G: 1 SUSPENSION ORAL at 01:11

## 2020-11-30 RX ADMIN — ALUMINUM HYDROXIDE, MAGNESIUM HYDROXIDE, AND SIMETHICONE 30 ML: 200; 200; 20 SUSPENSION ORAL at 06:11

## 2020-11-30 RX ADMIN — FUROSEMIDE 20 MG: 20 TABLET ORAL at 08:11

## 2020-11-30 RX ADMIN — DICYCLOMINE HYDROCHLORIDE 10 MG: 10 CAPSULE ORAL at 11:11

## 2020-11-30 RX ADMIN — PANTOPRAZOLE SODIUM 40 MG: 40 TABLET, DELAYED RELEASE ORAL at 08:11

## 2020-11-30 RX ADMIN — ATORVASTATIN CALCIUM 10 MG: 10 TABLET, FILM COATED ORAL at 08:11

## 2020-11-30 RX ADMIN — THYROID, PORCINE 30 MG: 30 TABLET ORAL at 08:11

## 2020-11-30 RX ADMIN — MEMANTINE HYDROCHLORIDE 5 MG: 5 TABLET ORAL at 08:11

## 2020-11-30 RX ADMIN — SUCRALFATE 1 G: 1 SUSPENSION ORAL at 11:11

## 2020-11-30 RX ADMIN — ALUMINUM HYDROXIDE, MAGNESIUM HYDROXIDE, AND SIMETHICONE 30 ML: 200; 200; 20 SUSPENSION ORAL at 11:11

## 2020-11-30 RX ADMIN — SUCRALFATE 1 G: 1 SUSPENSION ORAL at 06:11

## 2020-11-30 NOTE — DISCHARGE SUMMARY
"[facility] Hospital Medicine  Discharge Summary      Patient Name: Irlanda Lopez  MRN: [id number]  Admission Date: 11/28/2020  Hospital Length of Stay: 2 days  Discharge Date and Time:  11/30/2020 2:17 PM  Attending Physician: No att. providers found   Discharging Provider: Edelmira Menard NP  Primary Care Provider: Myla Arias MD      HPI:   The patient is a 79 yo female with HTN, Hypothyroid, Dementia, Hx Diverticulitis with abscess s/p partial colectomy with colostomy placement 1/2019, C-diff colitis, Frequent episodes of abdominal pain/N&V since partial colectomy who presented to the ED with abdominal pain. Pt reports she woke up in the night with abdominal pain across her upper abdominal area R>L rated approx 8/10 described as cramping sensation that waxes and wanes since then. Pt denies any exacerbating factors. Pain improved with morphine that was given in the ED. Pt reports she has noticed a "slight" decrease in stool from her colostomy.   In the ED, WBC normal, afebrile. CT abd showed Fluid distention and inflammation of the remaining right colon, suspicious for combination colitis and partial colonic obstruction, with transition point in the upper left pelvis, identical location to prior study of 08/27/2020, likely adhesive etiology. There is decompression of the distal colon leading up to the left lower quadrant colostomy.  General surgery has been consulted by the ED.   Of note, the patient saw Dr. Maier with GI on 10/16/20 with N/V, Abdominal pain, and recurrent C-diff colitis. C-diff PCR was positive 11/11/20 and pt was placed on po Vancomycin. However, multiple messages noted in EMR regarding insurance authorization for po Vanc. Pt unsure if the medication was ever authorized or if she was taking this medication or not. (Pt has Dementia)   Dr. Maier also documented that pt has an extensive history of C diff 3-4 times and planned for a possible FMT via colonoscopy.       * " No surgery found *      Hospital Course:   Irlanda Lopez is a 80 year old female who was admitted to [facility] for partial obstruction. Resolved with bowel rest, IVF's, and laxatives. She was asked to continue Miralax daily. C. Diff was negative. She was asked to follow up with PCP and GI specialist in one week.      Consults:   Consults (From admission, onward)        Status Ordering Provider     Inpatient consult to General Surgery  Once     Provider:  Rishabh Connelly MD    Completed JUSTINE ZALDIVAR          No new Assessment & Plan notes have been filed under this hospital service since the last note was generated.  Service: Hospital Medicine    Final Active Diagnoses:    Diagnosis Date Noted POA    PRINCIPAL PROBLEM:  Partial obstruction of colon [K56.600] 11/28/2020 Yes    Generalized abdominal pain [R10.84] 07/23/2020 Yes    C Diff Colitis [K52.9] 04/13/2020 Yes    Colostomy in place [Z93.3] 01/18/2019 Not Applicable    Hyperlipidemia [E78.5] 11/10/2018 Yes    Mild cognitive impairment with memory loss [G31.84] 09/14/2017 Yes    Essential hypertension [I10] 01/10/2017 Yes    Hypothyroidism [E03.9] 10/04/2016 Yes      Problems Resolved During this Admission:       Discharged Condition: stable    Disposition: Home or Self Care    Follow Up:    Patient Instructions:   No discharge procedures on file.    Significant Diagnostic Studies: Labs:   CMP   Recent Labs   Lab 11/30/20  0745   *   K 3.7   *   CO2 21*   GLU 88   BUN 11   CREATININE 0.8   CALCIUM 9.1   PROT 6.9   ALBUMIN 3.3*   BILITOT 0.5   ALKPHOS 69   AST 18   ALT 14   ANIONGAP 11   ESTGFRAFRICA >60   EGFRNONAA >60    and CBC   Recent Labs   Lab 11/30/20  0745   WBC 6.34   HGB 14.0   HCT 43.4          Pending Diagnostic Studies:     None         Medications:  Reconciled Home Medications:      Medication List      CHANGE how you take these medications    polyethylene glycol 17 gram/dose powder  Commonly known as:  GLYCOLAX  Take 17 g by mouth once daily.  What changed:   · when to take this  · reasons to take this     thyroid (pork) 30 mg Tab  Commonly known as: ARMOUR THYROID  Take 1 tablet (30 mg total) by mouth once daily.  What changed: when to take this     triamcinolone acetonide 0.1% 0.1 % ointment  Commonly known as: KENALOG  APPLY TOPICALLY 4 (FOUR) TIMES DAILY. TO RASH ON FACE AND CHEST AND NOSE  What changed:   · how to take this  · when to take this  · reasons to take this        CONTINUE taking these medications    ALPRAZolam 0.25 MG tablet  Commonly known as: XANAX  Take 1 tablet (0.25 mg total) by mouth 2 (two) times daily as needed for Anxiety.     atorvastatin 10 MG tablet  Commonly known as: LIPITOR  Take 1 tablet (10 mg total) by mouth once daily.     dicyclomine 10 MG capsule  Commonly known as: BENTYL  Take 10 mg by mouth 4 (four) times daily before meals and nightly.     donepeziL 10 MG tablet  Commonly known as: ARICEPT  TAKE 1 TABLET BY MOUTH  EVERY EVENING     furosemide 20 MG tablet  Commonly known as: LASIX  TAKE 1 TABLET BY MOUTH ONCE DAILY     memantine 10 MG Tab  Commonly known as: NAMENDA  TAKE 1 TABLET TWICE A DAY     ondansetron 4 MG Tbdl  Commonly known as: ZOFRAN-ODT  Take 1 tablet (4 mg total) by mouth every 6 (six) hours as needed (nausea). Take before taking vancomycin     pantoprazole 40 MG tablet  Commonly known as: PROTONIX  Take 1 tablet (40 mg total) by mouth 2 (two) times daily. for 14 days     PROBIOTIC COMPLEX ORAL  Take by mouth.     promethazine 25 MG tablet  Commonly known as: PHENERGAN  Take 1 tablet (25 mg total) by mouth every 6 (six) hours as needed.     vancomycin 250 MG capsule  Commonly known as: VANCOCIN  Take 1 capsule (250 mg total) by mouth 4 (four) times daily for 14 days, THEN 1 capsule (250 mg total) 3 (three) times daily for 14 days, THEN 1 capsule (250 mg total) 2 (two) times a day for 7 days, THEN 1 capsule (250 mg total) once daily for 7 days.  Start taking  on: November 12, 2020            Indwelling Lines/Drains at time of discharge:   Lines/Drains/Airways     Drain                 Colostomy 01/02/19 Descending/sigmoid  days         Colostomy 05/12/20  days                Time spent on the discharge of patient: >35 minutes  Patient was seen and examined on the date of discharge and determined to be suitable for discharge.         Edelmira Menard NP  Department of Hospital Medicine  [facility]

## 2020-11-30 NOTE — NURSING
Pt's discharge instructions given and explained to pt and caregiver. Verbalized understanding. IV discontinued. Belongings packed at bedside. Will transport out

## 2020-11-30 NOTE — NURSING
Patient called me to room to report colostomy bag was leaking. Colostomy bag changed. New gown placed on patient. Informed son dirty clothes placed in patient belonging bag and hanging on bathroom door. Colostomy bag sealed, clean and intact with brown drainage noted.

## 2020-11-30 NOTE — SUBJECTIVE & OBJECTIVE
Interval History:  No further abdominal pain.  Tolerating diet well.  Having good colostomy function.    Medications:  Continuous Infusions:   sodium chloride 0.9% 50 mL/hr at 11/28/20 1044     Scheduled Meds:   aluminum-magnesium hydroxide-simethicone  30 mL Oral QID (AC & HS)    atorvastatin  10 mg Oral Daily    dicyclomine  10 mg Oral QID (AC & HS)    donepeziL  10 mg Oral QHS    enoxaparin  40 mg Subcutaneous Q24H    furosemide  20 mg Oral Daily    memantine  5 mg Oral Daily    pantoprazole  40 mg Oral BID    sucralfate  1 g Oral Q6H    thyroid (pork)  30 mg Oral Daily     PRN Meds:morphine, ondansetron     Review of patient's allergies indicates:  No Known Allergies  Objective:     Vital Signs (Most Recent):  Temp: 96.1 °F (35.6 °C) (11/30/20 0713)  Pulse: (!) 44 (11/30/20 0713)  Resp: 18 (11/30/20 0713)  BP: 134/72 (11/30/20 0713)  SpO2: 95 % (11/30/20 0713) Vital Signs (24h Range):  Temp:  [96.1 °F (35.6 °C)-97.9 °F (36.6 °C)] 96.1 °F (35.6 °C)  Pulse:  [44-52] 44  Resp:  [16-18] 18  SpO2:  [93 %-96 %] 95 %  BP: (102-135)/(58-72) 134/72     Weight: 73 kg (160 lb 15 oz)  Body mass index is 29.44 kg/m².    Intake/Output - Last 3 Shifts       11/28 0700 - 11/29 0659 11/29 0700 - 11/30 0659 11/30 0700 - 12/01 0659    IV Piggyback       Total Intake(mL/kg)       Net              Urine Occurrence 3 x 2 x     Stool Occurrence 2 x 2 x           Physical Exam  Constitutional:       Appearance: She is well-developed.   HENT:      Head: Normocephalic and atraumatic.   Eyes:      Conjunctiva/sclera: Conjunctivae normal.   Neck:      Musculoskeletal: Normal range of motion.      Thyroid: No thyromegaly.   Cardiovascular:      Rate and Rhythm: Regular rhythm.   Pulmonary:      Effort: Pulmonary effort is normal. No respiratory distress.   Abdominal:      Comments: Soft, no distention, no tenderness; no rebound or guarding; well-healed midline previous incision; ostomy pink and viable with semi-solid stool in bag    Musculoskeletal: Normal range of motion.         General: No tenderness.   Skin:     General: Skin is warm and dry.      Capillary Refill: Capillary refill takes less than 2 seconds.      Findings: No rash.   Neurological:      Mental Status: She is alert and oriented to person, place, and time.         Significant Labs:  CBC:   Recent Labs   Lab 11/30/20  0745   WBC 6.34   RBC 4.92   HGB 14.0   HCT 43.4      MCV 88   MCH 28.5   MCHC 32.3     BMP:   Recent Labs   Lab 11/28/20  0325   *   *   K 4.2      CO2 23   BUN 11   CREATININE 0.8   CALCIUM 9.0

## 2020-11-30 NOTE — ASSESSMENT & PLAN NOTE
Likely chronic with acute flare but now asymptomatic    - No acute surgical intervention at this time. Pt feels better, is passing stool/flatus via ostomy and no longer has abdominal pain, nausea or vomiting  - Okay for diet  - Pt and family would like to avoid surgery if at all possible. Discussed we may be forced to operate, even on an elective basis, if these partial obstructions keep happening given the continued transition point of the colon from likely adhesive disease, but they are not interested at this time. Will continue to monitor and readdress

## 2020-11-30 NOTE — PROGRESS NOTES
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  Pt awoke from sleep earlier today with a severe upper abdominal pain with some nausea and emesis. Still having stool output but family reports slightly less. In ED, no leukocytosis and CT scan showing some colonic distention and partial obstruction similar to CT from Aug 2020. Surgery consulted for evaluation. 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    Post-Op Info:  * No surgery found *         Interval History:  No further abdominal pain.  Tolerating diet well.  Having good colostomy function.    Medications:  Continuous Infusions:   sodium chloride 0.9% 50 mL/hr at 11/28/20 1044     Scheduled Meds:   aluminum-magnesium hydroxide-simethicone  30 mL Oral QID (AC & HS)    atorvastatin  10 mg Oral Daily    dicyclomine  10 mg Oral QID (AC & HS)    donepeziL  10 mg Oral QHS    enoxaparin  40 mg Subcutaneous Q24H    furosemide  20 mg Oral Daily    memantine  5 mg Oral Daily    pantoprazole  40 mg Oral BID    sucralfate  1 g Oral Q6H    thyroid (pork)  30 mg Oral Daily     PRN Meds:morphine, ondansetron     Review of patient's allergies indicates:  No Known Allergies  Objective:     Vital Signs (Most Recent):  Temp: 96.1 °F (35.6 °C) (11/30/20 0713)  Pulse: (!) 44 (11/30/20 0713)  Resp: 18 (11/30/20 0713)  BP: 134/72 (11/30/20 0713)  SpO2: 95 % (11/30/20 0713) Vital Signs (24h Range):  Temp:  [96.1 °F (35.6 °C)-97.9 °F (36.6 °C)] 96.1 °F  (35.6 °C)  Pulse:  [44-52] 44  Resp:  [16-18] 18  SpO2:  [93 %-96 %] 95 %  BP: (102-135)/(58-72) 134/72     Weight: 73 kg (160 lb 15 oz)  Body mass index is 29.44 kg/m².    Intake/Output - Last 3 Shifts       11/28 0700 - 11/29 0659 11/29 0700 - 11/30 0659 11/30 0700 - 12/01 0659    IV Piggyback       Total Intake(mL/kg)       Net              Urine Occurrence 3 x 2 x     Stool Occurrence 2 x 2 x           Physical Exam  Constitutional:       Appearance: She is well-developed.   HENT:      Head: Normocephalic and atraumatic.   Eyes:      Conjunctiva/sclera: Conjunctivae normal.   Neck:      Musculoskeletal: Normal range of motion.      Thyroid: No thyromegaly.   Cardiovascular:      Rate and Rhythm: Regular rhythm.   Pulmonary:      Effort: Pulmonary effort is normal. No respiratory distress.   Abdominal:      Comments: Soft, no distention, no tenderness; no rebound or guarding; well-healed midline previous incision; ostomy pink and viable with semi-solid stool in bag   Musculoskeletal: Normal range of motion.         General: No tenderness.   Skin:     General: Skin is warm and dry.      Capillary Refill: Capillary refill takes less than 2 seconds.      Findings: No rash.   Neurological:      Mental Status: She is alert and oriented to person, place, and time.         Significant Labs:  CBC:   Recent Labs   Lab 11/30/20  0745   WBC 6.34   RBC 4.92   HGB 14.0   HCT 43.4      MCV 88   MCH 28.5   MCHC 32.3     BMP:   Recent Labs   Lab 11/28/20  0325   *   *   K 4.2      CO2 23   BUN 11   CREATININE 0.8   CALCIUM 9.0     Assessment/Plan:     * Partial obstruction of colon  Likely chronic with acute flare but now asymptomatic    - No acute surgical intervention at this time. Pt feels better, is passing stool/flatus via ostomy and no longer has abdominal pain, nausea or vomiting  - Okay for diet  - Pt and family would like to avoid surgery if at all possible. Discussed we may be forced to  operate, even on an elective basis, if these partial obstructions keep happening given the continued transition point of the colon from likely adhesive disease, but they are not interested at this time. Will continue to monitor and readdress    C Diff Colitis  Treatment per medical team    Hyperlipidemia  Management per hospital medicine    Mild cognitive impairment with memory loss  Management per hospital medicine    Essential hypertension  Management per hospital medicine    Hypothyroidism  Management per hospital medicine        Rishabh Connelly MD  Colorectal Surgery  [facility]

## 2020-11-30 NOTE — HOSPITAL COURSE
Irlanda Lopez is a 80 year old female who was admitted to [facility] for partial obstruction. Resolved with bowel rest, IVF's, and laxatives. She was asked to continue Miralax daily. C. Diff was negative. She was asked to follow up with PCP and GI specialist in one week.

## 2020-12-01 NOTE — PLAN OF CARE
12/01/20 1757   Final Note   Assessment Type Final Discharge Note   Anticipated Discharge Disposition Home   Right Care Referral Info   Post Acute Recommendation No Care

## 2020-12-02 ENCOUNTER — OFFICE VISIT (OUTPATIENT)
Dept: GASTROENTEROLOGY | Facility: CLINIC | Age: 80
End: 2020-12-02
Payer: MEDICARE

## 2020-12-02 VITALS
HEIGHT: 62 IN | HEART RATE: 53 BPM | OXYGEN SATURATION: 98 % | BODY MASS INDEX: 30.02 KG/M2 | WEIGHT: 163.13 LBS | SYSTOLIC BLOOD PRESSURE: 120 MMHG | DIASTOLIC BLOOD PRESSURE: 64 MMHG

## 2020-12-02 DIAGNOSIS — K52.9 COLITIS: Primary | ICD-10-CM

## 2020-12-02 PROCEDURE — 99999 PR PBB SHADOW E&M-EST. PATIENT-LVL III: CPT | Mod: PBBFAC,,, | Performed by: INTERNAL MEDICINE

## 2020-12-02 PROCEDURE — 3074F PR MOST RECENT SYSTOLIC BLOOD PRESSURE < 130 MM HG: ICD-10-PCS | Mod: CPTII,S$GLB,, | Performed by: INTERNAL MEDICINE

## 2020-12-02 PROCEDURE — 99213 OFFICE O/P EST LOW 20 MIN: CPT | Mod: S$GLB,,, | Performed by: INTERNAL MEDICINE

## 2020-12-02 PROCEDURE — 3288F PR FALLS RISK ASSESSMENT DOCUMENTED: ICD-10-PCS | Mod: CPTII,S$GLB,, | Performed by: INTERNAL MEDICINE

## 2020-12-02 PROCEDURE — 3078F PR MOST RECENT DIASTOLIC BLOOD PRESSURE < 80 MM HG: ICD-10-PCS | Mod: CPTII,S$GLB,, | Performed by: INTERNAL MEDICINE

## 2020-12-02 PROCEDURE — 99499 RISK ADDL DX/OHS AUDIT: ICD-10-PCS | Mod: S$GLB,,, | Performed by: INTERNAL MEDICINE

## 2020-12-02 PROCEDURE — 1159F MED LIST DOCD IN RCRD: CPT | Mod: S$GLB,,, | Performed by: INTERNAL MEDICINE

## 2020-12-02 PROCEDURE — 3074F SYST BP LT 130 MM HG: CPT | Mod: CPTII,S$GLB,, | Performed by: INTERNAL MEDICINE

## 2020-12-02 PROCEDURE — 99499 UNLISTED E&M SERVICE: CPT | Mod: S$GLB,,, | Performed by: INTERNAL MEDICINE

## 2020-12-02 PROCEDURE — 1126F PR PAIN SEVERITY QUANTIFIED, NO PAIN PRESENT: ICD-10-PCS | Mod: S$GLB,,, | Performed by: INTERNAL MEDICINE

## 2020-12-02 PROCEDURE — 3078F DIAST BP <80 MM HG: CPT | Mod: CPTII,S$GLB,, | Performed by: INTERNAL MEDICINE

## 2020-12-02 PROCEDURE — 1159F PR MEDICATION LIST DOCUMENTED IN MEDICAL RECORD: ICD-10-PCS | Mod: S$GLB,,, | Performed by: INTERNAL MEDICINE

## 2020-12-02 PROCEDURE — 99999 PR PBB SHADOW E&M-EST. PATIENT-LVL III: ICD-10-PCS | Mod: PBBFAC,,, | Performed by: INTERNAL MEDICINE

## 2020-12-02 PROCEDURE — 1126F AMNT PAIN NOTED NONE PRSNT: CPT | Mod: S$GLB,,, | Performed by: INTERNAL MEDICINE

## 2020-12-02 PROCEDURE — 1101F PT FALLS ASSESS-DOCD LE1/YR: CPT | Mod: CPTII,S$GLB,, | Performed by: INTERNAL MEDICINE

## 2020-12-02 PROCEDURE — 1101F PR PT FALLS ASSESS DOC 0-1 FALLS W/OUT INJ PAST YR: ICD-10-PCS | Mod: CPTII,S$GLB,, | Performed by: INTERNAL MEDICINE

## 2020-12-02 PROCEDURE — 3288F FALL RISK ASSESSMENT DOCD: CPT | Mod: CPTII,S$GLB,, | Performed by: INTERNAL MEDICINE

## 2020-12-02 PROCEDURE — 99213 PR OFFICE/OUTPT VISIT, EST, LEVL III, 20-29 MIN: ICD-10-PCS | Mod: S$GLB,,, | Performed by: INTERNAL MEDICINE

## 2020-12-02 NOTE — PROGRESS NOTES
Clinic Follow up    Reason for follow-up:  The encounter diagnosis was C Diff Colitis.    PCP: Myla Arias       HPI:  This is a 80 y.o. female here for follow up for recurrent C. Diff and recent hospitalization.  Pt was started on po vanc for recurrent C. Diff and then was hospitalized several days ago for abdominal pain/bowel obstruction.    Colorectal surgery saw patient and felt emergent surgery was not indicated but did discuss with family that if she continues to be hospitalized for this then elective surgery could be considered.  She was treated conservatively and discharged home.  GI was not consulted.  She was told to discontinue her vancomycin and start miralax.  She comes in today with her daughter to discuss this plan of care.  She has stopped the vanc since discharge.  She says she feels well today.  Of note, pt with dementia and has aroundt he clock care.  Her caregiver, who has come to previous appointments is not with her today.  Her daughter says that she has done well since discharge.  Per daughter, no fevers, chills, abdominal pain or nausea or vomiting.      ROS:  CONSTITUTIONAL: Denies weight change,  fatigue, fevers, chills, night sweats.  EYES: No changes in vision.   ENT: No oral lesions or sore throat.  HEMATOLOGICAL/Lymph: Denies bleeding tendency, bruising tendency. No swellings or enlarged lymph nodes.  CARDIOVASCULAR: Denies chest pain, shortness of breath, orthopnea and edema.  RESPIRATORY: Denies cough, hemoptysis, dyspnea, and wheezing.  GI: See HPI.  : Denies dysuria and hematuria  MUSCULOSKELETAL: Denies joint pain or swelling, back pain and muscle pain.  SKIN: Denies rashes.  NEUROLOGIC: Denies headaches, seizures and numbness.  PSYCHIATRIC: Denies depression or anxiety.  ENDOCRINE: Denies heat or cold intolerance and excessive thirst or urination.    Medical History:   Past Medical History:   Diagnosis Date    Clostridium difficile colitis     Dementia     Diverticulitis      s/p colostomy    High cholesterol     Hypertension     Hypothyroidism        Surgical History:  Past Surgical History:   Procedure Laterality Date    APPENDECTOMY  2008    CATARACT EXTRACTION      Dr Raygoza    COLON SURGERY      COLONOSCOPY N/A 1/2/2019    Procedure: COLONOSCOPY;  Surgeon: Reece Hogan MD;  Location: [facility] ENDO;  Service: Endoscopy;  Laterality: N/A;    COLONOSCOPY N/A 6/7/2019    Procedure: COLONOSCOPY;  Surgeon: Rishabh Connelly MD;  Location: [facility] ENDO;  Service: General;  Laterality: N/A;    COLOSTOMY Left 1/2/2019    Procedure: CREATION, COLOSTOMY;  Surgeon: Reece Hogan MD;  Location: [facility] OR;  Service: General;  Laterality: Left;    ESOPHAGOGASTRODUODENOSCOPY N/A 9/16/2020    Procedure: ESOPHAGOGASTRODUODENOSCOPY (EGD)- Needs Rapid;  Surgeon: Lucy Lafleur MD;  [facility];  Service: Endoscopy;  Laterality: N/A;    SHAHIDA PROCEDURE N/A 1/2/2019    Procedure: SHAHIDA PROCEDURE;  Surgeon: Reece Hogan MD;  Location: [facility] OR;  Service: General;  Laterality: N/A;    LYSIS OF ADHESIONS N/A 1/2/2019    Procedure: LYSIS, ADHESIONS;  Surgeon: Reece Hogan MD;  Location: [facility] OR;  Service: General;  Laterality: N/A;    VITRECTOMY Right 09/2013       Family History:   Family History   Problem Relation Age of Onset    Alzheimer's disease Mother     Aneurysm Father         brain    Stomach cancer Brother         50s       Social History:   Social History     Tobacco Use    Smoking status: Former Smoker     Years: 5.00    Smokeless tobacco: Never Used   Substance Use Topics    Alcohol use: No    Drug use: No       Allergies: Reviewed    Home Medications:   Medication List with Changes/Refills   Current Medications    ALPRAZOLAM (XANAX) 0.25 MG TABLET    Take 1 tablet (0.25 mg total) by mouth 2 (two) times daily as needed for Anxiety.    ATORVASTATIN (LIPITOR) 10 MG TABLET    Take 1 tablet (10 mg total) by mouth once daily.    DICYCLOMINE (BENTYL) 10 MG CAPSULE    Take 10  "mg by mouth 4 (four) times daily before meals and nightly.    DONEPEZIL (ARICEPT) 10 MG TABLET    TAKE 1 TABLET BY MOUTH  EVERY EVENING    FUROSEMIDE (LASIX) 20 MG TABLET    TAKE 1 TABLET BY MOUTH ONCE DAILY    L.ACID/B.BIFIDUM/B.ANIMAL/FOS (PROBIOTIC COMPLEX ORAL)    Take by mouth.    MEMANTINE (NAMENDA) 10 MG TAB    TAKE 1 TABLET TWICE A DAY    ONDANSETRON (ZOFRAN-ODT) 4 MG TBDL    Take 1 tablet (4 mg total) by mouth every 6 (six) hours as needed (nausea). Take before taking vancomycin    PANTOPRAZOLE (PROTONIX) 40 MG TABLET    Take 1 tablet (40 mg total) by mouth 2 (two) times daily. for 14 days    POLYETHYLENE GLYCOL (GLYCOLAX) 17 GRAM/DOSE POWDER    Take 17 g by mouth once daily.    PROMETHAZINE (PHENERGAN) 25 MG TABLET    Take 1 tablet (25 mg total) by mouth every 6 (six) hours as needed.    THYROID, PORK, (ARMOUR THYROID) 30 MG TAB    Take 1 tablet (30 mg total) by mouth once daily.    TRIAMCINOLONE ACETONIDE 0.1% (KENALOG) 0.1 % OINTMENT    APPLY TOPICALLY 4 (FOUR) TIMES DAILY. TO RASH ON FACE AND CHEST AND NOSE    VANCOMYCIN (VANCOCIN) 250 MG CAPSULE    Take 1 capsule (250 mg total) by mouth 4 (four) times daily for 14 days, THEN 1 capsule (250 mg total) 3 (three) times daily for 14 days, THEN 1 capsule (250 mg total) 2 (two) times a day for 7 days, THEN 1 capsule (250 mg total) once daily for 7 days.         Physical Exam:  Vital Signs:  /64   Pulse (!) 53   Ht 5' 2" (1.575 m)   Wt 74 kg (163 lb 2.3 oz)   SpO2 98%   BMI 29.84 kg/m²   Body mass index is 29.84 kg/m².      GENERAL: No acute distress, A&Ox3  EYES: Anicteric, no pallor noted.  ENT: OP clear  NECK: Supple, no masses, no thyromegally.  CHEST: Equal breath sounds bilaterally, no wheezing.  CARDIOVASCULAR: Regular rate and rhythm. Murmurs, rubs and gallops absent.  ABDOMEN: soft, non-tender, non-distended, normal bowel sounds, no hepatosplenomegaly   EXTREMITIES: No clubbing, cyanosis or edema.  SKIN: Without lesion or erythema.  LYMPH: " No cervical, axillary lymphadenopathy palpable.   NEUROLOGICAL: Grossly normal, no asterixis present.    Labs: Pertinent labs reviewed.   Imaging Review:   X-ray Abdomen Ap 1 View    Result Date: 11/30/2020  EXAMINATION: XR ABDOMEN AP 1 VIEW CLINICAL HISTORY: follow up obstruction; TECHNIQUE: AP View(s) of the abdomen was performed. COMPARISON: October 29, 2020 FINDINGS: There is a single dilated small bowel loops within the left hemiabdomen.  Bowel-gas pattern otherwise appears normal.  Negative for definite free air.  Osseous structures are unchanged.  Stable large right renal pelvic stone.     1.  Nonspecific bowel gas pattern with a single dilated small bowel loop within the left hemiabdomen. Electronically signed by: Farshad Matt MD Date:    11/30/2020 Time:    09:11    X-ray Abdomen Ap 1 View    Result Date: 11/29/2020  EXAMINATION: XR ABDOMEN AP 1 VIEW CLINICAL HISTORY: eval for bowel obstruction; TECHNIQUE: AP View(s) of the abdomen was performed. COMPARISON: CT abdomen pelvis 11/28/2020 FINDINGS: Large right renal calcification again noted.  Prominent air-filled, dilated loop of bowel in the right lower quadrant, possibly small bowel..  No gross intraperitoneal free air.     New, mildly dilated loop of air-filled small bowel in the right lower quadrant.  Otherwise no acute radiographic abnormality in the abdomen. Unchanged right renal calcifications Electronically signed by: Shankar Christine Date:    11/29/2020 Time:    10:20    Ct Abdomen Pelvis With Contrast    Result Date: 11/28/2020  EXAMINATION: CT ABDOMEN PELVIS WITH CONTRAST CLINICAL HISTORY: Abdominal distension;Nausea/vomiting; TECHNIQUE: Low dose axial images, sagittal and coronal reformations were obtained from the lung bases to the pubic symphysis after  mL Omnipaque 350. All CT scans at this location are performed using dose modulation techniques as appropriate to a performed exam including the following: Automated exposure control;  adjustment of the mA and/or kV according to patient size. COMPARISON: 08/27/2020 FINDINGS: Heart: Normal in size. No pericardial effusion. Lung Bases: Well aerated, without consolidation or pleural fluid. Liver: Normal in size and attenuation, with no focal hepatic lesions. Gallbladder: No calcified gallstones. Bile Ducts: No evidence of dilated ducts. Pancreas: No mass or peripancreatic fat stranding. Spleen: Unremarkable. Adrenals: Unremarkable. Kidneys/ Ureters: Unchanged 3.4 cm right renal pelvic stone.  Associated moderate to severe right renal cortical atrophy.  No hydronephrosis.  Left kidney unremarkable. Bladder: No evidence of wall thickening. Reproductive organs: Unremarkable. GI Tract/Mesentery: Fluid distention and inflammation of the remaining right colon, suspicious for combination colitis and partial colonic obstruction, with transition point in the upper left pelvis, identical location to prior study of 08/27/2020, likely adhesive etiology (series 2, image 81).  There is decompression of the distal colon leading up to the left lower quadrant colostomy. Appendix: No evidence of appendictis. Peritoneal Space: No ascites. No free air. Retroperitoneum: No significant adenopathy. Abdominal wall: Unremarkable. Vasculature: No significant atherosclerosis or aneurysm. Bones: No acute fracture.     Fluid distention and inflammation of the remaining right colon, suspicious for combination colitis and partial colonic obstruction, with transition point in the upper left pelvis, identical location to prior study of 08/27/2020, likely adhesive etiology. There is decompression of the distal colon leading up to the left lower quadrant colostomy. No free air or abscess. Electronically signed by: Luc Alcala Date:    11/28/2020 Time:    08:57        Assessment:  C Diff Colitis    Recurrent c. Diff, symptoms have improved and recent c. Diff negative.  The negative result in the hospital likely represents treated C.  Diff, however, vancomycin should not be stopped. She is on vanc taper dose for recurrent c diff and this course of treatment should be completed.  Ideally, would like to have done a stool transplant but the FDA has placed a hold/restriction on this procedure in light of COVID.  If pt decompensates and becomes severe C. Diff, then will give consideration to emergency use of FMT.  At this time, she does not meet criteria for that (elevated WBC, hypotension, renal insufficiency).       Recommendations:  - continue vancomycin as prescribed  - can discontinue miralax       Discussed case with Dr. Connelly who reiterated that these obstructions are likely from adhesions and surgery is not indicated, however, elective surgery could be considered if she continues to be admitted to the hospital.        Thank you so much for allowing me to participate in the care of Irlanda Maier MD

## 2020-12-03 NOTE — TELEPHONE ENCOUNTER
----- Message from Tammy Walters sent at 10/20/2017  3:47 PM CDT -----  Contact: Myla Mora (Daughter)  Myla called and stated she needed to speak to the nurse. She stated that she is checking the status of a medication request. She can be reached at [phone].    Thanks,  TF    (3) no apparent problem

## 2020-12-03 NOTE — PHYSICIAN QUERY
PT Name: Irlanda Lopez  MR #: [id number]     Diagnosis Clarification      CDS/: Yun Pickens RN, CDS               Contact information: [email or web address]      This form is a permanent document in the medical record.     Query Date: December 3, 2020    Dear Provider,  By submitting this query, we are merely seeking further clarification of documentation.  Please utilize your independent clinical judgment when addressing the question(s) below.     The medical record contains the following:    Supporting Clinical Information Location in Medical Record   80 y.o. female patient with a PMHx of HTN, high cholesterol, hypothyroidism, diverticulitis and Clostridium difficile colitis who presents to the Emergency Department for evaluation of vomiting which onset gradually 2 hours ago. Pt also c/o associated abdominal cramping and loose stool in colostomy bag which worsened after Thanksgiving meal 1.5 days ago but onset after C. Diff diagnosis... Pt is on week 2 of 6 of vancomycin for C. Diff    C Diff Colitis  Retest for Cdiff  Po Vancomycin for now       Cdiff negative. Will stop isolation and Vancomycin      Final Active Diagnoses:  C Diff Colitis       C. diff PCR: Positive (A)    C. diff Antigen: Negative  C difficile Toxins A+B, EIA: Negative      CONTINUE taking these medications    vancomycin 250 MG capsule     ED Provider Note 11/28                 H&P 11/28           HM PN 11/29       DC Summary 11/30         Labs 11/11/2020    Labs 11/28/2020        DC Summary 11/30      Please clarify the diagnosis of __C. Diff__ as it relates to this admission:    [ x ] Ruled In   [  ] Ruled Out   [  ] Other/Clarification of findings (please specify)_______________    [   ] Clinically undetermined         Please document in your progress notes daily for the duration of treatment, until resolved, and include in your discharge summary.

## 2020-12-22 ENCOUNTER — TELEPHONE (OUTPATIENT)
Dept: GASTROENTEROLOGY | Facility: CLINIC | Age: 80
End: 2020-12-22

## 2020-12-22 ENCOUNTER — PATIENT MESSAGE (OUTPATIENT)
Dept: GASTROENTEROLOGY | Facility: CLINIC | Age: 80
End: 2020-12-22

## 2020-12-22 NOTE — TELEPHONE ENCOUNTER
Message addressed via my chart by .  ----- Message from Isabela Eduardo sent at 12/22/2020 10:24 AM CST -----  Regarding: cramps  Contact: daughter, zaheer mora  Pt is cramping again. On last week of Vancomycin. No other symptoms. Has been given Bentyl for cramping. Cramps not intense at moment. Please advise to prevent ER visit again.   Please call Zaheer Mora at [phone]

## 2021-01-19 ENCOUNTER — TELEPHONE (OUTPATIENT)
Dept: GASTROENTEROLOGY | Facility: CLINIC | Age: 81
End: 2021-01-19

## 2021-01-19 ENCOUNTER — PATIENT MESSAGE (OUTPATIENT)
Dept: PRIMARY CARE CLINIC | Facility: CLINIC | Age: 81
End: 2021-01-19

## 2021-01-19 ENCOUNTER — TELEPHONE (OUTPATIENT)
Dept: PRIMARY CARE CLINIC | Facility: CLINIC | Age: 81
End: 2021-01-19

## 2021-01-26 ENCOUNTER — OFFICE VISIT (OUTPATIENT)
Dept: PRIMARY CARE CLINIC | Facility: CLINIC | Age: 81
End: 2021-01-26
Payer: MEDICARE

## 2021-01-26 VITALS
SYSTOLIC BLOOD PRESSURE: 128 MMHG | BODY MASS INDEX: 28.66 KG/M2 | HEIGHT: 62 IN | WEIGHT: 155.75 LBS | TEMPERATURE: 98 F | DIASTOLIC BLOOD PRESSURE: 74 MMHG | HEART RATE: 57 BPM

## 2021-01-26 DIAGNOSIS — R10.84 GENERALIZED ABDOMINAL PAIN: ICD-10-CM

## 2021-01-26 DIAGNOSIS — Z93.3 COLOSTOMY IN PLACE: ICD-10-CM

## 2021-01-26 DIAGNOSIS — G31.84 MILD COGNITIVE IMPAIRMENT WITH MEMORY LOSS: ICD-10-CM

## 2021-01-26 DIAGNOSIS — R11.2 NAUSEA AND VOMITING, INTRACTABILITY OF VOMITING NOT SPECIFIED, UNSPECIFIED VOMITING TYPE: Primary | ICD-10-CM

## 2021-01-26 PROCEDURE — 1126F PR PAIN SEVERITY QUANTIFIED, NO PAIN PRESENT: ICD-10-PCS | Mod: S$GLB,,, | Performed by: FAMILY MEDICINE

## 2021-01-26 PROCEDURE — 99215 OFFICE O/P EST HI 40 MIN: CPT | Mod: S$GLB,,, | Performed by: FAMILY MEDICINE

## 2021-01-26 PROCEDURE — 99499 RISK ADDL DX/OHS AUDIT: ICD-10-PCS | Mod: S$GLB,,, | Performed by: FAMILY MEDICINE

## 2021-01-26 PROCEDURE — 3078F DIAST BP <80 MM HG: CPT | Mod: CPTII,S$GLB,, | Performed by: FAMILY MEDICINE

## 2021-01-26 PROCEDURE — 99499 UNLISTED E&M SERVICE: CPT | Mod: S$GLB,,, | Performed by: FAMILY MEDICINE

## 2021-01-26 PROCEDURE — 1126F AMNT PAIN NOTED NONE PRSNT: CPT | Mod: S$GLB,,, | Performed by: FAMILY MEDICINE

## 2021-01-26 PROCEDURE — 3288F FALL RISK ASSESSMENT DOCD: CPT | Mod: CPTII,S$GLB,, | Performed by: FAMILY MEDICINE

## 2021-01-26 PROCEDURE — 3074F SYST BP LT 130 MM HG: CPT | Mod: CPTII,S$GLB,, | Performed by: FAMILY MEDICINE

## 2021-01-26 PROCEDURE — 1101F PR PT FALLS ASSESS DOC 0-1 FALLS W/OUT INJ PAST YR: ICD-10-PCS | Mod: CPTII,S$GLB,, | Performed by: FAMILY MEDICINE

## 2021-01-26 PROCEDURE — 1159F PR MEDICATION LIST DOCUMENTED IN MEDICAL RECORD: ICD-10-PCS | Mod: S$GLB,,, | Performed by: FAMILY MEDICINE

## 2021-01-26 PROCEDURE — 1101F PT FALLS ASSESS-DOCD LE1/YR: CPT | Mod: CPTII,S$GLB,, | Performed by: FAMILY MEDICINE

## 2021-01-26 PROCEDURE — 1159F MED LIST DOCD IN RCRD: CPT | Mod: S$GLB,,, | Performed by: FAMILY MEDICINE

## 2021-01-26 PROCEDURE — 3288F PR FALLS RISK ASSESSMENT DOCUMENTED: ICD-10-PCS | Mod: CPTII,S$GLB,, | Performed by: FAMILY MEDICINE

## 2021-01-26 PROCEDURE — 99215 PR OFFICE/OUTPT VISIT, EST, LEVL V, 40-54 MIN: ICD-10-PCS | Mod: S$GLB,,, | Performed by: FAMILY MEDICINE

## 2021-01-26 PROCEDURE — 99999 PR PBB SHADOW E&M-EST. PATIENT-LVL IV: CPT | Mod: PBBFAC,,, | Performed by: FAMILY MEDICINE

## 2021-01-26 PROCEDURE — 3074F PR MOST RECENT SYSTOLIC BLOOD PRESSURE < 130 MM HG: ICD-10-PCS | Mod: CPTII,S$GLB,, | Performed by: FAMILY MEDICINE

## 2021-01-26 PROCEDURE — 99999 PR PBB SHADOW E&M-EST. PATIENT-LVL IV: ICD-10-PCS | Mod: PBBFAC,,, | Performed by: FAMILY MEDICINE

## 2021-01-26 PROCEDURE — 3078F PR MOST RECENT DIASTOLIC BLOOD PRESSURE < 80 MM HG: ICD-10-PCS | Mod: CPTII,S$GLB,, | Performed by: FAMILY MEDICINE

## 2021-01-26 RX ORDER — PANTOPRAZOLE SODIUM 40 MG/1
40 TABLET, DELAYED RELEASE ORAL DAILY
Qty: 90 TABLET | Refills: 3 | Status: SHIPPED | OUTPATIENT
Start: 2021-01-26

## 2021-01-29 ENCOUNTER — PATIENT MESSAGE (OUTPATIENT)
Dept: GASTROENTEROLOGY | Facility: CLINIC | Age: 81
End: 2021-01-29

## 2021-01-29 ENCOUNTER — PATIENT MESSAGE (OUTPATIENT)
Dept: SURGERY | Facility: CLINIC | Age: 81
End: 2021-01-29

## 2021-02-04 ENCOUNTER — PATIENT MESSAGE (OUTPATIENT)
Dept: PRIMARY CARE CLINIC | Facility: CLINIC | Age: 81
End: 2021-02-04

## 2021-02-04 ENCOUNTER — PATIENT MESSAGE (OUTPATIENT)
Dept: UROLOGY | Facility: CLINIC | Age: 81
End: 2021-02-04

## 2021-02-04 RX ORDER — ALPRAZOLAM 0.25 MG/1
0.25 TABLET ORAL 2 TIMES DAILY PRN
Qty: 20 TABLET | Refills: 0 | Status: SHIPPED | OUTPATIENT
Start: 2021-02-04 | End: 2021-03-16 | Stop reason: SDUPTHER

## 2021-02-08 ENCOUNTER — PATIENT MESSAGE (OUTPATIENT)
Dept: GASTROENTEROLOGY | Facility: CLINIC | Age: 81
End: 2021-02-08

## 2021-02-09 ENCOUNTER — PATIENT MESSAGE (OUTPATIENT)
Dept: SURGERY | Facility: CLINIC | Age: 81
End: 2021-02-09

## 2021-02-11 ENCOUNTER — PATIENT MESSAGE (OUTPATIENT)
Dept: GASTROENTEROLOGY | Facility: CLINIC | Age: 81
End: 2021-02-11

## 2021-02-17 RX ORDER — DICYCLOMINE HYDROCHLORIDE 10 MG/1
10 CAPSULE ORAL
Qty: 120 CAPSULE | Refills: 11 | Status: ON HOLD | OUTPATIENT
Start: 2021-02-17 | End: 2021-05-25 | Stop reason: SDUPTHER

## 2021-03-16 RX ORDER — ALPRAZOLAM 0.25 MG/1
0.25 TABLET ORAL 2 TIMES DAILY PRN
Qty: 20 TABLET | Refills: 0 | Status: SHIPPED | OUTPATIENT
Start: 2021-03-16 | End: 2021-04-22 | Stop reason: SDUPTHER

## 2021-04-14 ENCOUNTER — PATIENT MESSAGE (OUTPATIENT)
Dept: PRIMARY CARE CLINIC | Facility: CLINIC | Age: 81
End: 2021-04-14

## 2021-04-14 RX ORDER — FLUOXETINE 10 MG/1
10 CAPSULE ORAL DAILY
Qty: 30 CAPSULE | Refills: 11 | Status: SHIPPED | OUTPATIENT
Start: 2021-04-14 | End: 2022-04-14

## 2021-04-14 RX ORDER — FLUOXETINE 10 MG/1
10 CAPSULE ORAL DAILY
Qty: 90 CAPSULE | Refills: 3 | Status: SHIPPED | OUTPATIENT
Start: 2021-04-14 | End: 2022-04-20

## 2021-04-22 ENCOUNTER — PATIENT MESSAGE (OUTPATIENT)
Dept: PRIMARY CARE CLINIC | Facility: CLINIC | Age: 81
End: 2021-04-22

## 2021-04-22 RX ORDER — ALPRAZOLAM 0.25 MG/1
0.25 TABLET ORAL 2 TIMES DAILY PRN
Qty: 20 TABLET | Refills: 0 | Status: SHIPPED | OUTPATIENT
Start: 2021-04-22 | End: 2021-06-02 | Stop reason: SDUPTHER

## 2021-05-05 ENCOUNTER — PATIENT MESSAGE (OUTPATIENT)
Dept: PRIMARY CARE CLINIC | Facility: CLINIC | Age: 81
End: 2021-05-05

## 2021-05-05 RX ORDER — TRIAMCINOLONE ACETONIDE 1 MG/G
OINTMENT TOPICAL
Qty: 80 G | Refills: 1 | Status: SHIPPED | OUTPATIENT
Start: 2021-05-05 | End: 2024-03-27

## 2021-05-23 ENCOUNTER — HOSPITAL ENCOUNTER (OUTPATIENT)
Facility: HOSPITAL | Age: 81
Discharge: HOME | End: 2021-05-25
Attending: FAMILY MEDICINE | Admitting: HOSPITALIST
Payer: MEDICARE

## 2021-05-23 DIAGNOSIS — K56.609 COLONIC OBSTRUCTION: Primary | ICD-10-CM

## 2021-05-23 PROBLEM — N39.0 UTI (URINARY TRACT INFECTION): Status: ACTIVE | Noted: 2021-05-23

## 2021-05-23 PROBLEM — I10 ESSENTIAL HYPERTENSION: Chronic | Status: ACTIVE | Noted: 2017-01-10

## 2021-05-23 LAB
ALBUMIN SERPL BCP-MCNC: 3.7 G/DL (ref 3.5–5.2)
ALP SERPL-CCNC: 72 U/L (ref 55–135)
ALT SERPL W/O P-5'-P-CCNC: 13 U/L (ref 10–44)
ANION GAP SERPL CALC-SCNC: 11 MMOL/L (ref 8–16)
AST SERPL-CCNC: 16 U/L (ref 10–40)
BACTERIA #/AREA URNS HPF: ABNORMAL /HPF
BASOPHILS # BLD AUTO: 0.05 K/UL (ref 0–0.2)
BASOPHILS NFR BLD: 0.4 % (ref 0–1.9)
BILIRUB SERPL-MCNC: 0.8 MG/DL (ref 0.1–1)
BILIRUB UR QL STRIP: NEGATIVE
BUN SERPL-MCNC: 16 MG/DL (ref 8–23)
CALCIUM SERPL-MCNC: 9.1 MG/DL (ref 8.7–10.5)
CHLORIDE SERPL-SCNC: 105 MMOL/L (ref 95–110)
CLARITY UR: CLEAR
CO2 SERPL-SCNC: 23 MMOL/L (ref 23–29)
COLOR UR: YELLOW
CREAT SERPL-MCNC: 0.8 MG/DL (ref 0.5–1.4)
CTP QC/QA: YES
DIFFERENTIAL METHOD: ABNORMAL
EOSINOPHIL # BLD AUTO: 0.1 K/UL (ref 0–0.5)
EOSINOPHIL NFR BLD: 0.8 % (ref 0–8)
ERYTHROCYTE [DISTWIDTH] IN BLOOD BY AUTOMATED COUNT: 13.4 % (ref 11.5–14.5)
EST. GFR  (AFRICAN AMERICAN): >60 ML/MIN/1.73 M^2
EST. GFR  (NON AFRICAN AMERICAN): >60 ML/MIN/1.73 M^2
GLUCOSE SERPL-MCNC: 136 MG/DL (ref 70–110)
GLUCOSE UR QL STRIP: NEGATIVE
HCT VFR BLD AUTO: 43.8 % (ref 37–48.5)
HCV AB SERPL QL IA: NEGATIVE
HEP C VIRUS HOLD SPECIMEN: NORMAL
HGB BLD-MCNC: 14.4 G/DL (ref 12–16)
HGB UR QL STRIP: ABNORMAL
IMM GRANULOCYTES # BLD AUTO: 0.07 K/UL (ref 0–0.04)
IMM GRANULOCYTES NFR BLD AUTO: 0.5 % (ref 0–0.5)
KETONES UR QL STRIP: NEGATIVE
LACTATE SERPL-SCNC: 1.1 MMOL/L (ref 0.5–2.2)
LEUKOCYTE ESTERASE UR QL STRIP: ABNORMAL
LIPASE SERPL-CCNC: 46 U/L (ref 4–60)
LYMPHOCYTES # BLD AUTO: 2.1 K/UL (ref 1–4.8)
LYMPHOCYTES NFR BLD: 16.1 % (ref 18–48)
MCH RBC QN AUTO: 28.2 PG (ref 27–31)
MCHC RBC AUTO-ENTMCNC: 32.9 G/DL (ref 32–36)
MCV RBC AUTO: 86 FL (ref 82–98)
MICROSCOPIC COMMENT: ABNORMAL
MONOCYTES # BLD AUTO: 1 K/UL (ref 0.3–1)
MONOCYTES NFR BLD: 7.5 % (ref 4–15)
NEUTROPHILS # BLD AUTO: 9.8 K/UL (ref 1.8–7.7)
NEUTROPHILS NFR BLD: 74.7 % (ref 38–73)
NITRITE UR QL STRIP: NEGATIVE
NRBC BLD-RTO: 0 /100 WBC
PH UR STRIP: 6 [PH] (ref 5–8)
PLATELET # BLD AUTO: 295 K/UL (ref 150–450)
PMV BLD AUTO: 9.3 FL (ref 9.2–12.9)
POTASSIUM SERPL-SCNC: 3.8 MMOL/L (ref 3.5–5.1)
PROT SERPL-MCNC: 7.3 G/DL (ref 6–8.4)
PROT UR QL STRIP: ABNORMAL
RBC # BLD AUTO: 5.11 M/UL (ref 4–5.4)
RBC #/AREA URNS HPF: 8 /HPF (ref 0–4)
SARS-COV-2 RDRP RESP QL NAA+PROBE: NEGATIVE
SODIUM SERPL-SCNC: 139 MMOL/L (ref 136–145)
SP GR UR STRIP: >=1.03 (ref 1–1.03)
SQUAMOUS #/AREA URNS HPF: 2 /HPF
URN SPEC COLLECT METH UR: ABNORMAL
UROBILINOGEN UR STRIP-ACNC: NEGATIVE EU/DL
WBC # BLD AUTO: 13.13 K/UL (ref 3.9–12.7)
WBC #/AREA STL HPF: NORMAL /[HPF]
WBC #/AREA URNS HPF: 15 /HPF (ref 0–5)

## 2021-05-23 PROCEDURE — 99214 OFFICE O/P EST MOD 30 MIN: CPT | Mod: ,,, | Performed by: SURGERY

## 2021-05-23 PROCEDURE — 63600175 PHARM REV CODE 636 W HCPCS: Performed by: INTERNAL MEDICINE

## 2021-05-23 PROCEDURE — 85025 COMPLETE CBC W/AUTO DIFF WBC: CPT | Performed by: FAMILY MEDICINE

## 2021-05-23 PROCEDURE — 99214 PR OFFICE/OUTPT VISIT, EST, LEVL IV, 30-39 MIN: ICD-10-PCS | Mod: ,,, | Performed by: SURGERY

## 2021-05-23 PROCEDURE — 86803 HEPATITIS C AB TEST: CPT | Performed by: EMERGENCY MEDICINE

## 2021-05-23 PROCEDURE — 96376 TX/PRO/DX INJ SAME DRUG ADON: CPT

## 2021-05-23 PROCEDURE — S0030 INJECTION, METRONIDAZOLE: HCPCS | Performed by: NURSE PRACTITIONER

## 2021-05-23 PROCEDURE — 87040 BLOOD CULTURE FOR BACTERIA: CPT | Performed by: EMERGENCY MEDICINE

## 2021-05-23 PROCEDURE — 83690 ASSAY OF LIPASE: CPT | Performed by: FAMILY MEDICINE

## 2021-05-23 PROCEDURE — 89055 LEUKOCYTE ASSESSMENT FECAL: CPT | Performed by: NURSE PRACTITIONER

## 2021-05-23 PROCEDURE — 87046 STOOL CULTR AEROBIC BACT EA: CPT | Performed by: NURSE PRACTITIONER

## 2021-05-23 PROCEDURE — 80053 COMPREHEN METABOLIC PANEL: CPT | Performed by: FAMILY MEDICINE

## 2021-05-23 PROCEDURE — 25000003 PHARM REV CODE 250: Performed by: NURSE PRACTITIONER

## 2021-05-23 PROCEDURE — 25000003 PHARM REV CODE 250: Performed by: INTERNAL MEDICINE

## 2021-05-23 PROCEDURE — 96365 THER/PROPH/DIAG IV INF INIT: CPT

## 2021-05-23 PROCEDURE — 96361 HYDRATE IV INFUSION ADD-ON: CPT

## 2021-05-23 PROCEDURE — 87045 FECES CULTURE AEROBIC BACT: CPT | Performed by: NURSE PRACTITIONER

## 2021-05-23 PROCEDURE — 96372 THER/PROPH/DIAG INJ SC/IM: CPT | Mod: 59

## 2021-05-23 PROCEDURE — 63600175 PHARM REV CODE 636 W HCPCS: Performed by: NURSE PRACTITIONER

## 2021-05-23 PROCEDURE — 36415 COLL VENOUS BLD VENIPUNCTURE: CPT | Performed by: EMERGENCY MEDICINE

## 2021-05-23 PROCEDURE — 96375 TX/PRO/DX INJ NEW DRUG ADDON: CPT

## 2021-05-23 PROCEDURE — 87177 OVA AND PARASITES SMEARS: CPT | Performed by: HOSPITALIST

## 2021-05-23 PROCEDURE — S0028 INJECTION, FAMOTIDINE, 20 MG: HCPCS | Performed by: NURSE PRACTITIONER

## 2021-05-23 PROCEDURE — 63600175 PHARM REV CODE 636 W HCPCS: Performed by: EMERGENCY MEDICINE

## 2021-05-23 PROCEDURE — 81000 URINALYSIS NONAUTO W/SCOPE: CPT | Performed by: FAMILY MEDICINE

## 2021-05-23 PROCEDURE — 87427 SHIGA-LIKE TOXIN AG IA: CPT | Performed by: NURSE PRACTITIONER

## 2021-05-23 PROCEDURE — 99285 EMERGENCY DEPT VISIT HI MDM: CPT | Mod: 25

## 2021-05-23 PROCEDURE — G0378 HOSPITAL OBSERVATION PER HR: HCPCS

## 2021-05-23 PROCEDURE — U0002 COVID-19 LAB TEST NON-CDC: HCPCS | Performed by: EMERGENCY MEDICINE

## 2021-05-23 PROCEDURE — 83605 ASSAY OF LACTIC ACID: CPT | Performed by: EMERGENCY MEDICINE

## 2021-05-23 PROCEDURE — 87209 SMEAR COMPLEX STAIN: CPT | Performed by: HOSPITALIST

## 2021-05-23 RX ORDER — SODIUM CHLORIDE 9 MG/ML
INJECTION, SOLUTION INTRAVENOUS CONTINUOUS
Status: DISCONTINUED | OUTPATIENT
Start: 2021-05-23 | End: 2021-05-23

## 2021-05-23 RX ORDER — CIPROFLOXACIN 2 MG/ML
400 INJECTION, SOLUTION INTRAVENOUS
Status: COMPLETED | OUTPATIENT
Start: 2021-05-23 | End: 2021-05-23

## 2021-05-23 RX ORDER — CIPROFLOXACIN 2 MG/ML
400 INJECTION, SOLUTION INTRAVENOUS
Status: DISCONTINUED | OUTPATIENT
Start: 2021-05-23 | End: 2021-05-25 | Stop reason: HOSPADM

## 2021-05-23 RX ORDER — ONDANSETRON 2 MG/ML
4 INJECTION INTRAMUSCULAR; INTRAVENOUS EVERY 8 HOURS PRN
Status: DISCONTINUED | OUTPATIENT
Start: 2021-05-23 | End: 2021-05-25 | Stop reason: HOSPADM

## 2021-05-23 RX ORDER — HYOSCYAMINE SULFATE 0.5 MG/ML
0.25 INJECTION, SOLUTION SUBCUTANEOUS EVERY 6 HOURS PRN
Status: DISCONTINUED | OUTPATIENT
Start: 2021-05-23 | End: 2021-05-25 | Stop reason: HOSPADM

## 2021-05-23 RX ORDER — HYDRALAZINE HYDROCHLORIDE 20 MG/ML
10 INJECTION INTRAMUSCULAR; INTRAVENOUS EVERY 8 HOURS PRN
Status: DISCONTINUED | OUTPATIENT
Start: 2021-05-23 | End: 2021-05-25 | Stop reason: HOSPADM

## 2021-05-23 RX ORDER — KETOROLAC TROMETHAMINE 30 MG/ML
30 INJECTION, SOLUTION INTRAMUSCULAR; INTRAVENOUS ONCE
Status: COMPLETED | OUTPATIENT
Start: 2021-05-23 | End: 2021-05-23

## 2021-05-23 RX ORDER — MORPHINE SULFATE 4 MG/ML
4 INJECTION, SOLUTION INTRAMUSCULAR; INTRAVENOUS
Status: DISCONTINUED | OUTPATIENT
Start: 2021-05-23 | End: 2021-05-23

## 2021-05-23 RX ORDER — FAMOTIDINE 20 MG/50ML
20 INJECTION, SOLUTION INTRAVENOUS 2 TIMES DAILY
Status: DISCONTINUED | OUTPATIENT
Start: 2021-05-23 | End: 2021-05-25 | Stop reason: HOSPADM

## 2021-05-23 RX ORDER — SODIUM CHLORIDE 9 MG/ML
INJECTION, SOLUTION INTRAVENOUS CONTINUOUS
Status: ACTIVE | OUTPATIENT
Start: 2021-05-23 | End: 2021-05-24

## 2021-05-23 RX ORDER — SODIUM CHLORIDE 0.9 % (FLUSH) 0.9 %
10 SYRINGE (ML) INJECTION
Status: DISCONTINUED | OUTPATIENT
Start: 2021-05-23 | End: 2021-05-25 | Stop reason: HOSPADM

## 2021-05-23 RX ORDER — METRONIDAZOLE 500 MG/100ML
500 INJECTION, SOLUTION INTRAVENOUS
Status: DISCONTINUED | OUTPATIENT
Start: 2021-05-23 | End: 2021-05-25 | Stop reason: HOSPADM

## 2021-05-23 RX ORDER — HEPARIN SODIUM 5000 [USP'U]/ML
5000 INJECTION, SOLUTION INTRAVENOUS; SUBCUTANEOUS EVERY 8 HOURS
Status: DISCONTINUED | OUTPATIENT
Start: 2021-05-23 | End: 2021-05-25 | Stop reason: HOSPADM

## 2021-05-23 RX ORDER — ACETAMINOPHEN 325 MG/1
650 TABLET ORAL ONCE
Status: COMPLETED | OUTPATIENT
Start: 2021-05-23 | End: 2021-05-23

## 2021-05-23 RX ADMIN — CIPROFLOXACIN 400 MG: 2 INJECTION, SOLUTION INTRAVENOUS at 04:05

## 2021-05-23 RX ADMIN — SODIUM CHLORIDE: 0.9 INJECTION, SOLUTION INTRAVENOUS at 05:05

## 2021-05-23 RX ADMIN — SODIUM CHLORIDE: 0.9 INJECTION, SOLUTION INTRAVENOUS at 01:05

## 2021-05-23 RX ADMIN — ACETAMINOPHEN 650 MG: 325 TABLET ORAL at 09:05

## 2021-05-23 RX ADMIN — METRONIDAZOLE 500 MG: 500 INJECTION, SOLUTION INTRAVENOUS at 01:05

## 2021-05-23 RX ADMIN — HEPARIN SODIUM 5000 UNITS: 5000 INJECTION INTRAVENOUS; SUBCUTANEOUS at 09:05

## 2021-05-23 RX ADMIN — KETOROLAC TROMETHAMINE 30 MG: 30 INJECTION, SOLUTION INTRAMUSCULAR; INTRAVENOUS at 04:05

## 2021-05-23 RX ADMIN — FAMOTIDINE 20 MG: 20 INJECTION, SOLUTION INTRAVENOUS at 09:05

## 2021-05-23 RX ADMIN — HEPARIN SODIUM 5000 UNITS: 5000 INJECTION INTRAVENOUS; SUBCUTANEOUS at 01:05

## 2021-05-23 RX ADMIN — METRONIDAZOLE 500 MG: 500 INJECTION, SOLUTION INTRAVENOUS at 05:05

## 2021-05-24 PROBLEM — F03.90 DEMENTIA: Status: ACTIVE | Noted: 2021-05-24

## 2021-05-24 LAB
ALBUMIN SERPL BCP-MCNC: 3.2 G/DL (ref 3.5–5.2)
ALP SERPL-CCNC: 65 U/L (ref 55–135)
ALT SERPL W/O P-5'-P-CCNC: 12 U/L (ref 10–44)
ANION GAP SERPL CALC-SCNC: 11 MMOL/L (ref 8–16)
AST SERPL-CCNC: 21 U/L (ref 10–40)
BASOPHILS # BLD AUTO: 0.05 K/UL (ref 0–0.2)
BASOPHILS NFR BLD: 0.9 % (ref 0–1.9)
BILIRUB SERPL-MCNC: 0.8 MG/DL (ref 0.1–1)
BUN SERPL-MCNC: 13 MG/DL (ref 8–23)
CALCIUM SERPL-MCNC: 8.6 MG/DL (ref 8.7–10.5)
CHLORIDE SERPL-SCNC: 115 MMOL/L (ref 95–110)
CO2 SERPL-SCNC: 15 MMOL/L (ref 23–29)
CREAT SERPL-MCNC: 0.8 MG/DL (ref 0.5–1.4)
DIFFERENTIAL METHOD: ABNORMAL
E COLI SXT1 STL QL IA: NEGATIVE
E COLI SXT2 STL QL IA: NEGATIVE
EOSINOPHIL # BLD AUTO: 0.3 K/UL (ref 0–0.5)
EOSINOPHIL NFR BLD: 5.4 % (ref 0–8)
ERYTHROCYTE [DISTWIDTH] IN BLOOD BY AUTOMATED COUNT: 13.7 % (ref 11.5–14.5)
EST. GFR  (AFRICAN AMERICAN): >60 ML/MIN/1.73 M^2
EST. GFR  (NON AFRICAN AMERICAN): >60 ML/MIN/1.73 M^2
GLUCOSE SERPL-MCNC: 81 MG/DL (ref 70–110)
HCT VFR BLD AUTO: 44.7 % (ref 37–48.5)
HGB BLD-MCNC: 13.5 G/DL (ref 12–16)
IMM GRANULOCYTES # BLD AUTO: 0.04 K/UL (ref 0–0.04)
IMM GRANULOCYTES NFR BLD AUTO: 0.7 % (ref 0–0.5)
LYMPHOCYTES # BLD AUTO: 1.8 K/UL (ref 1–4.8)
LYMPHOCYTES NFR BLD: 31.9 % (ref 18–48)
MAGNESIUM SERPL-MCNC: 1.9 MG/DL (ref 1.6–2.6)
MCH RBC QN AUTO: 27.3 PG (ref 27–31)
MCHC RBC AUTO-ENTMCNC: 30.2 G/DL (ref 32–36)
MCV RBC AUTO: 90 FL (ref 82–98)
MONOCYTES # BLD AUTO: 0.5 K/UL (ref 0.3–1)
MONOCYTES NFR BLD: 8.8 % (ref 4–15)
NEUTROPHILS # BLD AUTO: 3 K/UL (ref 1.8–7.7)
NEUTROPHILS NFR BLD: 52.3 % (ref 38–73)
NRBC BLD-RTO: 0 /100 WBC
PHOSPHATE SERPL-MCNC: 3.5 MG/DL (ref 2.7–4.5)
PLATELET # BLD AUTO: 223 K/UL (ref 150–450)
PMV BLD AUTO: 9.8 FL (ref 9.2–12.9)
POTASSIUM SERPL-SCNC: 4.1 MMOL/L (ref 3.5–5.1)
PROT SERPL-MCNC: 6.5 G/DL (ref 6–8.4)
RBC # BLD AUTO: 4.95 M/UL (ref 4–5.4)
SODIUM SERPL-SCNC: 141 MMOL/L (ref 136–145)
WBC # BLD AUTO: 5.71 K/UL (ref 3.9–12.7)

## 2021-05-24 PROCEDURE — 25000003 PHARM REV CODE 250: Performed by: NURSE PRACTITIONER

## 2021-05-24 PROCEDURE — 99214 OFFICE O/P EST MOD 30 MIN: CPT | Mod: ,,, | Performed by: SURGERY

## 2021-05-24 PROCEDURE — 83735 ASSAY OF MAGNESIUM: CPT | Performed by: NURSE PRACTITIONER

## 2021-05-24 PROCEDURE — G0378 HOSPITAL OBSERVATION PER HR: HCPCS

## 2021-05-24 PROCEDURE — 84100 ASSAY OF PHOSPHORUS: CPT | Performed by: NURSE PRACTITIONER

## 2021-05-24 PROCEDURE — 36415 COLL VENOUS BLD VENIPUNCTURE: CPT | Performed by: NURSE PRACTITIONER

## 2021-05-24 PROCEDURE — 85025 COMPLETE CBC W/AUTO DIFF WBC: CPT | Performed by: NURSE PRACTITIONER

## 2021-05-24 PROCEDURE — 63600175 PHARM REV CODE 636 W HCPCS: Performed by: INTERNAL MEDICINE

## 2021-05-24 PROCEDURE — 96375 TX/PRO/DX INJ NEW DRUG ADDON: CPT

## 2021-05-24 PROCEDURE — S0028 INJECTION, FAMOTIDINE, 20 MG: HCPCS | Performed by: NURSE PRACTITIONER

## 2021-05-24 PROCEDURE — 25000003 PHARM REV CODE 250: Performed by: INTERNAL MEDICINE

## 2021-05-24 PROCEDURE — S0030 INJECTION, METRONIDAZOLE: HCPCS | Performed by: NURSE PRACTITIONER

## 2021-05-24 PROCEDURE — 63600175 PHARM REV CODE 636 W HCPCS: Performed by: NURSE PRACTITIONER

## 2021-05-24 PROCEDURE — 96376 TX/PRO/DX INJ SAME DRUG ADON: CPT

## 2021-05-24 PROCEDURE — 96361 HYDRATE IV INFUSION ADD-ON: CPT

## 2021-05-24 PROCEDURE — 80053 COMPREHEN METABOLIC PANEL: CPT | Performed by: NURSE PRACTITIONER

## 2021-05-24 PROCEDURE — 96372 THER/PROPH/DIAG INJ SC/IM: CPT | Mod: 59

## 2021-05-24 PROCEDURE — 96374 THER/PROPH/DIAG INJ IV PUSH: CPT | Mod: 59

## 2021-05-24 PROCEDURE — 99214 PR OFFICE/OUTPT VISIT, EST, LEVL IV, 30-39 MIN: ICD-10-PCS | Mod: ,,, | Performed by: SURGERY

## 2021-05-24 PROCEDURE — 25000003 PHARM REV CODE 250: Performed by: SURGERY

## 2021-05-24 RX ORDER — ATORVASTATIN CALCIUM 10 MG/1
10 TABLET, FILM COATED ORAL DAILY
Status: DISCONTINUED | OUTPATIENT
Start: 2021-05-25 | End: 2021-05-25 | Stop reason: HOSPADM

## 2021-05-24 RX ORDER — FUROSEMIDE 20 MG/1
20 TABLET ORAL DAILY
Status: DISCONTINUED | OUTPATIENT
Start: 2021-05-25 | End: 2021-05-25 | Stop reason: HOSPADM

## 2021-05-24 RX ORDER — THYROID 30 MG/1
30 TABLET ORAL
Status: DISCONTINUED | OUTPATIENT
Start: 2021-05-25 | End: 2021-05-25 | Stop reason: HOSPADM

## 2021-05-24 RX ORDER — POLYETHYLENE GLYCOL 3350 17 G/17G
17 POWDER, FOR SOLUTION ORAL DAILY
Status: DISCONTINUED | OUTPATIENT
Start: 2021-05-24 | End: 2021-05-25 | Stop reason: HOSPADM

## 2021-05-24 RX ORDER — MEMANTINE HYDROCHLORIDE 5 MG/1
10 TABLET ORAL 2 TIMES DAILY
Status: DISCONTINUED | OUTPATIENT
Start: 2021-05-24 | End: 2021-05-25 | Stop reason: HOSPADM

## 2021-05-24 RX ORDER — ALPRAZOLAM 0.25 MG/1
0.25 TABLET ORAL 2 TIMES DAILY PRN
Status: DISCONTINUED | OUTPATIENT
Start: 2021-05-24 | End: 2021-05-25 | Stop reason: HOSPADM

## 2021-05-24 RX ORDER — DONEPEZIL HYDROCHLORIDE 5 MG/1
10 TABLET, FILM COATED ORAL NIGHTLY
Status: DISCONTINUED | OUTPATIENT
Start: 2021-05-24 | End: 2021-05-25 | Stop reason: HOSPADM

## 2021-05-24 RX ADMIN — MEMANTINE 10 MG: 5 TABLET ORAL at 09:05

## 2021-05-24 RX ADMIN — POLYETHYLENE GLYCOL 3350 17 G: 17 POWDER, FOR SOLUTION ORAL at 08:05

## 2021-05-24 RX ADMIN — SODIUM CHLORIDE: 0.9 INJECTION, SOLUTION INTRAVENOUS at 01:05

## 2021-05-24 RX ADMIN — HEPARIN SODIUM 5000 UNITS: 5000 INJECTION INTRAVENOUS; SUBCUTANEOUS at 05:05

## 2021-05-24 RX ADMIN — METRONIDAZOLE 500 MG: 500 INJECTION, SOLUTION INTRAVENOUS at 01:05

## 2021-05-24 RX ADMIN — FAMOTIDINE 20 MG: 20 INJECTION, SOLUTION INTRAVENOUS at 08:05

## 2021-05-24 RX ADMIN — HEPARIN SODIUM 5000 UNITS: 5000 INJECTION INTRAVENOUS; SUBCUTANEOUS at 09:05

## 2021-05-24 RX ADMIN — CIPROFLOXACIN 400 MG: 2 INJECTION, SOLUTION INTRAVENOUS at 05:05

## 2021-05-24 RX ADMIN — HEPARIN SODIUM 5000 UNITS: 5000 INJECTION INTRAVENOUS; SUBCUTANEOUS at 01:05

## 2021-05-24 RX ADMIN — FAMOTIDINE 20 MG: 20 INJECTION, SOLUTION INTRAVENOUS at 09:05

## 2021-05-24 RX ADMIN — DONEPEZIL HYDROCHLORIDE 10 MG: 5 TABLET, FILM COATED ORAL at 09:05

## 2021-05-24 RX ADMIN — METRONIDAZOLE 500 MG: 500 INJECTION, SOLUTION INTRAVENOUS at 09:05

## 2021-05-25 ENCOUNTER — PATIENT MESSAGE (OUTPATIENT)
Dept: PRIMARY CARE CLINIC | Facility: CLINIC | Age: 81
End: 2021-05-25

## 2021-05-25 VITALS
BODY MASS INDEX: 28.2 KG/M2 | OXYGEN SATURATION: 98 % | DIASTOLIC BLOOD PRESSURE: 75 MMHG | TEMPERATURE: 98 F | HEART RATE: 48 BPM | RESPIRATION RATE: 16 BRPM | WEIGHT: 153.25 LBS | SYSTOLIC BLOOD PRESSURE: 129 MMHG | HEIGHT: 62 IN

## 2021-05-25 LAB
ALBUMIN SERPL BCP-MCNC: 3.2 G/DL (ref 3.5–5.2)
ALP SERPL-CCNC: 68 U/L (ref 55–135)
ALT SERPL W/O P-5'-P-CCNC: 12 U/L (ref 10–44)
ANION GAP SERPL CALC-SCNC: 10 MMOL/L (ref 8–16)
AST SERPL-CCNC: 18 U/L (ref 10–40)
BASOPHILS # BLD AUTO: 0.05 K/UL (ref 0–0.2)
BASOPHILS NFR BLD: 0.5 % (ref 0–1.9)
BILIRUB SERPL-MCNC: 0.5 MG/DL (ref 0.1–1)
BUN SERPL-MCNC: 8 MG/DL (ref 8–23)
CALCIUM SERPL-MCNC: 8.5 MG/DL (ref 8.7–10.5)
CHLORIDE SERPL-SCNC: 116 MMOL/L (ref 95–110)
CO2 SERPL-SCNC: 15 MMOL/L (ref 23–29)
CREAT SERPL-MCNC: 0.8 MG/DL (ref 0.5–1.4)
DIFFERENTIAL METHOD: NORMAL
EOSINOPHIL # BLD AUTO: 0.3 K/UL (ref 0–0.5)
EOSINOPHIL NFR BLD: 2.6 % (ref 0–8)
ERYTHROCYTE [DISTWIDTH] IN BLOOD BY AUTOMATED COUNT: 13.3 % (ref 11.5–14.5)
EST. GFR  (AFRICAN AMERICAN): >60 ML/MIN/1.73 M^2
EST. GFR  (NON AFRICAN AMERICAN): >60 ML/MIN/1.73 M^2
GLUCOSE SERPL-MCNC: 80 MG/DL (ref 70–110)
HCT VFR BLD AUTO: 41.3 % (ref 37–48.5)
HGB BLD-MCNC: 13.3 G/DL (ref 12–16)
IMM GRANULOCYTES # BLD AUTO: 0.04 K/UL (ref 0–0.04)
IMM GRANULOCYTES NFR BLD AUTO: 0.4 % (ref 0–0.5)
LYMPHOCYTES # BLD AUTO: 1.9 K/UL (ref 1–4.8)
LYMPHOCYTES NFR BLD: 20 % (ref 18–48)
MAGNESIUM SERPL-MCNC: 1.6 MG/DL (ref 1.6–2.6)
MCH RBC QN AUTO: 28.2 PG (ref 27–31)
MCHC RBC AUTO-ENTMCNC: 32.2 G/DL (ref 32–36)
MCV RBC AUTO: 88 FL (ref 82–98)
MONOCYTES # BLD AUTO: 0.8 K/UL (ref 0.3–1)
MONOCYTES NFR BLD: 7.8 % (ref 4–15)
NEUTROPHILS # BLD AUTO: 6.6 K/UL (ref 1.8–7.7)
NEUTROPHILS NFR BLD: 68.7 % (ref 38–73)
NRBC BLD-RTO: 0 /100 WBC
O+P STL MICRO: NORMAL
PHOSPHATE SERPL-MCNC: 2.7 MG/DL (ref 2.7–4.5)
PLATELET # BLD AUTO: 245 K/UL (ref 150–450)
PMV BLD AUTO: 10 FL (ref 9.2–12.9)
POTASSIUM SERPL-SCNC: 3.9 MMOL/L (ref 3.5–5.1)
PROT SERPL-MCNC: 6.3 G/DL (ref 6–8.4)
RBC # BLD AUTO: 4.71 M/UL (ref 4–5.4)
SODIUM SERPL-SCNC: 141 MMOL/L (ref 136–145)
WBC # BLD AUTO: 9.65 K/UL (ref 3.9–12.7)

## 2021-05-25 PROCEDURE — 96372 THER/PROPH/DIAG INJ SC/IM: CPT

## 2021-05-25 PROCEDURE — 96376 TX/PRO/DX INJ SAME DRUG ADON: CPT

## 2021-05-25 PROCEDURE — 63600175 PHARM REV CODE 636 W HCPCS: Performed by: INTERNAL MEDICINE

## 2021-05-25 PROCEDURE — 83735 ASSAY OF MAGNESIUM: CPT | Performed by: NURSE PRACTITIONER

## 2021-05-25 PROCEDURE — 84100 ASSAY OF PHOSPHORUS: CPT | Performed by: NURSE PRACTITIONER

## 2021-05-25 PROCEDURE — 96361 HYDRATE IV INFUSION ADD-ON: CPT

## 2021-05-25 PROCEDURE — S0030 INJECTION, METRONIDAZOLE: HCPCS | Performed by: NURSE PRACTITIONER

## 2021-05-25 PROCEDURE — 96374 THER/PROPH/DIAG INJ IV PUSH: CPT | Mod: 59

## 2021-05-25 PROCEDURE — 96372 THER/PROPH/DIAG INJ SC/IM: CPT | Mod: 59

## 2021-05-25 PROCEDURE — 36415 COLL VENOUS BLD VENIPUNCTURE: CPT | Performed by: NURSE PRACTITIONER

## 2021-05-25 PROCEDURE — 25000003 PHARM REV CODE 250: Performed by: NURSE PRACTITIONER

## 2021-05-25 PROCEDURE — S0028 INJECTION, FAMOTIDINE, 20 MG: HCPCS | Performed by: NURSE PRACTITIONER

## 2021-05-25 PROCEDURE — 85025 COMPLETE CBC W/AUTO DIFF WBC: CPT | Performed by: NURSE PRACTITIONER

## 2021-05-25 PROCEDURE — 25000003 PHARM REV CODE 250: Performed by: SURGERY

## 2021-05-25 PROCEDURE — 99213 OFFICE O/P EST LOW 20 MIN: CPT | Mod: ,,, | Performed by: SURGERY

## 2021-05-25 PROCEDURE — 99213 PR OFFICE/OUTPT VISIT, EST, LEVL III, 20-29 MIN: ICD-10-PCS | Mod: ,,, | Performed by: SURGERY

## 2021-05-25 PROCEDURE — 80053 COMPREHEN METABOLIC PANEL: CPT | Performed by: NURSE PRACTITIONER

## 2021-05-25 PROCEDURE — G0378 HOSPITAL OBSERVATION PER HR: HCPCS

## 2021-05-25 PROCEDURE — 63600175 PHARM REV CODE 636 W HCPCS: Performed by: NURSE PRACTITIONER

## 2021-05-25 RX ORDER — CIPROFLOXACIN 500 MG/1
500 TABLET ORAL 2 TIMES DAILY
Qty: 6 TABLET | Refills: 0 | Status: SHIPPED | OUTPATIENT
Start: 2021-05-25 | End: 2021-05-28

## 2021-05-25 RX ORDER — DICYCLOMINE HYDROCHLORIDE 10 MG/1
10 CAPSULE ORAL
Qty: 120 CAPSULE | Refills: 11 | Status: ON HOLD | OUTPATIENT
Start: 2021-05-25 | End: 2022-08-24 | Stop reason: HOSPADM

## 2021-05-25 RX ORDER — HYOSCYAMINE SULFATE 0.5 MG/ML
0.25 INJECTION, SOLUTION SUBCUTANEOUS EVERY 6 HOURS PRN
Status: CANCELLED | OUTPATIENT
Start: 2021-05-25

## 2021-05-25 RX ADMIN — POLYETHYLENE GLYCOL 3350 17 G: 17 POWDER, FOR SOLUTION ORAL at 08:05

## 2021-05-25 RX ADMIN — HEPARIN SODIUM 5000 UNITS: 5000 INJECTION INTRAVENOUS; SUBCUTANEOUS at 06:05

## 2021-05-25 RX ADMIN — FAMOTIDINE 20 MG: 20 INJECTION, SOLUTION INTRAVENOUS at 08:05

## 2021-05-25 RX ADMIN — FUROSEMIDE 20 MG: 20 TABLET ORAL at 08:05

## 2021-05-25 RX ADMIN — THYROID, PORCINE 30 MG: 30 TABLET ORAL at 06:05

## 2021-05-25 RX ADMIN — MEMANTINE 10 MG: 5 TABLET ORAL at 08:05

## 2021-05-25 RX ADMIN — ATORVASTATIN CALCIUM 10 MG: 10 TABLET, FILM COATED ORAL at 08:05

## 2021-05-26 LAB — BACTERIA STL CULT: NORMAL

## 2021-05-28 LAB
BACTERIA BLD CULT: NORMAL
BACTERIA BLD CULT: NORMAL

## 2021-06-02 ENCOUNTER — LAB VISIT (OUTPATIENT)
Dept: LAB | Facility: HOSPITAL | Age: 81
End: 2021-06-02
Attending: FAMILY MEDICINE
Payer: MEDICARE

## 2021-06-02 ENCOUNTER — TELEPHONE (OUTPATIENT)
Dept: PRIMARY CARE CLINIC | Facility: CLINIC | Age: 81
End: 2021-06-02

## 2021-06-02 ENCOUNTER — OFFICE VISIT (OUTPATIENT)
Dept: PRIMARY CARE CLINIC | Facility: CLINIC | Age: 81
End: 2021-06-02
Payer: MEDICARE

## 2021-06-02 VITALS
BODY MASS INDEX: 28.51 KG/M2 | WEIGHT: 155.88 LBS | SYSTOLIC BLOOD PRESSURE: 110 MMHG | TEMPERATURE: 98 F | DIASTOLIC BLOOD PRESSURE: 64 MMHG | HEART RATE: 66 BPM

## 2021-06-02 DIAGNOSIS — F01.50 VASCULAR DEMENTIA WITHOUT BEHAVIORAL DISTURBANCE: ICD-10-CM

## 2021-06-02 DIAGNOSIS — R14.0 ABDOMINAL DISTENSION: ICD-10-CM

## 2021-06-02 DIAGNOSIS — E78.00 PURE HYPERCHOLESTEROLEMIA: ICD-10-CM

## 2021-06-02 DIAGNOSIS — R73.09 ABNORMAL GLUCOSE: ICD-10-CM

## 2021-06-02 DIAGNOSIS — F41.1 GAD (GENERALIZED ANXIETY DISORDER): ICD-10-CM

## 2021-06-02 DIAGNOSIS — K52.9 COLITIS: ICD-10-CM

## 2021-06-02 DIAGNOSIS — K52.9 COLITIS: Primary | ICD-10-CM

## 2021-06-02 DIAGNOSIS — E03.9 HYPOTHYROIDISM, UNSPECIFIED TYPE: ICD-10-CM

## 2021-06-02 DIAGNOSIS — R11.2 NAUSEA AND VOMITING, INTRACTABILITY OF VOMITING NOT SPECIFIED, UNSPECIFIED VOMITING TYPE: ICD-10-CM

## 2021-06-02 DIAGNOSIS — Z93.3 COLOSTOMY IN PLACE: ICD-10-CM

## 2021-06-02 DIAGNOSIS — R10.84 GENERALIZED ABDOMINAL PAIN: ICD-10-CM

## 2021-06-02 DIAGNOSIS — I77.1 TORTUOUS AORTA: ICD-10-CM

## 2021-06-02 DIAGNOSIS — G31.84 MILD COGNITIVE IMPAIRMENT WITH MEMORY LOSS: ICD-10-CM

## 2021-06-02 DIAGNOSIS — I10 ESSENTIAL HYPERTENSION: ICD-10-CM

## 2021-06-02 PROCEDURE — 85025 COMPLETE CBC W/AUTO DIFF WBC: CPT | Performed by: FAMILY MEDICINE

## 2021-06-02 PROCEDURE — 99999 PR PBB SHADOW E&M-EST. PATIENT-LVL IV: ICD-10-PCS | Mod: PBBFAC,,, | Performed by: FAMILY MEDICINE

## 2021-06-02 PROCEDURE — 1125F PR PAIN SEVERITY QUANTIFIED, PAIN PRESENT: ICD-10-PCS | Mod: S$GLB,,, | Performed by: FAMILY MEDICINE

## 2021-06-02 PROCEDURE — 80061 LIPID PANEL: CPT | Performed by: FAMILY MEDICINE

## 2021-06-02 PROCEDURE — 83036 HEMOGLOBIN GLYCOSYLATED A1C: CPT | Performed by: FAMILY MEDICINE

## 2021-06-02 PROCEDURE — 3288F FALL RISK ASSESSMENT DOCD: CPT | Mod: CPTII,S$GLB,, | Performed by: FAMILY MEDICINE

## 2021-06-02 PROCEDURE — 1125F AMNT PAIN NOTED PAIN PRSNT: CPT | Mod: S$GLB,,, | Performed by: FAMILY MEDICINE

## 2021-06-02 PROCEDURE — 3288F PR FALLS RISK ASSESSMENT DOCUMENTED: ICD-10-PCS | Mod: CPTII,S$GLB,, | Performed by: FAMILY MEDICINE

## 2021-06-02 PROCEDURE — 84439 ASSAY OF FREE THYROXINE: CPT | Performed by: FAMILY MEDICINE

## 2021-06-02 PROCEDURE — 99999 PR PBB SHADOW E&M-EST. PATIENT-LVL IV: CPT | Mod: PBBFAC,,, | Performed by: FAMILY MEDICINE

## 2021-06-02 PROCEDURE — 84443 ASSAY THYROID STIM HORMONE: CPT | Performed by: FAMILY MEDICINE

## 2021-06-02 PROCEDURE — 99499 RISK ADDL DX/OHS AUDIT: ICD-10-PCS | Mod: S$GLB,,, | Performed by: FAMILY MEDICINE

## 2021-06-02 PROCEDURE — 99495 TRANSJ CARE MGMT MOD F2F 14D: CPT | Mod: S$GLB,,, | Performed by: FAMILY MEDICINE

## 2021-06-02 PROCEDURE — 80053 COMPREHEN METABOLIC PANEL: CPT | Performed by: FAMILY MEDICINE

## 2021-06-02 PROCEDURE — 99499 UNLISTED E&M SERVICE: CPT | Mod: S$GLB,,, | Performed by: FAMILY MEDICINE

## 2021-06-02 PROCEDURE — 99495 TCM SERVICES (MODERATE COMPLEXITY): ICD-10-PCS | Mod: S$GLB,,, | Performed by: FAMILY MEDICINE

## 2021-06-02 PROCEDURE — 1101F PR PT FALLS ASSESS DOC 0-1 FALLS W/OUT INJ PAST YR: ICD-10-PCS | Mod: CPTII,S$GLB,, | Performed by: FAMILY MEDICINE

## 2021-06-02 PROCEDURE — 36415 COLL VENOUS BLD VENIPUNCTURE: CPT | Mod: PN | Performed by: FAMILY MEDICINE

## 2021-06-02 PROCEDURE — 1101F PT FALLS ASSESS-DOCD LE1/YR: CPT | Mod: CPTII,S$GLB,, | Performed by: FAMILY MEDICINE

## 2021-06-02 RX ORDER — ALPRAZOLAM 0.25 MG/1
0.25 TABLET ORAL 2 TIMES DAILY PRN
Qty: 20 TABLET | Refills: 0 | Status: SHIPPED | OUTPATIENT
Start: 2021-06-02 | End: 2021-08-04 | Stop reason: SDUPTHER

## 2021-06-03 ENCOUNTER — TELEPHONE (OUTPATIENT)
Dept: GASTROENTEROLOGY | Facility: CLINIC | Age: 81
End: 2021-06-03

## 2021-06-03 ENCOUNTER — PATIENT MESSAGE (OUTPATIENT)
Dept: PRIMARY CARE CLINIC | Facility: CLINIC | Age: 81
End: 2021-06-03

## 2021-06-03 LAB
ALBUMIN SERPL BCP-MCNC: 3.6 G/DL (ref 3.5–5.2)
ALP SERPL-CCNC: 64 U/L (ref 55–135)
ALT SERPL W/O P-5'-P-CCNC: 10 U/L (ref 10–44)
ANION GAP SERPL CALC-SCNC: 13 MMOL/L (ref 8–16)
ANISOCYTOSIS BLD QL SMEAR: SLIGHT
AST SERPL-CCNC: 17 U/L (ref 10–40)
BASOPHILS # BLD AUTO: 0.03 K/UL (ref 0–0.2)
BASOPHILS NFR BLD: 0.4 % (ref 0–1.9)
BILIRUB SERPL-MCNC: 0.9 MG/DL (ref 0.1–1)
BUN SERPL-MCNC: 9 MG/DL (ref 8–23)
BURR CELLS BLD QL SMEAR: ABNORMAL
CALCIUM SERPL-MCNC: 10.1 MG/DL (ref 8.7–10.5)
CHLORIDE SERPL-SCNC: 104 MMOL/L (ref 95–110)
CHOLEST SERPL-MCNC: 166 MG/DL (ref 120–199)
CHOLEST/HDLC SERPL: 4 {RATIO} (ref 2–5)
CO2 SERPL-SCNC: 22 MMOL/L (ref 23–29)
CREAT SERPL-MCNC: 0.8 MG/DL (ref 0.5–1.4)
DIFFERENTIAL METHOD: ABNORMAL
EOSINOPHIL # BLD AUTO: 0.1 K/UL (ref 0–0.5)
EOSINOPHIL NFR BLD: 0.9 % (ref 0–8)
ERYTHROCYTE [DISTWIDTH] IN BLOOD BY AUTOMATED COUNT: 13.8 % (ref 11.5–14.5)
EST. GFR  (AFRICAN AMERICAN): >60 ML/MIN/1.73 M^2
EST. GFR  (NON AFRICAN AMERICAN): >60 ML/MIN/1.73 M^2
ESTIMATED AVG GLUCOSE: 105 MG/DL (ref 68–131)
GLUCOSE SERPL-MCNC: 80 MG/DL (ref 70–110)
HBA1C MFR BLD: 5.3 % (ref 4–5.6)
HCT VFR BLD AUTO: 44.2 % (ref 37–48.5)
HDLC SERPL-MCNC: 42 MG/DL (ref 40–75)
HDLC SERPL: 25.3 % (ref 20–50)
HGB BLD-MCNC: 13.8 G/DL (ref 12–16)
IMM GRANULOCYTES # BLD AUTO: 0.04 K/UL (ref 0–0.04)
IMM GRANULOCYTES NFR BLD AUTO: 0.6 % (ref 0–0.5)
LDLC SERPL CALC-MCNC: 107.6 MG/DL (ref 63–159)
LYMPHOCYTES # BLD AUTO: 1.8 K/UL (ref 1–4.8)
LYMPHOCYTES NFR BLD: 26.9 % (ref 18–48)
MCH RBC QN AUTO: 28.2 PG (ref 27–31)
MCHC RBC AUTO-ENTMCNC: 31.2 G/DL (ref 32–36)
MCV RBC AUTO: 90 FL (ref 82–98)
MONOCYTES # BLD AUTO: 0.6 K/UL (ref 0.3–1)
MONOCYTES NFR BLD: 8.6 % (ref 4–15)
NEUTROPHILS # BLD AUTO: 4.2 K/UL (ref 1.8–7.7)
NEUTROPHILS NFR BLD: 62.6 % (ref 38–73)
NONHDLC SERPL-MCNC: 124 MG/DL
NRBC BLD-RTO: 0 /100 WBC
OVALOCYTES BLD QL SMEAR: ABNORMAL
PLATELET # BLD AUTO: 160 K/UL (ref 150–450)
PLATELET BLD QL SMEAR: ABNORMAL
PMV BLD AUTO: 10.8 FL (ref 9.2–12.9)
POIKILOCYTOSIS BLD QL SMEAR: SLIGHT
POTASSIUM SERPL-SCNC: 3.7 MMOL/L (ref 3.5–5.1)
PROT SERPL-MCNC: 7.6 G/DL (ref 6–8.4)
RBC # BLD AUTO: 4.9 M/UL (ref 4–5.4)
SODIUM SERPL-SCNC: 139 MMOL/L (ref 136–145)
T4 FREE SERPL-MCNC: 1.18 NG/DL (ref 0.71–1.51)
TRIGL SERPL-MCNC: 82 MG/DL (ref 30–150)
TSH SERPL DL<=0.005 MIU/L-ACNC: 1.48 UIU/ML (ref 0.4–4)
WBC # BLD AUTO: 6.74 K/UL (ref 3.9–12.7)

## 2021-06-06 ENCOUNTER — PATIENT MESSAGE (OUTPATIENT)
Dept: GASTROENTEROLOGY | Facility: CLINIC | Age: 81
End: 2021-06-06

## 2021-06-07 ENCOUNTER — TELEPHONE (OUTPATIENT)
Dept: GASTROENTEROLOGY | Facility: CLINIC | Age: 81
End: 2021-06-07

## 2021-06-07 ENCOUNTER — HOSPITAL ENCOUNTER (INPATIENT)
Facility: HOSPITAL | Age: 81
LOS: 3 days | Discharge: HOME | DRG: 389 | End: 2021-06-10
Attending: EMERGENCY MEDICINE | Admitting: INTERNAL MEDICINE
Payer: MEDICARE

## 2021-06-07 DIAGNOSIS — K52.9 COLITIS: Primary | ICD-10-CM

## 2021-06-07 DIAGNOSIS — K56.609 SMALL BOWEL OBSTRUCTION: ICD-10-CM

## 2021-06-07 LAB
ALBUMIN SERPL BCP-MCNC: 3.7 G/DL (ref 3.5–5.2)
ALP SERPL-CCNC: 70 U/L (ref 55–135)
ALT SERPL W/O P-5'-P-CCNC: 11 U/L (ref 10–44)
ANION GAP SERPL CALC-SCNC: 16 MMOL/L (ref 8–16)
ANISOCYTOSIS BLD QL SMEAR: SLIGHT
AST SERPL-CCNC: 18 U/L (ref 10–40)
BASOPHILS # BLD AUTO: 0.04 K/UL (ref 0–0.2)
BASOPHILS NFR BLD: 0.3 % (ref 0–1.9)
BILIRUB SERPL-MCNC: 0.9 MG/DL (ref 0.1–1)
BILIRUB UR QL STRIP: ABNORMAL
BUN SERPL-MCNC: 19 MG/DL (ref 8–23)
BURR CELLS BLD QL SMEAR: ABNORMAL
C DIFF GDH STL QL: NEGATIVE
C DIFF TOX A+B STL QL IA: NEGATIVE
CALCIUM SERPL-MCNC: 10 MG/DL (ref 8.7–10.5)
CHLORIDE SERPL-SCNC: 96 MMOL/L (ref 95–110)
CLARITY UR: CLEAR
CO2 SERPL-SCNC: 22 MMOL/L (ref 23–29)
COLOR UR: YELLOW
CREAT SERPL-MCNC: 1 MG/DL (ref 0.5–1.4)
CTP QC/QA: YES
DIFFERENTIAL METHOD: ABNORMAL
EOSINOPHIL # BLD AUTO: 0 K/UL (ref 0–0.5)
EOSINOPHIL NFR BLD: 0.2 % (ref 0–8)
ERYTHROCYTE [DISTWIDTH] IN BLOOD BY AUTOMATED COUNT: 13.2 % (ref 11.5–14.5)
EST. GFR  (AFRICAN AMERICAN): >60 ML/MIN/1.73 M^2
EST. GFR  (NON AFRICAN AMERICAN): 54 ML/MIN/1.73 M^2
GLUCOSE SERPL-MCNC: 124 MG/DL (ref 70–110)
GLUCOSE UR QL STRIP: NEGATIVE
HCT VFR BLD AUTO: 47.1 % (ref 37–48.5)
HGB BLD-MCNC: 15.9 G/DL (ref 12–16)
HGB UR QL STRIP: ABNORMAL
IMM GRANULOCYTES # BLD AUTO: 0.11 K/UL (ref 0–0.04)
IMM GRANULOCYTES NFR BLD AUTO: 0.8 % (ref 0–0.5)
KETONES UR QL STRIP: ABNORMAL
LEUKOCYTE ESTERASE UR QL STRIP: NEGATIVE
LIPASE SERPL-CCNC: 26 U/L (ref 4–60)
LYMPHOCYTES # BLD AUTO: 2.1 K/UL (ref 1–4.8)
LYMPHOCYTES NFR BLD: 15.5 % (ref 18–48)
MCH RBC QN AUTO: 27.8 PG (ref 27–31)
MCHC RBC AUTO-ENTMCNC: 33.8 G/DL (ref 32–36)
MCV RBC AUTO: 82 FL (ref 82–98)
MONOCYTES # BLD AUTO: 1 K/UL (ref 0.3–1)
MONOCYTES NFR BLD: 7.6 % (ref 4–15)
NEUTROPHILS # BLD AUTO: 10.1 K/UL (ref 1.8–7.7)
NEUTROPHILS NFR BLD: 75.6 % (ref 38–73)
NITRITE UR QL STRIP: NEGATIVE
NRBC BLD-RTO: 0 /100 WBC
OVALOCYTES BLD QL SMEAR: ABNORMAL
PH UR STRIP: 6 [PH] (ref 5–8)
PLATELET # BLD AUTO: 361 K/UL (ref 150–450)
PLATELET BLD QL SMEAR: ABNORMAL
PMV BLD AUTO: 9.8 FL (ref 9.2–12.9)
POIKILOCYTOSIS BLD QL SMEAR: SLIGHT
POTASSIUM SERPL-SCNC: 4.6 MMOL/L (ref 3.5–5.1)
PROT SERPL-MCNC: 8 G/DL (ref 6–8.4)
PROT UR QL STRIP: NEGATIVE
RBC # BLD AUTO: 5.72 M/UL (ref 4–5.4)
SARS-COV-2 RDRP RESP QL NAA+PROBE: NEGATIVE
SODIUM SERPL-SCNC: 134 MMOL/L (ref 136–145)
SP GR UR STRIP: 1.01 (ref 1–1.03)
URN SPEC COLLECT METH UR: ABNORMAL
UROBILINOGEN UR STRIP-ACNC: NEGATIVE EU/DL
WBC # BLD AUTO: 13.31 K/UL (ref 3.9–12.7)

## 2021-06-07 PROCEDURE — 80053 COMPREHEN METABOLIC PANEL: CPT | Performed by: EMERGENCY MEDICINE

## 2021-06-07 PROCEDURE — 63600175 PHARM REV CODE 636 W HCPCS: Performed by: NURSE PRACTITIONER

## 2021-06-07 PROCEDURE — 99285 EMERGENCY DEPT VISIT HI MDM: CPT | Mod: 25

## 2021-06-07 PROCEDURE — 99223 1ST HOSP IP/OBS HIGH 75: CPT | Mod: ,,, | Performed by: SURGERY

## 2021-06-07 PROCEDURE — 99223 PR INITIAL HOSPITAL CARE,LEVL III: ICD-10-PCS | Mod: ,,, | Performed by: SURGERY

## 2021-06-07 PROCEDURE — 25000003 PHARM REV CODE 250: Performed by: EMERGENCY MEDICINE

## 2021-06-07 PROCEDURE — S0030 INJECTION, METRONIDAZOLE: HCPCS | Performed by: EMERGENCY MEDICINE

## 2021-06-07 PROCEDURE — 81003 URINALYSIS AUTO W/O SCOPE: CPT | Performed by: NURSE PRACTITIONER

## 2021-06-07 PROCEDURE — 87324 CLOSTRIDIUM AG IA: CPT | Performed by: EMERGENCY MEDICINE

## 2021-06-07 PROCEDURE — 83690 ASSAY OF LIPASE: CPT | Performed by: EMERGENCY MEDICINE

## 2021-06-07 PROCEDURE — 21400001 HC TELEMETRY ROOM

## 2021-06-07 PROCEDURE — 87449 NOS EACH ORGANISM AG IA: CPT | Performed by: EMERGENCY MEDICINE

## 2021-06-07 PROCEDURE — 63600175 PHARM REV CODE 636 W HCPCS: Performed by: EMERGENCY MEDICINE

## 2021-06-07 PROCEDURE — 25500020 PHARM REV CODE 255: Performed by: EMERGENCY MEDICINE

## 2021-06-07 PROCEDURE — 85025 COMPLETE CBC W/AUTO DIFF WBC: CPT | Performed by: EMERGENCY MEDICINE

## 2021-06-07 PROCEDURE — U0002 COVID-19 LAB TEST NON-CDC: HCPCS | Performed by: EMERGENCY MEDICINE

## 2021-06-07 PROCEDURE — 96365 THER/PROPH/DIAG IV INF INIT: CPT

## 2021-06-07 PROCEDURE — 96361 HYDRATE IV INFUSION ADD-ON: CPT

## 2021-06-07 PROCEDURE — 25000003 PHARM REV CODE 250: Performed by: NURSE PRACTITIONER

## 2021-06-07 PROCEDURE — 96375 TX/PRO/DX INJ NEW DRUG ADDON: CPT

## 2021-06-07 RX ORDER — ONDANSETRON 2 MG/ML
4 INJECTION INTRAMUSCULAR; INTRAVENOUS EVERY 6 HOURS PRN
Status: DISCONTINUED | OUTPATIENT
Start: 2021-06-07 | End: 2021-06-10 | Stop reason: HOSPADM

## 2021-06-07 RX ORDER — FLUOXETINE 10 MG/1
10 CAPSULE ORAL DAILY
Status: DISCONTINUED | OUTPATIENT
Start: 2021-06-08 | End: 2021-06-10 | Stop reason: HOSPADM

## 2021-06-07 RX ORDER — MORPHINE SULFATE 2 MG/ML
2 INJECTION, SOLUTION INTRAMUSCULAR; INTRAVENOUS EVERY 4 HOURS PRN
Status: DISCONTINUED | OUTPATIENT
Start: 2021-06-07 | End: 2021-06-07

## 2021-06-07 RX ORDER — CIPROFLOXACIN 2 MG/ML
400 INJECTION, SOLUTION INTRAVENOUS
Status: DISCONTINUED | OUTPATIENT
Start: 2021-06-07 | End: 2021-06-10 | Stop reason: HOSPADM

## 2021-06-07 RX ORDER — MORPHINE SULFATE 4 MG/ML
4 INJECTION, SOLUTION INTRAMUSCULAR; INTRAVENOUS EVERY 4 HOURS PRN
Status: DISCONTINUED | OUTPATIENT
Start: 2021-06-07 | End: 2021-06-10 | Stop reason: HOSPADM

## 2021-06-07 RX ORDER — ONDANSETRON 2 MG/ML
4 INJECTION INTRAMUSCULAR; INTRAVENOUS
Status: COMPLETED | OUTPATIENT
Start: 2021-06-07 | End: 2021-06-07

## 2021-06-07 RX ORDER — ATORVASTATIN CALCIUM 10 MG/1
10 TABLET, FILM COATED ORAL DAILY
Status: DISCONTINUED | OUTPATIENT
Start: 2021-06-08 | End: 2021-06-10 | Stop reason: HOSPADM

## 2021-06-07 RX ORDER — POLYETHYLENE GLYCOL 3350 17 G/17G
17 POWDER, FOR SOLUTION ORAL DAILY
Status: DISCONTINUED | OUTPATIENT
Start: 2021-06-08 | End: 2021-06-10 | Stop reason: HOSPADM

## 2021-06-07 RX ORDER — MORPHINE SULFATE 4 MG/ML
4 INJECTION, SOLUTION INTRAMUSCULAR; INTRAVENOUS
Status: COMPLETED | OUTPATIENT
Start: 2021-06-07 | End: 2021-06-07

## 2021-06-07 RX ORDER — HEPARIN SODIUM 5000 [USP'U]/ML
5000 INJECTION, SOLUTION INTRAVENOUS; SUBCUTANEOUS EVERY 8 HOURS
Status: DISCONTINUED | OUTPATIENT
Start: 2021-06-07 | End: 2021-06-10 | Stop reason: HOSPADM

## 2021-06-07 RX ORDER — METRONIDAZOLE 500 MG/100ML
500 INJECTION, SOLUTION INTRAVENOUS
Status: DISCONTINUED | OUTPATIENT
Start: 2021-06-07 | End: 2021-06-10 | Stop reason: HOSPADM

## 2021-06-07 RX ORDER — SODIUM CHLORIDE 9 MG/ML
INJECTION, SOLUTION INTRAVENOUS CONTINUOUS
Status: DISCONTINUED | OUTPATIENT
Start: 2021-06-07 | End: 2021-06-10 | Stop reason: HOSPADM

## 2021-06-07 RX ORDER — ALPRAZOLAM 0.25 MG/1
0.25 TABLET ORAL 2 TIMES DAILY PRN
Status: DISCONTINUED | OUTPATIENT
Start: 2021-06-07 | End: 2021-06-10 | Stop reason: HOSPADM

## 2021-06-07 RX ORDER — PANTOPRAZOLE SODIUM 40 MG/1
40 TABLET, DELAYED RELEASE ORAL DAILY
Status: DISCONTINUED | OUTPATIENT
Start: 2021-06-08 | End: 2021-06-10 | Stop reason: HOSPADM

## 2021-06-07 RX ORDER — MEMANTINE HYDROCHLORIDE 10 MG/1
10 TABLET ORAL DAILY
Status: DISCONTINUED | OUTPATIENT
Start: 2021-06-08 | End: 2021-06-10 | Stop reason: HOSPADM

## 2021-06-07 RX ADMIN — MORPHINE SULFATE 4 MG: 4 INJECTION, SOLUTION INTRAMUSCULAR; INTRAVENOUS at 12:06

## 2021-06-07 RX ADMIN — METRONIDAZOLE 500 MG: 500 INJECTION, SOLUTION INTRAVENOUS at 02:06

## 2021-06-07 RX ADMIN — ONDANSETRON 4 MG: 2 INJECTION INTRAMUSCULAR; INTRAVENOUS at 12:06

## 2021-06-07 RX ADMIN — SODIUM CHLORIDE: 0.9 INJECTION, SOLUTION INTRAVENOUS at 09:06

## 2021-06-07 RX ADMIN — SODIUM CHLORIDE 1000 ML: 0.9 INJECTION, SOLUTION INTRAVENOUS at 12:06

## 2021-06-07 RX ADMIN — IOHEXOL 100 ML: 350 INJECTION, SOLUTION INTRAVENOUS at 01:06

## 2021-06-07 RX ADMIN — CIPROFLOXACIN 400 MG: 2 INJECTION, SOLUTION INTRAVENOUS at 04:06

## 2021-06-07 RX ADMIN — HEPARIN SODIUM 5000 UNITS: 5000 INJECTION INTRAVENOUS; SUBCUTANEOUS at 09:06

## 2021-06-08 LAB
ALBUMIN SERPL BCP-MCNC: 2.8 G/DL (ref 3.5–5.2)
ALP SERPL-CCNC: 52 U/L (ref 55–135)
ALT SERPL W/O P-5'-P-CCNC: 6 U/L (ref 10–44)
ANION GAP SERPL CALC-SCNC: 10 MMOL/L (ref 8–16)
AST SERPL-CCNC: 15 U/L (ref 10–40)
BASOPHILS # BLD AUTO: 0.04 K/UL (ref 0–0.2)
BASOPHILS NFR BLD: 0.6 % (ref 0–1.9)
BILIRUB SERPL-MCNC: 0.5 MG/DL (ref 0.1–1)
BUN SERPL-MCNC: 13 MG/DL (ref 8–23)
CALCIUM SERPL-MCNC: 8.3 MG/DL (ref 8.7–10.5)
CHLORIDE SERPL-SCNC: 106 MMOL/L (ref 95–110)
CO2 SERPL-SCNC: 20 MMOL/L (ref 23–29)
CREAT SERPL-MCNC: 0.8 MG/DL (ref 0.5–1.4)
DIFFERENTIAL METHOD: ABNORMAL
EOSINOPHIL # BLD AUTO: 0.2 K/UL (ref 0–0.5)
EOSINOPHIL NFR BLD: 2.6 % (ref 0–8)
ERYTHROCYTE [DISTWIDTH] IN BLOOD BY AUTOMATED COUNT: 13.3 % (ref 11.5–14.5)
EST. GFR  (AFRICAN AMERICAN): >60 ML/MIN/1.73 M^2
EST. GFR  (NON AFRICAN AMERICAN): >60 ML/MIN/1.73 M^2
GLUCOSE SERPL-MCNC: 85 MG/DL (ref 70–110)
HCT VFR BLD AUTO: 38.9 % (ref 37–48.5)
HGB BLD-MCNC: 12.4 G/DL (ref 12–16)
IMM GRANULOCYTES # BLD AUTO: 0.05 K/UL (ref 0–0.04)
IMM GRANULOCYTES NFR BLD AUTO: 0.8 % (ref 0–0.5)
LACTATE SERPL-SCNC: 1.2 MMOL/L (ref 0.5–2.2)
LYMPHOCYTES # BLD AUTO: 1.7 K/UL (ref 1–4.8)
LYMPHOCYTES NFR BLD: 25.2 % (ref 18–48)
MAGNESIUM SERPL-MCNC: 1.6 MG/DL (ref 1.6–2.6)
MCH RBC QN AUTO: 27.7 PG (ref 27–31)
MCHC RBC AUTO-ENTMCNC: 31.9 G/DL (ref 32–36)
MCV RBC AUTO: 87 FL (ref 82–98)
MONOCYTES # BLD AUTO: 0.7 K/UL (ref 0.3–1)
MONOCYTES NFR BLD: 10.2 % (ref 4–15)
NEUTROPHILS # BLD AUTO: 4 K/UL (ref 1.8–7.7)
NEUTROPHILS NFR BLD: 60.6 % (ref 38–73)
NRBC BLD-RTO: 0 /100 WBC
PHOSPHATE SERPL-MCNC: 2.7 MG/DL (ref 2.7–4.5)
PLATELET # BLD AUTO: 268 K/UL (ref 150–450)
PMV BLD AUTO: 9.9 FL (ref 9.2–12.9)
POTASSIUM SERPL-SCNC: 3.6 MMOL/L (ref 3.5–5.1)
PROCALCITONIN SERPL IA-MCNC: 0.04 NG/ML
PROT SERPL-MCNC: 5.8 G/DL (ref 6–8.4)
RBC # BLD AUTO: 4.48 M/UL (ref 4–5.4)
SODIUM SERPL-SCNC: 136 MMOL/L (ref 136–145)
TSH SERPL DL<=0.005 MIU/L-ACNC: 0.54 UIU/ML (ref 0.4–4)
WBC # BLD AUTO: 6.58 K/UL (ref 3.9–12.7)

## 2021-06-08 PROCEDURE — 99232 PR SUBSEQUENT HOSPITAL CARE,LEVL II: ICD-10-PCS | Mod: ,,, | Performed by: SURGERY

## 2021-06-08 PROCEDURE — 63600175 PHARM REV CODE 636 W HCPCS: Performed by: EMERGENCY MEDICINE

## 2021-06-08 PROCEDURE — 36415 COLL VENOUS BLD VENIPUNCTURE: CPT | Performed by: NURSE PRACTITIONER

## 2021-06-08 PROCEDURE — 83605 ASSAY OF LACTIC ACID: CPT | Performed by: NURSE PRACTITIONER

## 2021-06-08 PROCEDURE — 84443 ASSAY THYROID STIM HORMONE: CPT | Performed by: NURSE PRACTITIONER

## 2021-06-08 PROCEDURE — 63600175 PHARM REV CODE 636 W HCPCS: Performed by: NURSE PRACTITIONER

## 2021-06-08 PROCEDURE — 83735 ASSAY OF MAGNESIUM: CPT | Performed by: NURSE PRACTITIONER

## 2021-06-08 PROCEDURE — 21400001 HC TELEMETRY ROOM

## 2021-06-08 PROCEDURE — 84100 ASSAY OF PHOSPHORUS: CPT | Performed by: NURSE PRACTITIONER

## 2021-06-08 PROCEDURE — 80053 COMPREHEN METABOLIC PANEL: CPT | Performed by: NURSE PRACTITIONER

## 2021-06-08 PROCEDURE — 99232 SBSQ HOSP IP/OBS MODERATE 35: CPT | Mod: ,,, | Performed by: SURGERY

## 2021-06-08 PROCEDURE — S0030 INJECTION, METRONIDAZOLE: HCPCS | Performed by: EMERGENCY MEDICINE

## 2021-06-08 PROCEDURE — 84145 PROCALCITONIN (PCT): CPT | Performed by: NURSE PRACTITIONER

## 2021-06-08 PROCEDURE — 25000003 PHARM REV CODE 250: Performed by: EMERGENCY MEDICINE

## 2021-06-08 PROCEDURE — 85025 COMPLETE CBC W/AUTO DIFF WBC: CPT | Performed by: NURSE PRACTITIONER

## 2021-06-08 PROCEDURE — 25000003 PHARM REV CODE 250: Performed by: NURSE PRACTITIONER

## 2021-06-08 RX ADMIN — METRONIDAZOLE 500 MG: 500 INJECTION, SOLUTION INTRAVENOUS at 10:06

## 2021-06-08 RX ADMIN — HEPARIN SODIUM 5000 UNITS: 5000 INJECTION INTRAVENOUS; SUBCUTANEOUS at 06:06

## 2021-06-08 RX ADMIN — HEPARIN SODIUM 5000 UNITS: 5000 INJECTION INTRAVENOUS; SUBCUTANEOUS at 02:06

## 2021-06-08 RX ADMIN — CIPROFLOXACIN 400 MG: 2 INJECTION, SOLUTION INTRAVENOUS at 03:06

## 2021-06-08 RX ADMIN — CIPROFLOXACIN 400 MG: 2 INJECTION, SOLUTION INTRAVENOUS at 04:06

## 2021-06-08 RX ADMIN — ATORVASTATIN CALCIUM 10 MG: 10 TABLET, FILM COATED ORAL at 08:06

## 2021-06-08 RX ADMIN — SODIUM CHLORIDE: 0.9 INJECTION, SOLUTION INTRAVENOUS at 10:06

## 2021-06-08 RX ADMIN — PANTOPRAZOLE SODIUM 40 MG: 40 TABLET, DELAYED RELEASE ORAL at 08:06

## 2021-06-08 RX ADMIN — MEMANTINE 10 MG: 10 TABLET ORAL at 08:06

## 2021-06-08 RX ADMIN — METRONIDAZOLE 500 MG: 500 INJECTION, SOLUTION INTRAVENOUS at 06:06

## 2021-06-08 RX ADMIN — HEPARIN SODIUM 5000 UNITS: 5000 INJECTION INTRAVENOUS; SUBCUTANEOUS at 09:06

## 2021-06-08 RX ADMIN — METRONIDAZOLE 500 MG: 500 INJECTION, SOLUTION INTRAVENOUS at 02:06

## 2021-06-08 RX ADMIN — METRONIDAZOLE 500 MG: 500 INJECTION, SOLUTION INTRAVENOUS at 12:06

## 2021-06-08 RX ADMIN — FLUOXETINE 10 MG: 10 CAPSULE ORAL at 08:06

## 2021-06-09 ENCOUNTER — TELEPHONE (OUTPATIENT)
Dept: GASTROENTEROLOGY | Facility: CLINIC | Age: 81
End: 2021-06-09

## 2021-06-09 ENCOUNTER — PATIENT MESSAGE (OUTPATIENT)
Dept: GASTROENTEROLOGY | Facility: CLINIC | Age: 81
End: 2021-06-09

## 2021-06-09 ENCOUNTER — TELEPHONE (OUTPATIENT)
Dept: GASTROENTEROLOGY | Facility: HOSPITAL | Age: 81
End: 2021-06-09

## 2021-06-09 DIAGNOSIS — R10.84 GENERALIZED ABDOMINAL PAIN: ICD-10-CM

## 2021-06-09 DIAGNOSIS — R11.2 NAUSEA AND VOMITING, INTRACTABILITY OF VOMITING NOT SPECIFIED, UNSPECIFIED VOMITING TYPE: Primary | ICD-10-CM

## 2021-06-09 LAB
ALBUMIN SERPL BCP-MCNC: 2.9 G/DL (ref 3.5–5.2)
ALP SERPL-CCNC: 53 U/L (ref 55–135)
ALT SERPL W/O P-5'-P-CCNC: 5 U/L (ref 10–44)
ANION GAP SERPL CALC-SCNC: 11 MMOL/L (ref 8–16)
AST SERPL-CCNC: 14 U/L (ref 10–40)
BASOPHILS # BLD AUTO: 0.03 K/UL (ref 0–0.2)
BASOPHILS NFR BLD: 0.7 % (ref 0–1.9)
BILIRUB SERPL-MCNC: 0.4 MG/DL (ref 0.1–1)
BUN SERPL-MCNC: 9 MG/DL (ref 8–23)
CALCIUM SERPL-MCNC: 8.1 MG/DL (ref 8.7–10.5)
CHLORIDE SERPL-SCNC: 110 MMOL/L (ref 95–110)
CO2 SERPL-SCNC: 18 MMOL/L (ref 23–29)
CREAT SERPL-MCNC: 0.7 MG/DL (ref 0.5–1.4)
DIFFERENTIAL METHOD: ABNORMAL
EOSINOPHIL # BLD AUTO: 0.2 K/UL (ref 0–0.5)
EOSINOPHIL NFR BLD: 4.4 % (ref 0–8)
ERYTHROCYTE [DISTWIDTH] IN BLOOD BY AUTOMATED COUNT: 13.1 % (ref 11.5–14.5)
EST. GFR  (AFRICAN AMERICAN): >60 ML/MIN/1.73 M^2
EST. GFR  (NON AFRICAN AMERICAN): >60 ML/MIN/1.73 M^2
GLUCOSE SERPL-MCNC: 70 MG/DL (ref 70–110)
HCT VFR BLD AUTO: 37.7 % (ref 37–48.5)
HGB BLD-MCNC: 12 G/DL (ref 12–16)
IMM GRANULOCYTES # BLD AUTO: 0.04 K/UL (ref 0–0.04)
IMM GRANULOCYTES NFR BLD AUTO: 0.9 % (ref 0–0.5)
LYMPHOCYTES # BLD AUTO: 1.4 K/UL (ref 1–4.8)
LYMPHOCYTES NFR BLD: 33.7 % (ref 18–48)
MCH RBC QN AUTO: 27.6 PG (ref 27–31)
MCHC RBC AUTO-ENTMCNC: 31.8 G/DL (ref 32–36)
MCV RBC AUTO: 87 FL (ref 82–98)
MONOCYTES # BLD AUTO: 0.5 K/UL (ref 0.3–1)
MONOCYTES NFR BLD: 11.5 % (ref 4–15)
NEUTROPHILS # BLD AUTO: 2.1 K/UL (ref 1.8–7.7)
NEUTROPHILS NFR BLD: 48.8 % (ref 38–73)
NRBC BLD-RTO: 0 /100 WBC
PLATELET # BLD AUTO: 271 K/UL (ref 150–450)
PMV BLD AUTO: 10.1 FL (ref 9.2–12.9)
POTASSIUM SERPL-SCNC: 3.3 MMOL/L (ref 3.5–5.1)
PROT SERPL-MCNC: 5.8 G/DL (ref 6–8.4)
RBC # BLD AUTO: 4.35 M/UL (ref 4–5.4)
SODIUM SERPL-SCNC: 139 MMOL/L (ref 136–145)
WBC # BLD AUTO: 4.27 K/UL (ref 3.9–12.7)

## 2021-06-09 PROCEDURE — 25000003 PHARM REV CODE 250: Performed by: EMERGENCY MEDICINE

## 2021-06-09 PROCEDURE — 99232 SBSQ HOSP IP/OBS MODERATE 35: CPT | Mod: ,,, | Performed by: SURGERY

## 2021-06-09 PROCEDURE — 25000003 PHARM REV CODE 250: Performed by: NURSE PRACTITIONER

## 2021-06-09 PROCEDURE — 63600175 PHARM REV CODE 636 W HCPCS: Performed by: NURSE PRACTITIONER

## 2021-06-09 PROCEDURE — 99223 PR INITIAL HOSPITAL CARE,LEVL III: ICD-10-PCS | Mod: ,,, | Performed by: INTERNAL MEDICINE

## 2021-06-09 PROCEDURE — S0030 INJECTION, METRONIDAZOLE: HCPCS | Performed by: EMERGENCY MEDICINE

## 2021-06-09 PROCEDURE — 36415 COLL VENOUS BLD VENIPUNCTURE: CPT | Performed by: NURSE PRACTITIONER

## 2021-06-09 PROCEDURE — 63600175 PHARM REV CODE 636 W HCPCS: Performed by: EMERGENCY MEDICINE

## 2021-06-09 PROCEDURE — 99223 1ST HOSP IP/OBS HIGH 75: CPT | Mod: ,,, | Performed by: INTERNAL MEDICINE

## 2021-06-09 PROCEDURE — 80053 COMPREHEN METABOLIC PANEL: CPT | Performed by: NURSE PRACTITIONER

## 2021-06-09 PROCEDURE — 99232 PR SUBSEQUENT HOSPITAL CARE,LEVL II: ICD-10-PCS | Mod: ,,, | Performed by: SURGERY

## 2021-06-09 PROCEDURE — 21400001 HC TELEMETRY ROOM

## 2021-06-09 PROCEDURE — 85025 COMPLETE CBC W/AUTO DIFF WBC: CPT | Performed by: NURSE PRACTITIONER

## 2021-06-09 PROCEDURE — 25000003 PHARM REV CODE 250: Performed by: INTERNAL MEDICINE

## 2021-06-09 RX ORDER — GUAIFENESIN 600 MG/1
600 TABLET, EXTENDED RELEASE ORAL 2 TIMES DAILY
Status: DISCONTINUED | OUTPATIENT
Start: 2021-06-09 | End: 2021-06-10 | Stop reason: HOSPADM

## 2021-06-09 RX ORDER — METRONIDAZOLE 500 MG/1
500 TABLET ORAL EVERY 8 HOURS
Qty: 9 TABLET | Refills: 0 | Status: SHIPPED | OUTPATIENT
Start: 2021-06-09 | End: 2021-06-12

## 2021-06-09 RX ORDER — SODIUM, POTASSIUM,MAG SULFATES 17.5-3.13G
SOLUTION, RECONSTITUTED, ORAL ORAL
Qty: 354 ML | Refills: 0 | Status: ON HOLD | OUTPATIENT
Start: 2021-06-09 | End: 2021-06-23 | Stop reason: HOSPADM

## 2021-06-09 RX ORDER — CIPROFLOXACIN 500 MG/1
500 TABLET ORAL EVERY 12 HOURS
Qty: 6 TABLET | Refills: 0 | Status: SHIPPED | OUTPATIENT
Start: 2021-06-09 | End: 2021-06-12

## 2021-06-09 RX ADMIN — MEMANTINE 10 MG: 10 TABLET ORAL at 08:06

## 2021-06-09 RX ADMIN — HEPARIN SODIUM 5000 UNITS: 5000 INJECTION INTRAVENOUS; SUBCUTANEOUS at 10:06

## 2021-06-09 RX ADMIN — FLUOXETINE 10 MG: 10 CAPSULE ORAL at 08:06

## 2021-06-09 RX ADMIN — HEPARIN SODIUM 5000 UNITS: 5000 INJECTION INTRAVENOUS; SUBCUTANEOUS at 06:06

## 2021-06-09 RX ADMIN — POLYETHYLENE GLYCOL 3350 17 G: 17 POWDER, FOR SOLUTION ORAL at 08:06

## 2021-06-09 RX ADMIN — GUAIFENESIN 600 MG: 600 TABLET, EXTENDED RELEASE ORAL at 05:06

## 2021-06-09 RX ADMIN — ATORVASTATIN CALCIUM 10 MG: 10 TABLET, FILM COATED ORAL at 08:06

## 2021-06-09 RX ADMIN — PANTOPRAZOLE SODIUM 40 MG: 40 TABLET, DELAYED RELEASE ORAL at 08:06

## 2021-06-09 RX ADMIN — METRONIDAZOLE 500 MG: 500 INJECTION, SOLUTION INTRAVENOUS at 06:06

## 2021-06-09 RX ADMIN — CIPROFLOXACIN 400 MG: 2 INJECTION, SOLUTION INTRAVENOUS at 03:06

## 2021-06-09 RX ADMIN — CIPROFLOXACIN 400 MG: 2 INJECTION, SOLUTION INTRAVENOUS at 02:06

## 2021-06-09 RX ADMIN — HEPARIN SODIUM 5000 UNITS: 5000 INJECTION INTRAVENOUS; SUBCUTANEOUS at 01:06

## 2021-06-09 RX ADMIN — METRONIDAZOLE 500 MG: 500 INJECTION, SOLUTION INTRAVENOUS at 02:06

## 2021-06-09 RX ADMIN — METRONIDAZOLE 500 MG: 500 INJECTION, SOLUTION INTRAVENOUS at 10:06

## 2021-06-09 RX ADMIN — ONDANSETRON 4 MG: 2 INJECTION INTRAMUSCULAR; INTRAVENOUS at 06:06

## 2021-06-09 RX ADMIN — SODIUM CHLORIDE: 0.9 INJECTION, SOLUTION INTRAVENOUS at 06:06

## 2021-06-10 ENCOUNTER — PATIENT MESSAGE (OUTPATIENT)
Dept: GASTROENTEROLOGY | Facility: CLINIC | Age: 81
End: 2021-06-10

## 2021-06-10 ENCOUNTER — TELEPHONE (OUTPATIENT)
Dept: GASTROENTEROLOGY | Facility: CLINIC | Age: 81
End: 2021-06-10

## 2021-06-10 VITALS
WEIGHT: 148.81 LBS | HEIGHT: 62 IN | DIASTOLIC BLOOD PRESSURE: 61 MMHG | SYSTOLIC BLOOD PRESSURE: 112 MMHG | TEMPERATURE: 98 F | BODY MASS INDEX: 27.38 KG/M2 | RESPIRATION RATE: 18 BRPM | OXYGEN SATURATION: 98 % | HEART RATE: 49 BPM

## 2021-06-10 PROBLEM — K56.609 SMALL BOWEL OBSTRUCTION: Status: RESOLVED | Noted: 2021-06-07 | Resolved: 2021-06-10

## 2021-06-10 PROBLEM — K52.9 COLITIS: Status: RESOLVED | Noted: 2020-04-13 | Resolved: 2021-06-10

## 2021-06-10 LAB
ALBUMIN SERPL BCP-MCNC: 2.7 G/DL (ref 3.5–5.2)
ALP SERPL-CCNC: 46 U/L (ref 55–135)
ALT SERPL W/O P-5'-P-CCNC: 5 U/L (ref 10–44)
ANION GAP SERPL CALC-SCNC: 11 MMOL/L (ref 8–16)
AST SERPL-CCNC: 12 U/L (ref 10–40)
BASOPHILS # BLD AUTO: 0.04 K/UL (ref 0–0.2)
BASOPHILS NFR BLD: 1.1 % (ref 0–1.9)
BILIRUB SERPL-MCNC: 0.3 MG/DL (ref 0.1–1)
BUN SERPL-MCNC: 5 MG/DL (ref 8–23)
CALCIUM SERPL-MCNC: 7.7 MG/DL (ref 8.7–10.5)
CHLORIDE SERPL-SCNC: 110 MMOL/L (ref 95–110)
CO2 SERPL-SCNC: 18 MMOL/L (ref 23–29)
CREAT SERPL-MCNC: 0.7 MG/DL (ref 0.5–1.4)
DIFFERENTIAL METHOD: ABNORMAL
EOSINOPHIL # BLD AUTO: 0.2 K/UL (ref 0–0.5)
EOSINOPHIL NFR BLD: 4.5 % (ref 0–8)
ERYTHROCYTE [DISTWIDTH] IN BLOOD BY AUTOMATED COUNT: 13.2 % (ref 11.5–14.5)
EST. GFR  (AFRICAN AMERICAN): >60 ML/MIN/1.73 M^2
EST. GFR  (NON AFRICAN AMERICAN): >60 ML/MIN/1.73 M^2
GLUCOSE SERPL-MCNC: 95 MG/DL (ref 70–110)
HCT VFR BLD AUTO: 34.4 % (ref 37–48.5)
HGB BLD-MCNC: 11.2 G/DL (ref 12–16)
IMM GRANULOCYTES # BLD AUTO: 0.02 K/UL (ref 0–0.04)
IMM GRANULOCYTES NFR BLD AUTO: 0.5 % (ref 0–0.5)
LYMPHOCYTES # BLD AUTO: 1.6 K/UL (ref 1–4.8)
LYMPHOCYTES NFR BLD: 40.8 % (ref 18–48)
MCH RBC QN AUTO: 27.9 PG (ref 27–31)
MCHC RBC AUTO-ENTMCNC: 32.6 G/DL (ref 32–36)
MCV RBC AUTO: 86 FL (ref 82–98)
MONOCYTES # BLD AUTO: 0.5 K/UL (ref 0.3–1)
MONOCYTES NFR BLD: 12.9 % (ref 4–15)
NEUTROPHILS # BLD AUTO: 1.5 K/UL (ref 1.8–7.7)
NEUTROPHILS NFR BLD: 40.2 % (ref 38–73)
NRBC BLD-RTO: 0 /100 WBC
PLATELET # BLD AUTO: 283 K/UL (ref 150–450)
PMV BLD AUTO: 9.6 FL (ref 9.2–12.9)
POTASSIUM SERPL-SCNC: 3.3 MMOL/L (ref 3.5–5.1)
PROT SERPL-MCNC: 5.3 G/DL (ref 6–8.4)
RBC # BLD AUTO: 4.01 M/UL (ref 4–5.4)
SODIUM SERPL-SCNC: 139 MMOL/L (ref 136–145)
WBC # BLD AUTO: 3.8 K/UL (ref 3.9–12.7)

## 2021-06-10 PROCEDURE — 63600175 PHARM REV CODE 636 W HCPCS: Performed by: EMERGENCY MEDICINE

## 2021-06-10 PROCEDURE — 25000003 PHARM REV CODE 250: Performed by: INTERNAL MEDICINE

## 2021-06-10 PROCEDURE — 85025 COMPLETE CBC W/AUTO DIFF WBC: CPT | Performed by: NURSE PRACTITIONER

## 2021-06-10 PROCEDURE — S0030 INJECTION, METRONIDAZOLE: HCPCS | Performed by: EMERGENCY MEDICINE

## 2021-06-10 PROCEDURE — 25000003 PHARM REV CODE 250: Performed by: EMERGENCY MEDICINE

## 2021-06-10 PROCEDURE — 25000003 PHARM REV CODE 250: Performed by: NURSE PRACTITIONER

## 2021-06-10 PROCEDURE — 63600175 PHARM REV CODE 636 W HCPCS: Performed by: NURSE PRACTITIONER

## 2021-06-10 PROCEDURE — 96372 THER/PROPH/DIAG INJ SC/IM: CPT

## 2021-06-10 PROCEDURE — 36415 COLL VENOUS BLD VENIPUNCTURE: CPT | Performed by: NURSE PRACTITIONER

## 2021-06-10 PROCEDURE — 80053 COMPREHEN METABOLIC PANEL: CPT | Performed by: NURSE PRACTITIONER

## 2021-06-10 RX ORDER — POTASSIUM CHLORIDE 20 MEQ/1
40 TABLET, EXTENDED RELEASE ORAL ONCE
Status: DISCONTINUED | OUTPATIENT
Start: 2021-06-10 | End: 2021-06-10 | Stop reason: HOSPADM

## 2021-06-10 RX ADMIN — METRONIDAZOLE 500 MG: 500 INJECTION, SOLUTION INTRAVENOUS at 06:06

## 2021-06-10 RX ADMIN — SODIUM CHLORIDE: 0.9 INJECTION, SOLUTION INTRAVENOUS at 04:06

## 2021-06-10 RX ADMIN — HEPARIN SODIUM 5000 UNITS: 5000 INJECTION INTRAVENOUS; SUBCUTANEOUS at 06:06

## 2021-06-10 RX ADMIN — PANTOPRAZOLE SODIUM 40 MG: 40 TABLET, DELAYED RELEASE ORAL at 08:06

## 2021-06-10 RX ADMIN — GUAIFENESIN 600 MG: 600 TABLET, EXTENDED RELEASE ORAL at 08:06

## 2021-06-10 RX ADMIN — ATORVASTATIN CALCIUM 10 MG: 10 TABLET, FILM COATED ORAL at 08:06

## 2021-06-10 RX ADMIN — POLYETHYLENE GLYCOL 3350 17 G: 17 POWDER, FOR SOLUTION ORAL at 08:06

## 2021-06-10 RX ADMIN — FLUOXETINE 10 MG: 10 CAPSULE ORAL at 08:06

## 2021-06-10 RX ADMIN — MEMANTINE 10 MG: 10 TABLET ORAL at 08:06

## 2021-06-10 RX ADMIN — CIPROFLOXACIN 400 MG: 2 INJECTION, SOLUTION INTRAVENOUS at 03:06

## 2021-06-11 ENCOUNTER — TELEPHONE (OUTPATIENT)
Dept: ADMINISTRATIVE | Facility: HOSPITAL | Age: 81
End: 2021-06-11

## 2021-06-11 ENCOUNTER — PATIENT MESSAGE (OUTPATIENT)
Dept: PRIMARY CARE CLINIC | Facility: CLINIC | Age: 81
End: 2021-06-11

## 2021-06-11 ENCOUNTER — PATIENT MESSAGE (OUTPATIENT)
Dept: GASTROENTEROLOGY | Facility: CLINIC | Age: 81
End: 2021-06-11

## 2021-06-11 DIAGNOSIS — K52.9 COLITIS: ICD-10-CM

## 2021-06-11 DIAGNOSIS — G31.84 MILD COGNITIVE IMPAIRMENT WITH MEMORY LOSS: ICD-10-CM

## 2021-06-11 DIAGNOSIS — Z93.3 COLOSTOMY IN PLACE: Primary | ICD-10-CM

## 2021-06-16 ENCOUNTER — TELEPHONE (OUTPATIENT)
Dept: GASTROENTEROLOGY | Facility: CLINIC | Age: 81
End: 2021-06-16

## 2021-06-16 PROCEDURE — G0180 PR HOME HEALTH MD CERTIFICATION: ICD-10-PCS | Mod: ,,, | Performed by: FAMILY MEDICINE

## 2021-06-16 PROCEDURE — G0180 MD CERTIFICATION HHA PATIENT: HCPCS | Mod: ,,, | Performed by: FAMILY MEDICINE

## 2021-06-20 ENCOUNTER — LAB VISIT (OUTPATIENT)
Dept: URGENT CARE | Facility: CLINIC | Age: 81
End: 2021-06-20
Payer: MEDICARE

## 2021-06-20 DIAGNOSIS — R10.84 GENERALIZED ABDOMINAL PAIN: ICD-10-CM

## 2021-06-20 DIAGNOSIS — R11.2 NON-INTRACTABLE VOMITING WITH NAUSEA: ICD-10-CM

## 2021-06-20 PROCEDURE — U0005 INFEC AGEN DETEC AMPLI PROBE: HCPCS | Performed by: NURSE PRACTITIONER

## 2021-06-20 PROCEDURE — U0003 INFECTIOUS AGENT DETECTION BY NUCLEIC ACID (DNA OR RNA); SEVERE ACUTE RESPIRATORY SYNDROME CORONAVIRUS 2 (SARS-COV-2) (CORONAVIRUS DISEASE [COVID-19]), AMPLIFIED PROBE TECHNIQUE, MAKING USE OF HIGH THROUGHPUT TECHNOLOGIES AS DESCRIBED BY CMS-2020-01-R: HCPCS | Performed by: NURSE PRACTITIONER

## 2021-06-21 LAB — SARS-COV-2 RNA RESP QL NAA+PROBE: NOT DETECTED

## 2021-06-22 ENCOUNTER — TELEPHONE (OUTPATIENT)
Dept: PRIMARY CARE CLINIC | Facility: CLINIC | Age: 81
End: 2021-06-22

## 2021-06-23 ENCOUNTER — PATIENT MESSAGE (OUTPATIENT)
Dept: PRIMARY CARE CLINIC | Facility: CLINIC | Age: 81
End: 2021-06-23

## 2021-06-23 ENCOUNTER — ANESTHESIA EVENT (OUTPATIENT)
Dept: ENDOSCOPY | Facility: HOSPITAL | Age: 81
End: 2021-06-23
Payer: MEDICARE

## 2021-06-23 ENCOUNTER — TELEPHONE (OUTPATIENT)
Dept: ENDOSCOPY | Facility: HOSPITAL | Age: 81
End: 2021-06-23

## 2021-06-23 ENCOUNTER — HOSPITAL ENCOUNTER (OUTPATIENT)
Facility: HOSPITAL | Age: 81
Discharge: HOME | End: 2021-06-23
Attending: INTERNAL MEDICINE | Admitting: INTERNAL MEDICINE
Payer: MEDICARE

## 2021-06-23 ENCOUNTER — ANESTHESIA (OUTPATIENT)
Dept: ENDOSCOPY | Facility: HOSPITAL | Age: 81
End: 2021-06-23
Payer: MEDICARE

## 2021-06-23 DIAGNOSIS — K52.9 COLITIS: ICD-10-CM

## 2021-06-23 DIAGNOSIS — R93.3 ABNORMAL FINDING ON GI TRACT IMAGING: Primary | ICD-10-CM

## 2021-06-23 PROCEDURE — 37000009 HC ANESTHESIA EA ADD 15 MINS: Performed by: INTERNAL MEDICINE

## 2021-06-23 PROCEDURE — 44389 PR COLONOSCOPY THRU STOMA,BIOPSY: ICD-10-PCS | Mod: ,,, | Performed by: INTERNAL MEDICINE

## 2021-06-23 PROCEDURE — 88305 TISSUE EXAM BY PATHOLOGIST: CPT | Mod: 26,,, | Performed by: PATHOLOGY

## 2021-06-23 PROCEDURE — 63600175 PHARM REV CODE 636 W HCPCS: Performed by: ANESTHESIOLOGY

## 2021-06-23 PROCEDURE — 88305 TISSUE EXAM BY PATHOLOGIST: ICD-10-PCS | Mod: 26,,, | Performed by: PATHOLOGY

## 2021-06-23 PROCEDURE — 27201012 HC FORCEPS, HOT/COLD, DISP: Performed by: INTERNAL MEDICINE

## 2021-06-23 PROCEDURE — 88305 TISSUE EXAM BY PATHOLOGIST: CPT | Mod: 59 | Performed by: PATHOLOGY

## 2021-06-23 PROCEDURE — 44389 COLONOSCOPY WITH BIOPSY: CPT | Mod: ,,, | Performed by: INTERNAL MEDICINE

## 2021-06-23 PROCEDURE — 63600175 PHARM REV CODE 636 W HCPCS: Performed by: NURSE ANESTHETIST, CERTIFIED REGISTERED

## 2021-06-23 PROCEDURE — 44389 COLONOSCOPY WITH BIOPSY: CPT | Performed by: INTERNAL MEDICINE

## 2021-06-23 PROCEDURE — 25000003 PHARM REV CODE 250: Performed by: ANESTHESIOLOGY

## 2021-06-23 PROCEDURE — 37000008 HC ANESTHESIA 1ST 15 MINUTES: Performed by: INTERNAL MEDICINE

## 2021-06-23 RX ORDER — SODIUM CHLORIDE, SODIUM LACTATE, POTASSIUM CHLORIDE, CALCIUM CHLORIDE 600; 310; 30; 20 MG/100ML; MG/100ML; MG/100ML; MG/100ML
INJECTION, SOLUTION INTRAVENOUS CONTINUOUS PRN
Status: DISCONTINUED | OUTPATIENT
Start: 2021-06-23 | End: 2021-06-23

## 2021-06-23 RX ORDER — SODIUM CHLORIDE 0.9 % (FLUSH) 0.9 %
10 SYRINGE (ML) INJECTION
Status: DISCONTINUED | OUTPATIENT
Start: 2021-06-23 | End: 2021-06-23 | Stop reason: HOSPADM

## 2021-06-23 RX ORDER — PROPOFOL 10 MG/ML
VIAL (ML) INTRAVENOUS
Status: DISCONTINUED | OUTPATIENT
Start: 2021-06-23 | End: 2021-06-23

## 2021-06-23 RX ORDER — LIDOCAINE HYDROCHLORIDE 10 MG/ML
INJECTION, SOLUTION EPIDURAL; INFILTRATION; INTRACAUDAL; PERINEURAL
Status: DISCONTINUED | OUTPATIENT
Start: 2021-06-23 | End: 2021-06-23

## 2021-06-23 RX ORDER — SODIUM CHLORIDE, SODIUM LACTATE, POTASSIUM CHLORIDE, CALCIUM CHLORIDE 600; 310; 30; 20 MG/100ML; MG/100ML; MG/100ML; MG/100ML
INJECTION, SOLUTION INTRAVENOUS CONTINUOUS
Status: DISCONTINUED | OUTPATIENT
Start: 2021-06-23 | End: 2021-06-23 | Stop reason: HOSPADM

## 2021-06-23 RX ADMIN — LIDOCAINE HYDROCHLORIDE 50 MG: 10 INJECTION, SOLUTION EPIDURAL; INFILTRATION; INTRACAUDAL; PERINEURAL at 01:06

## 2021-06-23 RX ADMIN — PROPOFOL 20 MG: 10 INJECTION, EMULSION INTRAVENOUS at 01:06

## 2021-06-23 RX ADMIN — PROPOFOL 50 MG: 10 INJECTION, EMULSION INTRAVENOUS at 01:06

## 2021-06-23 RX ADMIN — SODIUM CHLORIDE, SODIUM LACTATE, POTASSIUM CHLORIDE, AND CALCIUM CHLORIDE: 600; 310; 30; 20 INJECTION, SOLUTION INTRAVENOUS at 01:06

## 2021-06-24 VITALS
WEIGHT: 138.25 LBS | HEIGHT: 62 IN | OXYGEN SATURATION: 97 % | TEMPERATURE: 98 F | DIASTOLIC BLOOD PRESSURE: 87 MMHG | HEART RATE: 68 BPM | BODY MASS INDEX: 25.44 KG/M2 | RESPIRATION RATE: 18 BRPM | SYSTOLIC BLOOD PRESSURE: 128 MMHG

## 2021-06-30 LAB
FINAL PATHOLOGIC DIAGNOSIS: NORMAL
GROSS: NORMAL
Lab: NORMAL
MICROSCOPIC EXAM: NORMAL

## 2021-07-08 ENCOUNTER — EXTERNAL HOME HEALTH (OUTPATIENT)
Dept: HOME HEALTH SERVICES | Facility: HOSPITAL | Age: 81
End: 2021-07-08
Payer: MEDICARE

## 2021-07-12 ENCOUNTER — PATIENT MESSAGE (OUTPATIENT)
Dept: GASTROENTEROLOGY | Facility: CLINIC | Age: 81
End: 2021-07-12

## 2021-07-28 ENCOUNTER — PATIENT OUTREACH (OUTPATIENT)
Dept: ADMINISTRATIVE | Facility: OTHER | Age: 81
End: 2021-07-28

## 2021-07-29 ENCOUNTER — OFFICE VISIT (OUTPATIENT)
Dept: CARDIOLOGY | Facility: CLINIC | Age: 81
End: 2021-07-29
Payer: MEDICARE

## 2021-07-29 VITALS
BODY MASS INDEX: 27.38 KG/M2 | HEART RATE: 49 BPM | DIASTOLIC BLOOD PRESSURE: 80 MMHG | OXYGEN SATURATION: 99 % | WEIGHT: 149.69 LBS | SYSTOLIC BLOOD PRESSURE: 134 MMHG

## 2021-07-29 DIAGNOSIS — I10 ESSENTIAL HYPERTENSION: Chronic | ICD-10-CM

## 2021-07-29 DIAGNOSIS — I77.1 TORTUOUS AORTA: ICD-10-CM

## 2021-07-29 DIAGNOSIS — G31.84 MILD COGNITIVE IMPAIRMENT WITH MEMORY LOSS: ICD-10-CM

## 2021-07-29 DIAGNOSIS — E78.00 PURE HYPERCHOLESTEROLEMIA: Primary | ICD-10-CM

## 2021-07-29 PROCEDURE — 99214 PR OFFICE/OUTPT VISIT, EST, LEVL IV, 30-39 MIN: ICD-10-PCS | Mod: S$GLB,,, | Performed by: INTERNAL MEDICINE

## 2021-07-29 PROCEDURE — 99214 OFFICE O/P EST MOD 30 MIN: CPT | Mod: S$GLB,,, | Performed by: INTERNAL MEDICINE

## 2021-07-29 PROCEDURE — 1159F PR MEDICATION LIST DOCUMENTED IN MEDICAL RECORD: ICD-10-PCS | Mod: CPTII,S$GLB,, | Performed by: INTERNAL MEDICINE

## 2021-07-29 PROCEDURE — 99499 UNLISTED E&M SERVICE: CPT | Mod: S$GLB,,, | Performed by: INTERNAL MEDICINE

## 2021-07-29 PROCEDURE — 1160F RVW MEDS BY RX/DR IN RCRD: CPT | Mod: CPTII,S$GLB,, | Performed by: INTERNAL MEDICINE

## 2021-07-29 PROCEDURE — 3075F PR MOST RECENT SYSTOLIC BLOOD PRESS GE 130-139MM HG: ICD-10-PCS | Mod: CPTII,S$GLB,, | Performed by: INTERNAL MEDICINE

## 2021-07-29 PROCEDURE — 3079F DIAST BP 80-89 MM HG: CPT | Mod: CPTII,S$GLB,, | Performed by: INTERNAL MEDICINE

## 2021-07-29 PROCEDURE — 3079F PR MOST RECENT DIASTOLIC BLOOD PRESSURE 80-89 MM HG: ICD-10-PCS | Mod: CPTII,S$GLB,, | Performed by: INTERNAL MEDICINE

## 2021-07-29 PROCEDURE — 99999 PR PBB SHADOW E&M-EST. PATIENT-LVL IV: CPT | Mod: PBBFAC,,, | Performed by: INTERNAL MEDICINE

## 2021-07-29 PROCEDURE — 3075F SYST BP GE 130 - 139MM HG: CPT | Mod: CPTII,S$GLB,, | Performed by: INTERNAL MEDICINE

## 2021-07-29 PROCEDURE — 99999 PR PBB SHADOW E&M-EST. PATIENT-LVL IV: ICD-10-PCS | Mod: PBBFAC,,, | Performed by: INTERNAL MEDICINE

## 2021-07-29 PROCEDURE — 1159F MED LIST DOCD IN RCRD: CPT | Mod: CPTII,S$GLB,, | Performed by: INTERNAL MEDICINE

## 2021-07-29 PROCEDURE — 99499 RISK ADDL DX/OHS AUDIT: ICD-10-PCS | Mod: S$GLB,,, | Performed by: INTERNAL MEDICINE

## 2021-07-29 PROCEDURE — 1160F PR REVIEW ALL MEDS BY PRESCRIBER/CLIN PHARMACIST DOCUMENTED: ICD-10-PCS | Mod: CPTII,S$GLB,, | Performed by: INTERNAL MEDICINE

## 2021-08-02 ENCOUNTER — PATIENT MESSAGE (OUTPATIENT)
Dept: PRIMARY CARE CLINIC | Facility: CLINIC | Age: 81
End: 2021-08-02

## 2021-08-04 ENCOUNTER — PATIENT MESSAGE (OUTPATIENT)
Dept: PRIMARY CARE CLINIC | Facility: CLINIC | Age: 81
End: 2021-08-04

## 2021-08-04 RX ORDER — ALPRAZOLAM 0.25 MG/1
0.25 TABLET ORAL 2 TIMES DAILY PRN
Qty: 20 TABLET | Refills: 0 | Status: SHIPPED | OUTPATIENT
Start: 2021-08-04 | End: 2021-09-30 | Stop reason: SDUPTHER

## 2021-09-15 ENCOUNTER — PATIENT MESSAGE (OUTPATIENT)
Dept: PRIMARY CARE CLINIC | Facility: CLINIC | Age: 81
End: 2021-09-15

## 2021-09-15 ENCOUNTER — TELEPHONE (OUTPATIENT)
Dept: PRIMARY CARE CLINIC | Facility: CLINIC | Age: 81
End: 2021-09-15

## 2021-09-30 RX ORDER — ALPRAZOLAM 0.25 MG/1
0.25 TABLET ORAL 2 TIMES DAILY PRN
Qty: 20 TABLET | Refills: 0 | Status: SHIPPED | OUTPATIENT
Start: 2021-09-30 | End: 2021-11-19 | Stop reason: SDUPTHER

## 2021-10-18 ENCOUNTER — TELEPHONE (OUTPATIENT)
Dept: ADMINISTRATIVE | Facility: HOSPITAL | Age: 81
End: 2021-10-18

## 2021-11-10 ENCOUNTER — TELEPHONE (OUTPATIENT)
Dept: ADMINISTRATIVE | Facility: HOSPITAL | Age: 81
End: 2021-11-10
Payer: MEDICARE

## 2021-11-18 ENCOUNTER — PATIENT MESSAGE (OUTPATIENT)
Dept: PRIMARY CARE CLINIC | Facility: CLINIC | Age: 81
End: 2021-11-18
Payer: MEDICARE

## 2021-11-19 ENCOUNTER — PATIENT MESSAGE (OUTPATIENT)
Dept: PRIMARY CARE CLINIC | Facility: CLINIC | Age: 81
End: 2021-11-19
Payer: MEDICARE

## 2021-11-19 RX ORDER — ALPRAZOLAM 0.25 MG/1
0.25 TABLET ORAL 2 TIMES DAILY PRN
Qty: 20 TABLET | Refills: 0 | Status: SHIPPED | OUTPATIENT
Start: 2021-11-19 | End: 2022-02-21 | Stop reason: SDUPTHER

## 2022-01-13 ENCOUNTER — TELEPHONE (OUTPATIENT)
Dept: ADMINISTRATIVE | Facility: HOSPITAL | Age: 82
End: 2022-01-13
Payer: MEDICARE

## 2022-02-01 ENCOUNTER — PATIENT MESSAGE (OUTPATIENT)
Dept: INTERNAL MEDICINE | Facility: CLINIC | Age: 82
End: 2022-02-01

## 2022-02-01 ENCOUNTER — OFFICE VISIT (OUTPATIENT)
Dept: INTERNAL MEDICINE | Facility: CLINIC | Age: 82
End: 2022-02-01
Payer: MEDICARE

## 2022-02-01 ENCOUNTER — HOSPITAL ENCOUNTER (OUTPATIENT)
Dept: RADIOLOGY | Facility: HOSPITAL | Age: 82
Discharge: HOME | End: 2022-02-01
Attending: PEDIATRICS
Payer: MEDICARE

## 2022-02-01 VITALS
TEMPERATURE: 98 F | HEIGHT: 62 IN | DIASTOLIC BLOOD PRESSURE: 82 MMHG | SYSTOLIC BLOOD PRESSURE: 116 MMHG | BODY MASS INDEX: 25.55 KG/M2 | WEIGHT: 138.88 LBS

## 2022-02-01 DIAGNOSIS — M54.9 PAIN OF COSTOVERTEBRAL ANGLE: ICD-10-CM

## 2022-02-01 DIAGNOSIS — M54.9 PAIN OF COSTOVERTEBRAL ANGLE: Primary | ICD-10-CM

## 2022-02-01 DIAGNOSIS — F01.50 VASCULAR DEMENTIA WITHOUT BEHAVIORAL DISTURBANCE: ICD-10-CM

## 2022-02-01 PROCEDURE — 99214 OFFICE O/P EST MOD 30 MIN: CPT | Mod: S$GLB,,, | Performed by: PEDIATRICS

## 2022-02-01 PROCEDURE — 72080 X-RAY EXAM THORACOLMB 2/> VW: CPT | Mod: TC

## 2022-02-01 PROCEDURE — 1125F AMNT PAIN NOTED PAIN PRSNT: CPT | Mod: CPTII,S$GLB,, | Performed by: PEDIATRICS

## 2022-02-01 PROCEDURE — 3074F SYST BP LT 130 MM HG: CPT | Mod: CPTII,S$GLB,, | Performed by: PEDIATRICS

## 2022-02-01 PROCEDURE — 1101F PR PT FALLS ASSESS DOC 0-1 FALLS W/OUT INJ PAST YR: ICD-10-PCS | Mod: CPTII,S$GLB,, | Performed by: PEDIATRICS

## 2022-02-01 PROCEDURE — 1159F PR MEDICATION LIST DOCUMENTED IN MEDICAL RECORD: ICD-10-PCS | Mod: CPTII,S$GLB,, | Performed by: PEDIATRICS

## 2022-02-01 PROCEDURE — 99999 PR PBB SHADOW E&M-EST. PATIENT-LVL IV: CPT | Mod: PBBFAC,,, | Performed by: PEDIATRICS

## 2022-02-01 PROCEDURE — 3288F FALL RISK ASSESSMENT DOCD: CPT | Mod: CPTII,S$GLB,, | Performed by: PEDIATRICS

## 2022-02-01 PROCEDURE — 1125F PR PAIN SEVERITY QUANTIFIED, PAIN PRESENT: ICD-10-PCS | Mod: CPTII,S$GLB,, | Performed by: PEDIATRICS

## 2022-02-01 PROCEDURE — 3079F PR MOST RECENT DIASTOLIC BLOOD PRESSURE 80-89 MM HG: ICD-10-PCS | Mod: CPTII,S$GLB,, | Performed by: PEDIATRICS

## 2022-02-01 PROCEDURE — 3079F DIAST BP 80-89 MM HG: CPT | Mod: CPTII,S$GLB,, | Performed by: PEDIATRICS

## 2022-02-01 PROCEDURE — 1101F PT FALLS ASSESS-DOCD LE1/YR: CPT | Mod: CPTII,S$GLB,, | Performed by: PEDIATRICS

## 2022-02-01 PROCEDURE — 3288F PR FALLS RISK ASSESSMENT DOCUMENTED: ICD-10-PCS | Mod: CPTII,S$GLB,, | Performed by: PEDIATRICS

## 2022-02-01 PROCEDURE — 72080 XR THORACOLUMBAR SPINE AP LATERAL: ICD-10-PCS | Mod: 26,,, | Performed by: RADIOLOGY

## 2022-02-01 PROCEDURE — 3074F PR MOST RECENT SYSTOLIC BLOOD PRESSURE < 130 MM HG: ICD-10-PCS | Mod: CPTII,S$GLB,, | Performed by: PEDIATRICS

## 2022-02-01 PROCEDURE — 1160F RVW MEDS BY RX/DR IN RCRD: CPT | Mod: CPTII,S$GLB,, | Performed by: PEDIATRICS

## 2022-02-01 PROCEDURE — 72080 X-RAY EXAM THORACOLMB 2/> VW: CPT | Mod: 26,,, | Performed by: RADIOLOGY

## 2022-02-01 PROCEDURE — 99999 PR PBB SHADOW E&M-EST. PATIENT-LVL IV: ICD-10-PCS | Mod: PBBFAC,,, | Performed by: PEDIATRICS

## 2022-02-01 PROCEDURE — 1160F PR REVIEW ALL MEDS BY PRESCRIBER/CLIN PHARMACIST DOCUMENTED: ICD-10-PCS | Mod: CPTII,S$GLB,, | Performed by: PEDIATRICS

## 2022-02-01 PROCEDURE — 99214 PR OFFICE/OUTPT VISIT, EST, LEVL IV, 30-39 MIN: ICD-10-PCS | Mod: S$GLB,,, | Performed by: PEDIATRICS

## 2022-02-01 PROCEDURE — 1159F MED LIST DOCD IN RCRD: CPT | Mod: CPTII,S$GLB,, | Performed by: PEDIATRICS

## 2022-02-01 RX ORDER — SULFAMETHOXAZOLE AND TRIMETHOPRIM 800; 160 MG/1; MG/1
1 TABLET ORAL 2 TIMES DAILY
Qty: 6 TABLET | Refills: 0 | Status: SHIPPED | OUTPATIENT
Start: 2022-02-01 | End: 2022-02-04

## 2022-02-01 NOTE — PROGRESS NOTES
Subjective:       Patient ID: Irlanda Lopez is a 81 y.o. female.    Chief Complaint: Back Pain and Groin Pain    She is here with her caretender as she has severe dementia. She is poor historian. She has had 2 days of back pain, likely mild around the CVA area. She cannot qualify or quantify more, caretender states she has been co,plaining but not limited activity. Has hx of UTI. No fever, N/V/D. Has colostomy. May have urinary frequency now but patient can't state if dysuria. CT does not notice blood or odor. No trauma or rash that CT is aware of.    Review of Systems   Constitutional: Negative for fever and unexpected weight change.   HENT: Negative for congestion and rhinorrhea.    Eyes: Negative for discharge and redness.   Respiratory: Negative for cough and wheezing.    Cardiovascular: Negative for chest pain, palpitations and leg swelling.   Gastrointestinal: Negative for constipation, diarrhea and vomiting.   Genitourinary: Negative for decreased urine volume, difficulty urinating and menstrual problem.   Musculoskeletal: Positive for back pain. Negative for arthralgias, gait problem and joint swelling.   Skin: Negative for rash and wound.   Neurological: Negative for syncope and headaches.   Psychiatric/Behavioral: Positive for confusion and decreased concentration. Negative for behavioral problems and sleep disturbance.       Objective:      Physical Exam  Vitals and nursing note reviewed.   Constitutional:       General: She is not in acute distress.     Appearance: She is well-developed. She is not ill-appearing (uses push chair for simplicity) or toxic-appearing.   Neck:      Thyroid: No thyromegaly.      Vascular: No JVD.   Cardiovascular:      Rate and Rhythm: Normal rate and regular rhythm.      Heart sounds: Normal heart sounds. No murmur heard.      Pulmonary:      Effort: Pulmonary effort is normal. No respiratory distress.      Breath sounds: Normal breath sounds. No wheezing or rales.    Abdominal:      General: There is no distension.      Palpations: Abdomen is soft. There is no mass.      Tenderness: There is abdominal tenderness (mild suprapubic tenderness.). There is left CVA tenderness. There is no guarding.      Comments: Colostomy in place.   Musculoskeletal:      Right lower leg: No edema.      Left lower leg: No edema.      Comments: Left cva tenderness and mild thoracic spine tenderness at about T9.   Lymphadenopathy:      Cervical: No cervical adenopathy.   Skin:     Capillary Refill: Capillary refill takes less than 2 seconds.      Findings: No rash.   Neurological:      General: No focal deficit present.      Mental Status: She is alert and oriented to person, place, and time.      Cranial Nerves: No cranial nerve deficit.      Coordination: Coordination normal.   Psychiatric:         Mood and Affect: Mood normal.      Comments: Pleasantly demented, answers questions and repetitive, o/w not able to be valid historian.          Assessment:       1. Pain of costovertebral angle    2. Vascular dementia without behavioral disturbance        Plan:       Pain of costovertebral angle  -     X-Ray Thoracolumbar Spine AP Lateral; Future; Expected date: 02/01/2022  -     Urinalysis; Future; Expected date: 02/01/2022  -     Urine culture; Future; Expected date: 02/01/2022    Vascular dementia without behavioral disturbance    Other orders  -     sulfamethoxazole-trimethoprim 800-160mg (BACTRIM DS) 800-160 mg Tab; Take 1 tablet by mouth 2 (two) times daily. for 3 days  Dispense: 6 tablet; Refill: 0    Xrays show djd and old T11 compression fracture. Use OTC tylenol or motrin prn in listed dosing, heat pad(care not to burn under CT supervision), OTC sports gel. UA not yet available with suprapubic tenderness 3 day bactrim pending results. F/U Dr Arias in 1-2 weeks.

## 2022-02-02 ENCOUNTER — TELEPHONE (OUTPATIENT)
Dept: UROLOGY | Facility: CLINIC | Age: 82
End: 2022-02-02
Payer: MEDICARE

## 2022-02-02 NOTE — TELEPHONE ENCOUNTER
----- Message from Isabela Eduardo sent at 2/1/2022  7:19 AM CST -----  Regarding: same day  Contact: heriberto Mora calling for a same day appt for kideny pain. [phone]

## 2022-02-02 NOTE — TELEPHONE ENCOUNTER
Spoke to Ms. Mora, daughter. She stated that Ms. Lopez saw Dr. Craft yesterday whom started her on antibiotics for a UTI, still waiting on urine culture results to come back. She needs a f/u appt with Dr. Matute. Appt made for Monday 2/21/22 at the [place] location at 1:00 pm, she verbally agreed.

## 2022-02-07 ENCOUNTER — PATIENT MESSAGE (OUTPATIENT)
Dept: PRIMARY CARE CLINIC | Facility: CLINIC | Age: 82
End: 2022-02-07
Payer: MEDICARE

## 2022-02-07 ENCOUNTER — TELEPHONE (OUTPATIENT)
Dept: INTERNAL MEDICINE | Facility: CLINIC | Age: 82
End: 2022-02-07
Payer: MEDICARE

## 2022-02-07 RX ORDER — SULFAMETHOXAZOLE AND TRIMETHOPRIM 800; 160 MG/1; MG/1
1 TABLET ORAL 2 TIMES DAILY
Qty: 14 TABLET | Refills: 0 | Status: SHIPPED | OUTPATIENT
Start: 2022-02-07 | End: 2022-02-14

## 2022-02-07 NOTE — TELEPHONE ENCOUNTER
Pt's dgter is requesting more antibiotics after being tested on last Thursday. Pt's dgtr states she is unable to contact PCP. Can you please contact the patient/ caregiver with advise on whether more medication is needed or if she needs a visit. Thanks!

## 2022-02-07 NOTE — TELEPHONE ENCOUNTER
Yes, she will probably need more than a 3 day course of bactrim. I will send in more for the patient. I will also send message to daughter Myla Mora the POA and ROSA. Please contact and inform.   Bactrim ds 1 tablet po bid x 7 days sent in.   Myla Arias MD

## 2022-02-08 ENCOUNTER — TELEPHONE (OUTPATIENT)
Dept: INTERNAL MEDICINE | Facility: CLINIC | Age: 82
End: 2022-02-08
Payer: MEDICARE

## 2022-02-08 NOTE — TELEPHONE ENCOUNTER
Pt needed a prescription sent to pharmacy from PCP. PT states the RX was received on today from pharmacy.//catarina

## 2022-02-08 NOTE — TELEPHONE ENCOUNTER
----- Message from Gin Flood sent at 2/7/2022  9:55 AM CST -----  Contact: Pt Daughter Myla  ...Type:  Needs Medical Advice    Who Called: Pt Daughter Myla   Pharmacy name and phone #:  .  [id number] [place] [facility] [address]  [address] [facility] [address]  Phone: [phone] Fax: [phone]    [facility] - [address], [facility] [address]  [address] [place] [address], [address] [address]  Phone: [phone] Fax: [phone]     Would the patient rather a call back or a response via [facility]? Call   Best Call Back Number: .[phone] (home)    Additional Information: Pt daughter is req a call back in regards to an antibiotic that was supposed to be sent

## 2022-02-15 ENCOUNTER — PATIENT MESSAGE (OUTPATIENT)
Dept: PRIMARY CARE CLINIC | Facility: CLINIC | Age: 82
End: 2022-02-15
Payer: MEDICARE

## 2022-02-17 RX ORDER — ATORVASTATIN CALCIUM 10 MG/1
TABLET, FILM COATED ORAL
Qty: 90 TABLET | Refills: 3 | Status: SHIPPED | OUTPATIENT
Start: 2022-02-17 | End: 2023-03-24

## 2022-02-17 NOTE — TELEPHONE ENCOUNTER
No new care gaps identified.  Powered by [employer]. Reference number: [id number].   2/16/2022 10:39:23 PM CST

## 2022-02-18 ENCOUNTER — TELEPHONE (OUTPATIENT)
Dept: PRIMARY CARE CLINIC | Facility: CLINIC | Age: 82
End: 2022-02-18
Payer: MEDICARE

## 2022-02-18 NOTE — TELEPHONE ENCOUNTER
----- Message from Papito Pandey sent at 2/18/2022  8:51 AM CST -----  Contact: pt daughter  .Type:  Needs Medical Advice    Who Called:  pt daughter    Symptoms (please be specific):   How long has patient had these symptoms:   Pharmacy name and phone #:   Would the patient rather a call back or a response via [facility]?   Call    Best Call Back Number:   [phone] (home)    Additional Information: Caller is requesting  a call back from the nurse in regards to the caller not getting the message that  was left for her on  my chart  please call  the call  back  please

## 2022-02-21 ENCOUNTER — OFFICE VISIT (OUTPATIENT)
Dept: UROLOGY | Facility: CLINIC | Age: 82
End: 2022-02-21
Payer: MEDICARE

## 2022-02-21 ENCOUNTER — PATIENT MESSAGE (OUTPATIENT)
Dept: PRIMARY CARE CLINIC | Facility: CLINIC | Age: 82
End: 2022-02-21
Payer: MEDICARE

## 2022-02-21 VITALS
HEIGHT: 62 IN | TEMPERATURE: 98 F | DIASTOLIC BLOOD PRESSURE: 68 MMHG | HEART RATE: 76 BPM | SYSTOLIC BLOOD PRESSURE: 122 MMHG | WEIGHT: 142.44 LBS | RESPIRATION RATE: 16 BRPM | BODY MASS INDEX: 26.21 KG/M2

## 2022-02-21 DIAGNOSIS — F41.1 GAD (GENERALIZED ANXIETY DISORDER): ICD-10-CM

## 2022-02-21 DIAGNOSIS — R73.09 ABNORMAL GLUCOSE: ICD-10-CM

## 2022-02-21 DIAGNOSIS — E03.9 HYPOTHYROIDISM, UNSPECIFIED TYPE: ICD-10-CM

## 2022-02-21 DIAGNOSIS — N20.0 RENAL STONE: Primary | ICD-10-CM

## 2022-02-21 DIAGNOSIS — G31.84 MILD COGNITIVE IMPAIRMENT WITH MEMORY LOSS: ICD-10-CM

## 2022-02-21 DIAGNOSIS — E78.00 PURE HYPERCHOLESTEROLEMIA: Primary | ICD-10-CM

## 2022-02-21 DIAGNOSIS — K52.9 COLITIS: ICD-10-CM

## 2022-02-21 LAB
BILIRUB SERPL-MCNC: NORMAL MG/DL
BLOOD URINE, POC: 50
CLARITY, POC UA: NORMAL
COLOR, POC UA: YELLOW
GLUCOSE UR QL STRIP: NORMAL
KETONES UR QL STRIP: NORMAL
LEUKOCYTE ESTERASE URINE, POC: NORMAL
NITRITE, POC UA: NORMAL
PH, POC UA: 6
PROTEIN, POC: NORMAL
SPECIFIC GRAVITY, POC UA: 1.01
UROBILINOGEN, POC UA: NORMAL

## 2022-02-21 PROCEDURE — 3288F FALL RISK ASSESSMENT DOCD: CPT | Mod: CPTII,S$GLB,, | Performed by: UROLOGY

## 2022-02-21 PROCEDURE — 1160F PR REVIEW ALL MEDS BY PRESCRIBER/CLIN PHARMACIST DOCUMENTED: ICD-10-PCS | Mod: CPTII,S$GLB,, | Performed by: UROLOGY

## 2022-02-21 PROCEDURE — 99999 PR PBB SHADOW E&M-EST. PATIENT-LVL IV: ICD-10-PCS | Mod: PBBFAC,,, | Performed by: UROLOGY

## 2022-02-21 PROCEDURE — 1101F PR PT FALLS ASSESS DOC 0-1 FALLS W/OUT INJ PAST YR: ICD-10-PCS | Mod: CPTII,S$GLB,, | Performed by: UROLOGY

## 2022-02-21 PROCEDURE — 3078F PR MOST RECENT DIASTOLIC BLOOD PRESSURE < 80 MM HG: ICD-10-PCS | Mod: CPTII,S$GLB,, | Performed by: UROLOGY

## 2022-02-21 PROCEDURE — 1159F MED LIST DOCD IN RCRD: CPT | Mod: CPTII,S$GLB,, | Performed by: UROLOGY

## 2022-02-21 PROCEDURE — 99213 PR OFFICE/OUTPT VISIT, EST, LEVL III, 20-29 MIN: ICD-10-PCS | Mod: S$GLB,,, | Performed by: UROLOGY

## 2022-02-21 PROCEDURE — 81002 POCT URINE DIPSTICK WITHOUT MICROSCOPE: ICD-10-PCS | Mod: S$GLB,,, | Performed by: UROLOGY

## 2022-02-21 PROCEDURE — 99999 PR PBB SHADOW E&M-EST. PATIENT-LVL IV: CPT | Mod: PBBFAC,,, | Performed by: UROLOGY

## 2022-02-21 PROCEDURE — 3078F DIAST BP <80 MM HG: CPT | Mod: CPTII,S$GLB,, | Performed by: UROLOGY

## 2022-02-21 PROCEDURE — 1126F PR PAIN SEVERITY QUANTIFIED, NO PAIN PRESENT: ICD-10-PCS | Mod: CPTII,S$GLB,, | Performed by: UROLOGY

## 2022-02-21 PROCEDURE — 1101F PT FALLS ASSESS-DOCD LE1/YR: CPT | Mod: CPTII,S$GLB,, | Performed by: UROLOGY

## 2022-02-21 PROCEDURE — 1160F RVW MEDS BY RX/DR IN RCRD: CPT | Mod: CPTII,S$GLB,, | Performed by: UROLOGY

## 2022-02-21 PROCEDURE — 3074F PR MOST RECENT SYSTOLIC BLOOD PRESSURE < 130 MM HG: ICD-10-PCS | Mod: CPTII,S$GLB,, | Performed by: UROLOGY

## 2022-02-21 PROCEDURE — 1159F PR MEDICATION LIST DOCUMENTED IN MEDICAL RECORD: ICD-10-PCS | Mod: CPTII,S$GLB,, | Performed by: UROLOGY

## 2022-02-21 PROCEDURE — 81002 URINALYSIS NONAUTO W/O SCOPE: CPT | Mod: S$GLB,,, | Performed by: UROLOGY

## 2022-02-21 PROCEDURE — 1126F AMNT PAIN NOTED NONE PRSNT: CPT | Mod: CPTII,S$GLB,, | Performed by: UROLOGY

## 2022-02-21 PROCEDURE — 3288F PR FALLS RISK ASSESSMENT DOCUMENTED: ICD-10-PCS | Mod: CPTII,S$GLB,, | Performed by: UROLOGY

## 2022-02-21 PROCEDURE — 99213 OFFICE O/P EST LOW 20 MIN: CPT | Mod: S$GLB,,, | Performed by: UROLOGY

## 2022-02-21 PROCEDURE — 3074F SYST BP LT 130 MM HG: CPT | Mod: CPTII,S$GLB,, | Performed by: UROLOGY

## 2022-02-21 RX ORDER — ALPRAZOLAM 0.25 MG/1
0.25 TABLET ORAL 2 TIMES DAILY PRN
Qty: 20 TABLET | Refills: 1 | Status: SHIPPED | OUTPATIENT
Start: 2022-02-21 | End: 2022-05-31 | Stop reason: SDUPTHER

## 2022-02-21 NOTE — TELEPHONE ENCOUNTER
Ok, I believe it is the xanax. I sent it in for her.   Labs ordered.   Not due for appt with me until June. Can do labs prior and go ahead and setup appt in person, labs 1 week prior. Thanks.

## 2022-02-21 NOTE — TELEPHONE ENCOUNTER
Sent message to pt to see what rx needed to be refilled.daughter wants to know when she can come in for an appt and to do labs

## 2022-02-21 NOTE — PROGRESS NOTES
Chief Complaint:   Encounter Diagnosis   Name Primary?    Renal stone Yes       HPI:   2/21/22- patient had 1 episode of UTI, but otherwise doing well with no stone symptoms.  7/18/20- 79-year-old female who presents with severe abdominal pain and discomfort, she is being evaluated for colonic impaction diverticulitis.  She is seen to have an extremely large right renal pelvis stone.  This had been seen prior, and she was followed by partner in regards to possible surgery for this.  Patient though it is determined not pursuing not been seen in the last couple of years.  Patient is having upper abdominal pain, no colic at this point.  Patient has had no previous urological history per her and her son's report, her daughter is her primary care take care, she is currently not present at the bedside.  Family history of stones, no urological cancers per the family.  10/17/18: Mag3 shows 72/38 L/R function. -UCx last visit.  PCNL is the only viable treatment option; observation not recommended.  10/8/18: 79 yo woman referred for renal stone after workup for UTI/diverticulitis.  Having diarrhea on meds for diverticulitis.  No abd/pelvic pain and no exac/rel factors.  No hematuria.  No prior urolithiasis.  No urinary bother.  No  history.  Normal sexual function but inactive.  Some memory problems but functional at home.    Allergies:  Patient has no known allergies.    Medications:  has a current medication list which includes the following prescription(s): alprazolam, atorvastatin, fluoxetine, fluoxetine, furosemide, l.acid/b.animalis,bifidum/fos, memantine, ondansetron, dicyclomine, donepezil, pantoprazole, promethazine, thyroid (pork), and triamcinolone acetonide 0.1%.    Review of Systems:  General: No fever, chills, fatigability, or weight loss.  Skin: No rashes, itching, or changes in color or texture of skin.  Chest: Denies JESUS, cyanosis, wheezing, cough, and sputum production.  Abdomen: Appetite fine. No weight  loss. Denies diarrhea, abdominal pain, hematemesis, or blood in stool.  Musculoskeletal: No joint stiffness or swelling. Denies back pain.  : As above.  All other review of systems negative.    PMH:   has a past medical history of Clostridium difficile colitis, Dementia, Diverticulitis, High cholesterol, Hypertension, and Hypothyroidism.    PSH:   has a past surgical history that includes Cataract extraction; Appendectomy (2008); Colonoscopy (N/A, 1/2/2019); Colostomy (Left, 1/2/2019); Maribel procedure (N/A, 1/2/2019); Lysis of adhesions (N/A, 1/2/2019); Colon surgery; Vitrectomy (Right, 09/2013); Colonoscopy (N/A, 6/7/2019); Esophagogastroduodenoscopy (N/A, 9/16/2020); and Colonoscopy (N/A, 6/23/2021).    FamHx: family history includes Alzheimer's disease in her mother; Aneurysm in her father; Stomach cancer in her brother.    SocHx:  reports that she has quit smoking. She quit after 5.00 years of use. She has never used smokeless tobacco. She reports that she does not drink alcohol and does not use drugs.      Physical Exam:  Vitals:    02/21/22 1310   BP: 122/68   Pulse: 76   Resp: 16   Temp: 97.9 °F (36.6 °C)     General: A&Ox3, no apparent distress, no deformities  Neck: No masses, normal ROM  Lungs: normal inspiration, no use of accessory muscles  Heart: normal pulse, no arrhythmias  Abdomen: Soft, NT, ND, no masses, no hernias, no hepatosplenomegaly  Skin: The skin is warm and dry. No jaundice.  Ext: No c/c/e.  : No pelvic floor prolapse.  Normal introitus, no urethral abnomralities. No Perineal abnormalities.    Labs/Studies:   UA 2+LE, trace protein, 50 blood 2/22  Creatinine 0.8 7/20  CT large right renal pelvis stone 7/20, in comparison from the study from October 2018, no significant change.  No evidence of hydronephrosis or obstructive uropathy.     Impression/Plan:       Right renal stone- PCNL would be the only viable option, would want to do a Lasix renogram prior to confirm function of this  kidney.  Due to multiple comorbidities though, they would rather hold and continue active surveillance.  Uncertain if the UTI was even related, but now clear.  Call with any pain prior to the next appointment or recurrent infections, otherwise see me in 1 year.

## 2022-03-02 NOTE — TELEPHONE ENCOUNTER
Care Due:                  Date            Visit Type   Department     Provider  --------------------------------------------------------------------------------                                MYCHART                              ANNUAL                              CHECKUP/PHY  [facility] PR[facility]  Last Visit: 06-      S            AGUSTINA Arias                              EP -                              PRIMARY      [facility] PRIMARY  Next Visit: 06-      CARE ([facility])   [facility]           Myla Arias                                                            Last  Test          Frequency    Reason                     Performed    Due Date  --------------------------------------------------------------------------------    Lipid Panel.  12 months..  atorvastatin.............  06- 05-    Powered by [employer]. Reference number: [id number].   3/02/2022 10:04:54 AM CST

## 2022-03-04 RX ORDER — THYROID 30 MG/1
30 TABLET ORAL
Qty: 90 TABLET | Refills: 3 | Status: SHIPPED | OUTPATIENT
Start: 2022-03-04 | End: 2023-02-24

## 2022-03-04 NOTE — TELEPHONE ENCOUNTER
----- Message from Samira Mosqueda sent at 3/4/2022 12:24 PM CST -----  Contact: Myla/Daughter  Myla called regarding a message from [employer] about her thyroid, pork, (ARMOUR THYROID) 30 mg Tab medication.  They need a new prescription to be sent to     [id number] [place] [facility] [address]  [address] [facility] [address]  Phone: [phone] Fax: [phone]    Please give her a call back at [phone]      Thanks  kb

## 2022-03-06 RX ORDER — THYROID, PORCINE 30 MG/1
TABLET ORAL
Qty: 90 TABLET | Refills: 3 | OUTPATIENT
Start: 2022-03-06

## 2022-03-06 NOTE — TELEPHONE ENCOUNTER
Refill Center Decision:   Quick Discontinue - Med(s) have already been approved in other encounter

## 2022-03-15 NOTE — TELEPHONE ENCOUNTER
"Received message that pt's daughter called yesterday afternoon after I had left and scheduled pt an appt. to see Dr Connelly, but was requesting to see Dr Connelly at an earlier date -  I returned Myla's call @ [phone] - Left a voicemail message advising her that Dr Connelly is only in clinic on Monday as discussed yesterday - That Maricel instructed pt to come in to see him "In a couple of weeks or so" which would be Monday May13th - Advised that if Monday May 13th is not a good day for her to bring pt to her appt. I would be glad to reschedule her for May 20th - Left my name and number [phone] for Myla to call back if she needs to still reschedule the appt.  " Request came in for sertraline 100mg.   Patient was given #90 2/14/2022 through [facility].  Patient states they will only fill #30.  Patient will call her insurance company to see how she can set up a script  to get 90 day supply.  At [facility] #30 was sent.  Patient understands.

## 2022-03-16 ENCOUNTER — PATIENT MESSAGE (OUTPATIENT)
Dept: PRIMARY CARE CLINIC | Facility: CLINIC | Age: 82
End: 2022-03-16
Payer: MEDICARE

## 2022-05-09 ENCOUNTER — TELEPHONE (OUTPATIENT)
Dept: INTERNAL MEDICINE | Facility: CLINIC | Age: 82
End: 2022-05-09
Payer: MEDICARE

## 2022-05-09 NOTE — TELEPHONE ENCOUNTER
----- Message from Kathryn Nieto sent at 5/9/2022  8:46 AM CDT -----  .Type:  Same Day Appointment Request    Caller is requesting a same day appointment.  Caller declined first available appointment listed below.    Name of Caller: CLARE/DAUGHTER  When is the first available appointment? 5/11  Symptoms: KNEE PAIN  Best Call Back Number:.[phone]   Additional Information: AVAILABLE ANYTIME      
Patient will wait to be seen. Patient states she will follow up if the symptoms worsen.  
- - -

## 2022-05-17 NOTE — ED NOTES
Cm met with pt and reviewed team meeting update  Pt has a swetha lift at home additionally  She is in agreement with d/c on 5/26 with home PT, OT, and RN  She would like [facility] for PT and OT and therefore to continue with RN with [facility]  Pt moved from room 6 to bed 11.  Pt admitted - awaiting bed availability.  No rooms available tonight.  Family at bedside. Call light in reach. SR up x 2.  Pt with no complaints of pain at this time.  Colostomy bag in place to LMQ.

## 2022-05-31 RX ORDER — ALPRAZOLAM 0.25 MG/1
0.25 TABLET ORAL 2 TIMES DAILY PRN
Qty: 20 TABLET | Refills: 1 | Status: SHIPPED | OUTPATIENT
Start: 2022-05-31 | End: 2022-07-28 | Stop reason: SDUPTHER

## 2022-05-31 NOTE — TELEPHONE ENCOUNTER
Care Due:                  Date            Visit Type   Department     Provider  --------------------------------------------------------------------------------                                MYCHART                              ANNUAL                              CHECKUP/PHY  [facility] PR[facility]  Last Visit: 06-      S            AGUSTINA Arias                              EP -                              PRIMARY      [facility] PRIMARY  Next Visit: 06-      CARE ([facility])   [facility]           Myla Arias                                                            Last  Test          Frequency    Reason                     Performed    Due Date  --------------------------------------------------------------------------------    Lipid Panel.  12 months..  atorvastatin.............  06- 05-    [employer] Embedded Care Gaps. Reference number: [id number]. 5/31/2022   8:58:38 AM CDT

## 2022-06-01 ENCOUNTER — PES CALL (OUTPATIENT)
Dept: ADMINISTRATIVE | Facility: CLINIC | Age: 82
End: 2022-06-01
Payer: MEDICARE

## 2022-06-08 ENCOUNTER — TELEPHONE (OUTPATIENT)
Dept: ADMINISTRATIVE | Facility: HOSPITAL | Age: 82
End: 2022-06-08
Payer: MEDICARE

## 2022-06-09 ENCOUNTER — LAB VISIT (OUTPATIENT)
Dept: LAB | Facility: HOSPITAL | Age: 82
End: 2022-06-09
Attending: FAMILY MEDICINE
Payer: MEDICARE

## 2022-06-09 DIAGNOSIS — K52.9 COLITIS: ICD-10-CM

## 2022-06-09 DIAGNOSIS — E78.00 PURE HYPERCHOLESTEROLEMIA: ICD-10-CM

## 2022-06-09 DIAGNOSIS — F41.1 GAD (GENERALIZED ANXIETY DISORDER): ICD-10-CM

## 2022-06-09 DIAGNOSIS — E03.9 HYPOTHYROIDISM, UNSPECIFIED TYPE: ICD-10-CM

## 2022-06-09 DIAGNOSIS — R73.09 ABNORMAL GLUCOSE: ICD-10-CM

## 2022-06-09 DIAGNOSIS — G31.84 MILD COGNITIVE IMPAIRMENT WITH MEMORY LOSS: ICD-10-CM

## 2022-06-09 LAB
ALBUMIN SERPL BCP-MCNC: 3.5 G/DL (ref 3.5–5.2)
ALP SERPL-CCNC: 74 U/L (ref 55–135)
ALT SERPL W/O P-5'-P-CCNC: 21 U/L (ref 10–44)
ANION GAP SERPL CALC-SCNC: 13 MMOL/L (ref 8–16)
AST SERPL-CCNC: 23 U/L (ref 10–40)
BASOPHILS # BLD AUTO: 0.04 K/UL (ref 0–0.2)
BASOPHILS NFR BLD: 0.6 % (ref 0–1.9)
BILIRUB SERPL-MCNC: 0.6 MG/DL (ref 0.1–1)
BUN SERPL-MCNC: 19 MG/DL (ref 8–23)
CALCIUM SERPL-MCNC: 9.3 MG/DL (ref 8.7–10.5)
CHLORIDE SERPL-SCNC: 105 MMOL/L (ref 95–110)
CHOLEST SERPL-MCNC: 154 MG/DL (ref 120–199)
CHOLEST/HDLC SERPL: 4.1 {RATIO} (ref 2–5)
CO2 SERPL-SCNC: 24 MMOL/L (ref 23–29)
CREAT SERPL-MCNC: 0.8 MG/DL (ref 0.5–1.4)
DIFFERENTIAL METHOD: ABNORMAL
EOSINOPHIL # BLD AUTO: 0.2 K/UL (ref 0–0.5)
EOSINOPHIL NFR BLD: 2.5 % (ref 0–8)
ERYTHROCYTE [DISTWIDTH] IN BLOOD BY AUTOMATED COUNT: 12.8 % (ref 11.5–14.5)
EST. GFR  (AFRICAN AMERICAN): >60 ML/MIN/1.73 M^2
EST. GFR  (NON AFRICAN AMERICAN): >60 ML/MIN/1.73 M^2
ESTIMATED AVG GLUCOSE: 103 MG/DL (ref 68–131)
GLUCOSE SERPL-MCNC: 80 MG/DL (ref 70–110)
HBA1C MFR BLD: 5.2 % (ref 4–5.6)
HCT VFR BLD AUTO: 42.4 % (ref 37–48.5)
HDLC SERPL-MCNC: 38 MG/DL (ref 40–75)
HDLC SERPL: 24.7 % (ref 20–50)
HGB BLD-MCNC: 13.4 G/DL (ref 12–16)
IMM GRANULOCYTES # BLD AUTO: 0.05 K/UL (ref 0–0.04)
IMM GRANULOCYTES NFR BLD AUTO: 0.7 % (ref 0–0.5)
LDLC SERPL CALC-MCNC: 104.6 MG/DL (ref 63–159)
LYMPHOCYTES # BLD AUTO: 1.9 K/UL (ref 1–4.8)
LYMPHOCYTES NFR BLD: 27 % (ref 18–48)
MCH RBC QN AUTO: 28.7 PG (ref 27–31)
MCHC RBC AUTO-ENTMCNC: 31.6 G/DL (ref 32–36)
MCV RBC AUTO: 91 FL (ref 82–98)
MONOCYTES # BLD AUTO: 0.6 K/UL (ref 0.3–1)
MONOCYTES NFR BLD: 8 % (ref 4–15)
NEUTROPHILS # BLD AUTO: 4.2 K/UL (ref 1.8–7.7)
NEUTROPHILS NFR BLD: 61.2 % (ref 38–73)
NONHDLC SERPL-MCNC: 116 MG/DL
NRBC BLD-RTO: 0 /100 WBC
PLATELET # BLD AUTO: 301 K/UL (ref 150–450)
PMV BLD AUTO: 10.4 FL (ref 9.2–12.9)
POTASSIUM SERPL-SCNC: 4.5 MMOL/L (ref 3.5–5.1)
PROT SERPL-MCNC: 6.7 G/DL (ref 6–8.4)
RBC # BLD AUTO: 4.67 M/UL (ref 4–5.4)
SODIUM SERPL-SCNC: 142 MMOL/L (ref 136–145)
T4 FREE SERPL-MCNC: 0.84 NG/DL (ref 0.71–1.51)
TRIGL SERPL-MCNC: 57 MG/DL (ref 30–150)
TSH SERPL DL<=0.005 MIU/L-ACNC: 1.24 UIU/ML (ref 0.4–4)
WBC # BLD AUTO: 6.86 K/UL (ref 3.9–12.7)

## 2022-06-09 PROCEDURE — 85025 COMPLETE CBC W/AUTO DIFF WBC: CPT | Performed by: FAMILY MEDICINE

## 2022-06-09 PROCEDURE — 80061 LIPID PANEL: CPT | Performed by: FAMILY MEDICINE

## 2022-06-09 PROCEDURE — 84439 ASSAY OF FREE THYROXINE: CPT | Performed by: FAMILY MEDICINE

## 2022-06-09 PROCEDURE — 83036 HEMOGLOBIN GLYCOSYLATED A1C: CPT | Performed by: FAMILY MEDICINE

## 2022-06-09 PROCEDURE — 80053 COMPREHEN METABOLIC PANEL: CPT | Performed by: FAMILY MEDICINE

## 2022-06-09 PROCEDURE — 84443 ASSAY THYROID STIM HORMONE: CPT | Performed by: FAMILY MEDICINE

## 2022-06-09 PROCEDURE — 36415 COLL VENOUS BLD VENIPUNCTURE: CPT | Mod: PO | Performed by: FAMILY MEDICINE

## 2022-06-14 ENCOUNTER — OFFICE VISIT (OUTPATIENT)
Dept: PRIMARY CARE CLINIC | Facility: CLINIC | Age: 82
End: 2022-06-14
Payer: MEDICARE

## 2022-06-14 VITALS
HEIGHT: 61 IN | SYSTOLIC BLOOD PRESSURE: 126 MMHG | BODY MASS INDEX: 27.34 KG/M2 | WEIGHT: 144.81 LBS | DIASTOLIC BLOOD PRESSURE: 78 MMHG

## 2022-06-14 DIAGNOSIS — F41.1 GAD (GENERALIZED ANXIETY DISORDER): ICD-10-CM

## 2022-06-14 DIAGNOSIS — E03.9 HYPOTHYROIDISM, UNSPECIFIED TYPE: ICD-10-CM

## 2022-06-14 DIAGNOSIS — Z93.3 COLOSTOMY IN PLACE: ICD-10-CM

## 2022-06-14 DIAGNOSIS — F01.50 VASCULAR DEMENTIA WITHOUT BEHAVIORAL DISTURBANCE: ICD-10-CM

## 2022-06-14 DIAGNOSIS — E78.00 PURE HYPERCHOLESTEROLEMIA: Primary | ICD-10-CM

## 2022-06-14 DIAGNOSIS — I77.1 TORTUOUS AORTA: ICD-10-CM

## 2022-06-14 DIAGNOSIS — H10.9 CONJUNCTIVITIS OF BOTH EYES, UNSPECIFIED CONJUNCTIVITIS TYPE: ICD-10-CM

## 2022-06-14 PROCEDURE — 1160F RVW MEDS BY RX/DR IN RCRD: CPT | Mod: CPTII,S$GLB,, | Performed by: FAMILY MEDICINE

## 2022-06-14 PROCEDURE — 99999 PR PBB SHADOW E&M-EST. PATIENT-LVL III: CPT | Mod: PBBFAC,,, | Performed by: FAMILY MEDICINE

## 2022-06-14 PROCEDURE — 1101F PR PT FALLS ASSESS DOC 0-1 FALLS W/OUT INJ PAST YR: ICD-10-PCS | Mod: CPTII,S$GLB,, | Performed by: FAMILY MEDICINE

## 2022-06-14 PROCEDURE — 3074F SYST BP LT 130 MM HG: CPT | Mod: CPTII,S$GLB,, | Performed by: FAMILY MEDICINE

## 2022-06-14 PROCEDURE — 99214 OFFICE O/P EST MOD 30 MIN: CPT | Mod: S$GLB,,, | Performed by: FAMILY MEDICINE

## 2022-06-14 PROCEDURE — 3078F DIAST BP <80 MM HG: CPT | Mod: CPTII,S$GLB,, | Performed by: FAMILY MEDICINE

## 2022-06-14 PROCEDURE — 3074F PR MOST RECENT SYSTOLIC BLOOD PRESSURE < 130 MM HG: ICD-10-PCS | Mod: CPTII,S$GLB,, | Performed by: FAMILY MEDICINE

## 2022-06-14 PROCEDURE — 99499 UNLISTED E&M SERVICE: CPT | Mod: S$GLB,,, | Performed by: FAMILY MEDICINE

## 2022-06-14 PROCEDURE — 3288F FALL RISK ASSESSMENT DOCD: CPT | Mod: CPTII,S$GLB,, | Performed by: FAMILY MEDICINE

## 2022-06-14 PROCEDURE — 99214 PR OFFICE/OUTPT VISIT, EST, LEVL IV, 30-39 MIN: ICD-10-PCS | Mod: S$GLB,,, | Performed by: FAMILY MEDICINE

## 2022-06-14 PROCEDURE — 1126F AMNT PAIN NOTED NONE PRSNT: CPT | Mod: CPTII,S$GLB,, | Performed by: FAMILY MEDICINE

## 2022-06-14 PROCEDURE — 3078F PR MOST RECENT DIASTOLIC BLOOD PRESSURE < 80 MM HG: ICD-10-PCS | Mod: CPTII,S$GLB,, | Performed by: FAMILY MEDICINE

## 2022-06-14 PROCEDURE — 3288F PR FALLS RISK ASSESSMENT DOCUMENTED: ICD-10-PCS | Mod: CPTII,S$GLB,, | Performed by: FAMILY MEDICINE

## 2022-06-14 PROCEDURE — 1126F PR PAIN SEVERITY QUANTIFIED, NO PAIN PRESENT: ICD-10-PCS | Mod: CPTII,S$GLB,, | Performed by: FAMILY MEDICINE

## 2022-06-14 PROCEDURE — 1160F PR REVIEW ALL MEDS BY PRESCRIBER/CLIN PHARMACIST DOCUMENTED: ICD-10-PCS | Mod: CPTII,S$GLB,, | Performed by: FAMILY MEDICINE

## 2022-06-14 PROCEDURE — 1159F MED LIST DOCD IN RCRD: CPT | Mod: CPTII,S$GLB,, | Performed by: FAMILY MEDICINE

## 2022-06-14 PROCEDURE — 99499 RISK ADDL DX/OHS AUDIT: ICD-10-PCS | Mod: S$GLB,,, | Performed by: FAMILY MEDICINE

## 2022-06-14 PROCEDURE — 99999 PR PBB SHADOW E&M-EST. PATIENT-LVL III: ICD-10-PCS | Mod: PBBFAC,,, | Performed by: FAMILY MEDICINE

## 2022-06-14 PROCEDURE — 1101F PT FALLS ASSESS-DOCD LE1/YR: CPT | Mod: CPTII,S$GLB,, | Performed by: FAMILY MEDICINE

## 2022-06-14 PROCEDURE — 1159F PR MEDICATION LIST DOCUMENTED IN MEDICAL RECORD: ICD-10-PCS | Mod: CPTII,S$GLB,, | Performed by: FAMILY MEDICINE

## 2022-06-14 RX ORDER — NEOMYCIN SULFATE, POLYMYXIN B SULFATE, AND DEXAMETHASONE 3.5; 10000; 1 MG/G; [USP'U]/G; MG/G
OINTMENT OPHTHALMIC NIGHTLY
Qty: 3.5 G | Refills: 0 | Status: ON HOLD | OUTPATIENT
Start: 2022-06-14 | End: 2022-08-24 | Stop reason: HOSPADM

## 2022-06-14 RX ORDER — FLUOXETINE 10 MG/1
10 CAPSULE ORAL DAILY
Qty: 90 CAPSULE | Refills: 3 | Status: SHIPPED | OUTPATIENT
Start: 2022-06-14 | End: 2023-01-09

## 2022-06-14 NOTE — PROGRESS NOTES
Subjective:      Patient ID: Irlanda Lopez is a 81 y.o. female.    Chief Complaint: Annual Exam    Disclaimer:  This note is prepared using voice recognition software and as such is likely to have errors and has not been proof read. Please contact me for questions.      Irlanda Lopez is a 81 y.o. female who presents today for multiple issues.  She is here today with her daughter Faiza. Recently at  for dermatitis due to uv light exposure.   Doing eye drops 4 times a day.   Has a marina noah cream she applies to eyes/face at night.   No recent issues for colitits. Stopped aricept and really helped.   Doing prozac daily for anxiety.   Rare use of xanax.   On statin for cholesterol. Doing well.   Staying on armour even though [employer] will not pay for it because she has been on it for so long and they want to limit any medication changes if possible.      Lab Results   Component Value Date    HGBA1C 5.2 06/09/2022    HGBA1C 5.3 06/02/2021    HGBA1C 5.4 05/12/2020      Lab Results   Component Value Date    CHOL 154 06/09/2022    CHOL 166 06/02/2021    CHOL 158 05/12/2020     Lab Results   Component Value Date    LDLCALC 104.6 06/09/2022    LDLCALC 107.6 06/02/2021    LDLCALC 103.4 05/12/2020       Wt Readings from Last 10 Encounters:   06/14/22 65.7 kg (144 lb 13.5 oz)   02/21/22 64.6 kg (142 lb 6.7 oz)   02/01/22 63 kg (138 lb 14.2 oz)   07/29/21 67.9 kg (149 lb 11.1 oz)   06/23/21 62.7 kg (138 lb 3.7 oz)   06/08/21 67.5 kg (148 lb 13 oz)   06/02/21 70.7 kg (155 lb 13.8 oz)   05/24/21 69.5 kg (153 lb 3.5 oz)   01/26/21 70.7 kg (155 lb 12.1 oz)   12/02/20 74 kg (163 lb 2.3 oz)       The ASCVD Risk score (Meansliam RUBIO Jr., et al., 2013) failed to calculate for the following reasons:    The 2013 ASCVD risk score is only valid for ages 40 to 79        Lab Results   Component Value Date    WBC 6.86 06/09/2022    HGB 13.4 06/09/2022    HCT 42.4 06/09/2022     06/09/2022    CHOL 154 06/09/2022    TRIG 57  "06/09/2022    HDL 38 (L) 06/09/2022    ALT 21 06/09/2022    AST 23 06/09/2022     06/09/2022    K 4.5 06/09/2022     06/09/2022    CREATININE 0.8 06/09/2022    BUN 19 06/09/2022    CO2 24 06/09/2022    TSH 1.244 06/09/2022    INR 1.0 06/10/2019    HGBA1C 5.2 06/09/2022       X-Ray Thoracolumbar Spine AP Lateral  Narrative: EXAMINATION:  XR THORACOLUMBAR SPINE AP LATERAL    CLINICAL HISTORY:  Dorsalgia, unspecified    TECHNIQUE:  AP and lateral views of the thoracolumbar spine were performed.    COMPARISON:  None    FINDINGS:  There is very mild levocurvature of the thoracolumbar spine with the apex seen at the T11-T12 disc space.  There is chronic wedging at the T11 vertebral body with approximately 40% vertebral body height loss.  There is grade 1 spondylolisthesis of L4 on L5 and L5 on S1.  There is at least moderate disc space narrowing and spondylosis present at the L5-S1 level.  Prominent facet arthropathy seen within the lower lumbar spine.  Calcification projecting over the right side of the abdomen measuring up to 3.6 cm in size most consistent with the known calcification within the right renal pelvis.  Impression: As above    Electronically signed by: Lonny Sanders DO  Date:    02/01/2022  Time:    10:36        Review of Systems   Constitutional: Negative for activity change, appetite change, fatigue, fever and unexpected weight change.   Respiratory: Negative for cough and shortness of breath.    Cardiovascular: Negative for chest pain and palpitations.   Gastrointestinal: Negative for abdominal distention and abdominal pain.   Skin: Positive for color change and rash.   Psychiatric/Behavioral: Negative for dysphoric mood and sleep disturbance.     Objective:     Vitals:    06/14/22 1024   BP: 126/78   Weight: 65.7 kg (144 lb 13.5 oz)   Height: 5' 1" (1.549 m)     Physical Exam  Vitals reviewed.   Constitutional:       General: She is not in acute distress.     Appearance: Normal appearance. " She is well-developed and normal weight.   HENT:      Head: Normocephalic and atraumatic.      Right Ear: Tympanic membrane and external ear normal.      Left Ear: Tympanic membrane and external ear normal.      Mouth/Throat:      Mouth: Mucous membranes are moist.   Eyes:      Conjunctiva/sclera:      Right eye: Right conjunctiva is injected.      Left eye: Left conjunctiva is injected.      Pupils: Pupils are equal, round, and reactive to light.   Cardiovascular:      Rate and Rhythm: Normal rate and regular rhythm.      Heart sounds: Normal heart sounds.   Pulmonary:      Effort: Pulmonary effort is normal.      Breath sounds: Normal breath sounds. No wheezing.   Abdominal:      General: There is no distension.      Palpations: Abdomen is not rigid. There is no mass.      Tenderness: There is no abdominal tenderness. There is no guarding or rebound.      Hernia: No hernia is present.          Comments: Colostomy in place   Musculoskeletal:      Cervical back: Normal range of motion.   Skin:     General: Skin is warm.      Findings: Erythema and rash present.          Neurological:      Mental Status: She is alert.   Psychiatric:         Attention and Perception: Attention normal.         Mood and Affect: Mood normal.         Speech: Speech normal.         Behavior: Behavior normal. Behavior is cooperative.         Thought Content: Thought content normal.         Cognition and Memory: Cognition is impaired. Memory is impaired. She exhibits impaired recent memory and impaired remote memory.         Judgment: Judgment normal.       Assessment:     1. Pure hypercholesterolemia    2. Tortuous aorta    3. Colostomy in place    4. Vascular dementia without behavioral disturbance    5. Hypothyroidism, unspecified type    6. MARTIN (generalized anxiety disorder)    7. Conjunctivitis of both eyes, unspecified conjunctivitis type      Plan:   Irlanda was seen today for annual exam.    Diagnoses and all orders for this  visit:    Pure hypercholesterolemia - stable, Continue with current medications and interventions. Labs reviewed.       Tortuous aorta- stable, Continue with current medications and interventions. Labs reviewed.     Colostomy in place- stable, Continue with current medications and interventions. Labs reviewed.     Vascular dementia without behavioral disturbance- stable, Continue with current medications and interventions. Labs reviewed.     Hypothyroidism, unspecified type- stable, Continue with current medications and interventions. Labs reviewed.     MARTIN (generalized anxiety disorder)- stable, Continue with current medications and interventions. Labs reviewed.  reviewed. Refilled xanax cont with prozac     Other orders- for conjunctivitis- start nighttime ointment.   -     neomycin-polymyxin-dexamethasone (DEXACINE) 3.5 mg/g-10,000 unit/g-0.1 % Oint; Place into both eyes every evening.  -     FLUoxetine 10 MG capsule; Take 1 capsule (10 mg total) by mouth once daily.            Follow up in about 1 year (around 6/14/2023) for physical with Dr MANCIA.    There are no Patient Instructions on file for this visit.

## 2022-06-16 ENCOUNTER — PES CALL (OUTPATIENT)
Dept: ADMINISTRATIVE | Facility: CLINIC | Age: 82
End: 2022-06-16
Payer: MEDICARE

## 2022-08-22 ENCOUNTER — HOSPITAL ENCOUNTER (OUTPATIENT)
Facility: HOSPITAL | Age: 82
Discharge: HOME | End: 2022-08-24
Attending: FAMILY MEDICINE | Admitting: STUDENT IN AN ORGANIZED HEALTH CARE EDUCATION/TRAINING PROGRAM
Payer: MEDICARE

## 2022-08-22 DIAGNOSIS — R10.9 RIGHT FLANK PAIN: ICD-10-CM

## 2022-08-22 DIAGNOSIS — N10 ACUTE PYELONEPHRITIS: ICD-10-CM

## 2022-08-22 DIAGNOSIS — N13.2 HYDRONEPHROSIS WITH RENAL CALCULOUS OBSTRUCTION: Primary | ICD-10-CM

## 2022-08-22 DIAGNOSIS — N20.0 RENAL STONE: ICD-10-CM

## 2022-08-22 PROBLEM — F03.90 DEMENTIA: Chronic | Status: ACTIVE | Noted: 2021-05-24

## 2022-08-22 LAB
ALBUMIN SERPL BCP-MCNC: 3.6 G/DL (ref 3.5–5.2)
ALP SERPL-CCNC: 80 U/L (ref 55–135)
ALT SERPL W/O P-5'-P-CCNC: 10 U/L (ref 10–44)
ANION GAP SERPL CALC-SCNC: 13 MMOL/L (ref 8–16)
AST SERPL-CCNC: 17 U/L (ref 10–40)
BACTERIA #/AREA URNS HPF: ABNORMAL /HPF
BASOPHILS # BLD AUTO: 0.03 K/UL (ref 0–0.2)
BASOPHILS NFR BLD: 0.2 % (ref 0–1.9)
BILIRUB SERPL-MCNC: 1.3 MG/DL (ref 0.1–1)
BILIRUB UR QL STRIP: NEGATIVE
BUN SERPL-MCNC: 12 MG/DL (ref 8–23)
CALCIUM SERPL-MCNC: 9.1 MG/DL (ref 8.7–10.5)
CHLORIDE SERPL-SCNC: 100 MMOL/L (ref 95–110)
CLARITY UR: ABNORMAL
CO2 SERPL-SCNC: 24 MMOL/L (ref 23–29)
COLOR UR: YELLOW
CREAT SERPL-MCNC: 0.9 MG/DL (ref 0.5–1.4)
CTP QC/QA: YES
DIFFERENTIAL METHOD: ABNORMAL
EOSINOPHIL # BLD AUTO: 0 K/UL (ref 0–0.5)
EOSINOPHIL NFR BLD: 0.2 % (ref 0–8)
ERYTHROCYTE [DISTWIDTH] IN BLOOD BY AUTOMATED COUNT: 13.3 % (ref 11.5–14.5)
EST. GFR  (NO RACE VARIABLE): >60 ML/MIN/1.73 M^2
GLUCOSE SERPL-MCNC: 129 MG/DL (ref 70–110)
GLUCOSE UR QL STRIP: NEGATIVE
HCT VFR BLD AUTO: 41.2 % (ref 37–48.5)
HGB BLD-MCNC: 13.5 G/DL (ref 12–16)
HGB UR QL STRIP: ABNORMAL
HYALINE CASTS #/AREA URNS LPF: 3 /LPF
IMM GRANULOCYTES # BLD AUTO: 0.05 K/UL (ref 0–0.04)
IMM GRANULOCYTES NFR BLD AUTO: 0.4 % (ref 0–0.5)
KETONES UR QL STRIP: NEGATIVE
LACTATE SERPL-SCNC: 1 MMOL/L (ref 0.5–2.2)
LACTATE SERPL-SCNC: 1.2 MMOL/L (ref 0.5–2.2)
LEUKOCYTE ESTERASE UR QL STRIP: ABNORMAL
LYMPHOCYTES # BLD AUTO: 1.2 K/UL (ref 1–4.8)
LYMPHOCYTES NFR BLD: 9.5 % (ref 18–48)
MCH RBC QN AUTO: 27.6 PG (ref 27–31)
MCHC RBC AUTO-ENTMCNC: 32.8 G/DL (ref 32–36)
MCV RBC AUTO: 84 FL (ref 82–98)
MICROSCOPIC COMMENT: ABNORMAL
MONOCYTES # BLD AUTO: 0.9 K/UL (ref 0.3–1)
MONOCYTES NFR BLD: 7.1 % (ref 4–15)
NEUTROPHILS # BLD AUTO: 10.8 K/UL (ref 1.8–7.7)
NEUTROPHILS NFR BLD: 82.6 % (ref 38–73)
NITRITE UR QL STRIP: NEGATIVE
NRBC BLD-RTO: 0 /100 WBC
PH UR STRIP: 7 [PH] (ref 5–8)
PLATELET # BLD AUTO: 269 K/UL (ref 150–450)
PMV BLD AUTO: 9.5 FL (ref 9.2–12.9)
POTASSIUM SERPL-SCNC: 4.1 MMOL/L (ref 3.5–5.1)
PROCALCITONIN SERPL IA-MCNC: 0.15 NG/ML
PROT SERPL-MCNC: 7.1 G/DL (ref 6–8.4)
PROT UR QL STRIP: ABNORMAL
RBC # BLD AUTO: 4.9 M/UL (ref 4–5.4)
RBC #/AREA URNS HPF: >100 /HPF (ref 0–4)
SARS-COV-2 RDRP RESP QL NAA+PROBE: NEGATIVE
SODIUM SERPL-SCNC: 137 MMOL/L (ref 136–145)
SP GR UR STRIP: 1.01 (ref 1–1.03)
SQUAMOUS #/AREA URNS HPF: 2 /HPF
URN SPEC COLLECT METH UR: ABNORMAL
UROBILINOGEN UR STRIP-ACNC: NEGATIVE EU/DL
WBC # BLD AUTO: 13.11 K/UL (ref 3.9–12.7)
WBC #/AREA URNS HPF: >100 /HPF (ref 0–5)
WBC CLUMPS URNS QL MICRO: ABNORMAL

## 2022-08-22 PROCEDURE — 85025 COMPLETE CBC W/AUTO DIFF WBC: CPT | Performed by: NURSE PRACTITIONER

## 2022-08-22 PROCEDURE — 93010 ELECTROCARDIOGRAM REPORT: CPT | Mod: ,,, | Performed by: INTERNAL MEDICINE

## 2022-08-22 PROCEDURE — 99291 CRITICAL CARE FIRST HOUR: CPT | Mod: 25

## 2022-08-22 PROCEDURE — 87086 URINE CULTURE/COLONY COUNT: CPT | Performed by: NURSE PRACTITIONER

## 2022-08-22 PROCEDURE — 99284 PR EMERGENCY DEPT VISIT,LEVEL IV: ICD-10-PCS | Mod: ,,, | Performed by: UROLOGY

## 2022-08-22 PROCEDURE — 87077 CULTURE AEROBIC IDENTIFY: CPT | Performed by: NURSE PRACTITIONER

## 2022-08-22 PROCEDURE — 93010 EKG 12-LEAD: ICD-10-PCS | Mod: ,,, | Performed by: INTERNAL MEDICINE

## 2022-08-22 PROCEDURE — 87088 URINE BACTERIA CULTURE: CPT | Performed by: NURSE PRACTITIONER

## 2022-08-22 PROCEDURE — 87040 BLOOD CULTURE FOR BACTERIA: CPT | Performed by: FAMILY MEDICINE

## 2022-08-22 PROCEDURE — 96365 THER/PROPH/DIAG IV INF INIT: CPT

## 2022-08-22 PROCEDURE — 96361 HYDRATE IV INFUSION ADD-ON: CPT

## 2022-08-22 PROCEDURE — 83605 ASSAY OF LACTIC ACID: CPT | Mod: 91 | Performed by: NURSE PRACTITIONER

## 2022-08-22 PROCEDURE — 81000 URINALYSIS NONAUTO W/SCOPE: CPT | Performed by: NURSE PRACTITIONER

## 2022-08-22 PROCEDURE — 99284 EMERGENCY DEPT VISIT MOD MDM: CPT | Mod: ,,, | Performed by: UROLOGY

## 2022-08-22 PROCEDURE — G0378 HOSPITAL OBSERVATION PER HR: HCPCS

## 2022-08-22 PROCEDURE — 25000003 PHARM REV CODE 250: Performed by: FAMILY MEDICINE

## 2022-08-22 PROCEDURE — 84145 PROCALCITONIN (PCT): CPT | Performed by: FAMILY MEDICINE

## 2022-08-22 PROCEDURE — 80053 COMPREHEN METABOLIC PANEL: CPT | Performed by: NURSE PRACTITIONER

## 2022-08-22 PROCEDURE — 87186 SC STD MICRODIL/AGAR DIL: CPT | Performed by: NURSE PRACTITIONER

## 2022-08-22 PROCEDURE — 63600175 PHARM REV CODE 636 W HCPCS: Performed by: FAMILY MEDICINE

## 2022-08-22 PROCEDURE — U0002 COVID-19 LAB TEST NON-CDC: HCPCS | Performed by: FAMILY MEDICINE

## 2022-08-22 PROCEDURE — 83605 ASSAY OF LACTIC ACID: CPT | Performed by: FAMILY MEDICINE

## 2022-08-22 PROCEDURE — 96366 THER/PROPH/DIAG IV INF ADDON: CPT

## 2022-08-22 PROCEDURE — 87040 BLOOD CULTURE FOR BACTERIA: CPT | Mod: 59 | Performed by: NURSE PRACTITIONER

## 2022-08-22 PROCEDURE — 93005 ELECTROCARDIOGRAM TRACING: CPT

## 2022-08-22 RX ORDER — FLUOXETINE 10 MG/1
10 CAPSULE ORAL DAILY
Status: DISCONTINUED | OUTPATIENT
Start: 2022-08-23 | End: 2022-08-24 | Stop reason: HOSPADM

## 2022-08-22 RX ORDER — ATORVASTATIN CALCIUM 10 MG/1
10 TABLET, FILM COATED ORAL DAILY
Status: DISCONTINUED | OUTPATIENT
Start: 2022-08-23 | End: 2022-08-24 | Stop reason: HOSPADM

## 2022-08-22 RX ORDER — NALOXONE HCL 0.4 MG/ML
0.02 VIAL (ML) INJECTION
Status: DISCONTINUED | OUTPATIENT
Start: 2022-08-22 | End: 2022-08-24 | Stop reason: HOSPADM

## 2022-08-22 RX ORDER — PANTOPRAZOLE SODIUM 40 MG/1
40 TABLET, DELAYED RELEASE ORAL DAILY
Status: DISCONTINUED | OUTPATIENT
Start: 2022-08-23 | End: 2022-08-24 | Stop reason: HOSPADM

## 2022-08-22 RX ORDER — MORPHINE SULFATE 4 MG/ML
2 INJECTION, SOLUTION INTRAMUSCULAR; INTRAVENOUS
Status: ACTIVE | OUTPATIENT
Start: 2022-08-22 | End: 2022-08-23

## 2022-08-22 RX ORDER — HYDROCODONE BITARTRATE AND ACETAMINOPHEN 5; 325 MG/1; MG/1
1 TABLET ORAL EVERY 6 HOURS PRN
Status: DISCONTINUED | OUTPATIENT
Start: 2022-08-22 | End: 2022-08-22

## 2022-08-22 RX ORDER — SODIUM CHLORIDE 0.9 % (FLUSH) 0.9 %
10 SYRINGE (ML) INJECTION EVERY 12 HOURS PRN
Status: DISCONTINUED | OUTPATIENT
Start: 2022-08-22 | End: 2022-08-24 | Stop reason: HOSPADM

## 2022-08-22 RX ORDER — BISACODYL 10 MG
10 SUPPOSITORY, RECTAL RECTAL DAILY PRN
Status: DISCONTINUED | OUTPATIENT
Start: 2022-08-22 | End: 2022-08-24 | Stop reason: HOSPADM

## 2022-08-22 RX ORDER — SODIUM CHLORIDE 9 MG/ML
INJECTION, SOLUTION INTRAVENOUS CONTINUOUS
Status: ACTIVE | OUTPATIENT
Start: 2022-08-22 | End: 2022-08-23

## 2022-08-22 RX ORDER — ALPRAZOLAM 0.25 MG/1
0.25 TABLET ORAL 2 TIMES DAILY PRN
Status: DISCONTINUED | OUTPATIENT
Start: 2022-08-22 | End: 2022-08-24 | Stop reason: HOSPADM

## 2022-08-22 RX ORDER — ONDANSETRON 2 MG/ML
4 INJECTION INTRAMUSCULAR; INTRAVENOUS
Status: ACTIVE | OUTPATIENT
Start: 2022-08-22 | End: 2022-08-23

## 2022-08-22 RX ORDER — ENOXAPARIN SODIUM 100 MG/ML
40 INJECTION SUBCUTANEOUS EVERY 24 HOURS
Status: DISCONTINUED | OUTPATIENT
Start: 2022-08-23 | End: 2022-08-24 | Stop reason: HOSPADM

## 2022-08-22 RX ORDER — HYDROCODONE BITARTRATE AND ACETAMINOPHEN 10; 325 MG/1; MG/1
1 TABLET ORAL EVERY 6 HOURS PRN
Status: DISCONTINUED | OUTPATIENT
Start: 2022-08-22 | End: 2022-08-24 | Stop reason: HOSPADM

## 2022-08-22 RX ORDER — PROMETHAZINE HYDROCHLORIDE 12.5 MG/1
25 TABLET ORAL EVERY 6 HOURS PRN
Status: DISCONTINUED | OUTPATIENT
Start: 2022-08-22 | End: 2022-08-24 | Stop reason: HOSPADM

## 2022-08-22 RX ORDER — MAG HYDROX/ALUMINUM HYD/SIMETH 200-200-20
30 SUSPENSION, ORAL (FINAL DOSE FORM) ORAL 4 TIMES DAILY PRN
Status: DISCONTINUED | OUTPATIENT
Start: 2022-08-22 | End: 2022-08-24 | Stop reason: HOSPADM

## 2022-08-22 RX ORDER — MEMANTINE HYDROCHLORIDE 10 MG/1
10 TABLET ORAL 2 TIMES DAILY
Status: DISCONTINUED | OUTPATIENT
Start: 2022-08-22 | End: 2022-08-24 | Stop reason: HOSPADM

## 2022-08-22 RX ORDER — THYROID 30 MG/1
30 TABLET ORAL
Status: DISCONTINUED | OUTPATIENT
Start: 2022-08-23 | End: 2022-08-24 | Stop reason: HOSPADM

## 2022-08-22 RX ORDER — ONDANSETRON 2 MG/ML
4 INJECTION INTRAMUSCULAR; INTRAVENOUS EVERY 8 HOURS PRN
Status: DISCONTINUED | OUTPATIENT
Start: 2022-08-22 | End: 2022-08-24 | Stop reason: HOSPADM

## 2022-08-22 RX ORDER — ACETAMINOPHEN 325 MG/1
650 TABLET ORAL EVERY 6 HOURS PRN
Status: DISCONTINUED | OUTPATIENT
Start: 2022-08-22 | End: 2022-08-24 | Stop reason: HOSPADM

## 2022-08-22 RX ORDER — TALC
6 POWDER (GRAM) TOPICAL NIGHTLY PRN
Status: DISCONTINUED | OUTPATIENT
Start: 2022-08-22 | End: 2022-08-24 | Stop reason: HOSPADM

## 2022-08-22 RX ADMIN — CEFTRIAXONE 1 G: 1 INJECTION, SOLUTION INTRAVENOUS at 05:08

## 2022-08-22 RX ADMIN — SODIUM CHLORIDE, POTASSIUM CHLORIDE, SODIUM LACTATE AND CALCIUM CHLORIDE 1959 ML: 600; 310; 30; 20 INJECTION, SOLUTION INTRAVENOUS at 05:08

## 2022-08-22 RX ADMIN — CEFTRIAXONE 1 G: 1 INJECTION, SOLUTION INTRAVENOUS at 09:08

## 2022-08-22 RX ADMIN — SODIUM CHLORIDE: 9 INJECTION, SOLUTION INTRAVENOUS at 09:08

## 2022-08-22 RX ADMIN — MEMANTINE 10 MG: 10 TABLET ORAL at 09:08

## 2022-08-22 NOTE — PHARMACY MED REC
"Admission Medication History     The home medication history was taken by Luis Mitchell.    You may go to "Admission" then "Reconcile Home Medications" tabs to review and/or act upon these items.      The home medication list has been updated by the Pharmacy department.    Please read ALL comments highlighted in yellow.    Please address this information as you see fit.     Feel free to contact us if you have any questions or require assistance.      Medications listed below were obtained from: Patient/family: DAUGHTER  (Not in a hospital admission)      DAUGHTER STATES PATIENT VOMITED MEDICATION THIS MORNING    Luis Mitchell  [id number]    Current Outpatient Medications on File Prior to Encounter   Medication Sig Dispense Refill Last Dose    ALPRAZolam (XANAX) 0.25 MG tablet Take 1 tablet (0.25 mg total) by mouth 2 (two) times daily as needed for Anxiety. 20 tablet 1 8/21/2022 at Unknown time    atorvastatin (LIPITOR) 10 MG tablet TAKE 1 TABLET BY MOUTH ONCE DAILY 90 tablet 3 8/21/2022 at Unknown time    FLUoxetine 10 MG capsule Take 1 capsule (10 mg total) by mouth once daily. 90 capsule 3 8/21/2022 at Unknown time    furosemide (LASIX) 20 MG tablet TAKE 1 TABLET BY MOUTH ONCE DAILY 90 tablet 3 8/21/2022 at Unknown time    memantine (NAMENDA) 10 MG Tab TAKE 1 TABLET BY MOUTH  TWICE DAILY 180 tablet 3 8/21/2022 at Unknown time    pantoprazole (PROTONIX) 40 MG tablet Take 1 tablet (40 mg total) by mouth once daily. 30 min prior to lunch 90 tablet 3 8/21/2022 at Unknown time    thyroid, pork, (ARMOUR THYROID) 30 mg Tab Take 1 tablet (30 mg total) by mouth before breakfast. 90 tablet 3 8/21/2022 at Unknown time    triamcinolone acetonide 0.1% (KENALOG) 0.1 % ointment APPLY TOPICALLY 4 (FOUR) TIMES DAILY. TO RASH ON FACE AND CHEST AND NOSE 80 g 1 Past Week at Unknown time    dicyclomine (BENTYL) 10 MG capsule Take 1 capsule (10 mg total) by mouth 4 (four) times daily before meals and nightly. " (Patient not taking: Reported on 8/22/2022) 120 capsule 11 Not Taking at Unknown time    L.acid/B.bifidum/B.animal/FOS (PROBIOTIC COMPLEX ORAL) Take 1 capsule by mouth once daily at 6am.       neomycin-polymyxin-dexamethasone (DEXACINE) 3.5 mg/g-10,000 unit/g-0.1 % Oint Place into both eyes every evening. (Patient not taking: Reported on 8/22/2022) 3.5 g 0 Not Taking at Unknown time                           .

## 2022-08-22 NOTE — ED PROVIDER NOTES
SCRIBE #1 NOTE: I, Nikhil Nathaly, am scribing for, and in the presence of, Bella Trujillo MD. I have scribed the entire note.       History     Chief Complaint   Patient presents with    Vomiting     Pt complaining of vomiting, flank pain, and back pain;      Review of patient's allergies indicates:  No Known Allergies      History of Present Illness     HPI    8/22/2022, 4:35 PM  History obtained from the patient      History of Present Illness: Irlanda Lopez is a 81 y.o. female patient with a PMHx of HTN, hypothyroidism, diverticulitis s/p colostomy, and dementia who presents to the Emergency Department for evaluation of right flank pain. Symptoms are constant and moderate in severity. No mitigating or exacerbating factors reported. Associated sxs include back pain and n/v. Patient denies any fever, chills, diarrhea, hematuria, dysuria, and all other sxs at this time. No prior tx reported. No further complaints or concerns at this time.       Arrival mode: Personal vehicle     PCP: Myla Arias MD        Past Medical History:  Past Medical History:   Diagnosis Date    Clostridium difficile colitis     Dementia     Diverticulitis     s/p colostomy    High cholesterol     Hypertension     Hypothyroidism        Past Surgical History:  Past Surgical History:   Procedure Laterality Date    APPENDECTOMY  2008    CATARACT EXTRACTION      Dr Raygoza    COLON SURGERY      COLONOSCOPY N/A 1/2/2019    Procedure: COLONOSCOPY;  Surgeon: Reece Hogan MD;  Location: [facility];  Service: Endoscopy;  Laterality: N/A;    COLONOSCOPY N/A 6/7/2019    Procedure: COLONOSCOPY;  Surgeon: Rishabh Connelly MD;  Location: [facility];  Service: General;  Laterality: N/A;    COLONOSCOPY N/A 6/23/2021    Procedure: COLONOSCOPY;  Surgeon: Lucy Lafleur MD;  Location: [facility] ENDO;  Service: Endoscopy;  Laterality: N/A;    COLOSTOMY Left 1/2/2019    Procedure: CREATION, COLOSTOMY;  Surgeon: Reece Hogan MD;  Location: [facility]  OR;  Service: General;  Laterality: Left;    CYSTOSCOPY WITH URETEROSCOPY, RETROGRADE PYELOGRAPHY, AND INSERTION OF STENT Right 8/23/2022    Procedure: CYSTOSCOPY, WITH RETROGRADE PYELOGRAM AND URETERAL STENT INSERTION;  Surgeon: Anselmo Matute MD;  Location: [facility] OR;  Service: Urology;  Laterality: Right;    ESOPHAGOGASTRODUODENOSCOPY N/A 9/16/2020    Procedure: ESOPHAGOGASTRODUODENOSCOPY (EGD)- Needs Rapid;  Surgeon: Lucy Lafleur MD;  Location: [facility];  Service: Endoscopy;  Laterality: N/A;    SHAHIDA PROCEDURE N/A 1/2/2019    Procedure: SHAHIDA PROCEDURE;  Surgeon: Reece Hogan MD;  Location: [facility] OR;  Service: General;  Laterality: N/A;    LYSIS OF ADHESIONS N/A 1/2/2019    Procedure: LYSIS, ADHESIONS;  Surgeon: Reece Hogan MD;  Location: [facility] OR;  Service: General;  Laterality: N/A;    VITRECTOMY Right 09/2013         Family History:  Family History   Problem Relation Age of Onset    Alzheimer's disease Mother     Aneurysm Father         brain    Stomach cancer Brother         50s       Social History:  Social History     Tobacco Use    Smoking status: Former Smoker     Years: 5.00    Smokeless tobacco: Never Used   Substance and Sexual Activity    Alcohol use: No    Drug use: No    Sexual activity: Never     Comment:         Review of Systems     Review of Systems   Constitutional: Negative for fever.   HENT: Negative for sore throat.    Respiratory: Negative for shortness of breath.    Cardiovascular: Negative for chest pain.   Gastrointestinal: Positive for nausea and vomiting.   Genitourinary: Positive for flank pain (R). Negative for dysuria and hematuria.   Musculoskeletal: Positive for back pain.   Skin: Negative for rash.   Neurological: Negative for weakness.   Hematological: Does not bruise/bleed easily.   All other systems reviewed and are negative.     Physical Exam     Initial Vitals [08/22/22 1444]   BP Pulse Resp Temp SpO2   (!) 134/97 68 16 99.1 °F (37.3 °C)  96 %      MAP       --          Physical Exam  Nursing Notes and Vital Signs Reviewed.  Constitutional: Patient is in no acute distress. Well-developed and well-nourished  Head: Atraumatic. Normocephalic.  Eyes: PERRL. EOM intact. Conjunctivae are not pale. No scleral icterus.  ENT: Mucous membranes are moist. Oropharynx is clear and symmetric.    Neck: Supple. Full ROM. No lymphadenopathy.  Cardiovascular: Regular rate. Regular rhythm. No murmurs, rubs, or gallops. Distal pulses are 2+ and symmetric.  Pulmonary/Chest: No respiratory distress. Clear to auscultation bilaterally. No wheezing or rales.  Abdominal: Colostomy bag LLQ. Right flank tenderness to palpation.  No rebound, guarding, or rigidity. Good bowel sounds.  Genitourinary: No CVA tenderness  Musculoskeletal: Moves all extremities. No obvious deformities. No edema. No calf tenderness.  Skin: Warm and dry.  Neurological:  Alert, awake, and appropriate.  Normal speech.  No acute focal neurological deficits are appreciated.  Psychiatric: Demented. Normal affect. Good eye contact. Appropriate in content.     ED Course   Critical Care    Date/Time: 8/22/2022 6:19 PM  Performed by: Bella Trujillo MD  Authorized by: Bella Trujillo MD   Direct patient critical care time: 15 minutes  Additional history critical care time: 6 minutes  Ordering / reviewing critical care time: 5 minutes  Documentation critical care time: 4 minutes  Consulting other physicians critical care time: 5 minutes  Consult with family critical care time: 5 minutes  Total critical care time (exclusive of procedural time) : 40 minutes  Critical care time was exclusive of separately billable procedures and treating other patients and teaching time.  Critical care was necessary to treat or prevent imminent or life-threatening deterioration of the following conditions: Hydronephrosis and nephrolithiasis.  Critical care was time spent personally by me on the following activities: blood draw  for specimens, development of treatment plan with patient or surrogate, discussions with consultants, interpretation of cardiac output measurements, evaluation of patient's response to treatment, examination of patient, obtaining history from patient or surrogate, ordering and performing treatments and interventions, ordering and review of laboratory studies, ordering and review of radiographic studies, pulse oximetry, re-evaluation of patient's condition and review of old charts.        ED Vital Signs:  Vitals:    08/23/22 0100 08/23/22 0300 08/23/22 0935 08/23/22 0940   BP:  101/72 (!) 116/56 (!) 111/59   Pulse:  62 65 68   Resp:  19 13 16   Temp:  98.4 °F (36.9 °C) 97.7 °F (36.5 °C)    TempSrc:  Oral Temporal    SpO2:  98% (!) 93% 99%   Weight: 64.4 kg (141 lb 15.6 oz)      Height: 5' (1.524 m)       08/23/22 0945 08/23/22 0955 08/23/22 1100 08/23/22 1141   BP: 116/61 129/60  110/60   Pulse: 69 66  (!) 51   Resp: 12 19  18   Temp:  98.4 °F (36.9 °C)  97.7 °F (36.5 °C)   TempSrc:  Temporal  Oral   SpO2: 100% 95% 96% 96%   Weight:       Height:        08/23/22 1600 08/23/22 1700 08/23/22 1907 08/23/22 2350   BP: (!) 104/57  109/61 (!) 90/52   Pulse: (!) 54 60 (!) 56 (!) 58   Resp: 18 18 20 14   Temp: 98.3 °F (36.8 °C)  99.5 °F (37.5 °C) 99 °F (37.2 °C)   TempSrc: Oral  Oral Oral   SpO2: 95% 95% (!) 92% (!) 91%   Weight:       Height:        08/24/22 0414 08/24/22 0747 08/24/22 1124   BP: 127/64 116/65 137/60   Pulse: (!) 58 (!) 57 61   Resp: 15 18 18   Temp: 99.8 °F (37.7 °C) 98.5 °F (36.9 °C) 98.8 °F (37.1 °C)   TempSrc:  Oral Oral   SpO2: (!) 92% 97% 97%   Weight:      Height:          Abnormal Lab Results:  Labs Reviewed   CBC W/ AUTO DIFFERENTIAL - Abnormal; Notable for the following components:       Result Value    WBC 13.11 (*)     Gran # (ANC) 10.8 (*)     Immature Grans (Abs) 0.05 (*)     Gran % 82.6 (*)     Lymph % 9.5 (*)     All other components within normal limits   COMPREHENSIVE METABOLIC PANEL -  Abnormal; Notable for the following components:    Glucose 129 (*)     Total Bilirubin 1.3 (*)     All other components within normal limits   URINALYSIS, REFLEX TO URINE CULTURE - Abnormal; Notable for the following components:    Appearance, UA Hazy (*)     Protein, UA 2+ (*)     Occult Blood UA 3+ (*)     Leukocytes, UA 3+ (*)     All other components within normal limits    Narrative:     Specimen Source->Urine   URINALYSIS MICROSCOPIC - Abnormal; Notable for the following components:    RBC, UA >100 (*)     WBC, UA >100 (*)     WBC Clumps, UA Many (*)     Bacteria Moderate (*)     Hyaline Casts, UA 3 (*)     All other components within normal limits    Narrative:     Specimen Source->Urine   LACTIC ACID, PLASMA   PROCALCITONIN   SARS-COV-2 RDRP GENE    Narrative:     This test utilizes isothermal nucleic acid amplification   technology to detect the SARS-CoV-2 RdRp nucleic acid segment.   The analytical sensitivity (limit of detection) is 125 genome   equivalents/mL.   A POSITIVE result implies infection with the SARS-CoV-2 virus;   the patient is presumed to be contagious.     A NEGATIVE result means that SARS-CoV-2 nucleic acids are not   present above the limit of detection. A NEGATIVE result should be   treated as presumptive. It does not rule out the possibility of   COVID-19 and should not be the sole basis for treatment decisions.   If COVID-19 is strongly suspected based on clinical and exposure   history, re-testing using an alternate molecular assay should be   considered.   This test is only for use under the Food and Drug   Administration s Emergency Use Authorization (EUA).   Commercial kits are provided by [employer].   Performance characteristics of the EUA have been independently   verified by [facility] Department of   Pathology and Laboratory Medicine.   _________________________________________________________________   The authorized Fact Sheet for Healthcare Providers  and the authorized Fact   Sheet for Patients of the ID NOW COVID-19 are available on the FDA   website:     https://www.fda.gov/media/391458/download  https://www.fda.gov/media/177096/download                All Lab Results:  Results for orders placed or performed during the hospital encounter of 08/22/22   Blood culture x two cultures. Draw prior to antibiotics.    Specimen: Peripheral, Forearm, Left; Blood   Result Value Ref Range    Blood Culture, Routine No Growth to date     Blood Culture, Routine No Growth to date    Blood culture x two cultures. Draw prior to antibiotics.    Specimen: Peripheral, Right Hand; Blood   Result Value Ref Range    Blood Culture, Routine No Growth to date     Blood Culture, Routine No Growth to date    Urine culture    Specimen: Urine   Result Value Ref Range    Urine Culture, Routine (A)      PRESUMPTIVE E COLI  >100,000 cfu/ml  Identification and susceptibility pending  No other significant isolate     CBC auto differential   Result Value Ref Range    WBC 13.11 (H) 3.90 - 12.70 K/uL    RBC 4.90 4.00 - 5.40 M/uL    Hemoglobin 13.5 12.0 - 16.0 g/dL    Hematocrit 41.2 37.0 - 48.5 %    MCV 84 82 - 98 fL    MCH 27.6 27.0 - 31.0 pg    MCHC 32.8 32.0 - 36.0 g/dL    RDW 13.3 11.5 - 14.5 %    Platelets 269 150 - 450 K/uL    MPV 9.5 9.2 - 12.9 fL    Immature Granulocytes 0.4 0.0 - 0.5 %    Gran # (ANC) 10.8 (H) 1.8 - 7.7 K/uL    Immature Grans (Abs) 0.05 (H) 0.00 - 0.04 K/uL    Lymph # 1.2 1.0 - 4.8 K/uL    Mono # 0.9 0.3 - 1.0 K/uL    Eos # 0.0 0.0 - 0.5 K/uL    Baso # 0.03 0.00 - 0.20 K/uL    nRBC 0 0 /100 WBC    Gran % 82.6 (H) 38.0 - 73.0 %    Lymph % 9.5 (L) 18.0 - 48.0 %    Mono % 7.1 4.0 - 15.0 %    Eosinophil % 0.2 0.0 - 8.0 %    Basophil % 0.2 0.0 - 1.9 %    Differential Method Automated    Comprehensive metabolic panel   Result Value Ref Range    Sodium 137 136 - 145 mmol/L    Potassium 4.1 3.5 - 5.1 mmol/L    Chloride 100 95 - 110 mmol/L    CO2 24 23 - 29 mmol/L    Glucose 129  (H) 70 - 110 mg/dL    BUN 12 8 - 23 mg/dL    Creatinine 0.9 0.5 - 1.4 mg/dL    Calcium 9.1 8.7 - 10.5 mg/dL    Total Protein 7.1 6.0 - 8.4 g/dL    Albumin 3.6 3.5 - 5.2 g/dL    Total Bilirubin 1.3 (H) 0.1 - 1.0 mg/dL    Alkaline Phosphatase 80 55 - 135 U/L    AST 17 10 - 40 U/L    ALT 10 10 - 44 U/L    Anion Gap 13 8 - 16 mmol/L    eGFR >60 >60 mL/min/1.73 m^2   Urinalysis, Reflex to Urine Culture Urine, Clean Catch    Specimen: Urine   Result Value Ref Range    Specimen UA Urine, Clean Catch     Color, UA Yellow Yellow, Straw, Gina    Appearance, UA Hazy (A) Clear    pH, UA 7.0 5.0 - 8.0    Specific Gravity, UA 1.015 1.005 - 1.030    Protein, UA 2+ (A) Negative    Glucose, UA Negative Negative    Ketones, UA Negative Negative    Bilirubin (UA) Negative Negative    Occult Blood UA 3+ (A) Negative    Nitrite, UA Negative Negative    Urobilinogen, UA Negative <2.0 EU/dL    Leukocytes, UA 3+ (A) Negative   Lactic acid, plasma #1   Result Value Ref Range    Lactate (Lactic Acid) 1.2 0.5 - 2.2 mmol/L   Lactic acid, plasma #2   Result Value Ref Range    Lactate (Lactic Acid) 1.0 0.5 - 2.2 mmol/L   Procalcitonin   Result Value Ref Range    Procalcitonin 0.15 <0.25 ng/mL   Urinalysis Microscopic   Result Value Ref Range    RBC, UA >100 (H) 0 - 4 /hpf    WBC, UA >100 (H) 0 - 5 /hpf    WBC Clumps, UA Many (A) None-Rare    Bacteria Moderate (A) None-Occ /hpf    Squam Epithel, UA 2 /hpf    Hyaline Casts, UA 3 (A) 0-1/lpf /lpf    Microscopic Comment SEE COMMENT    CBC Auto Differential   Result Value Ref Range    WBC 9.78 3.90 - 12.70 K/uL    RBC 3.99 (L) 4.00 - 5.40 M/uL    Hemoglobin 10.8 (L) 12.0 - 16.0 g/dL    Hematocrit 34.5 (L) 37.0 - 48.5 %    MCV 87 82 - 98 fL    MCH 27.1 27.0 - 31.0 pg    MCHC 31.3 (L) 32.0 - 36.0 g/dL    RDW 13.4 11.5 - 14.5 %    Platelets 220 150 - 450 K/uL    MPV 9.8 9.2 - 12.9 fL    Immature Granulocytes 0.4 0.0 - 0.5 %    Gran # (ANC) 6.1 1.8 - 7.7 K/uL    Immature Grans (Abs) 0.04 0.00 - 0.04  K/uL    Lymph # 2.4 1.0 - 4.8 K/uL    Mono # 1.1 (H) 0.3 - 1.0 K/uL    Eos # 0.1 0.0 - 0.5 K/uL    Baso # 0.04 0.00 - 0.20 K/uL    nRBC 0 0 /100 WBC    Gran % 62.6 38.0 - 73.0 %    Lymph % 24.2 18.0 - 48.0 %    Mono % 11.7 4.0 - 15.0 %    Eosinophil % 0.7 0.0 - 8.0 %    Basophil % 0.4 0.0 - 1.9 %    Differential Method Automated    Basic Metabolic Panel   Result Value Ref Range    Sodium 137 136 - 145 mmol/L    Potassium 3.9 3.5 - 5.1 mmol/L    Chloride 106 95 - 110 mmol/L    CO2 20 (L) 23 - 29 mmol/L    Glucose 94 70 - 110 mg/dL    BUN 12 8 - 23 mg/dL    Creatinine 0.8 0.5 - 1.4 mg/dL    Calcium 8.0 (L) 8.7 - 10.5 mg/dL    Anion Gap 11 8 - 16 mmol/L    eGFR >60 >60 mL/min/1.73 m^2   POCT COVID-19 Rapid Screening   Result Value Ref Range    POC Rapid COVID Negative Negative     Acceptable Yes        Imaging Results:  Imaging Results          X-Ray Chest AP Portable (Final result)  Result time 08/22/22 17:16:45    Final result by Genaro Robles MD (08/22/22 17:16:45)                 Impression:      No acute abnormality.      Electronically signed by: Genaro Robles  Date:    08/22/2022  Time:    17:16             Narrative:    EXAMINATION:  XR CHEST AP PORTABLE    CLINICAL HISTORY:  Sepsis;    TECHNIQUE:  Single frontal view of the chest was performed.    COMPARISON:  Multiple priors.    FINDINGS:  The lungs are clear, with normal appearance of pulmonary vasculature and no pleural effusion or pneumothorax.    The cardiac silhouette is normal in size. The hilar and mediastinal contours are unremarkable.    Degenerative change of the shoulders.                               CT Renal Stone Study ABD Pelvis WO (Final result)  Result time 08/22/22 15:35:26    Final result by Katie Oliver MD (Timothy) (08/22/22 15:35:26)                 Impression:      Large stone involving the right renal pelvis with interval enlargement of the right renal pelvis and calices as well as marked perinephric and  peripelvic fat stranding.  Findings compatible with interval obstruction.  Superimposed infection cannot be excluded.  Correlate.  Left kidney is unremarkable.    All CT scans at this facility use dose modulation, iterative reconstructions, and/or weight base dosing when appropriate to reduce radiation dose to as low as reasonably achievable      Electronically signed by: Katie Oliver MD  Date:    08/22/2022  Time:    15:35             Narrative:    EXAMINATION:  CT RENAL STONE STUDY ABD PELVIS WO    CLINICAL HISTORY:  flank pain;    COMPARISON:  Comparison CT abdomen pelvis, 06/27/2021.    FINDINGS:  Lung bases are clear    The liver, the spleen, and the pancreas appear normal.    The gallbladder is unremarkable.  There is no bile duct dilatation.    3.6 x 2.4 cm stone involving the right renal pelvis associated with marked dilatation of the right renal pelvis and calices which is increased since previous exam.  There is also stranding of the fat around the renal pelvis as well as perinephric fat stranding compatible with interval worsening likely related to obstruction superimposed infection cannot be excluded.  No definite ureteral stone.  Left kidney and collecting system appears unremarkable.    The aorta and inferior vena cava are unremarkable.    No evidence of bowel obstruction.   No evidence of appendicitis.  No evidence of diverticulitis.  Colostomy is present in the left mid to lower abdomen.    Bladder is normal. No abnormal masses or fluid collections in the pelvis.    Skeletal structures are intact.  No acute skeletal findings.                                 The EKG was ordered, reviewed, and independently interpreted by the ED provider.  Interpretation time: 16:47  Rate: 63 BPM  Rhythm: Sinus rhythm with marked sinus arrythmia  Interpretation: Septal infarct, age undetermined. No STEMI.           The Emergency Provider reviewed the vital signs and test results, which are outlined above.     ED  Discussion     5:05 PM: Discussed pt's case with Dr. Thomas (Urology). Awaiting urine results and will advise whether emergent surgery is needed.    6:18 PM: Discussed case with Cary Wong NP (Hospital Medicine). Dr. Johnson agrees with current care and management of pt and accepts admission.   Admitting Service: Hospital medicine  Admitting Physician: Dr. Johnson  Admit to: Observation    6:18 PM: Re-evaluated pt. I have discussed test results, shared treatment plan, and the need for admission with patient and family at bedside. Pt and family express understanding at this time and agree with all information. All questions answered. Pt and family have no further questions or concerns at this time. Pt is ready for admit.       Medical Decision Making:   Clinical Tests:   Lab Tests: Ordered and Reviewed  Radiological Study: Ordered and Reviewed  Medical Tests: Ordered and Reviewed           ED Medication(s):  Medications   morphine injection 2 mg (has no administration in time range)   ondansetron injection 4 mg (has no administration in time range)   0.9%  NaCl infusion (0 mL/hr Intravenous Stopped 8/23/22 2109)   lactated ringers bolus 1,959 mL (0 mLs Intravenous Stopped 8/22/22 1826)   cefTRIAXone (ROCEPHIN) 1 g/50 mL D5W IVPB (0 g Intravenous Stopped 8/22/22 1918)       Discharge Medication List as of 8/24/2022 11:22 AM      START taking these medications    Details   sulfamethoxazole-trimethoprim 800-160mg (BACTRIM DS) 800-160 mg Tab Take 1 tablet by mouth 2 (two) times daily. for 10 days, Starting Thu 8/25/2022, Until Sun 9/4/2022, Normal              Follow-up Information     Myla Arias MD Follow up in 1 week(s).    Specialty: Family Medicine  Contact information:  [address]  [address]  [place] [address] [phone]                             Scribe Attestation:   Scribe #1: I performed the above scribed service and the documentation accurately describes the services I performed. I attest to the  accuracy of the note.     Attending:   Physician Attestation Statement for Scribe #1: I, Bella Trujillo MD, personally performed the services described in this documentation, as scribed by Nikhil Andersen, in my presence, and it is both accurate and complete.           Clinical Impression       ICD-10-CM ICD-9-CM   1. Hydronephrosis with renal calculous obstruction  N13.2 591     592.0   2. Right flank pain  R10.9 789.09   3. Renal stone  N20.0 592.0   4. Acute pyelonephritis  N10 590.10       Disposition:   Disposition: Placed in Observation  Condition: Fair         Bella Trujillo MD  08/24/22 5058

## 2022-08-22 NOTE — CONSULTS
Chief Complaint:   Encounter Diagnosis   Name Primary?    Renal stone Yes       HPI:   08/22/2022 - presents to the ED today with new onset right flank pain with associated nausea, CT shows new dilation of her right kidney secondary to 3.6 cm right renal stone at the [facility], white count 13, renal function normal, lactate 1.2, urine pending, denies any gross hematuria or dysuria    2/21/22- patient had 1 episode of UTI, but otherwise doing well with no stone symptoms.  7/18/20- 79-year-old female who presents with severe abdominal pain and discomfort, she is being evaluated for colonic impaction diverticulitis.  She is seen to have an extremely large right renal pelvis stone.  This had been seen prior, and she was followed by partner in regards to possible surgery for this.  Patient though it is determined not pursuing not been seen in the last couple of years.  Patient is having upper abdominal pain, no colic at this point.  Patient has had no previous urological history per her and her son's report, her daughter is her primary care take care, she is currently not present at the bedside.  Family history of stones, no urological cancers per the family.  10/17/18: Mag3 shows 72/38 L/R function. -UCx last visit.  PCNL is the only viable treatment option; observation not recommended.  10/8/18: 77 yo woman referred for renal stone after workup for UTI/diverticulitis.  Having diarrhea on meds for diverticulitis.  No abd/pelvic pain and no exac/rel factors.  No hematuria.  No prior urolithiasis.  No urinary bother.  No  history.  Normal sexual function but inactive.  Some memory problems but functional at home.    Allergies:  Patient has no known allergies.    Medications:  has a current medication list which includes the following prescription(s): alprazolam, atorvastatin, fluoxetine, fluoxetine, furosemide, l.acid/b.animalis,bifidum/fos, memantine, ondansetron, dicyclomine, donepezil, pantoprazole, promethazine, thyroid  (pork), and triamcinolone acetonide 0.1%.    Review of Systems:  General: No fever, chills  Skin: No rashes  Chest:  Denies cough and sputum production  Heart: Denies chest pain  Resp: Denies dyspnea  Abdomen: Denies diarrhea, abdominal pain, hematemesis, or blood in stool.  Musculoskeletal: No joint stiffness or swelling. Denies back pain.  : see HPI  Neuro: no dizziness or weakness      PMH:   has a past medical history of Clostridium difficile colitis, Dementia, Diverticulitis, High cholesterol, Hypertension, and Hypothyroidism.    PSH:   has a past surgical history that includes Cataract extraction; Appendectomy (2008); Colonoscopy (N/A, 1/2/2019); Colostomy (Left, 1/2/2019); Maribel procedure (N/A, 1/2/2019); Lysis of adhesions (N/A, 1/2/2019); Colon surgery; Vitrectomy (Right, 09/2013); Colonoscopy (N/A, 6/7/2019); Esophagogastroduodenoscopy (N/A, 9/16/2020); and Colonoscopy (N/A, 6/23/2021).    FamHx: family history includes Alzheimer's disease in her mother; Aneurysm in her father; Stomach cancer in her brother.    SocHx:  reports that she has quit smoking. She quit after 5.00 years of use. She has never used smokeless tobacco. She reports that she does not drink alcohol and does not use drugs.      Physical Exam:  Vitals:    08/22/22 1645   BP:    Pulse: 62   Resp:    Temp:      General: awake, alert, cooperative  Head: NC/AT  Ears: external ears normal  Eyes: sclera normal  Lungs: normal inspiration, NAD  Heart: well-perfused  Abdomen: Soft, NT, ND  Skin: The skin is warm and dry  Ext: No c/c/e.  Neuro: grossly intact, AOx3    CT RENAL STONE STUDY ABD PELVIS WO 08/22/2022     CLINICAL HISTORY:  flank pain;     COMPARISON:  Comparison CT abdomen pelvis, 06/27/2021.     FINDINGS:  Lung bases are clear     The liver, the spleen, and the pancreas appear normal.     The gallbladder is unremarkable.  There is no bile duct dilatation.     3.6 x 2.4 cm stone involving the right renal pelvis associated with  marked dilatation of the right renal pelvis and calices which is increased since previous exam.  There is also stranding of the fat around the renal pelvis as well as perinephric fat stranding compatible with interval worsening likely related to obstruction superimposed infection cannot be excluded.  No definite ureteral stone.  Left kidney and collecting system appears unremarkable.     The aorta and inferior vena cava are unremarkable.     No evidence of bowel obstruction.   No evidence of appendicitis.  No evidence of diverticulitis.  Colostomy is present in the left mid to lower abdomen.     Bladder is normal. No abnormal masses or fluid collections in the pelvis.     Skeletal structures are intact.  No acute skeletal findings.     Impression:     Large stone involving the right renal pelvis with interval enlargement of the right renal pelvis and calices as well as marked perinephric and peripelvic fat stranding.  Findings compatible with interval obstruction.  Superimposed infection cannot be excluded.  Correlate.  Left kidney is unremarkable.    Labs/Studies:   Lab Results   Component Value Date    WBC 13.11 (H) 08/22/2022    HGB 13.5 08/22/2022    HCT 41.2 08/22/2022     08/22/2022     08/22/2022    K 4.1 08/22/2022     08/22/2022    CREATININE 0.9 08/22/2022    BUN 12 08/22/2022    CO2 24 08/22/2022    TSH 1.244 06/09/2022    INR 1.0 06/10/2019    HGBA1C 5.2 06/09/2022    CHOL 154 06/09/2022    TRIG 57 06/09/2022    HDL 38 (L) 06/09/2022    ALT 10 08/22/2022    AST 17 08/22/2022          Impression/Plan:    Right renal stone now with flank pain and hydronephrosis and perinephric stranding - white count 13 however lactate is normal, and patient is afebrile, is not tachycardic, and is normotensive, urine pending, recommend admission to hospital medicine and will plan to stent tomorrow, please make NPO at midnight    Addendum - UA with blood, LE, and protein    Bull Thomas MD

## 2022-08-23 ENCOUNTER — ANESTHESIA (OUTPATIENT)
Dept: SURGERY | Facility: HOSPITAL | Age: 82
End: 2022-08-23
Payer: MEDICARE

## 2022-08-23 ENCOUNTER — ANESTHESIA EVENT (OUTPATIENT)
Dept: SURGERY | Facility: HOSPITAL | Age: 82
End: 2022-08-23
Payer: MEDICARE

## 2022-08-23 LAB
ANION GAP SERPL CALC-SCNC: 11 MMOL/L (ref 8–16)
BASOPHILS # BLD AUTO: 0.04 K/UL (ref 0–0.2)
BASOPHILS NFR BLD: 0.4 % (ref 0–1.9)
BUN SERPL-MCNC: 12 MG/DL (ref 8–23)
CALCIUM SERPL-MCNC: 8 MG/DL (ref 8.7–10.5)
CHLORIDE SERPL-SCNC: 106 MMOL/L (ref 95–110)
CO2 SERPL-SCNC: 20 MMOL/L (ref 23–29)
CREAT SERPL-MCNC: 0.8 MG/DL (ref 0.5–1.4)
DIFFERENTIAL METHOD: ABNORMAL
EOSINOPHIL # BLD AUTO: 0.1 K/UL (ref 0–0.5)
EOSINOPHIL NFR BLD: 0.7 % (ref 0–8)
ERYTHROCYTE [DISTWIDTH] IN BLOOD BY AUTOMATED COUNT: 13.4 % (ref 11.5–14.5)
EST. GFR  (NO RACE VARIABLE): >60 ML/MIN/1.73 M^2
GLUCOSE SERPL-MCNC: 94 MG/DL (ref 70–110)
HCT VFR BLD AUTO: 34.5 % (ref 37–48.5)
HGB BLD-MCNC: 10.8 G/DL (ref 12–16)
IMM GRANULOCYTES # BLD AUTO: 0.04 K/UL (ref 0–0.04)
IMM GRANULOCYTES NFR BLD AUTO: 0.4 % (ref 0–0.5)
LYMPHOCYTES # BLD AUTO: 2.4 K/UL (ref 1–4.8)
LYMPHOCYTES NFR BLD: 24.2 % (ref 18–48)
MCH RBC QN AUTO: 27.1 PG (ref 27–31)
MCHC RBC AUTO-ENTMCNC: 31.3 G/DL (ref 32–36)
MCV RBC AUTO: 87 FL (ref 82–98)
MONOCYTES # BLD AUTO: 1.1 K/UL (ref 0.3–1)
MONOCYTES NFR BLD: 11.7 % (ref 4–15)
NEUTROPHILS # BLD AUTO: 6.1 K/UL (ref 1.8–7.7)
NEUTROPHILS NFR BLD: 62.6 % (ref 38–73)
NRBC BLD-RTO: 0 /100 WBC
PLATELET # BLD AUTO: 220 K/UL (ref 150–450)
PMV BLD AUTO: 9.8 FL (ref 9.2–12.9)
POTASSIUM SERPL-SCNC: 3.9 MMOL/L (ref 3.5–5.1)
RBC # BLD AUTO: 3.99 M/UL (ref 4–5.4)
SODIUM SERPL-SCNC: 137 MMOL/L (ref 136–145)
WBC # BLD AUTO: 9.78 K/UL (ref 3.9–12.7)

## 2022-08-23 PROCEDURE — 25000003 PHARM REV CODE 250: Performed by: NURSE ANESTHETIST, CERTIFIED REGISTERED

## 2022-08-23 PROCEDURE — 25000003 PHARM REV CODE 250: Performed by: FAMILY MEDICINE

## 2022-08-23 PROCEDURE — C2617 STENT, NON-COR, TEM W/O DEL: HCPCS | Performed by: UROLOGY

## 2022-08-23 PROCEDURE — G0378 HOSPITAL OBSERVATION PER HR: HCPCS

## 2022-08-23 PROCEDURE — 63600175 PHARM REV CODE 636 W HCPCS: Performed by: FAMILY MEDICINE

## 2022-08-23 PROCEDURE — 36415 COLL VENOUS BLD VENIPUNCTURE: CPT | Performed by: NURSE PRACTITIONER

## 2022-08-23 PROCEDURE — 96361 HYDRATE IV INFUSION ADD-ON: CPT | Mod: 59

## 2022-08-23 PROCEDURE — 80048 BASIC METABOLIC PNL TOTAL CA: CPT | Performed by: NURSE PRACTITIONER

## 2022-08-23 PROCEDURE — 52332 PR CYSTOSCOPY,INSERT URETERAL STENT: ICD-10-PCS | Mod: LT,,, | Performed by: UROLOGY

## 2022-08-23 PROCEDURE — 63600175 PHARM REV CODE 636 W HCPCS: Performed by: NURSE ANESTHETIST, CERTIFIED REGISTERED

## 2022-08-23 PROCEDURE — 25500020 PHARM REV CODE 255: Performed by: UROLOGY

## 2022-08-23 PROCEDURE — 37000008 HC ANESTHESIA 1ST 15 MINUTES: Performed by: UROLOGY

## 2022-08-23 PROCEDURE — 74420 PR  X-RAY RETROGRADE PYELOGRAM: ICD-10-PCS | Mod: 26,,, | Performed by: UROLOGY

## 2022-08-23 PROCEDURE — 36000706: Performed by: UROLOGY

## 2022-08-23 PROCEDURE — 37000009 HC ANESTHESIA EA ADD 15 MINS: Performed by: UROLOGY

## 2022-08-23 PROCEDURE — 36000707: Performed by: UROLOGY

## 2022-08-23 PROCEDURE — C1758 CATHETER, URETERAL: HCPCS | Performed by: UROLOGY

## 2022-08-23 PROCEDURE — C1769 GUIDE WIRE: HCPCS | Performed by: UROLOGY

## 2022-08-23 PROCEDURE — 85025 COMPLETE CBC W/AUTO DIFF WBC: CPT | Performed by: NURSE PRACTITIONER

## 2022-08-23 PROCEDURE — 74420 UROGRAPHY RTRGR +-KUB: CPT | Mod: 26,,, | Performed by: UROLOGY

## 2022-08-23 PROCEDURE — 52332 CYSTOSCOPY AND TREATMENT: CPT | Mod: LT,,, | Performed by: UROLOGY

## 2022-08-23 PROCEDURE — 71000033 HC RECOVERY, INTIAL HOUR: Performed by: UROLOGY

## 2022-08-23 DEVICE — STENT URETERAL UNIV 7FR 24CM
Type: IMPLANTABLE DEVICE | Site: URETER | Status: NON-FUNCTIONAL
Removed: 2022-11-08

## 2022-08-23 RX ORDER — EPHEDRINE SULFATE 50 MG/ML
INJECTION, SOLUTION INTRAVENOUS
Status: DISCONTINUED | OUTPATIENT
Start: 2022-08-23 | End: 2022-08-23

## 2022-08-23 RX ORDER — PROPOFOL 10 MG/ML
VIAL (ML) INTRAVENOUS
Status: DISCONTINUED | OUTPATIENT
Start: 2022-08-23 | End: 2022-08-23

## 2022-08-23 RX ORDER — OXYCODONE AND ACETAMINOPHEN 5; 325 MG/1; MG/1
1 TABLET ORAL
Status: CANCELLED | OUTPATIENT
Start: 2022-08-23

## 2022-08-23 RX ORDER — HYDROMORPHONE HYDROCHLORIDE 2 MG/ML
0.2 INJECTION, SOLUTION INTRAMUSCULAR; INTRAVENOUS; SUBCUTANEOUS EVERY 5 MIN PRN
Status: CANCELLED | OUTPATIENT
Start: 2022-08-23

## 2022-08-23 RX ORDER — SODIUM CHLORIDE 0.9 % (FLUSH) 0.9 %
10 SYRINGE (ML) INJECTION
Status: DISCONTINUED | OUTPATIENT
Start: 2022-08-23 | End: 2022-08-24 | Stop reason: HOSPADM

## 2022-08-23 RX ORDER — ONDANSETRON 2 MG/ML
INJECTION INTRAMUSCULAR; INTRAVENOUS
Status: DISCONTINUED | OUTPATIENT
Start: 2022-08-23 | End: 2022-08-23

## 2022-08-23 RX ORDER — IBUPROFEN 200 MG
16 TABLET ORAL
Status: DISCONTINUED | OUTPATIENT
Start: 2022-08-23 | End: 2022-08-24 | Stop reason: HOSPADM

## 2022-08-23 RX ORDER — LIDOCAINE HYDROCHLORIDE 20 MG/ML
INJECTION INTRAVENOUS
Status: DISCONTINUED | OUTPATIENT
Start: 2022-08-23 | End: 2022-08-23

## 2022-08-23 RX ORDER — GLUCAGON 1 MG
1 KIT INJECTION
Status: DISCONTINUED | OUTPATIENT
Start: 2022-08-23 | End: 2022-08-24 | Stop reason: HOSPADM

## 2022-08-23 RX ORDER — FENTANYL CITRATE 50 UG/ML
INJECTION, SOLUTION INTRAMUSCULAR; INTRAVENOUS
Status: DISCONTINUED | OUTPATIENT
Start: 2022-08-23 | End: 2022-08-23

## 2022-08-23 RX ORDER — IBUPROFEN 200 MG
24 TABLET ORAL
Status: DISCONTINUED | OUTPATIENT
Start: 2022-08-23 | End: 2022-08-24 | Stop reason: HOSPADM

## 2022-08-23 RX ORDER — HYOSCYAMINE SULFATE 0.12 MG/1
0.12 TABLET SUBLINGUAL EVERY 4 HOURS PRN
Status: DISCONTINUED | OUTPATIENT
Start: 2022-08-23 | End: 2022-08-24 | Stop reason: HOSPADM

## 2022-08-23 RX ORDER — PHENAZOPYRIDINE HYDROCHLORIDE 100 MG/1
100 TABLET, FILM COATED ORAL
Status: DISCONTINUED | OUTPATIENT
Start: 2022-08-23 | End: 2022-08-24 | Stop reason: HOSPADM

## 2022-08-23 RX ADMIN — ONDANSETRON 4 MG: 2 INJECTION, SOLUTION INTRAMUSCULAR; INTRAVENOUS at 09:08

## 2022-08-23 RX ADMIN — MEMANTINE 10 MG: 10 TABLET ORAL at 10:08

## 2022-08-23 RX ADMIN — PANTOPRAZOLE SODIUM 40 MG: 40 TABLET, DELAYED RELEASE ORAL at 10:08

## 2022-08-23 RX ADMIN — THYROID, PORCINE 30 MG: 30 TABLET ORAL at 05:08

## 2022-08-23 RX ADMIN — SODIUM CHLORIDE: 9 INJECTION, SOLUTION INTRAVENOUS at 11:08

## 2022-08-23 RX ADMIN — CEFTRIAXONE 1 G: 1 INJECTION, SOLUTION INTRAVENOUS at 08:08

## 2022-08-23 RX ADMIN — SODIUM CHLORIDE: 9 INJECTION, SOLUTION INTRAVENOUS at 05:08

## 2022-08-23 RX ADMIN — SODIUM CHLORIDE, POTASSIUM CHLORIDE, SODIUM LACTATE AND CALCIUM CHLORIDE: 600; 310; 30; 20 INJECTION, SOLUTION INTRAVENOUS at 09:08

## 2022-08-23 RX ADMIN — ENOXAPARIN SODIUM 40 MG: 100 INJECTION SUBCUTANEOUS at 06:08

## 2022-08-23 RX ADMIN — PROPOFOL 100 MG: 10 INJECTION, EMULSION INTRAVENOUS at 09:08

## 2022-08-23 RX ADMIN — EPHEDRINE SULFATE 15 MG: 50 INJECTION INTRAVENOUS at 09:08

## 2022-08-23 RX ADMIN — MEMANTINE 10 MG: 10 TABLET ORAL at 08:08

## 2022-08-23 RX ADMIN — GLYCOPYRROLATE 0.2 MG: 0.2 INJECTION, SOLUTION INTRAMUSCULAR; INTRAVENOUS at 09:08

## 2022-08-23 RX ADMIN — FLUOXETINE 10 MG: 10 CAPSULE ORAL at 10:08

## 2022-08-23 RX ADMIN — FENTANYL CITRATE 50 MCG: 50 INJECTION, SOLUTION INTRAMUSCULAR; INTRAVENOUS at 09:08

## 2022-08-23 RX ADMIN — LIDOCAINE HYDROCHLORIDE 100 MG: 20 INJECTION, SOLUTION INTRAVENOUS at 09:08

## 2022-08-23 RX ADMIN — ATORVASTATIN CALCIUM 10 MG: 10 TABLET, FILM COATED ORAL at 10:08

## 2022-08-23 NOTE — ADDENDUM NOTE
Addendum  created 08/23/22 0937 by Mickey Moser MD    Order list changed, Order sets accessed, Pharmacy for encounter modified

## 2022-08-23 NOTE — TRANSFER OF CARE
Anesthesia Transfer of Care Note    Patient: Irlanda Lopez    Procedure(s) Performed: Procedure(s) (LRB):  CYSTOSCOPY, WITH RETROGRADE PYELOGRAM AND URETERAL STENT INSERTION (Right)    Patient location: PACU    Anesthesia Type: general    Transport from OR: Transported from OR on room air with adequate spontaneous ventilation    Post pain: adequate analgesia    Post assessment: no apparent anesthetic complications and tolerated procedure well    Post vital signs: stable    Level of consciousness: awake    Nausea/Vomiting: no nausea/vomiting    Complications: none    Transfer of care protocol was followed      Last vitals:   Visit Vitals  /72 (BP Location: Right arm, Patient Position: Sitting)   Pulse 62   Temp 36.9 °C (98.4 °F) (Oral)   Resp 19   Ht 5' (1.524 m)   Wt 64.4 kg (141 lb 15.6 oz)   SpO2 98%   Breastfeeding No   BMI 27.73 kg/m²

## 2022-08-23 NOTE — H&P
Formerly [facility], later [facility]  [facility]  History & Physical    Patient Name: Irlanda Lopez  MRN: [id number]  Patient Class: OP- Observation  Admission Date: 8/22/2022  Attending Physician: Santhosh Johnson MD   Primary Care Provider: Myla Arias MD         Patient information was obtained from patient, relative(s), past medical records and ER records.     Subjective:     Principal Problem:Hydronephrosis with renal calculous obstruction    Chief Complaint:   Chief Complaint   Patient presents with    Vomiting     Pt complaining of vomiting, flank pain, and back pain;         HPI: Irlanda Lopez is an 81 y.o. female with a PMHx of dementia, diverticulitis s/p colostomy, HLD, HTN, and hypothyroidism who presented to the Emergency Department with c/o right flank pain over the past few days. Symptoms are constant and moderate in severity. No mitigating or exacerbating factors reported. Associated lower back pain and N/V. Denies any ABD pain or distention, dysuria, hematuria, urinary urgency or frequency, diarrhea, CP, SOB, lightheadedness, dizziness, weakness, fever or chills. Work-up in the ED showed: WBC 13, creatinine 0.9, BUN 12, stable LFTs, lactic 1.2, COVID negative. UA with 3+ leukocytes, >100 WBC, >100 RBC, 3+ occult blood, moderate bacteria. CT renal stone study ABD/pelvis showed a large stone involving the right renal pelvis with interval enlargement of the right renal pelvis and calices as well as marked perinephric and peripelvic fat stranding compatible with interval obstruction, superimposed infection cannot be excluded. Blood and urine cultures were obtained in the ED, and 30 mL/kg IV fluids and IV Rocephin given. Case was discussed with Urology per the ED, who plan on stent placement tomorrow. Patient was placed in Observation under Hospital Medicine services.         Past Medical History:   Diagnosis Date    Clostridium difficile colitis     Dementia     Diverticulitis     s/p colostomy    High  cholesterol     Hypertension     Hypothyroidism        Past Surgical History:   Procedure Laterality Date    APPENDECTOMY  2008    CATARACT EXTRACTION      Dr Raygoza    COLON SURGERY      COLONOSCOPY N/A 1/2/2019    Procedure: COLONOSCOPY;  Surgeon: Reece Hogan MD;  Location: [facility] ENDO;  Service: Endoscopy;  Laterality: N/A;    COLONOSCOPY N/A 6/7/2019    Procedure: COLONOSCOPY;  Surgeon: Rishabh Connelly MD;  Location: [facility] ENDO;  Service: General;  Laterality: N/A;    COLONOSCOPY N/A 6/23/2021    Procedure: COLONOSCOPY;  Surgeon: Lucy Lafleur MD;  Location: [facility] ENDO;  Service: Endoscopy;  Laterality: N/A;    COLOSTOMY Left 1/2/2019    Procedure: CREATION, COLOSTOMY;  Surgeon: Reece Hogan MD;  Location: [facility] OR;  Service: General;  Laterality: Left;    ESOPHAGOGASTRODUODENOSCOPY N/A 9/16/2020    Procedure: ESOPHAGOGASTRODUODENOSCOPY (EGD)- Needs Rapid;  Surgeon: Lucy Lafleur MD;  Location: [facility];  Service: Endoscopy;  Laterality: N/A;    SHAHIDA PROCEDURE N/A 1/2/2019    Procedure: SHAHIDA PROCEDURE;  Surgeon: Reece Hogan MD;  Location: [facility] OR;  Service: General;  Laterality: N/A;    LYSIS OF ADHESIONS N/A 1/2/2019    Procedure: LYSIS, ADHESIONS;  Surgeon: Reece Hogan MD;  Location: [facility];  Service: General;  Laterality: N/A;    VITRECTOMY Right 09/2013       Review of patient's allergies indicates:  No Known Allergies    No current facility-administered medications on file prior to encounter.     Current Outpatient Medications on File Prior to Encounter   Medication Sig    ALPRAZolam (XANAX) 0.25 MG tablet Take 1 tablet (0.25 mg total) by mouth 2 (two) times daily as needed for Anxiety.    atorvastatin (LIPITOR) 10 MG tablet TAKE 1 TABLET BY MOUTH ONCE DAILY    FLUoxetine 10 MG capsule Take 1 capsule (10 mg total) by mouth once daily.    furosemide (LASIX) 20 MG tablet TAKE 1 TABLET BY MOUTH ONCE DAILY    memantine (NAMENDA) 10 MG Tab TAKE 1 TABLET BY MOUTH  TWICE DAILY     pantoprazole (PROTONIX) 40 MG tablet Take 1 tablet (40 mg total) by mouth once daily. 30 min prior to lunch    thyroid, pork, (ARMOUR THYROID) 30 mg Tab Take 1 tablet (30 mg total) by mouth before breakfast.    triamcinolone acetonide 0.1% (KENALOG) 0.1 % ointment APPLY TOPICALLY 4 (FOUR) TIMES DAILY. TO RASH ON FACE AND CHEST AND NOSE    dicyclomine (BENTYL) 10 MG capsule Take 1 capsule (10 mg total) by mouth 4 (four) times daily before meals and nightly. (Patient not taking: Reported on 8/22/2022)    L.acid/B.bifidum/B.animal/FOS (PROBIOTIC COMPLEX ORAL) Take 1 capsule by mouth once daily at 6am.    neomycin-polymyxin-dexamethasone (DEXACINE) 3.5 mg/g-10,000 unit/g-0.1 % Oint Place into both eyes every evening. (Patient not taking: Reported on 8/22/2022)    [DISCONTINUED] ondansetron (ZOFRAN-ODT) 4 MG TbDL Take 1 tablet (4 mg total) by mouth every 6 (six) hours as needed (nausea). Take before taking vancomycin     Family History       Problem Relation (Age of Onset)    Alzheimer's disease Mother    Aneurysm Father    Stomach cancer Brother          Tobacco Use    Smoking status: Former Smoker     Years: 5.00    Smokeless tobacco: Never Used   Substance and Sexual Activity    Alcohol use: No    Drug use: No    Sexual activity: Never     Comment:      Review of Systems   Constitutional:  Negative for chills, diaphoresis, fatigue and fever.   Respiratory:  Negative for cough, shortness of breath and wheezing.    Cardiovascular:  Negative for chest pain, palpitations and leg swelling.   Gastrointestinal:  Positive for nausea and vomiting. Negative for abdominal distention, abdominal pain, constipation and diarrhea.   Genitourinary:  Positive for flank pain. Negative for decreased urine volume, difficulty urinating, dysuria, frequency, hematuria and urgency.   Musculoskeletal:  Positive for back pain. Negative for arthralgias and myalgias.   Skin:  Negative for pallor and rash.   Neurological:   Negative for dizziness, syncope, weakness, light-headedness, numbness and headaches.   Psychiatric/Behavioral:  The patient is not nervous/anxious.    All other systems reviewed and are negative.  Objective:     Vital Signs (Most Recent):  Temp: 98.2 °F (36.8 °C) (08/22/22 1915)  Pulse: 61 (08/22/22 1915)  Resp: 20 (08/22/22 1915)  BP: 100/85 (08/22/22 1915)  SpO2: 97 % (08/22/22 1915) Vital Signs (24h Range):  Temp:  [98.2 °F (36.8 °C)-99.1 °F (37.3 °C)] 98.2 °F (36.8 °C)  Pulse:  [58-68] 61  Resp:  [16-20] 20  SpO2:  [96 %-98 %] 97 %  BP: (100-140)/(65-97) 100/85     Weight: 65.3 kg (143 lb 15.4 oz)  Body mass index is 28.12 kg/m².    Physical Exam  Vitals and nursing note reviewed.   Constitutional:       General: She is awake. She is not in acute distress.     Appearance: Normal appearance. She is well-developed. She is not diaphoretic.   HENT:      Head: Normocephalic and atraumatic.   Eyes:      Conjunctiva/sclera: Conjunctivae normal.      Comments: PERRL; EOM intact.   Cardiovascular:      Rate and Rhythm: Normal rate and regular rhythm. No extrasystoles are present.     Heart sounds: S1 normal and S2 normal. No murmur heard.  Pulmonary:      Effort: Pulmonary effort is normal. No tachypnea.      Breath sounds: Normal breath sounds and air entry. No wheezing, rhonchi or rales.   Abdominal:      General: Bowel sounds are normal. There is no distension.      Palpations: Abdomen is soft.      Tenderness: There is no abdominal tenderness. There is right CVA tenderness. There is no left CVA tenderness, guarding or rebound. Negative signs include Rojas's sign.      Comments: LLQ colostomy in place.   Musculoskeletal:         General: No tenderness or deformity. Normal range of motion.      Cervical back: Normal range of motion and neck supple.      Right lower leg: No edema.      Left lower leg: No edema.   Skin:     General: Skin is warm and dry.      Capillary Refill: Capillary refill takes less than 2 seconds.       Findings: No erythema or rash.   Neurological:      General: No focal deficit present.      Mental Status: She is alert and oriented to person, place, and time.   Psychiatric:         Mood and Affect: Mood and affect normal.         Behavior: Behavior normal. Behavior is cooperative.           Significant Labs:  Results for orders placed or performed during the hospital encounter of 08/22/22   CBC auto differential   Result Value Ref Range    WBC 13.11 (H) 3.90 - 12.70 K/uL    RBC 4.90 4.00 - 5.40 M/uL    Hemoglobin 13.5 12.0 - 16.0 g/dL    Hematocrit 41.2 37.0 - 48.5 %    MCV 84 82 - 98 fL    MCH 27.6 27.0 - 31.0 pg    MCHC 32.8 32.0 - 36.0 g/dL    RDW 13.3 11.5 - 14.5 %    Platelets 269 150 - 450 K/uL    MPV 9.5 9.2 - 12.9 fL    Immature Granulocytes 0.4 0.0 - 0.5 %    Gran # (ANC) 10.8 (H) 1.8 - 7.7 K/uL    Immature Grans (Abs) 0.05 (H) 0.00 - 0.04 K/uL    Lymph # 1.2 1.0 - 4.8 K/uL    Mono # 0.9 0.3 - 1.0 K/uL    Eos # 0.0 0.0 - 0.5 K/uL    Baso # 0.03 0.00 - 0.20 K/uL    nRBC 0 0 /100 WBC    Gran % 82.6 (H) 38.0 - 73.0 %    Lymph % 9.5 (L) 18.0 - 48.0 %    Mono % 7.1 4.0 - 15.0 %    Eosinophil % 0.2 0.0 - 8.0 %    Basophil % 0.2 0.0 - 1.9 %    Differential Method Automated    Comprehensive metabolic panel   Result Value Ref Range    Sodium 137 136 - 145 mmol/L    Potassium 4.1 3.5 - 5.1 mmol/L    Chloride 100 95 - 110 mmol/L    CO2 24 23 - 29 mmol/L    Glucose 129 (H) 70 - 110 mg/dL    BUN 12 8 - 23 mg/dL    Creatinine 0.9 0.5 - 1.4 mg/dL    Calcium 9.1 8.7 - 10.5 mg/dL    Total Protein 7.1 6.0 - 8.4 g/dL    Albumin 3.6 3.5 - 5.2 g/dL    Total Bilirubin 1.3 (H) 0.1 - 1.0 mg/dL    Alkaline Phosphatase 80 55 - 135 U/L    AST 17 10 - 40 U/L    ALT 10 10 - 44 U/L    Anion Gap 13 8 - 16 mmol/L    eGFR >60 >60 mL/min/1.73 m^2   Urinalysis, Reflex to Urine Culture Urine, Clean Catch    Specimen: Urine   Result Value Ref Range    Specimen UA Urine, Clean Catch     Color, UA Yellow Yellow, Straw, Gina     Appearance, UA Hazy (A) Clear    pH, UA 7.0 5.0 - 8.0    Specific Gravity, UA 1.015 1.005 - 1.030    Protein, UA 2+ (A) Negative    Glucose, UA Negative Negative    Ketones, UA Negative Negative    Bilirubin (UA) Negative Negative    Occult Blood UA 3+ (A) Negative    Nitrite, UA Negative Negative    Urobilinogen, UA Negative <2.0 EU/dL    Leukocytes, UA 3+ (A) Negative   Lactic acid, plasma #1   Result Value Ref Range    Lactate (Lactic Acid) 1.2 0.5 - 2.2 mmol/L   Procalcitonin   Result Value Ref Range    Procalcitonin 0.15 <0.25 ng/mL   Urinalysis Microscopic   Result Value Ref Range    RBC, UA >100 (H) 0 - 4 /hpf    WBC, UA >100 (H) 0 - 5 /hpf    WBC Clumps, UA Many (A) None-Rare    Bacteria Moderate (A) None-Occ /hpf    Squam Epithel, UA 2 /hpf    Hyaline Casts, UA 3 (A) 0-1/lpf /lpf    Microscopic Comment SEE COMMENT    POCT COVID-19 Rapid Screening   Result Value Ref Range    POC Rapid COVID Negative Negative     Acceptable Yes       All pertinent labs within the past 24 hours have been reviewed.    Significant Imaging:  Imaging Results              X-Ray Chest AP Portable (Final result)  Result time 08/22/22 17:16:45      Final result by Genaro Robles MD (08/22/22 17:16:45)                   Impression:      No acute abnormality.      Electronically signed by: Genaro Robles  Date:    08/22/2022  Time:    17:16               Narrative:    EXAMINATION:  XR CHEST AP PORTABLE    CLINICAL HISTORY:  Sepsis;    TECHNIQUE:  Single frontal view of the chest was performed.    COMPARISON:  Multiple priors.    FINDINGS:  The lungs are clear, with normal appearance of pulmonary vasculature and no pleural effusion or pneumothorax.    The cardiac silhouette is normal in size. The hilar and mediastinal contours are unremarkable.    Degenerative change of the shoulders.                                       CT Renal Stone Study ABD Pelvis WO (Final result)  Result time 08/22/22 15:35:26      Final result  by Katie Oliver MD (Timothy) (08/22/22 15:35:26)                   Impression:      Large stone involving the right renal pelvis with interval enlargement of the right renal pelvis and calices as well as marked perinephric and peripelvic fat stranding.  Findings compatible with interval obstruction.  Superimposed infection cannot be excluded.  Correlate.  Left kidney is unremarkable.    All CT scans at this facility use dose modulation, iterative reconstructions, and/or weight base dosing when appropriate to reduce radiation dose to as low as reasonably achievable      Electronically signed by: Katie Oliver MD  Date:    08/22/2022  Time:    15:35               Narrative:    EXAMINATION:  CT RENAL STONE STUDY ABD PELVIS WO    CLINICAL HISTORY:  flank pain;    COMPARISON:  Comparison CT abdomen pelvis, 06/27/2021.    FINDINGS:  Lung bases are clear    The liver, the spleen, and the pancreas appear normal.    The gallbladder is unremarkable.  There is no bile duct dilatation.    3.6 x 2.4 cm stone involving the right renal pelvis associated with marked dilatation of the right renal pelvis and calices which is increased since previous exam.  There is also stranding of the fat around the renal pelvis as well as perinephric fat stranding compatible with interval worsening likely related to obstruction superimposed infection cannot be excluded.  No definite ureteral stone.  Left kidney and collecting system appears unremarkable.    The aorta and inferior vena cava are unremarkable.    No evidence of bowel obstruction.   No evidence of appendicitis.  No evidence of diverticulitis.  Colostomy is present in the left mid to lower abdomen.    Bladder is normal. No abnormal masses or fluid collections in the pelvis.    Skeletal structures are intact.  No acute skeletal findings.                                     I have reviewed all pertinent imaging results/findings within the past 24 hours.              Assessment/Plan:      * Hydronephrosis with renal calculous obstruction  - Urology consult. Plans for stent placement tomorrow. Will keep NPO after MN.  - IV hydration.  - Empiric IV abx.  - Analgesics as needed.  - Follow labs.       Acute pyelonephritis  - Urine culture obtained in the ED. Continue empiric IV Rocephin.  - IV hydration.      Dementia  - Continue home meds.         VTE Risk Mitigation (From admission, onward)         Ordered     enoxaparin injection 40 mg  Daily         08/22/22 1922     IP VTE HIGH RISK PATIENT  Once         08/22/22 1922     Place sequential compression device  Until discontinued         08/22/22 1922                   Cary Wong NP  Department of Hospital Medicine   [facility]

## 2022-08-23 NOTE — OP NOTE
Date of Procedure:  08/23/2022    Pre-operative Diagnosis:   1.  Right renal stone    Post-operative Diagnosis:   1.  Right renal stone    Surgeon:  Anselmo Matute MD    Assistants: None    Specimen: None    Anesthesia:  Mac anesthesia    Blood loss:  Minimal    Findings:  Right 7 Mal[place]ian by 24 cm stent in good position, retrograde demonstrates large stone, no evidence of dilation.    Procedures:  1. Cysto with placement of right ureteral stent  2. Right retrograde pyelogram  3. Fluoro less than one hour    Procedure in Detail:  Patient was brought to the operative suite and placed under general anesthesia.  After being positioned in dorsal lithotomy position, patient was sterilely prepped and draped.  Twenty-one Malawian sheath cystoscope was placed into a normal urethra.  Bilateral ureteral orifices were normal in size, shape, caliber and location.  The remainder of the bladder was otherwise unremarkable, no evidence of intravesical lesions or other abnormalities.  Wire was inserted up the right ureter and passed beyond the stone, into the renal pelvis.  Over the wire using Seldinger technique we placed a [place] catheter and removed the wire.  Retrograde was done demonstrating dilatation, but otherwise no filling defects, stone easily seen.  The wire was reinserted and the [place] catheter was removed.  Again using Seldinger technique we placed a 7 [place]ian by 24 cm double-J ureteral stent, with good coil in the renal pelvis and bladder on flouroscopic examination.  The bladder was drained with a Reyes catheter and the patient was then taken to the PACU in stable condition.  She will return back to her unit, Reyes catheter voiding trial once stable.  Definitive management of the stone as an outpatient.    Complications: None.

## 2022-08-23 NOTE — HPI
Irlanda Lopez is an 81 y.o. female with a PMHx of dementia, diverticulitis s/p colostomy, HLD, HTN, and hypothyroidism who presented to the Emergency Department with c/o right flank pain over the past few days. Symptoms are constant and moderate in severity. No mitigating or exacerbating factors reported. Associated lower back pain and N/V. Denies any ABD pain or distention, dysuria, hematuria, urinary urgency or frequency, diarrhea, CP, SOB, lightheadedness, dizziness, weakness, fever or chills. Work-up in the ED showed: WBC 13, creatinine 0.9, BUN 12, stable LFTs, lactic 1.2, COVID negative. UA with 3+ leukocytes, >100 WBC, >100 RBC, 3+ occult blood, moderate bacteria. CT renal stone study ABD/pelvis showed a large stone involving the right renal pelvis with interval enlargement of the right renal pelvis and calices as well as marked perinephric and peripelvic fat stranding compatible with interval obstruction, superimposed infection cannot be excluded. Blood and urine cultures were obtained in the ED, and 30 mL/kg IV fluids and IV Rocephin given. Case was discussed with Urology per the ED, who plan on stent placement tomorrow. Patient was placed in Observation under Hospital Medicine services.

## 2022-08-23 NOTE — PLAN OF CARE
Ongoing (interventions implemented as appropriate)  Pt confused at times  VSS  Pt able to make needs known.  Pt remained afebrile throughout this shift.   Pt remained free of falls this shift.   Pt denies pain this shift.  Plan of care reviewed. Patient verbalizes understanding.   Pt moving/turing independent. Frequent weight shifting encouraged.  Patient normal sinus rhythm on monitor.   Bed low, side rails up x 2, wheels locked, call light in reach.   Hourly rounding completed.   Will continue to observe.

## 2022-08-23 NOTE — ANESTHESIA POSTPROCEDURE EVALUATION
Anesthesia Post Evaluation    Patient: Irlanda Lopez    Procedure(s) Performed: Procedure(s) (LRB):  CYSTOSCOPY, WITH RETROGRADE PYELOGRAM AND URETERAL STENT INSERTION (Right)    Final Anesthesia Type: general      Patient location during evaluation: PACU  Patient participation: Yes- Able to Participate  Level of consciousness: awake  Post-procedure vital signs: reviewed and stable  Pain management: adequate  Airway patency: patent    PONV status at discharge: No PONV  Anesthetic complications: no      Cardiovascular status: blood pressure returned to baseline and hemodynamically stable  Respiratory status: unassisted and spontaneous ventilation  Hydration status: euvolemic  Follow-up not needed.          Vitals Value Taken Time   /72 08/23/22 0300   Temp 36.9 °C (98.4 °F) 08/23/22 0300   Pulse 62 08/23/22 0300   Resp 19 08/23/22 0300   SpO2 98 % 08/23/22 0300         No case tracking events are documented in the log.      Pain/Babar Score: No data recorded

## 2022-08-23 NOTE — SUBJECTIVE & OBJECTIVE
Interval History: See Hospital course    Review of Systems   Constitutional:  Negative for chills, diaphoresis, fatigue and fever.   Respiratory:  Negative for cough, shortness of breath and wheezing.    Cardiovascular:  Negative for chest pain, palpitations and leg swelling.   Gastrointestinal:  Negative for abdominal distention, abdominal pain, constipation, diarrhea, nausea and vomiting.   Genitourinary:  Negative for decreased urine volume, difficulty urinating, dysuria, flank pain, frequency, hematuria and urgency.   Musculoskeletal:  Positive for back pain. Negative for arthralgias and myalgias.   Skin:  Negative for pallor and rash.   Neurological:  Negative for dizziness, syncope, weakness, light-headedness, numbness and headaches.   Psychiatric/Behavioral:  The patient is not nervous/anxious.    All other systems reviewed and are negative.  Objective:     Vital Signs (Most Recent):  Temp: 97.7 °F (36.5 °C) (08/23/22 1141)  Pulse: (!) 51 (08/23/22 1141)  Resp: 18 (08/23/22 1141)  BP: 110/60 (08/23/22 1141)  SpO2: 96 % (08/23/22 1141)   Vital Signs (24h Range):  Temp:  [97.7 °F (36.5 °C)-99.1 °F (37.3 °C)] 97.7 °F (36.5 °C)  Pulse:  [51-69] 51  Resp:  [12-20] 18  SpO2:  [93 %-100 %] 96 %  BP: (100-140)/(56-97) 110/60     Weight: 64.4 kg (141 lb 15.6 oz)  Body mass index is 27.73 kg/m².    Intake/Output Summary (Last 24 hours) at 8/23/2022 1402  Last data filed at 8/23/2022 0930  Gross per 24 hour   Intake 670 ml   Output 600 ml   Net 70 ml      Physical Exam  Vitals and nursing note reviewed.   Constitutional:       General: She is awake. She is not in acute distress.     Appearance: Normal appearance. She is well-developed. She is not diaphoretic.   HENT:      Head: Normocephalic and atraumatic.   Eyes:      Conjunctiva/sclera: Conjunctivae normal.      Comments: PERRL; EOM intact.   Cardiovascular:      Rate and Rhythm: Normal rate and regular rhythm. No extrasystoles are present.     Heart sounds: S1  normal and S2 normal. No murmur heard.  Pulmonary:      Effort: Pulmonary effort is normal. No tachypnea.      Breath sounds: Normal breath sounds and air entry. No wheezing, rhonchi or rales.   Abdominal:      General: Bowel sounds are normal. There is no distension.      Palpations: Abdomen is soft.      Tenderness: There is no abdominal tenderness. There is no right CVA tenderness, left CVA tenderness, guarding or rebound. Negative signs include Rojas's sign.      Comments: LLQ colostomy in place.   Genitourinary:     Comments: Indwelling saldana cath  Musculoskeletal:         General: No tenderness or deformity. Normal range of motion.      Cervical back: Normal range of motion and neck supple.      Right lower leg: No edema.      Left lower leg: No edema.   Skin:     General: Skin is warm and dry.      Capillary Refill: Capillary refill takes less than 2 seconds.      Findings: No erythema or rash.   Neurological:      General: No focal deficit present.      Mental Status: She is alert and oriented to person, place, and time.   Psychiatric:         Mood and Affect: Mood and affect normal.         Behavior: Behavior normal. Behavior is cooperative.       Significant Labs: All pertinent labs within the past 24 hours have been reviewed.  CBC:   Recent Labs   Lab 08/22/22  1600 08/23/22  0536   WBC 13.11* 9.78   HGB 13.5 10.8*   HCT 41.2 34.5*    220     CMP:   Recent Labs   Lab 08/22/22  1600 08/23/22  0536    137   K 4.1 3.9    106   CO2 24 20*   * 94   BUN 12 12   CREATININE 0.9 0.8   CALCIUM 9.1 8.0*   PROT 7.1  --    ALBUMIN 3.6  --    BILITOT 1.3*  --    ALKPHOS 80  --    AST 17  --    ALT 10  --    ANIONGAP 13 11       Significant Imaging: I have reviewed all pertinent imaging results/findings within the past 24 hours.

## 2022-08-23 NOTE — HOSPITAL COURSE
Pt presented with right flank pain and found to have obstructive uropathy. There was a large kidney stone involving the right renal pelvis associated with marked dilatation of the right renal pelvis and calices which is increased since previous exam. Also, pt with superimposed infection as findings of pyelonephritis found.  Pt given IV fluids and started on IV Rocephin.  Urine culture pending. Prelim blood cultures NGTD. Urology performed placement of a right ureteral stent and a saldana cath was placed. Pt denied any symptoms post intervention.     Stable for discharge home with PO abx. Urine culture sensitivities pending and hx of ESBL, discussed possibility of PO abx resistance with family and need to return for PICC line placement and IV abx dependent on final sensitivities. Daughter at bedside Myla and pt requesting to go home with PO abx and will await culture results. Rx sent for PO bactrim. F/u with PCP

## 2022-08-23 NOTE — ANESTHESIA PREPROCEDURE EVALUATION
08/23/2022  Irlanda Lopez is a 81 y.o., female.    Patient Active Problem List   Diagnosis    Hypothyroidism    Essential hypertension    Mild cognitive impairment with memory loss    High serum vitamin D    Hyperlipidemia    Colostomy in place    Tortuous aorta    Intra-abdominal abscess    Abdominal distension    Nausea and vomiting    Screening for colon cancer    Colitis    Renal stone    Generalized abdominal pain    Colonic obstruction    UTI (urinary tract infection)    Dementia    Abnormal finding on GI tract imaging    MARTIN (generalized anxiety disorder)    Hydronephrosis with renal calculous obstruction    Acute pyelonephritis     Pre-op Assessment    I have reviewed the Patient Summary Reports.    I have reviewed the NPO Status.   I have reviewed the Medications.     Review of Systems  Anesthesia Hx:  No problems with previous Anesthesia  Denies Family Hx of Anesthesia complications.   Denies Personal Hx of Anesthesia complications.   Social:  Former Smoker    Hematology/Oncology:     Oncology Normal    -- Anemia:   EENT/Dental:   Unga   Cardiovascular:   Hypertension ECG has been reviewed. 2018 ECHO    1 - Biatrial enlargement.     2 - Concentric hypertrophy.     3 - Normal left ventricular systolic function (EF 60-65%).     4 - Normal left ventricular diastolic function.     5 - Right ventricular enlargement with normal systolic function.     6 - The estimated PA systolic pressure is 31 mmHg.     7 - Moderate tricuspid regurgitation.     Pulmonary:  Pulmonary Normal    Renal/:   Chronic Renal Disease renal calculi    Hepatic/GI:   Maribel procedure for diverticulitis complications   Musculoskeletal:  Musculoskeletal Normal    Neurological:  Neurology Normal    Endocrine:   Hypothyroidism    Dermatological:  Skin Normal    Psych:   anxiety Dementia, mild         Physical  Exam  General: Alert and Oriented    Airway:  Mallampati: II   Mouth Opening: Normal  TM Distance: Normal  Tongue: Normal  Neck ROM: Normal ROM        Anesthesia Plan  Type of Anesthesia, risks & benefits discussed:    Anesthesia Type: Gen ETT, Gen Supraglottic Airway, MAC  Intra-op Monitoring Plan: Standard ASA Monitors  Informed Consent: Informed consent signed with the Patient and all parties understand the risks and agree with anesthesia plan.  All questions answered.   ASA Score: 3  Day of Surgery Review of History & Physical: I have interviewed and examined the patient. I have reviewed the patient's H&P dated:     Ready For Surgery From Anesthesia Perspective.     .

## 2022-08-23 NOTE — SUBJECTIVE & OBJECTIVE
Past Medical History:   Diagnosis Date    Clostridium difficile colitis     Dementia     Diverticulitis     s/p colostomy    High cholesterol     Hypertension     Hypothyroidism        Past Surgical History:   Procedure Laterality Date    APPENDECTOMY  2008    CATARACT EXTRACTION      Dr Raygoza    COLON SURGERY      COLONOSCOPY N/A 1/2/2019    Procedure: COLONOSCOPY;  Surgeon: Reece Hogan MD;  Location: [facility];  Service: Endoscopy;  Laterality: N/A;    COLONOSCOPY N/A 6/7/2019    Procedure: COLONOSCOPY;  Surgeon: Rishabh Connelly MD;  Location: [facility] ENDO;  Service: General;  Laterality: N/A;    COLONOSCOPY N/A 6/23/2021    Procedure: COLONOSCOPY;  Surgeon: Lucy Lafleur MD;  Location: [facility] ENDO;  Service: Endoscopy;  Laterality: N/A;    COLOSTOMY Left 1/2/2019    Procedure: CREATION, COLOSTOMY;  Surgeon: Reece Hogan MD;  Location: [facility];  Service: General;  Laterality: Left;    ESOPHAGOGASTRODUODENOSCOPY N/A 9/16/2020    Procedure: ESOPHAGOGASTRODUODENOSCOPY (EGD)- Needs Rapid;  Surgeon: Lucy Lafleur MD;  Location: [facility];  Service: Endoscopy;  Laterality: N/A;    SHAHIDA PROCEDURE N/A 1/2/2019    Procedure: SHAHIDA PROCEDURE;  Surgeon: Reece Hogan MD;  Location: [facility] OR;  Service: General;  Laterality: N/A;    LYSIS OF ADHESIONS N/A 1/2/2019    Procedure: LYSIS, ADHESIONS;  Surgeon: Reece Hogan MD;  Location: [facility] OR;  Service: General;  Laterality: N/A;    VITRECTOMY Right 09/2013       Review of patient's allergies indicates:  No Known Allergies    No current facility-administered medications on file prior to encounter.     Current Outpatient Medications on File Prior to Encounter   Medication Sig    ALPRAZolam (XANAX) 0.25 MG tablet Take 1 tablet (0.25 mg total) by mouth 2 (two) times daily as needed for Anxiety.    atorvastatin (LIPITOR) 10 MG tablet TAKE 1 TABLET BY MOUTH ONCE DAILY    FLUoxetine 10 MG capsule Take 1 capsule (10 mg total) by mouth once daily.    furosemide (LASIX) 20 MG  tablet TAKE 1 TABLET BY MOUTH ONCE DAILY    memantine (NAMENDA) 10 MG Tab TAKE 1 TABLET BY MOUTH  TWICE DAILY    pantoprazole (PROTONIX) 40 MG tablet Take 1 tablet (40 mg total) by mouth once daily. 30 min prior to lunch    thyroid, pork, (ARMOUR THYROID) 30 mg Tab Take 1 tablet (30 mg total) by mouth before breakfast.    triamcinolone acetonide 0.1% (KENALOG) 0.1 % ointment APPLY TOPICALLY 4 (FOUR) TIMES DAILY. TO RASH ON FACE AND CHEST AND NOSE    dicyclomine (BENTYL) 10 MG capsule Take 1 capsule (10 mg total) by mouth 4 (four) times daily before meals and nightly. (Patient not taking: Reported on 8/22/2022)    L.acid/B.bifidum/B.animal/FOS (PROBIOTIC COMPLEX ORAL) Take 1 capsule by mouth once daily at 6am.    neomycin-polymyxin-dexamethasone (DEXACINE) 3.5 mg/g-10,000 unit/g-0.1 % Oint Place into both eyes every evening. (Patient not taking: Reported on 8/22/2022)    [DISCONTINUED] ondansetron (ZOFRAN-ODT) 4 MG TbDL Take 1 tablet (4 mg total) by mouth every 6 (six) hours as needed (nausea). Take before taking vancomycin     Family History       Problem Relation (Age of Onset)    Alzheimer's disease Mother    Aneurysm Father    Stomach cancer Brother          Tobacco Use    Smoking status: Former Smoker     Years: 5.00    Smokeless tobacco: Never Used   Substance and Sexual Activity    Alcohol use: No    Drug use: No    Sexual activity: Never     Comment:      Review of Systems   Constitutional:  Negative for chills, diaphoresis, fatigue and fever.   Respiratory:  Negative for cough, shortness of breath and wheezing.    Cardiovascular:  Negative for chest pain, palpitations and leg swelling.   Gastrointestinal:  Positive for nausea and vomiting. Negative for abdominal distention, abdominal pain, constipation and diarrhea.   Genitourinary:  Positive for flank pain. Negative for decreased urine volume, difficulty urinating, dysuria, frequency, hematuria and urgency.   Musculoskeletal:  Positive for back pain.  Negative for arthralgias and myalgias.   Skin:  Negative for pallor and rash.   Neurological:  Negative for dizziness, syncope, weakness, light-headedness, numbness and headaches.   Psychiatric/Behavioral:  The patient is not nervous/anxious.    All other systems reviewed and are negative.  Objective:     Vital Signs (Most Recent):  Temp: 98.2 °F (36.8 °C) (08/22/22 1915)  Pulse: 61 (08/22/22 1915)  Resp: 20 (08/22/22 1915)  BP: 100/85 (08/22/22 1915)  SpO2: 97 % (08/22/22 1915) Vital Signs (24h Range):  Temp:  [98.2 °F (36.8 °C)-99.1 °F (37.3 °C)] 98.2 °F (36.8 °C)  Pulse:  [58-68] 61  Resp:  [16-20] 20  SpO2:  [96 %-98 %] 97 %  BP: (100-140)/(65-97) 100/85     Weight: 65.3 kg (143 lb 15.4 oz)  Body mass index is 28.12 kg/m².    Physical Exam  Vitals and nursing note reviewed.   Constitutional:       General: She is awake. She is not in acute distress.     Appearance: Normal appearance. She is well-developed. She is not diaphoretic.   HENT:      Head: Normocephalic and atraumatic.   Eyes:      Conjunctiva/sclera: Conjunctivae normal.      Comments: PERRL; EOM intact.   Cardiovascular:      Rate and Rhythm: Normal rate and regular rhythm. No extrasystoles are present.     Heart sounds: S1 normal and S2 normal. No murmur heard.  Pulmonary:      Effort: Pulmonary effort is normal. No tachypnea.      Breath sounds: Normal breath sounds and air entry. No wheezing, rhonchi or rales.   Abdominal:      General: Bowel sounds are normal. There is no distension.      Palpations: Abdomen is soft.      Tenderness: There is no abdominal tenderness. There is right CVA tenderness. There is no left CVA tenderness, guarding or rebound. Negative signs include Rojas's sign.      Comments: LLQ colostomy in place.   Musculoskeletal:         General: No tenderness or deformity. Normal range of motion.      Cervical back: Normal range of motion and neck supple.      Right lower leg: No edema.      Left lower leg: No edema.   Skin:      General: Skin is warm and dry.      Capillary Refill: Capillary refill takes less than 2 seconds.      Findings: No erythema or rash.   Neurological:      General: No focal deficit present.      Mental Status: She is alert and oriented to person, place, and time.   Psychiatric:         Mood and Affect: Mood and affect normal.         Behavior: Behavior normal. Behavior is cooperative.           Significant Labs:  Results for orders placed or performed during the hospital encounter of 08/22/22   CBC auto differential   Result Value Ref Range    WBC 13.11 (H) 3.90 - 12.70 K/uL    RBC 4.90 4.00 - 5.40 M/uL    Hemoglobin 13.5 12.0 - 16.0 g/dL    Hematocrit 41.2 37.0 - 48.5 %    MCV 84 82 - 98 fL    MCH 27.6 27.0 - 31.0 pg    MCHC 32.8 32.0 - 36.0 g/dL    RDW 13.3 11.5 - 14.5 %    Platelets 269 150 - 450 K/uL    MPV 9.5 9.2 - 12.9 fL    Immature Granulocytes 0.4 0.0 - 0.5 %    Gran # (ANC) 10.8 (H) 1.8 - 7.7 K/uL    Immature Grans (Abs) 0.05 (H) 0.00 - 0.04 K/uL    Lymph # 1.2 1.0 - 4.8 K/uL    Mono # 0.9 0.3 - 1.0 K/uL    Eos # 0.0 0.0 - 0.5 K/uL    Baso # 0.03 0.00 - 0.20 K/uL    nRBC 0 0 /100 WBC    Gran % 82.6 (H) 38.0 - 73.0 %    Lymph % 9.5 (L) 18.0 - 48.0 %    Mono % 7.1 4.0 - 15.0 %    Eosinophil % 0.2 0.0 - 8.0 %    Basophil % 0.2 0.0 - 1.9 %    Differential Method Automated    Comprehensive metabolic panel   Result Value Ref Range    Sodium 137 136 - 145 mmol/L    Potassium 4.1 3.5 - 5.1 mmol/L    Chloride 100 95 - 110 mmol/L    CO2 24 23 - 29 mmol/L    Glucose 129 (H) 70 - 110 mg/dL    BUN 12 8 - 23 mg/dL    Creatinine 0.9 0.5 - 1.4 mg/dL    Calcium 9.1 8.7 - 10.5 mg/dL    Total Protein 7.1 6.0 - 8.4 g/dL    Albumin 3.6 3.5 - 5.2 g/dL    Total Bilirubin 1.3 (H) 0.1 - 1.0 mg/dL    Alkaline Phosphatase 80 55 - 135 U/L    AST 17 10 - 40 U/L    ALT 10 10 - 44 U/L    Anion Gap 13 8 - 16 mmol/L    eGFR >60 >60 mL/min/1.73 m^2   Urinalysis, Reflex to Urine Culture Urine, Clean Catch    Specimen: Urine   Result Value  Ref Range    Specimen UA Urine, Clean Catch     Color, UA Yellow Yellow, Straw, Gina    Appearance, UA Hazy (A) Clear    pH, UA 7.0 5.0 - 8.0    Specific Gravity, UA 1.015 1.005 - 1.030    Protein, UA 2+ (A) Negative    Glucose, UA Negative Negative    Ketones, UA Negative Negative    Bilirubin (UA) Negative Negative    Occult Blood UA 3+ (A) Negative    Nitrite, UA Negative Negative    Urobilinogen, UA Negative <2.0 EU/dL    Leukocytes, UA 3+ (A) Negative   Lactic acid, plasma #1   Result Value Ref Range    Lactate (Lactic Acid) 1.2 0.5 - 2.2 mmol/L   Procalcitonin   Result Value Ref Range    Procalcitonin 0.15 <0.25 ng/mL   Urinalysis Microscopic   Result Value Ref Range    RBC, UA >100 (H) 0 - 4 /hpf    WBC, UA >100 (H) 0 - 5 /hpf    WBC Clumps, UA Many (A) None-Rare    Bacteria Moderate (A) None-Occ /hpf    Squam Epithel, UA 2 /hpf    Hyaline Casts, UA 3 (A) 0-1/lpf /lpf    Microscopic Comment SEE COMMENT    POCT COVID-19 Rapid Screening   Result Value Ref Range    POC Rapid COVID Negative Negative     Acceptable Yes       All pertinent labs within the past 24 hours have been reviewed.    Significant Imaging:  Imaging Results              X-Ray Chest AP Portable (Final result)  Result time 08/22/22 17:16:45      Final result by Genaro Robles MD (08/22/22 17:16:45)                   Impression:      No acute abnormality.      Electronically signed by: Genaro Robles  Date:    08/22/2022  Time:    17:16               Narrative:    EXAMINATION:  XR CHEST AP PORTABLE    CLINICAL HISTORY:  Sepsis;    TECHNIQUE:  Single frontal view of the chest was performed.    COMPARISON:  Multiple priors.    FINDINGS:  The lungs are clear, with normal appearance of pulmonary vasculature and no pleural effusion or pneumothorax.    The cardiac silhouette is normal in size. The hilar and mediastinal contours are unremarkable.    Degenerative change of the shoulders.                                       CT Renal  Stone Study ABD Pelvis WO (Final result)  Result time 08/22/22 15:35:26      Final result by Katie Oliver MD (Timothy) (08/22/22 15:35:26)                   Impression:      Large stone involving the right renal pelvis with interval enlargement of the right renal pelvis and calices as well as marked perinephric and peripelvic fat stranding.  Findings compatible with interval obstruction.  Superimposed infection cannot be excluded.  Correlate.  Left kidney is unremarkable.    All CT scans at this facility use dose modulation, iterative reconstructions, and/or weight base dosing when appropriate to reduce radiation dose to as low as reasonably achievable      Electronically signed by: Katie Oliver MD  Date:    08/22/2022  Time:    15:35               Narrative:    EXAMINATION:  CT RENAL STONE STUDY ABD PELVIS WO    CLINICAL HISTORY:  flank pain;    COMPARISON:  Comparison CT abdomen pelvis, 06/27/2021.    FINDINGS:  Lung bases are clear    The liver, the spleen, and the pancreas appear normal.    The gallbladder is unremarkable.  There is no bile duct dilatation.    3.6 x 2.4 cm stone involving the right renal pelvis associated with marked dilatation of the right renal pelvis and calices which is increased since previous exam.  There is also stranding of the fat around the renal pelvis as well as perinephric fat stranding compatible with interval worsening likely related to obstruction superimposed infection cannot be excluded.  No definite ureteral stone.  Left kidney and collecting system appears unremarkable.    The aorta and inferior vena cava are unremarkable.    No evidence of bowel obstruction.   No evidence of appendicitis.  No evidence of diverticulitis.  Colostomy is present in the left mid to lower abdomen.    Bladder is normal. No abnormal masses or fluid collections in the pelvis.    Skeletal structures are intact.  No acute skeletal findings.                                     I have reviewed all  pertinent imaging results/findings within the past 24 hours.

## 2022-08-23 NOTE — ASSESSMENT & PLAN NOTE
- Urology consult.  - IV hydration.  - Empiric IV abx - Rocephin  - Analgesics as needed.  - Follow labs.   S/P right ureteral stent placed by Dr. Matute

## 2022-08-23 NOTE — PROGRESS NOTES
[facility]  [facility] Medicine  Progress Note    Patient Name: Irlanda Lopez  MRN: [id number]  Patient Class: OP- Outpatient Recovery   Admission Date: 8/22/2022  Length of Stay: 0 days  Attending Physician: Aileen Lynn MD  Primary Care Provider: Myla Arias MD        Subjective:     Principal Problem:Hydronephrosis with renal calculous obstruction        HPI:  Irlanda Lopez is an 81 y.o. female with a PMHx of dementia, diverticulitis s/p colostomy, HLD, HTN, and hypothyroidism who presented to the Emergency Department with c/o right flank pain over the past few days. Symptoms are constant and moderate in severity. No mitigating or exacerbating factors reported. Associated lower back pain and N/V. Denies any ABD pain or distention, dysuria, hematuria, urinary urgency or frequency, diarrhea, CP, SOB, lightheadedness, dizziness, weakness, fever or chills. Work-up in the ED showed: WBC 13, creatinine 0.9, BUN 12, stable LFTs, lactic 1.2, COVID negative. UA with 3+ leukocytes, >100 WBC, >100 RBC, 3+ occult blood, moderate bacteria. CT renal stone study ABD/pelvis showed a large stone involving the right renal pelvis with interval enlargement of the right renal pelvis and calices as well as marked perinephric and peripelvic fat stranding compatible with interval obstruction, superimposed infection cannot be excluded. Blood and urine cultures were obtained in the ED, and 30 mL/kg IV fluids and IV Rocephin given. Case was discussed with Urology per the ED, who plan on stent placement tomorrow. Patient was placed in Observation under Hospital Medicine services.         Overview/Hospital Course:  Pt presented with right flank pain and found to have obstructive uropathy. There was a large kidney stone involving the right renal pelvis associated with marked dilatation of the right renal pelvis and calices which is increased since previous exam. Also, pt with superimposed infection as findings of  pyelonephritis found.  Pt given IV fluids and started on IV Rocephin.  Urine culture pending. Prelim blood cultures NGTD. Urology performed placement of a right ureteral stent and a saldana cath was placed. Pt denied any symptoms post intervention.       Interval History: See Hospital course    Review of Systems   Constitutional:  Negative for chills, diaphoresis, fatigue and fever.   Respiratory:  Negative for cough, shortness of breath and wheezing.    Cardiovascular:  Negative for chest pain, palpitations and leg swelling.   Gastrointestinal:  Negative for abdominal distention, abdominal pain, constipation, diarrhea, nausea and vomiting.   Genitourinary:  Negative for decreased urine volume, difficulty urinating, dysuria, flank pain, frequency, hematuria and urgency.   Musculoskeletal:  Positive for back pain. Negative for arthralgias and myalgias.   Skin:  Negative for pallor and rash.   Neurological:  Negative for dizziness, syncope, weakness, light-headedness, numbness and headaches.   Psychiatric/Behavioral:  The patient is not nervous/anxious.    All other systems reviewed and are negative.  Objective:     Vital Signs (Most Recent):  Temp: 97.7 °F (36.5 °C) (08/23/22 1141)  Pulse: (!) 51 (08/23/22 1141)  Resp: 18 (08/23/22 1141)  BP: 110/60 (08/23/22 1141)  SpO2: 96 % (08/23/22 1141)   Vital Signs (24h Range):  Temp:  [97.7 °F (36.5 °C)-99.1 °F (37.3 °C)] 97.7 °F (36.5 °C)  Pulse:  [51-69] 51  Resp:  [12-20] 18  SpO2:  [93 %-100 %] 96 %  BP: (100-140)/(56-97) 110/60     Weight: 64.4 kg (141 lb 15.6 oz)  Body mass index is 27.73 kg/m².    Intake/Output Summary (Last 24 hours) at 8/23/2022 1402  Last data filed at 8/23/2022 0930  Gross per 24 hour   Intake 670 ml   Output 600 ml   Net 70 ml      Physical Exam  Vitals and nursing note reviewed.   Constitutional:       General: She is awake. She is not in acute distress.     Appearance: Normal appearance. She is well-developed. She is not diaphoretic.   HENT:       Head: Normocephalic and atraumatic.   Eyes:      Conjunctiva/sclera: Conjunctivae normal.      Comments: PERRL; EOM intact.   Cardiovascular:      Rate and Rhythm: Normal rate and regular rhythm. No extrasystoles are present.     Heart sounds: S1 normal and S2 normal. No murmur heard.  Pulmonary:      Effort: Pulmonary effort is normal. No tachypnea.      Breath sounds: Normal breath sounds and air entry. No wheezing, rhonchi or rales.   Abdominal:      General: Bowel sounds are normal. There is no distension.      Palpations: Abdomen is soft.      Tenderness: There is no abdominal tenderness. There is no right CVA tenderness, left CVA tenderness, guarding or rebound. Negative signs include Rojas's sign.      Comments: LLQ colostomy in place.   Genitourinary:     Comments: Indwelling saldana cath  Musculoskeletal:         General: No tenderness or deformity. Normal range of motion.      Cervical back: Normal range of motion and neck supple.      Right lower leg: No edema.      Left lower leg: No edema.   Skin:     General: Skin is warm and dry.      Capillary Refill: Capillary refill takes less than 2 seconds.      Findings: No erythema or rash.   Neurological:      General: No focal deficit present.      Mental Status: She is alert and oriented to person, place, and time.   Psychiatric:         Mood and Affect: Mood and affect normal.         Behavior: Behavior normal. Behavior is cooperative.       Significant Labs: All pertinent labs within the past 24 hours have been reviewed.  CBC:   Recent Labs   Lab 08/22/22  1600 08/23/22  0536   WBC 13.11* 9.78   HGB 13.5 10.8*   HCT 41.2 34.5*    220     CMP:   Recent Labs   Lab 08/22/22  1600 08/23/22  0536    137   K 4.1 3.9    106   CO2 24 20*   * 94   BUN 12 12   CREATININE 0.9 0.8   CALCIUM 9.1 8.0*   PROT 7.1  --    ALBUMIN 3.6  --    BILITOT 1.3*  --    ALKPHOS 80  --    AST 17  --    ALT 10  --    ANIONGAP 13 11       Significant Imaging: I  have reviewed all pertinent imaging results/findings within the past 24 hours.      Assessment/Plan:      * Hydronephrosis with renal calculous obstruction  - Urology consult.  - IV hydration.  - Empiric IV abx - Rocephin  - Analgesics as needed.  - Follow labs.   S/P right ureteral stent placed by Dr. Mattue      Acute pyelonephritis  - Urine culture obtained in the ED. Continue empiric IV Rocephin.  - IV hydration.      Dementia  - Continue home meds.         VTE Risk Mitigation (From admission, onward)         Ordered     enoxaparin injection 40 mg  Daily         08/22/22 1922     IP VTE HIGH RISK PATIENT  Once         08/22/22 1922     Place sequential compression device  Until discontinued         08/22/22 1922                Discharge Planning   MICHAEL:      Code Status: Full Code   Is the patient medically ready for discharge?:     Reason for patient still in hospital (select all that apply): Patient trending condition                     Shagufta Limon NP  Department of Hospital Medicine   [facility]

## 2022-08-23 NOTE — ASSESSMENT & PLAN NOTE
- Urology consult. Plans for stent placement tomorrow. Will keep NPO after MN.  - IV hydration.  - Empiric IV abx.  - Analgesics as needed.  - Follow labs.

## 2022-08-24 ENCOUNTER — TELEPHONE (OUTPATIENT)
Dept: PRIMARY CARE CLINIC | Facility: CLINIC | Age: 82
End: 2022-08-24
Payer: MEDICARE

## 2022-08-24 VITALS
OXYGEN SATURATION: 97 % | SYSTOLIC BLOOD PRESSURE: 137 MMHG | BODY MASS INDEX: 27.88 KG/M2 | HEIGHT: 60 IN | TEMPERATURE: 99 F | WEIGHT: 142 LBS | RESPIRATION RATE: 18 BRPM | DIASTOLIC BLOOD PRESSURE: 60 MMHG | HEART RATE: 61 BPM

## 2022-08-24 PROCEDURE — 25000003 PHARM REV CODE 250: Performed by: STUDENT IN AN ORGANIZED HEALTH CARE EDUCATION/TRAINING PROGRAM

## 2022-08-24 PROCEDURE — 63600175 PHARM REV CODE 636 W HCPCS: Performed by: STUDENT IN AN ORGANIZED HEALTH CARE EDUCATION/TRAINING PROGRAM

## 2022-08-24 PROCEDURE — 25000003 PHARM REV CODE 250: Performed by: FAMILY MEDICINE

## 2022-08-24 RX ORDER — SULFAMETHOXAZOLE AND TRIMETHOPRIM 800; 160 MG/1; MG/1
1 TABLET ORAL 2 TIMES DAILY
Qty: 20 TABLET | Refills: 0 | Status: SHIPPED | OUTPATIENT
Start: 2022-08-25 | End: 2022-09-04

## 2022-08-24 RX ORDER — GRANULES FOR ORAL 3 G/1
3 POWDER ORAL ONCE
Status: DISCONTINUED | OUTPATIENT
Start: 2022-08-24 | End: 2022-08-24

## 2022-08-24 RX ADMIN — ATORVASTATIN CALCIUM 10 MG: 10 TABLET, FILM COATED ORAL at 09:08

## 2022-08-24 RX ADMIN — MEMANTINE 10 MG: 10 TABLET ORAL at 09:08

## 2022-08-24 RX ADMIN — ERTAPENEM 1 G: 1 INJECTION INTRAMUSCULAR; INTRAVENOUS at 11:08

## 2022-08-24 RX ADMIN — PANTOPRAZOLE SODIUM 40 MG: 40 TABLET, DELAYED RELEASE ORAL at 09:08

## 2022-08-24 RX ADMIN — FLUOXETINE 10 MG: 10 CAPSULE ORAL at 09:08

## 2022-08-24 RX ADMIN — THYROID, PORCINE 30 MG: 30 TABLET ORAL at 06:08

## 2022-08-24 NOTE — TELEPHONE ENCOUNTER
Update... Patient is scheduled with Dr Matute for 2 week hosp follow per his recommendations and also scheduled a follow up virtual appt with Myla Light.

## 2022-08-24 NOTE — PLAN OF CARE
305/305 JEFERSON Lopez is a 81 y.o.female admitted on 8/22/2022 for Acute pyelonephritis   Code Status: Prior MRN: [id number]   Review of patient's allergies indicates:  No Known Allergies  Past Medical History:   Diagnosis Date    Clostridium difficile colitis     Dementia     Diverticulitis     s/p colostomy    High cholesterol     Hypertension     Hypothyroidism       PRN meds      @BRIEFLABTABLE[cbc:1,bmp:1}@     Orientation: disoriented to, time, situation, other (see comments) (demented)  Lisa Coma Scale Score: 14  O2 Device (Oxygen Therapy): room air  Lead Monitored: Lead II Rhythm: normal sinus rhythm    Cardiac/Telemetry Box Number: pacu 3  VTE Required Core Measure: (SCDs) Sequential compression device initiated/maintained Last Bowel Movement: 08/23/22  Diet Adult Regular  Voiding Characteristics: ureteral catheter  Cleveland Score: 20  Fall Risk Score: 14  Accucheck []   Freq?      Lines/Drains/Airways       Drain  Duration                  Colostomy 05/23/21  days              Peripheral Intravenous Line  Duration                  Peripheral IV - Single Lumen 08/22/22 1749 18 G;1 3/4 in Left Forearm 1 day

## 2022-08-24 NOTE — PROGRESS NOTES
Chief Complaint:   Encounter Diagnosis   Name Primary?    Renal stone Yes       HPI:   8/24/22- status post stent placement, doing well with no complaints.  History taken with the assistance of her daughter.    08/22/2022 - presents to the ED today with new onset right flank pain with associated nausea, CT shows new dilation of her right kidney secondary to 3.6 cm right renal stone at the [facility], white count 13, renal function normal, lactate 1.2, urine pending, denies any gross hematuria or dysuria    2/21/22- patient had 1 episode of UTI, but otherwise doing well with no stone symptoms.  7/18/20- 79-year-old female who presents with severe abdominal pain and discomfort, she is being evaluated for colonic impaction diverticulitis.  She is seen to have an extremely large right renal pelvis stone.  This had been seen prior, and she was followed by partner in regards to possible surgery for this.  Patient though it is determined not pursuing not been seen in the last couple of years.  Patient is having upper abdominal pain, no colic at this point.  Patient has had no previous urological history per her and her son's report, her daughter is her primary care take care, she is currently not present at the bedside.  Family history of stones, no urological cancers per the family.  10/17/18: Mag3 shows 72/38 L/R function. -UCx last visit.  PCNL is the only viable treatment option; observation not recommended.  10/8/18: 77 yo woman referred for renal stone after workup for UTI/diverticulitis.  Having diarrhea on meds for diverticulitis.  No abd/pelvic pain and no exac/rel factors.  No hematuria.  No prior urolithiasis.  No urinary bother.  No  history.  Normal sexual function but inactive.  Some memory problems but functional at home.    Allergies:  Patient has no known allergies.    Medications:  has a current medication list which includes the following prescription(s): alprazolam, atorvastatin, fluoxetine, fluoxetine,  furosemide, l.acid/b.animalis,bifidum/fos, memantine, ondansetron, dicyclomine, donepezil, pantoprazole, promethazine, thyroid (pork), and triamcinolone acetonide 0.1%.    Review of Systems:  General: No fever, chills  Skin: No rashes  Chest:  Denies cough and sputum production  Heart: Denies chest pain  Resp: Denies dyspnea  Abdomen: Denies diarrhea, abdominal pain, hematemesis, or blood in stool.  Musculoskeletal: No joint stiffness or swelling. Denies back pain.  : see HPI  Neuro: no dizziness or weakness      PMH:   has a past medical history of Clostridium difficile colitis, Dementia, Diverticulitis, High cholesterol, Hypertension, and Hypothyroidism.    PSH:   has a past surgical history that includes Cataract extraction; Appendectomy (2008); Colonoscopy (N/A, 1/2/2019); Colostomy (Left, 1/2/2019); Maribel procedure (N/A, 1/2/2019); Lysis of adhesions (N/A, 1/2/2019); Colon surgery; Vitrectomy (Right, 09/2013); Colonoscopy (N/A, 6/7/2019); Esophagogastroduodenoscopy (N/A, 9/16/2020); and Colonoscopy (N/A, 6/23/2021).    FamHx: family history includes Alzheimer's disease in her mother; Aneurysm in her father; Stomach cancer in her brother.    SocHx:  reports that she has quit smoking. She quit after 5.00 years of use. She has never used smokeless tobacco. She reports that she does not drink alcohol and does not use drugs.      Physical Exam:  Vitals:    08/24/22 0414   BP: 127/64   Pulse: (!) 58   Resp: 15   Temp: 99.8 °F (37.7 °C)     General: awake, alert, cooperative  Head: NC/AT  Ears: external ears normal  Eyes: sclera normal  Lungs: normal inspiration, NAD  Heart: well-perfused  Abdomen: Soft, NT, ND    CT RENAL STONE STUDY ABD PELVIS WO 08/22/2022     CLINICAL HISTORY:  flank pain;     COMPARISON:  Comparison CT abdomen pelvis, 06/27/2021.     FINDINGS:  Lung bases are clear     The liver, the spleen, and the pancreas appear normal.     The gallbladder is unremarkable.  There is no bile duct  dilatation.     3.6 x 2.4 cm stone involving the right renal pelvis associated with marked dilatation of the right renal pelvis and calices which is increased since previous exam.  There is also stranding of the fat around the renal pelvis as well as perinephric fat stranding compatible with interval worsening likely related to obstruction superimposed infection cannot be excluded.  No definite ureteral stone.  Left kidney and collecting system appears unremarkable.     The aorta and inferior vena cava are unremarkable.     No evidence of bowel obstruction.   No evidence of appendicitis.  No evidence of diverticulitis.  Colostomy is present in the left mid to lower abdomen.     Bladder is normal. No abnormal masses or fluid collections in the pelvis.     Skeletal structures are intact.  No acute skeletal findings.     Impression:     Large stone involving the right renal pelvis with interval enlargement of the right renal pelvis and calices as well as marked perinephric and peripelvic fat stranding.  Findings compatible with interval obstruction.  Superimposed infection cannot be excluded.  Correlate.  Left kidney is unremarkable.    Labs/Studies:   Lab Results   Component Value Date    WBC 9.78 08/23/2022    HGB 10.8 (L) 08/23/2022    HCT 34.5 (L) 08/23/2022     08/23/2022     08/23/2022    K 3.9 08/23/2022     08/23/2022    CREATININE 0.8 08/23/2022    BUN 12 08/23/2022    CO2 20 (L) 08/23/2022    TSH 1.244 06/09/2022    INR 1.0 06/10/2019    HGBA1C 5.2 06/09/2022    CHOL 154 06/09/2022    TRIG 57 06/09/2022    HDL 38 (L) 06/09/2022    ALT 10 08/22/2022    AST 17 08/22/2022          Impression/Plan:      Right renal stone- cystoscopy with right stent POD #1    -will discontinue Reyes catheter, as long as the patient is voiding no need to continue.    -will arrange for follow-up in 2-3 weeks to discuss definitive management.    -recommend 10-14 days of broad-spectrum antibiotics to cover her  infection.    -no further recommendations will sign off for now, contact with any other concerns.

## 2022-08-24 NOTE — PLAN OF CARE
Pt oriented x 4 VSS  Pt remained afebile throughout this shift  All meds administered per order.  Pt remianed free of falls  Plan of care reviewed. pt verbalizes understanding.  Patient moving/ turning independently.  Patient normal sinus on monitor  Bed low, side rails up x2, wheels locked, call light in reach.  Pt instructed to call for assistance  Hourly rounding completed.    Ultrasound of the lipoma back  Severe allergic reaction to latex

## 2022-08-24 NOTE — NURSING
Reyes removed per verbal order from Dr. Matute. No pain and distress noted to pt. Pt instructed to call when she has to urinate for assistance to restroom. Due to void by 1300.

## 2022-08-24 NOTE — TELEPHONE ENCOUNTER
----- Message from Karol Moses sent at 8/24/2022 10:27 AM CDT -----  Contact: Myla/daughter  Patient daughter is calling to inform Dr. Arias office that patient was admitted into the hospital on 8/22/2022 due to Kidney issues and Stint was place in patient on 8/23/2022. Explains patient was placed on Bactrim Antibiotic and a culture was ran and  stated the results should come at 8 pm tonight 8/24/2022 and if the antibiotic is not helping, patient will in to have an IV administrated at home and Home Health will need to come assist. Please give daughter a call at [phone]. Patient is being discharged today 8/24/2022.  Thanks  LR

## 2022-08-24 NOTE — DISCHARGE SUMMARY
[facility]  [facility]  Discharge Summary      Patient Name: Irlanda Lopez  MRN: [id number]  Patient Class: OP- Outpatient Recovery  Admission Date: 8/22/2022  Hospital Length of Stay: 0 days  Discharge Date and Time:  08/24/2022 10:11 AM  Attending Physician: Aileen Lynn MD   Discharging Provider: Aileen Lynn MD  Primary Care Provider: Myla Arias MD      HPI:   Irlanda Lopez is an 81 y.o. female with a PMHx of dementia, diverticulitis s/p colostomy, HLD, HTN, and hypothyroidism who presented to the Emergency Department with c/o right flank pain over the past few days. Symptoms are constant and moderate in severity. No mitigating or exacerbating factors reported. Associated lower back pain and N/V. Denies any ABD pain or distention, dysuria, hematuria, urinary urgency or frequency, diarrhea, CP, SOB, lightheadedness, dizziness, weakness, fever or chills. Work-up in the ED showed: WBC 13, creatinine 0.9, BUN 12, stable LFTs, lactic 1.2, COVID negative. UA with 3+ leukocytes, >100 WBC, >100 RBC, 3+ occult blood, moderate bacteria. CT renal stone study ABD/pelvis showed a large stone involving the right renal pelvis with interval enlargement of the right renal pelvis and calices as well as marked perinephric and peripelvic fat stranding compatible with interval obstruction, superimposed infection cannot be excluded. Blood and urine cultures were obtained in the ED, and 30 mL/kg IV fluids and IV Rocephin given. Case was discussed with Urology per the ED, who plan on stent placement tomorrow. Patient was placed in Observation under Hospital Medicine services.         Procedure(s) (LRB):  CYSTOSCOPY, WITH RETROGRADE PYELOGRAM AND URETERAL STENT INSERTION (Right)      Hospital Course:   Pt presented with right flank pain and found to have obstructive uropathy. There was a large kidney stone involving the right renal pelvis associated with marked dilatation of the right renal pelvis and  calices which is increased since previous exam. Also, pt with superimposed infection as findings of pyelonephritis found.  Pt given IV fluids and started on IV Rocephin.  Urine culture pending. Prelim blood cultures NGTD. Urology performed placement of a right ureteral stent and a saldana cath was placed. Pt denied any symptoms post intervention.     Stable for discharge home with PO abx. Urine culture sensitivities pending and hx of ESBL, discussed possibility of PO abx resistance with family and need to return for PICC line placement and IV abx dependent on final sensitivities. Daughter at bedside Myla and pt requesting to go home with PO abx and will await culture results. Rx sent for PO bactrim. F/u with PCP       Goals of Care Treatment Preferences:  Code Status: Full Code      Consults:     * Acute pyelonephritis  - Urine culture obtained in the ED. Continue empiric IV Rocephin.  - IV hydration.    One dose IV ertapenem prior to d/c    Hydronephrosis with renal calculous obstruction  - Urology consult.  - IV hydration.  - Empiric IV abx - Rocephin  - Analgesics as needed.  - Follow labs.   S/P right ureteral stent placed by Dr. Matute      Dementia  - Continue home meds.         Final Active Diagnoses:    Diagnosis Date Noted POA    PRINCIPAL PROBLEM:  Acute pyelonephritis [N10] 08/22/2022 Yes    Hydronephrosis with renal calculous obstruction [N13.2] 08/22/2022 Yes    Dementia [F03.90] 05/24/2021 Yes     Chronic      Problems Resolved During this Admission:       Discharged Condition: stable    Disposition: Home or Self Care    Follow Up:   Follow-up Information     Myla Arias MD Follow up in 1 week(s).    Specialty: Family Medicine  Contact information:  [address]  [address]  [facility] [address] [phone]                       Patient Instructions:      Diet Adult Regular     Activity as tolerated       Significant Diagnostic Studies: Labs: All labs within the past 24 hours have been  reviewed    Pending Diagnostic Studies:     None         Medications:  Reconciled Home Medications:      Medication List      START taking these medications    sulfamethoxazole-trimethoprim 800-160mg 800-160 mg Tab  Commonly known as: BACTRIM DS  Take 1 tablet by mouth 2 (two) times daily. for 10 days  Start taking on: August 25, 2022        CONTINUE taking these medications    ALPRAZolam 0.25 MG tablet  Commonly known as: XANAX  Take 1 tablet (0.25 mg total) by mouth 2 (two) times daily as needed for Anxiety.     atorvastatin 10 MG tablet  Commonly known as: LIPITOR  TAKE 1 TABLET BY MOUTH ONCE DAILY     FLUoxetine 10 MG capsule  Take 1 capsule (10 mg total) by mouth once daily.     memantine 10 MG Tab  Commonly known as: NAMENDA  TAKE 1 TABLET BY MOUTH  TWICE DAILY     pantoprazole 40 MG tablet  Commonly known as: PROTONIX  Take 1 tablet (40 mg total) by mouth once daily. 30 min prior to lunch     thyroid (pork) 30 mg Tab  Commonly known as: ARMOUR THYROID  Take 1 tablet (30 mg total) by mouth before breakfast.        STOP taking these medications    dicyclomine 10 MG capsule  Commonly known as: BENTYL     furosemide 20 MG tablet  Commonly known as: LASIX     neomycin-polymyxin-dexamethasone 3.5 mg/g-10,000 unit/g-0.1 % Oint  Commonly known as: DEXACINE     PROBIOTIC COMPLEX ORAL     triamcinolone acetonide 0.1% 0.1 % ointment  Commonly known as: KENALOG            Indwelling Lines/Drains at time of discharge:   Lines/Drains/Airways     Drain  Duration                Colostomy 05/23/21  days                Time spent on the discharge of patient: 60 minutes         Aileen Lynn MD  Department of Hospital Medicine  [facility]

## 2022-08-24 NOTE — PLAN OF CARE
[facility] 3  Initial Discharge Assessment       Primary Care Provider: Myla Arias MD    Admission Diagnosis: Renal stone [N20.0]  Right flank pain [R10.9]  Hydronephrosis with renal calculous obstruction [N13.2]    Admission Date: 8/22/2022  Expected Discharge Date: 8/24/2022    Discharge Barriers Identified: None    Payor: [employer]ARE / Plan: [email or web address] CHOICES 65 / Product Type: Medicare Advantage /     Extended Emergency Contact Information  Primary Emergency Contact: Myla Mora  Address: [address] [place] [address] [place]  Mobile Phone: [phone]  Relation: Daughter  Secondary Emergency Contact: shoshanajorge  Mobile Phone: [phone]  Relation: Daughter    Discharge Plan A: Home with family  Discharge Plan B: Home Health      [id number] [place] [place] [facility] [address]  [address] [address]  Phone: [phone] Fax: [phone]    [facility] - [place] [id number] [address]  Phone: [phone] Fax: [phone]      Initial Assessment (most recent)     Adult Discharge Assessment - 08/24/22 0944        Discharge Assessment    Assessment Type Discharge Planning Assessment     Confirmed/corrected address, phone number and insurance Yes     Confirmed Demographics Correct on Facesheet     Source of Information health care advocate;patient     Communicated MICHAEL with patient/caregiver Yes     Lives With other (see comments)   Caregivers    Do you expect to return to your current living situation? Yes     Do you have help at home or someone to help you manage your care at home? Yes     Who are your caregiver(s) and their phone number(s)? Gladys Stuart (caregiver) [phone]     Prior to hospitilization cognitive status: Alert/Oriented     Current cognitive status: Alert/Oriented     Walking or  Climbing Stairs Difficulty none     Dressing/Bathing Difficulty none     Home Accessibility wheelchair accessible     Home Layout Able to live on 1st floor     Equipment Currently Used at Home none     Readmission within 30 days? No     Patient currently being followed by outpatient case management? No     Do you currently have service(s) that help you manage your care at home? No     Do you take prescription medications? Yes     Do you have prescription coverage? Yes     Do you have any problems affording any of your prescribed medications? No     Is the patient taking medications as prescribed? yes     Who is going to help you get home at discharge? Gladys Stuart (caregiver) [phone]     How do you get to doctors appointments? family or friend will provide     Are you on dialysis? No     Do you take coumadin? No     Discharge Plan A Home with family     Discharge Plan B Home Health     DME Needed Upon Discharge  none     Discharge Plan discussed with: Caregiver     Name(s) and Number(s) Gladys Stuart (caregiver) [phone]     Discharge Barriers Identified None               Met with pt and her caregiver Gladys at bedside for DC assessment. Pt lives at home with 24/7 caregiver support. Pt may need IV ABX/HH pending lab results this evening. Will FU tomorrow. Aida provided a transitional care folder, information on advanced directives, information on pharmacy bedside delivery, and discharge planning begins on admission with contact information for any needs/questions.    Adrián Mascorro LMSW 8/24/2022 9:54 AM

## 2022-08-24 NOTE — ASSESSMENT & PLAN NOTE
- Urine culture obtained in the ED. Continue empiric IV Rocephin.  - IV hydration.    One dose IV ertapenem prior to d/c

## 2022-08-24 NOTE — TELEPHONE ENCOUNTER
No growth on blood cultures so far. Urine culture takes another 24-48 hours to do the sensitivities for the antibiotics. Please inform and if would need IV meds would have to be ordered by urologist.

## 2022-08-25 ENCOUNTER — TELEPHONE (OUTPATIENT)
Dept: PRIMARY CARE CLINIC | Facility: CLINIC | Age: 82
End: 2022-08-25
Payer: MEDICARE

## 2022-08-25 ENCOUNTER — OFFICE VISIT (OUTPATIENT)
Dept: PRIMARY CARE CLINIC | Facility: CLINIC | Age: 82
End: 2022-08-25
Payer: MEDICARE

## 2022-08-25 DIAGNOSIS — Z78.0 MENOPAUSE: ICD-10-CM

## 2022-08-25 DIAGNOSIS — N20.0 RENAL STONE: Primary | ICD-10-CM

## 2022-08-25 DIAGNOSIS — G31.84 MILD COGNITIVE IMPAIRMENT WITH MEMORY LOSS: ICD-10-CM

## 2022-08-25 LAB — BACTERIA UR CULT: ABNORMAL

## 2022-08-25 PROCEDURE — 1159F PR MEDICATION LIST DOCUMENTED IN MEDICAL RECORD: ICD-10-PCS | Mod: CPTII,95,, | Performed by: PHYSICIAN ASSISTANT

## 2022-08-25 PROCEDURE — 1159F MED LIST DOCD IN RCRD: CPT | Mod: CPTII,95,, | Performed by: PHYSICIAN ASSISTANT

## 2022-08-25 PROCEDURE — 99214 OFFICE O/P EST MOD 30 MIN: CPT | Mod: 95,,, | Performed by: PHYSICIAN ASSISTANT

## 2022-08-25 PROCEDURE — 1160F RVW MEDS BY RX/DR IN RCRD: CPT | Mod: CPTII,95,, | Performed by: PHYSICIAN ASSISTANT

## 2022-08-25 PROCEDURE — 1160F PR REVIEW ALL MEDS BY PRESCRIBER/CLIN PHARMACIST DOCUMENTED: ICD-10-PCS | Mod: CPTII,95,, | Performed by: PHYSICIAN ASSISTANT

## 2022-08-25 PROCEDURE — 99214 PR OFFICE/OUTPT VISIT, EST, LEVL IV, 30-39 MIN: ICD-10-PCS | Mod: 95,,, | Performed by: PHYSICIAN ASSISTANT

## 2022-08-25 NOTE — TELEPHONE ENCOUNTER
----- Message from Lizzette Watkins sent at 8/25/2022 10:51 AM CDT -----  Contact: Myla--Tcvmuwrw-[phone]  1MEDICALADVICE     Patient is calling for Medical Advice regarding:    Pt was discharged from the hospital with BACTRIM. Myla would like to know if pt can still take other medications and supplements while taking the BACTRIM. She states that the caregiver informed her that pt should not be taking other medications.     Would like response via XATA:  call back     Comments:   Please call back to advise.

## 2022-08-25 NOTE — PROGRESS NOTES
Subjective:      Patient ID: Irlanda Lopez is a 81 y.o. female.    Chief Complaint: Follow-up    The patient location is: home   The chief complaint leading to consultation is: f/u hospital discharge     Visit type: audiovisual    Face to Face time with patient: 20 minutes   22 minutes of total time spent on the encounter, which includes face to face time and non-face to face time preparing to see the patient (eg, review of tests), Obtaining and/or reviewing separately obtained history, Documenting clinical information in the electronic or other health record, Independently interpreting results (not separately reported) and communicating results to the patient/family/caregiver, or Care coordination (not separately reported).         Each patient to whom he or she provides medical services by telemedicine is:  (1) informed of the relationship between the physician and patient and the respective role of any other health care provider with respect to management of the patient; and (2) notified that he or she may decline to receive medical services by telemedicine and may withdraw from such care at any time.    Notes:   Irlanda Lopez is a 81 y.o.female who presents to video visit for Hospital follow-up post kidney stent     Since discharge:  Lip feel a little swollen  Back mid feel some aching  Slept well, took a good nap     Discharged on bactrim   Daughter asking about medications.  Was told by caregiver with Irlanda at time of discharge that was supposed to stop all medications       Review of Systems   Constitutional: Negative for activity change and unexpected weight change.   HENT: Positive for hearing loss. Negative for rhinorrhea and trouble swallowing.    Eyes: Negative for discharge and visual disturbance.   Respiratory: Negative for chest tightness and wheezing.    Cardiovascular: Negative for chest pain and palpitations.   Gastrointestinal: Negative for blood in stool, constipation, diarrhea and  vomiting.   Endocrine: Negative for polydipsia and polyuria.   Genitourinary: Negative for difficulty urinating, dysuria, hematuria and menstrual problem.   Musculoskeletal: Negative for arthralgias, joint swelling and neck pain.   Neurological: Negative for weakness and headaches.   Psychiatric/Behavioral: Positive for confusion. Negative for dysphoric mood.       Objective:   There were no vitals taken for this visit.  Physical Exam  Constitutional:       General: She is not in acute distress.     Appearance: She is well-developed. She is not diaphoretic.   HENT:      Head: Normocephalic and atraumatic.      Right Ear: External ear normal.      Left Ear: External ear normal.      Nose: Nose normal. No rhinorrhea.   Pulmonary:      Effort: Pulmonary effort is normal. No respiratory distress.   Neurological:      Mental Status: She is alert and oriented to person, place, and time.      Motor: No abnormal muscle tone.   Psychiatric:         Mood and Affect: Mood normal.         Behavior: Behavior normal.         Thought Content: Thought content normal.       Assessment:      1. Renal stone    2. Mild cognitive impairment with memory loss       Plan:   Renal stone    Mild cognitive impairment with memory loss    doing well since discharge   reviewed medications   Cont. Bactrim   possible moderate interaction between memantine and bactrim - ok to hold memantine while taking bactrim       Myla Light PA-C   Physician Assistant   [facility]

## 2022-08-26 ENCOUNTER — TELEPHONE (OUTPATIENT)
Dept: ADMINISTRATIVE | Facility: HOSPITAL | Age: 82
End: 2022-08-26
Payer: MEDICARE

## 2022-08-26 NOTE — TELEPHONE ENCOUNTER
MESSAGE BELOW FROM PHN:   Please contact patient.       Irlanda Lopez  [birth year]  MRN# [id number]  I just made a [facility] call and reached her daughter, Myla, who gave me the ph# for the sitter - Maricruz DEAN I called Page and went over the medication changes on the D/C summary.    Page stated that the sitter (Gladys) that just left Ms Linares home told her not to give her any medications except the Bactrim -160 1 tab bid x 10 days.  I explained to Page that member had some meds discontinued - Bentyl, Lasix, Neomycin and Kenalog oint.    However she is to continue all the rest of her current home meds.  Page said she is going to call Ms Linares daughter, Myla, to ask her to verify that member should be taking all the rest of current meds.  Adv Page to have Myla call Dr Arias and verify her meds should be continued.    I did fax the Med Rec to Dr Arias separately as we have to with PHN.    Just wanted to give you a heads up about what was told to me by lucas colon.    Her PCP appt was cancelled because she is able to take the PO Bactrim and not resistant to med.  Otherwise she would have to have a PICC line placed and IV antibiotics (this is what her daughter, Myla, advised).    Call me if you have questions.    Marina

## 2022-08-28 LAB
BACTERIA BLD CULT: NORMAL
BACTERIA BLD CULT: NORMAL

## 2022-09-10 ENCOUNTER — OFFICE VISIT (OUTPATIENT)
Dept: UROLOGY | Facility: CLINIC | Age: 82
End: 2022-09-10
Payer: MEDICARE

## 2022-09-10 ENCOUNTER — PATIENT MESSAGE (OUTPATIENT)
Dept: UROLOGY | Facility: CLINIC | Age: 82
End: 2022-09-10

## 2022-09-10 VITALS — DIASTOLIC BLOOD PRESSURE: 81 MMHG | SYSTOLIC BLOOD PRESSURE: 132 MMHG | HEART RATE: 46 BPM

## 2022-09-10 DIAGNOSIS — N20.0 RENAL STONE: Primary | ICD-10-CM

## 2022-09-10 PROCEDURE — 1159F MED LIST DOCD IN RCRD: CPT | Mod: CPTII,S$GLB,, | Performed by: UROLOGY

## 2022-09-10 PROCEDURE — 1126F AMNT PAIN NOTED NONE PRSNT: CPT | Mod: CPTII,S$GLB,, | Performed by: UROLOGY

## 2022-09-10 PROCEDURE — 1160F RVW MEDS BY RX/DR IN RCRD: CPT | Mod: CPTII,S$GLB,, | Performed by: UROLOGY

## 2022-09-10 PROCEDURE — 99024 PR POST-OP FOLLOW-UP VISIT: ICD-10-PCS | Mod: S$GLB,,, | Performed by: UROLOGY

## 2022-09-10 PROCEDURE — 3075F SYST BP GE 130 - 139MM HG: CPT | Mod: CPTII,S$GLB,, | Performed by: UROLOGY

## 2022-09-10 PROCEDURE — 3075F PR MOST RECENT SYSTOLIC BLOOD PRESS GE 130-139MM HG: ICD-10-PCS | Mod: CPTII,S$GLB,, | Performed by: UROLOGY

## 2022-09-10 PROCEDURE — 3288F FALL RISK ASSESSMENT DOCD: CPT | Mod: CPTII,S$GLB,, | Performed by: UROLOGY

## 2022-09-10 PROCEDURE — 3079F PR MOST RECENT DIASTOLIC BLOOD PRESSURE 80-89 MM HG: ICD-10-PCS | Mod: CPTII,S$GLB,, | Performed by: UROLOGY

## 2022-09-10 PROCEDURE — 99024 POSTOP FOLLOW-UP VISIT: CPT | Mod: S$GLB,,, | Performed by: UROLOGY

## 2022-09-10 PROCEDURE — 1159F PR MEDICATION LIST DOCUMENTED IN MEDICAL RECORD: ICD-10-PCS | Mod: CPTII,S$GLB,, | Performed by: UROLOGY

## 2022-09-10 PROCEDURE — 1101F PT FALLS ASSESS-DOCD LE1/YR: CPT | Mod: CPTII,S$GLB,, | Performed by: UROLOGY

## 2022-09-10 PROCEDURE — 1126F PR PAIN SEVERITY QUANTIFIED, NO PAIN PRESENT: ICD-10-PCS | Mod: CPTII,S$GLB,, | Performed by: UROLOGY

## 2022-09-10 PROCEDURE — 1160F PR REVIEW ALL MEDS BY PRESCRIBER/CLIN PHARMACIST DOCUMENTED: ICD-10-PCS | Mod: CPTII,S$GLB,, | Performed by: UROLOGY

## 2022-09-10 PROCEDURE — 3288F PR FALLS RISK ASSESSMENT DOCUMENTED: ICD-10-PCS | Mod: CPTII,S$GLB,, | Performed by: UROLOGY

## 2022-09-10 PROCEDURE — 99999 PR PBB SHADOW E&M-EST. PATIENT-LVL III: CPT | Mod: PBBFAC,,, | Performed by: UROLOGY

## 2022-09-10 PROCEDURE — 99999 PR PBB SHADOW E&M-EST. PATIENT-LVL III: ICD-10-PCS | Mod: PBBFAC,,, | Performed by: UROLOGY

## 2022-09-10 PROCEDURE — 1101F PR PT FALLS ASSESS DOC 0-1 FALLS W/OUT INJ PAST YR: ICD-10-PCS | Mod: CPTII,S$GLB,, | Performed by: UROLOGY

## 2022-09-10 PROCEDURE — 3079F DIAST BP 80-89 MM HG: CPT | Mod: CPTII,S$GLB,, | Performed by: UROLOGY

## 2022-09-10 NOTE — PROGRESS NOTES
Chief Complaint:   Encounter Diagnosis   Name Primary?    Renal stone Yes       HPI:   9/10/22- we had been monitoring her kidney stone under surveillance.  As patient has multiple medical comorbidities, unfortunately the stone became obstructed and a stent was required.  The patient comes in today to discuss management, no evidence of pain from the stent or signs of infection.  7/18/20- 79-year-old female who presents with severe abdominal pain and discomfort, she is being evaluated for colonic impaction diverticulitis.  She is seen to have an extremely large right renal pelvis stone.  This had been seen prior, and she was followed by partner in regards to possible surgery for this.  Patient though it is determined not pursuing not been seen in the last couple of years.  Patient is having upper abdominal pain, no colic at this point.  Patient has had no previous urological history per her and her son's report, her daughter is her primary care take care, she is currently not present at the bedside.  Family history of stones, no urological cancers per the family.  10/17/18: Mag3 shows 72/38 L/R function. -UCx last visit.  PCNL is the only viable treatment option; observation not recommended.  10/8/18: 77 yo woman referred for renal stone after workup for UTI/diverticulitis.  Having diarrhea on meds for diverticulitis.  No abd/pelvic pain and no exac/rel factors.  No hematuria.  No prior urolithiasis.  No urinary bother.  No  history.  Normal sexual function but inactive.  Some memory problems but functional at home.    Allergies:  Patient has no known allergies.    Medications:  has a current medication list which includes the following prescription(s): atorvastatin, fluoxetine, memantine, pantoprazole, thyroid (pork), alprazolam, [DISCONTINUED] furosemide, and [DISCONTINUED] triamcinolone acetonide 0.1%.    Review of Systems:  General: No fever, chills, fatigability, or weight loss.  Skin: No rashes, itching, or  changes in color or texture of skin.  Chest: Denies JESUS, cyanosis, wheezing, cough, and sputum production.  Abdomen: Appetite fine. No weight loss. Denies diarrhea, abdominal pain, hematemesis, or blood in stool.  Musculoskeletal: No joint stiffness or swelling. Denies back pain.  : As above.  All other review of systems negative.    PMH:   has a past medical history of Clostridium difficile colitis, Dementia, Diverticulitis, High cholesterol, Hypertension, and Hypothyroidism.    PSH:   has a past surgical history that includes Cataract extraction; Appendectomy (2008); Colonoscopy (N/A, 1/2/2019); Colostomy (Left, 1/2/2019); Maribel procedure (N/A, 1/2/2019); Lysis of adhesions (N/A, 1/2/2019); Colon surgery; Vitrectomy (Right, 09/2013); Colonoscopy (N/A, 6/7/2019); Esophagogastroduodenoscopy (N/A, 9/16/2020); Colonoscopy (N/A, 6/23/2021); and Cystoscopy with ureteroscopy, retrograde pyelography, and insertion of stent (Right, 8/23/2022).    FamHx: family history includes Alzheimer's disease in her mother; Aneurysm in her father; Stomach cancer in her brother.    SocHx:  reports that she has quit smoking. She has never used smokeless tobacco. She reports that she does not drink alcohol and does not use drugs.      Physical Exam:  Vitals:    09/10/22 1002   BP: 132/81   Pulse: (!) 46     General: A&Ox3, no apparent distress, no deformities  Neck: No masses, normal ROM  Lungs: normal inspiration, no use of accessory muscles  Heart: normal pulse, no arrhythmias  Abdomen: Soft, NT, ND, no masses, no hernias, no hepatosplenomegaly  Skin: The skin is warm and dry. No jaundice.  Ext: No c/c/e.  : No pelvic floor prolapse.  Normal introitus, no urethral abnomralities. No Perineal abnormalities.    Labs/Studies:   Creatinine 0.8 8/22  CT large right renal pelvis stone 8/22     Impression/Plan:       Right renal stone-  right stent  8/23/22    Patient unfortunately required stent placement, will obtain Lasix renogram to  assess function of this kidney.  Patient has multiple comorbidities, they do not want to pursue anything aggressive and I agree.  I have given her the options active surveillance with chronic stent exchanges, PCNL or ESWL to treat the stone.  Another option if the renogram shows no function of the kidney, we could attempt removal of the stent in the hopes that it would not re-obstruct.  Will discuss at the next visit after renogram.

## 2022-09-15 ENCOUNTER — TELEPHONE (OUTPATIENT)
Dept: UROLOGY | Facility: CLINIC | Age: 82
End: 2022-09-15
Payer: MEDICARE

## 2022-09-15 NOTE — TELEPHONE ENCOUNTER
Appt scheduled for pt notified on [employer]         ----- Message from Enriqueta Silverio sent at 9/15/2022 10:39 AM CDT -----  Pt's daughter is calling in regard to pt needing to be schedule for Nuclear Renal function test. Caller can be reached at [phone] (nynn)

## 2022-09-23 ENCOUNTER — PATIENT MESSAGE (OUTPATIENT)
Dept: UROLOGY | Facility: CLINIC | Age: 82
End: 2022-09-23
Payer: MEDICARE

## 2022-09-23 ENCOUNTER — TELEPHONE (OUTPATIENT)
Dept: UROLOGY | Facility: CLINIC | Age: 82
End: 2022-09-23

## 2022-09-23 ENCOUNTER — HOSPITAL ENCOUNTER (OUTPATIENT)
Dept: RADIOLOGY | Facility: HOSPITAL | Age: 82
Discharge: HOME | End: 2022-09-23
Attending: UROLOGY
Payer: MEDICARE

## 2022-09-23 DIAGNOSIS — N20.0 RENAL STONE: Primary | ICD-10-CM

## 2022-09-23 DIAGNOSIS — N20.0 RENAL STONE: ICD-10-CM

## 2022-09-23 PROCEDURE — 78708 NM RENOGRAM WITH LASIX: ICD-10-PCS | Mod: 26,,, | Performed by: RADIOLOGY

## 2022-09-23 PROCEDURE — 78708 K FLOW/FUNCT IMAGE W/DRUG: CPT | Mod: TC

## 2022-09-23 PROCEDURE — 78708 K FLOW/FUNCT IMAGE W/DRUG: CPT | Mod: 26,,, | Performed by: RADIOLOGY

## 2022-09-26 ENCOUNTER — TELEPHONE (OUTPATIENT)
Dept: UROLOGY | Facility: CLINIC | Age: 82
End: 2022-09-26
Payer: MEDICARE

## 2022-09-26 NOTE — TELEPHONE ENCOUNTER
I called and spoke to pts daughter; results of renogram discussed with her.  At this point, they would rather not pursue anything surgically. Encouraged daughter to call us if we could be of more assistance.

## 2022-09-27 ENCOUNTER — PATIENT MESSAGE (OUTPATIENT)
Dept: UROLOGY | Facility: CLINIC | Age: 82
End: 2022-09-27
Payer: MEDICARE

## 2022-10-01 ENCOUNTER — OFFICE VISIT (OUTPATIENT)
Dept: URGENT CARE | Facility: CLINIC | Age: 82
End: 2022-10-01
Payer: MEDICARE

## 2022-10-01 VITALS
SYSTOLIC BLOOD PRESSURE: 118 MMHG | HEIGHT: 60 IN | WEIGHT: 140 LBS | DIASTOLIC BLOOD PRESSURE: 56 MMHG | TEMPERATURE: 98 F | HEART RATE: 52 BPM | BODY MASS INDEX: 27.48 KG/M2 | OXYGEN SATURATION: 95 %

## 2022-10-01 DIAGNOSIS — L29.9 PRURITUS: ICD-10-CM

## 2022-10-01 DIAGNOSIS — H57.89 REDNESS OF BOTH EYES: ICD-10-CM

## 2022-10-01 DIAGNOSIS — L30.9 DERMATITIS: ICD-10-CM

## 2022-10-01 DIAGNOSIS — I10 ESSENTIAL HYPERTENSION: Chronic | ICD-10-CM

## 2022-10-01 DIAGNOSIS — R21 RASH: Primary | ICD-10-CM

## 2022-10-01 PROCEDURE — 3078F DIAST BP <80 MM HG: CPT | Mod: CPTII,S$GLB,, | Performed by: NURSE PRACTITIONER

## 2022-10-01 PROCEDURE — 3074F SYST BP LT 130 MM HG: CPT | Mod: CPTII,S$GLB,, | Performed by: NURSE PRACTITIONER

## 2022-10-01 PROCEDURE — 1159F PR MEDICATION LIST DOCUMENTED IN MEDICAL RECORD: ICD-10-PCS | Mod: CPTII,S$GLB,, | Performed by: NURSE PRACTITIONER

## 2022-10-01 PROCEDURE — 3074F PR MOST RECENT SYSTOLIC BLOOD PRESSURE < 130 MM HG: ICD-10-PCS | Mod: CPTII,S$GLB,, | Performed by: NURSE PRACTITIONER

## 2022-10-01 PROCEDURE — 3078F PR MOST RECENT DIASTOLIC BLOOD PRESSURE < 80 MM HG: ICD-10-PCS | Mod: CPTII,S$GLB,, | Performed by: NURSE PRACTITIONER

## 2022-10-01 PROCEDURE — 1159F MED LIST DOCD IN RCRD: CPT | Mod: CPTII,S$GLB,, | Performed by: NURSE PRACTITIONER

## 2022-10-01 PROCEDURE — 1125F PR PAIN SEVERITY QUANTIFIED, PAIN PRESENT: ICD-10-PCS | Mod: CPTII,S$GLB,, | Performed by: NURSE PRACTITIONER

## 2022-10-01 PROCEDURE — 1125F AMNT PAIN NOTED PAIN PRSNT: CPT | Mod: CPTII,S$GLB,, | Performed by: NURSE PRACTITIONER

## 2022-10-01 PROCEDURE — 99214 PR OFFICE/OUTPT VISIT, EST, LEVL IV, 30-39 MIN: ICD-10-PCS | Mod: S$GLB,,, | Performed by: NURSE PRACTITIONER

## 2022-10-01 PROCEDURE — 99214 OFFICE O/P EST MOD 30 MIN: CPT | Mod: S$GLB,,, | Performed by: NURSE PRACTITIONER

## 2022-10-01 RX ORDER — PREDNISONE 10 MG/1
10 TABLET ORAL 2 TIMES DAILY
Qty: 6 TABLET | Refills: 0 | Status: SHIPPED | OUTPATIENT
Start: 2022-10-01 | End: 2022-10-04

## 2022-10-01 RX ORDER — HYDROXYZINE HYDROCHLORIDE 10 MG/1
10 TABLET, FILM COATED ORAL 2 TIMES DAILY
Qty: 20 TABLET | Refills: 0 | Status: SHIPPED | OUTPATIENT
Start: 2022-10-01 | End: 2022-10-11

## 2022-10-01 NOTE — PATIENT INSTRUCTIONS
PLAN:   Consult allergist  Consult Ophthalmology  Advise increase p.o. fluids--water/juice & rest  Meds: Atarax, prednisone / no refills  Advise no scented cologne or perfume  Advise use of unscented soaps, lotions.  Cool mist humidifier/vaporizer.  Practice good handwashing.  Advise follow up with PCP in 2 3 days for recheck  Advise go to ER if nausea, vomiting, fever, increased pain, or fail to improve with treatment.  AVS provided and reviewed with patient including supportive care, follow up, and red flag symptoms.   Patient verbalizes understanding and agrees with treatment plan. Discharged from Urgent Care in stable condition.

## 2022-10-01 NOTE — PROGRESS NOTES
"Subjective:       Patient ID: Irlanda Lopez is a 82 y.o. female.    Vitals:  vitals were not taken for this visit.     Chief Complaint: Eye Problem    Eye Problem   Both eyes are affected. This is a new problem. The current episode started yesterday. The problem occurs constantly. The problem has been gradually worsening. There was no injury mechanism. The pain is at a severity of 8/10. The pain is moderate. There is No known exposure to pink eye. Associated symptoms include eye redness, a foreign body sensation, itching and photophobia. Pertinent negatives include no blurred vision, eye discharge, double vision, fever, nausea, recent URI or vomiting. She has tried eye drops and water for the symptoms. The treatment provided no relief.     Constitution: Negative for fever.   Eyes:  Positive for eye itching, eye redness and photophobia. Negative for eye discharge, double vision and blurred vision.   Gastrointestinal:  Negative for nausea and vomiting.       CHIEF COMPLAINT/REASON FOR VISIT:  Red itchy eyes and rash on face    HISTORY OF PRESENT ILLNESS:  82-year-old female with care giver complains of Red itchy eyes and rash on face onset yesterday.  Admits similar episode occurred in the past/ months. Care giver admits tried over-the-counter Benadryl & eye drops with little relief.  Denies seeking emergency room treatment. Patient denies chest pain, shortness of breath, congestion, cough, dizziness, blurred vision, nausea, vomiting, diarrhea, " aching all over", fatigue, loss of appetite & fever.      Past Medical History:   Diagnosis Date    Clostridium difficile colitis     Dementia     Diverticulitis     s/p colostomy    High cholesterol     Hypertension     Hypothyroidism           Social History     Socioeconomic History    Marital status:    Occupational History     Comment: [employer]   Tobacco Use    Smoking status: Former     Years: 5.00     Types: Cigarettes    Smokeless tobacco: Never "   Substance and Sexual Activity    Alcohol use: No    Drug use: No    Sexual activity: Never     Comment:           Family History   Problem Relation Age of Onset    Alzheimer's disease Mother     Aneurysm Father         brain    Stomach cancer Brother         50s       ROS:  GENERAL: No fever, chills, fatigability or weight loss.  SKIN:  Multiple red itchy rash, itchy red eyes  HEENT: No headaches or recent head trauma.  Denies ear pain, discharge or vertigo. No loss of smell, no epistaxis or postnasal drip. No hoarseness or change in voice.   NODES: No masses or lesions. Denies swollen glands.  CHEST: Denies cyanosis, wheezing, cough and sputum production.  CARDIOVASCULAR: Denies chest pain, PND, orthopnea or reduced exercise tolerance.  ABDOMEN: Appetite fine. No weight loss. Denies diarrhea, abdominal pain.  MUSCULOSKELETAL: No joint stiffness or swelling. Denies back pain.  NEUROLOGIC: No history of seizures, paralysis, alteration of gait or coordination.  PSYCHIATRIC: Denies mood swings, depression or suicidal thoughts.    PE:   APPEARANCE: Well nourished, well developed, in moderate distress.  Temp 97.6°, pulse ox 95%  V/S: Reviewed.  SKIN:  Bilateral sclera injected, upper and lower eyelids with red macular/ papular lesions, bilateral cheeks face, chest with red macular skin lesions with excoriation      HEENT:  turbinates pink, no oral soft tissue swelling, pink pharynx, TM's clear bilateral.  CHEST: Lungs clear to auscultation.  No wheezing  CARDIOVASCULAR: Regular rate and rhythm   MUSCULOSKELETAL: Motor: 5/5 strength major flexors/extensors.  NEUROLOGIC: No sensory deficits. Gait & Posture: Normal gait and fine motion. No cerebellar signs.  MENTAL STATUS: Patient alert, oriented x 3 & conversant.    PLAN:   Consult allergist  Consult Ophthalmology  Advise increase p.o. fluids--water/juice & rest  Meds: Atarax, prednisone / no refills  Advise no scented cologne or perfume  Advise use of unscented  soaps, lotions.  Cool mist humidifier/vaporizer.  Practice good handwashing.  Advise follow up with PCP in 2- 3 days for recheck  Advise go to ER if nausea, vomiting, fever, increased pain, or fail to improve with treatment.  AVS provided and reviewed with patient including supportive care, follow up, and red flag symptoms.   Patient verbalizes understanding and agrees with treatment plan. Discharged from Urgent Care in stable condition.    DIAGNOSIS:  Rash  Pruritus  Dermatitis  Redness in both eyes  Essential hypertension

## 2022-10-05 ENCOUNTER — PATIENT MESSAGE (OUTPATIENT)
Dept: UROLOGY | Facility: CLINIC | Age: 82
End: 2022-10-05
Payer: MEDICARE

## 2022-10-06 ENCOUNTER — TELEPHONE (OUTPATIENT)
Dept: ADMINISTRATIVE | Facility: HOSPITAL | Age: 82
End: 2022-10-06
Payer: MEDICARE

## 2022-10-10 ENCOUNTER — PATIENT MESSAGE (OUTPATIENT)
Dept: UROLOGY | Facility: CLINIC | Age: 82
End: 2022-10-10
Payer: MEDICARE

## 2022-10-12 ENCOUNTER — OFFICE VISIT (OUTPATIENT)
Dept: UROLOGY | Facility: CLINIC | Age: 82
End: 2022-10-12
Payer: MEDICARE

## 2022-10-12 VITALS
BODY MASS INDEX: 28.67 KG/M2 | HEIGHT: 60 IN | HEART RATE: 57 BPM | DIASTOLIC BLOOD PRESSURE: 75 MMHG | RESPIRATION RATE: 18 BRPM | WEIGHT: 146.06 LBS | SYSTOLIC BLOOD PRESSURE: 122 MMHG

## 2022-10-12 DIAGNOSIS — N20.0 RENAL STONE: ICD-10-CM

## 2022-10-12 DIAGNOSIS — Z01.818 PRE-OP TESTING: ICD-10-CM

## 2022-10-12 DIAGNOSIS — R30.0 DYSURIA: Primary | ICD-10-CM

## 2022-10-12 PROCEDURE — 1101F PR PT FALLS ASSESS DOC 0-1 FALLS W/OUT INJ PAST YR: ICD-10-PCS | Mod: CPTII,S$GLB,, | Performed by: NURSE PRACTITIONER

## 2022-10-12 PROCEDURE — 1125F PR PAIN SEVERITY QUANTIFIED, PAIN PRESENT: ICD-10-PCS | Mod: CPTII,S$GLB,, | Performed by: NURSE PRACTITIONER

## 2022-10-12 PROCEDURE — 87077 CULTURE AEROBIC IDENTIFY: CPT | Mod: 59 | Performed by: NURSE PRACTITIONER

## 2022-10-12 PROCEDURE — 3288F FALL RISK ASSESSMENT DOCD: CPT | Mod: CPTII,S$GLB,, | Performed by: NURSE PRACTITIONER

## 2022-10-12 PROCEDURE — 87088 URINE BACTERIA CULTURE: CPT | Performed by: NURSE PRACTITIONER

## 2022-10-12 PROCEDURE — 1125F AMNT PAIN NOTED PAIN PRSNT: CPT | Mod: CPTII,S$GLB,, | Performed by: NURSE PRACTITIONER

## 2022-10-12 PROCEDURE — 3074F SYST BP LT 130 MM HG: CPT | Mod: CPTII,S$GLB,, | Performed by: NURSE PRACTITIONER

## 2022-10-12 PROCEDURE — 99214 PR OFFICE/OUTPT VISIT, EST, LEVL IV, 30-39 MIN: ICD-10-PCS | Mod: S$GLB,,, | Performed by: NURSE PRACTITIONER

## 2022-10-12 PROCEDURE — 3078F DIAST BP <80 MM HG: CPT | Mod: CPTII,S$GLB,, | Performed by: NURSE PRACTITIONER

## 2022-10-12 PROCEDURE — 87086 URINE CULTURE/COLONY COUNT: CPT | Performed by: NURSE PRACTITIONER

## 2022-10-12 PROCEDURE — 99999 PR PBB SHADOW E&M-EST. PATIENT-LVL V: CPT | Mod: PBBFAC,,, | Performed by: NURSE PRACTITIONER

## 2022-10-12 PROCEDURE — 99999 PR PBB SHADOW E&M-EST. PATIENT-LVL V: ICD-10-PCS | Mod: PBBFAC,,, | Performed by: NURSE PRACTITIONER

## 2022-10-12 PROCEDURE — 3074F PR MOST RECENT SYSTOLIC BLOOD PRESSURE < 130 MM HG: ICD-10-PCS | Mod: CPTII,S$GLB,, | Performed by: NURSE PRACTITIONER

## 2022-10-12 PROCEDURE — 1101F PT FALLS ASSESS-DOCD LE1/YR: CPT | Mod: CPTII,S$GLB,, | Performed by: NURSE PRACTITIONER

## 2022-10-12 PROCEDURE — 3288F PR FALLS RISK ASSESSMENT DOCUMENTED: ICD-10-PCS | Mod: CPTII,S$GLB,, | Performed by: NURSE PRACTITIONER

## 2022-10-12 PROCEDURE — 99214 OFFICE O/P EST MOD 30 MIN: CPT | Mod: S$GLB,,, | Performed by: NURSE PRACTITIONER

## 2022-10-12 PROCEDURE — 3078F PR MOST RECENT DIASTOLIC BLOOD PRESSURE < 80 MM HG: ICD-10-PCS | Mod: CPTII,S$GLB,, | Performed by: NURSE PRACTITIONER

## 2022-10-12 PROCEDURE — 87186 SC STD MICRODIL/AGAR DIL: CPT | Mod: 59 | Performed by: NURSE PRACTITIONER

## 2022-10-12 RX ORDER — SOLIFENACIN SUCCINATE 5 MG/1
5 TABLET, FILM COATED ORAL DAILY
Qty: 90 TABLET | Refills: 3 | Status: ON HOLD | OUTPATIENT
Start: 2022-10-12 | End: 2023-01-10 | Stop reason: SDUPTHER

## 2022-10-12 NOTE — PROGRESS NOTES
Chief Complaint:   Stent pain    HPI:   Patient is presenting with her daughter with complaints of burning and pressure with urination.  Patient has been followed by this clinic for large renal stone that required a stent.  He patient had stent placed mid-August.  Uses over-the-counter AZO.  Recent urine culture was negative.  Urine in clinic indicates leukocytes and blood.  Juju  9/10/22- we had been monitoring her kidney stone under surveillance.  As patient has multiple medical comorbidities, unfortunately the stone became obstructed and a stent was required.  The patient comes in today to discuss management, no evidence of pain from the stent or signs of infection.  7/18/20- 79-year-old female who presents with severe abdominal pain and discomfort, she is being evaluated for colonic impaction diverticulitis.  She is seen to have an extremely large right renal pelvis stone.  This had been seen prior, and she was followed by partner in regards to possible surgery for this.  Patient though it is determined not pursuing not been seen in the last couple of years.  Patient is having upper abdominal pain, no colic at this point.  Patient has had no previous urological history per her and her son's report, her daughter is her primary care take care, she is currently not present at the bedside.  Family history of stones, no urological cancers per the family.  10/17/18: Mag3 shows 72/38 L/R function. -UCx last visit.  PCNL is the only viable treatment option; observation not recommended.  10/8/18: 77 yo woman referred for renal stone after workup for UTI/diverticulitis.  Having diarrhea on meds for diverticulitis.  No abd/pelvic pain and no exac/rel factors.  No hematuria.  No prior urolithiasis.  No urinary bother.  No  history.  Normal sexual function but inactive.  Some memory problems but functional at home.  Allergies:  Patient has no known allergies.    Medications:  has a current medication list which  includes the following prescription(s): atorvastatin, fluoxetine, memantine, pantoprazole, thyroid (pork), alprazolam, loteprednol, methen-m.blue-s.phos-phsal-hyo, solifenacin, [DISCONTINUED] furosemide, and [DISCONTINUED] triamcinolone acetonide 0.1%.    Review of Systems:  General: No fever, chills, fatigability, or weight loss.  Skin: No rashes, itching, or changes in color or texture of skin.  Chest: Denies JESUS, cyanosis, wheezing, cough, and sputum production.  Abdomen: See HPI  Musculoskeletal: No joint stiffness or swelling. Denies back pain.  : As above.  All other review of systems negative.    PMH:   has a past medical history of Clostridium difficile colitis, Dementia, Diverticulitis, High cholesterol, Hypertension, and Hypothyroidism.    PSH:   has a past surgical history that includes Cataract extraction; Appendectomy (2008); Colonoscopy (N/A, 1/2/2019); Colostomy (Left, 1/2/2019); Maribel procedure (N/A, 1/2/2019); Lysis of adhesions (N/A, 1/2/2019); Colon surgery; Vitrectomy (Right, 09/2013); Colonoscopy (N/A, 6/7/2019); Esophagogastroduodenoscopy (N/A, 9/16/2020); Colonoscopy (N/A, 6/23/2021); and Cystoscopy with ureteroscopy, retrograde pyelography, and insertion of stent (Right, 8/23/2022).    FamHx: family history includes Alzheimer's disease in her mother; Aneurysm in her father; Stomach cancer in her brother.    SocHx:  reports that she has quit smoking. She has never used smokeless tobacco. She reports that she does not drink alcohol and does not use drugs.      Physical Exam:  Vitals:    10/12/22 1501   BP: 122/75   Pulse: (!) 57   Resp: 18     General: A&Ox3, no apparent distress, no deformities  Neck: No masses, normal thyroid  Lungs: normal inspiration, no use of accessory muscles  Heart: normal pulse, no arrhythmias  Abdomen: Soft, NT, ND, no masses, no hernias, no hepatosplenomegaly    Labs/Studies:   See HPI    Impression/Plan:   Dr. Matute saw patient and discussed plan of care  with daughter and patient.  MD recommendations:  1. VESIcare 5 mg prescription sent to local pharmacy  2. Uribel p.r.n. for bladder spasms  3. Patient scheduled for cystoscopy with stent placement.  4. Urine sent for repeat culture

## 2022-10-13 ENCOUNTER — PATIENT MESSAGE (OUTPATIENT)
Dept: OPHTHALMOLOGY | Facility: CLINIC | Age: 82
End: 2022-10-13

## 2022-10-13 ENCOUNTER — TELEPHONE (OUTPATIENT)
Dept: UROLOGY | Facility: CLINIC | Age: 82
End: 2022-10-13
Payer: MEDICARE

## 2022-10-13 ENCOUNTER — HOSPITAL ENCOUNTER (OUTPATIENT)
Dept: RADIOLOGY | Facility: HOSPITAL | Age: 82
Discharge: HOME | End: 2022-10-13
Attending: NURSE PRACTITIONER
Payer: MEDICARE

## 2022-10-13 ENCOUNTER — OFFICE VISIT (OUTPATIENT)
Dept: OPHTHALMOLOGY | Facility: CLINIC | Age: 82
End: 2022-10-13
Payer: MEDICARE

## 2022-10-13 DIAGNOSIS — H10.13 ALLERGIC CONJUNCTIVITIS OF BOTH EYES: Primary | ICD-10-CM

## 2022-10-13 DIAGNOSIS — Z01.818 PRE-OP TESTING: ICD-10-CM

## 2022-10-13 PROCEDURE — 1160F RVW MEDS BY RX/DR IN RCRD: CPT | Mod: CPTII,S$GLB,, | Performed by: OPTOMETRIST

## 2022-10-13 PROCEDURE — 71046 X-RAY EXAM CHEST 2 VIEWS: CPT | Mod: TC,FY,PO

## 2022-10-13 PROCEDURE — 99203 OFFICE O/P NEW LOW 30 MIN: CPT | Mod: S$GLB,,, | Performed by: OPTOMETRIST

## 2022-10-13 PROCEDURE — 99999 PR PBB SHADOW E&M-EST. PATIENT-LVL III: ICD-10-PCS | Mod: PBBFAC,,, | Performed by: OPTOMETRIST

## 2022-10-13 PROCEDURE — 1159F PR MEDICATION LIST DOCUMENTED IN MEDICAL RECORD: ICD-10-PCS | Mod: CPTII,S$GLB,, | Performed by: OPTOMETRIST

## 2022-10-13 PROCEDURE — 1159F MED LIST DOCD IN RCRD: CPT | Mod: CPTII,S$GLB,, | Performed by: OPTOMETRIST

## 2022-10-13 PROCEDURE — 71046 X-RAY EXAM CHEST 2 VIEWS: CPT | Mod: 26,,, | Performed by: RADIOLOGY

## 2022-10-13 PROCEDURE — 99203 PR OFFICE/OUTPT VISIT, NEW, LEVL III, 30-44 MIN: ICD-10-PCS | Mod: S$GLB,,, | Performed by: OPTOMETRIST

## 2022-10-13 PROCEDURE — 71046 XR CHEST PA AND LATERAL: ICD-10-PCS | Mod: 26,,, | Performed by: RADIOLOGY

## 2022-10-13 PROCEDURE — 99999 PR PBB SHADOW E&M-EST. PATIENT-LVL III: CPT | Mod: PBBFAC,,, | Performed by: OPTOMETRIST

## 2022-10-13 PROCEDURE — 1160F PR REVIEW ALL MEDS BY PRESCRIBER/CLIN PHARMACIST DOCUMENTED: ICD-10-PCS | Mod: CPTII,S$GLB,, | Performed by: OPTOMETRIST

## 2022-10-13 RX ORDER — LOTEPREDNOL ETABONATE 5 MG/ML
1 SUSPENSION/ DROPS OPHTHALMIC 4 TIMES DAILY
Qty: 5 ML | Refills: 0 | Status: SHIPPED | OUTPATIENT
Start: 2022-10-13 | End: 2022-10-19 | Stop reason: ALTCHOICE

## 2022-10-13 NOTE — PROGRESS NOTES
HPI     Itchy Eye            Comments: Pt has been using eyewash and eyedrops for 1 month  Pt has been taking claritin for 3 days per PCP              Comments    Pt new to [facility]  Laterality: yes  Pain Scale:  8-9 because of itching  Onset:   1 month  Discharge:   no  A.M. Matting:  no  Itch:   yes all day  Redness:   yes  Photophobia:   extremely  Foreign body sensation:   yes in the OD  Deep pain:   no  Previous occurrence:   yes earlier this year and in august  Drops:   yes OTC  Contact lens wear? no             Last edited by Lorna Richmond on 10/13/2022  8:43 AM.            Assessment /Plan     For exam results, see Encounter Report.    Allergic conjunctivitis of both eyes  -     loteprednol (LOTEMAX) 0.5 % ophthalmic suspension; Place 1 drop into both eyes 4 (four) times daily. for 7 days  Dispense: 5 mL; Refill: 0    Start lotemax qid x 7 days.   Cool pfat 4 times daily  Continue pataday once daily    RTC 6 days for f/u with annual exam if improvement in symptoms. Sooner if any changes to vision or worsening symptoms.

## 2022-10-13 NOTE — TELEPHONE ENCOUNTER
----- Message from Anselmo Matute MD sent at 10/12/2022  3:59 PM CDT -----  Right stent 11/8 escobar

## 2022-10-13 NOTE — PATIENT INSTRUCTIONS
Instill Lotemax 4 times daily to BOTH eyes  Instill pataday 1 time daily to BOTH eye   Instill cold over the counter refresh drops as needed to BOTH eyes

## 2022-10-14 ENCOUNTER — PATIENT MESSAGE (OUTPATIENT)
Dept: UROLOGY | Facility: CLINIC | Age: 82
End: 2022-10-14
Payer: MEDICARE

## 2022-10-16 LAB — BACTERIA UR CULT: ABNORMAL

## 2022-10-16 RX ORDER — NITROFURANTOIN 25; 75 MG/1; MG/1
100 CAPSULE ORAL 2 TIMES DAILY
Qty: 20 CAPSULE | Refills: 0 | Status: ON HOLD | OUTPATIENT
Start: 2022-10-16 | End: 2023-01-10 | Stop reason: HOSPADM

## 2022-10-17 ENCOUNTER — TELEPHONE (OUTPATIENT)
Dept: ADMINISTRATIVE | Facility: HOSPITAL | Age: 82
End: 2022-10-17
Payer: MEDICARE

## 2022-10-19 ENCOUNTER — OFFICE VISIT (OUTPATIENT)
Dept: OPHTHALMOLOGY | Facility: CLINIC | Age: 82
End: 2022-10-19
Payer: MEDICARE

## 2022-10-19 DIAGNOSIS — H01.011 ULCERATIVE BLEPHARITIS OF UPPER EYELIDS OF BOTH EYES: Primary | ICD-10-CM

## 2022-10-19 DIAGNOSIS — H52.4 MYOPIA WITH ASTIGMATISM AND PRESBYOPIA, BILATERAL: ICD-10-CM

## 2022-10-19 DIAGNOSIS — H10.13 ALLERGIC CONJUNCTIVITIS OF BOTH EYES: ICD-10-CM

## 2022-10-19 DIAGNOSIS — H01.014 ULCERATIVE BLEPHARITIS OF UPPER EYELIDS OF BOTH EYES: Primary | ICD-10-CM

## 2022-10-19 DIAGNOSIS — Z96.1 PSEUDOPHAKIA OF BOTH EYES: ICD-10-CM

## 2022-10-19 DIAGNOSIS — H52.13 MYOPIA WITH ASTIGMATISM AND PRESBYOPIA, BILATERAL: ICD-10-CM

## 2022-10-19 DIAGNOSIS — H52.203 MYOPIA WITH ASTIGMATISM AND PRESBYOPIA, BILATERAL: ICD-10-CM

## 2022-10-19 PROCEDURE — 99213 PR OFFICE/OUTPT VISIT, EST, LEVL III, 20-29 MIN: ICD-10-PCS | Mod: S$GLB,,, | Performed by: OPTOMETRIST

## 2022-10-19 PROCEDURE — 99999 PR PBB SHADOW E&M-EST. PATIENT-LVL III: ICD-10-PCS | Mod: PBBFAC,,, | Performed by: OPTOMETRIST

## 2022-10-19 PROCEDURE — 1159F PR MEDICATION LIST DOCUMENTED IN MEDICAL RECORD: ICD-10-PCS | Mod: CPTII,S$GLB,, | Performed by: OPTOMETRIST

## 2022-10-19 PROCEDURE — 1159F MED LIST DOCD IN RCRD: CPT | Mod: CPTII,S$GLB,, | Performed by: OPTOMETRIST

## 2022-10-19 PROCEDURE — 1160F RVW MEDS BY RX/DR IN RCRD: CPT | Mod: CPTII,S$GLB,, | Performed by: OPTOMETRIST

## 2022-10-19 PROCEDURE — 92015 DETERMINE REFRACTIVE STATE: CPT | Mod: S$GLB,,, | Performed by: OPTOMETRIST

## 2022-10-19 PROCEDURE — 99999 PR PBB SHADOW E&M-EST. PATIENT-LVL III: CPT | Mod: PBBFAC,,, | Performed by: OPTOMETRIST

## 2022-10-19 PROCEDURE — 99213 OFFICE O/P EST LOW 20 MIN: CPT | Mod: S$GLB,,, | Performed by: OPTOMETRIST

## 2022-10-19 PROCEDURE — 1160F PR REVIEW ALL MEDS BY PRESCRIBER/CLIN PHARMACIST DOCUMENTED: ICD-10-PCS | Mod: CPTII,S$GLB,, | Performed by: OPTOMETRIST

## 2022-10-19 PROCEDURE — 92015 PR REFRACTION: ICD-10-PCS | Mod: S$GLB,,, | Performed by: OPTOMETRIST

## 2022-10-19 RX ORDER — NEOMYCIN SULFATE, POLYMYXIN B SULFATE, AND DEXAMETHASONE 3.5; 10000; 1 MG/G; [USP'U]/G; MG/G
OINTMENT OPHTHALMIC
Qty: 3.5 G | Refills: 0 | Status: CANCELLED | OUTPATIENT
Start: 2022-10-19

## 2022-10-19 RX ORDER — ERYTHROMYCIN 5 MG/G
OINTMENT OPHTHALMIC
Qty: 3.5 G | Refills: 0 | Status: SHIPPED | OUTPATIENT
Start: 2022-10-19 | End: 2023-04-20

## 2022-10-19 RX ORDER — PREDNISOLONE ACETATE 10 MG/ML
SUSPENSION/ DROPS OPHTHALMIC
Qty: 5 ML | Refills: 0 | Status: SHIPPED | OUTPATIENT
Start: 2022-10-19 | End: 2023-03-20

## 2022-10-19 NOTE — PROGRESS NOTES
HPI     Follow-up            Comments: Pt states that yesterday was better, but today it is itchy and   irritated          Comments    Laterality: yes  Pain Scale:  5-6 because of itching  Onset:   1 month  Discharge:   no  A.M. Matting:  no  Itch:   yes all day  Redness:   yes  Photophobia:   extremely  Foreign body sensation:   no its just irritated  Deep pain:   no  Previous occurrence:   yes earlier this year and in august  Drops:   yes lotemax 4x's a day  Contact lens wear? no             Last edited by Shashank Manning, OD on 10/19/2022 11:11 AM.            Assessment /Plan     For exam results, see Encounter Report.    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  Dispense: 3.5 g; Refill: 0  Start erythromycin nikky bid to lids and lashes. Lid hygiene reviewed.   Allergic conjunctivitis of both eyes  -     prednisoLONE acetate (PRED FORTE) 1 % DrpS; Every 2 hours while awake for 2 days and 4 times daily for 5 days in both eyes.  Dispense: 5 mL; Refill: 0  Improving in symptoms with lotemax but persistent itching and inflammation, noted on exam today. Will switch from lotemax to Pred forte x 7 days.   Pseudophakia of both eyes  Mrx given prn.     RTC in 1 week with Dr JIN for f/u and lid recheck, sooner if any changes to vision or worsening symptoms.

## 2022-10-19 NOTE — PATIENT INSTRUCTIONS
Instill Pred Forte every 2 hours while awake for 2 days then 4 times daily for 5 days.   Apply erythromycin ointment to lids and lashes 2 times daily for 7 days.

## 2022-10-24 ENCOUNTER — TELEPHONE (OUTPATIENT)
Dept: PREADMISSION TESTING | Facility: HOSPITAL | Age: 82
End: 2022-10-24
Payer: MEDICARE

## 2022-10-24 RX ORDER — POTASSIUM CHLORIDE 750 MG/1
CAPSULE, EXTENDED RELEASE ORAL DAILY
COMMUNITY

## 2022-10-24 NOTE — TELEPHONE ENCOUNTER
Pre op instructions reviewed with pt daughter per phone: Spoke about pre op process and surgery instructions, verbalized understanding.    To confirm, Surgery is scheduled on 11/8/22. We will call you the business day (after 2:30 pm) prior to surgery to confirm arrival time as it is subject to change due to cancellations/ emergencies.    Please report to the [facility] (1st Floor) at [facility] located off of [facility] (2nd building on the left, in front of the [facility] pole).  Address: [address] [place] [address] [place] [address]        INSTRUCTIONS IMPORTANT!!!  Do Not Eat, Drink, or Smoke after 12 midnight unless instructed otherwise by your Surgeon. OK to brush teeth, no gum, candy or mints!      *Take Only these medications with a small sip of water Morning of Surgery:  [place]  Xanax as needed  Atorvastatin  Fluoxetine  Namenda  Pantoprazole      ____  Stop all Aspirin products, Ibuprofen, Advil, Motrin & Aleve at least 7 days prior to  surgery, unless otherwise instructed by your Physician office (May use Tylenol).  ____  Stop taking any Fish Oil/ Vitamins /Supplements at least 7 days prior to surgery, unless instructed otherwise by your Physician office.  ____  NO Acrylic/fake nails or nail polish worn day of surgery (specifically hand/arm & foot surgeries).  ____  NO powder, lotions, deodorants, oils or cream on body.  ____  Remove jewelry & piercings prior to surgery.  ____  Dentures, Hearing Aids & Contact Lens will need to be removed prior to the       start of surgery.  ____  Bring photo ID and insurance information to hospital (Leave Valuables at Home).  ____  If going home the same day, arrange for a ride home. You will not be able to drive 24 hrs if Anesthesia was used.   ____  Females (ages 11-60) may need to give a urine sample the morning of surgery; please see Pre op Nurse prior to using the restroom.  ____  Wear clean, loose fitting clothing to allow for dressings/ bandages.            Diabetic  Patients: If you take diabetic medication, do NOT take morning of surgery unless instructed by Doctor. Metformin to be stopped 24 hrs prior to surgery time. DO NOT take long-acting insulin the evening before surgery. Blood sugars will be checked in pre-op by Nurse.    Bathing Instructions:    -Do not shave your face or body the day before or the day of surgery unless instructed otherwise by your Surgeon.  -Do not shave pubic hair 7 days prior to surgery (gyn pt's).   -Shower & Rinse your body as usual with anti-bacterial Soap (Dial, Lever 2000,  or Hibiclens)   -Do not use Hibiclens on your face (Females should avoid genital area as well).   -Do not wash with anti-bacterial soap after you use the Hibiclens.   -Rinse & dry your body thoroughly. Apply clean clothes after shower.    [facility] Visitor/Ride Policy:  Only 2 adults allowed (over the age of 18) to accompany you to the Hospital. You Must have a ride home from a responsible adult that you know and trust. Medical Transport, Uber or Lyft can only be used if patient has a responsible adult to accompany them during ride home.    Discharge Instructions: You will receive Post-op/Discharge instructions by your Discharge Nurse prior to going home. Please call your Surgeon's office with any post-surgery questions/concerns @ [phone].    *If you are running late or have questions the morning of surgery, please call the Surgery Dept @ [phone]  *Insurance/ Financial Questions, please call [phone]    Thank you,  -[facility] Pre Admit Testing Nurse  [phone] or [phone]  M-F 7:30 am-4 pm

## 2022-10-26 ENCOUNTER — OFFICE VISIT (OUTPATIENT)
Dept: OPHTHALMOLOGY | Facility: CLINIC | Age: 82
End: 2022-10-26
Payer: MEDICARE

## 2022-10-26 DIAGNOSIS — H01.011 ULCERATIVE BLEPHARITIS OF UPPER EYELIDS OF BOTH EYES: Primary | ICD-10-CM

## 2022-10-26 DIAGNOSIS — H01.014 ULCERATIVE BLEPHARITIS OF UPPER EYELIDS OF BOTH EYES: Primary | ICD-10-CM

## 2022-10-26 DIAGNOSIS — H10.13 ALLERGIC CONJUNCTIVITIS OF BOTH EYES: ICD-10-CM

## 2022-10-26 PROCEDURE — 1160F RVW MEDS BY RX/DR IN RCRD: CPT | Mod: CPTII,S$GLB,, | Performed by: OPTOMETRIST

## 2022-10-26 PROCEDURE — 92012 INTRM OPH EXAM EST PATIENT: CPT | Mod: S$GLB,,, | Performed by: OPTOMETRIST

## 2022-10-26 PROCEDURE — 1159F MED LIST DOCD IN RCRD: CPT | Mod: CPTII,S$GLB,, | Performed by: OPTOMETRIST

## 2022-10-26 PROCEDURE — 99999 PR PBB SHADOW E&M-EST. PATIENT-LVL III: ICD-10-PCS | Mod: PBBFAC,,, | Performed by: OPTOMETRIST

## 2022-10-26 PROCEDURE — 92012 PR EYE EXAM, EST PATIENT,INTERMED: ICD-10-PCS | Mod: S$GLB,,, | Performed by: OPTOMETRIST

## 2022-10-26 PROCEDURE — 1160F PR REVIEW ALL MEDS BY PRESCRIBER/CLIN PHARMACIST DOCUMENTED: ICD-10-PCS | Mod: CPTII,S$GLB,, | Performed by: OPTOMETRIST

## 2022-10-26 PROCEDURE — 99999 PR PBB SHADOW E&M-EST. PATIENT-LVL III: CPT | Mod: PBBFAC,,, | Performed by: OPTOMETRIST

## 2022-10-26 PROCEDURE — 1159F PR MEDICATION LIST DOCUMENTED IN MEDICAL RECORD: ICD-10-PCS | Mod: CPTII,S$GLB,, | Performed by: OPTOMETRIST

## 2022-10-26 NOTE — PROGRESS NOTES
HPI    NP to [facility]  Patient here today for red eye follow up   Prescribed Erythromycin ointment BID OU and Pred Forte QID OU  Patient states eyes are much better  No pain or discomfort  Last edited by Lanette Sousa, PCT on 10/26/2022 10:37 AM.            Assessment /Plan     For exam results, see Encounter Report.    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

## 2022-10-27 ENCOUNTER — TELEPHONE (OUTPATIENT)
Dept: ADMINISTRATIVE | Facility: HOSPITAL | Age: 82
End: 2022-10-27
Payer: MEDICARE

## 2022-11-07 ENCOUNTER — PATIENT MESSAGE (OUTPATIENT)
Dept: PREADMISSION TESTING | Facility: HOSPITAL | Age: 82
End: 2022-11-07
Payer: MEDICARE

## 2022-11-07 ENCOUNTER — TELEPHONE (OUTPATIENT)
Dept: PREADMISSION TESTING | Facility: HOSPITAL | Age: 82
End: 2022-11-07
Payer: MEDICARE

## 2022-11-07 NOTE — TELEPHONE ENCOUNTER
Called and LVM for pt about the following:     Please arrive to [facility] ([facility] main lobby at 0630 am on 11/8/22 for your scheduled procedure. NOTHING to eat or drink after midnight unless instructed otherwise by your Surgeon. Take only the medications instructed by your Pre Admit Nurse with small sip of water.    Thank you,  -[facility] Pre Admit Testing Dept.  Mon-Fri 8 am - 4 pm [phone]

## 2022-11-08 ENCOUNTER — TELEPHONE (OUTPATIENT)
Dept: UROLOGY | Facility: CLINIC | Age: 82
End: 2022-11-08
Payer: MEDICARE

## 2022-11-08 ENCOUNTER — HOSPITAL ENCOUNTER (OUTPATIENT)
Facility: HOSPITAL | Age: 82
Discharge: HOME | End: 2022-11-08
Attending: UROLOGY | Admitting: UROLOGY
Payer: MEDICARE

## 2022-11-08 ENCOUNTER — ANESTHESIA (OUTPATIENT)
Dept: SURGERY | Facility: HOSPITAL | Age: 82
End: 2022-11-08
Payer: MEDICARE

## 2022-11-08 ENCOUNTER — ANESTHESIA EVENT (OUTPATIENT)
Dept: SURGERY | Facility: HOSPITAL | Age: 82
End: 2022-11-08
Payer: MEDICARE

## 2022-11-08 DIAGNOSIS — N20.0 RENAL STONE: Primary | ICD-10-CM

## 2022-11-08 DIAGNOSIS — R30.0 DYSURIA: ICD-10-CM

## 2022-11-08 PROCEDURE — 52332 CYSTOSCOPY AND TREATMENT: CPT | Mod: RT,,, | Performed by: UROLOGY

## 2022-11-08 PROCEDURE — C2617 STENT, NON-COR, TEM W/O DEL: HCPCS | Performed by: UROLOGY

## 2022-11-08 PROCEDURE — 71000015 HC POSTOP RECOV 1ST HR: Performed by: UROLOGY

## 2022-11-08 PROCEDURE — 74420 UROGRAPHY RTRGR +-KUB: CPT | Mod: 26,,, | Performed by: UROLOGY

## 2022-11-08 PROCEDURE — 63600175 PHARM REV CODE 636 W HCPCS: Performed by: UROLOGY

## 2022-11-08 PROCEDURE — 74420 PR  X-RAY RETROGRADE PYELOGRAM: ICD-10-PCS | Mod: 26,,, | Performed by: UROLOGY

## 2022-11-08 PROCEDURE — 25500020 PHARM REV CODE 255: Performed by: UROLOGY

## 2022-11-08 PROCEDURE — 36000706: Performed by: UROLOGY

## 2022-11-08 PROCEDURE — 25000003 PHARM REV CODE 250: Performed by: NURSE ANESTHETIST, CERTIFIED REGISTERED

## 2022-11-08 PROCEDURE — 36000707: Performed by: UROLOGY

## 2022-11-08 PROCEDURE — 37000008 HC ANESTHESIA 1ST 15 MINUTES: Performed by: UROLOGY

## 2022-11-08 PROCEDURE — 71000033 HC RECOVERY, INTIAL HOUR: Performed by: UROLOGY

## 2022-11-08 PROCEDURE — C1758 CATHETER, URETERAL: HCPCS | Performed by: UROLOGY

## 2022-11-08 PROCEDURE — 37000009 HC ANESTHESIA EA ADD 15 MINS: Performed by: UROLOGY

## 2022-11-08 PROCEDURE — 52332 PR CYSTOSCOPY,INSERT URETERAL STENT: ICD-10-PCS | Mod: RT,,, | Performed by: UROLOGY

## 2022-11-08 PROCEDURE — 63600175 PHARM REV CODE 636 W HCPCS: Performed by: NURSE ANESTHETIST, CERTIFIED REGISTERED

## 2022-11-08 PROCEDURE — C1769 GUIDE WIRE: HCPCS | Performed by: UROLOGY

## 2022-11-08 DEVICE — STENT URETERAL UNIV 8FR 26CM: Type: IMPLANTABLE DEVICE | Site: URETER | Status: FUNCTIONAL

## 2022-11-08 RX ORDER — HYDROMORPHONE HYDROCHLORIDE 2 MG/ML
0.2 INJECTION, SOLUTION INTRAMUSCULAR; INTRAVENOUS; SUBCUTANEOUS EVERY 5 MIN PRN
Status: DISCONTINUED | OUTPATIENT
Start: 2022-11-08 | End: 2022-11-08 | Stop reason: HOSPADM

## 2022-11-08 RX ORDER — PHENAZOPYRIDINE HYDROCHLORIDE 200 MG/1
200 TABLET, FILM COATED ORAL 3 TIMES DAILY PRN
Qty: 15 TABLET | Refills: 0 | Status: ON HOLD | OUTPATIENT
Start: 2022-11-08 | End: 2023-01-10 | Stop reason: HOSPADM

## 2022-11-08 RX ORDER — HYOSCYAMINE SULFATE 0.12 MG/1
0.25 TABLET SUBLINGUAL EVERY 4 HOURS PRN
Qty: 40 TABLET | Refills: 0 | Status: SHIPPED | OUTPATIENT
Start: 2022-11-08 | End: 2022-11-30 | Stop reason: SDUPTHER

## 2022-11-08 RX ORDER — SODIUM CHLORIDE, SODIUM LACTATE, POTASSIUM CHLORIDE, CALCIUM CHLORIDE 600; 310; 30; 20 MG/100ML; MG/100ML; MG/100ML; MG/100ML
INJECTION, SOLUTION INTRAVENOUS CONTINUOUS PRN
Status: DISCONTINUED | OUTPATIENT
Start: 2022-11-08 | End: 2022-11-08

## 2022-11-08 RX ORDER — CEFDINIR 300 MG/1
300 CAPSULE ORAL 2 TIMES DAILY
Qty: 20 CAPSULE | Refills: 0 | Status: SHIPPED | OUTPATIENT
Start: 2022-11-08 | End: 2022-11-18

## 2022-11-08 RX ORDER — CEFAZOLIN SODIUM 2 G/50ML
2 SOLUTION INTRAVENOUS
Status: COMPLETED | OUTPATIENT
Start: 2022-11-08 | End: 2022-11-08

## 2022-11-08 RX ORDER — EPHEDRINE SULFATE 50 MG/ML
INJECTION, SOLUTION INTRAVENOUS
Status: DISCONTINUED | OUTPATIENT
Start: 2022-11-08 | End: 2022-11-08

## 2022-11-08 RX ORDER — PROPOFOL 10 MG/ML
VIAL (ML) INTRAVENOUS CONTINUOUS PRN
Status: DISCONTINUED | OUTPATIENT
Start: 2022-11-08 | End: 2022-11-08

## 2022-11-08 RX ORDER — OXYCODONE AND ACETAMINOPHEN 5; 325 MG/1; MG/1
1 TABLET ORAL
Status: DISCONTINUED | OUTPATIENT
Start: 2022-11-08 | End: 2022-11-08 | Stop reason: HOSPADM

## 2022-11-08 RX ORDER — ONDANSETRON 2 MG/ML
INJECTION INTRAMUSCULAR; INTRAVENOUS
Status: DISCONTINUED | OUTPATIENT
Start: 2022-11-08 | End: 2022-11-08

## 2022-11-08 RX ORDER — ONDANSETRON 2 MG/ML
4 INJECTION INTRAMUSCULAR; INTRAVENOUS DAILY PRN
Status: DISCONTINUED | OUTPATIENT
Start: 2022-11-08 | End: 2022-11-08 | Stop reason: HOSPADM

## 2022-11-08 RX ORDER — FENTANYL CITRATE 50 UG/ML
INJECTION, SOLUTION INTRAMUSCULAR; INTRAVENOUS
Status: DISCONTINUED | OUTPATIENT
Start: 2022-11-08 | End: 2022-11-08

## 2022-11-08 RX ADMIN — ONDANSETRON 4 MG: 2 INJECTION, SOLUTION INTRAMUSCULAR; INTRAVENOUS at 08:11

## 2022-11-08 RX ADMIN — GLYCOPYRROLATE 0.2 MG: 0.2 INJECTION, SOLUTION INTRAMUSCULAR; INTRAVENOUS at 08:11

## 2022-11-08 RX ADMIN — EPHEDRINE SULFATE 10 MG: 50 INJECTION INTRAVENOUS at 08:11

## 2022-11-08 RX ADMIN — PROPOFOL 200 MCG/KG/MIN: 10 INJECTION, EMULSION INTRAVENOUS at 08:11

## 2022-11-08 RX ADMIN — CEFAZOLIN SODIUM 2 G: 2 SOLUTION INTRAVENOUS at 08:11

## 2022-11-08 RX ADMIN — FENTANYL CITRATE 25 MCG: 50 INJECTION, SOLUTION INTRAMUSCULAR; INTRAVENOUS at 08:11

## 2022-11-08 RX ADMIN — SODIUM CHLORIDE, SODIUM LACTATE, POTASSIUM CHLORIDE, AND CALCIUM CHLORIDE: .6; .31; .03; .02 INJECTION, SOLUTION INTRAVENOUS at 07:11

## 2022-11-08 NOTE — ANESTHESIA PREPROCEDURE EVALUATION
11/08/2022  Irlanda Lopez is a 82 y.o., female.    Patient Active Problem List   Diagnosis    Hypothyroidism    Essential hypertension    Mild cognitive impairment with memory loss    High serum vitamin D    Hyperlipidemia    Colostomy in place    Tortuous aorta    Intra-abdominal abscess    Abdominal distension    Nausea and vomiting    Screening for colon cancer    Colitis    Renal stone    Generalized abdominal pain    Colonic obstruction    UTI (urinary tract infection)    Dementia    Abnormal finding on GI tract imaging    MARTIN (generalized anxiety disorder)    Hydronephrosis with renal calculous obstruction    Acute pyelonephritis     Past Medical History:   Diagnosis Date    Clostridium difficile colitis     Dementia     Diverticulitis     s/p colostomy    High cholesterol     Hypertension     Hypothyroidism     Kidney stones      Past Surgical History:   Procedure Laterality Date    APPENDECTOMY  2008    CATARACT EXTRACTION      Dr Raygoza    COLON SURGERY      COLONOSCOPY N/A 01/02/2019    Procedure: COLONOSCOPY;  Surgeon: Reece Hogan MD;  Location: [facility] ENDO;  Service: Endoscopy;  Laterality: N/A;    COLONOSCOPY N/A 06/07/2019    Procedure: COLONOSCOPY;  Surgeon: Rishabh Connelly MD;  Location: [facility] ENDO;  Service: General;  Laterality: N/A;    COLONOSCOPY N/A 06/23/2021    Procedure: COLONOSCOPY;  Surgeon: Lucy Lafleur MD;  Location: [facility] ENDO;  Service: Endoscopy;  Laterality: N/A;    COLOSTOMY Left 01/02/2019    Procedure: CREATION, COLOSTOMY;  Surgeon: Reece Hogan MD;  Location: [facility] OR;  Service: General;  Laterality: Left;    CYSTOSCOPY WITH URETEROSCOPY, RETROGRADE PYELOGRAPHY, AND INSERTION OF STENT Right 08/23/2022    Procedure: CYSTOSCOPY, WITH RETROGRADE PYELOGRAM AND URETERAL STENT INSERTION;  Surgeon: Anselmo Matute MD;  Location: [facility]  OR;  Service: Urology;  Laterality: Right;    ESOPHAGOGASTRODUODENOSCOPY N/A 09/16/2020    Procedure: ESOPHAGOGASTRODUODENOSCOPY (EGD)- Needs Rapid;  Surgeon: Lucy Lafleur MD;  Location: [facility];  Service: Endoscopy;  Laterality: N/A;    SHAHIDA PROCEDURE N/A 01/02/2019    Procedure: SHAHIDA PROCEDURE;  Surgeon: Reece Hogan MD;  Location: [facility] OR;  Service: General;  Laterality: N/A;    LYSIS OF ADHESIONS N/A 01/02/2019    Procedure: LYSIS, ADHESIONS;  Surgeon: Reece Hogan MD;  Location: [facility] OR;  Service: General;  Laterality: N/A;    VITRECTOMY Right 09/2013         Pre-op Assessment    I have reviewed the Patient Summary Reports.    I have reviewed the NPO Status.   I have reviewed the Medications.     Review of Systems  Anesthesia Hx:  No problems with previous Anesthesia  History of prior surgery of interest to airway management or planning: Previous anesthesia: General Denies Family Hx of Anesthesia complications.   Denies Personal Hx of Anesthesia complications.   Social:  Former Smoker    Hematology/Oncology:     Oncology Normal    -- Anemia:   EENT/Dental:   Sun'aq   Cardiovascular:   Hypertension ECG has been reviewed. 2018 ECHO    1 - Biatrial enlargement.     2 - Concentric hypertrophy.     3 - Normal left ventricular systolic function (EF 60-65%).     4 - Normal left ventricular diastolic function.     5 - Right ventricular enlargement with normal systolic function.     6 - The estimated PA systolic pressure is 31 mmHg.     7 - Moderate tricuspid regurgitation.     Pulmonary:  Pulmonary Normal  Denies Recent URI.    Renal/:   Chronic Renal Disease renal calculi    Hepatic/GI:   S/p Shahida procedure for diverticulitis complications   Musculoskeletal:  Musculoskeletal Normal    Neurological:  Neurology Normal    Endocrine:   Hypothyroidism    Dermatological:  Skin Normal    Psych:   anxiety Dementia, mild         Physical Exam  General: Alert, Oriented and  Cooperative    Airway:  Mallampati: II   Mouth Opening: Normal  TM Distance: Normal  Tongue: Normal  Neck ROM: Normal ROM    Dental:    Chest/Lungs:  Clear to auscultation, Normal Respiratory Rate        Chemistry        Component Value Date/Time     10/13/2022 0921    K 3.8 10/13/2022 0921     10/13/2022 0921    CO2 29 10/13/2022 0921    BUN 15 10/13/2022 0921    CREATININE 0.9 10/13/2022 0921    GLU 81 10/13/2022 0921        Component Value Date/Time    CALCIUM 9.4 10/13/2022 0921    ALKPHOS 89 10/13/2022 0921    AST 20 10/13/2022 0921    ALT 14 10/13/2022 0921    BILITOT 1.1 (H) 10/13/2022 0921    ESTGFRAFRICA >60.0 06/09/2022 0854    EGFRNONAA >60.0 06/09/2022 0854        Lab Results   Component Value Date    WBC 8.46 10/13/2022    HGB 13.7 10/13/2022    HCT 44.3 10/13/2022    MCV 88 10/13/2022     10/13/2022           Anesthesia Plan  Type of Anesthesia, risks & benefits discussed:    Anesthesia Type: Gen Supraglottic Airway, MAC  Intra-op Monitoring Plan: Standard ASA Monitors  Post Op Pain Control Plan: multimodal analgesia and IV/PO Opioids PRN  Induction:  IV  Informed Consent: Informed consent signed with the Patient and all parties understand the risks and agree with anesthesia plan.  All questions answered.   ASA Score: 3  Day of Surgery Review of History & Physical: I have interviewed and examined the patient. I have reviewed the patient's H&P dated:     Ready For Surgery From Anesthesia Perspective.     .

## 2022-11-08 NOTE — OP NOTE
Date of Procedure:  11/08/2022    Pre-operative Diagnosis:   1.  Right renal stone    Post-operative Diagnosis:   1.  Right renal stone    Surgeon:  Anselmo Matute MD    Assistants: None    Specimen: None    Anesthesia:  MAC anesthesia    Blood loss:  Minimal    Findings:  Right 8 Congolese by 26 cm stent in good position, retrograde unremarkable except for large right renal stone.    Procedures:  1. Cysto with placement of right ureteral stent  2. Right retrograde pyelogram  3. Fluoro less than one hour    Procedure in Detail:  Patient was brought to the operative suite and placed under general anesthesia.  After being positioned in dorsal lithotomy position, patient was sterilely prepped and draped.  Twenty-one Congolese sheath cystoscope was placed into a normal urethra.  Bilateral ureteral orifices were normal in size, shape, caliber and location.  The remainder of the bladder was otherwise unremarkable, no evidence of intravesical lesions or other abnormalities.  Stent was grasped brought to the urethral meatus, a wire was inserted up the right ureter and passed beyond the stone, into the renal pelvis.  Over the wire using Seldinger technique we placed a Elba catheter and removed the wire.  Retrograde was done demonstrating dilatation, but otherwise no filling defects or other abnormalities, except for the large right renal stone.  The wire was reinserted and the Elba catheter was removed.  Again using Seldinger technique we placed a 8 Congolese by 26 cm double-J ureteral stent, with good coil in the renal pelvis and bladder on flouroscopic examination.  The bladder was drained with the cystoscope and the patient was then taken to the PACU in stable condition.  Patient will return in 5 months to schedule a 6 month appointment hopefully, unless she decides to pursue surgical management of the stone.    Complications: None.

## 2022-11-08 NOTE — TRANSFER OF CARE
Anesthesia Transfer of Care Note    Patient: Irlanda Lopez    Procedure(s) Performed: Procedure(s) (LRB):  CYSTOURETEROSCOPY, WITH RETROGRADE PYELOGRAM AND URETERAL STENT INSERTION (Right)    Patient location: PACU    Anesthesia Type: MAC    Transport from OR: Transported from OR on room air with adequate spontaneous ventilation    Post pain: adequate analgesia    Post assessment: no apparent anesthetic complications    Post vital signs: stable    Level of consciousness: awake    Nausea/Vomiting: no nausea/vomiting    Complications: none    Transfer of care protocol was followed      Last vitals:   Visit Vitals  BP (!) 151/78 (BP Location: Right arm, Patient Position: Sitting)   Pulse (!) 53   Temp 36.7 °C (98.1 °F) (Temporal)   Resp 16   Ht 5' (1.524 m)   Wt 67.1 kg (148 lb 0.6 oz)   SpO2 98%   Breastfeeding No   BMI 28.91 kg/m²

## 2022-11-08 NOTE — DISCHARGE SUMMARY
[facility] - Surgery (Hospital)  Discharge Note  Short Stay    Procedure(s) (LRB):  CYSTOURETEROSCOPY, WITH RETROGRADE PYELOGRAM AND URETERAL STENT INSERTION (Right)      OUTCOME: Patient tolerated treatment/procedure well without complication and is now ready for discharge.    DISPOSITION: Home or Self Care    FINAL DIAGNOSIS:  <principal problem not specified>    FOLLOWUP: In clinic    DISCHARGE INSTRUCTIONS:    Discharge Procedure Orders   X-Ray Abdomen AP 1 View   Standing Status: Future Standing Exp. Date: 11/08/23     Order Specific Question Answer Comments   May the Radiologist modify the order per protocol to meet the clinical needs of the patient? Yes      US Retroperitoneal Complete   Standing Status: Future Standing Exp. Date: 11/08/23     Order Specific Question Answer Comments   May the Radiologist modify the order per protocol to meet the clinical needs of the patient? Yes      Diet Adult Regular     Notify your health care provider if you experience any of the following:  severe uncontrolled pain     Notify your health care provider if you experience any of the following:  persistent nausea and vomiting or diarrhea     Notify your health care provider if you experience any of the following:  temperature >100.4     Activity as tolerated        TIME SPENT ON DISCHARGE: 5 minutes

## 2022-11-08 NOTE — PLAN OF CARE
Discharge instructions discussed with patient and patient's daughter. Both verbalized understanding.

## 2022-11-10 NOTE — ANESTHESIA POSTPROCEDURE EVALUATION
Anesthesia Post Evaluation    Patient: Irlanda Lopez    Procedure(s) Performed: Procedure(s) (LRB):  CYSTOURETEROSCOPY, WITH RETROGRADE PYELOGRAM AND URETERAL STENT INSERTION (Right)    Final Anesthesia Type: MAC      Patient location during evaluation: PACU  Patient participation: Yes- Able to Participate  Level of consciousness: awake and alert and oriented  Post-procedure vital signs: reviewed and stable  Pain management: adequate  Airway patency: patent  LEAH mitigation strategies: Multimodal analgesia  PONV status at discharge: No PONV  Anesthetic complications: no      Cardiovascular status: blood pressure returned to baseline and hemodynamically stable  Respiratory status: unassisted  Hydration status: euvolemic  Follow-up not needed.          Vitals Value Taken Time   /70 11/08/22 0855   Temp 36.7 °C (98 °F) 11/08/22 0830   Pulse 70 11/08/22 0855   Resp 19 11/08/22 0855   SpO2 97 % 11/08/22 0855   Vitals shown include unvalidated device data.      Event Time   Out of Recovery 08:55:43         Pain/Babar Score: No data recorded

## 2022-11-17 VITALS
WEIGHT: 148.06 LBS | DIASTOLIC BLOOD PRESSURE: 70 MMHG | HEIGHT: 60 IN | BODY MASS INDEX: 29.07 KG/M2 | RESPIRATION RATE: 19 BRPM | TEMPERATURE: 98 F | OXYGEN SATURATION: 98 % | SYSTOLIC BLOOD PRESSURE: 131 MMHG | HEART RATE: 70 BPM

## 2022-11-28 PROBLEM — N10 ACUTE PYELONEPHRITIS: Status: RESOLVED | Noted: 2022-08-22 | Resolved: 2022-11-28

## 2022-11-30 ENCOUNTER — PATIENT MESSAGE (OUTPATIENT)
Dept: UROLOGY | Facility: CLINIC | Age: 82
End: 2022-11-30
Payer: MEDICARE

## 2022-11-30 RX ORDER — LEVOFLOXACIN 500 MG/1
500 TABLET, FILM COATED ORAL DAILY
Qty: 10 TABLET | Refills: 0 | Status: ON HOLD | OUTPATIENT
Start: 2022-11-30 | End: 2023-01-10 | Stop reason: HOSPADM

## 2022-11-30 RX ORDER — HYOSCYAMINE SULFATE 0.12 MG/1
0.25 TABLET SUBLINGUAL EVERY 4 HOURS PRN
Qty: 40 TABLET | Refills: 0 | Status: ON HOLD | OUTPATIENT
Start: 2022-11-30 | End: 2023-01-10 | Stop reason: SDUPTHER

## 2022-12-01 ENCOUNTER — PATIENT MESSAGE (OUTPATIENT)
Dept: UROLOGY | Facility: CLINIC | Age: 82
End: 2022-12-01
Payer: MEDICARE

## 2022-12-12 ENCOUNTER — LAB VISIT (OUTPATIENT)
Dept: LAB | Facility: HOSPITAL | Age: 82
End: 2022-12-12
Attending: UROLOGY
Payer: MEDICARE

## 2022-12-12 ENCOUNTER — PATIENT MESSAGE (OUTPATIENT)
Dept: UROLOGY | Facility: CLINIC | Age: 82
End: 2022-12-12

## 2022-12-12 ENCOUNTER — OFFICE VISIT (OUTPATIENT)
Dept: UROLOGY | Facility: CLINIC | Age: 82
End: 2022-12-12
Payer: MEDICARE

## 2022-12-12 VITALS
BODY MASS INDEX: 28.9 KG/M2 | SYSTOLIC BLOOD PRESSURE: 107 MMHG | WEIGHT: 148 LBS | HEART RATE: 55 BPM | DIASTOLIC BLOOD PRESSURE: 67 MMHG

## 2022-12-12 DIAGNOSIS — Z01.818 PRE-OP TESTING: ICD-10-CM

## 2022-12-12 DIAGNOSIS — R30.0 DYSURIA: ICD-10-CM

## 2022-12-12 DIAGNOSIS — N20.0 RENAL STONE: Primary | ICD-10-CM

## 2022-12-12 LAB
ALBUMIN SERPL BCP-MCNC: 3.2 G/DL (ref 3.5–5.2)
ALP SERPL-CCNC: 59 U/L (ref 55–135)
ALT SERPL W/O P-5'-P-CCNC: 9 U/L (ref 10–44)
ANION GAP SERPL CALC-SCNC: 10 MMOL/L (ref 8–16)
AST SERPL-CCNC: 16 U/L (ref 10–40)
BILIRUB SERPL-MCNC: 0.7 MG/DL (ref 0.1–1)
BUN SERPL-MCNC: 15 MG/DL (ref 8–23)
CALCIUM SERPL-MCNC: 8.7 MG/DL (ref 8.7–10.5)
CHLORIDE SERPL-SCNC: 106 MMOL/L (ref 95–110)
CO2 SERPL-SCNC: 21 MMOL/L (ref 23–29)
CREAT SERPL-MCNC: 0.9 MG/DL (ref 0.5–1.4)
EST. GFR  (NO RACE VARIABLE): >60 ML/MIN/1.73 M^2
GLUCOSE SERPL-MCNC: 99 MG/DL (ref 70–110)
POTASSIUM SERPL-SCNC: 4.2 MMOL/L (ref 3.5–5.1)
PROT SERPL-MCNC: 6.1 G/DL (ref 6–8.4)
SODIUM SERPL-SCNC: 137 MMOL/L (ref 136–145)

## 2022-12-12 PROCEDURE — 3078F DIAST BP <80 MM HG: CPT | Mod: CPTII,S$GLB,, | Performed by: UROLOGY

## 2022-12-12 PROCEDURE — 3074F SYST BP LT 130 MM HG: CPT | Mod: CPTII,S$GLB,, | Performed by: UROLOGY

## 2022-12-12 PROCEDURE — 85025 COMPLETE CBC W/AUTO DIFF WBC: CPT | Performed by: UROLOGY

## 2022-12-12 PROCEDURE — 99999 PR PBB SHADOW E&M-EST. PATIENT-LVL V: ICD-10-PCS | Mod: PBBFAC,,, | Performed by: UROLOGY

## 2022-12-12 PROCEDURE — 3074F PR MOST RECENT SYSTOLIC BLOOD PRESSURE < 130 MM HG: ICD-10-PCS | Mod: CPTII,S$GLB,, | Performed by: UROLOGY

## 2022-12-12 PROCEDURE — 1126F PR PAIN SEVERITY QUANTIFIED, NO PAIN PRESENT: ICD-10-PCS | Mod: CPTII,S$GLB,, | Performed by: UROLOGY

## 2022-12-12 PROCEDURE — 1159F PR MEDICATION LIST DOCUMENTED IN MEDICAL RECORD: ICD-10-PCS | Mod: CPTII,S$GLB,, | Performed by: UROLOGY

## 2022-12-12 PROCEDURE — 99214 OFFICE O/P EST MOD 30 MIN: CPT | Mod: S$GLB,,, | Performed by: UROLOGY

## 2022-12-12 PROCEDURE — 3288F PR FALLS RISK ASSESSMENT DOCUMENTED: ICD-10-PCS | Mod: CPTII,S$GLB,, | Performed by: UROLOGY

## 2022-12-12 PROCEDURE — 3078F PR MOST RECENT DIASTOLIC BLOOD PRESSURE < 80 MM HG: ICD-10-PCS | Mod: CPTII,S$GLB,, | Performed by: UROLOGY

## 2022-12-12 PROCEDURE — 99214 PR OFFICE/OUTPT VISIT, EST, LEVL IV, 30-39 MIN: ICD-10-PCS | Mod: S$GLB,,, | Performed by: UROLOGY

## 2022-12-12 PROCEDURE — 80053 COMPREHEN METABOLIC PANEL: CPT | Performed by: UROLOGY

## 2022-12-12 PROCEDURE — 99999 PR PBB SHADOW E&M-EST. PATIENT-LVL V: CPT | Mod: PBBFAC,,, | Performed by: UROLOGY

## 2022-12-12 PROCEDURE — 1160F RVW MEDS BY RX/DR IN RCRD: CPT | Mod: CPTII,S$GLB,, | Performed by: UROLOGY

## 2022-12-12 PROCEDURE — 1159F MED LIST DOCD IN RCRD: CPT | Mod: CPTII,S$GLB,, | Performed by: UROLOGY

## 2022-12-12 PROCEDURE — 1126F AMNT PAIN NOTED NONE PRSNT: CPT | Mod: CPTII,S$GLB,, | Performed by: UROLOGY

## 2022-12-12 PROCEDURE — 1101F PR PT FALLS ASSESS DOC 0-1 FALLS W/OUT INJ PAST YR: ICD-10-PCS | Mod: CPTII,S$GLB,, | Performed by: UROLOGY

## 2022-12-12 PROCEDURE — 36415 COLL VENOUS BLD VENIPUNCTURE: CPT | Performed by: UROLOGY

## 2022-12-12 PROCEDURE — 1101F PT FALLS ASSESS-DOCD LE1/YR: CPT | Mod: CPTII,S$GLB,, | Performed by: UROLOGY

## 2022-12-12 PROCEDURE — 3288F FALL RISK ASSESSMENT DOCD: CPT | Mod: CPTII,S$GLB,, | Performed by: UROLOGY

## 2022-12-12 PROCEDURE — 1160F PR REVIEW ALL MEDS BY PRESCRIBER/CLIN PHARMACIST DOCUMENTED: ICD-10-PCS | Mod: CPTII,S$GLB,, | Performed by: UROLOGY

## 2022-12-12 RX ORDER — MIRABEGRON 50 MG/1
50 TABLET, FILM COATED, EXTENDED RELEASE ORAL DAILY
Qty: 30 TABLET | Refills: 11 | Status: ON HOLD | OUTPATIENT
Start: 2022-12-12 | End: 2023-01-10 | Stop reason: SDUPTHER

## 2022-12-12 NOTE — PROGRESS NOTES
Chief Complaint:   Encounter Diagnoses   Name Primary?    Renal stone Yes    Dysuria        HPI:   12/12/22- stent exchange, VESIcare, left some p.r.n. and Uribel she is still having significant stent discomfort.  She comes in today with her caregiver and her son, they are considering definitive management.  7/18/20- 79-year-old female who presents with severe abdominal pain and discomfort, she is being evaluated for colonic impaction diverticulitis.  She is seen to have an extremely large right renal pelvis stone.  This had been seen prior, and she was followed by partner in regards to possible surgery for this.  Patient though it is determined not pursuing not been seen in the last couple of years.  Patient is having upper abdominal pain, no colic at this point.  Patient has had no previous urological history per her and her son's report, her daughter is her primary care take care, she is currently not present at the bedside.  Family history of stones, no urological cancers per the family.  10/17/18: Mag3 shows 72/38 L/R function. -UCx last visit.  PCNL is the only viable treatment option; observation not recommended.  10/8/18: 77 yo woman referred for renal stone after workup for UTI/diverticulitis.  Having diarrhea on meds for diverticulitis.  No abd/pelvic pain and no exac/rel factors.  No hematuria.  No prior urolithiasis.  No urinary bother.  No  history.  Normal sexual function but inactive.  Some memory problems but functional at home.    Allergies:  Patient has no known allergies.    Medications:  has a current medication list which includes the following prescription(s): atorvastatin, erythromycin, fluoxetine, hyoscyamine, levofloxacin, memantine, methen-m.blue-s.phos-phsal-hyo, nitrofurantoin (macrocrystal-monohydrate), pantoprazole, phenazopyridine, potassium chloride, prednisolone acetate, solifenacin, thyroid (pork), alprazolam, [DISCONTINUED] furosemide, and [DISCONTINUED] triamcinolone acetonide  0.1%.    Review of Systems:  General: No fever, chills, fatigability, or weight loss.  Skin: No rashes, itching, or changes in color or texture of skin.  Chest: Denies JESUS, cyanosis, wheezing, cough, and sputum production.  Abdomen: Appetite fine. No weight loss. Denies diarrhea, abdominal pain, hematemesis, or blood in stool.  Musculoskeletal: No joint stiffness or swelling. Denies back pain.  : As above.  All other review of systems negative.    PMH:   has a past medical history of Clostridium difficile colitis, Dementia, Diverticulitis, High cholesterol, Hypertension, Hypothyroidism, and Kidney stones.    PSH:   has a past surgical history that includes Cataract extraction; Appendectomy (2008); Colonoscopy (N/A, 01/02/2019); Colostomy (Left, 01/02/2019); Maribel procedure (N/A, 01/02/2019); Lysis of adhesions (N/A, 01/02/2019); Colon surgery; Vitrectomy (Right, 09/2013); Colonoscopy (N/A, 06/07/2019); Esophagogastroduodenoscopy (N/A, 09/16/2020); Colonoscopy (N/A, 06/23/2021); Cystoscopy with ureteroscopy, retrograde pyelography, and insertion of stent (Right, 08/23/2022); and Cystoureteroscopy with retrograde pyelography and insertion of stent into ureter (Right, 11/8/2022).    FamHx: family history includes Alzheimer's disease in her mother; Aneurysm in her father; Stomach cancer in her brother.    SocHx:  reports that she has quit smoking. Her smoking use included cigarettes. She has never used smokeless tobacco. She reports that she does not drink alcohol and does not use drugs.      Physical Exam:  Vitals:    12/12/22 1310   BP: 107/67   Pulse: (!) 55     General: A&Ox3, no apparent distress, no deformities  Neck: No masses, normal ROM  Lungs: normal inspiration, no use of accessory muscles  Heart: normal pulse, no arrhythmias  Abdomen: Soft, NT, ND, no masses, no hernias, no hepatosplenomegaly  Skin: The skin is warm and dry. No jaundice.  Ext: No c/c/e.  : No pelvic floor prolapse.  Normal introitus,  no urethral abnomralities. No Perineal abnormalities.    Labs/Studies:   Creatinine 0.9 10/22  CT large right renal pelvis stone 8/22  Lasix renogram 23% right/77% left 9/22     Impression/Plan:         Right renal stone-  right stent  11/8/22, 8/23/22    Currently on VESIcare and will add Myrbetriq 50 mg.  They will monitor for hypertension and stop the medication and contact our office if this occurs.  Continue Levsin and Uribel as needed.  We were hopeful that the early stent exchange would alleviate symptoms, but she still has persistent and chronic dysuria with stent discomfort.  At this point we discussed further measures such as stone management versus nephrectomy, given the fact that her kidney function is still above 20% we do not recommend nephrectomy nor do they want to proceed.  Therefore discussed options of serial ESWL or ureteroscopy, versus PCNL.  They have decided to go ahead and pursue right PCNL, can always cancel if the myrbetriq assists.      Patient understands the risks, benefits and alternatives.  These include but are not limited to damage to surrounding structures including colon, bowel, kidney, liver, major blood vessels and spleen.  Risk of loss of kidney or bleeding within the kidney requiring embolization and complete destruction of the kidney.  Risk of not getting all the stones.  Risk of heart attack, stroke, death, DVT and PE.  Patient understanding all the above has elected to proceed procedure as stated.

## 2022-12-12 NOTE — H&P (VIEW-ONLY)
Chief Complaint:   Encounter Diagnoses   Name Primary?    Renal stone Yes    Dysuria        HPI:   12/12/22- stent exchange, VESIcare, left some p.r.n. and Uribel she is still having significant stent discomfort.  She comes in today with her caregiver and her son, they are considering definitive management.  7/18/20- 79-year-old female who presents with severe abdominal pain and discomfort, she is being evaluated for colonic impaction diverticulitis.  She is seen to have an extremely large right renal pelvis stone.  This had been seen prior, and she was followed by partner in regards to possible surgery for this.  Patient though it is determined not pursuing not been seen in the last couple of years.  Patient is having upper abdominal pain, no colic at this point.  Patient has had no previous urological history per her and her son's report, her daughter is her primary care take care, she is currently not present at the bedside.  Family history of stones, no urological cancers per the family.  10/17/18: Mag3 shows 72/38 L/R function. -UCx last visit.  PCNL is the only viable treatment option; observation not recommended.  10/8/18: 79 yo woman referred for renal stone after workup for UTI/diverticulitis.  Having diarrhea on meds for diverticulitis.  No abd/pelvic pain and no exac/rel factors.  No hematuria.  No prior urolithiasis.  No urinary bother.  No  history.  Normal sexual function but inactive.  Some memory problems but functional at home.    Allergies:  Patient has no known allergies.    Medications:  has a current medication list which includes the following prescription(s): atorvastatin, erythromycin, fluoxetine, hyoscyamine, levofloxacin, memantine, methen-m.blue-s.phos-phsal-hyo, nitrofurantoin (macrocrystal-monohydrate), pantoprazole, phenazopyridine, potassium chloride, prednisolone acetate, solifenacin, thyroid (pork), alprazolam, [DISCONTINUED] furosemide, and [DISCONTINUED] triamcinolone acetonide  0.1%.    Review of Systems:  General: No fever, chills, fatigability, or weight loss.  Skin: No rashes, itching, or changes in color or texture of skin.  Chest: Denies JESUS, cyanosis, wheezing, cough, and sputum production.  Abdomen: Appetite fine. No weight loss. Denies diarrhea, abdominal pain, hematemesis, or blood in stool.  Musculoskeletal: No joint stiffness or swelling. Denies back pain.  : As above.  All other review of systems negative.    PMH:   has a past medical history of Clostridium difficile colitis, Dementia, Diverticulitis, High cholesterol, Hypertension, Hypothyroidism, and Kidney stones.    PSH:   has a past surgical history that includes Cataract extraction; Appendectomy (2008); Colonoscopy (N/A, 01/02/2019); Colostomy (Left, 01/02/2019); Maribel procedure (N/A, 01/02/2019); Lysis of adhesions (N/A, 01/02/2019); Colon surgery; Vitrectomy (Right, 09/2013); Colonoscopy (N/A, 06/07/2019); Esophagogastroduodenoscopy (N/A, 09/16/2020); Colonoscopy (N/A, 06/23/2021); Cystoscopy with ureteroscopy, retrograde pyelography, and insertion of stent (Right, 08/23/2022); and Cystoureteroscopy with retrograde pyelography and insertion of stent into ureter (Right, 11/8/2022).    FamHx: family history includes Alzheimer's disease in her mother; Aneurysm in her father; Stomach cancer in her brother.    SocHx:  reports that she has quit smoking. Her smoking use included cigarettes. She has never used smokeless tobacco. She reports that she does not drink alcohol and does not use drugs.      Physical Exam:  Vitals:    12/12/22 1310   BP: 107/67   Pulse: (!) 55     General: A&Ox3, no apparent distress, no deformities  Neck: No masses, normal ROM  Lungs: normal inspiration, no use of accessory muscles  Heart: normal pulse, no arrhythmias  Abdomen: Soft, NT, ND, no masses, no hernias, no hepatosplenomegaly  Skin: The skin is warm and dry. No jaundice.  Ext: No c/c/e.  : No pelvic floor prolapse.  Normal introitus,  no urethral abnomralities. No Perineal abnormalities.    Labs/Studies:   Creatinine 0.9 10/22  CT large right renal pelvis stone 8/22  Lasix renogram 23% right/77% left 9/22     Impression/Plan:         Right renal stone-  right stent  11/8/22, 8/23/22    Currently on VESIcare and will add Myrbetriq 50 mg.  They will monitor for hypertension and stop the medication and contact our office if this occurs.  Continue Levsin and Uribel as needed.  We were hopeful that the early stent exchange would alleviate symptoms, but she still has persistent and chronic dysuria with stent discomfort.  At this point we discussed further measures such as stone management versus nephrectomy, given the fact that her kidney function is still above 20% we do not recommend nephrectomy nor do they want to proceed.  Therefore discussed options of serial ESWL or ureteroscopy, versus PCNL.  They have decided to go ahead and pursue right PCNL, can always cancel if the myrbetriq assists.      Patient understands the risks, benefits and alternatives.  These include but are not limited to damage to surrounding structures including colon, bowel, kidney, liver, major blood vessels and spleen.  Risk of loss of kidney or bleeding within the kidney requiring embolization and complete destruction of the kidney.  Risk of not getting all the stones.  Risk of heart attack, stroke, death, DVT and PE.  Patient understanding all the above has elected to proceed procedure as stated.

## 2022-12-13 LAB
BASOPHILS # BLD AUTO: 0.06 K/UL (ref 0–0.2)
BASOPHILS NFR BLD: 0.7 % (ref 0–1.9)
DIFFERENTIAL METHOD: ABNORMAL
EOSINOPHIL # BLD AUTO: 0.2 K/UL (ref 0–0.5)
EOSINOPHIL NFR BLD: 2.5 % (ref 0–8)
ERYTHROCYTE [DISTWIDTH] IN BLOOD BY AUTOMATED COUNT: 14.2 % (ref 11.5–14.5)
HCT VFR BLD AUTO: 40.1 % (ref 37–48.5)
HGB BLD-MCNC: 12.9 G/DL (ref 12–16)
IMM GRANULOCYTES # BLD AUTO: 0.08 K/UL (ref 0–0.04)
IMM GRANULOCYTES NFR BLD AUTO: 0.9 % (ref 0–0.5)
LYMPHOCYTES # BLD AUTO: 2.8 K/UL (ref 1–4.8)
LYMPHOCYTES NFR BLD: 29.9 % (ref 18–48)
MCH RBC QN AUTO: 28 PG (ref 27–31)
MCHC RBC AUTO-ENTMCNC: 32.2 G/DL (ref 32–36)
MCV RBC AUTO: 87 FL (ref 82–98)
MONOCYTES # BLD AUTO: 1 K/UL (ref 0.3–1)
MONOCYTES NFR BLD: 10.3 % (ref 4–15)
NEUTROPHILS # BLD AUTO: 5.2 K/UL (ref 1.8–7.7)
NEUTROPHILS NFR BLD: 55.7 % (ref 38–73)
NRBC BLD-RTO: 0 /100 WBC
PLATELET # BLD AUTO: 257 K/UL (ref 150–450)
PMV BLD AUTO: 11.3 FL (ref 9.2–12.9)
RBC # BLD AUTO: 4.6 M/UL (ref 4–5.4)
WBC # BLD AUTO: 9.23 K/UL (ref 3.9–12.7)

## 2022-12-30 NOTE — HPI
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  Of note, the patient was admitted last month with similar symptoms.  Over the past few days, the patient has abdominal pain and cramping with associated abdominal distention and decreased colostomy output.  She saw her PCP who ordered a CT scan which was concern for possible colitis.  She was admitted to the hospital to the medical service after presenting to the ER following the CT scan.  Since admission, she has started having colostomy output again is now tolerating clear liquids well.  Surgery is consulted for evaluation.  
 79 year old female who is status post Maribel's pouch and left lower quadrant colostomy in January of 2019 for diverticulitis who presented to the EDt the request of Dr. Arias due to complaints abdominal pain and decreased output from her ostomy that began four days prior to presentation. CT scan of the abdomen showed-prominence of the proximal/mid colon with focal caliber change noted within the left lower quadrant, similar to prior study.  Persistent pericolonic fat stranding and wall thickening suggesting nonspecific colitis most prevalent at the area of caliber change.  All cultures and lab results reviewed.  05/12- urine -GNR  C difficile -positive  04/12- ESBL -UTI  
Irlanda Lopez is a 79 year old female who is status post Maribel's pouch and left lower quadrant colostomy in January of 2019 for diverticulitis who presented to the EDt the request of Dr. Arias due to complaints abdominal pain and decreased output from her ostomy that began four days prior to presentation. There is associated belching, hiccups, nausea and one episode of emesis following contrast ingestion on the date of presentation. The patient denies fever, chills and blood in stool. The patient's daughter reports two prior episodes with similar symptoms. The patient was hospitalized from 4/12/20 to 4/13/20 for similar symptoms which were thought to be due to gastroenteritis and symptoms improved with supportive care. Stool culture and E. Coli were negative on Today, CT scan shows similar findings to one performed on 4/12/20 including prominence of the proximal to mid colon with focal caliber changed noted within the left lower quadrant as well as persistent pericolonoic fat stranding and wall thickening suggestive of nonspecific colitis. Labs were unremarkable. Code status was discussed with the patient and her daughter. She is a full code. Her four children are her surrogate medical decision makers.   
GIB (gastrointestinal bleeding)

## 2023-01-03 ENCOUNTER — TELEPHONE (OUTPATIENT)
Dept: PREADMISSION TESTING | Facility: HOSPITAL | Age: 83
End: 2023-01-03
Payer: MEDICARE

## 2023-01-07 ENCOUNTER — PATIENT MESSAGE (OUTPATIENT)
Dept: PRIMARY CARE CLINIC | Facility: CLINIC | Age: 83
End: 2023-01-07
Payer: MEDICARE

## 2023-01-09 ENCOUNTER — TELEPHONE (OUTPATIENT)
Dept: PREADMISSION TESTING | Facility: HOSPITAL | Age: 83
End: 2023-01-09
Payer: MEDICARE

## 2023-01-09 ENCOUNTER — PATIENT MESSAGE (OUTPATIENT)
Dept: PRIMARY CARE CLINIC | Facility: CLINIC | Age: 83
End: 2023-01-09
Payer: MEDICARE

## 2023-01-09 RX ORDER — FLUOXETINE 10 MG/1
10-20 CAPSULE ORAL DAILY
Qty: 180 CAPSULE | Refills: 3
Start: 2023-01-09 | End: 2023-03-16

## 2023-01-09 NOTE — TELEPHONE ENCOUNTER
Called and spoke with pt daughter about the following:     Please arrive to [facility] ([facility]) at 7 am on 1/10/23 for your scheduled procedure.  Address: [address] [place] [address] [place] [address] ([address] [place] [address], [address] Floor [facility])  >>>NO eating or drinking after midnight unless instructed otherwise by your Surgeon<<<    Thank you,  -[facility] Pre [facility]mit [facility].  Mon-Fri 8 am - 4 pm [phone]

## 2023-01-10 ENCOUNTER — PATIENT MESSAGE (OUTPATIENT)
Dept: UROLOGY | Facility: CLINIC | Age: 83
End: 2023-01-10
Payer: MEDICARE

## 2023-01-10 ENCOUNTER — TELEPHONE (OUTPATIENT)
Dept: UROLOGY | Facility: CLINIC | Age: 83
End: 2023-01-10
Payer: MEDICARE

## 2023-01-10 ENCOUNTER — HOSPITAL ENCOUNTER (OUTPATIENT)
Facility: HOSPITAL | Age: 83
Discharge: HOME | End: 2023-01-10
Attending: UROLOGY | Admitting: UROLOGY
Payer: MEDICARE

## 2023-01-10 VITALS
HEIGHT: 60 IN | WEIGHT: 149.38 LBS | HEART RATE: 53 BPM | RESPIRATION RATE: 16 BRPM | TEMPERATURE: 98 F | DIASTOLIC BLOOD PRESSURE: 74 MMHG | BODY MASS INDEX: 29.33 KG/M2 | OXYGEN SATURATION: 99 % | SYSTOLIC BLOOD PRESSURE: 154 MMHG

## 2023-01-10 DIAGNOSIS — N20.0 RENAL STONE: ICD-10-CM

## 2023-01-10 PROCEDURE — 25500020 PHARM REV CODE 255: Performed by: UROLOGY

## 2023-01-10 PROCEDURE — 71000016 HC POSTOP RECOV ADDL HR: Performed by: UROLOGY

## 2023-01-10 PROCEDURE — 50432 PLMT NEPHROSTOMY CATHETER: CPT

## 2023-01-10 PROCEDURE — 71000033 HC RECOVERY, INTIAL HOUR: Performed by: UROLOGY

## 2023-01-10 PROCEDURE — C1726 CATH, BAL DIL, NON-VASCULAR: HCPCS | Performed by: UROLOGY

## 2023-01-10 PROCEDURE — 63600175 PHARM REV CODE 636 W HCPCS: Performed by: RADIOLOGY

## 2023-01-10 PROCEDURE — 71000015 HC POSTOP RECOV 1ST HR: Performed by: UROLOGY

## 2023-01-10 PROCEDURE — 99499 NO LOS: ICD-10-PCS | Mod: ,,, | Performed by: UROLOGY

## 2023-01-10 PROCEDURE — C1773 RET DEV, INSERTABLE: HCPCS | Performed by: UROLOGY

## 2023-01-10 PROCEDURE — 99499 UNLISTED E&M SERVICE: CPT | Mod: ,,, | Performed by: UROLOGY

## 2023-01-10 PROCEDURE — C1769 GUIDE WIRE: HCPCS | Performed by: UROLOGY

## 2023-01-10 PROCEDURE — C1729 CATH, DRAINAGE: HCPCS | Performed by: UROLOGY

## 2023-01-10 RX ORDER — SOLIFENACIN SUCCINATE 5 MG/1
5 TABLET, FILM COATED ORAL DAILY
Qty: 90 TABLET | Refills: 3 | Status: SHIPPED | OUTPATIENT
Start: 2023-01-10 | End: 2023-02-09

## 2023-01-10 RX ORDER — HYOSCYAMINE SULFATE 0.12 MG/1
0.25 TABLET SUBLINGUAL EVERY 4 HOURS PRN
Qty: 40 TABLET | Refills: 1 | Status: SHIPPED | OUTPATIENT
Start: 2023-01-10 | End: 2023-06-15 | Stop reason: SDUPTHER

## 2023-01-10 RX ORDER — LEVOFLOXACIN 500 MG/1
500 TABLET, FILM COATED ORAL DAILY
Qty: 7 TABLET | Refills: 0 | Status: SHIPPED | OUTPATIENT
Start: 2023-01-10 | End: 2023-01-17

## 2023-01-10 RX ORDER — ACETAMINOPHEN 10 MG/ML
1000 INJECTION, SOLUTION INTRAVENOUS ONCE
Status: DISCONTINUED | OUTPATIENT
Start: 2023-01-10 | End: 2023-01-10 | Stop reason: HOSPADM

## 2023-01-10 RX ORDER — MIRABEGRON 50 MG/1
50 TABLET, FILM COATED, EXTENDED RELEASE ORAL DAILY
Qty: 30 TABLET | Refills: 11 | Status: SHIPPED | OUTPATIENT
Start: 2023-01-10 | End: 2023-02-09 | Stop reason: SDUPTHER

## 2023-01-10 RX ORDER — ONDANSETRON 2 MG/ML
4 INJECTION INTRAMUSCULAR; INTRAVENOUS DAILY PRN
Status: DISCONTINUED | OUTPATIENT
Start: 2023-01-10 | End: 2023-01-10 | Stop reason: HOSPADM

## 2023-01-10 RX ORDER — HYDROMORPHONE HYDROCHLORIDE 2 MG/ML
0.2 INJECTION, SOLUTION INTRAMUSCULAR; INTRAVENOUS; SUBCUTANEOUS EVERY 5 MIN PRN
Status: DISCONTINUED | OUTPATIENT
Start: 2023-01-10 | End: 2023-01-10 | Stop reason: HOSPADM

## 2023-01-10 RX ORDER — MIDAZOLAM HYDROCHLORIDE 1 MG/ML
INJECTION INTRAMUSCULAR; INTRAVENOUS CODE/TRAUMA/SEDATION MEDICATION
Status: COMPLETED | OUTPATIENT
Start: 2023-01-10 | End: 2023-01-10

## 2023-01-10 RX ORDER — CEFAZOLIN SODIUM 2 G/50ML
2 SOLUTION INTRAVENOUS
Status: DISCONTINUED | OUTPATIENT
Start: 2023-01-10 | End: 2023-01-10 | Stop reason: HOSPADM

## 2023-01-10 RX ORDER — FENTANYL CITRATE 50 UG/ML
INJECTION, SOLUTION INTRAMUSCULAR; INTRAVENOUS CODE/TRAUMA/SEDATION MEDICATION
Status: COMPLETED | OUTPATIENT
Start: 2023-01-10 | End: 2023-01-10

## 2023-01-10 RX ADMIN — FENTANYL CITRATE 50 MCG: 50 INJECTION, SOLUTION INTRAMUSCULAR; INTRAVENOUS at 08:01

## 2023-01-10 RX ADMIN — FENTANYL CITRATE 25 MCG: 50 INJECTION, SOLUTION INTRAMUSCULAR; INTRAVENOUS at 09:01

## 2023-01-10 RX ADMIN — IOHEXOL 1 ML: 240 INJECTION, SOLUTION INTRATHECAL; INTRAVASCULAR; INTRAVENOUS; ORAL at 10:01

## 2023-01-10 RX ADMIN — MIDAZOLAM HYDROCHLORIDE 1 MG: 1 INJECTION INTRAMUSCULAR; INTRAVENOUS at 08:01

## 2023-01-10 NOTE — PROGRESS NOTES
Due to decompression of the renal pelvis and size of the kidney, cannot not get a full thickness access to the renal pelvis through the kidney.  Therefore we have cancelled the case and will have her follow up as an outpatient.

## 2023-01-10 NOTE — SEDATION DOCUMENTATION
Pt transferred to [facility] and transported to PACU bay 7. Bedside report given to recovery nurse. Pt to recover for 2 hours.

## 2023-01-10 NOTE — SEDATION DOCUMENTATION
Procedure stopped at this time. Gauze/tegaderm applied to site and pt positioned supine. No bleeding noted at this time. VSS and NADN.

## 2023-01-10 NOTE — DISCHARGE SUMMARY
[facility] (Hospital)  Discharge Note  Short Stay    Procedure(s) (LRB):  NEPHROSTOLITHOTOMY, PERCUTANEOUS (Right)      OUTCOME: Patient tolerated treatment/procedure well without complication and is now ready for discharge.    DISPOSITION: Home or Self Care    FINAL DIAGNOSIS:  <principal problem not specified>    FOLLOWUP: In clinic    DISCHARGE INSTRUCTIONS:    Discharge Procedure Orders   Diet Adult Regular     Notify your health care provider if you experience any of the following:  severe uncontrolled pain     Notify your health care provider if you experience any of the following:  persistent nausea and vomiting or diarrhea     Notify your health care provider if you experience any of the following:  temperature >100.4     Activity as tolerated   Order Comments: Light activity for 48 hours        TIME SPENT ON DISCHARGE: 5 minutes

## 2023-01-17 ENCOUNTER — PATIENT MESSAGE (OUTPATIENT)
Dept: PRIMARY CARE CLINIC | Facility: CLINIC | Age: 83
End: 2023-01-17
Payer: MEDICARE

## 2023-02-06 ENCOUNTER — OFFICE VISIT (OUTPATIENT)
Dept: PRIMARY CARE CLINIC | Facility: CLINIC | Age: 83
End: 2023-02-06
Payer: MEDICARE

## 2023-02-06 ENCOUNTER — TELEPHONE (OUTPATIENT)
Dept: PRIMARY CARE CLINIC | Facility: CLINIC | Age: 83
End: 2023-02-06
Payer: MEDICARE

## 2023-02-06 VITALS
BODY MASS INDEX: 29.41 KG/M2 | WEIGHT: 149.81 LBS | DIASTOLIC BLOOD PRESSURE: 70 MMHG | TEMPERATURE: 98 F | HEART RATE: 70 BPM | HEIGHT: 60 IN | SYSTOLIC BLOOD PRESSURE: 142 MMHG

## 2023-02-06 DIAGNOSIS — I77.1 TORTUOUS AORTA: ICD-10-CM

## 2023-02-06 DIAGNOSIS — I10 ESSENTIAL HYPERTENSION: Chronic | ICD-10-CM

## 2023-02-06 DIAGNOSIS — F41.1 GAD (GENERALIZED ANXIETY DISORDER): ICD-10-CM

## 2023-02-06 DIAGNOSIS — E78.00 PURE HYPERCHOLESTEROLEMIA: ICD-10-CM

## 2023-02-06 DIAGNOSIS — F03.94 DEMENTIA WITH ANXIETY, UNSPECIFIED DEMENTIA SEVERITY, UNSPECIFIED DEMENTIA TYPE: Primary | ICD-10-CM

## 2023-02-06 PROCEDURE — 1159F PR MEDICATION LIST DOCUMENTED IN MEDICAL RECORD: ICD-10-PCS | Mod: CPTII,S$GLB,, | Performed by: NURSE PRACTITIONER

## 2023-02-06 PROCEDURE — 99999 PR PBB SHADOW E&M-EST. PATIENT-LVL IV: ICD-10-PCS | Mod: PBBFAC,,, | Performed by: NURSE PRACTITIONER

## 2023-02-06 PROCEDURE — 3078F PR MOST RECENT DIASTOLIC BLOOD PRESSURE < 80 MM HG: ICD-10-PCS | Mod: CPTII,S$GLB,, | Performed by: NURSE PRACTITIONER

## 2023-02-06 PROCEDURE — 1159F MED LIST DOCD IN RCRD: CPT | Mod: CPTII,S$GLB,, | Performed by: NURSE PRACTITIONER

## 2023-02-06 PROCEDURE — 3077F PR MOST RECENT SYSTOLIC BLOOD PRESSURE >= 140 MM HG: ICD-10-PCS | Mod: CPTII,S$GLB,, | Performed by: NURSE PRACTITIONER

## 2023-02-06 PROCEDURE — 99999 PR PBB SHADOW E&M-EST. PATIENT-LVL IV: CPT | Mod: PBBFAC,,, | Performed by: NURSE PRACTITIONER

## 2023-02-06 PROCEDURE — 1160F RVW MEDS BY RX/DR IN RCRD: CPT | Mod: CPTII,S$GLB,, | Performed by: NURSE PRACTITIONER

## 2023-02-06 PROCEDURE — 99214 PR OFFICE/OUTPT VISIT, EST, LEVL IV, 30-39 MIN: ICD-10-PCS | Mod: S$GLB,,, | Performed by: NURSE PRACTITIONER

## 2023-02-06 PROCEDURE — 3078F DIAST BP <80 MM HG: CPT | Mod: CPTII,S$GLB,, | Performed by: NURSE PRACTITIONER

## 2023-02-06 PROCEDURE — 1126F PR PAIN SEVERITY QUANTIFIED, NO PAIN PRESENT: ICD-10-PCS | Mod: CPTII,S$GLB,, | Performed by: NURSE PRACTITIONER

## 2023-02-06 PROCEDURE — 99214 OFFICE O/P EST MOD 30 MIN: CPT | Mod: S$GLB,,, | Performed by: NURSE PRACTITIONER

## 2023-02-06 PROCEDURE — 1160F PR REVIEW ALL MEDS BY PRESCRIBER/CLIN PHARMACIST DOCUMENTED: ICD-10-PCS | Mod: CPTII,S$GLB,, | Performed by: NURSE PRACTITIONER

## 2023-02-06 PROCEDURE — 3077F SYST BP >= 140 MM HG: CPT | Mod: CPTII,S$GLB,, | Performed by: NURSE PRACTITIONER

## 2023-02-06 PROCEDURE — 1126F AMNT PAIN NOTED NONE PRSNT: CPT | Mod: CPTII,S$GLB,, | Performed by: NURSE PRACTITIONER

## 2023-02-06 RX ORDER — ALPRAZOLAM 0.25 MG/1
0.25 TABLET ORAL 2 TIMES DAILY PRN
Qty: 30 TABLET | Refills: 0 | Status: SHIPPED | OUTPATIENT
Start: 2023-02-06 | End: 2023-03-07 | Stop reason: SDUPTHER

## 2023-02-06 RX ORDER — ALPRAZOLAM 0.25 MG/1
0.25 TABLET ORAL 2 TIMES DAILY PRN
Qty: 30 TABLET | Refills: 0 | Status: SHIPPED | OUTPATIENT
Start: 2023-03-08 | End: 2023-03-07

## 2023-02-06 NOTE — TELEPHONE ENCOUNTER
----- Message from Madalyn Reyes sent at 2/6/2023 10:06 AM CST -----  Type:  Sooner Apoointment Request    Caller is requesting a sooner appointment.  Caller declined first available appointment listed below.  Caller will not accept being placed on the waitlist and is requesting a message be sent to doctor.  Name of Caller:Myla  When is the first available appointment?2/10  Symptoms: pt need follow up for meds anxiety  pt will be out tomorrow, states pt can do a virtual visit also ASAP  Would the patient rather a call back or a response via [facility]? Call back  Best Call Back Number:[phone]  Additional Information: na

## 2023-02-06 NOTE — PROGRESS NOTES
HPI     Chief Complaint  Chief Complaint   Patient presents with    Medication Refill       HPI  Irlanda Lopez is a 82 y.o. female with multiple medical diagnoses as listed in the medical history and problem list that presents for MARTIN/Dementia.  This patient is new to me. Caregiver, Melba,  is present.     MARTIN/Dementia:Melba reports late patient has become more anxious. She becomes more anxious when getting ready for doctor's zhang ts. ADLs are starting to take longer to complete. Thought process with caring out tasks is starting to take longer. No interruptions with sleep. She usually takes Xanax BID daily PRN however, Melba reports she has needed every day lately. Reports compliance with Prozac and Namenda as prescribed.     History     PAST MEDICAL HISTORY:  Past Medical History:   Diagnosis Date    Clostridium difficile colitis     Dementia     Diverticulitis     s/p colostomy    High cholesterol     Hypertension     Hypothyroidism     Kidney stones        PAST SURGICAL HISTORY:  Past Surgical History:   Procedure Laterality Date    APPENDECTOMY  2008    CATARACT EXTRACTION      Dr Raygoza    COLON SURGERY      COLONOSCOPY N/A 01/02/2019    Procedure: COLONOSCOPY;  Surgeon: Reece Hogan MD;  Location: [facility] ENDO;  Service: Endoscopy;  Laterality: N/A;    COLONOSCOPY N/A 06/07/2019    Procedure: COLONOSCOPY;  Surgeon: Rishabh Connelly MD;  Location: [facility] ENDO;  Service: General;  Laterality: N/A;    COLONOSCOPY N/A 06/23/2021    Procedure: COLONOSCOPY;  Surgeon: Lucy Lafleur MD;  Location: [facility] ENDO;  Service: Endoscopy;  Laterality: N/A;    COLOSTOMY Left 01/02/2019    Procedure: CREATION, COLOSTOMY;  Surgeon: Reece Hogan MD;  Location: [facility] OR;  Service: General;  Laterality: Left;    CYSTOSCOPY WITH URETEROSCOPY, RETROGRADE PYELOGRAPHY, AND INSERTION OF STENT Right 08/23/2022    Procedure: CYSTOSCOPY, WITH RETROGRADE PYELOGRAM AND URETERAL STENT INSERTION;  Surgeon: Anselmo Matute MD;   Location: [facility] OR;  Service: Urology;  Laterality: Right;    CYSTOURETEROSCOPY WITH RETROGRADE PYELOGRAPHY AND INSERTION OF STENT INTO URETER Right 11/8/2022    Procedure: CYSTOURETEROSCOPY, WITH RETROGRADE PYELOGRAM AND URETERAL STENT INSERTION;  Surgeon: Anselmo Matute MD;  Location: [facility] OR;  Service: Urology;  Laterality: Right;    ESOPHAGOGASTRODUODENOSCOPY N/A 09/16/2020    Procedure: ESOPHAGOGASTRODUODENOSCOPY (EGD)- Needs Rapid;  Surgeon: Lucy Lafleur MD;  Location: [facility] ENDO;  Service: Endoscopy;  Laterality: N/A;    SHAHIDA PROCEDURE N/A 01/02/2019    Procedure: SHAHIDA PROCEDURE;  Surgeon: Reece Hogan MD;  Location: [facility] OR;  Service: General;  Laterality: N/A;    LYSIS OF ADHESIONS N/A 01/02/2019    Procedure: LYSIS, ADHESIONS;  Surgeon: Reece Hogan MD;  Location: [facility];  Service: General;  Laterality: N/A;    VITRECTOMY Right 09/2013       SOCIAL HISTORY:  Social History     Socioeconomic History    Marital status:    Occupational History     Comment: [employer]   Tobacco Use    Smoking status: Former     Years: 5.00     Types: Cigarettes    Smokeless tobacco: Never   Substance and Sexual Activity    Alcohol use: No    Drug use: No    Sexual activity: Never     Comment:        FAMILY HISTORY:  Family History   Problem Relation Age of Onset    Alzheimer's disease Mother     Aneurysm Father         brain    Stomach cancer Brother         50s       ALLERGIES AND MEDICATIONS: updated and reviewed.  Review of patient's allergies indicates:  No Known Allergies  Current Outpatient Medications   Medication Sig Dispense Refill    atorvastatin (LIPITOR) 10 MG tablet TAKE 1 TABLET BY MOUTH ONCE DAILY 90 tablet 3    erythromycin (ROMYCIN) ophthalmic ointment Apply ointment to lids and lashes in both eyes 2 times daily for 7 days. 3.5 g 0    FLUoxetine 10 MG capsule Take 1-2 capsules (10-20 mg total) by mouth once daily. 180 capsule 3    hyoscyamine (LEVSIN/SL) 0.125 mg Subl  Place 2 tablets (0.25 mg total) under the tongue every 4 (four) hours as needed (spasms). 40 tablet 1    memantine (NAMENDA) 10 MG Tab Take 1 tablet (10 mg total) by mouth 2 (two) times daily. 180 tablet 3    methen-m.blue-s.phos-phsal-hyo (URIBEL) 118-10-40.8-36 mg Cap Take 1 capsule by mouth 3 (three) times daily as needed (dysuria). 30 capsule 5    mirabegron (MYRBETRIQ) 50 mg Tb24 Take 1 tablet (50 mg total) by mouth once daily. 30 tablet 11    pantoprazole (PROTONIX) 40 MG tablet Take 1 tablet (40 mg total) by mouth once daily. 30 min prior to lunch 90 tablet 3    potassium chloride (MICRO-K) 10 MEQ CpSR Take by mouth once daily.      prednisoLONE acetate (PRED FORTE) 1 % DrpS Every 2 hours while awake for 2 days and 4 times daily for 5 days in both eyes. 5 mL 0    solifenacin (VESICARE) 5 MG tablet Take 1 tablet (5 mg total) by mouth once daily. 90 tablet 3    thyroid, pork, (ARMOUR THYROID) 30 mg Tab Take 1 tablet (30 mg total) by mouth before breakfast. 90 tablet 3    [START ON 3/8/2023] ALPRAZolam (XANAX) 0.25 MG tablet Take 1 tablet (0.25 mg total) by mouth 2 (two) times daily as needed for Anxiety. 30 tablet 0    ALPRAZolam (XANAX) 0.25 MG tablet Take 1 tablet (0.25 mg total) by mouth 2 (two) times daily as needed for Anxiety. 30 tablet 0     No current facility-administered medications for this visit.       Exam     ROS  Review of Systems   Constitutional:  Negative for appetite change, chills, fatigue and fever.   HENT:  Negative for congestion, ear pain, postnasal drip, rhinorrhea, sinus pressure, sneezing and sore throat.    Respiratory:  Negative for shortness of breath.    Cardiovascular:  Negative for chest pain and palpitations.   Gastrointestinal:  Negative for abdominal pain, constipation, diarrhea, nausea and vomiting.   Genitourinary:  Negative for dysuria.   Musculoskeletal:  Negative for arthralgias.   Neurological:  Negative for headaches.   Psychiatric/Behavioral:  Positive for decreased  concentration. The patient is nervous/anxious.          Physical Exam  Vitals:    02/06/23 1452   BP: (!) 142/70   Pulse: 70   Temp: 97.6 °F (36.4 °C)   Weight: 67.9 kg (149 lb 12.8 oz)   Height: 5' (1.524 m)    Body mass index is 29.26 kg/m².  Weight: 67.9 kg (149 lb 12.8 oz)   Height: 5' (152.4 cm)   Physical Exam  Constitutional:       General: She is not in acute distress.     Appearance: Normal appearance.   HENT:      Head: Normocephalic and atraumatic.      Right Ear: External ear normal.      Left Ear: External ear normal.      Nose: Nose normal.      Mouth/Throat:      Mouth: Mucous membranes are moist.   Eyes:      Pupils: Pupils are equal, round, and reactive to light.   Cardiovascular:      Rate and Rhythm: Normal rate and regular rhythm.      Pulses: Normal pulses.   Pulmonary:      Effort: Pulmonary effort is normal. No respiratory distress.      Breath sounds: Normal breath sounds. No wheezing.   Abdominal:      General: Bowel sounds are normal.      Palpations: Abdomen is soft.   Musculoskeletal:      Cervical back: Neck supple.   Lymphadenopathy:      Cervical: No cervical adenopathy.   Skin:     General: Skin is warm and dry.   Neurological:      Mental Status: She is alert and oriented to person, place, and time.   Psychiatric:         Behavior: Behavior is slowed.         Health Maintenance         Date Due Completion Date    DEXA Scan 10/04/2019 10/4/2016 (Declined)    Override on 10/4/2016: Declined    Influenza Vaccine (1) 09/01/2022 1/26/2021 (Declined)    Override on 1/26/2021: Declined    Shingles Vaccine (1 of 2) 06/14/2023 (Originally 9/21/1990) ---    COVID-19 Vaccine (1) 06/14/2023 (Originally 3/21/1941) ---    Pneumococcal Vaccines (Age 65+) (1 - PCV) 06/14/2023 (Originally 9/21/2006) ---    Lipid Panel 06/09/2023 6/9/2022    TETANUS VACCINE 10/04/2026 10/4/2016 (Declined)    Override on 10/4/2016: Declined            Assessment & Plan     Assessment & Plan  Problem List Items  Addressed This Visit          Neuro    Dementia - Primary (Chronic)  - Patient likely needs to follow-up with NeuroPsych to re-establish baseline of cognitive function given recent report history  - I gave options of [facility] or [facility]  - Caregiver present deferred referral as she has to speak with patient's daughter regarding plan of care       Psychiatric    MARTIN (generalized anxiety disorder)  -The current medical regimen is effective;  continue present plan and medications.     Relevant Medications    ALPRAZolam (XANAX) 0.25 MG tablet (Start on 3/8/2023)    ALPRAZolam (XANAX) 0.25 MG tablet       Cardiac/Vascular    Essential hypertension (Chronic)  - Blood pressure within acceptable limits for patient's ago  - No Hypertensive's on board    Overview     improved with hctz 12.5mg daily. add asa 81mg daily. suspect prior event was tia.          Hyperlipidemia  -The current medical regimen is effective;  continue present plan and medications.     Tortuous aorta  -Continue statin therapy    Overview     cxr 1/23/19, on statin, on asa              Health Maintenance reviewed: Deferred     Follow-up: Approx 5 months with PCP for Chronic conditions    30+ minutes of total time spent on the encounter, which includes face to face time and non-face to face time preparing to see the patient (eg, review of tests), Obtaining and/or reviewing separately obtained history, documenting clinical information in the electronic or other health record, independently interpreting results (not separately reported) and communicating results to the patient/family/caregiver, or Care coordination (not separately reported).

## 2023-02-09 ENCOUNTER — OFFICE VISIT (OUTPATIENT)
Dept: UROLOGY | Facility: CLINIC | Age: 83
End: 2023-02-09
Payer: MEDICARE

## 2023-02-09 VITALS
HEART RATE: 48 BPM | WEIGHT: 149.69 LBS | TEMPERATURE: 97 F | SYSTOLIC BLOOD PRESSURE: 135 MMHG | DIASTOLIC BLOOD PRESSURE: 75 MMHG | HEIGHT: 60 IN | BODY MASS INDEX: 29.39 KG/M2

## 2023-02-09 DIAGNOSIS — R30.0 DYSURIA: ICD-10-CM

## 2023-02-09 DIAGNOSIS — N20.0 RENAL STONE: Primary | ICD-10-CM

## 2023-02-09 PROCEDURE — 99214 PR OFFICE/OUTPT VISIT, EST, LEVL IV, 30-39 MIN: ICD-10-PCS | Mod: S$GLB,,, | Performed by: UROLOGY

## 2023-02-09 PROCEDURE — 1125F PR PAIN SEVERITY QUANTIFIED, PAIN PRESENT: ICD-10-PCS | Mod: CPTII,S$GLB,, | Performed by: UROLOGY

## 2023-02-09 PROCEDURE — 1125F AMNT PAIN NOTED PAIN PRSNT: CPT | Mod: CPTII,S$GLB,, | Performed by: UROLOGY

## 2023-02-09 PROCEDURE — 1101F PR PT FALLS ASSESS DOC 0-1 FALLS W/OUT INJ PAST YR: ICD-10-PCS | Mod: CPTII,S$GLB,, | Performed by: UROLOGY

## 2023-02-09 PROCEDURE — 3078F PR MOST RECENT DIASTOLIC BLOOD PRESSURE < 80 MM HG: ICD-10-PCS | Mod: CPTII,S$GLB,, | Performed by: UROLOGY

## 2023-02-09 PROCEDURE — 1159F MED LIST DOCD IN RCRD: CPT | Mod: CPTII,S$GLB,, | Performed by: UROLOGY

## 2023-02-09 PROCEDURE — 3288F PR FALLS RISK ASSESSMENT DOCUMENTED: ICD-10-PCS | Mod: CPTII,S$GLB,, | Performed by: UROLOGY

## 2023-02-09 PROCEDURE — 3288F FALL RISK ASSESSMENT DOCD: CPT | Mod: CPTII,S$GLB,, | Performed by: UROLOGY

## 2023-02-09 PROCEDURE — 1160F RVW MEDS BY RX/DR IN RCRD: CPT | Mod: CPTII,S$GLB,, | Performed by: UROLOGY

## 2023-02-09 PROCEDURE — 3075F PR MOST RECENT SYSTOLIC BLOOD PRESS GE 130-139MM HG: ICD-10-PCS | Mod: CPTII,S$GLB,, | Performed by: UROLOGY

## 2023-02-09 PROCEDURE — 1159F PR MEDICATION LIST DOCUMENTED IN MEDICAL RECORD: ICD-10-PCS | Mod: CPTII,S$GLB,, | Performed by: UROLOGY

## 2023-02-09 PROCEDURE — 99214 OFFICE O/P EST MOD 30 MIN: CPT | Mod: S$GLB,,, | Performed by: UROLOGY

## 2023-02-09 PROCEDURE — 1101F PT FALLS ASSESS-DOCD LE1/YR: CPT | Mod: CPTII,S$GLB,, | Performed by: UROLOGY

## 2023-02-09 PROCEDURE — 3075F SYST BP GE 130 - 139MM HG: CPT | Mod: CPTII,S$GLB,, | Performed by: UROLOGY

## 2023-02-09 PROCEDURE — 99999 PR PBB SHADOW E&M-EST. PATIENT-LVL IV: CPT | Mod: PBBFAC,,, | Performed by: UROLOGY

## 2023-02-09 PROCEDURE — 99999 PR PBB SHADOW E&M-EST. PATIENT-LVL IV: ICD-10-PCS | Mod: PBBFAC,,, | Performed by: UROLOGY

## 2023-02-09 PROCEDURE — 1160F PR REVIEW ALL MEDS BY PRESCRIBER/CLIN PHARMACIST DOCUMENTED: ICD-10-PCS | Mod: CPTII,S$GLB,, | Performed by: UROLOGY

## 2023-02-09 PROCEDURE — 3078F DIAST BP <80 MM HG: CPT | Mod: CPTII,S$GLB,, | Performed by: UROLOGY

## 2023-02-09 RX ORDER — MIRABEGRON 50 MG/1
50 TABLET, FILM COATED, EXTENDED RELEASE ORAL DAILY
Qty: 90 TABLET | Refills: 3 | Status: SHIPPED | OUTPATIENT
Start: 2023-02-09 | End: 2023-04-20

## 2023-02-09 NOTE — PROGRESS NOTES
Chief Complaint:   Encounter Diagnoses   Name Primary?    Renal stone Yes    Dysuria        HPI:   2/9/23- dysuria and urgency have actually improved, no issues following failure of PCNL.  7/18/20- 79-year-old female who presents with severe abdominal pain and discomfort, she is being evaluated for colonic impaction diverticulitis.  She is seen to have an extremely large right renal pelvis stone.  This had been seen prior, and she was followed by partner in regards to possible surgery for this.  Patient though it is determined not pursuing not been seen in the last couple of years.  Patient is having upper abdominal pain, no colic at this point.  Patient has had no previous urological history per her and her son's report, her daughter is her primary care take care, she is currently not present at the bedside.  Family history of stones, no urological cancers per the family.  10/17/18: Mag3 shows 72/38 L/R function. -UCx last visit.  PCNL is the only viable treatment option; observation not recommended.  10/8/18: 77 yo woman referred for renal stone after workup for UTI/diverticulitis.  Having diarrhea on meds for diverticulitis.  No abd/pelvic pain and no exac/rel factors.  No hematuria.  No prior urolithiasis.  No urinary bother.  No  history.  Normal sexual function but inactive.  Some memory problems but functional at home.    Allergies:  Patient has no known allergies.    Medications:  has a current medication list which includes the following prescription(s): [START ON 3/8/2023] alprazolam, alprazolam, atorvastatin, erythromycin, fluoxetine, hyoscyamine, memantine, methen-m.blue-s.phos-phsal-hyo, myrbetriq, pantoprazole, potassium chloride, prednisolone acetate, thyroid (pork), solifenacin, [DISCONTINUED] furosemide, and [DISCONTINUED] triamcinolone acetonide 0.1%.    Review of Systems:  General: No fever, chills, fatigability, or weight loss.  Skin: No rashes, itching, or changes in color or texture of  skin.  Chest: Denies JESUS, cyanosis, wheezing, cough, and sputum production.  Abdomen: Appetite fine. No weight loss. Denies diarrhea, abdominal pain, hematemesis, or blood in stool.  Musculoskeletal: No joint stiffness or swelling. Denies back pain.  : As above.  All other review of systems negative.    PMH:   has a past medical history of Clostridium difficile colitis, Dementia, Diverticulitis, High cholesterol, Hypertension, Hypothyroidism, and Kidney stones.    PSH:   has a past surgical history that includes Cataract extraction; Appendectomy (2008); Colonoscopy (N/A, 01/02/2019); Colostomy (Left, 01/02/2019); Maribel procedure (N/A, 01/02/2019); Lysis of adhesions (N/A, 01/02/2019); Colon surgery; Vitrectomy (Right, 09/2013); Colonoscopy (N/A, 06/07/2019); Esophagogastroduodenoscopy (N/A, 09/16/2020); Colonoscopy (N/A, 06/23/2021); Cystoscopy with ureteroscopy, retrograde pyelography, and insertion of stent (Right, 08/23/2022); and Cystoureteroscopy with retrograde pyelography and insertion of stent into ureter (Right, 11/8/2022).    FamHx: family history includes Alzheimer's disease in her mother; Aneurysm in her father; Stomach cancer in her brother.    SocHx:  reports that she has quit smoking. Her smoking use included cigarettes. She has never used smokeless tobacco. She reports that she does not drink alcohol and does not use drugs.      Physical Exam:  There were no vitals filed for this visit.    General: A&Ox3, no apparent distress, no deformities  Neck: No masses, normal ROM  Lungs: normal inspiration, no use of accessory muscles  Heart: normal pulse, no arrhythmias  Abdomen: Soft, NT, ND, no masses, no hernias, no hepatosplenomegaly  Skin: The skin is warm and dry. No jaundice.  Ext: No c/c/e.  : No pelvic floor prolapse.  Normal introitus, no urethral abnomralities. No Perineal abnormalities.    Labs/Studies:   Creatinine 0.9 10/22  CT large right renal pelvis stone 8/22  Lasix renogram 23%  right/77% left 9/22     Impression/Plan:         Right renal stone-  failed PCN  1/10/23,  right stent  11/8/22, 8/23/22    Currently on Myrbetriq 50 mg, with Levsin and Uribel as needed.  Symptoms have actually been stable, recently failed PCNL due to inability to get the nephrostomy tube in position.  Options today would include serial stents versus possible ESWL with serial stents.  Understanding ureteroscopy may also be warranted.  Either way will require multiple procedures, but would not attempt another PCNL.  Since she is doing well today I will see her back in 1 month with a KUB and renal ultrasound, they will consider surgical options and most likely will need stent exchange at that point.  Call with any complaints in the meantime though.  If symptoms persistent could be due to a UTI and family will contact our office.

## 2023-02-13 ENCOUNTER — OFFICE VISIT (OUTPATIENT)
Dept: URGENT CARE | Facility: CLINIC | Age: 83
End: 2023-02-13
Payer: MEDICARE

## 2023-02-13 VITALS
HEIGHT: 60 IN | BODY MASS INDEX: 29.25 KG/M2 | SYSTOLIC BLOOD PRESSURE: 125 MMHG | RESPIRATION RATE: 17 BRPM | TEMPERATURE: 98 F | OXYGEN SATURATION: 97 % | DIASTOLIC BLOOD PRESSURE: 65 MMHG | WEIGHT: 149 LBS | HEART RATE: 53 BPM

## 2023-02-13 DIAGNOSIS — R31.9 URINARY TRACT INFECTION WITH HEMATURIA, SITE UNSPECIFIED: ICD-10-CM

## 2023-02-13 DIAGNOSIS — N39.0 URINARY TRACT INFECTION WITH HEMATURIA, SITE UNSPECIFIED: ICD-10-CM

## 2023-02-13 DIAGNOSIS — J30.1 HAY FEVER: ICD-10-CM

## 2023-02-13 DIAGNOSIS — Z87.440 HISTORY OF RECURRENT UTIS: ICD-10-CM

## 2023-02-13 DIAGNOSIS — T78.40XA ALLERGIC REACTION, INITIAL ENCOUNTER: Primary | ICD-10-CM

## 2023-02-13 DIAGNOSIS — Z86.59 HISTORY OF DEMENTIA: ICD-10-CM

## 2023-02-13 DIAGNOSIS — R22.0 FACIAL SWELLING: ICD-10-CM

## 2023-02-13 DIAGNOSIS — R00.1 BRADYCARDIA: ICD-10-CM

## 2023-02-13 LAB
BILIRUB UR QL STRIP: NEGATIVE
COLOR UR: YELLOW
GLUCOSE UR QL STRIP: NEGATIVE
KETONES UR QL STRIP: NEGATIVE
LEUKOCYTE ESTERASE UR QL STRIP: POSITIVE
PH, POC UA: 5.5
POC BLOOD, URINE: POSITIVE
POC NITRATES, URINE: NEGATIVE
PROT UR QL STRIP: POSITIVE
SP GR UR STRIP: 1.02 (ref 1–1.03)
UROBILINOGEN UR STRIP-ACNC: NORMAL (ref 0.1–1.1)

## 2023-02-13 PROCEDURE — 1159F PR MEDICATION LIST DOCUMENTED IN MEDICAL RECORD: ICD-10-PCS | Mod: CPTII,S$GLB,, | Performed by: PHYSICIAN ASSISTANT

## 2023-02-13 PROCEDURE — 1159F MED LIST DOCD IN RCRD: CPT | Mod: CPTII,S$GLB,, | Performed by: PHYSICIAN ASSISTANT

## 2023-02-13 PROCEDURE — 81003 URINALYSIS AUTO W/O SCOPE: CPT | Mod: QW,S$GLB,, | Performed by: PHYSICIAN ASSISTANT

## 2023-02-13 PROCEDURE — 3078F PR MOST RECENT DIASTOLIC BLOOD PRESSURE < 80 MM HG: ICD-10-PCS | Mod: CPTII,S$GLB,, | Performed by: PHYSICIAN ASSISTANT

## 2023-02-13 PROCEDURE — 3074F PR MOST RECENT SYSTOLIC BLOOD PRESSURE < 130 MM HG: ICD-10-PCS | Mod: CPTII,S$GLB,, | Performed by: PHYSICIAN ASSISTANT

## 2023-02-13 PROCEDURE — 99214 OFFICE O/P EST MOD 30 MIN: CPT | Mod: 25,S$GLB,, | Performed by: PHYSICIAN ASSISTANT

## 2023-02-13 PROCEDURE — 96372 THER/PROPH/DIAG INJ SC/IM: CPT | Mod: S$GLB,,, | Performed by: PHYSICIAN ASSISTANT

## 2023-02-13 PROCEDURE — 3078F DIAST BP <80 MM HG: CPT | Mod: CPTII,S$GLB,, | Performed by: PHYSICIAN ASSISTANT

## 2023-02-13 PROCEDURE — 87086 URINE CULTURE/COLONY COUNT: CPT | Performed by: PHYSICIAN ASSISTANT

## 2023-02-13 PROCEDURE — 1125F PR PAIN SEVERITY QUANTIFIED, PAIN PRESENT: ICD-10-PCS | Mod: CPTII,S$GLB,, | Performed by: PHYSICIAN ASSISTANT

## 2023-02-13 PROCEDURE — 87088 URINE BACTERIA CULTURE: CPT | Performed by: PHYSICIAN ASSISTANT

## 2023-02-13 PROCEDURE — 81003 POCT URINALYSIS, DIPSTICK, AUTOMATED, W/O SCOPE: ICD-10-PCS | Mod: QW,S$GLB,, | Performed by: PHYSICIAN ASSISTANT

## 2023-02-13 PROCEDURE — 99214 PR OFFICE/OUTPT VISIT, EST, LEVL IV, 30-39 MIN: ICD-10-PCS | Mod: 25,S$GLB,, | Performed by: PHYSICIAN ASSISTANT

## 2023-02-13 PROCEDURE — 1125F AMNT PAIN NOTED PAIN PRSNT: CPT | Mod: CPTII,S$GLB,, | Performed by: PHYSICIAN ASSISTANT

## 2023-02-13 PROCEDURE — 96372 PR INJECTION,THERAP/PROPH/DIAG2ST, IM OR SUBCUT: ICD-10-PCS | Mod: S$GLB,,, | Performed by: PHYSICIAN ASSISTANT

## 2023-02-13 PROCEDURE — 3074F SYST BP LT 130 MM HG: CPT | Mod: CPTII,S$GLB,, | Performed by: PHYSICIAN ASSISTANT

## 2023-02-13 PROCEDURE — 87077 CULTURE AEROBIC IDENTIFY: CPT | Performed by: PHYSICIAN ASSISTANT

## 2023-02-13 PROCEDURE — 87186 SC STD MICRODIL/AGAR DIL: CPT | Performed by: PHYSICIAN ASSISTANT

## 2023-02-13 RX ORDER — DOXYCYCLINE HYCLATE 100 MG
100 TABLET ORAL 2 TIMES DAILY
Qty: 14 TABLET | Refills: 0 | Status: SHIPPED | OUTPATIENT
Start: 2023-02-13 | End: 2023-02-20

## 2023-02-13 RX ORDER — DIPHENHYDRAMINE HYDROCHLORIDE 50 MG/ML
50 INJECTION INTRAMUSCULAR; INTRAVENOUS ONCE
Status: COMPLETED | OUTPATIENT
Start: 2023-02-13 | End: 2023-02-13

## 2023-02-13 RX ORDER — DEXAMETHASONE SODIUM PHOSPHATE 100 MG/10ML
10 INJECTION INTRAMUSCULAR; INTRAVENOUS ONCE
Status: COMPLETED | OUTPATIENT
Start: 2023-02-13 | End: 2023-02-13

## 2023-02-13 RX ORDER — MONTELUKAST SODIUM 10 MG/1
10 TABLET ORAL DAILY
Qty: 90 TABLET | Refills: 0 | Status: SHIPPED | OUTPATIENT
Start: 2023-02-13 | End: 2023-05-14

## 2023-02-13 RX ADMIN — DIPHENHYDRAMINE HYDROCHLORIDE 50 MG: 50 INJECTION INTRAMUSCULAR; INTRAVENOUS at 02:02

## 2023-02-13 RX ADMIN — DEXAMETHASONE SODIUM PHOSPHATE 10 MG: 100 INJECTION INTRAMUSCULAR; INTRAVENOUS at 02:02

## 2023-02-13 NOTE — PROGRESS NOTES
Subjective:       Patient ID: Irlanda Lopez is a 82 y.o. female.    Vitals:  height is 5' (1.524 m) and weight is 67.6 kg (149 lb). Her oral temperature is 97.8 °F (36.6 °C). Her blood pressure is 125/65 and her pulse is 53 (abnormal). Her respiration is 17 and oxygen saturation is 97%.     Chief Complaint: Facial Swelling    Patient presents with redness and swelling around the eyes and dry itchy eyes that began this morning. Put some erythromycin cream on the eyes and took a Claritin.     Eye Problem   Both eyes are affected. This is a new problem. The current episode started today. The problem has been gradually worsening. There was no injury mechanism. The pain is at a severity of 3/10. There is No known exposure to pink eye. She Does not wear contacts. Associated symptoms include itching. Pertinent negatives include no blurred vision, eye discharge, double vision, eye redness, fever, foreign body sensation, nausea, photophobia, recent URI or vomiting. She has tried eye drops for the symptoms.     Constitution: Negative for fever.   Eyes:  Positive for eye itching, eye pain and eyelid swelling. Negative for eye trauma, eye discharge, eye redness, photophobia, double vision and blurred vision.   Gastrointestinal:  Negative for nausea and vomiting.   Skin:  Positive for erythema.   Allergic/Immunologic: Positive for eczema.        Skin inflamed     Objective:      Vitals:    02/13/23 1352   BP: 125/65   Pulse: (!) 53   Resp: 17   Temp: 97.8 °F (36.6 °C)   TempSrc: Oral   SpO2: 97%   Weight: 67.6 kg (149 lb)   Height: 5' (1.524 m)       Physical Exam   Constitutional: She is oriented to person, place, and time. She appears well-developed.   HENT:   Head: Normocephalic and atraumatic.   Ears:   Right Ear: External ear normal.   Left Ear: External ear normal.   Nose: Nose normal.   Mouth/Throat: Oropharynx is clear and moist.   Eyes: Conjunctivae, EOM and lids are normal. Pupils are equal, round, and reactive to  light. Right eye exhibits no chemosis. Left eye exhibits no chemosis. Right conjunctiva is not injected. Left conjunctiva is not injected. Right eye exhibits normal extraocular motion and no nystagmus. Left eye exhibits normal extraocular motion and no nystagmus. Extraocular movement intact vision grossly intact gaze aligned appropriately periorbital hyperpigmentation   Neck: Trachea normal and phonation normal. Neck supple. No neck rigidity present.   Cardiovascular: Bradycardia present.   Pulmonary/Chest: Effort normal and breath sounds normal. No stridor. No tachypnea and no bradypnea. No respiratory distress. She has no wheezes. She has no rhonchi. She has no rales.   Musculoskeletal: Normal range of motion.         General: Normal range of motion.   Neurological: She is alert and oriented to person, place, and time.   Skin: Skin is warm, dry and intact. Capillary refill takes less than 2 seconds. erythema   Psychiatric: Her speech is normal and behavior is normal. Judgment and thought content normal.   Nursing note and vitals reviewed.        Assessment:       1. Allergic reaction, initial encounter    2. Facial swelling    3. History of recurrent UTIs    4. Hay fever    5. History of dementia    6. Bradycardia    7. Urinary tract infection with hematuria, site unspecified        Results for orders placed or performed in visit on 02/13/23   POCT Urinalysis, Dipstick, Automated, W/O Scope   Result Value Ref Range    POC Blood, Urine Positive (A) Negative    POC Bilirubin, Urine Negative Negative    POC Urobilinogen, Urine normal 0.1 - 1.1    POC Ketones, Urine Negative Negative    POC Protein, Urine Positive (A) Negative    POC Nitrates, Urine Negative Negative    POC Glucose, Urine Negative Negative    pH, UA 5.5     POC Specific Gravity, Urine 1.020 1.003 - 1.029    POC Leukocytes, Urine Positive (A) Negative    Color, UA Yellow Light Yellow, Yellow       Plan:         Allergic reaction, initial encounter  -      dexAMETHasone injection 10 mg  -     diphenhydrAMINE injection 50 mg    Facial swelling  -     dexAMETHasone injection 10 mg  -     diphenhydrAMINE injection 50 mg    History of recurrent UTIs  -     POCT Urinalysis, Dipstick, Automated, W/O Scope    Hay fever  -     montelukast (SINGULAIR) 10 mg tablet; Take 1 tablet (10 mg total) by mouth once daily.  Dispense: 90 tablet; Refill: 0    History of dementia    Bradycardia    Urinary tract infection with hematuria, site unspecified  -     Urine culture  -     doxycycline (VIBRA-TABS) 100 MG tablet; Take 1 tablet (100 mg total) by mouth 2 (two) times daily. for 7 days  Dispense: 14 tablet; Refill: 0           Medical Decision Making:   History:   Old Records Summarized: records from clinic visits.       <> Summary of Records: Patient has been seen by allergist before for this problem.  She has a bunch of eye drops and ointments  Initial Assessment:   Patient has history of bladder stent---urinalysis pending; has established urologist    This is not the 1st time her face has been swollen and red    Denies any new medications, supplements, hygiene products, or food.  Differential Diagnosis:   Eczema  Allergic reaction  Facial cellulitis  Anxiety induced hives  UTI  Clinical Tests:   Lab Tests: Ordered and Reviewed       <> Summary of Lab: Abnormal urinalysis--will cover antibiotics--urine culture pending  Urgent Care Management:  IM steroid and antihistamine      Patient skin has calmed down with IM steroid and Benadryl.  STEROID STEWARDSHIP:   Discussed risks versus benefits of steroid shot.    Explained that there is a risk of temporary decreased immune system and temporary elevation of blood pressure/blood sugar.    Patient elected to proceed with steroid shot at this time.           Patient Instructions     - Please touch bases with allergist or primary care doctor if your symptoms are not improving   - Please go to emergency room after symptoms or  worsening    ALLERGIC REACTION:    - Stop Claritin; replace with Singular antihistamine daily for allergies and for this acute itching/rash/allergic reaction.    - continue ointment and drops as directed by primary care and allergist per follow-up appointment    - You can take Benadryl over-the-counter as directed as well for itching/rash/allergic reaction. (Benadryl CAUSES DROWSINESS!-DO NOT DRIVE OR OPERATE HEAVY MACHINERY AFTER TAKING HIS MEDICATION)  - You can also take over-the-counter Zantac (heartburn medication) for itching/rash/allergic reaction.  - You should go to the ER for any new or worsening symtoms, including but not limited to: difficulty breathing, inability to swallow/talk, CP, or syncope.    URINARY TRACT INFECTION:    -Take your antibiotics as directed and complete the entire course.  -Drink plenty fluids.  -Urine culture pending. We will call with results.  -If your symptoms are not improving or worsen, you will need to follow-up with primary care or go to the emergency department      - You must understand that you have received an Urgent Care treatment only and that you may be released before all of your medical problems are known or treated.   - You, the patient, will arrange for follow up care as instructed with your primary care provider or recommended specialist.   - If your condition worsens or fails to improve we recommend that you receive another evaluation at the ER immediately or contact your PCP to discuss your concerns, or return here.   - Please do not drive or make any important decisions for 24 hours if you have received any pain medications, sedatives or mood altering drugs during your visit.    Disclaimer: This document was drafted with the use of a voice recognition device and is likely to have sound alike errors.

## 2023-02-13 NOTE — PATIENT INSTRUCTIONS
- Please touch bases with allergist or primary care doctor if your symptoms are not improving   - Please go to emergency room after symptoms or worsening    ALLERGIC REACTION:    - Stop Claritin; replace with Singular antihistamine daily for allergies and for this acute itching/rash/allergic reaction.    - continue ointment and drops as directed by primary care and allergist per follow-up appointment    - You can take Benadryl over-the-counter as directed as well for itching/rash/allergic reaction. (Benadryl CAUSES DROWSINESS!-DO NOT DRIVE OR OPERATE HEAVY MACHINERY AFTER TAKING HIS MEDICATION)  - You can also take over-the-counter Zantac (heartburn medication) for itching/rash/allergic reaction.  - You should go to the ER for any new or worsening symtoms, including but not limited to: difficulty breathing, inability to swallow/talk, CP, or syncope.    URINARY TRACT INFECTION:    -Take your antibiotics as directed and complete the entire course.  -Drink plenty fluids.  -Urine culture pending. We will call with results.  -If your symptoms are not improving or worsen, you will need to follow-up with primary care or go to the emergency department      - You must understand that you have received an Urgent Care treatment only and that you may be released before all of your medical problems are known or treated.   - You, the patient, will arrange for follow up care as instructed with your primary care provider or recommended specialist.   - If your condition worsens or fails to improve we recommend that you receive another evaluation at the ER immediately or contact your PCP to discuss your concerns, or return here.   - Please do not drive or make any important decisions for 24 hours if you have received any pain medications, sedatives or mood altering drugs during your visit.    Disclaimer: This document was drafted with the use of a voice recognition device and is likely to have sound alike errors.

## 2023-02-15 ENCOUNTER — TELEPHONE (OUTPATIENT)
Dept: PRIMARY CARE CLINIC | Facility: CLINIC | Age: 83
End: 2023-02-15
Payer: MEDICARE

## 2023-02-15 DIAGNOSIS — F03.94 DEMENTIA WITH ANXIETY, UNSPECIFIED DEMENTIA SEVERITY, UNSPECIFIED DEMENTIA TYPE: Primary | ICD-10-CM

## 2023-02-15 LAB — BACTERIA UR CULT: ABNORMAL

## 2023-02-15 NOTE — TELEPHONE ENCOUNTER
----- Message from Zack Osorio sent at 2/15/2023  6:46 AM CST -----  Contact: pt daughter - zaheer  States she's calling rg the pt's being placed on anxiety meds and states that the pt is acting out/ upset and wants to discuss what's been going on with the pt and how she has been acting and can be reached at [phone]//thanks/dbw     Please call back asap rg the matter today pls

## 2023-02-15 NOTE — TELEPHONE ENCOUNTER
----- Message from Marii Lutz sent at 2/15/2023  8:07 AM CST -----  Contact: [phone]  Patient daughter Myla would like to consult with a nurse in regards to a referral. She stated she is wanting her mom to see Dr.Bern Mansfield. Please call back at [phone]. Thanks horace

## 2023-02-16 ENCOUNTER — PATIENT MESSAGE (OUTPATIENT)
Dept: UROLOGY | Facility: CLINIC | Age: 83
End: 2023-02-16
Payer: MEDICARE

## 2023-02-16 ENCOUNTER — TELEPHONE (OUTPATIENT)
Dept: URGENT CARE | Facility: CLINIC | Age: 83
End: 2023-02-16
Payer: MEDICARE

## 2023-02-16 ENCOUNTER — TELEPHONE (OUTPATIENT)
Dept: UROLOGY | Facility: CLINIC | Age: 83
End: 2023-02-16
Payer: MEDICARE

## 2023-02-16 RX ORDER — SULFAMETHOXAZOLE AND TRIMETHOPRIM 800; 160 MG/1; MG/1
1 TABLET ORAL 2 TIMES DAILY
Qty: 14 TABLET | Refills: 0 | Status: SHIPPED | OUTPATIENT
Start: 2023-02-16 | End: 2023-02-23

## 2023-02-16 NOTE — TELEPHONE ENCOUNTER
I have signed for the following orders AND/OR meds.  Please call the patient and ask the patient to schedule the testing AND/OR inform about any medications that were sent.      Orders Placed This Encounter   Procedures    Ambulatory referral/consult to Adult Neuropsychology     Standing Status:   Future     Standing Expiration Date:   3/16/2024     Referral Priority:   Routine     Referral Type:   Psychiatric     Referral Reason:   Specialty Services Required     Referred to Provider:   Bimal Mansfield, PhD     Number of Visits Requested:   1

## 2023-02-16 NOTE — TELEPHONE ENCOUNTER
+uti. Sensitive to only macrobid and bactrim oral. Daughter called back. Pt ID confirmed with name and . Explained results and need to change abx. Last GFR>60. Rx bactrim sent to pharmacy on file. Take until completion. If still having symptoms after completion, f/u urine culture with Dr. Matute. If any worsening AMS, fever, vomiting, lethargy to report to hospital. Expressed understanding.

## 2023-02-16 NOTE — TELEPHONE ENCOUNTER
+ ecoli urine culture. On doxycycline. Needs switched to bactrim BID x 7 days. Attempted to call daughter POA. [facility] to return call.  Katrina Zabala

## 2023-02-16 NOTE — TELEPHONE ENCOUNTER
----- Message from Zack Osorio sent at 2/15/2023  6:48 AM CST -----  Contact: Pt  Type:  Needs Medical Advice    Who Called: zaheer   Symptoms (please be specific): UTI/ kindey stones - Stint was placed - pt is acting out  How long has patient had these symptoms:     Pharmacy name and phone #:     Would the patient rather a call back or a response via [facility]? Phone  Best Call Back Number: [phone]  Additional Information:     Please call back asap rg the matter today pls

## 2023-02-16 NOTE — TELEPHONE ENCOUNTER
"Returned call to pts daughter.  States mom is having lots of discomfort and pain probably related to the stent; however she recently presented to [facility]'s urgent care with symptoms of UTI, confusion and "acting out".  Daughter states mom was put on antibiotics but is concerned that the infection is not clearing up and wants to know what else can be done.  I told her that I would message Dr. Matute. Afebrile; no hematuria.  "

## 2023-02-17 ENCOUNTER — TELEPHONE (OUTPATIENT)
Dept: UROLOGY | Facility: CLINIC | Age: 83
End: 2023-02-17
Payer: MEDICARE

## 2023-02-17 NOTE — TELEPHONE ENCOUNTER
Left voice message just checking to see how pt was doing and to make sure she was taking the Doxy that Dr. Matute sent in for her.

## 2023-02-26 ENCOUNTER — PATIENT MESSAGE (OUTPATIENT)
Dept: UROLOGY | Facility: CLINIC | Age: 83
End: 2023-02-26
Payer: MEDICARE

## 2023-02-26 ENCOUNTER — NURSE TRIAGE (OUTPATIENT)
Dept: ADMINISTRATIVE | Facility: CLINIC | Age: 83
End: 2023-02-26
Payer: MEDICARE

## 2023-02-26 NOTE — TELEPHONE ENCOUNTER
Pt daughter called and states she is currently not with pt she is 45 mins away from pt.pt daughter informed to call back when she is with pt or have pt's caregiver who is with her call us back.  Reason for Disposition   [1] Caller is not with the adult (patient) AND [2] probable NON-URGENT symptoms    Protocols used: Information Only Call - No Triage-A-

## 2023-02-26 NOTE — TELEPHONE ENCOUNTER
"Pt's caretaker calls back stating, "The pharmacy is stating that the prescription was never called in." NT notes that the uribel RX was e-scribed at 13:55 by Dr. Matute.     Medication  methen-m.blue-s.phos-phsal-hyo (URIBEL) 118-10-40.8-36 mg Cap [[id number]]  Outpatient Medication Detail     Disp Refills Start End LISA   boblue-s.phos-phsal-hyo (URIBEL) 118-10-40.8-36 mg Cap 30 capsule 5 2/26/2023  No   Sig - Route: Take 1 capsule by mouth 3 (three) times daily as needed (dysuria). - Oral   Sent to pharmacy as: methen-m.blue-s.phos-phsal-hyo (URIBEL) 118-10-40.8-36 mg Cap   Class: Normal   Order: [id number]   Date/Time Signed: 2/26/2023 13:55       E-Prescribing Status: Receipt confirmed by pharmacy (2/26/2023  1:55 PM CST)     Pharmacy    [id number] - [place], [place] - [address]          NT reaches out to the pt's preferred pharmacy to ask why the medication was not filled. Pharmacy tech states that generic prescription is not in stock but that they do have the brand name and it is ready for pick-up. Pt's caregiver informed. She verbalizes understanding and is instructed to call back if pt develops any new/worsening sxs, or if they have any additional questions or concerns.   Reason for Disposition   Caller with prescription and triager answers question    Protocols used: Medication Refill and Renewal Call-A-AH    "

## 2023-02-26 NOTE — TELEPHONE ENCOUNTER
"Pt's caretaker calls on behalf of pt requesting a refill of pt's Uribel RX to the [facility] on [address] in [place]. Caretaker states, "Her prescription was originally sent to [facility] but it got transferred to [facility] pharmacy on accident after a recent appointment. I know she has more refills on the script, but we can't get it transferred back to [facility] because the [facility] pharmacy is closed today."    NT reaches out to Dr. Matute, OCP, for further assistance. Dr. Matute advises that he will send a refill to pt's preferred [facility] pharmacy. Caretaker made aware. She verbalizes understanding and is instructed to call back if pt develops any new/worsening sxs, or if she has any additional questions or concerns.    Reason for Disposition   [1] Prescription refill request for ESSENTIAL medicine (i.e., likelihood of harm to patient if not taken) AND [2] triager unable to refill per department policy    Protocols used: Medication Refill and Renewal Call-A-AH    "

## 2023-03-03 ENCOUNTER — PES CALL (OUTPATIENT)
Dept: ADMINISTRATIVE | Facility: CLINIC | Age: 83
End: 2023-03-03
Payer: MEDICARE

## 2023-03-07 ENCOUNTER — TELEPHONE (OUTPATIENT)
Dept: UROLOGY | Facility: CLINIC | Age: 83
End: 2023-03-07
Payer: MEDICARE

## 2023-03-07 DIAGNOSIS — F41.1 GAD (GENERALIZED ANXIETY DISORDER): ICD-10-CM

## 2023-03-07 RX ORDER — ALPRAZOLAM 0.25 MG/1
0.25 TABLET ORAL 2 TIMES DAILY PRN
Qty: 30 TABLET | Refills: 0 | Status: SHIPPED | OUTPATIENT
Start: 2023-03-07 | End: 2023-03-16

## 2023-03-07 NOTE — TELEPHONE ENCOUNTER
Called, ZACHARY with pt's daughter in regards to appt. Pt scheduled for a virtual visit for 3/9/23 at 8:20 with John Newton NP.

## 2023-03-07 NOTE — TELEPHONE ENCOUNTER
----- Message from John Newton NP sent at 3/7/2023  1:38 PM CST -----  Regarding: Anxiety  Team,    Schedule this patient with for virtual visit this Thursday 03/07/2023. Notify daughter/family of appt.    John Newton NP

## 2023-03-08 ENCOUNTER — PATIENT MESSAGE (OUTPATIENT)
Dept: PRIMARY CARE CLINIC | Facility: CLINIC | Age: 83
End: 2023-03-08
Payer: MEDICARE

## 2023-03-08 ENCOUNTER — TELEPHONE (OUTPATIENT)
Dept: INTERNAL MEDICINE | Facility: CLINIC | Age: 83
End: 2023-03-08
Payer: MEDICARE

## 2023-03-08 NOTE — TELEPHONE ENCOUNTER
Contact pt daughter regarding message that was sent over regarding virtual visit. Spoke with daughter Myla, she was fine with everything we discuss. KJ

## 2023-03-10 ENCOUNTER — HOSPITAL ENCOUNTER (OUTPATIENT)
Dept: RADIOLOGY | Facility: HOSPITAL | Age: 83
Discharge: HOME | End: 2023-03-10
Attending: UROLOGY
Payer: MEDICARE

## 2023-03-10 DIAGNOSIS — N20.0 RENAL STONE: ICD-10-CM

## 2023-03-10 PROCEDURE — 74018 RADEX ABDOMEN 1 VIEW: CPT | Mod: TC,FY,PO

## 2023-03-10 PROCEDURE — 74018 RADEX ABDOMEN 1 VIEW: CPT | Mod: 26,,, | Performed by: RADIOLOGY

## 2023-03-10 PROCEDURE — 76770 US EXAM ABDO BACK WALL COMP: CPT | Mod: TC,PO

## 2023-03-10 PROCEDURE — 76770 US RETROPERITONEAL COMPLETE: ICD-10-PCS | Mod: 26,,, | Performed by: RADIOLOGY

## 2023-03-10 PROCEDURE — 74018 XR ABDOMEN AP 1 VIEW: ICD-10-PCS | Mod: 26,,, | Performed by: RADIOLOGY

## 2023-03-10 PROCEDURE — 76770 US EXAM ABDO BACK WALL COMP: CPT | Mod: 26,,, | Performed by: RADIOLOGY

## 2023-03-16 ENCOUNTER — PATIENT MESSAGE (OUTPATIENT)
Dept: PRIMARY CARE CLINIC | Facility: CLINIC | Age: 83
End: 2023-03-16

## 2023-03-16 ENCOUNTER — OFFICE VISIT (OUTPATIENT)
Dept: PRIMARY CARE CLINIC | Facility: CLINIC | Age: 83
End: 2023-03-16
Payer: MEDICARE

## 2023-03-16 ENCOUNTER — OFFICE VISIT (OUTPATIENT)
Dept: UROLOGY | Facility: CLINIC | Age: 83
End: 2023-03-16
Payer: MEDICARE

## 2023-03-16 VITALS
HEIGHT: 60 IN | SYSTOLIC BLOOD PRESSURE: 124 MMHG | HEART RATE: 62 BPM | BODY MASS INDEX: 29.86 KG/M2 | OXYGEN SATURATION: 97 % | TEMPERATURE: 99 F | DIASTOLIC BLOOD PRESSURE: 72 MMHG | WEIGHT: 152.13 LBS

## 2023-03-16 VITALS
DIASTOLIC BLOOD PRESSURE: 77 MMHG | SYSTOLIC BLOOD PRESSURE: 122 MMHG | HEART RATE: 57 BPM | WEIGHT: 149 LBS | BODY MASS INDEX: 29.1 KG/M2

## 2023-03-16 DIAGNOSIS — F01.54 VASCULAR DEMENTIA WITH ANXIETY, UNSPECIFIED DEMENTIA SEVERITY: Chronic | ICD-10-CM

## 2023-03-16 DIAGNOSIS — I77.1 TORTUOUS AORTA: ICD-10-CM

## 2023-03-16 DIAGNOSIS — R30.0 DYSURIA: ICD-10-CM

## 2023-03-16 DIAGNOSIS — I10 ESSENTIAL HYPERTENSION: Chronic | ICD-10-CM

## 2023-03-16 DIAGNOSIS — E78.00 PURE HYPERCHOLESTEROLEMIA: ICD-10-CM

## 2023-03-16 DIAGNOSIS — Z01.818 PRE-OP TESTING: ICD-10-CM

## 2023-03-16 DIAGNOSIS — Z93.3 COLOSTOMY IN PLACE: ICD-10-CM

## 2023-03-16 DIAGNOSIS — F41.1 GAD (GENERALIZED ANXIETY DISORDER): Primary | ICD-10-CM

## 2023-03-16 DIAGNOSIS — N20.0 RENAL STONE: Primary | ICD-10-CM

## 2023-03-16 PROCEDURE — 1159F PR MEDICATION LIST DOCUMENTED IN MEDICAL RECORD: ICD-10-PCS | Mod: CPTII,S$GLB,, | Performed by: UROLOGY

## 2023-03-16 PROCEDURE — 1159F PR MEDICATION LIST DOCUMENTED IN MEDICAL RECORD: ICD-10-PCS | Mod: CPTII,S$GLB,, | Performed by: FAMILY MEDICINE

## 2023-03-16 PROCEDURE — 1159F MED LIST DOCD IN RCRD: CPT | Mod: CPTII,S$GLB,, | Performed by: FAMILY MEDICINE

## 2023-03-16 PROCEDURE — 3078F PR MOST RECENT DIASTOLIC BLOOD PRESSURE < 80 MM HG: ICD-10-PCS | Mod: CPTII,S$GLB,, | Performed by: UROLOGY

## 2023-03-16 PROCEDURE — 3078F DIAST BP <80 MM HG: CPT | Mod: CPTII,S$GLB,, | Performed by: UROLOGY

## 2023-03-16 PROCEDURE — 99214 PR OFFICE/OUTPT VISIT, EST, LEVL IV, 30-39 MIN: ICD-10-PCS | Mod: S$GLB,,, | Performed by: FAMILY MEDICINE

## 2023-03-16 PROCEDURE — 1160F PR REVIEW ALL MEDS BY PRESCRIBER/CLIN PHARMACIST DOCUMENTED: ICD-10-PCS | Mod: CPTII,S$GLB,, | Performed by: UROLOGY

## 2023-03-16 PROCEDURE — 1126F AMNT PAIN NOTED NONE PRSNT: CPT | Mod: CPTII,S$GLB,, | Performed by: UROLOGY

## 2023-03-16 PROCEDURE — 3288F FALL RISK ASSESSMENT DOCD: CPT | Mod: CPTII,S$GLB,, | Performed by: UROLOGY

## 2023-03-16 PROCEDURE — 3078F PR MOST RECENT DIASTOLIC BLOOD PRESSURE < 80 MM HG: ICD-10-PCS | Mod: CPTII,S$GLB,, | Performed by: FAMILY MEDICINE

## 2023-03-16 PROCEDURE — 99999 PR PBB SHADOW E&M-EST. PATIENT-LVL IV: ICD-10-PCS | Mod: PBBFAC,,, | Performed by: FAMILY MEDICINE

## 2023-03-16 PROCEDURE — 1101F PT FALLS ASSESS-DOCD LE1/YR: CPT | Mod: CPTII,S$GLB,, | Performed by: UROLOGY

## 2023-03-16 PROCEDURE — 99214 OFFICE O/P EST MOD 30 MIN: CPT | Mod: S$GLB,,, | Performed by: UROLOGY

## 2023-03-16 PROCEDURE — 1126F PR PAIN SEVERITY QUANTIFIED, NO PAIN PRESENT: ICD-10-PCS | Mod: CPTII,S$GLB,, | Performed by: UROLOGY

## 2023-03-16 PROCEDURE — 1159F MED LIST DOCD IN RCRD: CPT | Mod: CPTII,S$GLB,, | Performed by: UROLOGY

## 2023-03-16 PROCEDURE — 99999 PR PBB SHADOW E&M-EST. PATIENT-LVL IV: ICD-10-PCS | Mod: PBBFAC,,, | Performed by: UROLOGY

## 2023-03-16 PROCEDURE — 3288F PR FALLS RISK ASSESSMENT DOCUMENTED: ICD-10-PCS | Mod: CPTII,S$GLB,, | Performed by: UROLOGY

## 2023-03-16 PROCEDURE — 3074F SYST BP LT 130 MM HG: CPT | Mod: CPTII,S$GLB,, | Performed by: FAMILY MEDICINE

## 2023-03-16 PROCEDURE — 99214 OFFICE O/P EST MOD 30 MIN: CPT | Mod: S$GLB,,, | Performed by: FAMILY MEDICINE

## 2023-03-16 PROCEDURE — 3074F PR MOST RECENT SYSTOLIC BLOOD PRESSURE < 130 MM HG: ICD-10-PCS | Mod: CPTII,S$GLB,, | Performed by: FAMILY MEDICINE

## 2023-03-16 PROCEDURE — 3074F SYST BP LT 130 MM HG: CPT | Mod: CPTII,S$GLB,, | Performed by: UROLOGY

## 2023-03-16 PROCEDURE — 99214 PR OFFICE/OUTPT VISIT, EST, LEVL IV, 30-39 MIN: ICD-10-PCS | Mod: S$GLB,,, | Performed by: UROLOGY

## 2023-03-16 PROCEDURE — 99999 PR PBB SHADOW E&M-EST. PATIENT-LVL IV: CPT | Mod: PBBFAC,,, | Performed by: FAMILY MEDICINE

## 2023-03-16 PROCEDURE — 99999 PR PBB SHADOW E&M-EST. PATIENT-LVL IV: CPT | Mod: PBBFAC,,, | Performed by: UROLOGY

## 2023-03-16 PROCEDURE — 3074F PR MOST RECENT SYSTOLIC BLOOD PRESSURE < 130 MM HG: ICD-10-PCS | Mod: CPTII,S$GLB,, | Performed by: UROLOGY

## 2023-03-16 PROCEDURE — 1101F PR PT FALLS ASSESS DOC 0-1 FALLS W/OUT INJ PAST YR: ICD-10-PCS | Mod: CPTII,S$GLB,, | Performed by: UROLOGY

## 2023-03-16 PROCEDURE — 3078F DIAST BP <80 MM HG: CPT | Mod: CPTII,S$GLB,, | Performed by: FAMILY MEDICINE

## 2023-03-16 PROCEDURE — 1160F RVW MEDS BY RX/DR IN RCRD: CPT | Mod: CPTII,S$GLB,, | Performed by: UROLOGY

## 2023-03-16 RX ORDER — ALPRAZOLAM 0.25 MG/1
0.25 TABLET ORAL 2 TIMES DAILY PRN
Qty: 30 TABLET | Refills: 3 | Status: SHIPPED | OUTPATIENT
Start: 2023-03-16 | End: 2023-04-05 | Stop reason: SDUPTHER

## 2023-03-16 RX ORDER — FLUOXETINE HYDROCHLORIDE 40 MG/1
40 CAPSULE ORAL DAILY
Qty: 90 CAPSULE | Refills: 3 | Status: SHIPPED | OUTPATIENT
Start: 2023-03-16 | End: 2023-06-15 | Stop reason: SDUPTHER

## 2023-03-16 NOTE — H&P (VIEW-ONLY)
Chief Complaint:   Encounter Diagnoses   Name Primary?    Renal stone Yes    Dysuria        HPI:   3/16/23- patient is here today for routine evaluation, she is due for stent exchange, otherwise dysuria and urgency are relatively stable on medical management.    7/18/20- 79-year-old female who presents with severe abdominal pain and discomfort, she is being evaluated for colonic impaction diverticulitis.  She is seen to have an extremely large right renal pelvis stone.  This had been seen prior, and she was followed by partner in regards to possible surgery for this.  Patient though it is determined not pursuing not been seen in the last couple of years.  Patient is having upper abdominal pain, no colic at this point.  Patient has had no previous urological history per her and her son's report, her daughter is her primary care take care, she is currently not present at the bedside.  Family history of stones, no urological cancers per the family.  10/17/18: Mag3 shows 72/38 L/R function. -UCx last visit.  PCNL is the only viable treatment option; observation not recommended.  10/8/18: 77 yo woman referred for renal stone after workup for UTI/diverticulitis.  Having diarrhea on meds for diverticulitis.  No abd/pelvic pain and no exac/rel factors.  No hematuria.  No prior urolithiasis.  No urinary bother.  No  history.  Normal sexual function but inactive.  Some memory problems but functional at home.    Allergies:  Patient has no known allergies.    Medications:  has a current medication list which includes the following prescription(s): alprazolam, armour thyroid, atorvastatin, erythromycin, fluoxetine, hyoscyamine, memantine, myrbetriq, montelukast, pantoprazole, phenazopyridine, potassium chloride, prednisolone acetate, [DISCONTINUED] furosemide, and [DISCONTINUED] triamcinolone acetonide 0.1%.    Review of Systems:  General: No fever, chills, fatigability, or weight loss.  Skin: No rashes, itching, or changes in  color or texture of skin.  Chest: Denies JESUS, cyanosis, wheezing, cough, and sputum production.  Abdomen: Appetite fine. No weight loss. Denies diarrhea, abdominal pain, hematemesis, or blood in stool.  Musculoskeletal: No joint stiffness or swelling. Denies back pain.  : As above.  All other review of systems negative.    PMH:   has a past medical history of Clostridium difficile colitis, Dementia, Diverticulitis, High cholesterol, Hypertension, Hypothyroidism, and Kidney stones.    PSH:   has a past surgical history that includes Cataract extraction; Appendectomy (2008); Colonoscopy (N/A, 01/02/2019); Colostomy (Left, 01/02/2019); Maribel procedure (N/A, 01/02/2019); Lysis of adhesions (N/A, 01/02/2019); Colon surgery; Vitrectomy (Right, 09/2013); Colonoscopy (N/A, 06/07/2019); Esophagogastroduodenoscopy (N/A, 09/16/2020); Colonoscopy (N/A, 06/23/2021); Cystoscopy with ureteroscopy, retrograde pyelography, and insertion of stent (Right, 08/23/2022); and Cystoureteroscopy with retrograde pyelography and insertion of stent into ureter (Right, 11/8/2022).    FamHx: family history includes Alzheimer's disease in her mother; Aneurysm in her father; Stomach cancer in her brother.    SocHx:  reports that she has quit smoking. Her smoking use included cigarettes. She has never used smokeless tobacco. She reports that she does not drink alcohol and does not use drugs.      Physical Exam:  Vitals:    03/16/23 1125   BP: 122/77   Pulse: (!) 57       General: A&Ox3, no apparent distress, no deformities  Neck: No masses, normal ROM  Lungs: normal inspiration, no use of accessory muscles  Heart: normal pulse, no arrhythmias  Abdomen: Soft, NT, ND, no masses, no hernias, no hepatosplenomegaly  Skin: The skin is warm and dry. No jaundice.  Ext: No c/c/e.  : No pelvic floor prolapse.  Normal introitus, no urethral abnomralities. No Perineal abnormalities.    Labs/Studies:   Ucx E.Coli 2/23  Creatinine 0.9 12/22  KUB/FIDEL 3.4  cm right renal stone, right stent, mild right hydro 3/23  CT large right renal pelvis stone 8/22  Lasix renogram 23% right/77% left 9/22     Impression/Plan:        Right renal stone-  failed PCN  1/10/23,  right stent  11/8/22, 8/23/22    Currently on Myrbetriq 50 mg, with Levsin as needed.  Symptoms have actually been stable, possibly having some increased anxiety from the Myrbetriq and they may try to hold this.  If bladder symptoms return they may reinitiate.  Previous failed PCNL due to inability to get the nephrostomy tube in position.  We have again discussed options today and since she is due for stent exchange we decided to pursue possible serial ureteroscopies.  Patient's daughter understands this could take multiple procedures, could even alleviate the stone with a single procedure.  Either way will schedule for cystoscopy with retrograde and stent exchange, in addition will perform right ureteroscopy with laser lithotripsy.      Patient understands the risks, benefits and alternatives to the above-stated procedure.  These include but are not limited to damage to the surrounding structures including the urethra, ureter, bladder and kidney.  Risk of perforation requiring open procedure.  Risk of recurrent stones, need for stents, need for secondary or more definitive procedures for these stones.  Risk of infection, hematuria, persistent pain or stent discomfort.  Risk of heart attack, stroke, death, DVT and PE.  Patient understanding of all the above has elected to proceed.

## 2023-03-16 NOTE — PROGRESS NOTES
Subjective:      Patient ID: Irlanda Lopez is a 82 y.o. female.    Chief Complaint: Follow-up      Patient is a 82 y.o. female coming in today  has a past medical history of Clostridium difficile colitis, Dementia, Diverticulitis, High cholesterol, Hypertension, Hypothyroidism, and Kidney stones.    Pt presents to clinic for f/u. Pt accompanied by family today, daughter Faiza.    Has been f/u with Dr. Matute, urology, for renal stone; has scheduled surgery next month for renal stent replacement and lithotripsy. Currently on Xanax, Pyridium, Mirabegron, Fluoxetine, and Namenda. Family reports pt has not been able to start f/u with psychologist for anxiety treatment. Family is requesting tips to help pt with dementia, recurrent facial washing,  and anxiety treatment.     No questionnaires on file.    Pmh, Psh, Family Hx, Social Hx, HM updated in Epic Tabs today.   Review of Systems   Constitutional:  Negative for chills, fatigue and fever.   HENT:  Negative for ear pain and trouble swallowing.    Eyes:  Negative for pain and visual disturbance.   Respiratory:  Negative for cough and shortness of breath.    Cardiovascular:  Negative for chest pain and leg swelling.   Gastrointestinal:  Negative for abdominal pain, blood in stool, nausea and vomiting.   Endocrine: Negative for cold intolerance and heat intolerance.   Genitourinary:  Negative for dysuria and frequency.   Musculoskeletal:  Negative for joint swelling, myalgias and neck pain.   Skin:  Positive for color change and rash.   Neurological:  Negative for dizziness and headaches.   Psychiatric/Behavioral:  Negative for behavioral problems and sleep disturbance.    Objective:     Vitals:    03/16/23 1321   BP: 124/72   Pulse: 62   Temp: 98.5 °F (36.9 °C)   SpO2: 97%   Weight: 69 kg (152 lb 1.9 oz)   Height: 5' (1.524 m)     Wt Readings from Last 10 Encounters:   03/16/23 69 kg (152 lb 1.9 oz)   03/16/23 67.6 kg (149 lb)   02/15/23 67.6 kg (149 lb)    02/13/23 67.6 kg (149 lb)   02/09/23 67.9 kg (149 lb 11.1 oz)   02/06/23 67.9 kg (149 lb 12.8 oz)   01/10/23 67.8 kg (149 lb 5.8 oz)   12/12/22 67.1 kg (148 lb)   11/08/22 67.1 kg (148 lb 0.6 oz)   10/12/22 66.3 kg (146 lb 0.9 oz)     Physical Exam  Vitals and nursing note reviewed.   Constitutional:       General: She is awake.      Appearance: Normal appearance. She is well-developed, well-groomed and normal weight.   HENT:      Head: Normocephalic and atraumatic.      Right Ear: External ear normal.      Left Ear: External ear normal.      Nose: Nose normal.   Eyes:      Conjunctiva/sclera: Conjunctivae normal.   Neck:      Thyroid: No thyromegaly or thyroid tenderness.   Cardiovascular:      Rate and Rhythm: Normal rate and regular rhythm.      Heart sounds: Normal heart sounds.   Pulmonary:      Effort: Pulmonary effort is normal. No accessory muscle usage.      Breath sounds: Normal breath sounds.   Musculoskeletal:      Cervical back: Normal range of motion and neck supple.   Skin:     Findings: Erythema present.      Comments: Face and cheeks erythematous from excessive scratching   Neurological:      General: No focal deficit present.      Mental Status: She is alert. Mental status is at baseline.   Psychiatric:         Attention and Perception: Attention normal.         Mood and Affect: Mood normal.         Speech: Speech normal.         Behavior: Behavior normal. Behavior is cooperative.         Thought Content: Thought content normal.         Cognition and Memory: Cognition is impaired. Memory is impaired. She exhibits impaired recent memory.         Judgment: Judgment is impulsive. Judgment is not inappropriate.     Assessment:     1. Vascular dementia with anxiety, unspecified dementia severity    2. Pure hypercholesterolemia    3. Tortuous aorta    4. Essential hypertension    5. MARTIN (generalized anxiety disorder)    6. Colostomy in place        LABS:   Lab Results   Component Value Date    HGBA1C  5.2 06/09/2022    HGBA1C 5.3 06/02/2021    HGBA1C 5.4 05/12/2020      Lab Results   Component Value Date    CHOL 154 06/09/2022    CHOL 166 06/02/2021    CHOL 158 05/12/2020     Lab Results   Component Value Date    LDLCALC 104.6 06/09/2022    LDLCALC 107.6 06/02/2021    LDLCALC 103.4 05/12/2020     Lab Results   Component Value Date    WBC 9.23 12/12/2022    HGB 12.9 12/12/2022    HCT 40.1 12/12/2022     12/12/2022    CHOL 154 06/09/2022    TRIG 57 06/09/2022    HDL 38 (L) 06/09/2022    ALT 9 (L) 12/12/2022    AST 16 12/12/2022     12/12/2022    K 4.2 12/12/2022     12/12/2022    CREATININE 0.9 12/12/2022    BUN 15 12/12/2022    CO2 21 (L) 12/12/2022    TSH 1.244 06/09/2022    INR 1.0 06/10/2019    HGBA1C 5.2 06/09/2022       The ASCVD Risk score (Coby DK, et al., 2019) failed to calculate for the following reasons:    The 2019 ASCVD risk score is only valid for ages 40 to 79    US Retroperitoneal Complete  Narrative: EXAM: US RETROPERITONEAL COMPLETE    CLINICAL HISTORY: [N20.0]-Calculus of kidney.    TECHNIQUE: Standard retroperitoneal ultrasound with grayscale and Doppler images of the kidneys and bladder.    FINDINGS:  The right kidney measures 10.6 cm.  The left kidney measures 11.9 cm. Cortical thinning of right kidney.  Right ureteral stent appears well positioned with the tips in the right renal pelvis and bladder.  3.5 cm calculus in the right renal pelvis with mild to moderate hydronephrosis.  Small nonobstructing right renal calculi.  No evidence of left nephrolithiasis or left hydronephrosis.  Shadowing stent is seen in the bladder lumen.    No cystic or solid renal masses identified. No perinephric fluid collections seen. No definite evidence of superimposed bladder calculi.  Mild bladder wall thickening.  Impression: Well-positioned right ureteral stent. 3.5 cm calculus in the right renal pelvis with mild to moderate hydronephrosis.  Small nonobstructing right renal calculi.  Mild  bladder wall thickening.  Otherwise unremarkable retroperitoneal ultrasound.    Finalized on: 3/10/2023 1:11 PM By:  Robbin Parra MD  [facility]# [id number]      2023-03-10 13:13:43.647    [facility]  X-Ray Abdomen AP 1 View  Narrative: EXAM:    Single view of the abdomen.    CLINICAL HISTORY:  [N20.0]-Calculus of kidney.    TECHNIQUE: Abdominal x-ray 2 views    FINDINGS: Right ureteral stent projects in expected position.  Stable 3.4 cm calculus in the region of the right renal pelvis.  No other radiographic evidence of urolithiasis.  Stable right pelvic phleboliths. The bowel gas pattern is within normal limits. There is no radiographic evidence of bowel obstruction, ileus or free air.  Impression:  See above.    Finalized on: 3/10/2023 11:55 AM By:  Robbin Parra MD  [facility]# [id number]      2023-03-10 11:57:19.310    [facility]      Plan:   Irlanda was seen today for follow-up.    Diagnoses and all orders for this visit:    Vascular dementia with anxiety, unspecified dementia severity    Pure hypercholesterolemia    Tortuous aorta    Essential hypertension    MARTIN (generalized anxiety disorder)  -     FLUoxetine 40 MG capsule; Take 1 capsule (40 mg total) by mouth once daily. To control anxiety and OCD symptoms Maintenance  -     ALPRAZolam (XANAX) 0.25 MG tablet; Take 1 tablet (0.25 mg total) by mouth 2 (two) times daily as needed for Anxiety.    Colostomy in place      Advised family on exercises and habits to help pt with concentration and anxiety treatment.   MARTIN not controlled. Increase prozac from 20mg to Rx Fluoxetine 40 mg/day for OCD and anxiety treatment.  Refilled Rx Xanax 0.25 mg BID prn for anxiety treatment.   Can hold myrbetriq.   Instructed family to have pt f/u in 3 months.     There are no Patient Instructions on file for this visit.    Follow up in about 3 months (around 6/16/2023) for f/u office visit Dr. Arias.  Rafyibe Attestation:   Daniel ROWAN am scribing for, and in the presence of, Dr. Myla Arias MD. HARPAL  performed the above scribed service and the documentation accurately describes the services I performed. I attest to the accuracy of the note.    I, Dr. Myla Arias MD, reviewed documentation as scribed above. I personally performed the services described in this documentation.  I agree that the record reflects my personal performance and is accurate and complete. Myla Arias MD.    03/16/2023

## 2023-03-17 ENCOUNTER — PATIENT MESSAGE (OUTPATIENT)
Dept: PRIMARY CARE CLINIC | Facility: CLINIC | Age: 83
End: 2023-03-17
Payer: MEDICARE

## 2023-03-20 ENCOUNTER — HOSPITAL ENCOUNTER (OUTPATIENT)
Facility: HOSPITAL | Age: 83
Discharge: HOME | End: 2023-03-22
Attending: EMERGENCY MEDICINE | Admitting: SURGERY
Payer: MEDICARE

## 2023-03-20 DIAGNOSIS — R10.84 GENERALIZED ABDOMINAL PAIN: Primary | ICD-10-CM

## 2023-03-20 DIAGNOSIS — K56.609 SMALL BOWEL OBSTRUCTION: ICD-10-CM

## 2023-03-20 DIAGNOSIS — K91.89: ICD-10-CM

## 2023-03-20 DIAGNOSIS — N30.90 CYSTITIS: ICD-10-CM

## 2023-03-20 PROBLEM — R11.2 NAUSEA AND VOMITING: Status: RESOLVED | Noted: 2019-04-24 | Resolved: 2023-03-20

## 2023-03-20 PROBLEM — R30.0 DYSURIA: Status: RESOLVED | Noted: 2022-12-12 | Resolved: 2023-03-20

## 2023-03-20 PROBLEM — K65.1 INTRA-ABDOMINAL ABSCESS: Status: RESOLVED | Noted: 2019-01-25 | Resolved: 2023-03-20

## 2023-03-20 PROBLEM — R14.0 ABDOMINAL DISTENSION: Status: RESOLVED | Noted: 2019-04-24 | Resolved: 2023-03-20

## 2023-03-20 PROBLEM — N13.2 HYDRONEPHROSIS WITH RENAL CALCULOUS OBSTRUCTION: Status: RESOLVED | Noted: 2022-08-22 | Resolved: 2023-03-20

## 2023-03-20 PROBLEM — K52.9 COLITIS: Status: RESOLVED | Noted: 2020-04-13 | Resolved: 2023-03-20

## 2023-03-20 PROBLEM — N63.10 MASS OF RIGHT BREAST: Status: ACTIVE | Noted: 2023-03-20

## 2023-03-20 PROBLEM — Z12.11 SCREENING FOR COLON CANCER: Status: RESOLVED | Noted: 2019-06-07 | Resolved: 2023-03-20

## 2023-03-20 PROBLEM — N39.0 UTI (URINARY TRACT INFECTION): Status: RESOLVED | Noted: 2021-05-23 | Resolved: 2023-03-20

## 2023-03-20 PROBLEM — R93.3 ABNORMAL FINDING ON GI TRACT IMAGING: Status: RESOLVED | Noted: 2021-06-23 | Resolved: 2023-03-20

## 2023-03-20 LAB
ALBUMIN SERPL BCP-MCNC: 3.6 G/DL (ref 3.5–5.2)
ALP SERPL-CCNC: 71 U/L (ref 55–135)
ALT SERPL W/O P-5'-P-CCNC: 15 U/L (ref 10–44)
ANION GAP SERPL CALC-SCNC: 11 MMOL/L (ref 8–16)
AST SERPL-CCNC: 27 U/L (ref 10–40)
BACTERIA #/AREA URNS HPF: ABNORMAL /HPF
BASOPHILS # BLD AUTO: 0.02 K/UL (ref 0–0.2)
BASOPHILS NFR BLD: 0.2 % (ref 0–1.9)
BILIRUB SERPL-MCNC: 1 MG/DL (ref 0.1–1)
BILIRUB UR QL STRIP: NEGATIVE
BUN SERPL-MCNC: 12 MG/DL (ref 8–23)
CALCIUM SERPL-MCNC: 9.1 MG/DL (ref 8.7–10.5)
CHLORIDE SERPL-SCNC: 101 MMOL/L (ref 95–110)
CLARITY UR: ABNORMAL
CO2 SERPL-SCNC: 22 MMOL/L (ref 23–29)
COLOR UR: YELLOW
CREAT SERPL-MCNC: 0.9 MG/DL (ref 0.5–1.4)
DIFFERENTIAL METHOD: ABNORMAL
EOSINOPHIL # BLD AUTO: 0 K/UL (ref 0–0.5)
EOSINOPHIL NFR BLD: 0.4 % (ref 0–8)
ERYTHROCYTE [DISTWIDTH] IN BLOOD BY AUTOMATED COUNT: 12.9 % (ref 11.5–14.5)
EST. GFR  (NO RACE VARIABLE): >60 ML/MIN/1.73 M^2
GLUCOSE SERPL-MCNC: 109 MG/DL (ref 70–110)
GLUCOSE UR QL STRIP: NEGATIVE
HCT VFR BLD AUTO: 41.6 % (ref 37–48.5)
HCV AB SERPL QL IA: NEGATIVE
HEP C VIRUS HOLD SPECIMEN: NORMAL
HGB BLD-MCNC: 14.1 G/DL (ref 12–16)
HGB UR QL STRIP: ABNORMAL
HIV 1+2 AB+HIV1 P24 AG SERPL QL IA: NEGATIVE
HYALINE CASTS #/AREA URNS LPF: 0 /LPF
IMM GRANULOCYTES # BLD AUTO: 0.05 K/UL (ref 0–0.04)
IMM GRANULOCYTES NFR BLD AUTO: 0.5 % (ref 0–0.5)
KETONES UR QL STRIP: NEGATIVE
LEUKOCYTE ESTERASE UR QL STRIP: ABNORMAL
LYMPHOCYTES # BLD AUTO: 2.7 K/UL (ref 1–4.8)
LYMPHOCYTES NFR BLD: 27.6 % (ref 18–48)
MCH RBC QN AUTO: 29.1 PG (ref 27–31)
MCHC RBC AUTO-ENTMCNC: 33.9 G/DL (ref 32–36)
MCV RBC AUTO: 86 FL (ref 82–98)
MICROSCOPIC COMMENT: ABNORMAL
MONOCYTES # BLD AUTO: 1 K/UL (ref 0.3–1)
MONOCYTES NFR BLD: 10.4 % (ref 4–15)
NEUTROPHILS # BLD AUTO: 6 K/UL (ref 1.8–7.7)
NEUTROPHILS NFR BLD: 60.9 % (ref 38–73)
NITRITE UR QL STRIP: NEGATIVE
NRBC BLD-RTO: 0 /100 WBC
PH UR STRIP: 6 [PH] (ref 5–8)
PLATELET # BLD AUTO: 236 K/UL (ref 150–450)
PMV BLD AUTO: 9.9 FL (ref 9.2–12.9)
POTASSIUM SERPL-SCNC: 4.2 MMOL/L (ref 3.5–5.1)
PROT SERPL-MCNC: 7.1 G/DL (ref 6–8.4)
PROT UR QL STRIP: ABNORMAL
RBC # BLD AUTO: 4.85 M/UL (ref 4–5.4)
RBC #/AREA URNS HPF: 36 /HPF (ref 0–4)
SODIUM SERPL-SCNC: 134 MMOL/L (ref 136–145)
SP GR UR STRIP: 1.01 (ref 1–1.03)
SQUAMOUS #/AREA URNS HPF: 3 /HPF
UNIDENT CRYS URNS QL MICRO: ABNORMAL
URN SPEC COLLECT METH UR: ABNORMAL
UROBILINOGEN UR STRIP-ACNC: NEGATIVE EU/DL
WBC # BLD AUTO: 9.79 K/UL (ref 3.9–12.7)
WBC #/AREA URNS HPF: >100 /HPF (ref 0–5)
WBC CLUMPS URNS QL MICRO: ABNORMAL

## 2023-03-20 PROCEDURE — 87088 URINE BACTERIA CULTURE: CPT | Performed by: SURGERY

## 2023-03-20 PROCEDURE — 87389 HIV-1 AG W/HIV-1&-2 AB AG IA: CPT | Performed by: EMERGENCY MEDICINE

## 2023-03-20 PROCEDURE — 86803 HEPATITIS C AB TEST: CPT | Performed by: EMERGENCY MEDICINE

## 2023-03-20 PROCEDURE — 25000003 PHARM REV CODE 250: Performed by: SURGERY

## 2023-03-20 PROCEDURE — 96374 THER/PROPH/DIAG INJ IV PUSH: CPT

## 2023-03-20 PROCEDURE — 87186 SC STD MICRODIL/AGAR DIL: CPT | Performed by: SURGERY

## 2023-03-20 PROCEDURE — 99285 EMERGENCY DEPT VISIT HI MDM: CPT | Mod: 25

## 2023-03-20 PROCEDURE — 81000 URINALYSIS NONAUTO W/SCOPE: CPT | Performed by: SURGERY

## 2023-03-20 PROCEDURE — G0378 HOSPITAL OBSERVATION PER HR: HCPCS

## 2023-03-20 PROCEDURE — 87086 URINE CULTURE/COLONY COUNT: CPT | Performed by: SURGERY

## 2023-03-20 PROCEDURE — 85025 COMPLETE CBC W/AUTO DIFF WBC: CPT | Performed by: EMERGENCY MEDICINE

## 2023-03-20 PROCEDURE — 87077 CULTURE AEROBIC IDENTIFY: CPT | Performed by: SURGERY

## 2023-03-20 PROCEDURE — 96361 HYDRATE IV INFUSION ADD-ON: CPT

## 2023-03-20 PROCEDURE — 63600175 PHARM REV CODE 636 W HCPCS: Performed by: EMERGENCY MEDICINE

## 2023-03-20 PROCEDURE — 80053 COMPREHEN METABOLIC PANEL: CPT | Performed by: EMERGENCY MEDICINE

## 2023-03-20 PROCEDURE — 96375 TX/PRO/DX INJ NEW DRUG ADDON: CPT

## 2023-03-20 RX ORDER — SODIUM CHLORIDE 9 MG/ML
INJECTION, SOLUTION INTRAVENOUS CONTINUOUS
Status: DISCONTINUED | OUTPATIENT
Start: 2023-03-20 | End: 2023-03-21

## 2023-03-20 RX ORDER — MORPHINE SULFATE 4 MG/ML
4 INJECTION, SOLUTION INTRAMUSCULAR; INTRAVENOUS
Status: COMPLETED | OUTPATIENT
Start: 2023-03-20 | End: 2023-03-20

## 2023-03-20 RX ORDER — ONDANSETRON 2 MG/ML
4 INJECTION INTRAMUSCULAR; INTRAVENOUS
Status: COMPLETED | OUTPATIENT
Start: 2023-03-20 | End: 2023-03-20

## 2023-03-20 RX ADMIN — SODIUM CHLORIDE: 9 INJECTION, SOLUTION INTRAVENOUS at 04:03

## 2023-03-20 RX ADMIN — MORPHINE SULFATE 4 MG: 4 INJECTION INTRAVENOUS at 02:03

## 2023-03-20 RX ADMIN — ONDANSETRON 4 MG: 2 INJECTION INTRAMUSCULAR; INTRAVENOUS at 02:03

## 2023-03-20 NOTE — ASSESSMENT & PLAN NOTE
Imaging is concerning for partial small-bowel obstruction  Placed in observation  Gastrografin small-bowel follow-through   NPO except for ice chips  IV medications as needed

## 2023-03-20 NOTE — ASSESSMENT & PLAN NOTE
Urology consultation in the morning.      If surgery is required for a bowel obstruction urologic intervention could occur at the same time if indicated

## 2023-03-20 NOTE — PHARMACY MED REC
"Admission Medication History     The home medication history was taken by Luis Mitchell.    You may go to "Admission" then "Reconcile Home Medications" tabs to review and/or act upon these items.     The home medication list has been updated by the Pharmacy department.   Please read ALL comments highlighted in yellow.   Please address this information as you see fit.    Feel free to contact us if you have any questions or require assistance.      Medications listed below were obtained from: Analytic software- [employer] and Medical records  (Not in a hospital admission)        Luis Mitchell  [id number]    Current Outpatient Medications on File Prior to Encounter   Medication Sig Dispense Refill Last Dose    ALPRAZolam (XANAX) 0.25 MG tablet Take 1 tablet (0.25 mg total) by mouth 2 (two) times daily as needed for Anxiety. 30 tablet 3 3/19/2023    ARMOUR THYROID 30 mg Tab TAKE 1 TABLET (30 MG TOTAL) BY MOUTH BEFORE BREAKFAST. 90 tablet 5 3/19/2023    atorvastatin (LIPITOR) 10 MG tablet TAKE 1 TABLET BY MOUTH ONCE DAILY 90 tablet 3 3/19/2023    erythromycin (ROMYCIN) ophthalmic ointment Apply ointment to lids and lashes in both eyes 2 times daily for 7 days. 3.5 g 0 3/19/2023    FLUoxetine 40 MG capsule Take 1 capsule (40 mg total) by mouth once daily. To control anxiety and OCD symptoms Maintenance 90 capsule 3 3/19/2023    hyoscyamine (LEVSIN/SL) 0.125 mg Subl Place 2 tablets (0.25 mg total) under the tongue every 4 (four) hours as needed (spasms). 40 tablet 1 3/19/2023    memantine (NAMENDA) 10 MG Tab Take 1 tablet (10 mg total) by mouth 2 (two) times daily. 180 tablet 3 3/19/2023    mirabegron (MYRBETRIQ) 50 mg Tb24 Take 1 tablet (50 mg total) by mouth once daily. 90 tablet 3 3/19/2023    montelukast (SINGULAIR) 10 mg tablet Take 1 tablet (10 mg total) by mouth once daily. 90 tablet 0 3/19/2023    pantoprazole (PROTONIX) 40 MG tablet Take 1 tablet (40 mg total) by mouth once daily. 30 min prior to lunch 90 " tablet 3 3/19/2023    phenazopyridine (PYRIDIUM) 100 MG tablet Please specify directions, refills and quantity 6 tablet 5 3/19/2023    potassium chloride (MICRO-K) 10 MEQ CpSR Take by mouth once daily.   3/19/2023                         .

## 2023-03-20 NOTE — HPI
2-year-old female who presented to the emergency room with a three-day history of abdominal pain.  The abdominal pain was described as being on the right side and crampy in nature.  When the pain 1st occurred 3 days ago the family tried a bland diet.  She was given medications to help with her symptoms from her right ureteral stent.  She continued to have ongoing complaints of right-sided crampy pain.  She was given Levsin with no results.  She was brought to the emergency room by her family.  The patient has had colostomy output throughout this.  She is had intermittent nausea.      The patient has a colostomy secondary to diverticulitis.  Surgery was complicated by an abscess that required CT-guided drainage.  She is not considered to be a candidate for colostomy reversal.  She is a known parastomal hernia.  She has short-term memory loss

## 2023-03-20 NOTE — SUBJECTIVE & OBJECTIVE
No current facility-administered medications on file prior to encounter.     Current Outpatient Medications on File Prior to Encounter   Medication Sig    ALPRAZolam (XANAX) 0.25 MG tablet Take 1 tablet (0.25 mg total) by mouth 2 (two) times daily as needed for Anxiety.    ARMOUR THYROID 30 mg Tab TAKE 1 TABLET (30 MG TOTAL) BY MOUTH BEFORE BREAKFAST.    atorvastatin (LIPITOR) 10 MG tablet TAKE 1 TABLET BY MOUTH ONCE DAILY    erythromycin (ROMYCIN) ophthalmic ointment Apply ointment to lids and lashes in both eyes 2 times daily for 7 days.    FLUoxetine 40 MG capsule Take 1 capsule (40 mg total) by mouth once daily. To control anxiety and OCD symptoms Maintenance    hyoscyamine (LEVSIN/SL) 0.125 mg Subl Place 2 tablets (0.25 mg total) under the tongue every 4 (four) hours as needed (spasms).    memantine (NAMENDA) 10 MG Tab Take 1 tablet (10 mg total) by mouth 2 (two) times daily.    mirabegron (MYRBETRIQ) 50 mg Tb24 Take 1 tablet (50 mg total) by mouth once daily.    montelukast (SINGULAIR) 10 mg tablet Take 1 tablet (10 mg total) by mouth once daily.    pantoprazole (PROTONIX) 40 MG tablet Take 1 tablet (40 mg total) by mouth once daily. 30 min prior to lunch    phenazopyridine (PYRIDIUM) 100 MG tablet Please specify directions, refills and quantity    potassium chloride (MICRO-K) 10 MEQ CpSR Take by mouth once daily.    prednisoLONE acetate (PRED FORTE) 1 % DrpS Every 2 hours while awake for 2 days and 4 times daily for 5 days in both eyes.    [DISCONTINUED] furosemide (LASIX) 20 MG tablet TAKE 1 TABLET BY MOUTH ONCE DAILY    [DISCONTINUED] triamcinolone acetonide 0.1% (KENALOG) 0.1 % ointment APPLY TOPICALLY 4 (FOUR) TIMES DAILY. TO RASH ON FACE AND CHEST AND NOSE       Review of patient's allergies indicates:  No Known Allergies    Past Medical History:   Diagnosis Date    Clostridium difficile colitis     Dementia     Diverticulitis     s/p colostomy    High cholesterol     Hypertension     Hypothyroidism      Kidney stones      Past Surgical History:   Procedure Laterality Date    APPENDECTOMY  2008    CATARACT EXTRACTION      Dr Raygoza    COLON SURGERY      COLONOSCOPY N/A 01/02/2019    Procedure: COLONOSCOPY;  Surgeon: Reece Hogan MD;  Location: [facility];  Service: Endoscopy;  Laterality: N/A;    COLONOSCOPY N/A 06/07/2019    Procedure: COLONOSCOPY;  Surgeon: Rishabh Connelly MD;  Location: [facility];  Service: General;  Laterality: N/A;    COLONOSCOPY N/A 06/23/2021    Procedure: COLONOSCOPY;  Surgeon: Lucy Lafleur MD;  Location: [facility];  Service: Endoscopy;  Laterality: N/A;    COLOSTOMY Left 01/02/2019    Procedure: CREATION, COLOSTOMY;  Surgeon: Reece Hogan MD;  Location: [facility];  Service: General;  Laterality: Left;    CYSTOSCOPY WITH URETEROSCOPY, RETROGRADE PYELOGRAPHY, AND INSERTION OF STENT Right 08/23/2022    Procedure: CYSTOSCOPY, WITH RETROGRADE PYELOGRAM AND URETERAL STENT INSERTION;  Surgeon: Anselmo Matute MD;  Location: [facility];  Service: Urology;  Laterality: Right;    CYSTOURETEROSCOPY WITH RETROGRADE PYELOGRAPHY AND INSERTION OF STENT INTO URETER Right 11/8/2022    Procedure: CYSTOURETEROSCOPY, WITH RETROGRADE PYELOGRAM AND URETERAL STENT INSERTION;  Surgeon: Anselmo Matute MD;  Location: [facility];  Service: Urology;  Laterality: Right;    ESOPHAGOGASTRODUODENOSCOPY N/A 09/16/2020    Procedure: ESOPHAGOGASTRODUODENOSCOPY (EGD)- Needs Rapid;  Surgeon: Lucy Lafleur MD;  Location: [facility];  Service: Endoscopy;  Laterality: N/A;    SHAHIDA PROCEDURE N/A 01/02/2019    Procedure: SHAHIDA PROCEDURE;  Surgeon: Reece Hogan MD;  Location: [facility];  Service: General;  Laterality: N/A;    LYSIS OF ADHESIONS N/A 01/02/2019    Procedure: LYSIS, ADHESIONS;  Surgeon: Reece Hogan MD;  Location: [facility];  Service: General;  Laterality: N/A;    VITRECTOMY Right 09/2013     Family History       Problem Relation (Age of Onset)    Alzheimer's disease Mother    Aneurysm Father    Stomach  cancer Brother          Tobacco Use    Smoking status: Former     Years: 5.00     Types: Cigarettes    Smokeless tobacco: Never   Substance and Sexual Activity    Alcohol use: No    Drug use: No    Sexual activity: Never     Comment:      Review of Systems   Constitutional:  Negative for appetite change, chills, fatigue, fever and unexpected weight change.   HENT:  Negative for hearing loss and rhinorrhea.    Eyes:  Negative for visual disturbance.   Respiratory:  Negative for apnea, cough, shortness of breath and wheezing.    Cardiovascular:  Negative for chest pain and palpitations.   Gastrointestinal:  Positive for abdominal pain. Negative for abdominal distention, blood in stool, constipation, diarrhea, nausea and vomiting.   Genitourinary:  Negative for dysuria, frequency and urgency.   Musculoskeletal:  Negative for arthralgias and neck pain.   Skin:  Negative for rash.   Neurological:  Negative for seizures, weakness, numbness and headaches.        Memory loss   Hematological:  Negative for adenopathy. Does not bruise/bleed easily.   Psychiatric/Behavioral:  Negative for hallucinations. The patient is not nervous/anxious.    Objective:     Vital Signs (Most Recent):  Temp: 98.5 °F (36.9 °C) (03/20/23 1554)  Pulse: 60 (03/20/23 1554)  Resp: 18 (03/20/23 1554)  BP: (!) 156/80 (03/20/23 1554)  SpO2: 95 % (03/20/23 1554)   Vital Signs (24h Range):  Temp:  [98.2 °F (36.8 °C)-98.5 °F (36.9 °C)] 98.5 °F (36.9 °C)  Pulse:  [55-60] 60  Resp:  [18-20] 18  SpO2:  [95 %] 95 %  BP: (156-173)/(80-89) 156/80     Weight: 68.2 kg (150 lb 5.7 oz)  Body mass index is 29.36 kg/m².    Physical Exam  Constitutional:       Appearance: She is well-developed.   HENT:      Head: Normocephalic.   Eyes:      General: No scleral icterus.     Pupils: Pupils are equal, round, and reactive to light.   Neck:      Thyroid: No thyromegaly.      Vascular: No JVD.      Trachea: No tracheal deviation.   Cardiovascular:      Rate and  Rhythm: Normal rate and regular rhythm.      Heart sounds: Normal heart sounds.   Pulmonary:      Breath sounds: Normal breath sounds. No wheezing.   Abdominal:      General: Bowel sounds are normal. Distension: very much.      Palpations: Abdomen is soft. Abdomen is not rigid. There is no mass.      Tenderness: Tenderness: Minimal diffuse. There is no guarding or rebound.      Hernia: Hernia: in the area of the colostomy.      Comments: Midline scar   Musculoskeletal:         General: Normal range of motion.   Lymphadenopathy:      Cervical: No cervical adenopathy.   Skin:     General: Skin is warm and dry.      Findings: No erythema or rash.   Neurological:      Mental Status: She is alert and oriented to person, place, and time.       Significant Labs:  I have reviewed all pertinent lab results within the past 24 hours.  CBC:   Recent Labs   Lab 03/20/23  1409   WBC 9.79   RBC 4.85   HGB 14.1   HCT 41.6      MCV 86   MCH 29.1   MCHC 33.9     BMP:   Recent Labs   Lab 03/20/23  1409      *   K 4.2      CO2 22*   BUN 12   CREATININE 0.9   CALCIUM 9.1     CMP:   Recent Labs   Lab 03/20/23  1409      CALCIUM 9.1   ALBUMIN 3.6   PROT 7.1   *   K 4.2   CO2 22*      BUN 12   CREATININE 0.9   ALKPHOS 71   ALT 15   AST 27   BILITOT 1.0     Microbiology Results (last 7 days)       ** No results found for the last 168 hours. **          No results for input(s): COLORU, CLARITYU, SPECGRAV, PHUR, PROTEINUA, GLUCOSEU, BILIRUBINCON, BLOODU, WBCU, RBCU, BACTERIA, MUCUS, NITRITE, LEUKOCYTESUR, UROBILINOGEN, HYALINECASTS in the last 168 hours.    Significant Diagnostics:  I have reviewed all pertinent imaging results/findings within the past 24 hours.  CT scan    EXAMINATION:  CT RENAL STONE STUDY ABD PELVIS WO     CLINICAL HISTORY:  Flank pain, kidney stone suspected;     TECHNIQUE:  Standard abdomen and pelvis CT protocol without oral or IV contrast was performed.      COMPARISON:  Eight/22/2022     FINDINGS:  Finding: The size of the heart is within normal limits. The lungs are clear. There is no pneumothorax or pleural effusion.  There is a 13 mm oval shaped mass in the medial aspect of the right breast.     The liver, gallbladder, pancreas, spleen, and adrenals are normal in appearance.  There are several non obstructive stones in the midpole of the left kidney.  The larger 1 measures 2 mm.  There is a 32 mm stone in the right renal pelvis.  There has been interval placement of a right ureteral stent.  There is a moderate amount of periureteral stranding around the proximal portion of the right ureter.  The left ureter is normal in appearance.  The uterus and ovaries are not optimally visualized.  There are surgical changes associated with a colostomy through the left side of the anterior wall of the pelvis.  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  There are surgical changes in the expected location of the appendix.  There are dilated loops of small bowel in the expected location of the proximal and mid portions of the ileum.  The midportion of the ileum as a diameter of 3.4 cm.  The distal portion of the ileum as a diameter of 1.4 cm.  There is no free fluid within the abdomen or pelvis. There is no pneumoperitoneum.  There is grade 1 anterolisthesis of L4 on L5.  There is grade 1 anterolisthesis of L5 on S1.  There is narrowing of the spinal canal at the level of the L4/L5 intervertebral disc space.     Impression:     1. There are dilated loops of small bowel in the expected location of the proximal and mid portions of the ileum. The midportion of the ileum as a diameter of 3.4 cm. The distal portion of the ileum as a diameter of 1.4 cm.  This is characteristic of a small-bowel obstruction.  2. There is a 13 mm oval shaped mass in the medial aspect of the right breast.  If additional imaging evaluation is clinically indicated, I recommend consideration of a diagnostic mammogram and/or an  ultrasound examination of the right breast.  3. There is a 32 mm stone in the right renal pelvis. There has been interval placement of a right ureteral stent. There is a moderate amount of periureteral stranding around the proximal portion of the right ureter.  An infectious process cannot be excluded  4. There are several non obstructive stones in the midpole of the left kidney.  The larger 1 measures 2 mm.  5. There are surgical changes associated with a colostomy through the left side of the anterior wall of the pelvis.  6. 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 There are surgical changes in the expected location of the appendix.  7. There is grade 1 anterolisthesis of L4 on L5. There is grade 1 anterolisthesis of L5 on S1. There is narrowing of the spinal canal at the level of the L4/L5 intervertebral disc space.  If additional imaging evaluation is clinically indicated, I recommend consideration of an MRI examination of the lumbar spine without IV contrast.  All CT scans at this facility use dose modulation, iterative reconstruction, and/or weight base dosing when appropriate to reduce radiation dose when appropriate to reduce radiation dose to as low as reasonably achievable.        Electronically signed by: Dangelo Christopher MD  Date:                                            03/20/2023  Time:                                           15:07           Exam Ended: 03/20/23 14:33 Last Resulted: 03/20/23 15:07

## 2023-03-20 NOTE — ASSESSMENT & PLAN NOTE
Patient will need a clinical exam likely tomorrow when she is in a gown.      She would then need a right-sided mammogram and ultrasound to further define the right breast mass as an outpatient as breast imaging is limited

## 2023-03-20 NOTE — ED PROVIDER NOTES
SCRIBE #1 NOTE: I, Sandoval Muhammad, am scribing for, and in the presence of, Manuel Baeza MD. I have scribed the entire note.      History      Chief Complaint   Patient presents with    Abdominal Pain     Right lower quadrant pain x 3 days, recent hx of kidney stone and stent. Hx of diverticulitis ( colostomy bag in place). Nausea & vomiting, unable to eat.        Review of patient's allergies indicates:  No Known Allergies     HPI   HPI    3/20/2023, 1:42 PM   History obtained from the patient      History of Present Illness: Irlanda Lopez is a 82 y.o. female patient with a PMHx of diverticulitis s/p colostomy, HTN, kidney stones s/p stent placement who presents to the Emergency Department for R-sided abdominal pain, onset 3 days PTA. Symptoms are constant and moderate in severity. No mitigating or exacerbating factors reported. Associated sxs include intermittent nausea. Patient denies any fever, chills, vomiting, SOB, CP, weakness, numbness, dizziness, headache, and all other sxs at this time. No prior Tx reported. No further complaints or concerns at this time.     Arrival mode: Personal vehicle    PCP: Myla Arias MD       Past Medical History:  Past Medical History:   Diagnosis Date    Clostridium difficile colitis     Dementia     Diverticulitis     s/p colostomy    High cholesterol     Hypertension     Hypothyroidism     Kidney stones        Past Surgical History:  Past Surgical History:   Procedure Laterality Date    APPENDECTOMY  2008    CATARACT EXTRACTION      Dr Raygoza    COLON SURGERY      COLONOSCOPY N/A 01/02/2019    Procedure: COLONOSCOPY;  Surgeon: Reece Hogan MD;  Location: [facility];  Service: Endoscopy;  Laterality: N/A;    COLONOSCOPY N/A 06/07/2019    Procedure: COLONOSCOPY;  Surgeon: Rishabh Connelly MD;  Location: [facility];  Service: General;  Laterality: N/A;    COLONOSCOPY N/A 06/23/2021    Procedure: COLONOSCOPY;  Surgeon: Lucy Lafleur MD;  Location: [facility];  Service:  Endoscopy;  Laterality: N/A;    COLOSTOMY Left 01/02/2019    Procedure: CREATION, COLOSTOMY;  Surgeon: Reece Hogan MD;  Location: [facility] OR;  Service: General;  Laterality: Left;    CYSTOSCOPY WITH URETEROSCOPY, RETROGRADE PYELOGRAPHY, AND INSERTION OF STENT Right 08/23/2022    Procedure: CYSTOSCOPY, WITH RETROGRADE PYELOGRAM AND URETERAL STENT INSERTION;  Surgeon: Anselmo Matute MD;  Location: [facility] OR;  Service: Urology;  Laterality: Right;    CYSTOURETEROSCOPY WITH RETROGRADE PYELOGRAPHY AND INSERTION OF STENT INTO URETER Right 11/8/2022    Procedure: CYSTOURETEROSCOPY, WITH RETROGRADE PYELOGRAM AND URETERAL STENT INSERTION;  Surgeon: Anselmo Matute MD;  Location: [facility] OR;  Service: Urology;  Laterality: Right;    ESOPHAGOGASTRODUODENOSCOPY N/A 09/16/2020    Procedure: ESOPHAGOGASTRODUODENOSCOPY (EGD)- Needs Rapid;  Surgeon: Lucy Lafleur MD;  Location: [facility];  Service: Endoscopy;  Laterality: N/A;    SHAHIDA PROCEDURE N/A 01/02/2019    Procedure: SHAHIDA PROCEDURE;  Surgeon: Reece Hogan MD;  Location: [facility] OR;  Service: General;  Laterality: N/A;    LYSIS OF ADHESIONS N/A 01/02/2019    Procedure: LYSIS, ADHESIONS;  Surgeon: Reece Hogan MD;  Location: [facility];  Service: General;  Laterality: N/A;    VITRECTOMY Right 09/2013         Family History:  Family History   Problem Relation Age of Onset    Alzheimer's disease Mother     Aneurysm Father         brain    Stomach cancer Brother         50s       Social History:  Social History     Tobacco Use    Smoking status: Former     Years: 5.00     Types: Cigarettes    Smokeless tobacco: Never   Substance and Sexual Activity    Alcohol use: No    Drug use: No    Sexual activity: Never     Comment:        ROS   Review of Systems   Constitutional:  Negative for chills and fever.   HENT:  Negative for sore throat.    Respiratory:  Negative for shortness of breath.    Cardiovascular:  Negative for chest pain.   Gastrointestinal:  Positive for  abdominal pain (R) and nausea (intermittent). Negative for diarrhea and vomiting.   Genitourinary:  Negative for dysuria.   Musculoskeletal:  Negative for back pain.   Skin:  Negative for rash.   Neurological:  Negative for dizziness, weakness, light-headedness, numbness and headaches.   Hematological:  Does not bruise/bleed easily.   All other systems reviewed and are negative.    Physical Exam      Initial Vitals [03/20/23 1327]   BP Pulse Resp Temp SpO2   (!) 173/89 (!) 55 20 98.2 °F (36.8 °C) 95 %      MAP       --          Physical Exam  Nursing Notes and Vital Signs Reviewed.  Constitutional: Patient is in no acute distress. Well-developed and well-nourished.  Head: Atraumatic. Normocephalic.  Eyes: PERRL. EOM intact. Conjunctivae are not pale. No scleral icterus.  ENT: Mucous membranes are moist. Oropharynx is clear and symmetric.    Neck: Supple. Full ROM. No lymphadenopathy.  Cardiovascular: Regular rate. Regular rhythm. No murmurs, rubs, or gallops. Distal pulses are 2+ and symmetric.  Pulmonary/Chest: No respiratory distress. Clear to auscultation bilaterally. No wheezing or rales.  Abdominal: Mild distention Colostomy in place. There is no tenderness.  No rebound, guarding, or rigidity.  Musculoskeletal: Moves all extremities. No obvious deformities. No edema.  Skin: Warm and dry.  Neurological:  Alert, awake, and appropriate.  Normal speech.  No acute focal neurological deficits are appreciated.  Psychiatric: Normal affect. Good eye contact. Appropriate in content.    ED Course    Procedures  ED Vital Signs:  Vitals:    03/20/23 1327 03/20/23 1409 03/20/23 1430 03/20/23 1554   BP: (!) 173/89  (!) 166/82 (!) 156/80   Pulse: (!) 55  60 60   Resp: 20 20 18 18   Temp: 98.2 °F (36.8 °C)   98.5 °F (36.9 °C)   TempSrc: Oral   Oral   SpO2: 95%   95%   Weight: 68.2 kg (150 lb 5.7 oz)      Height:        03/20/23 2200 03/21/23 0236   BP: (!) 144/82 (!) 119/57   Pulse: 70 (!) 53   Resp: 18 18   Temp: 98.9 °F (37.2  "°C) 98.8 °F (37.1 °C)   TempSrc: Oral Oral   SpO2: 96% (!) 93%   Weight:  67.7 kg (149 lb 4 oz)   Height:  5' 2" (1.575 m)       Abnormal Lab Results:  Labs Reviewed   CBC W/ AUTO DIFFERENTIAL - Abnormal; Notable for the following components:       Result Value    Immature Grans (Abs) 0.05 (*)     All other components within normal limits    Narrative:     Release to patient->Immediate   COMPREHENSIVE METABOLIC PANEL - Abnormal; Notable for the following components:    Sodium 134 (*)     CO2 22 (*)     All other components within normal limits    Narrative:     Release to patient->Immediate   URINALYSIS, REFLEX TO URINE CULTURE - Abnormal; Notable for the following components:    Appearance, UA Cloudy (*)     Protein, UA 1+ (*)     Occult Blood UA 1+ (*)     Leukocytes, UA 3+ (*)     All other components within normal limits    Narrative:     Specimen Source->Urine   URINALYSIS MICROSCOPIC - Abnormal; Notable for the following components:    RBC, UA 36 (*)     WBC, UA >100 (*)     WBC Clumps, UA Many (*)     Bacteria Many (*)     All other components within normal limits    Narrative:     Specimen Source->Urine   CULTURE, URINE   HIV 1 / 2 ANTIBODY    Narrative:     Release to patient->Immediate   HEPATITIS C ANTIBODY    Narrative:     Release to patient->Immediate   HEP C VIRUS HOLD SPECIMEN    Narrative:     Release to patient->Immediate        All Lab Results:  Results for orders placed or performed during the hospital encounter of 03/20/23   HIV 1/2 Ag/Ab (4th Gen)   Result Value Ref Range    HIV 1/2 Ag/Ab Negative Negative   Hepatitis C Antibody   Result Value Ref Range    Hepatitis C Ab Negative Negative   HCV Virus Hold Specimen   Result Value Ref Range    HEP C Virus Hold Specimen Hold for HCV sendout    CBC Auto Differential   Result Value Ref Range    WBC 9.79 3.90 - 12.70 K/uL    RBC 4.85 4.00 - 5.40 M/uL    Hemoglobin 14.1 12.0 - 16.0 g/dL    Hematocrit 41.6 37.0 - 48.5 %    MCV 86 82 - 98 fL    MCH 29.1 " 27.0 - 31.0 pg    MCHC 33.9 32.0 - 36.0 g/dL    RDW 12.9 11.5 - 14.5 %    Platelets 236 150 - 450 K/uL    MPV 9.9 9.2 - 12.9 fL    Immature Granulocytes 0.5 0.0 - 0.5 %    Gran # (ANC) 6.0 1.8 - 7.7 K/uL    Immature Grans (Abs) 0.05 (H) 0.00 - 0.04 K/uL    Lymph # 2.7 1.0 - 4.8 K/uL    Mono # 1.0 0.3 - 1.0 K/uL    Eos # 0.0 0.0 - 0.5 K/uL    Baso # 0.02 0.00 - 0.20 K/uL    nRBC 0 0 /100 WBC    Gran % 60.9 38.0 - 73.0 %    Lymph % 27.6 18.0 - 48.0 %    Mono % 10.4 4.0 - 15.0 %    Eosinophil % 0.4 0.0 - 8.0 %    Basophil % 0.2 0.0 - 1.9 %    Differential Method Automated    Comprehensive Metabolic Panel   Result Value Ref Range    Sodium 134 (L) 136 - 145 mmol/L    Potassium 4.2 3.5 - 5.1 mmol/L    Chloride 101 95 - 110 mmol/L    CO2 22 (L) 23 - 29 mmol/L    Glucose 109 70 - 110 mg/dL    BUN 12 8 - 23 mg/dL    Creatinine 0.9 0.5 - 1.4 mg/dL    Calcium 9.1 8.7 - 10.5 mg/dL    Total Protein 7.1 6.0 - 8.4 g/dL    Albumin 3.6 3.5 - 5.2 g/dL    Total Bilirubin 1.0 0.1 - 1.0 mg/dL    Alkaline Phosphatase 71 55 - 135 U/L    AST 27 10 - 40 U/L    ALT 15 10 - 44 U/L    Anion Gap 11 8 - 16 mmol/L    eGFR >60 >60 mL/min/1.73 m^2   Urinalysis, Reflex to Urine Culture Urine, Clean Catch    Specimen: Urine   Result Value Ref Range    Specimen UA Urine, Clean Catch     Color, UA Yellow Yellow, Straw, Gina    Appearance, UA Cloudy (A) Clear    pH, UA 6.0 5.0 - 8.0    Specific Gravity, UA 1.010 1.005 - 1.030    Protein, UA 1+ (A) Negative    Glucose, UA Negative Negative    Ketones, UA Negative Negative    Bilirubin (UA) Negative Negative    Occult Blood UA 1+ (A) Negative    Nitrite, UA Negative Negative    Urobilinogen, UA Negative <2.0 EU/dL    Leukocytes, UA 3+ (A) Negative   Urinalysis Microscopic   Result Value Ref Range    RBC, UA 36 (H) 0 - 4 /hpf    WBC, UA >100 (H) 0 - 5 /hpf    WBC Clumps, UA Many (A) None-Rare    Bacteria Many (A) None-Occ /hpf    Squam Epithel, UA 3 /hpf    Hyaline Casts, UA 0 0-1/lpf /lpf    Unclass  Tami UA Moderate None-Moderate    Microscopic Comment SEE COMMENT      Imaging Results:  Imaging Results              CT Renal Stone Study ABD Pelvis WO (Final result)  Result time 03/20/23 15:07:23      Final result by RODRICK Christopher Sr., MD (03/20/23 15:07:23)                   Impression:      1. There are dilated loops of small bowel in the expected location of the proximal and mid portions of the ileum. The midportion of the ileum as a diameter of 3.4 cm. The distal portion of the ileum as a diameter of 1.4 cm.  This is characteristic of a small-bowel obstruction.  2. There is a 13 mm oval shaped mass in the medial aspect of the right breast.  If additional imaging evaluation is clinically indicated, I recommend consideration of a diagnostic mammogram and/or an ultrasound examination of the right breast.  3. There is a 32 mm stone in the right renal pelvis. There has been interval placement of a right ureteral stent. There is a moderate amount of periureteral stranding around the proximal portion of the right ureter.  An infectious process cannot be excluded  4. There are several non obstructive stones in the midpole of the left kidney.  The larger 1 measures 2 mm.  5. There are surgical changes associated with a colostomy through the left side of the anterior wall of the pelvis.  6. 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 There are surgical changes in the expected location of the appendix.  7. There is grade 1 anterolisthesis of L4 on L5. There is grade 1 anterolisthesis of L5 on S1. There is narrowing of the spinal canal at the level of the L4/L5 intervertebral disc space.  If additional imaging evaluation is clinically indicated, I recommend consideration of an MRI examination of the lumbar spine without IV contrast.  All CT scans at this facility use dose modulation, iterative reconstruction, and/or weight base dosing when appropriate to reduce radiation dose when appropriate to reduce radiation dose to as low as  reasonably achievable.      Electronically signed by: Dangelo Christopher MD  Date:    03/20/2023  Time:    15:07               Narrative:    EXAMINATION:  CT RENAL STONE STUDY ABD PELVIS WO    CLINICAL HISTORY:  Flank pain, kidney stone suspected;    TECHNIQUE:  Standard abdomen and pelvis CT protocol without oral or IV contrast was performed.    COMPARISON:  Eight/22/2022    FINDINGS:  Finding: The size of the heart is within normal limits. The lungs are clear. There is no pneumothorax or pleural effusion.  There is a 13 mm oval shaped mass in the medial aspect of the right breast.    The liver, gallbladder, pancreas, spleen, and adrenals are normal in appearance.  There are several non obstructive stones in the midpole of the left kidney.  The larger 1 measures 2 mm.  There is a 32 mm stone in the right renal pelvis.  There has been interval placement of a right ureteral stent.  There is a moderate amount of periureteral stranding around the proximal portion of the right ureter.  The left ureter is normal in appearance.  The uterus and ovaries are not optimally visualized.  There are surgical changes associated with a colostomy through the left side of the anterior wall of the pelvis.  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  There are surgical changes in the expected location of the appendix.  There are dilated loops of small bowel in the expected location of the proximal and mid portions of the ileum.  The midportion of the ileum as a diameter of 3.4 cm.  The distal portion of the ileum as a diameter of 1.4 cm.  There is no free fluid within the abdomen or pelvis. There is no pneumoperitoneum.  There is grade 1 anterolisthesis of L4 on L5.  There is grade 1 anterolisthesis of L5 on S1.  There is narrowing of the spinal canal at the level of the L4/L5 intervertebral disc space.                                              The Emergency Provider reviewed the vital signs and test results, which are outlined above.    ED Discussion      3:32 PM: Discussed pt's case with Dr. Londono (General Surgery), who will come evaluate pt at bedside. Dr. Londono also recommends consulting Urology regarding the pt's ureteral stent.    4:02 PM: Dr. Londono (General Surgery) has evaluated pt at bedside and recommends admission to his service for surgery. Dr. Londono agrees with current care and management of pt and accepts admission.   Admitting Service: General Surgery  Admitting Physician: Dr. Londono  Admit to: Obs    4:03 PM: Re-evaluated pt. I have discussed test results, shared treatment plan, and the need for admission with patient and family at bedside. Pt and family express understanding at this time and agree with all information. All questions answered. Pt and family have no further questions or concerns at this time. Pt is ready for admit.         ED Medication(s):  Medications   LIDOcaine (PF) 10 mg/ml (1%) injection 10 mg (has no administration in time range)   sodium chloride 0.9% flush 10 mL (has no administration in time range)   ondansetron disintegrating tablet 8 mg (has no administration in time range)   0.9%  NaCl infusion ( Intravenous New Bag 3/21/23 0316)   morphine injection 2 mg (has no administration in time range)   morphine injection 1 mg (has no administration in time range)   prochlorperazine injection Soln 5 mg (has no administration in time range)   cloNIDine 0.1 mg/24 hr td ptwk 1 patch (has no administration in time range)   hydrALAZINE injection 10 mg (has no administration in time range)   morphine injection 4 mg (4 mg Intravenous Given 3/20/23 1409)   ondansetron injection 4 mg (4 mg Intravenous Given 3/20/23 1409)         Current Discharge Medication List          Medical Decision Making    Medical Decision Making:   Initial Assessment:   RLQ pain for the last three days.  Has a history of a kidney stone with stent placement  Differential Diagnosis:   Kidney stone, SBO, abd pain, vomiting  Clinical Tests:   Lab  Tests: Ordered and Reviewed  Radiological Study: Ordered and Reviewed  ED Management:  Labs and imaging reviewed by me.  Dilitation of Small bowel consistent with bowel obstruction.  Stent in place with stranding around kidney.  Considered admission, will consult surgery and urology.    Other:   I have discussed this case with another health care provider.       <> Summary of the Discussion: Discussed case with Dr. Londono (General Surgery) will come see the patient, and recommends urology consult.  Discussed case with Dr. Thomas (Urology) will see the patient while admitted to the hospital.           Scribe Attestation:   Scribe #1: I performed the above scribed service and the documentation accurately describes the services I performed. I attest to the accuracy of the note.    Attending:   Physician Attestation Statement for Scribe #1: I, Manuel Baeza MD, personally performed the services described in this documentation, as scribed by Sandoval Muhammad, in my presence, and it is both accurate and complete.          Clinical Impression       ICD-10-CM ICD-9-CM   1. Generalized abdominal pain  R10.84 789.07   2. Small bowel anastomotic dilation  K91.89 997.49   3. Small bowel obstruction  K56.609 560.9       Disposition:   Disposition: Placed in Observation  Condition: Fair       Manuel Baeza MD  03/21/23 0554       Manuel Baeza MD  03/21/23 0608

## 2023-03-20 NOTE — H&P
Formerly [facility], [facility] - Emergency Dept.  General Surgery  History & Physical    Patient Name: Irlanda Lopez  MRN: [id number]  Admission Date: 3/20/2023  Attending Physician: Bella Trujillo MD   Primary Care Provider: Myla Arias MD    Patient information was obtained from patient, relative(s) and ER records.     Subjective:     Chief Complaint/Reason for Admission: abdominal cramping, possible bowel obstuction    History of Present Illness:          2-year-old female who presented to the emergency room with a three-day history of abdominal pain.  The abdominal pain was described as being on the right side and crampy in nature.  When the pain 1st occurred 3 days ago the family tried a bland diet.  She was given medications to help with her symptoms from her right ureteral stent.  She continued to have ongoing complaints of right-sided crampy pain.  She was given Levsin with no results.  She was brought to the emergency room by her family.  The patient has had colostomy output throughout this.  She is had intermittent nausea.      The patient has a colostomy secondary to diverticulitis.  Surgery was complicated by an abscess that required CT-guided drainage.  She is not considered to be a candidate for colostomy reversal.  She is a known parastomal hernia.  She has short-term memory loss        No current facility-administered medications on file prior to encounter.     Current Outpatient Medications on File Prior to Encounter   Medication Sig    ALPRAZolam (XANAX) 0.25 MG tablet Take 1 tablet (0.25 mg total) by mouth 2 (two) times daily as needed for Anxiety.    ARMOUR THYROID 30 mg Tab TAKE 1 TABLET (30 MG TOTAL) BY MOUTH BEFORE BREAKFAST.    atorvastatin (LIPITOR) 10 MG tablet TAKE 1 TABLET BY MOUTH ONCE DAILY    erythromycin (ROMYCIN) ophthalmic ointment Apply ointment to lids and lashes in both eyes 2 times daily for 7 days.    FLUoxetine 40 MG capsule Take 1 capsule (40 mg total) by mouth once daily. To control  anxiety and OCD symptoms Maintenance    hyoscyamine (LEVSIN/SL) 0.125 mg Subl Place 2 tablets (0.25 mg total) under the tongue every 4 (four) hours as needed (spasms).    memantine (NAMENDA) 10 MG Tab Take 1 tablet (10 mg total) by mouth 2 (two) times daily.    mirabegron (MYRBETRIQ) 50 mg Tb24 Take 1 tablet (50 mg total) by mouth once daily.    montelukast (SINGULAIR) 10 mg tablet Take 1 tablet (10 mg total) by mouth once daily.    pantoprazole (PROTONIX) 40 MG tablet Take 1 tablet (40 mg total) by mouth once daily. 30 min prior to lunch    phenazopyridine (PYRIDIUM) 100 MG tablet Please specify directions, refills and quantity    potassium chloride (MICRO-K) 10 MEQ CpSR Take by mouth once daily.    prednisoLONE acetate (PRED FORTE) 1 % DrpS Every 2 hours while awake for 2 days and 4 times daily for 5 days in both eyes.    [DISCONTINUED] furosemide (LASIX) 20 MG tablet TAKE 1 TABLET BY MOUTH ONCE DAILY    [DISCONTINUED] triamcinolone acetonide 0.1% (KENALOG) 0.1 % ointment APPLY TOPICALLY 4 (FOUR) TIMES DAILY. TO RASH ON FACE AND CHEST AND NOSE       Review of patient's allergies indicates:  No Known Allergies    Past Medical History:   Diagnosis Date    Clostridium difficile colitis     Dementia     Diverticulitis     s/p colostomy    High cholesterol     Hypertension     Hypothyroidism     Kidney stones      Past Surgical History:   Procedure Laterality Date    APPENDECTOMY  2008    CATARACT EXTRACTION      Dr Raygoza    COLON SURGERY      COLONOSCOPY N/A 01/02/2019    Procedure: COLONOSCOPY;  Surgeon: Reece Hogan MD;  Location: [facility];  Service: Endoscopy;  Laterality: N/A;    COLONOSCOPY N/A 06/07/2019    Procedure: COLONOSCOPY;  Surgeon: Rishabh Connelly MD;  Location: [facility];  Service: General;  Laterality: N/A;    COLONOSCOPY N/A 06/23/2021    Procedure: COLONOSCOPY;  Surgeon: Lucy Lafleur MD;  Location: [facility];  Service: Endoscopy;  Laterality: N/A;    COLOSTOMY Left  01/02/2019    Procedure: CREATION, COLOSTOMY;  Surgeon: Reece Hogan MD;  Location: [facility];  Service: General;  Laterality: Left;    CYSTOSCOPY WITH URETEROSCOPY, RETROGRADE PYELOGRAPHY, AND INSERTION OF STENT Right 08/23/2022    Procedure: CYSTOSCOPY, WITH RETROGRADE PYELOGRAM AND URETERAL STENT INSERTION;  Surgeon: Anselmo Matute MD;  Location: [facility];  Service: Urology;  Laterality: Right;    CYSTOURETEROSCOPY WITH RETROGRADE PYELOGRAPHY AND INSERTION OF STENT INTO URETER Right 11/8/2022    Procedure: CYSTOURETEROSCOPY, WITH RETROGRADE PYELOGRAM AND URETERAL STENT INSERTION;  Surgeon: Anselmo Matute MD;  Location: [facility] OR;  Service: Urology;  Laterality: Right;    ESOPHAGOGASTRODUODENOSCOPY N/A 09/16/2020    Procedure: ESOPHAGOGASTRODUODENOSCOPY (EGD)- Needs Rapid;  Surgeon: Lucy Lafleur MD;  Location[facility];  Service: Endoscopy;  Laterality: N/A;    SHAHIDA PROCEDURE N/A 01/02/2019    Procedure: SHAHIDA PROCEDURE;  Surgeon: Reece Hogan MD;  Location: [facility];  Service: General;  Laterality: N/A;    LYSIS OF ADHESIONS N/A 01/02/2019    Procedure: LYSIS, ADHESIONS;  Surgeon: Reece Hogan MD;  Location: [facility];  Service: General;  Laterality: N/A;    VITRECTOMY Right 09/2013     Family History       Problem Relation (Age of Onset)    Alzheimer's disease Mother    Aneurysm Father    Stomach cancer Brother          Tobacco Use    Smoking status: Former     Years: 5.00     Types: Cigarettes    Smokeless tobacco: Never   Substance and Sexual Activity    Alcohol use: No    Drug use: No    Sexual activity: Never     Comment:      Review of Systems   Constitutional:  Negative for appetite change, chills, fatigue, fever and unexpected weight change.   HENT:  Negative for hearing loss and rhinorrhea.    Eyes:  Negative for visual disturbance.   Respiratory:  Negative for apnea, cough, shortness of breath and wheezing.    Cardiovascular:  Negative for chest pain and palpitations.    Gastrointestinal:  Positive for abdominal pain. Negative for abdominal distention, blood in stool, constipation, diarrhea, nausea and vomiting.   Genitourinary:  Negative for dysuria, frequency and urgency.   Musculoskeletal:  Negative for arthralgias and neck pain.   Skin:  Negative for rash.   Neurological:  Negative for seizures, weakness, numbness and headaches.        Memory loss   Hematological:  Negative for adenopathy. Does not bruise/bleed easily.   Psychiatric/Behavioral:  Negative for hallucinations. The patient is not nervous/anxious.    Objective:     Vital Signs (Most Recent):  Temp: 98.5 °F (36.9 °C) (03/20/23 1554)  Pulse: 60 (03/20/23 1554)  Resp: 18 (03/20/23 1554)  BP: (!) 156/80 (03/20/23 1554)  SpO2: 95 % (03/20/23 1554)   Vital Signs (24h Range):  Temp:  [98.2 °F (36.8 °C)-98.5 °F (36.9 °C)] 98.5 °F (36.9 °C)  Pulse:  [55-60] 60  Resp:  [18-20] 18  SpO2:  [95 %] 95 %  BP: (156-173)/(80-89) 156/80     Weight: 68.2 kg (150 lb 5.7 oz)  Body mass index is 29.36 kg/m².    Physical Exam  Constitutional:       Appearance: She is well-developed.   HENT:      Head: Normocephalic.   Eyes:      General: No scleral icterus.     Pupils: Pupils are equal, round, and reactive to light.   Neck:      Thyroid: No thyromegaly.      Vascular: No JVD.      Trachea: No tracheal deviation.   Cardiovascular:      Rate and Rhythm: Normal rate and regular rhythm.      Heart sounds: Normal heart sounds.   Pulmonary:      Breath sounds: Normal breath sounds. No wheezing.   Abdominal:      General: Bowel sounds are normal. Distension: very much.      Palpations: Abdomen is soft. Abdomen is not rigid. There is no mass.      Tenderness: Tenderness: Minimal diffuse. There is no guarding or rebound.      Hernia: Hernia: in the area of the colostomy.      Comments: Midline scar   Musculoskeletal:         General: Normal range of motion.   Lymphadenopathy:      Cervical: No cervical adenopathy.   Skin:     General: Skin is  warm and dry.      Findings: No erythema or rash.   Neurological:      Mental Status: She is alert and oriented to person, place, and time.       Significant Labs:  I have reviewed all pertinent lab results within the past 24 hours.  CBC:   Recent Labs   Lab 03/20/23  1409   WBC 9.79   RBC 4.85   HGB 14.1   HCT 41.6      MCV 86   MCH 29.1   MCHC 33.9     BMP:   Recent Labs   Lab 03/20/23  1409      *   K 4.2      CO2 22*   BUN 12   CREATININE 0.9   CALCIUM 9.1     CMP:   Recent Labs   Lab 03/20/23  1409      CALCIUM 9.1   ALBUMIN 3.6   PROT 7.1   *   K 4.2   CO2 22*      BUN 12   CREATININE 0.9   ALKPHOS 71   ALT 15   AST 27   BILITOT 1.0     Microbiology Results (last 7 days)       ** No results found for the last 168 hours. **          No results for input(s): COLORU, CLARITYU, SPECGRAV, PHUR, PROTEINUA, GLUCOSEU, BILIRUBINCON, BLOODU, WBCU, RBCU, BACTERIA, MUCUS, NITRITE, LEUKOCYTESUR, UROBILINOGEN, HYALINECASTS in the last 168 hours.    Significant Diagnostics:  I have reviewed all pertinent imaging results/findings within the past 24 hours.  CT scan    EXAMINATION:  CT RENAL STONE STUDY ABD PELVIS WO     CLINICAL HISTORY:  Flank pain, kidney stone suspected;     TECHNIQUE:  Standard abdomen and pelvis CT protocol without oral or IV contrast was performed.     COMPARISON:  Eight/22/2022     FINDINGS:  Finding: The size of the heart is within normal limits. The lungs are clear. There is no pneumothorax or pleural effusion.  There is a 13 mm oval shaped mass in the medial aspect of the right breast.     The liver, gallbladder, pancreas, spleen, and adrenals are normal in appearance.  There are several non obstructive stones in the midpole of the left kidney.  The larger 1 measures 2 mm.  There is a 32 mm stone in the right renal pelvis.  There has been interval placement of a right ureteral stent.  There is a moderate amount of periureteral stranding around the proximal  portion of the right ureter.  The left ureter is normal in appearance.  The uterus and ovaries are not optimally visualized.  There are surgical changes associated with a colostomy through the left side of the anterior wall of the pelvis.  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  There are surgical changes in the expected location of the appendix.  There are dilated loops of small bowel in the expected location of the proximal and mid portions of the ileum.  The midportion of the ileum as a diameter of 3.4 cm.  The distal portion of the ileum as a diameter of 1.4 cm.  There is no free fluid within the abdomen or pelvis. There is no pneumoperitoneum.  There is grade 1 anterolisthesis of L4 on L5.  There is grade 1 anterolisthesis of L5 on S1.  There is narrowing of the spinal canal at the level of the L4/L5 intervertebral disc space.     Impression:     1. There are dilated loops of small bowel in the expected location of the proximal and mid portions of the ileum. The midportion of the ileum as a diameter of 3.4 cm. The distal portion of the ileum as a diameter of 1.4 cm.  This is characteristic of a small-bowel obstruction.  2. There is a 13 mm oval shaped mass in the medial aspect of the right breast.  If additional imaging evaluation is clinically indicated, I recommend consideration of a diagnostic mammogram and/or an ultrasound examination of the right breast.  3. There is a 32 mm stone in the right renal pelvis. There has been interval placement of a right ureteral stent. There is a moderate amount of periureteral stranding around the proximal portion of the right ureter.  An infectious process cannot be excluded  4. There are several non obstructive stones in the midpole of the left kidney.  The larger 1 measures 2 mm.  5. There are surgical changes associated with a colostomy through the left side of the anterior wall of the pelvis.  6. 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 There are surgical changes in the expected location of the  appendix.  7. There is grade 1 anterolisthesis of L4 on L5. There is grade 1 anterolisthesis of L5 on S1. There is narrowing of the spinal canal at the level of the L4/L5 intervertebral disc space.  If additional imaging evaluation is clinically indicated, I recommend consideration of an MRI examination of the lumbar spine without IV contrast.  All CT scans at this facility use dose modulation, iterative reconstruction, and/or weight base dosing when appropriate to reduce radiation dose when appropriate to reduce radiation dose to as low as reasonably achievable.        Electronically signed by: Dangelo Christopher MD  Date:                                            03/20/2023  Time:                                           15:07           Exam Ended: 03/20/23 14:33 Last Resulted: 03/20/23 15:07              Assessment/Plan:     * Small bowel anastomotic dilation  Imaging is concerning for partial small-bowel obstruction  Placed in observation  Gastrografin small-bowel follow-through   NPO except for ice chips  IV medications as needed    Mass of right breast  Patient will need a clinical exam likely tomorrow when she is in a gown.      She would then need a right-sided mammogram and ultrasound to further define the right breast mass as an outpatient as breast imaging is limited       MARTIN (generalized anxiety disorder)  Monitor    Dementia  Monitor, home medications when she is able to tolerate oral intake    Renal stone  Urology consultation in the morning.      If surgery is required for a bowel obstruction urologic intervention could occur at the same time if indicated    Colostomy in place  Will ask Wound Care to evaluate    Hyperlipidemia  Hold home medications    Mild cognitive impairment with memory loss  Monitor    Essential hypertension  Topical and IV medications    Hypothyroidism  Monitor, check TSH if admission is going to be longer than 48 hours with NPO status    VTE Risk Mitigation (From admission,  onward)    None          Zay Londono MD  General Surgery  Formerly [facility], [facility] - Emergency Dept.

## 2023-03-21 PROBLEM — N30.90 CYSTITIS: Status: ACTIVE | Noted: 2023-03-21

## 2023-03-21 LAB
ANION GAP SERPL CALC-SCNC: 4 MMOL/L (ref 8–16)
BASOPHILS # BLD AUTO: 0.03 K/UL (ref 0–0.2)
BASOPHILS NFR BLD: 0.6 % (ref 0–1.9)
BUN SERPL-MCNC: 11 MG/DL (ref 8–23)
CALCIUM SERPL-MCNC: 8.5 MG/DL (ref 8.7–10.5)
CHLORIDE SERPL-SCNC: 108 MMOL/L (ref 95–110)
CO2 SERPL-SCNC: 23 MMOL/L (ref 23–29)
CREAT SERPL-MCNC: 0.8 MG/DL (ref 0.5–1.4)
DIFFERENTIAL METHOD: ABNORMAL
EOSINOPHIL # BLD AUTO: 0.1 K/UL (ref 0–0.5)
EOSINOPHIL NFR BLD: 2.6 % (ref 0–8)
ERYTHROCYTE [DISTWIDTH] IN BLOOD BY AUTOMATED COUNT: 13.2 % (ref 11.5–14.5)
EST. GFR  (NO RACE VARIABLE): >60 ML/MIN/1.73 M^2
GLUCOSE SERPL-MCNC: 88 MG/DL (ref 70–110)
HCT VFR BLD AUTO: 36.4 % (ref 37–48.5)
HGB BLD-MCNC: 11.7 G/DL (ref 12–16)
IMM GRANULOCYTES # BLD AUTO: 0.03 K/UL (ref 0–0.04)
IMM GRANULOCYTES NFR BLD AUTO: 0.6 % (ref 0–0.5)
LYMPHOCYTES # BLD AUTO: 1.8 K/UL (ref 1–4.8)
LYMPHOCYTES NFR BLD: 36.7 % (ref 18–48)
MCH RBC QN AUTO: 28.7 PG (ref 27–31)
MCHC RBC AUTO-ENTMCNC: 32.1 G/DL (ref 32–36)
MCV RBC AUTO: 89 FL (ref 82–98)
MONOCYTES # BLD AUTO: 0.6 K/UL (ref 0.3–1)
MONOCYTES NFR BLD: 11.8 % (ref 4–15)
NEUTROPHILS # BLD AUTO: 2.4 K/UL (ref 1.8–7.7)
NEUTROPHILS NFR BLD: 47.7 % (ref 38–73)
NRBC BLD-RTO: 0 /100 WBC
PLATELET # BLD AUTO: 192 K/UL (ref 150–450)
PMV BLD AUTO: 10.2 FL (ref 9.2–12.9)
POTASSIUM SERPL-SCNC: 3.7 MMOL/L (ref 3.5–5.1)
RBC # BLD AUTO: 4.07 M/UL (ref 4–5.4)
SODIUM SERPL-SCNC: 135 MMOL/L (ref 136–145)
WBC # BLD AUTO: 5.02 K/UL (ref 3.9–12.7)

## 2023-03-21 PROCEDURE — 99233 PR SUBSEQUENT HOSPITAL CARE,LEVL III: ICD-10-PCS | Mod: ,,, | Performed by: SURGERY

## 2023-03-21 PROCEDURE — G0378 HOSPITAL OBSERVATION PER HR: HCPCS

## 2023-03-21 PROCEDURE — 25500020 PHARM REV CODE 255: Performed by: SURGERY

## 2023-03-21 PROCEDURE — 99223 PR INITIAL HOSPITAL CARE,LEVL III: ICD-10-PCS | Mod: ,,, | Performed by: UROLOGY

## 2023-03-21 PROCEDURE — 99223 1ST HOSP IP/OBS HIGH 75: CPT | Mod: ,,, | Performed by: UROLOGY

## 2023-03-21 PROCEDURE — 25000003 PHARM REV CODE 250: Performed by: UROLOGY

## 2023-03-21 PROCEDURE — 25000003 PHARM REV CODE 250: Performed by: SURGERY

## 2023-03-21 PROCEDURE — 96360 HYDRATION IV INFUSION INIT: CPT | Mod: 59

## 2023-03-21 PROCEDURE — 80048 BASIC METABOLIC PNL TOTAL CA: CPT | Performed by: SURGERY

## 2023-03-21 PROCEDURE — 99233 SBSQ HOSP IP/OBS HIGH 50: CPT | Mod: ,,, | Performed by: SURGERY

## 2023-03-21 PROCEDURE — 96361 HYDRATE IV INFUSION ADD-ON: CPT

## 2023-03-21 PROCEDURE — 85025 COMPLETE CBC W/AUTO DIFF WBC: CPT | Performed by: SURGERY

## 2023-03-21 PROCEDURE — 36415 COLL VENOUS BLD VENIPUNCTURE: CPT | Performed by: SURGERY

## 2023-03-21 RX ORDER — ATORVASTATIN CALCIUM 10 MG/1
10 TABLET, FILM COATED ORAL DAILY
Status: DISCONTINUED | OUTPATIENT
Start: 2023-03-22 | End: 2023-03-22 | Stop reason: HOSPADM

## 2023-03-21 RX ORDER — HYDRALAZINE HYDROCHLORIDE 20 MG/ML
10 INJECTION INTRAMUSCULAR; INTRAVENOUS EVERY 8 HOURS PRN
Status: DISCONTINUED | OUTPATIENT
Start: 2023-03-21 | End: 2023-03-22 | Stop reason: HOSPADM

## 2023-03-21 RX ORDER — FLUOXETINE HYDROCHLORIDE 20 MG/1
40 CAPSULE ORAL DAILY
Status: DISCONTINUED | OUTPATIENT
Start: 2023-03-22 | End: 2023-03-21

## 2023-03-21 RX ORDER — OXYBUTYNIN CHLORIDE 5 MG/1
5 TABLET, EXTENDED RELEASE ORAL DAILY
Status: DISCONTINUED | OUTPATIENT
Start: 2023-03-22 | End: 2023-03-22 | Stop reason: HOSPADM

## 2023-03-21 RX ORDER — SODIUM CHLORIDE 9 MG/ML
INJECTION, SOLUTION INTRAVENOUS CONTINUOUS
Status: DISCONTINUED | OUTPATIENT
Start: 2023-03-21 | End: 2023-03-22 | Stop reason: HOSPADM

## 2023-03-21 RX ORDER — MONTELUKAST SODIUM 10 MG/1
10 TABLET ORAL DAILY
Status: DISCONTINUED | OUTPATIENT
Start: 2023-03-22 | End: 2023-03-22 | Stop reason: HOSPADM

## 2023-03-21 RX ORDER — MEMANTINE HYDROCHLORIDE 5 MG/1
10 TABLET ORAL 2 TIMES DAILY
Status: DISCONTINUED | OUTPATIENT
Start: 2023-03-21 | End: 2023-03-22 | Stop reason: HOSPADM

## 2023-03-21 RX ORDER — FLUOXETINE HYDROCHLORIDE 20 MG/1
40 CAPSULE ORAL NIGHTLY
Status: DISCONTINUED | OUTPATIENT
Start: 2023-03-22 | End: 2023-03-22 | Stop reason: HOSPADM

## 2023-03-21 RX ORDER — ONDANSETRON 8 MG/1
8 TABLET, ORALLY DISINTEGRATING ORAL EVERY 8 HOURS PRN
Status: DISCONTINUED | OUTPATIENT
Start: 2023-03-21 | End: 2023-03-22 | Stop reason: HOSPADM

## 2023-03-21 RX ORDER — SULFAMETHOXAZOLE AND TRIMETHOPRIM 800; 160 MG/1; MG/1
1 TABLET ORAL 2 TIMES DAILY
Status: DISCONTINUED | OUTPATIENT
Start: 2023-03-21 | End: 2023-03-22 | Stop reason: HOSPADM

## 2023-03-21 RX ORDER — MORPHINE SULFATE 2 MG/ML
1 INJECTION, SOLUTION INTRAMUSCULAR; INTRAVENOUS EVERY 4 HOURS PRN
Status: DISCONTINUED | OUTPATIENT
Start: 2023-03-21 | End: 2023-03-22 | Stop reason: HOSPADM

## 2023-03-21 RX ORDER — SODIUM CHLORIDE 0.9 % (FLUSH) 0.9 %
10 SYRINGE (ML) INJECTION
Status: DISCONTINUED | OUTPATIENT
Start: 2023-03-21 | End: 2023-03-22 | Stop reason: HOSPADM

## 2023-03-21 RX ORDER — PANTOPRAZOLE SODIUM 40 MG/1
40 TABLET, DELAYED RELEASE ORAL DAILY
Status: DISCONTINUED | OUTPATIENT
Start: 2023-03-22 | End: 2023-03-22 | Stop reason: HOSPADM

## 2023-03-21 RX ORDER — FLUOXETINE HYDROCHLORIDE 20 MG/1
40 CAPSULE ORAL DAILY
Status: DISCONTINUED | OUTPATIENT
Start: 2023-03-21 | End: 2023-03-21

## 2023-03-21 RX ORDER — MORPHINE SULFATE 2 MG/ML
2 INJECTION, SOLUTION INTRAMUSCULAR; INTRAVENOUS EVERY 4 HOURS PRN
Status: DISCONTINUED | OUTPATIENT
Start: 2023-03-21 | End: 2023-03-22 | Stop reason: HOSPADM

## 2023-03-21 RX ORDER — LIDOCAINE HYDROCHLORIDE 10 MG/ML
1 INJECTION, SOLUTION EPIDURAL; INFILTRATION; INTRACAUDAL; PERINEURAL ONCE AS NEEDED
Status: DISCONTINUED | OUTPATIENT
Start: 2023-03-21 | End: 2023-03-22 | Stop reason: HOSPADM

## 2023-03-21 RX ORDER — THYROID 30 MG/1
30 TABLET ORAL
Status: DISCONTINUED | OUTPATIENT
Start: 2023-03-22 | End: 2023-03-22 | Stop reason: HOSPADM

## 2023-03-21 RX ORDER — PROCHLORPERAZINE EDISYLATE 5 MG/ML
5 INJECTION INTRAMUSCULAR; INTRAVENOUS EVERY 6 HOURS PRN
Status: DISCONTINUED | OUTPATIENT
Start: 2023-03-21 | End: 2023-03-22 | Stop reason: HOSPADM

## 2023-03-21 RX ORDER — CLONIDINE 0.1 MG/24H
1 PATCH, EXTENDED RELEASE TRANSDERMAL
Status: DISCONTINUED | OUTPATIENT
Start: 2023-03-21 | End: 2023-03-21

## 2023-03-21 RX ADMIN — DIATRIZOATE MEGLUMINE AND DIATRIZOATE SODIUM 240 ML: 660; 100 LIQUID ORAL; RECTAL at 11:03

## 2023-03-21 RX ADMIN — SODIUM CHLORIDE: 9 INJECTION, SOLUTION INTRAVENOUS at 03:03

## 2023-03-21 RX ADMIN — SULFAMETHOXAZOLE AND TRIMETHOPRIM 1 TABLET: 800; 160 TABLET ORAL at 09:03

## 2023-03-21 RX ADMIN — MEMANTINE HYDROCHLORIDE 10 MG: 5 TABLET ORAL at 09:03

## 2023-03-21 RX ADMIN — FLUOXETINE 40 MG: 20 CAPSULE ORAL at 10:03

## 2023-03-21 RX ADMIN — SULFAMETHOXAZOLE AND TRIMETHOPRIM 1 TABLET: 800; 160 TABLET ORAL at 12:03

## 2023-03-21 RX ADMIN — SODIUM CHLORIDE: 9 INJECTION, SOLUTION INTRAVENOUS at 08:03

## 2023-03-21 NOTE — ASSESSMENT & PLAN NOTE
Patient more likely had bowel dilation due to a urinary tract infection/cystitis.  Clear liquids and advance as tolerated.  Repeat x-rays tomorrow

## 2023-03-21 NOTE — PLAN OF CARE
[facility] - Med Surg  Initial Discharge Assessment       Primary Care Provider: Myla Arias MD    Admission Diagnosis: Small bowel anastomotic dilation [K91.89]    Admission Date: 3/20/2023  Expected Discharge Date: per attending         Payor: [employer] MANAGED MEDICARE / Plan: Fleep 65 / Product Type: Medicare Advantage /     Extended Emergency Contact Information  Primary Emergency Contact: Myla Mora  Address: [address] [place] [address]  Mobile Phone: [phone]  Relation: Daughter  Secondary Emergency Contact: jorge anna  Mobile Phone: [phone]  Relation: Daughter    Discharge Plan A: Home         [id number] [place] - [address]  [address]  [facility] [address]  Phone: [phone] Fax: [phone]    [facility] - [address] [address]  Phone: [phone] Fax: [phone]      Initial Assessment (most recent)       Adult Discharge Assessment - 03/21/23 1016          Discharge Assessment    Assessment Type Discharge Planning Assessment     Confirmed/corrected address, phone number and insurance Yes     Confirmed Demographics Correct on Facesheet     Source of Information family     When was your last doctors appointment? 03/16/23     Communicated MICHAEL with patient/caregiver Date not available/Unable to determine     Reason For Admission small bowel anastomic     People in Home alone     Do you expect to return to your current living situation? Yes     Do you have help at home or someone to help you manage your care at home? Yes     Who are your caregiver(s) and their phone number(s)? Personal caregiver     Prior to hospitilization cognitive status: Alert/Oriented     Current cognitive status: Alert/Oriented     Equipment Currently Used at Home none     Readmission within 30 days? No      Patient currently being followed by outpatient case management? Unable to determine (comments)     Do you currently have service(s) that help you manage your care at home? No     Do you take prescription medications? Yes     Do you have prescription coverage? Yes     Coverage people's health     Do you have any problems affording any of your prescribed medications? No     Is the patient taking medications as prescribed? yes     Who is going to help you get home at discharge? daughter     How do you get to doctors appointments? family or friend will provide     Are you on dialysis? No     Do you take coumadin? No     Discharge Plan A Home                      SW to f/u as needed.

## 2023-03-21 NOTE — PROGRESS NOTES
Reading Physician Reading Date Result Priority   Nicolás Hess MD  [phone] 3/21/2023 Routine     Narrative & Impression  EXAMINATION:  XR SMALL BOWEL FOLLOW THROUGH     CLINICAL HISTORY:  Evaluate for bowel obstruction, dilated loops of small bowel on CT scan;     TECHNIQUE:  Water-soluble contrast was administered orally and serial abdominal radiographs were taken over 2 hours.     COMPARISON:  CT abdomen pelvis March 20, 2023.     FINDINGS:  Contrast reaches the colon within 15 minutes.  Contrast reaches the colostomy bag by 2 hours.  There is a prominent loop colon in the mid to lower abdomen which comes to a point; this correlates with a transition point seen on comparison CT.     Right nephrostomy tube with large right renal calculus.     Impression:     Some holdup of contrast in the mid colon in an area of transition point seen on comparison CT.  Contrast reaches the colostomy bag at 2 hours.        Electronically signed by: Nicolás Hess  Date:                                            03/21/2023  Time:                                           14:54           Exam Ended: 03/21/23 12:42         Clear liquid diet

## 2023-03-21 NOTE — NURSING
Received awake and alert with son at bedside, denies any pain or discomfort at this time. Assessment per flowsheet. Colostomy emptied, new IV started, oriented to room, bed low, call light within reach. Will continue to monitor.

## 2023-03-21 NOTE — CONSULTS
Chief Complaint:   Encounter Diagnoses   Name Primary?    Renal stone Yes    Dysuria        HPI:   03/21/2023 - admitted to the hospital with possible SBO, CT in the ED showed some right perinephric and periureteral stranding, UA consistent with UTI, patient without urologic complaints, voiding well, no GH or dysuria, no flank pain    3/16/23- patient is here today for routine evaluation, she is due for stent exchange, otherwise dysuria and urgency are relatively stable on medical management.    7/18/20- 79-year-old female who presents with severe abdominal pain and discomfort, she is being evaluated for colonic impaction diverticulitis.  She is seen to have an extremely large right renal pelvis stone.  This had been seen prior, and she was followed by partner in regards to possible surgery for this.  Patient though it is determined not pursuing not been seen in the last couple of years.  Patient is having upper abdominal pain, no colic at this point.  Patient has had no previous urological history per her and her son's report, her daughter is her primary care take care, she is currently not present at the bedside.  Family history of stones, no urological cancers per the family.  10/17/18: Mag3 shows 72/38 L/R function. -UCx last visit.  PCNL is the only viable treatment option; observation not recommended.  10/8/18: 79 yo woman referred for renal stone after workup for UTI/diverticulitis.  Having diarrhea on meds for diverticulitis.  No abd/pelvic pain and no exac/rel factors.  No hematuria.  No prior urolithiasis.  No urinary bother.  No  history.  Normal sexual function but inactive.  Some memory problems but functional at home.    Allergies:  Patient has no known allergies.    Medications:  has a current medication list which includes the following prescription(s): alprazolam, armour thyroid, atorvastatin, erythromycin, fluoxetine, hyoscyamine, memantine, myrbetriq, montelukast, pantoprazole, phenazopyridine,  potassium chloride, prednisolone acetate, [DISCONTINUED] furosemide, and [DISCONTINUED] triamcinolone acetonide 0.1%.    Review of Systems:  General: No fever, chills  Skin: No rashes  Chest:  Denies cough and sputum production  Heart: Denies chest pain  Resp: Denies dyspnea  Abdomen: +abd distention  Musculoskeletal: No joint stiffness or swelling. Denies back pain.  : see HPI  Neuro: no dizziness or weakness      PMH:   has a past medical history of Clostridium difficile colitis, Dementia, Diverticulitis, High cholesterol, Hypertension, Hypothyroidism, and Kidney stones.    PSH:   has a past surgical history that includes Cataract extraction; Appendectomy (2008); Colonoscopy (N/A, 01/02/2019); Colostomy (Left, 01/02/2019); Maribel procedure (N/A, 01/02/2019); Lysis of adhesions (N/A, 01/02/2019); Colon surgery; Vitrectomy (Right, 09/2013); Colonoscopy (N/A, 06/07/2019); Esophagogastroduodenoscopy (N/A, 09/16/2020); Colonoscopy (N/A, 06/23/2021); Cystoscopy with ureteroscopy, retrograde pyelography, and insertion of stent (Right, 08/23/2022); and Cystoureteroscopy with retrograde pyelography and insertion of stent into ureter (Right, 11/8/2022).    FamHx: family history includes Alzheimer's disease in her mother; Aneurysm in her father; Stomach cancer in her brother.    SocHx:  reports that she has quit smoking. Her smoking use included cigarettes. She has never used smokeless tobacco. She reports that she does not drink alcohol and does not use drugs.      Physical Exam:  Vitals:    03/21/23 0811   BP: (!) 120/59   Pulse: (!) 50   Resp: 16   Temp: 97.8 °F (36.6 °C)     General: awake, alert, cooperative  Head: NC/AT  Ears: external ears normal  Eyes: sclera normal  Lungs: normal inspiration, NAD  Heart: well-perfused  Abdomen: Soft, NT, mod distention  Skin: The skin is warm and dry.  Ext: No c/c/e.  Neuro: grossly intact, AOx3      Labs/Studies:   Lab Results   Component Value Date    WBC 5.02 03/21/2023     HGB 11.7 (L) 03/21/2023    HCT 36.4 (L) 03/21/2023     03/21/2023     (L) 03/21/2023    K 3.7 03/21/2023     03/21/2023    CREATININE 0.8 03/21/2023    BUN 11 03/21/2023    CO2 23 03/21/2023    TSH 1.244 06/09/2022    INR 1.0 06/10/2019    HGBA1C 5.2 06/09/2022    CHOL 154 06/09/2022    TRIG 57 06/09/2022    HDL 38 (L) 06/09/2022    ALT 15 03/20/2023    AST 27 03/20/2023     CT RENAL STONE STUDY ABD PELVIS WO 03/20/2023     CLINICAL HISTORY:  Flank pain, kidney stone suspected;     TECHNIQUE:  Standard abdomen and pelvis CT protocol without oral or IV contrast was performed.     COMPARISON:  Eight/22/2022     FINDINGS:  Finding: The size of the heart is within normal limits. The lungs are clear. There is no pneumothorax or pleural effusion.  There is a 13 mm oval shaped mass in the medial aspect of the right breast.     The liver, gallbladder, pancreas, spleen, and adrenals are normal in appearance.  There are several non obstructive stones in the midpole of the left kidney.  The larger 1 measures 2 mm.  There is a 32 mm stone in the right renal pelvis.  There has been interval placement of a right ureteral stent.  There is a moderate amount of periureteral stranding around the proximal portion of the right ureter.  The left ureter is normal in appearance.  The uterus and ovaries are not optimally visualized.  There are surgical changes associated with a colostomy through the left side of the anterior wall of the pelvis.  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  There are surgical changes in the expected location of the appendix.  There are dilated loops of small bowel in the expected location of the proximal and mid portions of the ileum.  The midportion of the ileum as a diameter of 3.4 cm.  The distal portion of the ileum as a diameter of 1.4 cm.  There is no free fluid within the abdomen or pelvis. There is no pneumoperitoneum.  There is grade 1 anterolisthesis of L4 on L5.  There is grade 1 anterolisthesis  of L5 on S1.  There is narrowing of the spinal canal at the level of the L4/L5 intervertebral disc space.     Impression:     1. There are dilated loops of small bowel in the expected location of the proximal and mid portions of the ileum. The midportion of the ileum as a diameter of 3.4 cm. The distal portion of the ileum as a diameter of 1.4 cm.  This is characteristic of a small-bowel obstruction.  2. There is a 13 mm oval shaped mass in the medial aspect of the right breast.  If additional imaging evaluation is clinically indicated, I recommend consideration of a diagnostic mammogram and/or an ultrasound examination of the right breast.  3. There is a 32 mm stone in the right renal pelvis. There has been interval placement of a right ureteral stent. There is a moderate amount of periureteral stranding around the proximal portion of the right ureter.  An infectious process cannot be excluded  4. There are several non obstructive stones in the midpole of the left kidney.  The larger 1 measures 2 mm.  5. There are surgical changes associated with a colostomy through the left side of the anterior wall of the pelvis.  6. 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 There are surgical changes in the expected location of the appendix.  7. There is grade 1 anterolisthesis of L4 on L5. There is grade 1 anterolisthesis of L5 on S1. There is narrowing of the spinal canal at the level of the L4/L5 intervertebral disc space.  If additional imaging evaluation is clinically indicated, I recommend consideration of an MRI examination of the lumbar spine without IV contrast.     Impression/Plan:        Right renal stone-  failed PCN  1/10/23,  right stent  11/8/22, 8/23/22, due for exchange but should hold off with active UTI, start bactrim based on prior UCX, will f/u pending UCX, call with questions or concerns    Bull Thomas MD

## 2023-03-21 NOTE — SUBJECTIVE & OBJECTIVE
Interval History:  Patient is feeling better.  Less abdominal pain.  Emptying or colostomy.  Small-bowel follow-through no obvious obstruction.  Start clears.    Medications:  Continuous Infusions:   sodium chloride 0.9% 80 mL/hr at 03/21/23 1209     Scheduled Meds:   sulfamethoxazole-trimethoprim 800-160mg  1 tablet Oral BID     PRN Meds:hydrALAZINE, LIDOcaine (PF) 10 mg/ml (1%), morphine, morphine, ondansetron, prochlorperazine, sodium chloride 0.9%     Review of patient's allergies indicates:  No Known Allergies  Objective:     Vital Signs (Most Recent):  Temp: 97.8 °F (36.6 °C) (03/21/23 1140)  Pulse: (!) 49 (03/21/23 1140)  Resp: 16 (03/21/23 1140)  BP: (!) 119/58 (03/21/23 1140)  SpO2: 97 % (03/21/23 1140)   Vital Signs (24h Range):  Temp:  [97.6 °F (36.4 °C)-98.9 °F (37.2 °C)] 97.8 °F (36.6 °C)  Pulse:  [49-70] 49  Resp:  [16-18] 16  SpO2:  [93 %-97 %] 97 %  BP: (119-166)/(57-82) 119/58     Weight: 67.7 kg (149 lb 4 oz)  Body mass index is 27.3 kg/m².    Intake/Output - Last 3 Shifts       None            Physical Exam  Vitals reviewed.   Constitutional:       Appearance: She is well-developed. Ill appearance: Mild chronic.   HENT:      Head: Normocephalic.   Eyes:      Pupils: Pupils are equal, round, and reactive to light.   Neck:      Thyroid: No thyromegaly.      Vascular: No JVD.      Trachea: No tracheal deviation.   Cardiovascular:      Rate and Rhythm: Normal rate and regular rhythm.      Heart sounds: Normal heart sounds.   Pulmonary:      Effort: Pulmonary effort is normal.      Breath sounds: Normal breath sounds. No wheezing.   Abdominal:      General: Bowel sounds are normal. There is no distension.      Palpations: Abdomen is soft. Abdomen is not rigid. There is no mass.      Tenderness: There is no abdominal tenderness. There is no guarding or rebound.      Comments: Slightly obese.  Left-sided colostomy.  Peristomal hernia   Musculoskeletal:         General: Normal range of motion.    Lymphadenopathy:      Cervical: No cervical adenopathy.   Skin:     General: Skin is warm and dry.      Findings: No erythema or rash.   Neurological:      General: No focal deficit present.      Mental Status: She is alert and oriented to person, place, and time.   Psychiatric:         Mood and Affect: Mood normal.         Behavior: Behavior normal.         Thought Content: Thought content normal.         Judgment: Judgment normal.       Significant Labs:  I have reviewed all pertinent lab results within the past 24 hours.  CBC:   Recent Labs   Lab 03/21/23  0615   WBC 5.02   RBC 4.07   HGB 11.7*   HCT 36.4*      MCV 89   MCH 28.7   MCHC 32.1     BMP:   Recent Labs   Lab 03/21/23 0615   GLU 88   *   K 3.7      CO2 23   BUN 11   CREATININE 0.8   CALCIUM 8.5*     Recent Labs   Lab 03/20/23 1824   COLORU Yellow   SPECGRAV 1.010   PHUR 6.0   PROTEINUA 1+*   BACTERIA Many*   NITRITE Negative   LEUKOCYTESUR 3+*   UROBILINOGEN Negative   HYALINECASTS 0     Microbiology Results (last 7 days)       Procedure Component Value Units Date/Time    Urine culture [[id number]] Collected: 03/20/23 1824    Order Status: No result Specimen: Urine Updated: 03/20/23 1843           Significant Diagnostics:  I have reviewed all pertinent imaging results/findings within the past 24 hours.  Small-bowel follow-through.  Contrast reaches colon.  Official report pending but does not appear to have obstruction

## 2023-03-21 NOTE — PROGRESS NOTES
[facility]  General Surgery  Progress Note    Subjective:     History of Present Illness:           2-year-old female who presented to the emergency room with a three-day history of abdominal pain.  The abdominal pain was described as being on the right side and crampy in nature.  When the pain 1st occurred 3 days ago the family tried a bland diet.  She was given medications to help with her symptoms from her right ureteral stent.  She continued to have ongoing complaints of right-sided crampy pain.  She was given Levsin with no results.  She was brought to the emergency room by her family.  The patient has had colostomy output throughout this.  She is had intermittent nausea.      The patient has a colostomy secondary to diverticulitis.  Surgery was complicated by an abscess that required CT-guided drainage.  She is not considered to be a candidate for colostomy reversal.  She is a known parastomal hernia.  She has short-term memory loss        Post-Op Info:  * No surgery found *         Interval History:  Patient is feeling better.  Less abdominal pain.  Emptying or colostomy.  Small-bowel follow-through no obvious obstruction.  Start clears.    Medications:  Continuous Infusions:   sodium chloride 0.9% 80 mL/hr at 03/21/23 1209     Scheduled Meds:   sulfamethoxazole-trimethoprim 800-160mg  1 tablet Oral BID     PRN Meds:hydrALAZINE, LIDOcaine (PF) 10 mg/ml (1%), morphine, morphine, ondansetron, prochlorperazine, sodium chloride 0.9%     Review of patient's allergies indicates:  No Known Allergies  Objective:     Vital Signs (Most Recent):  Temp: 97.8 °F (36.6 °C) (03/21/23 1140)  Pulse: (!) 49 (03/21/23 1140)  Resp: 16 (03/21/23 1140)  BP: (!) 119/58 (03/21/23 1140)  SpO2: 97 % (03/21/23 1140)   Vital Signs (24h Range):  Temp:  [97.6 °F (36.4 °C)-98.9 °F (37.2 °C)] 97.8 °F (36.6 °C)  Pulse:  [49-70] 49  Resp:  [16-18] 16  SpO2:  [93 %-97 %] 97 %  BP: (119-166)/(57-82) 119/58     Weight: 67.7 kg (149 lb 4  oz)  Body mass index is 27.3 kg/m².    Intake/Output - Last 3 Shifts       None            Physical Exam  Vitals reviewed.   Constitutional:       Appearance: She is well-developed. Ill appearance: Mild chronic.   HENT:      Head: Normocephalic.   Eyes:      Pupils: Pupils are equal, round, and reactive to light.   Neck:      Thyroid: No thyromegaly.      Vascular: No JVD.      Trachea: No tracheal deviation.   Cardiovascular:      Rate and Rhythm: Normal rate and regular rhythm.      Heart sounds: Normal heart sounds.   Pulmonary:      Effort: Pulmonary effort is normal.      Breath sounds: Normal breath sounds. No wheezing.   Abdominal:      General: Bowel sounds are normal. There is no distension.      Palpations: Abdomen is soft. Abdomen is not rigid. There is no mass.      Tenderness: There is no abdominal tenderness. There is no guarding or rebound.      Comments: Slightly obese.  Left-sided colostomy.  Peristomal hernia   Musculoskeletal:         General: Normal range of motion.   Lymphadenopathy:      Cervical: No cervical adenopathy.   Skin:     General: Skin is warm and dry.      Findings: No erythema or rash.   Neurological:      General: No focal deficit present.      Mental Status: She is alert and oriented to person, place, and time.   Psychiatric:         Mood and Affect: Mood normal.         Behavior: Behavior normal.         Thought Content: Thought content normal.         Judgment: Judgment normal.       Significant Labs:  I have reviewed all pertinent lab results within the past 24 hours.  CBC:   Recent Labs   Lab 03/21/23  0615   WBC 5.02   RBC 4.07   HGB 11.7*   HCT 36.4*      MCV 89   MCH 28.7   MCHC 32.1     BMP:   Recent Labs   Lab 03/21/23  0615   GLU 88   *   K 3.7      CO2 23   BUN 11   CREATININE 0.8   CALCIUM 8.5*     Recent Labs   Lab 03/20/23  1824   COLORU Yellow   SPECGRAV 1.010   PHUR 6.0   PROTEINUA 1+*   BACTERIA Many*   NITRITE Negative   LEUKOCYTESUR 3+*    UROBILINOGEN Negative   HYALINECASTS 0     Microbiology Results (last 7 days)       Procedure Component Value Units Date/Time    Urine culture [[id number]] Collected: 03/20/23 1824    Order Status: No result Specimen: Urine Updated: 03/20/23 1843           Significant Diagnostics:  I have reviewed all pertinent imaging results/findings within the past 24 hours.  Small-bowel follow-through.  Contrast reaches colon.  Official report pending but does not appear to have obstruction    Assessment/Plan:     * Small bowel anastomotic dilation  Patient more likely had bowel dilation due to a urinary tract infection/cystitis.  Clear liquids and advance as tolerated.  Repeat x-rays tomorrow    Cystitis  Urinalysis and urine microscopy are concerning for a cystitis even though the patient has a stent.      She is been started on Bactrim by the urology service.    Adjust antibiotics per culture    Mass of right breast  Patient will need a clinical exam likely tomorrow when she is in a gown.      She would then need a right-sided mammogram and ultrasound to further define the right breast mass as an outpatient as breast imaging is limited       MARTIN (generalized anxiety disorder)  Monitor    Dementia  Monitor, home medications when she is able to tolerate oral intake    Renal stone  Urology consultation in the morning.      If surgery is required for a bowel obstruction urologic intervention could occur at the same time if indicated    Colostomy in place  Will ask Wound Care to evaluate    Hyperlipidemia  Hold home medications    Mild cognitive impairment with memory loss  Monitor    Essential hypertension  Topical and IV medications    Hypothyroidism  Monitor, check TSH if admission is going to be longer than 48 hours with NPO status        Zay Londono MD  General Surgery  [facility]

## 2023-03-21 NOTE — ASSESSMENT & PLAN NOTE
Urinalysis and urine microscopy are concerning for a cystitis even though the patient has a stent.      She is been started on Bactrim by the urology service.    Adjust antibiotics per culture

## 2023-03-21 NOTE — HOSPITAL COURSE
03/21/2023.  Reports no abdominal pain.  No nausea.  Has emptied colostomy bag.  Small-bowel follow-through shows no obvious obstruction.  Official report pending    03/22/2023.  No abdominal pain.  Has emptied ostomy multiple times.  Tolerated clear liquids.  Small bowel follow-through showed no obstruction but slight delay in exiting the colon.  Abdominal films are pending

## 2023-03-22 ENCOUNTER — TELEPHONE (OUTPATIENT)
Dept: PREADMISSION TESTING | Facility: HOSPITAL | Age: 83
End: 2023-03-22
Payer: MEDICARE

## 2023-03-22 VITALS
WEIGHT: 149.25 LBS | SYSTOLIC BLOOD PRESSURE: 112 MMHG | HEART RATE: 54 BPM | RESPIRATION RATE: 18 BRPM | DIASTOLIC BLOOD PRESSURE: 59 MMHG | HEIGHT: 62 IN | BODY MASS INDEX: 27.47 KG/M2 | OXYGEN SATURATION: 96 % | TEMPERATURE: 98 F

## 2023-03-22 PROCEDURE — 25000003 PHARM REV CODE 250: Performed by: SURGERY

## 2023-03-22 PROCEDURE — 99238 HOSP IP/OBS DSCHRG MGMT 30/<: CPT | Mod: ,,, | Performed by: SURGERY

## 2023-03-22 PROCEDURE — G0378 HOSPITAL OBSERVATION PER HR: HCPCS

## 2023-03-22 PROCEDURE — 25000003 PHARM REV CODE 250: Performed by: UROLOGY

## 2023-03-22 PROCEDURE — 99238 PR HOSPITAL DISCHARGE DAY,<30 MIN: ICD-10-PCS | Mod: ,,, | Performed by: SURGERY

## 2023-03-22 PROCEDURE — 96361 HYDRATE IV INFUSION ADD-ON: CPT

## 2023-03-22 RX ORDER — SULFAMETHOXAZOLE AND TRIMETHOPRIM 800; 160 MG/1; MG/1
1 TABLET ORAL 2 TIMES DAILY
Qty: 12 TABLET | Refills: 0 | Status: ON HOLD | OUTPATIENT
Start: 2023-03-22 | End: 2023-04-04 | Stop reason: HOSPADM

## 2023-03-22 RX ADMIN — SULFAMETHOXAZOLE AND TRIMETHOPRIM 1 TABLET: 800; 160 TABLET ORAL at 09:03

## 2023-03-22 RX ADMIN — THYROID, PORCINE 30 MG: 30 TABLET ORAL at 05:03

## 2023-03-22 RX ADMIN — OXYBUTYNIN CHLORIDE 5 MG: 5 TABLET, EXTENDED RELEASE ORAL at 08:03

## 2023-03-22 RX ADMIN — MONTELUKAST 10 MG: 10 TABLET, FILM COATED ORAL at 08:03

## 2023-03-22 RX ADMIN — SODIUM CHLORIDE: 9 INJECTION, SOLUTION INTRAVENOUS at 05:03

## 2023-03-22 RX ADMIN — PANTOPRAZOLE SODIUM 40 MG: 40 TABLET, DELAYED RELEASE ORAL at 08:03

## 2023-03-22 RX ADMIN — ATORVASTATIN CALCIUM 10 MG: 10 TABLET, FILM COATED ORAL at 08:03

## 2023-03-22 RX ADMIN — MEMANTINE HYDROCHLORIDE 10 MG: 5 TABLET ORAL at 08:03

## 2023-03-22 NOTE — ASSESSMENT & PLAN NOTE
Mass in the right breast is been present on imaging dating back to 2021.  It is essentially unchanged.      No additional workup

## 2023-03-22 NOTE — DISCHARGE INSTRUCTIONS
You likely developed a bladder/urinary tract infection as result of the stent.  You should take the antibiotics that were prescribed.      You should follow-up with Dr. Matute as scheduled for your stent exchange.      Should you develop worsening abdominal pain nausea vomiting or extreme discomfort with your right-sided stent please return to the emergency room

## 2023-03-22 NOTE — PROGRESS NOTES
EXAMINATION:  XR ABDOMEN FLAT AND ERECT     CLINICAL HISTORY:  Follow-up after small-bowel follow-through;     TECHNIQUE:  Flat and erect AP views of the abdomen were performed.     COMPARISON:  Small bowel small through performed yesterday.     FINDINGS:  Known obstructing right renal calculus with right ureteral stent remains in place.  No residual contrast.  Bowel gas pattern is normal for this patient to include the dilated gas-filled colon in the mid pelvis.     Impression:     No acute finding.        Electronically signed by: Nicolás Hess  Date:                                            03/22/2023  Time:                                           11:03

## 2023-03-22 NOTE — ASSESSMENT & PLAN NOTE
This was likely an ileus secondary to cystitis.  Patient is clinically improving.  If x-ray show improvement then she will be given a regular diet and anticipated discharge later today if diet as tolerated

## 2023-03-22 NOTE — NURSING
DC instructions reviewed with patient and daughter at bedside, meds delivered to bedside, belongings gathered at bedside, DC via  with transport, no acute distress noted.

## 2023-03-22 NOTE — PLAN OF CARE
Problem: Adult Inpatient Plan of Care  Goal: Plan of Care Review  Outcome: Met  Goal: Patient-Specific Goal (Individualized)  Outcome: Met  Goal: Absence of Hospital-Acquired Illness or Injury  Outcome: Met  Goal: Optimal Comfort and Wellbeing  Outcome: Met  Goal: Readiness for Transition of Care  Outcome: Met   Adequate for DC

## 2023-03-22 NOTE — TELEPHONE ENCOUNTER
Pre op instructions reviewed with Pt's Daughter ,verbalized understanding.    To confirm, Surgery is scheduled on 4/04/23. We will call you late afternoon the business day prior to surgery with your arrival time.    *Please report to the [facility]obby (1st Floor) located off of [facility] (2nd Entrance/Building on the left, in front of the flag pole).  Address: [address] [place] [address] [place]. [id number]        INSTRUCTIONS IMPORTANT!!!  Do Not Eat, Drink, or Smoke after 12 midnight unless instructed otherwise by your Surgeon. OK to brush teeth, no gum, candy or mints!      *Take Only these medications with a small sip of water Morning of Surgery:  Atorvastatin  Fluoxetine  Zumbrota Thyroid      ____  HOLD all vitamins, herbal supplements, aspirin products & NSAIDS 7 days prior to surgery, as these can thin the blood.  ____  NO Acrylic/fake nails or nail polish worn day of surgery (specifically hand/arm & foot surgeries).  ____  NO powder, lotions, deodorants, oils or cream on body.  ____  Remove all jewelry & piercings prior to surgery.  ____  Remove Dentures, Hearing Aids & Contact Lens prior to surgery.  ____  Bring photo ID and insurance information to hospital (Leave Valuables at Home).  ____  If going home the same day, arrange for a ride home. You will not be able to drive for 24 hrs if Anesthesia was used.   ____  Females (ages 11-60): may need to give a urine sample the morning of surgery; please see Pre op Nurse prior to using the restroom.  ____  Males: Stop ED medications (Viagra, Cialis) 24 hrs prior to surgery.  ____  Wear clean, loose fitting clothing to allow for dressings/ bandages.            Diabetic Patients: If you take diabetic medication, do NOT take morning of surgery unless instructed by Doctor. Metformin to be stopped 24 hrs prior to surgery time. DO NOT take long-acting insulin the evening before surgery. Blood sugars will be checked in pre-op by Nurse.    Bathing Instructions:     -Shower with anti-bacterial Soap (Hibiclens or Dial) the night before surgery and the morning of.   -Do not use Hibiclens on your face or genitals.   -Apply clean clothes after shower.  -Do not shave your face or body 2 days prior to surgery unless instructed otherwise by your Surgeon.  -Do not shave pubic hair 7 days prior to surgery (gyn pt's).    [facility] Visitor/Ride Policy:  Only 2 adults allowed (over the age of 18) to accompany you to the Hospital. You Must have a ride home from a responsible adult that you know and trust. Medical Transport, Uber or Lyft can only be used if patient has a responsible adult to accompany them during ride home.    Discharge Instructions: You will receive Post-op/Discharge instructions by your Discharge Nurse prior to going home. Please call your Surgeon's office with any post-surgery questions/concerns @ [phone].    *If you are running late or have questions the morning of surgery, please call the Surgery Dept @ [phone]  *Insurance/ Financial Questions, please call [phone]    Thank you,  -[facility] Pre Admit Testing Nurse  [phone] or [phone]  M-F 7:30 am-4:30 pm (Closed Major Holidays)

## 2023-03-22 NOTE — SUBJECTIVE & OBJECTIVE
Interval History:  Patient has tolerated clears.  Multiple colostomy output.  Small bowel follow-through showed a slight delay in transit of the colon but otherwise no significant obstruction.      If abdominal film show no abnormality she will be advanced to a regular diet anticipate discharge later today    Medications:  Continuous Infusions:   sodium chloride 0.9% 80 mL/hr at 03/22/23 0633     Scheduled Meds:   atorvastatin  10 mg Oral Daily    FLUoxetine  40 mg Oral QHS    memantine  10 mg Oral BID    montelukast  10 mg Oral Daily    oxybutynin  5 mg Oral Daily    pantoprazole  40 mg Oral Daily    sulfamethoxazole-trimethoprim 800-160mg  1 tablet Oral BID    thyroid (pork)  30 mg Oral Before breakfast     PRN Meds:hydrALAZINE, LIDOcaine (PF) 10 mg/ml (1%), morphine, morphine, ondansetron, prochlorperazine, sodium chloride 0.9%     Review of patient's allergies indicates:  No Known Allergies  Objective:     Vital Signs (Most Recent):  Temp: 98 °F (36.7 °C) (03/22/23 0721)  Pulse: (!) 50 (03/22/23 0721)  Resp: 18 (03/22/23 0721)  BP: 139/70 (03/22/23 0721)  SpO2: 95 % (03/22/23 0721)   Vital Signs (24h Range):  Temp:  [97.8 °F (36.6 °C)-98.4 °F (36.9 °C)] 98 °F (36.7 °C)  Pulse:  [49-52] 50  Resp:  [16-18] 18  SpO2:  [92 %-97 %] 95 %  BP: (106-139)/(53-70) 139/70     Weight: 67.7 kg (149 lb 4 oz)  Body mass index is 27.3 kg/m².    Intake/Output - Last 3 Shifts         03/20 0700  03/21 0659 03/21 0700 03/22 0659 03/22 0700 03/23 0659    I.V. (mL/kg)  1806.7 (26.7)     Total Intake(mL/kg)  1806.7 (26.7)     Stool  300     Total Output  300     Net  +1506.7                    Physical Exam  Constitutional:       Appearance: She is well-developed.   HENT:      Head: Normocephalic.   Eyes:      Pupils: Pupils are equal, round, and reactive to light.   Neck:      Thyroid: No thyromegaly.      Vascular: No JVD.      Trachea: No tracheal deviation.   Cardiovascular:      Rate and Rhythm: Normal rate and regular rhythm.       Heart sounds: Normal heart sounds.   Pulmonary:      Effort: Pulmonary effort is normal.      Breath sounds: Normal breath sounds. No wheezing.   Abdominal:      General: Bowel sounds are normal. There is no distension.      Palpations: Abdomen is soft. Abdomen is not rigid. There is no mass.      Tenderness: There is no abdominal tenderness. There is no guarding or rebound.      Comments: Slightly protuberant.  Left-sided colostomy with hernia.  Liquid stool in the bag.  The colostomy is pink and moist   Musculoskeletal:         General: Normal range of motion.      Right lower leg: No edema.      Left lower leg: No edema.   Lymphadenopathy:      Cervical: No cervical adenopathy.   Skin:     General: Skin is warm and dry.      Findings: No erythema or rash.   Neurological:      Mental Status: She is oriented to person, place, and time.   Psychiatric:         Mood and Affect: Mood normal.         Behavior: Behavior normal.       Significant Labs:  I have reviewed all pertinent lab results within the past 24 hours.  CBC:   Recent Labs   Lab 03/21/23  0615   WBC 5.02   RBC 4.07   HGB 11.7*   HCT 36.4*      MCV 89   MCH 28.7   MCHC 32.1     BMP:   Recent Labs   Lab 03/21/23  0615   GLU 88   *   K 3.7      CO2 23   BUN 11   CREATININE 0.8   CALCIUM 8.5*       Significant Diagnostics:  I have reviewed all pertinent imaging results/findings within the past 24 hours.  Abdominal film are pending

## 2023-03-22 NOTE — PLAN OF CARE
Pt remains free of injury throughout the shift, safety measures remain in place.   Poc reviewed with pt. Vss, no acute s/s of distress. Medication given as ordered. Hourly rounding done. Chart reviwed, will cont with poc.         Problem: Adult Inpatient Plan of Care  Goal: Optimal Comfort and Wellbeing  Outcome: Ongoing, Progressing

## 2023-03-22 NOTE — DISCHARGE SUMMARY
[facility] Surg  General Surgery  Discharge Summary      Patient Name: Irlanda Lopez  MRN: [id number]  Admission Date: 3/20/2023  Hospital Length of Stay: 0 days  Discharge Date and Time:  03/22/2023 11:52 AM  Attending Physician: Zay Londono MD   Discharging Provider: Zay Londono MD  Primary Care Provider: Myla Arias MD    HPI:            2-year-old female who presented to the emergency room with a three-day history of abdominal pain.  The abdominal pain was described as being on the right side and crampy in nature.  When the pain 1st occurred 3 days ago the family tried a bland diet.  She was given medications to help with her symptoms from her right ureteral stent.  She continued to have ongoing complaints of right-sided crampy pain.  She was given Levsin with no results.  She was brought to the emergency room by her family.  The patient has had colostomy output throughout this.  She is had intermittent nausea.      The patient has a colostomy secondary to diverticulitis.  Surgery was complicated by an abscess that required CT-guided drainage.  She is not considered to be a candidate for colostomy reversal.  She is a known parastomal hernia.  She has short-term memory loss        * No surgery found *      Indwelling Lines/Drains at time of discharge:   Lines/Drains/Airways     Drain  Duration                Colostomy 05/23/21  days              Hospital Course: 03/21/2023.  Reports no abdominal pain.  No nausea.  Has emptied colostomy bag.  Small-bowel follow-through shows no obvious obstruction.  Official report pending    03/22/2023.  No abdominal pain.  Has emptied ostomy multiple times.  Tolerated clear liquids.  Small bowel follow-through showed no obstruction but slight delay in exiting the colon.  Abdominal films are pending      Goals of Care Treatment Preferences:  Code Status: Full Code      Consults:   Consults (From admission, onward)        Status Ordering Provider      Inpatient consult to Urology  Once        Provider:  Bull Thomas MD    Completed SHARRI HE          Significant Diagnostic Studies: Labs:   BMP:   Recent Labs   Lab 03/20/23  1409 03/21/23  0615    88   * 135*   K 4.2 3.7    108   CO2 22* 23   BUN 12 11   CREATININE 0.9 0.8   CALCIUM 9.1 8.5*    and CMP   Recent Labs   Lab 03/20/23  1409 03/21/23  0615   * 135*   K 4.2 3.7    108   CO2 22* 23    88   BUN 12 11   CREATININE 0.9 0.8   CALCIUM 9.1 8.5*   PROT 7.1  --    ALBUMIN 3.6  --    BILITOT 1.0  --    ALKPHOS 71  --    AST 27  --    ALT 15  --    ANIONGAP 11 4*     Radiology: CT scan: CT ABDOMEN PELVIS WITH CONTRAST: No results found for this visit on 03/20/23. and CT ABDOMEN PELVIS WITHOUT CONTRAST: No results found for this visit on 03/20/23.  Small-bowel follow-through showing no significant obstruction    Microbiology Results (last 7 days)     Procedure Component Value Units Date/Time    Urine culture [[id number]]  (Abnormal) Collected: 03/20/23 1824    Order Status: Completed Specimen: Urine Updated: 03/22/23 0742     Urine Culture, Routine PRESUMPTIVE E COLI  >100,000 cfu/ml  Identification and susceptibility pending      Narrative:      Specimen Source->Urine          Pending Diagnostic Studies:     None        Final Active Diagnoses:    Diagnosis Date Noted POA    PRINCIPAL PROBLEM:  Small bowel anastomotic dilation [K91.89] 03/20/2023 Yes    Cystitis [N30.90] 03/21/2023 Yes    Mass of right breast [N63.10] 03/20/2023 Yes    MARTIN (generalized anxiety disorder) [F41.1] 06/14/2022 Yes    Dementia [F03.90] 05/24/2021 Yes     Chronic    Renal stone [N20.0] 04/13/2020 Yes    Colostomy in place [Z93.3] 01/18/2019 Not Applicable    Hyperlipidemia [E78.5] 11/10/2018 Yes    Mild cognitive impairment with memory loss [G31.84] 09/14/2017 Yes    Essential hypertension [I10] 01/10/2017 Yes     Chronic    Hypothyroidism [E03.9] 10/04/2016 Yes       Problems Resolved During this Admission:      Discharged Condition: stable    Disposition: Home or Self Care    Follow Up:   Follow-up Information     Anselmo Matute MD Follow up.    Specialty: Urology  Why: As scheduled for stent exchange  Contact information:  [address] [facility]  [place] [address] [phone]                       Patient Instructions:      Diet Adult Regular     Notify your health care provider if you experience any of the following:  temperature >100.4     Notify your health care provider if you experience any of the following:  persistent nausea and vomiting or diarrhea     Notify your health care provider if you experience any of the following:  severe uncontrolled pain     Activity as tolerated     Medications:  Reconciled Home Medications:      Medication List      START taking these medications    sulfamethoxazole-trimethoprim 800-160mg 800-160 mg Tab  Commonly known as: BACTRIM DS  Take 1 tablet by mouth 2 (two) times daily.        CONTINUE taking these medications    ALPRAZolam 0.25 MG tablet  Commonly known as: XANAX  Take 1 tablet (0.25 mg total) by mouth 2 (two) times daily as needed for Anxiety.     ARMOUR THYROID 30 mg Tab  Generic drug: thyroid (pork)  TAKE 1 TABLET (30 MG TOTAL) BY MOUTH BEFORE BREAKFAST.     atorvastatin 10 MG tablet  Commonly known as: LIPITOR  TAKE 1 TABLET BY MOUTH ONCE DAILY     erythromycin ophthalmic ointment  Commonly known as: ROMYCIN  Apply ointment to lids and lashes in both eyes 2 times daily for 7 days.     FLUoxetine 40 MG capsule  Take 1 capsule (40 mg total) by mouth once daily. To control anxiety and OCD symptoms Maintenance     hyoscyamine 0.125 mg Subl  Commonly known as: LEVSIN/SL  Place 2 tablets (0.25 mg total) under the tongue every 4 (four) hours as needed (spasms).     memantine 10 MG Tab  Commonly known as: NAMENDA  Take 1 tablet (10 mg total) by mouth 2 (two) times daily.     montelukast 10 mg tablet  Commonly known as:  SINGULAIR  Take 1 tablet (10 mg total) by mouth once daily.     MYRBETRIQ 50 mg Tb24  Generic drug: mirabegron  Take 1 tablet (50 mg total) by mouth once daily.     phenazopyridine 100 MG tablet  Commonly known as: PYRIDIUM  Please specify directions, refills and quantity     potassium chloride 10 MEQ Cpsr  Commonly known as: MICRO-K  Take by mouth once daily.        ASK your doctor about these medications    pantoprazole 40 MG tablet  Commonly known as: PROTONIX  Take 1 tablet (40 mg total) by mouth once daily. 30 min prior to lunch          Time spent on the discharge of patient: 20 minutes    Zay Londono MD  General Surgery  [facility]

## 2023-03-22 NOTE — PLAN OF CARE
[facility]  Discharge Final Note    Primary Care Provider: Myla Arias MD    Expected Discharge Date: 3/22/2023    Final Discharge Note (most recent)       Final Note - 03/22/23 1252          Final Note    Assessment Type Final Discharge Note     Anticipated Discharge Disposition Home or Self Care     Hospital Resources/Appts/Education Provided Appointments scheduled and added to AVS                                Contact Info       Anselmo Matute MD   Specialty: Urology    [address] [facility] [place] [address]   Phone: [phone]       Next Steps: Follow up    Instructions: As scheduled for stent exchange

## 2023-03-23 ENCOUNTER — PATIENT MESSAGE (OUTPATIENT)
Dept: SURGERY | Facility: CLINIC | Age: 83
End: 2023-03-23
Payer: MEDICARE

## 2023-03-23 LAB — BACTERIA UR CULT: ABNORMAL

## 2023-04-03 ENCOUNTER — TELEPHONE (OUTPATIENT)
Dept: UROLOGY | Facility: CLINIC | Age: 83
End: 2023-04-03
Payer: MEDICARE

## 2023-04-03 ENCOUNTER — ANESTHESIA EVENT (OUTPATIENT)
Dept: SURGERY | Facility: HOSPITAL | Age: 83
End: 2023-04-03
Payer: MEDICARE

## 2023-04-03 ENCOUNTER — TELEPHONE (OUTPATIENT)
Dept: PREADMISSION TESTING | Facility: HOSPITAL | Age: 83
End: 2023-04-03
Payer: MEDICARE

## 2023-04-03 NOTE — ANESTHESIA PREPROCEDURE EVALUATION
04/03/2023  Irlanda Lopze is a 82 y.o., female.    Patient Active Problem List   Diagnosis    Hypothyroidism    Essential hypertension    Mild cognitive impairment with memory loss    High serum vitamin D    Hyperlipidemia    Colostomy in place    Tortuous aorta    Renal stone    Dementia    MARTIN (generalized anxiety disorder)    Small bowel anastomotic dilation    Mass of right breast    Cystitis     Pre-op Assessment    I have reviewed the Patient Summary Reports.     I have reviewed the Nursing Notes. I have reviewed the NPO Status.   I have reviewed the Medications.     Review of Systems  Anesthesia Hx:  Denies Family Hx of Anesthesia complications.   Denies Personal Hx of Anesthesia complications.   Social:  Former Smoker    Hematology/Oncology:  Hematology Normal   Oncology Normal     EENT/Dental:EENT/Dental Normal   Cardiovascular:   Hypertension ECG has been reviewed.    Pulmonary:  Pulmonary Normal    Renal/:   renal calculi    Hepatic/GI:  Hepatic/GI Normal    Musculoskeletal:  Musculoskeletal Normal    Neurological:  Dementia    Endocrine:   Hypothyroidism    Dermatological:  Skin Normal    Psych:   anxiety          Physical Exam  General: Alert and Oriented    Airway:  Mallampati: II   Mouth Opening: Normal  TM Distance: Normal  Tongue: Normal  Neck ROM: Normal ROM        Anesthesia Plan  Type of Anesthesia, risks & benefits discussed:    Anesthesia Type: Gen ETT, Gen Supraglottic Airway, MAC, Gen Natural Airway  Intra-op Monitoring Plan: Standard ASA Monitors  Induction:  IV  Informed Consent: Informed consent signed with the Patient and all parties understand the risks and agree with anesthesia plan.  All questions answered.   ASA Score: 3  Day of Surgery Review of History & Physical: I have interviewed and examined the patient. I have reviewed the patient's H&P dated:      Ready For Surgery From Anesthesia Perspective.     .

## 2023-04-03 NOTE — TELEPHONE ENCOUNTER
Returned call to pt; waiting on surgery arrival time.  Requesting to a later time for surgery due to drive time.

## 2023-04-03 NOTE — TELEPHONE ENCOUNTER
Called and spoke with Pt's daughter about the following:     Please arrive to [facility] ([facility] at 8:30 am on 4/04/23 for your scheduled procedure.  Address: [address] [place] [address] [place]. [address] ([address] on left, 1st Floor Lobby)  >>>NO eating or drinking after midnight unless instructed otherwise by your Surgeon<<<

## 2023-04-04 ENCOUNTER — TELEPHONE (OUTPATIENT)
Dept: PRIMARY CARE CLINIC | Facility: CLINIC | Age: 83
End: 2023-04-04
Payer: MEDICARE

## 2023-04-04 ENCOUNTER — PATIENT MESSAGE (OUTPATIENT)
Dept: SURGERY | Facility: HOSPITAL | Age: 83
End: 2023-04-04
Payer: MEDICARE

## 2023-04-04 ENCOUNTER — ANESTHESIA (OUTPATIENT)
Dept: SURGERY | Facility: HOSPITAL | Age: 83
End: 2023-04-04
Payer: MEDICARE

## 2023-04-04 ENCOUNTER — HOSPITAL ENCOUNTER (OUTPATIENT)
Facility: HOSPITAL | Age: 83
Discharge: HOME | End: 2023-04-04
Attending: UROLOGY | Admitting: UROLOGY
Payer: MEDICARE

## 2023-04-04 DIAGNOSIS — F41.1 GAD (GENERALIZED ANXIETY DISORDER): ICD-10-CM

## 2023-04-04 DIAGNOSIS — N20.0 RENAL STONE: ICD-10-CM

## 2023-04-04 PROCEDURE — 27201423 OPTIME MED/SURG SUP & DEVICES STERILE SUPPLY: Performed by: UROLOGY

## 2023-04-04 PROCEDURE — 37000009 HC ANESTHESIA EA ADD 15 MINS: Performed by: UROLOGY

## 2023-04-04 PROCEDURE — 71000033 HC RECOVERY, INTIAL HOUR: Performed by: UROLOGY

## 2023-04-04 PROCEDURE — C1758 CATHETER, URETERAL: HCPCS | Performed by: UROLOGY

## 2023-04-04 PROCEDURE — 36000707: Performed by: UROLOGY

## 2023-04-04 PROCEDURE — 71000015 HC POSTOP RECOV 1ST HR: Performed by: UROLOGY

## 2023-04-04 PROCEDURE — 63600175 PHARM REV CODE 636 W HCPCS: Performed by: STUDENT IN AN ORGANIZED HEALTH CARE EDUCATION/TRAINING PROGRAM

## 2023-04-04 PROCEDURE — 63600175 PHARM REV CODE 636 W HCPCS: Performed by: UROLOGY

## 2023-04-04 PROCEDURE — C2617 STENT, NON-COR, TEM W/O DEL: HCPCS | Performed by: UROLOGY

## 2023-04-04 PROCEDURE — 25000003 PHARM REV CODE 250: Performed by: STUDENT IN AN ORGANIZED HEALTH CARE EDUCATION/TRAINING PROGRAM

## 2023-04-04 PROCEDURE — 52356 CYSTO/URETERO W/LITHOTRIPSY: CPT | Mod: RT,,, | Performed by: UROLOGY

## 2023-04-04 PROCEDURE — C1894 INTRO/SHEATH, NON-LASER: HCPCS | Performed by: UROLOGY

## 2023-04-04 PROCEDURE — 74420 PR  X-RAY RETROGRADE PYELOGRAM: ICD-10-PCS | Mod: 26,,, | Performed by: UROLOGY

## 2023-04-04 PROCEDURE — 74420 UROGRAPHY RTRGR +-KUB: CPT | Mod: 26,,, | Performed by: UROLOGY

## 2023-04-04 PROCEDURE — 37000008 HC ANESTHESIA 1ST 15 MINUTES: Performed by: UROLOGY

## 2023-04-04 PROCEDURE — C1747 OPTIME ENDOSCOPE, SINGLE, URINARY TRACT: HCPCS | Performed by: UROLOGY

## 2023-04-04 PROCEDURE — 52356 PR CYSTO/URETERO W/LITHOTRIPSY: ICD-10-PCS | Mod: RT,,, | Performed by: UROLOGY

## 2023-04-04 PROCEDURE — 36000706: Performed by: UROLOGY

## 2023-04-04 PROCEDURE — C1769 GUIDE WIRE: HCPCS | Performed by: UROLOGY

## 2023-04-04 PROCEDURE — 25500020 PHARM REV CODE 255: Performed by: UROLOGY

## 2023-04-04 DEVICE — STENT URETERAL UNIV 8FR 26CM
Type: IMPLANTABLE DEVICE | Site: URETER | Status: NON-FUNCTIONAL
Removed: 2023-11-07

## 2023-04-04 RX ORDER — FENTANYL CITRATE 50 UG/ML
INJECTION, SOLUTION INTRAMUSCULAR; INTRAVENOUS
Status: DISCONTINUED | OUTPATIENT
Start: 2023-04-04 | End: 2023-04-04

## 2023-04-04 RX ORDER — MEPERIDINE HYDROCHLORIDE 25 MG/ML
12.5 INJECTION INTRAMUSCULAR; INTRAVENOUS; SUBCUTANEOUS ONCE
Status: DISCONTINUED | OUTPATIENT
Start: 2023-04-04 | End: 2023-04-04 | Stop reason: HOSPADM

## 2023-04-04 RX ORDER — HYDROMORPHONE HYDROCHLORIDE 2 MG/ML
0.2 INJECTION, SOLUTION INTRAMUSCULAR; INTRAVENOUS; SUBCUTANEOUS EVERY 5 MIN PRN
Status: DISCONTINUED | OUTPATIENT
Start: 2023-04-04 | End: 2023-04-04 | Stop reason: HOSPADM

## 2023-04-04 RX ORDER — KETOROLAC TROMETHAMINE 30 MG/ML
15 INJECTION, SOLUTION INTRAMUSCULAR; INTRAVENOUS EVERY 8 HOURS PRN
Status: DISCONTINUED | OUTPATIENT
Start: 2023-04-04 | End: 2023-04-04 | Stop reason: HOSPADM

## 2023-04-04 RX ORDER — DIPHENHYDRAMINE HYDROCHLORIDE 50 MG/ML
25 INJECTION INTRAMUSCULAR; INTRAVENOUS EVERY 6 HOURS PRN
Status: DISCONTINUED | OUTPATIENT
Start: 2023-04-04 | End: 2023-04-04 | Stop reason: HOSPADM

## 2023-04-04 RX ORDER — PROPOFOL 10 MG/ML
VIAL (ML) INTRAVENOUS
Status: DISCONTINUED | OUTPATIENT
Start: 2023-04-04 | End: 2023-04-04

## 2023-04-04 RX ORDER — OXYCODONE HYDROCHLORIDE 5 MG/1
5 TABLET ORAL
Status: DISCONTINUED | OUTPATIENT
Start: 2023-04-04 | End: 2023-04-04 | Stop reason: HOSPADM

## 2023-04-04 RX ORDER — ALBUTEROL SULFATE 0.83 MG/ML
2.5 SOLUTION RESPIRATORY (INHALATION) EVERY 4 HOURS PRN
Status: DISCONTINUED | OUTPATIENT
Start: 2023-04-04 | End: 2023-04-04 | Stop reason: HOSPADM

## 2023-04-04 RX ORDER — SULFAMETHOXAZOLE AND TRIMETHOPRIM 800; 160 MG/1; MG/1
1 TABLET ORAL 2 TIMES DAILY
Qty: 14 TABLET | Refills: 0 | Status: SHIPPED | OUTPATIENT
Start: 2023-04-04 | End: 2023-04-20

## 2023-04-04 RX ORDER — EPHEDRINE SULFATE 50 MG/ML
INJECTION, SOLUTION INTRAVENOUS
Status: DISCONTINUED | OUTPATIENT
Start: 2023-04-04 | End: 2023-04-04

## 2023-04-04 RX ORDER — SODIUM CHLORIDE, SODIUM LACTATE, POTASSIUM CHLORIDE, CALCIUM CHLORIDE 600; 310; 30; 20 MG/100ML; MG/100ML; MG/100ML; MG/100ML
INJECTION, SOLUTION INTRAVENOUS CONTINUOUS PRN
Status: DISCONTINUED | OUTPATIENT
Start: 2023-04-04 | End: 2023-04-04

## 2023-04-04 RX ORDER — PROCHLORPERAZINE EDISYLATE 5 MG/ML
5 INJECTION INTRAMUSCULAR; INTRAVENOUS EVERY 30 MIN PRN
Status: DISCONTINUED | OUTPATIENT
Start: 2023-04-04 | End: 2023-04-04 | Stop reason: HOSPADM

## 2023-04-04 RX ORDER — DEXAMETHASONE SODIUM PHOSPHATE 4 MG/ML
INJECTION, SOLUTION INTRA-ARTICULAR; INTRALESIONAL; INTRAMUSCULAR; INTRAVENOUS; SOFT TISSUE
Status: DISCONTINUED | OUTPATIENT
Start: 2023-04-04 | End: 2023-04-04

## 2023-04-04 RX ORDER — OXYCODONE AND ACETAMINOPHEN 5; 325 MG/1; MG/1
1 TABLET ORAL EVERY 4 HOURS PRN
Qty: 10 TABLET | Refills: 0 | Status: SHIPPED | OUTPATIENT
Start: 2023-04-04 | End: 2023-09-19 | Stop reason: ALTCHOICE

## 2023-04-04 RX ORDER — ALPRAZOLAM 0.25 MG/1
0.25 TABLET ORAL 2 TIMES DAILY PRN
Qty: 30 TABLET | Refills: 3 | Status: CANCELLED | OUTPATIENT
Start: 2023-04-04

## 2023-04-04 RX ORDER — ONDANSETRON 2 MG/ML
4 INJECTION INTRAMUSCULAR; INTRAVENOUS DAILY PRN
Status: DISCONTINUED | OUTPATIENT
Start: 2023-04-04 | End: 2023-04-04 | Stop reason: HOSPADM

## 2023-04-04 RX ORDER — LIDOCAINE HYDROCHLORIDE 20 MG/ML
INJECTION INTRAVENOUS
Status: DISCONTINUED | OUTPATIENT
Start: 2023-04-04 | End: 2023-04-04

## 2023-04-04 RX ORDER — SODIUM CHLORIDE 0.9 % (FLUSH) 0.9 %
3 SYRINGE (ML) INJECTION
Status: DISCONTINUED | OUTPATIENT
Start: 2023-04-04 | End: 2023-04-04 | Stop reason: HOSPADM

## 2023-04-04 RX ORDER — ONDANSETRON 2 MG/ML
INJECTION INTRAMUSCULAR; INTRAVENOUS
Status: DISCONTINUED | OUTPATIENT
Start: 2023-04-04 | End: 2023-04-04

## 2023-04-04 RX ORDER — CEFAZOLIN SODIUM 2 G/50ML
2 SOLUTION INTRAVENOUS
Status: COMPLETED | OUTPATIENT
Start: 2023-04-04 | End: 2023-04-04

## 2023-04-04 RX ADMIN — FENTANYL CITRATE 50 MCG: 50 INJECTION, SOLUTION INTRAMUSCULAR; INTRAVENOUS at 10:04

## 2023-04-04 RX ADMIN — EPHEDRINE SULFATE 10 MG: 50 INJECTION INTRAVENOUS at 10:04

## 2023-04-04 RX ADMIN — SODIUM CHLORIDE, SODIUM LACTATE, POTASSIUM CHLORIDE, AND CALCIUM CHLORIDE: 600; 310; 30; 20 INJECTION, SOLUTION INTRAVENOUS at 09:04

## 2023-04-04 RX ADMIN — EPHEDRINE SULFATE 10 MG: 50 INJECTION INTRAVENOUS at 09:04

## 2023-04-04 RX ADMIN — LIDOCAINE HYDROCHLORIDE 100 MG: 20 INJECTION INTRAVENOUS at 09:04

## 2023-04-04 RX ADMIN — DEXAMETHASONE SODIUM PHOSPHATE 4 MG: 4 INJECTION, SOLUTION INTRA-ARTICULAR; INTRALESIONAL; INTRAMUSCULAR; INTRAVENOUS; SOFT TISSUE at 09:04

## 2023-04-04 RX ADMIN — ONDANSETRON 4 MG: 2 INJECTION INTRAMUSCULAR; INTRAVENOUS at 09:04

## 2023-04-04 RX ADMIN — FENTANYL CITRATE 50 MCG: 50 INJECTION, SOLUTION INTRAMUSCULAR; INTRAVENOUS at 09:04

## 2023-04-04 RX ADMIN — PROPOFOL 100 MG: 10 INJECTION, EMULSION INTRAVENOUS at 09:04

## 2023-04-04 RX ADMIN — CEFAZOLIN SODIUM 2 G: 2 SOLUTION INTRAVENOUS at 09:04

## 2023-04-04 NOTE — ANESTHESIA POSTPROCEDURE EVALUATION
Anesthesia Post Evaluation    Patient: Irlanda Lopez    Procedure(s) Performed: Procedure(s) (LRB):  CYSTOURETEROSCOPY, WITH HOLMIUM LASER LITHOTRIPSY OF URETERAL CALCULUS AND STENT INSERTION (Right)    Final Anesthesia Type: general      Patient location during evaluation: PACU  Patient participation: Yes- Able to Participate  Level of consciousness: awake and alert  Post-procedure vital signs: reviewed and not stable  Pain management: adequate  Airway patency: patent  LEAH mitigation strategies: Preoperative initiation of continuous positive airway pressure (CPAP) or non-invasive positive pressure ventilation (NIPPV)  PONV status at discharge: No PONV  Anesthetic complications: no      Cardiovascular status: blood pressure returned to baseline  Respiratory status: unassisted, spontaneous ventilation and room air  Hydration status: euvolemic  Follow-up not needed.          Vitals Value Taken Time   /76 04/04/23 1112   Temp 98 04/04/23 1113   Pulse 56 04/04/23 1112   Resp 17 04/04/23 1112   SpO2 100 % 04/04/23 1112   Vitals shown include unvalidated device data.      No case tracking events are documented in the log.      Pain/Babar Score: No data recorded

## 2023-04-04 NOTE — TELEPHONE ENCOUNTER
No new care gaps identified.  [facility] Embedded Care Gaps. Reference number: [id number]. 4/04/2023   4:58:40 PM CDT

## 2023-04-04 NOTE — TELEPHONE ENCOUNTER
----- Message from Aubree Mitchell sent at 4/4/2023  4:10 PM CDT -----  Contact: daughter  Patients daughter called this afternoon  stating she cant find it at an independent pharmacy either  moms ALPRAZolam (XANAX) 0.25 MG tablet [facility] not [facility] it and her mom is really high strung and ocd and she only has 5 pills left and the caregivers are afraid she will run out. Please call her daughter back [phone]

## 2023-04-04 NOTE — TELEPHONE ENCOUNTER
----- Message from Tasneem Camacho sent at 4/4/2023  4:53 PM CDT -----  Contact: flaquito/ abril  Patients daughter is calling to speak with the nurse regarding medication. Reports needing to know if the medication ALPRAZolam (XANAX) 0.25 MG tablet can be [facility] to get [facility]. Please give the patients daughter a call back at [phone]  Thanks naldo      dyspnea on exertion

## 2023-04-04 NOTE — TRANSFER OF CARE
"Anesthesia Transfer of Care Note    Patient: Irlanda Lopez    Procedure(s) Performed: Procedure(s) (LRB):  CYSTOURETEROSCOPY, WITH HOLMIUM LASER LITHOTRIPSY OF URETERAL CALCULUS AND STENT INSERTION (Right)    Patient location: PACU    Anesthesia Type: general    Transport from OR: Transported from OR on room air with adequate spontaneous ventilation    Post pain: adequate analgesia    Post assessment: no apparent anesthetic complications and tolerated procedure well    Post vital signs: stable    Level of consciousness: awake, responds to stimulation and alert    Nausea/Vomiting: no nausea/vomiting    Complications: none    Transfer of care protocol was followedComments: Report given to PACU RN at bedside. RN given opportunity to ask questions or clarify concerns. No Concerns verbalized. RN was asked if ready to assume care of patient. RN verbally confirmed. Pt. left in stable condition. SV. Vital Signs Return to Near Baseline. No s/s of distress noted.       Last vitals:   Visit Vitals  BP (!) 153/81 (BP Location: Right arm, Patient Position: Sitting)   Pulse 85   Temp 36.7 °C (98.1 °F) (Temporal)   Resp 18   Ht 5' 2" (1.575 m)   Wt 66.3 kg (146 lb 2.6 oz)   SpO2 97%   Breastfeeding No   BMI 26.73 kg/m²     "

## 2023-04-04 NOTE — ANESTHESIA PROCEDURE NOTES
Intubation    Date/Time: 4/4/2023 9:55 AM  Performed by: Mac Ventura CRNA  Authorized by: Shankar Vergara MD     Intubation:     Induction:  Intravenous    Intubated:  Postinduction    Mask Ventilation:  Easy mask    Attempts:  1    Attempted By:  CRNA    Method of Intubation:  Blind intubation    Difficult Airway Encountered?: No      Complications:  None    Airway Device:  Supraglottic airway/LMA    Airway Device Size:  4.0    Secured at:  The lips    Placement Verified By:  Capnometry    Complicating Factors:  None    Findings Post-Intubation:  BS equal bilateral and atraumatic/condition of teeth unchanged

## 2023-04-04 NOTE — OP NOTE
Date of Procedure: 04/04/2023    PREOPERATIVE DIAGNOSIS:  right ureteral stone.                                                                                                           POSTOPERATIVE DIAGNOSIS:  right ureteral stone.                               PROCEDURES:                                                                  1.  right ureteroscopy with laser lithotripsy                           2.  Cystoscopy with placement of right double-J ureteral stent.              3.  Right retrograde pyelogram.                                               4.  Fluoro less than 1 hour.                                                 SURGEON:  Anselmo Matute M.D.                                          Assistants: None    Specimen: none    ANESTHESIA:  General endotracheal.                                           FINDINGS:  Stone fragmented at least 50%, retrograde unremarkable, stent in good position.                                    BLOOD LOSS:  None.                                                         DRAINS:  Right 8 Haitian by 26 cm double-J ureteral stent.                      PROCEDURE IN DETAIL:  Patient is brought to the operative suite and placed under anesthesia in the dorsal lithotomy position.  After being sterilely prepped and draped a 21 Haitian sheath cystoscope was placed into a normal urethra.  Bladder was otherwise unremarkable, no evidence of intravesical lesions or other abnormalities.  Bilateral ureteral orifices normal size, shape, caliber and location.  Right ureteral stent was identified grasped brought to the meatus.  A wire was then inserted up to the level the renal pelvis using fluoroscopic imaging.  Stent was then removed.  Cystoscope was reinserted a 2nd wire was placed up the right ureter to the level the renal pelvis using fluoroscopic imaging.  Over the wire using Seldinger technique we placed a short ureteral access sheath and inserted a flexible ureteral scope.  Stone  was immediately identified and then fragmentation was initiated.  Extremely large stone by the conclusion at least 50% fragmentation.  The rest was dusted in the hopes of passage.  The scope and sheath were removed slowly demonstrating no stones within the ureter.  Prior to extraction though retrograde was done demonstrating no filling defects or other abnormalities except for the large right stone.  Over our safety wire an 8 [place] Leonean by 26 cm double-J ureteral stent was placed into the renal pelvis the bladder using fluoroscopic imaging.  Bladder was drained the patient was taken to the PACU in stable condition.  I will see her back in 1-2 months with a KUB and discussion repeat ureteroscopy.    COMPLICATIONS:  None.

## 2023-04-04 NOTE — TELEPHONE ENCOUNTER
Called patient daughter back in reference to her message. Myla (daughter) said she will send her brother to come and  the prescription instead of sending it electronically.

## 2023-04-04 NOTE — DISCHARGE SUMMARY
[facility] ([facility])  Discharge Note  Short Stay    Procedure(s) (LRB):  CYSTOURETEROSCOPY, WITH HOLMIUM LASER LITHOTRIPSY OF URETERAL CALCULUS AND STENT INSERTION (Right)      OUTCOME: Patient tolerated treatment/procedure well without complication and is now ready for discharge.    DISPOSITION: Home or Self Care    FINAL DIAGNOSIS:  <principal problem not specified>    FOLLOWUP: In clinic    DISCHARGE INSTRUCTIONS:    Discharge Procedure Orders   Diet Adult Regular     Notify your health care provider if you experience any of the following:  severe uncontrolled pain     Notify your health care provider if you experience any of the following:  persistent nausea and vomiting or diarrhea     Notify your health care provider if you experience any of the following:  temperature >100.4     Activity as tolerated        TIME SPENT ON DISCHARGE: 5 minutes

## 2023-04-04 NOTE — TELEPHONE ENCOUNTER
----- Message from Aubree Mitchell sent at 4/4/2023  4:10 PM CDT -----  Contact: daughter  Patients daughter called this afternoon  stating she cant find it at an independent pharmacy either  moms ALPRAZolam (XANAX) [facility] MG tablet [facility] and her mom is really high strung and ocd and she only has 5 pills left and the caregivers are afraid she will run out. Please call her daughter back [phone]

## 2023-04-05 ENCOUNTER — PATIENT MESSAGE (OUTPATIENT)
Dept: PRIMARY CARE CLINIC | Facility: CLINIC | Age: 83
End: 2023-04-05
Payer: MEDICARE

## 2023-04-05 ENCOUNTER — TELEPHONE (OUTPATIENT)
Dept: NEUROLOGY | Facility: CLINIC | Age: 83
End: 2023-04-05
Payer: MEDICARE

## 2023-04-05 VITALS
SYSTOLIC BLOOD PRESSURE: 139 MMHG | OXYGEN SATURATION: 94 % | BODY MASS INDEX: 26.9 KG/M2 | WEIGHT: 146.19 LBS | DIASTOLIC BLOOD PRESSURE: 78 MMHG | HEIGHT: 62 IN | RESPIRATION RATE: 16 BRPM | TEMPERATURE: 98 F | HEART RATE: 57 BPM

## 2023-04-05 DIAGNOSIS — F41.1 GAD (GENERALIZED ANXIETY DISORDER): ICD-10-CM

## 2023-04-05 RX ORDER — ALPRAZOLAM 0.25 MG/1
0.25 TABLET ORAL 2 TIMES DAILY PRN
Qty: 30 TABLET | Refills: 0 | Status: SHIPPED | OUTPATIENT
Start: 2023-04-05 | End: 2023-04-06 | Stop reason: SDUPTHER

## 2023-04-05 RX ORDER — ALPRAZOLAM 0.25 MG/1
TABLET ORAL
Qty: 30 TABLET | Refills: 0 | OUTPATIENT
Start: 2023-04-05

## 2023-04-05 NOTE — TELEPHONE ENCOUNTER
Tried to call patient back several times in reference to her message. Patient daughter is calling upset about coming  the prescription . On yesterday she told selina she was gone see if her brother can help her and come out to [place] and  the script. Because every pharmacy she has called around too don't have the medicine. Patient medicine was cancel from sending thru electronically. Myla agree to taking the prescription to another pharmacy.

## 2023-04-05 NOTE — TELEPHONE ENCOUNTER
Irlanda Lopez,     I have sent the following medications to your preferred pharmacy on file. Please let me know if you have further questions.     Medications Ordered This Encounter   Medications    ALPRAZolam (XANAX) 0.25 MG tablet     Sig: Take 1 tablet (0.25 mg total) by mouth 2 (two) times daily as needed for Anxiety.     Dispense:  30 tablet     Refill:  0      PRINTED RX for patient's family member to pickup.      Sincerely,   Myla Arias MD

## 2023-04-06 ENCOUNTER — TELEPHONE (OUTPATIENT)
Dept: PRIMARY CARE CLINIC | Facility: CLINIC | Age: 83
End: 2023-04-06
Payer: MEDICARE

## 2023-04-06 DIAGNOSIS — F41.1 GAD (GENERALIZED ANXIETY DISORDER): ICD-10-CM

## 2023-04-06 RX ORDER — ALPRAZOLAM 0.25 MG/1
0.25 TABLET ORAL 2 TIMES DAILY PRN
Qty: 30 TABLET | Refills: 1 | Status: SHIPPED | OUTPATIENT
Start: 2023-04-06 | End: 2023-04-12

## 2023-04-06 RX ORDER — ALPRAZOLAM 0.25 MG/1
0.25 TABLET ORAL 2 TIMES DAILY PRN
Qty: 30 TABLET | Refills: 0 | Status: CANCELLED | OUTPATIENT
Start: 2023-04-06

## 2023-04-06 NOTE — TELEPHONE ENCOUNTER
Called and spoke with patient daughter zaheer @ 9:52am. On 04/06/2023. Zaheer said that she wanted us to cancel her mother paper prescription because she found a pharmacy that has the medication and want it transfer over to [facility] that's in her chart..she can't  the paper script that's why it's being canceled.  Zaheer said the pharmacy advise her they can hold it for her. But not all day, I explain to zaheer that the provider is in clinic and not sure what time medication will be sent over. I asked her did she understand what I said and she verbally agree.

## 2023-04-06 NOTE — TELEPHONE ENCOUNTER
Irlanda Lopez,     I have sent the following medications to your preferred pharmacy on file. Please let me know if you have further questions.     Medications Ordered This Encounter   Medications    ALPRAZolam (XANAX) 0.25 MG tablet     Sig: Take 1 tablet (0.25 mg total) by mouth 2 (two) times daily as needed for Anxiety.     Dispense:  30 tablet     Refill:  1          [facility]/pharmacy #[id number] [place], [place] [facility] [address] [address] [facility] [address] [facility]  [address] [address] [address]  Phone: [phone] Fax: [phone]  Sincerely,   Myla Arias MD

## 2023-04-07 NOTE — ASSESSMENT & PLAN NOTE
Urine Cx growing GNR, last month had E Coli which was ESBL positive, and resistant to Cipro  Await C/S  ID consulted  Pt on oral Vanc and probiotics        [FreeTextEntry1] : 73 year old female with hx of morbid obesity  improved sleep apnea with weight loss ,  Monoclonal gammopathy HTN  hyperlipidemia  PAF , bradycardia with normal chronotropic response  ,was evaluated by EP  placed on hydralazine , patients palpitations resolved , on eliquis , came for follow up  says she is feeling fine ,  denies any chest pain or palpitation or dizziness \par \par \par Patient blood pressure  is elevated as she has white coat component , patient says her home BP readings are normal range   right arm BP more than left arm ?? \par says she is having side effects with lipitor ,multiple joint pains , unable to sleep longer at night , can not lay on left side due to hip pain stopped taking medication then she felt better \par \par Patient used to be very obese , did loose some weight , with some improvement in sleep apnea , now she does not use cpap , patient had long standing hx of copd ,  used to use oxygen  NC  now she is fine on bronchodilator treatment ,  now she is not using oxygen \par  \par \par her blood work showed  improved lipid profile    HDL 92 Tg 47 LDl 81  stable mild anemia  done  april 4 2023  taking red yeast rice \par

## 2023-04-11 ENCOUNTER — TELEPHONE (OUTPATIENT)
Dept: PRIMARY CARE CLINIC | Facility: CLINIC | Age: 83
End: 2023-04-11
Payer: MEDICARE

## 2023-04-11 DIAGNOSIS — F41.1 GAD (GENERALIZED ANXIETY DISORDER): ICD-10-CM

## 2023-04-11 NOTE — TELEPHONE ENCOUNTER
----- Message from Tori Clark sent at 4/11/2023  2:31 PM CDT -----  Contact: Myla/ Daughter  Myla is needing a call back regarding [facility] not having the 0.25mg Xanax. She stated that the patient's caregivers are threatening not to stay with the patient if she doesn't have her medication. She will be out by Thursday. [facility] stated they have the 0.50 that can be scored but need the doctors approval. Please call her at [phone].    Please send it to:   [id number] [place] [facility] [address] [address] [facility]  [address] [address] [address]  Phone: [phone] Fax: [phone]

## 2023-04-12 ENCOUNTER — PATIENT MESSAGE (OUTPATIENT)
Dept: CARDIOLOGY | Facility: CLINIC | Age: 83
End: 2023-04-12
Payer: MEDICARE

## 2023-04-12 RX ORDER — ALPRAZOLAM 0.5 MG/1
0.25 TABLET ORAL 2 TIMES DAILY PRN
Qty: 15 TABLET | Refills: 1 | Status: SHIPPED | OUTPATIENT
Start: 2023-04-12 | End: 2023-04-20

## 2023-04-18 ENCOUNTER — PES CALL (OUTPATIENT)
Dept: ADMINISTRATIVE | Facility: CLINIC | Age: 83
End: 2023-04-18
Payer: MEDICARE

## 2023-04-19 PROBLEM — N39.41 URGE URINARY INCONTINENCE: Status: ACTIVE | Noted: 2023-04-19

## 2023-04-19 PROBLEM — I70.0 AORTIC ATHEROSCLEROSIS: Status: ACTIVE | Noted: 2023-04-19

## 2023-04-19 PROBLEM — M85.80 OSTEOPENIA: Status: ACTIVE | Noted: 2023-04-19

## 2023-04-20 ENCOUNTER — OFFICE VISIT (OUTPATIENT)
Dept: CARDIOLOGY | Facility: CLINIC | Age: 83
End: 2023-04-20
Payer: MEDICARE

## 2023-04-20 ENCOUNTER — OFFICE VISIT (OUTPATIENT)
Dept: INTERNAL MEDICINE | Facility: CLINIC | Age: 83
End: 2023-04-20
Payer: MEDICARE

## 2023-04-20 VITALS
DIASTOLIC BLOOD PRESSURE: 78 MMHG | OXYGEN SATURATION: 98 % | HEART RATE: 49 BPM | WEIGHT: 147.06 LBS | SYSTOLIC BLOOD PRESSURE: 112 MMHG | BODY MASS INDEX: 27.06 KG/M2 | HEIGHT: 62 IN

## 2023-04-20 VITALS
BODY MASS INDEX: 27.06 KG/M2 | DIASTOLIC BLOOD PRESSURE: 74 MMHG | WEIGHT: 147.06 LBS | RESPIRATION RATE: 18 BRPM | HEART RATE: 50 BPM | SYSTOLIC BLOOD PRESSURE: 136 MMHG | OXYGEN SATURATION: 98 % | HEIGHT: 62 IN

## 2023-04-20 DIAGNOSIS — I10 ESSENTIAL HYPERTENSION: Chronic | ICD-10-CM

## 2023-04-20 DIAGNOSIS — G31.84 MILD COGNITIVE IMPAIRMENT WITH MEMORY LOSS: ICD-10-CM

## 2023-04-20 DIAGNOSIS — Z93.3 COLOSTOMY IN PLACE: ICD-10-CM

## 2023-04-20 DIAGNOSIS — F41.1 GAD (GENERALIZED ANXIETY DISORDER): ICD-10-CM

## 2023-04-20 DIAGNOSIS — N39.41 URGE URINARY INCONTINENCE: ICD-10-CM

## 2023-04-20 DIAGNOSIS — I77.1 TORTUOUS AORTA: ICD-10-CM

## 2023-04-20 DIAGNOSIS — I70.0 AORTIC ATHEROSCLEROSIS: ICD-10-CM

## 2023-04-20 DIAGNOSIS — E78.00 PURE HYPERCHOLESTEROLEMIA: ICD-10-CM

## 2023-04-20 DIAGNOSIS — E78.00 PURE HYPERCHOLESTEROLEMIA: Primary | ICD-10-CM

## 2023-04-20 DIAGNOSIS — E03.9 HYPOTHYROIDISM, UNSPECIFIED TYPE: ICD-10-CM

## 2023-04-20 DIAGNOSIS — M85.80 OSTEOPENIA, UNSPECIFIED LOCATION: ICD-10-CM

## 2023-04-20 DIAGNOSIS — N20.0 RENAL STONE: ICD-10-CM

## 2023-04-20 DIAGNOSIS — Z00.00 ENCOUNTER FOR MEDICARE ANNUAL WELLNESS EXAM: Primary | ICD-10-CM

## 2023-04-20 PROCEDURE — 99999 PR PBB SHADOW E&M-EST. PATIENT-LVL V: ICD-10-PCS | Mod: PBBFAC,,, | Performed by: NURSE PRACTITIONER

## 2023-04-20 PROCEDURE — 3075F PR MOST RECENT SYSTOLIC BLOOD PRESS GE 130-139MM HG: ICD-10-PCS | Mod: CPTII,S$GLB,, | Performed by: NURSE PRACTITIONER

## 2023-04-20 PROCEDURE — 1170F FXNL STATUS ASSESSED: CPT | Mod: CPTII,S$GLB,, | Performed by: NURSE PRACTITIONER

## 2023-04-20 PROCEDURE — 99214 OFFICE O/P EST MOD 30 MIN: CPT | Mod: S$GLB,,, | Performed by: INTERNAL MEDICINE

## 2023-04-20 PROCEDURE — 1101F PR PT FALLS ASSESS DOC 0-1 FALLS W/OUT INJ PAST YR: ICD-10-PCS | Mod: CPTII,S$GLB,, | Performed by: INTERNAL MEDICINE

## 2023-04-20 PROCEDURE — 1101F PT FALLS ASSESS-DOCD LE1/YR: CPT | Mod: CPTII,S$GLB,, | Performed by: NURSE PRACTITIONER

## 2023-04-20 PROCEDURE — 1160F RVW MEDS BY RX/DR IN RCRD: CPT | Mod: CPTII,S$GLB,, | Performed by: INTERNAL MEDICINE

## 2023-04-20 PROCEDURE — 1159F MED LIST DOCD IN RCRD: CPT | Mod: CPTII,S$GLB,, | Performed by: INTERNAL MEDICINE

## 2023-04-20 PROCEDURE — 3288F PR FALLS RISK ASSESSMENT DOCUMENTED: ICD-10-PCS | Mod: CPTII,S$GLB,, | Performed by: NURSE PRACTITIONER

## 2023-04-20 PROCEDURE — G0439 PR MEDICARE ANNUAL WELLNESS SUBSEQUENT VISIT: ICD-10-PCS | Mod: S$GLB,,, | Performed by: NURSE PRACTITIONER

## 2023-04-20 PROCEDURE — 3074F PR MOST RECENT SYSTOLIC BLOOD PRESSURE < 130 MM HG: ICD-10-PCS | Mod: CPTII,S$GLB,, | Performed by: INTERNAL MEDICINE

## 2023-04-20 PROCEDURE — 3288F FALL RISK ASSESSMENT DOCD: CPT | Mod: CPTII,S$GLB,, | Performed by: NURSE PRACTITIONER

## 2023-04-20 PROCEDURE — 99214 PR OFFICE/OUTPT VISIT, EST, LEVL IV, 30-39 MIN: ICD-10-PCS | Mod: S$GLB,,, | Performed by: INTERNAL MEDICINE

## 2023-04-20 PROCEDURE — 1159F PR MEDICATION LIST DOCUMENTED IN MEDICAL RECORD: ICD-10-PCS | Mod: CPTII,S$GLB,, | Performed by: INTERNAL MEDICINE

## 2023-04-20 PROCEDURE — G0439 PPPS, SUBSEQ VISIT: HCPCS | Mod: S$GLB,,, | Performed by: NURSE PRACTITIONER

## 2023-04-20 PROCEDURE — 3078F PR MOST RECENT DIASTOLIC BLOOD PRESSURE < 80 MM HG: ICD-10-PCS | Mod: CPTII,S$GLB,, | Performed by: INTERNAL MEDICINE

## 2023-04-20 PROCEDURE — 3078F DIAST BP <80 MM HG: CPT | Mod: CPTII,S$GLB,, | Performed by: NURSE PRACTITIONER

## 2023-04-20 PROCEDURE — 1126F PR PAIN SEVERITY QUANTIFIED, NO PAIN PRESENT: ICD-10-PCS | Mod: CPTII,S$GLB,, | Performed by: INTERNAL MEDICINE

## 2023-04-20 PROCEDURE — 1126F PR PAIN SEVERITY QUANTIFIED, NO PAIN PRESENT: ICD-10-PCS | Mod: CPTII,S$GLB,, | Performed by: NURSE PRACTITIONER

## 2023-04-20 PROCEDURE — 99999 PR PBB SHADOW E&M-EST. PATIENT-LVL V: CPT | Mod: PBBFAC,,, | Performed by: NURSE PRACTITIONER

## 2023-04-20 PROCEDURE — 99999 PR PBB SHADOW E&M-EST. PATIENT-LVL V: CPT | Mod: PBBFAC,,, | Performed by: INTERNAL MEDICINE

## 2023-04-20 PROCEDURE — 1126F AMNT PAIN NOTED NONE PRSNT: CPT | Mod: CPTII,S$GLB,, | Performed by: INTERNAL MEDICINE

## 2023-04-20 PROCEDURE — 1160F RVW MEDS BY RX/DR IN RCRD: CPT | Mod: CPTII,S$GLB,, | Performed by: NURSE PRACTITIONER

## 2023-04-20 PROCEDURE — 3078F PR MOST RECENT DIASTOLIC BLOOD PRESSURE < 80 MM HG: ICD-10-PCS | Mod: CPTII,S$GLB,, | Performed by: NURSE PRACTITIONER

## 2023-04-20 PROCEDURE — 3288F PR FALLS RISK ASSESSMENT DOCUMENTED: ICD-10-PCS | Mod: CPTII,S$GLB,, | Performed by: INTERNAL MEDICINE

## 2023-04-20 PROCEDURE — 3288F FALL RISK ASSESSMENT DOCD: CPT | Mod: CPTII,S$GLB,, | Performed by: INTERNAL MEDICINE

## 2023-04-20 PROCEDURE — 1101F PT FALLS ASSESS-DOCD LE1/YR: CPT | Mod: CPTII,S$GLB,, | Performed by: INTERNAL MEDICINE

## 2023-04-20 PROCEDURE — 3074F SYST BP LT 130 MM HG: CPT | Mod: CPTII,S$GLB,, | Performed by: INTERNAL MEDICINE

## 2023-04-20 PROCEDURE — 1170F PR FUNCTIONAL STATUS ASSESSED: ICD-10-PCS | Mod: CPTII,S$GLB,, | Performed by: NURSE PRACTITIONER

## 2023-04-20 PROCEDURE — 1101F PR PT FALLS ASSESS DOC 0-1 FALLS W/OUT INJ PAST YR: ICD-10-PCS | Mod: CPTII,S$GLB,, | Performed by: NURSE PRACTITIONER

## 2023-04-20 PROCEDURE — 1126F AMNT PAIN NOTED NONE PRSNT: CPT | Mod: CPTII,S$GLB,, | Performed by: NURSE PRACTITIONER

## 2023-04-20 PROCEDURE — 1159F MED LIST DOCD IN RCRD: CPT | Mod: CPTII,S$GLB,, | Performed by: NURSE PRACTITIONER

## 2023-04-20 PROCEDURE — 1159F PR MEDICATION LIST DOCUMENTED IN MEDICAL RECORD: ICD-10-PCS | Mod: CPTII,S$GLB,, | Performed by: NURSE PRACTITIONER

## 2023-04-20 PROCEDURE — 3075F SYST BP GE 130 - 139MM HG: CPT | Mod: CPTII,S$GLB,, | Performed by: NURSE PRACTITIONER

## 2023-04-20 PROCEDURE — 1160F PR REVIEW ALL MEDS BY PRESCRIBER/CLIN PHARMACIST DOCUMENTED: ICD-10-PCS | Mod: CPTII,S$GLB,, | Performed by: INTERNAL MEDICINE

## 2023-04-20 PROCEDURE — 1160F PR REVIEW ALL MEDS BY PRESCRIBER/CLIN PHARMACIST DOCUMENTED: ICD-10-PCS | Mod: CPTII,S$GLB,, | Performed by: NURSE PRACTITIONER

## 2023-04-20 PROCEDURE — 3078F DIAST BP <80 MM HG: CPT | Mod: CPTII,S$GLB,, | Performed by: INTERNAL MEDICINE

## 2023-04-20 PROCEDURE — 99999 PR PBB SHADOW E&M-EST. PATIENT-LVL V: ICD-10-PCS | Mod: PBBFAC,,, | Performed by: INTERNAL MEDICINE

## 2023-04-20 RX ORDER — ALPRAZOLAM 0.25 MG/1
0.25 TABLET ORAL 2 TIMES DAILY PRN
COMMUNITY
Start: 2023-04-14 | End: 2023-06-12

## 2023-04-20 RX ORDER — FUROSEMIDE 20 MG/1
20 TABLET ORAL DAILY
Qty: 30 TABLET | Refills: 11 | Status: SHIPPED | OUTPATIENT
Start: 2023-04-20 | End: 2023-04-24 | Stop reason: SDUPTHER

## 2023-04-20 RX ORDER — CIPROFLOXACIN HYDROCHLORIDE 3 MG/ML
SOLUTION/ DROPS OPHTHALMIC
COMMUNITY
End: 2023-05-03 | Stop reason: ALTCHOICE

## 2023-04-20 NOTE — PROGRESS NOTES
"  Irlanda Lopez presented for an initial Medicare AWV today. The following components were reviewed and updated:    Medical history  Family History  Social history  Allergies and Current Medications  Health Risk Assessment  Health Maintenance  Care Team    **See Completed Assessments for Annual Wellness visit with in the encounter summary    The following assessments were completed:  Depression Screening  Cognitive function Screening  Timed Get Up Test  Whisper Test          Vitals:    04/20/23 1328   BP: 136/74   BP Location: Left arm   Patient Position: Sitting   Pulse: (!) 50   Resp: 18   SpO2: 98%   Weight: 66.7 kg (147 lb 0.8 oz)   Height: 5' 2" (1.575 m)     Body mass index is 26.9 kg/m².   ]    Physical Exam  Constitutional:       Appearance: Normal appearance.   HENT:      Head: Normocephalic and atraumatic.   Cardiovascular:      Rate and Rhythm: Regular rhythm. Bradycardia present.      Pulses: Normal pulses.      Heart sounds: Normal heart sounds.   Pulmonary:      Effort: Pulmonary effort is normal.      Breath sounds: Normal breath sounds.   Abdominal:      Palpations: Abdomen is soft.   Musculoskeletal:         General: Normal range of motion.   Skin:     General: Skin is warm and dry.      Capillary Refill: Capillary refill takes less than 2 seconds.   Neurological:      General: No focal deficit present.      Mental Status: She is alert and oriented to person, place, and time.   Psychiatric:         Mood and Affect: Mood normal.         Behavior: Behavior normal.         Thought Content: Thought content normal.         Cognition and Memory: She exhibits impaired recent memory.         Judgment: Judgment normal.       Diagnoses and health risks identified today and associated recommendations/orders:  1. Encounter for Medicare annual wellness exam  Reviewed and discussed preventive health screenings and vaccinations with the patient.   DEXA: We discussed scheduling DEXA to evaluate bone density; " discussed risks vs benefits of treatment options. Patient's daughter stated they will consider this and let us know if they decide to pursue DEXA.     2. Aortic atherosclerosis  CT A/P 11/2018. On Lipitor. Stable. Continue current treatment plan as previously prescribed with your Cardiologist.     3. Tortuous aorta  On Lipitor. Stable. Continue current treatment plan as previously prescribed with your Cardiologist.     4. Mild cognitive impairment with memory loss  On Namenda; has Caregiver at home who assists with ADLs. Stable. Continue current treatment plan as previously prescribed with your PCP.     5. MARTIN (generalized anxiety disorder)  On Prozac with PRN Xanax. Stable. Continue current treatment plan as previously prescribed with your PCP.     6. Pure hypercholesterolemia  On Lipitor. Stable. Continue current treatment plan as previously prescribed with your Cardiologist and PCP.     Lab Results   Component Value Date    CHOL 154 06/09/2022    CHOL 166 06/02/2021    CHOL 158 05/12/2020     Lab Results   Component Value Date    HDL 38 (L) 06/09/2022    HDL 42 06/02/2021    HDL 39 (L) 05/12/2020     Lab Results   Component Value Date    LDLCALC 104.6 06/09/2022    LDLCALC 107.6 06/02/2021    LDLCALC 103.4 05/12/2020     No results found for: DLDL  Lab Results   Component Value Date    TRIG 57 06/09/2022    TRIG 82 06/02/2021    TRIG 78 05/12/2020     Lab Results   Component Value Date    CHOLHDL 24.7 06/09/2022    CHOLHDL 25.3 06/02/2021    CHOLHDL 24.7 05/12/2020     7. Essential hypertension  Stable/at goal today; not on medical therapy. Continue current treatment plan as previously prescribed with your PCP.     8. Renal stone  S/p cystoscopy with retrograde and stent exchange as well as right ureteroscopy with laser lithotripsy 4/4/2023. She is followed/managed by Urologist and will be undergoing another procedure in June 2023 for stone. She is currently on Levsin PRN but has not needed lately.     9. Urge  urinary incontinence  O Levsin PRN but has not needed lately; Myrbetriq discontinued due to concern for increasing anxiety and patient notes she is doing ok off of medication. Continue current treatment plan as previously prescribed with your Urologist.     10. Hypothyroidism, unspecified type  Stable on armour thyroid. Continue current treatment plan as previously prescribed with your PCP    Lab Results   Component Value Date    TSH 1.244 06/09/2022    FREET4 0.84 06/09/2022     11. Colostomy in place  S/p Maribel's procedure for acute sigmoid diverticulitis. Patient does her own colostomy care with no reported issues.     12. Osteopenia, unspecified location  Noted on imaging back in 2019, has not had DEXA scan. We discussed scheduling DEXA to evaluate bone density; discussed risks vs benefits of treatment options. Patient's daughter stated they will consider this and let us know if they decide to pursue DEXA. Advised her to continue vitamin D supplementation as well as weight-bearing exercises.     Opioid screening: Patient was prescribed short-course of Percocet PRN by Urologist, Dr. Matute, following urological procedure 4/4/2023. Patient stated she has not been taking medication.       Provided Irlanda with a 5-10 year written screening schedule and personal prevention plan. Recommendations were developed using the USPSTF age appropriate recommendations. Education, counseling, and referrals were provided as needed.  After Visit Summary printed and given to patient which includes a list of additional screenings\tests needed.    Follow up in about 1 year (around 4/20/2024).      Yun Mccabe NP    I offered to discuss advanced care planning, including how to pick a person who would make decisions for you if you were unable to make them for yourself, called a health care power of , and what kind of decisions you might make such as use of life sustaining treatments such as ventilators and tube  feeding when faced with a life limiting illness recorded on a living will that they will need to know. (How you want to be cared for as you near the end of your natural life)     X Patient is interested in learning more about how to make advanced directives.  I provided them paperwork and offered to discuss this with them.

## 2023-04-20 NOTE — PATIENT INSTRUCTIONS
Counseling and Referral of Other Preventative  (Italic type indicates deductible and co-insurance are waived)    Patient Name: Irlanda Lopez  Today's Date: 4/20/2023    Health Maintenance       Date Due Completion Date    DEXA Scan 10/04/2019 10/4/2016 (Declined)    Override on 10/4/2016: Declined    Influenza Vaccine (1) 09/01/2022 1/26/2021 (Declined)    Override on 1/26/2021: Declined    Shingles Vaccine (1 of 2) 06/14/2023 (Originally 9/21/1990) ---    COVID-19 Vaccine (1) 06/14/2023 (Originally 3/21/1941) ---    Pneumococcal Vaccines (Age 65+) (1 - PCV) 06/14/2023 (Originally 9/21/2006) ---    Lipid Panel 06/09/2023 6/9/2022    TETANUS VACCINE 10/04/2026 10/4/2016 (Declined)    Override on 10/4/2016: Declined        No orders of the defined types were placed in this encounter.    The following information is provided to all patients.  This information is to help you find resources for any of the problems found today that may be affecting your health:                Living healthy guide: www.[facility].[place].gov      Understanding Diabetes: www.diabetes.org      Eating healthy: www.cdc.gov/healthyweight      CDC home safety checklist: www.cdc.gov/steadi/patient.html      Agency on Aging: www.goea.[place].[facility]      Alcoholics anonymous (AA): www.aa.org      Physical Activity: www.alana.nih.gov/ch8dqwz      Tobacco use: www.quitwithusla.org     Counseling and Referral of Other Preventative  (Italic type indicates deductible and co-insurance are waived)    Patient Name: Irlanda Lopez  Today's Date: 4/20/2023    Health Maintenance       Date Due Completion Date    DEXA Scan 10/04/2019 10/4/2016 (Declined)    Override on 10/4/2016: Declined    Influenza Vaccine (1) 09/01/2022 1/26/2021 (Declined)    Override on 1/26/2021: Declined    Shingles Vaccine (1 of 2) 06/14/2023 (Originally [birth year]1991) ---    COVID-19 Vaccine (1) 06/14/2023 (Originally 3/21/1941) ---    Pneumococcal Vaccines (Age 65+) (1 - PCV) 06/14/2023 (Originally  9/21/2006) ---    Lipid Panel 06/09/2023 6/9/2022    TETANUS VACCINE 10/04/2026 10/4/2016 (Declined)    Override on 10/4/2016: Declined        No orders of the defined types were placed in this encounter.    The following information is provided to all patients.  This information is to help you find resources for any of the problems found today that may be affecting your health:                Living healthy guide: www.[facility].[place].[facility]      Understanding Diabetes: www.diabetes.org      Eating healthy: www.cdc.gov/healthyweight      CDC home safety checklist: www.cdc.gov/steadi/patient.html      Agency on Aging: www.goea.[place].[facility]      Alcoholics anonymous (AA): www.aa.org      Physical Activity: www.alana.nih.gov/xd0pszq      Tobacco use: www.quitwithusla.org

## 2023-04-20 NOTE — PROGRESS NOTES
Subjective:   Patient ID:  Irlanda Lopez is a 82 y.o. female who presents for follow-up of Hyperlipidemia and Hypertension (Pt needs her lasix refill she needs a short term to local pharmacy and one to long term pharmacy)  Patient denies CP, angina or anginal equivalent.   2018 echo nml  Last seen 7-21  Hypertension  This is a chronic problem. The current episode started more than 1 year ago. The problem has been gradually improving since onset. The problem is controlled. Pertinent negatives include no chest pain, palpitations or shortness of breath. Past treatments include diuretics. The current treatment provides moderate improvement. There are no compliance problems.    Hyperlipidemia  This is a chronic problem. The current episode started more than 1 year ago. The problem is controlled. Recent lipid tests were reviewed and are variable. Pertinent negatives include no chest pain or shortness of breath. Current antihyperlipidemic treatment includes statins. There are no compliance problems.  Risk factors for coronary artery disease include hypertension and dyslipidemia.   Edema  This is a chronic problem. The current episode started more than 1 year ago. The problem occurs intermittently. The problem has been gradually improving. Pertinent negatives include no chest pain. Nothing aggravates the symptoms. She has tried rest for the symptoms. The treatment provided moderate relief.     Review of Systems   Constitutional: Negative. Negative for weight gain.   HENT: Negative.     Eyes: Negative.    Cardiovascular: Negative.  Negative for chest pain, leg swelling and palpitations.   Respiratory: Negative.  Negative for shortness of breath.    Endocrine: Negative.    Hematologic/Lymphatic: Negative.    Skin: Negative.    Musculoskeletal:  Negative for muscle weakness.   Gastrointestinal: Negative.    Genitourinary: Negative.    Neurological: Negative.  Negative for dizziness.   Psychiatric/Behavioral: Negative.      Allergic/Immunologic: Negative.    All other systems reviewed and are negative.  Family History   Problem Relation Age of Onset    Alzheimer's disease Mother     Aneurysm Father         brain    Stomach cancer Brother         50s    Parkinsonism Brother     Bladder Cancer Grandchild      Past Medical History:   Diagnosis Date    Clostridium difficile colitis     Dementia     Diverticulitis     s/p colostomy    High cholesterol     Hypertension     Hypothyroidism     Kidney stones      Social History     Socioeconomic History    Marital status:    Occupational History     Comment: [employer]   Tobacco Use    Smoking status: Former     Years: 5.00     Types: Cigarettes    Smokeless tobacco: Never   Substance and Sexual Activity    Alcohol use: No    Drug use: No    Sexual activity: Never     Comment:      Social Determinants of Health     Financial Resource Strain: Low Risk     Difficulty of Paying Living Expenses: Not hard at all   Food Insecurity: No Food Insecurity    Worried About Running Out of Food in the Last Year: Never true    Ran Out of Food in the Last Year: Never true   Transportation Needs: No Transportation Needs    Lack of Transportation (Medical): No    Lack of Transportation (Non-Medical): No   Physical Activity: Inactive    Days of Exercise per Week: 0 days    Minutes of Exercise per Session: 0 min   Stress: No Stress Concern Present    Feeling of Stress : Not at all   Social Connections: Moderately Isolated    Frequency of Communication with Friends and Family: More than three times a week    Frequency of Social Gatherings with Friends and Family: More than three times a week    Attends [employer] Services: Never    Active Member of Clubs or Organizations: No    Attends Club or Organization Meetings: Never    Marital Status:    Housing Stability: Low Risk     Unable to Pay for Housing in the Last Year: No    Number of Places Lived in the Last Year: 1    Unstable Housing  in the Last Year: No     Current Outpatient Medications on File Prior to Visit   Medication Sig Dispense Refill    ARMOUR THYROID 30 mg Tab TAKE 1 TABLET (30 MG TOTAL) BY MOUTH BEFORE BREAKFAST. 90 tablet 5    atorvastatin (LIPITOR) 10 MG tablet TAKE 1 TABLET BY MOUTH ONCE DAILY 90 tablet 3    FLUoxetine 40 MG capsule Take 1 capsule (40 mg total) by mouth once daily. To control anxiety and OCD symptoms Maintenance 90 capsule 3    hyoscyamine (LEVSIN/SL) 0.125 mg Subl Place 2 tablets (0.25 mg total) under the tongue every 4 (four) hours as needed (spasms). 40 tablet 1    memantine (NAMENDA) 10 MG Tab Take 1 tablet (10 mg total) by mouth 2 (two) times daily. 180 tablet 3    montelukast (SINGULAIR) 10 mg tablet Take 1 tablet (10 mg total) by mouth once daily. 90 tablet 0    oxyCODONE-acetaminophen (PERCOCET) 5-325 mg per tablet Take 1 tablet by mouth every 4 (four) hours as needed for Pain. 10 tablet 0    pantoprazole (PROTONIX) 40 MG tablet Take 1 tablet (40 mg total) by mouth once daily. 30 min prior to lunch (Patient taking differently: Take 40 mg by mouth as needed. 30 min prior to lunch) 90 tablet 3    phenazopyridine (PYRIDIUM) 100 MG tablet Please specify directions, refills and quantity 6 tablet 5    potassium chloride (MICRO-K) 10 MEQ CpSR Take by mouth once daily.      [DISCONTINUED] ALPRAZolam (XANAX) 0.5 MG tablet Take 0.5 tablets (0.25 mg total) by mouth 2 (two) times daily as needed for Anxiety. 15 tablet 1    [DISCONTINUED] erythromycin (ROMYCIN) ophthalmic ointment Apply ointment to lids and lashes in both eyes 2 times daily for 7 days. (Patient not taking: Reported on 4/20/2023) 3.5 g 0    [DISCONTINUED] furosemide (LASIX) 20 MG tablet TAKE 1 TABLET BY MOUTH ONCE DAILY 90 tablet 3    [DISCONTINUED] mirabegron (MYRBETRIQ) 50 mg Tb24 Take 1 tablet (50 mg total) by mouth once daily. (Patient not taking: Reported on 4/20/2023) 90 tablet 3    [DISCONTINUED] sulfamethoxazole-trimethoprim 800-160mg (BACTRIM  DS) 800-160 mg Tab Take 1 tablet by mouth 2 (two) times daily. (Patient not taking: Reported on 4/20/2023) 14 tablet 0    [DISCONTINUED] triamcinolone acetonide 0.1% (KENALOG) 0.1 % ointment APPLY TOPICALLY 4 (FOUR) TIMES DAILY. TO RASH ON FACE AND CHEST AND NOSE 80 g 1     No current facility-administered medications on file prior to visit.     Review of patient's allergies indicates:  No Known Allergies    Objective:     Physical Exam  Vitals and nursing note reviewed.   Constitutional:       Appearance: She is well-developed.   HENT:      Head: Normocephalic and atraumatic.   Eyes:      Conjunctiva/sclera: Conjunctivae normal.      Pupils: Pupils are equal, round, and reactive to light.   Cardiovascular:      Rate and Rhythm: Normal rate and regular rhythm.      Pulses: Intact distal pulses.      Heart sounds: Normal heart sounds.   Pulmonary:      Effort: Pulmonary effort is normal.      Breath sounds: Normal breath sounds.   Abdominal:      General: Bowel sounds are normal.      Palpations: Abdomen is soft.   Musculoskeletal:         General: Normal range of motion.      Cervical back: Normal range of motion and neck supple.   Skin:     General: Skin is warm and dry.   Neurological:      Mental Status: She is alert and oriented to person, place, and time.       Assessment:     1. Pure hypercholesterolemia    2. Tortuous aorta    3. Aortic atherosclerosis    4. Essential hypertension        Plan:     Pure hypercholesterolemia    Tortuous aorta    Aortic atherosclerosis    Essential hypertension      Continue lasix-htn/edema  Continue statin-hlp    OTC K

## 2023-04-24 ENCOUNTER — PATIENT MESSAGE (OUTPATIENT)
Dept: CARDIOLOGY | Facility: CLINIC | Age: 83
End: 2023-04-24
Payer: MEDICARE

## 2023-04-25 ENCOUNTER — TELEPHONE (OUTPATIENT)
Dept: NEUROLOGY | Facility: CLINIC | Age: 83
End: 2023-04-25
Payer: MEDICARE

## 2023-04-25 ENCOUNTER — PATIENT MESSAGE (OUTPATIENT)
Dept: PRIMARY CARE CLINIC | Facility: CLINIC | Age: 83
End: 2023-04-25
Payer: MEDICARE

## 2023-04-25 DIAGNOSIS — F41.1 GAD (GENERALIZED ANXIETY DISORDER): Primary | ICD-10-CM

## 2023-04-25 DIAGNOSIS — F01.54 VASCULAR DEMENTIA WITH ANXIETY, UNSPECIFIED DEMENTIA SEVERITY: ICD-10-CM

## 2023-04-25 RX ORDER — FUROSEMIDE 20 MG/1
20 TABLET ORAL DAILY
Qty: 90 TABLET | Refills: 3 | Status: SHIPPED | OUTPATIENT
Start: 2023-04-25 | End: 2024-01-09

## 2023-04-26 NOTE — TELEPHONE ENCOUNTER
I have signed for the following orders AND/OR meds.  Please call the patient and ask the patient to schedule the testing AND/OR inform about any medications that were sent.      Orders Placed This Encounter   Procedures    Ambulatory referral/consult to Psychiatry     Standing Status:   Future     Standing Expiration Date:   5/26/2024     Referral Priority:   Routine     Referral Type:   Psychiatric     Referral Reason:   Specialty Services Required     Requested Specialty:   Psychiatry     Number of Visits Requested:   1            @[email or web address]@

## 2023-05-03 ENCOUNTER — OFFICE VISIT (OUTPATIENT)
Dept: OPHTHALMOLOGY | Facility: CLINIC | Age: 83
End: 2023-05-03
Payer: MEDICARE

## 2023-05-03 DIAGNOSIS — H01.014 ULCERATIVE BLEPHARITIS OF UPPER EYELIDS OF BOTH EYES: Primary | ICD-10-CM

## 2023-05-03 DIAGNOSIS — H10.13 ALLERGIC CONJUNCTIVITIS OF BOTH EYES: ICD-10-CM

## 2023-05-03 DIAGNOSIS — Z96.1 PSEUDOPHAKIA OF BOTH EYES: ICD-10-CM

## 2023-05-03 DIAGNOSIS — H01.011 ULCERATIVE BLEPHARITIS OF UPPER EYELIDS OF BOTH EYES: Primary | ICD-10-CM

## 2023-05-03 PROCEDURE — 99999 PR PBB SHADOW E&M-EST. PATIENT-LVL III: CPT | Mod: PBBFAC,,, | Performed by: OPTOMETRIST

## 2023-05-03 PROCEDURE — 99213 PR OFFICE/OUTPT VISIT, EST, LEVL III, 20-29 MIN: ICD-10-PCS | Mod: S$GLB,,, | Performed by: OPTOMETRIST

## 2023-05-03 PROCEDURE — 99213 OFFICE O/P EST LOW 20 MIN: CPT | Mod: S$GLB,,, | Performed by: OPTOMETRIST

## 2023-05-03 PROCEDURE — 1159F MED LIST DOCD IN RCRD: CPT | Mod: CPTII,S$GLB,, | Performed by: OPTOMETRIST

## 2023-05-03 PROCEDURE — 1159F PR MEDICATION LIST DOCUMENTED IN MEDICAL RECORD: ICD-10-PCS | Mod: CPTII,S$GLB,, | Performed by: OPTOMETRIST

## 2023-05-03 PROCEDURE — 99999 PR PBB SHADOW E&M-EST. PATIENT-LVL III: ICD-10-PCS | Mod: PBBFAC,,, | Performed by: OPTOMETRIST

## 2023-05-03 RX ORDER — NEOMYCIN SULFATE, POLYMYXIN B SULFATE, AND DEXAMETHASONE 3.5; 10000; 1 MG/G; [USP'U]/G; MG/G
OINTMENT OPHTHALMIC
Qty: 3.5 G | Refills: 0 | Status: SHIPPED | OUTPATIENT
Start: 2023-05-03

## 2023-05-03 NOTE — PATIENT INSTRUCTIONS
Apply maxitrol ointment 3 times daily to lids and lashes  Instill pataday 1 drop in the morning as needed for itching  Instill refresh preservative free tear drops 3-4 times daily. This helps when it is cold.   Discussed Claritin or zyrtec as needed for allergies.

## 2023-05-03 NOTE — PROGRESS NOTES
HPI    Patient here today for itchy eyes  C/O irritation and itchy eye lids constantly associated with tearing   Symptoms have been present for several months  Using Erythromycin that was prescribed a few months prior  Uses kleenex to wipe eyes   Patient constantly rubs eyes causing them to become irritated     1. PCIOL OU    Last edited by Lanette Sousa, PCT on 5/3/2023  9:53 AM.            Assessment /Plan     For exam results, see Encounter Report.    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   Consider allergist referral if no improvement.     Pseudophakia of both eyes  Observe    RTC as scheduled for annual eye exam with DFE sooner if any changes to vision or worsening symptoms.

## 2023-05-29 ENCOUNTER — PATIENT MESSAGE (OUTPATIENT)
Dept: UROLOGY | Facility: CLINIC | Age: 83
End: 2023-05-29
Payer: MEDICARE

## 2023-06-02 ENCOUNTER — HOSPITAL ENCOUNTER (OUTPATIENT)
Dept: RADIOLOGY | Facility: HOSPITAL | Age: 83
Discharge: HOME | End: 2023-06-02
Attending: UROLOGY
Payer: MEDICARE

## 2023-06-02 DIAGNOSIS — N20.0 RENAL STONE: ICD-10-CM

## 2023-06-02 PROCEDURE — 74018 RADEX ABDOMEN 1 VIEW: CPT | Mod: 26,,, | Performed by: RADIOLOGY

## 2023-06-02 PROCEDURE — 74018 XR ABDOMEN AP 1 VIEW: ICD-10-PCS | Mod: 26,,, | Performed by: RADIOLOGY

## 2023-06-02 PROCEDURE — 74018 RADEX ABDOMEN 1 VIEW: CPT | Mod: TC,FY,PO

## 2023-06-05 ENCOUNTER — OFFICE VISIT (OUTPATIENT)
Dept: UROLOGY | Facility: CLINIC | Age: 83
End: 2023-06-05
Payer: MEDICARE

## 2023-06-05 VITALS — WEIGHT: 147.06 LBS | HEIGHT: 62 IN | BODY MASS INDEX: 27.06 KG/M2

## 2023-06-05 DIAGNOSIS — N20.0 RENAL STONE: Primary | ICD-10-CM

## 2023-06-05 DIAGNOSIS — R30.0 DYSURIA: ICD-10-CM

## 2023-06-05 PROCEDURE — 1159F PR MEDICATION LIST DOCUMENTED IN MEDICAL RECORD: ICD-10-PCS | Mod: CPTII,S$GLB,, | Performed by: UROLOGY

## 2023-06-05 PROCEDURE — 1160F RVW MEDS BY RX/DR IN RCRD: CPT | Mod: CPTII,S$GLB,, | Performed by: UROLOGY

## 2023-06-05 PROCEDURE — 99024 POSTOP FOLLOW-UP VISIT: CPT | Mod: S$GLB,,, | Performed by: UROLOGY

## 2023-06-05 PROCEDURE — 1159F MED LIST DOCD IN RCRD: CPT | Mod: CPTII,S$GLB,, | Performed by: UROLOGY

## 2023-06-05 PROCEDURE — 3288F PR FALLS RISK ASSESSMENT DOCUMENTED: ICD-10-PCS | Mod: CPTII,S$GLB,, | Performed by: UROLOGY

## 2023-06-05 PROCEDURE — 99024 PR POST-OP FOLLOW-UP VISIT: ICD-10-PCS | Mod: S$GLB,,, | Performed by: UROLOGY

## 2023-06-05 PROCEDURE — 1101F PT FALLS ASSESS-DOCD LE1/YR: CPT | Mod: CPTII,S$GLB,, | Performed by: UROLOGY

## 2023-06-05 PROCEDURE — 1126F PR PAIN SEVERITY QUANTIFIED, NO PAIN PRESENT: ICD-10-PCS | Mod: CPTII,S$GLB,, | Performed by: UROLOGY

## 2023-06-05 PROCEDURE — 1126F AMNT PAIN NOTED NONE PRSNT: CPT | Mod: CPTII,S$GLB,, | Performed by: UROLOGY

## 2023-06-05 PROCEDURE — 99999 PR PBB SHADOW E&M-EST. PATIENT-LVL IV: ICD-10-PCS | Mod: PBBFAC,,, | Performed by: UROLOGY

## 2023-06-05 PROCEDURE — 99999 PR PBB SHADOW E&M-EST. PATIENT-LVL IV: CPT | Mod: PBBFAC,,, | Performed by: UROLOGY

## 2023-06-05 PROCEDURE — 3288F FALL RISK ASSESSMENT DOCD: CPT | Mod: CPTII,S$GLB,, | Performed by: UROLOGY

## 2023-06-05 PROCEDURE — 1160F PR REVIEW ALL MEDS BY PRESCRIBER/CLIN PHARMACIST DOCUMENTED: ICD-10-PCS | Mod: CPTII,S$GLB,, | Performed by: UROLOGY

## 2023-06-05 PROCEDURE — 1101F PR PT FALLS ASSESS DOC 0-1 FALLS W/OUT INJ PAST YR: ICD-10-PCS | Mod: CPTII,S$GLB,, | Performed by: UROLOGY

## 2023-06-05 NOTE — PROGRESS NOTES
Chief Complaint:   Encounter Diagnoses   Name Primary?    Renal stone Yes    Dysuria        HPI:   6/5/23- here today to discuss imaging, she has had no issues with her stent since the surgery.    7/18/20- 79-year-old female who presents with severe abdominal pain and discomfort, she is being evaluated for colonic impaction diverticulitis.  She is seen to have an extremely large right renal pelvis stone.  This had been seen prior, and she was followed by partner in regards to possible surgery for this.  Patient though it is determined not pursuing not been seen in the last couple of years.  Patient is having upper abdominal pain, no colic at this point.  Patient has had no previous urological history per her and her son's report, her daughter is her primary care take care, she is currently not present at the bedside.  Family history of stones, no urological cancers per the family.  10/17/18: Mag3 shows 72/38 L/R function. -UCx last visit.  PCNL is the only viable treatment option; observation not recommended.  10/8/18: 79 yo woman referred for renal stone after workup for UTI/diverticulitis.  Having diarrhea on meds for diverticulitis.  No abd/pelvic pain and no exac/rel factors.  No hematuria.  No prior urolithiasis.  No urinary bother.  No  history.  Normal sexual function but inactive.  Some memory problems but functional at home.    Allergies:  Patient has no known allergies.    Medications:  has a current medication list which includes the following prescription(s): alprazolam, armour thyroid, atorvastatin, fluoxetine, furosemide, hyoscyamine, memantine, neomycin-polymyxin-dexamethasone, oxycodone-acetaminophen, pantoprazole, phenazopyridine, potassium chloride, and [DISCONTINUED] triamcinolone acetonide 0.1%.    Review of Systems:  General: No fever, chills, fatigability, or weight loss.  Skin: No rashes, itching, or changes in color or texture of skin.  Chest: Denies JESUS, cyanosis, wheezing, cough, and  sputum production.  Abdomen: Appetite fine. No weight loss. Denies diarrhea, abdominal pain, hematemesis, or blood in stool.  Musculoskeletal: No joint stiffness or swelling. Denies back pain.  : As above.  All other review of systems negative.    PMH:   has a past medical history of Clostridium difficile colitis, Dementia, Diverticulitis, High cholesterol, Hypertension, Hypothyroidism, and Kidney stones.    PSH:   has a past surgical history that includes Cataract extraction; Appendectomy (2008); Colonoscopy (N/A, 01/02/2019); Colostomy (Left, 01/02/2019); Maribel procedure (N/A, 01/02/2019); Lysis of adhesions (N/A, 01/02/2019); Colon surgery; Vitrectomy (Right, 09/2013); Colonoscopy (N/A, 06/07/2019); Esophagogastroduodenoscopy (N/A, 09/16/2020); Colonoscopy (N/A, 06/23/2021); Cystoscopy with ureteroscopy, retrograde pyelography, and insertion of stent (Right, 08/23/2022); Cystoureteroscopy with retrograde pyelography and insertion of stent into ureter (Right, 11/8/2022); and cystoureteroscopy, with holmium laser lithotripsy of ureteral calculus and stent insertion (Right, 4/4/2023).    FamHx: family history includes Alzheimer's disease in her mother; Aneurysm in her father; Bladder Cancer in her grandchild; Parkinsonism in her brother; Stomach cancer in her brother.    SocHx:  reports that she has quit smoking. Her smoking use included cigarettes. She has never used smokeless tobacco. She reports that she does not drink alcohol and does not use drugs.      Physical Exam:  There were no vitals filed for this visit.      General: A&Ox3, no apparent distress, no deformities  Neck: No masses, normal ROM  Lungs: normal inspiration, no use of accessory muscles  Heart: normal pulse, no arrhythmias  Abdomen: Soft, NT, ND, no masses, no hernias, no hepatosplenomegaly  Skin: The skin is warm and dry. No jaundice.  Ext: No c/c/e.  : No pelvic floor prolapse.  Normal introitus, no urethral abnomralities. No Perineal  abnormalities.    Labs/Studies:   Ucx E.Coli 2/23  Creatinine 0.9 12/22  KUB persistent stone and stent 6/23  KUB/FIDEL 3.4 cm right renal stone, right stent, mild right hydro 3/23  CT large right renal pelvis stone 8/22  Lasix renogram 23% right/77% left 9/22     Impression/Plan:        Right renal stone-  right ureteroscopy  4/4/23,  failed PCN  1/10/23,  right stent  11/8/22, 8/23/22    Previously used Myrbetriq and Levsin, since this most recent surgery she has required no medical management and has done well with no complaints.  Therefore instead of pursuing repeat ureteroscopy and stent exchange will give it another 4 months.  Call with any complaints in the meantime.  Obtain a KUB and renal ultrasound at that juncture.  Thus far no evidence of dysuria or infections.

## 2023-06-06 ENCOUNTER — PATIENT MESSAGE (OUTPATIENT)
Dept: PRIMARY CARE CLINIC | Facility: CLINIC | Age: 83
End: 2023-06-06
Payer: MEDICARE

## 2023-06-12 DIAGNOSIS — F41.1 GENERALIZED ANXIETY DISORDER: ICD-10-CM

## 2023-06-12 RX ORDER — ALPRAZOLAM 0.25 MG/1
TABLET ORAL
Qty: 30 TABLET | Refills: 2 | Status: SHIPPED | OUTPATIENT
Start: 2023-06-12 | End: 2023-08-05 | Stop reason: SDUPTHER

## 2023-06-12 NOTE — TELEPHONE ENCOUNTER
Care Due:                  Date            Visit Type   Department     Provider  --------------------------------------------------------------------------------                                SAME DAY -                              ESTABLISHED   [facility]  Last Visit: 03-      PATIENT      CARE           Myla Arias                              EP -                              PRIMARY      [facility]  Next Visit: 06-      CARE ([facility])   CARE           Myla Arias                                                            Last  Test          Frequency    Reason                     Performed    Due Date  --------------------------------------------------------------------------------    Lipid Panel.  12 months..  atorvastatin.............  06- 06-    [facility] Embedded Care Due Messages. Reference number: [id number].   6/12/2023 2:07:55 PM CDT

## 2023-06-14 ENCOUNTER — PATIENT MESSAGE (OUTPATIENT)
Dept: OPHTHALMOLOGY | Facility: CLINIC | Age: 83
End: 2023-06-14
Payer: MEDICARE

## 2023-06-14 ENCOUNTER — TELEPHONE (OUTPATIENT)
Dept: OPHTHALMOLOGY | Facility: CLINIC | Age: 83
End: 2023-06-14
Payer: MEDICARE

## 2023-06-14 RX ORDER — MEMANTINE HYDROCHLORIDE 10 MG/1
10 TABLET ORAL 2 TIMES DAILY
Qty: 180 TABLET | Refills: 3 | Status: SHIPPED | OUTPATIENT
Start: 2023-06-14 | End: 2023-06-15 | Stop reason: SDUPTHER

## 2023-06-14 NOTE — TELEPHONE ENCOUNTER
Refill Routing Note   Medication(s) are not appropriate for processing by [facility] for the following reason(s):      Medication outside of protocol    [facility] action(s):  Route None identified          Appointments  past 12m or future 3m with PCP    Date Provider   Last Visit   3/16/2023 Myla Arias MD   Next Visit   6/15/2023 Myla Arias MD   ED visits in past 90 days: 0        Note composed:10:20 AM 06/14/2023

## 2023-06-14 NOTE — TELEPHONE ENCOUNTER
Called patient, patient did not answer. Left detailed voicemail with contact number to call back.

## 2023-06-15 ENCOUNTER — OFFICE VISIT (OUTPATIENT)
Dept: PRIMARY CARE CLINIC | Facility: CLINIC | Age: 83
End: 2023-06-15
Payer: MEDICARE

## 2023-06-15 VITALS
WEIGHT: 151.81 LBS | HEART RATE: 60 BPM | OXYGEN SATURATION: 97 % | DIASTOLIC BLOOD PRESSURE: 70 MMHG | SYSTOLIC BLOOD PRESSURE: 114 MMHG | HEIGHT: 62 IN | BODY MASS INDEX: 27.94 KG/M2

## 2023-06-15 DIAGNOSIS — F41.1 GAD (GENERALIZED ANXIETY DISORDER): Primary | ICD-10-CM

## 2023-06-15 DIAGNOSIS — F01.54 VASCULAR DEMENTIA WITH ANXIETY, UNSPECIFIED DEMENTIA SEVERITY: Chronic | ICD-10-CM

## 2023-06-15 DIAGNOSIS — R11.0 NAUSEA: ICD-10-CM

## 2023-06-15 DIAGNOSIS — E78.00 PURE HYPERCHOLESTEROLEMIA: ICD-10-CM

## 2023-06-15 DIAGNOSIS — I10 ESSENTIAL HYPERTENSION: ICD-10-CM

## 2023-06-15 DIAGNOSIS — E55.9 VITAMIN D DEFICIENCY: ICD-10-CM

## 2023-06-15 DIAGNOSIS — Z93.3 COLOSTOMY IN PLACE: ICD-10-CM

## 2023-06-15 DIAGNOSIS — R73.09 ABNORMAL GLUCOSE: ICD-10-CM

## 2023-06-15 DIAGNOSIS — I77.1 TORTUOUS AORTA: ICD-10-CM

## 2023-06-15 DIAGNOSIS — E03.9 HYPOTHYROIDISM, UNSPECIFIED TYPE: ICD-10-CM

## 2023-06-15 PROCEDURE — 1126F AMNT PAIN NOTED NONE PRSNT: CPT | Mod: CPTII,S$GLB,, | Performed by: FAMILY MEDICINE

## 2023-06-15 PROCEDURE — 3074F PR MOST RECENT SYSTOLIC BLOOD PRESSURE < 130 MM HG: ICD-10-PCS | Mod: CPTII,S$GLB,, | Performed by: FAMILY MEDICINE

## 2023-06-15 PROCEDURE — 1126F PR PAIN SEVERITY QUANTIFIED, NO PAIN PRESENT: ICD-10-PCS | Mod: CPTII,S$GLB,, | Performed by: FAMILY MEDICINE

## 2023-06-15 PROCEDURE — 3078F DIAST BP <80 MM HG: CPT | Mod: CPTII,S$GLB,, | Performed by: FAMILY MEDICINE

## 2023-06-15 PROCEDURE — 99214 PR OFFICE/OUTPT VISIT, EST, LEVL IV, 30-39 MIN: ICD-10-PCS | Mod: S$GLB,,, | Performed by: FAMILY MEDICINE

## 2023-06-15 PROCEDURE — 3078F PR MOST RECENT DIASTOLIC BLOOD PRESSURE < 80 MM HG: ICD-10-PCS | Mod: CPTII,S$GLB,, | Performed by: FAMILY MEDICINE

## 2023-06-15 PROCEDURE — 99214 OFFICE O/P EST MOD 30 MIN: CPT | Mod: S$GLB,,, | Performed by: FAMILY MEDICINE

## 2023-06-15 PROCEDURE — 3074F SYST BP LT 130 MM HG: CPT | Mod: CPTII,S$GLB,, | Performed by: FAMILY MEDICINE

## 2023-06-15 PROCEDURE — 99999 PR PBB SHADOW E&M-EST. PATIENT-LVL III: ICD-10-PCS | Mod: PBBFAC,,, | Performed by: FAMILY MEDICINE

## 2023-06-15 PROCEDURE — 99999 PR PBB SHADOW E&M-EST. PATIENT-LVL III: CPT | Mod: PBBFAC,,, | Performed by: FAMILY MEDICINE

## 2023-06-15 RX ORDER — THYROID 30 MG/1
30 TABLET ORAL
Qty: 90 TABLET | Refills: 3 | Status: SHIPPED | OUTPATIENT
Start: 2023-06-15

## 2023-06-15 RX ORDER — HYOSCYAMINE SULFATE 0.12 MG/1
0.25 TABLET SUBLINGUAL EVERY 4 HOURS PRN
Qty: 40 TABLET | Refills: 1 | Status: SHIPPED | OUTPATIENT
Start: 2023-06-15

## 2023-06-15 RX ORDER — MEMANTINE HYDROCHLORIDE 10 MG/1
10 TABLET ORAL 2 TIMES DAILY
Qty: 180 TABLET | Refills: 3 | Status: SHIPPED | OUTPATIENT
Start: 2023-06-15

## 2023-06-15 RX ORDER — FLUOXETINE HYDROCHLORIDE 40 MG/1
40 CAPSULE ORAL DAILY
Qty: 90 CAPSULE | Refills: 3 | Status: SHIPPED | OUTPATIENT
Start: 2023-06-15 | End: 2023-10-26 | Stop reason: DRUGHIGH

## 2023-06-15 RX ORDER — ONDANSETRON 8 MG/1
8 TABLET, ORALLY DISINTEGRATING ORAL EVERY 12 HOURS PRN
Qty: 30 TABLET | Refills: 2 | Status: SHIPPED | OUTPATIENT
Start: 2023-06-15

## 2023-06-15 NOTE — PROGRESS NOTES
"Subjective:      Patient ID: Irlanda Lopez is a 82 y.o. female.    Chief Complaint: Follow-up (3 month )      Patient is a 82 y.o. female coming in today for 3 month f/u. She is accompanied by family today.   Family reports pt has had difficulties with Xanax refilling due to miscommunication from pharmacy about nationwide shortage. Pt currently f/u with psychiatry and urology. Family reports pt's anxiety has significantly improved with Fluoxetine. Pt is requiring medication refills today. Currently on Namenda, Fluoxetine, [place] thyroid, Hyoscyamine, and Atorvastatin.         Pmh, Psh, Family Hx, Social Hx, HM updated in Epic Tabs today.   Review of Systems   Constitutional:  Negative for chills, fatigue and fever.   HENT:  Negative for ear pain and trouble swallowing.    Eyes:  Negative for pain and visual disturbance.   Respiratory:  Negative for cough and shortness of breath.    Cardiovascular:  Negative for chest pain and leg swelling.   Gastrointestinal:  Negative for abdominal pain, blood in stool, nausea and vomiting.   Endocrine: Negative for cold intolerance and heat intolerance.   Genitourinary:  Negative for dysuria and frequency.   Musculoskeletal:  Negative for joint swelling, myalgias and neck pain.   Skin:  Negative for color change and rash.   Neurological:  Negative for dizziness and headaches.   Psychiatric/Behavioral:  Negative for behavioral problems and sleep disturbance.    Objective:     Vitals:    06/15/23 1348   BP: 114/70   Pulse: 60   SpO2: 97%   Weight: 68.9 kg (151 lb 12.6 oz)   Height: 5' 2" (1.575 m)     Wt Readings from Last 10 Encounters:   06/15/23 68.9 kg (151 lb 12.6 oz)   06/05/23 66.7 kg (147 lb 0.8 oz)   04/20/23 66.7 kg (147 lb 0.8 oz)   04/20/23 66.7 kg (147 lb 0.8 oz)   04/04/23 66.3 kg (146 lb 2.6 oz)   03/21/23 67.7 kg (149 lb 4 oz)   03/16/23 69 kg (152 lb 1.9 oz)   03/16/23 67.6 kg (149 lb)   02/15/23 67.6 kg (149 lb)   02/13/23 67.6 kg (149 lb)     Physical " Exam  Vitals reviewed.   Constitutional:       Appearance: Normal appearance. She is well-developed and overweight.   HENT:      Head: Normocephalic and atraumatic.      Right Ear: Tympanic membrane and external ear normal.      Left Ear: Tympanic membrane and external ear normal.      Nose: Nose normal.      Mouth/Throat:      Mouth: Mucous membranes are moist.      Pharynx: Oropharynx is clear.   Eyes:      Conjunctiva/sclera: Conjunctivae normal.      Pupils: Pupils are equal, round, and reactive to light.   Neck:      Thyroid: No thyromegaly.   Cardiovascular:      Rate and Rhythm: Normal rate and regular rhythm.      Heart sounds: Normal heart sounds. No murmur heard.    No friction rub. No gallop.   Pulmonary:      Effort: Pulmonary effort is normal. No respiratory distress.      Breath sounds: Normal breath sounds. No wheezing or rales.   Abdominal:      General: Bowel sounds are normal. There is no distension.      Palpations: Abdomen is soft.      Tenderness: There is no abdominal tenderness. There is no rebound.      Comments: Colostomy bag in place   Musculoskeletal:         General: Normal range of motion.      Cervical back: Normal range of motion and neck supple.   Lymphadenopathy:      Cervical: No cervical adenopathy.   Skin:     General: Skin is warm and dry.      Findings: No rash.   Neurological:      Mental Status: She is alert and oriented to person, place, and time.   Psychiatric:         Attention and Perception: Attention and perception normal.         Mood and Affect: Mood and affect normal.         Speech: Speech normal.         Behavior: Behavior normal.         Thought Content: Thought content normal.         Cognition and Memory: Cognition is impaired. Memory is impaired.         Judgment: Judgment normal.       Assessment:     1. MARITN (generalized anxiety disorder)    2. Vascular dementia with anxiety, unspecified dementia severity    3. Nausea    4. Pure hypercholesterolemia    5.  Colostomy in place    6. Vitamin D deficiency    7. Abnormal glucose    8. Essential hypertension    9. Tortuous aorta    10. Hypothyroidism, unspecified type        Plan:   Irlanda was seen today for follow-up.    Diagnoses and all orders for this visit:    MARTIN (generalized anxiety disorder)  -     TSH; Future  -     T4, Free; Future  -     Lipid Panel; Future  -     Hemoglobin A1C; Future  -     Comprehensive Metabolic Panel; Future  -     CBC Auto Differential; Future  -     Vitamin D; Future  -     FLUoxetine 40 MG capsule; Take 1 capsule (40 mg total) by mouth once daily. To control anxiety and OCD symptoms Maintenance    Vascular dementia with anxiety, unspecified dementia severity  -     TSH; Future  -     T4, Free; Future  -     Lipid Panel; Future  -     Hemoglobin A1C; Future  -     Comprehensive Metabolic Panel; Future  -     CBC Auto Differential; Future  -     Vitamin D; Future  -     memantine (NAMENDA) 10 MG Tab; Take 1 tablet (10 mg total) by mouth 2 (two) times daily.    Nausea  -     TSH; Future  -     T4, Free; Future  -     Lipid Panel; Future  -     Hemoglobin A1C; Future  -     Comprehensive Metabolic Panel; Future  -     CBC Auto Differential; Future  -     Vitamin D; Future    Pure hypercholesterolemia  -     TSH; Future  -     T4, Free; Future  -     Lipid Panel; Future  -     Hemoglobin A1C; Future  -     Comprehensive Metabolic Panel; Future  -     CBC Auto Differential; Future  -     Vitamin D; Future    Colostomy in place  -     TSH; Future  -     T4, Free; Future  -     Lipid Panel; Future  -     Hemoglobin A1C; Future  -     Comprehensive Metabolic Panel; Future  -     CBC Auto Differential; Future  -     Vitamin D; Future    Vitamin D deficiency  -     Vitamin D; Future    Abnormal glucose  -     Hemoglobin A1C; Future    Essential hypertension  -     Lipid Panel; Future  -     Hemoglobin A1C; Future  -     CBC Auto Differential; Future    Tortuous aorta  -     Lipid Panel;  Future    Hypothyroidism, unspecified type  -     TSH; Future  -     T4, Free; Future  -     thyroid, pork, (ARMOUR THYROID) 30 mg Tab; Take 1 tablet (30 mg total) by mouth before breakfast.  -     T3, FREE; Future    Other orders  -     hyoscyamine (LEVSIN/SL) 0.125 mg Subl; Place 2 tablets (0.25 mg total) under the tongue every 4 (four) hours as needed (spasms).  -     ondansetron (ZOFRAN-ODT) 8 MG TbDL; Take 1 tablet (8 mg total) by mouth every 12 (twelve) hours as needed (nausea).      - above diagnoses were discussed and reviewed today during visit. The conditions are stable; MARTIN significantly improved since last visit. Continue with current medications and interventions.   Lab work ordered to be completed next week.   Refilled Rx Hyoscyamine 0.25 mg PRN for muscle spasms treatment.   Refilled Rx Zofran 8 mg PRN for nausea treatment.   Refilled Rx Fluoxetine 40 mg/day for MARTIN treatment.   Refilled Rx Namenda 10 mg BID for dementia and anxiety treatment.   Refilled Rx [place] thyroid 30 mg/day for hypothyroidism treatment.   Pt and family declining all vaccinations in the future.   Pt declined bone density test in the future.   Instructed to f/u in 3 months.     There are no Patient Instructions on file for this visit.    Follow up in about 3 months (around 9/10/2023) for f/u office visit Dr. Arias/ anxiety davie .      LABS:   Lab Results   Component Value Date    HGBA1C 5.2 06/09/2022    HGBA1C 5.3 06/02/2021    HGBA1C 5.4 05/12/2020      Lab Results   Component Value Date    CHOL 154 06/09/2022    CHOL 166 06/02/2021    CHOL 158 05/12/2020     Lab Results   Component Value Date    LDLCALC 104.6 06/09/2022    LDLCALC 107.6 06/02/2021    LDLCALC 103.4 05/12/2020     Lab Results   Component Value Date    WBC 5.02 03/21/2023    HGB 11.7 (L) 03/21/2023    HCT 36.4 (L) 03/21/2023     03/21/2023    CHOL 154 06/09/2022    TRIG 57 06/09/2022    HDL 38 (L) 06/09/2022    ALT 15 03/20/2023    AST 27 03/20/2023    NA  135 (L) 03/21/2023    K 3.7 03/21/2023     03/21/2023    CREATININE 0.8 03/21/2023    BUN 11 03/21/2023    CO2 23 03/21/2023    TSH 1.244 06/09/2022    INR 1.0 06/10/2019    HGBA1C 5.2 06/09/2022       The ASCVD Risk score (Coby DK, et al., 2019) failed to calculate for the following reasons:    The 2019 ASCVD risk score is only valid for ages 40 to 79  X-Ray Abdomen AP 1 View  Narrative: EXAM:  XR ABDOMEN AP 1 VIEW    HISTORY: Nephrolithiasis    COMPARISON: 03/10/2023    FINDINGS: The bowel gas pattern is normal in appearance.  There is a 35 mm oval-shaped calcification projected over the right side of the abdomen.  This is projected over the proximal portion of the right ureteral stent.  There is no pneumoperitoneum. The bony structures appear intact.  Impression: 1.  There is a 35 mm oval-shaped calcification projected over the right side of the abdomen.  This is projected over the proximal portion of the right ureteral stent.  2.  The bowel gas pattern is normal in appearance.  3.  If additional imaging evaluation is clinically indicated, I recommend consideration of a CT examination of the abdomen and pelvis without oral or IV contrast.    Finalized on: 6/2/2023 11:18 AM By:  Dangelo Christopher MD  [facility]# [id number]      2023-06-02 11:20:23.591    RG    Scribe Attestation:   I, Daniel Sanderson, am scribing for, and in the presence of, Dr. Myla Arias MD. I performed the above scribed service and the documentation accurately describes the services I performed. I attest to the accuracy of the note.    I, Dr. Myla Arias MD, reviewed documentation as scribed above. I personally performed the services described in this documentation.  I agree that the record reflects my personal performance and is accurate and complete. Myla Arias MD.    06/15/2023

## 2023-06-20 ENCOUNTER — LAB VISIT (OUTPATIENT)
Dept: LAB | Facility: HOSPITAL | Age: 83
End: 2023-06-20
Attending: FAMILY MEDICINE
Payer: MEDICARE

## 2023-06-20 DIAGNOSIS — E55.9 VITAMIN D DEFICIENCY: ICD-10-CM

## 2023-06-20 DIAGNOSIS — I10 ESSENTIAL HYPERTENSION: ICD-10-CM

## 2023-06-20 DIAGNOSIS — R11.0 NAUSEA: ICD-10-CM

## 2023-06-20 DIAGNOSIS — E78.00 PURE HYPERCHOLESTEROLEMIA: ICD-10-CM

## 2023-06-20 DIAGNOSIS — Z93.3 COLOSTOMY IN PLACE: ICD-10-CM

## 2023-06-20 DIAGNOSIS — I77.1 TORTUOUS AORTA: ICD-10-CM

## 2023-06-20 DIAGNOSIS — F01.54 VASCULAR DEMENTIA WITH ANXIETY, UNSPECIFIED DEMENTIA SEVERITY: Chronic | ICD-10-CM

## 2023-06-20 DIAGNOSIS — F41.1 GAD (GENERALIZED ANXIETY DISORDER): ICD-10-CM

## 2023-06-20 DIAGNOSIS — R73.09 ABNORMAL GLUCOSE: ICD-10-CM

## 2023-06-20 DIAGNOSIS — E03.9 HYPOTHYROIDISM, UNSPECIFIED TYPE: ICD-10-CM

## 2023-06-20 LAB
25(OH)D3+25(OH)D2 SERPL-MCNC: 63 NG/ML (ref 30–96)
ALBUMIN SERPL BCP-MCNC: 3.6 G/DL (ref 3.5–5.2)
ALP SERPL-CCNC: 77 U/L (ref 55–135)
ALT SERPL W/O P-5'-P-CCNC: 12 U/L (ref 10–44)
ANION GAP SERPL CALC-SCNC: 10 MMOL/L (ref 8–16)
AST SERPL-CCNC: 19 U/L (ref 10–40)
BASOPHILS # BLD AUTO: 0.05 K/UL (ref 0–0.2)
BASOPHILS NFR BLD: 0.7 % (ref 0–1.9)
BILIRUB SERPL-MCNC: 0.6 MG/DL (ref 0.1–1)
BUN SERPL-MCNC: 14 MG/DL (ref 8–23)
CALCIUM SERPL-MCNC: 9.1 MG/DL (ref 8.7–10.5)
CHLORIDE SERPL-SCNC: 108 MMOL/L (ref 95–110)
CHOLEST SERPL-MCNC: 166 MG/DL (ref 120–199)
CHOLEST/HDLC SERPL: 4 {RATIO} (ref 2–5)
CO2 SERPL-SCNC: 25 MMOL/L (ref 23–29)
CREAT SERPL-MCNC: 1 MG/DL (ref 0.5–1.4)
DIFFERENTIAL METHOD: NORMAL
EOSINOPHIL # BLD AUTO: 0.4 K/UL (ref 0–0.5)
EOSINOPHIL NFR BLD: 5.7 % (ref 0–8)
ERYTHROCYTE [DISTWIDTH] IN BLOOD BY AUTOMATED COUNT: 13.3 % (ref 11.5–14.5)
EST. GFR  (NO RACE VARIABLE): 56.6 ML/MIN/1.73 M^2
ESTIMATED AVG GLUCOSE: 97 MG/DL (ref 68–131)
GLUCOSE SERPL-MCNC: 74 MG/DL (ref 70–110)
HBA1C MFR BLD: 5 % (ref 4–5.6)
HCT VFR BLD AUTO: 42.1 % (ref 37–48.5)
HDLC SERPL-MCNC: 42 MG/DL (ref 40–75)
HDLC SERPL: 25.3 % (ref 20–50)
HGB BLD-MCNC: 13.5 G/DL (ref 12–16)
IMM GRANULOCYTES # BLD AUTO: 0.03 K/UL (ref 0–0.04)
IMM GRANULOCYTES NFR BLD AUTO: 0.4 % (ref 0–0.5)
LDLC SERPL CALC-MCNC: 111 MG/DL (ref 63–159)
LYMPHOCYTES # BLD AUTO: 2.3 K/UL (ref 1–4.8)
LYMPHOCYTES NFR BLD: 33.3 % (ref 18–48)
MCH RBC QN AUTO: 28.5 PG (ref 27–31)
MCHC RBC AUTO-ENTMCNC: 32.1 G/DL (ref 32–36)
MCV RBC AUTO: 89 FL (ref 82–98)
MONOCYTES # BLD AUTO: 0.7 K/UL (ref 0.3–1)
MONOCYTES NFR BLD: 9.6 % (ref 4–15)
NEUTROPHILS # BLD AUTO: 3.4 K/UL (ref 1.8–7.7)
NEUTROPHILS NFR BLD: 50.3 % (ref 38–73)
NONHDLC SERPL-MCNC: 124 MG/DL
NRBC BLD-RTO: 0 /100 WBC
PLATELET # BLD AUTO: 248 K/UL (ref 150–450)
PMV BLD AUTO: 10.7 FL (ref 9.2–12.9)
POTASSIUM SERPL-SCNC: 3.9 MMOL/L (ref 3.5–5.1)
PROT SERPL-MCNC: 6.8 G/DL (ref 6–8.4)
RBC # BLD AUTO: 4.74 M/UL (ref 4–5.4)
SODIUM SERPL-SCNC: 143 MMOL/L (ref 136–145)
T3FREE SERPL-MCNC: 3.5 PG/ML (ref 2.3–4.2)
T4 FREE SERPL-MCNC: 0.93 NG/DL (ref 0.71–1.51)
TRIGL SERPL-MCNC: 65 MG/DL (ref 30–150)
TSH SERPL DL<=0.005 MIU/L-ACNC: 2.06 UIU/ML (ref 0.4–4)
WBC # BLD AUTO: 6.85 K/UL (ref 3.9–12.7)

## 2023-06-20 PROCEDURE — 85025 COMPLETE CBC W/AUTO DIFF WBC: CPT | Performed by: FAMILY MEDICINE

## 2023-06-20 PROCEDURE — 80061 LIPID PANEL: CPT | Performed by: FAMILY MEDICINE

## 2023-06-20 PROCEDURE — 80053 COMPREHEN METABOLIC PANEL: CPT | Performed by: FAMILY MEDICINE

## 2023-06-20 PROCEDURE — 82306 VITAMIN D 25 HYDROXY: CPT | Performed by: FAMILY MEDICINE

## 2023-06-20 PROCEDURE — 84443 ASSAY THYROID STIM HORMONE: CPT | Performed by: FAMILY MEDICINE

## 2023-06-20 PROCEDURE — 36415 COLL VENOUS BLD VENIPUNCTURE: CPT | Mod: PO | Performed by: FAMILY MEDICINE

## 2023-06-20 PROCEDURE — 84481 FREE ASSAY (FT-3): CPT | Performed by: FAMILY MEDICINE

## 2023-06-20 PROCEDURE — 83036 HEMOGLOBIN GLYCOSYLATED A1C: CPT | Performed by: FAMILY MEDICINE

## 2023-06-20 PROCEDURE — 84439 ASSAY OF FREE THYROXINE: CPT | Performed by: FAMILY MEDICINE

## 2023-06-28 NOTE — TELEPHONE ENCOUNTER
"Myla Mora/abril called and stated, "Mother started vomiting (x2), diarrhea (watery stool), and fever the morning, was seen in urgent care today,([facility])was told X-ray of abd which showed, Distension under ribs on left side." informed daughter Dr Hogan is not in clinic today and a message will be sent to him in regards to this issue. Daughter verbalized an understanding  " Complain of dizziness, lightheadedness, heart rate 47-52  Tele monitor   We will hold amiodarone for now  IV fluids  Orthostatics blood pressure  Consider consulting Cardiology  CMP/CBC  Echo

## 2023-08-07 DIAGNOSIS — F41.1 GENERALIZED ANXIETY DISORDER: ICD-10-CM

## 2023-08-07 RX ORDER — ALPRAZOLAM 0.25 MG/1
0.25 TABLET ORAL 2 TIMES DAILY PRN
Qty: 30 TABLET | Refills: 2 | Status: CANCELLED | OUTPATIENT
Start: 2023-08-07

## 2023-08-07 RX ORDER — ALPRAZOLAM 0.25 MG/1
0.25 TABLET ORAL 2 TIMES DAILY PRN
Qty: 30 TABLET | Refills: 2 | OUTPATIENT
Start: 2023-08-07

## 2023-08-07 NOTE — TELEPHONE ENCOUNTER
No care due was identified.  [facility] Embedded Care Due Messages. Reference number: [id number].   8/07/2023 11:33:14 AM CDT

## 2023-08-07 NOTE — TELEPHONE ENCOUNTER
----- Message from Karol Moses sent at 8/7/2023 10:54 AM CDT -----  Contact: Myla/daughter  .Type:  RX Refill Request    Who Called: Myla/daughter   Refill or New Rx:refill  RX Name and Strength:ALPRAZolam (XANAX) 0.25 MG tablet  How is the patient currently taking it? (ex. 1XDay):as prescribed   Is this a 30 day or 90 day RX:30  Preferred Pharmacy with phone number:.  [facility]/pharmacy #[id number] [place], [place] - [id number] [facility] [address]  Phone: [phone] Fax: [phone]  Local or Mail Order:local   Ordering Provider:Dr. Arias   Would the patient rather a call back or a response via [facility]? Call   Best Call Back Number:.[phone]  Additional Information:   Thanks  LR

## 2023-08-31 NOTE — INTERVAL H&P NOTE
The patient has been examined and the H&P has been reviewed:    I concur with the findings and no changes have occurred since H&P was written.    Anesthesia/Surgery risks, benefits and alternative options discussed and understood by patient/family.          There are no hospital problems to display for this patient.    
130

## 2023-09-19 ENCOUNTER — OFFICE VISIT (OUTPATIENT)
Dept: PRIMARY CARE CLINIC | Facility: CLINIC | Age: 83
End: 2023-09-19
Payer: MEDICARE

## 2023-09-19 VITALS
TEMPERATURE: 97 F | DIASTOLIC BLOOD PRESSURE: 62 MMHG | WEIGHT: 147.19 LBS | HEART RATE: 56 BPM | BODY MASS INDEX: 26.92 KG/M2 | OXYGEN SATURATION: 97 % | SYSTOLIC BLOOD PRESSURE: 118 MMHG

## 2023-09-19 DIAGNOSIS — I70.0 AORTIC ATHEROSCLEROSIS: ICD-10-CM

## 2023-09-19 DIAGNOSIS — Z93.3 COLOSTOMY IN PLACE: ICD-10-CM

## 2023-09-19 DIAGNOSIS — F41.1 GAD (GENERALIZED ANXIETY DISORDER): ICD-10-CM

## 2023-09-19 DIAGNOSIS — I10 ESSENTIAL HYPERTENSION: Chronic | ICD-10-CM

## 2023-09-19 DIAGNOSIS — F01.54 VASCULAR DEMENTIA WITH ANXIETY, UNSPECIFIED DEMENTIA SEVERITY: Primary | Chronic | ICD-10-CM

## 2023-09-19 DIAGNOSIS — R73.09 ABNORMAL GLUCOSE: ICD-10-CM

## 2023-09-19 DIAGNOSIS — F41.1 GENERALIZED ANXIETY DISORDER: ICD-10-CM

## 2023-09-19 DIAGNOSIS — E78.00 PURE HYPERCHOLESTEROLEMIA: ICD-10-CM

## 2023-09-19 DIAGNOSIS — K91.89: ICD-10-CM

## 2023-09-19 DIAGNOSIS — E55.9 VITAMIN D DEFICIENCY: ICD-10-CM

## 2023-09-19 DIAGNOSIS — E03.9 HYPOTHYROIDISM, UNSPECIFIED TYPE: ICD-10-CM

## 2023-09-19 PROCEDURE — 99214 PR OFFICE/OUTPT VISIT, EST, LEVL IV, 30-39 MIN: ICD-10-PCS | Mod: S$GLB,,, | Performed by: FAMILY MEDICINE

## 2023-09-19 PROCEDURE — 1126F AMNT PAIN NOTED NONE PRSNT: CPT | Mod: CPTII,S$GLB,, | Performed by: FAMILY MEDICINE

## 2023-09-19 PROCEDURE — 1159F MED LIST DOCD IN RCRD: CPT | Mod: CPTII,S$GLB,, | Performed by: FAMILY MEDICINE

## 2023-09-19 PROCEDURE — 1101F PT FALLS ASSESS-DOCD LE1/YR: CPT | Mod: CPTII,S$GLB,, | Performed by: FAMILY MEDICINE

## 2023-09-19 PROCEDURE — 3288F FALL RISK ASSESSMENT DOCD: CPT | Mod: CPTII,S$GLB,, | Performed by: FAMILY MEDICINE

## 2023-09-19 PROCEDURE — 3074F PR MOST RECENT SYSTOLIC BLOOD PRESSURE < 130 MM HG: ICD-10-PCS | Mod: CPTII,S$GLB,, | Performed by: FAMILY MEDICINE

## 2023-09-19 PROCEDURE — 3288F PR FALLS RISK ASSESSMENT DOCUMENTED: ICD-10-PCS | Mod: CPTII,S$GLB,, | Performed by: FAMILY MEDICINE

## 2023-09-19 PROCEDURE — 99999 PR PBB SHADOW E&M-EST. PATIENT-LVL III: CPT | Mod: PBBFAC,,, | Performed by: FAMILY MEDICINE

## 2023-09-19 PROCEDURE — 1101F PR PT FALLS ASSESS DOC 0-1 FALLS W/OUT INJ PAST YR: ICD-10-PCS | Mod: CPTII,S$GLB,, | Performed by: FAMILY MEDICINE

## 2023-09-19 PROCEDURE — 3078F DIAST BP <80 MM HG: CPT | Mod: CPTII,S$GLB,, | Performed by: FAMILY MEDICINE

## 2023-09-19 PROCEDURE — 1126F PR PAIN SEVERITY QUANTIFIED, NO PAIN PRESENT: ICD-10-PCS | Mod: CPTII,S$GLB,, | Performed by: FAMILY MEDICINE

## 2023-09-19 PROCEDURE — 1159F PR MEDICATION LIST DOCUMENTED IN MEDICAL RECORD: ICD-10-PCS | Mod: CPTII,S$GLB,, | Performed by: FAMILY MEDICINE

## 2023-09-19 PROCEDURE — 99214 OFFICE O/P EST MOD 30 MIN: CPT | Mod: S$GLB,,, | Performed by: FAMILY MEDICINE

## 2023-09-19 PROCEDURE — 99999 PR PBB SHADOW E&M-EST. PATIENT-LVL III: ICD-10-PCS | Mod: PBBFAC,,, | Performed by: FAMILY MEDICINE

## 2023-09-19 PROCEDURE — 3078F PR MOST RECENT DIASTOLIC BLOOD PRESSURE < 80 MM HG: ICD-10-PCS | Mod: CPTII,S$GLB,, | Performed by: FAMILY MEDICINE

## 2023-09-19 PROCEDURE — 3074F SYST BP LT 130 MM HG: CPT | Mod: CPTII,S$GLB,, | Performed by: FAMILY MEDICINE

## 2023-09-19 RX ORDER — ALPRAZOLAM 0.25 MG/1
0.25 TABLET ORAL 2 TIMES DAILY PRN
Qty: 60 TABLET | Refills: 2 | Status: SHIPPED | OUTPATIENT
Start: 2023-09-19 | End: 2023-12-07 | Stop reason: SDUPTHER

## 2023-09-19 NOTE — PROGRESS NOTES
Subjective:      Patient ID: Irlanda Lopez is a 82 y.o. female.    Chief Complaint: Follow-up (Patient reports 3 month follow up for Anxiety medication)      Patient is a 82 y.o. female coming in today for f/u. She is accompanied by family today.  Pt overall feeling well. Has not had psychiatry assessment for anxiety treatment. She has upcoming X-rays and US prior to scheduled appointment with Dr. Matute, urology. Family endorse pt continues to take medications as prescribed. Had some issues refilling Xanax since last visit due to medication shortage. Pt reports vision is stable; only uses reading glasses when reading. Diet has been stable without complications. Denies abd pain, nausea, or constipation. She is requiring medication refill today. Family reports no complications with colostomy bag in place. Pt denies recent falls. No other health concern at this time.       1. Vascular dementia with anxiety, unspecified dementia severity    2. Generalized anxiety disorder    3. MARTIN (generalized anxiety disorder)    4. Pure hypercholesterolemia    5. Aortic atherosclerosis    6. Essential hypertension    7. Colostomy in place    8. Small bowel anastomotic dilation    9. Vitamin D deficiency    10. Abnormal glucose    11. Hypothyroidism, unspecified type       No questionnaires on file.    Pmh, Psh, Family Hx, Social Hx, HM updated in Epic Tabs today.   Review of Systems   Constitutional:  Negative for activity change, appetite change, chills, fatigue and fever.   HENT:  Negative for ear pain and trouble swallowing.    Eyes:  Negative for pain and visual disturbance.   Respiratory:  Negative for cough and shortness of breath.    Cardiovascular:  Negative for chest pain and leg swelling.   Gastrointestinal:  Negative for abdominal pain, blood in stool, nausea and vomiting.   Endocrine: Negative for cold intolerance and heat intolerance.   Genitourinary:  Negative for dysuria and frequency.   Musculoskeletal:   Negative for joint swelling, myalgias and neck pain.   Skin:  Negative for color change and rash.   Neurological:  Negative for dizziness and headaches.   Psychiatric/Behavioral:  Negative for behavioral problems, confusion, decreased concentration, dysphoric mood and sleep disturbance.      Objective:     Vitals:    09/19/23 1318   BP: 118/62   Pulse: (!) 56   Temp: 97.1 °F (36.2 °C)   SpO2: 97%   Weight: 66.7 kg (147 lb 2.5 oz)     Wt Readings from Last 10 Encounters:   09/19/23 66.7 kg (147 lb 2.5 oz)   06/15/23 68.9 kg (151 lb 12.6 oz)   06/05/23 66.7 kg (147 lb 0.8 oz)   04/20/23 66.7 kg (147 lb 0.8 oz)   04/20/23 66.7 kg (147 lb 0.8 oz)   04/04/23 66.3 kg (146 lb 2.6 oz)   03/21/23 67.7 kg (149 lb 4 oz)   03/16/23 69 kg (152 lb 1.9 oz)   03/16/23 67.6 kg (149 lb)   02/15/23 67.6 kg (149 lb)     Physical Exam  Vitals reviewed.   Constitutional:       Appearance: Normal appearance. She is well-developed and normal weight.   HENT:      Head: Normocephalic and atraumatic.      Right Ear: Tympanic membrane and external ear normal.      Left Ear: Tympanic membrane and external ear normal.      Nose: Nose normal.      Mouth/Throat:      Mouth: Mucous membranes are moist.      Pharynx: Oropharynx is clear.   Eyes:      Conjunctiva/sclera: Conjunctivae normal.      Pupils: Pupils are equal, round, and reactive to light.   Neck:      Thyroid: No thyromegaly.   Cardiovascular:      Rate and Rhythm: Normal rate and regular rhythm.      Heart sounds: Normal heart sounds. No murmur heard.     No friction rub. No gallop.   Pulmonary:      Effort: Pulmonary effort is normal. No respiratory distress.      Breath sounds: Normal breath sounds. No wheezing or rales.   Abdominal:      General: Bowel sounds are normal. There is no distension.      Palpations: Abdomen is soft.      Tenderness: There is no abdominal tenderness. There is no rebound.      Comments: Colostomy bag in place   Musculoskeletal:         General: Normal range  of motion.      Cervical back: Normal range of motion and neck supple.   Lymphadenopathy:      Cervical: No cervical adenopathy.   Skin:     General: Skin is warm and dry.      Findings: No rash.   Neurological:      Mental Status: She is alert. Mental status is at baseline.   Psychiatric:         Attention and Perception: Attention and perception normal.         Mood and Affect: Mood and affect normal.         Speech: Speech normal.         Behavior: Behavior normal.         Thought Content: Thought content normal.         Cognition and Memory: Cognition is impaired. Memory is impaired.         Judgment: Judgment normal.         Assessment:     1. Vascular dementia with anxiety, unspecified dementia severity    2. Generalized anxiety disorder    3. MARTIN (generalized anxiety disorder)    4. Pure hypercholesterolemia    5. Aortic atherosclerosis    6. Essential hypertension    7. Colostomy in place    8. Small bowel anastomotic dilation    9. Vitamin D deficiency    10. Abnormal glucose    11. Hypothyroidism, unspecified type        Plan:   Irlanda was seen today for follow-up.    Diagnoses and all orders for this visit:    Vascular dementia with anxiety, unspecified dementia severity  -     Hemoglobin A1C; Future  -     Comprehensive Metabolic Panel; Future  -     Lipid Panel; Future  -     CBC Auto Differential; Future  -     TSH; Future  -     T4, Free; Future    Generalized anxiety disorder  -     ALPRAZolam (XANAX) 0.25 MG tablet; Take 1 tablet (0.25 mg total) by mouth 2 (two) times daily as needed for Anxiety.  -     Hemoglobin A1C; Future  -     Comprehensive Metabolic Panel; Future  -     Lipid Panel; Future  -     CBC Auto Differential; Future  -     TSH; Future  -     T4, Free; Future    MARTIN (generalized anxiety disorder)    Pure hypercholesterolemia  -     Hemoglobin A1C; Future  -     Comprehensive Metabolic Panel; Future  -     Lipid Panel; Future  -     CBC Auto Differential; Future  -     TSH; Future  -      T4, Free; Future    Aortic atherosclerosis  -     Hemoglobin A1C; Future  -     Comprehensive Metabolic Panel; Future  -     Lipid Panel; Future  -     CBC Auto Differential; Future  -     TSH; Future  -     T4, Free; Future    Essential hypertension  -     Hemoglobin A1C; Future  -     Comprehensive Metabolic Panel; Future  -     Lipid Panel; Future  -     CBC Auto Differential; Future  -     TSH; Future  -     T4, Free; Future    Colostomy in place  -     Hemoglobin A1C; Future  -     Comprehensive Metabolic Panel; Future  -     Lipid Panel; Future  -     CBC Auto Differential; Future  -     TSH; Future  -     T4, Free; Future    Small bowel anastomotic dilation  -     Hemoglobin A1C; Future  -     Comprehensive Metabolic Panel; Future  -     Lipid Panel; Future  -     CBC Auto Differential; Future  -     TSH; Future  -     T4, Free; Future    Vitamin D deficiency    Abnormal glucose  -     Hemoglobin A1C; Future  -     Comprehensive Metabolic Panel; Future  -     Lipid Panel; Future  -     CBC Auto Differential; Future  -     TSH; Future  -     T4, Free; Future    Hypothyroidism, unspecified type  -     Hemoglobin A1C; Future  -     Comprehensive Metabolic Panel; Future  -     Lipid Panel; Future  -     CBC Auto Differential; Future  -     TSH; Future  -     T4, Free; Future      Anxiety is controlled with medication. Instructed to continue as prescribed.   Increased Rx Xanax to 0.25 mg BID as needed for anxiety treatment. Disp #60. Cont with prozac 40mg.  reviewed.   Referral given to Dr. Amos, psychiatry, for anxiety treatment.   Other chronic conditions are stable. Continue with medications as prescribed.   Advised to keep appointment for imaging and with urology this week.  Lab work ordered to be completed prior to next visit.    Instructed to f/u in 4 months for anxiety f/u.     There are no Patient Instructions on file for this visit.    Follow up in about 16 weeks (around 1/9/2024) for f/u office  visit Dr. Arias.      LABS:   Lab Results   Component Value Date    HGBA1C 5.0 06/20/2023    HGBA1C 5.2 06/09/2022    HGBA1C 5.3 06/02/2021      Lab Results   Component Value Date    CHOL 166 06/20/2023    CHOL 154 06/09/2022    CHOL 166 06/02/2021     Lab Results   Component Value Date    LDLCALC 111.0 06/20/2023    LDLCALC 104.6 06/09/2022    LDLCALC 107.6 06/02/2021     Lab Results   Component Value Date    WBC 6.85 06/20/2023    HGB 13.5 06/20/2023    HCT 42.1 06/20/2023     06/20/2023    CHOL 166 06/20/2023    TRIG 65 06/20/2023    HDL 42 06/20/2023    ALT 12 06/20/2023    AST 19 06/20/2023     06/20/2023    K 3.9 06/20/2023     06/20/2023    CREATININE 1.0 06/20/2023    BUN 14 06/20/2023    CO2 25 06/20/2023    TSH 2.057 06/20/2023    INR 1.0 06/10/2019    HGBA1C 5.0 06/20/2023       The ASCVD Risk score (Baxley DK, et al., 2019) failed to calculate for the following reasons:    The 2019 ASCVD risk score is only valid for ages 40 to 79  X-Ray Abdomen AP 1 View  Narrative: EXAM:  XR ABDOMEN AP 1 VIEW    HISTORY: Nephrolithiasis    COMPARISON: 03/10/2023    FINDINGS: The bowel gas pattern is normal in appearance.  There is a 35 mm oval-shaped calcification projected over the right side of the abdomen.  This is projected over the proximal portion of the right ureteral stent.  There is no pneumoperitoneum. The bony structures appear intact.  Impression: 1.  There is a 35 mm oval-shaped calcification projected over the right side of the abdomen.  This is projected over the proximal portion of the right ureteral stent.  2.  The bowel gas pattern is normal in appearance.  3.  If additional imaging evaluation is clinically indicated, I recommend consideration of a CT examination of the abdomen and pelvis without oral or IV contrast.    Finalized on: 6/2/2023 11:18 AM By:  Dangelo Christopher MD  BRRG# [id number]      2023-06-02 11:20:[id number]    R    Scribe Attestation:   I, Daniel Sanderson, am scribing for,  and in the presence of, Dr. Myla Arias MD. I performed the above scribed service and the documentation accurately describes the services I performed. I attest to the accuracy of the note.    I, Dr. Myla Arias MD, reviewed documentation as scribed above. I personally performed the services described in this documentation.  I agree that the record reflects my personal performance and is accurate and complete. Myla Arias MD.    09/19/2023

## 2023-10-02 ENCOUNTER — HOSPITAL ENCOUNTER (OUTPATIENT)
Dept: RADIOLOGY | Facility: HOSPITAL | Age: 83
Discharge: HOME | End: 2023-10-02
Attending: UROLOGY
Payer: MEDICARE

## 2023-10-02 DIAGNOSIS — N20.0 RENAL STONE: ICD-10-CM

## 2023-10-02 PROCEDURE — 74018 RADEX ABDOMEN 1 VIEW: CPT | Mod: TC,FY,PO

## 2023-10-02 PROCEDURE — 74018 XR ABDOMEN AP 1 VIEW: ICD-10-PCS | Mod: 26,,, | Performed by: RADIOLOGY

## 2023-10-02 PROCEDURE — 76770 US RETROPERITONEAL COMPLETE: ICD-10-PCS | Mod: 26,,, | Performed by: RADIOLOGY

## 2023-10-02 PROCEDURE — 74018 RADEX ABDOMEN 1 VIEW: CPT | Mod: 26,,, | Performed by: RADIOLOGY

## 2023-10-02 PROCEDURE — 76770 US EXAM ABDO BACK WALL COMP: CPT | Mod: 26,,, | Performed by: RADIOLOGY

## 2023-10-02 PROCEDURE — 76770 US EXAM ABDO BACK WALL COMP: CPT | Mod: TC,PO

## 2023-10-05 ENCOUNTER — OFFICE VISIT (OUTPATIENT)
Dept: UROLOGY | Facility: CLINIC | Age: 83
End: 2023-10-05
Payer: MEDICARE

## 2023-10-05 VITALS
WEIGHT: 147.06 LBS | DIASTOLIC BLOOD PRESSURE: 68 MMHG | SYSTOLIC BLOOD PRESSURE: 100 MMHG | BODY MASS INDEX: 27.06 KG/M2 | HEIGHT: 62 IN | HEART RATE: 57 BPM | RESPIRATION RATE: 16 BRPM

## 2023-10-05 DIAGNOSIS — R30.0 DYSURIA: ICD-10-CM

## 2023-10-05 DIAGNOSIS — Z01.818 PRE-OP EVALUATION: ICD-10-CM

## 2023-10-05 DIAGNOSIS — N20.0 RENAL STONE: Primary | ICD-10-CM

## 2023-10-05 PROCEDURE — 1160F PR REVIEW ALL MEDS BY PRESCRIBER/CLIN PHARMACIST DOCUMENTED: ICD-10-PCS | Mod: CPTII,S$GLB,, | Performed by: UROLOGY

## 2023-10-05 PROCEDURE — 3074F PR MOST RECENT SYSTOLIC BLOOD PRESSURE < 130 MM HG: ICD-10-PCS | Mod: CPTII,S$GLB,, | Performed by: UROLOGY

## 2023-10-05 PROCEDURE — 1126F PR PAIN SEVERITY QUANTIFIED, NO PAIN PRESENT: ICD-10-PCS | Mod: CPTII,S$GLB,, | Performed by: UROLOGY

## 2023-10-05 PROCEDURE — 99214 OFFICE O/P EST MOD 30 MIN: CPT | Mod: S$GLB,,, | Performed by: UROLOGY

## 2023-10-05 PROCEDURE — 99999 PR PBB SHADOW E&M-EST. PATIENT-LVL V: ICD-10-PCS | Mod: PBBFAC,,, | Performed by: UROLOGY

## 2023-10-05 PROCEDURE — 1159F MED LIST DOCD IN RCRD: CPT | Mod: CPTII,S$GLB,, | Performed by: UROLOGY

## 2023-10-05 PROCEDURE — 1159F PR MEDICATION LIST DOCUMENTED IN MEDICAL RECORD: ICD-10-PCS | Mod: CPTII,S$GLB,, | Performed by: UROLOGY

## 2023-10-05 PROCEDURE — 99999 PR PBB SHADOW E&M-EST. PATIENT-LVL V: CPT | Mod: PBBFAC,,, | Performed by: UROLOGY

## 2023-10-05 PROCEDURE — 3078F DIAST BP <80 MM HG: CPT | Mod: CPTII,S$GLB,, | Performed by: UROLOGY

## 2023-10-05 PROCEDURE — 3288F PR FALLS RISK ASSESSMENT DOCUMENTED: ICD-10-PCS | Mod: CPTII,S$GLB,, | Performed by: UROLOGY

## 2023-10-05 PROCEDURE — 3288F FALL RISK ASSESSMENT DOCD: CPT | Mod: CPTII,S$GLB,, | Performed by: UROLOGY

## 2023-10-05 PROCEDURE — 1101F PT FALLS ASSESS-DOCD LE1/YR: CPT | Mod: CPTII,S$GLB,, | Performed by: UROLOGY

## 2023-10-05 PROCEDURE — 1160F RVW MEDS BY RX/DR IN RCRD: CPT | Mod: CPTII,S$GLB,, | Performed by: UROLOGY

## 2023-10-05 PROCEDURE — 3078F PR MOST RECENT DIASTOLIC BLOOD PRESSURE < 80 MM HG: ICD-10-PCS | Mod: CPTII,S$GLB,, | Performed by: UROLOGY

## 2023-10-05 PROCEDURE — 3074F SYST BP LT 130 MM HG: CPT | Mod: CPTII,S$GLB,, | Performed by: UROLOGY

## 2023-10-05 PROCEDURE — 1101F PR PT FALLS ASSESS DOC 0-1 FALLS W/OUT INJ PAST YR: ICD-10-PCS | Mod: CPTII,S$GLB,, | Performed by: UROLOGY

## 2023-10-05 PROCEDURE — 99214 PR OFFICE/OUTPT VISIT, EST, LEVL IV, 30-39 MIN: ICD-10-PCS | Mod: S$GLB,,, | Performed by: UROLOGY

## 2023-10-05 PROCEDURE — 1126F AMNT PAIN NOTED NONE PRSNT: CPT | Mod: CPTII,S$GLB,, | Performed by: UROLOGY

## 2023-10-05 RX ORDER — CEFDINIR 300 MG/1
300 CAPSULE ORAL 2 TIMES DAILY
Qty: 20 CAPSULE | Refills: 0 | Status: SHIPPED | OUTPATIENT
Start: 2023-10-05 | End: 2023-10-15

## 2023-10-05 NOTE — PROGRESS NOTES
Chief Complaint:   Encounter Diagnoses   Name Primary?    Renal stone Yes    Dysuria        HPI:   10/5/23- just today she is had some increased and urgency, none prior to this.  She is currently 6 months out from her last stent exchange, no significant stone reduction despite laser lithotripsy.    7/18/20- 79-year-old female who presents with severe abdominal pain and discomfort, she is being evaluated for colonic impaction diverticulitis.  She is seen to have an extremely large right renal pelvis stone.  This had been seen prior, and she was followed by partner in regards to possible surgery for this.  Patient though it is determined not pursuing not been seen in the last couple of years.  Patient is having upper abdominal pain, no colic at this point.  Patient has had no previous urological history per her and her son's report, her daughter is her primary care take care, she is currently not present at the bedside.  Family history of stones, no urological cancers per the family.  10/17/18: Mag3 shows 72/38 L/R function. -UCx last visit.  PCNL is the only viable treatment option; observation not recommended.  10/8/18: 79 yo woman referred for renal stone after workup for UTI/diverticulitis.  Having diarrhea on meds for diverticulitis.  No abd/pelvic pain and no exac/rel factors.  No hematuria.  No prior urolithiasis.  No urinary bother.  No  history.  Normal sexual function but inactive.  Some memory problems but functional at home.    Allergies:  Patient has no known allergies.    Medications:  has a current medication list which includes the following prescription(s): alprazolam, atorvastatin, fluoxetine, furosemide, hyoscyamine, memantine, neomycin-polymyxin-dexamethasone, ondansetron, pantoprazole, phenazopyridine, potassium chloride, thyroid (pork), and [DISCONTINUED] triamcinolone acetonide 0.1%.    Review of Systems:  General: No fever, chills, fatigability, or weight loss.  Skin: No rashes, itching, or  changes in color or texture of skin.  Chest: Denies JESUS, cyanosis, wheezing, cough, and sputum production.  Abdomen: Appetite fine. No weight loss. Denies diarrhea, abdominal pain, hematemesis, or blood in stool.  Musculoskeletal: No joint stiffness or swelling. Denies back pain.  : As above.  All other review of systems negative.    PMH:   has a past medical history of Clostridium difficile colitis, Dementia, Diverticulitis, High cholesterol, Hypertension, Hypothyroidism, and Kidney stones.    PSH:   has a past surgical history that includes Cataract extraction; Appendectomy (2008); Colonoscopy (N/A, 01/02/2019); Colostomy (Left, 01/02/2019); Maribel procedure (N/A, 01/02/2019); Lysis of adhesions (N/A, 01/02/2019); Colon surgery; Vitrectomy (Right, 09/2013); Colonoscopy (N/A, 06/07/2019); Esophagogastroduodenoscopy (N/A, 09/16/2020); Colonoscopy (N/A, 06/23/2021); Cystoscopy with ureteroscopy, retrograde pyelography, and insertion of stent (Right, 08/23/2022); Cystoureteroscopy with retrograde pyelography and insertion of stent into ureter (Right, 11/8/2022); and cystoureteroscopy, with holmium laser lithotripsy of ureteral calculus and stent insertion (Right, 4/4/2023).    FamHx: family history includes Alzheimer's disease in her mother; Aneurysm in her father; Bladder Cancer in her grandchild; Parkinsonism in her brother; Stomach cancer in her brother.    SocHx:  reports that she has quit smoking. Her smoking use included cigarettes. She has never used smokeless tobacco. She reports that she does not drink alcohol and does not use drugs.      Physical Exam:  Vitals:    10/05/23 1112   BP: 100/68   Pulse: (!) 57   Resp: 16         General: A&Ox3, no apparent distress, no deformities  Neck: No masses, normal ROM  Lungs: normal inspiration, no use of accessory muscles  Heart: normal pulse, no arrhythmias  Abdomen: Soft, NT, ND, no masses, no hernias, no hepatosplenomegaly  Skin: The skin is warm and dry. No  jaundice.  Ext: No c/c/e.  : No pelvic floor prolapse.  Normal introitus, no urethral abnomralities. No Perineal abnormalities.    Labs/Studies:   Ucx E.Coli 2/23  Creatinine 1.0 6/23  KUB/FIDEL persistent right renal stone, right stent, mild right hydro 10/23  CT large right renal pelvis stone 8/22  Lasix renogram 23% right/77% left 9/22     Impression/Plan:        Right renal stone-  right ureteroscopy  4/4/23,  failed PCN  1/10/23,  right stent  11/8/22, 8/23/22    Previously used Myrbetriq and Levsin, both of which she did not tolerate well, symptoms today of urgency but this has just been occurring today.  This could be behavioral, but have prescribed her 10 days of cefdinir.  If this persists over the next day or 2 the name will go ahead and proceed with treatment, if not then they will take it preoperatively.  Patient is now due at 6 months for stent exchange, will proceed with cystoscopy, right stent exchange with retrograde.  By the time the change has been completed she will have made 7 months.  No need for ureteroscopy based on KUB.      Patient understands the risks, benefits and alternatives of the above-stated procedure.  These include but not limited to damage to the surrounding structures including the urethra, ureters and bladder.  Risk of perforation requiring open procedures, Reyes catheterization at the conclusion of the procedure.  Risk of need for further surgeries.  Risk of pain, hematuria, infections.  Risk of heart attack, stroke, death, DVT and PE.  Patient understanding of all the above he has elected to pursue.

## 2023-10-26 ENCOUNTER — OFFICE VISIT (OUTPATIENT)
Dept: PSYCHIATRY | Facility: CLINIC | Age: 83
End: 2023-10-26
Payer: COMMERCIAL

## 2023-10-26 ENCOUNTER — PATIENT MESSAGE (OUTPATIENT)
Dept: PRIMARY CARE CLINIC | Facility: CLINIC | Age: 83
End: 2023-10-26
Payer: MEDICARE

## 2023-10-26 DIAGNOSIS — F03.90 DEMENTIA, UNSPECIFIED DEMENTIA SEVERITY, UNSPECIFIED DEMENTIA TYPE, UNSPECIFIED WHETHER BEHAVIORAL, PSYCHOTIC, OR MOOD DISTURBANCE OR ANXIETY: ICD-10-CM

## 2023-10-26 DIAGNOSIS — F41.9 ANXIETY: Primary | ICD-10-CM

## 2023-10-26 PROCEDURE — 1160F RVW MEDS BY RX/DR IN RCRD: CPT | Mod: CPTII,S$GLB,, | Performed by: PSYCHIATRY & NEUROLOGY

## 2023-10-26 PROCEDURE — 1159F MED LIST DOCD IN RCRD: CPT | Mod: CPTII,S$GLB,, | Performed by: PSYCHIATRY & NEUROLOGY

## 2023-10-26 PROCEDURE — 1160F PR REVIEW ALL MEDS BY PRESCRIBER/CLIN PHARMACIST DOCUMENTED: ICD-10-PCS | Mod: CPTII,S$GLB,, | Performed by: PSYCHIATRY & NEUROLOGY

## 2023-10-26 PROCEDURE — 99205 OFFICE O/P NEW HI 60 MIN: CPT | Mod: S$GLB,,, | Performed by: PSYCHIATRY & NEUROLOGY

## 2023-10-26 PROCEDURE — 99999 PR PBB SHADOW E&M-EST. PATIENT-LVL II: ICD-10-PCS | Mod: PBBFAC,,, | Performed by: PSYCHIATRY & NEUROLOGY

## 2023-10-26 PROCEDURE — 99205 PR OFFICE/OUTPT VISIT, NEW, LEVL V, 60-74 MIN: ICD-10-PCS | Mod: S$GLB,,, | Performed by: PSYCHIATRY & NEUROLOGY

## 2023-10-26 PROCEDURE — 99999 PR PBB SHADOW E&M-EST. PATIENT-LVL II: CPT | Mod: PBBFAC,,, | Performed by: PSYCHIATRY & NEUROLOGY

## 2023-10-26 PROCEDURE — 1159F PR MEDICATION LIST DOCUMENTED IN MEDICAL RECORD: ICD-10-PCS | Mod: CPTII,S$GLB,, | Performed by: PSYCHIATRY & NEUROLOGY

## 2023-10-26 RX ORDER — FLUOXETINE HYDROCHLORIDE 20 MG/1
20 CAPSULE ORAL DAILY
Qty: 90 CAPSULE | Refills: 0 | Status: SHIPPED | OUTPATIENT
Start: 2023-10-26 | End: 2023-11-01

## 2023-10-26 NOTE — PROGRESS NOTES
"PSYCHIATRIC EVALUATION     Name: Irlanda Lopez  Age: 83 y.o.  : 1940    64 minutes of total time spent on the encounter, which includes face to face time and non-face to face time.  Face-to-face time: 47 minutes.    Preparing to see the patient (reviewing portions of the available record), Performing a medically appropriate evaluation, Counseling and educating the patient/family/caregiver, Ordering medications, labs, or referrals, and Documenting clinical information in the health record      CHIEF COMPLAINT :  The patient herself reports feeling pretty good," said with upbeat tone and affect.      HISTORY OF PRESENT ILLNESS:         Irlanda Lopez a 83 y.o.  female presents today by way of referral from her primary care provider.  A [facility] 15 visit indicates a diagnosis of MARTIN, and documentation includes:   Family reports pt has had difficulties with Xanax refilling due to miscommunication from pharmacy about nationwide shortage. Pt currently f/u with psychiatry and urology. Family reports pt's anxiety has significantly improved with Fluoxetine [40 mg]. Pt is requiring medication refills today. Currently on Namenda, Fluoxetine, Venetie thyroid, Hyoscyamine, and Atorvastatin.   visit diagnoses are:   1. Vascular dementia with anxiety, unspecified dementia severity    2. Generalized anxiety disorder    3. MARTIN (generalized anxiety disorder)    4. Pure hypercholesterolemia    5. Aortic atherosclerosis    6. Essential hypertension    7. Colostomy in place    8. Small bowel anastomotic dilation    9. Vitamin D deficiency    10. Abnormal glucose    11. Hypothyroidism, unspecified type    Medications noted Prozac 40 mg daily, Xanax 0.25 mg b.i.d. PRN, atorvastatin, furosemide, hyoscyamine, memantine, ondansetron PRN, pantoprazole, phenazopyridine, potassium, and pork thyroid     indicates the patient has been maintained on 60 tablets of Xanax 0.25 mg for a 30 day supply by her PCP, last filled " "October 6.    The patient presents with her 5 days of the week around the clock sitter Melba (relieved by a 2 day sitter) and her daughter Myla.  The patient herself denies any complaints, though she acknowledges when asked frustration with forgetfulness.  I am a happy-go-omer person."  She denies depression, irritability, elevation; she reports anxiety sometimes if I have to go somewhere."  Melba in Myla report that she does indeed have anxiety if she is about to go somewhere.  She feels insecure because of her forgetfulness.  Additionally, preparing to leave the home takes a long time because she often showers and/or washes her hair more than once because she forgets that she has done so and feels that it has to be done.  Melba describes patently working with her on redirection.  They concur that she has no mood or affective disturbances apart from intermittent anxiety associated with forgetfulness and report that she is never agitated, aggressive, or combative.  No threats of harm to self or others.  No psychosis.    No history of any mental health symptoms prior to the beginnings of cognitive problems several years ago.  They report that cognitive problems are worse of late.  They ask about Namenda and medication approaches to memory and report side effects in the past with Aricept.  They report no current neurology follow-up, and I recommended such.  They remotely saw a neurologist at [facility] but currently ask for a neurology referral at [facility].    The patient herself is pleasant and makes good efforts to cooperate but has notable cognitive impairments, such that the information is gleaned from Melba and Myla.  The patient speaks very positively both and clearly has a close relationship with her caregiver.    They report the primary care had started the patient on 10 mg of Prozac and 0.25 mg twice daily of Xanax.  The 10 mg of Prozac was prescribed at bedtime and was increased to 40 " mg.  They have not seen much benefit with the use of the Prozac or Xanax and report that the patient has had difficulty with sleep.      PAST BEHAVIORAL HEALTH HISTORY  Outpatient treatment-none  Inpatient Treatment - none  Suicide Attempts - none  Violence - none  Psychosis - none  Stacy/Hypomania - none  Medications - as above  Counseling - none    SUBSTANCE USE:    Alcohol:  None     Other:  None    Allergy Review:   Review of patient's allergies indicates:  No Known Allergies     Medical Problem List:   Patient Active Problem List   Diagnosis    Hypothyroidism    Essential hypertension    Mild cognitive impairment with memory loss    High serum vitamin D    Hyperlipidemia    Colostomy in place    Tortuous aorta    Renal stone    Dementia    MARTIN (generalized anxiety disorder)    Dysuria    Small bowel anastomotic dilation    Mass of right breast    Cystitis    Aortic atherosclerosis    Osteopenia    Urge urinary incontinence        Past Surgical History:   Procedure Laterality Date    APPENDECTOMY  2008    CATARACT EXTRACTION      Dr Ragyoza    COLON SURGERY      COLONOSCOPY N/A 01/02/2019    Procedure: COLONOSCOPY;  Surgeon: Reece Hogan MD;  Location: [facility];  Service: Endoscopy;  Laterality: N/A;    COLONOSCOPY N/A 06/07/2019    Procedure: COLONOSCOPY;  Surgeon: Rishabh Connelly MD;  Location: [facility] ENDO;  Service: General;  Laterality: N/A;    COLONOSCOPY N/A 06/23/2021    Procedure: COLONOSCOPY;  Surgeon: Lucy Lafleur MD;  Location: [facility] ENDO;  Service: Endoscopy;  Laterality: N/A;    COLOSTOMY Left 01/02/2019    Procedure: CREATION, COLOSTOMY;  Surgeon: Reece Hogan MD;  Location: [facility];  Service: General;  Laterality: Left;    CYSTOSCOPY WITH URETEROSCOPY, RETROGRADE PYELOGRAPHY, AND INSERTION OF STENT Right 08/23/2022    Procedure: CYSTOSCOPY, WITH RETROGRADE PYELOGRAM AND URETERAL STENT INSERTION;  Surgeon: Anselmo Matute MD;  Location: [facility];  Service: Urology;  Laterality: Right;     CYSTOURETEROSCOPY WITH RETROGRADE PYELOGRAPHY AND INSERTION OF STENT INTO URETER Right 11/8/2022    Procedure: CYSTOURETEROSCOPY, WITH RETROGRADE PYELOGRAM AND URETERAL STENT INSERTION;  Surgeon: Anselmo Matute MD;  Location: [facility];  Service: Urology;  Laterality: Right;    CYSTOURETEROSCOPY, WITH HOLMIUM LASER LITHOTRIPSY OF URETERAL CALCULUS AND STENT INSERTION Right 4/4/2023    Procedure: CYSTOURETEROSCOPY, WITH HOLMIUM LASER LITHOTRIPSY OF URETERAL CALCULUS AND STENT INSERTION;  Surgeon: Anselmo Matute MD;  Location: [facility];  Service: Urology;  Laterality: Right;    ESOPHAGOGASTRODUODENOSCOPY N/A 09/16/2020    Procedure: ESOPHAGOGASTRODUODENOSCOPY (EGD)- Needs Rapid;  Surgeon: Lucy Lafleur MD;  Location: [facility];  Service: Endoscopy;  Laterality: N/A;    SHAHIDA PROCEDURE N/A 01/02/2019    Procedure: SHAHIDA PROCEDURE;  Surgeon: Reece Hogan MD;  Location: [facility];  Service: General;  Laterality: N/A;    LYSIS OF ADHESIONS N/A 01/02/2019    Procedure: LYSIS, ADHESIONS;  Surgeon: Reece Hogan MD;  Location: [facility];  Service: General;  Laterality: N/A;    VITRECTOMY Right 09/2013      Family History:  Family History   Problem Relation Age of Onset    Alzheimer's disease Mother     Aneurysm Father         brain    Stomach cancer Brother         50s    Parkinsonism Brother     Bladder Cancer Grandchild       Family Psychiatric History:  None    PSYCHO-SOCIAL/DEVELOPMENT HISTORY:     Relationships:  The patient has a 24 hour caregiver with the patient and the caregiver being the only persons living in the home.  The patient has 4 children and is close to 3 of the children, who visit her regularly.    Education:  [employer]    Employment (past):  [employer]    Mental Status Exam:   Appearance:  Well-groomed   Orientation:  Person only and with some understanding that this is a medical appointment, with no further details recalled  Attitude:  Pleasant, good efforts to cooperate  Eye Contact:   Appropriate when engaged in direct conversation  Behavior:  Calm  Speech:    Rate - WNL    Volume - WNL    Quantity - decreased    Tone - calm, pleasant    Pressure - no  Thought Processes:  Linear  Mood:  Anxious at times   Affect:  Calm, pleasant, including polite smiling   SI:  No  HI:  No  Paranoia:  No  Delusions:  No  Hallucinations:  No  Attention:  Decreased  Cognition:  Impaired  Insight:  Limited by impaired cognition  Judgment:  Limited by impaired cognition  Impulse Control:  Limited by impaired cognition    Assessment/Plan:     Encounter Diagnoses   Name Primary?    Anxiety Yes    Dementia, unspecified dementia severity, unspecified dementia type, unspecified whether behavioral, psychotic, or mood disturbance or anxiety         Follow up in 2-3 months  At the end of the appointment, the patient was directed to the  for scheduling.    Psychiatry Medication:  Decrease Prozac to 20 mg and move dosing to morning.  Xanax 0.25 mg 1 tablet daily for 3 weeks and then discontinue.  Okay to take melatonin, no more than 3 mg, given when it becomes dark outside.  Sedation, unsteadiness, falls, confusion as potential side effects reviewed.    Reviewed with patient:  Report side effects or any other problems to the psychiatrist during clinic business hours. Call 911 or go to an emergency department for any acute or urgent issues otherwise.  Follow up with primary care/MD specialist for continued monitoring of general health and wellness and any medical conditions.  Call  [facility] at [phone] or go to [facility]  My Chart if necessary for scheduling or rescheduling.  It is the responsibility of the patient to reschedule an appointment if an appointment has been canceled or missed.  Understanding was expressed; and no further concerns or questions were raised at this time.       There are no Patient Instructions on file for this visit.    Large portions of this note were completed by way of  voice recognition dictation software, and transcription errors are possible, such that specific information in the note should be considered in the context of the entire report.

## 2023-10-27 ENCOUNTER — PATIENT MESSAGE (OUTPATIENT)
Dept: SURGERY | Facility: HOSPITAL | Age: 83
End: 2023-10-27
Payer: MEDICARE

## 2023-10-31 ENCOUNTER — TELEPHONE (OUTPATIENT)
Dept: PREADMISSION TESTING | Facility: HOSPITAL | Age: 83
End: 2023-10-31
Payer: MEDICARE

## 2023-10-31 NOTE — TELEPHONE ENCOUNTER
----- Message from Myla Arias MD sent at 10/30/2023 11:30 AM CDT -----  Regarding: RE: surgery clearance  I will be seeing this patient for a virtual visit on Wednesday November 1st.  We can also discuss her preop during that virtual visit and place any orders if needed at that time.  There will be documentation on the visit from November 1st.  Just SHAHLA.  Myla Arias MD    ----- Message -----  From: Maria Del Carmen Aviles LPN  Sent: 10/30/2023   9:14 AM CDT  To: Myla Arias MD  Subject: FW: surgery clearance                            Please advise,  ----- Message -----  From: Ashley Lucas RN  Sent: 10/30/2023   8:54 AM CDT  To: [facility] Staff  Subject: surgery clearance                                Good morning, this pt is having a cystoscopy with ureteral stent insertion on 11/07 with Dr. Matute.   She saw Dr. Arias on 9/19.  We attempted to schedule the pt to see our provider in the Pre-Op Clinic but the daughter states that would be a great hardship for them. Therefore, we are requesting a surgery clearance note. Please advise on whether this patient can be cleared to proceed from your standpoint or will need any further testing to ensure they are optimized to proceed.     Thank you,  Pre-Admit Department  [phone]

## 2023-11-01 ENCOUNTER — PATIENT MESSAGE (OUTPATIENT)
Dept: PRIMARY CARE CLINIC | Facility: CLINIC | Age: 83
End: 2023-11-01

## 2023-11-01 ENCOUNTER — OFFICE VISIT (OUTPATIENT)
Dept: PRIMARY CARE CLINIC | Facility: CLINIC | Age: 83
End: 2023-11-01
Payer: MEDICARE

## 2023-11-01 DIAGNOSIS — F41.1 GAD (GENERALIZED ANXIETY DISORDER): ICD-10-CM

## 2023-11-01 DIAGNOSIS — N39.41 URGE URINARY INCONTINENCE: ICD-10-CM

## 2023-11-01 DIAGNOSIS — F41.9 ANXIETY: ICD-10-CM

## 2023-11-01 DIAGNOSIS — F01.54 VASCULAR DEMENTIA WITH ANXIETY, UNSPECIFIED DEMENTIA SEVERITY: ICD-10-CM

## 2023-11-01 DIAGNOSIS — I10 ESSENTIAL HYPERTENSION: ICD-10-CM

## 2023-11-01 DIAGNOSIS — Z01.818 PREOP EXAMINATION: Primary | ICD-10-CM

## 2023-11-01 PROCEDURE — 99215 OFFICE O/P EST HI 40 MIN: CPT | Mod: 95,,, | Performed by: FAMILY MEDICINE

## 2023-11-01 PROCEDURE — 1160F PR REVIEW ALL MEDS BY PRESCRIBER/CLIN PHARMACIST DOCUMENTED: ICD-10-PCS | Mod: CPTII,95,, | Performed by: FAMILY MEDICINE

## 2023-11-01 PROCEDURE — 1160F RVW MEDS BY RX/DR IN RCRD: CPT | Mod: CPTII,95,, | Performed by: FAMILY MEDICINE

## 2023-11-01 PROCEDURE — 1159F PR MEDICATION LIST DOCUMENTED IN MEDICAL RECORD: ICD-10-PCS | Mod: CPTII,95,, | Performed by: FAMILY MEDICINE

## 2023-11-01 PROCEDURE — 1159F MED LIST DOCD IN RCRD: CPT | Mod: CPTII,95,, | Performed by: FAMILY MEDICINE

## 2023-11-01 PROCEDURE — 99215 PR OFFICE/OUTPT VISIT, EST, LEVL V, 40-54 MIN: ICD-10-PCS | Mod: 95,,, | Performed by: FAMILY MEDICINE

## 2023-11-01 RX ORDER — FLUOXETINE HYDROCHLORIDE 20 MG/1
40 CAPSULE ORAL DAILY
Qty: 90 CAPSULE | Refills: 0
Start: 2023-11-01 | End: 2023-12-07 | Stop reason: SDUPTHER

## 2023-11-01 NOTE — PROGRESS NOTES
Subjective:      Patient ID: Irlanda Lopez is a 83 y.o. female.    Chief Complaint: Anxiety    The patient location is: home  Visit type: video and audio simultaneous    Patient is a 83 y.o. female coming in today for anxiety. Family is present during the visit.   Pt has met with Dr. Rosario, psychiatry. Family reports concerns for anxiety medication adjustments made by psychiatry with upcoming surgery. Recent workup with psychiatry reviewed with pt and family. Discussed at length with family about changes in medication. Pt has upcoming cystoscopy next week with Dr. Matute, urology. They are concerned that she will have more ocd tendencies with coming off the xanax and prozac. The caregiver Page who is at night with her states she actually gets worse in the evenings.       [facility] Reason For Visit    11/1/2023  4:08 PM CDT - Filed by Patient   What is your primary reason for visit? Other/Annual   Have you experienced any of the following:   Change in activity? Yes   Unexpected weight change? No   Neck pain? No   Hearing loss? No   Runny nose? No   Trouble swallowing? No   Eye discharge? Yes   Changes in vision? No   Chest tightness? No   Wheezing? No   Chest pain? No   Heart beating fast or racing? No   Blood in stool? No   Constipation? No   Vomiting? No   Diarrhea? No   Drinking much more than usual? No   Urinating much more than usual? No   Difficulty urinating? No   Blood in the urine? No   Menstrual problem? No   Painful urination? No   Joint swelling? No   Joint pain? No   Headaches? No   Weakness? No   Confusion? Yes   Feeling depressed? No         Pmh, Psh, Family Hx, Social Hx updated in Epic Tabs today.     LABS:   Lab Results   Component Value Date    WBC 6.85 06/20/2023    HGB 13.5 06/20/2023    HCT 42.1 06/20/2023     06/20/2023    CHOL 166 06/20/2023    TRIG 65 06/20/2023    HDL 42 06/20/2023    ALT 12 06/20/2023    AST 19 06/20/2023     06/20/2023    K 3.9 06/20/2023      06/20/2023    CREATININE 1.0 06/20/2023    BUN 14 06/20/2023    CO2 25 06/20/2023    TSH 2.057 06/20/2023    INR 1.0 06/10/2019    HGBA1C 5.0 06/20/2023       US Retroperitoneal Complete  Narrative: EXAMINATION:  US RETROPERITONEAL COMPLETE    CLINICAL HISTORY:  Calculus of kidney    TECHNIQUE:  Ultrasound of the kidneys and urinary bladder was performed including color flow and Doppler evaluation of the kidneys.    COMPARISON:  03/10/2023    FINDINGS:  The right kidney measures 10.8 cm. The left kidney measures 11.7 cm.  There is normal corticomedullary differentiation and cortical thickness.  No solid or cystic masses. Mild-to-moderate right-sided hydronephrosis similar in appearance to the prior exam.  Large calcification seen projecting over the renal pelvis that measures up to 2.9 cm in size.  There is also the suggestion of additional nonobstructing upper pole and lower pole left renal calculi measuring up to 7 mm and 4 mm respectively.The bladder is unremarkable in appearance.  Distal portion of a right ureteral stent is noted within the bladder.  Impression: 1. Large stone within the renal pelvis measuring up to 2.9 cm in size.  Persistent mild-to-moderate hydronephrosis.  Distal portion of the right ureteral stent is visualized.  Nonobstructing right renal calculi also seen.    Electronically signed by: Lonny Sanders DO  Date:    10/02/2023  Time:    13:09  X-Ray Abdomen AP 1 View  Narrative: EXAMINATION:  XR ABDOMEN AP 1 VIEW    CLINICAL HISTORY:  Calculus of kidney    TECHNIQUE:  Single AP View of the abdomen was performed.    COMPARISON:  06/22/2023    FINDINGS:  The bowel gas pattern is nonspecific and nonobstructive.  No indirect evidence for free air.  Large calcification seen projecting over the right kidney that measures up to 3.6 cm in size.  A right ureteral stent is been placed.  There are a few smaller stones seen projecting over the right kidney as well and may be slightly changed in  positioning but otherwise essentially unchanged when compared to the prior study.  No calcification seen along the course of the stent..  Impression: As above    Electronically signed by: Lonny Sanders DO  Date:    10/02/2023  Time:    10:36        Review of Systems   Constitutional:  Positive for activity change. Negative for unexpected weight change.   HENT:  Negative for hearing loss, rhinorrhea and trouble swallowing.    Eyes:  Positive for discharge and redness. Negative for visual disturbance.   Respiratory:  Negative for chest tightness and wheezing.    Cardiovascular:  Negative for chest pain and palpitations.   Gastrointestinal:  Negative for blood in stool, constipation, diarrhea and vomiting.   Endocrine: Negative for polydipsia and polyuria.   Genitourinary:  Negative for difficulty urinating, dysuria, hematuria and menstrual problem.   Musculoskeletal:  Negative for arthralgias, joint swelling and neck pain.   Neurological:  Negative for weakness and headaches.   Psychiatric/Behavioral:  Positive for confusion and sleep disturbance. Negative for dysphoric mood. The patient is nervous/anxious.      Objective:   There were no vitals filed for this visit.  Wt Readings from Last 10 Encounters:   10/05/23 66.7 kg (147 lb 0.8 oz)   09/19/23 66.7 kg (147 lb 2.5 oz)   06/15/23 68.9 kg (151 lb 12.6 oz)   06/05/23 66.7 kg (147 lb 0.8 oz)   04/20/23 66.7 kg (147 lb 0.8 oz)   04/20/23 66.7 kg (147 lb 0.8 oz)   04/04/23 66.3 kg (146 lb 2.6 oz)   03/21/23 67.7 kg (149 lb 4 oz)   03/16/23 69 kg (152 lb 1.9 oz)   03/16/23 67.6 kg (149 lb)     Physical Exam  Vitals reviewed.   Constitutional:       General: She is awake. She is not in acute distress.     Appearance: She is well-developed and well-groomed. She is not ill-appearing.   HENT:      Mouth/Throat:      Lips: Pink.   Psychiatric:         Attention and Perception: Attention and perception normal. She is attentive.         Mood and Affect: Affect normal. Mood is  not anxious or depressed. Affect is not labile, blunt, angry or inappropriate.         Speech: Speech normal. She is communicative. Speech is not rapid and pressured, delayed, slurred or tangential.         Behavior: Behavior is not agitated, slowed, aggressive, withdrawn, hyperactive or combative. Behavior is cooperative.         Thought Content: Thought content is not paranoid or delusional. Thought content does not include homicidal or suicidal ideation. Thought content does not include homicidal or suicidal plan.         Cognition and Memory: Cognition and memory normal. Memory is not impaired. She does not exhibit impaired recent memory or impaired remote memory.         Judgment: Judgment is not impulsive or inappropriate.       Assessment:     1. Preop examination    2. MARTIN (generalized anxiety disorder)    3. Vascular dementia with anxiety, unspecified dementia severity    4. Essential hypertension    5. Urge urinary incontinence    6. Anxiety      Plan:   Irlanda was seen today for anxiety.    Diagnoses and all orders for this visit:    Preop examination  Comments:  for urologic procedure. labs and cxr to be performed on 11/6/23    Orders:  -     CBC Auto Differential; Future  -     Basic Metabolic Panel; Future  -     X-Ray Chest PA And Lateral; Future    MARTIN (generalized anxiety disorder)  Comments:  family is concerned about med changes prior to surgery and with ocd tendencies. keep prozac at 40mg until 2 wk after surgery, then reduce to 20mg and AM dosing    Vascular dementia with anxiety, unspecified dementia severity  Comments:  family is concerned about med changes prior to surgery and with ocd tendencies. will keep xanax at 0.25mg bid, and consider adjusting meds in Dec. 2023.   Orders:  -     X-Ray Chest PA And Lateral; Future    Essential hypertension  -     X-Ray Chest PA And Lateral; Future    Urge urinary incontinence  -     CBC Auto Differential; Future  -     Basic Metabolic Panel; Future  -      X-Ray Chest PA And Lateral; Future    Anxiety  -     FLUoxetine 20 MG capsule; Take 2 capsules (40 mg total) by mouth once daily.    FOr preop:   CXR and lab work ordered to be completed next week prior to procedure.   Can proceed with proposed cystoscope by Dr. Matute after review of labs.     Emphasized to family importance of changing anxiety medication to morning for prozac, but not making changes until 2 weeks after surgery due to dementia.    Fluoxetine 40 mg  in the AM for now, then can reduce to 20mg after surgery for anxiety treatment. Keep xanax at bid dosing for now. Can meet in Dec to discuss decreasing xanax to 1 tablet a day at that time if appropriate.   Will consider adjusting medication next month after surgery. Add melatonin at dark no more than 3mg dose.   Instructed to f/u in 5 weeks for anxiety f/u.     Face to Face time with patient: 4:12 PM-4:35 PM         45  minutes of total time spent on the encounter, which includes face to face time and non-face to face time preparing to see the patient (eg, review of tests), Obtaining and/or reviewing separately obtained history, Documenting clinical information in the electronic or other health record, Independently interpreting results (not separately reported) and communicating results to the patient/family/caregiver, or Care coordination (not separately reported).     Each patient to whom he or she provides medical services by telemedicine is:  (1) informed of the relationship between the physician and patient and the respective role of any other health care provider with respect to management of the patient; and (2) notified that he or she may decline to receive medical services by telemedicine and may withdraw from such care at any time.      Follow up in about 5 weeks (around 12/6/2023) for f/u office visit Dr. Arias /sandro hobbs .    There are no Patient Instructions on file for this visit.    I reviewed the patient's past  medical, surgical, social and family history and with  physical exam findings and the proposed surgery and I make the following recommendations:     From a cardiac standpoint the patient is low risk for surgery with a low risk surgery. She has no evidence of cardiac symptomatology or cardiac diagnoses. The patient may proceed with surgery without further cardiac workup.     From a pulmonary standpoint the patient presents as a good candidate as well. The patient has no history of lung disease or pulmonary symptoms. Good pulmonary toilet, incentive spirometry, early ambulation are all recommended to improve the pulmonary outcome. No further pulmonary workup as noted prior to surgery.   DVT prophylaxis should be per standard. Venous compression devices are recommended. Early ambulation. Patient has been educated on signs and symptoms of both DVT and pulmonary embolus and instructed on what to do if there are symptoms postop.     The patient has been instructed to take  blood pressure medication the morning of surgery with a sip of water. Avoid any aspirin or anti-inflammatories between now and surgery.     If there is any further I can do to assist in the care of this patient please not hesitate to contact me. I will forward the lab results upon my receipt.      Scribe Attestation:   I, Daniel Sanderson, am scribing for, and in the presence of, Dr. Myla Arias MD. I performed the above scribed service and the documentation accurately describes the services I performed. I attest to the accuracy of the note.    I, Dr. Myla Arias MD, reviewed documentation as scribed above. I personally performed the services described in this documentation.  I agree that the record reflects my personal performance and is accurate and complete. Myla Arias MD.    11/01/2023

## 2023-11-06 ENCOUNTER — HOSPITAL ENCOUNTER (OUTPATIENT)
Dept: CARDIOLOGY | Facility: HOSPITAL | Age: 83
Discharge: HOME | End: 2023-11-06
Attending: UROLOGY
Payer: MEDICARE

## 2023-11-06 ENCOUNTER — TELEPHONE (OUTPATIENT)
Dept: PREADMISSION TESTING | Facility: HOSPITAL | Age: 83
End: 2023-11-06
Payer: MEDICARE

## 2023-11-06 ENCOUNTER — HOSPITAL ENCOUNTER (OUTPATIENT)
Dept: RADIOLOGY | Facility: HOSPITAL | Age: 83
Discharge: HOME | End: 2023-11-06
Attending: FAMILY MEDICINE
Payer: MEDICARE

## 2023-11-06 DIAGNOSIS — I10 ESSENTIAL HYPERTENSION: ICD-10-CM

## 2023-11-06 DIAGNOSIS — Z01.818 PREOP EXAMINATION: ICD-10-CM

## 2023-11-06 DIAGNOSIS — Z01.818 PRE-OP TESTING: ICD-10-CM

## 2023-11-06 DIAGNOSIS — N39.41 URGE URINARY INCONTINENCE: ICD-10-CM

## 2023-11-06 DIAGNOSIS — F01.54 VASCULAR DEMENTIA WITH ANXIETY, UNSPECIFIED DEMENTIA SEVERITY: ICD-10-CM

## 2023-11-06 PROCEDURE — 71046 X-RAY EXAM CHEST 2 VIEWS: CPT | Mod: TC,FY,PO

## 2023-11-06 PROCEDURE — 93010 EKG 12-LEAD: ICD-10-PCS | Mod: ,,, | Performed by: INTERNAL MEDICINE

## 2023-11-06 PROCEDURE — 93010 ELECTROCARDIOGRAM REPORT: CPT | Mod: ,,, | Performed by: INTERNAL MEDICINE

## 2023-11-06 PROCEDURE — 93005 ELECTROCARDIOGRAM TRACING: CPT | Mod: PO

## 2023-11-06 PROCEDURE — 71046 XR CHEST PA AND LATERAL: ICD-10-PCS | Mod: 26,,, | Performed by: RADIOLOGY

## 2023-11-06 PROCEDURE — 71046 X-RAY EXAM CHEST 2 VIEWS: CPT | Mod: 26,,, | Performed by: RADIOLOGY

## 2023-11-07 ENCOUNTER — TELEPHONE (OUTPATIENT)
Dept: UROLOGY | Facility: CLINIC | Age: 83
End: 2023-11-07
Payer: MEDICARE

## 2023-11-07 ENCOUNTER — ANESTHESIA EVENT (OUTPATIENT)
Dept: SURGERY | Facility: HOSPITAL | Age: 83
End: 2023-11-07
Payer: MEDICARE

## 2023-11-07 ENCOUNTER — ANESTHESIA (OUTPATIENT)
Dept: SURGERY | Facility: HOSPITAL | Age: 83
End: 2023-11-07
Payer: MEDICARE

## 2023-11-07 PROCEDURE — 25000003 PHARM REV CODE 250: Performed by: NURSE ANESTHETIST, CERTIFIED REGISTERED

## 2023-11-07 PROCEDURE — 63600175 PHARM REV CODE 636 W HCPCS: Performed by: UROLOGY

## 2023-11-07 PROCEDURE — 63600175 PHARM REV CODE 636 W HCPCS: Performed by: NURSE ANESTHETIST, CERTIFIED REGISTERED

## 2023-11-07 RX ORDER — LIDOCAINE HYDROCHLORIDE 20 MG/ML
INJECTION INTRAVENOUS
Status: DISCONTINUED | OUTPATIENT
Start: 2023-11-07 | End: 2023-11-07

## 2023-11-07 RX ORDER — PROPOFOL 10 MG/ML
VIAL (ML) INTRAVENOUS
Status: DISCONTINUED | OUTPATIENT
Start: 2023-11-07 | End: 2023-11-07

## 2023-11-07 RX ADMIN — PROPOFOL 30 MG: 10 INJECTION, EMULSION INTRAVENOUS at 02:11

## 2023-11-07 RX ADMIN — LIDOCAINE HYDROCHLORIDE 50 MG: 20 INJECTION INTRAVENOUS at 01:11

## 2023-11-07 RX ADMIN — PROPOFOL 30 MG: 10 INJECTION, EMULSION INTRAVENOUS at 01:11

## 2023-11-07 RX ADMIN — CEFAZOLIN SODIUM 2 G: 2 SOLUTION INTRAVENOUS at 01:11

## 2023-11-07 RX ADMIN — GLYCOPYRROLATE 0.2 MG: 0.2 INJECTION, SOLUTION INTRAMUSCULAR; INTRAVENOUS at 01:11

## 2023-11-07 NOTE — TELEPHONE ENCOUNTER
----- Message from Anselmo Matute MD sent at 11/7/2023  2:26 PM CST -----  5 months with zane condon and mark anthony

## 2023-11-07 NOTE — ANESTHESIA PREPROCEDURE EVALUATION
11/07/2023  Irlanda Lopez is a 83 y.o., female.    Patient Active Problem List   Diagnosis    Hypothyroidism    Essential hypertension    Mild cognitive impairment with memory loss    High serum vitamin D    Hyperlipidemia    Colostomy in place    Tortuous aorta    Renal stone    Dementia    MARTIN (generalized anxiety disorder)    Dysuria    Small bowel anastomotic dilation    Mass of right breast    Cystitis    Aortic atherosclerosis    Osteopenia    Urge urinary incontinence     Pre-op Assessment    I have reviewed the Patient Summary Reports.     I have reviewed the Nursing Notes. I have reviewed the NPO Status.   I have reviewed the Medications.     Review of Systems  Anesthesia Hx:  Denies Family Hx of Anesthesia complications.   Denies Personal Hx of Anesthesia complications.   Social:  Former Smoker    Hematology/Oncology:  Hematology Normal   Oncology Normal     EENT/Dental:EENT/Dental Normal   Cardiovascular:   Hypertension PVD ECG has been reviewed.    Pulmonary:  Pulmonary Normal    Renal/:   renal calculi    Musculoskeletal:  Musculoskeletal Normal    Neurological:  Dementia moderate    Endocrine:   Hypothyroidism    Dermatological:  Skin Normal    Psych:   anxiety          Physical Exam  General: Alert and Oriented    Airway:  Mallampati: II   Mouth Opening: Normal  TM Distance: Normal  Tongue: Normal  Neck ROM: Normal ROM        Anesthesia Plan  Type of Anesthesia, risks & benefits discussed:    Anesthesia Type: Gen ETT, Gen Supraglottic Airway, MAC  Intra-op Monitoring Plan: Standard ASA Monitors  Informed Consent: Informed consent signed with the Patient representative and all parties understand the risks and agree with anesthesia plan.  All questions answered.   ASA Score: 3  Day of Surgery Review of History & Physical: I have interviewed and examined the patient. I have  reviewed the patient's H&P dated:     Ready For Surgery From Anesthesia Perspective.     .

## 2023-11-07 NOTE — TRANSFER OF CARE
"Anesthesia Transfer of Care Note    Patient: Irlanda Lopez    Procedure(s) Performed: Procedure(s) (LRB):  CYSTOSCOPY, WITH RETROGRADE PYELOGRAM AND URETERAL STENT INSERTION (Right)  CYSTOURETEROSCOPY,WITH HOLMIUM LASER LITHOTRIPSY OF URETERAL CALCULUS (Right)    Patient location: PACU    Anesthesia Type: MAC    Transport from OR: Transported from OR on room air with adequate spontaneous ventilation    Post pain: adequate analgesia    Post assessment: no apparent anesthetic complications    Post vital signs: stable    Level of consciousness: awake    Nausea/Vomiting: no nausea/vomiting    Complications: none    Transfer of care protocol was followed      Last vitals:   Visit Vitals  BP (!) 155/72 (BP Location: Right arm, Patient Position: Sitting)   Pulse (!) 48   Temp 36.8 °C (98.2 °F) (Temporal)   Resp 18   Ht 5' 2" (1.575 m)   Wt 68.5 kg (151 lb 0.2 oz)   SpO2 99%   Breastfeeding No   BMI 27.62 kg/m²     "

## 2023-11-07 NOTE — ANESTHESIA POSTPROCEDURE EVALUATION
Anesthesia Post Evaluation    Patient: Irlanda Lopez    Procedure(s) Performed: Procedure(s) (LRB):  CYSTOSCOPY, WITH RETROGRADE PYELOGRAM AND URETERAL STENT INSERTION (Right)  CYSTOURETEROSCOPY,WITH HOLMIUM LASER LITHOTRIPSY OF URETERAL CALCULUS (Right)    Final Anesthesia Type: MAC      Patient location during evaluation: PACU  Patient participation: Yes- Able to Participate  Level of consciousness: awake  Post-procedure vital signs: reviewed and stable  Pain management: adequate  Airway patency: patent    PONV status at discharge: No PONV  Anesthetic complications: no      Cardiovascular status: hemodynamically stable  Respiratory status: unassisted  Hydration status: euvolemic  Follow-up not needed.          Vitals Value Taken Time   /65 11/07/23 1510   Temp 36.9 °C (98.4 °F) 11/07/23 1425   Pulse 46 11/07/23 1510   Resp 8 11/07/23 1510   SpO2 95 % 11/07/23 1510   Vitals shown include unvalidated device data.      No case tracking events are documented in the log.      Pain/Babar Score: Pain Rating Prior to Med Admin: 6 (11/7/2023  2:55 PM)  Babar Score: 10 (11/7/2023  3:00 PM)

## 2023-11-07 NOTE — PROGRESS NOTES
Lungs are clear without infection.  The old compression fractures have been noted from before and are not new and have been there for a while. Please let me know if you have further questions.    Sincerely,   Myla Arias MD

## 2023-11-19 NOTE — TELEPHONE ENCOUNTER
Irlanda Lopez,     I have sent the following medications to your preferred pharmacy on file. Please let me know if you have further questions.     Medications Ordered This Encounter   Medications    ALPRAZolam (XANAX) 0.5 MG tablet     Sig: Take 0.5 tablets (0.25 mg total) by mouth 2 (two) times daily as needed for Anxiety.     Dispense:  15 tablet     Refill:  1     Filled only #9 tablets of the 0.25mg on 4/6/23 due to [facility] being out of stock. Reports they have 0.5mg tablets in stock and can cut in half.          [id number] [place], [place] - [address] [facility]  [address] [facility] [address]  Phone: [phone] Fax: [phone]    Sincerely,   Myla Arias MD   Patient/Caregiver provided printed discharge information.

## 2023-12-06 DIAGNOSIS — F41.1 GENERALIZED ANXIETY DISORDER: ICD-10-CM

## 2023-12-07 ENCOUNTER — OFFICE VISIT (OUTPATIENT)
Dept: PRIMARY CARE CLINIC | Facility: CLINIC | Age: 83
End: 2023-12-07
Payer: MEDICARE

## 2023-12-07 DIAGNOSIS — F41.1 GENERALIZED ANXIETY DISORDER: ICD-10-CM

## 2023-12-07 DIAGNOSIS — F42.9 OBSESSIVE-COMPULSIVE DISORDER, UNSPECIFIED TYPE: ICD-10-CM

## 2023-12-07 DIAGNOSIS — F41.9 ANXIETY: ICD-10-CM

## 2023-12-07 DIAGNOSIS — G31.84 MILD COGNITIVE IMPAIRMENT WITH MEMORY LOSS: Primary | ICD-10-CM

## 2023-12-07 PROCEDURE — 1159F MED LIST DOCD IN RCRD: CPT | Mod: CPTII,95,, | Performed by: FAMILY MEDICINE

## 2023-12-07 PROCEDURE — 3288F FALL RISK ASSESSMENT DOCD: CPT | Mod: CPTII,95,, | Performed by: FAMILY MEDICINE

## 2023-12-07 PROCEDURE — 1101F PT FALLS ASSESS-DOCD LE1/YR: CPT | Mod: CPTII,95,, | Performed by: FAMILY MEDICINE

## 2023-12-07 PROCEDURE — 99214 OFFICE O/P EST MOD 30 MIN: CPT | Mod: 95,,, | Performed by: FAMILY MEDICINE

## 2023-12-07 PROCEDURE — 99214 PR OFFICE/OUTPT VISIT, EST, LEVL IV, 30-39 MIN: ICD-10-PCS | Mod: 95,,, | Performed by: FAMILY MEDICINE

## 2023-12-07 PROCEDURE — 1101F PR PT FALLS ASSESS DOC 0-1 FALLS W/OUT INJ PAST YR: ICD-10-PCS | Mod: CPTII,95,, | Performed by: FAMILY MEDICINE

## 2023-12-07 PROCEDURE — 3288F PR FALLS RISK ASSESSMENT DOCUMENTED: ICD-10-PCS | Mod: CPTII,95,, | Performed by: FAMILY MEDICINE

## 2023-12-07 PROCEDURE — 1159F PR MEDICATION LIST DOCUMENTED IN MEDICAL RECORD: ICD-10-PCS | Mod: CPTII,95,, | Performed by: FAMILY MEDICINE

## 2023-12-07 RX ORDER — FLUOXETINE HYDROCHLORIDE 20 MG/1
60 CAPSULE ORAL DAILY
Qty: 90 CAPSULE | Refills: 11 | Status: SHIPPED | OUTPATIENT
Start: 2023-12-07 | End: 2024-02-12

## 2023-12-07 RX ORDER — ALPRAZOLAM 0.25 MG/1
0.25 TABLET ORAL 2 TIMES DAILY PRN
Qty: 60 TABLET | Refills: 2 | OUTPATIENT
Start: 2023-12-07

## 2023-12-07 RX ORDER — ALPRAZOLAM 0.25 MG/1
0.25 TABLET ORAL 2 TIMES DAILY PRN
Qty: 60 TABLET | Refills: 1 | Status: SHIPPED | OUTPATIENT
Start: 2023-12-07 | End: 2024-01-16 | Stop reason: SDUPTHER

## 2023-12-07 NOTE — PROGRESS NOTES
Subjective:      Patient ID: Irlanda Lopez is a 83 y.o. female.    Chief Complaint: Medication Refill    The patient location is: home  Visit type: video and audio simultaneous    Patient is a 83 y.o. female coming in today for medication refill. Family present in visit.  Family is concerned pt is not having proper sleep at night due to activity noted at night through Fitbit. Family reports recent stent surgery went without complications. Family reports pt is stable with current dose of Fluoxetine. Discussed with family medication adjustment for improved anxiety treatment.       [facility] Reason For Visit    12/7/2023  3:09 PM CST - Filed by Myla Mora (Proxy)   What is your primary reason for visit? Other/Annual   Have you experienced any of the following:   Change in activity? No   Unexpected weight change? No   Neck pain? No   Hearing loss? No   Runny nose? No   Trouble swallowing? No   Eye discharge? No   Changes in vision? No   Chest tightness? No   Wheezing? No   Chest pain? No   Heart beating fast or racing? No   Blood in stool? No   Constipation? No   Vomiting? No   Diarrhea? No   Drinking much more than usual? No   Urinating much more than usual? No   Difficulty urinating? No   Blood in the urine? No   Menstrual problem? No   Painful urination? No   Joint swelling? No   Joint pain? No   Headaches? No   Weakness? No   Confusion? No   Feeling depressed? No         Pmh, Psh, Family Hx, Social Hx updated in Epic Tabs today.     LABS:   Lab Results   Component Value Date    WBC 8.62 11/06/2023    HGB 12.8 11/06/2023    HCT 41.2 11/06/2023     11/06/2023    CHOL 166 06/20/2023    TRIG 65 06/20/2023    HDL 42 06/20/2023    ALT 12 06/20/2023    AST 19 06/20/2023     11/06/2023    K 4.4 11/06/2023     11/06/2023    CREATININE 0.9 11/06/2023    BUN 15 11/06/2023    CO2 22 (L) 11/06/2023    TSH 2.057 06/20/2023    INR 1.0 06/10/2019    HGBA1C 5.0 06/20/2023       FL Retrograde  Pyelogram  Narrative: EXAMINATION:  FL RETROGRADE PYELOGRAM    CLINICAL HISTORY:  Renal calculus, flank pain.    TECHNIQUE:  Fluoroscopic guidance for procedure performed by Anselmo Matute.    Air Kerma: 5.3 mGy    FINDINGS:  Fluoroscopic guidance utilized for retrograde pyelogram.  Impression: Fluoroscopic guidance utilized for retrograde pyelogram.  Please see procedural dictation for further details.    Electronically signed by: Nicolás Hess  Date:    11/07/2023  Time:    15:28        Review of Systems   Constitutional:  Negative for activity change and unexpected weight change.   HENT:  Negative for hearing loss, rhinorrhea and trouble swallowing.    Eyes:  Negative for discharge and visual disturbance.   Respiratory:  Negative for chest tightness and wheezing.    Cardiovascular:  Negative for chest pain and palpitations.   Gastrointestinal:  Negative for blood in stool, constipation, diarrhea and vomiting.   Endocrine: Negative for polydipsia and polyuria.   Genitourinary:  Negative for difficulty urinating, dysuria, hematuria and menstrual problem.   Musculoskeletal:  Negative for arthralgias, joint swelling and neck pain.   Neurological:  Negative for weakness and headaches.   Psychiatric/Behavioral:  Negative for confusion and dysphoric mood.      Objective:   There were no vitals filed for this visit.  Wt Readings from Last 10 Encounters:   11/07/23 68.5 kg (151 lb 0.2 oz)   10/05/23 66.7 kg (147 lb 0.8 oz)   09/19/23 66.7 kg (147 lb 2.5 oz)   06/15/23 68.9 kg (151 lb 12.6 oz)   06/05/23 66.7 kg (147 lb 0.8 oz)   04/20/23 66.7 kg (147 lb 0.8 oz)   04/20/23 66.7 kg (147 lb 0.8 oz)   04/04/23 66.3 kg (146 lb 2.6 oz)   03/21/23 67.7 kg (149 lb 4 oz)   03/16/23 69 kg (152 lb 1.9 oz)     Physical Exam  Vitals reviewed.   Constitutional:       General: She is awake. She is not in acute distress.     Appearance: She is well-developed and well-groomed. She is not ill-appearing.   HENT:      Mouth/Throat:       Lips: [place].   Psychiatric:         Attention and Perception: Attention and perception normal. She is attentive.         Mood and Affect: Affect normal. Mood is not anxious or depressed. Affect is not labile, blunt, angry or inappropriate.         Speech: Speech normal. She is communicative. Speech is not rapid and pressured, delayed, slurred or tangential.         Behavior: Behavior is not agitated, slowed, aggressive, withdrawn, hyperactive or combative. Behavior is cooperative.         Thought Content: Thought content is not paranoid or delusional. Thought content does not include homicidal or suicidal ideation. Thought content does not include homicidal or suicidal plan.         Cognition and Memory: Cognition and memory normal. Memory is not impaired. She does not exhibit impaired recent memory or impaired remote memory.         Judgment: Judgment is not impulsive or inappropriate.       Assessment:     1. Mild cognitive impairment with memory loss    2. Generalized anxiety disorder    3. Anxiety    4. Obsessive-compulsive disorder, unspecified type      Plan:   Irlanda was seen today for medication refill.    Diagnoses and all orders for this visit:    Mild cognitive impairment with memory loss    Generalized anxiety disorder  -     ALPRAZolam (XANAX) 0.25 MG tablet; Take 1 tablet (0.25 mg total) by mouth 2 (two) times daily as needed for Anxiety.    Anxiety  -     FLUoxetine 20 MG capsule; Take 3 capsules (60 mg total) by mouth once daily. For OCD/anxiety    Obsessive-compulsive disorder, unspecified type      Anxiety with OCD symptoms are improving but not still at goal. Currently on prozac 40mg in AM and xanax 0.25mg bid. Desires to have improvement.   Increased Rx Fluoxetine to 60 mg/day for anxiety treatment.   Refilled Rx Xanax 0.25 mg BID for anxiety treatment.   Instructed to f/u in 3 weeks for anxiety f/u.   Discussed sleep issues. Now using melatonin at night. Will use fitbit to monitor sleep patterns.      Face to Face time with patient: 3:43 PM-4:04 PM        37   minutes of total time spent on the encounter, which includes face to face time and non-face to face time preparing to see the patient (eg, review of tests), Obtaining and/or reviewing separately obtained history, Documenting clinical information in the electronic or other health record, Independently interpreting results (not separately reported) and communicating results to the patient/family/caregiver, or Care coordination (not separately reported).     Each patient to whom he or she provides medical services by telemedicine is:  (1) informed of the relationship between the physician and patient and the respective role of any other health care provider with respect to management of the patient; and (2) notified that he or she may decline to receive medical services by telemedicine and may withdraw from such care at any time.      Follow up in about 20 days (around 12/27/2023) for f/u Telemed Dr Arias/ sandro .    There are no Patient Instructions on file for this visit.    Scribe Attestation:   I, Daniel Sanderson, am scribing for, and in the presence of, Dr. Myla Arias MD. I performed the above scribed service and the documentation accurately describes the services I performed. I attest to the accuracy of the note.    I, Dr. Myla Arias MD, reviewed documentation as scribed above. I personally performed the services described in this documentation.  I agree that the record reflects my personal performance and is accurate and complete. Myla Arias MD.    12/07/2023

## 2023-12-07 NOTE — TELEPHONE ENCOUNTER
No care due was identified.  [facility] Embedded Care Due Messages. Reference number: [id number].   12/06/2023 6:25:40 PM CST

## 2023-12-26 ENCOUNTER — PATIENT MESSAGE (OUTPATIENT)
Dept: PRIMARY CARE CLINIC | Facility: CLINIC | Age: 83
End: 2023-12-26
Payer: MEDICARE

## 2023-12-26 DIAGNOSIS — F41.9 ANXIETY: ICD-10-CM

## 2023-12-26 RX ORDER — FLUOXETINE HYDROCHLORIDE 20 MG/1
60 CAPSULE ORAL DAILY
Qty: 90 CAPSULE | Refills: 11 | Status: CANCELLED | OUTPATIENT
Start: 2023-12-26

## 2023-12-26 NOTE — TELEPHONE ENCOUNTER
No care due was identified.  [facility] Embedded Care Due Messages. Reference number: [id number].   12/26/2023 11:26:43 AM CST

## 2024-01-07 ENCOUNTER — PATIENT MESSAGE (OUTPATIENT)
Dept: PRIMARY CARE CLINIC | Facility: CLINIC | Age: 84
End: 2024-01-07
Payer: MEDICARE

## 2024-01-09 ENCOUNTER — OFFICE VISIT (OUTPATIENT)
Dept: PRIMARY CARE CLINIC | Facility: CLINIC | Age: 84
End: 2024-01-09
Payer: MEDICARE

## 2024-01-09 VITALS
DIASTOLIC BLOOD PRESSURE: 60 MMHG | OXYGEN SATURATION: 97 % | SYSTOLIC BLOOD PRESSURE: 110 MMHG | BODY MASS INDEX: 28.55 KG/M2 | WEIGHT: 156.06 LBS | HEART RATE: 56 BPM | TEMPERATURE: 97 F

## 2024-01-09 DIAGNOSIS — F42.9 OBSESSIVE-COMPULSIVE DISORDER, UNSPECIFIED TYPE: ICD-10-CM

## 2024-01-09 DIAGNOSIS — I70.0 AORTIC ATHEROSCLEROSIS: ICD-10-CM

## 2024-01-09 DIAGNOSIS — F41.1 GENERALIZED ANXIETY DISORDER: ICD-10-CM

## 2024-01-09 DIAGNOSIS — Z93.3 COLOSTOMY IN PLACE: ICD-10-CM

## 2024-01-09 DIAGNOSIS — F01.54 VASCULAR DEMENTIA WITH ANXIETY, UNSPECIFIED DEMENTIA SEVERITY: Primary | ICD-10-CM

## 2024-01-09 PROCEDURE — 99214 OFFICE O/P EST MOD 30 MIN: CPT | Mod: S$GLB,,, | Performed by: FAMILY MEDICINE

## 2024-01-09 PROCEDURE — 99999 PR PBB SHADOW E&M-EST. PATIENT-LVL IV: CPT | Mod: PBBFAC,,, | Performed by: FAMILY MEDICINE

## 2024-01-09 RX ORDER — FLUOXETINE HYDROCHLORIDE 40 MG/1
80 CAPSULE ORAL DAILY
Qty: 60 CAPSULE | Refills: 5 | Status: SHIPPED | OUTPATIENT
Start: 2024-01-09 | End: 2024-02-12 | Stop reason: ALTCHOICE

## 2024-01-09 NOTE — PROGRESS NOTES
Subjective:      Patient ID: Irlanda Lopez is a 83 y.o. female.    Chief Complaint: Follow-up      Patient is a 83 y.o. female coming in today for f/u. Family is present in visit today.   Family reports pt has been active and going outside the home since last visit. They report recent onset of rhinorrhea and eye itching. Family reports hand fidgeting improves when walking outside and doing hand activities. Previous psychiatric workup reviewed with pt and family. Report indicates pt constantly picks at her nose and tried to shove toilet paper up her nose. Has been presenting for sx of dementia since last visit. Unable to determine if insomnia has improved. She is still on Melatonin. Discussed at length different activities for pt to do at home to help with hand fidgeting. No other health concern at this time.       1. Vascular dementia with anxiety, unspecified dementia severity    2. Generalized anxiety disorder    3. Obsessive-compulsive disorder, unspecified type    4. Colostomy in place    5. Aortic atherosclerosis       No questionnaires on file.    Pmh, Psh, Family Hx, Social Hx, HM updated in Epic Tabs today.   Review of Systems   Constitutional:  Negative for chills, fatigue and fever.   HENT:  Negative for ear pain and trouble swallowing.    Eyes:  Negative for pain and visual disturbance.   Respiratory:  Negative for cough and shortness of breath.    Cardiovascular:  Negative for chest pain and leg swelling.   Gastrointestinal:  Negative for abdominal pain, blood in stool, nausea and vomiting.   Endocrine: Negative for cold intolerance and heat intolerance.   Genitourinary:  Negative for dysuria and frequency.   Musculoskeletal:  Negative for joint swelling, myalgias and neck pain.   Skin:  Negative for color change and rash.   Neurological:  Negative for dizziness and headaches.   Psychiatric/Behavioral:  Negative for behavioral problems and sleep disturbance.      Objective:     Vitals:    01/09/24  1311   BP: 110/60   BP Location: Right arm   Patient Position: Sitting   BP Method: Large (Manual)   Pulse: (!) 56   Temp: 96.5 °F (35.8 °C)   TempSrc: Tympanic   SpO2: 97%   Weight: 70.8 kg (156 lb 1.4 oz)     Wt Readings from Last 10 Encounters:   01/09/24 70.8 kg (156 lb 1.4 oz)   11/07/23 68.5 kg (151 lb 0.2 oz)   10/05/23 66.7 kg (147 lb 0.8 oz)   09/19/23 66.7 kg (147 lb 2.5 oz)   06/15/23 68.9 kg (151 lb 12.6 oz)   06/05/23 66.7 kg (147 lb 0.8 oz)   04/20/23 66.7 kg (147 lb 0.8 oz)   04/20/23 66.7 kg (147 lb 0.8 oz)   04/04/23 66.3 kg (146 lb 2.6 oz)   03/21/23 67.7 kg (149 lb 4 oz)     Physical Exam  Vitals reviewed.   Constitutional:       Appearance: Normal appearance. She is well-developed and normal weight.   HENT:      Head: Normocephalic and atraumatic.      Right Ear: Tympanic membrane and external ear normal.      Left Ear: Tympanic membrane and external ear normal.      Nose: Nose normal.      Mouth/Throat:      Mouth: Mucous membranes are moist.      Pharynx: Oropharynx is clear.   Eyes:      Conjunctiva/sclera: Conjunctivae normal.      Pupils: Pupils are equal, round, and reactive to light.   Neck:      Thyroid: No thyromegaly.   Cardiovascular:      Rate and Rhythm: Normal rate and regular rhythm.      Heart sounds: Normal heart sounds. No murmur heard.     No friction rub. No gallop.   Pulmonary:      Effort: Pulmonary effort is normal. No respiratory distress.      Breath sounds: Normal breath sounds. No wheezing or rales.   Abdominal:      General: Bowel sounds are normal. There is no distension.      Palpations: Abdomen is soft.      Tenderness: There is no abdominal tenderness. There is no rebound.   Musculoskeletal:         General: Normal range of motion.      Cervical back: Normal range of motion and neck supple.   Lymphadenopathy:      Cervical: No cervical adenopathy.   Skin:     General: Skin is warm and dry.      Findings: No rash.   Neurological:      Mental Status: She is alert and  oriented to person, place, and time.   Psychiatric:         Attention and Perception: Attention and perception normal.         Mood and Affect: Mood and affect normal.         Speech: Speech normal.         Behavior: Behavior normal.         Thought Content: Thought content normal.         Cognition and Memory: Cognition and memory normal.         Judgment: Judgment normal.         Assessment:     1. Vascular dementia with anxiety, unspecified dementia severity    2. Generalized anxiety disorder    3. Obsessive-compulsive disorder, unspecified type    4. Colostomy in place    5. Aortic atherosclerosis        Plan:   Irlanda was seen today for follow-up.    Diagnoses and all orders for this visit:    Vascular dementia with anxiety, unspecified dementia severity  Comments:  worse recently, with symptoms. more ocd tendencies. increase prozac to 80mg. f/u with psych as scheduled.    Generalized anxiety disorder  Comments:  worse recently, with symptoms. more ocd tendencies. increase prozac to 80mg. f/u with psych as scheduled.rf xanax bid  Orders:  -     FLUoxetine 40 MG capsule; Take 2 capsules (80 mg total) by mouth once daily.    Obsessive-compulsive disorder, unspecified type  Comments:  worse recently, with symptoms. more ocd tendencies. increase prozac to 80mg. f/u with psych as scheduled.rf xanax bid  Orders:  -     FLUoxetine 40 MG capsule; Take 2 capsules (80 mg total) by mouth once daily.    Colostomy in place  Comments:  - stable, Continue with current medications and interventions. Labs reviewed.    Aortic atherosclerosis  Comments:  - stable, Continue with current medications and interventions. Labs reviewed. on statin 10mg.      MARTIN and compulsive disorder are exacerbated at this time.  Pt is presenting worsening sx of dementia.   Increased Rx Fluoxetine to 40 mg BID for MARTIN and compulsive disorder treatment.   Highly encouraged pt and family to f/u with psychiatry for possible adjustment in treatment.    Advised on home activities to help with dementia progression.   Instructed to f/u in 1 month via telemedicine for dementia f/u.     There are no Patient Instructions on file for this visit.    Follow up in about 1 month (around 2/9/2024) for f/u Telemed Dr Arias/ 400pm appt please .      LABS:   Lab Results   Component Value Date    HGBA1C 5.0 06/20/2023    HGBA1C 5.2 06/09/2022    HGBA1C 5.3 06/02/2021      Lab Results   Component Value Date    CHOL 166 06/20/2023    CHOL 154 06/09/2022    CHOL 166 06/02/2021     Lab Results   Component Value Date    LDLCALC 111.0 06/20/2023    LDLCALC 104.6 06/09/2022    LDLCALC 107.6 06/02/2021     Lab Results   Component Value Date    WBC 8.62 11/06/2023    HGB 12.8 11/06/2023    HCT 41.2 11/06/2023     11/06/2023    CHOL 166 06/20/2023    TRIG 65 06/20/2023    HDL 42 06/20/2023    ALT 12 06/20/2023    AST 19 06/20/2023     11/06/2023    K 4.4 11/06/2023     11/06/2023    CREATININE 0.9 11/06/2023    BUN 15 11/06/2023    CO2 22 (L) 11/06/2023    TSH 2.057 06/20/2023    INR 1.0 06/10/2019    HGBA1C 5.0 06/20/2023       The ASCVD Risk score (Coby DK, et al., 2019) failed to calculate for the following reasons:    The 2019 ASCVD risk score is only valid for ages 40 to 79  FL Retrograde Pyelogram  Narrative: EXAMINATION:  FL RETROGRADE PYELOGRAM    CLINICAL HISTORY:  Renal calculus, flank pain.    TECHNIQUE:  Fluoroscopic guidance for procedure performed by Anselmo Matute.    Air Kerma: 5.3 mGy    FINDINGS:  Fluoroscopic guidance utilized for retrograde pyelogram.  Impression: Fluoroscopic guidance utilized for retrograde pyelogram.  Please see procedural dictation for further details.    Electronically signed by: Nicolás Hess  Date:    11/07/2023  Time:    15:28    Scribe Attestation:   I, Daniel Sanderson, am scribing for, and in the presence of, Dr. Myla Arias MD. I performed the above scribed service and the documentation accurately describes the  services I performed. I attest to the accuracy of the note.    I, Dr. Myla Arias MD, reviewed documentation as scribed above. I personally performed the services described in this documentation.  I agree that the record reflects my personal performance and is accurate and complete. Myla Arias MD.    01/09/2024

## 2024-01-10 ENCOUNTER — LAB VISIT (OUTPATIENT)
Dept: LAB | Facility: HOSPITAL | Age: 84
End: 2024-01-10
Attending: FAMILY MEDICINE
Payer: MEDICARE

## 2024-01-10 DIAGNOSIS — K91.89: ICD-10-CM

## 2024-01-10 DIAGNOSIS — F41.1 GENERALIZED ANXIETY DISORDER: ICD-10-CM

## 2024-01-10 DIAGNOSIS — F01.54 VASCULAR DEMENTIA WITH ANXIETY, UNSPECIFIED DEMENTIA SEVERITY: Chronic | ICD-10-CM

## 2024-01-10 DIAGNOSIS — I70.0 AORTIC ATHEROSCLEROSIS: ICD-10-CM

## 2024-01-10 DIAGNOSIS — I10 ESSENTIAL HYPERTENSION: Chronic | ICD-10-CM

## 2024-01-10 DIAGNOSIS — E03.9 HYPOTHYROIDISM, UNSPECIFIED TYPE: ICD-10-CM

## 2024-01-10 DIAGNOSIS — E78.00 PURE HYPERCHOLESTEROLEMIA: ICD-10-CM

## 2024-01-10 DIAGNOSIS — R73.09 ABNORMAL GLUCOSE: ICD-10-CM

## 2024-01-10 DIAGNOSIS — Z93.3 COLOSTOMY IN PLACE: ICD-10-CM

## 2024-01-10 LAB
ALBUMIN SERPL BCP-MCNC: 3.1 G/DL (ref 3.5–5.2)
ALP SERPL-CCNC: 87 U/L (ref 55–135)
ALT SERPL W/O P-5'-P-CCNC: 16 U/L (ref 10–44)
ANION GAP SERPL CALC-SCNC: 14 MMOL/L (ref 8–16)
AST SERPL-CCNC: 24 U/L (ref 10–40)
BASOPHILS # BLD AUTO: 0.05 K/UL (ref 0–0.2)
BASOPHILS NFR BLD: 0.7 % (ref 0–1.9)
BILIRUB SERPL-MCNC: 0.7 MG/DL (ref 0.1–1)
BUN SERPL-MCNC: 11 MG/DL (ref 8–23)
CALCIUM SERPL-MCNC: 9 MG/DL (ref 8.7–10.5)
CHLORIDE SERPL-SCNC: 101 MMOL/L (ref 95–110)
CHOLEST SERPL-MCNC: 159 MG/DL (ref 120–199)
CHOLEST/HDLC SERPL: 4.2 {RATIO} (ref 2–5)
CO2 SERPL-SCNC: 22 MMOL/L (ref 23–29)
CREAT SERPL-MCNC: 0.9 MG/DL (ref 0.5–1.4)
DIFFERENTIAL METHOD BLD: ABNORMAL
EOSINOPHIL # BLD AUTO: 0.3 K/UL (ref 0–0.5)
EOSINOPHIL NFR BLD: 4.5 % (ref 0–8)
ERYTHROCYTE [DISTWIDTH] IN BLOOD BY AUTOMATED COUNT: 14.9 % (ref 11.5–14.5)
EST. GFR  (NO RACE VARIABLE): >60 ML/MIN/1.73 M^2
GLUCOSE SERPL-MCNC: 69 MG/DL (ref 70–110)
HCT VFR BLD AUTO: 41.1 % (ref 37–48.5)
HDLC SERPL-MCNC: 38 MG/DL (ref 40–75)
HDLC SERPL: 23.9 % (ref 20–50)
HGB BLD-MCNC: 13 G/DL (ref 12–16)
IMM GRANULOCYTES # BLD AUTO: 0.05 K/UL (ref 0–0.04)
IMM GRANULOCYTES NFR BLD AUTO: 0.7 % (ref 0–0.5)
LDLC SERPL CALC-MCNC: 103.8 MG/DL (ref 63–159)
LYMPHOCYTES # BLD AUTO: 2.5 K/UL (ref 1–4.8)
LYMPHOCYTES NFR BLD: 32.9 % (ref 18–48)
MCH RBC QN AUTO: 26.6 PG (ref 27–31)
MCHC RBC AUTO-ENTMCNC: 31.6 G/DL (ref 32–36)
MCV RBC AUTO: 84 FL (ref 82–98)
MONOCYTES # BLD AUTO: 0.7 K/UL (ref 0.3–1)
MONOCYTES NFR BLD: 9.5 % (ref 4–15)
NEUTROPHILS # BLD AUTO: 3.9 K/UL (ref 1.8–7.7)
NEUTROPHILS NFR BLD: 51.7 % (ref 38–73)
NONHDLC SERPL-MCNC: 121 MG/DL
NRBC BLD-RTO: 0 /100 WBC
PLATELET # BLD AUTO: 333 K/UL (ref 150–450)
PMV BLD AUTO: 9.8 FL (ref 9.2–12.9)
POTASSIUM SERPL-SCNC: 4 MMOL/L (ref 3.5–5.1)
PROT SERPL-MCNC: 7.2 G/DL (ref 6–8.4)
RBC # BLD AUTO: 4.88 M/UL (ref 4–5.4)
SODIUM SERPL-SCNC: 137 MMOL/L (ref 136–145)
T4 FREE SERPL-MCNC: 0.88 NG/DL (ref 0.71–1.51)
TRIGL SERPL-MCNC: 86 MG/DL (ref 30–150)
TSH SERPL DL<=0.005 MIU/L-ACNC: 1.78 UIU/ML (ref 0.4–4)
WBC # BLD AUTO: 7.6 K/UL (ref 3.9–12.7)

## 2024-01-10 PROCEDURE — 85025 COMPLETE CBC W/AUTO DIFF WBC: CPT | Performed by: FAMILY MEDICINE

## 2024-01-10 PROCEDURE — 83036 HEMOGLOBIN GLYCOSYLATED A1C: CPT | Performed by: FAMILY MEDICINE

## 2024-01-10 PROCEDURE — 36415 COLL VENOUS BLD VENIPUNCTURE: CPT | Mod: PO | Performed by: FAMILY MEDICINE

## 2024-01-10 PROCEDURE — 84439 ASSAY OF FREE THYROXINE: CPT | Performed by: FAMILY MEDICINE

## 2024-01-10 PROCEDURE — 80061 LIPID PANEL: CPT | Performed by: FAMILY MEDICINE

## 2024-01-10 PROCEDURE — 84443 ASSAY THYROID STIM HORMONE: CPT | Performed by: FAMILY MEDICINE

## 2024-01-10 PROCEDURE — 80053 COMPREHEN METABOLIC PANEL: CPT | Performed by: FAMILY MEDICINE

## 2024-01-11 LAB
ESTIMATED AVG GLUCOSE: 105 MG/DL (ref 68–131)
HBA1C MFR BLD: 5.3 % (ref 4–5.6)

## 2024-01-12 ENCOUNTER — PATIENT MESSAGE (OUTPATIENT)
Dept: PRIMARY CARE CLINIC | Facility: CLINIC | Age: 84
End: 2024-01-12
Payer: MEDICARE

## 2024-01-12 DIAGNOSIS — F41.1 GENERALIZED ANXIETY DISORDER: ICD-10-CM

## 2024-01-16 RX ORDER — ALPRAZOLAM 0.25 MG/1
0.25 TABLET ORAL 2 TIMES DAILY PRN
Qty: 180 TABLET | Refills: 1 | Status: SHIPPED | OUTPATIENT
Start: 2024-01-16 | End: 2024-03-12 | Stop reason: ALTCHOICE

## 2024-01-16 NOTE — PROGRESS NOTES
Irlanda Lopez,     Your labs have been reviewed. Your cholesterol, sugar, blood counts, kidney functions, liver functions, and thyroid functions are within acceptable ranges for your age and conditions.     Any abnormalities that you see at this time, have been reviewed, and are not indicative of needing additional workup or concern.  Please continue on your current treatment plans.     Please let me know if you have further questions.     Sincerely,   Myla Arias MD

## 2024-01-16 NOTE — TELEPHONE ENCOUNTER
Irlanda Lopez,     I have sent the following medications to your preferred pharmacy on file. Please let me know if you have further questions.     Medications Ordered This Encounter   Medications    ALPRAZolam (XANAX) 0.25 MG tablet     Sig: Take 1 tablet (0.25 mg total) by mouth 2 (two) times daily as needed for Anxiety.     Dispense:  180 tablet     Refill:  1        [facility] - [address] [address]  Phone: [phone] Fax: [phone]       Sincerely,   Myla Arias MD

## 2024-02-02 ENCOUNTER — PATIENT MESSAGE (OUTPATIENT)
Dept: PRIMARY CARE CLINIC | Facility: CLINIC | Age: 84
End: 2024-02-02
Payer: MEDICARE

## 2024-02-12 ENCOUNTER — OFFICE VISIT (OUTPATIENT)
Dept: PRIMARY CARE CLINIC | Facility: CLINIC | Age: 84
End: 2024-02-12
Payer: MEDICARE

## 2024-02-12 DIAGNOSIS — F41.1 GAD (GENERALIZED ANXIETY DISORDER): ICD-10-CM

## 2024-02-12 DIAGNOSIS — I70.0 AORTIC ATHEROSCLEROSIS: ICD-10-CM

## 2024-02-12 DIAGNOSIS — F41.9 ANXIETY: ICD-10-CM

## 2024-02-12 DIAGNOSIS — F01.C4 SEVERE VASCULAR DEMENTIA WITH ANXIETY: Primary | Chronic | ICD-10-CM

## 2024-02-12 DIAGNOSIS — N20.0 RENAL STONE: ICD-10-CM

## 2024-02-12 DIAGNOSIS — I10 ESSENTIAL HYPERTENSION: Chronic | ICD-10-CM

## 2024-02-12 PROCEDURE — 99214 OFFICE O/P EST MOD 30 MIN: CPT | Mod: 95,,, | Performed by: FAMILY MEDICINE

## 2024-02-12 RX ORDER — FLUOXETINE HYDROCHLORIDE 20 MG/1
CAPSULE ORAL
Qty: 21 CAPSULE
Start: 2024-02-12 | End: 2024-03-12 | Stop reason: ALTCHOICE

## 2024-02-12 NOTE — Clinical Note
Nancy, You will be seeing this patient in 2 weeks. Has significant vascular dementia. We have tried prozac to high dose 80mg and wasn't affective. Family care givers were adamant about continuing xanax bid in the past,because they thought it was the only thing that was helping, but now they are starting to realize it may not be helping. She will probably need mood stabilizer vs atypical antipsychoctic medication. I'm weaning down the prozac to just 20mg by the time of your visit in 2 weeks to even being fully off of it. She is already on namenda and has done visit with Dr. Alesha Mansfield in the past, but needs [facility] now. The daughter Faiza will be present at the visit also. Thanks so much! Myla Arias MD

## 2024-02-12 NOTE — PROGRESS NOTES
Subjective:      Patient ID: Irlanda Lopez is a 83 y.o. female.    Chief Complaint: Follow-up (Follow up medication)    The patient location is: home  Visit type: video and audio simultaneous    Patient is a 83 y.o. female coming in today for f/u. Family is the only one present during visit.   Family reports no change in mood with Fluoxetine prescribed last visit. Family reports pt recently had panic attack after suddenly waking up from afternoon nap. Episode was accompanied with hallucinations of  loved ones. Family states pt has upcoming appointment with neurology in 2 weeks. Pt also has upcoming appointments with urology and cardiology. No other health concern at this time.       [facility] Reason For Visit    2024  3:41 PM CST - Filed by Patient   What is your primary reason for visit? Other/Annual   Have you experienced any of the following:   Change in activity? No   Unexpected weight change? No   Neck pain? No   Hearing loss? No   Runny nose? No   Trouble swallowing? No   Eye discharge? No   Changes in vision? No   Chest tightness? No   Wheezing? No   Chest pain? No   Heart beating fast or racing? No   Blood in stool? No   Constipation? No   Vomiting? No   Diarrhea? No   Drinking much more than usual? No   Urinating much more than usual? No   Difficulty urinating? No   Blood in the urine? No   Menstrual problem? No   Painful urination? No   Joint swelling? No   Joint pain? No   Headaches? No   Weakness? No   Confusion? Yes   Feeling depressed? No     [facility]    2024  4:21 PM CST - Filed by Patient   This questionnaire should take approximately 5 to 10 minutes to complete.  To begin, press Let's Begin and then press Continue. Let's Begin   On average, how many days per week do you engage in moderate to strenuous exercise (like a brisk walk)? 0 days   On average, how many minutes do you engage in exercise at this level? 0 min   Do you feel stress - tense, restless, nervous, or anxious,  or unable to sleep at night because your mind is troubled all the time - these days?    Do you belong to any clubs or organizations such as Zoroastrian groups, unions, fraternal or athletic groups, or school groups? Patient declined   How often do you attend meetings of the clubs or organizations you belong to? Patient declined   In a typical week, how many times do you talk on the phone with family, friends, or neighbors? Patient declined   How often do you get together with friends or relatives? Patient declined   Are you , , , , never , or living with a partner? Patient declined   How hard is it for you to pay for the very basics like food, housing, medical care, and heating? Patient declined   Within the past 12 months, you worried that your food would run out before you got the money to buy more. Patient declined   Within the past 12 months, the food you bought just didnt last and you didnt have money to get more. Patient declined   In the past 12 months, has lack of transportation kept you from medical appointments or from getting medications? Patient declined   In the past 12 months, has lack of transportation kept you from meetings, work, or from getting things needed for daily living?    How often do you have a drink containing alcohol? Never   How many drinks containing alcohol do you have on a typical day when you are drinking? Patient does not drink   How often do you have six or more drinks on one occasion? Never   In the last 12 months, was there a time when you were not able to pay the mortgage or rent on time? No   In the last 12 months, how many places have you lived? (range: at least 0)    In the last 12 months, was there a time when you did not have a steady place to sleep or slept in a shelter (including now)? No         Pmh, Psh, Family Hx, Social Hx updated in Epic Tabs today.     LABS:   Lab Results   Component Value Date    WBC 7.60 01/10/2024    HGB 13.0  01/10/2024    HCT 41.1 01/10/2024     01/10/2024    CHOL 159 01/10/2024    TRIG 86 01/10/2024    HDL 38 (L) 01/10/2024    ALT 16 01/10/2024    AST 24 01/10/2024     01/10/2024    K 4.0 01/10/2024     01/10/2024    CREATININE 0.9 01/10/2024    BUN 11 01/10/2024    CO2 22 (L) 01/10/2024    TSH 1.780 01/10/2024    INR 1.0 06/10/2019    HGBA1C 5.3 01/10/2024       FL Retrograde Pyelogram  Narrative: EXAMINATION:  FL RETROGRADE PYELOGRAM    CLINICAL HISTORY:  Renal calculus, flank pain.    TECHNIQUE:  Fluoroscopic guidance for procedure performed by Anselmo Matute.    Air Kerma: 5.3 mGy    FINDINGS:  Fluoroscopic guidance utilized for retrograde pyelogram.  Impression: Fluoroscopic guidance utilized for retrograde pyelogram.  Please see procedural dictation for further details.    Electronically signed by: Nicoáls Hess  Date:    11/07/2023  Time:    15:28        Review of Systems   Constitutional:  Negative for activity change and unexpected weight change.   HENT:  Negative for hearing loss, rhinorrhea and trouble swallowing.    Eyes:  Negative for discharge and visual disturbance.   Respiratory:  Negative for chest tightness and wheezing.    Cardiovascular:  Negative for chest pain and palpitations.   Gastrointestinal:  Negative for blood in stool, constipation, diarrhea and vomiting.   Endocrine: Negative for polydipsia and polyuria.   Genitourinary:  Negative for difficulty urinating, dysuria, hematuria and menstrual problem.   Musculoskeletal:  Negative for arthralgias, joint swelling and neck pain.   Neurological:  Negative for weakness and headaches.   Psychiatric/Behavioral:  Positive for confusion. Negative for dysphoric mood.      Objective:   There were no vitals filed for this visit.  Wt Readings from Last 10 Encounters:   01/09/24 70.8 kg (156 lb 1.4 oz)   11/07/23 68.5 kg (151 lb 0.2 oz)   10/05/23 66.7 kg (147 lb 0.8 oz)   09/19/23 66.7 kg (147 lb 2.5 oz)   06/15/23 68.9 kg (151 lb  12.6 oz)   06/05/23 66.7 kg (147 lb 0.8 oz)   04/20/23 66.7 kg (147 lb 0.8 oz)   04/20/23 66.7 kg (147 lb 0.8 oz)   04/04/23 66.3 kg (146 lb 2.6 oz)   03/21/23 67.7 kg (149 lb 4 oz)     Physical Exam  Vitals reviewed.   Constitutional:       General: She is awake. She is not in acute distress.     Appearance: Normal appearance. She is well-developed, well-groomed and normal weight. She is not ill-appearing.   HENT:      Head: Normocephalic and atraumatic.      Right Ear: External ear normal.      Left Ear: External ear normal.      Nose: Nose normal.      Mouth/Throat:      Lips: Pink.   Eyes:      Conjunctiva/sclera: Conjunctivae normal.   Pulmonary:      Effort: Pulmonary effort is normal.   Neurological:      Mental Status: She is alert.   Psychiatric:         Attention and Perception: Attention and perception normal. She is attentive.         Mood and Affect: Mood and affect normal. Mood is not anxious or depressed. Affect is not labile, blunt, angry or inappropriate.         Speech: Speech normal. She is communicative. Speech is not rapid and pressured, delayed, slurred or tangential.         Behavior: Behavior normal. Behavior is not agitated, slowed, aggressive, withdrawn, hyperactive or combative. Behavior is cooperative.         Thought Content: Thought content normal. Thought content is not paranoid or delusional. Thought content does not include homicidal or suicidal ideation. Thought content does not include homicidal or suicidal plan.         Cognition and Memory: Cognition and memory normal. Memory is not impaired. She does not exhibit impaired recent memory or impaired remote memory.         Judgment: Judgment normal. Judgment is not impulsive or inappropriate.       Assessment:     1. Severe vascular dementia with anxiety    2. Anxiety    3. MARTIN (generalized anxiety disorder)    4. Aortic atherosclerosis    5. Essential hypertension    6. Renal stone      Plan:   Irlanda was seen today for  follow-up.    Diagnoses and all orders for this visit:    Severe vascular dementia with anxiety    Anxiety  -     FLUoxetine 20 MG capsule; Take 2 capsules (40 mg total) by mouth once daily for 7 days, THEN 1 capsule (20 mg total) once daily for 7 days.    MARTIN (generalized anxiety disorder)    Aortic atherosclerosis    Essential hypertension    Renal stone      HPI was all reported by family member present during the visit.   Vascular dementia is progressively worsening since last visit; MARTIN remains unchanged despite being on Fluoxetine.   Decreased Rx Fluoxetine to 20 mg BID for 7 days then reduce to 20 mg/day prior to neurology appointment.   Advised to keep appointment with cardiology and urology.   Instructed to f/u in 1 month for neurology appointment f/u.     Face to Face time with patient: 4:28 PM-4:44 PM         30  minutes of total time spent on the encounter, which includes face to face time and non-face to face time preparing to see the patient (eg, review of tests), Obtaining and/or reviewing separately obtained history, Documenting clinical information in the electronic or other health record, Independently interpreting results (not separately reported) and communicating results to the patient/family/caregiver, or Care coordination (not separately reported).     Each patient to whom he or she provides medical services by telemedicine is:  (1) informed of the relationship between the physician and patient and the respective role of any other health care provider with respect to management of the patient; and (2) notified that he or she may decline to receive medical services by telemedicine and may withdraw from such care at any time.      Follow up in about 1 month (around 3/12/2024) for f/u Telemed Dr Arias/ f/u neurology .    There are no Patient Instructions on file for this visit.    Scribe Attestation:   Daniel ROWAN am scribing for, and in the presence of, Dr. Myla Arias MD. HARPAL performed  the above scribed service and the documentation accurately describes the services I performed. I attest to the accuracy of the note.    I, Dr. Myla Arias MD, reviewed documentation as scribed above. I personally performed the services described in this documentation.  I agree that the record reflects my personal performance and is accurate and complete. Myla Arias MD.    02/12/2024

## 2024-02-23 ENCOUNTER — OFFICE VISIT (OUTPATIENT)
Dept: NEUROLOGY | Facility: CLINIC | Age: 84
End: 2024-02-23
Payer: MEDICARE

## 2024-02-23 ENCOUNTER — LAB VISIT (OUTPATIENT)
Dept: LAB | Facility: HOSPITAL | Age: 84
End: 2024-02-23
Attending: NURSE PRACTITIONER
Payer: MEDICARE

## 2024-02-23 VITALS
RESPIRATION RATE: 16 BRPM | SYSTOLIC BLOOD PRESSURE: 112 MMHG | DIASTOLIC BLOOD PRESSURE: 64 MMHG | HEIGHT: 62 IN | BODY MASS INDEX: 28.52 KG/M2 | HEART RATE: 48 BPM | WEIGHT: 155 LBS

## 2024-02-23 DIAGNOSIS — K91.89: ICD-10-CM

## 2024-02-23 DIAGNOSIS — F41.1 GAD (GENERALIZED ANXIETY DISORDER): ICD-10-CM

## 2024-02-23 DIAGNOSIS — R41.3 OTHER AMNESIA: ICD-10-CM

## 2024-02-23 DIAGNOSIS — E78.00 PURE HYPERCHOLESTEROLEMIA: ICD-10-CM

## 2024-02-23 DIAGNOSIS — I10 ESSENTIAL HYPERTENSION: Chronic | ICD-10-CM

## 2024-02-23 DIAGNOSIS — F02.818 MAJOR NEUROCOGNITIVE DISORDER DUE TO ALZHEIMER'S DISEASE, WITH BEHAVIORAL DISTURBANCE: ICD-10-CM

## 2024-02-23 DIAGNOSIS — G31.84 MILD COGNITIVE IMPAIRMENT WITH MEMORY LOSS: ICD-10-CM

## 2024-02-23 DIAGNOSIS — N39.41 URGE URINARY INCONTINENCE: ICD-10-CM

## 2024-02-23 DIAGNOSIS — F02.818 MAJOR NEUROCOGNITIVE DISORDER DUE TO ALZHEIMER'S DISEASE, WITH BEHAVIORAL DISTURBANCE: Primary | ICD-10-CM

## 2024-02-23 DIAGNOSIS — I77.1 TORTUOUS AORTA: ICD-10-CM

## 2024-02-23 DIAGNOSIS — F03.90 DEMENTIA, UNSPECIFIED DEMENTIA SEVERITY, UNSPECIFIED DEMENTIA TYPE, UNSPECIFIED WHETHER BEHAVIORAL, PSYCHOTIC, OR MOOD DISTURBANCE OR ANXIETY: ICD-10-CM

## 2024-02-23 DIAGNOSIS — G30.9 MAJOR NEUROCOGNITIVE DISORDER DUE TO ALZHEIMER'S DISEASE, WITH BEHAVIORAL DISTURBANCE: ICD-10-CM

## 2024-02-23 DIAGNOSIS — Z86.73 HISTORY OF LACUNAR CEREBROVASCULAR ACCIDENT (CVA): ICD-10-CM

## 2024-02-23 DIAGNOSIS — G30.9 MAJOR NEUROCOGNITIVE DISORDER DUE TO ALZHEIMER'S DISEASE, WITH BEHAVIORAL DISTURBANCE: Primary | ICD-10-CM

## 2024-02-23 LAB
FOLATE SERPL-MCNC: 16.6 NG/ML (ref 4–24)
HCYS SERPL-SCNC: 8.8 UMOL/L (ref 4–15.5)
VIT B12 SERPL-MCNC: 1091 PG/ML (ref 210–950)

## 2024-02-23 PROCEDURE — 99417 PROLNG OP E/M EACH 15 MIN: CPT | Mod: S$GLB,,, | Performed by: NURSE PRACTITIONER

## 2024-02-23 PROCEDURE — 83090 ASSAY OF HOMOCYSTEINE: CPT | Performed by: NURSE PRACTITIONER

## 2024-02-23 PROCEDURE — 99205 OFFICE O/P NEW HI 60 MIN: CPT | Mod: S$GLB,,, | Performed by: NURSE PRACTITIONER

## 2024-02-23 PROCEDURE — 82607 VITAMIN B-12: CPT | Performed by: NURSE PRACTITIONER

## 2024-02-23 PROCEDURE — 99999 PR PBB SHADOW E&M-EST. PATIENT-LVL V: CPT | Mod: PBBFAC,,, | Performed by: NURSE PRACTITIONER

## 2024-02-23 PROCEDURE — 82746 ASSAY OF FOLIC ACID SERUM: CPT | Performed by: NURSE PRACTITIONER

## 2024-02-23 PROCEDURE — 36415 COLL VENOUS BLD VENIPUNCTURE: CPT | Performed by: NURSE PRACTITIONER

## 2024-02-23 RX ORDER — RISPERIDONE 0.5 MG/1
0.5 TABLET ORAL 2 TIMES DAILY
Qty: 60 TABLET | Refills: 11 | Status: SHIPPED | OUTPATIENT
Start: 2024-02-23 | End: 2024-03-27

## 2024-02-23 RX ORDER — BUSPIRONE HYDROCHLORIDE 10 MG/1
10 TABLET ORAL 3 TIMES DAILY
Qty: 90 TABLET | Refills: 11 | Status: SHIPPED | OUTPATIENT
Start: 2024-02-23 | End: 2025-02-22

## 2024-02-23 NOTE — PROGRESS NOTES
Subjective:      Patient ID: Irlanda Lopez is a 83 y.o. female.   informant:  Patient's daughter and personal caregiver    Chief Complaint: I have difficulty with my memory    The patient and her daughter are in the clinic today but bring with them a note from the caregiver detail inguinal events that have occurred over the past few months.  As an example, the patient has for gotten where things are and her own home.  She frequently will panic as she forgets where she placed her purse last.  The caregiver notes that she seems to be sleeping more during the day than she had been at night and that she is occasionally waking up in the early morning hours.  Occasionally, she has been somewhat irritable.  She also has her times of voicing depression over the loss of her .  She is not totally aware of all of her medications at times and will ask the same questions repetitively.  The caregiver indicates that in the past the patient would wash her own clothing but now depends upon the caregiver for that task.    The patient's daughter it confirms this history in indicates that the patient does have a tendency to be very repetitious in her comments.  She is also aware of some difficulty with her mood at times.  According to the daughter, the patient is independent for dressing and bathing.  She is independent for eating after food preparation has been completed but that the family and the caregiver do all the cooking.  When asked if she does do any walking type of exercise the patient indicates that she does do so, but the daughter indicates that this is not accurate history.    The daughter is the primary decision maker in her care in the there are 2 sons who are in the family but do not actively participate.  The daughter notes increasing apathy with the patient becoming more withdrawn from her usual activities.          ROS:  GENERAL: NO FEVER, CHILLS, FATIGABILITY OR WEIGHT LOSS.  SKIN: NO RASHES, ITCHING OR  CHANGES IN COLOR OR TEXTURE OF SKIN.  HEAD: NO HEADACHES OR RECENT HEAD TRAUMA.  EYES: VISUAL ACUITY FINE. NO PHOTOPHOBIA, OCULAR PAIN OR DIPLOPIA.  EARS: DENIES EAR PAIN, DISCHARGE OR VERTIGO.  NOSE: NO LOSS OF SMELL, NO EPISTAXIS OR POSTNASAL DRIP.  MOUTH & THROAT: NO HOARSENESS OR CHANGE IN VOICE. NO EXCESSIVE GUM BLEEDING.  NODES: DENIES SWOLLEN GLANDS.  CHEST: DENIES JESUS, CYANOSIS, WHEEZING, COUGH AND SPUTUM PRODUCTION.  CARDIOVASCULAR: DENIES CHEST PAIN, PND, ORTHOPNEA OR REDUCED EXERCISE TOLERANCE.  ABDOMEN: APPETITE FINE. NO WEIGHT LOSS.   THE PATIENT HAS A COLOSTOMY.  URINARY: NO FLANK PAIN, DYSURIA OR HEMATURIA.  PERIPHERAL VASCULAR: NO CLAUDICATION OR CYANOSIS.  MUSCULOSKELETAL: NO JOINT STIFFNESS OR SWELLING. DENIES BACK PAIN.  NEUROLOGIC: NO HISTORY OF SEIZURES, PARALYSIS, ALTERATION OF GAIT OR COORDINATION.    Past Medical History:   Diagnosis Date    Clostridium difficile colitis     Dementia     Diverticulitis     s/p colostomy    High cholesterol     Hypertension     Hypothyroidism     Kidney stones      Past Surgical History:   Procedure Laterality Date    APPENDECTOMY  2008    CATARACT EXTRACTION      Dr Raygoza    COLON SURGERY      COLONOSCOPY N/A 01/02/2019    Procedure: COLONOSCOPY;  Surgeon: Reece Hogan MD;  Location: [facility] ENDO;  Service: Endoscopy;  Laterality: N/A;    COLONOSCOPY N/A 06/07/2019    Procedure: COLONOSCOPY;  Surgeon: Rishabh Connelly MD;  Location: [facility] ENDO;  Service: General;  Laterality: N/A;    COLONOSCOPY N/A 06/23/2021    Procedure: COLONOSCOPY;  Surgeon: Lucy Lafleur MD;  Location: [facility] ENDO;  Service: Endoscopy;  Laterality: N/A;    COLOSTOMY Left 01/02/2019    Procedure: CREATION, COLOSTOMY;  Surgeon: Reece Hogan MD;  Location: [facility] OR;  Service: General;  Laterality: Left;    CYSTOSCOPY WITH URETEROSCOPY, RETROGRADE PYELOGRAPHY, AND INSERTION OF STENT Right 08/23/2022    Procedure: CYSTOSCOPY, WITH RETROGRADE PYELOGRAM AND URETERAL STENT INSERTION;  Surgeon:  Anselmo Matute MD;  Location: [facility];  Service: Urology;  Laterality: Right;    CYSTOSCOPY WITH URETEROSCOPY, RETROGRADE PYELOGRAPHY, AND INSERTION OF STENT Right 11/07/2023    Procedure: CYSTOSCOPY, WITH RETROGRADE PYELOGRAM AND URETERAL STENT INSERTION;  Surgeon: Anselmo Matute MD;  Location: [facility] OR;  Service: Urology;  Laterality: Right;  stent exchange    CYSTOURETEROSCOPY WITH RETROGRADE PYELOGRAPHY AND INSERTION OF STENT INTO URETER Right 11/08/2022    Procedure: CYSTOURETEROSCOPY, WITH RETROGRADE PYELOGRAM AND URETERAL STENT INSERTION;  Surgeon: Anselmo Matute MD;  Location: [facility];  Service: Urology;  Laterality: Right;    CYSTOURETEROSCOPY, WITH HOLMIUM LASER LITHOTRIPSY OF URETERAL CALCULUS AND STENT INSERTION Right 04/04/2023    Procedure: CYSTOURETEROSCOPY, WITH HOLMIUM LASER LITHOTRIPSY OF URETERAL CALCULUS AND STENT INSERTION;  Surgeon: Anselmo Matute MD;  Location: [facility] OR;  Service: Urology;  Laterality: Right;    CYSTOURETEROSCOPY,WITH HOLMIUM LASER LITHOTRIPSY OF URETERAL CALCULUS Right 11/07/2023    Procedure: CYSTOURETEROSCOPY,WITH HOLMIUM LASER LITHOTRIPSY OF URETERAL CALCULUS;  Surgeon: Anselmo Matute MD;  Location: [facility];  Service: Urology;  Laterality: Right;    ESOPHAGOGASTRODUODENOSCOPY N/A 09/16/2020    Procedure: ESOPHAGOGASTRODUODENOSCOPY (EGD)- Needs Rapid;  Surgeon: Lucy Lafleur MD;  Location: [facility];  Service: Endoscopy;  Laterality: N/A;    EYE SURGERY  ?? Dr Jaret PINEDO PROCEDURE N/A 01/02/2019    Procedure: SHAHIDA PROCEDURE;  Surgeon: Reece Hogan MD;  Location: [facility];  Service: General;  Laterality: N/A;    LYSIS OF ADHESIONS N/A 01/02/2019    Procedure: LYSIS, ADHESIONS;  Surgeon: Reece Hogan MD;  Location: [facility];  Service: General;  Laterality: N/A;    VITRECTOMY Right 09/2013     Family History   Problem Relation Age of Onset    Alzheimer's disease Mother     Aneurysm Father         brain    Stomach cancer Brother          50s    Parkinsonism Brother     Bladder Cancer Grandchild      Social History     Socioeconomic History    Marital status:    Occupational History     Comment: [employer]   Tobacco Use    Smoking status: Former     Types: Cigarettes    Smokeless tobacco: Never    Tobacco comments:     Smoked in 20s. Quit soon   Substance and Sexual Activity    Alcohol use: No    Drug use: No    Sexual activity: Never     Comment:      Social Determinants of Health     Financial Resource Strain: Patient Declined (2/12/2024)    Overall Financial Resource Strain (CARDIA)     Difficulty of Paying Living Expenses: Patient declined   Food Insecurity: Patient Declined (2/12/2024)    Hunger Vital Sign     Worried About Running Out of Food in the Last Year: Patient declined     Ran Out of Food in the Last Year: Patient declined   Transportation Needs: Unknown (2/12/2024)    PRAPARE - Transportation     Lack of Transportation (Medical): Patient declined     Lack of Transportation (Non-Medical): No   Physical Activity: Inactive (2/12/2024)    Exercise Vital Sign     Days of Exercise per Week: 0 days     Minutes of Exercise per Session: 0 min   Stress: No Stress Concern Present (4/20/2023)    [employer] [place] [employer] - Occupational Stress Questionnaire     Feeling of Stress : Not at all   Social Connections: Unknown (2/12/2024)    Social Connection and Isolation Panel [NHANES]     Frequency of Communication with Friends and Family: Patient declined     Frequency of Social Gatherings with Friends and Family: Patient declined     Attends Restorationist Services: Never     Active Member of Clubs or Organizations: Patient declined     Attends Club or Organization Meetings: Patient declined     Marital Status: Patient declined   Housing Stability: Low Risk  (2/12/2024)    Housing Stability Vital Sign     Unable to Pay for Housing in the Last Year: No     Number of Places Lived in the Last Year: 1     Unstable  Housing in the Last Year: No         Objective:   PE:   VITAL SIGNS:   HEIGHT 5 FT 2 IN, WEIGHT 77.4 KG, BMI 31.21  There were no vitals filed for this visit.    APPEARANCE: WELL NOURISHED, WELL DEVELOPED, IN NO ACUTE DISTRESS.    HEAD: NORMOCEPHALIC, ATRAUMATIC.  EYES: PERRL. EOMI.  NON-ICTERIC SCLERAE.   NOSE: MUCOSA PINK. AIRWAY CLEAR.  MOUTH & THROAT: NO TONSILLAR ENLARGEMENT. NO PHARYNGEAL ERYTHEMA OR EXUDATE. NO STRIDOR.  NECK: SUPPLE. NO BRUITS.  CHEST: LUNGS CLEAR TO AUSCULTATION.  CARDIOVASCULAR: REGULAR RHYTHM WITH  A SOFT SYSTOLIC MURMUR HEARD ALONG THE RIGHT STERNAL BORDER.  ABDOMEN: BOWEL SOUNDS NORMAL.  COLOSTOMY IN PLACE WITHOUT EVIDENCE OF ERYTHEMA.  MUSCULOSKELETAL:  NO BONY DEFORMITY SEEN.  MUSCLE TONE IS NORMAL.  MUSCLE MASS IS NORMAL.    NEUROLOGIC:   MENTAL STATUS:  THE PATIENT IS WELL ORIENTED TO PERSON, TIME, PLACE, AND SITUATION.  THE PATIENT WAS ABLE TO NAME THE MONTH AND YEAR AS WELL AS LOCATION CORRECTLY.  SHE WAS UNABLE TO NAME THE DATE OR DAY OF THE WEEK.  SHE WAS ABLE TO REPEAT 3 DIGITS BACKWARDS.  SHE WAS ABLE TO REPEAT SENTENCES VERBATIM WITHOUT DIFFICULTY.  SHE COULD NOT RECALL ANY OF 5 WORDS ON DELAYED RECALL.  SHE WAS REPETITIOUS IN HER COMMENTS.  THE PATIENT IS ATTENTIVE TO THE ENVIRONMENT AND COOPERATIVE FOR THE EXAM.  CRANIAL NERVES: II-XII GROSSLY INTACT. FUNDOSCOPIC EXAM IS NORMAL.  NO HEMORRHAGE, EXUDATE OR PAPILLEDEMA IS PRESENT. THE EXTRAOCULAR MUSCLES ARE INTACT IN THE CARDINAL DIRECTIONS OF GAZE.  NO PTOSIS IS PRESENT. FACIAL FEATURES ARE SYMMETRICAL.  SPEECH IS NORMAL IN FLUENCY, DICTION, AND PHRASING.  TONGUE PROTRUDES IN THE MIDLINE.    GAIT AND STATION:  ROMBERG IS NEGATIVE.  GOOD ALTERNATE ARMSWING WITH NORMAL GAIT.  MOTOR:  NO DOWNDRIFT OF EITHER ARM WHEN HELD AT SHOULDER LEVEL.  MANUAL MUSCLE TESTING OF PROXIMAL AND DISTAL MUSCLES OF BOTH UPPER AND LOWER EXTREMITIES IS NORMAL. MUSCLE MASS IS NORMAL.  MUSCLE TONE IS NORMAL.  SENSORY:  INTACT BOTH UPPER AND LOWER  EXTREMITIES TO PIN PRICK, TOUCH, AND VIBRATION.  CEREBELLAR:  FINGER TO NOSE DONE WELL.  ALTERNATING MOVEMENTS INTACT.  NO INVOLUNTARY MOVEMENTS OR TREMOR SEEN.  REFLEXES:  STRETCH REFLEXES ARE 2+ BOTH UPPER AND LOWER EXTREMITIES.  PLANTAR STIMULATION IS FLEXOR BILATERALLY AND NO PATHOLOGICAL REFLEXES ARE SEEN              Assessment:     No diagnosis found.      Discussion:  From my evaluation today I do not think the patient has a not features of cognitive dysfunction to label this as dementia.  She certainly does have difficulty with delayed recall but is largely independent for instrumental activities of daily living.  She does have a caregiver who is assuming more responsibilities time goes by however.  The patient is developing increasing apathy and may benefit from a low-dose SSRI , such as Celexa.  I did not write that prescription today but would indicate to the daughter that if the symptoms of depression appeared to be more severe that this medicine would be added.      Plan:     1.  Continue current medications as prescribed including Aricept and Namenda  2.  Routine follow-up with Neurology in 6 months.      This was a 55 min visit with the patient with over 50% of the time spent counseling the patient and the daughter regarding the findings on today's examination and discussion of mild cognitive impairment and its relationship to dementia.  This note is generated with speech recognition software and is subject to transcription error and sound alike phrases that may be missed by proofreading.

## 2024-02-23 NOTE — PROGRESS NOTES
Subjective:       Patient ID: Irlanda Lopez is a 83 y.o. female.    Chief Complaint: Dementia      Former Patient of Dr. Mango MD Neurologist.     HPIThe patient is here for memory loss evaluation. The patient is accompanied by daughter.      The patient is new to me. presenting with memory changes that began in 2017. The patient was previous evaluated in 2017 at [facility] and was Dx with dementia and started on DMA.  The patient has had continual cognitive decline. She has problems related to progressive short term memory loss and behavior changes. For example, the patient would repeat the same question repeatedly. In the late after noon she gets confused. She forgets conversations, and she forget what she ate. She has been retired many years but she gets up and packs her car and tries to go work. Other days the patient doesn't want to leave the house. The patient excessively forgets where placed certain things. She has a colostomy and she was able to mange it independently but now she requires assistance from caretakers. She has 24 hour care. The patient is no longer driving and she would get lost. Patient has confusion around and inside the house. Patient has  trouble remembering the date and time. Patient has caretakers that manage medications and family manage appointments and keeping up with major holidays and political changes. The patient is independent in handling finances. The patient is still independent with ADLs. Patient has some agitation or aggression occasionally. Patient has hallucinations or delusions. No seizures. No language problems. No problems handling tools. Patient has a history of strokes unable to recall details, patient family discovered old stroke on MRI testing in 2017. No history of headaches. No history of hypothyroidism. No history of alcoholism. No history of B12 deficiency.  Hx depression Prozac 20 mg daily, takes Xanax 0.25 mg po BID. No history of bipolar disorder. No history of  Untreated Syphilis.  No history of HIV infection. No toxic exposures.  No history of traumatic brain injury. Mild tremors noted.  No falls. Wide base gait.  No urinary incontinence. Bowel incontinence has a colostomy.  No change in sleep and appetite. Mother had dementia in 80 years of age. Patient was started on Aricept and Namenda in 2017 but she was unable to tolerate Aricept caused GI upset. She continue take Namenda 10 mg po BID.                   Review of Systems   Unable to perform ROS: Dementia   HENT:  Positive for hearing loss.    Gastrointestinal:         Colostomy    Musculoskeletal:  Positive for arthralgias and back pain.   Psychiatric/Behavioral:  Positive for behavioral problems, confusion, decreased concentration and dysphoric mood. The patient is nervous/anxious.                  Current Outpatient Medications:     ALPRAZolam (XANAX) 0.25 MG tablet, Take 1 tablet (0.25 mg total) by mouth 2 (two) times daily as needed for Anxiety., Disp: 180 tablet, Rfl: 1    atorvastatin (LIPITOR) 10 MG tablet, TAKE 1 TABLET BY MOUTH ONCE DAILY, Disp: 90 tablet, Rfl: 3    FLUoxetine 20 MG capsule, Take 2 capsules (40 mg total) by mouth once daily for 7 days, THEN 1 capsule (20 mg total) once daily for 7 days. (Patient taking differently: Patient takes one tablet a day. ), Disp: 21 capsule, Rfl:     hyoscyamine (LEVSIN/SL) 0.125 mg Subl, Place 2 tablets (0.25 mg total) under the tongue every 4 (four) hours as needed (spasms)., Disp: 40 tablet, Rfl: 1    memantine (NAMENDA) 10 MG Tab, Take 1 tablet (10 mg total) by mouth 2 (two) times daily., Disp: 180 tablet, Rfl: 3    neomycin-polymyxin-dexamethasone (MAXITROL) 3.5 mg/g-10,000 unit/g-0.1 % Oint, Apply ointment 3 times daily to lids and lashes for both eyes for 10 days, Disp: 3.5 g, Rfl: 0    pantoprazole (PROTONIX) 40 MG tablet, Take 1 tablet (40 mg total) by mouth once daily. 30 min prior to lunch (Patient taking differently: Take 40 mg by mouth as needed. 30 min  prior to lunch), Disp: 90 tablet, Rfl: 3    phenazopyridine (PYRIDIUM) 100 MG tablet, Please specify directions, refills and quantity (Patient taking differently: Take 100 mg by mouth daily as needed (PRN).), Disp: 6 tablet, Rfl: 5    potassium chloride (MICRO-K) 10 MEQ CpSR, Take by mouth once daily., Disp: , Rfl:     thyroid, pork, (ARMOUR THYROID) 30 mg Tab, Take 1 tablet (30 mg total) by mouth before breakfast., Disp: 90 tablet, Rfl: 3    busPIRone (BUSPAR) 10 MG tablet, Take 1 tablet (10 mg total) by mouth 3 (three) times daily., Disp: 90 tablet, Rfl: 11    ondansetron (ZOFRAN-ODT) 8 MG TbDL, Take 1 tablet (8 mg total) by mouth every 12 (twelve) hours as needed (nausea). (Patient not taking: Reported on 2/23/2024), Disp: 30 tablet, Rfl: 2    risperiDONE (RISPERDAL) 0.5 MG Tab, Take 1 tablet (0.5 mg total) by mouth 2 (two) times daily., Disp: 60 tablet, Rfl: 11  Past Medical History:   Diagnosis Date    Clostridium difficile colitis     Dementia     Diverticulitis     s/p colostomy    High cholesterol     Hypertension     Hypothyroidism     Kidney stones      Past Surgical History:   Procedure Laterality Date    APPENDECTOMY  2008    CATARACT EXTRACTION      Dr Raygoza    COLON SURGERY      COLONOSCOPY N/A 01/02/2019    Procedure: COLONOSCOPY;  Surgeon: Reece Hogan MD;  Location: [facility] [place] [facility];  Service: Endoscopy;  Laterality: N/A;    COLONOSCOPY N/A 06/07/2019    Procedure: COLONOSCOPY;  Surgeon: Rishabh Connelly MD;  Location: [facility] [place] [facility];  Service: General;  Laterality: N/A;    COLONOSCOPY N/A 06/23/2021    Procedure: COLONOSCOPY;  Surgeon: Lucy Lafleur MD;  Location: [facility] [place] [facility];  Service: Endoscopy;  Laterality: N/A;    COLOSTOMY Left 01/02/2019    Procedure: CREATION, COLOSTOMY;  Surgeon: Reece Hogan MD;  Location: [facility] [place] OR;  Service: General;  Laterality: Left;    CYSTOSCOPY WITH URETEROSCOPY, RETROGRADE PYELOGRAPHY, AND INSERTION OF STENT Right 08/23/2022    Procedure: CYSTOSCOPY, WITH RETROGRADE PYELOGRAM  AND URETERAL STENT INSERTION;  Surgeon: Anselmo Matute MD;  Location: [facility];  Service: Urology;  Laterality: Right;    CYSTOSCOPY WITH URETEROSCOPY, RETROGRADE PYELOGRAPHY, AND INSERTION OF STENT Right 11/07/2023    Procedure: CYSTOSCOPY, WITH RETROGRADE PYELOGRAM AND URETERAL STENT INSERTION;  Surgeon: Anselmo Matute MD;  Location: [facility];  Service: Urology;  Laterality: Right;  stent exchange    CYSTOURETEROSCOPY WITH RETROGRADE PYELOGRAPHY AND INSERTION OF STENT INTO URETER Right 11/08/2022    Procedure: CYSTOURETEROSCOPY, WITH RETROGRADE PYELOGRAM AND URETERAL STENT INSERTION;  Surgeon: Anselmo Matute MD;  Location: [facility];  Service: Urology;  Laterality: Right;    CYSTOURETEROSCOPY, WITH HOLMIUM LASER LITHOTRIPSY OF URETERAL CALCULUS AND STENT INSERTION Right 04/04/2023    Procedure: CYSTOURETEROSCOPY, WITH HOLMIUM LASER LITHOTRIPSY OF URETERAL CALCULUS AND STENT INSERTION;  Surgeon: Anselmo Matute MD;  Location: [facility];  Service: Urology;  Laterality: Right;    CYSTOURETEROSCOPY,WITH HOLMIUM LASER LITHOTRIPSY OF URETERAL CALCULUS Right 11/07/2023    Procedure: CYSTOURETEROSCOPY,WITH HOLMIUM LASER LITHOTRIPSY OF URETERAL CALCULUS;  Surgeon: Anselmo Matute MD;  Location: [facility];  Service: Urology;  Laterality: Right;    ESOPHAGOGASTRODUODENOSCOPY N/A 09/16/2020    Procedure: ESOPHAGOGASTRODUODENOSCOPY (EGD)- Needs Rapid;  Surgeon: Lucy Lafleur MD;  Location: [facility];  Service: Endoscopy;  Laterality: N/A;    EYE SURGERY  ?? Dr Raygoza    SHAHIDA PROCEDURE N/A 01/02/2019    Procedure: SHAHIDA PROCEDURE;  Surgeon: Reece Hogan MD;  Location: [facility];  Service: General;  Laterality: N/A;    LYSIS OF ADHESIONS N/A 01/02/2019    Procedure: LYSIS, ADHESIONS;  Surgeon: Reece Hogan MD;  Location: [facility];  Service: General;  Laterality: N/A;    VITRECTOMY Right 09/2013     Social History     Socioeconomic History    Marital status:    Occupational History     Comment:  [employer]   Tobacco Use    Smoking status: Former     Types: Cigarettes    Smokeless tobacco: Never    Tobacco comments:     Smoked in 20s. Quit soon   Substance and Sexual Activity    Alcohol use: No    Drug use: No    Sexual activity: Never     Comment:      Social Determinants of Health     Financial Resource Strain: Patient Declined (2/12/2024)    Overall Financial Resource Strain (CARDIA)     Difficulty of Paying Living Expenses: Patient declined   Food Insecurity: Patient Declined (2/12/2024)    Hunger Vital Sign     Worried About Running Out of Food in the Last Year: Patient declined     Ran Out of Food in the Last Year: Patient declined   Transportation Needs: Unknown (2/12/2024)    PRAPARE - Transportation     Lack of Transportation (Medical): Patient declined     Lack of Transportation (Non-Medical): No   Physical Activity: Inactive (2/12/2024)    Exercise Vital Sign     Days of Exercise per Week: 0 days     Minutes of Exercise per Session: 0 min   Stress: No Stress Concern Present (4/20/2023)    [employer] [place] [employer] - Occupational Stress Questionnaire     Feeling of Stress : Not at all   Social Connections: Unknown (2/12/2024)    Social Connection and Isolation Panel [NHANES]     Frequency of Communication with Friends and Family: Patient declined     Frequency of Social Gatherings with Friends and Family: Patient declined     Attends [employer] Services: Never     Active Member of Clubs or Organizations: Patient declined     Attends Club or Organization Meetings: Patient declined     Marital Status: Patient declined   Housing Stability: Low Risk  (2/12/2024)    Housing Stability Vital Sign     Unable to Pay for Housing in the Last Year: No     Number of Places Lived in the Last Year: 1     Unstable Housing in the Last Year: No             Past/Current Medical/Surgical History, Past/Current Social History, Past/Current Family History and Past/Current Medications were  reviewed in detail.        Objective:           VITAL SIGNS WERE REVIEWED      GENERAL APPEARANCE:     The patient looks comfortable, confused, cooperative anxious.    BMI 28.35 kg     No signs of respiratory distress.    Normal breathing pattern.    No dysmorphic features    Normal eye contact.     GENERAL MEDICAL EXAM:    HEENT:  Head is atraumatic normocephalic.     No tender temporal arteries.     Neck and Axillae: No JVD. No visible lesions.    No carotid bruits. No thyromegaly. No lymphadenopathy.    Cardiopulmonary: No cyanosis. No tachypnea. Normal respiratory effort.    Gastrointestinal/Urogenital:  No jaundice. No stomas or lesions. No visible hernias. No catheters.     Abdomen is soft non-tender. No masses or organomegaly, Colostomy present     Skin, Hair and Nails: No pathognonomic skin rash. No neurofibromatosis. No visible lesions.No stigmata of autoimmune disease. No clubbing.    Skin is warm and moist. No palpable masses.    Limbs: No varicose veins. No visible swelling.    No palpable edema. Pulses are symmetric. Pedal pulses are palpable.      Muskoskeletal: + visible deformities. Poor posture No visible lesions.    No spine tenderness. No signs of longstanding neuropathy. No dislocations or fractures.            Neurologic Exam     Mental Status   Disoriented to country and area. Oriented to city.   Disoriented to year, month and date.   Follows 2 step commands.   Attention: decreased. Concentration: decreased.   Speech: speech is normal and not slurred   Level of consciousness: alert  Knowledge: poor and inconsistent with education. Able to perform simple calculations.   Able to name object. Unable to read. Unable to repeat. Unable to write. Abnormal comprehension.     Cranial Nerves     CN II   Visual fields full to confrontation.   Visual acuity: normal with correction  Right visual field deficit: none  Left visual field deficit: none     CN III, IV, VI   Pupils are equal, round, and reactive  to light.  Extraocular motions are normal.   Right pupil: Size: 2 mm. Shape: regular. Reactivity: brisk. Consensual response: intact. Accommodation: intact.   Left pupil: Size: 2 mm. Shape: regular. Reactivity: brisk. Consensual response: intact. Accommodation: intact.   CN III: no CN III palsy  CN VI: no CN VI palsy  Nystagmus: none   Diplopia: none  Ophthalmoparesis: none  Upgaze: normal  Downgaze: normal  Conjugate gaze: present  Vestibulo-ocular reflex: present    CN V   Facial sensation intact.   Right facial sensation deficit: none  Left facial sensation deficit: none    CN VII   Right facial weakness: none  Left facial weakness: none  Left taste: normal    CN VIII   CN VIII normal.   Hearing: impaired  Right Rinne: AC > BC  Left Rinne: AC > BC    CN IX, X   CN IX normal.   CN X normal.   Palate: symmetric    CN XI   CN XI normal.   Right sternocleidomastoid strength: normal  Left sternocleidomastoid strength: normal  Right trapezius strength: normal  Left trapezius strength: normal    CN XII   CN XII normal.   Tongue: not atrophic  Fasciculations: absent  Tongue deviation: none    Motor Exam   Muscle bulk: normal  Overall muscle tone: normal  Right arm tone: normal  Left arm tone: normal  Right arm pronator drift: absent  Left arm pronator drift: absent  Right leg tone: normal  Left leg tone: normal    Strength   Strength 5/5 throughout.   Right neck flexion: 5/5  Left neck flexion: 5/5  Right neck extension: 5/5  Left neck extension: 5/5  Right deltoid: 5/5  Left deltoid: 5/5  Right biceps: 5/5  Left biceps: 5/5  Right triceps: 5/5  Left triceps: 5/5  Right wrist flexion: 5/5  Left wrist flexion: 5/5  Right wrist extension: 5/5  Left wrist extension: 5/5  Right interossei: 5/5  Left interossei: 5/5  Right iliopsoas: 5/5  Left iliopsoas: 5/5  Right quadriceps: 5/5  Left quadriceps: 5/5  Right hamstrin/5  Left hamstrin/5  Right glutei: 5/5  Left glutei: 5/5  Right anterior tibial: 5/5  Left anterior  tibial: 5/5  Right posterior tibial: 5/5  Left posterior tibial: 5/5  Right peroneal: 5/5  Left peroneal: 5/5  Right gastroc: 5/5  Left gastroc: 5/5    Sensory Exam   Light touch normal.   Right arm light touch: normal  Left arm light touch: normal  Vibration normal.   Right arm vibration: normal  Left arm vibration: normal  Proprioception normal.   Right arm proprioception: normal  Left arm proprioception: normal  Pinprick normal.     ASIF      Gait, Coordination, and Reflexes     Gait  Gait: wide-based    Coordination   Finger to nose coordination: normal    Tremor   Resting tremor: absent  Intention tremor: absent  Action tremor: absent    Reflexes   Right brachioradialis: 2+  Left brachioradialis: 2+  Right biceps: 2+  Left biceps: 2+  Right triceps: 2+  Left triceps: 2+  Right patellar: 2+  Left patellar: 2+  Right achilles: 1+  Left achilles: 1+  Left plantar: normal  Right Patino: absent  Left Patino: absent  Right ankle clonus: absent  Left ankle clonus: absent  Right pendular knee jerk: absent  Left pendular knee jerk: absent        Poor posture            ANISH COGNITIVE ASSESSMENT (MOCA) TOTAL SCORE 30             SEVERE DEMENTIA <10    12 th grade     DATE 02-       TOTAL SCORE 10/30       VISUOSPATIAL EXECUTIVE (5) 1       NAMING (3) 3       ATTENTION (6) 3       LANGUAGE (3) 1       ABSTRACTION(2) 1       RECALL (5) 0       ORIENTATION (6) 1           Lab Results   Component Value Date    WBC 7.60 01/10/2024    HGB 13.0 01/10/2024    HCT 41.1 01/10/2024    MCV 84 01/10/2024     01/10/2024     Sodium   Date Value Ref Range Status   01/10/2024 137 136 - 145 mmol/L Final     Potassium   Date Value Ref Range Status   01/10/2024 4.0 3.5 - 5.1 mmol/L Final     Chloride   Date Value Ref Range Status   01/10/2024 101 95 - 110 mmol/L Final     CO2   Date Value Ref Range Status   01/10/2024 22 (L) 23 - 29 mmol/L Final     Glucose   Date Value Ref Range Status   01/10/2024 69 (L) 70 - 110 mg/dL  Final     BUN   Date Value Ref Range Status   01/10/2024 11 8 - 23 mg/dL Final     Creatinine   Date Value Ref Range Status   01/10/2024 0.9 0.5 - 1.4 mg/dL Final     Calcium   Date Value Ref Range Status   01/10/2024 9.0 8.7 - 10.5 mg/dL Final     Total Protein   Date Value Ref Range Status   01/10/2024 7.2 6.0 - 8.4 g/dL Final     Albumin   Date Value Ref Range Status   01/10/2024 3.1 (L) 3.5 - 5.2 g/dL Final     Total Bilirubin   Date Value Ref Range Status   01/10/2024 0.7 0.1 - 1.0 mg/dL Final     Comment:     For infants and newborns, interpretation of results should be based  on gestational age, weight and in agreement with clinical  observations.    Premature Infant recommended reference ranges:  Up to 24 hours.............<8.0 mg/dL  Up to 48 hours............<12.0 mg/dL  3-5 days..................<15.0 mg/dL  6-29 days.................<15.0 mg/dL       Alkaline Phosphatase   Date Value Ref Range Status   01/10/2024 87 55 - 135 U/L Final     AST   Date Value Ref Range Status   01/10/2024 24 10 - 40 U/L Final     ALT   Date Value Ref Range Status   01/10/2024 16 10 - 44 U/L Final     Anion Gap   Date Value Ref Range Status   01/10/2024 14 8 - 16 mmol/L Final     eGFR if    Date Value Ref Range Status   06/09/2022 >60.0 >60 mL/min/1.73 m^2 Final     eGFR if non    Date Value Ref Range Status   06/09/2022 >60.0 >60 mL/min/1.73 m^2 Final     Comment:     Calculation used to obtain the estimated glomerular filtration  rate (eGFR) is the CKD-EPI equation.        Lab Results   Component Value Date    CUDGABJG15 905 06/16/2017     Lab Results   Component Value Date    TSH 1.780 01/10/2024    FREET4 0.88 01/10/2024   LABS EVALUATION:    02-    Vitamin B 12 1091 ^, HC level 8.8 NL, FA level 16.6 NL,     07-    MRI BRAIN WO    There is a chronic lacunar infarct within the lateral aspect of the right thalamus.     Scattered foci of T2 flair hyperintense signal within the  bilateral periatrial white matter as well as surrounding the ventricular system are nonspecific but likely relate to chronic microvascular ischemic change.  This is not necessarily unusual for the age of the patient.      No asymmetric atrophy or hydrocephalus.    MRI Brain WO:    03-    MRI Brain No acute intracranial abnormality.     Moderate global cortical atrophy without lobar predominance.  Score 3 bilateral medial temporal lobe atrophy.     Several punctate foci of susceptibility artifact in the peripheral aspect of the posterior cerebral hemispheres likely related to remote microhemorrhage, possible cerebral amyloid angiopathy.    Result consistent with Alzheimer's Disease related it is consistent with exam findings.        Reviewed the neuroimaging independently       Assessment:       1. 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 Dementia, unspecified dementia severity, unspecified dementia type, unspecified whether behavioral, psychotic, or mood disturbance or anxiety    3. MARTIN (generalized anxiety disorder)    4. History of lacunar cerebrovascular accident (CVA)    5. Small bowel anastomotic dilation    6. Urge urinary incontinence    7. Other amnesia    8. Mild cognitive impairment with memory loss    9. Essential hypertension    10. Pure hypercholesterolemia    11. Tortuous aorta        Plan:         MAJOR NEUROCOGNITIVE DISORDER DUE TO ALZHEIMER'S DISEASE WITH BEHAVIORAL DISTURBANCE/ MARTIN/HX OF LACUNAR STROKES/ URGE INCONTINENCE/ COLOSTOMY         EVALUATION     Brain MRI.    TSH,  FA, B12, HC HIV, RPR.    Comprehensive Neuropsychological Testing       DISEASE-MODIFYING AGENTS     Explained to the patient and family that medications are intended to slow down the progression (in the early stages of the disease) and not stop it or reverse the disease. The disease is relentless, progressive and so far, cannot be controlled. Progression includes  Cognitive decline, Behavioral disturbances, and Motor decline as well.     I counseled the patient and family that the rate of progression is extremely variable, and the average (10 years) is an inaccurate measure.     CLASSES:    NMDA RECEPTOR ANTAGONISTS    Continue memantine/Namenda 10 mg BID    CHOLINESTERASE INHIBITORS (CEI)      Will donepezil/Aricept caused GI symptoms         SYMPTOMATIC MANAGEMENT-BEHAVIORAL SYMPTOMS AND NON-COGNITIVE SYMPTOMS       ANTIDEPRESSANTS CLASS MEDICATIONS      Taking Prozac 20 mg daily,    Discontinue Xanax 0.25 mg po BID  .     Will start Buspar 10 mg po BID       ANTIPSYCHOTIC AND NEUROLEPTIC CLASS MEDICATIONS     Will start Risperdal 0.5 mg BID one hour before sunset to assist with sundowning , psychotic symptoms and behavioral disturbances. Other options include: Aripiprazole (Abilify),  Quetiapine (Seroquel) and  Brexipiprazole (Rixulti).            HOME CARE       Falling Down Precautions. Gait is affected by the disease as well.     Avoid driving and access to firearms     Incremental 24/Care     Help with finances and decision making.    Join support group.    Proofing the house and use labeling.    Avoid antihistamines and anticholinergics.    Avoid changing routine.    Use written reminders.    Avoid multitasking.    Healthy diet, exercise (physical and cognitive).    Good sleep hygiene.        HERE ARE 10 WAYS TO REDUCE RISK OF DEMENTIA AND SLOW DOWN THE PROGRESSION OF DEMENTIA        1.Be physically active.    2. Avoid smoking and alcohol consumption.    3. Track your numbers. Keep your blood pressure, cholesterol, blood sugar and weight within recommended ranges.    4. Stay socially connected.    5. Make healthy food choices. Eat a well-balance and healthy diet that rich in cereals, fish, legumes, and vegetables.    6. Reduce stress.     7. Challenge your brain by trying something new, playing games, or learning a new language.    8. Take care of your hearing. Avoid  being continuously exposed to loud sounds and wear a hearing aid if hearing does become a problem.    9. Lower risk of falls. Consider installing handrails on all stairs and grab bars in bathrooms.    10. Reduce your exposure to air pollution, such as exposure to exhaust in heavy traffic.         CAREGIVERS COUNSELING     Recommend reading the following books:     The 36-Hour Day and there are many online and printed resources to help caregivers as well.     Alzheimer's Through the Stages.     Dementia with G.R.A.C.E.            PREVENTION OF DELIRIUM       1. Good hydration and avoid electrolyte imbalance  2. Recognize and treat infections immediately especially UTI.  3. Bladder emptying and prevent constipation.   4. Provide stimulating activities and familiar objects  5. Use eyeglasses and hearing aids if needed.   6. Use simple and regular communication about people, current place, and time  7. Mobility and range-of-motion exercises  8. Reduce noise, lighting and avoid sleep interruptions  9. Non-narcotic pain management.  10.Nondrug treatment for sleep problems or anxiety  11. Avoid antihistamines.  12. Avoid narcotics.  13. Avoid benzodiazepines.            HELPFUL STRATEGIES FOR THE FAMILY AND CAREGIVERS        BEHAVIORAL MANAGEMENT STRATEGIES     Remember that you cannot reason with acute psychosis or confusion. Unless there is an immediate safety issue, there is no need to challenge or correct the person.  For example, if a pt is hallucinating, do not argue with the person that what they are seeing is not real. If they are expressing paranoia, empathize gently with their fear, and attempt to redirect them to a different activity.   If the person is particularly stuck on a topic or activity, as long as the activity is safe and is not worsening their agitation, you don't need to intervene.     Communicate calmly, simply, and concisely    Reassure the person that they are safe and you are here to help  Try not  "to express irritation or anger  Speak quietly and calmly, do not shout or threaten the person. Avoid provocation.   Establish verbal contact and provide orientation and reassurance.  Attempt to identify the patient's wants and feelings, listen to what they are saying, and reflect those wants and feelings back to the patient.  Dont use sarcasm as a weapon    Set clear limits on behavior in a calm voice ("You cannot hit the nurse.")  Offer choices and optimism ("Which toothpaste would you like to use today?")    Redirect the person to an alternative behavior ("Can you come help me with this?)    Avoid saying things like, "We already went over this!" "You can't keep doing this." "I already explained this to you"  Instead, simply nod calmly and acknowledge what the person is saying ("Yes, that makes sense."), and then request their help or company in a different behavior.   Having a mental list of activities the patient enjoys can be helpful with redirection. For example, do they enjoy going for walks? Eating a piece of candy?   If redirection becomes challenging, you can place objects that your family member enjoys strategically in the home in order to use for distraction. This is particularly useful if there are items that they often talk about or frequently ask you questions about; or if they are visually striking. Once you focus the person on this object, talk about it briefly until they are calm and then attempt to redirect to a new behavior.   Sometimes activities which are repetitive can be calming, particularly if there is a comforting sensory element. For example, pt may enjoy folding soft towels or laundry.    Limit overstimulation    Decrease distractions by turning off the TV, computer, any fluorescent lights that hum, etc.  Ask any casual visitors to leave--the fewer people the better  If the person is very agitated, avoid touching them, and respect their personal space  Sit down and ask the person to sit " "down also     PREVENTATIVE STRATEGIES     Try to help the patient develop a routine and stick to it  Address all immediate safety issues  Does the person still have access to the car and keys? Take the keys away, or disconnect the car battery.  Are they wandering at night? Install locks on the outside doors. A keypad lock works well. Alarms on bedroom doors can also be helpful.   Are they turning on the stove and risking a fire? If you cannot limit their access to the kitchen, you may need to unplug dangerous devices any time you are not in the room.   If the person is exhibiting poor judgment throughout the day, they likely need someone supervising them at all times.   Do they still have access to significant financial assets? Limit their access to accounts, and consult with a  if necessary.   Identify situations that seem to trigger agitation or a problematic behavior, and modify the person's environment to minimize or eliminate that trigger.   For example, is their behavior worse if they don't get a good night's sleep? Or if they don't eat or drink enough? If so, prioritize those activities and build behavior plans to support them.  Do they get upset about not being allowed to answer the phone when it rings? Put all of the phones in the house on "silent."   Do they become paranoid that certain family members are trying to take advantage of them? Limit contact with the targeted family member as much as possible, and re-introduce them slowly after some time has passed.   Encourage independence in daily living whenever safely possible  Encourage collaborative decision-making regarding his care as well as daily activities  Encourage regular physical exercise  Address issues that may be impacting sleep   Ex: Urology consult for frequent nighttime urination, address chronic pain that may be interfering with sleep, moving to a different room if his roommate snores loudly, make sure the room is a comfortable " temperature and he has adequate pillows and blankets  Ensure he gets out into the sunlight at least once per day to encourage regular circadian cycle  No caffeine, sugar, or large meals within two hours of bedtime   Make sure room at night is dark and quiet and minimize nighttime interruptions from staff unless medically necessary   Try to help pt go to sleep and wake up at the same time every day  Monitor closely for worsening psychiatric symptoms (insomnia, social withdrawal, deterioration of personal hygiene, hostility, confusing or nonsensical speech, paranoia, hallucinations) and intervene promptly. Assess for possible antecedents, modify environment appropriately.            SLEEP HYGIENE     Poor sleep has a negative effect on cognition. Several strategies have been shown to improve sleep:     Caffeine intake in the afternoon and evening, as well as stuffing oneself at supper, can decrease the quality of restful sleep throughout the night.   Bedtime and wake-up times should be consistent every night and morning so the body becomes used to a single routine, even on the weekends.  Engage in daily physical activity, but not 2-3 hours before bedtime.   No technology use (television, computer, iPad) 1-2 hours before bed.   Have a wind down routine (e.g., soft lights in the house, bath before bed, reduced fluid intake, songs, reading, less noise) to promote sleep readiness.   Visit the www.sleepfoundation.org for more strategies.      COGNITIVE HYGIENE     Engage in regular exercise, which increases alertness and arousal and can improve attention and focus.  Consider lower impact exercises, such as yoga or light walking.  Get a good nights sleep, as this can enhance alertness and cognition.  Eat healthy foods and balanced meals. It is notable that research indicates certain nutrients may aid in brain function, such as B vitamins (especially B6, B12, and folic acid), antioxidants (such as vitamins C and E, and  beta carotene), and Omega-3 fatty acids. Talk with your physician or nutritionist about whats right for you.   Keep your brain active. Find activities to stay mentally active, such as reading, games (cards, checkers), puzzles (crosswords, Sudoku, jig saw), crafts (models, woodworking), gardening, or participating in activities in the community.  Stay socially engaged. Continue staying active with your family and friends.      FUTURE PLANNING     The patient and caregivers should consider formal arrangements to allow a designated person to make medical and financial decisions for the pt, should he/she become unable to do so.  Options to consider include designating a healthcare proxy, medical and/or financial power of , and completing advanced directives for healthcare decisions and estate planning (e.g., finalizing a will).  If cost is prohibitive, [facility] Legal Doctor Evidence (https://[employer]/) provides free  for individuals with low income.       ADDITIONAL RESOURCES     Alzheimer's Services of the [facility] (www.http://alzbr.org) have good local caregiver and patient resources.   Consider resources for support through the Governors Office of Elderly Affairs (http://goea.[place].[facility]/), [place] Chapter of the Alzheimers Association (www.alz.org/[place]/), the Family Caregiver [place] (www.caregiver.org), and the American Psychological Association (http://www.apa.org/pi/about/publications/caregivers/consumers/index.aspxconsumers/index.aspx).  For More Information About Hallucinations, Delusions, and Paranoia in Alzheimer's [facility] Alzheimer's and related Dementias Education and Referral (ADEAR) Center 1-132.456.9564 (toll-free) jocyar@alana.nih.gov www.alana.nih.gov/alzheimers The National [address] on Aging's ADEAR Center offers information and free print publications about Alzheimer's disease and related dementias for families, caregivers, and health professionals. ADEAR Center staff  answer telephone, email, and written requests and make referrals to local and national resources            MEDICAL/SURGICAL COMORBIDITIES     All relevant medical comorbidities noted and managed by primary care physician and medical care team.          MISCELLANEOUS MEDICAL PROBLEMS       HEALTHY LIFESTYLE AND PREVENTATIVE CARE    Encouraged the patient to adhere to the age-appropriate health maintenance guidelines including screening tests and vaccinations.     Discussed the overall importance of healthy lifestyle, optimal weight, exercise, healthy diet, good sleep hygiene and avoiding drugs including smoking, alcohol and recreational drugs. The patient verbalized full understanding.       Advised the patient to follow COVID-19 prevention measures.       I spent 145 minutes more than 50 % face to face with the patient    Time spent in counseling and coordination of care including discussions etiology of diagnosis, pathogenesis of diagnosis, prognosis of diagnosis,, diagnostic results, impression and recommendations, diagnostic studies, management, risks and benefits of treatment, instructions of disease self-management, treatment instructions, follow up requirements, patient and family counseling/involvement in care compliance with treatment regimen. All of the patient's questions were answered during this discussion.     Olayinka Patten, MSN NP      Collaborating Provider: Teofilo Friedman MD, FAAN Neurologist/Epileptologist

## 2024-02-23 NOTE — PATIENT INSTRUCTIONS
HOME CARE     Has home care     Home Palliative care       Falling Down Precautions. Gait is affected by the disease as well.     Avoid driving and access to firearms     Incremental 24/Care     Help with finances and decision making.    Join support group.    Proofing the house and use labeling.    Avoid antihistamines and anticholinergics.    Avoid changing routine.    Use written reminders.    Avoid multitasking.    Healthy diet, exercise (physical and cognitive).    Good sleep hygiene.        HERE ARE 10 WAYS TO REDUCE RISK OF DEMENTIA AND SLOW DOWN THE PROGRESSION OF DEMENTIA        1.Be physically active.    2. Avoid smoking and alcohol consumption.    3. Track your numbers. Keep your blood pressure, cholesterol, blood sugar and weight within recommended ranges.    4. Stay socially connected.    5. Make healthy food choices. Eat a well-balance and healthy diet that rich in cereals, fish, legumes, and vegetables.    6. Reduce stress.     7. Challenge your brain by trying something new, playing games, or learning a new language.    8. Take care of your hearing. Avoid being continuously exposed to loud sounds and wear a hearing aid if hearing does become a problem.    9. Lower risk of falls. Consider installing handrails on all stairs and grab bars in bathrooms.    10. Reduce your exposure to air pollution, such as exposure to exhaust in heavy traffic.         CAREGIVERS COUNSELING     Recommend reading the following books:     The 36-Hour Day and there are many online and printed resources to help caregivers as well.     Alzheimer's Through the Stages.     Dementia with G.R.A.C.E.            PREVENTION OF DELIRIUM       1. Good hydration and avoid electrolyte imbalance  2. Recognize and treat infections immediately especially UTI.  3. Bladder emptying and prevent constipation.   4. Provide stimulating activities and familiar objects  5. Use eyeglasses and hearing aids if needed.   6. Use simple and regular  "communication about people, current place, and time  7. Mobility and range-of-motion exercises  8. Reduce noise, lighting and avoid sleep interruptions  9. Non-narcotic pain management.  10.Nondrug treatment for sleep problems or anxiety  11. Avoid antihistamines.  12. Avoid narcotics.  13. Avoid benzodiazepines.            HELPFUL STRATEGIES FOR THE FAMILY AND CAREGIVERS        BEHAVIORAL MANAGEMENT STRATEGIES     Remember that you cannot reason with acute psychosis or confusion. Unless there is an immediate safety issue, there is no need to challenge or correct the person.  For example, if a pt is hallucinating, do not argue with the person that what they are seeing is not real. If they are expressing paranoia, empathize gently with their fear, and attempt to redirect them to a different activity.   If the person is particularly stuck on a topic or activity, as long as the activity is safe and is not worsening their agitation, you don't need to intervene.     Communicate calmly, simply, and concisely    Reassure the person that they are safe and you are here to help  Try not to express irritation or anger  Speak quietly and calmly, do not shout or threaten the person. Avoid provocation.   Establish verbal contact and provide orientation and reassurance.  Attempt to identify the patient's wants and feelings, listen to what they are saying, and reflect those wants and feelings back to the patient.  Dont use sarcasm as a weapon    Set clear limits on behavior in a calm voice ("You cannot hit the nurse.")  Offer choices and optimism ("Which toothpaste would you like to use today?")    Redirect the person to an alternative behavior ("Can you come help me with this?)    Avoid saying things like, "We already went over this!" "You can't keep doing this." "I already explained this to you"  Instead, simply nod calmly and acknowledge what the person is saying ("Yes, that makes sense."), and then request their help or " company in a different behavior.   Having a mental list of activities the patient enjoys can be helpful with redirection. For example, do they enjoy going for walks? Eating a piece of candy?   If redirection becomes challenging, you can place objects that your family member enjoys strategically in the home in order to use for distraction. This is particularly useful if there are items that they often talk about or frequently ask you questions about; or if they are visually striking. Once you focus the person on this object, talk about it briefly until they are calm and then attempt to redirect to a new behavior.   Sometimes activities which are repetitive can be calming, particularly if there is a comforting sensory element. For example, pt may enjoy folding soft towels or laundry.    Limit overstimulation    Decrease distractions by turning off the TV, computer, any fluorescent lights that hum, etc.  Ask any casual visitors to leave--the fewer people the better  If the person is very agitated, avoid touching them, and respect their personal space  Sit down and ask the person to sit down also     PREVENTATIVE STRATEGIES     Try to help the patient develop a routine and stick to it  Address all immediate safety issues  Does the person still have access to the car and keys? Take the keys away, or disconnect the car battery.  Are they wandering at night? Install locks on the outside doors. A keypad lock works well. Alarms on bedroom doors can also be helpful.   Are they turning on the stove and risking a fire? If you cannot limit their access to the kitchen, you may need to unplug dangerous devices any time you are not in the room.   If the person is exhibiting poor judgment throughout the day, they likely need someone supervising them at all times.   Do they still have access to significant financial assets? Limit their access to accounts, and consult with a  if necessary.   Identify situations that seem to  "trigger agitation or a problematic behavior, and modify the person's environment to minimize or eliminate that trigger.   For example, is their behavior worse if they don't get a good night's sleep? Or if they don't eat or drink enough? If so, prioritize those activities and build behavior plans to support them.  Do they get upset about not being allowed to answer the phone when it rings? Put all of the phones in the house on "silent."   Do they become paranoid that certain family members are trying to take advantage of them? Limit contact with the targeted family member as much as possible, and re-introduce them slowly after some time has passed.   Encourage independence in daily living whenever safely possible  Encourage collaborative decision-making regarding his care as well as daily activities  Encourage regular physical exercise  Address issues that may be impacting sleep   Ex: Urology consult for frequent nighttime urination, address chronic pain that may be interfering with sleep, moving to a different room if his roommate snores loudly, make sure the room is a comfortable temperature and he has adequate pillows and blankets  Ensure he gets out into the sunlight at least once per day to encourage regular circadian cycle  No caffeine, sugar, or large meals within two hours of bedtime   Make sure room at night is dark and quiet and minimize nighttime interruptions from staff unless medically necessary   Try to help pt go to sleep and wake up at the same time every day  Monitor closely for worsening psychiatric symptoms (insomnia, social withdrawal, deterioration of personal hygiene, hostility, confusing or nonsensical speech, paranoia, hallucinations) and intervene promptly. Assess for possible antecedents, modify environment appropriately.            SLEEP HYGIENE     Poor sleep has a negative effect on cognition. Several strategies have been shown to improve sleep:     Caffeine intake in the afternoon and " evening, as well as stuffing oneself at supper, can decrease the quality of restful sleep throughout the night.   Bedtime and wake-up times should be consistent every night and morning so the body becomes used to a single routine, even on the weekends.  Engage in daily physical activity, but not 2-3 hours before bedtime.   No technology use (television, computer, iPad) 1-2 hours before bed.   Have a wind down routine (e.g., soft lights in the house, bath before bed, reduced fluid intake, songs, reading, less noise) to promote sleep readiness.   Visit the www.sleepfoundation.org for more strategies.      COGNITIVE HYGIENE     Engage in regular exercise, which increases alertness and arousal and can improve attention and focus.  Consider lower impact exercises, such as yoga or light walking.  Get a good nights sleep, as this can enhance alertness and cognition.  Eat healthy foods and balanced meals. It is notable that research indicates certain nutrients may aid in brain function, such as B vitamins (especially B6, B12, and folic acid), antioxidants (such as vitamins C and E, and beta carotene), and Omega-3 fatty acids. Talk with your physician or nutritionist about whats right for you.   Keep your brain active. Find activities to stay mentally active, such as reading, games (cards, checkers), puzzles (crosswords, Sudoku, jig saw), crafts (models, woodworking), gardening, or participating in activities in the community.  Stay socially engaged. Continue staying active with your family and friends.      FUTURE PLANNING     The patient and caregivers should consider formal arrangements to allow a designated person to make medical and financial decisions for the pt, should he/she become unable to do so.  Options to consider include designating a healthcare proxy, medical and/or financial power of , and completing advanced directives for healthcare decisions and estate planning (e.g., finalizing a will).  If  cost is prohibitive, [facility] Legal Services (https://[facility].org/) provides free  for individuals with low income.       ADDITIONAL RESOURCES     Alzheimer's Services of the [facility] (www.http://alzbr.org) have good local caregiver and patient resources.   Consider resources for support through the [facility]ly [facility] (http://goea.[place].[facility]/), [place] Chapter of the Alzheimers Association (www.alz.org/[facility]/), the Family Caregiver [place] (www.caregiver.org), and the American Psychological Association (http://www.apa.org/pi/about/publications/caregivers/consumers/index.aspxconsumers/index.aspx).  For More Information About Hallucinations, Delusions, and Paranoia in Alzheimer's [facility] Alzheimer's and related Dementias Education and Referral (ADEAR) Center 1-103.906.6098 (toll-free) adear@alana.nih.gov www.alana.nih.gov/alzheimers The National [place] on Aging's ADEAR Center offers information and free print publications about Alzheimer's disease and related dementias for families, caregivers, and health professionals. ADEAR Center staff answer telephone, email, and written requests and make referrals to local and national resources

## 2024-02-24 RX ORDER — ATORVASTATIN CALCIUM 10 MG/1
TABLET, FILM COATED ORAL
Qty: 90 TABLET | Refills: 3 | Status: SHIPPED | OUTPATIENT
Start: 2024-02-24

## 2024-02-24 NOTE — TELEPHONE ENCOUNTER
No care due was identified.  [facility] Embedded Care Due Messages. Reference number: [id number].   2/23/2024 9:36:58 PM CST

## 2024-02-24 NOTE — TELEPHONE ENCOUNTER
Refill Decision Note   Irlanda Lopez  is requesting a refill authorization.  Brief Assessment and Rationale for Refill:  Approve     Medication Therapy Plan:         Comments:     Note composed:8:18 AM 02/24/2024

## 2024-02-25 ENCOUNTER — PATIENT MESSAGE (OUTPATIENT)
Dept: PRIMARY CARE CLINIC | Facility: CLINIC | Age: 84
End: 2024-02-25
Payer: MEDICARE

## 2024-02-26 ENCOUNTER — PATIENT MESSAGE (OUTPATIENT)
Dept: NEUROLOGY | Facility: CLINIC | Age: 84
End: 2024-02-26
Payer: MEDICARE

## 2024-03-01 ENCOUNTER — PATIENT MESSAGE (OUTPATIENT)
Dept: NEUROLOGY | Facility: CLINIC | Age: 84
End: 2024-03-01
Payer: MEDICARE

## 2024-03-01 ENCOUNTER — TELEPHONE (OUTPATIENT)
Dept: NEUROLOGY | Facility: CLINIC | Age: 84
End: 2024-03-01
Payer: MEDICARE

## 2024-03-01 ENCOUNTER — HOSPITAL ENCOUNTER (OUTPATIENT)
Dept: RADIOLOGY | Facility: HOSPITAL | Age: 84
Discharge: HOME | End: 2024-03-01
Attending: NURSE PRACTITIONER
Payer: MEDICARE

## 2024-03-01 DIAGNOSIS — F41.1 GAD (GENERALIZED ANXIETY DISORDER): ICD-10-CM

## 2024-03-01 DIAGNOSIS — R41.3 OTHER AMNESIA: ICD-10-CM

## 2024-03-01 DIAGNOSIS — F02.818 MAJOR NEUROCOGNITIVE DISORDER DUE TO ALZHEIMER'S DISEASE, WITH BEHAVIORAL DISTURBANCE: ICD-10-CM

## 2024-03-01 DIAGNOSIS — N39.41 URGE URINARY INCONTINENCE: ICD-10-CM

## 2024-03-01 DIAGNOSIS — Z86.73 HISTORY OF LACUNAR CEREBROVASCULAR ACCIDENT (CVA): ICD-10-CM

## 2024-03-01 DIAGNOSIS — F03.90 DEMENTIA, UNSPECIFIED DEMENTIA SEVERITY, UNSPECIFIED DEMENTIA TYPE, UNSPECIFIED WHETHER BEHAVIORAL, PSYCHOTIC, OR MOOD DISTURBANCE OR ANXIETY: ICD-10-CM

## 2024-03-01 DIAGNOSIS — K91.89: ICD-10-CM

## 2024-03-01 DIAGNOSIS — G30.9 MAJOR NEUROCOGNITIVE DISORDER DUE TO ALZHEIMER'S DISEASE, WITH BEHAVIORAL DISTURBANCE: ICD-10-CM

## 2024-03-01 PROCEDURE — 70551 MRI BRAIN STEM W/O DYE: CPT | Mod: TC,PN

## 2024-03-01 PROCEDURE — 70551 MRI BRAIN STEM W/O DYE: CPT | Mod: 26,,, | Performed by: STUDENT IN AN ORGANIZED HEALTH CARE EDUCATION/TRAINING PROGRAM

## 2024-03-01 NOTE — TELEPHONE ENCOUNTER
----- Message from Nancy Patten NP sent at 3/1/2024  3:41 PM CST -----  MRI Brain WO:    03-    MRI Brain No acute intracranial abnormality.     Moderate global cortical atrophy without lobar predominance.  Score 3 bilateral medial temporal lobe atrophy.     Several punctate foci of susceptibility artifact in the peripheral aspect of the posterior cerebral hemispheres likely related to remote microhemorrhage, possible cerebral amyloid angiopathy.    Result consistent with Alzheimer's Disease related it is consistent with exam findings.

## 2024-03-01 NOTE — TELEPHONE ENCOUNTER
----- Message from Stanislav Bermudez sent at 3/1/2024  3:58 PM CST -----  Contact: daughter  ..Type:  Patient Returning Call    Who Called:.Irlanda Lopez  Who Left Message for Patient:  Does the patient know what this is regarding?:MRI results   Would the patient rather a call back or a response via [facility]? Call back   Best Call Back Number:[phone]  Additional Information: Myla states she missed a call

## 2024-03-01 NOTE — TELEPHONE ENCOUNTER
Daughter would like you to compare MRI from 2017 to her current recent one in 2024.  She would also like to know base off the score of her MOCA test does it match the to her results on the MRI. She would like to know if her mother is consider mild dementia or severe base of the MRI.

## 2024-03-01 NOTE — PROGRESS NOTES
MRI Brain WO:    03-    MRI Brain No acute intracranial abnormality.    Moderate global cortical atrophy without lobar predominance.  Score 3 bilateral medial temporal lobe atrophy.    Several punctate foci of susceptibility artifact in the peripheral aspect of the posterior cerebral hemispheres likely related to remote microhemorrhage, possible cerebral amyloid angiopathy.    Result consistent with Alzheimer's Disease related it is consistent with exam findings.

## 2024-03-04 ENCOUNTER — PATIENT MESSAGE (OUTPATIENT)
Dept: PRIMARY CARE CLINIC | Facility: CLINIC | Age: 84
End: 2024-03-04
Payer: MEDICARE

## 2024-03-04 NOTE — TELEPHONE ENCOUNTER
I would recommend taking a half tablet first for one week then tirtrate to a whole tablet if tolerated

## 2024-03-05 ENCOUNTER — PATIENT MESSAGE (OUTPATIENT)
Dept: NEUROLOGY | Facility: CLINIC | Age: 84
End: 2024-03-05
Payer: MEDICARE

## 2024-03-05 ENCOUNTER — TELEPHONE (OUTPATIENT)
Dept: NEUROLOGY | Facility: CLINIC | Age: 84
End: 2024-03-05
Payer: MEDICARE

## 2024-03-05 NOTE — TELEPHONE ENCOUNTER
----- Message from Donna Vines sent at 3/5/2024  8:55 AM CST -----  Contact: Myla (Daughter)  Pts daughter was returning the missed phone call. Please call her back at [phone].    Thanks  TS

## 2024-03-12 ENCOUNTER — OFFICE VISIT (OUTPATIENT)
Dept: PRIMARY CARE CLINIC | Facility: CLINIC | Age: 84
End: 2024-03-12
Payer: MEDICARE

## 2024-03-12 DIAGNOSIS — F01.C4 SEVERE VASCULAR DEMENTIA WITH ANXIETY: Primary | Chronic | ICD-10-CM

## 2024-03-12 DIAGNOSIS — Z93.3 COLOSTOMY IN PLACE: ICD-10-CM

## 2024-03-12 DIAGNOSIS — F42.4 EXCORIATION (SKIN-PICKING) DISORDER: ICD-10-CM

## 2024-03-12 DIAGNOSIS — F41.1 GAD (GENERALIZED ANXIETY DISORDER): ICD-10-CM

## 2024-03-12 PROCEDURE — G2211 COMPLEX E/M VISIT ADD ON: HCPCS | Mod: 95,,, | Performed by: FAMILY MEDICINE

## 2024-03-12 PROCEDURE — 99215 OFFICE O/P EST HI 40 MIN: CPT | Mod: 95,,, | Performed by: FAMILY MEDICINE

## 2024-03-12 NOTE — PROGRESS NOTES
Subjective:      Patient ID: Irlanda Lopez is a 83 y.o. female.    Chief Complaint: Follow-up (F/u Neurology /Questions about side effects r/t Risperdal /Sinus issues.)    The patient location is: home  Visit type: video and audio simultaneous    Patient is a 83 y.o. female coming in today for f/u. Family is present during visit.   Family report pt is on home health. Discussed differences of palliative care vs home health. Family reports pt has not been f/u by any specialist at this time due to her being agitated when leaving her home. Family have not started Risperdal medication due to concerns of side effects. Discussed at length risks vs benefits of medication. Pt is currently on Fluoxetine. Family reports pt continues to have OCD and picking tendencies. No other health concern at this time.         [facility] Reason For Visit    3/12/2024  2:23 PM CDT - Filed by Patient   What is your primary reason for visit? Other/Annual   Have you experienced any of the following:   Change in activity? No   Unexpected weight change? No   Neck pain? No   Hearing loss? Yes   Runny nose? Yes   Trouble swallowing? No   Eye discharge? No   Changes in vision? No   Chest tightness? No   Wheezing? No   Chest pain? No   Heart beating fast or racing? No   Blood in stool? No   Constipation? No   Vomiting? No   Diarrhea? No   Drinking much more than usual? No   Urinating much more than usual? No   Difficulty urinating? No   Blood in the urine? No   Menstrual problem? No   Painful urination? No   Joint swelling? No   Joint pain? No   Headaches? No   Weakness? No   Confusion? Yes   Feeling depressed? No         Pmh, Psh, Family Hx, Social Hx updated in Epic Tabs today.     LABS:   Lab Results   Component Value Date    WBC 7.60 01/10/2024    HGB 13.0 01/10/2024    HCT 41.1 01/10/2024     01/10/2024    CHOL 159 01/10/2024    TRIG 86 01/10/2024    HDL 38 (L) 01/10/2024    ALT 16 01/10/2024    AST 24 01/10/2024     01/10/2024     K 4.0 01/10/2024     01/10/2024    CREATININE 0.9 01/10/2024    BUN 11 01/10/2024    CO2 22 (L) 01/10/2024    TSH 1.780 01/10/2024    INR 1.0 06/10/2019    HGBA1C 5.3 01/10/2024       MRI Brain Without Contrast  Narrative: EXAMINATION:  MRI BRAIN WITHOUT CONTRAST    CLINICAL HISTORY:  Memory loss;Mental status change, unknown cause; Unspecified dementia, unspecified severity, without behavioral disturbance, psychotic disturbance, mood disturbance, and anxiety    TECHNIQUE:  Multiplanar multisequence MR imaging of the brain was performed without contrast.    COMPARISON:  MRI dated 07/28/2017    FINDINGS:  Moderate global cortical atrophy without lobar predominance.  No evidence of hydrocephalus.  Score 3 bilateral medial temporal lobe atrophy.    Mild T2/FLAIR hyperintense signal noted in the periventricular and subcortical cerebral white matter.    Several punctate foci of susceptibility artifact noted in the peripheral aspect of the posterior cerebral hemispheres.    No evidence of acute territorial infarct, acute intracranial hemorrhage, or mass lesion.  No abnormal restricted diffusion.  No significant midline shift or mass effect.    Midline structures and posterior fossa structures are otherwise unremarkable.  Basal cisterns are clear.  Major vascular flow voids are unremarkable.    Cranium is unremarkable.  Postsurgical appearance of the bilateral orbital lens.  Orbits and globes otherwise within normal limits.  Mild mucosal thickening of the right maxillary sinus.  Remaining paranasal sinuses and mastoid air cells are essentially clear.  Impression: No acute intracranial abnormality.    Moderate global cortical atrophy without lobar predominance.  Score 3 bilateral medial temporal lobe atrophy.    Nonspecific white matter changes likely related to chronic microvascular ischemia    Several punctate foci of susceptibility artifact in the peripheral aspect of the posterior cerebral hemispheres likely  related to remote microhemorrhage, possible cerebral amyloid angiopathy.    Electronically signed by: Govind Hamilton  Date:    03/01/2024  Time:    13:19        Review of Systems   Constitutional:  Negative for activity change and unexpected weight change.   HENT:  Positive for hearing loss and rhinorrhea. Negative for trouble swallowing.    Eyes:  Negative for discharge and visual disturbance.   Respiratory:  Negative for chest tightness and wheezing.    Cardiovascular:  Negative for chest pain and palpitations.   Gastrointestinal:  Negative for blood in stool, constipation, diarrhea and vomiting.   Endocrine: Negative for polydipsia and polyuria.   Genitourinary:  Negative for difficulty urinating, dysuria, hematuria and menstrual problem.   Musculoskeletal:  Negative for arthralgias, joint swelling and neck pain.   Neurological:  Negative for weakness and headaches.   Psychiatric/Behavioral:  Positive for confusion. Negative for dysphoric mood.      Objective:   There were no vitals filed for this visit.  Wt Readings from Last 10 Encounters:   02/23/24 70.3 kg (154 lb 15.7 oz)   01/09/24 70.8 kg (156 lb 1.4 oz)   11/07/23 68.5 kg (151 lb 0.2 oz)   10/05/23 66.7 kg (147 lb 0.8 oz)   09/19/23 66.7 kg (147 lb 2.5 oz)   06/15/23 68.9 kg (151 lb 12.6 oz)   06/05/23 66.7 kg (147 lb 0.8 oz)   04/20/23 66.7 kg (147 lb 0.8 oz)   04/20/23 66.7 kg (147 lb 0.8 oz)   04/04/23 66.3 kg (146 lb 2.6 oz)     Physical Exam  Vitals reviewed.   Constitutional:       General: She is awake. She is not in acute distress.     Appearance: Normal appearance. She is well-developed, well-groomed and normal weight. She is not ill-appearing.   HENT:      Head: Normocephalic and atraumatic.      Right Ear: External ear normal.      Left Ear: External ear normal.      Nose: Nose normal.      Mouth/Throat:      Lips: Pink.   Eyes:      Conjunctiva/sclera: Conjunctivae normal.   Pulmonary:      Effort: Pulmonary effort is normal.    Neurological:      Mental Status: She is alert.   Psychiatric:         Attention and Perception: Attention and perception normal. She is attentive.         Mood and Affect: Mood and affect normal. Mood is not anxious or depressed. Affect is not labile, blunt, angry or inappropriate.         Speech: Speech normal. She is communicative. Speech is not rapid and pressured, delayed, slurred or tangential.         Behavior: Behavior normal. Behavior is not agitated, slowed, aggressive, withdrawn, hyperactive or combative. Behavior is cooperative.         Thought Content: Thought content normal. Thought content is not paranoid or delusional. Thought content does not include homicidal or suicidal ideation. Thought content does not include homicidal or suicidal plan.         Cognition and Memory: Cognition and memory normal. Memory is not impaired. She does not exhibit impaired recent memory or impaired remote memory.         Judgment: Judgment normal. Judgment is not impulsive or inappropriate.       Assessment:     1. Severe vascular dementia with anxiety    2. Excoriation (skin-picking) disorder    3. MARTIN (generalized anxiety disorder)      Plan:   Irlanda was seen today for follow-up.    Diagnoses and all orders for this visit:    Severe vascular dementia with anxiety    Excoriation (skin-picking) disorder    MARTIN (generalized anxiety disorder)      Instructed to stop Fluoxetine at this time; continue with Buspirone 10 mg tid.   Strongly advised to try Risperdal for OCD and picking tendencies treatment.  Discussed black box warning, risks and benefits of medication and fda approval reasons for use of this medication for severe dementia with agitation and ocd disorder that has failed ssri treatment.   Handicap tag provided to pt today to .   Discussed risks and benefits of HH vs pallative care vs hospice treatment and goals of care with patient.   Will reach out to Neurology NP to assist with patient further and  "follow up on medication management with severe dementia.   Instructed to continue f/u with specialists as needed. Instructed to f/u in 3 months with me chronic conditions.     I spoke with daughter Myla Ansari" today in regards to her concerns for the medications with her mom, particularly the risperdal and discussed options to explain differences between Home health vs pallative care/hospice. She is going to look at these options with siblings. She tried the risperidal before bid but was too sleepy, falling asleep in her oatmeal etc. I advised them to retry it as the 1/2 tablet at bedtime as you suggested. I also advised to fully stop the prozac. Can do the buspar 10mg tid as you mentioned. She does need follow up as she has a lot questions in regards to these medications, so I see you put 8 week virtual visit follow up with her but that appt is out to August. Can you see about scheduling her a virtual visit in the next 1-2 months with you to see how she is doing with the meds? I'm happy to fill in the gaps when I can. Thanks. Myla Arias MD    Face to Face time with patient: 4:04 PM-4:31 PM        45   minutes of total time spent on the encounter, which includes face to face time and non-face to face time preparing to see the patient (eg, review of tests), Obtaining and/or reviewing separately obtained history, Documenting clinical information in the electronic or other health record, Independently interpreting results (not separately reported) and communicating results to the patient/family/caregiver, or Care coordination (not separately reported).     Each patient to whom he or she provides medical services by telemedicine is:  (1) informed of the relationship between the physician and patient and the respective role of any other health care provider with respect to management of the patient; and (2) notified that he or she may decline to receive medical services by telemedicine and may withdraw from such " care at any time.    Visit today included increased complexity associated with the care of the episodic problem ocd, anxiety, dementia addressed and managing the longitudinal care of the patient due to the serious and/or complex managed problem(s) dementia advanced with anxiety disorder.    Follow up in about 3 months (around 6/12/2024) for f/u Telemed Dr Arias / f/u medications .    There are no Patient Instructions on file for this visit.    Scribe Attestation:   I, Daniel Sanderson, am scribing for, and in the presence of, Dr. Myla Arias MD. I performed the above scribed service and the documentation accurately describes the services I performed. I attest to the accuracy of the note.    I, Dr. Myla Arias MD, reviewed documentation as scribed above. I personally performed the services described in this documentation.  I agree that the record reflects my personal performance and is accurate and complete. Myla Arias MD.    03/12/2024

## 2024-03-12 NOTE — Clinical Note
"I spoke with daughter Myla Ansari" today in regards to her concerns for the medications with her mom, particularly the risperdal and discussed options to explain differences between Home health vs pallative care/hospice. She is going to look at these options with siblings. She tried the risperidal before bid but was too sleepy, falling asleep in her oatmeal etc. I advised them to retry it as the 1/2 tablet at bedtime as you suggested. I also advised to fully stop the prozac. Can do the buspar 10mg tid as you mentioned. She does need follow up as she has a lot questions in regards to these medications, so I see you put 8 week virtual visit follow up with her but that appt is out to August. Can you see about scheduling her a virtual visit in the next 1-2 months with you to see how she is doing with the meds? I'm happy to fill in the gaps when I can. Thanks. Myla Arias MD "

## 2024-03-13 ENCOUNTER — PATIENT MESSAGE (OUTPATIENT)
Dept: NEUROLOGY | Facility: CLINIC | Age: 84
End: 2024-03-13
Payer: MEDICARE

## 2024-03-13 DIAGNOSIS — F01.C4 SEVERE VASCULAR DEMENTIA WITH ANXIETY: Chronic | ICD-10-CM

## 2024-03-13 DIAGNOSIS — G31.84 MILD COGNITIVE IMPAIRMENT WITH MEMORY LOSS: Primary | ICD-10-CM

## 2024-03-14 PROCEDURE — G0180 MD CERTIFICATION HHA PATIENT: HCPCS | Mod: ,,, | Performed by: NURSE PRACTITIONER

## 2024-03-19 ENCOUNTER — PATIENT MESSAGE (OUTPATIENT)
Dept: NEUROLOGY | Facility: CLINIC | Age: 84
End: 2024-03-19
Payer: MEDICARE

## 2024-03-26 ENCOUNTER — TELEPHONE (OUTPATIENT)
Dept: UROLOGY | Facility: CLINIC | Age: 84
End: 2024-03-26
Payer: MEDICARE

## 2024-03-27 ENCOUNTER — TELEPHONE (OUTPATIENT)
Dept: PRIMARY CARE CLINIC | Facility: CLINIC | Age: 84
End: 2024-03-27
Payer: MEDICARE

## 2024-03-27 ENCOUNTER — OFFICE VISIT (OUTPATIENT)
Dept: NEUROLOGY | Facility: CLINIC | Age: 84
End: 2024-03-27
Payer: MEDICARE

## 2024-03-27 ENCOUNTER — PATIENT MESSAGE (OUTPATIENT)
Dept: NEUROLOGY | Facility: CLINIC | Age: 84
End: 2024-03-27

## 2024-03-27 ENCOUNTER — LAB VISIT (OUTPATIENT)
Dept: LAB | Facility: HOSPITAL | Age: 84
End: 2024-03-27
Attending: NURSE PRACTITIONER
Payer: MEDICARE

## 2024-03-27 VITALS
BODY MASS INDEX: 28.8 KG/M2 | DIASTOLIC BLOOD PRESSURE: 73 MMHG | WEIGHT: 156.5 LBS | RESPIRATION RATE: 16 BRPM | HEART RATE: 59 BPM | HEIGHT: 62 IN | SYSTOLIC BLOOD PRESSURE: 118 MMHG

## 2024-03-27 DIAGNOSIS — R35.0 URINARY FREQUENCY: ICD-10-CM

## 2024-03-27 DIAGNOSIS — R39.15 URGENCY OF URINATION: ICD-10-CM

## 2024-03-27 DIAGNOSIS — R35.0 URINARY FREQUENCY: Primary | ICD-10-CM

## 2024-03-27 LAB
BACTERIA #/AREA URNS AUTO: ABNORMAL /HPF
BILIRUB UR QL STRIP: ABNORMAL
CLARITY UR REFRACT.AUTO: ABNORMAL
COLOR UR AUTO: ABNORMAL
GLUCOSE UR QL STRIP: NEGATIVE
HGB UR QL STRIP: ABNORMAL
HYALINE CASTS UR QL AUTO: 0 /LPF
KETONES UR QL STRIP: NEGATIVE
LEUKOCYTE ESTERASE UR QL STRIP: ABNORMAL
MICROSCOPIC COMMENT: ABNORMAL
NITRITE UR QL STRIP: POSITIVE
PH UR STRIP: 7 [PH] (ref 5–8)
PROT UR QL STRIP: ABNORMAL
RBC #/AREA URNS AUTO: 17 /HPF (ref 0–4)
SP GR UR STRIP: 1.01 (ref 1–1.03)
URN SPEC COLLECT METH UR: ABNORMAL
WBC #/AREA URNS AUTO: >100 /HPF (ref 0–5)
WBC CLUMPS UR QL AUTO: ABNORMAL

## 2024-03-27 PROCEDURE — 99214 OFFICE O/P EST MOD 30 MIN: CPT | Mod: S$GLB,,, | Performed by: NURSE PRACTITIONER

## 2024-03-27 PROCEDURE — 81001 URINALYSIS AUTO W/SCOPE: CPT | Performed by: NURSE PRACTITIONER

## 2024-03-27 PROCEDURE — 99999 PR PBB SHADOW E&M-EST. PATIENT-LVL IV: CPT | Mod: PBBFAC,,, | Performed by: NURSE PRACTITIONER

## 2024-03-27 RX ORDER — RISPERIDONE 0.5 MG/1
0.5 TABLET ORAL NIGHTLY
Qty: 30 TABLET | Refills: 11 | Status: SHIPPED | OUTPATIENT
Start: 2024-03-27 | End: 2025-03-27

## 2024-03-27 NOTE — PROGRESS NOTES
Subjective:       Patient ID: Irlanda Lopez is a 83 y.o. female.    Chief Complaint: Update Plan Of Care      Former Patient of Dr. Mango MD Neurologist.     HPIThe patient is here for memory loss evaluation. The patient is accompanied by daughter.      The patient is new to me. presenting with memory changes that began in 2017. The patient was previous evaluated in 2017 at [facility] and was Dx with dementia and started on DMA.  The patient has had continual cognitive decline. She has problems related to progressive short term memory loss and behavior changes. For example, the patient would repeat the same question repeatedly. In the late after noon she gets confused. She forgets conversations, and she forget what she ate. She has been retired many years but she gets up and packs her car and tries to go work. Other days the patient doesn't want to leave the house. The patient excessively forgets where placed certain things. She has a colostomy and she was able to mange it independently but now she requires assistance from caretakers. She has 24 hour care. The patient is no longer driving and she would get lost. Patient has confusion around and inside the house. Patient has  trouble remembering the date and time. Patient has caretakers that manage medications and family manage appointments and keeping up with major holidays and political changes. The patient is independent in handling finances. The patient is still independent with ADLs. Patient has some agitation or aggression occasionally. Patient has hallucinations or delusions. No seizures. No language problems. No problems handling tools. Patient has a history of strokes unable to recall details, patient family discovered old stroke on MRI testing in 2017. No history of headaches. No history of hypothyroidism. No history of alcoholism. No history of B12 deficiency.  Hx depression Prozac 20 mg daily, takes Xanax 0.25 mg po BID. No history of bipolar disorder. No  history of Untreated Syphilis.  No history of HIV infection. No toxic exposures.  No history of traumatic brain injury. Mild tremors noted.  No falls. Wide base gait.  No urinary incontinence. Bowel incontinence has a colostomy.  No change in sleep and appetite. Mother had dementia in 80 years of age. Patient was started on Aricept and Namenda in 2017 but she was unable to tolerate Aricept caused GI upset. She continue take Namenda 10 mg po BID.         INTERVAL HISTORY 03-: Patient present for follow up for follow up memory and behavior manage. Patient accompanied by daughter. Patient doing well. Patient daughter reports she is no longer taking Prozac 20 mg daily and no longer taking Xanax.     Tolerating Buspar 10 mg po recentlty started BID no issues, anxiety persist but will give Buspar titration more time at current dose. Patient daughter agrees to plan of care. Risperdal 0.5 mg po BID caused excessive drowsiness recommend decreasing frequency to bedtimes.     Patient is having difficulty sleeping but will hold off on starting medication for sleep to evaluate the Risperdal response.     MRI Brain WO 03- No acute intracranial abnormality. Result consistent with Alzheimer's Disease related it is consistent with exam findings.        Review of Systems   Unable to perform ROS: Dementia   HENT:  Positive for hearing loss.    Gastrointestinal:         Colostomy    Musculoskeletal:  Positive for arthralgias and back pain.   Psychiatric/Behavioral:  Positive for behavioral problems, confusion, decreased concentration and dysphoric mood. The patient is nervous/anxious.                  Current Outpatient Medications:     atorvastatin (LIPITOR) 10 MG tablet, TAKE 1 TABLET BY MOUTH ONCE  DAILY, Disp: 90 tablet, Rfl: 3    busPIRone (BUSPAR) 10 MG tablet, Take 1 tablet (10 mg total) by mouth 3 (three) times daily., Disp: 90 tablet, Rfl: 11    hyoscyamine (LEVSIN/SL) 0.125 mg Subl, Place 2 tablets (0.25 mg  total) under the tongue every 4 (four) hours as needed (spasms)., Disp: 40 tablet, Rfl: 1    memantine (NAMENDA) 10 MG Tab, Take 1 tablet (10 mg total) by mouth 2 (two) times daily., Disp: 180 tablet, Rfl: 3    neomycin-polymyxin-dexamethasone (MAXITROL) 3.5 mg/g-10,000 unit/g-0.1 % Oint, Apply ointment 3 times daily to lids and lashes for both eyes for 10 days, Disp: 3.5 g, Rfl: 0    ondansetron (ZOFRAN-ODT) 8 MG TbDL, Take 1 tablet (8 mg total) by mouth every 12 (twelve) hours as needed (nausea)., Disp: 30 tablet, Rfl: 2    pantoprazole (PROTONIX) 40 MG tablet, Take 1 tablet (40 mg total) by mouth once daily. 30 min prior to lunch (Patient taking differently: Take 40 mg by mouth as needed. 30 min prior to lunch), Disp: 90 tablet, Rfl: 3    potassium chloride (MICRO-K) 10 MEQ CpSR, Take by mouth once daily., Disp: , Rfl:     thyroid, pork, (ARMOUR THYROID) 30 mg Tab, Take 1 tablet (30 mg total) by mouth before breakfast., Disp: 90 tablet, Rfl: 3    risperiDONE (RISPERDAL) 0.5 MG Tab, Take 1 tablet (0.5 mg total) by mouth every evening., Disp: 30 tablet, Rfl: 11  Past Medical History:   Diagnosis Date    Clostridium difficile colitis     Dementia     Diverticulitis     s/p colostomy    High cholesterol     Hypertension     Hypothyroidism     Kidney stones      Past Surgical History:   Procedure Laterality Date    APPENDECTOMY  2008    CATARACT EXTRACTION      Dr Raygoza    COLON SURGERY      COLONOSCOPY N/A 01/02/2019    Procedure: COLONOSCOPY;  Surgeon: Reece Hogan MD;  Location: [facility];  Service: Endoscopy;  Laterality: N/A;    COLONOSCOPY N/A 06/07/2019    Procedure: COLONOSCOPY;  Surgeon: Rishabh Connelly MD;  Location: [facility];  Service: General;  Laterality: N/A;    COLONOSCOPY N/A 06/23/2021    Procedure: COLONOSCOPY;  Surgeon: Lucy Lafleur MD;  Location: [facility];  Service: Endoscopy;  Laterality: N/A;    COLOSTOMY Left 01/02/2019    Procedure: CREATION, COLOSTOMY;  Surgeon: Reece Hogan MD;   Location: [facility];  Service: General;  Laterality: Left;    CYSTOSCOPY WITH URETEROSCOPY, RETROGRADE PYELOGRAPHY, AND INSERTION OF STENT Right 08/23/2022    Procedure: CYSTOSCOPY, WITH RETROGRADE PYELOGRAM AND URETERAL STENT INSERTION;  Surgeon: Anselmo Matute MD;  Location: [facility];  Service: Urology;  Laterality: Right;    CYSTOSCOPY WITH URETEROSCOPY, RETROGRADE PYELOGRAPHY, AND INSERTION OF STENT Right 11/07/2023    Procedure: CYSTOSCOPY, WITH RETROGRADE PYELOGRAM AND URETERAL STENT INSERTION;  Surgeon: Anselmo Matute MD;  Location: [facility];  Service: Urology;  Laterality: Right;  stent exchange    CYSTOURETEROSCOPY WITH RETROGRADE PYELOGRAPHY AND INSERTION OF STENT INTO URETER Right 11/08/2022    Procedure: CYSTOURETEROSCOPY, WITH RETROGRADE PYELOGRAM AND URETERAL STENT INSERTION;  Surgeon: Anselmo Matute MD;  Location: [facility];  Service: Urology;  Laterality: Right;    CYSTOURETEROSCOPY, WITH HOLMIUM LASER LITHOTRIPSY OF URETERAL CALCULUS AND STENT INSERTION Right 04/04/2023    Procedure: CYSTOURETEROSCOPY, WITH HOLMIUM LASER LITHOTRIPSY OF URETERAL CALCULUS AND STENT INSERTION;  Surgeon: Anselmo Matute MD;  Location: [facility];  Service: Urology;  Laterality: Right;    CYSTOURETEROSCOPY,WITH HOLMIUM LASER LITHOTRIPSY OF URETERAL CALCULUS Right 11/07/2023    Procedure: CYSTOURETEROSCOPY,WITH HOLMIUM LASER LITHOTRIPSY OF URETERAL CALCULUS;  Surgeon: Anselmo Matute MD;  Location: [facility];  Service: Urology;  Laterality: Right;    ESOPHAGOGASTRODUODENOSCOPY N/A 09/16/2020    Procedure: ESOPHAGOGASTRODUODENOSCOPY (EGD)- Needs Rapid;  Surgeon: Lucy Lafleur MD;  Location: [facility];  Service: Endoscopy;  Laterality: N/A;    EYE SURGERY  ?? Dr Jaret PINEDO PROCEDURE N/A 01/02/2019    Procedure: SHAHIDA PROCEDURE;  Surgeon: Reece Hogan MD;  Location: [facility];  Service: General;  Laterality: N/A;    LYSIS OF ADHESIONS N/A 01/02/2019    Procedure: LYSIS, ADHESIONS;  Surgeon:  Reece Hogan MD;  Location: [facility];  Service: General;  Laterality: N/A;    VITRECTOMY Right 09/2013     Social History     Socioeconomic History    Marital status:    Occupational History     Comment: [employer]   Tobacco Use    Smoking status: Former     Types: Cigarettes    Smokeless tobacco: Never    Tobacco comments:     Smoked in 20s. Quit soon   Substance and Sexual Activity    Alcohol use: No    Drug use: No    Sexual activity: Never     Comment:      Social Determinants of Health     Financial Resource Strain: Patient Declined (2/12/2024)    Overall Financial Resource Strain (CARDIA)     Difficulty of Paying Living Expenses: Patient declined   Food Insecurity: Patient Declined (2/12/2024)    Hunger Vital Sign     Worried About Running Out of Food in the Last Year: Patient declined     Ran Out of Food in the Last Year: Patient declined   Transportation Needs: Unknown (2/12/2024)    PRAPARE - Transportation     Lack of Transportation (Medical): Patient declined     Lack of Transportation (Non-Medical): No   Physical Activity: Inactive (2/12/2024)    Exercise Vital Sign     Days of Exercise per Week: 0 days     Minutes of Exercise per Session: 0 min   Stress: No Stress Concern Present (4/20/2023)    [employer] - Occupational Stress Questionnaire     Feeling of Stress : Not at all   Social Connections: Unknown (2/12/2024)    Social Connection and Isolation Panel [NHANES]     Frequency of Communication with Friends and Family: Patient declined     Frequency of Social Gatherings with Friends and Family: Patient declined     Attends Caodaism Services: Never     Active Member of Clubs or Organizations: Patient declined     Attends Club or Organization Meetings: Patient declined     Marital Status: Patient declined   Housing Stability: Low Risk  (2/12/2024)    Housing Stability Vital Sign     Unable to Pay for Housing in the Last Year: No     Number of Places  Lived in the Last Year: 1     Unstable Housing in the Last Year: No             Past/Current Medical/Surgical History, Past/Current Social History, Past/Current Family History and Past/Current Medications were reviewed in detail.        Objective:           VITAL SIGNS WERE REVIEWED      GENERAL APPEARANCE:     The patient looks comfortable, confused, cooperative anxious.    BMI 28.35 kg     No signs of respiratory distress.    Normal breathing pattern.    No dysmorphic features    Normal eye contact.     GENERAL MEDICAL EXAM:    HEENT:  Head is atraumatic normocephalic.     No tender temporal arteries.     Neck and Axillae: No JVD. No visible lesions.    No carotid bruits. No thyromegaly. No lymphadenopathy.    Cardiopulmonary: No cyanosis. No tachypnea. Normal respiratory effort.    Gastrointestinal/Urogenital:  No jaundice. No stomas or lesions. No visible hernias. No catheters.     Abdomen is soft non-tender. No masses or organomegaly, Colostomy present     Skin, Hair and Nails: No pathognonomic skin rash. No neurofibromatosis. No visible lesions.No stigmata of autoimmune disease. No clubbing.    Skin is warm and moist. No palpable masses.    Limbs: No varicose veins. No visible swelling.    No palpable edema. Pulses are symmetric. Pedal pulses are palpable.      Muskoskeletal: + visible deformities. Poor posture No visible lesions.    No spine tenderness. No signs of longstanding neuropathy. No dislocations or fractures.            Neurologic Exam     Mental Status   Disoriented to country and area. Oriented to city.   Disoriented to year, month and date.   Follows 2 step commands.   Attention: decreased. Concentration: decreased.   Speech: speech is normal and not slurred   Level of consciousness: alert  Knowledge: poor and inconsistent with education. Able to perform simple calculations.   Able to name object. Unable to read. Unable to repeat. Unable to write. Abnormal comprehension.     Cranial Nerves     CN  II   Visual fields full to confrontation.   Visual acuity: normal with correction  Right visual field deficit: none  Left visual field deficit: none     CN III, IV, VI   Pupils are equal, round, and reactive to light.  Extraocular motions are normal.   Right pupil: Size: 2 mm. Shape: regular. Reactivity: brisk. Consensual response: intact. Accommodation: intact.   Left pupil: Size: 2 mm. Shape: regular. Reactivity: brisk. Consensual response: intact. Accommodation: intact.   CN III: no CN III palsy  CN VI: no CN VI palsy  Nystagmus: none   Diplopia: none  Ophthalmoparesis: none  Upgaze: normal  Downgaze: normal  Conjugate gaze: present  Vestibulo-ocular reflex: present    CN V   Facial sensation intact.   Right facial sensation deficit: none  Left facial sensation deficit: none    CN VII   Right facial weakness: none  Left facial weakness: none  Left taste: normal    CN VIII   CN VIII normal.   Hearing: impaired  Right Rinne: AC > BC  Left Rinne: AC > BC    CN IX, X   CN IX normal.   CN X normal.   Palate: symmetric    CN XI   CN XI normal.   Right sternocleidomastoid strength: normal  Left sternocleidomastoid strength: normal  Right trapezius strength: normal  Left trapezius strength: normal    CN XII   CN XII normal.   Tongue: not atrophic  Fasciculations: absent  Tongue deviation: none    Motor Exam   Muscle bulk: normal  Overall muscle tone: normal  Right arm tone: normal  Left arm tone: normal  Right arm pronator drift: absent  Left arm pronator drift: absent  Right leg tone: normal  Left leg tone: normal    Strength   Strength 5/5 throughout.   Right neck flexion: 5/5  Left neck flexion: 5/5  Right neck extension: 5/5  Left neck extension: 5/5  Right deltoid: 5/5  Left deltoid: 5/5  Right biceps: 5/5  Left biceps: 5/5  Right triceps: 5/5  Left triceps: 5/5  Right wrist flexion: 5/5  Left wrist flexion: 5/5  Right wrist extension: 5/5  Left wrist extension: 5/5  Right interossei: 5/5  Left interossei:  5/5  Right iliopsoas: 5/5  Left iliopsoas: 5/5  Right quadriceps: 5/5  Left quadriceps: 5/5  Right hamstrin/5  Left hamstrin/5  Right glutei: 5/5  Left glutei: 5/5  Right anterior tibial: 5/5  Left anterior tibial: 5/5  Right posterior tibial: 5/5  Left posterior tibial: 5/5  Right peroneal: 5/5  Left peroneal: 5/5  Right gastroc: 5/5  Left gastroc: 5/5    Sensory Exam   Light touch normal.   Right arm light touch: normal  Left arm light touch: normal  Vibration normal.   Right arm vibration: normal  Left arm vibration: normal  Proprioception normal.   Right arm proprioception: normal  Left arm proprioception: normal  Pinprick normal.     ASIF      Gait, Coordination, and Reflexes     Gait  Gait: wide-based    Coordination   Finger to nose coordination: normal    Tremor   Resting tremor: absent  Intention tremor: absent  Action tremor: absent    Reflexes   Right brachioradialis: 2+  Left brachioradialis: 2+  Right biceps: 2+  Left biceps: 2+  Right triceps: 2+  Left triceps: 2+  Right patellar: 2+  Left patellar: 2+  Right achilles: 1+  Left achilles: 1+  Left plantar: normal  Right Patino: absent  Left Patino: absent  Right ankle clonus: absent  Left ankle clonus: absent  Right pendular knee jerk: absent  Left pendular knee jerk: absent        Poor posture            ANISH COGNITIVE ASSESSMENT (MOCA) TOTAL SCORE 30             SEVERE DEMENTIA <10    12 th grade     DATE 2024       TOTAL SCORE 10/30       VISUOSPATIAL EXECUTIVE (5) 1       NAMING (3) 3       ATTENTION (6) 3       LANGUAGE (3) 1       ABSTRACTION(2) 1       RECALL (5) 0       ORIENTATION (6) 1           Lab Results   Component Value Date    WBC 7.60 01/10/2024    HGB 13.0 01/10/2024    HCT 41.1 01/10/2024    MCV 84 01/10/2024     01/10/2024     Sodium   Date Value Ref Range Status   01/10/2024 137 136 - 145 mmol/L Final     Potassium   Date Value Ref Range Status   01/10/2024 4.0 3.5 - 5.1 mmol/L Final     Chloride   Date  Value Ref Range Status   01/10/2024 101 95 - 110 mmol/L Final     CO2   Date Value Ref Range Status   01/10/2024 22 (L) 23 - 29 mmol/L Final     Glucose   Date Value Ref Range Status   01/10/2024 69 (L) 70 - 110 mg/dL Final     BUN   Date Value Ref Range Status   01/10/2024 11 8 - 23 mg/dL Final     Creatinine   Date Value Ref Range Status   01/10/2024 0.9 0.5 - 1.4 mg/dL Final     Calcium   Date Value Ref Range Status   01/10/2024 9.0 8.7 - 10.5 mg/dL Final     Total Protein   Date Value Ref Range Status   01/10/2024 7.2 6.0 - 8.4 g/dL Final     Albumin   Date Value Ref Range Status   01/10/2024 3.1 (L) 3.5 - 5.2 g/dL Final     Total Bilirubin   Date Value Ref Range Status   01/10/2024 0.7 0.1 - 1.0 mg/dL Final     Comment:     For infants and newborns, interpretation of results should be based  on gestational age, weight and in agreement with clinical  observations.    Premature Infant recommended reference ranges:  Up to 24 hours.............<8.0 mg/dL  Up to 48 hours............<12.0 mg/dL  3-5 days..................<15.0 mg/dL  6-29 days.................<15.0 mg/dL       Alkaline Phosphatase   Date Value Ref Range Status   01/10/2024 87 55 - 135 U/L Final     AST   Date Value Ref Range Status   01/10/2024 24 10 - 40 U/L Final     ALT   Date Value Ref Range Status   01/10/2024 16 10 - 44 U/L Final     Anion Gap   Date Value Ref Range Status   01/10/2024 14 8 - 16 mmol/L Final     eGFR if    Date Value Ref Range Status   06/09/2022 >60.0 >60 mL/min/1.73 m^2 Final     eGFR if non    Date Value Ref Range Status   06/09/2022 >60.0 >60 mL/min/1.73 m^2 Final     Comment:     Calculation used to obtain the estimated glomerular filtration  rate (eGFR) is the CKD-EPI equation.        Lab Results   Component Value Date    GOEPPYUP95 1091 (H) 02/23/2024     Lab Results   Component Value Date    TSH 1.780 01/10/2024    FREET4 0.88 01/10/2024   LABS EVALUATION:    02-    Vitamin B 12  1091 ^, HC level 8.8 NL, FA level 16.6 NL,     07-    MRI BRAIN WO    There is a chronic lacunar infarct within the lateral aspect of the right thalamus.     Scattered foci of T2 flair hyperintense signal within the bilateral periatrial white matter as well as surrounding the ventricular system are nonspecific but likely relate to chronic microvascular ischemic change.  This is not necessarily unusual for the age of the patient.      No asymmetric atrophy or hydrocephalus.    MRI Brain WO:    03-    MRI Brain No acute intracranial abnormality.     Moderate global cortical atrophy without lobar predominance.  Score 3 bilateral medial temporal lobe atrophy.     Several punctate foci of susceptibility artifact in the peripheral aspect of the posterior cerebral hemispheres likely related to remote microhemorrhage, possible cerebral amyloid angiopathy.    Result consistent with Alzheimer's Disease related it is consistent with exam findings.        Reviewed the neuroimaging independently       Assessment:       1. Urinary frequency    2. 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    TSH,  FA, B12, HC HIV, RPR.    Comprehensive Neuropsychological Testing       DISEASE-MODIFYING AGENTS     Explained to the patient and family that medications are intended to slow down the progression (in the early stages of the disease) and not stop it or reverse the disease. The disease is relentless, progressive and so far, cannot be controlled. Progression includes Cognitive decline, Behavioral disturbances, and Motor decline as well.     I counseled the patient and family that the rate of progression is extremely variable, and the average (10 years) is an inaccurate measure.     CLASSES:    NMDA RECEPTOR ANTAGONISTS    Continue memantine/Namenda 10  mg BID    CHOLINESTERASE INHIBITORS (CEI)      Will donepezil/Aricept caused GI symptoms         SYMPTOMATIC MANAGEMENT-BEHAVIORAL SYMPTOMS AND NON-COGNITIVE SYMPTOMS       ANTIDEPRESSANTS CLASS MEDICATIONS      Failed Prozac 20 mg daily,and  Xanax 0.25 mg po BID  .     Conitneu Buspar 10 mg po BID       ANTIPSYCHOTIC AND NEUROLEPTIC CLASS MEDICATIONS     Change Risperdal 0.5 mg BID to HS  one hour before sunset to assist with sundowning , psychotic symptoms and behavioral disturbances. Other options include: Aripiprazole (Abilify),  Quetiapine (Seroquel) and  Brexipiprazole (Rixulti).            HOME CARE       Falling Down Precautions. Gait is affected by the disease as well.     Avoid driving and access to firearms     Incremental 24/Care     Help with finances and decision making.    Join support group.    Proofing the house and use labeling.    Avoid antihistamines and anticholinergics.    Avoid changing routine.    Use written reminders.    Avoid multitasking.    Healthy diet, exercise (physical and cognitive).    Good sleep hygiene.        HERE ARE 10 WAYS TO REDUCE RISK OF DEMENTIA AND SLOW DOWN THE PROGRESSION OF DEMENTIA        1.Be physically active.    2. Avoid smoking and alcohol consumption.    3. Track your numbers. Keep your blood pressure, cholesterol, blood sugar and weight within recommended ranges.    4. Stay socially connected.    5. Make healthy food choices. Eat a well-balance and healthy diet that rich in cereals, fish, legumes, and vegetables.    6. Reduce stress.     7. Challenge your brain by trying something new, playing games, or learning a new language.    8. Take care of your hearing. Avoid being continuously exposed to loud sounds and wear a hearing aid if hearing does become a problem.    9. Lower risk of falls. Consider installing handrails on all stairs and grab bars in bathrooms.    10. Reduce your exposure to air pollution, such as exposure to exhaust in heavy traffic.          CAREGIVERS COUNSELING     Recommend reading the following books:     The 36-Hour Day and there are many online and printed resources to help caregivers as well.     Alzheimer's Through the Stages.     Dementia with G.R.A.C.E.            PREVENTION OF DELIRIUM       1. Good hydration and avoid electrolyte imbalance  2. Recognize and treat infections immediately especially UTI.  3. Bladder emptying and prevent constipation.   4. Provide stimulating activities and familiar objects  5. Use eyeglasses and hearing aids if needed.   6. Use simple and regular communication about people, current place, and time  7. Mobility and range-of-motion exercises  8. Reduce noise, lighting and avoid sleep interruptions  9. Non-narcotic pain management.  10.Nondrug treatment for sleep problems or anxiety  11. Avoid antihistamines.  12. Avoid narcotics.  13. Avoid benzodiazepines.            HELPFUL STRATEGIES FOR THE FAMILY AND CAREGIVERS        BEHAVIORAL MANAGEMENT STRATEGIES     Remember that you cannot reason with acute psychosis or confusion. Unless there is an immediate safety issue, there is no need to challenge or correct the person.  For example, if a pt is hallucinating, do not argue with the person that what they are seeing is not real. If they are expressing paranoia, empathize gently with their fear, and attempt to redirect them to a different activity.   If the person is particularly stuck on a topic or activity, as long as the activity is safe and is not worsening their agitation, you don't need to intervene.     Communicate calmly, simply, and concisely    Reassure the person that they are safe and you are here to help  Try not to express irritation or anger  Speak quietly and calmly, do not shout or threaten the person. Avoid provocation.   Establish verbal contact and provide orientation and reassurance.  Attempt to identify the patient's wants and feelings, listen to what they are saying, and reflect those  "wants and feelings back to the patient.  Dont use sarcasm as a weapon    Set clear limits on behavior in a calm voice ("You cannot hit the nurse.")  Offer choices and optimism ("Which toothpaste would you like to use today?")    Redirect the person to an alternative behavior ("Can you come help me with this?)    Avoid saying things like, "We already went over this!" "You can't keep doing this." "I already explained this to you"  Instead, simply nod calmly and acknowledge what the person is saying ("Yes, that makes sense."), and then request their help or company in a different behavior.   Having a mental list of activities the patient enjoys can be helpful with redirection. For example, do they enjoy going for walks? Eating a piece of candy?   If redirection becomes challenging, you can place objects that your family member enjoys strategically in the home in order to use for distraction. This is particularly useful if there are items that they often talk about or frequently ask you questions about; or if they are visually striking. Once you focus the person on this object, talk about it briefly until they are calm and then attempt to redirect to a new behavior.   Sometimes activities which are repetitive can be calming, particularly if there is a comforting sensory element. For example, pt may enjoy folding soft towels or laundry.    Limit overstimulation    Decrease distractions by turning off the TV, computer, any fluorescent lights that hum, etc.  Ask any casual visitors to leave--the fewer people the better  If the person is very agitated, avoid touching them, and respect their personal space  Sit down and ask the person to sit down also     PREVENTATIVE STRATEGIES     Try to help the patient develop a routine and stick to it  Address all immediate safety issues  Does the person still have access to the car and keys? Take the keys away, or disconnect the car battery.  Are they wandering at night? Install locks " "on the outside doors. A keypad lock works well. Alarms on bedroom doors can also be helpful.   Are they turning on the stove and risking a fire? If you cannot limit their access to the kitchen, you may need to unplug dangerous devices any time you are not in the room.   If the person is exhibiting poor judgment throughout the day, they likely need someone supervising them at all times.   Do they still have access to significant financial assets? Limit their access to accounts, and consult with a  if necessary.   Identify situations that seem to trigger agitation or a problematic behavior, and modify the person's environment to minimize or eliminate that trigger.   For example, is their behavior worse if they don't get a good night's sleep? Or if they don't eat or drink enough? If so, prioritize those activities and build behavior plans to support them.  Do they get upset about not being allowed to answer the phone when it rings? Put all of the phones in the house on "silent."   Do they become paranoid that certain family members are trying to take advantage of them? Limit contact with the targeted family member as much as possible, and re-introduce them slowly after some time has passed.   Encourage independence in daily living whenever safely possible  Encourage collaborative decision-making regarding his care as well as daily activities  Encourage regular physical exercise  Address issues that may be impacting sleep   Ex: Urology consult for frequent nighttime urination, address chronic pain that may be interfering with sleep, moving to a different room if his roommate snores loudly, make sure the room is a comfortable temperature and he has adequate pillows and blankets  Ensure he gets out into the sunlight at least once per day to encourage regular circadian cycle  No caffeine, sugar, or large meals within two hours of bedtime   Make sure room at night is dark and quiet and minimize nighttime interruptions " from staff unless medically necessary   Try to help pt go to sleep and wake up at the same time every day  Monitor closely for worsening psychiatric symptoms (insomnia, social withdrawal, deterioration of personal hygiene, hostility, confusing or nonsensical speech, paranoia, hallucinations) and intervene promptly. Assess for possible antecedents, modify environment appropriately.            SLEEP HYGIENE     Poor sleep has a negative effect on cognition. Several strategies have been shown to improve sleep:     Caffeine intake in the afternoon and evening, as well as stuffing oneself at supper, can decrease the quality of restful sleep throughout the night.   Bedtime and wake-up times should be consistent every night and morning so the body becomes used to a single routine, even on the weekends.  Engage in daily physical activity, but not 2-3 hours before bedtime.   No technology use (television, computer, iPad) 1-2 hours before bed.   Have a wind down routine (e.g., soft lights in the house, bath before bed, reduced fluid intake, songs, reading, less noise) to promote sleep readiness.   Visit the www.sleepfoundation.org for more strategies.      COGNITIVE HYGIENE     Engage in regular exercise, which increases alertness and arousal and can improve attention and focus.  Consider lower impact exercises, such as yoga or light walking.  Get a good nights sleep, as this can enhance alertness and cognition.  Eat healthy foods and balanced meals. It is notable that research indicates certain nutrients may aid in brain function, such as B vitamins (especially B6, B12, and folic acid), antioxidants (such as vitamins C and E, and beta carotene), and Omega-3 fatty acids. Talk with your physician or nutritionist about whats right for you.   Keep your brain active. Find activities to stay mentally active, such as reading, games (cards, checkers), puzzles (crosswords, Sudoku, jig saw), crafts (models, woodworking),  gardening, or participating in activities in the community.  Stay socially engaged. Continue staying active with your family and friends.      FUTURE PLANNING     The patient and caregivers should consider formal arrangements to allow a designated person to make medical and financial decisions for the pt, should he/she become unable to do so.  Options to consider include designating a healthcare proxy, medical and/or financial power of , and completing advanced directives for healthcare decisions and estate planning (e.g., finalizing a will).  If cost is prohibitive, [facility] Legal Services (https://[employer]/) provides free  for individuals with low income.       ADDITIONAL RESOURCES     Alzheimer's [facility] the [facility] (www.http://alzbr.org) have good local caregiver and patient resources.   Consider resources for support through the [facility] Office of Elderly Affairs (http://goea.[place].gov/), [place] Chapter of the Alzheimers Association (www.alz.org/[place]/), the Family Caregiver [place] (www.caregiver.org), and the American Psychological Association (http://www.apa.org/pi/about/publications/caregivers/consumers/index.aspxconsumers/index.aspx).  For More Information About Hallucinations, Delusions, and Paranoia in Alzheimer's [facility] Alzheimer's and related Dementias Education and Referral (ADEAR) Center 1-896.808.7517 (toll-free) adear@alana.nih.gov www.alana.nih.gov/alzheimers The National [place] on Aging's ADEAR Center offers information and free print publications about Alzheimer's disease and related dementias for families, caregivers, and health professionals. ADEAR Center staff answer telephone, email, and written requests and make referrals to local and national resources            MEDICAL/SURGICAL COMORBIDITIES     All relevant medical comorbidities noted and managed by primary care physician and medical care team.          MISCELLANEOUS MEDICAL PROBLEMS      Handicap application form completed     HEALTHY LIFESTYLE AND PREVENTATIVE CARE    Encouraged the patient to adhere to the age-appropriate health maintenance guidelines including screening tests and vaccinations.     Discussed the overall importance of healthy lifestyle, optimal weight, exercise, healthy diet, good sleep hygiene and avoiding drugs including smoking, alcohol and recreational drugs. The patient verbalized full understanding.       Advised the patient to follow COVID-19 prevention measures.       I spent 30 minutes more than 50 % face to face with the patient    Time spent in counseling and coordination of care including discussions etiology of diagnosis, pathogenesis of diagnosis, prognosis of diagnosis,, diagnostic results, impression and recommendations, diagnostic studies, management, risks and benefits of treatment, instructions of disease self-management, treatment instructions, follow up requirements, patient and family counseling/involvement in care compliance with treatment regimen. All of the patient's questions were answered during this discussion.     Olaiynka Patten, MSN NP      Collaborating Provider: Teofilo Friedman MD, FAAN Neurologist/Epileptologist

## 2024-03-27 NOTE — TELEPHONE ENCOUNTER
"The registry is only for commerical 's licenses. It should not be an issue. We will try to have admin reach out to [facility] but pt's daughter just needs to be made aware to go back to [facility] and inform that the "registry" is only for a commerical 's license not for a handicap tag.     Myla Arias MD    "

## 2024-03-27 NOTE — TELEPHONE ENCOUNTER
----- Message from Maria Del Carmen Aviles LPN sent at 3/26/2024  2:12 PM CDT -----  Contact: Edinson montest dianna scott/Martina [phone]  Please advise, Motor Vehicle states your unable to approve handicapped license plates.   ----- Message -----  From: Tisha Edmondson  Sent: 3/26/2024   2:06 PM CDT  To: [place] Staff    Office of motor calling because Dr Arias is not listed in the  [facility] Motor Carrier Safety Administration to be able to approve patients for handicapped license plates. This is a new system DMV now has to check to make sure providers are certified to be able to do this. The web site is [email or web address].dot.gov

## 2024-03-28 ENCOUNTER — PATIENT MESSAGE (OUTPATIENT)
Dept: NEUROLOGY | Facility: CLINIC | Age: 84
End: 2024-03-28
Payer: MEDICARE

## 2024-03-28 DIAGNOSIS — R31.9 URINARY TRACT INFECTION WITH HEMATURIA, SITE UNSPECIFIED: Primary | ICD-10-CM

## 2024-03-28 DIAGNOSIS — N39.0 URINARY TRACT INFECTION WITH HEMATURIA, SITE UNSPECIFIED: Primary | ICD-10-CM

## 2024-03-28 RX ORDER — LEVOFLOXACIN 750 MG/1
750 TABLET ORAL DAILY
Qty: 7 TABLET | Refills: 0 | Status: SHIPPED | OUTPATIENT
Start: 2024-03-28 | End: 2024-04-04

## 2024-03-28 RX ORDER — FLUCONAZOLE 150 MG/1
150 TABLET ORAL DAILY
Qty: 1 TABLET | Refills: 0 | Status: SHIPPED | OUTPATIENT
Start: 2024-03-28 | End: 2024-03-29

## 2024-03-28 NOTE — PROGRESS NOTES
Patient had urinary complaints, I know she is seeing you, do you have any recommendations for treatment?

## 2024-03-28 NOTE — PROGRESS NOTES
LAB RESULTS:    03-    UA 2+ Protein, 2+ BLOOD, +Nitrite, Leukocytes UA 3+,     Results confirm UTI recommend Levaquin 750 mg po daily for 7 days and Diflucan 150 mg oral tablet one time dose

## 2024-04-04 ENCOUNTER — PATIENT MESSAGE (OUTPATIENT)
Dept: NEUROLOGY | Facility: CLINIC | Age: 84
End: 2024-04-04
Payer: MEDICARE

## 2024-04-10 RX ORDER — FUROSEMIDE 20 MG/1
20 TABLET ORAL
Qty: 90 TABLET | Refills: 3 | Status: SHIPPED | OUTPATIENT
Start: 2024-04-10 | End: 2024-04-10 | Stop reason: SDUPTHER

## 2024-04-11 RX ORDER — FUROSEMIDE 20 MG/1
20 TABLET ORAL DAILY
Qty: 90 TABLET | Refills: 3 | Status: SHIPPED | OUTPATIENT
Start: 2024-04-11

## 2024-04-15 ENCOUNTER — HOSPITAL ENCOUNTER (OUTPATIENT)
Dept: RADIOLOGY | Facility: HOSPITAL | Age: 84
Discharge: HOME | End: 2024-04-15
Attending: UROLOGY
Payer: MEDICARE

## 2024-04-15 DIAGNOSIS — N20.0 RENAL STONE: ICD-10-CM

## 2024-04-15 PROCEDURE — 74018 RADEX ABDOMEN 1 VIEW: CPT | Mod: 26,,, | Performed by: RADIOLOGY

## 2024-04-15 PROCEDURE — 76770 US EXAM ABDO BACK WALL COMP: CPT | Mod: 26,,, | Performed by: RADIOLOGY

## 2024-04-15 PROCEDURE — 74018 RADEX ABDOMEN 1 VIEW: CPT | Mod: TC,FY,PO

## 2024-04-15 PROCEDURE — 76770 US EXAM ABDO BACK WALL COMP: CPT | Mod: TC,PO

## 2024-04-18 ENCOUNTER — OFFICE VISIT (OUTPATIENT)
Dept: UROLOGY | Facility: CLINIC | Age: 84
End: 2024-04-18
Payer: MEDICARE

## 2024-04-18 VITALS — BODY MASS INDEX: 28.8 KG/M2 | HEIGHT: 62 IN | WEIGHT: 156.5 LBS

## 2024-04-18 DIAGNOSIS — R30.0 DYSURIA: ICD-10-CM

## 2024-04-18 DIAGNOSIS — Z01.818 PRE-OP TESTING: Primary | ICD-10-CM

## 2024-04-18 DIAGNOSIS — N20.0 RENAL STONE: ICD-10-CM

## 2024-04-18 PROCEDURE — 3288F FALL RISK ASSESSMENT DOCD: CPT | Mod: CPTII,S$GLB,, | Performed by: UROLOGY

## 2024-04-18 PROCEDURE — 1126F AMNT PAIN NOTED NONE PRSNT: CPT | Mod: CPTII,S$GLB,, | Performed by: UROLOGY

## 2024-04-18 PROCEDURE — 99214 OFFICE O/P EST MOD 30 MIN: CPT | Mod: S$GLB,,, | Performed by: UROLOGY

## 2024-04-18 PROCEDURE — 1159F MED LIST DOCD IN RCRD: CPT | Mod: CPTII,S$GLB,, | Performed by: UROLOGY

## 2024-04-18 PROCEDURE — 1160F RVW MEDS BY RX/DR IN RCRD: CPT | Mod: CPTII,S$GLB,, | Performed by: UROLOGY

## 2024-04-18 PROCEDURE — 99999 PR PBB SHADOW E&M-EST. PATIENT-LVL V: CPT | Mod: PBBFAC,,, | Performed by: UROLOGY

## 2024-04-18 PROCEDURE — 1101F PT FALLS ASSESS-DOCD LE1/YR: CPT | Mod: CPTII,S$GLB,, | Performed by: UROLOGY

## 2024-04-18 RX ORDER — CEFAZOLIN SODIUM 2 G/50ML
2 SOLUTION INTRAVENOUS
Status: CANCELLED | OUTPATIENT
Start: 2024-04-18

## 2024-04-18 NOTE — PROGRESS NOTES
Chief Complaint:   Encounter Diagnoses   Name Primary?    Renal stone Yes    Dysuria        HPI:   4/18/24- currently doing well with no significant dysuria, here today to schedule her stent exchange.    7/18/20- 79-year-old female who presents with severe abdominal pain and discomfort, she is being evaluated for colonic impaction diverticulitis.  She is seen to have an extremely large right renal pelvis stone.  This had been seen prior, and she was followed by partner in regards to possible surgery for this.  Patient though it is determined not pursuing not been seen in the last couple of years.  Patient is having upper abdominal pain, no colic at this point.  Patient has had no previous urological history per her and her son's report, her daughter is her primary care take care, she is currently not present at the bedside.  Family history of stones, no urological cancers per the family.  10/17/18: Mag3 shows 72/38 L/R function. -UCx last visit.  PCNL is the only viable treatment option; observation not recommended.  10/8/18: 77 yo woman referred for renal stone after workup for UTI/diverticulitis.  Having diarrhea on meds for diverticulitis.  No abd/pelvic pain and no exac/rel factors.  No hematuria.  No prior urolithiasis.  No urinary bother.  No  history.  Normal sexual function but inactive.  Some memory problems but functional at home.    Allergies:  Patient has no known allergies.    Medications:  has a current medication list which includes the following prescription(s): atorvastatin, buspirone, furosemide, hyoscyamine, memantine, neomycin-polymyxin-dexamethasone, ondansetron, pantoprazole, potassium chloride, risperidone, thyroid (pork), and [DISCONTINUED] triamcinolone acetonide 0.1%.    Review of Systems:  General: No fever, chills, fatigability, or weight loss.  Skin: No rashes, itching, or changes in color or texture of skin.  Chest: Denies JESUS, cyanosis, wheezing, cough, and sputum  production.  Abdomen: Appetite fine. No weight loss. Denies diarrhea, abdominal pain, hematemesis, or blood in stool.  Musculoskeletal: No joint stiffness or swelling. Denies back pain.  : As above.  All other review of systems negative.    PMH:   has a past medical history of Clostridium difficile colitis, Dementia, Diverticulitis, High cholesterol, Hypertension, Hypothyroidism, and Kidney stones.    PSH:   has a past surgical history that includes Cataract extraction; Appendectomy (2008); Colonoscopy (N/A, 01/02/2019); Colostomy (Left, 01/02/2019); Maribel procedure (N/A, 01/02/2019); Lysis of adhesions (N/A, 01/02/2019); Colon surgery; Vitrectomy (Right, 09/2013); Colonoscopy (N/A, 06/07/2019); Esophagogastroduodenoscopy (N/A, 09/16/2020); Colonoscopy (N/A, 06/23/2021); Cystoscopy with ureteroscopy, retrograde pyelography, and insertion of stent (Right, 08/23/2022); Cystoureteroscopy with retrograde pyelography and insertion of stent into ureter (Right, 11/08/2022); cystoureteroscopy, with holmium laser lithotripsy of ureteral calculus and stent insertion (Right, 04/04/2023); Cystoscopy with ureteroscopy, retrograde pyelography, and insertion of stent (Right, 11/07/2023); cystoureteroscopy,with holmium laser lithotripsy of ureteral calculus (Right, 11/07/2023); and Eye surgery (?? Dr Raygoza).    FamHx: family history includes Alzheimer's disease in her mother; Aneurysm in her father; Bladder Cancer in her grandchild; Parkinsonism in her brother; Stomach cancer in her brother.    SocHx:  reports that she has quit smoking. Her smoking use included cigarettes. She has never used smokeless tobacco. She reports that she does not drink alcohol and does not use drugs.      Physical Exam:  There were no vitals filed for this visit.  General: A&Ox3, no apparent distress, no deformities  Neck: No masses, normal ROM  Lungs: normal inspiration, no use of accessory muscles  Heart: normal pulse, no arrhythmias  Abdomen: Soft,  NT, ND, no masses, no hernias, no hepatosplenomegaly  Skin: The skin is warm and dry. No jaundice.  Ext: No c/c/e.  : No pelvic floor prolapse.  Normal introitus, no urethral abnomralities. No Perineal abnormalities.    Labs/Studies:   Ucx E.Coli 2/23  Creatinine 0.9 1/24  KUB/FIDEL persistent right renal stone, right stent, mild right hydro 4/24  CT large right renal pelvis stone 8/22  Lasix renogram 23% right/77% left 9/22     Impression/Plan:        1. Right renal stone-  right stent  11/28/23,  right ureteroscopy  4/4/23,  failed PCN  1/10/23,  right stent  11/8/22, 8/23/22    Currently doing well with no irritative symptoms, she is due for a stent exchange in 6 months.  Questionable finding of calcifications around the distal coil, we did have to fracture this at the last surgery.  Proceed with cystoscopy, possible vesical litholapaxy, right retrograde and stent exchange.  Of note previous ureteroscopy did not yield stone clearance.      Patient understands the risks, benefits and alternatives of the above-stated procedure.  These include but not limited to damage to the surrounding structures including the urethra, ureters and bladder.  Risk of perforation requiring open procedures, Reyes catheterization at the conclusion of the procedure.  Risk of need for further surgeries.  Risk of pain, hematuria, infections.  Risk of heart attack, stroke, death, DVT and PE.  Patient understanding of all the above he has elected to pursue.    2. Dysuria- doing well with no recurrence, call with any issues prior to the next appointment.

## 2024-04-18 NOTE — H&P (VIEW-ONLY)
Chief Complaint:   Encounter Diagnoses   Name Primary?    Renal stone Yes    Dysuria        HPI:   4/18/24- currently doing well with no significant dysuria, here today to schedule her stent exchange.    7/18/20- 79-year-old female who presents with severe abdominal pain and discomfort, she is being evaluated for colonic impaction diverticulitis.  She is seen to have an extremely large right renal pelvis stone.  This had been seen prior, and she was followed by partner in regards to possible surgery for this.  Patient though it is determined not pursuing not been seen in the last couple of years.  Patient is having upper abdominal pain, no colic at this point.  Patient has had no previous urological history per her and her son's report, her daughter is her primary care take care, she is currently not present at the bedside.  Family history of stones, no urological cancers per the family.  10/17/18: Mag3 shows 72/38 L/R function. -UCx last visit.  PCNL is the only viable treatment option; observation not recommended.  10/8/18: 79 yo woman referred for renal stone after workup for UTI/diverticulitis.  Having diarrhea on meds for diverticulitis.  No abd/pelvic pain and no exac/rel factors.  No hematuria.  No prior urolithiasis.  No urinary bother.  No  history.  Normal sexual function but inactive.  Some memory problems but functional at home.    Allergies:  Patient has no known allergies.    Medications:  has a current medication list which includes the following prescription(s): atorvastatin, buspirone, furosemide, hyoscyamine, memantine, neomycin-polymyxin-dexamethasone, ondansetron, pantoprazole, potassium chloride, risperidone, thyroid (pork), and [DISCONTINUED] triamcinolone acetonide 0.1%.    Review of Systems:  General: No fever, chills, fatigability, or weight loss.  Skin: No rashes, itching, or changes in color or texture of skin.  Chest: Denies JESUS, cyanosis, wheezing, cough, and sputum  production.  Abdomen: Appetite fine. No weight loss. Denies diarrhea, abdominal pain, hematemesis, or blood in stool.  Musculoskeletal: No joint stiffness or swelling. Denies back pain.  : As above.  All other review of systems negative.    PMH:   has a past medical history of Clostridium difficile colitis, Dementia, Diverticulitis, High cholesterol, Hypertension, Hypothyroidism, and Kidney stones.    PSH:   has a past surgical history that includes Cataract extraction; Appendectomy (2008); Colonoscopy (N/A, 01/02/2019); Colostomy (Left, 01/02/2019); Maribel procedure (N/A, 01/02/2019); Lysis of adhesions (N/A, 01/02/2019); Colon surgery; Vitrectomy (Right, 09/2013); Colonoscopy (N/A, 06/07/2019); Esophagogastroduodenoscopy (N/A, 09/16/2020); Colonoscopy (N/A, 06/23/2021); Cystoscopy with ureteroscopy, retrograde pyelography, and insertion of stent (Right, 08/23/2022); Cystoureteroscopy with retrograde pyelography and insertion of stent into ureter (Right, 11/08/2022); cystoureteroscopy, with holmium laser lithotripsy of ureteral calculus and stent insertion (Right, 04/04/2023); Cystoscopy with ureteroscopy, retrograde pyelography, and insertion of stent (Right, 11/07/2023); cystoureteroscopy,with holmium laser lithotripsy of ureteral calculus (Right, 11/07/2023); and Eye surgery (?? Dr Raygoza).    FamHx: family history includes Alzheimer's disease in her mother; Aneurysm in her father; Bladder Cancer in her grandchild; Parkinsonism in her brother; Stomach cancer in her brother.    SocHx:  reports that she has quit smoking. Her smoking use included cigarettes. She has never used smokeless tobacco. She reports that she does not drink alcohol and does not use drugs.      Physical Exam:  There were no vitals filed for this visit.  General: A&Ox3, no apparent distress, no deformities  Neck: No masses, normal ROM  Lungs: normal inspiration, no use of accessory muscles  Heart: normal pulse, no arrhythmias  Abdomen: Soft,  NT, ND, no masses, no hernias, no hepatosplenomegaly  Skin: The skin is warm and dry. No jaundice.  Ext: No c/c/e.  : No pelvic floor prolapse.  Normal introitus, no urethral abnomralities. No Perineal abnormalities.    Labs/Studies:   Ucx E.Coli 2/23  Creatinine 0.9 1/24  KUB/FIDEL persistent right renal stone, right stent, mild right hydro 4/24  CT large right renal pelvis stone 8/22  Lasix renogram 23% right/77% left 9/22     Impression/Plan:        1. Right renal stone-  right stent  11/28/23,  right ureteroscopy  4/4/23,  failed PCN  1/10/23,  right stent  11/8/22, 8/23/22    Currently doing well with no irritative symptoms, she is due for a stent exchange in 6 months.  Questionable finding of calcifications around the distal coil, we did have to fracture this at the last surgery.  Proceed with cystoscopy, possible vesical litholapaxy, right retrograde and stent exchange.  Of note previous ureteroscopy did not yield stone clearance.      Patient understands the risks, benefits and alternatives of the above-stated procedure.  These include but not limited to damage to the surrounding structures including the urethra, ureters and bladder.  Risk of perforation requiring open procedures, Reyes catheterization at the conclusion of the procedure.  Risk of need for further surgeries.  Risk of pain, hematuria, infections.  Risk of heart attack, stroke, death, DVT and PE.  Patient understanding of all the above he has elected to pursue.    2. Dysuria- doing well with no recurrence, call with any issues prior to the next appointment.

## 2024-04-23 ENCOUNTER — PATIENT MESSAGE (OUTPATIENT)
Dept: UROLOGY | Facility: CLINIC | Age: 84
End: 2024-04-23
Payer: MEDICARE

## 2024-04-29 ENCOUNTER — EXTERNAL HOME HEALTH (OUTPATIENT)
Dept: HOME HEALTH SERVICES | Facility: HOSPITAL | Age: 84
End: 2024-04-29
Payer: MEDICARE

## 2024-04-30 ENCOUNTER — LAB VISIT (OUTPATIENT)
Dept: LAB | Facility: HOSPITAL | Age: 84
End: 2024-04-30
Attending: UROLOGY
Payer: MEDICARE

## 2024-04-30 DIAGNOSIS — Z01.818 PRE-OP TESTING: ICD-10-CM

## 2024-04-30 LAB
ANION GAP SERPL CALC-SCNC: 12 MMOL/L (ref 8–16)
BASOPHILS # BLD AUTO: 0.06 K/UL (ref 0–0.2)
BASOPHILS NFR BLD: 0.7 % (ref 0–1.9)
BUN SERPL-MCNC: 11 MG/DL (ref 8–23)
CALCIUM SERPL-MCNC: 9.5 MG/DL (ref 8.7–10.5)
CHLORIDE SERPL-SCNC: 103 MMOL/L (ref 95–110)
CO2 SERPL-SCNC: 20 MMOL/L (ref 23–29)
CREAT SERPL-MCNC: 0.9 MG/DL (ref 0.5–1.4)
DIFFERENTIAL METHOD BLD: ABNORMAL
EOSINOPHIL # BLD AUTO: 0.3 K/UL (ref 0–0.5)
EOSINOPHIL NFR BLD: 3.8 % (ref 0–8)
ERYTHROCYTE [DISTWIDTH] IN BLOOD BY AUTOMATED COUNT: 16.4 % (ref 11.5–14.5)
EST. GFR  (NO RACE VARIABLE): >60 ML/MIN/1.73 M^2
GLUCOSE SERPL-MCNC: 72 MG/DL (ref 70–110)
HCT VFR BLD AUTO: 41.5 % (ref 37–48.5)
HGB BLD-MCNC: 12.7 G/DL (ref 12–16)
IMM GRANULOCYTES # BLD AUTO: 0.07 K/UL (ref 0–0.04)
IMM GRANULOCYTES NFR BLD AUTO: 0.8 % (ref 0–0.5)
LYMPHOCYTES # BLD AUTO: 2.9 K/UL (ref 1–4.8)
LYMPHOCYTES NFR BLD: 33.9 % (ref 18–48)
MCH RBC QN AUTO: 26.1 PG (ref 27–31)
MCHC RBC AUTO-ENTMCNC: 30.6 G/DL (ref 32–36)
MCV RBC AUTO: 85 FL (ref 82–98)
MONOCYTES # BLD AUTO: 0.9 K/UL (ref 0.3–1)
MONOCYTES NFR BLD: 10.8 % (ref 4–15)
NEUTROPHILS # BLD AUTO: 4.3 K/UL (ref 1.8–7.7)
NEUTROPHILS NFR BLD: 50 % (ref 38–73)
NRBC BLD-RTO: 0 /100 WBC
PLATELET # BLD AUTO: 320 K/UL (ref 150–450)
PMV BLD AUTO: 9.9 FL (ref 9.2–12.9)
POTASSIUM SERPL-SCNC: 3.9 MMOL/L (ref 3.5–5.1)
RBC # BLD AUTO: 4.87 M/UL (ref 4–5.4)
SODIUM SERPL-SCNC: 135 MMOL/L (ref 136–145)
WBC # BLD AUTO: 8.61 K/UL (ref 3.9–12.7)

## 2024-04-30 PROCEDURE — 80048 BASIC METABOLIC PNL TOTAL CA: CPT | Performed by: UROLOGY

## 2024-04-30 PROCEDURE — 85025 COMPLETE CBC W/AUTO DIFF WBC: CPT | Performed by: UROLOGY

## 2024-04-30 PROCEDURE — 36415 COLL VENOUS BLD VENIPUNCTURE: CPT | Mod: PO | Performed by: UROLOGY

## 2024-05-06 ENCOUNTER — DOCUMENT SCAN (OUTPATIENT)
Dept: HOME HEALTH SERVICES | Facility: HOSPITAL | Age: 84
End: 2024-05-06
Payer: MEDICARE

## 2024-05-07 ENCOUNTER — DOCUMENT SCAN (OUTPATIENT)
Dept: HOME HEALTH SERVICES | Facility: HOSPITAL | Age: 84
End: 2024-05-07
Payer: MEDICARE

## 2024-05-07 ENCOUNTER — TELEPHONE (OUTPATIENT)
Dept: PREADMISSION TESTING | Facility: HOSPITAL | Age: 84
End: 2024-05-07
Payer: MEDICARE

## 2024-05-07 NOTE — TELEPHONE ENCOUNTER
Pre op instructions reviewed with pt daughter over telephone, verbalized understanding.    To confirm, Surgery is scheduled on 5/14/24. We will call you late afternoon the business day prior to surgery with your arrival time.    *Please report to the [facility] Lobby (1st Floor) located off of [facility] (2nd Entrance/Building on the left, in front of the flag pole).  Address: [address] [place] [address] [place]. [address]      INSTRUCTIONS IMPORTANT!!!  DO NOT Eat, Drink, or Smoke after 12 midnight unless instructed otherwise by your Surgeon. OK to brush teeth, no gum, candy or mints!    >>>MEDICATION INSTRUCTIONS<<<: Morning of Surgery, take small sip of water with ONLY these medications:  Xanax  Lipitor  Fluoxetine  Memantine  Thyroid     !!!STOP ALL Aspirins, NSAIDS, WEIGHT LOSS INJECTIONS/PILLS, Herbal supplements, & Vitamins 7 DAYS BEFORE SURGERY!!!    ____  Avoid Alcoholic beverages 3 days prior to surgery, as it can thin the blood.  ____  NO Acrylic/fake nails or nail polish worn day of surgery (specifically hand/arm & foot surgeries).  ____  NO powder, lotions, deodorants, oils or cream on body.  ____  Remove all jewelry & piercings & foreign objects before arrival & leave at home.  ____  Remove Dentures, Hearing Aids & Contact Lens prior to surgery.  ____  Bring photo ID and insurance information to hospital (Leave Valuables at Home).  ____  If going home the same day, arrange for a ride home. You will not be able to drive for 24 hrs if Anesthesia was used.   ____  Females (ages 11-60): may need to give a urine sample the morning of surgery; please see Pre op Nurse prior to using the restroom.  ____  Males: Stop ED medications (Viagra, Cialis) 24 hrs prior to surgery.  ____  Wear clean, loose fitting clothing to allow for dressings/ bandages.      Bathing Instructions:    -Shower with anti-bacterial Soap (Hibiclens or Dial) the night before surgery and the morning of.   -Do not use Hibiclens on  your face or genitals.   -Apply clean clothes after shower.  -Do not shave your face or body 2 days prior to surgery unless instructed otherwise by your Surgeon.  -Do not shave pubic hair 7 days prior to surgery (gyn pt's).    [facility]Ride Policy:  Only 2 adults allowed in pre op/recovery area during your procedure. You MUST HAVE A RIDE HOME from a responsible adult that you know and trust. Medical Transport, Uber or Lyft can ONLY be used if patient has a responsible adult to accompany them during ride home.       *Signs and symptoms of Infection Before or After Surgery:               !!!If you experience any fever, chills, nausea/ vomiting, foul odor/ excessive drainage from surgical site, flu-like symptoms, new wounds or cuts, PLEASE CALL THE SURGEON OFFICE at [phone] or SEND MESSAGE THROUGH [employer]!!!     *If you are running late the morning of surgery, please call the [facility] Surgery Dept @ [phone].     *Billing questions:  [phone]     Thank you,  -[facility] Pre Admit Dept.  [phone] or [phone]  M-F 7:30 am-4:00 pm (Closed Major Holidays)

## 2024-05-08 ENCOUNTER — OFFICE VISIT (OUTPATIENT)
Dept: CARDIOLOGY | Facility: CLINIC | Age: 84
End: 2024-05-08
Payer: MEDICARE

## 2024-05-08 VITALS
WEIGHT: 157.44 LBS | SYSTOLIC BLOOD PRESSURE: 138 MMHG | HEART RATE: 65 BPM | OXYGEN SATURATION: 97 % | DIASTOLIC BLOOD PRESSURE: 76 MMHG | HEIGHT: 62 IN | BODY MASS INDEX: 28.97 KG/M2

## 2024-05-08 DIAGNOSIS — I10 ESSENTIAL HYPERTENSION: Chronic | ICD-10-CM

## 2024-05-08 DIAGNOSIS — I77.1 TORTUOUS AORTA: ICD-10-CM

## 2024-05-08 DIAGNOSIS — E78.00 PURE HYPERCHOLESTEROLEMIA: ICD-10-CM

## 2024-05-08 DIAGNOSIS — I70.0 AORTIC ATHEROSCLEROSIS: Primary | ICD-10-CM

## 2024-05-08 PROCEDURE — 3078F DIAST BP <80 MM HG: CPT | Mod: CPTII,S$GLB,, | Performed by: INTERNAL MEDICINE

## 2024-05-08 PROCEDURE — 1159F MED LIST DOCD IN RCRD: CPT | Mod: CPTII,S$GLB,, | Performed by: INTERNAL MEDICINE

## 2024-05-08 PROCEDURE — 99214 OFFICE O/P EST MOD 30 MIN: CPT | Mod: S$GLB,,, | Performed by: INTERNAL MEDICINE

## 2024-05-08 PROCEDURE — 3075F SYST BP GE 130 - 139MM HG: CPT | Mod: CPTII,S$GLB,, | Performed by: INTERNAL MEDICINE

## 2024-05-08 PROCEDURE — 1101F PT FALLS ASSESS-DOCD LE1/YR: CPT | Mod: CPTII,S$GLB,, | Performed by: INTERNAL MEDICINE

## 2024-05-08 PROCEDURE — 99999 PR PBB SHADOW E&M-EST. PATIENT-LVL III: CPT | Mod: PBBFAC,,, | Performed by: INTERNAL MEDICINE

## 2024-05-08 PROCEDURE — 1160F RVW MEDS BY RX/DR IN RCRD: CPT | Mod: CPTII,S$GLB,, | Performed by: INTERNAL MEDICINE

## 2024-05-08 PROCEDURE — 3288F FALL RISK ASSESSMENT DOCD: CPT | Mod: CPTII,S$GLB,, | Performed by: INTERNAL MEDICINE

## 2024-05-08 PROCEDURE — 1126F AMNT PAIN NOTED NONE PRSNT: CPT | Mod: CPTII,S$GLB,, | Performed by: INTERNAL MEDICINE

## 2024-05-08 NOTE — PROGRESS NOTES
Subjective:   Patient ID:  Irlanda Lopez is a 83 y.o. female who presents for follow-up of Annual Exam  Patient denies CP, angina or anginal equivalent.  Echo 2018 nml  EKG 12-23 sinus bradycardia  Pt planned for cystoscopy next week  Lipids are at goal  Hypertension  This is a chronic problem. The current episode started more than 1 year ago. The problem has been gradually improving since onset. The problem is controlled. Pertinent negatives include no chest pain, palpitations or shortness of breath. Past treatments include diuretics. The current treatment provides moderate improvement. There are no compliance problems.    Hyperlipidemia  This is a chronic problem. The current episode started more than 1 year ago. The problem is controlled. Recent lipid tests were reviewed and are variable. Pertinent negatives include no chest pain or shortness of breath. Current antihyperlipidemic treatment includes statins. There are no compliance problems.  Risk factors for coronary artery disease include hypertension and dyslipidemia.   Edema  This is a chronic problem. The current episode started more than 1 year ago. The problem occurs intermittently. The problem has been gradually improving. Pertinent negatives include no chest pain. Nothing aggravates the symptoms. She has tried rest for the symptoms. The treatment provided moderate relief.       Review of Systems   Constitutional: Negative. Negative for weight gain.   HENT: Negative.     Eyes: Negative.    Cardiovascular: Negative.  Negative for chest pain, leg swelling and palpitations.   Respiratory: Negative.  Negative for shortness of breath.    Endocrine: Negative.    Hematologic/Lymphatic: Negative.    Skin: Negative.    Musculoskeletal:  Negative for muscle weakness.   Gastrointestinal: Negative.    Genitourinary: Negative.    Neurological: Negative.  Negative for dizziness.   Psychiatric/Behavioral: Negative.     Allergic/Immunologic: Negative.    All other  systems reviewed and are negative.    Family History   Problem Relation Name Age of Onset    Alzheimer's disease Mother      Aneurysm Father          brain    Stomach cancer Brother          50s    Parkinsonism Brother      Bladder Cancer Grandchild       Past Medical History:   Diagnosis Date    Clostridium difficile colitis     Dementia     Diverticulitis     s/p colostomy    High cholesterol     Hypertension     Hypothyroidism     Kidney stones      Social History     Socioeconomic History    Marital status:    Occupational History     Comment: [employer]   Tobacco Use    Smoking status: Former     Types: Cigarettes    Smokeless tobacco: Never    Tobacco comments:     Smoked in 20s. Quit soon   Substance and Sexual Activity    Alcohol use: No    Drug use: No    Sexual activity: Never     Comment:      Social Determinants of Health     Financial Resource Strain: Patient Declined (2/12/2024)    Overall Financial Resource Strain (CARDIA)     Difficulty of Paying Living Expenses: Patient declined   Food Insecurity: Patient Declined (2/12/2024)    Hunger Vital Sign     Worried About Running Out of Food in the Last Year: Patient declined     Ran Out of Food in the Last Year: Patient declined   Transportation Needs: Unknown (2/12/2024)    PRAPARE - Transportation     Lack of Transportation (Medical): Patient declined   Physical Activity: Inactive (2/12/2024)    Exercise Vital Sign     Days of Exercise per Week: 0 days     Minutes of Exercise per Session: 0 min   Stress: No Stress Concern Present (4/20/2023)    [employer] - Occupational Stress Questionnaire     Feeling of Stress : Not at all   Housing Stability: Low Risk  (2/12/2024)    Housing Stability Vital Sign     Unable to Pay for Housing in the Last Year: No     Number of Places Lived in the Last Year: 1     Unstable Housing in the Last Year: No     Current Outpatient Medications on File Prior to Visit    Medication Sig Dispense Refill    atorvastatin (LIPITOR) 10 MG tablet TAKE 1 TABLET BY MOUTH ONCE  DAILY 90 tablet 3    busPIRone (BUSPAR) 10 MG tablet Take 1 tablet (10 mg total) by mouth 3 (three) times daily. 90 tablet 11    furosemide (LASIX) 20 MG tablet Take 1 tablet (20 mg total) by mouth once daily. 90 tablet 3    hyoscyamine (LEVSIN/SL) 0.125 mg Subl Place 2 tablets (0.25 mg total) under the tongue every 4 (four) hours as needed (spasms). 40 tablet 1    memantine (NAMENDA) 10 MG Tab Take 1 tablet (10 mg total) by mouth 2 (two) times daily. 180 tablet 3    neomycin-polymyxin-dexamethasone (MAXITROL) 3.5 mg/g-10,000 unit/g-0.1 % Oint Apply ointment 3 times daily to lids and lashes for both eyes for 10 days 3.5 g 0    ondansetron (ZOFRAN-ODT) 8 MG TbDL Take 1 tablet (8 mg total) by mouth every 12 (twelve) hours as needed (nausea). 30 tablet 2    pantoprazole (PROTONIX) 40 MG tablet Take 1 tablet (40 mg total) by mouth once daily. 30 min prior to lunch (Patient taking differently: Take 40 mg by mouth as needed. 30 min prior to lunch) 90 tablet 3    potassium chloride (MICRO-K) 10 MEQ CpSR Take by mouth once daily.      risperiDONE (RISPERDAL) 0.5 MG Tab Take 1 tablet (0.5 mg total) by mouth every evening. 30 tablet 11    thyroid, pork, (ARMOUR THYROID) 30 mg Tab Take 1 tablet (30 mg total) by mouth before breakfast. 90 tablet 3    [DISCONTINUED] triamcinolone acetonide 0.1% (KENALOG) 0.1 % ointment APPLY TOPICALLY 4 (FOUR) TIMES DAILY. TO RASH ON FACE AND CHEST AND NOSE 80 g 1     No current facility-administered medications on file prior to visit.     Review of patient's allergies indicates:  No Known Allergies    Objective:     Physical Exam  Vitals and nursing note reviewed.   Constitutional:       Appearance: She is well-developed.   HENT:      Head: Normocephalic and atraumatic.   Eyes:      Conjunctiva/sclera: Conjunctivae normal.      Pupils: Pupils are equal, round, and reactive to light.    Cardiovascular:      Rate and Rhythm: Normal rate and regular rhythm.      Pulses: Intact distal pulses.      Heart sounds: Normal heart sounds.   Pulmonary:      Effort: Pulmonary effort is normal.      Breath sounds: Normal breath sounds.   Abdominal:      General: Bowel sounds are normal.      Palpations: Abdomen is soft.   Musculoskeletal:         General: Normal range of motion.      Cervical back: Normal range of motion and neck supple.   Skin:     General: Skin is warm and dry.   Neurological:      Mental Status: She is alert and oriented to person, place, and time.         Assessment:     1. Aortic atherosclerosis    2. Essential hypertension    3. Pure hypercholesterolemia    4. Tortuous aorta        Plan:     Aortic atherosclerosis    Essential hypertension    Pure hypercholesterolemia    Tortuous aorta    Continue lasix-htn/edema  Continue statin-hlp    OTC

## 2024-05-13 ENCOUNTER — TELEPHONE (OUTPATIENT)
Dept: PREADMISSION TESTING | Facility: HOSPITAL | Age: 84
End: 2024-05-13
Payer: MEDICARE

## 2024-05-13 NOTE — TELEPHONE ENCOUNTER
Pre op instructions reviewed with pt daughter over telephone, verbalized understanding.     To confirm, Surgery is scheduled on 5/14/24.    PLEASE ARRIVE AT 9:15 AM  *Please report to the [facility]obby (1st Floor) located off of [facility] (2nd Entrance/Building on the left, in front of the flag pole).  Address: [address] [place] [address] [place]. [address]      INSTRUCTIONS IMPORTANT!!!  DO NOT Eat, Drink, or Smoke after 12 midnight unless instructed otherwise by your Surgeon. OK to brush teeth, no gum, candy or mints!     >>>MEDICATION INSTRUCTIONS<<<: Morning of Surgery, take small sip of water with ONLY these medications:  BUSPIRONE  Lipitor  Memantine  Thyroid      !!!STOP ALL Aspirins, NSAIDS, WEIGHT LOSS INJECTIONS/PILLS, Herbal supplements, & Vitamins 7 DAYS BEFORE SURGERY!!!     ____  Avoid Alcoholic beverages 3 days prior to surgery, as it can thin the blood.  ____  NO Acrylic/fake nails or nail polish worn day of surgery (specifically hand/arm & foot surgeries).  ____  NO powder, lotions, deodorants, oils or cream on body.  ____  Remove all jewelry & piercings & foreign objects before arrival & leave at home.  ____  Remove Dentures, Hearing Aids & Contact Lens prior to surgery.  ____  Bring photo ID and insurance information to hospital (Leave Valuables at Home).  ____  If going home the same day, arrange for a ride home. You will not be able to drive for 24 hrs if Anesthesia was used.   ____  Females (ages 11-60): may need to give a urine sample the morning of surgery; please see Pre op Nurse prior to using the restroom.  ____  Males: Stop ED medications (Viagra, Cialis) 24 hrs prior to surgery.  ____  Wear clean, loose fitting clothing to allow for dressings/ bandages.        Bathing Instructions:               -Shower with anti-bacterial Soap (Hibiclens or Dial) the night before surgery and the morning of.              -Do not use Hibiclens on your face or genitals.              -Apply  clean clothes after shower.  -Do not shave your face or body 2 days prior to surgery unless instructed otherwise by your Surgeon.  -Do not shave pubic hair 7 days prior to surgery (gyn pt's).     [facility]Ride Policy:  Only 2 adults allowed in pre op/recovery area during your procedure. You MUST HAVE A RIDE HOME from a responsible adult that you know and trust. Medical Transport, Uber or Lyft can ONLY be used if patient has a responsible adult to accompany them during ride home.       *Signs and symptoms of Infection Before or After Surgery:               !!!If you experience any fever, chills, nausea/ vomiting, foul odor/ excessive drainage from surgical site, flu-like symptoms, new wounds or cuts, PLEASE CALL THE SURGEON OFFICE at [phone] or SEND MESSAGE THROUGH [facility]!!!     *If you are running late the morning of surgery, please call the [facility] Surgery Dept @ [phone].     *Billing questions:  [phone]      Thank you,  -[facility] Pre [facility]mit Dept.  [phone] or [phone]  M-F 7:30 am-4:00 pm (Closed Major Holidays)

## 2024-05-13 NOTE — TELEPHONE ENCOUNTER
Called and spoke with pt daughter about the following:     Please arrive to [facility] ([facility] at 0915am on 5/14/24 for your scheduled procedure.  Address: [address] [place] [address] [place]. [address] (2nd Building on left, 1st Floor Lobby)  >>>NO eating or drinking after midnight unless instructed otherwise by your Surgeon<<<    Thank you,  -[facility] Pre [facility] Testing Dept.  Mon-Fri 7:30 am - 4 pm [phone]

## 2024-05-14 ENCOUNTER — TELEPHONE (OUTPATIENT)
Dept: UROLOGY | Facility: CLINIC | Age: 84
End: 2024-05-14
Payer: MEDICARE

## 2024-05-14 ENCOUNTER — ANESTHESIA EVENT (OUTPATIENT)
Dept: SURGERY | Facility: HOSPITAL | Age: 84
End: 2024-05-14
Payer: MEDICARE

## 2024-05-14 ENCOUNTER — HOSPITAL ENCOUNTER (OUTPATIENT)
Facility: HOSPITAL | Age: 84
Discharge: HOME | End: 2024-05-14
Attending: UROLOGY | Admitting: UROLOGY
Payer: MEDICARE

## 2024-05-14 ENCOUNTER — ANESTHESIA (OUTPATIENT)
Dept: SURGERY | Facility: HOSPITAL | Age: 84
End: 2024-05-14
Payer: MEDICARE

## 2024-05-14 DIAGNOSIS — N20.0 RENAL STONE: ICD-10-CM

## 2024-05-14 PROCEDURE — 36000707: Performed by: UROLOGY

## 2024-05-14 PROCEDURE — 37000009 HC ANESTHESIA EA ADD 15 MINS: Performed by: UROLOGY

## 2024-05-14 PROCEDURE — 71000015 HC POSTOP RECOV 1ST HR: Performed by: UROLOGY

## 2024-05-14 PROCEDURE — 52332 CYSTOSCOPY AND TREATMENT: CPT | Mod: 51,RT,, | Performed by: UROLOGY

## 2024-05-14 PROCEDURE — C1769 GUIDE WIRE: HCPCS | Performed by: UROLOGY

## 2024-05-14 PROCEDURE — 71000033 HC RECOVERY, INTIAL HOUR: Performed by: UROLOGY

## 2024-05-14 PROCEDURE — 36000706: Performed by: UROLOGY

## 2024-05-14 PROCEDURE — 25000003 PHARM REV CODE 250

## 2024-05-14 PROCEDURE — 37000008 HC ANESTHESIA 1ST 15 MINUTES: Performed by: UROLOGY

## 2024-05-14 PROCEDURE — C2617 STENT, NON-COR, TEM W/O DEL: HCPCS | Performed by: UROLOGY

## 2024-05-14 PROCEDURE — 74420 UROGRAPHY RTRGR +-KUB: CPT | Mod: 26,,, | Performed by: UROLOGY

## 2024-05-14 PROCEDURE — 27201423 OPTIME MED/SURG SUP & DEVICES STERILE SUPPLY: Performed by: UROLOGY

## 2024-05-14 PROCEDURE — 25500020 PHARM REV CODE 255: Performed by: UROLOGY

## 2024-05-14 PROCEDURE — C1758 CATHETER, URETERAL: HCPCS | Performed by: UROLOGY

## 2024-05-14 PROCEDURE — 63600175 PHARM REV CODE 636 W HCPCS: Performed by: UROLOGY

## 2024-05-14 PROCEDURE — 52317 REMOVE BLADDER STONE: CPT | Mod: ,,, | Performed by: UROLOGY

## 2024-05-14 PROCEDURE — 63600175 PHARM REV CODE 636 W HCPCS

## 2024-05-14 DEVICE — STENT URETERAL UNIV 8FR 26CM: Type: IMPLANTABLE DEVICE | Site: URETER | Status: FUNCTIONAL

## 2024-05-14 RX ORDER — PROPOFOL 10 MG/ML
VIAL (ML) INTRAVENOUS CONTINUOUS PRN
Status: DISCONTINUED | OUTPATIENT
Start: 2024-05-14 | End: 2024-05-14

## 2024-05-14 RX ORDER — LIDOCAINE HYDROCHLORIDE 20 MG/ML
INJECTION, SOLUTION EPIDURAL; INFILTRATION; INTRACAUDAL; PERINEURAL
Status: DISCONTINUED | OUTPATIENT
Start: 2024-05-14 | End: 2024-05-14

## 2024-05-14 RX ORDER — CEFAZOLIN SODIUM 2 G/50ML
2 SOLUTION INTRAVENOUS
Status: COMPLETED | OUTPATIENT
Start: 2024-05-14 | End: 2024-05-14

## 2024-05-14 RX ORDER — FENTANYL CITRATE 50 UG/ML
INJECTION, SOLUTION INTRAMUSCULAR; INTRAVENOUS
Status: DISCONTINUED | OUTPATIENT
Start: 2024-05-14 | End: 2024-05-14

## 2024-05-14 RX ORDER — MEPERIDINE HYDROCHLORIDE 25 MG/ML
12.5 INJECTION INTRAMUSCULAR; INTRAVENOUS; SUBCUTANEOUS ONCE AS NEEDED
Status: DISCONTINUED | OUTPATIENT
Start: 2024-05-14 | End: 2024-05-14 | Stop reason: HOSPADM

## 2024-05-14 RX ORDER — ONDANSETRON HYDROCHLORIDE 2 MG/ML
INJECTION, SOLUTION INTRAVENOUS
Status: DISCONTINUED | OUTPATIENT
Start: 2024-05-14 | End: 2024-05-14

## 2024-05-14 RX ORDER — OXYCODONE AND ACETAMINOPHEN 5; 325 MG/1; MG/1
1 TABLET ORAL
Status: DISCONTINUED | OUTPATIENT
Start: 2024-05-14 | End: 2024-05-14 | Stop reason: HOSPADM

## 2024-05-14 RX ORDER — ONDANSETRON HYDROCHLORIDE 2 MG/ML
4 INJECTION, SOLUTION INTRAVENOUS DAILY PRN
Status: DISCONTINUED | OUTPATIENT
Start: 2024-05-14 | End: 2024-05-14 | Stop reason: HOSPADM

## 2024-05-14 RX ORDER — PROPOFOL 10 MG/ML
VIAL (ML) INTRAVENOUS
Status: DISCONTINUED | OUTPATIENT
Start: 2024-05-14 | End: 2024-05-14

## 2024-05-14 RX ORDER — HYOSCYAMINE SULFATE 0.12 MG/1
0.25 TABLET SUBLINGUAL EVERY 4 HOURS PRN
Qty: 40 TABLET | Refills: 0 | Status: SHIPPED | OUTPATIENT
Start: 2024-05-14

## 2024-05-14 RX ORDER — FENTANYL CITRATE 50 UG/ML
25 INJECTION, SOLUTION INTRAMUSCULAR; INTRAVENOUS EVERY 5 MIN PRN
Status: DISCONTINUED | OUTPATIENT
Start: 2024-05-14 | End: 2024-05-14 | Stop reason: HOSPADM

## 2024-05-14 RX ORDER — PHENAZOPYRIDINE HYDROCHLORIDE 200 MG/1
200 TABLET, FILM COATED ORAL 3 TIMES DAILY PRN
Qty: 15 TABLET | Refills: 0 | Status: SHIPPED | OUTPATIENT
Start: 2024-05-14

## 2024-05-14 RX ORDER — HYDROMORPHONE HYDROCHLORIDE 2 MG/ML
0.2 INJECTION, SOLUTION INTRAMUSCULAR; INTRAVENOUS; SUBCUTANEOUS EVERY 5 MIN PRN
Status: DISCONTINUED | OUTPATIENT
Start: 2024-05-14 | End: 2024-05-14 | Stop reason: HOSPADM

## 2024-05-14 RX ORDER — ONDANSETRON HYDROCHLORIDE 2 MG/ML
4 INJECTION, SOLUTION INTRAVENOUS ONCE AS NEEDED
Status: DISCONTINUED | OUTPATIENT
Start: 2024-05-14 | End: 2024-05-14 | Stop reason: HOSPADM

## 2024-05-14 RX ORDER — CEFDINIR 300 MG/1
300 CAPSULE ORAL 2 TIMES DAILY
Qty: 20 CAPSULE | Refills: 0 | Status: SHIPPED | OUTPATIENT
Start: 2024-05-14 | End: 2024-05-24

## 2024-05-14 RX ADMIN — PROPOFOL 20 MG: 10 INJECTION, EMULSION INTRAVENOUS at 11:05

## 2024-05-14 RX ADMIN — CEFAZOLIN SODIUM 2 G: 2 SOLUTION INTRAVENOUS at 11:05

## 2024-05-14 RX ADMIN — FENTANYL CITRATE 25 MCG: 50 INJECTION, SOLUTION INTRAMUSCULAR; INTRAVENOUS at 11:05

## 2024-05-14 RX ADMIN — GLYCOPYRROLATE 0.2 MG: 0.2 INJECTION, SOLUTION INTRAMUSCULAR; INTRAVENOUS at 11:05

## 2024-05-14 RX ADMIN — SODIUM CHLORIDE, SODIUM LACTATE, POTASSIUM CHLORIDE, AND CALCIUM CHLORIDE: .6; .31; .03; .02 INJECTION, SOLUTION INTRAVENOUS at 10:05

## 2024-05-14 RX ADMIN — LIDOCAINE HYDROCHLORIDE 40 MG: 20 INJECTION, SOLUTION EPIDURAL; INFILTRATION; INTRACAUDAL; PERINEURAL at 11:05

## 2024-05-14 RX ADMIN — PROPOFOL 40 MG: 10 INJECTION, EMULSION INTRAVENOUS at 11:05

## 2024-05-14 RX ADMIN — ONDANSETRON 4 MG: 2 INJECTION INTRAMUSCULAR; INTRAVENOUS at 11:05

## 2024-05-14 RX ADMIN — PROPOFOL 50 MCG/KG/MIN: 10 INJECTION, EMULSION INTRAVENOUS at 11:05

## 2024-05-14 NOTE — TRANSFER OF CARE
"Anesthesia Transfer of Care Note    Patient: Irlanda Lopez    Procedure(s) Performed: Procedure(s) (LRB):  CYSTOSCOPY, WITH RETROGRADE PYELOGRAM AND URETERAL STENT INSERTION (Right)  CYSTOLITHOLAPAXY (Right)    Patient location: PACU    Anesthesia Type: MAC    Transport from OR: Transported from OR on room air with adequate spontaneous ventilation    Post pain: adequate analgesia    Post assessment: no apparent anesthetic complications    Post vital signs: stable    Level of consciousness: responds to stimulation and awake    Nausea/Vomiting: no nausea/vomiting    Complications: none    Transfer of care protocol was followedComments: Report given to PACU RN at bedside. Hand off tool used. RN given opportunity to ask questions or clarify concerns. No Concerns verbalized. RN was asked if ready to assume care of patient. RN verbally confirmed. Pt. left in stable condition. SV. Vital Signs Return to Near Baseline. No s/s of distress noted.       Last vitals: Visit Vitals  BP (!) 160/77 (BP Location: Right arm, Patient Position: Sitting)   Pulse 61   Temp 36.6 °C (97.9 °F) (Temporal)   Resp 20   Ht 5' 2" (1.575 m)   Wt 70.3 kg (154 lb 15.7 oz)   SpO2 97%   Breastfeeding No   BMI 28.35 kg/m²     "

## 2024-05-14 NOTE — ANESTHESIA POSTPROCEDURE EVALUATION
Anesthesia Post Evaluation    Patient: Irlanda Lopez    Procedure(s) Performed: Procedure(s) (LRB):  CYSTOSCOPY, WITH RETROGRADE PYELOGRAM AND URETERAL STENT INSERTION (Right)  CYSTOLITHOLAPAXY (Right)    Final Anesthesia Type: general      Patient location during evaluation: PACU  Patient participation: Yes- Able to Participate  Level of consciousness: awake and alert, oriented and awake  Post-procedure vital signs: reviewed and stable  Pain management: adequate  Airway patency: patent    PONV status at discharge: No PONV  Anesthetic complications: no      Cardiovascular status: blood pressure returned to baseline  Respiratory status: unassisted  Hydration status: euvolemic  Follow-up not needed.              Vitals Value Taken Time   /59 05/14/24 1153   Temp 98.4   05/14/24 1156   Pulse 55 05/14/24 1156   Resp 25 05/14/24 1156   SpO2 94 % 05/14/24 1156   Vitals shown include unfiled device data.      No case tracking events are documented in the log.      Pain/Babar Score: No data recorded

## 2024-05-14 NOTE — OP NOTE
Date of Procedure:  05/14/2024    Pre-operative Diagnosis:   1.  Right renal stone- 2 cm    Post-operative Diagnosis:   1.  Right renal stone  2.  Bladder stone 2 cm    Surgeon:  Anselmo Matute MD    Assistants: None    Specimen: None    Anesthesia:  Mac anesthesia    Blood loss:  Minimal    Findings:  Right 8 [place]ian by 26 cm stent in good position, retrograde large right renal stone, bladder stone around the distal coil.    Procedures:  1. Cysto with placement of right ureteral stent  2. Right retrograde pyelogram  3. Vesical litholapaxy  4. Fluoro less than one hour    Procedure in Detail:  Patient was brought to the operative suite and placed under general anesthesia.  After being positioned in dorsal lithotomy position, patient was sterilely prepped and draped.  Twenty-one Albanian sheath cystoscope was placed into a normal urethra.  Bilateral ureteral orifices were normal in size, shape, caliber and location.  The remainder of the bladder was otherwise unremarkable, no evidence of intravesical lesions or other abnormalities.  Stent was identified, large stone around the distal coil, using laser we are able to form vesical litholapaxy fracturing the stone and then irrigating out with the cystoscope.  At which point we are able to grasp the stent and remove it through the urethral meatus.  A wire was inserted the stent was removed.  Over the wire using Seldinger technique we placed a [place] catheter, wire was removed.  Retrograde was performed demonstrating persistent right renal stone, no filling defects or other abnormalities, otherwise unremarkable right retrograde nephrogram, good position within the renal pelvis.  Wire was reinserted the [place] catheter was removed.  Then over the wire using Seldinger technique we placed an 8 Albanian by 26 cm double-J ureteral stent, good coil in the renal pelvis the bladder using fluoroscopic imaging.  The bladder was then drained by the cystoscope.  The patient was then  taken to the PACU in stable condition.  She will follow back up in 5 months to perform repeat cystoscopy with stent exchange at 6 months.    Complications: None.

## 2024-05-14 NOTE — ANESTHESIA PREPROCEDURE EVALUATION
05/14/2024  Irlanda Lopez is a 83 y.o., female.      Pre-op Assessment    I have reviewed the Patient Summary Reports.     I have reviewed the Nursing Notes. I have reviewed the NPO Status.      Review of Systems  Anesthesia Hx:  No problems with previous Anesthesia                Hematology/Oncology:  Hematology Normal   Oncology Normal                                   Cardiovascular:     Hypertension                                        Renal/:  Chronic Renal Disease                Hepatic/GI:  Hepatic/GI Normal                 Neurological:  Neurology Normal                                      Endocrine:   Hypothyroidism          Dermatological:  Skin Normal    Psych:  Psychiatric History                Patient Active Problem List   Diagnosis    Hypothyroidism    Essential hypertension    Mild cognitive impairment with memory loss    High serum vitamin D    Hyperlipidemia    Colostomy in place    Tortuous aorta    Renal stone    Dementia    MARTIN (generalized anxiety disorder)    Dysuria    Small bowel anastomotic dilation    Mass of right breast    Cystitis    Aortic atherosclerosis    Osteopenia    Urge urinary incontinence         Physical Exam  General: Well nourished, Cooperative, Alert and Oriented    Airway:  Mallampati: II / II  Mouth Opening: Normal  TM Distance: Normal  Tongue: Normal  Neck ROM: Normal ROM    Dental:  Intact        Anesthesia Plan  Type of Anesthesia, risks & benefits discussed:    Anesthesia Type: Gen ETT, MAC, Gen Supraglottic Airway  Intra-op Monitoring Plan: Standard ASA Monitors  Post Op Pain Control Plan: multimodal analgesia  Induction:  IV  Airway Plan: Direct  Informed Consent: Informed consent signed with the Patient and all parties understand the risks and agree with anesthesia plan.  All questions answered.   ASA Score: 3  Day of Surgery Review of History &  Physical: H&P Update referred to the surgeon/provider.I have interviewed and examined the patient. I have reviewed the patient's H&P dated: There are no significant changes. H&P completed by Anesthesiologist.    Ready For Surgery From Anesthesia Perspective.     .

## 2024-05-14 NOTE — TELEPHONE ENCOUNTER
----- Message from Anselmo Matute MD sent at 5/14/2024 11:51 AM CDT -----  Kub and mark anthony in 5 months

## 2024-05-14 NOTE — DISCHARGE SUMMARY
[facility] - Surgery (Hospital)  Discharge Note  Short Stay    Procedure(s) (LRB):  CYSTOSCOPY, WITH RETROGRADE PYELOGRAM AND URETERAL STENT INSERTION (Right)      OUTCOME: Patient tolerated treatment/procedure well without complication and is now ready for discharge.    DISPOSITION: Home or Self Care    FINAL DIAGNOSIS:  renal stones    FOLLOWUP: In clinic    DISCHARGE INSTRUCTIONS:    Discharge Procedure Orders   X-Ray KUB   Standing Status: Future Standing Exp. Date: 05/14/25     Order Specific Question Answer Comments   May the Radiologist modify the order per protocol to meet the clinical needs of the patient? Yes    Release to patient Immediate      US Retroperitoneal Complete   Standing Status: Future Standing Exp. Date: 05/14/25     Order Specific Question Answer Comments   May the Radiologist modify the order per protocol to meet the clinical needs of the patient? Yes    Release to patient Immediate      Diet Adult Regular     Notify your health care provider if you experience any of the following:  severe uncontrolled pain     Notify your health care provider if you experience any of the following:  persistent nausea and vomiting or diarrhea     Notify your health care provider if you experience any of the following:  temperature >100.4     Activity as tolerated        TIME SPENT ON DISCHARGE: 5 minutes

## 2024-05-15 ENCOUNTER — PATIENT MESSAGE (OUTPATIENT)
Dept: UROLOGY | Facility: CLINIC | Age: 84
End: 2024-05-15
Payer: MEDICARE

## 2024-05-15 VITALS
RESPIRATION RATE: 20 BRPM | TEMPERATURE: 98 F | BODY MASS INDEX: 28.52 KG/M2 | HEIGHT: 62 IN | SYSTOLIC BLOOD PRESSURE: 122 MMHG | WEIGHT: 155 LBS | DIASTOLIC BLOOD PRESSURE: 60 MMHG | HEART RATE: 61 BPM | OXYGEN SATURATION: 99 %

## 2024-05-16 ENCOUNTER — TELEPHONE (OUTPATIENT)
Dept: UROLOGY | Facility: CLINIC | Age: 84
End: 2024-05-16
Payer: MEDICARE

## 2024-05-16 RX ORDER — LEVOFLOXACIN 500 MG/1
500 TABLET, FILM COATED ORAL DAILY
Qty: 7 TABLET | Refills: 0 | Status: SHIPPED | OUTPATIENT
Start: 2024-05-16 | End: 2024-05-23

## 2024-05-16 NOTE — TELEPHONE ENCOUNTER
Daughter states pt was having a reaction to the Omnicef--severe itching. Told her not to give pt any more until she hears back from us.

## 2024-05-16 NOTE — TELEPHONE ENCOUNTER
Called pts daughter and told her that Dr. Matute wanted pt to stop taking the Omnicef  due to itching and start taking Levaquin that he sent to her pharmacy; pt to use Benadryl prn for itching.

## 2024-05-18 ENCOUNTER — DOCUMENT SCAN (OUTPATIENT)
Dept: HOME HEALTH SERVICES | Facility: HOSPITAL | Age: 84
End: 2024-05-18
Payer: MEDICARE

## 2024-05-18 DIAGNOSIS — F01.54 VASCULAR DEMENTIA WITH ANXIETY, UNSPECIFIED DEMENTIA SEVERITY: Chronic | ICD-10-CM

## 2024-05-20 RX ORDER — MEMANTINE HYDROCHLORIDE 10 MG/1
10 TABLET ORAL 2 TIMES DAILY
Qty: 180 TABLET | Refills: 3 | Status: SHIPPED | OUTPATIENT
Start: 2024-05-20

## 2024-06-21 NOTE — TELEPHONE ENCOUNTER
Called and spoke with pts daughter. She stated that she could not open up her mychart and see the last message that was sent to her by Dr. Arias. Gave her the message. She stated that they are actually seeing the urologist on Monday. They will let us know if they need anything else.    Documentation of OPAT Completion    OPAT completed on: 6/18/24    Date Infusion company notified on: patient was in LTACH    PICC Pulled on: 6/18/24 Port removed     Episode closed? 6/21    Flowsheet updated? 6/21/24

## 2024-06-30 ENCOUNTER — PATIENT MESSAGE (OUTPATIENT)
Dept: PRIMARY CARE CLINIC | Facility: CLINIC | Age: 84
End: 2024-06-30
Payer: MEDICARE

## 2024-07-02 ENCOUNTER — LAB VISIT (OUTPATIENT)
Dept: LAB | Facility: HOSPITAL | Age: 84
End: 2024-07-02
Attending: FAMILY MEDICINE
Payer: MEDICARE

## 2024-07-02 ENCOUNTER — OFFICE VISIT (OUTPATIENT)
Dept: PRIMARY CARE CLINIC | Facility: CLINIC | Age: 84
End: 2024-07-02
Payer: MEDICARE

## 2024-07-02 ENCOUNTER — PATIENT MESSAGE (OUTPATIENT)
Dept: PRIMARY CARE CLINIC | Facility: CLINIC | Age: 84
End: 2024-07-02

## 2024-07-02 VITALS
OXYGEN SATURATION: 97 % | HEIGHT: 62 IN | DIASTOLIC BLOOD PRESSURE: 72 MMHG | BODY MASS INDEX: 28.82 KG/M2 | RESPIRATION RATE: 18 BRPM | TEMPERATURE: 97 F | WEIGHT: 156.63 LBS | SYSTOLIC BLOOD PRESSURE: 122 MMHG | HEART RATE: 74 BPM

## 2024-07-02 DIAGNOSIS — R63.0 DECREASED APPETITE: ICD-10-CM

## 2024-07-02 DIAGNOSIS — R10.9 ABDOMINAL CRAMPING: ICD-10-CM

## 2024-07-02 DIAGNOSIS — Z93.3 COLOSTOMY IN PLACE: ICD-10-CM

## 2024-07-02 DIAGNOSIS — R10.9 ABDOMINAL CRAMPING: Primary | ICD-10-CM

## 2024-07-02 DIAGNOSIS — F01.C4 SEVERE VASCULAR DEMENTIA WITH ANXIETY: ICD-10-CM

## 2024-07-02 LAB
ALBUMIN SERPL BCP-MCNC: 3.4 G/DL (ref 3.5–5.2)
ALP SERPL-CCNC: 89 U/L (ref 55–135)
ALT SERPL W/O P-5'-P-CCNC: 13 U/L (ref 10–44)
ANION GAP SERPL CALC-SCNC: 11 MMOL/L (ref 8–16)
AST SERPL-CCNC: 26 U/L (ref 10–40)
BASOPHILS # BLD AUTO: 0.06 K/UL (ref 0–0.2)
BASOPHILS NFR BLD: 0.6 % (ref 0–1.9)
BILIRUB SERPL-MCNC: 0.4 MG/DL (ref 0.1–1)
BUN SERPL-MCNC: 12 MG/DL (ref 8–23)
CALCIUM SERPL-MCNC: 9.9 MG/DL (ref 8.7–10.5)
CHLORIDE SERPL-SCNC: 100 MMOL/L (ref 95–110)
CO2 SERPL-SCNC: 23 MMOL/L (ref 23–29)
CREAT SERPL-MCNC: 1.2 MG/DL (ref 0.5–1.4)
DIFFERENTIAL METHOD BLD: ABNORMAL
EOSINOPHIL # BLD AUTO: 0.3 K/UL (ref 0–0.5)
EOSINOPHIL NFR BLD: 2.8 % (ref 0–8)
ERYTHROCYTE [DISTWIDTH] IN BLOOD BY AUTOMATED COUNT: 15 % (ref 11.5–14.5)
EST. GFR  (NO RACE VARIABLE): 45.2 ML/MIN/1.73 M^2
GLUCOSE SERPL-MCNC: 90 MG/DL (ref 70–110)
HCT VFR BLD AUTO: 38.5 % (ref 37–48.5)
HGB BLD-MCNC: 12.6 G/DL (ref 12–16)
IMM GRANULOCYTES # BLD AUTO: 0.05 K/UL (ref 0–0.04)
IMM GRANULOCYTES NFR BLD AUTO: 0.5 % (ref 0–0.5)
LYMPHOCYTES # BLD AUTO: 3.4 K/UL (ref 1–4.8)
LYMPHOCYTES NFR BLD: 33.4 % (ref 18–48)
MCH RBC QN AUTO: 26.9 PG (ref 27–31)
MCHC RBC AUTO-ENTMCNC: 32.7 G/DL (ref 32–36)
MCV RBC AUTO: 82 FL (ref 82–98)
MONOCYTES # BLD AUTO: 1.1 K/UL (ref 0.3–1)
MONOCYTES NFR BLD: 10.3 % (ref 4–15)
NEUTROPHILS # BLD AUTO: 5.4 K/UL (ref 1.8–7.7)
NEUTROPHILS NFR BLD: 52.4 % (ref 38–73)
NRBC BLD-RTO: 0 /100 WBC
PLATELET # BLD AUTO: 354 K/UL (ref 150–450)
PMV BLD AUTO: 9.5 FL (ref 9.2–12.9)
POTASSIUM SERPL-SCNC: 4.5 MMOL/L (ref 3.5–5.1)
PROT SERPL-MCNC: 7.6 G/DL (ref 6–8.4)
RBC # BLD AUTO: 4.68 M/UL (ref 4–5.4)
SODIUM SERPL-SCNC: 134 MMOL/L (ref 136–145)
WBC # BLD AUTO: 10.25 K/UL (ref 3.9–12.7)

## 2024-07-02 PROCEDURE — 85025 COMPLETE CBC W/AUTO DIFF WBC: CPT | Performed by: FAMILY MEDICINE

## 2024-07-02 PROCEDURE — 3078F DIAST BP <80 MM HG: CPT | Mod: CPTII,S$GLB,, | Performed by: FAMILY MEDICINE

## 2024-07-02 PROCEDURE — G2211 COMPLEX E/M VISIT ADD ON: HCPCS | Mod: S$GLB,,, | Performed by: FAMILY MEDICINE

## 2024-07-02 PROCEDURE — 80053 COMPREHEN METABOLIC PANEL: CPT | Performed by: FAMILY MEDICINE

## 2024-07-02 PROCEDURE — 1101F PT FALLS ASSESS-DOCD LE1/YR: CPT | Mod: CPTII,S$GLB,, | Performed by: FAMILY MEDICINE

## 2024-07-02 PROCEDURE — 1159F MED LIST DOCD IN RCRD: CPT | Mod: CPTII,S$GLB,, | Performed by: FAMILY MEDICINE

## 2024-07-02 PROCEDURE — 1126F AMNT PAIN NOTED NONE PRSNT: CPT | Mod: CPTII,S$GLB,, | Performed by: FAMILY MEDICINE

## 2024-07-02 PROCEDURE — 3074F SYST BP LT 130 MM HG: CPT | Mod: CPTII,S$GLB,, | Performed by: FAMILY MEDICINE

## 2024-07-02 PROCEDURE — 36415 COLL VENOUS BLD VENIPUNCTURE: CPT | Mod: PN | Performed by: FAMILY MEDICINE

## 2024-07-02 PROCEDURE — 1160F RVW MEDS BY RX/DR IN RCRD: CPT | Mod: CPTII,S$GLB,, | Performed by: FAMILY MEDICINE

## 2024-07-02 PROCEDURE — 99214 OFFICE O/P EST MOD 30 MIN: CPT | Mod: S$GLB,,, | Performed by: FAMILY MEDICINE

## 2024-07-02 PROCEDURE — 99999 PR PBB SHADOW E&M-EST. PATIENT-LVL V: CPT | Mod: PBBFAC,,, | Performed by: FAMILY MEDICINE

## 2024-07-02 PROCEDURE — 3288F FALL RISK ASSESSMENT DOCD: CPT | Mod: CPTII,S$GLB,, | Performed by: FAMILY MEDICINE

## 2024-07-02 NOTE — PROGRESS NOTES
Chief Complaint  Chief Complaint   Patient presents with    Abdominal Cramping     2 weeks ago wanted to discuss her digestive system and her appetite is not the same haven't been eating like her normal self        HPI  Irlanda Lopez is a 83 y.o. female with multiple medical diagnoses as listed in the medical history and problem list that presents for  in person visit.      The patient's last visit with me was on 3/12/2024.     History of Present Illness    Patient presents today for a check-in and has been having digestive issues for about two weeks.    She started experiencing cramps two Sundays ago on the 23rd. Bentyl was given but has not been helping. She did not want to eat and only had bone broth for a few days. On Wednesday morning, she ate scrambled eggs. She went out to eat and had ice cream with her step-in caregiver, but then had no appetite when her primary caregiver returned. She has been having broth, scrambled eggs, and some blueberries in the morning but her appetite is still off. She denies any current cramping, pain, fever, or vomiting. Her colostomy has been well-tolerated with no reported cramps or pain.    She reports feeling more weary and tired, preferring to stay in pajamas on the sofa. She has had some fluctuations in energy levels and appetite, with periods of increased lethargy and decreased appetite alternating with times of feeling more energetic. However, she denies excessive daytime sleepiness or constant fatigue.    Risperdal (Risperidone) 0.5 tablet at bedtime, Namenda (Memantine) 1 pill twice daily, BuSpar (Buspirone) 20 mg twice daily, unspecified cholesterol medication, and Hyoscyamine as needed for uterine discomfort related to stents. She denies any issues with the stents or kidney stones since the last stent replacement on May 14th and has not required hyoscyamine for uterine discomfort during this period.    She has a history of colostomy on the left anterior abdominal  "wall. She underwent a kidney stone removal procedure where half of a large kidney stone was removed, with some stone remaining. She had a stent placed, which was removed and replaced last month to prevent stone blockage. She denies any issues related to the stent replacement or remaining kidney stone at this time.    She has a past medical history of uterine discomfort related to stents. She previously had a uterine stent removed and replaced last month in May. Since the stent replacement, she denies any further episodes of uterine discomfort or issues related to the stents.         No questionnaires on file.       Pmh, Psh, Family Hx, Social Hx, HM updated in Epic Tabs today.    Review of Systems   Constitutional:  Positive for appetite change. Negative for activity change, fatigue and unexpected weight change.   Respiratory:  Negative for cough and shortness of breath.    Cardiovascular:  Negative for chest pain and palpitations.   Gastrointestinal:  Negative for abdominal distention, abdominal pain, constipation, diarrhea and nausea.   Psychiatric/Behavioral:  Negative for dysphoric mood and sleep disturbance. The patient is not nervous/anxious.         Objective:     Vitals:    07/02/24 1522   BP: 122/72   BP Location: Left arm   Patient Position: Sitting   BP Method: Large (Manual)   Pulse: 74   Resp: 18   Temp: 96.9 °F (36.1 °C)   TempSrc: Tympanic   SpO2: 97%   Weight: 71.1 kg (156 lb 10.2 oz)   Height: 5' 2" (1.575 m)     Wt Readings from Last 10 Encounters:   07/02/24 71.1 kg (156 lb 10.2 oz)   05/14/24 70.3 kg (154 lb 15.7 oz)   05/08/24 71.4 kg (157 lb 6.5 oz)   04/18/24 71 kg (156 lb 8.4 oz)   03/27/24 71 kg (156 lb 8.4 oz)   02/23/24 70.3 kg (154 lb 15.7 oz)   01/09/24 70.8 kg (156 lb 1.4 oz)   11/07/23 68.5 kg (151 lb 0.2 oz)   10/05/23 66.7 kg (147 lb 0.8 oz)   09/19/23 66.7 kg (147 lb 2.5 oz)     Physical Exam    Abdomen: Ostomy output appears normal. Stoma appears healthy, pink and red.     "   Physical Exam  Vitals and nursing note reviewed.   Constitutional:       General: She is awake.      Appearance: Normal appearance. She is well-developed, well-groomed and normal weight.   HENT:      Head: Normocephalic and atraumatic.      Right Ear: External ear normal.      Left Ear: External ear normal.      Nose: Nose normal.   Eyes:      Conjunctiva/sclera: Conjunctivae normal.   Neck:      Thyroid: No thyromegaly or thyroid tenderness.   Cardiovascular:      Rate and Rhythm: Normal rate and regular rhythm.      Heart sounds: Normal heart sounds.   Pulmonary:      Effort: Pulmonary effort is normal. No accessory muscle usage.      Breath sounds: Normal breath sounds.   Abdominal:      General: Bowel sounds are normal. There is no distension.      Tenderness: There is no abdominal tenderness. There is no guarding or rebound.      Comments: Ostomy present. Intact red stoma. Output noted    Musculoskeletal:      Cervical back: Normal range of motion and neck supple.   Skin:     General: Skin is warm and dry.   Neurological:      General: No focal deficit present.      Mental Status: She is alert. Mental status is at baseline.   Psychiatric:         Attention and Perception: Attention normal.         Mood and Affect: Mood normal.         Speech: Speech normal.         Behavior: Behavior normal. Behavior is cooperative.         Judgment: Judgment normal.         Assessment:     1. Abdominal cramping    2. Decreased appetite    3. Colostomy in place    4. Severe vascular dementia with anxiety        LABS:   Lab Results   Component Value Date    HGBA1C 5.3 01/10/2024    HGBA1C 5.0 06/20/2023    HGBA1C 5.2 06/09/2022      Lab Results   Component Value Date    CHOL 159 01/10/2024    CHOL 166 06/20/2023    CHOL 154 06/09/2022     Lab Results   Component Value Date    LDLCALC 103.8 01/10/2024    LDLCALC 111.0 06/20/2023    LDLCALC 104.6 06/09/2022     Lab Results   Component Value Date    WBC 8.61 04/30/2024    HGB  12.7 04/30/2024    HCT 41.5 04/30/2024     04/30/2024    CHOL 159 01/10/2024    TRIG 86 01/10/2024    HDL 38 (L) 01/10/2024    ALT 16 01/10/2024    AST 24 01/10/2024     (L) 04/30/2024    K 3.9 04/30/2024     04/30/2024    CREATININE 0.9 04/30/2024    BUN 11 04/30/2024    CO2 20 (L) 04/30/2024    TSH 1.780 01/10/2024    INR 1.0 06/10/2019    HGBA1C 5.3 01/10/2024       Plan:   Irlanda was seen today for abdominal cramping.    Diagnoses and all orders for this visit:    Abdominal cramping  -     Comprehensive Metabolic Panel; Future  -     CBC Auto Differential; Future  -     Ambulatory referral/consult to Gastroenterology; Future    Decreased appetite  -     Comprehensive Metabolic Panel; Future  -     CBC Auto Differential; Future  -     Ambulatory referral/consult to Gastroenterology; Future    Colostomy in place  -     Ambulatory referral/consult to Gastroenterology; Future    Severe vascular dementia with anxiety        Assessment & Plan    GASTROINTESTINAL ISSUES:  - Patient has had decreased appetite and energy for the past 2 weeks, with no associated pain, fever, vomiting, or change in ostomy output.  - Symptoms started after patient ate out at a restaurant and had ice cream, raising concern for possible dietary intolerance or mild GI infection.  - Will check basic labs today to evaluate for signs of infection, dehydration or electrolyte abnormalities.  - If normal, suspect symptoms due to dietary changes and will recommend bland diet and close monitoring.  - Basic labs ordered today to check blood counts, signs of infection, electrolytes, and kidney function.  - Referred to Sulema Duncan, GI nurse practitioner, to establish care since Dr. Maier moved away.  - Contact office if appetite does not improve, output increases, or any other concerning symptoms develop in the meantime.  MEDICATION MANAGEMENT:  - Continued Risperdal 0.5 tablet at bedtime, memantine 10 mg twice daily, buspirone 20 mg  twice daily, and hyoscyamine as needed for uterine/bladder spasms related to ureteral stent.  FOLLOW-UP CARE:  - Requested appointment to be scheduled at the [place] location in 1-2 months.  - Follow up with Dr. Arias in 3 months for virtual visit, can be changed to in-person if needed.         FL Retrograde Pyelogram  Narrative: EXAMINATION:  FL RETROGRADE PYELOGRAM    CLINICAL HISTORY:  Surgery;    COMPARISON:  November 7, 2023    FLUORO TIME:  24 seconds, dose of 6 mGy    FINDINGS:  One image was provided for review.  It reveals a ureteral stent in place on the right with large renal stones.  Con no contrast was injected..  Impression: As above    Electronically signed by: Farshad Matt MD  Date:    05/14/2024  Time:    14:13    The ASCVD Risk score (Coby DK, et al., 2019) failed to calculate for the following reasons:    The 2019 ASCVD risk score is only valid for ages 40 to 79    Follow-up: Follow up in about 3 months (around 10/2/2024) for f/u Virtual Visit Dr. Arias, 3mo chronic .    I spent a total of   35    minutes face to face and non-face to face on the date of this visit.This includes time preparing to see the patient (eg, review of tests, notes), obtaining and/or reviewing additional history from an independent historian and/or outside medical records, documenting clinical information in the electronic health record, independently interpreting results and/or communicating results to the patient/family/caregiver, or care coordinator.  Visit today included increased complexity associated with the care of the episodic problem addressed and managing the longitudinal care of the patient due to the serious and/or complex managed problem(s).    This note was generated with the assistance of ambient listening technology. Verbal consent was obtained by the patient and accompanying visitor(s) for the recording of patient appointment to facilitate this note. I attest to having reviewed and edited the  generated note for accuracy, though some syntax or spelling errors may persist. Please contact the author of this note for any clarification.       There are no Patient Instructions on file for this visit.

## 2024-07-03 ENCOUNTER — OFFICE VISIT (OUTPATIENT)
Dept: URGENT CARE | Facility: CLINIC | Age: 84
End: 2024-07-03
Payer: MEDICARE

## 2024-07-03 VITALS
SYSTOLIC BLOOD PRESSURE: 145 MMHG | DIASTOLIC BLOOD PRESSURE: 75 MMHG | HEART RATE: 65 BPM | OXYGEN SATURATION: 96 % | RESPIRATION RATE: 18 BRPM | TEMPERATURE: 98 F

## 2024-07-03 DIAGNOSIS — N39.0 URINARY TRACT INFECTION WITH HEMATURIA, SITE UNSPECIFIED: Primary | ICD-10-CM

## 2024-07-03 DIAGNOSIS — R31.9 URINARY TRACT INFECTION WITH HEMATURIA, SITE UNSPECIFIED: Primary | ICD-10-CM

## 2024-07-03 DIAGNOSIS — R10.9 RIGHT FLANK PAIN: ICD-10-CM

## 2024-07-03 LAB
BILIRUBIN, UA POC OHS: NEGATIVE
BLOOD, UA POC OHS: ABNORMAL
CLARITY, UA POC OHS: ABNORMAL
COLOR, UA POC OHS: YELLOW
GLUCOSE, UA POC OHS: NEGATIVE
KETONES, UA POC OHS: NEGATIVE
LEUKOCYTES, UA POC OHS: ABNORMAL
NITRITE, UA POC OHS: POSITIVE
PH, UA POC OHS: 6
PROTEIN, UA POC OHS: 100
SPECIFIC GRAVITY, UA POC OHS: 1
UROBILINOGEN, UA POC OHS: 0.2

## 2024-07-03 PROCEDURE — 81003 URINALYSIS AUTO W/O SCOPE: CPT | Mod: QW,S$GLB,, | Performed by: PHYSICIAN ASSISTANT

## 2024-07-03 PROCEDURE — 99214 OFFICE O/P EST MOD 30 MIN: CPT | Mod: S$GLB,,, | Performed by: PHYSICIAN ASSISTANT

## 2024-07-03 RX ORDER — DICYCLOMINE HYDROCHLORIDE 20 MG/1
20 TABLET ORAL
Qty: 120 TABLET | Refills: 1 | Status: SHIPPED | OUTPATIENT
Start: 2024-07-03

## 2024-07-03 RX ORDER — NITROFURANTOIN 25; 75 MG/1; MG/1
100 CAPSULE ORAL 2 TIMES DAILY
Qty: 10 CAPSULE | Refills: 0 | Status: SHIPPED | OUTPATIENT
Start: 2024-07-03 | End: 2024-07-08

## 2024-07-03 NOTE — PROGRESS NOTES
Subjective:      Patient ID: Irlanda Lopez is a 83 y.o. female.    Vitals:  oral temperature is 97.6 °F (36.4 °C). Her blood pressure is 145/75 (abnormal) and her pulse is 65. Her respiration is 18 and oxygen saturation is 96%.     Chief Complaint: Flank Pain    Accompanied with daughter, c/o right flank pain that began today.  Currently rates pain 2/10, no known injuries.  Daughter messaged patient's PCP for advice because her mother developed this right flank pain today.  She was told to increase fluids and if pain worsens then go to the ED because of holiday tomorrow.  Patient daughter deny fever, chills, and/or any other symptoms associated with this complaint.    Flank Pain  This is a new problem. The current episode started today. The problem is unchanged. The quality of the pain is described as aching. The pain does not radiate. The pain is at a severity of 2/10. The pain is mild. The pain is The same all the time. The symptoms are aggravated by standing, twisting and bending. Stiffness is present All day. Pertinent negatives include no abdominal pain, bladder incontinence, bowel incontinence, chest pain, dysuria, fever, headaches, leg pain, numbness, paresis, paresthesias, pelvic pain, perianal numbness, tingling, weakness, weight loss, genital pain, hematuria, urethral discharge or vaginal discharge. Risk factors include history of renal stones. She has tried nothing for the symptoms. The treatment provided no relief.       Constitution: Negative for fever.   Cardiovascular:  Negative for chest pain.   Gastrointestinal:  Negative for abdominal pain and bowel incontinence.   Genitourinary:  Positive for flank pain. Negative for dysuria, bladder incontinence, hematuria and pelvic pain.   Neurological:  Negative for headaches and numbness.      Objective:     Physical Exam   Constitutional: She is oriented to person, place, and time. She appears well-developed.   HENT:   Head: Normocephalic and atraumatic.    Ears:   Right Ear: External ear normal.   Left Ear: External ear normal.   Nose: Nose normal.   Mouth/Throat: Oropharynx is clear and moist and mucous membranes are normal.   Eyes: Lids are normal.   Neck: Neck supple.   Cardiovascular: Normal pulses.   Pulmonary/Chest: Effort normal.   Abdominal: Normal appearance and bowel sounds are normal. She exhibits no distension. Soft. There is no abdominal tenderness. There is no left CVA tenderness and no right CVA tenderness.   Genitourinary:    No vaginal discharge.     Neurological: She is alert and oriented to person, place, and time.   Skin: Skin is warm, dry and intact.   Psychiatric: Her speech is normal and behavior is normal.   Nursing note and vitals reviewed.    Assessment:     1. Urinary tract infection with hematuria, site unspecified    2. Right flank pain      Results for orders placed or performed in visit on 07/03/24   POCT Urinalysis(Instrument)   Result Value Ref Range    Color, POC UA Yellow Yellow, Straw, Colorless    Clarity, POC UA Cloudy (A) Clear    Glucose, POC UA Negative Negative    Bilirubin, POC UA Negative Negative    Ketones, POC UA Negative Negative    Spec Grav POC UA 1.000 1.005 - 1.030    Blood, POC UA Large (A) Negative    pH, POC UA 6.0 5.0 - 8.0    Protein, POC  (A) Negative    Urobilinogen, POC UA 0.2 <=1.0    Nitrite, POC UA Positive (A) Negative    WBC, POC UA Large (A) Negative     *Note: Due to a large number of results and/or encounters for the requested time period, some results have not been displayed. A complete set of results can be found in Results Review.       Plan:   VSS. Patient non-toxic appearing. Discussed medication being prescribed.  Advised patient to follow up with PCP as needed.  Patient verbalized understanding, agrees with the plan, and is comfortable with discharge.    Urinary tract infection with hematuria, site unspecified  -     nitrofurantoin, macrocrystal-monohydrate, (MACROBID) 100 MG capsule;  Take 1 capsule (100 mg total) by mouth 2 (two) times daily. for 5 days  Dispense: 10 capsule; Refill: 0    Right flank pain  -     POCT Urinalysis(Instrument)      Medical Decision Making:   Clinical Tests:   Lab Tests: Ordered and Reviewed       <> Summary of Lab: Urinalysis: +UTI

## 2024-07-03 NOTE — TELEPHONE ENCOUNTER
Irlanda Lopez,     I have sent the following medications to your preferred pharmacy on file. Please let me know if you have further questions.     Medications Ordered This Encounter   Medications    dicyclomine (BENTYL) 20 mg tablet     Sig: Take 1 tablet (20 mg total) by mouth before meals and at bedtime as needed (abdominal cramping).     Dispense:  120 tablet     Refill:  1          [facility]/pharmacy #[id number] [place], [place] [facility] [address] [facility]  [address] [facility] [address]  Phone: [phone] Fax: [phone]       Sincerely,   Myla Arias MD

## 2024-07-05 ENCOUNTER — PATIENT MESSAGE (OUTPATIENT)
Dept: UROLOGY | Facility: CLINIC | Age: 84
End: 2024-07-05
Payer: MEDICARE

## 2024-07-19 ENCOUNTER — PATIENT MESSAGE (OUTPATIENT)
Dept: NEUROLOGY | Facility: CLINIC | Age: 84
End: 2024-07-19
Payer: MEDICARE

## 2024-07-21 DIAGNOSIS — E03.9 HYPOTHYROIDISM, UNSPECIFIED TYPE: ICD-10-CM

## 2024-07-21 RX ORDER — THYROID, PORCINE 30 MG/1
30 TABLET ORAL
Qty: 90 TABLET | Refills: 1 | Status: SHIPPED | OUTPATIENT
Start: 2024-07-21

## 2024-07-22 NOTE — TELEPHONE ENCOUNTER
Refill Decision Note   Irlanda Lopez  is requesting a refill authorization.    Brief Assessment and Rationale for Refill:   Approve       Medication Therapy Plan:         Comments:     Note composed:10:05 PM 07/21/2024

## 2024-07-22 NOTE — TELEPHONE ENCOUNTER
No care due was identified.  [facility] Embedded Care Due Messages. Reference number: [id number].   7/21/2024 9:41:11 PM CDT

## 2024-08-06 ENCOUNTER — TELEPHONE (OUTPATIENT)
Dept: NEUROLOGY | Facility: CLINIC | Age: 84
End: 2024-08-06
Payer: MEDICARE

## 2024-08-13 ENCOUNTER — PATIENT MESSAGE (OUTPATIENT)
Dept: NEUROLOGY | Facility: CLINIC | Age: 84
End: 2024-08-13
Payer: MEDICARE

## 2024-08-14 ENCOUNTER — PATIENT MESSAGE (OUTPATIENT)
Dept: NEUROLOGY | Facility: CLINIC | Age: 84
End: 2024-08-14
Payer: MEDICARE

## 2024-08-14 RX ORDER — BUSPIRONE HYDROCHLORIDE 10 MG/1
10 TABLET ORAL 2 TIMES DAILY
Qty: 60 TABLET | Refills: 11 | Status: SHIPPED | OUTPATIENT
Start: 2024-08-14 | End: 2025-08-14

## 2024-08-15 ENCOUNTER — PATIENT MESSAGE (OUTPATIENT)
Dept: NEUROLOGY | Facility: CLINIC | Age: 84
End: 2024-08-15
Payer: MEDICARE

## 2024-08-19 RX ORDER — BUSPIRONE HYDROCHLORIDE 10 MG/1
20 TABLET ORAL 2 TIMES DAILY
Qty: 120 TABLET | Refills: 11 | Status: SHIPPED | OUTPATIENT
Start: 2024-08-19 | End: 2025-08-19

## 2024-08-27 ENCOUNTER — OFFICE VISIT (OUTPATIENT)
Dept: SURGERY | Facility: CLINIC | Age: 84
End: 2024-08-27
Payer: MEDICARE

## 2024-08-27 VITALS
DIASTOLIC BLOOD PRESSURE: 73 MMHG | SYSTOLIC BLOOD PRESSURE: 113 MMHG | WEIGHT: 156.19 LBS | HEART RATE: 66 BPM | HEIGHT: 62 IN | BODY MASS INDEX: 28.74 KG/M2

## 2024-08-27 DIAGNOSIS — Z93.3 COLOSTOMY IN PLACE: Primary | ICD-10-CM

## 2024-08-27 DIAGNOSIS — R10.9 ABDOMINAL CRAMPING: ICD-10-CM

## 2024-08-27 DIAGNOSIS — R63.0 DECREASED APPETITE: ICD-10-CM

## 2024-08-27 PROCEDURE — 1126F AMNT PAIN NOTED NONE PRSNT: CPT | Mod: CPTII,S$GLB,,

## 2024-08-27 PROCEDURE — 3074F SYST BP LT 130 MM HG: CPT | Mod: CPTII,S$GLB,,

## 2024-08-27 PROCEDURE — 99999 PR PBB SHADOW E&M-EST. PATIENT-LVL IV: CPT | Mod: PBBFAC,,,

## 2024-08-27 PROCEDURE — 99204 OFFICE O/P NEW MOD 45 MIN: CPT | Mod: S$GLB,,,

## 2024-08-27 PROCEDURE — 3078F DIAST BP <80 MM HG: CPT | Mod: CPTII,S$GLB,,

## 2024-08-27 PROCEDURE — 1159F MED LIST DOCD IN RCRD: CPT | Mod: CPTII,S$GLB,,

## 2024-08-28 RX ORDER — DICYCLOMINE HYDROCHLORIDE 20 MG/1
20 TABLET ORAL
Qty: 180 TABLET | Refills: 1 | Status: SHIPPED | OUTPATIENT
Start: 2024-08-28

## 2024-08-28 NOTE — TELEPHONE ENCOUNTER
No care due was identified.  [facility] Embedded Care Due Messages. Reference number: [id number].   8/27/2024 8:31:31 PM CDT

## 2024-09-03 NOTE — PROGRESS NOTES
History & Physical    Subjective     CC: colostomy f/u    History of Present Illness:  Patient is a 83 y.o. female presents for follow up. Patient with hx of Maribel's procedure for perforated sigmoid diverticulitis in 2019 by Dr. Hogan. She had a postoperative course complicated by an abscess requiring percutaneous drainage.    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 Had discussions with Dr. Connelly in 2019 about reversal and they decided against ostomy reversal/fistula repair as she was not a good candidate for surgery.   Has had hospitalizations previously for abdominal pain with workup partial colon obstructions vs colitis first in  with most recent 2023. Was evaluated by CRS and was thought to have favored colitis over bowel obstruction. Each time, symptoms improved with conservative treatment including bowel rest. Also with hx of Cdiff in  that was treated; was followed by Dr. Maier but last seen by GI department in .   Most recent colonoscopy 2021 with normal colon; ostomy opening stenotic and required  gastroscope to perform procedure.   Most of history comes from patient's daughter. Reports another episode of abdominal cramping about 3 months ago that was similar to prior episodes when she was hospitalized.Reports abdominal cramping episodes located epigastric region. Started after eating at a restaurant and having ice cream. Also with decreased appetite at times. Did not go to hospital and did at home bowel rest and took bentyl which eventually resolved after a few days. Currently, patient is having no symptoms. Denies any abdominal pain, nausea, vomiting, fevers, chills, decreased appetite. Her colostomy is having normal output. Her PCP referred her to GI to get established with Dr. Maier's TATY since Dr. Maier moved away - which has not yet been scheduled. Referred to CRS as patient has a colostomy. Colostomy functioning well  with good output. No issues with peristomal irritation. Denies nausea, vomiting, fevers, chills. Takes miralax daily. Good appetite.    Chief Complaint   Patient presents with    Abdominal Pain       Review of patient's allergies indicates:   Allergen Reactions    Omnicef [cefdinir] Itching       Current Outpatient Medications   Medication Sig Dispense Refill    ARMOUR THYROID 30 mg Tab TAKE 1 TABLET BY MOUTH BEFORE  BREAKFAST 90 tablet 1    atorvastatin (LIPITOR) 10 MG tablet TAKE 1 TABLET BY MOUTH ONCE  DAILY 90 tablet 3    busPIRone (BUSPAR) 10 MG tablet Take 2 tablets (20 mg total) by mouth 2 (two) times daily. 120 tablet 11    furosemide (LASIX) 20 MG tablet Take 1 tablet (20 mg total) by mouth once daily. 90 tablet 3    hyoscyamine (LEVSIN) 0.125 mg Subl Place 2 tablets (0.25 mg total) under the tongue every 4 (four) hours as needed. 40 tablet 0    hyoscyamine (LEVSIN/SL) 0.125 mg Subl Place 2 tablets (0.25 mg total) under the tongue every 4 (four) hours as needed (spasms). 40 tablet 1    memantine (NAMENDA) 10 MG Tab TAKE 1 TABLET BY MOUTH TWICE  DAILY 180 tablet 3    neomycin-polymyxin-dexamethasone (MAXITROL) 3.5 mg/g-10,000 unit/g-0.1 % Oint Apply ointment 3 times daily to lids and lashes for both eyes for 10 days 3.5 g 0    ondansetron (ZOFRAN-ODT) 8 MG TbDL Take 1 tablet (8 mg total) by mouth every 12 (twelve) hours as needed (nausea). 30 tablet 2    pantoprazole (PROTONIX) 40 MG tablet Take 1 tablet (40 mg total) by mouth once daily. 30 min prior to lunch (Patient taking differently: Take 40 mg by mouth as needed. 30 min prior to lunch) 90 tablet 3    phenazopyridine (PYRIDIUM) 200 MG tablet Take 1 tablet (200 mg total) by mouth 3 (three) times daily as needed. 15 tablet 0    potassium chloride (MICRO-K) 10 MEQ CpSR Take by mouth once daily.      risperiDONE (RISPERDAL) 0.5 MG Tab Take 1 tablet (0.5 mg total) by mouth every evening. 30 tablet 11    dicyclomine (BENTYL) 20 mg tablet TAKE 1 TABLET (20 MG  TOTAL) BY MOUTH BEFORE MEALS AND AT BEDTIME AS NEEDED (ABDOMINAL CRAMPING). 180 tablet 1     No current facility-administered medications for this visit.       Past Medical History:   Diagnosis Date    Clostridium difficile colitis     Dementia     Diverticulitis     s/p colostomy    High cholesterol     Hypertension     Hypothyroidism     Kidney stones      Past Surgical History:   Procedure Laterality Date    APPENDECTOMY  2008    CATARACT EXTRACTION      Dr Raygoza    COLON SURGERY      COLONOSCOPY N/A 01/02/2019    Procedure: COLONOSCOPY;  Surgeon: Reece Hogan MD;  Location: [facility] ENDO;  Service: Endoscopy;  Laterality: N/A;    COLONOSCOPY N/A 06/07/2019    Procedure: COLONOSCOPY;  Surgeon: Rishabh Connelly MD;  Location: [facility] ENDO;  Service: General;  Laterality: N/A;    COLONOSCOPY N/A 06/23/2021    Procedure: COLONOSCOPY;  Surgeon: Lucy Lafleur MD;  Location: [facility] ENDO;  Service: Endoscopy;  Laterality: N/A;    COLOSTOMY Left 01/02/2019    Procedure: CREATION, COLOSTOMY;  Surgeon: Reece Hogan MD;  Location: [facility];  Service: General;  Laterality: Left;    CYSTOSCOPIC LITHOLAPAXY Right 5/14/2024    Procedure: CYSTOLITHOLAPAXY;  Surgeon: Anselmo Matute MD;  Location: [facility];  Service: Urology;  Laterality: Right;    CYSTOSCOPY WITH URETEROSCOPY, RETROGRADE PYELOGRAPHY, AND INSERTION OF STENT Right 08/23/2022    Procedure: CYSTOSCOPY, WITH RETROGRADE PYELOGRAM AND URETERAL STENT INSERTION;  Surgeon: Anselmo Matute MD;  Location: [facility];  Service: Urology;  Laterality: Right;    CYSTOSCOPY WITH URETEROSCOPY, RETROGRADE PYELOGRAPHY, AND INSERTION OF STENT Right 11/07/2023    Procedure: CYSTOSCOPY, WITH RETROGRADE PYELOGRAM AND URETERAL STENT INSERTION;  Surgeon: Anselmo Matute MD;  Location: [facility];  Service: Urology;  Laterality: Right;  stent exchange    CYSTOSCOPY WITH URETEROSCOPY, RETROGRADE PYELOGRAPHY, AND INSERTION OF STENT Right 5/14/2024    Procedure: CYSTOSCOPY, WITH  RETROGRADE PYELOGRAM AND URETERAL STENT INSERTION;  Surgeon: Anselmo Matute MD;  Location: [facility];  Service: Urology;  Laterality: Right;    CYSTOURETEROSCOPY WITH RETROGRADE PYELOGRAPHY AND INSERTION OF STENT INTO URETER Right 11/08/2022    Procedure: CYSTOURETEROSCOPY, WITH RETROGRADE PYELOGRAM AND URETERAL STENT INSERTION;  Surgeon: Anselmo Matute MD;  Location: [facility];  Service: Urology;  Laterality: Right;    CYSTOURETEROSCOPY, WITH HOLMIUM LASER LITHOTRIPSY OF URETERAL CALCULUS AND STENT INSERTION Right 04/04/2023    Procedure: CYSTOURETEROSCOPY, WITH HOLMIUM LASER LITHOTRIPSY OF URETERAL CALCULUS AND STENT INSERTION;  Surgeon: Anselmo Matute MD;  Location: [facility] OR;  Service: Urology;  Laterality: Right;    CYSTOURETEROSCOPY,WITH HOLMIUM LASER LITHOTRIPSY OF URETERAL CALCULUS Right 11/07/2023    Procedure: CYSTOURETEROSCOPY,WITH HOLMIUM LASER LITHOTRIPSY OF URETERAL CALCULUS;  Surgeon: Anselmo Matute MD;  Location: [facility];  Service: Urology;  Laterality: Right;    ESOPHAGOGASTRODUODENOSCOPY N/A 09/16/2020    Procedure: ESOPHAGOGASTRODUODENOSCOPY (EGD)- Needs Rapid;  Surgeon: Lucy Lafleur MD;  Location: [facility];  Service: Endoscopy;  Laterality: N/A;    EYE SURGERY  ?? Dr Raygoza    SHAHIDA PROCEDURE N/A 01/02/2019    Procedure: SHAHIDA PROCEDURE;  Surgeon: Reece Hogan MD;  Location: [facility];  Service: General;  Laterality: N/A;    LYSIS OF ADHESIONS N/A 01/02/2019    Procedure: LYSIS, ADHESIONS;  Surgeon: Reece Hogan MD;  Location: [facility];  Service: General;  Laterality: N/A;    VITRECTOMY Right 09/2013     Family History   Problem Relation Name Age of Onset    Alzheimer's disease Mother      Aneurysm Father          brain    Stomach cancer Brother          50s    Parkinsonism Brother      Bladder Cancer Grandchild       Social History     Tobacco Use    Smoking status: Former     Types: Cigarettes     Passive exposure: Never    Smokeless tobacco: Never    Tobacco comments:  "    Smoked in 20's. Quit soon   Substance Use Topics    Alcohol use: No    Drug use: No        Review of Systems:  Review of Systems   Constitutional:  Negative for activity change, appetite change, chills, fatigue, fever and unexpected weight change.   HENT:  Negative for congestion, ear pain, sore throat and trouble swallowing.    Eyes:  Negative for pain, redness and itching.   Respiratory:  Negative for cough, shortness of breath and wheezing.    Cardiovascular:  Negative for chest pain, palpitations and leg swelling.   Gastrointestinal:  Negative for abdominal distention, abdominal pain, anal bleeding, blood in stool, constipation, diarrhea, nausea, rectal pain and vomiting.   Endocrine: Negative for cold intolerance, heat intolerance and polyuria.   Genitourinary:  Negative for dysuria, flank pain, frequency and hematuria.   Musculoskeletal:  Negative for gait problem, joint swelling and neck pain.   Skin:  Negative for color change, rash and wound.   Allergic/Immunologic: Negative for environmental allergies and immunocompromised state.   Neurological:  Negative for dizziness, speech difficulty, weakness and numbness.   Psychiatric/Behavioral:  Negative for agitation, confusion and hallucinations.           Objective     Vital Signs (Most Recent)  Pulse: 66 (08/27/24 1340)  BP: 113/73 (08/27/24 1340)  5' 2" (1.575 m)  70.8 kg (156 lb 3.1 oz)     Physical Exam:  Physical Exam  Vitals reviewed.   Constitutional:       General: She is not in acute distress.     Appearance: Normal appearance. She is well-developed. She is not ill-appearing or toxic-appearing.   HENT:      Head: Normocephalic and atraumatic.      Right Ear: External ear normal.      Left Ear: External ear normal.      Nose: Nose normal.   Cardiovascular:      Rate and Rhythm: Normal rate.   Pulmonary:      Effort: Pulmonary effort is normal. No respiratory distress.   Abdominal:      General: Abdomen is flat. There is no distension.      " Palpations: Abdomen is soft.      Tenderness: There is no abdominal tenderness.      Comments: Soft, non-distended, non-TTP. Ostomy pink and viable with soft stool in bag; well-healed midline incision; +soft, reducible parastomal hernia   Musculoskeletal:         General: Normal range of motion.      Cervical back: Normal range of motion and neck supple.   Skin:     General: Skin is warm and dry.   Neurological:      Mental Status: She is alert. Mental status is at baseline.   Psychiatric:         Mood and Affect: Mood normal.         Behavior: Behavior normal.         Diagnostic Results:  Colonoscopy/pathology : reviewed        Assessment and Plan     83 y.o. F with previous Maribel's procedure for acute sigmoid diverticulitis with resultant abscess as well as postoperative abscess requiring IR drainage in 2019 with imaging and symptoms consistent with colovaginal and rectovaginal fistula who declined surgery now with intermittent abdominal pain     -patient is passing stool/flatus via ostomy. No abdominal pain, nausea, vomiting. Tolerating regular diet. Was hospitalized in the past (most recent ) for symptoms with findings concerning for partial small bowel obstruction vs colitis - symptoms always resolved with bowel rest. Was told to f/u with GI for further workup.   - colonoscopy reviewed with stenotic ostomy opening, able to perform procedure with  colonoscope. Biopsies with non-specific inflammation. Was supposed to f/u with GI but never did. Having normal ostomy output. If changes in ostomy function, would have her RTC to see Dr. Connelly. Should f/u with GI for further workup as she previously saw Dr. Maier. Message sent for scheduling.   -Patient with soft, easily reducible peristomal hernia that is asymptomatic.  She is not having any issues with pouching or major discomfort. We discussed the symptoms to look out for including incarceration and strangulation. This would require  an immediate visit to the emergency room or call to our office immediately for evaluation.  -Discussed that a minimum I would recommend behavioral, lifestyle and medication modifications to improve bowel habits. Usual bowel management handout given to patient. This includes a stool softener twice per day, fiber powder supplementation daily, drinking at least 64oz of water/day, avoiding straining with bowel movements, spending less than 5 min on toilet per bowel movement, eating a high fiber diet, using miralax as needed to achieve a bowel movement daily and using wet wipes to wipe after bowel movements when irritated.   -RTC prn     Mari Bradshaw PA-C  Colon and Rectal Surgery  [facility]

## 2024-09-06 ENCOUNTER — OFFICE VISIT (OUTPATIENT)
Dept: NEUROLOGY | Facility: CLINIC | Age: 84
End: 2024-09-06
Payer: MEDICARE

## 2024-09-06 ENCOUNTER — PATIENT MESSAGE (OUTPATIENT)
Dept: NEUROLOGY | Facility: CLINIC | Age: 84
End: 2024-09-06

## 2024-09-06 VITALS
SYSTOLIC BLOOD PRESSURE: 110 MMHG | BODY MASS INDEX: 29.05 KG/M2 | HEIGHT: 62 IN | WEIGHT: 157.88 LBS | DIASTOLIC BLOOD PRESSURE: 75 MMHG | HEART RATE: 56 BPM | RESPIRATION RATE: 16 BRPM

## 2024-09-06 DIAGNOSIS — E78.00 PURE HYPERCHOLESTEROLEMIA: ICD-10-CM

## 2024-09-06 DIAGNOSIS — F02.818 MAJOR NEUROCOGNITIVE DISORDER DUE TO ALZHEIMER'S DISEASE, WITH BEHAVIORAL DISTURBANCE: Primary | ICD-10-CM

## 2024-09-06 DIAGNOSIS — G31.84 MILD COGNITIVE IMPAIRMENT WITH MEMORY LOSS: ICD-10-CM

## 2024-09-06 DIAGNOSIS — F41.1 GAD (GENERALIZED ANXIETY DISORDER): ICD-10-CM

## 2024-09-06 DIAGNOSIS — F03.90 DEMENTIA, UNSPECIFIED DEMENTIA SEVERITY, UNSPECIFIED DEMENTIA TYPE, UNSPECIFIED WHETHER BEHAVIORAL, PSYCHOTIC, OR MOOD DISTURBANCE OR ANXIETY: ICD-10-CM

## 2024-09-06 DIAGNOSIS — K91.89: ICD-10-CM

## 2024-09-06 DIAGNOSIS — G30.9 MAJOR NEUROCOGNITIVE DISORDER DUE TO ALZHEIMER'S DISEASE, WITH BEHAVIORAL DISTURBANCE: Primary | ICD-10-CM

## 2024-09-06 DIAGNOSIS — Z86.73 HISTORY OF LACUNAR CEREBROVASCULAR ACCIDENT (CVA): ICD-10-CM

## 2024-09-06 DIAGNOSIS — R41.3 OTHER AMNESIA: ICD-10-CM

## 2024-09-06 DIAGNOSIS — N39.41 URGE URINARY INCONTINENCE: ICD-10-CM

## 2024-09-06 PROCEDURE — 99999 PR PBB SHADOW E&M-EST. PATIENT-LVL V: CPT | Mod: PBBFAC,,, | Performed by: NURSE PRACTITIONER

## 2024-09-06 RX ORDER — BUPROPION HYDROCHLORIDE 150 MG/1
150 TABLET ORAL DAILY
Qty: 30 TABLET | Refills: 11 | Status: SHIPPED | OUTPATIENT
Start: 2024-09-06 | End: 2025-09-06

## 2024-09-06 NOTE — PROGRESS NOTES
Subjective:       Patient ID: Irlanda Lopez is a 83 y.o. female.    Chief Complaint: <9>Major neurocognitive disorder due to Alzheimer's disease,       Former Patient of Dr. Mango MD Neurologist.     HPIThe patient is here for memory loss evaluation. The patient is accompanied by daughter.      The patient is new to me. presenting with memory changes that began in 2017. The patient was previous evaluated in [place] [facility] [place] and was Dx with dementia and started on DMA.  The patient has had continual cognitive decline. She has problems related to progressive short term memory loss and behavior changes. For example, the patient would repeat the same question repeatedly. In the late after noon she gets confused. She forgets conversations, and she forget what she ate. She has been retired many years but she gets up and packs her car and tries to go work. Other days the patient doesn't want to leave the house. The patient excessively forgets where placed certain things. She has a colostomy and she was able to mange it independently but now she requires assistance from caretakers. She has 24 hour care. The patient is no longer driving and she would get lost. Patient has confusion around and inside the house. Patient has  trouble remembering the date and time. Patient has caretakers that manage medications and family manage appointments and keeping up with major holidays and political changes. The patient is independent in handling finances. The patient is still independent with ADLs. Patient has some agitation or aggression occasionally. Patient has hallucinations or delusions. No seizures. No language problems. No problems handling tools. Patient has a history of strokes unable to recall details, patient family discovered old stroke on MRI testing in 2017. No history of headaches. No history of hypothyroidism. No history of alcoholism. No history of B12 deficiency.  Hx depression Prozac 20 mg daily, takes Xanax 0.25 mg  po BID. No history of bipolar disorder. No history of Untreated Syphilis.  No history of HIV infection. No toxic exposures.  No history of traumatic brain injury. Mild tremors noted.  No falls. Wide base gait.  No urinary incontinence. Bowel incontinence has a colostomy.  No change in sleep and appetite. Mother had dementia in 80 years of age. Patient was started on Aricept and Namenda in 2017 but she was unable to tolerate Aricept caused GI upset. She continue take Namenda 10 mg po BID. Patient daughter reports she is no longer taking Prozac 20 mg daily and no longer taking Xanax. MRI Brain WO 03- No acute intracranial abnormality. Result consistent with Alzheimer's Disease related it is consistent with exam findings.         INTERVAL HISTORY 09-: Patient present for follow up for follow up memory and behavior manage. Patient accompanied by daughter. Patient doing well. Tolerating Buspar 20 mg po BID no issues, anxiety persist. Risperdal 0.5 mg po HS is very helpful, no side effects. Patient sleeping has improved since starting Risperdal.            Review of Systems   Unable to perform ROS: Dementia   HENT:  Positive for hearing loss.    Gastrointestinal:         Colostomy    Musculoskeletal:  Positive for arthralgias and back pain.   Psychiatric/Behavioral:  Positive for behavioral problems, confusion, decreased concentration and dysphoric mood. The patient is nervous/anxious.                  Current Outpatient Medications:     ARMOUR THYROID 30 mg Tab, TAKE 1 TABLET BY MOUTH BEFORE  BREAKFAST, Disp: 90 tablet, Rfl: 1    atorvastatin (LIPITOR) 10 MG tablet, TAKE 1 TABLET BY MOUTH ONCE  DAILY, Disp: 90 tablet, Rfl: 3    dicyclomine (BENTYL) 20 mg tablet, TAKE 1 TABLET (20 MG TOTAL) BY MOUTH BEFORE MEALS AND AT BEDTIME AS NEEDED (ABDOMINAL CRAMPING)., Disp: 180 tablet, Rfl: 1    furosemide (LASIX) 20 MG tablet, Take 1 tablet (20 mg total) by mouth once daily., Disp: 90 tablet, Rfl: 3    hyoscyamine  (LEVSIN) 0.125 mg Subl, Place 2 tablets (0.25 mg total) under the tongue every 4 (four) hours as needed., Disp: 40 tablet, Rfl: 0    hyoscyamine (LEVSIN/SL) 0.125 mg Subl, Place 2 tablets (0.25 mg total) under the tongue every 4 (four) hours as needed (spasms)., Disp: 40 tablet, Rfl: 1    memantine (NAMENDA) 10 MG Tab, TAKE 1 TABLET BY MOUTH TWICE  DAILY, Disp: 180 tablet, Rfl: 3    ondansetron (ZOFRAN-ODT) 8 MG TbDL, Take 1 tablet (8 mg total) by mouth every 12 (twelve) hours as needed (nausea)., Disp: 30 tablet, Rfl: 2    pantoprazole (PROTONIX) 40 MG tablet, Take 1 tablet (40 mg total) by mouth once daily. 30 min prior to lunch (Patient taking differently: Take 40 mg by mouth as needed. 30 min prior to lunch), Disp: 90 tablet, Rfl: 3    phenazopyridine (PYRIDIUM) 200 MG tablet, Take 1 tablet (200 mg total) by mouth 3 (three) times daily as needed., Disp: 15 tablet, Rfl: 0    potassium chloride (MICRO-K) 10 MEQ CpSR, Take by mouth once daily., Disp: , Rfl:     risperiDONE (RISPERDAL) 0.5 MG Tab, Take 1 tablet (0.5 mg total) by mouth every evening., Disp: 30 tablet, Rfl: 11    buPROPion (WELLBUTRIN XL) 150 MG TB24 tablet, Take 1 tablet (150 mg total) by mouth once daily., Disp: 30 tablet, Rfl: 11  Past Medical History:   Diagnosis Date    Clostridium difficile colitis     Dementia     Diverticulitis     s/p colostomy    High cholesterol     Hypertension     Hypothyroidism     Kidney stones      Past Surgical History:   Procedure Laterality Date    APPENDECTOMY  2008    CATARACT EXTRACTION      Dr Raygoza    COLON SURGERY      COLONOSCOPY N/A 01/02/2019    Procedure: COLONOSCOPY;  Surgeon: Reece Hogan MD;  Location: [facility];  Service: Endoscopy;  Laterality: N/A;    COLONOSCOPY N/A 06/07/2019    Procedure: COLONOSCOPY;  Surgeon: Rishabh Connelly MD;  Location: [facility];  Service: General;  Laterality: N/A;    COLONOSCOPY N/A 06/23/2021    Procedure: COLONOSCOPY;  Surgeon: Lucy Lafleur MD;  Location: [facility]  ENDO;  Service: Endoscopy;  Laterality: N/A;    COLOSTOMY Left 01/02/2019    Procedure: CREATION, COLOSTOMY;  Surgeon: Reece Hogan MD;  Location: [facility] OR;  Service: General;  Laterality: Left;    CYSTOSCOPIC LITHOLAPAXY Right 5/14/2024    Procedure: CYSTOLITHOLAPAXY;  Surgeon: Anselmo Matute MD;  Location: [facility];  Service: Urology;  Laterality: Right;    CYSTOSCOPY WITH URETEROSCOPY, RETROGRADE PYELOGRAPHY, AND INSERTION OF STENT Right 08/23/2022    Procedure: CYSTOSCOPY, WITH RETROGRADE PYELOGRAM AND URETERAL STENT INSERTION;  Surgeon: Anselmo Matute MD;  Location: [facility];  Service: Urology;  Laterality: Right;    CYSTOSCOPY WITH URETEROSCOPY, RETROGRADE PYELOGRAPHY, AND INSERTION OF STENT Right 11/07/2023    Procedure: CYSTOSCOPY, WITH RETROGRADE PYELOGRAM AND URETERAL STENT INSERTION;  Surgeon: Anselmo Matute MD;  Location: [facility];  Service: Urology;  Laterality: Right;  stent exchange    CYSTOSCOPY WITH URETEROSCOPY, RETROGRADE PYELOGRAPHY, AND INSERTION OF STENT Right 5/14/2024    Procedure: CYSTOSCOPY, WITH RETROGRADE PYELOGRAM AND URETERAL STENT INSERTION;  Surgeon: Anselmo Matute MD;  Location: [facility] OR;  Service: Urology;  Laterality: Right;    CYSTOURETEROSCOPY WITH RETROGRADE PYELOGRAPHY AND INSERTION OF STENT INTO URETER Right 11/08/2022    Procedure: CYSTOURETEROSCOPY, WITH RETROGRADE PYELOGRAM AND URETERAL STENT INSERTION;  Surgeon: Anselmo Matute MD;  Location: [facility];  Service: Urology;  Laterality: Right;    CYSTOURETEROSCOPY, WITH HOLMIUM LASER LITHOTRIPSY OF URETERAL CALCULUS AND STENT INSERTION Right 04/04/2023    Procedure: CYSTOURETEROSCOPY, WITH HOLMIUM LASER LITHOTRIPSY OF URETERAL CALCULUS AND STENT INSERTION;  Surgeon: Anselmo Matute MD;  Location: [facility];  Service: Urology;  Laterality: Right;    CYSTOURETEROSCOPY,WITH HOLMIUM LASER LITHOTRIPSY OF URETERAL CALCULUS Right 11/07/2023    Procedure: CYSTOURETEROSCOPY,WITH HOLMIUM LASER LITHOTRIPSY OF  URETERAL CALCULUS;  Surgeon: Anselmo Matute MD;  Location: [facility] OR;  Service: Urology;  Laterality: Right;    ESOPHAGOGASTRODUODENOSCOPY N/A 09/16/2020    Procedure: ESOPHAGOGASTRODUODENOSCOPY (EGD)- Needs Rapid;  Surgeon: Lucy Lafleur MD;  Location: [facility];  Service: Endoscopy;  Laterality: N/A;    EYE SURGERY  ?? Dr Raygoza    SHAHIDA PROCEDURE N/A 01/02/2019    Procedure: SHAHIDA PROCEDURE;  Surgeon: Reece Hogan MD;  Location: [facility] OR;  Service: General;  Laterality: N/A;    LYSIS OF ADHESIONS N/A 01/02/2019    Procedure: LYSIS, ADHESIONS;  Surgeon: Reece Hogan MD;  Location: [facility] OR;  Service: General;  Laterality: N/A;    VITRECTOMY Right 09/2013     Social History     Socioeconomic History    Marital status:    Occupational History     Comment: [employer]   Tobacco Use    Smoking status: Former     Types: Cigarettes     Passive exposure: Never    Smokeless tobacco: Never    Tobacco comments:     Smoked in 20's. Quit soon   Substance and Sexual Activity    Alcohol use: No    Drug use: No    Sexual activity: Never     Comment:      Social Determinants of Health     Financial Resource Strain: Patient Declined (2/12/2024)    Overall Financial Resource Strain (CARDIA)     Difficulty of Paying Living Expenses: Patient declined   Food Insecurity: Patient Declined (2/12/2024)    Hunger Vital Sign     Worried About Running Out of Food in the Last Year: Patient declined     Ran Out of Food in the Last Year: Patient declined   Transportation Needs: Unknown (2/12/2024)    PRAPARE - Transportation     Lack of Transportation (Medical): Patient declined   Physical Activity: Inactive (2/12/2024)    Exercise Vital Sign     Days of Exercise per Week: 0 days     Minutes of Exercise per Session: 0 min   Stress: No Stress Concern Present (4/20/2023)    [employer] [place] [employer] - Occupational Stress Questionnaire     Feeling of Stress : Not at all   Housing Stability: Low Risk   (2/12/2024)    Housing Stability Vital Sign     Unable to Pay for Housing in the Last Year: No     Number of Places Lived in the Last Year: 1     Unstable Housing in the Last Year: No             Past/Current Medical/Surgical History, Past/Current Social History, Past/Current Family History and Past/Current Medications were reviewed in detail.        Objective:           VITAL SIGNS WERE REVIEWED      GENERAL APPEARANCE:     The patient looks comfortable, confused, cooperative anxious.    BMI 28.35 kg     No signs of respiratory distress.    Normal breathing pattern.    No dysmorphic features    Normal eye contact.     GENERAL MEDICAL EXAM:    HEENT:  Head is atraumatic normocephalic.     No tender temporal arteries.     Neck and Axillae: No JVD. No visible lesions.    No carotid bruits. No thyromegaly. No lymphadenopathy.    Cardiopulmonary: No cyanosis. No tachypnea. Normal respiratory effort.    Gastrointestinal/Urogenital:  No jaundice. No stomas or lesions. No visible hernias. No catheters.     Abdomen is soft non-tender. No masses or organomegaly, Colostomy present     Skin, Hair and Nails: No pathognonomic skin rash. No neurofibromatosis. No visible lesions.No stigmata of autoimmune disease. No clubbing.    Skin is warm and moist. No palpable masses.    Limbs: No varicose veins. No visible swelling.    No palpable edema. Pulses are symmetric. Pedal pulses are palpable.      Muskoskeletal: + visible deformities. Poor posture No visible lesions.    No spine tenderness. No signs of longstanding neuropathy. No dislocations or fractures.            Neurologic Exam     Mental Status   Disoriented to country and area. Oriented to city.   Disoriented to year, month and date.   Follows 2 step commands.   Attention: decreased. Concentration: decreased.   Speech: speech is normal and not slurred   Level of consciousness: alert  Knowledge: poor and inconsistent with education. Able to perform simple calculations.   Able  to name object. Unable to read. Unable to repeat. Unable to write. Abnormal comprehension.     Cranial Nerves     CN II   Visual fields full to confrontation.   Visual acuity: normal with correction  Right visual field deficit: none  Left visual field deficit: none     CN III, IV, VI   Pupils are equal, round, and reactive to light.  Extraocular motions are normal.   Right pupil: Size: 2 mm. Shape: regular. Reactivity: brisk. Consensual response: intact. Accommodation: intact.   Left pupil: Size: 2 mm. Shape: regular. Reactivity: brisk. Consensual response: intact. Accommodation: intact.   CN III: no CN III palsy  CN VI: no CN VI palsy  Nystagmus: none   Diplopia: none  Ophthalmoparesis: none  Upgaze: normal  Downgaze: normal  Conjugate gaze: present  Vestibulo-ocular reflex: present    CN V   Facial sensation intact.   Right facial sensation deficit: none  Left facial sensation deficit: none    CN VII   Right facial weakness: none  Left facial weakness: none  Left taste: normal    CN VIII   CN VIII normal.   Hearing: impaired  Right Rinne: AC > BC  Left Rinne: AC > BC    CN IX, X   CN IX normal.   CN X normal.   Palate: symmetric    CN XI   CN XI normal.   Right sternocleidomastoid strength: normal  Left sternocleidomastoid strength: normal  Right trapezius strength: normal  Left trapezius strength: normal    CN XII   CN XII normal.   Tongue: not atrophic  Fasciculations: absent  Tongue deviation: none    Motor Exam   Muscle bulk: normal  Overall muscle tone: normal  Right arm tone: normal  Left arm tone: normal  Right arm pronator drift: absent  Left arm pronator drift: absent  Right leg tone: normal  Left leg tone: normal    Strength   Strength 5/5 throughout.   Right neck flexion: 5/5  Left neck flexion: 5/5  Right neck extension: 5/5  Left neck extension: 5/5  Right deltoid: 5/5  Left deltoid: 5/5  Right biceps: 5/5  Left biceps: 5/5  Right triceps: 5/5  Left triceps: 5/5  Right wrist flexion: 5/5  Left wrist  flexion: 5/5  Right wrist extension: 5/5  Left wrist extension: 5/5  Right interossei: 5/5  Left interossei: 5/5  Right iliopsoas: 5/5  Left iliopsoas: 5/5  Right quadriceps: 5/5  Left quadriceps: 5/5  Right hamstrin/5  Left hamstrin/5  Right glutei: 5/5  Left glutei: 5/5  Right anterior tibial: 5/5  Left anterior tibial: 5/5  Right posterior tibial: 5/5  Left posterior tibial: 5/5  Right peroneal: 5/5  Left peroneal: 5/5  Right gastroc: 5/5  Left gastroc: 5/5    Sensory Exam   Light touch normal.   Right arm light touch: normal  Left arm light touch: normal  Vibration normal.   Right arm vibration: normal  Left arm vibration: normal  Proprioception normal.   Right arm proprioception: normal  Left arm proprioception: normal  Pinprick normal.     ASIF      Gait, Coordination, and Reflexes     Gait  Gait: wide-based    Coordination   Finger to nose coordination: normal    Tremor   Resting tremor: absent  Intention tremor: absent  Action tremor: absent    Reflexes   Right brachioradialis: 2+  Left brachioradialis: 2+  Right biceps: 2+  Left biceps: 2+  Right triceps: 2+  Left triceps: 2+  Right patellar: 2+  Left patellar: 2+  Right achilles: 1+  Left achilles: 1+  Left plantar: normal  Right Patino: absent  Left Patino: absent  Right ankle clonus: absent  Left ankle clonus: absent  Right pendular knee jerk: absent  Left pendular knee jerk: absent        Poor posture            ANISH COGNITIVE ASSESSMENT (MOCA) TOTAL SCORE 30             SEVERE DEMENTIA <10    12 th grade     DATE 2024       TOTAL SCORE 10/30       VISUOSPATIAL EXECUTIVE (5) 1       NAMING (3) 3       ATTENTION (6) 3       LANGUAGE (3) 1       ABSTRACTION(2) 1       RECALL (5) 0       ORIENTATION (6) 1           Lab Results   Component Value Date    WBC 10.25 2024    HGB 12.6 2024    HCT 38.5 2024    MCV 82 2024     2024     Sodium   Date Value Ref Range Status   2024 134 (L) 136 - 145 mmol/L  Final     Potassium   Date Value Ref Range Status   07/02/2024 4.5 3.5 - 5.1 mmol/L Final     Chloride   Date Value Ref Range Status   07/02/2024 100 95 - 110 mmol/L Final     CO2   Date Value Ref Range Status   07/02/2024 23 23 - 29 mmol/L Final     Glucose   Date Value Ref Range Status   07/02/2024 90 70 - 110 mg/dL Final     BUN   Date Value Ref Range Status   07/02/2024 12 8 - 23 mg/dL Final     Creatinine   Date Value Ref Range Status   07/02/2024 1.2 0.5 - 1.4 mg/dL Final     Calcium   Date Value Ref Range Status   07/02/2024 9.9 8.7 - 10.5 mg/dL Final     Total Protein   Date Value Ref Range Status   07/02/2024 7.6 6.0 - 8.4 g/dL Final     Albumin   Date Value Ref Range Status   07/02/2024 3.4 (L) 3.5 - 5.2 g/dL Final     Total Bilirubin   Date Value Ref Range Status   07/02/2024 0.4 0.1 - 1.0 mg/dL Final     Comment:     For infants and newborns, interpretation of results should be based  on gestational age, weight and in agreement with clinical  observations.    Premature Infant recommended reference ranges:  Up to 24 hours.............<8.0 mg/dL  Up to 48 hours............<12.0 mg/dL  3-5 days..................<15.0 mg/dL  6-29 days.................<15.0 mg/dL       Alkaline Phosphatase   Date Value Ref Range Status   07/02/2024 89 55 - 135 U/L Final     AST   Date Value Ref Range Status   07/02/2024 26 10 - 40 U/L Final     ALT   Date Value Ref Range Status   07/02/2024 13 10 - 44 U/L Final     Anion Gap   Date Value Ref Range Status   07/02/2024 11 8 - 16 mmol/L Final     eGFR if    Date Value Ref Range Status   06/09/2022 >60.0 >60 mL/min/1.73 m^2 Final     eGFR if non    Date Value Ref Range Status   06/09/2022 >60.0 >60 mL/min/1.73 m^2 Final     Comment:     Calculation used to obtain the estimated glomerular filtration  rate (eGFR) is the CKD-EPI equation.        Lab Results   Component Value Date    YSFUHBYO28 1091 (H) 02/23/2024     Lab Results   Component Value Date     TSH 1.780 01/10/2024    FREET4 0.88 01/10/2024   LABS EVALUATION:    02-    Vitamin B 12 1091 ^, HC level 8.8 NL, FA level 16.6 NL,     07-    MRI BRAIN WO    There is a chronic lacunar infarct within the lateral aspect of the right thalamus.     Scattered foci of T2 flair hyperintense signal within the bilateral periatrial white matter as well as surrounding the ventricular system are nonspecific but likely relate to chronic microvascular ischemic change.  This is not necessarily unusual for the age of the patient.      No asymmetric atrophy or hydrocephalus.    MRI Brain WO:    03-    MRI Brain No acute intracranial abnormality.     Moderate global cortical atrophy without lobar predominance.  Score 3 bilateral medial temporal lobe atrophy.     Several punctate foci of susceptibility artifact in the peripheral aspect of the posterior cerebral hemispheres likely related to remote microhemorrhage, possible cerebral amyloid angiopathy.    Result consistent with Alzheimer's Disease related it is consistent with exam findings.        Reviewed the neuroimaging independently       Assessment:       1. 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 Mild cognitive impairment with memory loss    3. MARTIN (generalized anxiety disorder)    4. History of lacunar cerebrovascular accident (CVA)    5. Dementia, unspecified dementia severity, unspecified dementia type, unspecified whether behavioral, psychotic, or mood disturbance or anxiety    6. Urge urinary incontinence    7. Small bowel anastomotic dilation    8. Other amnesia    9. Pure hypercholesterolemia            Plan:         MAJOR NEUROCOGNITIVE DISORDER DUE TO ALZHEIMER'S DISEASE WITH BEHAVIORAL DISTURBANCE/ MARTIN/HX OF LACUNAR STROKES/ URGE INCONTINENCE/ COLOSTOMY         EVALUATION     Comprehensive Neuropsychological Testing       DISEASE-MODIFYING AGENTS     Explained to the patient and family that  medications are intended to slow down the progression (in the early stages of the disease) and not stop it or reverse the disease. The disease is relentless, progressive and so far, cannot be controlled. Progression includes Cognitive decline, Behavioral disturbances, and Motor decline as well.     I counseled the patient and family that the rate of progression is extremely variable, and the average (10 years) is an inaccurate measure.     CLASSES:    NMDA RECEPTOR ANTAGONISTS    Continue memantine/Namenda 10 mg BID    CHOLINESTERASE INHIBITORS (CEI)      Will avoid donepezil/Aricept caused GI symptoms         SYMPTOMATIC MANAGEMENT-BEHAVIORAL SYMPTOMS AND NON-COGNITIVE SYMPTOMS       ANTIDEPRESSANTS CLASS MEDICATIONS      Failed Prozac 20 mg daily,and  Xanax 0.25 mg po BID  .     Hold Buspar 10 mg po BID     Will start Wellbutrin  mg po Daily       ANTIPSYCHOTIC AND NEUROLEPTIC CLASS MEDICATIONS     Continue Risperdal 0.5 mg BID to HS  one hour before sunset to assist with sundowning , psychotic symptoms and behavioral disturbances. Other options include: Aripiprazole (Abilify),  Quetiapine (Seroquel) and  Brexipiprazole (Rixulti).      HOME CARE       Falling Down Precautions. Gait is affected by the disease as well.     Avoid driving and access to firearms     Incremental 24/Care     Help with finances and decision making.    Join support group.    Proofing the house and use labeling.    Avoid antihistamines and anticholinergics.    Avoid changing routine.    Use written reminders.    Avoid multitasking.    Healthy diet, exercise (physical and cognitive).    Good sleep hygiene.        HERE ARE 10 WAYS TO REDUCE RISK OF DEMENTIA AND SLOW DOWN THE PROGRESSION OF DEMENTIA        1.Be physically active.    2. Avoid smoking and alcohol consumption.    3. Track your numbers. Keep your blood pressure, cholesterol, blood sugar and weight within recommended ranges.    4. Stay socially connected.    5. Make healthy  food choices. Eat a well-balance and healthy diet that rich in cereals, fish, legumes, and vegetables.    6. Reduce stress.     7. Challenge your brain by trying something new, playing games, or learning a new language.    8. Take care of your hearing. Avoid being continuously exposed to loud sounds and wear a hearing aid if hearing does become a problem.    9. Lower risk of falls. Consider installing handrails on all stairs and grab bars in bathrooms.    10. Reduce your exposure to air pollution, such as exposure to exhaust in heavy traffic.         CAREGIVERS COUNSELING     Recommend reading the following books:     The 36-Hour Day and there are many online and printed resources to help caregivers as well.     Alzheimer's Through the Stages.     Dementia with G.R.A.C.E.            PREVENTION OF DELIRIUM       1. Good hydration and avoid electrolyte imbalance  2. Recognize and treat infections immediately especially UTI.  3. Bladder emptying and prevent constipation.   4. Provide stimulating activities and familiar objects  5. Use eyeglasses and hearing aids if needed.   6. Use simple and regular communication about people, current place, and time  7. Mobility and range-of-motion exercises  8. Reduce noise, lighting and avoid sleep interruptions  9. Non-narcotic pain management.  10.Nondrug treatment for sleep problems or anxiety  11. Avoid antihistamines.  12. Avoid narcotics.  13. Avoid benzodiazepines.            HELPFUL STRATEGIES FOR THE FAMILY AND CAREGIVERS        BEHAVIORAL MANAGEMENT STRATEGIES     Remember that you cannot reason with acute psychosis or confusion. Unless there is an immediate safety issue, there is no need to challenge or correct the person.  For example, if a pt is hallucinating, do not argue with the person that what they are seeing is not real. If they are expressing paranoia, empathize gently with their fear, and attempt to redirect them to a different activity.   If the person is  "particularly stuck on a topic or activity, as long as the activity is safe and is not worsening their agitation, you don't need to intervene.     Communicate calmly, simply, and concisely    Reassure the person that they are safe and you are here to help  Try not to express irritation or anger  Speak quietly and calmly, do not shout or threaten the person. Avoid provocation.   Establish verbal contact and provide orientation and reassurance.  Attempt to identify the patient's wants and feelings, listen to what they are saying, and reflect those wants and feelings back to the patient.  Dont use sarcasm as a weapon    Set clear limits on behavior in a calm voice ("You cannot hit the nurse.")  Offer choices and optimism ("Which toothpaste would you like to use today?")    Redirect the person to an alternative behavior ("Can you come help me with this?)    Avoid saying things like, "We already went over this!" "You can't keep doing this." "I already explained this to you"  Instead, simply nod calmly and acknowledge what the person is saying ("Yes, that makes sense."), and then request their help or company in a different behavior.   Having a mental list of activities the patient enjoys can be helpful with redirection. For example, do they enjoy going for walks? Eating a piece of candy?   If redirection becomes challenging, you can place objects that your family member enjoys strategically in the home in order to use for distraction. This is particularly useful if there are items that they often talk about or frequently ask you questions about; or if they are visually striking. Once you focus the person on this object, talk about it briefly until they are calm and then attempt to redirect to a new behavior.   Sometimes activities which are repetitive can be calming, particularly if there is a comforting sensory element. For example, pt may enjoy folding soft towels or laundry.    Limit overstimulation    Decrease " "distractions by turning off the TV, computer, any fluorescent lights that hum, etc.  Ask any casual visitors to leave--the fewer people the better  If the person is very agitated, avoid touching them, and respect their personal space  Sit down and ask the person to sit down also     PREVENTATIVE STRATEGIES     Try to help the patient develop a routine and stick to it  Address all immediate safety issues  Does the person still have access to the car and keys? Take the keys away, or disconnect the car battery.  Are they wandering at night? Install locks on the outside doors. A keypad lock works well. Alarms on bedroom doors can also be helpful.   Are they turning on the stove and risking a fire? If you cannot limit their access to the kitchen, you may need to unplug dangerous devices any time you are not in the room.   If the person is exhibiting poor judgment throughout the day, they likely need someone supervising them at all times.   Do they still have access to significant financial assets? Limit their access to accounts, and consult with a  if necessary.   Identify situations that seem to trigger agitation or a problematic behavior, and modify the person's environment to minimize or eliminate that trigger.   For example, is their behavior worse if they don't get a good night's sleep? Or if they don't eat or drink enough? If so, prioritize those activities and build behavior plans to support them.  Do they get upset about not being allowed to answer the phone when it rings? Put all of the phones in the house on "silent."   Do they become paranoid that certain family members are trying to take advantage of them? Limit contact with the targeted family member as much as possible, and re-introduce them slowly after some time has passed.   Encourage independence in daily living whenever safely possible  Encourage collaborative decision-making regarding his care as well as daily activities  Encourage regular " physical exercise  Address issues that may be impacting sleep   Ex: Urology consult for frequent nighttime urination, address chronic pain that may be interfering with sleep, moving to a different room if his roommate snores loudly, make sure the room is a comfortable temperature and he has adequate pillows and blankets  Ensure he gets out into the sunlight at least once per day to encourage regular circadian cycle  No caffeine, sugar, or large meals within two hours of bedtime   Make sure room at night is dark and quiet and minimize nighttime interruptions from staff unless medically necessary   Try to help pt go to sleep and wake up at the same time every day  Monitor closely for worsening psychiatric symptoms (insomnia, social withdrawal, deterioration of personal hygiene, hostility, confusing or nonsensical speech, paranoia, hallucinations) and intervene promptly. Assess for possible antecedents, modify environment appropriately.            SLEEP HYGIENE     Poor sleep has a negative effect on cognition. Several strategies have been shown to improve sleep:     Caffeine intake in the afternoon and evening, as well as stuffing oneself at supper, can decrease the quality of restful sleep throughout the night.   Bedtime and wake-up times should be consistent every night and morning so the body becomes used to a single routine, even on the weekends.  Engage in daily physical activity, but not 2-3 hours before bedtime.   No technology use (television, computer, iPad) 1-2 hours before bed.   Have a wind down routine (e.g., soft lights in the house, bath before bed, reduced fluid intake, songs, reading, less noise) to promote sleep readiness.   Visit the www.sleepfoundation.org for more strategies.      COGNITIVE HYGIENE     Engage in regular exercise, which increases alertness and arousal and can improve attention and focus.  Consider lower impact exercises, such as yoga or light walking.  Get a good nights sleep,  as this can enhance alertness and cognition.  Eat healthy foods and balanced meals. It is notable that research indicates certain nutrients may aid in brain function, such as B vitamins (especially B6, B12, and folic acid), antioxidants (such as vitamins C and E, and beta carotene), and Omega-3 fatty acids. Talk with your physician or nutritionist about whats right for you.   Keep your brain active. Find activities to stay mentally active, such as reading, games (cards, checkers), puzzles (crosswords, Sudoku, jig saw), crafts (models, woodworking), gardening, or participating in activities in the community.  Stay socially engaged. Continue staying active with your family and friends.      FUTURE PLANNING     The patient and caregivers should consider formal arrangements to allow a designated person to make medical and financial decisions for the pt, should he/she become unable to do so.  Options to consider include designating a healthcare proxy, medical and/or financial power of , and completing advanced directives for healthcare decisions and estate planning (e.g., finalizing a will).  If cost is prohibitive, [facility] Legal Services (https://[employer]/) provides free  for individuals with low income.       ADDITIONAL RESOURCES     Alzheimer's Services of the [facility] (www.http://alzbr.org) have good local caregiver and patient resources.   Consider resources for support through the Governors Office of Elderly Affairs (http://goea.[place].gov/), [place] Chapter of the Alzheimers Association (www.alz.org/[place]/), the Family Caregiver [place] (www.caregiver.org), and the American Psychological Association (http://www.apa.org/pi/about/publications/caregivers/consumers/index.aspxconsumers/index.aspx).  For More Information About Hallucinations, Delusions, and Paranoia in Alzheimer's ALONSO Alzheimer's and related Dementias Education and Referral (ADEAR) [place] 1-163.955.9956  (toll-free) vee@alana.nih.gov www.alana.nih.gov/alzheimers The National [place] [employer]'s ADEAR Center offers information and free print publications about Alzheimer's disease and related dementias for families, caregivers, and health professionals. ADEAR Center staff answer telephone, email, and written requests and make referrals to local and national resources            MEDICAL/SURGICAL COMORBIDITIES     All relevant medical comorbidities noted and managed by primary care physician and medical care team.          MISCELLANEOUS MEDICAL PROBLEMS       HEALTHY LIFESTYLE AND PREVENTATIVE CARE    Encouraged the patient to adhere to the age-appropriate health maintenance guidelines including screening tests and vaccinations.     Discussed the overall importance of healthy lifestyle, optimal weight, exercise, healthy diet, good sleep hygiene and avoiding drugs including smoking, alcohol and recreational drugs. The patient verbalized full understanding.       Advised the patient to follow COVID-19 prevention measures.       I spent 30 minutes more than 50 % face to face with the patient    Time spent in counseling and coordination of care including discussions etiology of diagnosis, pathogenesis of diagnosis, prognosis of diagnosis,, diagnostic results, impression and recommendations, diagnostic studies, management, risks and benefits of treatment, instructions of disease self-management, treatment instructions, follow up requirements, patient and family counseling/involvement in care compliance with treatment regimen. All of the patient's questions were answered during this discussion.     Olayinka Patten, MSN NP      Collaborating Provider: Teofilo Friedman MD, FAAN Neurologist/Epileptologist

## 2024-09-06 NOTE — PATIENT INSTRUCTIONS
Hold Buspar 20 mg po Twice per day, (may give as needed for anxiety)     Will start Wellbutrin  mg po Daily

## 2024-09-25 ENCOUNTER — PATIENT MESSAGE (OUTPATIENT)
Dept: NEUROLOGY | Facility: CLINIC | Age: 84
End: 2024-09-25
Payer: MEDICARE

## 2024-10-14 ENCOUNTER — HOSPITAL ENCOUNTER (OUTPATIENT)
Dept: RADIOLOGY | Facility: HOSPITAL | Age: 84
Discharge: HOME | End: 2024-10-14
Attending: UROLOGY
Payer: MEDICARE

## 2024-10-14 DIAGNOSIS — N20.0 RENAL STONE: ICD-10-CM

## 2024-10-14 PROCEDURE — 74018 RADEX ABDOMEN 1 VIEW: CPT | Mod: TC,FY,PO

## 2024-10-14 PROCEDURE — 76770 US EXAM ABDO BACK WALL COMP: CPT | Mod: 26,,, | Performed by: RADIOLOGY

## 2024-10-14 PROCEDURE — 76770 US EXAM ABDO BACK WALL COMP: CPT | Mod: TC,PO

## 2024-10-14 PROCEDURE — 74018 RADEX ABDOMEN 1 VIEW: CPT | Mod: 26,,, | Performed by: RADIOLOGY

## 2024-10-17 ENCOUNTER — OFFICE VISIT (OUTPATIENT)
Dept: UROLOGY | Facility: CLINIC | Age: 84
End: 2024-10-17
Payer: MEDICARE

## 2024-10-17 VITALS — HEIGHT: 62 IN | BODY MASS INDEX: 28.8 KG/M2 | WEIGHT: 156.5 LBS

## 2024-10-17 DIAGNOSIS — N20.0 RENAL STONE: Primary | ICD-10-CM

## 2024-10-17 DIAGNOSIS — Z01.818 PRE-OP TESTING: ICD-10-CM

## 2024-10-17 DIAGNOSIS — R30.0 DYSURIA: ICD-10-CM

## 2024-10-17 PROCEDURE — 99999 PR PBB SHADOW E&M-EST. PATIENT-LVL V: CPT | Mod: PBBFAC,,, | Performed by: UROLOGY

## 2024-10-17 RX ORDER — CIPROFLOXACIN 2 MG/ML
400 INJECTION, SOLUTION INTRAVENOUS
OUTPATIENT
Start: 2024-10-17

## 2024-10-17 NOTE — PROGRESS NOTES
Chief Complaint:   Encounter Diagnoses   Name Primary?    Renal stone Yes    Dysuria        HPI:   10/17/24- doing well with no complaints, here today to schedule a replacement stent.    7/18/20- 79-year-old female who presents with severe abdominal pain and discomfort, she is being evaluated for colonic impaction diverticulitis.  She is seen to have an extremely large right renal pelvis stone.  This had been seen prior, and she was followed by partner in regards to possible surgery for this.  Patient though it is determined not pursuing not been seen in the last couple of years.  Patient is having upper abdominal pain, no colic at this point.  Patient has had no previous urological history per her and her son's report, her daughter is her primary care take care, she is currently not present at the bedside.  Family history of stones, no urological cancers per the family.  10/17/18: Mag3 shows 72/38 L/R function. -UCx last visit.  PCNL is the only viable treatment option; observation not recommended.  10/8/18: 77 yo woman referred for renal stone after workup for UTI/diverticulitis.  Having diarrhea on meds for diverticulitis.  No abd/pelvic pain and no exac/rel factors.  No hematuria.  No prior urolithiasis.  No urinary bother.  No  history.  Normal sexual function but inactive.  Some memory problems but functional at home.    Allergies:  Omnicef [cefdinir]    Medications:  has a current medication list which includes the following prescription(s): armour thyroid, atorvastatin, bupropion, dicyclomine, furosemide, hyoscyamine, hyoscyamine, memantine, ondansetron, pantoprazole, phenazopyridine, potassium chloride, risperidone, and [DISCONTINUED] triamcinolone acetonide 0.1%.    Review of Systems:  General: No fever, chills, fatigability, or weight loss.  Skin: No rashes, itching, or changes in color or texture of skin.  Chest: Denies JESUS, cyanosis, wheezing, cough, and sputum production.  Abdomen: Appetite fine. No  weight loss. Denies diarrhea, abdominal pain, hematemesis, or blood in stool.  Musculoskeletal: No joint stiffness or swelling. Denies back pain.  : As above.  All other review of systems negative.    PMH:   has a past medical history of Clostridium difficile colitis, Dementia, Diverticulitis, High cholesterol, Hypertension, Hypothyroidism, and Kidney stones.    PSH:   has a past surgical history that includes Cataract extraction; Appendectomy (2008); Colonoscopy (N/A, 01/02/2019); Colostomy (Left, 01/02/2019); Maribel procedure (N/A, 01/02/2019); Lysis of adhesions (N/A, 01/02/2019); Colon surgery; Vitrectomy (Right, 09/2013); Colonoscopy (N/A, 06/07/2019); Esophagogastroduodenoscopy (N/A, 09/16/2020); Colonoscopy (N/A, 06/23/2021); Cystoscopy with ureteroscopy, retrograde pyelography, and insertion of stent (Right, 08/23/2022); Cystoureteroscopy with retrograde pyelography and insertion of stent into ureter (Right, 11/08/2022); cystoureteroscopy, with holmium laser lithotripsy of ureteral calculus and stent insertion (Right, 04/04/2023); Cystoscopy with ureteroscopy, retrograde pyelography, and insertion of stent (Right, 11/07/2023); cystoureteroscopy,with holmium laser lithotripsy of ureteral calculus (Right, 11/07/2023); Eye surgery (?? Dr Raygoza); Cystoscopy with ureteroscopy, retrograde pyelography, and insertion of stent (Right, 5/14/2024); and Cystoscopic litholapaxy (Right, 5/14/2024).    FamHx: family history includes Alzheimer's disease in her mother; Aneurysm in her father; Bladder Cancer in her grandchild; Parkinsonism in her brother; Stomach cancer in her brother.    SocHx:  reports that she has quit smoking. Her smoking use included cigarettes. She has never been exposed to tobacco smoke. She has never used smokeless tobacco. She reports that she does not drink alcohol and does not use drugs.      Physical Exam:  There were no vitals filed for this visit.  General: A&Ox3, no apparent distress, no  deformities  Neck: No masses, normal ROM  Lungs: normal inspiration, no use of accessory muscles  Heart: normal pulse, no arrhythmias  Abdomen: Soft, NT, ND, no masses, no hernias, no hepatosplenomegaly  Skin: The skin is warm and dry. No jaundice.  Ext: No c/c/e.  : No pelvic floor prolapse.  Normal introitus, no urethral abnomralities. No Perineal abnormalities.    Labs/Studies:   Ucx E.Coli 2/23  Creatinine 0.9 1/24  KUB/FIDEL persistent right renal stone, right stent, mild right hydro 10/24  CT large right renal pelvis stone 8/22  Lasix renogram 23% right/77% left 9/22  Creatinine 1.2 7/24     Impression/Plan:        1. Right renal stone-  right stent with vesicolithalopaxy around distal coil  5/14/24, 11/28/23,  right ureteroscopy  4/4/23,  failed PCN  1/10/23,  right stent  11/8/22, 8/23/22    Currently doing well with no irritative symptoms, she is due for a 6 months stent exchange.  We usually have to fracture calcifications around the distal coil.  Proceed with cystoscopy, possible vesical litholapaxy, right retrograde and stent exchange.  Of note previous ureteroscopy did not yield stone clearance.      Patient understands the risks, benefits and alternatives of the above-stated procedure.  These include but not limited to damage to the surrounding structures including the urethra, ureters and bladder.  Risk of perforation requiring open procedures, Reyes catheterization at the conclusion of the procedure.  Risk of need for further surgeries.  Risk of pain, hematuria, infections.  Risk of heart attack, stroke, death, DVT and PE.  Patient understanding of all the above he has elected to pursue.    2. Dysuria- doing well with no recurrence, call with any issues prior to the next appointment.

## 2024-10-28 ENCOUNTER — HOSPITAL ENCOUNTER (OUTPATIENT)
Dept: CARDIOLOGY | Facility: HOSPITAL | Age: 84
Discharge: HOME | End: 2024-10-28
Attending: UROLOGY
Payer: MEDICARE

## 2024-10-28 ENCOUNTER — HOSPITAL ENCOUNTER (OUTPATIENT)
Dept: RADIOLOGY | Facility: HOSPITAL | Age: 84
Discharge: HOME | End: 2024-10-28
Attending: UROLOGY
Payer: MEDICARE

## 2024-10-28 DIAGNOSIS — Z01.818 PRE-OP TESTING: ICD-10-CM

## 2024-10-28 LAB
OHS QRS DURATION: 86 MS
OHS QTC CALCULATION: 400 MS

## 2024-10-28 PROCEDURE — 93010 ELECTROCARDIOGRAM REPORT: CPT | Mod: ,,, | Performed by: INTERNAL MEDICINE

## 2024-10-28 PROCEDURE — 93005 ELECTROCARDIOGRAM TRACING: CPT | Mod: PO

## 2024-10-28 PROCEDURE — 71046 X-RAY EXAM CHEST 2 VIEWS: CPT | Mod: 26,,, | Performed by: RADIOLOGY

## 2024-10-28 PROCEDURE — 71046 X-RAY EXAM CHEST 2 VIEWS: CPT | Mod: TC,FY,PO

## 2024-11-04 ENCOUNTER — TELEPHONE (OUTPATIENT)
Dept: UROLOGY | Facility: CLINIC | Age: 84
End: 2024-11-04
Payer: MEDICARE

## 2024-11-04 ENCOUNTER — TELEPHONE (OUTPATIENT)
Dept: PREADMISSION TESTING | Facility: HOSPITAL | Age: 84
End: 2024-11-04
Payer: MEDICARE

## 2024-11-04 ENCOUNTER — PATIENT MESSAGE (OUTPATIENT)
Dept: UROLOGY | Facility: CLINIC | Age: 84
End: 2024-11-04
Payer: MEDICARE

## 2024-11-04 NOTE — TELEPHONE ENCOUNTER
Called pts daughter, Myla, after speaking with Dr. Matute.  Per her request, pts surgery has been moved up to be done on tomorrow, 11/5/24.  Reminded daughter of surgery instructions; [facility]

## 2024-11-04 NOTE — TELEPHONE ENCOUNTER
Pre op instructions reviewed with Pt's daughter over telephone, verbalized understanding.    Procedure Date: 11/05/24  Arrival Time:  PLEASE ARRIVE AT 9:15 AM.  Address:   [facility] (Off [facility], 2nd Building on the left)  [id number] [place] [address] [place]. [id number]  >>>Please enter through front entrance Lobby of 1st floor to Registration desk<<<      !!!INSTRUCTIONS IMPORTANT!!!  NO FOOD after midnight! You may have clear liquids up to 3 hrs before your arrival to the Hospital  Clear liquids include Gatorade, water, soda, black coffee or tea (no milk or creamer), and clear juices.  Clear liquids do NOT include anything with pulp or food particles (Chicken broth, ice cream, yogurt, Jello, etc.)    >>>MEDICATION INSTRUCTIONS<<<: Morning of Surgery, take small sip of water with ONLY these medications:  WELLBUTRIN  THYROID    *Diabetic/ Prediabetic Patients: !!!If you take diabetic or weight loss medication, Do NOT take morning of surgery unless instructed by Doctor!!!  Metformin to be stopped 24 hrs prior to surgery.   Long Acting Insulin Instructions: HOLD the night before surgery unless instructed differently by Provider!  Ozempic/ Mounjaro/ Wegovy/ Trulicity/ Semaglutide injections or weight loss medication to be stopped 7 days prior to surgery.    !!!STOP ALL Aspirins, NSAIDS, WEIGHT LOSS INJECTIONS/PILLS, Herbal supplements, & Vitamins 7 DAYS BEFORE SURGERY!!!    ____  Avoid Alcoholic beverages 3 days prior to surgery, as it can thin the blood.  ____  NO Acrylic/fake nails or nail polish worn day of surgery (specifically hand/arm & foot surgeries).  ____  NO powder, lotions, deodorants, oils or cream on body.  ____  Remove all jewelry & piercings & foreign objects before arrival & leave at home.  ____  Remove Dentures, Hearing Aids & Contact Lens prior to surgery.  ____  Bring photo ID and insurance information to hospital (Leave Valuables at Home).  ____  If going home the same day, arrange for  a ride home. You will not be able to drive for 24 hrs if Anesthesia was used.   ____  Females (ages 11-60): may need to give a urine sample the morning of surgery; please see Pre op Nurse prior to using the restroom.  ____  Males: Stop ED medications (Viagra, Cialis) 24 hrs prior to surgery.  ____  Wear clean, loose fitting clothing to allow for dressings/ bandages.      Bathing Instructions:    -Shower with anti-bacterial Soap (Hibiclens or Dial) the night before surgery and the morning of.   -Do not use Hibiclens on your face or genitals.   -Apply clean clothes after shower.  -Do not shave your face or body 2 days prior to surgery unless instructed otherwise by your Surgeon.  -Do not shave pubic hair 7 days prior to surgery (gyn pt's).    [facility] Visitor/Ride Policy:  Only 2 adults allowed in pre op/recovery area during your procedure. You MUST HAVE A RIDE HOME from a responsible adult that you know and trust. Medical Transport, Uber or Lyft can ONLY be used if patient has a responsible adult to accompany them during ride home.       *Signs and symptoms of Infection Before or After Surgery:               !!!If you experience any fever, chills, nausea/ vomiting, foul odor/ excessive drainage from surgical site, flu-like symptoms, new wounds or cuts, PLEASE CALL THE SURGEON OFFICE at [phone] or SEND MESSAGE THROUGH [email or web address]!!!     *If you are running late the morning of surgery, please call the Hospital Surgery Dept @ [phone].     *Billing questions:  [phone]     Thank you,  -[facility] Pre Admit Dept.  [phone] or [phone]  M-F 7:30 am-4:00 pm (Closed Major Holidays)

## 2024-11-05 ENCOUNTER — ANESTHESIA EVENT (OUTPATIENT)
Dept: SURGERY | Facility: HOSPITAL | Age: 84
End: 2024-11-05
Payer: MEDICARE

## 2024-11-05 ENCOUNTER — TELEPHONE (OUTPATIENT)
Dept: UROLOGY | Facility: CLINIC | Age: 84
End: 2024-11-05
Payer: MEDICARE

## 2024-11-05 ENCOUNTER — HOSPITAL ENCOUNTER (OUTPATIENT)
Facility: HOSPITAL | Age: 84
Discharge: HOME | End: 2024-11-05
Attending: UROLOGY | Admitting: UROLOGY
Payer: MEDICARE

## 2024-11-05 ENCOUNTER — ANESTHESIA (OUTPATIENT)
Dept: SURGERY | Facility: HOSPITAL | Age: 84
End: 2024-11-05
Payer: MEDICARE

## 2024-11-05 VITALS
BODY MASS INDEX: 28.71 KG/M2 | HEIGHT: 62 IN | TEMPERATURE: 97 F | HEART RATE: 59 BPM | DIASTOLIC BLOOD PRESSURE: 70 MMHG | OXYGEN SATURATION: 96 % | WEIGHT: 156 LBS | RESPIRATION RATE: 20 BRPM | SYSTOLIC BLOOD PRESSURE: 127 MMHG

## 2024-11-05 DIAGNOSIS — N20.0 RENAL STONE: ICD-10-CM

## 2024-11-05 PROCEDURE — 63600175 PHARM REV CODE 636 W HCPCS: Performed by: NURSE ANESTHETIST, CERTIFIED REGISTERED

## 2024-11-05 PROCEDURE — 37000008 HC ANESTHESIA 1ST 15 MINUTES: Performed by: UROLOGY

## 2024-11-05 PROCEDURE — 27201423 OPTIME MED/SURG SUP & DEVICES STERILE SUPPLY: Performed by: UROLOGY

## 2024-11-05 PROCEDURE — 71000033 HC RECOVERY, INTIAL HOUR: Performed by: UROLOGY

## 2024-11-05 PROCEDURE — 25000003 PHARM REV CODE 250: Performed by: NURSE ANESTHETIST, CERTIFIED REGISTERED

## 2024-11-05 PROCEDURE — 52332 CYSTOSCOPY AND TREATMENT: CPT | Mod: 51,RT,, | Performed by: UROLOGY

## 2024-11-05 PROCEDURE — C1758 CATHETER, URETERAL: HCPCS | Performed by: UROLOGY

## 2024-11-05 PROCEDURE — 36000706: Performed by: UROLOGY

## 2024-11-05 PROCEDURE — 63600175 PHARM REV CODE 636 W HCPCS: Performed by: UROLOGY

## 2024-11-05 PROCEDURE — C2617 STENT, NON-COR, TEM W/O DEL: HCPCS | Performed by: UROLOGY

## 2024-11-05 PROCEDURE — 25500020 PHARM REV CODE 255: Performed by: UROLOGY

## 2024-11-05 PROCEDURE — 71000015 HC POSTOP RECOV 1ST HR: Performed by: UROLOGY

## 2024-11-05 PROCEDURE — 37000009 HC ANESTHESIA EA ADD 15 MINS: Performed by: UROLOGY

## 2024-11-05 PROCEDURE — 36000707: Performed by: UROLOGY

## 2024-11-05 PROCEDURE — 74420 UROGRAPHY RTRGR +-KUB: CPT | Mod: 26,,, | Performed by: UROLOGY

## 2024-11-05 PROCEDURE — C1769 GUIDE WIRE: HCPCS | Performed by: UROLOGY

## 2024-11-05 PROCEDURE — 52317 REMOVE BLADDER STONE: CPT | Mod: ,,, | Performed by: UROLOGY

## 2024-11-05 DEVICE — STENT URETERAL UNIV 8FR 26CM: Type: IMPLANTABLE DEVICE | Site: URETER | Status: FUNCTIONAL

## 2024-11-05 RX ORDER — ONDANSETRON HYDROCHLORIDE 2 MG/ML
4 INJECTION, SOLUTION INTRAVENOUS DAILY PRN
OUTPATIENT
Start: 2024-11-05

## 2024-11-05 RX ORDER — PROPOFOL 10 MG/ML
VIAL (ML) INTRAVENOUS
Status: DISCONTINUED | OUTPATIENT
Start: 2024-11-05 | End: 2024-11-05

## 2024-11-05 RX ORDER — LEVOFLOXACIN 500 MG/1
500 TABLET, FILM COATED ORAL DAILY
Qty: 7 TABLET | Refills: 0 | Status: SHIPPED | OUTPATIENT
Start: 2024-11-05 | End: 2024-11-12

## 2024-11-05 RX ORDER — CIPROFLOXACIN 2 MG/ML
400 INJECTION, SOLUTION INTRAVENOUS
Status: COMPLETED | OUTPATIENT
Start: 2024-11-05 | End: 2024-11-05

## 2024-11-05 RX ORDER — ONDANSETRON HYDROCHLORIDE 2 MG/ML
INJECTION, SOLUTION INTRAVENOUS
Status: DISCONTINUED | OUTPATIENT
Start: 2024-11-05 | End: 2024-11-05

## 2024-11-05 RX ORDER — FENTANYL CITRATE 50 UG/ML
25 INJECTION, SOLUTION INTRAMUSCULAR; INTRAVENOUS EVERY 5 MIN PRN
OUTPATIENT
Start: 2024-11-05

## 2024-11-05 RX ORDER — OXYCODONE AND ACETAMINOPHEN 5; 325 MG/1; MG/1
1 TABLET ORAL
OUTPATIENT
Start: 2024-11-05

## 2024-11-05 RX ORDER — EPHEDRINE SULFATE 50 MG/ML
INJECTION, SOLUTION INTRAVENOUS
Status: DISCONTINUED | OUTPATIENT
Start: 2024-11-05 | End: 2024-11-05

## 2024-11-05 RX ORDER — LIDOCAINE HYDROCHLORIDE 20 MG/ML
INJECTION INTRAVENOUS
Status: DISCONTINUED | OUTPATIENT
Start: 2024-11-05 | End: 2024-11-05

## 2024-11-05 RX ORDER — HYOSCYAMINE SULFATE 0.12 MG/1
0.25 TABLET SUBLINGUAL EVERY 4 HOURS PRN
Qty: 40 TABLET | Refills: 0 | Status: SHIPPED | OUTPATIENT
Start: 2024-11-05

## 2024-11-05 RX ORDER — PHENAZOPYRIDINE HYDROCHLORIDE 200 MG/1
200 TABLET, FILM COATED ORAL 3 TIMES DAILY PRN
Qty: 15 TABLET | Refills: 0 | Status: SHIPPED | OUTPATIENT
Start: 2024-11-05

## 2024-11-05 RX ORDER — MEPERIDINE HYDROCHLORIDE 25 MG/ML
12.5 INJECTION INTRAMUSCULAR; INTRAVENOUS; SUBCUTANEOUS ONCE AS NEEDED
OUTPATIENT
Start: 2024-11-05 | End: 2024-11-06

## 2024-11-05 RX ORDER — HYDROMORPHONE HYDROCHLORIDE 1 MG/ML
0.2 INJECTION, SOLUTION INTRAMUSCULAR; INTRAVENOUS; SUBCUTANEOUS EVERY 5 MIN PRN
OUTPATIENT
Start: 2024-11-05

## 2024-11-05 RX ORDER — ONDANSETRON HYDROCHLORIDE 2 MG/ML
4 INJECTION, SOLUTION INTRAVENOUS ONCE AS NEEDED
OUTPATIENT
Start: 2024-11-05 | End: 2036-04-03

## 2024-11-05 RX ORDER — FENTANYL CITRATE 50 UG/ML
INJECTION, SOLUTION INTRAMUSCULAR; INTRAVENOUS
Status: DISCONTINUED | OUTPATIENT
Start: 2024-11-05 | End: 2024-11-05

## 2024-11-05 RX ADMIN — PROPOFOL 60 MG: 10 INJECTION, EMULSION INTRAVENOUS at 01:11

## 2024-11-05 RX ADMIN — EPHEDRINE SULFATE 30 MG: 50 INJECTION INTRAVENOUS at 02:11

## 2024-11-05 RX ADMIN — CIPROFLOXACIN 400 MG: 2 INJECTION, SOLUTION INTRAVENOUS at 01:11

## 2024-11-05 RX ADMIN — ONDANSETRON 4 MG: 2 INJECTION INTRAMUSCULAR; INTRAVENOUS at 01:11

## 2024-11-05 RX ADMIN — SODIUM CHLORIDE, SODIUM LACTATE, POTASSIUM CHLORIDE, AND CALCIUM CHLORIDE: .6; .31; .03; .02 INJECTION, SOLUTION INTRAVENOUS at 01:11

## 2024-11-05 RX ADMIN — LIDOCAINE HYDROCHLORIDE 100 MG: 20 INJECTION INTRAVENOUS at 01:11

## 2024-11-05 RX ADMIN — FENTANYL CITRATE 100 MCG: 50 INJECTION, SOLUTION INTRAMUSCULAR; INTRAVENOUS at 01:11

## 2024-11-05 RX ADMIN — EPHEDRINE SULFATE 20 MG: 50 INJECTION INTRAVENOUS at 01:11

## 2024-11-05 NOTE — DISCHARGE SUMMARY
[facility] (Hospital)  Discharge Note  Short Stay    Procedure(s) (LRB):  CYSTOURETEROSCOPY, WITH HOLMIUM LASER LITHOTRIPSY OF URETERAL CALCULUS AND STENT INSERTION (Right)  CYSTOSCOPY, WITH URETERAL STENT REMOVAL (Right)  PYELOGRAM, RETROGRADE (Right)      OUTCOME: Patient tolerated treatment/procedure well without complication and is now ready for discharge.    DISPOSITION: Home or Self Care    FINAL DIAGNOSIS:  renal stones    FOLLOWUP: In clinic    DISCHARGE INSTRUCTIONS:    Discharge Procedure Orders   X-Ray KUB   Standing Status: Future Standing Exp. Date: 11/05/25     Order Specific Question Answer Comments   May the Radiologist modify the order per protocol to meet the clinical needs of the patient? Yes    Release to patient Immediate      US Retroperitoneal Complete   Standing Status: Future Standing Exp. Date: 11/05/25     Order Specific Question Answer Comments   May the Radiologist modify the order per protocol to meet the clinical needs of the patient? Yes    Release to patient Immediate      Diet Adult Regular     Notify your health care provider if you experience any of the following:  severe uncontrolled pain     Notify your health care provider if you experience any of the following:  persistent nausea and vomiting or diarrhea     Notify your health care provider if you experience any of the following:  temperature >100.4     Activity as tolerated        TIME SPENT ON DISCHARGE: 5 minutes

## 2024-11-05 NOTE — ANESTHESIA PROCEDURE NOTES
Intubation    Date/Time: 11/5/2024 1:28 PM    Performed by: Dangelo Parsons CRNA  Authorized by: Shankar Vergara MD    Intubation:     Induction:  Intravenous    Intubated:  Postinduction    Mask Ventilation:  Not attempted    Attempts:  1    Attempted By:  CRNA    Method of Intubation:  Direct    Difficult Airway Encountered?: No      Complications:  None    Airway Device:  Supraglottic airway/LMA    Airway Device Size:  3.0    Style/Cuff Inflation:  Uncuffed    Secured at:  The lips    Placement Verified By:  Capnometry    Complicating Factors:  None    Findings Post-Intubation:  BS equal bilateral

## 2024-11-05 NOTE — ANESTHESIA PREPROCEDURE EVALUATION
11/05/2024  Irlanda Lopez is a 84 y.o., female.      Pre-op Assessment    I have reviewed the Patient Summary Reports.     I have reviewed the Nursing Notes. I have reviewed the NPO Status.      Review of Systems  Anesthesia Hx:  No problems with previous Anesthesia                Hematology/Oncology:  Hematology Normal   Oncology Normal                                   Cardiovascular:     Hypertension                                          Renal/:  Chronic Renal Disease                Hepatic/GI:  Hepatic/GI Normal                    Neurological:  Neurology Normal                                      Endocrine:   Hypothyroidism          Dermatological:  Skin Normal    Psych:  Psychiatric History                  Physical Exam  General: Well nourished, Cooperative, Alert and Oriented    Airway:  Mallampati: II / II  Mouth Opening: Normal  TM Distance: Normal  Tongue: Normal  Neck ROM: Normal ROM    Dental:  Intact      Patient Active Problem List   Diagnosis    Hypothyroidism    Essential hypertension    Mild cognitive impairment with memory loss    High serum vitamin D    Hyperlipidemia    Colostomy in place    Tortuous aorta    Renal stone    Dementia    MARTIN (generalized anxiety disorder)    Dysuria    Small bowel anastomotic dilation    Mass of right breast    Cystitis    Aortic atherosclerosis    Osteopenia    Urge urinary incontinence           Anesthesia Plan  Type of Anesthesia, risks & benefits discussed:    Anesthesia Type: Gen ETT, MAC, Gen Supraglottic Airway  Intra-op Monitoring Plan: Standard ASA Monitors  Post Op Pain Control Plan: multimodal analgesia  Induction:  IV  Airway Plan: Direct  Informed Consent: Informed consent signed with the Patient and all parties understand the risks and agree with anesthesia plan.  All questions answered.   ASA Score: 2  Day of Surgery Review of  History & Physical: H&P Update referred to the surgeon/provider.I have interviewed and examined the patient. I have reviewed the patient's H&P dated: There are no significant changes. H&P completed by Anesthesiologist.    Ready For Surgery From Anesthesia Perspective.     .

## 2024-11-05 NOTE — TREATMENT PLAN
Patient's daughter, Myla Mora, stated upon discharge that patient's purse was left unattended for a few minutes while in preop. Daughter states that her Mom's (the patient) [place] sunglasses and case are missing. Instructed patient and daughter to make sure that sunglasses are not in her car or at home, and I will be touching base in a bit to see if they found them.

## 2024-11-05 NOTE — TELEPHONE ENCOUNTER
----- Message from Anselmo Matute MD sent at 11/5/2024  2:09 PM CST -----  5 months with zane condon and mark anthony

## 2024-11-05 NOTE — TREATMENT PLAN
Attempted to call patient's daughter, Myla with no answer. Left a voicemail to call us back to let us know if she found sunglasses.

## 2024-11-05 NOTE — OP NOTE
Date of Procedure: 11/05/2024    Pre-operative Diagnosis:   1.  Right renal stone- 1 cm     Post-operative Diagnosis:   1.  Right renal stone  2.  Bladder stone 1 cm     Surgeon:  Anselmo Matute MD     Assistants: None     Specimen: None     Anesthesia:  Mac anesthesia     Blood loss:  Minimal     Findings:  Right 8 Beninese by 26 cm stent in good position, retrograde large right renal stone, bladder stone around the distal coil.     Procedures:  1. Cysto with placement of right ureteral stent  2. Right retrograde pyelogram  3. Vesical litholapaxy  4. Fluoro less than one hour     Procedure in Detail:  Patient was brought to the operative suite and placed under general anesthesia.  After being positioned in dorsal lithotomy position, patient was sterilely prepped and draped.  Twenty-one Beninese sheath cystoscope was placed into a normal urethra.  Bilateral ureteral orifices were normal in size, shape, caliber and location.  The remainder of the bladder was otherwise unremarkable, no evidence of intravesical lesions or other abnormalities.  Stent was identified, large stone around the distal coil, using laser we are able to form vesical litholapaxy fracturing the stone and then irrigating out with the cystoscope.  At which point we are able to grasp the stent and remove it through the urethral meatus.  A wire was inserted the stent was removed.  Over the wire using Seldinger technique we placed a [place] catheter, wire was removed.  Retrograde was performed demonstrating persistent right renal stone, no filling defects or other abnormalities, otherwise unremarkable right retrograde nephrogram, good position within the renal pelvis.  Wire was reinserted the [place] catheter was removed.  Then over the wire using Seldinger technique we placed an 8 Beninese by 26 cm double-J ureteral stent, good coil in the renal pelvis the bladder using fluoroscopic imaging.  The bladder was then drained by the cystoscope.  The patient  was then taken to the PACU in stable condition.  She will follow back up in 5 months to perform repeat cystoscopy with stent exchange at 6 months.     Complications: None.

## 2024-11-05 NOTE — TRANSFER OF CARE
"Anesthesia Transfer of Care Note    Patient: Irlanda Lopez    Procedure(s) Performed: Procedure(s) (LRB):  CYSTOURETEROSCOPY, WITH HOLMIUM LASER LITHOTRIPSY OF URETERAL CALCULUS AND STENT INSERTION (Right)  CYSTOSCOPY, WITH URETERAL STENT REMOVAL (Right)  PYELOGRAM, RETROGRADE (Right)    Patient location: PACU    Anesthesia Type: general    Transport from OR: Transported from OR on room air with adequate spontaneous ventilation    Post pain: adequate analgesia    Post assessment: no apparent anesthetic complications    Post vital signs: stable    Level of consciousness: awake    Nausea/Vomiting: no nausea/vomiting    Complications: none    Transfer of care protocol was followed      Last vitals: Visit Vitals  BP (!) 153/90 (BP Location: Right arm, Patient Position: Sitting)   Pulse 80   Temp 36.9 °C (98.4 °F) (Temporal)   Resp 18   Ht 5' 2" (1.575 m)   Wt 70.8 kg (156 lb)   SpO2 96%   Breastfeeding No   BMI 28.53 kg/m²     "

## 2024-11-07 ENCOUNTER — PATIENT MESSAGE (OUTPATIENT)
Dept: PRIMARY CARE CLINIC | Facility: CLINIC | Age: 84
End: 2024-11-07
Payer: MEDICARE

## 2024-11-07 DIAGNOSIS — R29.898 WEAKNESS OF BOTH LOWER EXTREMITIES: Primary | ICD-10-CM

## 2024-11-07 DIAGNOSIS — R29.898 WEAKNESS OF BOTH UPPER EXTREMITIES: ICD-10-CM

## 2024-11-07 NOTE — TELEPHONE ENCOUNTER
See pended referral (if applicable).//ddw    -Referral can be found under [facility] tab.    Please send to:  Mary Kay at the [facility]  [phone]

## 2024-11-07 NOTE — TELEPHONE ENCOUNTER
I have signed for the following orders AND/OR meds.  Please call the patient and ask the patient to schedule the testing AND/OR inform about any medications that were sent.      Orders Placed This Encounter   Procedures    Ambulatory referral/consult to Physical/Occupational Therapy     Standing Status:   Future     Standing Expiration Date:   12/7/2025     Referral Priority:   Routine     Referral Type:   Physical Medicine     Referral Reason:   Specialty Services Required     Number of Visits Requested:   1

## 2024-11-08 NOTE — ANESTHESIA POSTPROCEDURE EVALUATION
Anesthesia Post Evaluation    Patient: Irlanda Lopez    Procedure(s) Performed: Procedure(s) (LRB):  CYSTOURETEROSCOPY, WITH HOLMIUM LASER LITHOTRIPSY OF URETERAL CALCULUS AND STENT INSERTION (Right)  CYSTOSCOPY, WITH URETERAL STENT REMOVAL (Right)  PYELOGRAM, RETROGRADE (Right)    Final Anesthesia Type: general      Patient location during evaluation: PACU  Patient participation: Yes- Able to Participate  Level of consciousness: awake and alert and oriented  Post-procedure vital signs: reviewed and stable  Pain management: adequate  Airway patency: patent  LEAH mitigation strategies: Verification of full reversal of neuromuscular block  PONV status at discharge: No PONV  Anesthetic complications: no      Cardiovascular status: blood pressure returned to baseline and hemodynamically stable  Respiratory status: unassisted  Hydration status: euvolemic  Follow-up not needed.              Vitals Value Taken Time   /70 11/05/24 1440   Temp 36.3 °C (97.4 °F) 11/05/24 1415   Pulse 60 11/05/24 1443   Resp 20 11/05/24 1445   SpO2 97 % 11/05/24 1443   Vitals shown include unfiled device data.      Event Time   Out of Recovery 14:45:00         Pain/Babar Score: No data recorded

## 2024-11-18 ENCOUNTER — PATIENT MESSAGE (OUTPATIENT)
Dept: PRIMARY CARE CLINIC | Facility: CLINIC | Age: 84
End: 2024-11-18
Payer: MEDICARE

## 2024-11-18 RX ORDER — CHOLECALCIFEROL (VITAMIN D3) 25 MCG
2000 TABLET ORAL DAILY
COMMUNITY

## 2024-11-18 RX ORDER — POLYETHYLENE GLYCOL 3350 17 G/17G
17 POWDER, FOR SOLUTION ORAL DAILY
COMMUNITY

## 2024-11-18 RX ORDER — VITAMIN B COMPLEX
1 CAPSULE ORAL DAILY
Start: 2024-11-18

## 2024-11-18 RX ORDER — UBIDECARENONE 30 MG
100 CAPSULE ORAL DAILY
COMMUNITY

## 2024-11-19 NOTE — TELEPHONE ENCOUNTER
Please print out that ok to give the following medications to the patient through [facility] program:       Miralax 17 grams daily mixed in 4 oz of water or juice  Vitamin D3 2000 IU daily   Super B complex- 1 tablet po daily   Co-enzyme q 10 100mg 1 tablet po daily   Potassium supplement 10 meq 1 tablet po daily   Atorvastatin 10mg 1 tablet twice a week on Tuesdays and Thursdays    See attached images from patient portal message.

## 2024-11-21 ENCOUNTER — PATIENT MESSAGE (OUTPATIENT)
Dept: NEUROLOGY | Facility: CLINIC | Age: 84
End: 2024-11-21
Payer: MEDICARE

## 2024-11-22 ENCOUNTER — PATIENT MESSAGE (OUTPATIENT)
Dept: UROLOGY | Facility: CLINIC | Age: 84
End: 2024-11-22
Payer: MEDICARE

## 2024-11-22 ENCOUNTER — PATIENT MESSAGE (OUTPATIENT)
Dept: NEUROLOGY | Facility: CLINIC | Age: 84
End: 2024-11-22
Payer: MEDICARE

## 2024-11-23 RX ORDER — VIBEGRON 75 MG/1
75 TABLET, FILM COATED ORAL DAILY
Qty: 30 TABLET | Refills: 11 | Status: SHIPPED | OUTPATIENT
Start: 2024-11-23 | End: 2024-11-26

## 2024-11-23 RX ORDER — MIRABEGRON 50 MG/1
50 TABLET, FILM COATED, EXTENDED RELEASE ORAL DAILY
Qty: 30 TABLET | Refills: 11 | Status: SHIPPED | OUTPATIENT
Start: 2024-11-23 | End: 2024-11-23

## 2024-11-26 ENCOUNTER — PATIENT MESSAGE (OUTPATIENT)
Dept: PRIMARY CARE CLINIC | Facility: CLINIC | Age: 84
End: 2024-11-26

## 2024-11-26 ENCOUNTER — OFFICE VISIT (OUTPATIENT)
Dept: PRIMARY CARE CLINIC | Facility: CLINIC | Age: 84
End: 2024-11-26
Payer: MEDICARE

## 2024-11-26 ENCOUNTER — HOSPITAL ENCOUNTER (OUTPATIENT)
Dept: RADIOLOGY | Facility: HOSPITAL | Age: 84
Discharge: HOME | End: 2024-11-26
Attending: FAMILY MEDICINE
Payer: MEDICARE

## 2024-11-26 VITALS
OXYGEN SATURATION: 97 % | HEART RATE: 65 BPM | BODY MASS INDEX: 28.8 KG/M2 | WEIGHT: 156.5 LBS | DIASTOLIC BLOOD PRESSURE: 72 MMHG | SYSTOLIC BLOOD PRESSURE: 126 MMHG | TEMPERATURE: 98 F | HEIGHT: 62 IN | RESPIRATION RATE: 18 BRPM

## 2024-11-26 DIAGNOSIS — R10.9 ABDOMINAL CRAMPING: ICD-10-CM

## 2024-11-26 DIAGNOSIS — I70.0 AORTIC ATHEROSCLEROSIS: ICD-10-CM

## 2024-11-26 DIAGNOSIS — R10.9 FLANK PAIN: ICD-10-CM

## 2024-11-26 DIAGNOSIS — I10 ESSENTIAL HYPERTENSION: ICD-10-CM

## 2024-11-26 DIAGNOSIS — E03.9 HYPOTHYROIDISM, UNSPECIFIED TYPE: ICD-10-CM

## 2024-11-26 DIAGNOSIS — Z93.3 COLOSTOMY IN PLACE: ICD-10-CM

## 2024-11-26 DIAGNOSIS — E55.9 VITAMIN D DEFICIENCY: ICD-10-CM

## 2024-11-26 DIAGNOSIS — F41.1 GAD (GENERALIZED ANXIETY DISORDER): ICD-10-CM

## 2024-11-26 DIAGNOSIS — R10.9 ABDOMINAL SPASMS: ICD-10-CM

## 2024-11-26 DIAGNOSIS — F01.C4 SEVERE VASCULAR DEMENTIA WITH ANXIETY: Primary | ICD-10-CM

## 2024-11-26 DIAGNOSIS — H04.129 DRY EYE: ICD-10-CM

## 2024-11-26 DIAGNOSIS — R09.89 RHONCHI AT RIGHT LUNG BASE: ICD-10-CM

## 2024-11-26 DIAGNOSIS — R12 HEARTBURN: ICD-10-CM

## 2024-11-26 DIAGNOSIS — N39.41 URGE URINARY INCONTINENCE: ICD-10-CM

## 2024-11-26 PROCEDURE — 99215 OFFICE O/P EST HI 40 MIN: CPT | Mod: S$GLB,,, | Performed by: FAMILY MEDICINE

## 2024-11-26 PROCEDURE — 3288F FALL RISK ASSESSMENT DOCD: CPT | Mod: CPTII,S$GLB,, | Performed by: FAMILY MEDICINE

## 2024-11-26 PROCEDURE — G2211 COMPLEX E/M VISIT ADD ON: HCPCS | Mod: S$GLB,,, | Performed by: FAMILY MEDICINE

## 2024-11-26 PROCEDURE — 71046 X-RAY EXAM CHEST 2 VIEWS: CPT | Mod: TC

## 2024-11-26 PROCEDURE — 1126F AMNT PAIN NOTED NONE PRSNT: CPT | Mod: CPTII,S$GLB,, | Performed by: FAMILY MEDICINE

## 2024-11-26 PROCEDURE — 1159F MED LIST DOCD IN RCRD: CPT | Mod: CPTII,S$GLB,, | Performed by: FAMILY MEDICINE

## 2024-11-26 PROCEDURE — 1101F PT FALLS ASSESS-DOCD LE1/YR: CPT | Mod: CPTII,S$GLB,, | Performed by: FAMILY MEDICINE

## 2024-11-26 PROCEDURE — 3078F DIAST BP <80 MM HG: CPT | Mod: CPTII,S$GLB,, | Performed by: FAMILY MEDICINE

## 2024-11-26 PROCEDURE — 71046 X-RAY EXAM CHEST 2 VIEWS: CPT | Mod: 26,,, | Performed by: RADIOLOGY

## 2024-11-26 PROCEDURE — 3074F SYST BP LT 130 MM HG: CPT | Mod: CPTII,S$GLB,, | Performed by: FAMILY MEDICINE

## 2024-11-26 PROCEDURE — 99999 PR PBB SHADOW E&M-EST. PATIENT-LVL III: CPT | Mod: PBBFAC,,, | Performed by: FAMILY MEDICINE

## 2024-11-26 RX ORDER — HYOSCYAMINE SULFATE 0.12 MG/1
0.25 TABLET SUBLINGUAL EVERY 4 HOURS PRN
Qty: 60 TABLET | Refills: 11 | Status: SHIPPED | OUTPATIENT
Start: 2024-11-26

## 2024-11-26 RX ORDER — RISPERIDONE 0.5 MG/1
0.5 TABLET ORAL NIGHTLY
Qty: 30 TABLET | Refills: 11 | Status: SHIPPED | OUTPATIENT
Start: 2024-11-26 | End: 2025-11-26

## 2024-11-26 RX ORDER — BUPROPION HYDROCHLORIDE 150 MG/1
150 TABLET ORAL EVERY MORNING
Qty: 90 TABLET | Refills: 3 | Status: SHIPPED | OUTPATIENT
Start: 2024-11-26

## 2024-11-26 RX ORDER — FAMOTIDINE 40 MG/1
40 TABLET, FILM COATED ORAL 2 TIMES DAILY PRN
Qty: 180 TABLET | Refills: 3 | Status: SHIPPED | OUTPATIENT
Start: 2024-11-26

## 2024-11-26 RX ORDER — ATORVASTATIN CALCIUM 10 MG/1
TABLET, FILM COATED ORAL
Qty: 90 TABLET | Refills: 3 | Status: SHIPPED | OUTPATIENT
Start: 2024-11-26

## 2024-11-26 RX ORDER — DICYCLOMINE HYDROCHLORIDE 20 MG/1
20 TABLET ORAL
Qty: 180 TABLET | Refills: 1 | Status: SHIPPED | OUTPATIENT
Start: 2024-11-26

## 2024-11-26 RX ORDER — CARBOXYMETHYLCELLULOSE SODIUM 10 MG/ML
GEL OPHTHALMIC
Qty: 15 ML | Refills: 3 | Status: SHIPPED | OUTPATIENT
Start: 2024-11-26

## 2024-11-26 RX ORDER — FUROSEMIDE 20 MG/1
20 TABLET ORAL DAILY
Qty: 90 TABLET | Refills: 3 | Status: SHIPPED | OUTPATIENT
Start: 2024-11-26

## 2024-11-26 RX ORDER — HYOSCYAMINE SULFATE 0.12 MG/1
0.25 TABLET SUBLINGUAL EVERY 4 HOURS PRN
Qty: 40 TABLET | Refills: 0 | COMMUNITY
Start: 2024-11-26 | End: 2024-11-26 | Stop reason: SDUPTHER

## 2024-11-26 RX ORDER — THYROID 30 MG/1
30 TABLET ORAL NIGHTLY
Qty: 90 TABLET | Refills: 3 | Status: SHIPPED | OUTPATIENT
Start: 2024-11-26

## 2024-11-26 RX ORDER — ACETAMINOPHEN 500 MG
1000 TABLET ORAL EVERY 6 HOURS PRN
COMMUNITY

## 2024-11-26 RX ORDER — ONDANSETRON 8 MG/1
8 TABLET, ORALLY DISINTEGRATING ORAL EVERY 12 HOURS PRN
Qty: 90 TABLET | Refills: 3 | Status: SHIPPED | OUTPATIENT
Start: 2024-11-26

## 2024-11-26 RX ORDER — BUSPIRONE HYDROCHLORIDE 10 MG/1
TABLET ORAL
Qty: 180 TABLET | Refills: 3 | Status: SHIPPED | OUTPATIENT
Start: 2024-11-26 | End: 2025-11-26

## 2024-11-26 RX ORDER — CHOLECALCIFEROL (VITAMIN D3) 25 MCG
2000 TABLET ORAL EVERY MORNING
Qty: 180 TABLET | Refills: 3 | Status: SHIPPED | OUTPATIENT
Start: 2024-11-26

## 2024-11-26 RX ORDER — BUTYROSPERMUM PARKII(SHEA BUTTER), SIMMONDSIA CHINENSIS (JOJOBA) SEED OIL, ALOE BARBADENSIS LEAF EXTRACT .01; 1; 3.5 G/100G; G/100G; G/100G
250 LIQUID TOPICAL DAILY
COMMUNITY

## 2024-11-26 RX ORDER — PHENAZOPYRIDINE HYDROCHLORIDE 200 MG/1
200 TABLET, FILM COATED ORAL 3 TIMES DAILY PRN
Qty: 15 TABLET | Refills: 1 | Status: SHIPPED | OUTPATIENT
Start: 2024-11-26

## 2024-11-26 NOTE — LETTER
November 26, 2024    Irlanda Lopez  [address] [address] [address]         [facility]  [address] [place] [address]  Phone: [phone]  Fax: [phone] November 26, 2024     Patient: Irlanda Lopez   YOB: 1940   Date of Visit: 11/26/2024       To Whom It May Concern:    It is my medical opinion that Irlanda Lopez  needs to have the following dietary restrictions:     No dairy   no beans   no cabbage   no red sauces ( mexican, italian, red gravy)   no spicy foods  no fried foods   No carbonated beverages other than occasionally 8 oz of sprite for upset stomach.      This list may be expanded upon by the family members if they identify additional foods that can cause further GI discomfort and abdominal spasms.   She has a colostomy.      If you have any questions or concerns, please don't hesitate to call.    Sincerely,      Myla Arias MD

## 2024-11-26 NOTE — PATIENT INSTRUCTIONS
Current Outpatient Medications:     acetaminophen (TYLENOL) 500 MG tablet, Take 1,000 mg by mouth every 6 (six) hours as needed for Pain or Temperature greater than (101F, fever, headache, arthritis)., Disp: , Rfl:     atorvastatin (LIPITOR) 10 MG tablet, 10mg tablet po twice weekly to be given on Monday and Thursday, Disp: 90 tablet, Rfl: 3    b complex vitamins capsule, Take 1 capsule by mouth once daily., Disp: , Rfl:     buPROPion (WELLBUTRIN XL) 150 MG TB24 tablet, Take 1 tablet (150 mg total) by mouth every morning., Disp: 90 tablet, Rfl: 3    busPIRone (BUSPAR) 10 MG tablet, Take 1 tablet (10 mg total) by mouth with lunch. May also take 1 tablet (10 mg total) daily as needed (anxiety, panic attacks, agitation)., Disp: 180 tablet, Rfl: 3    carboxymethylcellulose (REFRESH LIQUIGEL) 1 % ophthalmic solution, Apply 2 drops to eye 2 (two) times daily. May also apply 1 drop 2 (two) times daily as needed (for dry eyes). Bilateral eyes ( Refresh eye drops/ saline)., Disp: 15 mL, Rfl: 3    dicyclomine (BENTYL) 20 mg tablet, Take 1 tablet (20 mg total) by mouth before meals and at bedtime as needed (abdominal cramping, belly pain, stomach pain around colostomy)., Disp: 180 tablet, Rfl: 1    famotidine (PEPCID) 40 MG tablet, Take 1 tablet (40 mg total) by mouth 2 (two) times daily as needed for Heartburn (reflux, indigestion, flatulence, upset stomach)., Disp: 180 tablet, Rfl: 3    furosemide (LASIX) 20 MG tablet, Take 1 tablet (20 mg total) by mouth once daily. For leg edema, and blood pressure. Hold medication if blood pressure is < 100 systolic with AM BP readings, Disp: 90 tablet, Rfl: 3    hyoscyamine 0.125 mg Subl, Place 2 tablets (0.25 mg total) under the tongue every 4 (four) hours as needed (flank pain, back pain, kidney spasms, abdoninal spasms)., Disp: 60 tablet, Rfl: 11    memantine (NAMENDA) 10 MG Tab, TAKE 1 TABLET BY MOUTH TWICE  DAILY, Disp: 180 tablet, Rfl: 3    ondansetron (ZOFRAN-ODT) 8 MG Bebeto,  Take 1 tablet (8 mg total) by mouth every 12 (twelve) hours as needed (nausea, vomiting, upset stomach)., Disp: 90 tablet, Rfl: 3    phenazopyridine (PYRIDIUM) 200 MG tablet, Take 1 tablet (200 mg total) by mouth 3 (three) times daily as needed (bladder spasms, urinary tract infection, bladder pain)., Disp: 15 tablet, Rfl: 1    polyethylene glycol (GLYCOLAX) 17 gram/dose powder, Take 17 g by mouth once daily. For constipation, Disp: , Rfl:     potassium chloride (MICRO-K) 10 MEQ CpSR, Take by mouth once daily., Disp: , Rfl:     risperiDONE (RISPERDAL) 0.5 MG Tab, Take 1 tablet (0.5 mg total) by mouth every evening., Disp: 30 tablet, Rfl: 11    Saccharomyces boulardii (FLORASTOR) 250 mg capsule, Take 250 mg by mouth Daily. For probiotic for GI tract with colostomy ( ok for generic for florastor), Disp: , Rfl:     thyroid, pork, (ARMOUR THYROID) 30 mg Tab, Take 1 tablet (30 mg total) by mouth every evening., Disp: 90 tablet, Rfl: 3    vitamin D (VITAMIN D3) 1000 units Tab, Take 2 tablets (2,000 Units total) by mouth every morning., Disp: 180 tablet, Rfl: 3

## 2024-11-26 NOTE — PROGRESS NOTES
Chief Complaint  Chief Complaint   Patient presents with    Follow-up     3 month        HPI  Irlanda Lopez is a 84 y.o. female with multiple medical diagnoses as listed in the medical history and problem list that presents for  in person visit.     History of Present Illness    CHIEF COMPLAINT:  - Patient presents for medication management and to address concerns related to her transition to assisted living with her severe Alzheimer's dementia.    HPI:  Patient with severe Alzheimer's dementia recently moved to an assisted living facility. Issues with medication management occurred during the transition, including incorrect administration of thyroid medication and omission of supplements. Patient has anxiety attacks managed with as-needed medication and becomes agitated when discussing certain topics like her pads or wanting to return home.    Patient has GI discomfort due to dietary issues. She had severe stomach upset and loss of appetite after consuming white beans twice. Patient has a colostomy and is GI compromised, unable to tolerate gassy foods, dairy, spicy foods, or red gravies. She has been complaining of right flank pain for the last week.    Patient uses eye drops for dry eyes and takes medication for acid reflux or indigestion. She has arthritis, particularly affecting her knees.    There are concerns about the patient's water intake, leading to discussions about implementing a schedule for offering water every 2 hours.    Patient's weight has remained stable at around 156-157 lbs. She had a chest XR on the 28th before a procedure. Patient has a history of having a fistula.    Patient denies coughing and any medical diagnoses.    MEDICATIONS:  - Armor Thyroid, 1 tablet nightly after 7:30 PM  - Atorvastatin 10 mg, 1 tablet twice weekly (Monday and Thursday), for cholesterol  - Wellbutrin 150 mg, 1 tablet daily in the morning, for anxiety  - Buspirone 10 mg, 1 tablet daily at lunch and 1 additional  "tablet as needed, for anxiety/panic attacks/agitation  - Furosemide, 1 tablet daily in the morning, for leg edema and blood pressure (hold if blood pressure less than 100 systolic)  - Namenda, 1 pill twice daily  - Glycolax, 1 tablet daily, for constipation and to help with colostomy  - Vitamin D 2000 IU, 1 tablet daily in the morning  - Probiotic, 1 tablet daily  - Refresh eye drops, 2 drops to both eyes twice daily and 1 additional drop twice daily as needed for dry eyes  - Tylenol 500 mg, every 6 hours as needed for pain, arthritis, fever, headache, toothache, back pain  - Potassium, take when taking furosemide    PMH:  - Severe Alzheimer's dementia.  Vascular dementia.  Hypothyroidism.  Hypercholesterolemia.    RECENT/REMOTE SURGICAL HISTORY:  - Colostomy.  Stent placement: Recently replaced.         No questionnaires on file.       Pmh, Psh, Family Hx, Social Hx, HM updated in Epic Tabs today.    Review of Systems   Constitutional:  Positive for appetite change. Negative for activity change and fatigue.   Respiratory:  Negative for cough and shortness of breath.    Cardiovascular:  Negative for chest pain and palpitations.   Gastrointestinal:  Negative for abdominal distention and abdominal pain.   Genitourinary:  Positive for flank pain. Negative for frequency.   Musculoskeletal:  Negative for arthralgias and gait problem.   Psychiatric/Behavioral:  Positive for confusion and decreased concentration. Negative for dysphoric mood and sleep disturbance. The patient is not nervous/anxious.         Objective:     Vitals:    11/26/24 1103   BP: 126/72   BP Location: Right arm   Patient Position: Sitting   Pulse: 65   Resp: 18   Temp: 98.1 °F (36.7 °C)   TempSrc: Tympanic   SpO2: 97%   Weight: 71 kg (156 lb 8.4 oz)   Height: 5' 2" (1.575 m)     Wt Readings from Last 10 Encounters:   11/26/24 71 kg (156 lb 8.4 oz)   11/04/24 70.8 kg (156 lb)   10/17/24 71 kg (156 lb 8.4 oz)   09/06/24 71.6 kg (157 lb 13.6 oz) "   08/27/24 70.8 kg (156 lb 3.1 oz)   07/02/24 71.1 kg (156 lb 10.2 oz)   05/14/24 70.3 kg (154 lb 15.7 oz)   05/08/24 71.4 kg (157 lb 6.5 oz)   04/18/24 71 kg (156 lb 8.4 oz)   03/27/24 71 kg (156 lb 8.4 oz)     Physical Exam    Abdomen: Large hernia near colostomy site.  TEST RESULTS:  - Last blood test: Sodium level was below normal range.  Previous blood test: Sodium level was on the lower end of normal range.  IMAGING:  - Chest XR: 28th (unknown month), before a procedure       Physical Exam  Vitals and nursing note reviewed.   Constitutional:       General: She is awake.      Appearance: Normal appearance. She is well-developed, well-groomed and normal weight.   HENT:      Head: Normocephalic and atraumatic.      Right Ear: External ear normal.      Left Ear: External ear normal.      Nose: Nose normal.   Eyes:      Conjunctiva/sclera: Conjunctivae normal.   Neck:      Thyroid: No thyromegaly or thyroid tenderness.   Cardiovascular:      Rate and Rhythm: Normal rate and regular rhythm.      Heart sounds: Normal heart sounds.   Pulmonary:      Effort: Pulmonary effort is normal. No accessory muscle usage.      Breath sounds: Normal breath sounds.   Abdominal:      General: There is no distension.      Tenderness: There is no abdominal tenderness.      Comments: Colostomy in place    Musculoskeletal:      Cervical back: Normal range of motion and neck supple.   Neurological:      Mental Status: She is alert. Mental status is at baseline.   Psychiatric:         Attention and Perception: Attention normal.         Mood and Affect: Mood normal.         Speech: Speech normal.         Behavior: Behavior normal. Behavior is cooperative.         Cognition and Memory: Cognition is impaired. Memory is impaired. She exhibits impaired recent memory and impaired remote memory.         Assessment:     1. Severe vascular dementia with anxiety    2. MARTIN (generalized anxiety disorder)    3. Flank pain    4. Hypothyroidism,  unspecified type    5. Abdominal cramping    6. Colostomy in place    7. Aortic atherosclerosis    8. Vitamin D deficiency    9. Urge urinary incontinence    10. Heartburn    11. Abdominal spasms    12. Dry eye    13. Essential hypertension    14. Rhonchi at right lung base        LABS:   Lab Results   Component Value Date    HGBA1C 5.3 01/10/2024    HGBA1C 5.0 06/20/2023    HGBA1C 5.2 06/09/2022      Lab Results   Component Value Date    CHOL 159 01/10/2024    CHOL 166 06/20/2023    CHOL 154 06/09/2022     Lab Results   Component Value Date    LDLCALC 103.8 01/10/2024    LDLCALC 111.0 06/20/2023    LDLCALC 104.6 06/09/2022     Lab Results   Component Value Date    WBC 10.99 11/26/2024    HGB 12.7 11/26/2024    HCT 40.0 11/26/2024     11/26/2024    CHOL 159 01/10/2024    TRIG 86 01/10/2024    HDL 38 (L) 01/10/2024    ALT 9 (L) 11/26/2024    AST 21 11/26/2024     (L) 11/26/2024    K 3.9 11/26/2024    CL 99 11/26/2024    CREATININE 0.9 11/26/2024    BUN 7 (L) 11/26/2024    CO2 21 (L) 11/26/2024    TSH 1.780 01/10/2024    INR 1.0 06/10/2019    HGBA1C 5.3 01/10/2024       Plan:   Irlanda was seen today for follow-up.    Diagnoses and all orders for this visit:    Severe vascular dementia with anxiety  -     risperiDONE (RISPERDAL) 0.5 MG Tab; Take 1 tablet (0.5 mg total) by mouth every evening.    MARTIN (generalized anxiety disorder)  -     busPIRone (BUSPAR) 10 MG tablet; Take 1 tablet (10 mg total) by mouth with lunch. May also take 1 tablet (10 mg total) daily as needed (anxiety, panic attacks, agitation).  -     risperiDONE (RISPERDAL) 0.5 MG Tab; Take 1 tablet (0.5 mg total) by mouth every evening.  -     buPROPion (WELLBUTRIN XL) 150 MG TB24 tablet; Take 1 tablet (150 mg total) by mouth every morning.    Flank pain  -     hyoscyamine 0.125 mg Subl; Place 2 tablets (0.25 mg total) under the tongue every 4 (four) hours as needed (flank pain, back pain, kidney spasms, abdoninal spasms).  -     POCT  urinalysis, dipstick or tablet reag  -     X-Ray Chest PA And Lateral; Future  -     CBC Without Differential; Future  -     Comprehensive Metabolic Panel; Future    Hypothyroidism, unspecified type  -     thyroid, pork, (ARMOUR THYROID) 30 mg Tab; Take 1 tablet (30 mg total) by mouth every evening.    Abdominal cramping  -     dicyclomine (BENTYL) 20 mg tablet; Take 1 tablet (20 mg total) by mouth before meals and at bedtime as needed (abdominal cramping, belly pain, stomach pain around colostomy).  -     ondansetron (ZOFRAN-ODT) 8 MG TbDL; Take 1 tablet (8 mg total) by mouth every 12 (twelve) hours as needed (nausea, vomiting, upset stomach).  -     phenazopyridine (PYRIDIUM) 200 MG tablet; Take 1 tablet (200 mg total) by mouth 3 (three) times daily as needed (bladder spasms, urinary tract infection, bladder pain).    Colostomy in place    Aortic atherosclerosis  -     atorvastatin (LIPITOR) 10 MG tablet; 10mg tablet po twice weekly to be given on Monday and Thursday    Vitamin D deficiency  -     vitamin D (VITAMIN D3) 1000 units Tab; Take 2 tablets (2,000 Units total) by mouth every morning.    Urge urinary incontinence    Heartburn  -     famotidine (PEPCID) 40 MG tablet; Take 1 tablet (40 mg total) by mouth 2 (two) times daily as needed for Heartburn (reflux, indigestion, flatulence, upset stomach).    Abdominal spasms  -     dicyclomine (BENTYL) 20 mg tablet; Take 1 tablet (20 mg total) by mouth before meals and at bedtime as needed (abdominal cramping, belly pain, stomach pain around colostomy).    Dry eye  -     carboxymethylcellulose (REFRESH LIQUIGEL) 1 % ophthalmic solution; Apply 2 drops to eye 2 (two) times daily. May also apply 1 drop 2 (two) times daily as needed (for dry eyes). Bilateral eyes ( Refresh eye drops/ saline).    Essential hypertension  -     furosemide (LASIX) 20 MG tablet; Take 1 tablet (20 mg total) by mouth once daily. For leg edema, and blood pressure. Hold medication if blood  pressure is < 100 systolic with AM BP readings    Rhonchi at right lung base  -     X-Ray Chest PA And Lateral; Future        Assessment & Plan    IMPRESSION:  - Assessed current medication regimen and made adjustments to optimize management of anxiety, GI issues, and other chronic conditions  - Evaluated right flank pain; suspect possible dehydration or respiratory issues  - Considered impact of recent transition to assisted living on care and medication management  - Reviewed dietary restrictions in context of colostomy and GI compromise  - Will order labs and chest XR to assess kidney function, electrolytes, and possible fluid retention before holidays    PLAN SUMMARY:  - Continue Furosemide daily for leg edema and blood pressure  - Start Famotidine 20mg PO BID PRN for GI symptoms  - Continue Namenda 1 tablet PO BID  - Discontinue Coenzyme Q10  - Continue Glycolax daily for constipation  - Continue Pyridium PRN for bladder spasms  - Decrease Ativan to 0.5mg PO nightly  - Continue Zofran PRN for nausea and vomiting  - Change Bentyl to PRN for abdominal discomfort  - Continue Vitamin D 2000 IU PO daily  - Start Tylenol 500mg PO q6h PRN for pain and fever  - Start Probi RDI-4250 GI probiotic 1 tablet PO daily  - Continue Atorvastatin 10mg PO twice weekly  - Continue Wellbutrin 150mg PO daily in AM  - Discontinue Protonix  - Continue Armor Thyroid nightly  - Increase Buspirone to 10mg PO daily at lunch, with additional 10mg PRN  - Start Refresh eye drops BID, with additional drops PRN  - Order chest XR to evaluate for fluid retention  - Order CBC and BMP to check blood counts, kidney function, and electrolytes  - Follow up in 4 weeks for virtual appointment    FLUID RETENTION:  - Explained importance of proper hydration and deep breathing exercises to prevent fluid buildup in lungs.  - Patient to take deep breaths regularly to prevent fluid buildup in lungs.  - Recommend increasing water intake, aiming for 4-5 ounces  every 2 hours.  - Continued Furosemide daily for leg edema and blood pressure; hold if systolic BP < 100.  - Ordered chest XR to evaluate for fluid retention.    GASTROINTESTINAL ISSUES:  - Started Famotidine 20mg PO BID PRN for heartburn, reflux, indigestion, flatulence, upset stomach.  - Started Probi RDI-4250 GI probiotic 1 tablet PO daily.  - Changed Bentyl to PRN for abdominal cramping, abdominal pain, pain around colostomy.  - Continued Zofran PRN for nausea, upset stomach, vomiting.  - Continued Glycolax daily for constipation and to help with colostomy.  - Discontinued Protonix.    DRY EYES:  - Started Refresh eye drops: apply 2 drops to bilateral eyes BID; may apply 1 additional drop BID PRN for dry eyes.    PAIN MANAGEMENT:  - Started Tylenol 500mg PO q6h PRN for pain, fever, arthritis, headache, toothache, back pain; notify if temperature > 101°F.    ANXIETY:  - Increased Buspirone to 10mg PO daily at lunch, with additional 10mg daily PRN for anxiety, panic attacks, or agitation.  - Decreased Ativan to 0.5mg PO nightly.    DEPRESSION:  - Continued Wellbutrin 150mg PO daily in AM.    THYROID DISORDER:  - Continued Armor Thyroid to be taken nightly after evening medicat  ions.    HYPERLIPIDEMIA:  - Continued Atorvastatin 10mg PO twice weekly (Monday and Thursday).    DEMENTIA:  - Continued Namenda 1 tablet PO BID.    BLADDER ISSUES:  - Continued Pyridium PRN for bladder spasms.    VITAMIN D SUPPLEMENTATION:  - Continued Vitamin D 2000 IU PO daily in morning.    MEDICATIONS/SUPPLEMENTS:  - Discontinued Coenzyme Q10.    LABS:  - Ordered CBC, BMP to check blood counts, kidney function, and electrolyte levels.    FOLLOW UP:  - Follow up in 4 weeks, around December 23rd, for virtual appointment.  - Contact office if any issues arise before next appointment.         X-Ray Chest PA And Lateral  Narrative: EXAMINATION:  XR CHEST PA AND LATERAL    CLINICAL HISTORY:  Unspecified abdominal pain    TECHNIQUE:  PA and  lateral views of the chest were performed.    COMPARISON:  10/28/2024    FINDINGS:  No infiltrates.  No pneumothorax.  The heart is enlarged.  There is tortuosity of the descending thoracic aorta.  There is blunting of either costophrenic angle consistent with small bilateral pleural effusions there is also biapical pleural thickening.  There is an area of what appears to represent probable subsegmental atelectasis within the right lower lung zone.  Impression: As above    Electronically signed by: Lonny Sanders DO  Date:    11/26/2024  Time:    13:08    The ASCVD Risk score (Coby CELIS, et al., 2019) failed to calculate for the following reasons:    The 2019 ASCVD risk score is only valid for ages 40 to 79    Follow-up: Follow up in about 27 days (around 12/23/2024) for f/u Virtual Visit Dr. Juana baig .    I spent a total of  60     minutes face to face and non-face to face on the date of this visit.This includes time preparing to see the patient (eg, review of tests, notes), obtaining and/or reviewing additional history from an independent historian and/or outside medical records, documenting clinical information in the electronic health record, independently interpreting results and/or communicating results to the patient/family/caregiver, or care coordinator.  Visit today included increased complexity associated with the care of the episodic problem addressed and managing the longitudinal care of the patient due to the serious and/or complex managed problem(s).    This note was generated with the assistance of ambient listening technology. Verbal consent was obtained by the patient and accompanying visitor(s) for the recording of patient appointment to facilitate this note. I attest to having reviewed and edited the generated note for accuracy, though some syntax or spelling errors may persist. Please contact the author of this note for any clarification.       Patient Instructions     Current Outpatient  Medications:     acetaminophen (TYLENOL) 500 MG tablet, Take 1,000 mg by mouth every 6 (six) hours as needed for Pain or Temperature greater than (101F, fever, headache, arthritis)., Disp: , Rfl:     atorvastatin (LIPITOR) 10 MG tablet, 10mg tablet po twice weekly to be given on Monday and Thursday, Disp: 90 tablet, Rfl: 3    b complex vitamins capsule, Take 1 capsule by mouth once daily., Disp: , Rfl:     buPROPion (WELLBUTRIN XL) 150 MG TB24 tablet, Take 1 tablet (150 mg total) by mouth every morning., Disp: 90 tablet, Rfl: 3    busPIRone (BUSPAR) 10 MG tablet, Take 1 tablet (10 mg total) by mouth with lunch. May also take 1 tablet (10 mg total) daily as needed (anxiety, panic attacks, agitation)., Disp: 180 tablet, Rfl: 3    carboxymethylcellulose (REFRESH LIQUIGEL) 1 % ophthalmic solution, Apply 2 drops to eye 2 (two) times daily. May also apply 1 drop 2 (two) times daily as needed (for dry eyes). Bilateral eyes ( Refresh eye drops/ saline)., Disp: 15 mL, Rfl: 3    dicyclomine (BENTYL) 20 mg tablet, Take 1 tablet (20 mg total) by mouth before meals and at bedtime as needed (abdominal cramping, belly pain, stomach pain around colostomy)., Disp: 180 tablet, Rfl: 1    famotidine (PEPCID) 40 MG tablet, Take 1 tablet (40 mg total) by mouth 2 (two) times daily as needed for Heartburn (reflux, indigestion, flatulence, upset stomach)., Disp: 180 tablet, Rfl: 3    furosemide (LASIX) 20 MG tablet, Take 1 tablet (20 mg total) by mouth once daily. For leg edema, and blood pressure. Hold medication if blood pressure is < 100 systolic with AM BP readings, Disp: 90 tablet, Rfl: 3    hyoscyamine 0.125 mg Subl, Place 2 tablets (0.25 mg total) under the tongue every 4 (four) hours as needed (flank pain, back pain, kidney spasms, abdoninal spasms)., Disp: 60 tablet, Rfl: 11    memantine (NAMENDA) 10 MG Tab, TAKE 1 TABLET BY MOUTH TWICE  DAILY, Disp: 180 tablet, Rfl: 3    ondansetron (ZOFRAN-ODT) 8 MG TbDL, Take 1 tablet (8 mg  total) by mouth every 12 (twelve) hours as needed (nausea, vomiting, upset stomach)., Disp: 90 tablet, Rfl: 3    phenazopyridine (PYRIDIUM) 200 MG tablet, Take 1 tablet (200 mg total) by mouth 3 (three) times daily as needed (bladder spasms, urinary tract infection, bladder pain)., Disp: 15 tablet, Rfl: 1    polyethylene glycol (GLYCOLAX) 17 gram/dose powder, Take 17 g by mouth once daily. For constipation, Disp: , Rfl:     potassium chloride (MICRO-K) 10 MEQ CpSR, Take by mouth once daily., Disp: , Rfl:     risperiDONE (RISPERDAL) 0.5 MG Tab, Take 1 tablet (0.5 mg total) by mouth every evening., Disp: 30 tablet, Rfl: 11    Saccharomyces boulardii (FLORASTOR) 250 mg capsule, Take 250 mg by mouth Daily. For probiotic for GI tract with colostomy ( ok for generic for florastor), Disp: , Rfl:     thyroid, pork, (ARMOUR THYROID) 30 mg Tab, Take 1 tablet (30 mg total) by mouth every evening., Disp: 90 tablet, Rfl: 3    vitamin D (VITAMIN D3) 1000 units Tab, Take 2 tablets (2,000 Units total) by mouth every morning., Disp: 180 tablet, Rfl: 3

## 2024-11-26 NOTE — LETTER
2024    Irlanda Lopez  [address] [address]             [facility]  [address]  [place] [address]  Phone: [phone]  Fax: [phone] To whom it may concern,     The following is an updated medication list for Irlanda Lopez  : 1940.     Current Outpatient Medications:     acetaminophen (TYLENOL) 500 MG tablet, Take 1,000 mg by mouth every 6 (six) hours as needed for Pain or Temperature greater than (101F, fever, headache, arthritis)., Disp: , Rfl:       atorvastatin (LIPITOR) 10 MG tablet, 10mg tablet po twice weekly to be given on Monday and Thursday, Disp: 90 tablet, Rfl: 3      b complex vitamins capsule, Take 1 capsule by mouth once daily., Disp: , Rfl:       buPROPion (WELLBUTRIN XL) 150 MG TB24 tablet, Take 1 tablet (150 mg total) by mouth every morning., Disp: 90 tablet, Rfl: 3      busPIRone (BUSPAR) 10 MG tablet, Take 1 tablet (10 mg total) by mouth with lunch. May also take 1 tablet (10 mg total) daily as needed (anxiety, panic attacks, agitation)., Disp: 180 tablet, Rfl: 3      carboxymethylcellulose (REFRESH LIQUIGEL) 1 % ophthalmic solution, Apply 2 drops to eye 2 (two) times daily. May also apply 1 drop 2 (two) times daily as needed (for dry eyes). Bilateral eyes ( Refresh eye drops/ saline)., Disp: 15 mL, Rfl: 3      dicyclomine (BENTYL) 20 mg tablet, Take 1 tablet (20 mg total) by mouth before meals and at bedtime as needed (abdominal cramping, belly pain, stomach pain around colostomy)., Disp: 180 tablet, Rfl: 1      famotidine (PEPCID) 40 MG tablet, Take 1 tablet (40 mg total) by mouth 2 (two) times daily as needed for Heartburn (reflux, indigestion, flatulence, upset stomach)., Disp: 180 tablet, Rfl: 3      furosemide (LASIX) 20 MG tablet, Take 1 tablet (20 mg total) by mouth once daily. For leg edema, and blood pressure. Hold medication if blood pressure is < 100 systolic with AM BP readings, Disp: 90  tablet, Rfl: 3      hyoscyamine 0.125 mg Subl, Place 2 tablets (0.25 mg total) under the tongue every 4 (four) hours as needed (flank pain, back pain, kidney spasms, abdoninal spasms)., Disp: 60 tablet, Rfl: 11      memantine (NAMENDA) 10 MG Tab, TAKE 1 TABLET BY MOUTH TWICE  DAILY, Disp: 180 tablet, Rfl: 3      ondansetron (ZOFRAN-ODT) 8 MG TbDL, Take 1 tablet (8 mg total) by mouth every 12 (twelve) hours as needed (nausea, vomiting, upset stomach)., Disp: 90 tablet, Rfl: 3      phenazopyridine (PYRIDIUM) 200 MG tablet, Take 1 tablet (200 mg total) by mouth 3 (three) times daily as needed (bladder spasms, urinary tract infection, bladder pain)., Disp: 15 tablet, Rfl: 1      polyethylene glycol (GLYCOLAX) 17 gram/dose powder, Take 17 g by mouth once daily. For constipation, Disp: , Rfl:       potassium chloride (MICRO-K) 10 MEQ CpSR, Take by mouth once daily when taking furosemide , Disp: , Rfl:       risperiDONE (RISPERDAL) 0.5 MG Tab, Take 1 tablet (0.5 mg total) by mouth every evening., Disp: 30 tablet, Rfl: 11      Saccharomyces boulardii (FLORASTOR) 250 mg capsule, Take 250 mg by mouth Daily. For probiotic for GI tract with colostomy ( ok for generic for florastor), Disp: , Rfl:       thyroid, pork, (ARMOUR THYROID) 30 mg Tab, Take 1 tablet (30 mg total) by mouth every evening., Disp: 90 tablet, Rfl: 3      vitamin D (VITAMIN D3) 1000 units Tab, Take 2 tablets (2,000 Units total) by mouth every morning., Disp: 180 tablet, Rfl: 3    If you have any questions or concerns, please don't hesitate to call.    Sincerely,    Myla Arias MD

## 2024-11-27 ENCOUNTER — PATIENT MESSAGE (OUTPATIENT)
Dept: PRIMARY CARE CLINIC | Facility: CLINIC | Age: 84
End: 2024-11-27
Payer: MEDICARE

## 2024-11-28 ENCOUNTER — PATIENT MESSAGE (OUTPATIENT)
Dept: PRIMARY CARE CLINIC | Facility: CLINIC | Age: 84
End: 2024-11-28
Payer: MEDICARE

## 2024-11-30 ENCOUNTER — PATIENT MESSAGE (OUTPATIENT)
Dept: PRIMARY CARE CLINIC | Facility: CLINIC | Age: 84
End: 2024-11-30
Payer: MEDICARE

## 2024-11-30 DIAGNOSIS — F41.1 GAD (GENERALIZED ANXIETY DISORDER): ICD-10-CM

## 2024-11-30 DIAGNOSIS — I70.0 AORTIC ATHEROSCLEROSIS: ICD-10-CM

## 2024-11-30 DIAGNOSIS — F01.54 VASCULAR DEMENTIA WITH ANXIETY, UNSPECIFIED DEMENTIA SEVERITY: Chronic | ICD-10-CM

## 2024-11-30 DIAGNOSIS — E55.9 VITAMIN D DEFICIENCY: ICD-10-CM

## 2024-11-30 DIAGNOSIS — R10.9 ABDOMINAL CRAMPING: ICD-10-CM

## 2024-11-30 DIAGNOSIS — F01.C4 SEVERE VASCULAR DEMENTIA WITH ANXIETY: ICD-10-CM

## 2024-11-30 DIAGNOSIS — E03.9 HYPOTHYROIDISM, UNSPECIFIED TYPE: ICD-10-CM

## 2024-11-30 DIAGNOSIS — R10.9 ABDOMINAL SPASMS: ICD-10-CM

## 2024-11-30 DIAGNOSIS — R12 HEARTBURN: ICD-10-CM

## 2024-11-30 DIAGNOSIS — I10 ESSENTIAL HYPERTENSION: ICD-10-CM

## 2024-11-30 DIAGNOSIS — R10.9 FLANK PAIN: ICD-10-CM

## 2024-12-09 RX ORDER — POTASSIUM CHLORIDE 750 MG/1
10 CAPSULE, EXTENDED RELEASE ORAL DAILY
Qty: 90 CAPSULE | Refills: 3 | Status: SHIPPED | OUTPATIENT
Start: 2024-12-09

## 2024-12-09 RX ORDER — RISPERIDONE 0.5 MG/1
0.5 TABLET ORAL NIGHTLY
Qty: 90 TABLET | Refills: 3 | Status: SHIPPED | OUTPATIENT
Start: 2024-12-09 | End: 2025-12-09

## 2024-12-09 RX ORDER — PHENAZOPYRIDINE HYDROCHLORIDE 200 MG/1
200 TABLET, FILM COATED ORAL 3 TIMES DAILY PRN
Qty: 45 TABLET | Refills: 1 | Status: SHIPPED | OUTPATIENT
Start: 2024-12-09 | End: 2024-12-11 | Stop reason: SDUPTHER

## 2024-12-09 RX ORDER — THYROID 30 MG/1
30 TABLET ORAL NIGHTLY
Qty: 90 TABLET | Refills: 3 | Status: SHIPPED | OUTPATIENT
Start: 2024-12-09

## 2024-12-09 RX ORDER — FUROSEMIDE 20 MG/1
20 TABLET ORAL DAILY
Qty: 90 TABLET | Refills: 3 | Status: SHIPPED | OUTPATIENT
Start: 2024-12-09

## 2024-12-09 RX ORDER — BUPROPION HYDROCHLORIDE 150 MG/1
150 TABLET ORAL EVERY MORNING
Qty: 90 TABLET | Refills: 3 | Status: SHIPPED | OUTPATIENT
Start: 2024-12-09

## 2024-12-09 RX ORDER — POLYETHYLENE GLYCOL 3350 17 G/17G
17 POWDER, FOR SOLUTION ORAL DAILY
Qty: 595 G | Refills: 3 | Status: SHIPPED | OUTPATIENT
Start: 2024-12-09

## 2024-12-09 RX ORDER — CHOLECALCIFEROL (VITAMIN D3) 25 MCG
2000 TABLET ORAL EVERY MORNING
Qty: 180 TABLET | Refills: 3 | Status: SHIPPED | OUTPATIENT
Start: 2024-12-09

## 2024-12-09 RX ORDER — BUTYROSPERMUM PARKII(SHEA BUTTER), SIMMONDSIA CHINENSIS (JOJOBA) SEED OIL, ALOE BARBADENSIS LEAF EXTRACT .01; 1; 3.5 G/100G; G/100G; G/100G
250 LIQUID TOPICAL DAILY
Qty: 90 CAPSULE | Refills: 3 | Status: SHIPPED | OUTPATIENT
Start: 2024-12-09

## 2024-12-09 RX ORDER — DICYCLOMINE HYDROCHLORIDE 20 MG/1
20 TABLET ORAL
Qty: 180 TABLET | Refills: 1 | Status: SHIPPED | OUTPATIENT
Start: 2024-12-09

## 2024-12-09 RX ORDER — HYOSCYAMINE SULFATE 0.12 MG/1
0.25 TABLET SUBLINGUAL EVERY 4 HOURS PRN
Qty: 180 TABLET | Refills: 3 | Status: SHIPPED | OUTPATIENT
Start: 2024-12-09 | End: 2024-12-11 | Stop reason: SDUPTHER

## 2024-12-09 RX ORDER — MEMANTINE HYDROCHLORIDE 10 MG/1
10 TABLET ORAL 2 TIMES DAILY
Qty: 180 TABLET | Refills: 3 | Status: SHIPPED | OUTPATIENT
Start: 2024-12-09

## 2024-12-09 RX ORDER — ONDANSETRON 8 MG/1
8 TABLET, ORALLY DISINTEGRATING ORAL EVERY 12 HOURS PRN
Qty: 90 TABLET | Refills: 3 | Status: SHIPPED | OUTPATIENT
Start: 2024-12-09

## 2024-12-09 RX ORDER — ATORVASTATIN CALCIUM 10 MG/1
TABLET, FILM COATED ORAL
Qty: 90 TABLET | Refills: 3 | Status: SHIPPED | OUTPATIENT
Start: 2024-12-09

## 2024-12-09 RX ORDER — FAMOTIDINE 40 MG/1
40 TABLET, FILM COATED ORAL 2 TIMES DAILY PRN
Qty: 180 TABLET | Refills: 3 | Status: SHIPPED | OUTPATIENT
Start: 2024-12-09

## 2024-12-09 RX ORDER — BUSPIRONE HYDROCHLORIDE 10 MG/1
TABLET ORAL
Qty: 180 TABLET | Refills: 3 | Status: SHIPPED | OUTPATIENT
Start: 2024-12-09 | End: 2025-12-09

## 2024-12-09 NOTE — TELEPHONE ENCOUNTER
Care Due:                  Date            Visit Type   Department     Provider  --------------------------------------------------------------------------------                                EP -                              PRIMARY      [facility] PRIMARY  Last Visit: 11-      CARE ([facility])   CARE           Myla Arias  Next Visit: None Scheduled  None         None Found                                                            Last  Test          Frequency    Reason                     Performed    Due Date  --------------------------------------------------------------------------------    Lipid Panel.  12 months..  atorvastatin.............  01-   01-    TSH.........  12 months..  thyroid,.................  01- 01-    Vitamin D...  12 months..  vitamin..................  06- 06-    [employer] Embedded Care Due Messages. Reference number: [id number].   12/09/2024 9:20:07 AM CST

## 2024-12-09 NOTE — TELEPHONE ENCOUNTER
Refill Routing Note   Medication(s) are not appropriate for processing by [facility] for the following reason(s):        Outside of protocol  New or recently adjusted medication  Alternate pharmacy has been requested-Refill center protocols would require refill adjustment which may not be best option for patient.   Requesting provider review all and resend with same number of refills on al meds to keep more organized for the patient. Thank you :-)    [facility] action(s):  Defer  Route     Requires labs : Yes      Medication Therapy Plan: Patient's med proxy had requested an alternate pharmacy for all meds. Refill Center cannot approve the same number of refills per protocols (>7days). Deferring for provider to review all meds and resend to keep refills consistent for patient.      Appointments  past 12m or future 3m with PCP    Date Provider   Last Visit   11/26/2024 Myla Arias MD   Next Visit   Visit date not found Myla Arias MD   ED visits in past 90 days: 0        Note composed:10:21 AM 12/09/2024

## 2024-12-10 ENCOUNTER — PATIENT MESSAGE (OUTPATIENT)
Dept: NEUROLOGY | Facility: CLINIC | Age: 84
End: 2024-12-10

## 2024-12-10 ENCOUNTER — OFFICE VISIT (OUTPATIENT)
Dept: NEUROLOGY | Facility: CLINIC | Age: 84
End: 2024-12-10
Payer: MEDICARE

## 2024-12-10 VITALS — BODY MASS INDEX: 28.63 KG/M2 | HEIGHT: 62 IN

## 2024-12-10 DIAGNOSIS — Z93.3 COLOSTOMY IN PLACE: ICD-10-CM

## 2024-12-10 DIAGNOSIS — F03.90 DEMENTIA, UNSPECIFIED DEMENTIA SEVERITY, UNSPECIFIED DEMENTIA TYPE, UNSPECIFIED WHETHER BEHAVIORAL, PSYCHOTIC, OR MOOD DISTURBANCE OR ANXIETY: ICD-10-CM

## 2024-12-10 DIAGNOSIS — K91.89: ICD-10-CM

## 2024-12-10 DIAGNOSIS — N39.41 URGE URINARY INCONTINENCE: ICD-10-CM

## 2024-12-10 DIAGNOSIS — G31.84 MILD COGNITIVE IMPAIRMENT WITH MEMORY LOSS: ICD-10-CM

## 2024-12-10 DIAGNOSIS — F01.C4 SEVERE VASCULAR DEMENTIA WITH ANXIETY: ICD-10-CM

## 2024-12-10 DIAGNOSIS — R31.9 URINARY TRACT INFECTION WITH HEMATURIA, SITE UNSPECIFIED: ICD-10-CM

## 2024-12-10 DIAGNOSIS — F41.1 GAD (GENERALIZED ANXIETY DISORDER): ICD-10-CM

## 2024-12-10 DIAGNOSIS — R35.0 URINARY FREQUENCY: ICD-10-CM

## 2024-12-10 DIAGNOSIS — I10 ESSENTIAL HYPERTENSION: ICD-10-CM

## 2024-12-10 DIAGNOSIS — E78.00 PURE HYPERCHOLESTEROLEMIA: ICD-10-CM

## 2024-12-10 DIAGNOSIS — N30.90 CYSTITIS: ICD-10-CM

## 2024-12-10 DIAGNOSIS — R41.3 OTHER AMNESIA: ICD-10-CM

## 2024-12-10 DIAGNOSIS — G30.9 MAJOR NEUROCOGNITIVE DISORDER DUE TO ALZHEIMER'S DISEASE, WITH BEHAVIORAL DISTURBANCE: Primary | ICD-10-CM

## 2024-12-10 DIAGNOSIS — R79.89 HIGH SERUM VITAMIN D: ICD-10-CM

## 2024-12-10 DIAGNOSIS — R39.15 URGENCY OF URINATION: ICD-10-CM

## 2024-12-10 DIAGNOSIS — F02.818 MAJOR NEUROCOGNITIVE DISORDER DUE TO ALZHEIMER'S DISEASE, WITH BEHAVIORAL DISTURBANCE: Primary | ICD-10-CM

## 2024-12-10 DIAGNOSIS — I77.1 TORTUOUS AORTA: ICD-10-CM

## 2024-12-10 DIAGNOSIS — N39.0 URINARY TRACT INFECTION WITH HEMATURIA, SITE UNSPECIFIED: ICD-10-CM

## 2024-12-10 DIAGNOSIS — Z86.73 HISTORY OF LACUNAR CEREBROVASCULAR ACCIDENT (CVA): ICD-10-CM

## 2024-12-10 PROCEDURE — 99214 OFFICE O/P EST MOD 30 MIN: CPT | Mod: 95,,, | Performed by: NURSE PRACTITIONER

## 2024-12-10 PROCEDURE — 1159F MED LIST DOCD IN RCRD: CPT | Mod: CPTII,95,, | Performed by: NURSE PRACTITIONER

## 2024-12-10 PROCEDURE — 1160F RVW MEDS BY RX/DR IN RCRD: CPT | Mod: CPTII,95,, | Performed by: NURSE PRACTITIONER

## 2024-12-10 NOTE — PROGRESS NOTES
Subjective:       Patient ID: Irlanda Lopez is a 84 y.o. female.    Chief Complaint: Major neurocognitive disorder due to Alzheimer's disease, w      Former Patient of Dr. Mango MD Neurologist.     HPIThe patient is here for memory loss evaluation. The patient is accompanied by daughter.      The patient is new to me. presenting with memory changes that began in 2017. The patient was previous evaluated in 2017 at [facility] and was Dx with dementia and started on DMA.  The patient has had continual cognitive decline. She has problems related to progressive short term memory loss and behavior changes. For example, the patient would repeat the same question repeatedly. In the late after noon she gets confused. She forgets conversations, and she forget what she ate. She has been retired many years but she gets up and packs her car and tries to go work. Other days the patient doesn't want to leave the house. The patient excessively forgets where placed certain things. She has a colostomy and she was able to mange it independently but now she requires assistance from caretakers. She has 24 hour care. The patient is no longer driving and she would get lost. Patient has confusion around and inside the house. Patient has  trouble remembering the date and time. Patient has caretakers that manage medications and family manage appointments and keeping up with major holidays and political changes. The patient is independent in handling finances. The patient is still independent with ADLs. Patient has some agitation or aggression occasionally. Patient has hallucinations or delusions. No seizures. No language problems. No problems handling tools. Patient has a history of strokes unable to recall details, patient family discovered old stroke on MRI testing in 2017. No history of headaches. No history of hypothyroidism. No history of alcoholism. No history of B12 deficiency.  Hx depression Prozac 20 mg daily, takes Xanax 0.25 mg po  BID. No history of bipolar disorder. No history of Untreated Syphilis.  No history of HIV infection. No toxic exposures.  No history of traumatic brain injury. Mild tremors noted.  No falls. Wide base gait.  No urinary incontinence. Bowel incontinence has a colostomy.  No change in sleep and appetite. Mother had dementia in 80 years of age. Patient was started on Aricept and Namenda in 2017 but she was unable to tolerate Aricept caused GI upset. She continue take Namenda 10 mg po BID. Patient daughter reports she is no longer taking Prozac 20 mg daily and no longer taking Xanax. MRI Brain WO 03- No acute intracranial abnormality. Result consistent with Alzheimer's Disease related it is consistent with exam findings.         INTERVAL HISTORY 12-: Patient for memory management. Patient daughter is present for follow up for follow up memory and behavior manage. Patient accompanied by daughter. Patient is now a Resident of [facility] [place] [facility]. Tolerating Buspar 10 mg po BID no issues. Risperdal 0.5 mg po HS is very helpful, sleeping during the night, no side effects. Patient is adjusting to new place of residents. She recently restarted memory treatment continue take Namenda 10 mg po BID. Patient unable to tolerate Aricept caused GI symptoms. Tolerating Wellbutrin  mg po Daily no reported no side effects.     The originating site (patient location) is: Home.        The distant site (neurologist location) is: Neurology Clinic at [facility].        The chief complaint leading to consultation is: F/U memory management       Visit type: Virtual visit with synchronous audio and video.        Total time spent with patient:  15 minutes         Special circumstances: This visit occurred during COVID-19 Pandemic Public Health Emergency.         Consent: The patient verbally consented to participating in the video visit and informed that may decline to receive medical  services by telemedicine and may withdraw from such care at any time.        I discussed with the patient the nature of our telemedicine visits, that:        I  would evaluate the patient and recommend diagnostics and treatments based on my assessment.     Our sessions are not being recorded and that personal health information is protected.     Our team would provide follow up care in person if/when the patient needs it.     Virtual (video/telemedicine) visits have significant limitations. A telemedicine exam is primarily focused on the history and what I can observe. Several critical parts of the neurological exam cannot be performed.          Review of Systems   Unable to perform ROS: Dementia   HENT:  Positive for hearing loss.    Gastrointestinal:         Colostomy    Musculoskeletal:  Positive for arthralgias and back pain.   Psychiatric/Behavioral:  Positive for behavioral problems, confusion, decreased concentration and dysphoric mood. The patient is nervous/anxious.                  Current Outpatient Medications:     acetaminophen (TYLENOL) 500 MG tablet, Take 1,000 mg by mouth every 6 (six) hours as needed for Pain or Temperature greater than (101F, fever, headache, arthritis)., Disp: , Rfl:     atorvastatin (LIPITOR) 10 MG tablet, 10mg tablet po twice weekly to be given on Monday and Thursday, Disp: 90 tablet, Rfl: 3    b complex vitamins capsule, Take 1 capsule by mouth once daily., Disp: , Rfl:     buPROPion (WELLBUTRIN XL) 150 MG TB24 tablet, Take 1 tablet (150 mg total) by mouth every morning., Disp: 90 tablet, Rfl: 3    busPIRone (BUSPAR) 10 MG tablet, Take 1 tablet (10 mg total) by mouth with lunch. May also take 1 tablet (10 mg total) daily as needed (anxiety, panic attacks, agitation)., Disp: 180 tablet, Rfl: 3    carboxymethylcellulose (REFRESH LIQUIGEL) 1 % ophthalmic solution, Apply 2 drops to eye 2 (two) times daily. May also apply 1 drop 2 (two) times daily as needed (for dry eyes).  Bilateral eyes ( Refresh eye drops/ saline)., Disp: 15 mL, Rfl: 3    dicyclomine (BENTYL) 20 mg tablet, Take 1 tablet (20 mg total) by mouth before meals and at bedtime as needed (abdominal cramping, belly pain, stomach pain around colostomy)., Disp: 180 tablet, Rfl: 1    famotidine (PEPCID) 40 MG tablet, Take 1 tablet (40 mg total) by mouth 2 (two) times daily as needed for Heartburn (reflux, indigestion, flatulence, upset stomach)., Disp: 180 tablet, Rfl: 3    furosemide (LASIX) 20 MG tablet, Take 1 tablet (20 mg total) by mouth once daily. For leg edema, and blood pressure. Hold medication if blood pressure is < 100 systolic with AM BP readings, Disp: 90 tablet, Rfl: 3    hyoscyamine 0.125 mg Subl, Place 2 tablets (0.25 mg total) under the tongue every 4 (four) hours as needed (flank pain, back pain, kidney spasms, abdoninal spasms)., Disp: 180 tablet, Rfl: 3    memantine (NAMENDA) 10 MG Tab, Take 1 tablet (10 mg total) by mouth 2 (two) times daily., Disp: 180 tablet, Rfl: 3    ondansetron (ZOFRAN-ODT) 8 MG TbDL, Take 1 tablet (8 mg total) by mouth every 12 (twelve) hours as needed (nausea, vomiting, upset stomach)., Disp: 90 tablet, Rfl: 3    phenazopyridine (PYRIDIUM) 200 MG tablet, Take 1 tablet (200 mg total) by mouth 3 (three) times daily as needed (bladder spasms, urinary tract infection, bladder pain)., Disp: 45 tablet, Rfl: 1    polyethylene glycol (GLYCOLAX) 17 gram/dose powder, Take 17 g by mouth once daily. For constipation, Disp: 595 g, Rfl: 3    potassium chloride (MICRO-K) 10 MEQ CpSR, Take 1 capsule (10 mEq total) by mouth once daily., Disp: 90 capsule, Rfl: 3    risperiDONE (RISPERDAL) 0.5 MG Tab, Take 1 tablet (0.5 mg total) by mouth every evening., Disp: 90 tablet, Rfl: 3    Saccharomyces boulardii (FLORASTOR) 250 mg capsule, Take 1 capsule (250 mg total) by mouth Daily. For probiotic for GI tract with colostomy ( ok for generic for florastor), Disp: 90 capsule, Rfl: 3    thyroid, pork, (ARMOUR  THYROID) 30 mg Tab, Take 1 tablet (30 mg total) by mouth every evening., Disp: 90 tablet, Rfl: 3    vitamin D (VITAMIN D3) 1000 units Tab, Take 2 tablets (2,000 Units total) by mouth every morning., Disp: 180 tablet, Rfl: 3  Past Medical History:   Diagnosis Date    Clostridium difficile colitis     Dementia     Diverticulitis     s/p colostomy    High cholesterol     Hypertension     Hypothyroidism     Kidney stones      Past Surgical History:   Procedure Laterality Date    APPENDECTOMY  2008    CATARACT EXTRACTION      Dr Raygoza    COLON SURGERY      COLONOSCOPY N/A 01/02/2019    Procedure: COLONOSCOPY;  Surgeon: Reece Hogan MD;  Location: [facility] ENDO;  Service: Endoscopy;  Laterality: N/A;    COLONOSCOPY N/A 06/07/2019    Procedure: COLONOSCOPY;  Surgeon: Rishabh Connelly MD;  Location: [facility] ENDO;  Service: General;  Laterality: N/A;    COLONOSCOPY N/A 06/23/2021    Procedure: COLONOSCOPY;  Surgeon: Lucy Lafleur MD;  Location: [facility] ENDO;  Service: Endoscopy;  Laterality: N/A;    COLOSTOMY Left 01/02/2019    Procedure: CREATION, COLOSTOMY;  Surgeon: Reece Hogan MD;  Location: [facility];  Service: General;  Laterality: Left;    CYSTOSCOPIC LITHOLAPAXY Right 5/14/2024    Procedure: CYSTOLITHOLAPAXY;  Surgeon: Anselmo Matute MD;  Location: [facility];  Service: Urology;  Laterality: Right;    CYSTOSCOPY W/ URETERAL STENT REMOVAL Right 11/5/2024    Procedure: CYSTOSCOPY, WITH URETERAL STENT REMOVAL;  Surgeon: Anselmo Matute MD;  Location: [facility];  Service: Urology;  Laterality: Right;    CYSTOSCOPY WITH URETEROSCOPY, RETROGRADE PYELOGRAPHY, AND INSERTION OF STENT Right 08/23/2022    Procedure: CYSTOSCOPY, WITH RETROGRADE PYELOGRAM AND URETERAL STENT INSERTION;  Surgeon: Anselmo Matute MD;  Location: [facility] OR;  Service: Urology;  Laterality: Right;    CYSTOSCOPY WITH URETEROSCOPY, RETROGRADE PYELOGRAPHY, AND INSERTION OF STENT Right 11/07/2023    Procedure: CYSTOSCOPY, WITH RETROGRADE PYELOGRAM AND  URETERAL STENT INSERTION;  Surgeon: Anselmo Matute MD;  Location: [facility];  Service: Urology;  Laterality: Right;  stent exchange    CYSTOSCOPY WITH URETEROSCOPY, RETROGRADE PYELOGRAPHY, AND INSERTION OF STENT Right 5/14/2024    Procedure: CYSTOSCOPY, WITH RETROGRADE PYELOGRAM AND URETERAL STENT INSERTION;  Surgeon: Anselmo Matute MD;  Location: [facility];  Service: Urology;  Laterality: Right;    CYSTOURETEROSCOPY WITH RETROGRADE PYELOGRAPHY AND INSERTION OF STENT INTO URETER Right 11/08/2022    Procedure: CYSTOURETEROSCOPY, WITH RETROGRADE PYELOGRAM AND URETERAL STENT INSERTION;  Surgeon: Anselmo Matute MD;  Location: [facility];  Service: Urology;  Laterality: Right;    CYSTOURETEROSCOPY, WITH HOLMIUM LASER LITHOTRIPSY OF URETERAL CALCULUS AND STENT INSERTION Right 04/04/2023    Procedure: CYSTOURETEROSCOPY, WITH HOLMIUM LASER LITHOTRIPSY OF URETERAL CALCULUS AND STENT INSERTION;  Surgeon: Anselmo Matute MD;  Location: [facility];  Service: Urology;  Laterality: Right;    CYSTOURETEROSCOPY, WITH HOLMIUM LASER LITHOTRIPSY OF URETERAL CALCULUS AND STENT INSERTION Right 11/5/2024    Procedure: CYSTOURETEROSCOPY, WITH HOLMIUM LASER LITHOTRIPSY OF URETERAL CALCULUS AND STENT INSERTION;  Surgeon: Anselmo Matute MD;  Location: [facility];  Service: Urology;  Laterality: Right;  vesical litholapaxy    CYSTOURETEROSCOPY,WITH HOLMIUM LASER LITHOTRIPSY OF URETERAL CALCULUS Right 11/07/2023    Procedure: CYSTOURETEROSCOPY,WITH HOLMIUM LASER LITHOTRIPSY OF URETERAL CALCULUS;  Surgeon: Anselmo Matute MD;  Location: [facility];  Service: Urology;  Laterality: Right;    ESOPHAGOGASTRODUODENOSCOPY N/A 09/16/2020    Procedure: ESOPHAGOGASTRODUODENOSCOPY (EGD)- Needs Rapid;  Surgeon: Lucy Lafleur MD;  Location: [facility];  Service: Endoscopy;  Laterality: N/A;    EYE SURGERY  ?? Dr Jaret PINEDO PROCEDURE N/A 01/02/2019    Procedure: SHAHIDA PROCEDURE;  Surgeon: Reece Hogan MD;  Location: [facility]  OR;  Service: General;  Laterality: N/A;    LYSIS OF ADHESIONS N/A 01/02/2019    Procedure: LYSIS, ADHESIONS;  Surgeon: Reece Hogan MD;  Location: [facility] OR;  Service: General;  Laterality: N/A;    RETROGRADE PYELOGRAPHY Right 11/5/2024    Procedure: PYELOGRAM, RETROGRADE;  Surgeon: Anselmo Matute MD;  Location: [facility] OR;  Service: Urology;  Laterality: Right;    VITRECTOMY Right 09/2013     Social History     Socioeconomic History    Marital status:    Occupational History     Comment: [email or web address]   Tobacco Use    Smoking status: Former     Types: Cigarettes     Passive exposure: Never    Smokeless tobacco: Never    Tobacco comments:     Smoked in 20's. Quit soon   Substance and Sexual Activity    Alcohol use: No    Drug use: No    Sexual activity: Never     Comment:      Social Drivers of Health     Financial Resource Strain: Patient Declined (2/12/2024)    Overall Financial Resource Strain (CARDIA)     Difficulty of Paying Living Expenses: Patient declined   Food Insecurity: Patient Declined (2/12/2024)    Hunger Vital Sign     Worried About Running Out of Food in the Last Year: Patient declined     Ran Out of Food in the Last Year: Patient declined   Transportation Needs: Unknown (2/12/2024)    PRAPARE - Transportation     Lack of Transportation (Medical): Patient declined   Physical Activity: Inactive (2/12/2024)    Exercise Vital Sign     Days of Exercise per Week: 0 days     Minutes of Exercise per Session: 0 min   Stress: No Stress Concern Present (4/20/2023)    [employer] - Occupational Stress Questionnaire     Feeling of Stress : Not at all   Housing Stability: Low Risk  (2/12/2024)    Housing Stability Vital Sign     Unable to Pay for Housing in the Last Year: No     Number of Places Lived in the Last Year: 1     Unstable Housing in the Last Year: No             Past/Current Medical/Surgical History, Past/Current Social History, Past/Current Family  History and Past/Current Medications were reviewed in detail.        Objective:           VITAL SIGNS WERE REVIEWED      GENERAL APPEARANCE:     The patient looks comfortable,confused, cooperative anxious.    BMI 28.35 kg     No signs of respiratory distress.    Normal breathing pattern.    No dysmorphic features    Normal eye contact.     GENERAL MEDICAL EXAM:    HEENT:  Head is atraumatic normocephalic.     No tender temporal arteries.     Neck and Axillae: No JVD. No visible lesions.    No carotid bruits. No thyromegaly. No lymphadenopathy.    Cardiopulmonary: No cyanosis. No tachypnea. Normal respiratory effort.    Gastrointestinal/Urogenital:  No jaundice. No stomas or lesions. No visible hernias. No catheters.     Abdomen is soft non-tender. No masses or organomegaly, Colostomy present     Skin, Hair and Nails: No pathognonomic skin rash. No neurofibromatosis. No visible lesions.No stigmata of autoimmune disease. No clubbing.    Skin is warm and moist. No palpable masses.    Limbs: No varicose veins. No visible swelling.    No palpable edema. Pulses are symmetric. Pedal pulses are palpable.      Muskoskeletal: + visible deformities. Poor posture No visible lesions.    No spine tenderness. No signs of longstanding neuropathy. No dislocations or fractures.            Neurologic Exam     Mental Status   Disoriented to country and area. Oriented to city.   Disoriented to year, month and date.   Follows 2 step commands.   Attention: decreased. Concentration: decreased.   Speech: speech is normal and not slurred   Level of consciousness: alert  Knowledge: poor and inconsistent with education. Able to perform simple calculations.   Able to name object. Unable to read. Unable to repeat. Unable to write. Abnormal comprehension.     Cranial Nerves     CN II   Visual fields full to confrontation.   Visual acuity: normal with correction  Right visual field deficit: none  Left visual field deficit: none     CN III, IV, VI    Pupils are equal, round, and reactive to light.  Extraocular motions are normal.   Right pupil: Size: 2 mm. Shape: regular. Reactivity: brisk. Consensual response: intact. Accommodation: intact.   Left pupil: Size: 2 mm. Shape: regular. Reactivity: brisk. Consensual response: intact. Accommodation: intact.   CN III: no CN III palsy  CN VI: no CN VI palsy  Nystagmus: none   Diplopia: none  Ophthalmoparesis: none  Upgaze: normal  Downgaze: normal  Conjugate gaze: present  Vestibulo-ocular reflex: present    CN V   Facial sensation intact.   Right facial sensation deficit: none  Left facial sensation deficit: none    CN VII   Right facial weakness: none  Left facial weakness: none  Left taste: normal    CN VIII   CN VIII normal.   Hearing: impaired  Right Rinne: AC > BC  Left Rinne: AC > BC    CN IX, X   CN IX normal.   CN X normal.   Palate: symmetric    CN XI   CN XI normal.   Right sternocleidomastoid strength: normal  Left sternocleidomastoid strength: normal  Right trapezius strength: normal  Left trapezius strength: normal    CN XII   CN XII normal.   Tongue: not atrophic  Fasciculations: absent  Tongue deviation: none    Motor Exam   Muscle bulk: normal  Overall muscle tone: normal  Right arm tone: normal  Left arm tone: normal  Right arm pronator drift: absent  Left arm pronator drift: absent  Right leg tone: normal  Left leg tone: normal    Strength   Strength 5/5 throughout.   Right neck flexion: 5/5  Left neck flexion: 5/5  Right neck extension: 5/5  Left neck extension: 5/5  Right deltoid: 5/5  Left deltoid: 5/5  Right biceps: 5/5  Left biceps: 5/5  Right triceps: 5/5  Left triceps: 5/5  Right wrist flexion: 5/5  Left wrist flexion: 5/5  Right wrist extension: 5/5  Left wrist extension: 5/5  Right interossei: 5/5  Left interossei: 5/5  Right iliopsoas: 5/5  Left iliopsoas: 5/5  Right quadriceps: 5/5  Left quadriceps: 5/5  Right hamstrin/5  Left hamstrin/5  Right glutei: 5/5  Left glutei:  5/5  Right anterior tibial: 5/5  Left anterior tibial: 5/5  Right posterior tibial: 5/5  Left posterior tibial: 5/5  Right peroneal: 5/5  Left peroneal: 5/5  Right gastroc: 5/5  Left gastroc: 5/5    Sensory Exam   Light touch normal.   Right arm light touch: normal  Left arm light touch: normal  Vibration normal.   Right arm vibration: normal  Left arm vibration: normal  Proprioception normal.   Right arm proprioception: normal  Left arm proprioception: normal  Pinprick normal.     ASIF      Gait, Coordination, and Reflexes     Gait  Gait: wide-based    Coordination   Finger to nose coordination: normal    Tremor   Resting tremor: absent  Intention tremor: absent  Action tremor: absent    Reflexes   Right brachioradialis: 2+  Left brachioradialis: 2+  Right biceps: 2+  Left biceps: 2+  Right triceps: 2+  Left triceps: 2+  Right patellar: 2+  Left patellar: 2+  Right achilles: 1+  Left achilles: 1+  Left plantar: normal  Right Ptaino: absent  Left Patino: absent  Right ankle clonus: absent  Left ankle clonus: absent  Right pendular knee jerk: absent  Left pendular knee jerk: absent        Poor posture            ANISH COGNITIVE ASSESSMENT (MOCA) TOTAL SCORE 30       SEVERE DEMENTIA <10    12 th grade     DATE 02-       TOTAL SCORE 10/30       VISUOSPATIAL EXECUTIVE (5) 1       NAMING (3) 3       ATTENTION (6) 3       LANGUAGE (3) 1       ABSTRACTION(2) 1       RECALL (5) 0       ORIENTATION (6) 1           Lab Results   Component Value Date    WBC 10.99 11/26/2024    HGB 12.7 11/26/2024    HCT 40.0 11/26/2024    MCV 85 11/26/2024     11/26/2024     Sodium   Date Value Ref Range Status   11/26/2024 134 (L) 136 - 145 mmol/L Final     Potassium   Date Value Ref Range Status   11/26/2024 3.9 3.5 - 5.1 mmol/L Final     Chloride   Date Value Ref Range Status   11/26/2024 99 95 - 110 mmol/L Final     CO2   Date Value Ref Range Status   11/26/2024 21 (L) 23 - 29 mmol/L Final     Glucose   Date Value Ref  Range Status   11/26/2024 90 70 - 110 mg/dL Final     BUN   Date Value Ref Range Status   11/26/2024 7 (L) 8 - 23 mg/dL Final     Creatinine   Date Value Ref Range Status   11/26/2024 0.9 0.5 - 1.4 mg/dL Final     Calcium   Date Value Ref Range Status   11/26/2024 9.6 8.7 - 10.5 mg/dL Final     Total Protein   Date Value Ref Range Status   11/26/2024 7.8 6.0 - 8.4 g/dL Final     Albumin   Date Value Ref Range Status   11/26/2024 3.4 (L) 3.5 - 5.2 g/dL Final     Total Bilirubin   Date Value Ref Range Status   11/26/2024 0.7 0.1 - 1.0 mg/dL Final     Comment:     For infants and newborns, interpretation of results should be based  on gestational age, weight and in agreement with clinical  observations.    Premature Infant recommended reference ranges:  Up to 24 hours.............<8.0 mg/dL  Up to 48 hours............<12.0 mg/dL  3-5 days..................<15.0 mg/dL  6-29 days.................<15.0 mg/dL       Alkaline Phosphatase   Date Value Ref Range Status   11/26/2024 103 40 - 150 U/L Final     AST   Date Value Ref Range Status   11/26/2024 21 10 - 40 U/L Final     ALT   Date Value Ref Range Status   11/26/2024 9 (L) 10 - 44 U/L Final     Anion Gap   Date Value Ref Range Status   11/26/2024 14 8 - 16 mmol/L Final     eGFR if    Date Value Ref Range Status   06/09/2022 >60.0 >60 mL/min/1.73 m^2 Final     eGFR if non    Date Value Ref Range Status   06/09/2022 >60.0 >60 mL/min/1.73 m^2 Final     Comment:     Calculation used to obtain the estimated glomerular filtration  rate (eGFR) is the CKD-EPI equation.        Lab Results   Component Value Date    TKYZKGKP85 1091 (H) 02/23/2024     Lab Results   Component Value Date    TSH 1.780 01/10/2024    FREET4 0.88 01/10/2024   LABS EVALUATION:    02-    Vitamin B 12 1091 ^, HC level 8.8 NL, FA level 16.6 NL,     07-    MRI BRAIN WO    There is a chronic lacunar infarct within the lateral aspect of the right thalamus.      Scattered foci of T2 flair hyperintense signal within the bilateral periatrial white matter as well as surrounding the ventricular system are nonspecific but likely relate to chronic microvascular ischemic change.  This is not necessarily unusual for the age of the patient.      No asymmetric atrophy or hydrocephalus.    MRI Brain WO:    03-    MRI Brain No acute intracranial abnormality.     Moderate global cortical atrophy without lobar predominance.  Score 3 bilateral medial temporal lobe atrophy.     Several punctate foci of susceptibility artifact in the peripheral aspect of the posterior cerebral hemispheres likely related to remote microhemorrhage, possible cerebral amyloid angiopathy.    Result consistent with Alzheimer's Disease related it is consistent with exam findings.        Reviewed the neuroimaging independently       Assessment:       1. 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 Mild cognitive impairment with memory loss    3. MARTIN (generalized anxiety disorder)    4. History of lacunar cerebrovascular accident (CVA)    5. Dementia, unspecified dementia severity, unspecified dementia type, unspecified whether behavioral, psychotic, or mood disturbance or anxiety    6. Urge urinary incontinence    7. Other amnesia    8. Severe vascular dementia with anxiety    9. Urinary tract infection with hematuria, site unspecified    10. Urinary frequency    11. Essential hypertension    12. Small bowel anastomotic dilation    13. Pure hypercholesterolemia    14. Urgency of urination    15. Tortuous aorta    16. High serum vitamin D    17. Colostomy in place    18. Cystitis              Plan:         MAJOR NEUROCOGNITIVE DISORDER DUE TO ALZHEIMER'S DISEASE WITH BEHAVIORAL DISTURBANCE/ MARTIN/HX OF LACUNAR STROKES/ URGE INCONTINENCE/ COLOSTOMY         EVALUATION     Comprehensive Neuropsychological Testing       DISEASE-MODIFYING AGENTS     Explained to the  patient and family that medications are intended to slow down the progression (in the early stages of the disease) and not stop it or reverse the disease. The disease is relentless, progressive and so far, cannot be controlled. Progression includes Cognitive decline, Behavioral disturbances, and Motor decline as well.     I counseled the patient and family that the rate of progression is extremely variable, and the average (10 years) is an inaccurate measure.     CLASSES:    NMDA RECEPTOR ANTAGONISTS    Continue memantine/Namenda 10 mg BID    CHOLINESTERASE INHIBITORS (CEI)    Will avoid donepezil/Aricept caused GI symptoms         SYMPTOMATIC MANAGEMENT-BEHAVIORAL SYMPTOMS AND NON-COGNITIVE SYMPTOMS       ANTIDEPRESSANTS CLASS MEDICATIONS      Continue Buspar 10 mg po BID prn      Continue Wellbutrin  mg po Daily     Failed Prozac 20 mg daily,and  Xanax 0.25 mg po BID  .     ANTIPSYCHOTIC AND NEUROLEPTIC CLASS MEDICATIONS     Continue Risperdal 0.5 mg BID to HS  one hour before sunset to assist with sundowning , psychotic symptoms and behavioral disturbances.       HOME CARE     [facility] Care Dementia     Falling Down Precautions. Gait is affected by the disease as well.     Avoid driving and access to firearms     Incremental /Care     Help with finances and decision making.    Join support group.    Proofing the house and use labeling.    Avoid antihistamines and anticholinergics.    Avoid changing routine.    Use written reminders.    Avoid multitasking.    Healthy diet, exercise (physical and cognitive).    Good sleep hygiene.        HERE ARE 10 WAYS TO REDUCE RISK OF DEMENTIA AND SLOW DOWN THE PROGRESSION OF DEMENTIA        1.Be physically active.    2. Avoid smoking and alcohol consumption.    3. Track your numbers. Keep your blood pressure, cholesterol, blood sugar and weight within recommended ranges.    4. Stay socially connected.    5. Make healthy food choices. Eat a  well-balance and healthy diet that rich in cereals, fish, legumes, and vegetables.    6. Reduce stress.     7. Challenge your brain by trying something new, playing games, or learning a new language.    8. Take care of your hearing. Avoid being continuously exposed to loud sounds and wear a hearing aid if hearing does become a problem.    9. Lower risk of falls. Consider installing handrails on all stairs and grab bars in bathrooms.    10. Reduce your exposure to air pollution, such as exposure to exhaust in heavy traffic.         CAREGIVERS COUNSELING     Recommend reading the following books:     The 36-Hour Day and there are many online and printed resources to help caregivers as well.     Alzheimer's Through the Stages.     Dementia with G.R.A.C.E.            PREVENTION OF DELIRIUM       1. Good hydration and avoid electrolyte imbalance  2. Recognize and treat infections immediately especially UTI.  3. Bladder emptying and prevent constipation.   4. Provide stimulating activities and familiar objects  5. Use eyeglasses and hearing aids if needed.   6. Use simple and regular communication about people, current place, and time  7. Mobility and range-of-motion exercises  8. Reduce noise, lighting and avoid sleep interruptions  9. Non-narcotic pain management.  10.Nondrug treatment for sleep problems or anxiety  11. Avoid antihistamines.  12. Avoid narcotics.  13. Avoid benzodiazepines.            HELPFUL STRATEGIES FOR THE FAMILY AND CAREGIVERS        BEHAVIORAL MANAGEMENT STRATEGIES     Remember that you cannot reason with acute psychosis or confusion. Unless there is an immediate safety issue, there is no need to challenge or correct the person.  For example, if a pt is hallucinating, do not argue with the person that what they are seeing is not real. If they are expressing paranoia, empathize gently with their fear, and attempt to redirect them to a different activity.   If the person is particularly stuck on a  "topic or activity, as long as the activity is safe and is not worsening their agitation, you don't need to intervene.     Communicate calmly, simply, and concisely    Reassure the person that they are safe and you are here to help  Try not to express irritation or anger  Speak quietly and calmly, do not shout or threaten the person. Avoid provocation.   Establish verbal contact and provide orientation and reassurance.  Attempt to identify the patient's wants and feelings, listen to what they are saying, and reflect those wants and feelings back to the patient.  Dont use sarcasm as a weapon    Set clear limits on behavior in a calm voice ("You cannot hit the nurse.")  Offer choices and optimism ("Which toothpaste would you like to use today?")    Redirect the person to an alternative behavior ("Can you come help me with this?)    Avoid saying things like, "We already went over this!" "You can't keep doing this." "I already explained this to you"  Instead, simply nod calmly and acknowledge what the person is saying ("Yes, that makes sense."), and then request their help or company in a different behavior.   Having a mental list of activities the patient enjoys can be helpful with redirection. For example, do they enjoy going for walks? Eating a piece of candy?   If redirection becomes challenging, you can place objects that your family member enjoys strategically in the home in order to use for distraction. This is particularly useful if there are items that they often talk about or frequently ask you questions about; or if they are visually striking. Once you focus the person on this object, talk about it briefly until they are calm and then attempt to redirect to a new behavior.   Sometimes activities which are repetitive can be calming, particularly if there is a comforting sensory element. For example, pt may enjoy folding soft towels or laundry.    Limit overstimulation    Decrease distractions by turning off " "the TV, computer, any fluorescent lights that hum, etc.  Ask any casual visitors to leave--the fewer people the better  If the person is very agitated, avoid touching them, and respect their personal space  Sit down and ask the person to sit down also     PREVENTATIVE STRATEGIES     Try to help the patient develop a routine and stick to it  Address all immediate safety issues  Does the person still have access to the car and keys? Take the keys away, or disconnect the car battery.  Are they wandering at night? Install locks on the outside doors. A keypad lock works well. Alarms on bedroom doors can also be helpful.   Are they turning on the stove and risking a fire? If you cannot limit their access to the kitchen, you may need to unplug dangerous devices any time you are not in the room.   If the person is exhibiting poor judgment throughout the day, they likely need someone supervising them at all times.   Do they still have access to significant financial assets? Limit their access to accounts, and consult with a  if necessary.   Identify situations that seem to trigger agitation or a problematic behavior, and modify the person's environment to minimize or eliminate that trigger.   For example, is their behavior worse if they don't get a good night's sleep? Or if they don't eat or drink enough? If so, prioritize those activities and build behavior plans to support them.  Do they get upset about not being allowed to answer the phone when it rings? Put all of the phones in the house on "silent."   Do they become paranoid that certain family members are trying to take advantage of them? Limit contact with the targeted family member as much as possible, and re-introduce them slowly after some time has passed.   Encourage independence in daily living whenever safely possible  Encourage collaborative decision-making regarding his care as well as daily activities  Encourage regular physical exercise  Address issues " that may be impacting sleep   Ex: Urology consult for frequent nighttime urination, address chronic pain that may be interfering with sleep, moving to a different room if his roommate snores loudly, make sure the room is a comfortable temperature and he has adequate pillows and blankets  Ensure he gets out into the sunlight at least once per day to encourage regular circadian cycle  No caffeine, sugar, or large meals within two hours of bedtime   Make sure room at night is dark and quiet and minimize nighttime interruptions from staff unless medically necessary   Try to help pt go to sleep and wake up at the same time every day  Monitor closely for worsening psychiatric symptoms (insomnia, social withdrawal, deterioration of personal hygiene, hostility, confusing or nonsensical speech, paranoia, hallucinations) and intervene promptly. Assess for possible antecedents, modify environment appropriately.            SLEEP HYGIENE     Poor sleep has a negative effect on cognition. Several strategies have been shown to improve sleep:     Caffeine intake in the afternoon and evening, as well as stuffing oneself at supper, can decrease the quality of restful sleep throughout the night.   Bedtime and wake-up times should be consistent every night and morning so the body becomes used to a single routine, even on the weekends.  Engage in daily physical activity, but not 2-3 hours before bedtime.   No technology use (television, computer, iPad) 1-2 hours before bed.   Have a wind down routine (e.g., soft lights in the house, bath before bed, reduced fluid intake, songs, reading, less noise) to promote sleep readiness.   Visit the www.sleepfoundation.org for more strategies.      COGNITIVE HYGIENE     Engage in regular exercise, which increases alertness and arousal and can improve attention and focus.  Consider lower impact exercises, such as yoga or light walking.  Get a good nights sleep, as this can enhance alertness and  cognition.  Eat healthy foods and balanced meals. It is notable that research indicates certain nutrients may aid in brain function, such as B vitamins (especially B6, B12, and folic acid), antioxidants (such as vitamins C and E, and beta carotene), and Omega-3 fatty acids. Talk with your physician or nutritionist about whats right for you.   Keep your brain active. Find activities to stay mentally active, such as reading, games (cards, checkers), puzzles (crosswords, Sudoku, jig saw), crafts (models, woodworking), gardening, or participating in activities in the community.  Stay socially engaged. Continue staying active with your family and friends.      FUTURE PLANNING     The patient and caregivers should consider formal arrangements to allow a designated person to make medical and financial decisions for the pt, should he/she become unable to do so.  Options to consider include designating a healthcare proxy, medical and/or financial power of , and completing advanced directives for healthcare decisions and estate planning (e.g., finalizing a will).  If cost is prohibitive, [facility] Legal [employer] (https://Saberr/) provides free  for individuals with low income.       ADDITIONAL RESOURCES     Alzheimer's Services of the [facility] (www.http://alzbr.org) have good local caregiver and patient resources.   Consider resources for support through the [facility] Office of Elderly Affairs (http://goea.[place].gov/), [place] Chapter of the Alzheimers Association (www.alz.org/[place]/), the Family Caregiver [place] (www.caregiver.org), and the American Psychological Association (http://www.apa.org/pi/about/publications/caregivers/consumers/index.aspxconsumers/index.aspx).  For More Information About Hallucinations, Delusions, and Paranoia in Alzheimer's [facility] Alzheimer's and related Dementias Education and Referral (ADEAR) Center 1-320.479.9239 (toll-free) vee@alana.nih.gov  www.alana.nih.gov/alzheimers The National [place] on Aging's [facility] offers information and free print publications about Alzheimer's disease and related dementias for families, caregivers, and health professionals. [facility] staff answer telephone, email, and written requests and make referrals to local and national resources            MEDICAL/SURGICAL COMORBIDITIES     All relevant medical comorbidities noted and managed by primary care physician and medical care team.          MISCELLANEOUS MEDICAL PROBLEMS       HEALTHY LIFESTYLE AND PREVENTATIVE CARE    Encouraged the patient to adhere to the age-appropriate health maintenance guidelines including screening tests and vaccinations.     Discussed the overall importance of healthy lifestyle, optimal weight, exercise, healthy diet, good sleep hygiene and avoiding drugs including smoking, alcohol and recreational drugs. The patient verbalized full understanding.       Advised the patient to follow COVID-19 prevention measures.       I spent 30 minutes more than 50 % face to face with the patient    Time spent in counseling and coordination of care including discussions etiology of diagnosis, pathogenesis of diagnosis, prognosis of diagnosis,, diagnostic results, impression and recommendations, diagnostic studies, management, risks and benefits of treatment, instructions of disease self-management, treatment instructions, follow up requirements, patient and family counseling/involvement in care compliance with treatment regimen. All of the patient's questions were answered during this discussion.     Olayinka Patten, MSN NP      Collaborating Provider: Teofilo Friedman MD, FAAN Neurologist/Epileptologist

## 2024-12-11 ENCOUNTER — TELEPHONE (OUTPATIENT)
Dept: PRIMARY CARE CLINIC | Facility: CLINIC | Age: 84
End: 2024-12-11
Payer: MEDICARE

## 2024-12-11 ENCOUNTER — OFFICE VISIT (OUTPATIENT)
Dept: URGENT CARE | Facility: CLINIC | Age: 84
End: 2024-12-11
Payer: MEDICARE

## 2024-12-11 VITALS
SYSTOLIC BLOOD PRESSURE: 146 MMHG | RESPIRATION RATE: 16 BRPM | OXYGEN SATURATION: 100 % | TEMPERATURE: 98 F | BODY MASS INDEX: 28.89 KG/M2 | WEIGHT: 157 LBS | DIASTOLIC BLOOD PRESSURE: 70 MMHG | HEART RATE: 69 BPM | HEIGHT: 62 IN

## 2024-12-11 DIAGNOSIS — N39.0 URINARY TRACT INFECTION WITH HEMATURIA, SITE UNSPECIFIED: Primary | ICD-10-CM

## 2024-12-11 DIAGNOSIS — R10.9 FLANK PAIN: ICD-10-CM

## 2024-12-11 DIAGNOSIS — R10.9 ABDOMINAL CRAMPING: ICD-10-CM

## 2024-12-11 DIAGNOSIS — R31.9 URINARY TRACT INFECTION WITH HEMATURIA, SITE UNSPECIFIED: Primary | ICD-10-CM

## 2024-12-11 DIAGNOSIS — R39.15 URINARY URGENCY: ICD-10-CM

## 2024-12-11 DIAGNOSIS — Z93.3 COLOSTOMY IN PLACE: ICD-10-CM

## 2024-12-11 DIAGNOSIS — R41.0 CONFUSION: ICD-10-CM

## 2024-12-11 LAB
BILIRUBIN, UA POC OHS: NEGATIVE
BLOOD, UA POC OHS: ABNORMAL
CLARITY, UA POC OHS: ABNORMAL
COLOR, UA POC OHS: YELLOW
GLUCOSE, UA POC OHS: NEGATIVE
KETONES, UA POC OHS: NEGATIVE
LEUKOCYTES, UA POC OHS: ABNORMAL
NITRITE, UA POC OHS: NEGATIVE
PH, UA POC OHS: 6
PROTEIN, UA POC OHS: 250
SPECIFIC GRAVITY, UA POC OHS: 1.02
UROBILINOGEN, UA POC OHS: 0.2

## 2024-12-11 PROCEDURE — 87088 URINE BACTERIA CULTURE: CPT | Performed by: PHYSICIAN ASSISTANT

## 2024-12-11 PROCEDURE — 99214 OFFICE O/P EST MOD 30 MIN: CPT | Mod: S$GLB,,, | Performed by: PHYSICIAN ASSISTANT

## 2024-12-11 PROCEDURE — 87186 SC STD MICRODIL/AGAR DIL: CPT | Performed by: PHYSICIAN ASSISTANT

## 2024-12-11 PROCEDURE — 87086 URINE CULTURE/COLONY COUNT: CPT | Performed by: PHYSICIAN ASSISTANT

## 2024-12-11 PROCEDURE — 81003 URINALYSIS AUTO W/O SCOPE: CPT | Mod: QW,S$GLB,, | Performed by: PHYSICIAN ASSISTANT

## 2024-12-11 RX ORDER — HYOSCYAMINE SULFATE 0.12 MG/1
0.25 TABLET SUBLINGUAL EVERY 4 HOURS PRN
Qty: 30 TABLET | Refills: 3 | Status: SHIPPED | OUTPATIENT
Start: 2024-12-11

## 2024-12-11 RX ORDER — HYOSCYAMINE SULFATE 0.12 MG/1
0.25 TABLET SUBLINGUAL EVERY 4 HOURS PRN
Qty: 180 TABLET | Refills: 3 | Status: CANCELLED | OUTPATIENT
Start: 2024-12-11

## 2024-12-11 RX ORDER — PHENAZOPYRIDINE HYDROCHLORIDE 200 MG/1
200 TABLET, FILM COATED ORAL 3 TIMES DAILY PRN
Qty: 30 TABLET | Refills: 1 | Status: SHIPPED | OUTPATIENT
Start: 2024-12-11

## 2024-12-11 RX ORDER — SULFAMETHOXAZOLE AND TRIMETHOPRIM 800; 160 MG/1; MG/1
1 TABLET ORAL 2 TIMES DAILY
Qty: 14 TABLET | Refills: 0 | Status: SHIPPED | OUTPATIENT
Start: 2024-12-11 | End: 2024-12-18

## 2024-12-11 RX ORDER — PHENAZOPYRIDINE HYDROCHLORIDE 200 MG/1
200 TABLET, FILM COATED ORAL 3 TIMES DAILY PRN
Qty: 30 TABLET | Refills: 1 | Status: SHIPPED | OUTPATIENT
Start: 2024-12-11 | End: 2024-12-11 | Stop reason: SDUPTHER

## 2024-12-11 RX ORDER — PHENAZOPYRIDINE HYDROCHLORIDE 200 MG/1
200 TABLET, FILM COATED ORAL 3 TIMES DAILY PRN
Qty: 30 TABLET | Refills: 1 | Status: CANCELLED | OUTPATIENT
Start: 2024-12-11

## 2024-12-11 NOTE — TELEPHONE ENCOUNTER
----- Message from Servando Faye sent at 12/11/2024  1:03 PM CST -----  Contact: jordan /daughter  Please advise  ----- Message -----  From: Carmelo Anthony  Sent: 12/11/2024  12:41 PM CST  To: [place] is requesting that  authorize the [facility] assisted living to do a urinalysis on ms. Irlanda crowder  Please give her a call back at [phone]

## 2024-12-11 NOTE — TELEPHONE ENCOUNTER
Ms. Fournier can have a urinalysis completed on her at [facility] assisted living, but it CANNOT be collected in a hat or bedpan as these are not sterile.     If she is concerned that Ms. Fournier has UTI, then may be best for her to have her brought to an [facility] urgent care or lab facility to make sure it is collected appropriately.

## 2024-12-11 NOTE — TELEPHONE ENCOUNTER
----- Message from Summer sent at 12/11/2024  3:36 PM CST -----  Contact: Myla/Daughter  Patient called asking for advice about her mom is acting really strange and she thinks she has a UTI. Please call patient at [phone]. Thanks!

## 2024-12-11 NOTE — PATIENT INSTRUCTIONS
URINARY TRACT INFECTION:    -Take your antibiotics as directed and complete the entire course.  -Drink plenty fluids.  -Urine culture pending. We will call with results.  -If your symptoms are not improving or worsen, you will need to follow-up with primary care or go to the emergency department      If you have been discharged from the clinic prior to your point of care test results being completed, please make sure to check your [employer] account.  If there is a change in treatment, we will communicate with you through here.  If your test is positive, and medications are ordered, these will be sent to your preferred pharmacy.   If your test is negative, no further steps needed. If you do not hear from us or have questions, please call the clinic.      - You must understand that you have received an Urgent Care treatment only and that you may be released before all of your medical problems are known or treated.   - You, the patient, will arrange for follow up care as instructed with your primary care provider or recommended specialist.   - If your condition worsens or fails to improve we recommend that you receive another evaluation at the ER immediately or contact your PCP to discuss your concerns, or return here.   - Please do not drive or make any important decisions for 24 hours if you have received any pain medications, sedatives or mood altering drugs during your visit.    Disclaimer: This document was drafted with the use of a voice recognition device and is likely to have sound alike errors.

## 2024-12-11 NOTE — TELEPHONE ENCOUNTER
No care due was identified.  [facility] Embedded Care Due Messages. Reference number: [id number].   12/11/2024 3:09:05 PM CST

## 2024-12-11 NOTE — TELEPHONE ENCOUNTER
Irlanda Lopez,     I have sent the following medications to your preferred pharmacy on file. Please let me know if you have further questions.     Medications Ordered This Encounter   Medications    hyoscyamine 0.125 mg Subl     Sig: Place 2 tablets (0.25 mg total) under the tongue every 4 (four) hours as needed (flank pain, back pain, kidney spasms, abdoninal spasms).     Dispense:  30 tablet     Refill:  3    phenazopyridine (PYRIDIUM) 200 MG tablet     Sig: Take 1 tablet (200 mg total) by mouth 3 (three) times daily as needed (bladder spasms, urinary tract infection, bladder pain).     Dispense:  30 tablet     Refill:  1          [id number] [place] [facility] [address] [facility]  [address] [facility] [address]  Phone: [phone] Fax: [phone]    [facility]) - [place] [address] [address]  Phone: [phone] Fax: [phone]    [facility] -  - [address]  [address]  [facility] [address]  [facility] [address]  Phone: [phone] Fax: [phone]       Sincerely,   Myla Arias MD

## 2024-12-11 NOTE — TELEPHONE ENCOUNTER
Agree if patient is confused, then recommend ER or urgent care for today. Otherwise, can see John Newton tomorrow or Friday if available openings, or me if I have cancellations.

## 2024-12-11 NOTE — TELEPHONE ENCOUNTER
Encouraged daughter ER or UC please advise, she is in assistive living with confusion . If any other directives please let me know.   Thanks,

## 2024-12-11 NOTE — PROGRESS NOTES
"Subjective:      Patient ID: Irlanda Lopez is a 84 y.o. female.    Vitals:  height is 5' 2" (1.575 m) and weight is 71.2 kg (157 lb). Her oral temperature is 97.6 °F (36.4 °C). Her blood pressure is 146/70 (abnormal) and her pulse is 69. Her respiration is 16 and oxygen saturation is 100%.     Chief Complaint: Altered Mental Status    Pt is in new assisted living facility since 11/9. Pt family visited today and noticed pt seemed not her normal self and seemed more lethargic. Pt doctor stated she may have a UTI and told family to bring pt to urgent care.     Altered Mental Status  This is a new problem. The current episode started today. The problem has been unchanged. Associated symptoms include abdominal pain. She has tried nothing for the symptoms.       Gastrointestinal:  Positive for abdominal pain.   Neurological:  Positive for altered mental status.   Psychiatric/Behavioral:  Positive for altered mental status.       Objective:     Vitals:    12/11/24 1648   BP: (!) 146/70   BP Location: Right arm   Patient Position: Sitting   Pulse: 69   Resp: 16   Temp: 97.6 °F (36.4 °C)   TempSrc: Oral   SpO2: 100%   Weight: 71.2 kg (157 lb)   Height: 5' 2" (1.575 m)       Physical Exam   Constitutional: She appears well-developed.   HENT:   Head: Normocephalic and atraumatic.   Ears:   Right Ear: External ear normal.   Left Ear: External ear normal.   Nose: Nose normal. No nasal deformity. No epistaxis.   Mouth/Throat: Oropharynx is clear and moist and mucous membranes are normal.   Eyes: Lids are normal.   Neck: Trachea normal and phonation normal.   Cardiovascular: Normal pulses.   Pulmonary/Chest: Effort normal.   Abdominal:      Comments: COLOSTOMY BAG    Neurological: She is alert.   Skin: Skin is warm, dry and intact.   Psychiatric: Her speech is normal.   Nursing note and vitals reviewed.chaperone present         Assessment:     1. Urinary tract infection with hematuria, site unspecified    2. Confusion    3. " Urinary urgency    4. Colostomy in place      Results for orders placed or performed in visit on 12/11/24   POCT Urinalysis(Instrument)    Collection Time: 12/11/24  4:48 PM   Result Value Ref Range    Color, POC UA Yellow Yellow, Straw, Colorless    Clarity, POC UA Cloudy (A) Clear    Glucose, POC UA Negative Negative    Bilirubin, POC UA Negative Negative    Ketones, POC UA Negative Negative    Spec Grav POC UA 1.020 1.005 - 1.030    Blood, POC UA Moderate (A) Negative    pH, POC UA 6.0 5.0 - 8.0    Protein, POC  (A) Negative    Urobilinogen, POC UA 0.2 <=1.0    Nitrite, POC UA Negative Negative    WBC, POC UA Large (A) Negative     *Note: Due to a large number of results and/or encounters for the requested time period, some results have not been displayed. A complete set of results can be found in Results Review.       Plan:       Urinary tract infection with hematuria, site unspecified  -     sulfamethoxazole-trimethoprim 800-160mg (BACTRIM DS) 800-160 mg Tab; Take 1 tablet by mouth 2 (two) times daily. for 7 days  Dispense: 14 tablet; Refill: 0  -     Urine culture    Confusion  -     POCT Urinalysis(Instrument)  -     Urine culture    Urinary urgency    Colostomy in place          Medical Decision Making:   Initial Assessment:   VSS  H/O RECURRENT UTI PER CAREGIVER AND FAMILY  LAST URINE CX 1 YR AGO- SENSITIVE TO BACTRIM AND MACROBID   Clinical Tests:   Lab Tests: Ordered and Reviewed  Urgent Care Management:  See entire note for further details  Discussed with pt all pertinent UC information and results. Discussed pt dx and plan of tx.   Gave pt all f/u and return to the UC instructions. All questions and concerns were addressed at this time.   Pt expresses understanding of information and instructions, and is comfortable with plan to discharge.   Pt is stable for discharge.     I discussed with patient and/or family/caretaker that evaluation in the UC does not suggest any emergent or life threatening  medical conditions requiring immediate intervention beyond what was provided in the UC, and I believe patient is safe for discharge.  Regardless, an unremarkable evaluation in the UC does not preclude the development or presence of a serious or life threatening condition. As such, patient was instructed to GO to ER immediately for any worsening or change in current symptoms.               Patient Instructions   URINARY TRACT INFECTION:    -Take your antibiotics as directed and complete the entire course.  -Drink plenty fluids.  -Urine culture pending. We will call with results.  -If your symptoms are not improving or worsen, you will need to follow-up with primary care or go to the emergency department      If you have been discharged from the clinic prior to your point of care test results being completed, please make sure to check your [email or web address] account.  If there is a change in treatment, we will communicate with you through here.  If your test is positive, and medications are ordered, these will be sent to your preferred pharmacy.   If your test is negative, no further steps needed. If you do not hear from us or have questions, please call the clinic.      - You must understand that you have received an Urgent Care treatment only and that you may be released before all of your medical problems are known or treated.   - You, the patient, will arrange for follow up care as instructed with your primary care provider or recommended specialist.   - If your condition worsens or fails to improve we recommend that you receive another evaluation at the ER immediately or contact your PCP to discuss your concerns, or return here.   - Please do not drive or make any important decisions for 24 hours if you have received any pain medications, sedatives or mood altering drugs during your visit.    Disclaimer: This document was drafted with the use of a voice recognition device and is likely to have sound alike errors.

## 2024-12-12 ENCOUNTER — PATIENT MESSAGE (OUTPATIENT)
Dept: UROLOGY | Facility: CLINIC | Age: 84
End: 2024-12-12
Payer: MEDICARE

## 2024-12-14 LAB — BACTERIA UR CULT: ABNORMAL

## 2024-12-16 ENCOUNTER — PATIENT MESSAGE (OUTPATIENT)
Dept: UROLOGY | Facility: CLINIC | Age: 84
End: 2024-12-16
Payer: MEDICARE

## 2024-12-16 ENCOUNTER — HOSPITAL ENCOUNTER (INPATIENT)
Facility: HOSPITAL | Age: 84
LOS: 9 days | Discharge: NURSING, REHAB OR LONG-TERM CARE | DRG: 871 | End: 2024-12-26
Attending: EMERGENCY MEDICINE | Admitting: INTERNAL MEDICINE
Payer: MEDICARE

## 2024-12-16 ENCOUNTER — HOSPITAL ENCOUNTER (OUTPATIENT)
Dept: RADIOLOGY | Facility: HOSPITAL | Age: 84
Discharge: HOME | End: 2024-12-16
Attending: UROLOGY
Payer: MEDICARE

## 2024-12-16 ENCOUNTER — PATIENT MESSAGE (OUTPATIENT)
Dept: PRIMARY CARE CLINIC | Facility: CLINIC | Age: 84
End: 2024-12-16
Payer: MEDICARE

## 2024-12-16 DIAGNOSIS — R07.9 CHEST PAIN: ICD-10-CM

## 2024-12-16 DIAGNOSIS — B96.29 UTI DUE TO EXTENDED-SPECTRUM BETA LACTAMASE (ESBL) PRODUCING ESCHERICHIA COLI: ICD-10-CM

## 2024-12-16 DIAGNOSIS — A41.9 SEPSIS, DUE TO UNSPECIFIED ORGANISM, UNSPECIFIED WHETHER ACUTE ORGAN DYSFUNCTION PRESENT: ICD-10-CM

## 2024-12-16 DIAGNOSIS — Z16.12 UTI DUE TO EXTENDED-SPECTRUM BETA LACTAMASE (ESBL) PRODUCING ESCHERICHIA COLI: ICD-10-CM

## 2024-12-16 DIAGNOSIS — N20.0 RENAL STONE: Primary | ICD-10-CM

## 2024-12-16 DIAGNOSIS — K75.0 LIVER ABSCESS: Primary | ICD-10-CM

## 2024-12-16 DIAGNOSIS — N39.0 UTI DUE TO EXTENDED-SPECTRUM BETA LACTAMASE (ESBL) PRODUCING ESCHERICHIA COLI: ICD-10-CM

## 2024-12-16 DIAGNOSIS — N20.0 RENAL STONE: ICD-10-CM

## 2024-12-16 LAB
ALBUMIN SERPL BCP-MCNC: 2.5 G/DL (ref 3.5–5.2)
ALP SERPL-CCNC: 96 U/L (ref 40–150)
ALT SERPL W/O P-5'-P-CCNC: 24 U/L (ref 10–44)
ANION GAP SERPL CALC-SCNC: 11 MMOL/L (ref 8–16)
AST SERPL-CCNC: 27 U/L (ref 10–40)
BASOPHILS # BLD AUTO: 0.04 K/UL (ref 0–0.2)
BASOPHILS NFR BLD: 0.2 % (ref 0–1.9)
BILIRUB SERPL-MCNC: 0.8 MG/DL (ref 0.1–1)
BUN SERPL-MCNC: 8 MG/DL (ref 8–23)
CALCIUM SERPL-MCNC: 8.5 MG/DL (ref 8.7–10.5)
CHLORIDE SERPL-SCNC: 97 MMOL/L (ref 95–110)
CO2 SERPL-SCNC: 18 MMOL/L (ref 23–29)
CREAT SERPL-MCNC: 0.8 MG/DL (ref 0.5–1.4)
DIFFERENTIAL METHOD BLD: ABNORMAL
EOSINOPHIL # BLD AUTO: 0.1 K/UL (ref 0–0.5)
EOSINOPHIL NFR BLD: 0.5 % (ref 0–8)
ERYTHROCYTE [DISTWIDTH] IN BLOOD BY AUTOMATED COUNT: 14.9 % (ref 11.5–14.5)
EST. GFR  (NO RACE VARIABLE): >60 ML/MIN/1.73 M^2
GLUCOSE SERPL-MCNC: 93 MG/DL (ref 70–110)
HCT VFR BLD AUTO: 29.1 % (ref 37–48.5)
HGB BLD-MCNC: 9.8 G/DL (ref 12–16)
IMM GRANULOCYTES # BLD AUTO: 0.2 K/UL (ref 0–0.04)
IMM GRANULOCYTES NFR BLD AUTO: 1.2 % (ref 0–0.5)
LACTATE SERPL-SCNC: 0.8 MMOL/L (ref 0.5–2.2)
LIPASE SERPL-CCNC: 14 U/L (ref 4–60)
LYMPHOCYTES # BLD AUTO: 1.8 K/UL (ref 1–4.8)
LYMPHOCYTES NFR BLD: 10.9 % (ref 18–48)
MCH RBC QN AUTO: 26.1 PG (ref 27–31)
MCHC RBC AUTO-ENTMCNC: 33.7 G/DL (ref 32–36)
MCV RBC AUTO: 77 FL (ref 82–98)
MONOCYTES # BLD AUTO: 1.4 K/UL (ref 0.3–1)
MONOCYTES NFR BLD: 8.6 % (ref 4–15)
NEUTROPHILS # BLD AUTO: 13.2 K/UL (ref 1.8–7.7)
NEUTROPHILS NFR BLD: 78.6 % (ref 38–73)
NRBC BLD-RTO: 0 /100 WBC
PLATELET # BLD AUTO: 435 K/UL (ref 150–450)
PMV BLD AUTO: 8.3 FL (ref 9.2–12.9)
POTASSIUM SERPL-SCNC: 3.9 MMOL/L (ref 3.5–5.1)
PROT SERPL-MCNC: 6.2 G/DL (ref 6–8.4)
RBC # BLD AUTO: 3.76 M/UL (ref 4–5.4)
SODIUM SERPL-SCNC: 126 MMOL/L (ref 136–145)
WBC # BLD AUTO: 16.75 K/UL (ref 3.9–12.7)

## 2024-12-16 PROCEDURE — 85025 COMPLETE CBC W/AUTO DIFF WBC: CPT | Performed by: PHYSICIAN ASSISTANT

## 2024-12-16 PROCEDURE — 74176 CT ABD & PELVIS W/O CONTRAST: CPT | Mod: TC,PN

## 2024-12-16 PROCEDURE — 74176 CT ABD & PELVIS W/O CONTRAST: CPT | Mod: 26,,, | Performed by: RADIOLOGY

## 2024-12-16 PROCEDURE — 83605 ASSAY OF LACTIC ACID: CPT | Performed by: PHYSICIAN ASSISTANT

## 2024-12-16 PROCEDURE — 99285 EMERGENCY DEPT VISIT HI MDM: CPT | Mod: 25

## 2024-12-16 PROCEDURE — 83690 ASSAY OF LIPASE: CPT | Performed by: PHYSICIAN ASSISTANT

## 2024-12-16 PROCEDURE — 87040 BLOOD CULTURE FOR BACTERIA: CPT | Mod: 59 | Performed by: NURSE PRACTITIONER

## 2024-12-16 PROCEDURE — 80053 COMPREHEN METABOLIC PANEL: CPT | Performed by: PHYSICIAN ASSISTANT

## 2024-12-16 NOTE — ED NOTES
"One IV attempt by paramedic, and one IV attempt by nurse done. Pts daughter said "Stop torturing her" and declined further IV attempts at this time. CN notified pt requires US IV and pt directed to wait in waiting room for availability. Pt given warm blanket.   "

## 2024-12-16 NOTE — FIRST PROVIDER EVALUATION
" Emergency Department TeleTriage Encounter Note      CHIEF COMPLAINT    Chief Complaint   Patient presents with    abnormal labs     Pt was told to come to the ED for an abnormal CT scan.        VITAL SIGNS   Initial Vitals [12/16/24 1252]   BP Pulse Resp Temp SpO2   (!) 141/66 66 16 98.4 °F (36.9 °C) 97 %      MAP       --            ALLERGIES    Review of patient's allergies indicates:   Allergen Reactions    Omnicef [cefdinir] Itching       PROVIDER TRIAGE NOTE  Patient presents with complaint of abnormal CT scan.  Daughter giving history and reports that patient recently had a UTI and was treated at urgent care.  She has a permanent renal stent.  She started having pain on the right side and had a CT scan today which showed a possible liver abscess.  Patient is unable to give history.  Daughter reports no fever documented from the assisted living facility      Phy:   Constitutional: well nourished, well developed, appearing stated age, NAD        Initial orders will be placed and care will be transferred to an alternate provider when patient is roomed for a full evaluation. Any additional orders and the final disposition will be determined by that provider.        ORDERS  Labs Reviewed   HEPATITIS C ANTIBODY   HEP C VIRUS HOLD SPECIMEN   HIV 1 / 2 ANTIBODY       ED Orders (720h ago, onward)      Start Ordered     Status Ordering Provider    12/16/24 1433 12/16/24 1433  Hepatitis C Antibody  STAT        Placed in "And" Linked Group    Ordered FAVIOLA GUTIERREZ    12/16/24 1433 12/16/24 1433  HCV Virus Hold Specimen  STAT        Placed in "And" Linked Group    Ordered FAVIOLA GUTIERREZ P.    12/16/24 1433 12/16/24 1433  HIV 1/2 Ag/Ab (4th Gen)  STAT         Ordered FAVIOLA GUTIERREZ              Virtual Visit Note: The provider triage portion of this emergency department evaluation and documentation was performed via [employer], a HIPAA-compliant telemedicine application, in concert with a tele-presenter in the " room. A face to face patient evaluation with one of my colleagues will occur once the patient is placed in an emergency department room.      DISCLAIMER: This note was prepared with [employer] voice recognition transcription software. Garbled syntax, mangled pronouns, and other bizarre constructions may be attributed to that software system.

## 2024-12-17 PROBLEM — K75.0 LIVER ABSCESS: Status: ACTIVE | Noted: 2024-12-17

## 2024-12-17 PROBLEM — E87.1 HYPONATREMIA: Status: ACTIVE | Noted: 2024-12-17

## 2024-12-17 PROBLEM — Z16.12 UTI DUE TO EXTENDED-SPECTRUM BETA LACTAMASE (ESBL) PRODUCING ESCHERICHIA COLI: Status: ACTIVE | Noted: 2021-05-23

## 2024-12-17 PROBLEM — D64.9 ANEMIA: Status: ACTIVE | Noted: 2024-12-17

## 2024-12-17 PROBLEM — A41.9 SEPSIS: Status: ACTIVE | Noted: 2024-12-17

## 2024-12-17 PROBLEM — B96.29 UTI DUE TO EXTENDED-SPECTRUM BETA LACTAMASE (ESBL) PRODUCING ESCHERICHIA COLI: Status: ACTIVE | Noted: 2021-05-23

## 2024-12-17 LAB
ACANTHOCYTES BLD QL SMEAR: PRESENT
ALBUMIN SERPL BCP-MCNC: 2.4 G/DL (ref 3.5–5.2)
ALP SERPL-CCNC: 103 U/L (ref 40–150)
ALT SERPL W/O P-5'-P-CCNC: 21 U/L (ref 10–44)
ANION GAP SERPL CALC-SCNC: 14 MMOL/L (ref 8–16)
APTT PPP: 35.2 SEC (ref 21–32)
AST SERPL-CCNC: 39 U/L (ref 10–40)
BACTERIA #/AREA URNS HPF: ABNORMAL /HPF
BASOPHILS # BLD AUTO: 0.08 K/UL (ref 0–0.2)
BASOPHILS NFR BLD: 0.4 % (ref 0–1.9)
BILIRUB SERPL-MCNC: 0.6 MG/DL (ref 0.1–1)
BILIRUB UR QL STRIP: NEGATIVE
BUN SERPL-MCNC: 11 MG/DL (ref 8–23)
BURR CELLS BLD QL SMEAR: ABNORMAL
CALCIUM SERPL-MCNC: 8.4 MG/DL (ref 8.7–10.5)
CHLORIDE SERPL-SCNC: 102 MMOL/L (ref 95–110)
CLARITY UR: ABNORMAL
CO2 SERPL-SCNC: 13 MMOL/L (ref 23–29)
COLOR UR: YELLOW
CORTIS SERPL-MCNC: 14.6 UG/DL
CREAT SERPL-MCNC: 0.9 MG/DL (ref 0.5–1.4)
DIFFERENTIAL METHOD BLD: ABNORMAL
EOSINOPHIL # BLD AUTO: 0.1 K/UL (ref 0–0.5)
EOSINOPHIL NFR BLD: 0.7 % (ref 0–8)
ERYTHROCYTE [DISTWIDTH] IN BLOOD BY AUTOMATED COUNT: 15 % (ref 11.5–14.5)
EST. GFR  (NO RACE VARIABLE): >60 ML/MIN/1.73 M^2
GIANT PLATELETS BLD QL SMEAR: PRESENT
GLUCOSE SERPL-MCNC: 99 MG/DL (ref 70–110)
GLUCOSE UR QL STRIP: NEGATIVE
GRAM STN SPEC: NORMAL
GRAM STN SPEC: NORMAL
HCT VFR BLD AUTO: 35 % (ref 37–48.5)
HGB BLD-MCNC: 10.7 G/DL (ref 12–16)
HGB UR QL STRIP: ABNORMAL
HYALINE CASTS #/AREA URNS LPF: 0 /LPF
IMM GRANULOCYTES # BLD AUTO: 0.27 K/UL (ref 0–0.04)
IMM GRANULOCYTES NFR BLD AUTO: 1.4 % (ref 0–0.5)
INR PPP: 1.1 (ref 0.8–1.2)
KETONES UR QL STRIP: ABNORMAL
LACTATE SERPL-SCNC: 1.1 MMOL/L (ref 0.5–2.2)
LEUKOCYTE ESTERASE UR QL STRIP: ABNORMAL
LYMPHOCYTES # BLD AUTO: 2.4 K/UL (ref 1–4.8)
LYMPHOCYTES NFR BLD: 12.2 % (ref 18–48)
MCH RBC QN AUTO: 25.2 PG (ref 27–31)
MCHC RBC AUTO-ENTMCNC: 30.6 G/DL (ref 32–36)
MCV RBC AUTO: 83 FL (ref 82–98)
MICROSCOPIC COMMENT: ABNORMAL
MONOCYTES # BLD AUTO: 1.9 K/UL (ref 0.3–1)
MONOCYTES NFR BLD: 9.4 % (ref 4–15)
NEUTROPHILS # BLD AUTO: 15.1 K/UL (ref 1.8–7.7)
NEUTROPHILS NFR BLD: 75.9 % (ref 38–73)
NITRITE UR QL STRIP: NEGATIVE
NRBC BLD-RTO: 0 /100 WBC
OSMOLALITY SERPL: 270 MOSM/KG (ref 275–295)
OVALOCYTES BLD QL SMEAR: ABNORMAL
PH UR STRIP: 7 [PH] (ref 5–8)
PLATELET # BLD AUTO: 451 K/UL (ref 150–450)
PLATELET BLD QL SMEAR: ABNORMAL
PMV BLD AUTO: 8.4 FL (ref 9.2–12.9)
POIKILOCYTOSIS BLD QL SMEAR: SLIGHT
POTASSIUM SERPL-SCNC: 4.5 MMOL/L (ref 3.5–5.1)
PROT SERPL-MCNC: 6.4 G/DL (ref 6–8.4)
PROT UR QL STRIP: ABNORMAL
PROTHROMBIN TIME: 12.6 SEC (ref 9–12.5)
RBC # BLD AUTO: 4.24 M/UL (ref 4–5.4)
RBC #/AREA URNS HPF: 23 /HPF (ref 0–4)
SODIUM SERPL-SCNC: 129 MMOL/L (ref 136–145)
SODIUM UR-SCNC: 28 MMOL/L (ref 20–250)
SP GR UR STRIP: 1.01 (ref 1–1.03)
T4 FREE SERPL-MCNC: 1.02 NG/DL (ref 0.71–1.51)
TSH SERPL DL<=0.005 MIU/L-ACNC: 2.16 UIU/ML (ref 0.4–4)
URN SPEC COLLECT METH UR: ABNORMAL
UROBILINOGEN UR STRIP-ACNC: NEGATIVE EU/DL
WBC # BLD AUTO: 19.85 K/UL (ref 3.9–12.7)
WBC #/AREA URNS HPF: >100 /HPF (ref 0–5)

## 2024-12-17 PROCEDURE — 36415 COLL VENOUS BLD VENIPUNCTURE: CPT | Performed by: NURSE PRACTITIONER

## 2024-12-17 PROCEDURE — 87070 CULTURE OTHR SPECIMN AEROBIC: CPT | Performed by: FAMILY MEDICINE

## 2024-12-17 PROCEDURE — 27000207 HC ISOLATION

## 2024-12-17 PROCEDURE — 99223 1ST HOSP IP/OBS HIGH 75: CPT | Mod: ,,, | Performed by: UROLOGY

## 2024-12-17 PROCEDURE — 83935 ASSAY OF URINE OSMOLALITY: CPT | Performed by: INTERNAL MEDICINE

## 2024-12-17 PROCEDURE — 25000003 PHARM REV CODE 250: Performed by: NURSE PRACTITIONER

## 2024-12-17 PROCEDURE — 81000 URINALYSIS NONAUTO W/SCOPE: CPT | Performed by: PHYSICIAN ASSISTANT

## 2024-12-17 PROCEDURE — 97530 THERAPEUTIC ACTIVITIES: CPT

## 2024-12-17 PROCEDURE — 84443 ASSAY THYROID STIM HORMONE: CPT | Performed by: NURSE PRACTITIONER

## 2024-12-17 PROCEDURE — 63600175 PHARM REV CODE 636 W HCPCS: Performed by: RADIOLOGY

## 2024-12-17 PROCEDURE — 80053 COMPREHEN METABOLIC PANEL: CPT | Performed by: NURSE PRACTITIONER

## 2024-12-17 PROCEDURE — 87086 URINE CULTURE/COLONY COUNT: CPT | Performed by: PHYSICIAN ASSISTANT

## 2024-12-17 PROCEDURE — 87205 SMEAR GRAM STAIN: CPT | Performed by: FAMILY MEDICINE

## 2024-12-17 PROCEDURE — C1751 CATH, INF, PER/CENT/MIDLINE: HCPCS

## 2024-12-17 PROCEDURE — 87186 SC STD MICRODIL/AGAR DIL: CPT | Performed by: FAMILY MEDICINE

## 2024-12-17 PROCEDURE — 99223 1ST HOSP IP/OBS HIGH 75: CPT | Mod: NSCH,,, | Performed by: INTERNAL MEDICINE

## 2024-12-17 PROCEDURE — 25000003 PHARM REV CODE 250: Performed by: INTERNAL MEDICINE

## 2024-12-17 PROCEDURE — 97162 PT EVAL MOD COMPLEX 30 MIN: CPT

## 2024-12-17 PROCEDURE — 21400001 HC TELEMETRY ROOM

## 2024-12-17 PROCEDURE — 63600175 PHARM REV CODE 636 W HCPCS: Performed by: FAMILY MEDICINE

## 2024-12-17 PROCEDURE — 63600175 PHARM REV CODE 636 W HCPCS: Performed by: NURSE PRACTITIONER

## 2024-12-17 PROCEDURE — 84439 ASSAY OF FREE THYROXINE: CPT | Performed by: NURSE PRACTITIONER

## 2024-12-17 PROCEDURE — 85025 COMPLETE CBC W/AUTO DIFF WBC: CPT | Performed by: NURSE PRACTITIONER

## 2024-12-17 PROCEDURE — 87075 CULTR BACTERIA EXCEPT BLOOD: CPT | Performed by: FAMILY MEDICINE

## 2024-12-17 PROCEDURE — 87077 CULTURE AEROBIC IDENTIFY: CPT | Performed by: FAMILY MEDICINE

## 2024-12-17 PROCEDURE — 83930 ASSAY OF BLOOD OSMOLALITY: CPT | Performed by: NURSE PRACTITIONER

## 2024-12-17 PROCEDURE — 84300 ASSAY OF URINE SODIUM: CPT | Performed by: INTERNAL MEDICINE

## 2024-12-17 PROCEDURE — 82533 TOTAL CORTISOL: CPT | Performed by: NURSE PRACTITIONER

## 2024-12-17 PROCEDURE — 83605 ASSAY OF LACTIC ACID: CPT | Performed by: NURSE PRACTITIONER

## 2024-12-17 PROCEDURE — 25000003 PHARM REV CODE 250: Performed by: FAMILY MEDICINE

## 2024-12-17 PROCEDURE — 02HV33Z INSERTION OF INFUSION DEVICE INTO SUPERIOR VENA CAVA, PERCUTANEOUS APPROACH: ICD-10-PCS | Performed by: RADIOLOGY

## 2024-12-17 PROCEDURE — 0F9030Z DRAINAGE OF LIVER WITH DRAINAGE DEVICE, PERCUTANEOUS APPROACH: ICD-10-PCS | Performed by: RADIOLOGY

## 2024-12-17 PROCEDURE — 63600175 PHARM REV CODE 636 W HCPCS: Performed by: EMERGENCY MEDICINE

## 2024-12-17 PROCEDURE — 85730 THROMBOPLASTIN TIME PARTIAL: CPT | Performed by: NURSE PRACTITIONER

## 2024-12-17 PROCEDURE — 25000003 PHARM REV CODE 250: Performed by: EMERGENCY MEDICINE

## 2024-12-17 PROCEDURE — 85610 PROTHROMBIN TIME: CPT | Performed by: NURSE PRACTITIONER

## 2024-12-17 PROCEDURE — 36573 INSJ PICC RS&I 5 YR+: CPT

## 2024-12-17 RX ORDER — THYROID 30 MG/1
30 TABLET ORAL NIGHTLY
Status: DISCONTINUED | OUTPATIENT
Start: 2024-12-17 | End: 2024-12-26 | Stop reason: HOSPADM

## 2024-12-17 RX ORDER — GLUCAGON 1 MG
1 KIT INJECTION
Status: DISCONTINUED | OUTPATIENT
Start: 2024-12-17 | End: 2024-12-26 | Stop reason: HOSPADM

## 2024-12-17 RX ORDER — BUSPIRONE HYDROCHLORIDE 10 MG/1
10 TABLET ORAL 2 TIMES DAILY
Status: DISCONTINUED | OUTPATIENT
Start: 2024-12-17 | End: 2024-12-26 | Stop reason: HOSPADM

## 2024-12-17 RX ORDER — OXYCODONE HYDROCHLORIDE 5 MG/1
5 TABLET ORAL EVERY 6 HOURS PRN
Status: DISCONTINUED | OUTPATIENT
Start: 2024-12-17 | End: 2024-12-26 | Stop reason: HOSPADM

## 2024-12-17 RX ORDER — LIDOCAINE HYDROCHLORIDE 10 MG/ML
INJECTION, SOLUTION INFILTRATION; PERINEURAL CODE/TRAUMA/SEDATION MEDICATION
Status: COMPLETED | OUTPATIENT
Start: 2024-12-17 | End: 2024-12-17

## 2024-12-17 RX ORDER — IBUPROFEN 200 MG
16 TABLET ORAL
Status: DISCONTINUED | OUTPATIENT
Start: 2024-12-17 | End: 2024-12-26 | Stop reason: HOSPADM

## 2024-12-17 RX ORDER — MEMANTINE HYDROCHLORIDE 10 MG/1
10 TABLET ORAL 2 TIMES DAILY
Status: DISCONTINUED | OUTPATIENT
Start: 2024-12-17 | End: 2024-12-26 | Stop reason: HOSPADM

## 2024-12-17 RX ORDER — VANCOMYCIN 1.75 GRAM/500 ML IN 0.9 % SODIUM CHLORIDE INTRAVENOUS
1750 ONCE
Status: DISCONTINUED | OUTPATIENT
Start: 2024-12-17 | End: 2024-12-17

## 2024-12-17 RX ORDER — NALOXONE HCL 0.4 MG/ML
0.02 VIAL (ML) INJECTION
Status: DISCONTINUED | OUTPATIENT
Start: 2024-12-17 | End: 2024-12-26 | Stop reason: HOSPADM

## 2024-12-17 RX ORDER — SODIUM CHLORIDE 0.9 % (FLUSH) 0.9 %
3 SYRINGE (ML) INJECTION EVERY 8 HOURS PRN
Status: DISCONTINUED | OUTPATIENT
Start: 2024-12-17 | End: 2024-12-26 | Stop reason: HOSPADM

## 2024-12-17 RX ORDER — RISPERIDONE 0.5 MG/1
0.5 TABLET ORAL NIGHTLY
Status: DISCONTINUED | OUTPATIENT
Start: 2024-12-17 | End: 2024-12-26 | Stop reason: HOSPADM

## 2024-12-17 RX ORDER — FENTANYL CITRATE 50 UG/ML
INJECTION, SOLUTION INTRAMUSCULAR; INTRAVENOUS CODE/TRAUMA/SEDATION MEDICATION
Status: COMPLETED | OUTPATIENT
Start: 2024-12-17 | End: 2024-12-17

## 2024-12-17 RX ORDER — ACETAMINOPHEN 325 MG/1
650 TABLET ORAL EVERY 4 HOURS PRN
Status: DISCONTINUED | OUTPATIENT
Start: 2024-12-17 | End: 2024-12-26 | Stop reason: HOSPADM

## 2024-12-17 RX ORDER — SODIUM CHLORIDE 9 MG/ML
INJECTION, SOLUTION INTRAVENOUS CONTINUOUS
Status: DISCONTINUED | OUTPATIENT
Start: 2024-12-17 | End: 2024-12-17

## 2024-12-17 RX ORDER — DIPHENHYDRAMINE HCL 25 MG
25 CAPSULE ORAL ONCE
Status: DISCONTINUED | OUTPATIENT
Start: 2024-12-17 | End: 2024-12-20

## 2024-12-17 RX ORDER — IBUPROFEN 200 MG
24 TABLET ORAL
Status: DISCONTINUED | OUTPATIENT
Start: 2024-12-17 | End: 2024-12-26 | Stop reason: HOSPADM

## 2024-12-17 RX ORDER — ONDANSETRON HYDROCHLORIDE 2 MG/ML
4 INJECTION, SOLUTION INTRAVENOUS EVERY 6 HOURS PRN
Status: DISCONTINUED | OUTPATIENT
Start: 2024-12-17 | End: 2024-12-26 | Stop reason: HOSPADM

## 2024-12-17 RX ADMIN — MEMANTINE 10 MG: 10 TABLET ORAL at 09:12

## 2024-12-17 RX ADMIN — SODIUM CHLORIDE: 9 INJECTION, SOLUTION INTRAVENOUS at 03:12

## 2024-12-17 RX ADMIN — RISPERIDONE 0.5 MG: 0.5 TABLET, FILM COATED ORAL at 02:12

## 2024-12-17 RX ADMIN — BUSPIRONE HYDROCHLORIDE 10 MG: 10 TABLET ORAL at 09:12

## 2024-12-17 RX ADMIN — FENTANYL CITRATE 25 MCG: 50 INJECTION, SOLUTION INTRAMUSCULAR; INTRAVENOUS at 10:12

## 2024-12-17 RX ADMIN — RISPERIDONE 0.5 MG: 0.5 TABLET, FILM COATED ORAL at 09:12

## 2024-12-17 RX ADMIN — MEROPENEM 2 G: 2 INJECTION, POWDER, FOR SOLUTION INTRAVENOUS at 09:12

## 2024-12-17 RX ADMIN — FENTANYL CITRATE 50 MCG: 50 INJECTION, SOLUTION INTRAMUSCULAR; INTRAVENOUS at 10:12

## 2024-12-17 RX ADMIN — HYPROMELLOSE 2910 2 DROP: 5 SOLUTION/ DROPS OPHTHALMIC at 09:12

## 2024-12-17 RX ADMIN — SODIUM BICARBONATE: 84 INJECTION, SOLUTION INTRAVENOUS at 06:12

## 2024-12-17 RX ADMIN — SODIUM CHLORIDE 500 ML: 9 INJECTION, SOLUTION INTRAVENOUS at 01:12

## 2024-12-17 RX ADMIN — THYROID, PORCINE 30 MG: 30 TABLET ORAL at 09:12

## 2024-12-17 RX ADMIN — BUSPIRONE HYDROCHLORIDE 10 MG: 10 TABLET ORAL at 02:12

## 2024-12-17 RX ADMIN — LIDOCAINE HYDROCHLORIDE 5 ML: 10 INJECTION, SOLUTION INFILTRATION; PERINEURAL at 10:12

## 2024-12-17 RX ADMIN — ACETAMINOPHEN 650 MG: 325 TABLET ORAL at 12:12

## 2024-12-17 RX ADMIN — HYPROMELLOSE 2910 2 DROP: 5 SOLUTION/ DROPS OPHTHALMIC at 06:12

## 2024-12-17 RX ADMIN — ACETAMINOPHEN 650 MG: 325 TABLET ORAL at 05:12

## 2024-12-17 RX ADMIN — PIPERACILLIN SODIUM AND TAZOBACTAM SODIUM 4.5 G: 4; .5 INJECTION, POWDER, FOR SOLUTION INTRAVENOUS at 12:12

## 2024-12-17 RX ADMIN — MEMANTINE 10 MG: 10 TABLET ORAL at 02:12

## 2024-12-17 RX ADMIN — VANCOMYCIN HYDROCHLORIDE 1750 MG: 500 INJECTION, POWDER, LYOPHILIZED, FOR SOLUTION INTRAVENOUS at 05:12

## 2024-12-17 NOTE — CONSULTS
Chief Complaint: liver abscess    HPI:   12/17/24-  84-year-old female well known to the service due to chronic right renal stone and stent exchanges on a serial basis.  Patient had her last stent exchanged 1st part of November, he had to have worsening right flank pain came in for an ultrasound but could not tolerate.  CT stone protocol demonstrated a liver abscess.  Stent appears to be in good position.  Patient is otherwise clinically stable.    Allergies:  Omnicef [cefdinir]    Medications:  See MAR    Review of Systems:  General: No fever, chills, fatigability, or weight loss.  Skin: No rashes, itching, or changes in color or texture of skin.  Chest: Denies JESUS, cyanosis, wheezing, cough, and sputum production.  Abdomen: Appetite fine. No weight loss. Denies diarrhea, abdominal pain, hematemesis, or blood in stool.  Musculoskeletal: No joint stiffness or swelling. Denies back pain.  : As above.  All other review of systems negative.    PMH:   has a past medical history of Clostridium difficile colitis, Dementia, Diverticulitis, High cholesterol, Hypertension, Hypothyroidism, and Kidney stones.    PSH:   has a past surgical history that includes Cataract extraction; Appendectomy (2008); Colonoscopy (N/A, 01/02/2019); Colostomy (Left, 01/02/2019); Maribel procedure (N/A, 01/02/2019); Lysis of adhesions (N/A, 01/02/2019); Colon surgery; Vitrectomy (Right, 09/2013); Colonoscopy (N/A, 06/07/2019); Esophagogastroduodenoscopy (N/A, 09/16/2020); Colonoscopy (N/A, 06/23/2021); Cystoscopy with ureteroscopy, retrograde pyelography, and insertion of stent (Right, 08/23/2022); Cystoureteroscopy with retrograde pyelography and insertion of stent into ureter (Right, 11/08/2022); cystoureteroscopy, with holmium laser lithotripsy of ureteral calculus and stent insertion (Right, 04/04/2023); Cystoscopy with ureteroscopy, retrograde pyelography, and insertion of stent (Right, 11/07/2023); cystoureteroscopy,with holmium laser  lithotripsy of ureteral calculus (Right, 11/07/2023); Eye surgery (?? Dr Raygoza); Cystoscopy with ureteroscopy, retrograde pyelography, and insertion of stent (Right, 5/14/2024); Cystoscopic litholapaxy (Right, 5/14/2024); cystoureteroscopy, with holmium laser lithotripsy of ureteral calculus and stent insertion (Right, 11/5/2024); Cystoscopy w/ ureteral stent removal (Right, 11/5/2024); and Retrograde pyelography (Right, 11/5/2024).    FamHx: family history includes Alzheimer's disease in her mother; Aneurysm in her father; Bladder Cancer in her grandchild; Parkinsonism in her brother; Stomach cancer in her brother.    SocHx:  reports that she has quit smoking. Her smoking use included cigarettes. She has never been exposed to tobacco smoke. She has never used smokeless tobacco. She reports that she does not drink alcohol and does not use drugs.      Physical Exam:  Vitals:    12/17/24 0500   BP:    Pulse: (!) 58   Resp:    Temp:      General: A&Ox3, no apparent distress, no deformities  Neck: No masses, normal ROM  Lungs: normal inspiration, no use of accessory muscles  Heart: normal pulse, no arrhythmias  Abdomen: Soft, NT, ND, no masses, no flank pain noted on the right costovertebral angle  Skin: The skin is warm and dry. No jaundice.  Ext: No c/c/e.    Labs/Studies:   White count 19.85, please see the chart.    BMP demonstrates a BUN and creatinine of 11 and 0.9 respectively.  UA 1+ protein, 1+ ketones, 1+ blood, 3+ LE.    Cultures are pending  CT stone protocol 4 cm liver abscess possibly contiguous with the right kidney, right renal stone unchanged with no hydronephrosis stent in good position, slight growth to right breast lesion 12/24    Impression/Plan:     1. Liver abscess- patient was admitted to hospital medicine on IV antibiotics, will go down to the interventional radiology suite for percutaneous drainage of the liver abscess.  I have discussed the case with our IR physician.      2. Renal stone- stent  appears to be in good position, no hydronephrosis, no further recommendations in regards to the treatment of the stone.

## 2024-12-17 NOTE — SUBJECTIVE & OBJECTIVE
Past Medical History:   Diagnosis Date    Clostridium difficile colitis     Dementia     Diverticulitis     s/p colostomy    High cholesterol     Hypertension     Hypothyroidism     Kidney stones        Past Surgical History:   Procedure Laterality Date    APPENDECTOMY  2008    CATARACT EXTRACTION      Dr Raygoza    COLON SURGERY      COLONOSCOPY N/A 01/02/2019    Procedure: COLONOSCOPY;  Surgeon: Reece Hogan MD;  Location: [facility];  Service: Endoscopy;  Laterality: N/A;    COLONOSCOPY N/A 06/07/2019    Procedure: COLONOSCOPY;  Surgeon: Rishabh Connelly MD;  Location: [facility];  Service: General;  Laterality: N/A;    COLONOSCOPY N/A 06/23/2021    Procedure: COLONOSCOPY;  Surgeon: Lucy Lafleur MD;  Location: [facility];  Service: Endoscopy;  Laterality: N/A;    COLOSTOMY Left 01/02/2019    Procedure: CREATION, COLOSTOMY;  Surgeon: Reece Hogan MD;  Location: [facility];  Service: General;  Laterality: Left;    CYSTOSCOPIC LITHOLAPAXY Right 5/14/2024    Procedure: CYSTOLITHOLAPAXY;  Surgeon: Anselmo Matute MD;  Location: [facility];  Service: Urology;  Laterality: Right;    CYSTOSCOPY W/ URETERAL STENT REMOVAL Right 11/5/2024    Procedure: CYSTOSCOPY, WITH URETERAL STENT REMOVAL;  Surgeon: Anselmo Matute MD;  Location: [facility];  Service: Urology;  Laterality: Right;    CYSTOSCOPY WITH URETEROSCOPY, RETROGRADE PYELOGRAPHY, AND INSERTION OF STENT Right 08/23/2022    Procedure: CYSTOSCOPY, WITH RETROGRADE PYELOGRAM AND URETERAL STENT INSERTION;  Surgeon: Anselmo Matute MD;  Location: [facility];  Service: Urology;  Laterality: Right;    CYSTOSCOPY WITH URETEROSCOPY, RETROGRADE PYELOGRAPHY, AND INSERTION OF STENT Right 11/07/2023    Procedure: CYSTOSCOPY, WITH RETROGRADE PYELOGRAM AND URETERAL STENT INSERTION;  Surgeon: Anselmo Matute MD;  Location: [facility];  Service: Urology;  Laterality: Right;  stent exchange    CYSTOSCOPY WITH URETEROSCOPY, RETROGRADE PYELOGRAPHY, AND INSERTION OF STENT Right  5/14/2024    Procedure: CYSTOSCOPY, WITH RETROGRADE PYELOGRAM AND URETERAL STENT INSERTION;  Surgeon: Anselmo Matute MD;  Location: [facility];  Service: Urology;  Laterality: Right;    CYSTOURETEROSCOPY WITH RETROGRADE PYELOGRAPHY AND INSERTION OF STENT INTO URETER Right 11/08/2022    Procedure: CYSTOURETEROSCOPY, WITH RETROGRADE PYELOGRAM AND URETERAL STENT INSERTION;  Surgeon: Anselmo Matute MD;  Location: [facility];  Service: Urology;  Laterality: Right;    CYSTOURETEROSCOPY, WITH HOLMIUM LASER LITHOTRIPSY OF URETERAL CALCULUS AND STENT INSERTION Right 04/04/2023    Procedure: CYSTOURETEROSCOPY, WITH HOLMIUM LASER LITHOTRIPSY OF URETERAL CALCULUS AND STENT INSERTION;  Surgeon: Anselmo Matute MD;  Location: [facility];  Service: Urology;  Laterality: Right;    CYSTOURETEROSCOPY, WITH HOLMIUM LASER LITHOTRIPSY OF URETERAL CALCULUS AND STENT INSERTION Right 11/5/2024    Procedure: CYSTOURETEROSCOPY, WITH HOLMIUM LASER LITHOTRIPSY OF URETERAL CALCULUS AND STENT INSERTION;  Surgeon: Anselmo Matute MD;  Location: [facility];  Service: Urology;  Laterality: Right;  vesical litholapaxy    CYSTOURETEROSCOPY,WITH HOLMIUM LASER LITHOTRIPSY OF URETERAL CALCULUS Right 11/07/2023    Procedure: CYSTOURETEROSCOPY,WITH HOLMIUM LASER LITHOTRIPSY OF URETERAL CALCULUS;  Surgeon: Anselmo Matute MD;  Location: [facility];  Service: Urology;  Laterality: Right;    ESOPHAGOGASTRODUODENOSCOPY N/A 09/16/2020    Procedure: ESOPHAGOGASTRODUODENOSCOPY (EGD)- Needs Rapid;  Surgeon: Lucy Lafleur MD;  Location: [facility];  Service: Endoscopy;  Laterality: N/A;    EYE SURGERY  ?? Dr Jaret PINEDO PROCEDURE N/A 01/02/2019    Procedure: SHAHIDA PROCEDURE;  Surgeon: Reece Hogan MD;  Location: [facility];  Service: General;  Laterality: N/A;    LYSIS OF ADHESIONS N/A 01/02/2019    Procedure: LYSIS, ADHESIONS;  Surgeon: Reece Hogan MD;  Location: [facility];  Service: General;  Laterality: N/A;    RETROGRADE PYELOGRAPHY Right  11/5/2024    Procedure: PYELOGRAM, RETROGRADE;  Surgeon: Anselmo Matute MD;  Location: [facility];  Service: Urology;  Laterality: Right;    VITRECTOMY Right 09/2013       Review of patient's allergies indicates:   Allergen Reactions    Omnicef [cefdinir] Itching       No current facility-administered medications on file prior to encounter.     Current Outpatient Medications on File Prior to Encounter   Medication Sig    acetaminophen (TYLENOL) 500 MG tablet Take 1,000 mg by mouth every 6 (six) hours as needed for Pain or Temperature greater than (101F, fever, headache, arthritis).    atorvastatin (LIPITOR) 10 MG tablet 10mg tablet po twice weekly to be given on Monday and Thursday    b complex vitamins capsule Take 1 capsule by mouth once daily.    buPROPion (WELLBUTRIN XL) 150 MG TB24 tablet Take 1 tablet (150 mg total) by mouth every morning.    busPIRone (BUSPAR) 10 MG tablet Take 1 tablet (10 mg total) by mouth with lunch. May also take 1 tablet (10 mg total) daily as needed (anxiety, panic attacks, agitation).    carboxymethylcellulose (REFRESH LIQUIGEL) 1 % ophthalmic solution Apply 2 drops to eye 2 (two) times daily. May also apply 1 drop 2 (two) times daily as needed (for dry eyes). Bilateral eyes ( Refresh eye drops/ saline).    dicyclomine (BENTYL) 20 mg tablet Take 1 tablet (20 mg total) by mouth before meals and at bedtime as needed (abdominal cramping, belly pain, stomach pain around colostomy).    famotidine (PEPCID) 40 MG tablet Take 1 tablet (40 mg total) by mouth 2 (two) times daily as needed for Heartburn (reflux, indigestion, flatulence, upset stomach).    furosemide (LASIX) 20 MG tablet Take 1 tablet (20 mg total) by mouth once daily. For leg edema, and blood pressure. Hold medication if blood pressure is < 100 systolic with AM BP readings    hyoscyamine 0.125 mg Subl Place 2 tablets (0.25 mg total) under the tongue every 4 (four) hours as needed (flank pain, back pain, kidney spasms,  abdoninal spasms).    memantine (NAMENDA) 10 MG Tab Take 1 tablet (10 mg total) by mouth 2 (two) times daily.    ondansetron (ZOFRAN-ODT) 8 MG TbDL Take 1 tablet (8 mg total) by mouth every 12 (twelve) hours as needed (nausea, vomiting, upset stomach).    phenazopyridine (PYRIDIUM) 200 MG tablet Take 1 tablet (200 mg total) by mouth 3 (three) times daily as needed (bladder spasms, urinary tract infection, bladder pain).    polyethylene glycol (GLYCOLAX) 17 gram/dose powder Take 17 g by mouth once daily. For constipation    potassium chloride (MICRO-K) 10 MEQ CpSR Take 1 capsule (10 mEq total) by mouth once daily.    risperiDONE (RISPERDAL) 0.5 MG Tab Take 1 tablet (0.5 mg total) by mouth every evening.    Saccharomyces boulardii (FLORASTOR) 250 mg capsule Take 1 capsule (250 mg total) by mouth Daily. For probiotic for GI tract with colostomy ( ok for generic for florastor)    sulfamethoxazole-trimethoprim 800-160mg (BACTRIM DS) 800-160 mg Tab Take 1 tablet by mouth 2 (two) times daily. for 7 days    thyroid, pork, (ARMOUR THYROID) 30 mg Tab Take 1 tablet (30 mg total) by mouth every evening.    vitamin D (VITAMIN D3) 1000 units Tab Take 2 tablets (2,000 Units total) by mouth every morning.    [DISCONTINUED] triamcinolone acetonide 0.1% (KENALOG) 0.1 % ointment APPLY TOPICALLY 4 (FOUR) TIMES DAILY. TO RASH ON FACE AND CHEST AND NOSE     Family History       Problem Relation (Age of Onset)    Alzheimer's disease Mother    Aneurysm Father    Bladder Cancer Grandchild    Parkinsonism Brother    Stomach cancer Brother          Tobacco Use    Smoking status: Former     Types: Cigarettes     Passive exposure: Never    Smokeless tobacco: Never    Tobacco comments:     Smoked in 20's. Quit soon   Substance and Sexual Activity    Alcohol use: No    Drug use: No    Sexual activity: Never     Comment:      Review of Systems   Reason unable to perform ROS: limited due to dementia/memory impairment.   Constitutional:   Positive for chills. Negative for fever.   HENT: Negative.     Eyes: Negative.    Respiratory: Negative.  Negative for cough, shortness of breath and wheezing.    Cardiovascular: Negative.  Negative for chest pain and leg swelling.   Gastrointestinal:  Positive for abdominal pain. Negative for blood in stool, constipation, diarrhea, nausea and vomiting.        Right sided abdominal pain during ultrasound yesterday -- intermittent   Endocrine: Positive for cold intolerance.   Genitourinary: Negative.  Negative for difficulty urinating.   Musculoskeletal: Negative.    Skin: Negative.    Allergic/Immunologic: Negative.    Neurological: Negative.    Psychiatric/Behavioral:  The patient is nervous/anxious.      Objective:     Vital Signs (Most Recent):  Temp: (!) 101 °F (38.3 °C) (12/17/24 0015)  Pulse: 72 (12/17/24 0100)  Resp: (!) 28 (12/17/24 0100)  BP: (!) 116/56 (12/17/24 0100)  SpO2: 95 % (12/17/24 0100) Vital Signs (24h Range):  Temp:  [98.4 °F (36.9 °C)-101 °F (38.3 °C)] 101 °F (38.3 °C)  Pulse:  [66-72] 72  Resp:  [16-28] 28  SpO2:  [95 %-99 %] 95 %  BP: (116-184)/(56-74) 116/56     Weight: 71 kg (156 lb 8.4 oz)  Body mass index is 28.63 kg/m².     Physical Exam  Vitals reviewed.   Constitutional:       General: She is not in acute distress.  HENT:      Head: Normocephalic.      Nose: Nose normal.      Mouth/Throat:      Mouth: Mucous membranes are moist.   Eyes:      Extraocular Movements: Extraocular movements intact.      Pupils: Pupils are equal, round, and reactive to light.   Cardiovascular:      Rate and Rhythm: Normal rate and regular rhythm.      Pulses: Normal pulses.      Heart sounds: Normal heart sounds.   Pulmonary:      Effort: Pulmonary effort is normal. No respiratory distress.      Breath sounds: Normal breath sounds. No wheezing or rales.   Chest:      Chest wall: No tenderness.   Abdominal:      General: Bowel sounds are normal. There is no distension.      Palpations: Abdomen is soft.       Tenderness: There is no abdominal tenderness.      Comments: colostomy   Musculoskeletal:         General: Normal range of motion.      Right lower leg: No edema.      Left lower leg: No edema.   Skin:     General: Skin is warm and dry.      Capillary Refill: Capillary refill takes less than 2 seconds.   Neurological:      Mental Status: She is alert. Mental status is at baseline.      Comments: Oriented to person and place, forgetful/memory impairment due to dementia, family at bedside states patient is at baseline mentation   HORTON 5/5   Psychiatric:      Comments: Anxious              CRANIAL NERVES     CN III, IV, VI   Pupils are equal, round, and reactive to light.       Significant Labs: All pertinent labs within the past 24 hours have been reviewed.    Bilirubin:   Recent Labs   Lab 11/26/24  1237 12/16/24  2223   BILITOT 0.7 0.8     Blood Cultures x 2 ordered    CBC:   Recent Labs   Lab 12/16/24  2223   WBC 16.75*   HGB 9.8*   HCT 29.1*        CMP:   Recent Labs   Lab 12/16/24  2223   *   K 3.9   CL 97   CO2 18*   GLU 93   BUN 8   CREATININE 0.8   CALCIUM 8.5*   PROT 6.2   ALBUMIN 2.5*   BILITOT 0.8   ALKPHOS 96   AST 27   ALT 24   ANIONGAP 11     Lactic Acid:   Recent Labs   Lab 12/16/24  2223   LACTATE 0.8     Lipase:   Recent Labs   Lab 12/16/24  2223   LIPASE 14     UA pending    Significant Imaging: I have reviewed all pertinent imaging results/findings within the past 24 hours.    CT RENAL STONE STUDY ABD PELVIS WO     CLINICAL HISTORY:  Calculus of kidneyrenal stones;     TECHNIQUE:  Noncontrast images were obtained     COMPARISON:  Renal ultrasound 10/23/2024, CT abdomen pelvis, 03/20/2023.     FINDINGS:  Small right pleural effusion.  1.5 cm masslike lesion medial lower quadrant of the right breast.  Interval enlargement since prior CT.     The liver, the spleen, and the pancreas appear normal.     Gallbladder is contracted.  No bile duct dilatation.     Right ureteral stent appears to be  in good position.  Large stone related to the right renal pelvis measuring 3.3 cm in size.  No significant hydronephrosis.  However, stable marked parenchymal thinning is present of the right kidney suggesting chronic obstruction.  And there is interval development of a complex ill-defined masslike lesion in the adjacent right lobe of the liver which appears contiguous with the superior aspect of the right kidney which probably corresponds to a 4 cm hepatic abscess or perihepatic abscess.  Left kidney appears unremarkable.  No hydronephrosis or renal stones.     The aorta and inferior vena cava are unremarkable.     No evidence of bowel obstruction.  There is a peristomal hernia on the left containing fat as well as loops of bowel measuring 10 cm.  No obstruction.     Bladder is normal. No abnormal masses or fluid collections in the pelvis.     Degenerative changes of the spine.  Stable chronic compression fracture involving T11..     Impression:     Interval development of a masslike lesion in the right lobe of the liver contiguous with the right kidney concerning for a 4 cm abscess.  Malignancy not excluded.     3.3 cm right renal stone right ureteral stent in place without significant hydronephrosis.     Suspicious appearing medial lower quadrant right breast mass measuring 1.5 cm.  Correlation with diagnostic mammogram and ultrasound is recommended.   Yes/given 30 day supply of PEP meds .  to follow up at occupational health

## 2024-12-17 NOTE — PT/OT/SLP EVAL
"Physical Therapy Evaluation and Treatment    Patient Name: Irlanda Lopez   MRN: [id number]  Recent Surgery: * No surgery found *      Recommendations:     Discharge Recommendations: Low Intensity Therapy   Discharge Equipment Recommendations: walker, rolling   Barriers to discharge: None    Assessment:     Irlanda Lopez is a 84 y.o. female admitted with a medical diagnosis of Liver abscess. She presents with the following impairments/functional limitations: weakness, impaired endurance, impaired functional mobility, gait instability, impaired balance, pain, decreased safety awareness, decreased lower extremity function, impaired cognition, decreased ROM.    Rehab Prognosis: Good; patient would benefit from acute PT services to address these deficits and reach maximum level of function.    Plan:     During this hospitalization, patient to be seen 3 x/week to address the above listed problems via gait training, therapeutic activities, therapeutic exercises    Plan of Care Expires: 12/17/24    Subjective     Chief Complaint: Pt is motivated to participate. Reported "I have been up to the bathroom since my procedure."   Patient Comments/Goals: none stated  Pain/Comfort:  Pain Rating 1: 6/10  Location - Side 1: Right  Location - Orientation 1: generalized  Location 1: flank  Pain Addressed 1: Reposition, Distraction  Pain Rating Post-Intervention 1: 6/10    Social History:  Pt is a poor historian, hx of dementia, hx obtained from pt and family in room.   Living Environment: Patient lives in a assisted living facility, no steps to enter.  Prior Level of Function: Prior to admission, patient was independent, not driving and retired, and ambulated household distances using no AD  Equipment Used at Home: shower chair  DME owned (not currently used): none  Assistance Upon Discharge: family and facility staff    Objective:     Communicated with nurse Charles and epic chart review prior to session. Patient found HOB " "elevated with peripheral IV, telemetry, colostomy (RUQ drain) upon PT entry to room.    General Precautions: Standard, fall   Orthopedic Precautions: N/A   Braces: N/A    Respiratory Status: Room air    Exams:  Cognition: Patient is oriented to Person  RLE ROM: WFL  RLE Strength:  Grossly 4/5  LLE ROM: WFL  LLE Strength:  Grossly 4/5  Sensation:    -       Intact  Skin Integrity/Edema:     -       Skin integrity: Visible skin intact    Functional Mobility:  Gait belt applied - Yes  Bed Mobility  Rolling Left: stand by assistance  Scooting: stand by assistance  Supine to Sit: stand by assistance  Sit to Supine: minimum assistance for LE management  Transfers  Sit to Stand: stand by assistance with rolling walker  Gait  Patient ambulated 30ft with rolling walker and stand by assistance. Patient demonstrates occasional unsteady gait. No c/o dizziness or SOB, no gross LOB. All lines remained intact throughout ambulation trail.  Balance  Sitting: stand by assistance  Standing: stand by assistance    Therapeutic Activities and Exercises:   Pt educated on role of PT in acute care and POC. Educated on importance of OOB activities, activity pacing, and HEP (marching/hip flex, hip abd, heel slides/LAQ, quad sets, ankle pumps) in order to maintain/regain strength. Encouraged to sit up in chair for all meals. Educated on proper use of RW for safety and to reduce risk of falling. Educated on "call don't fall" policy and increased risk of falling due to weakness, instructed to utilize call bell for assistance with all transfers. Pt agreeable to all requests.    AM-PAC 6 CLICK MOBILITY  Total Score:16    Patient left with bed in chair position with all lines intact, call button in reach, and family present.    GOALS:   Multidisciplinary Problems       Physical Therapy Goals          Problem: Physical Therapy    Goal Priority Disciplines Outcome Interventions   Physical Therapy Goal     PT, PT/OT     Description: Goals to be met by " 12/31/24.  1. Pt will complete bed mobility MOD I.  2. Pt will complete sit to stand MOD I.  3. Pt will ambulate 200ft MOD I using RW.  4. Pt will increase AMPAC score by 2 points to progress functional mobility.                       History:     Past Medical History:   Diagnosis Date    Clostridium difficile colitis     Dementia     Diverticulitis     s/p colostomy    High cholesterol     Hypertension     Hypothyroidism     Kidney stones        Past Surgical History:   Procedure Laterality Date    APPENDECTOMY  2008    CATARACT EXTRACTION      Dr Raygoza    COLON SURGERY      COLONOSCOPY N/A 01/02/2019    Procedure: COLONOSCOPY;  Surgeon: Reece Hogan MD;  Location: [facility] ENDO;  Service: Endoscopy;  Laterality: N/A;    COLONOSCOPY N/A 06/07/2019    Procedure: COLONOSCOPY;  Surgeon: Rishabh Connelly MD;  Location: [facility] ENDO;  Service: General;  Laterality: N/A;    COLONOSCOPY N/A 06/23/2021    Procedure: COLONOSCOPY;  Surgeon: Lucy Lafleur MD;  Location: [facility] ENDO;  Service: Endoscopy;  Laterality: N/A;    COLOSTOMY Left 01/02/2019    Procedure: CREATION, COLOSTOMY;  Surgeon: Reece Hogan MD;  Location: [facility] OR;  Service: General;  Laterality: Left;    CYSTOSCOPIC LITHOLAPAXY Right 5/14/2024    Procedure: CYSTOLITHOLAPAXY;  Surgeon: Anselmo Matute MD;  Location: [facility];  Service: Urology;  Laterality: Right;    CYSTOSCOPY W/ URETERAL STENT REMOVAL Right 11/5/2024    Procedure: CYSTOSCOPY, WITH URETERAL STENT REMOVAL;  Surgeon: Anselmo Matute MD;  Location: [facility];  Service: Urology;  Laterality: Right;    CYSTOSCOPY WITH URETEROSCOPY, RETROGRADE PYELOGRAPHY, AND INSERTION OF STENT Right 08/23/2022    Procedure: CYSTOSCOPY, WITH RETROGRADE PYELOGRAM AND URETERAL STENT INSERTION;  Surgeon: Anselmo Matute MD;  Location: [facility] OR;  Service: Urology;  Laterality: Right;    CYSTOSCOPY WITH URETEROSCOPY, RETROGRADE PYELOGRAPHY, AND INSERTION OF STENT Right 11/07/2023    Procedure: CYSTOSCOPY, WITH  RETROGRADE PYELOGRAM AND URETERAL STENT INSERTION;  Surgeon: Anselmo Matute MD;  Location: [facility];  Service: Urology;  Laterality: Right;  stent exchange    CYSTOSCOPY WITH URETEROSCOPY, RETROGRADE PYELOGRAPHY, AND INSERTION OF STENT Right 5/14/2024    Procedure: CYSTOSCOPY, WITH RETROGRADE PYELOGRAM AND URETERAL STENT INSERTION;  Surgeon: Anselmo Matute MD;  Location: [facility];  Service: Urology;  Laterality: Right;    CYSTOURETEROSCOPY WITH RETROGRADE PYELOGRAPHY AND INSERTION OF STENT INTO URETER Right 11/08/2022    Procedure: CYSTOURETEROSCOPY, WITH RETROGRADE PYELOGRAM AND URETERAL STENT INSERTION;  Surgeon: Anselmo Matute MD;  Location: [facility];  Service: Urology;  Laterality: Right;    CYSTOURETEROSCOPY, WITH HOLMIUM LASER LITHOTRIPSY OF URETERAL CALCULUS AND STENT INSERTION Right 04/04/2023    Procedure: CYSTOURETEROSCOPY, WITH HOLMIUM LASER LITHOTRIPSY OF URETERAL CALCULUS AND STENT INSERTION;  Surgeon: Anselmo Matute MD;  Location: [facility];  Service: Urology;  Laterality: Right;    CYSTOURETEROSCOPY, WITH HOLMIUM LASER LITHOTRIPSY OF URETERAL CALCULUS AND STENT INSERTION Right 11/5/2024    Procedure: CYSTOURETEROSCOPY, WITH HOLMIUM LASER LITHOTRIPSY OF URETERAL CALCULUS AND STENT INSERTION;  Surgeon: Anselmo Matute MD;  Location: [facility];  Service: Urology;  Laterality: Right;  vesical litholapaxy    CYSTOURETEROSCOPY,WITH HOLMIUM LASER LITHOTRIPSY OF URETERAL CALCULUS Right 11/07/2023    Procedure: CYSTOURETEROSCOPY,WITH HOLMIUM LASER LITHOTRIPSY OF URETERAL CALCULUS;  Surgeon: Anselmo Matute MD;  Location: [facility];  Service: Urology;  Laterality: Right;    ESOPHAGOGASTRODUODENOSCOPY N/A 09/16/2020    Procedure: ESOPHAGOGASTRODUODENOSCOPY (EGD)- Needs Rapid;  Surgeon: Lucy Lafleur MD;  Location: [facility];  Service: Endoscopy;  Laterality: N/A;    EYE SURGERY  ?? Dr Jaret PINEDO PROCEDURE N/A 01/02/2019    Procedure: SHAHIDA PROCEDURE;  Surgeon: Reece  MD Samira;  Location: [facility] OR;  Service: General;  Laterality: N/A;    LYSIS OF ADHESIONS N/A 01/02/2019    Procedure: LYSIS, ADHESIONS;  Surgeon: Reece Hogan MD;  Location: [facility] OR;  Service: General;  Laterality: N/A;    RETROGRADE PYELOGRAPHY Right 11/5/2024    Procedure: PYELOGRAM, RETROGRADE;  Surgeon: Anselmo Matute MD;  Location: [facility];  Service: Urology;  Laterality: Right;    VITRECTOMY Right 09/2013       Time Tracking:     PT Received On: 12/17/24  PT Start Time: 1240  PT Stop Time: 1305  PT Total Time (min): 25 min     Billable Minutes: Evaluation 15min and Therapeutic Activity 10min    12/17/2024

## 2024-12-17 NOTE — ASSESSMENT & PLAN NOTE
The patient's most recent sodium results are listed below.  Recent Labs     12/16/24  2223 12/17/24  0522   * 129*       Plan  - Correct the sodium by 4-6mEq in 24 hours.   - Obtain the following studies: Urine sodium, urine osmolality, serum osmolality, AM cortisol, or TSH, T4.  - Will treat the hyponatremia with IV fluids as follows: NS    12/17/24: improving with NS IVFs   Metabolic acidosis with Bicarb 13, LA normal   IVFs changed to Bicarb drip

## 2024-12-17 NOTE — ED PROVIDER NOTES
SCRIBE #1 NOTE: I, Tracie Soares, am scribing for, and in the presence of, Marilu Ellison MD. I have scribed the entire note.       History     Chief Complaint   Patient presents with    abnormal labs     Pt was told to come to the ED for an abnormal CT scan.      Review of patient's allergies indicates:   Allergen Reactions    Omnicef [cefdinir] Itching         History of Present Illness     HPI    12/16/2024, 9:21 PM  History obtained from the patient      History of Present Illness: Irlanda Lopez is a 84 y.o. female patient with a PMHx of HTN, Dementia, kidney stones, hypothyroidism, and high cholesterol who presents to the Emergency Department for evaluation of an abnormal CT scan from this morning with Dr. Matute. Pt had a UTI last week. Symptoms are constant and moderate in severity. No mitigating or exacerbating factors reported. Associated sxs include chills, generalized abd pain, and cold intolerance. Patient denies any fever, vomiting, and all other sxs at this time. No prior Tx reported. No further complaints or concerns at this time.       Arrival mode: Personal vehicle     PCP: Myla Arias MD        Past Medical History:  Past Medical History:   Diagnosis Date    Clostridium difficile colitis     Dementia     Diverticulitis     s/p colostomy    High cholesterol     Hypertension     Hypothyroidism     Kidney stones        Past Surgical History:  Past Surgical History:   Procedure Laterality Date    APPENDECTOMY  2008    CATARACT EXTRACTION      Dr Raygoza    COLON SURGERY      COLONOSCOPY N/A 01/02/2019    Procedure: COLONOSCOPY;  Surgeon: Reece Hogan MD;  Location: [facility];  Service: Endoscopy;  Laterality: N/A;    COLONOSCOPY N/A 06/07/2019    Procedure: COLONOSCOPY;  Surgeon: Rishabh Connelly MD;  Location: [facility];  Service: General;  Laterality: N/A;    COLONOSCOPY N/A 06/23/2021    Procedure: COLONOSCOPY;  Surgeon: Lucy Lafleur MD;  Location: [facility];  Service: Endoscopy;   Laterality: N/A;    COLOSTOMY Left 01/02/2019    Procedure: CREATION, COLOSTOMY;  Surgeon: Reece Hogan MD;  Location: [facility] OR;  Service: General;  Laterality: Left;    CYSTOSCOPIC LITHOLAPAXY Right 5/14/2024    Procedure: CYSTOLITHOLAPAXY;  Surgeon: Anselmo Matute MD;  Location: [facility];  Service: Urology;  Laterality: Right;    CYSTOSCOPY W/ URETERAL STENT REMOVAL Right 11/5/2024    Procedure: CYSTOSCOPY, WITH URETERAL STENT REMOVAL;  Surgeon: Anselmo Matute MD;  Location: [facility] OR;  Service: Urology;  Laterality: Right;    CYSTOSCOPY WITH URETEROSCOPY, RETROGRADE PYELOGRAPHY, AND INSERTION OF STENT Right 08/23/2022    Procedure: CYSTOSCOPY, WITH RETROGRADE PYELOGRAM AND URETERAL STENT INSERTION;  Surgeon: Anselmo Matute MD;  Location: [facility];  Service: Urology;  Laterality: Right;    CYSTOSCOPY WITH URETEROSCOPY, RETROGRADE PYELOGRAPHY, AND INSERTION OF STENT Right 11/07/2023    Procedure: CYSTOSCOPY, WITH RETROGRADE PYELOGRAM AND URETERAL STENT INSERTION;  Surgeon: Anselmo Matute MD;  Location: [facility];  Service: Urology;  Laterality: Right;  stent exchange    CYSTOSCOPY WITH URETEROSCOPY, RETROGRADE PYELOGRAPHY, AND INSERTION OF STENT Right 5/14/2024    Procedure: CYSTOSCOPY, WITH RETROGRADE PYELOGRAM AND URETERAL STENT INSERTION;  Surgeon: Anselmo Matute MD;  Location: [facility];  Service: Urology;  Laterality: Right;    CYSTOURETEROSCOPY WITH RETROGRADE PYELOGRAPHY AND INSERTION OF STENT INTO URETER Right 11/08/2022    Procedure: CYSTOURETEROSCOPY, WITH RETROGRADE PYELOGRAM AND URETERAL STENT INSERTION;  Surgeon: Anselmo Matute MD;  Location: [facility];  Service: Urology;  Laterality: Right;    CYSTOURETEROSCOPY, WITH HOLMIUM LASER LITHOTRIPSY OF URETERAL CALCULUS AND STENT INSERTION Right 04/04/2023    Procedure: CYSTOURETEROSCOPY, WITH HOLMIUM LASER LITHOTRIPSY OF URETERAL CALCULUS AND STENT INSERTION;  Surgeon: Anselmo Matute MD;  Location: [facility];  Service:  Urology;  Laterality: Right;    CYSTOURETEROSCOPY, WITH HOLMIUM LASER LITHOTRIPSY OF URETERAL CALCULUS AND STENT INSERTION Right 11/5/2024    Procedure: CYSTOURETEROSCOPY, WITH HOLMIUM LASER LITHOTRIPSY OF URETERAL CALCULUS AND STENT INSERTION;  Surgeon: Anselmo Matute MD;  Location: [facility];  Service: Urology;  Laterality: Right;  vesical litholapaxy    CYSTOURETEROSCOPY,WITH HOLMIUM LASER LITHOTRIPSY OF URETERAL CALCULUS Right 11/07/2023    Procedure: CYSTOURETEROSCOPY,WITH HOLMIUM LASER LITHOTRIPSY OF URETERAL CALCULUS;  Surgeon: Anselmo Matute MD;  Location: [facility] OR;  Service: Urology;  Laterality: Right;    ESOPHAGOGASTRODUODENOSCOPY N/A 09/16/2020    Procedure: ESOPHAGOGASTRODUODENOSCOPY (EGD)- Needs Rapid;  Surgeon: Lucy Lafleur MD;  Location: [facility];  Service: Endoscopy;  Laterality: N/A;    EYE SURGERY  ?? Dr Raygoza    SHAHIDA PROCEDURE N/A 01/02/2019    Procedure: SHAHIDA PROCEDURE;  Surgeon: Reece Hogan MD;  Location: [facility] OR;  Service: General;  Laterality: N/A;    LYSIS OF ADHESIONS N/A 01/02/2019    Procedure: LYSIS, ADHESIONS;  Surgeon: Reece Hogan MD;  Location: [facility] OR;  Service: General;  Laterality: N/A;    RETROGRADE PYELOGRAPHY Right 11/5/2024    Procedure: PYELOGRAM, RETROGRADE;  Surgeon: Anselmo Matute MD;  Location: [facility];  Service: Urology;  Laterality: Right;    VITRECTOMY Right 09/2013         Family History:  Family History   Problem Relation Name Age of Onset    Alzheimer's disease Mother      Aneurysm Father          brain    Stomach cancer Brother          50s    Parkinsonism Brother      Bladder Cancer Grandchild         Social History:  Social History     Tobacco Use    Smoking status: Former     Types: Cigarettes     Passive exposure: Never    Smokeless tobacco: Never    Tobacco comments:     Smoked in 20's. Quit soon   Substance and Sexual Activity    Alcohol use: No    Drug use: No    Sexual activity: Never     Comment:         Review of  "Systems     Review of Systems   Constitutional:  Positive for chills. Negative for fever.   Gastrointestinal:  Positive for abdominal pain (generalized). Negative for vomiting.   Endocrine: Positive for cold intolerance.   All other systems reviewed and are negative.       Physical Exam     Initial Vitals [12/16/24 1252]   BP Pulse Resp Temp SpO2   (!) 141/66 66 16 98.4 °F (36.9 °C) 97 %      MAP       --          Physical Exam  Nursing Notes and Vital Signs Reviewed.  Constitutional: Patient is in no acute distress. Well-developed and well-nourished.  Head: Atraumatic. Normocephalic.  Eyes: PERRL. EOM intact. Conjunctivae are not pale. No scleral icterus.  ENT: Mucous membranes are moist. Oropharynx is clear and symmetric.    Neck: Supple. Full ROM. No lymphadenopathy.  Cardiovascular: Regular rate. Regular rhythm. No murmurs, rubs, or gallops. Distal pulses are 2+ and symmetric.  Pulmonary/Chest: No respiratory distress. Clear to auscultation bilaterally. No wheezing or rales.  Abdominal: Soft and non-distended.  There is no tenderness.  No rebound, guarding, or rigidity. Good bowel sounds. Pt ha an ostomy bag.  Genitourinary: No CVA tenderness  Musculoskeletal: Moves all extremities. No obvious deformities. No edema. No calf tenderness.  Skin: Warm and dry.  Neurological:  Alert, awake, and appropriate.  Normal speech.  No acute focal neurological deficits are appreciated.  Psychiatric: Normal affect. Good eye contact. Appropriate in content.     ED Course   Procedures  ED Vital Signs:  Vitals:    12/16/24 1252 12/16/24 2130 12/16/24 2200 12/16/24 2230   BP: (!) 141/66 (!) 161/69 136/65 134/64   Pulse: 66 70 69 71   Resp: 16 (!) 21 (!) 25 20   Temp: 98.4 °F (36.9 °C)  99 °F (37.2 °C)    TempSrc: Oral  Oral    SpO2: 97% 99% 98% 97%   Weight: 71 kg (156 lb 8.4 oz)      Height: 5' 2" (1.575 m)       12/16/24 2300 12/16/24 2315 12/16/24 2330 12/16/24 2345   BP: 127/60 135/63 (!) 184/66 (!) 120/58   Pulse: 70 70 69 68 "   Resp: (!) 21 (!) 23 19 (!) 23   Temp:       TempSrc:       SpO2: 95% 95% 96% 96%   Weight:       Height:        12/17/24 0015 12/17/24 0030 12/17/24 0100 12/17/24 0216   BP: (!) 117/57 (!) 167/74 (!) 116/56    Pulse: 71 72 72 68   Resp: (!) 25 (!) 28 (!) 28    Temp: (!) 101 °F (38.3 °C)      TempSrc: Rectal      SpO2: 98% 97% 95%    Weight:       Height:        12/17/24 0244 12/17/24 0345 12/17/24 0417   BP:  (!) 91/55    Pulse: 70 68 (!) 58   Resp:  16    Temp:  98.1 °F (36.7 °C)    TempSrc:  Oral    SpO2:  98%    Weight:      Height:          Abnormal Lab Results:  Labs Reviewed   CBC W/ AUTO DIFFERENTIAL - Abnormal       Result Value    WBC 16.75 (*)     RBC 3.76 (*)     Hemoglobin 9.8 (*)     Hematocrit 29.1 (*)     MCV 77 (*)     MCH 26.1 (*)     MCHC 33.7      RDW 14.9 (*)     Platelets 435      MPV 8.3 (*)     Immature Granulocytes 1.2 (*)     Gran # (ANC) 13.2 (*)     Immature Grans (Abs) 0.20 (*)     Lymph # 1.8      Mono # 1.4 (*)     Eos # 0.1      Baso # 0.04      nRBC 0      Gran % 78.6 (*)     Lymph % 10.9 (*)     Mono % 8.6      Eosinophil % 0.5      Basophil % 0.2      Differential Method Automated      Narrative:     Release to patient->Immediate   COMPREHENSIVE METABOLIC PANEL - Abnormal    Sodium 126 (*)     Potassium 3.9      Chloride 97      CO2 18 (*)     Glucose 93      BUN 8      Creatinine 0.8      Calcium 8.5 (*)     Total Protein 6.2      Albumin 2.5 (*)     Total Bilirubin 0.8      Alkaline Phosphatase 96      AST 27      ALT 24      eGFR >60      Anion Gap 11      Narrative:     Release to patient->Immediate   URINALYSIS, REFLEX TO URINE CULTURE - Abnormal    Specimen UA Urine, Catheterized      Color, UA Yellow      Appearance, UA Hazy (*)     pH, UA 7.0      Specific Gravity, UA 1.010      Protein, UA 1+ (*)     Glucose, UA Negative      Ketones, UA 1+ (*)     Bilirubin (UA) Negative      Occult Blood UA 1+ (*)     Nitrite, UA Negative      Urobilinogen, UA Negative      Leukocytes,  UA 3+ (*)     Narrative:     Specimen Source->Urine   URINALYSIS MICROSCOPIC - Abnormal    RBC, UA 23 (*)     WBC, UA >100 (*)     Bacteria Occasional      Hyaline Casts, UA 0      Microscopic Comment SEE COMMENT      Narrative:     Specimen Source->Urine   CULTURE, BLOOD   CULTURE, BLOOD   CULTURE, URINE   LIPASE    Lipase 14      Narrative:     Release to patient->Immediate   LACTIC ACID, PLASMA    Lactate (Lactic Acid) 0.8     COMPREHENSIVE METABOLIC PANEL   CBC W/ AUTO DIFFERENTIAL   PROTIME-INR   APTT   CORTISOL, RANDOM   TSH   T4, FREE   OSMOLALITY, SERUM   OSMOLALITY, URINE RANDOM   SODIUM, URINE, RANDOM        All Lab Results:  Results for orders placed or performed during the hospital encounter of 12/16/24   CBC W/ AUTO DIFFERENTIAL    Collection Time: 12/16/24 10:23 PM   Result Value Ref Range    WBC 16.75 (H) 3.90 - 12.70 K/uL    RBC 3.76 (L) 4.00 - 5.40 M/uL    Hemoglobin 9.8 (L) 12.0 - 16.0 g/dL    Hematocrit 29.1 (L) 37.0 - 48.5 %    MCV 77 (L) 82 - 98 fL    MCH 26.1 (L) 27.0 - 31.0 pg    MCHC 33.7 32.0 - 36.0 g/dL    RDW 14.9 (H) 11.5 - 14.5 %    Platelets 435 150 - 450 K/uL    MPV 8.3 (L) 9.2 - 12.9 fL    Immature Granulocytes 1.2 (H) 0.0 - 0.5 %    Gran # (ANC) 13.2 (H) 1.8 - 7.7 K/uL    Immature Grans (Abs) 0.20 (H) 0.00 - 0.04 K/uL    Lymph # 1.8 1.0 - 4.8 K/uL    Mono # 1.4 (H) 0.3 - 1.0 K/uL    Eos # 0.1 0.0 - 0.5 K/uL    Baso # 0.04 0.00 - 0.20 K/uL    nRBC 0 0 /100 WBC    Gran % 78.6 (H) 38.0 - 73.0 %    Lymph % 10.9 (L) 18.0 - 48.0 %    Mono % 8.6 4.0 - 15.0 %    Eosinophil % 0.5 0.0 - 8.0 %    Basophil % 0.2 0.0 - 1.9 %    Differential Method Automated    Comp. Metabolic Panel    Collection Time: 12/16/24 10:23 PM   Result Value Ref Range    Sodium 126 (L) 136 - 145 mmol/L    Potassium 3.9 3.5 - 5.1 mmol/L    Chloride 97 95 - 110 mmol/L    CO2 18 (L) 23 - 29 mmol/L    Glucose 93 70 - 110 mg/dL    BUN 8 8 - 23 mg/dL    Creatinine 0.8 0.5 - 1.4 mg/dL    Calcium 8.5 (L) 8.7 - 10.5 mg/dL     Total Protein 6.2 6.0 - 8.4 g/dL    Albumin 2.5 (L) 3.5 - 5.2 g/dL    Total Bilirubin 0.8 0.1 - 1.0 mg/dL    Alkaline Phosphatase 96 40 - 150 U/L    AST 27 10 - 40 U/L    ALT 24 10 - 44 U/L    eGFR >60 >60 mL/min/1.73 m^2    Anion Gap 11 8 - 16 mmol/L   Lipase    Collection Time: 12/16/24 10:23 PM   Result Value Ref Range    Lipase 14 4 - 60 U/L   Lactic acid, plasma    Collection Time: 12/16/24 10:23 PM   Result Value Ref Range    Lactate (Lactic Acid) 0.8 0.5 - 2.2 mmol/L   Urinalysis, Reflex to Urine Culture Urine, Catheterized    Collection Time: 12/17/24 12:21 AM    Specimen: Urine   Result Value Ref Range    Specimen UA Urine, Catheterized     Color, UA Yellow Yellow, Straw, Gina    Appearance, UA Hazy (A) Clear    pH, UA 7.0 5.0 - 8.0    Specific Gravity, UA 1.010 1.005 - 1.030    Protein, UA 1+ (A) Negative    Glucose, UA Negative Negative    Ketones, UA 1+ (A) Negative    Bilirubin (UA) Negative Negative    Occult Blood UA 1+ (A) Negative    Nitrite, UA Negative Negative    Urobilinogen, UA Negative <2.0 EU/dL    Leukocytes, UA 3+ (A) Negative   Urinalysis Microscopic    Collection Time: 12/17/24 12:21 AM   Result Value Ref Range    RBC, UA 23 (H) 0 - 4 /hpf    WBC, UA >100 (H) 0 - 5 /hpf    Bacteria Occasional None-Occ /hpf    Hyaline Casts, UA 0 0-1/lpf /lpf    Microscopic Comment SEE COMMENT      *Note: Due to a large number of results and/or encounters for the requested time period, some results have not been displayed. A complete set of results can be found in Results Review.         Imaging Results:  Imaging Results    None                   The Emergency Provider reviewed the vital signs and test results, which are outlined above.     ED Discussion       12:09 AM: Discussed case with Melly Kaplan NP ([facility]). Dr. Lopez agrees with current care and management of pt and accepts admission.   Admitting Service: hospital medicine  Admitting Physician: Dr. Lopez  Admit to: inpt  med/tele    12:09 AM: Re-evaluated pt. I have discussed test results, shared treatment plan, and the need for admission with patient and family at bedside. Pt and family express understanding at this time and agree with all information. All questions answered. Pt and family have no further questions or concerns at this time. Pt is ready for admit.         Medical Decision Making  Amount and/or Complexity of Data Reviewed  External Data Reviewed: radiology.     Details: CT Scan 12/16/24  Labs: ordered. Decision-making details documented in ED Course.                ED Medication(s):  Medications   acetaminophen tablet 650 mg (650 mg Oral Given 12/17/24 0515)   piperacillin-tazobactam (ZOSYN) 4.5 g in D5W 100 mL IVPB (MB+) (has no administration in time range)   vancomycin - pharmacy to dose (has no administration in time range)   vancomycin 1750 mg in 0.9% sodium chloride 500 mL IVPB (1,750 mg Intravenous New Bag 12/17/24 0541)   busPIRone tablet 10 mg (10 mg Oral Given 12/17/24 0225)   artificial tears 0.5 % ophthalmic solution 2 drop (has no administration in time range)   memantine tablet 10 mg (10 mg Oral Given 12/17/24 0225)   risperiDONE tablet 0.5 mg (0.5 mg Oral Given 12/17/24 0225)   thyroid (pork) tablet 30 mg (has no administration in time range)   0.9% NaCl infusion ( Intravenous New Bag 12/17/24 0337)   sodium chloride 0.9% flush 3 mL (has no administration in time range)   ondansetron injection 4 mg (has no administration in time range)   naloxone 0.4 mg/mL injection 0.02 mg (has no administration in time range)   glucose chewable tablet 16 g (has no administration in time range)   glucose chewable tablet 24 g (has no administration in time range)   glucagon (human recombinant) injection 1 mg (has no administration in time range)   dextrose 10% bolus 125 mL 125 mL (has no administration in time range)   dextrose 10% bolus 250 mL 250 mL (has no administration in time range)   vancomycin 1,250 mg in 0.9%  NaCl 250 mL IVPB (admixture device) (1,250 mg Intravenous Trough Due As Scheduled Before Dose 12/19/24 0500)   piperacillin-tazobactam (ZOSYN) 4.5 g in D5W 100 mL IVPB (MB+) (4.5 g Intravenous New Bag 12/17/24 0052)   sodium chloride 0.9% bolus 500 mL 500 mL (0 mLs Intravenous Stopped 12/17/24 0241)       Current Discharge Medication List                  Scribe Attestation:   Scribe #1: I performed the above scribed service and the documentation accurately describes the services I performed. I attest to the accuracy of the note.     Attending:   Physician Attestation Statement for Scribe #1: I, Marilu Ellison MD, personally performed the services described in this documentation, as scribed by Tracie Soares, in my presence, and it is both accurate and complete.           Clinical Impression       ICD-10-CM ICD-9-CM   1. Liver abscess  K75.0 572.0   2. Chest pain  R07.9 786.50       Disposition:   Disposition: Admitted  Condition: Stable         Marilu Ellison MD  12/17/24 0776

## 2024-12-17 NOTE — NURSING TRANSFER
Nursing Transfer Note      12/17/2024   3:09 AM    Nurse giving handoff:Laisha  Nurse receiving handoff:Sanjana    Reason patient is being transferred: higher level of care    Transfer From: ED    Transfer via stretcher    Transfer with cardiac monitoring    Transported by Laisha    Transfer Vital Signs:  Blood Pressure:116/56  Heart Rate:70  O2:95  Temperature:101  Respirations:28    Telemetry: Box Number [id number]  Order for Tele Monitor? Yes    Additional Lines:     Medicines sent: none    Any special needs or follow-up needed: no    Patient belongings transferred with patient: Yes    Chart send with patient: Yes    Notified: son    Patient reassessed at: 12/17/24 0217 (date, time)  1  Upon arrival to floor: cardiac monitor applied, patient oriented to room, call bell in reach, and bed in lowest position

## 2024-12-17 NOTE — ASSESSMENT & PLAN NOTE
Diagnosed with UTI per urgent care visit 12/11 -- has been on Bactrim, culture + E coli  UA ordered/pending  Covering with broad spectrum antibiotics (Zosyn/Vancomycin)

## 2024-12-17 NOTE — PROGRESS NOTES
Pharmacokinetic Initial Assessment: IV Vancomycin    Assessment/Plan:    Initiate intravenous vancomycin with loading dose of 1750 mg once followed by a maintenance dose of vancomycin 1250 mg IV every 12 hours  Desired empiric serum trough concentration is 15 to 20 mcg/mL  Draw vancomycin trough level 60 min prior to third dose on 12/19/24 at approximately 0500.  Pharmacy will continue to follow and monitor vancomycin.      Please contact pharmacy at extension [phone] with any questions regarding this assessment.     Thank you for the consult,   Daniel Mccabe       Patient brief summary:  Irlanda Lopez is a 84 y.o. female initiated on antimicrobial therapy with IV Vancomycin for treatment of suspected intra-abdominal infection    Drug Allergies:   Review of patient's allergies indicates:   Allergen Reactions    Omnicef [cefdinir] Itching       Actual Body Weight:   71 kg    Renal Function:   Estimated Creatinine Clearance: 49.2 mL/min (based on SCr of 0.8 mg/dL).,     Dialysis Method (if applicable):  N/A    CBC (last 72 hours):  Recent Labs   Lab Result Units 12/16/24  2223   WBC K/uL 16.75*   Hemoglobin g/dL 9.8*   Hematocrit % 29.1*   Platelets K/uL 435   Gran % % 78.6*   Lymph % % 10.9*   Mono % % 8.6   Eosinophil % % 0.5   Basophil % % 0.2   Differential Method  Automated       Metabolic Panel (last 72 hours):  Recent Labs   Lab Result Units 12/16/24 2223 12/17/24  0021   Sodium mmol/L 126*  --    Potassium mmol/L 3.9  --    Chloride mmol/L 97  --    CO2 mmol/L 18*  --    Glucose mg/dL 93  --    Glucose, UA   --  Negative   BUN mg/dL 8  --    Creatinine mg/dL 0.8  --    Albumin g/dL 2.5*  --    Total Bilirubin mg/dL 0.8  --    Alkaline Phosphatase U/L 96  --    AST U/L 27  --    ALT U/L 24  --        Microbiologic Results:  Microbiology Results (last 7 days)       Procedure Component Value Units Date/Time    Urine culture [[id number]] Collected: 12/17/24 0021    Order Status: No result Specimen: Urine  Updated: 12/17/24 0137    Blood culture [[id number]] Collected: 12/16/24 2230    Order Status: Sent Specimen: Blood from Peripheral, Forearm, Left     Blood culture [[id number]] Collected: 12/16/24 2230    Order Status: Sent Specimen: Blood from Peripheral, Forearm, Left

## 2024-12-17 NOTE — ASSESSMENT & PLAN NOTE
The patient's most recent sodium results are listed below.  Recent Labs     12/16/24  2223   *     Plan  - Correct the sodium by 4-6mEq in 24 hours.   - Obtain the following studies: Urine sodium, urine osmolality, serum osmolality, AM cortisol, or TSH, T4.  - Will treat the hyponatremia with IV fluids as follows: NS

## 2024-12-17 NOTE — HOSPITAL COURSE
The patient is a 85 yo female with recent kidney stones s/p cystoscopy with stent placement on 11/05/2024 and recent UTI on 12/11-on Bactrim for E coli urinary tract infection who was admitted for Sepsis due to UTI and Liver abscess on IV  Zosyn and Vanco. Temp 101F on admit. Urine culture 12/11/24 grew ESBL E coli. IV Abx changed to IV Meropenem. IR consulted and pt underwent CT guided drain placement per IR-20 ml of green pus sent to gram stain and culture.IR recommended monitoring drain output, Gentle flushing with 5mL NS every shift, and Drain can be removed after when less than 10mL output over 24 hours and patient is clinically improved.   ID consulted and recommended IV Meropenem x 3 weeks. Right sided ZINA drain had 100ml output overnight.   Afebrile, WBC 16>19>11>5.9. Blood cultures show NGTD. Repeat urine culture showed no growth (pt was on bactrim as OP). Liver abscess Aerobic culture showed no growth. Anaerobic culture pending.   Discussed the possibility of discharge with drain and IV abx with daughter. She does not feel they can manage the IV abx. Pt has dementia and is unable to manage IV abx. CM consulted for SNF.  Pt pulled at drain and there was concern for dislodgement. Repeat CT abd showed decrease in size of suspected abscess in the inferior aspect of the right lobe of the liver, drainage catheter in good position.   On 12/20/24, Liver abscess aerobic culture now growing STREPTOCOCCUS CONSTELLATUS. Urine culture on admit (12/17) still no growth. Urine culture 12/11 grew ESBL Intravenous antibiotics:  Dr Lopez recommended to change IV abx to IV Rocephin 2 gram daily for 3 weeks Estimated end date of IV antibiotics: 01/10/2025. Weekly Labs:CBC, CMP, ESR, CRP. Will need repeat CT abd prior to discontinuing drain  Pt still having pain to RUQ. Drain only put out 5 cc/24 hours on 12/19, 12cc on 12/20, and 10 cc/24 hours on 12/21. Dr. Gabriel with radiology recommended continue flushing drain tube through  the weekend and reassess with repeat CT abd on Monday. Repeat CT abd ordered.   Awaiting SNF insurance approval -may need P2P    12/23- pt seen and examined, chart, imaging reviewed. Appreciate IR/ Dr. Gabriel. Pt doing better, lying in bed comfortably,. VSS afeb, no drainage from the Liver ZINA drain- hence IR pulled it out this adm, after repeat CT abd showed that the Liver abscess had almost resolved. Pt participated in the PT/Ot and needs mod assist. SW arranging SNF- await insurance auth. Cont Rocephin. Labs stable. Normal WBC. All Cx remain NGTD.     12/24- looks and feels better, sitting up in bed, eating and talking, ambulating on her own well as well. Talking easily but gets pleasantly confused at times. Son at her side. SNF could not be arranged at Admissions coordinator at the facility not present, hence likely after Lexa. Cont IV rocephin till 1/10/25. Started on triple Vit plus Inj b12 IM.     12/25- resting well in bed, no complaints, VSS afeb, exam unchanged. Eating drinking well, ambulating well. Await SNF placement for IV Abx till 1/10/2025 for the Liver Abscess.     12/26- looks and feels much better, sitting up in bed comfortably on RA, eating drinking well, ambulating well. Daughter at her side. She has been accepted at the SNF for continuing IV abx till 1/10/25 and they feel ready to go now. She was seen and examined and deemed stable for discharge to SNF today.

## 2024-12-17 NOTE — H&P
[facility]  History & Physical    Patient Name: Irlanda Lopez  MRN: [id number]  Patient Class: IP- Inpatient  Admission Date: 12/16/2024  Attending Physician: Tierra Lopez MD   Primary Care Provider: Myla Arias MD         Patient information was obtained from patient, relative(s), past medical records, and ER records.     Subjective:     Principal Problem:Liver abscess    Chief Complaint:   Chief Complaint   Patient presents with    abnormal labs     Pt was told to come to the ED for an abnormal CT scan.         HPI:  Patient is 84-year-old female with past medical history significant for  hypertension, hypothyroidism,, Alzheimer's dementia, anxiety, hyperlipidemia, colostomy, kidney stones, stroke, osteopenia, chronic urinary tract infections, right breast mass, and anemia who presented to ED as directed by Dr. Matute due to abnormal CT scan showing liver abscess.  She recently had cystoscopy with stent placement on 11/05/2024 and then was diagnosed with urinary tract infection at urgent care on 12/11, has been on Bactrim for E coli urinary tract infection.  Patient was scheduled for an ultrasound to be done which was ordered by Dr. Matute on 12/16, however  patient was unable to tolerate the ultrasound due to increased pain.  He then ordered CT scan and when results showed liver abscess he directed her to ED.  Due to her dementia, most information is obtained from her family at bedside. She does report chills, and intermittent abdominal pain. She denies dysuria, nausea, vomiting, chest pain, shortness of breath, cough, diarrhea.  on arrival to ED, temp 98.4°, heart rate 66, respirations 16, blood pressure 141/66, 97% SpO2 on room air.  Lab workup shows WBC 16.75, hemoglobin 9.8, hematocrit 29.1, sodium 126, CO2 18, BUN 8, creatinine 0.8, albumin 2.5, normal LFTs, lipase 14, lipase 0.8.  Urinalysis has been ordered, is pending.  Hospital Medicine was consulted  for admission due to liver abscess for IV antibiotics and to consult IR for possible drainage of abscess in AM.    Past Medical History:   Diagnosis Date    Clostridium difficile colitis     Dementia     Diverticulitis     s/p colostomy    High cholesterol     Hypertension     Hypothyroidism     Kidney stones        Past Surgical History:   Procedure Laterality Date    APPENDECTOMY  2008    CATARACT EXTRACTION      Dr Raygoza    COLON SURGERY      COLONOSCOPY N/A 01/02/2019    Procedure: COLONOSCOPY;  Surgeon: Reece Hogan MD;  Location: [facility];  Service: Endoscopy;  Laterality: N/A;    COLONOSCOPY N/A 06/07/2019    Procedure: COLONOSCOPY;  Surgeon: Rishabh Connelly MD;  Location: [facility];  Service: General;  Laterality: N/A;    COLONOSCOPY N/A 06/23/2021    Procedure: COLONOSCOPY;  Surgeon: Lucy Lafleur MD;  Location: [facility];  Service: Endoscopy;  Laterality: N/A;    COLOSTOMY Left 01/02/2019    Procedure: CREATION, COLOSTOMY;  Surgeon: Reece Hogan MD;  Location: [facility];  Service: General;  Laterality: Left;    CYSTOSCOPIC LITHOLAPAXY Right 5/14/2024    Procedure: CYSTOLITHOLAPAXY;  Surgeon: Anselmo Matute MD;  Location: [facility];  Service: Urology;  Laterality: Right;    CYSTOSCOPY W/ URETERAL STENT REMOVAL Right 11/5/2024    Procedure: CYSTOSCOPY, WITH URETERAL STENT REMOVAL;  Surgeon: Anselmo Matute MD;  Location: [facility];  Service: Urology;  Laterality: Right;    CYSTOSCOPY WITH URETEROSCOPY, RETROGRADE PYELOGRAPHY, AND INSERTION OF STENT Right 08/23/2022    Procedure: CYSTOSCOPY, WITH RETROGRADE PYELOGRAM AND URETERAL STENT INSERTION;  Surgeon: Anselmo Matute MD;  Location: [facility];  Service: Urology;  Laterality: Right;    CYSTOSCOPY WITH URETEROSCOPY, RETROGRADE PYELOGRAPHY, AND INSERTION OF STENT Right 11/07/2023    Procedure: CYSTOSCOPY, WITH RETROGRADE PYELOGRAM AND URETERAL STENT INSERTION;  Surgeon: Anselmo Matute MD;  Location: [facility];  Service: Urology;   Laterality: Right;  stent exchange    CYSTOSCOPY WITH URETEROSCOPY, RETROGRADE PYELOGRAPHY, AND INSERTION OF STENT Right 5/14/2024    Procedure: CYSTOSCOPY, WITH RETROGRADE PYELOGRAM AND URETERAL STENT INSERTION;  Surgeon: Anselmo Matute MD;  Location: [facility];  Service: Urology;  Laterality: Right;    CYSTOURETEROSCOPY WITH RETROGRADE PYELOGRAPHY AND INSERTION OF STENT INTO URETER Right 11/08/2022    Procedure: CYSTOURETEROSCOPY, WITH RETROGRADE PYELOGRAM AND URETERAL STENT INSERTION;  Surgeon: Anselmo Matute MD;  Location: [facility];  Service: Urology;  Laterality: Right;    CYSTOURETEROSCOPY, WITH HOLMIUM LASER LITHOTRIPSY OF URETERAL CALCULUS AND STENT INSERTION Right 04/04/2023    Procedure: CYSTOURETEROSCOPY, WITH HOLMIUM LASER LITHOTRIPSY OF URETERAL CALCULUS AND STENT INSERTION;  Surgeon: Anselmo Matute MD;  Location: [facility];  Service: Urology;  Laterality: Right;    CYSTOURETEROSCOPY, WITH HOLMIUM LASER LITHOTRIPSY OF URETERAL CALCULUS AND STENT INSERTION Right 11/5/2024    Procedure: CYSTOURETEROSCOPY, WITH HOLMIUM LASER LITHOTRIPSY OF URETERAL CALCULUS AND STENT INSERTION;  Surgeon: Anselmo Matute MD;  Location: [facility];  Service: Urology;  Laterality: Right;  vesical litholapaxy    CYSTOURETEROSCOPY,WITH HOLMIUM LASER LITHOTRIPSY OF URETERAL CALCULUS Right 11/07/2023    Procedure: CYSTOURETEROSCOPY,WITH HOLMIUM LASER LITHOTRIPSY OF URETERAL CALCULUS;  Surgeon: Anselmo Matute MD;  Location: [facility];  Service: Urology;  Laterality: Right;    ESOPHAGOGASTRODUODENOSCOPY N/A 09/16/2020    Procedure: ESOPHAGOGASTRODUODENOSCOPY (EGD)- Needs Rapid;  Surgeon: Lucy Lafleur MD;  Location: [facility];  Service: Endoscopy;  Laterality: N/A;    EYE SURGERY  ?? Dr Jaret PINEDO PROCEDURE N/A 01/02/2019    Procedure: SHAHIDA PROCEDURE;  Surgeon: Reece Hogan MD;  Location: [facility];  Service: General;  Laterality: N/A;    LYSIS OF ADHESIONS N/A 01/02/2019    Procedure: LYSIS,  ADHESIONS;  Surgeon: Reece Hogan MD;  Location: [facility] OR;  Service: General;  Laterality: N/A;    RETROGRADE PYELOGRAPHY Right 11/5/2024    Procedure: PYELOGRAM, RETROGRADE;  Surgeon: Anselmo Matute MD;  Location: [facility] OR;  Service: Urology;  Laterality: Right;    VITRECTOMY Right 09/2013       Review of patient's allergies indicates:   Allergen Reactions    Omnicef [cefdinir] Itching       No current facility-administered medications on file prior to encounter.     Current Outpatient Medications on File Prior to Encounter   Medication Sig    acetaminophen (TYLENOL) 500 MG tablet Take 1,000 mg by mouth every 6 (six) hours as needed for Pain or Temperature greater than (101F, fever, headache, arthritis).    atorvastatin (LIPITOR) 10 MG tablet 10mg tablet po twice weekly to be given on Monday and Thursday    b complex vitamins capsule Take 1 capsule by mouth once daily.    buPROPion (WELLBUTRIN XL) 150 MG TB24 tablet Take 1 tablet (150 mg total) by mouth every morning.    busPIRone (BUSPAR) 10 MG tablet Take 1 tablet (10 mg total) by mouth with lunch. May also take 1 tablet (10 mg total) daily as needed (anxiety, panic attacks, agitation).    carboxymethylcellulose (REFRESH LIQUIGEL) 1 % ophthalmic solution Apply 2 drops to eye 2 (two) times daily. May also apply 1 drop 2 (two) times daily as needed (for dry eyes). Bilateral eyes ( Refresh eye drops/ saline).    dicyclomine (BENTYL) 20 mg tablet Take 1 tablet (20 mg total) by mouth before meals and at bedtime as needed (abdominal cramping, belly pain, stomach pain around colostomy).    famotidine (PEPCID) 40 MG tablet Take 1 tablet (40 mg total) by mouth 2 (two) times daily as needed for Heartburn (reflux, indigestion, flatulence, upset stomach).    furosemide (LASIX) 20 MG tablet Take 1 tablet (20 mg total) by mouth once daily. For leg edema, and blood pressure. Hold medication if blood pressure is < 100 systolic with AM BP readings    hyoscyamine 0.125 mg Subl  Place 2 tablets (0.25 mg total) under the tongue every 4 (four) hours as needed (flank pain, back pain, kidney spasms, abdoninal spasms).    memantine (NAMENDA) 10 MG Tab Take 1 tablet (10 mg total) by mouth 2 (two) times daily.    ondansetron (ZOFRAN-ODT) 8 MG TbDL Take 1 tablet (8 mg total) by mouth every 12 (twelve) hours as needed (nausea, vomiting, upset stomach).    phenazopyridine (PYRIDIUM) 200 MG tablet Take 1 tablet (200 mg total) by mouth 3 (three) times daily as needed (bladder spasms, urinary tract infection, bladder pain).    polyethylene glycol (GLYCOLAX) 17 gram/dose powder Take 17 g by mouth once daily. For constipation    potassium chloride (MICRO-K) 10 MEQ CpSR Take 1 capsule (10 mEq total) by mouth once daily.    risperiDONE (RISPERDAL) 0.5 MG Tab Take 1 tablet (0.5 mg total) by mouth every evening.    Saccharomyces boulardii (FLORASTOR) 250 mg capsule Take 1 capsule (250 mg total) by mouth Daily. For probiotic for GI tract with colostomy ( ok for generic for florastor)    sulfamethoxazole-trimethoprim 800-160mg (BACTRIM DS) 800-160 mg Tab Take 1 tablet by mouth 2 (two) times daily. for 7 days    thyroid, pork, (ARMOUR THYROID) 30 mg Tab Take 1 tablet (30 mg total) by mouth every evening.    vitamin D (VITAMIN D3) 1000 units Tab Take 2 tablets (2,000 Units total) by mouth every morning.    [DISCONTINUED] triamcinolone acetonide 0.1% (KENALOG) 0.1 % ointment APPLY TOPICALLY 4 (FOUR) TIMES DAILY. TO RASH ON FACE AND CHEST AND NOSE     Family History       Problem Relation (Age of Onset)    Alzheimer's disease Mother    Aneurysm Father    Bladder Cancer Grandchild    Parkinsonism Brother    Stomach cancer Brother          Tobacco Use    Smoking status: Former     Types: Cigarettes     Passive exposure: Never    Smokeless tobacco: Never    Tobacco comments:     Smoked in 20's. Quit soon   Substance and Sexual Activity    Alcohol use: No    Drug use: No    Sexual activity: Never     Comment:       Review of Systems   Reason unable to perform ROS: limited due to dementia/memory impairment.   Constitutional:  Positive for chills. Negative for fever.   HENT: Negative.     Eyes: Negative.    Respiratory: Negative.  Negative for cough, shortness of breath and wheezing.    Cardiovascular: Negative.  Negative for chest pain and leg swelling.   Gastrointestinal:  Positive for abdominal pain. Negative for blood in stool, constipation, diarrhea, nausea and vomiting.        Right sided abdominal pain during ultrasound yesterday -- intermittent   Endocrine: Positive for cold intolerance.   Genitourinary: Negative.  Negative for difficulty urinating.   Musculoskeletal: Negative.    Skin: Negative.    Allergic/Immunologic: Negative.    Neurological: Negative.    Psychiatric/Behavioral:  The patient is nervous/anxious.      Objective:     Vital Signs (Most Recent):  Temp: (!) 101 °F (38.3 °C) (12/17/24 0015)  Pulse: 72 (12/17/24 0100)  Resp: (!) 28 (12/17/24 0100)  BP: (!) 116/56 (12/17/24 0100)  SpO2: 95 % (12/17/24 0100) Vital Signs (24h Range):  Temp:  [98.4 °F (36.9 °C)-101 °F (38.3 °C)] 101 °F (38.3 °C)  Pulse:  [66-72] 72  Resp:  [16-28] 28  SpO2:  [95 %-99 %] 95 %  BP: (116-184)/(56-74) 116/56     Weight: 71 kg (156 lb 8.4 oz)  Body mass index is 28.63 kg/m².     Physical Exam  Vitals reviewed.   Constitutional:       General: She is not in acute distress.  HENT:      Head: Normocephalic.      Nose: Nose normal.      Mouth/Throat:      Mouth: Mucous membranes are moist.   Eyes:      Extraocular Movements: Extraocular movements intact.      Pupils: Pupils are equal, round, and reactive to light.   Cardiovascular:      Rate and Rhythm: Normal rate and regular rhythm.      Pulses: Normal pulses.      Heart sounds: Normal heart sounds.   Pulmonary:      Effort: Pulmonary effort is normal. No respiratory distress.      Breath sounds: Normal breath sounds. No wheezing or rales.   Chest:      Chest wall: No tenderness.    Abdominal:      General: Bowel sounds are normal. There is no distension.      Palpations: Abdomen is soft.      Tenderness: There is no abdominal tenderness.      Comments: colostomy   Musculoskeletal:         General: Normal range of motion.      Right lower leg: No edema.      Left lower leg: No edema.   Skin:     General: Skin is warm and dry.      Capillary Refill: Capillary refill takes less than 2 seconds.   Neurological:      Mental Status: She is alert. Mental status is at baseline.      Comments: Oriented to person and place, forgetful/memory impairment due to dementia, family at bedside states patient is at baseline mentation   CLIFFORD 5/5   Psychiatric:      Comments: Anxious              CRANIAL NERVES     CN III, IV, VI   Pupils are equal, round, and reactive to light.       Significant Labs: All pertinent labs within the past 24 hours have been reviewed.    Bilirubin:   Recent Labs   Lab 11/26/24  1237 12/16/24  2223   BILITOT 0.7 0.8     Blood Cultures x 2 ordered    CBC:   Recent Labs   Lab 12/16/24  2223   WBC 16.75*   HGB 9.8*   HCT 29.1*        CMP:   Recent Labs   Lab 12/16/24  2223   *   K 3.9   CL 97   CO2 18*   GLU 93   BUN 8   CREATININE 0.8   CALCIUM 8.5*   PROT 6.2   ALBUMIN 2.5*   BILITOT 0.8   ALKPHOS 96   AST 27   ALT 24   ANIONGAP 11     Lactic Acid:   Recent Labs   Lab 12/16/24  2223   LACTATE 0.8     Lipase:   Recent Labs   Lab 12/16/24  2223   LIPASE 14     UA pending    Significant Imaging: I have reviewed all pertinent imaging results/findings within the past 24 hours.    CT RENAL STONE STUDY ABD PELVIS WO     CLINICAL HISTORY:  Calculus of kidneyrenal stones;     TECHNIQUE:  Noncontrast images were obtained     COMPARISON:  Renal ultrasound 10/23/2024, CT abdomen pelvis, 03/20/2023.     FINDINGS:  Small right pleural effusion.  1.5 cm masslike lesion medial lower quadrant of the right breast.  Interval enlargement since prior CT.     The liver, the spleen, and the  pancreas appear normal.     Gallbladder is contracted.  No bile duct dilatation.     Right ureteral stent appears to be in good position.  Large stone related to the right renal pelvis measuring 3.3 cm in size.  No significant hydronephrosis.  However, stable marked parenchymal thinning is present of the right kidney suggesting chronic obstruction.  And there is interval development of a complex ill-defined masslike lesion in the adjacent right lobe of the liver which appears contiguous with the superior aspect of the right kidney which probably corresponds to a 4 cm hepatic abscess or perihepatic abscess.  Left kidney appears unremarkable.  No hydronephrosis or renal stones.     The aorta and inferior vena cava are unremarkable.     No evidence of bowel obstruction.  There is a peristomal hernia on the left containing fat as well as loops of bowel measuring 10 cm.  No obstruction.     Bladder is normal. No abnormal masses or fluid collections in the pelvis.     Degenerative changes of the spine.  Stable chronic compression fracture involving T11..     Impression:     Interval development of a masslike lesion in the right lobe of the liver contiguous with the right kidney concerning for a 4 cm abscess.  Malignancy not excluded.     3.3 cm right renal stone right ureteral stent in place without significant hydronephrosis.     Suspicious appearing medial lower quadrant right breast mass measuring 1.5 cm.  Correlation with diagnostic mammogram and ultrasound is recommended.    Assessment/Plan:     * Liver abscess  NPO x medications  Consult IR for drainage  Blood cultures ordered, pending  Lactic acid normal, WBC 16.75  Zosyn and vancomycin ordered  Hydrating with IV fluids  PRN acetaminophen for fever      Hyponatremia   The patient's most recent sodium results are listed below.  Recent Labs     12/16/24  2223   *     Plan  - Correct the sodium by 4-6mEq in 24 hours.   - Obtain the following studies: Urine  sodium, urine osmolality, serum osmolality, AM cortisol, or TSH, T4.  - Will treat the hyponatremia with IV fluids as follows: NS    Frequent urinary tract infections  Diagnosed with UTI per urgent care visit 12/11 -- has been on Bactrim, culture + E coli  UA ordered/pending  Covering with broad spectrum antibiotics (Zosyn/Vancomycin)      Anemia   Most recent hemoglobin and hematocrit are listed below.  Recent Labs     12/16/24  2223   HGB 9.8*   HCT 29.1*     Plan  - Monitor serial CBC: Daily  - Transfuse PRBC if patient becomes hemodynamically unstable, symptomatic or H/H drops below 7/21.      MARTIN (generalized anxiety disorder)  Very anxious  Resume home Buspar      Dementia  Continue home medications  Fall precautions, use bed alarm  Requires frequent reorientation  Supportive care    Kidney stone  Had cystoscopy, stent placed per Dr. Matute on 11/5        Hypothyroidism  Checking thyroid studies  Continue armour thyroid per home med list        VTE Risk Mitigation (From admission, onward)           Ordered     Reason for No Pharmacological VTE Prophylaxis  Once        Comments: Procedure required   Question:  Reasons:  Answer:  Physician Provided (leave comment)    12/17/24 0110     IP VTE HIGH RISK PATIENT  Once         12/17/24 0110     Place sequential compression device  Until discontinued         12/17/24 0110                   This patient's case has been reviewed by my supervising physician, Dr. Tierra Lopez.  Supervising physician will provide additional orders and recommendations at his or her discretion.         Melly Kaplan NP  Department of Hospital Medicine  [facility] - Telemetry ([facility])

## 2024-12-17 NOTE — PLAN OF CARE
Plan of care and orders reviewed with patient. Medications reviewed with patient. Safety measures inplace including bed locked in lowest position call light in reach and side rails for mobility. Chart and orders reviewed.     Problem: Adult Inpatient Plan of Care  Goal: Plan of Care Review  Outcome: Progressing  Flowsheets (Taken 12/17/2024 0306)  Plan of Care Reviewed With:   patient   child  Goal: Patient-Specific Goal (Individualized)  Outcome: Progressing  Goal: Absence of Hospital-Acquired Illness or Injury  Outcome: Progressing  Goal: Optimal Comfort and Wellbeing  Outcome: Progressing  Goal: Readiness for Transition of Care  Outcome: Progressing

## 2024-12-17 NOTE — ASSESSMENT & PLAN NOTE
NPO x medications  Consult IR for drainage  Blood cultures ordered, pending  Lactic acid normal, WBC 16.75  Zosyn and vancomycin ordered  Hydrating with IV fluids  PRN acetaminophen for fever    12/17/24: IR consulted and pt underwent CT guided drain placement per IR-20 ml of green pus sent to gram stain and culture.IR recommended monitoring drain output, Gentle flushing with 5mL NS every shift, and Drain can be removed after when less than 10mL output over 24 hours and patient is clinically improved.   ID consulted

## 2024-12-17 NOTE — PLAN OF CARE
PT EVAL complete. Required SBA for bed mobility, ambulated 30ft SBA using RW. Recommending low intensity therapy upon d/c.

## 2024-12-17 NOTE — PROGRESS NOTES
[facility] Medicine  Progress Note    Patient Name: Irlanda Lopez  MRN: [id number]  Patient Class: IP- Inpatient   Admission Date: 12/16/2024  Length of Stay: 0 days  Attending Physician: Eric Lynn MD  Primary Care Provider: Myla Arias MD        Subjective     Principal Problem:Liver abscess        HPI:   Patient is 84-year-old female with past medical history significant for  hypertension, hypothyroidism,, Alzheimer's dementia, anxiety, hyperlipidemia, colostomy, kidney stones, stroke, osteopenia, chronic urinary tract infections, right breast mass, and anemia who presented to ED as directed by Dr. Matute due to abnormal CT scan showing liver abscess.  She recently had cystoscopy with stent placement on 11/05/2024 and then was diagnosed with urinary tract infection at urgent care on 12/11, has been on Bactrim for E coli urinary tract infection.  Patient was scheduled for an ultrasound to be done which was ordered by Dr. Matute on 12/16, however  patient was unable to tolerate the ultrasound due to increased pain.  He then ordered CT scan and when results showed liver abscess he directed her to ED.  Due to her dementia, most information is obtained from her family at bedside. She does report chills, and intermittent abdominal pain. She denies dysuria, nausea, vomiting, chest pain, shortness of breath, cough, diarrhea.  on arrival to ED, temp 98.4°, heart rate 66, respirations 16, blood pressure 141/66, 97% SpO2 on room air.  Lab workup shows WBC 16.75, hemoglobin 9.8, hematocrit 29.1, sodium 126, CO2 18, BUN 8, creatinine 0.8, albumin 2.5, normal LFTs, lipase 14, lipase 0.8.  Urinalysis has been ordered, is pending.  Hospital Medicine was consulted for admission due to liver abscess for IV antibiotics and to consult IR for possible drainage of abscess in AM.    Overview/Hospital Course:  The patient is a 83 yo female with recent kidney stones s/p cystoscopy with stent  placement on 11/05/2024 and recent UTI on 12/11-on Bactrim for E coli urinary tract infection who was admitted for Sepsis due to UTI and Liver abscess on IV  Zosyn and Vanco. Temp 101F on admit. Urine culture 12/11/24 grew ESBL E coli. Blood cultures pending. IV Abx changed to IV Meropenem. IR consulted and pt underwent CT guided drain placement per IR-20 ml of green pus sent to gram stain and culture.IR recommended monitoring drain output, Gentle flushing with 5mL NS every shift, and Drain can be removed after when less than 10mL output over 24 hours and patient is clinically improved.   ID consulted   Afebrile, WBC 16>19.   Discussed the possibility of discharge with drain and IV abx with daughter. CM consulted.     Interval History: pt pleasantly confused. Afebrile now. WBC 16>18. Abx changed to IV Meropenem and ID consulted. Drain placed today.     Review of Systems   Unable to perform ROS: Dementia     Objective:     Vital Signs (Most Recent):  Temp: 97.6 °F (36.4 °C) (12/17/24 1104)  Pulse: (!) 52 (12/17/24 1104)  Resp: 16 (12/17/24 1104)  BP: (!) 132/58 (12/17/24 1104)  SpO2: (!) 92 % (12/17/24 1104) Vital Signs (24h Range):  Temp:  [97.6 °F (36.4 °C)-101 °F (38.3 °C)] 97.6 °F (36.4 °C)  Pulse:  [51-73] 52  Resp:  [16-28] 16  SpO2:  [92 %-100 %] 92 %  BP: ()/(52-74) 132/58     Weight: 74.3 kg (163 lb 12.8 oz)  Body mass index is 29.96 kg/m².    Intake/Output Summary (Last 24 hours) at 12/17/2024 1246  Last data filed at 12/17/2024 1104  Gross per 24 hour   Intake --   Output 201 ml   Net -201 ml         Physical Exam  Vitals and nursing note reviewed.   Constitutional:       General: She is not in acute distress.     Appearance: She is well-developed. She is not diaphoretic.   HENT:      Head: Normocephalic and atraumatic.      Nose: Nose normal.   Eyes:      General: No scleral icterus.     Conjunctiva/sclera: Conjunctivae normal.   Neck:      Trachea: No tracheal deviation.   Cardiovascular:      Rate  and Rhythm: Normal rate and regular rhythm.      Heart sounds: Normal heart sounds. No murmur heard.     No friction rub. No gallop.   Pulmonary:      Effort: Pulmonary effort is normal. No respiratory distress.      Breath sounds: Normal breath sounds. No stridor. No wheezing or rales.   Chest:      Chest wall: No tenderness.   Abdominal:      General: Bowel sounds are normal. There is no distension.      Palpations: Abdomen is soft. There is no mass.      Tenderness: There is abdominal tenderness (RUQ). There is no guarding or rebound.   Musculoskeletal:         General: No tenderness or deformity. Normal range of motion.      Cervical back: Normal range of motion and neck supple.   Skin:     General: Skin is warm and dry.      Coloration: Skin is not pale.      Findings: No erythema or rash.   Neurological:      Mental Status: She is alert.      Cranial Nerves: No cranial nerve deficit.      Motor: No abnormal muscle tone.      Coordination: Coordination normal.      Comments: Oriented to person only    Psychiatric:         Behavior: Behavior normal.         Thought Content: Thought content normal.             Significant Labs: All pertinent labs within the past 24 hours have been reviewed.    Significant Imaging: I have reviewed all pertinent imaging results/findings within the past 24 hours.    Assessment and Plan     * Liver abscess  NPO x medications  Consult IR for drainage  Blood cultures ordered, pending  Lactic acid normal, WBC 16.75  Zosyn and vancomycin ordered  Hydrating with IV fluids  PRN acetaminophen for fever    12/17/24: IR consulted and pt underwent CT guided drain placement per IR-20 ml of green pus sent to gram stain and culture.IR recommended monitoring drain output, Gentle flushing with 5mL NS every shift, and Drain can be removed after when less than 10mL output over 24 hours and patient is clinically improved.   ID consulted       Sepsis  This patient does have evidence of infective  focus  My overall impression is sepsis.  Source: Urinary Tract and Liver abscess  Antibiotics given-   Antibiotics (72h ago, onward)      Start     Stop Route Frequency Ordered    12/17/24 0845  meropenem 2 g in 0.9% NaCl 100 mL IVPB (MB+)         -- IV Every 12 hours (non-standard times) 12/17/24 0742          Latest lactate reviewed-  Recent Labs   Lab 12/17/24  0932   LACTATE 1.1     Organ dysfunction indicated by  none    Fluid challenge Not needed - patient is not hypotensive      Post- resuscitation assessment No - Post resuscitation assessment not needed       Will Not start Pressors- Levophed for MAP of 65  Source control achieved by: IV Meropenem     UTI due to extended-spectrum beta lactamase (ESBL) producing Escherichia coli  Diagnosed with UTI per urgent care visit 12/11 -- has been on Bactrim, culture + E coli  Urine culture 12/11/24 grew ESBL E coli   Abx changed to IV Meropenem  Repeat urine culture and blood cultures pending   ID consutled     Hyponatremia   The patient's most recent sodium results are listed below.  Recent Labs     12/16/24  2223 12/17/24  0522   * 129*       Plan  - Correct the sodium by 4-6mEq in 24 hours.   - Obtain the following studies: Urine sodium, urine osmolality, serum osmolality, AM cortisol, or TSH, T4.  - Will treat the hyponatremia with IV fluids as follows: NS    12/17/24: improving with NS IVFs   Metabolic acidosis with Bicarb 13, LA normal   IVFs changed to Bicarb drip     Hypothyroidism  Checking thyroid studies  Continue armour thyroid per home med list      Anemia   Most recent hemoglobin and hematocrit are listed below.  Recent Labs     12/16/24  2223   HGB 9.8*   HCT 29.1*     Plan  - Monitor serial CBC: Daily  - Transfuse PRBC if patient becomes hemodynamically unstable, symptomatic or H/H drops below 7/21.      MARTIN (generalized anxiety disorder)  Very anxious  Resume home Buspar      Dementia  Continue home medications  Fall precautions, use bed  alarm  Requires frequent reorientation  Supportive care    Kidney stone  Had cystoscopy, stent placed per Dr. Matuet on 11/5          VTE Risk Mitigation (From admission, onward)           Ordered     Reason for No Pharmacological VTE Prophylaxis  Once        Comments: Procedure required   Question:  Reasons:  Answer:  Physician Provided (leave comment)    12/17/24 0110     IP VTE HIGH RISK PATIENT  Once         12/17/24 0110     Place sequential compression device  Until discontinued         12/17/24 0110                    Discharge Planning   MICHAEL:      Code Status: Full Code   Medical Readiness for Discharge Date:                    Please place Justification for DME        Kayley Russell NP  Department of Hospital Medicine   [facility] - Telemetry ([facility])

## 2024-12-17 NOTE — PROCEDURES
"Irlanda Lopez is a 84 y.o. female patient.    Temp: 97.6 °F (36.4 °C) (12/17/24 1104)  Pulse: (!) 52 (12/17/24 1104)  Resp: 16 (12/17/24 1104)  BP: (!) 132/58 (12/17/24 1104)  SpO2: (!) 92 % (12/17/24 1104)  Weight: 74.3 kg (163 lb 12.8 oz) (12/17/24 0428)  Height: 5' 2" (157.5 cm) (12/16/24 1252)    PICC  Date/Time: 12/17/2024 4:20 PM  Performed by: Lenard Martinez RN  Consent Done: Yes  Time out: Immediately prior to procedure a time out was called to verify the correct patient, procedure, equipment, support staff and site/side marked as required  Indications: med administration and vascular access  Anesthesia: local infiltration  Local anesthetic: lidocaine 1% without epinephrine  Anesthetic Total (mL): 2  Preparation: skin prepped with chlorhexidine (without alcohol)  Skin prep agent dried: skin prep agent completely dried prior to procedure  Sterile barriers: all five maximum sterile barriers used - cap, mask, sterile gown, sterile gloves, and large sterile sheet  Hand hygiene: hand hygiene performed prior to central venous catheter insertion  Location details: right basilic  Catheter type: double lumen  Catheter size: 4 Fr  Catheter Length: 31cm    Ultrasound guidance: yes  Vessel Caliber: medium and patent, compressibility normal  Needle advanced into vessel with real time Ultrasound guidance.  Guidewire confirmed in vessel.  Sterile sheath used.  Number of attempts: 1  Post-procedure: blood return through all ports, chlorhexidine patch and sterile dressing applied  Specimens: No  Implants: No  Assessment: placement verified by x-ray, free fluid flow, no pneumothorax on x-ray and successful placement          Lenard Martinez RN  12/17/2024    "

## 2024-12-17 NOTE — PROGRESS NOTES
Therapy with Vancomycin complete and/or consult discontinued by provider.  Pharmacy will sign off, please re-consult as needed.     Edu Machado D 12/17/2024 7:38 AM

## 2024-12-17 NOTE — ASSESSMENT & PLAN NOTE
NPO x medications  Consult IR for drainage  Blood cultures ordered, pending  Lactic acid normal, WBC 16.75  Zosyn and vancomycin ordered  Hydrating with IV fluids  PRN acetaminophen for fever

## 2024-12-17 NOTE — OP NOTE
Radiology Post-Procedure Note    Pre Op Diagnosis: Liver abscess.   Post Op Diagnosis: Same    Procedure: CT guided drain placement.     Procedure performed by: Dr Nicolás Vale    Written Informed Consent Obtained: Yes  Specimen Removed: YES 20 mL green puss for Culture and Gram stain.    Estimated Blood Loss: Minimal    Findings:   Drain in liver abscess.     Recommend monitoring drain output.    Gentle flushihng with 5mL NS every shift.  Drain can be removed after when less than 10mL output over 24 hours and patient is clinically improved.     Patient tolerated procedure well.    Nicolás Vale MD  Interventional Radiology

## 2024-12-17 NOTE — PT/OT/SLP PROGRESS
Physical Therapy      Patient Name:  Irlanda Lopez   MRN:  [id number]    PT orders received, EVAL initiated via chart review. Presented to pt's room at 1005. Patient not seen today secondary to Off the floor for procedure/surgery. Will continue efforts.    Faviola Deleon, PT, DPT  12/17/2024   1005

## 2024-12-17 NOTE — SUBJECTIVE & OBJECTIVE
Interval History: pt pleasantly confused. Afebrile now. WBC 16>18. Abx changed to IV Meropenem and ID consulted. Drain placed today.     Review of Systems   Unable to perform ROS: Dementia     Objective:     Vital Signs (Most Recent):  Temp: 97.6 °F (36.4 °C) (12/17/24 1104)  Pulse: (!) 52 (12/17/24 1104)  Resp: 16 (12/17/24 1104)  BP: (!) 132/58 (12/17/24 1104)  SpO2: (!) 92 % (12/17/24 1104) Vital Signs (24h Range):  Temp:  [97.6 °F (36.4 °C)-101 °F (38.3 °C)] 97.6 °F (36.4 °C)  Pulse:  [51-73] 52  Resp:  [16-28] 16  SpO2:  [92 %-100 %] 92 %  BP: ()/(52-74) 132/58     Weight: 74.3 kg (163 lb 12.8 oz)  Body mass index is 29.96 kg/m².    Intake/Output Summary (Last 24 hours) at 12/17/2024 1246  Last data filed at 12/17/2024 1104  Gross per 24 hour   Intake --   Output 201 ml   Net -201 ml         Physical Exam  Vitals and nursing note reviewed.   Constitutional:       General: She is not in acute distress.     Appearance: She is well-developed. She is not diaphoretic.   HENT:      Head: Normocephalic and atraumatic.      Nose: Nose normal.   Eyes:      General: No scleral icterus.     Conjunctiva/sclera: Conjunctivae normal.   Neck:      Trachea: No tracheal deviation.   Cardiovascular:      Rate and Rhythm: Normal rate and regular rhythm.      Heart sounds: Normal heart sounds. No murmur heard.     No friction rub. No gallop.   Pulmonary:      Effort: Pulmonary effort is normal. No respiratory distress.      Breath sounds: Normal breath sounds. No stridor. No wheezing or rales.   Chest:      Chest wall: No tenderness.   Abdominal:      General: Bowel sounds are normal. There is no distension.      Palpations: Abdomen is soft. There is no mass.      Tenderness: There is abdominal tenderness (RUQ). There is no guarding or rebound.   Musculoskeletal:         General: No tenderness or deformity. Normal range of motion.      Cervical back: Normal range of motion and neck supple.   Skin:     General: Skin is warm  and dry.      Coloration: Skin is not pale.      Findings: No erythema or rash.   Neurological:      Mental Status: She is alert.      Cranial Nerves: No cranial nerve deficit.      Motor: No abnormal muscle tone.      Coordination: Coordination normal.      Comments: Oriented to person only    Psychiatric:         Behavior: Behavior normal.         Thought Content: Thought content normal.             Significant Labs: All pertinent labs within the past 24 hours have been reviewed.    Significant Imaging: I have reviewed all pertinent imaging results/findings within the past 24 hours.

## 2024-12-17 NOTE — HPI
Patient is 84-year-old female with past medical history significant for  hypertension, hypothyroidism,, Alzheimer's dementia, anxiety, hyperlipidemia, colostomy, kidney stones, stroke, osteopenia, chronic urinary tract infections, right breast mass, and anemia who presented to ED as directed by Dr. Matute due to abnormal CT scan showing liver abscess.  She recently had cystoscopy with stent placement on 11/05/2024 and then was diagnosed with urinary tract infection at urgent care on 12/11, has been on Bactrim for E coli urinary tract infection.  Patient was scheduled for an ultrasound to be done which was ordered by Dr. Matute on 12/16, however  patient was unable to tolerate the ultrasound due to increased pain.  He then ordered CT scan and when results showed liver abscess he directed her to ED.  Due to her dementia, most information is obtained from her family at bedside. She does report chills, and intermittent abdominal pain. She denies dysuria, nausea, vomiting, chest pain, shortness of breath, cough, diarrhea.  on arrival to ED, temp 98.4°, heart rate 66, respirations 16, blood pressure 141/66, 97% SpO2 on room air.  Lab workup shows WBC 16.75, hemoglobin 9.8, hematocrit 29.1, sodium 126, CO2 18, BUN 8, creatinine 0.8, albumin 2.5, normal LFTs, lipase 14, lipase 0.8.  Urinalysis has been ordered, is pending.  Hospital Medicine was consulted for admission due to liver abscess for IV antibiotics and to consult IR for possible drainage of abscess in AM.

## 2024-12-17 NOTE — INTERVAL H&P NOTE
The patient has been examined and the H&P has been reviewed:    I concur with the findings and no changes have occurred since H&P was written.        Active Hospital Problems    Diagnosis  POA    *Liver abscess [K75.0]  Yes    Anemia [D64.9]  Yes    Hyponatremia [E87.1]  Yes    MARTIN (generalized anxiety disorder) [F41.1]  Yes    Dementia [F03.90]  Yes     Chronic    Frequent urinary tract infections [N39.0]  Yes    Kidney stone [N20.0]  Yes    Hypothyroidism [E03.9]  Yes     for 15 yrs only tried armour thyroid through Dr Yu, willing to pay out of pocket, since on medicare. will refill when labs returned        Resolved Hospital Problems   No resolved problems to display.

## 2024-12-17 NOTE — ASSESSMENT & PLAN NOTE
Diagnosed with UTI per urgent care visit 12/11 -- has been on Bactrim, culture + E coli  Urine culture 12/11/24 grew ESBL E coli   Abx changed to IV Meropenem  ID consutled

## 2024-12-17 NOTE — ED NOTES
Bed: 16  Expected date:   Expected time:   Means of arrival: Personal Transportation  Comments:  When Clean

## 2024-12-17 NOTE — ASSESSMENT & PLAN NOTE
This patient does have evidence of infective focus  My overall impression is sepsis.  Source: Urinary Tract and Liver abscess  Antibiotics given-   Antibiotics (72h ago, onward)      Start     Stop Route Frequency Ordered    12/17/24 0845  meropenem 2 g in 0.9% NaCl 100 mL IVPB (MB+)         -- IV Every 12 hours (non-standard times) 12/17/24 0742          Latest lactate reviewed-  Recent Labs   Lab 12/17/24  0932   LACTATE 1.1     Organ dysfunction indicated by  none    Fluid challenge Not needed - patient is not hypotensive      Post- resuscitation assessment No - Post resuscitation assessment not needed       Will Not start Pressors- Levophed for MAP of 65  Source control achieved by: IV Meropenem

## 2024-12-17 NOTE — CONSULTS
[facility] - Telemetry (Hospital)  Initial Discharge Assessment       Primary Care Provider: Myla Arias MD    Admission Diagnosis: Liver abscess [K75.0]  Chest pain [R07.9]    Admission Date: 12/16/2024  Expected Discharge Date:     Transition of Care Barriers: None    Payor: [employer] [facility] / Plan: [employer] / Product Type: Medicare Advantage /     Extended Emergency Contact Information  Primary Emergency Contact: Mundo Moreland  Mobile Phone: [phone]  Relation: Healthcare Power of   Secondary Emergency Contact: Myla Mora  Address: [address] [place] [address] [place]  Mobile Phone: [phone]  Relation: Daughter    Discharge Plan A: Assisted Living, Home Health  Discharge Plan B: Skilled Nursing Facility      [id number] [place] [facility] [address] [facility]  [address] [address]  Phone: [phone] Fax: [phone]    [facility] - [place] [address] [place] [address] [address]  Phone: [phone] Fax: [phone]    [facility] - [facility] [address]  [address]  [facility] [address]  Phone: [phone] Fax: [phone]    [id number] - [place] - [address] [facility] [address]  [address]  [place] [address]  Phone: [phone] Fax: [phone]      Initial Assessment (most recent)       Adult Discharge Assessment - 12/17/24 0922          Discharge Assessment    Assessment Type Discharge Planning Assessment     Confirmed/corrected address, phone number and insurance Yes     Confirmed Demographics Correct on Facesheet     Source of Information patient;family     Communicated MICHAEL with patient/caregiver Date not available/Unable to determine     Reason For Admission Liver abscess     People in Home facility  resident     Facility Arrived From: [facility] ([place])     Do you expect to return to your current living situation? Yes     Do you have help at home or someone to help you manage your care at home? Yes     Who are your caregiver(s) and their phone number(s)? Pt's children - Myla, Keri and Mundo (POA)     Prior to hospitilization cognitive status: Alert/Oriented     Current cognitive status: Alert/Oriented     Walking or Climbing Stairs Difficulty no     Dressing/Bathing Difficulty no     Equipment Currently Used at Home shower chair     Readmission within 30 days? No     Patient currently being followed by outpatient case management? No     Do you currently have service(s) that help you manage your care at home? No     Do you take prescription medications? Yes     Do you have prescription coverage? Yes     Do you have any problems affording any of your prescribed medications? No     Is the patient taking medications as prescribed? yes     Who is going to help you get home at discharge? Pt's children     How do you get to doctors appointments? family or friend will provide     Are you on dialysis? No     Do you take coumadin? No     Discharge Plan A Assisted Living;Home Health     Discharge Plan B Skilled Nursing Facility     DME Needed Upon Discharge  none     Discharge Plan discussed with: Patient;Adult children     Transition of Care Barriers None                   SW met with patient and daughter, Keri, at bedside to complete assessment. Pt was asleep throughout assessment. Keri reports pt is a resident at [facility] in [place]. Prior to [facility], pt was at home with 24hr care/supervision; however, this arrangement became too costly. Keri reports pt recently moved to [facility] in November and family is still adjusting to this transition. Keri reports family pays for dementia care. Keri reports that patient is ambulatory without DME.     Consult placed for SNF pending PT/OT recs and ABX recs. SW to  follow for final recs.      Pt's whiteboard updated with CM contact and anticipated discharge disposition. SW to remain available as needed.

## 2024-12-17 NOTE — ASSESSMENT & PLAN NOTE
Most recent hemoglobin and hematocrit are listed below.  Recent Labs     12/16/24  2223   HGB 9.8*   HCT 29.1*     Plan  - Monitor serial CBC: Daily  - Transfuse PRBC if patient becomes hemodynamically unstable, symptomatic or H/H drops below 7/21.

## 2024-12-17 NOTE — ASSESSMENT & PLAN NOTE
Continue home medications  Fall precautions, use bed alarm  Requires frequent reorientation  Supportive care

## 2024-12-18 ENCOUNTER — TELEPHONE (OUTPATIENT)
Dept: UROLOGY | Facility: CLINIC | Age: 84
End: 2024-12-18
Payer: MEDICARE

## 2024-12-18 LAB
ALBUMIN SERPL BCP-MCNC: 2.4 G/DL (ref 3.5–5.2)
ALP SERPL-CCNC: 97 U/L (ref 40–150)
ALT SERPL W/O P-5'-P-CCNC: 17 U/L (ref 10–44)
ANION GAP SERPL CALC-SCNC: 13 MMOL/L (ref 8–16)
AST SERPL-CCNC: 34 U/L (ref 10–40)
BACTERIA UR CULT: NO GROWTH
BASOPHILS # BLD AUTO: 0.05 K/UL (ref 0–0.2)
BASOPHILS NFR BLD: 0.4 % (ref 0–1.9)
BILIRUB SERPL-MCNC: 0.4 MG/DL (ref 0.1–1)
BUN SERPL-MCNC: 9 MG/DL (ref 8–23)
CALCIUM SERPL-MCNC: 9.1 MG/DL (ref 8.7–10.5)
CHLORIDE SERPL-SCNC: 101 MMOL/L (ref 95–110)
CO2 SERPL-SCNC: 22 MMOL/L (ref 23–29)
CREAT SERPL-MCNC: 0.9 MG/DL (ref 0.5–1.4)
DIFFERENTIAL METHOD BLD: ABNORMAL
EOSINOPHIL # BLD AUTO: 0.1 K/UL (ref 0–0.5)
EOSINOPHIL NFR BLD: 1 % (ref 0–8)
ERYTHROCYTE [DISTWIDTH] IN BLOOD BY AUTOMATED COUNT: 15 % (ref 11.5–14.5)
EST. GFR  (NO RACE VARIABLE): >60 ML/MIN/1.73 M^2
GLUCOSE SERPL-MCNC: 111 MG/DL (ref 70–110)
HCT VFR BLD AUTO: 37 % (ref 37–48.5)
HGB BLD-MCNC: 11.4 G/DL (ref 12–16)
IMM GRANULOCYTES # BLD AUTO: 0.27 K/UL (ref 0–0.04)
IMM GRANULOCYTES NFR BLD AUTO: 2.4 % (ref 0–0.5)
LYMPHOCYTES # BLD AUTO: 0.9 K/UL (ref 1–4.8)
LYMPHOCYTES NFR BLD: 7.9 % (ref 18–48)
MAGNESIUM SERPL-MCNC: 1.7 MG/DL (ref 1.6–2.6)
MCH RBC QN AUTO: 25.1 PG (ref 27–31)
MCHC RBC AUTO-ENTMCNC: 30.8 G/DL (ref 32–36)
MCV RBC AUTO: 81 FL (ref 82–98)
MONOCYTES # BLD AUTO: 0.7 K/UL (ref 0.3–1)
MONOCYTES NFR BLD: 5.9 % (ref 4–15)
NEUTROPHILS # BLD AUTO: 9.2 K/UL (ref 1.8–7.7)
NEUTROPHILS NFR BLD: 82.4 % (ref 38–73)
NRBC BLD-RTO: 0 /100 WBC
OSMOLALITY UR: 132 MOSM/KG (ref 50–1200)
PLATELET # BLD AUTO: 466 K/UL (ref 150–450)
PMV BLD AUTO: 8.5 FL (ref 9.2–12.9)
POTASSIUM SERPL-SCNC: 4 MMOL/L (ref 3.5–5.1)
PROT SERPL-MCNC: 6.3 G/DL (ref 6–8.4)
RBC # BLD AUTO: 4.55 M/UL (ref 4–5.4)
SODIUM SERPL-SCNC: 136 MMOL/L (ref 136–145)
WBC # BLD AUTO: 11.13 K/UL (ref 3.9–12.7)

## 2024-12-18 PROCEDURE — 83735 ASSAY OF MAGNESIUM: CPT | Performed by: NURSE PRACTITIONER

## 2024-12-18 PROCEDURE — 21400001 HC TELEMETRY ROOM

## 2024-12-18 PROCEDURE — 99233 SBSQ HOSP IP/OBS HIGH 50: CPT | Mod: NSCH,,, | Performed by: INTERNAL MEDICINE

## 2024-12-18 PROCEDURE — 97530 THERAPEUTIC ACTIVITIES: CPT

## 2024-12-18 PROCEDURE — 25000003 PHARM REV CODE 250: Performed by: INTERNAL MEDICINE

## 2024-12-18 PROCEDURE — 97535 SELF CARE MNGMENT TRAINING: CPT

## 2024-12-18 PROCEDURE — 36415 COLL VENOUS BLD VENIPUNCTURE: CPT | Performed by: NURSE PRACTITIONER

## 2024-12-18 PROCEDURE — 97116 GAIT TRAINING THERAPY: CPT

## 2024-12-18 PROCEDURE — 63600175 PHARM REV CODE 636 W HCPCS: Performed by: FAMILY MEDICINE

## 2024-12-18 PROCEDURE — 25000003 PHARM REV CODE 250: Performed by: FAMILY MEDICINE

## 2024-12-18 PROCEDURE — 85025 COMPLETE CBC W/AUTO DIFF WBC: CPT | Performed by: NURSE PRACTITIONER

## 2024-12-18 PROCEDURE — 99232 SBSQ HOSP IP/OBS MODERATE 35: CPT | Mod: ,,, | Performed by: UROLOGY

## 2024-12-18 PROCEDURE — 97165 OT EVAL LOW COMPLEX 30 MIN: CPT

## 2024-12-18 PROCEDURE — 80053 COMPREHEN METABOLIC PANEL: CPT | Performed by: NURSE PRACTITIONER

## 2024-12-18 PROCEDURE — 27000207 HC ISOLATION

## 2024-12-18 RX ADMIN — THYROID, PORCINE 30 MG: 30 TABLET ORAL at 08:12

## 2024-12-18 RX ADMIN — MEMANTINE 10 MG: 10 TABLET ORAL at 10:12

## 2024-12-18 RX ADMIN — BUSPIRONE HYDROCHLORIDE 10 MG: 10 TABLET ORAL at 08:12

## 2024-12-18 RX ADMIN — MEMANTINE 10 MG: 10 TABLET ORAL at 08:12

## 2024-12-18 RX ADMIN — MEROPENEM 2 G: 2 INJECTION, POWDER, FOR SOLUTION INTRAVENOUS at 10:12

## 2024-12-18 RX ADMIN — RISPERIDONE 0.5 MG: 0.5 TABLET, FILM COATED ORAL at 08:12

## 2024-12-18 RX ADMIN — HYPROMELLOSE 2910 2 DROP: 5 SOLUTION/ DROPS OPHTHALMIC at 10:12

## 2024-12-18 RX ADMIN — HYPROMELLOSE 2910 2 DROP: 5 SOLUTION/ DROPS OPHTHALMIC at 08:12

## 2024-12-18 RX ADMIN — BUSPIRONE HYDROCHLORIDE 10 MG: 10 TABLET ORAL at 10:12

## 2024-12-18 RX ADMIN — MEROPENEM 2 G: 2 INJECTION, POWDER, FOR SOLUTION INTRAVENOUS at 08:12

## 2024-12-18 NOTE — HPI
84-year-old female with past medical history significant for  hypertension, hypothyroidism,, Alzheimer's dementia, anxiety, hyperlipidemia, colostomy, kidney stones, stroke, osteopenia, chronic urinary tract infections, right breast mas.  She had recent cystoscopy with stent placement on 11/05/2024 and was on Bactrim for UTI.  CT scan of the abdomen  Since admission- labs and imaging test reviewed-  CT renal stone- 12/16/24 -  Interval development of a masslike lesion in the right lobe of the liver contiguous with the right kidney concerning for a 4 cm abscess.  Malignancy not excluded.     3.3 cm right renal stone right ureteral stent in place without significant hydronephrosis.     Suspicious appearing medial lower quadrant right breast mass measuring 1.5 cm.  Correlation with diagnostic mammogram and ultrasound is recommended.   She had CT guided  drainage    Lab workup shows WBC 16.75,   Cultures from liver pending   Urine culture- -12/11- ESBL E coli

## 2024-12-18 NOTE — ASSESSMENT & PLAN NOTE
The patient's most recent sodium results are listed below.  Recent Labs     12/16/24  2223 12/17/24  0522 12/18/24  0500   * 129* 136       Plan  - Correct the sodium by 4-6mEq in 24 hours.   - Obtain the following studies: Urine sodium, urine osmolality, serum osmolality, AM cortisol, or TSH, T4.  - Will treat the hyponatremia with IV fluids as follows: NS    12/17/24: improving with NS IVFs   Metabolic acidosis with Bicarb 13, LA normal   IVFs changed to Bicarb drip     12/18/24: resolved

## 2024-12-18 NOTE — PLAN OF CARE
Pt tolerated interventions well. Required SBA for bed mobility, ambulated 56ft SBA using RW. Recommending low intensity therapy upon d/c.

## 2024-12-18 NOTE — PT/OT/SLP PROGRESS
Physical Therapy Treatment    Patient Name:  Irlanda Lopez   MRN:  [id number]    Recommendations:     Discharge Recommendations: Low Intensity Therapy  Discharge Equipment Recommendations: walker, rolling  Barriers to discharge: None    Assessment:     Irlanda Lopez is a 84 y.o. female admitted with a medical diagnosis of Liver abscess.  She presents with the following impairments/functional limitations: weakness, impaired endurance, impaired functional mobility, gait instability, impaired balance, decreased safety awareness, decreased lower extremity function, decreased ROM, impaired cognition, pain.    Rehab Prognosis: Good; patient would benefit from acute skilled PT services to address these deficits and reach maximum level of function.    Recent Surgery: * No surgery found *      Plan:     During this hospitalization, patient to be seen 3 x/week to address the identified rehab impairments via gait training, therapeutic activities, therapeutic exercises and progress toward the following goals:    Plan of Care Expires:  12/31/24    Subjective     Chief Complaint: Pt is motivated to participate  Patient/Family Comments/goals: none stated  Pain/Comfort:  Pain Rating 1: 0/10      Objective:     Communicated with nurse Lanza and epic chart review prior to session.  Patient found supine with peripheral IV, telemetry, ZINA drain, PICC line, colostomy upon PT entry to room.     General Precautions: Standard, fall, contact  Orthopedic Precautions: N/A  Braces: N/A  Respiratory Status: Room air     Functional Mobility:  Gait belt applied - Yes  Increased verbal cuing needed  Bed Mobility  Rolling Left: stand by assistance  Scooting: stand by assistance  Supine to Sit: stand by assistance  Sit to Supine: stand by assistance  Transfers  Sit to Stand: stand by assistance with rolling walker  Bed to Chair: stand by assistance with rolling walker using Step Transfer  Gait  Patient ambulated 56ft with rolling walker and  "stand by assistance. Patient demonstrates steady gait. No c/o dizziness or SOB, no LOB. All lines remained intact throughout ambulation trial.  Balance  Sitting: stand by assistance  Standing: stand by assistance  Pt stood at sink x5 min to wash hands and brush teeth, SBA, no LOB    AM-PAC 6 CLICK MOBILITY  Turning over in bed (including adjusting bedclothes, sheets and blankets)?: 3  Sitting down on and standing up from a chair with arms (e.g., wheelchair, bedside commode, etc.): 3  Moving from lying on back to sitting on the side of the bed?: 3  Moving to and from a bed to a chair (including a wheelchair)?: 3  Need to walk in hospital room?: 3  Climbing 3-5 steps with a railing?: 1 (NT)  Basic Mobility Total Score: 16       Treatment & Education:  Reviewed role of PT in acute care and POC. Pt tolerated interventions well. Reviewed importance of OOB activities, activity pacing, and HEP (marching/hip flex, hip abd, heel slides/LAQ, quad sets, ankle pumps) in order to maintain/regain strength. Encouraged to sit EOB for all meals. Reviewed proper use of RW for safety and to reduce risk of falling. Reviewed "call don't fall" policy and increased risk of falling due to weakness, instructed to utilize call bell for assistance with all transfers. Pt agreeable to all requests.      Patient left with bed in chair position with all lines intact, call button in reach, and bed alarm on..    GOALS:   Multidisciplinary Problems       Physical Therapy Goals          Problem: Physical Therapy    Goal Priority Disciplines Outcome Interventions   Physical Therapy Goal     PT, PT/OT Progressing    Description: Goals to be met by 12/31/24.  1. Pt will complete bed mobility MOD I.  2. Pt will complete sit to stand MOD I.  3. Pt will ambulate 200ft MOD I using RW.  4. Pt will increase AMPAC score by 2 points to progress functional mobility.                       Time Tracking:     PT Received On: 12/18/24  PT Start Time: 1025     PT " Stop Time: 1055  PT Total Time (min): 30 min     Billable Minutes: Gait Training 15min and Therapeutic Activity 15min    Treatment Type: Treatment  PT/PTA: PT     Number of PTA visits since last PT visit: 0     12/18/2024

## 2024-12-18 NOTE — PROGRESS NOTES
Chief Complaint: liver abscess    HPI:   12/18/24- doing well following drain placement.    12/17/24-  84-year-old female well known to the service due to chronic right renal stone and stent exchanges on a serial basis.  Patient had her last stent exchanged 1st part of November, he had to have worsening right flank pain came in for an ultrasound but could not tolerate.  CT stone protocol demonstrated a liver abscess.  Stent appears to be in good position.  Patient is otherwise clinically stable.    Allergies:  Omnicef [cefdinir]    Medications:  See MAR    Review of Systems:  General: No fever, chills, fatigability, or weight loss.  Skin: No rashes, itching, or changes in color or texture of skin.  Chest: Denies JESUS, cyanosis, wheezing, cough, and sputum production.  Abdomen: Appetite fine. No weight loss. Denies diarrhea, abdominal pain, hematemesis, or blood in stool.  Musculoskeletal: No joint stiffness or swelling. Denies back pain.  : As above.  All other review of systems negative.    PMH:   has a past medical history of Clostridium difficile colitis, Dementia, Diverticulitis, High cholesterol, Hypertension, Hypothyroidism, and Kidney stones.    PSH:   has a past surgical history that includes Cataract extraction; Appendectomy (2008); Colonoscopy (N/A, 01/02/2019); Colostomy (Left, 01/02/2019); Maribel procedure (N/A, 01/02/2019); Lysis of adhesions (N/A, 01/02/2019); Colon surgery; Vitrectomy (Right, 09/2013); Colonoscopy (N/A, 06/07/2019); Esophagogastroduodenoscopy (N/A, 09/16/2020); Colonoscopy (N/A, 06/23/2021); Cystoscopy with ureteroscopy, retrograde pyelography, and insertion of stent (Right, 08/23/2022); Cystoureteroscopy with retrograde pyelography and insertion of stent into ureter (Right, 11/08/2022); cystoureteroscopy, with holmium laser lithotripsy of ureteral calculus and stent insertion (Right, 04/04/2023); Cystoscopy with ureteroscopy, retrograde pyelography, and insertion of stent (Right,  11/07/2023); cystoureteroscopy,with holmium laser lithotripsy of ureteral calculus (Right, 11/07/2023); Eye surgery (?? Dr Raygoza); Cystoscopy with ureteroscopy, retrograde pyelography, and insertion of stent (Right, 5/14/2024); Cystoscopic litholapaxy (Right, 5/14/2024); cystoureteroscopy, with holmium laser lithotripsy of ureteral calculus and stent insertion (Right, 11/5/2024); Cystoscopy w/ ureteral stent removal (Right, 11/5/2024); and Retrograde pyelography (Right, 11/5/2024).    FamHx: family history includes Alzheimer's disease in her mother; Aneurysm in her father; Bladder Cancer in her grandchild; Parkinsonism in her brother; Stomach cancer in her brother.    SocHx:  reports that she has quit smoking. Her smoking use included cigarettes. She has never been exposed to tobacco smoke. She has never used smokeless tobacco. She reports that she does not drink alcohol and does not use drugs.      Physical Exam:  Vitals:    12/18/24 0500   BP:    Pulse: 65   Resp:    Temp:      General: A&Ox3, no apparent distress, no deformities  Neck: No masses, normal ROM  Lungs: normal inspiration, no use of accessory muscles  Heart: normal pulse, no arrhythmias  Abdomen: Soft, NT, ND, no masses, no flank pain noted on the right costovertebral angle  Skin: The skin is warm and dry. No jaundice.  Ext: No c/c/e.    Labs/Studies:   White count 11.33, please see the chart.    BMP demonstrates a BUN and creatinine of 9 and 0.9 respectively.  Cultures are pending  CT stone protocol 4 cm liver abscess possibly contiguous with the right kidney, right renal stone unchanged with no hydronephrosis stent in good position, slight growth to right breast lesion 12/24    Impression/Plan:     1. Liver abscess-  percutaneous drain by IR  12/17/24    Await cultures and continue drain till output decreases, defer to ID for recommendations.  Currently on meropenem.    2. Renal stone- stent appears to be in good position, no hydronephrosis, no  further recommendations in regards to the treatment of the stone.  Will arrange for outpatient follow up.    3. Breast lesion- oncology and further studies in follow up.

## 2024-12-18 NOTE — PLAN OF CARE
SW received call from pt's daughter, Myla, requesting referral to [facility] in [place]. Referral sent via Epic. Agency reviewing referral.   No other accepting options at this time.

## 2024-12-18 NOTE — PLAN OF CARE
A245/A245 JEFERSON  Irlanda Lopez is a 84 y.o.female admitted on 12/16/2024 for Liver abscess   Code Status: Full Code MRN: [id number]   Review of patient's allergies indicates:   Allergen Reactions    Omnicef [cefdinir] Itching     Past Medical History:   Diagnosis Date    Clostridium difficile colitis     Dementia     Diverticulitis     s/p colostomy    High cholesterol     Hypertension     Hypothyroidism     Kidney stones       PRN meds    acetaminophen, 650 mg, Q4H PRN  dextrose 10%, 12.5 g, PRN  dextrose 10%, 25 g, PRN  glucagon (human recombinant), 1 mg, PRN  glucose, 16 g, PRN  glucose, 24 g, PRN  naloxone, 0.02 mg, PRN  ondansetron, 4 mg, Q6H PRN  oxyCODONE, 5 mg, Q6H PRN  sodium chloride 0.9%, 3 mL, Q8H PRN      Chart check completed. Will continue plan of care.      Orientation: disoriented to, time  [place] Coma Scale Score: 15     Lead Monitored: Lead II Rhythm: normal sinus rhythm    Cardiac/Telemetry Box Number: [id number]  VTE Core Measure: (SCDs) Sequential compression device initiated/maintained Last Bowel Movement: 12/17/24  Diet Cardiac Soft & Bite Sized (IDDSI Level 6)  Voiding Characteristics: incontinence  [place] Score: 17  Fall Risk Score: 17  Accucheck []   Freq?      Lines/Drains/Airways       Peripherally Inserted Central Catheter Line  Duration             PICC Double Lumen 12/17/24 1620 right basilic <1 day              Drain  Duration                  Colostomy 05/23/21 LLQ 1305 days         Closed/Suction Drain 12/17/24 1034 Tube - 1 RUQ Bulb 8.5 Fr. <1 day              Peripheral Intravenous Line  Duration                  Peripheral IV - Single Lumen 12/16/24 2223 20 G Anterior;Distal;Left Forearm 1 day

## 2024-12-18 NOTE — ASSESSMENT & PLAN NOTE
NPO x medications  Consult IR for drainage  Blood cultures ordered, pending  Lactic acid normal, WBC 16.75  Zosyn and vancomycin ordered  Hydrating with IV fluids  PRN acetaminophen for fever    12/17/24: IR consulted and pt underwent CT guided drain placement per IR-20 ml of green pus sent to gram stain and culture.IR recommended monitoring drain output, Gentle flushing with 5mL NS every shift, and Drain can be removed after when less than 10mL output over 24 hours and patient is clinically improved.     12/18/24: Afebrile, WBC 16>19>11. Blood cultures show NGTD. Repeat urine culture showed no growth (pt was on bactrim as OP). Liver abscess Aerobic culture showed no growth. Anaerobic culture pending.  ID consulted and recommended IV meropenem x 3 weeks Discussed the possibility of discharge with drain and IV abx with daughter. She does not feel they can manage the IV abx. Pt has dementia and is unable to manage IV abx. CM consulted for SNF.

## 2024-12-18 NOTE — PT/OT/SLP EVAL
"Occupational Therapy   Evaluation    Name: Irlanda Lopez  MRN: [id number]  Admitting Diagnosis: Liver abscess  Recent Surgery: * No surgery found *      Recommendations:     Discharge Recommendations: Low Intensity Therapy  Discharge Equipment Recommendations:  none  Barriers to discharge:  None    Assessment:     Irlanda Lopez is a 84 y.o. female with a medical diagnosis of Liver abscess.  She presents with the following performance deficits affecting function: weakness, impaired self care skills, impaired balance, impaired cognition, decreased safety awareness.      Rehab Prognosis: Good; patient would benefit from acute skilled OT services to address these deficits and reach maximum level of function.       Plan:     Patient to be seen 2 x/week to address the above listed problems via self-care/home management, therapeutic activities, therapeutic exercises  Plan of Care Expires: 01/01/25  Plan of Care Reviewed with: patient, daughter    Subjective     Chief Complaint: "I'm getting old."  Patient/Family Comments/goals: To return to assisted living.     Occupational Profile:  Living Environment: Lives in assisted living facility.  Previous level of function: Independent with ADLs; requires assistance for higher level IADLs such as med management and meal preparation.   Roles and Routines: not driving or working  Equipment Used at Home: none  Assistance upon Discharge: family and facility staff    Pain/Comfort:  Pain Rating 1: 0/10    Patients cultural, spiritual, Worship conflicts given the current situation: no    Objective:     Communicated with: nurse prior to session.  Patient found HOB elevated with peripheral IV, telemetry, ZINA drain, PICC line upon OT entry to room.    General Precautions: Standard, fall  Orthopedic Precautions: N/A  Braces: N/A  Respiratory Status: Room air    Occupational Performance:    Bed Mobility:    Patient completed Scooting/Bridging with supervision  Patient completed Supine " to Sit with supervision  Patient completed Sit to Supine with supervision    Functional Mobility/Transfers:  Patient completed Sit <> Stand Transfer with stand by assistance  with  no assistive device   Patient completed Bed <> Chair Transfer using Step Transfer technique with stand by assistance with rolling walker  Patient completed Toilet Transfer Step Transfer technique with stand by assistance with  rolling walker  Functional Mobility: Pt ambulated in hallway and room with contact guard assistance and rolling walker due to impaired balance. Pt required min assistance for RW management in bathroom to turn toward toilet.     Activities of Daily Living:  Feeding:  set up  assistance/min a to grasp pills  Grooming: stand by assistance to perform oral and hand hygiene in standing  Lower Body Dressing: minimum assistance to pull up brief  Toileting: minimum assistance for balance while managing clothing; verbal cuing to initiate hygiene    Cognitive/Visual Perceptual:  Cognitive/Psychosocial Skills:     -       Oriented to: Person and Place   -       Memory: Impaired STM and Impaired LTM  -       Safety awareness/insight to disability: impaired   -       Mood/Affect/Coping skills/emotional control: Appropriate to situation, Cooperative, and Pleasant    Physical Exam:  Balance:    -       sitting: WFL; standing; requires SBA  Upper Extremity Range of Motion:     -       Right Upper Extremity: WFL  -       Left Upper Extremity: WFL  Upper Extremity Strength:    -       Right Upper Extremity: WFL  -       Left Upper Extremity: WFL  Fine Motor Coordination:    -       Impaired  Left hand, manipulation of objects when opening toothpaste container    AMPAC 6 Click ADL:  AMPAC Total Score: 18    Treatment & Education:  Pt and daughter educated on role of OT in acute care. Encouraged patient to sit upright EOB for meals and utilize call light to toilet in bathroom when able.     Patient left HOB elevated with all lines  intact, call button in reach, bed alarm on, and daughter present    GOALS:   Multidisciplinary Problems       Occupational Therapy Goals          Problem: Occupational Therapy    Goal Priority Disciplines Outcome Interventions   Occupational Therapy Goal     OT, PT/OT     Description: Goals to be met by: 1/1/25     Patient will increase functional independence with ADLs by performing:    Grooming while standing at sink with [place].  Toileting from toilet with [place] for hygiene and clothing management.                          History:     Past Medical History:   Diagnosis Date    Clostridium difficile colitis     Dementia     Diverticulitis     s/p colostomy    High cholesterol     Hypertension     Hypothyroidism     Kidney stones          Past Surgical History:   Procedure Laterality Date    APPENDECTOMY  2008    CATARACT EXTRACTION      Dr Raygoza    COLON SURGERY      COLONOSCOPY N/A 01/02/2019    Procedure: COLONOSCOPY;  Surgeon: Reece Hogan MD;  Location: [facility] ENDO;  Service: Endoscopy;  Laterality: N/A;    COLONOSCOPY N/A 06/07/2019    Procedure: COLONOSCOPY;  Surgeon: Rishabh Connelly MD;  Location: [facility] ENDO;  Service: General;  Laterality: N/A;    COLONOSCOPY N/A 06/23/2021    Procedure: COLONOSCOPY;  Surgeon: Lucy Lafleur MD;  Location: [facility] ENDO;  Service: Endoscopy;  Laterality: N/A;    COLOSTOMY Left 01/02/2019    Procedure: CREATION, COLOSTOMY;  Surgeon: Reece Hogan MD;  Location: [facility] OR;  Service: General;  Laterality: Left;    CYSTOSCOPIC LITHOLAPAXY Right 5/14/2024    Procedure: CYSTOLITHOLAPAXY;  Surgeon: Anselmo Matute MD;  Location: [facility] OR;  Service: Urology;  Laterality: Right;    CYSTOSCOPY W/ URETERAL STENT REMOVAL Right 11/5/2024    Procedure: CYSTOSCOPY, WITH URETERAL STENT REMOVAL;  Surgeon: Anselmo Matute MD;  Location: [facility] OR;  Service: Urology;  Laterality: Right;    CYSTOSCOPY WITH URETEROSCOPY, RETROGRADE PYELOGRAPHY, AND INSERTION OF STENT Right  08/23/2022    Procedure: CYSTOSCOPY, WITH RETROGRADE PYELOGRAM AND URETERAL STENT INSERTION;  Surgeon: Anselmo Matute MD;  Location: [facility];  Service: Urology;  Laterality: Right;    CYSTOSCOPY WITH URETEROSCOPY, RETROGRADE PYELOGRAPHY, AND INSERTION OF STENT Right 11/07/2023    Procedure: CYSTOSCOPY, WITH RETROGRADE PYELOGRAM AND URETERAL STENT INSERTION;  Surgeon: Anselmo Matute MD;  Location: [facility];  Service: Urology;  Laterality: Right;  stent exchange    CYSTOSCOPY WITH URETEROSCOPY, RETROGRADE PYELOGRAPHY, AND INSERTION OF STENT Right 5/14/2024    Procedure: CYSTOSCOPY, WITH RETROGRADE PYELOGRAM AND URETERAL STENT INSERTION;  Surgeon: Anselmo Matute MD;  Location: [facility];  Service: Urology;  Laterality: Right;    CYSTOURETEROSCOPY WITH RETROGRADE PYELOGRAPHY AND INSERTION OF STENT INTO URETER Right 11/08/2022    Procedure: CYSTOURETEROSCOPY, WITH RETROGRADE PYELOGRAM AND URETERAL STENT INSERTION;  Surgeon: Anselmo Matute MD;  Location: [facility];  Service: Urology;  Laterality: Right;    CYSTOURETEROSCOPY, WITH HOLMIUM LASER LITHOTRIPSY OF URETERAL CALCULUS AND STENT INSERTION Right 04/04/2023    Procedure: CYSTOURETEROSCOPY, WITH HOLMIUM LASER LITHOTRIPSY OF URETERAL CALCULUS AND STENT INSERTION;  Surgeon: Anselmo Matute MD;  Location: [facility];  Service: Urology;  Laterality: Right;    CYSTOURETEROSCOPY, WITH HOLMIUM LASER LITHOTRIPSY OF URETERAL CALCULUS AND STENT INSERTION Right 11/5/2024    Procedure: CYSTOURETEROSCOPY, WITH HOLMIUM LASER LITHOTRIPSY OF URETERAL CALCULUS AND STENT INSERTION;  Surgeon: Anselmo Matute MD;  Location: [facility];  Service: Urology;  Laterality: Right;  vesical litholapaxy    CYSTOURETEROSCOPY,WITH HOLMIUM LASER LITHOTRIPSY OF URETERAL CALCULUS Right 11/07/2023    Procedure: CYSTOURETEROSCOPY,WITH HOLMIUM LASER LITHOTRIPSY OF URETERAL CALCULUS;  Surgeon: Anselmo Matute MD;  Location: [facility];  Service: Urology;  Laterality: Right;     ESOPHAGOGASTRODUODENOSCOPY N/A 09/16/2020    Procedure: ESOPHAGOGASTRODUODENOSCOPY (EGD)- Needs Rapid;  Surgeon: Lucy Lafleur MD;  Location: [facility];  Service: Endoscopy;  Laterality: N/A;    EYE SURGERY  ?? Dr Raygoza    SHAHIDA PROCEDURE N/A 01/02/2019    Procedure: SHAHIDA PROCEDURE;  Surgeon: Reece Hogan MD;  Location: [facility] OR;  Service: General;  Laterality: N/A;    LYSIS OF ADHESIONS N/A 01/02/2019    Procedure: LYSIS, ADHESIONS;  Surgeon: Reece Hogan MD;  Location: [facility];  Service: General;  Laterality: N/A;    RETROGRADE PYELOGRAPHY Right 11/5/2024    Procedure: PYELOGRAM, RETROGRADE;  Surgeon: Anselmo Matute MD;  Location: [facility];  Service: Urology;  Laterality: Right;    VITRECTOMY Right 09/2013       Time Tracking:     OT Date of Treatment: 12/18/24  OT Start Time: 1025  OT Stop Time: 1055  OT Total Time (min): 30 min    Billable Minutes:Evaluation 15  Self Care/Home Management 15    12/18/2024

## 2024-12-18 NOTE — CONSULTS
[facility])  Infectious Disease  Consult Note    Patient Name: Irlanda Lopez  MRN: [id number]  Admission Date: 12/16/2024  Hospital Length of Stay: 1 days  Attending Physician: Eric Lynn MD  Primary Care Provider: Myla Arias MD     Isolation Status: Contact    Patient information was obtained from patient, past medical records, and ER records.      Consults  Assessment/Plan:     Neuro  Dementia  Supportive care     Renal/  Mass of right breast  Oncology follow up    UTI due to extended-spectrum beta lactamase (ESBL) producing Escherichia coli  Will continue Meropenem -  Follow cultures from the liver.    GI  * Liver abscess  S/pIR guided drainage- cultures pending   Will continue Meropenem          Thank you for your consult. I will follow-up with patient. Please contact us if you have any additional questions.    Bull Lopez MD, [facility]  Infectious Disease  [facility])    Subjective:     Principal Problem: Liver abscess    HPI:  84-year-old female with past medical history significant for  hypertension, hypothyroidism,, Alzheimer's dementia, anxiety, hyperlipidemia, colostomy, kidney stones, stroke, osteopenia, chronic urinary tract infections, right breast mas.  She had recent cystoscopy with stent placement on 11/05/2024 and was on Bactrim for UTI.  CT scan of the abdomen  Since admission- labs and imaging test reviewed-  CT renal stone- 12/16/24 -  Interval development of a masslike lesion in the right lobe of the liver contiguous with the right kidney concerning for a 4 cm abscess.  Malignancy not excluded.     3.3 cm right renal stone right ureteral stent in place without significant hydronephrosis.     Suspicious appearing medial lower quadrant right breast mass measuring 1.5 cm.  Correlation with diagnostic mammogram and ultrasound is recommended.   She had CT guided  drainage    Lab workup shows WBC 16.75,   Cultures from liver pending   Urine culture-  -12/11- ESBL E coli       Past Medical History:   Diagnosis Date    Clostridium difficile colitis     Dementia     Diverticulitis     s/p colostomy    High cholesterol     Hypertension     Hypothyroidism     Kidney stones        Past Surgical History:   Procedure Laterality Date    APPENDECTOMY  2008    CATARACT EXTRACTION      Dr Raygoza    COLON SURGERY      COLONOSCOPY N/A 01/02/2019    Procedure: COLONOSCOPY;  Surgeon: Reece Hogan MD;  Location: [facility] [place] [facility];  Service: Endoscopy;  Laterality: N/A;    COLONOSCOPY N/A 06/07/2019    Procedure: COLONOSCOPY;  Surgeon: Rishabh Connelly MD;  Location: [facility] [place] [facility];  Service: General;  Laterality: N/A;    COLONOSCOPY N/A 06/23/2021    Procedure: COLONOSCOPY;  Surgeon: Lucy Lafleur MD;  Location: [facility] [place] [facility];  Service: Endoscopy;  Laterality: N/A;    COLOSTOMY Left 01/02/2019    Procedure: CREATION, COLOSTOMY;  Surgeon: Reece Hogan MD;  Location: [facility];  Service: General;  Laterality: Left;    CYSTOSCOPIC LITHOLAPAXY Right 5/14/2024    Procedure: CYSTOLITHOLAPAXY;  Surgeon: Anselmo Matute MD;  Location: [facility];  Service: Urology;  Laterality: Right;    CYSTOSCOPY W/ URETERAL STENT REMOVAL Right 11/5/2024    Procedure: CYSTOSCOPY, WITH URETERAL STENT REMOVAL;  Surgeon: Anselmo Matute MD;  Location: [facility];  Service: Urology;  Laterality: Right;    CYSTOSCOPY WITH URETEROSCOPY, RETROGRADE PYELOGRAPHY, AND INSERTION OF STENT Right 08/23/2022    Procedure: CYSTOSCOPY, WITH RETROGRADE PYELOGRAM AND URETERAL STENT INSERTION;  Surgeon: Anselmo Matute MD;  Location: [facility];  Service: Urology;  Laterality: Right;    CYSTOSCOPY WITH URETEROSCOPY, RETROGRADE PYELOGRAPHY, AND INSERTION OF STENT Right 11/07/2023    Procedure: CYSTOSCOPY, WITH RETROGRADE PYELOGRAM AND URETERAL STENT INSERTION;  Surgeon: Anselmo Matute MD;  Location: [facility];  Service: Urology;  Laterality: Right;  stent exchange    CYSTOSCOPY WITH URETEROSCOPY, RETROGRADE PYELOGRAPHY, AND  INSERTION OF STENT Right 5/14/2024    Procedure: CYSTOSCOPY, WITH RETROGRADE PYELOGRAM AND URETERAL STENT INSERTION;  Surgeon: Anselmo Matute MD;  Location: [facility];  Service: Urology;  Laterality: Right;    CYSTOURETEROSCOPY WITH RETROGRADE PYELOGRAPHY AND INSERTION OF STENT INTO URETER Right 11/08/2022    Procedure: CYSTOURETEROSCOPY, WITH RETROGRADE PYELOGRAM AND URETERAL STENT INSERTION;  Surgeon: Anselmo Matute MD;  Location: [facility];  Service: Urology;  Laterality: Right;    CYSTOURETEROSCOPY, WITH HOLMIUM LASER LITHOTRIPSY OF URETERAL CALCULUS AND STENT INSERTION Right 04/04/2023    Procedure: CYSTOURETEROSCOPY, WITH HOLMIUM LASER LITHOTRIPSY OF URETERAL CALCULUS AND STENT INSERTION;  Surgeon: Anselmo Matute MD;  Location: [facility];  Service: Urology;  Laterality: Right;    CYSTOURETEROSCOPY, WITH HOLMIUM LASER LITHOTRIPSY OF URETERAL CALCULUS AND STENT INSERTION Right 11/5/2024    Procedure: CYSTOURETEROSCOPY, WITH HOLMIUM LASER LITHOTRIPSY OF URETERAL CALCULUS AND STENT INSERTION;  Surgeon: Anselmo Matute MD;  Location: [facility];  Service: Urology;  Laterality: Right;  vesical litholapaxy    CYSTOURETEROSCOPY,WITH HOLMIUM LASER LITHOTRIPSY OF URETERAL CALCULUS Right 11/07/2023    Procedure: CYSTOURETEROSCOPY,WITH HOLMIUM LASER LITHOTRIPSY OF URETERAL CALCULUS;  Surgeon: Anselmo Matute MD;  Location: [facility];  Service: Urology;  Laterality: Right;    ESOPHAGOGASTRODUODENOSCOPY N/A 09/16/2020    Procedure: ESOPHAGOGASTRODUODENOSCOPY (EGD)- Needs Rapid;  Surgeon: Lucy Lafleur MD;  Location: [facility];  Service: Endoscopy;  Laterality: N/A;    EYE SURGERY  ?? Dr Jaret PINEDO PROCEDURE N/A 01/02/2019    Procedure: SHAHIDA PROCEDURE;  Surgeon: Reece Hogan MD;  Location: [facility];  Service: General;  Laterality: N/A;    LYSIS OF ADHESIONS N/A 01/02/2019    Procedure: LYSIS, ADHESIONS;  Surgeon: Reece Hogan MD;  Location: [facility];  Service: General;  Laterality: N/A;     RETROGRADE PYELOGRAPHY Right 11/5/2024    Procedure: PYELOGRAM, RETROGRADE;  Surgeon: Anselmo Matute MD;  Location: [facility];  Service: Urology;  Laterality: Right;    VITRECTOMY Right 09/2013       Review of patient's allergies indicates:   Allergen Reactions    Omnicef [cefdinir] Itching       Medications:  Medications Prior to Admission   Medication Sig    acetaminophen (TYLENOL) 500 MG tablet Take 1,000 mg by mouth every 6 (six) hours as needed for Pain or Temperature greater than (101F, fever, headache, arthritis).    atorvastatin (LIPITOR) 10 MG tablet 10mg tablet po twice weekly to be given on Monday and Thursday    b complex vitamins capsule Take 1 capsule by mouth once daily.    buPROPion (WELLBUTRIN XL) 150 MG TB24 tablet Take 1 tablet (150 mg total) by mouth every morning.    busPIRone (BUSPAR) 10 MG tablet Take 1 tablet (10 mg total) by mouth with lunch. May also take 1 tablet (10 mg total) daily as needed (anxiety, panic attacks, agitation).    carboxymethylcellulose (REFRESH LIQUIGEL) 1 % ophthalmic solution Apply 2 drops to eye 2 (two) times daily. May also apply 1 drop 2 (two) times daily as needed (for dry eyes). Bilateral eyes ( Refresh eye drops/ saline).    dicyclomine (BENTYL) 20 mg tablet Take 1 tablet (20 mg total) by mouth before meals and at bedtime as needed (abdominal cramping, belly pain, stomach pain around colostomy).    famotidine (PEPCID) 40 MG tablet Take 1 tablet (40 mg total) by mouth 2 (two) times daily as needed for Heartburn (reflux, indigestion, flatulence, upset stomach).    furosemide (LASIX) 20 MG tablet Take 1 tablet (20 mg total) by mouth once daily. For leg edema, and blood pressure. Hold medication if blood pressure is < 100 systolic with AM BP readings    hyoscyamine 0.125 mg Subl Place 2 tablets (0.25 mg total) under the tongue every 4 (four) hours as needed (flank pain, back pain, kidney spasms, abdoninal spasms).    memantine (NAMENDA) 10 MG Tab Take 1 tablet  (10 mg total) by mouth 2 (two) times daily.    ondansetron (ZOFRAN-ODT) 8 MG TbDL Take 1 tablet (8 mg total) by mouth every 12 (twelve) hours as needed (nausea, vomiting, upset stomach).    phenazopyridine (PYRIDIUM) 200 MG tablet Take 1 tablet (200 mg total) by mouth 3 (three) times daily as needed (bladder spasms, urinary tract infection, bladder pain).    polyethylene glycol (GLYCOLAX) 17 gram/dose powder Take 17 g by mouth once daily. For constipation    potassium chloride (MICRO-K) 10 MEQ CpSR Take 1 capsule (10 mEq total) by mouth once daily.    risperiDONE (RISPERDAL) 0.5 MG Tab Take 1 tablet (0.5 mg total) by mouth every evening.    Saccharomyces boulardii (FLORASTOR) 250 mg capsule Take 1 capsule (250 mg total) by mouth Daily. For probiotic for GI tract with colostomy ( ok for generic for florastor)    sulfamethoxazole-trimethoprim 800-160mg (BACTRIM DS) 800-160 mg Tab Take 1 tablet by mouth 2 (two) times daily. for 7 days    thyroid, pork, (ARMOUR THYROID) 30 mg Tab Take 1 tablet (30 mg total) by mouth every evening.    vitamin D (VITAMIN D3) 1000 units Tab Take 2 tablets (2,000 Units total) by mouth every morning.     Antibiotics (From admission, onward)      Start     Stop Route Frequency Ordered    12/17/24 0845  meropenem 2 g in 0.9% NaCl 100 mL IVPB (MB+)         -- IV Every 12 hours (non-standard times) 12/17/24 0742          Antifungals (From admission, onward)      None          Antivirals (From admission, onward)      None             Immunization History   Administered Date(s) Administered    Influenza - Trivalent - Fluzone High Dose - PF (65 years and older) 09/26/2018, 10/01/2019       Family History       Problem Relation (Age of Onset)    Alzheimer's disease Mother    Aneurysm Father    Bladder Cancer Grandchild    Parkinsonism Brother    Stomach cancer Brother          Social History     Socioeconomic History    Marital status:    Occupational History     Comment: [employer]    Tobacco Use    Smoking status: Former     Types: Cigarettes     Passive exposure: Never    Smokeless tobacco: Never    Tobacco comments:     Smoked in 20's. Quit soon   Substance and Sexual Activity    Alcohol use: No    Drug use: No    Sexual activity: Never     Comment:      Social Drivers of Health     Financial Resource Strain: Patient Declined (12/17/2024)    Overall Financial Resource Strain (CARDIA)     Difficulty of Paying Living Expenses: Patient declined   Food Insecurity: Patient Declined (12/17/2024)    Hunger Vital Sign     Worried About Running Out of Food in the Last Year: Patient declined     Ran Out of Food in the Last Year: Patient declined   Transportation Needs: Patient Unable To Answer (12/17/2024)    TRANSPORTATION NEEDS     Transportation : Patient unable to answer   Physical Activity: Inactive (2/12/2024)    Exercise Vital Sign     Days of Exercise per Week: 0 days     Minutes of Exercise per Session: 0 min   Stress: Patient Declined (12/17/2024)    [employer] - Occupational Stress Questionnaire     Feeling of Stress : Patient declined   Housing Stability: Patient Declined (12/17/2024)    Housing Stability Vital Sign     Unable to Pay for Housing in the Last Year: Patient declined     Homeless in the Last Year: Patient declined     Review of Systems   Constitutional:  Negative for activity change, appetite change, chills, diaphoresis and fatigue.   HENT:  Negative for congestion.    Neurological:  Negative for dizziness, facial asymmetry and headaches.     Objective:     Vital Signs (Most Recent):  Temp: 99.6 °F (37.6 °C) (12/18/24 0421)  Pulse: 69 (12/18/24 0426)  Resp: 20 (12/18/24 0421)  BP: (!) 150/68 (12/18/24 0426)  SpO2: 97 % (12/18/24 0421) Vital Signs (24h Range):  Temp:  [97.4 °F (36.3 °C)-99.6 °F (37.6 °C)] 99.6 °F (37.6 °C)  Pulse:  [51-69] 69  Resp:  [16-20] 20  SpO2:  [92 %-100 %] 97 %  BP: (110-164)/(57-79) 150/68     Weight: 72.1 kg (158  "lb 15.2 oz)  Body mass index is 29.07 kg/m².    Estimated Creatinine Clearance: 44 mL/min (based on SCr of 0.9 mg/dL).     Physical Exam  Vitals and nursing note reviewed.   HENT:      Head: Normocephalic.   Cardiovascular:      Rate and Rhythm: Normal rate.   Pulmonary:      Effort: Pulmonary effort is normal.   Abdominal:      Comments: Drain noted   Musculoskeletal:      Cervical back: Normal range of motion.   Skin:     General: Skin is warm.   Neurological:      Mental Status: She is alert.          Significant Labs: Blood Culture:   Recent Labs   Lab 12/16/24 2230   LABBLOO No Growth to date  No Growth to date     CBC:   Recent Labs   Lab 12/16/24 2223 12/17/24  0522 12/18/24  0500   WBC 16.75* 19.85* 11.13   HGB 9.8* 10.7* 11.4*   HCT 29.1* 35.0* 37.0    451* 466*     CMP:   Recent Labs   Lab 12/16/24 2223 12/17/24  0522 12/18/24  0500   * 129* 136   K 3.9 4.5 4.0   CL 97 102 101   CO2 18* 13* 22*   GLU 93 99 111*   BUN 8 11 9   CREATININE 0.8 0.9 0.9   CALCIUM 8.5* 8.4* 9.1   PROT 6.2 6.4 6.3   ALBUMIN 2.5* 2.4* 2.4*   BILITOT 0.8 0.6 0.4   ALKPHOS 96 103 97   AST 27 39 34   ALT 24 21 17   ANIONGAP 11 14 13     Urine Culture:   Recent Labs   Lab 12/11/24  1701   LABURIN ESCHERICHIA COLI ESBL  10,000 - 49,999 cfu/ml  No other significant isolate  *     Wound Culture: No results for input(s): "LABAERO" in the last 4320 hours.  All pertinent labs within the past 24 hours have been reviewed.    Significant Imaging: I have reviewed all pertinent imaging results/findings within the past 24 hours.              "

## 2024-12-18 NOTE — SUBJECTIVE & OBJECTIVE
Interval History: pt pleasantly confused. Afebrile now. WBC 16>18>11. Cont IV Meropenem x 3 weeks-per ID. Percutaneous ZINA drain with 100ml output/24 hours    Review of Systems   Unable to perform ROS: Dementia     Objective:     Vital Signs (Most Recent):  Temp: 98.6 °F (37 °C) (12/18/24 1203)  Pulse: (!) 50 (12/18/24 1400)  Resp: 18 (12/18/24 1203)  BP: (!) 110/53 (12/18/24 1203)  SpO2: (!) 67 % (12/18/24 1203) Vital Signs (24h Range):  Temp:  [97.4 °F (36.3 °C)-99.6 °F (37.6 °C)] 98.6 °F (37 °C)  Pulse:  [50-69] 50  Resp:  [18-20] 18  SpO2:  [67 %-100 %] 67 %  BP: (110-164)/(53-79) 110/53     Weight: 72.1 kg (158 lb 15.2 oz)  Body mass index is 29.07 kg/m².    Intake/Output Summary (Last 24 hours) at 12/18/2024 1620  Last data filed at 12/18/2024 1500  Gross per 24 hour   Intake 1525.71 ml   Output 751 ml   Net 774.71 ml         Physical Exam  Vitals and nursing note reviewed.   Constitutional:       General: She is not in acute distress.     Appearance: She is well-developed. She is not diaphoretic.   HENT:      Head: Normocephalic and atraumatic.      Nose: Nose normal.   Eyes:      General: No scleral icterus.     Conjunctiva/sclera: Conjunctivae normal.   Neck:      Trachea: No tracheal deviation.   Cardiovascular:      Rate and Rhythm: Normal rate and regular rhythm.      Heart sounds: Normal heart sounds. No murmur heard.     No friction rub. No gallop.   Pulmonary:      Effort: Pulmonary effort is normal. No respiratory distress.      Breath sounds: Normal breath sounds. No stridor. No wheezing or rales.   Chest:      Chest wall: No tenderness.   Abdominal:      General: Bowel sounds are normal. There is no distension.      Palpations: Abdomen is soft. There is no mass.      Tenderness: There is abdominal tenderness (RUQ TTP- improving). There is no guarding or rebound.      Comments: ZINA percutaneous drain in place with purulent output    Musculoskeletal:         General: No tenderness or deformity. Normal  range of motion.      Cervical back: Normal range of motion and neck supple.   Skin:     General: Skin is warm and dry.      Coloration: Skin is not pale.      Findings: No erythema or rash.   Neurological:      Mental Status: She is alert.      Cranial Nerves: No cranial nerve deficit.      Motor: No abnormal muscle tone.      Coordination: Coordination normal.      Comments: Oriented to person only    Psychiatric:         Behavior: Behavior normal.         Cognition and Memory: Cognition is impaired. Memory is impaired. She exhibits impaired recent memory and impaired remote memory.             Significant Labs: All pertinent labs within the past 24 hours have been reviewed.    Significant Imaging: I have reviewed all pertinent imaging results/findings within the past 24 hours.

## 2024-12-18 NOTE — PROGRESS NOTES
[facility] Medicine  Progress Note    Patient Name: Irlanda Lopez  MRN: [id number]  Patient Class: IP- Inpatient   Admission Date: 12/16/2024  Length of Stay: 1 days  Attending Physician: Eric Lynn MD  Primary Care Provider: Myla Arias MD        Subjective     Principal Problem:Liver abscess        HPI:   Patient is 84-year-old female with past medical history significant for  hypertension, hypothyroidism,, Alzheimer's dementia, anxiety, hyperlipidemia, colostomy, kidney stones, stroke, osteopenia, chronic urinary tract infections, right breast mass, and anemia who presented to ED as directed by Dr. Matute due to abnormal CT scan showing liver abscess.  She recently had cystoscopy with stent placement on 11/05/2024 and then was diagnosed with urinary tract infection at urgent care on 12/11, has been on Bactrim for E coli urinary tract infection.  Patient was scheduled for an ultrasound to be done which was ordered by Dr. Matute on 12/16, however  patient was unable to tolerate the ultrasound due to increased pain.  He then ordered CT scan and when results showed liver abscess he directed her to ED.  Due to her dementia, most information is obtained from her family at bedside. She does report chills, and intermittent abdominal pain. She denies dysuria, nausea, vomiting, chest pain, shortness of breath, cough, diarrhea.  on arrival to ED, temp 98.4°, heart rate 66, respirations 16, blood pressure 141/66, 97% SpO2 on room air.  Lab workup shows WBC 16.75, hemoglobin 9.8, hematocrit 29.1, sodium 126, CO2 18, BUN 8, creatinine 0.8, albumin 2.5, normal LFTs, lipase 14, lipase 0.8.  Urinalysis has been ordered, is pending.  Hospital Medicine was consulted for admission due to liver abscess for IV antibiotics and to consult IR for possible drainage of abscess in AM.    Overview/Hospital Course:  The patient is a 83 yo female with recent kidney stones s/p cystoscopy with stent  placement on 11/05/2024 and recent UTI on 12/11-on Bactrim for E coli urinary tract infection who was admitted for Sepsis due to UTI and Liver abscess on IV  Zosyn and Vanco. Temp 101F on admit. Urine culture 12/11/24 grew ESBL E coli. IV Abx changed to IV Meropenem. IR consulted and pt underwent CT guided drain placement per IR-20 ml of green pus sent to gram stain and culture.IR recommended monitoring drain output, Gentle flushing with 5mL NS every shift, and Drain can be removed after when less than 10mL output over 24 hours and patient is clinically improved.   ID consulted and recommended IV Meropenem x 3 weeks. Right sided ZINA drain had 100ml output overnight.   Afebrile, WBC 16>19.11. Blood cultures show NGTD. Repeat urine culture showed no growth (pt was on bactrim as OP). Liver abscess Aerobic culture showed no growth. Anaerobic culture pending.   Discussed the possibility of discharge with drain and IV abx with daughter. She does not feel they can manage the IV abx. Pt has dementia and is unable to manage IV abx. CM consulted for SNF    Interval History: pt pleasantly confused. Afebrile now. WBC 16>18>11. Cont IV Meropenem x 3 weeks-per ID. Percutaneous ZINA drain with 100ml output/24 hours    Review of Systems   Unable to perform ROS: Dementia     Objective:     Vital Signs (Most Recent):  Temp: 98.6 °F (37 °C) (12/18/24 1203)  Pulse: (!) 50 (12/18/24 1400)  Resp: 18 (12/18/24 1203)  BP: (!) 110/53 (12/18/24 1203)  SpO2: (!) 67 % (12/18/24 1203) Vital Signs (24h Range):  Temp:  [97.4 °F (36.3 °C)-99.6 °F (37.6 °C)] 98.6 °F (37 °C)  Pulse:  [50-69] 50  Resp:  [18-20] 18  SpO2:  [67 %-100 %] 67 %  BP: (110-164)/(53-79) 110/53     Weight: 72.1 kg (158 lb 15.2 oz)  Body mass index is 29.07 kg/m².    Intake/Output Summary (Last 24 hours) at 12/18/2024 1620  Last data filed at 12/18/2024 1500  Gross per 24 hour   Intake 1525.71 ml   Output 751 ml   Net 774.71 ml         Physical Exam  Vitals and nursing note  reviewed.   Constitutional:       General: She is not in acute distress.     Appearance: She is well-developed. She is not diaphoretic.   HENT:      Head: Normocephalic and atraumatic.      Nose: Nose normal.   Eyes:      General: No scleral icterus.     Conjunctiva/sclera: Conjunctivae normal.   Neck:      Trachea: No tracheal deviation.   Cardiovascular:      Rate and Rhythm: Normal rate and regular rhythm.      Heart sounds: Normal heart sounds. No murmur heard.     No friction rub. No gallop.   Pulmonary:      Effort: Pulmonary effort is normal. No respiratory distress.      Breath sounds: Normal breath sounds. No stridor. No wheezing or rales.   Chest:      Chest wall: No tenderness.   Abdominal:      General: Bowel sounds are normal. There is no distension.      Palpations: Abdomen is soft. There is no mass.      Tenderness: There is abdominal tenderness (RUQ TTP- improving). There is no guarding or rebound.      Comments: ZINA percutaneous drain in place with purulent output    Musculoskeletal:         General: No tenderness or deformity. Normal range of motion.      Cervical back: Normal range of motion and neck supple.   Skin:     General: Skin is warm and dry.      Coloration: Skin is not pale.      Findings: No erythema or rash.   Neurological:      Mental Status: She is alert.      Cranial Nerves: No cranial nerve deficit.      Motor: No abnormal muscle tone.      Coordination: Coordination normal.      Comments: Oriented to person only    Psychiatric:         Behavior: Behavior normal.         Cognition and Memory: Cognition is impaired. Memory is impaired. She exhibits impaired recent memory and impaired remote memory.             Significant Labs: All pertinent labs within the past 24 hours have been reviewed.    Significant Imaging: I have reviewed all pertinent imaging results/findings within the past 24 hours.    Assessment and Plan     * Liver abscess  NPO x medications  Consult IR for  drainage  Blood cultures ordered, pending  Lactic acid normal, WBC 16.75  Zosyn and vancomycin ordered  Hydrating with IV fluids  PRN acetaminophen for fever    12/17/24: IR consulted and pt underwent CT guided drain placement per IR-20 ml of green pus sent to gram stain and culture.IR recommended monitoring drain output, Gentle flushing with 5mL NS every shift, and Drain can be removed after when less than 10mL output over 24 hours and patient is clinically improved.     12/18/24: Afebrile, WBC 16>19>11. Blood cultures show NGTD. Repeat urine culture showed no growth (pt was on bactrim as OP). Liver abscess Aerobic culture showed no growth. Anaerobic culture pending.  ID consulted and recommended IV meropenem x 3 weeks Discussed the possibility of discharge with drain and IV abx with daughter. She does not feel they can manage the IV abx. Pt has dementia and is unable to manage IV abx. CM consulted for SNF.    Sepsis  This patient does have evidence of infective focus  My overall impression is sepsis.  Source: Urinary Tract and Liver abscess  Antibiotics given-   Antibiotics (72h ago, onward)      Start     Stop Route Frequency Ordered    12/17/24 0845  meropenem 2 g in 0.9% NaCl 100 mL IVPB (MB+)         -- IV Every 12 hours (non-standard times) 12/17/24 0742          Latest lactate reviewed-  Recent Labs   Lab 12/17/24  0932   LACTATE 1.1     Organ dysfunction indicated by  none    Fluid challenge Not needed - patient is not hypotensive      Post- resuscitation assessment No - Post resuscitation assessment not needed       Will Not start Pressors- Levophed for MAP of 65  Source control achieved by: IV Meropenem     UTI due to extended-spectrum beta lactamase (ESBL) producing Escherichia coli  Diagnosed with UTI per urgent care visit 12/11 -- has been on Bactrim  Urine culture 12/11/24 grew ESBL E coli   Abx changed to IV Meropenem  ID consutled and recommended IV Meropenem x 3 weeks     Hyponatremia   The  patient's most recent sodium results are listed below.  Recent Labs     12/16/24  2223 12/17/24  0522 12/18/24  0500   * 129* 136       Plan  - Correct the sodium by 4-6mEq in 24 hours.   - Obtain the following studies: Urine sodium, urine osmolality, serum osmolality, AM cortisol, or TSH, T4.  - Will treat the hyponatremia with IV fluids as follows: NS    12/17/24: improving with NS IVFs   Metabolic acidosis with Bicarb 13, LA normal   IVFs changed to Bicarb drip     12/18/24: resolved     Hypothyroidism  TSH/Free T4 WNL  Continue armour thyroid per home med list      Anemia   Most recent hemoglobin and hematocrit are listed below.  Recent Labs     12/16/24  2223   HGB 9.8*   HCT 29.1*     Plan  - Monitor serial CBC: Daily  - Transfuse PRBC if patient becomes hemodynamically unstable, symptomatic or H/H drops below 7/21.      Mass of right breast  Seen on CT renal stone study   Will need OP f/u and mammography       MARTIN (generalized anxiety disorder)  Very anxious  Resume home Buspar      Dementia  Continue home medications  Fall precautions, use bed alarm  Requires frequent reorientation  Supportive care    Kidney stone  Had cystoscopy, stent placed per Dr. Matute on 11/5    Per Dr. Matute-stent appears to be in good position, no hydronephrosis, no further recommendations in regards to the treatment of the stone.  Will arrange for outpatient follow up.         VTE Risk Mitigation (From admission, onward)           Ordered     Reason for No Pharmacological VTE Prophylaxis  Once        Comments: Procedure required   Question:  Reasons:  Answer:  Physician Provided (leave comment)    12/17/24 0110     IP VTE HIGH RISK PATIENT  Once         12/17/24 0110     Place sequential compression device  Until discontinued         12/17/24 0110                    Discharge Planning   MICHAEL:      Code Status: Full Code   Medical Readiness for Discharge Date:   Discharge Plan A: Skilled Nursing Facility   Discharge  Delays: None known at this time            Please place Justification for DME        Kayley Russell NP  Department of Hospital Medicine   [facility] - Telemetry ([facility])

## 2024-12-18 NOTE — ASSESSMENT & PLAN NOTE
Had cystoscopy, stent placed per Dr. Matute on 11/5    Per Dr. Matute-stent appears to be in good position, no hydronephrosis, no further recommendations in regards to the treatment of the stone.  Will arrange for outpatient follow up.

## 2024-12-18 NOTE — PLAN OF CARE
12/18/24 1156   Post-Acute Status   Post-Acute Authorization Placement   Post-Acute Placement Status Referrals Sent   Discharge Delays None known at this time   Discharge Plan   Discharge Plan A Skilled Nursing Facility   Discharge Plan B Skilled Nursing Facility     FARZANEH met with patient and daughter, Myla, at bedside for discharge planning. Family requesting SNF placement d/t family and longterm inability to manage needs for discharge. Myla explained that longterm staff would not be able to assist with IVABX and family is limited with ability to assist daily.   FARZANEH discussed SNF qualifications and explained that placement can be attempted but is dependent on accepting SNF and receiving SNF auth via [facility].  FARZANEH provided Myla with SNF list for review.  Myla reports not being familiar with facilities and is agreeable to referrals being sent for review/bed availability. Referrals sent via Epic.  FARZANEH to follow for decision.

## 2024-12-18 NOTE — CARE UPDATE
Notified that pt pulled out her percutaneous drain. CT abd ordered for am. Consult IR in am for drain placement if needed.

## 2024-12-18 NOTE — PLAN OF CARE
Recommending low intensity therapy upon discharge. Min A toileting for hygiene cues. Standby assistance standing at sink to perform grooming.

## 2024-12-18 NOTE — ASSESSMENT & PLAN NOTE
Diagnosed with UTI per urgent care visit 12/11 -- has been on Bactrim  Urine culture 12/11/24 grew ESBL E coli   Abx changed to IV Meropenem  ID consutled and recommended IV Meropenem x 3 weeks

## 2024-12-18 NOTE — SUBJECTIVE & OBJECTIVE
Past Medical History:   Diagnosis Date    Clostridium difficile colitis     Dementia     Diverticulitis     s/p colostomy    High cholesterol     Hypertension     Hypothyroidism     Kidney stones        Past Surgical History:   Procedure Laterality Date    APPENDECTOMY  2008    CATARACT EXTRACTION      Dr Raygoza    COLON SURGERY      COLONOSCOPY N/A 01/02/2019    Procedure: COLONOSCOPY;  Surgeon: Reece Hogan MD;  Location: [facility];  Service: Endoscopy;  Laterality: N/A;    COLONOSCOPY N/A 06/07/2019    Procedure: COLONOSCOPY;  Surgeon: Rishabh Connelly MD;  Location: [facility];  Service: General;  Laterality: N/A;    COLONOSCOPY N/A 06/23/2021    Procedure: COLONOSCOPY;  Surgeon: Lucy Lafleur MD;  Location: [facility];  Service: Endoscopy;  Laterality: N/A;    COLOSTOMY Left 01/02/2019    Procedure: CREATION, COLOSTOMY;  Surgeon: Reece Hogan MD;  Location: [facility];  Service: General;  Laterality: Left;    CYSTOSCOPIC LITHOLAPAXY Right 5/14/2024    Procedure: CYSTOLITHOLAPAXY;  Surgeon: Anselmo Matute MD;  Location: [facility];  Service: Urology;  Laterality: Right;    CYSTOSCOPY W/ URETERAL STENT REMOVAL Right 11/5/2024    Procedure: CYSTOSCOPY, WITH URETERAL STENT REMOVAL;  Surgeon: Anselmo Matute MD;  Location: [facility];  Service: Urology;  Laterality: Right;    CYSTOSCOPY WITH URETEROSCOPY, RETROGRADE PYELOGRAPHY, AND INSERTION OF STENT Right 08/23/2022    Procedure: CYSTOSCOPY, WITH RETROGRADE PYELOGRAM AND URETERAL STENT INSERTION;  Surgeon: Anselmo Matute MD;  Location: [facility];  Service: Urology;  Laterality: Right;    CYSTOSCOPY WITH URETEROSCOPY, RETROGRADE PYELOGRAPHY, AND INSERTION OF STENT Right 11/07/2023    Procedure: CYSTOSCOPY, WITH RETROGRADE PYELOGRAM AND URETERAL STENT INSERTION;  Surgeon: Anselmo Matute MD;  Location: [facility];  Service: Urology;  Laterality: Right;  stent exchange    CYSTOSCOPY WITH URETEROSCOPY, RETROGRADE PYELOGRAPHY, AND INSERTION OF STENT Right  5/14/2024    Procedure: CYSTOSCOPY, WITH RETROGRADE PYELOGRAM AND URETERAL STENT INSERTION;  Surgeon: Anselmo Matute MD;  Location: [facility];  Service: Urology;  Laterality: Right;    CYSTOURETEROSCOPY WITH RETROGRADE PYELOGRAPHY AND INSERTION OF STENT INTO URETER Right 11/08/2022    Procedure: CYSTOURETEROSCOPY, WITH RETROGRADE PYELOGRAM AND URETERAL STENT INSERTION;  Surgeon: Anselmo Matute MD;  Location: [facility];  Service: Urology;  Laterality: Right;    CYSTOURETEROSCOPY, WITH HOLMIUM LASER LITHOTRIPSY OF URETERAL CALCULUS AND STENT INSERTION Right 04/04/2023    Procedure: CYSTOURETEROSCOPY, WITH HOLMIUM LASER LITHOTRIPSY OF URETERAL CALCULUS AND STENT INSERTION;  Surgeon: Anselmo Matute MD;  Location: [facility];  Service: Urology;  Laterality: Right;    CYSTOURETEROSCOPY, WITH HOLMIUM LASER LITHOTRIPSY OF URETERAL CALCULUS AND STENT INSERTION Right 11/5/2024    Procedure: CYSTOURETEROSCOPY, WITH HOLMIUM LASER LITHOTRIPSY OF URETERAL CALCULUS AND STENT INSERTION;  Surgeon: Anselmo Matute MD;  Location: [facility];  Service: Urology;  Laterality: Right;  vesical litholapaxy    CYSTOURETEROSCOPY,WITH HOLMIUM LASER LITHOTRIPSY OF URETERAL CALCULUS Right 11/07/2023    Procedure: CYSTOURETEROSCOPY,WITH HOLMIUM LASER LITHOTRIPSY OF URETERAL CALCULUS;  Surgeon: Anselmo Matute MD;  Location: [facility];  Service: Urology;  Laterality: Right;    ESOPHAGOGASTRODUODENOSCOPY N/A 09/16/2020    Procedure: ESOPHAGOGASTRODUODENOSCOPY (EGD)- Needs Rapid;  Surgeon: Lucy Lafleur MD;  Location: [facility];  Service: Endoscopy;  Laterality: N/A;    EYE SURGERY  ?? Dr Jaret PINEDO PROCEDURE N/A 01/02/2019    Procedure: SHAHIDA PROCEDURE;  Surgeon: Reece Hogan MD;  Location: [facility];  Service: General;  Laterality: N/A;    LYSIS OF ADHESIONS N/A 01/02/2019    Procedure: LYSIS, ADHESIONS;  Surgeon: Reece Hogan MD;  Location: [facility];  Service: General;  Laterality: N/A;    RETROGRADE PYELOGRAPHY Right  11/5/2024    Procedure: PYELOGRAM, RETROGRADE;  Surgeon: Anselmo Matute MD;  Location: [facility];  Service: Urology;  Laterality: Right;    VITRECTOMY Right 09/2013       Review of patient's allergies indicates:   Allergen Reactions    Omnicef [cefdinir] Itching       Medications:  Medications Prior to Admission   Medication Sig    acetaminophen (TYLENOL) 500 MG tablet Take 1,000 mg by mouth every 6 (six) hours as needed for Pain or Temperature greater than (101F, fever, headache, arthritis).    atorvastatin (LIPITOR) 10 MG tablet 10mg tablet po twice weekly to be given on Monday and Thursday    b complex vitamins capsule Take 1 capsule by mouth once daily.    buPROPion (WELLBUTRIN XL) 150 MG TB24 tablet Take 1 tablet (150 mg total) by mouth every morning.    busPIRone (BUSPAR) 10 MG tablet Take 1 tablet (10 mg total) by mouth with lunch. May also take 1 tablet (10 mg total) daily as needed (anxiety, panic attacks, agitation).    carboxymethylcellulose (REFRESH LIQUIGEL) 1 % ophthalmic solution Apply 2 drops to eye 2 (two) times daily. May also apply 1 drop 2 (two) times daily as needed (for dry eyes). Bilateral eyes ( Refresh eye drops/ saline).    dicyclomine (BENTYL) 20 mg tablet Take 1 tablet (20 mg total) by mouth before meals and at bedtime as needed (abdominal cramping, belly pain, stomach pain around colostomy).    famotidine (PEPCID) 40 MG tablet Take 1 tablet (40 mg total) by mouth 2 (two) times daily as needed for Heartburn (reflux, indigestion, flatulence, upset stomach).    furosemide (LASIX) 20 MG tablet Take 1 tablet (20 mg total) by mouth once daily. For leg edema, and blood pressure. Hold medication if blood pressure is < 100 systolic with AM BP readings    hyoscyamine 0.125 mg Subl Place 2 tablets (0.25 mg total) under the tongue every 4 (four) hours as needed (flank pain, back pain, kidney spasms, abdoninal spasms).    memantine (NAMENDA) 10 MG Tab Take 1 tablet (10 mg total) by mouth 2 (two)  times daily.    ondansetron (ZOFRAN-ODT) 8 MG TbDL Take 1 tablet (8 mg total) by mouth every 12 (twelve) hours as needed (nausea, vomiting, upset stomach).    phenazopyridine (PYRIDIUM) 200 MG tablet Take 1 tablet (200 mg total) by mouth 3 (three) times daily as needed (bladder spasms, urinary tract infection, bladder pain).    polyethylene glycol (GLYCOLAX) 17 gram/dose powder Take 17 g by mouth once daily. For constipation    potassium chloride (MICRO-K) 10 MEQ CpSR Take 1 capsule (10 mEq total) by mouth once daily.    risperiDONE (RISPERDAL) 0.5 MG Tab Take 1 tablet (0.5 mg total) by mouth every evening.    Saccharomyces boulardii (FLORASTOR) 250 mg capsule Take 1 capsule (250 mg total) by mouth Daily. For probiotic for GI tract with colostomy ( ok for generic for florastor)    sulfamethoxazole-trimethoprim 800-160mg (BACTRIM DS) 800-160 mg Tab Take 1 tablet by mouth 2 (two) times daily. for 7 days    thyroid, pork, (ARMOUR THYROID) 30 mg Tab Take 1 tablet (30 mg total) by mouth every evening.    vitamin D (VITAMIN D3) 1000 units Tab Take 2 tablets (2,000 Units total) by mouth every morning.     Antibiotics (From admission, onward)      Start     Stop Route Frequency Ordered    12/17/24 0845  meropenem 2 g in 0.9% NaCl 100 mL IVPB (MB+)         -- IV Every 12 hours (non-standard times) 12/17/24 0742          Antifungals (From admission, onward)      None          Antivirals (From admission, onward)      None             Immunization History   Administered Date(s) Administered    Influenza - Trivalent - Fluzone High Dose - PF (65 years and older) 09/26/2018, 10/01/2019       Family History       Problem Relation (Age of Onset)    Alzheimer's disease Mother    Aneurysm Father    Bladder Cancer Grandchild    Parkinsonism Brother    Stomach cancer Brother          Social History     Socioeconomic History    Marital status:    Occupational History     Comment: [employer]   Tobacco Use    Smoking status:  Former     Types: Cigarettes     Passive exposure: Never    Smokeless tobacco: Never    Tobacco comments:     Smoked in 20's. Quit soon   Substance and Sexual Activity    Alcohol use: No    Drug use: No    Sexual activity: Never     Comment:      Social Drivers of Health     Financial Resource Strain: Patient Declined (12/17/2024)    Overall Financial Resource Strain (CARDIA)     Difficulty of Paying Living Expenses: Patient declined   Food Insecurity: Patient Declined (12/17/2024)    Hunger Vital Sign     Worried About Running Out of Food in the Last Year: Patient declined     Ran Out of Food in the Last Year: Patient declined   Transportation Needs: Patient Unable To Answer (12/17/2024)    TRANSPORTATION NEEDS     Transportation : Patient unable to answer   Physical Activity: Inactive (2/12/2024)    Exercise Vital Sign     Days of Exercise per Week: 0 days     Minutes of Exercise per Session: 0 min   Stress: Patient Declined (12/17/2024)    [employer] [place] [employer] - Occupational Stress Questionnaire     Feeling of Stress : Patient declined   Housing Stability: Patient Declined (12/17/2024)    Housing Stability Vital Sign     Unable to Pay for Housing in the Last Year: Patient declined     Homeless in the Last Year: Patient declined     Review of Systems   Constitutional:  Negative for activity change, appetite change, chills, diaphoresis and fatigue.   HENT:  Negative for congestion.    Neurological:  Negative for dizziness, facial asymmetry and headaches.     Objective:     Vital Signs (Most Recent):  Temp: 99.6 °F (37.6 °C) (12/18/24 0421)  Pulse: 69 (12/18/24 0426)  Resp: 20 (12/18/24 0421)  BP: (!) 150/68 (12/18/24 0426)  SpO2: 97 % (12/18/24 0421) Vital Signs (24h Range):  Temp:  [97.4 °F (36.3 °C)-99.6 °F (37.6 °C)] 99.6 °F (37.6 °C)  Pulse:  [51-69] 69  Resp:  [16-20] 20  SpO2:  [92 %-100 %] 97 %  BP: (110-164)/(57-79) 150/68     Weight: 72.1 kg (158 lb 15.2 oz)  Body mass index is  "29.07 kg/m².    Estimated Creatinine Clearance: 44 mL/min (based on SCr of 0.9 mg/dL).     Physical Exam  Vitals and nursing note reviewed.   HENT:      Head: Normocephalic.   Cardiovascular:      Rate and Rhythm: Normal rate.   Pulmonary:      Effort: Pulmonary effort is normal.   Abdominal:      Comments: Drain noted   Musculoskeletal:      Cervical back: Normal range of motion.   Skin:     General: Skin is warm.   Neurological:      Mental Status: She is alert.          Significant Labs: Blood Culture:   Recent Labs   Lab 12/16/24 2230   LABBLOO No Growth to date  No Growth to date     CBC:   Recent Labs   Lab 12/16/24 2223 12/17/24  0522 12/18/24  0500   WBC 16.75* 19.85* 11.13   HGB 9.8* 10.7* 11.4*   HCT 29.1* 35.0* 37.0    451* 466*     CMP:   Recent Labs   Lab 12/16/24 2223 12/17/24  0522 12/18/24  0500   * 129* 136   K 3.9 4.5 4.0   CL 97 102 101   CO2 18* 13* 22*   GLU 93 99 111*   BUN 8 11 9   CREATININE 0.8 0.9 0.9   CALCIUM 8.5* 8.4* 9.1   PROT 6.2 6.4 6.3   ALBUMIN 2.5* 2.4* 2.4*   BILITOT 0.8 0.6 0.4   ALKPHOS 96 103 97   AST 27 39 34   ALT 24 21 17   ANIONGAP 11 14 13     Urine Culture:   Recent Labs   Lab 12/11/24  1701   LABURIN ESCHERICHIA COLI ESBL  10,000 - 49,999 cfu/ml  No other significant isolate  *     Wound Culture: No results for input(s): "LABAERO" in the last 4320 hours.  All pertinent labs within the past 24 hours have been reviewed.    Significant Imaging: I have reviewed all pertinent imaging results/findings within the past 24 hours.  "

## 2024-12-19 LAB
ALBUMIN SERPL BCP-MCNC: 2.1 G/DL (ref 3.5–5.2)
ALP SERPL-CCNC: 77 U/L (ref 40–150)
ALT SERPL W/O P-5'-P-CCNC: 17 U/L (ref 10–44)
ANION GAP SERPL CALC-SCNC: 8 MMOL/L (ref 8–16)
AST SERPL-CCNC: 23 U/L (ref 10–40)
BASOPHILS # BLD AUTO: 0.04 K/UL (ref 0–0.2)
BASOPHILS NFR BLD: 0.7 % (ref 0–1.9)
BILIRUB SERPL-MCNC: 0.3 MG/DL (ref 0.1–1)
BUN SERPL-MCNC: 9 MG/DL (ref 8–23)
CALCIUM SERPL-MCNC: 8.7 MG/DL (ref 8.7–10.5)
CHLORIDE SERPL-SCNC: 102 MMOL/L (ref 95–110)
CO2 SERPL-SCNC: 24 MMOL/L (ref 23–29)
CREAT SERPL-MCNC: 0.8 MG/DL (ref 0.5–1.4)
DIFFERENTIAL METHOD BLD: ABNORMAL
EOSINOPHIL # BLD AUTO: 0.4 K/UL (ref 0–0.5)
EOSINOPHIL NFR BLD: 7.1 % (ref 0–8)
ERYTHROCYTE [DISTWIDTH] IN BLOOD BY AUTOMATED COUNT: 15.1 % (ref 11.5–14.5)
EST. GFR  (NO RACE VARIABLE): >60 ML/MIN/1.73 M^2
GLUCOSE SERPL-MCNC: 93 MG/DL (ref 70–110)
HCT VFR BLD AUTO: 29.9 % (ref 37–48.5)
HCT VFR BLD AUTO: 36.9 % (ref 37–48.5)
HGB BLD-MCNC: 11.6 G/DL (ref 12–16)
HGB BLD-MCNC: 9.3 G/DL (ref 12–16)
IMM GRANULOCYTES # BLD AUTO: 0.24 K/UL (ref 0–0.04)
IMM GRANULOCYTES NFR BLD AUTO: 4 % (ref 0–0.5)
LYMPHOCYTES # BLD AUTO: 1.6 K/UL (ref 1–4.8)
LYMPHOCYTES NFR BLD: 26.6 % (ref 18–48)
MCH RBC QN AUTO: 25.1 PG (ref 27–31)
MCHC RBC AUTO-ENTMCNC: 31.1 G/DL (ref 32–36)
MCV RBC AUTO: 81 FL (ref 82–98)
MONOCYTES # BLD AUTO: 0.7 K/UL (ref 0.3–1)
MONOCYTES NFR BLD: 11.3 % (ref 4–15)
NEUTROPHILS # BLD AUTO: 3 K/UL (ref 1.8–7.7)
NEUTROPHILS NFR BLD: 50.3 % (ref 38–73)
NRBC BLD-RTO: 0 /100 WBC
PLATELET # BLD AUTO: 395 K/UL (ref 150–450)
PMV BLD AUTO: 8.7 FL (ref 9.2–12.9)
POTASSIUM SERPL-SCNC: 4.1 MMOL/L (ref 3.5–5.1)
PROT SERPL-MCNC: 5.3 G/DL (ref 6–8.4)
RBC # BLD AUTO: 3.7 M/UL (ref 4–5.4)
SODIUM SERPL-SCNC: 134 MMOL/L (ref 136–145)
WBC # BLD AUTO: 5.93 K/UL (ref 3.9–12.7)

## 2024-12-19 PROCEDURE — 25000003 PHARM REV CODE 250: Performed by: FAMILY MEDICINE

## 2024-12-19 PROCEDURE — 27000207 HC ISOLATION

## 2024-12-19 PROCEDURE — 97535 SELF CARE MNGMENT TRAINING: CPT

## 2024-12-19 PROCEDURE — 97530 THERAPEUTIC ACTIVITIES: CPT | Mod: CQ

## 2024-12-19 PROCEDURE — 85014 HEMATOCRIT: CPT | Performed by: NURSE PRACTITIONER

## 2024-12-19 PROCEDURE — 25000003 PHARM REV CODE 250: Performed by: NURSE PRACTITIONER

## 2024-12-19 PROCEDURE — 36415 COLL VENOUS BLD VENIPUNCTURE: CPT | Performed by: NURSE PRACTITIONER

## 2024-12-19 PROCEDURE — 99233 SBSQ HOSP IP/OBS HIGH 50: CPT | Mod: NSCH,,, | Performed by: INTERNAL MEDICINE

## 2024-12-19 PROCEDURE — 63600175 PHARM REV CODE 636 W HCPCS: Performed by: FAMILY MEDICINE

## 2024-12-19 PROCEDURE — 97116 GAIT TRAINING THERAPY: CPT | Mod: CQ

## 2024-12-19 PROCEDURE — 85025 COMPLETE CBC W/AUTO DIFF WBC: CPT | Performed by: NURSE PRACTITIONER

## 2024-12-19 PROCEDURE — 97530 THERAPEUTIC ACTIVITIES: CPT

## 2024-12-19 PROCEDURE — 85018 HEMOGLOBIN: CPT | Performed by: NURSE PRACTITIONER

## 2024-12-19 PROCEDURE — 21400001 HC TELEMETRY ROOM

## 2024-12-19 PROCEDURE — 80053 COMPREHEN METABOLIC PANEL: CPT | Performed by: NURSE PRACTITIONER

## 2024-12-19 PROCEDURE — 25000003 PHARM REV CODE 250: Performed by: INTERNAL MEDICINE

## 2024-12-19 RX ORDER — MUPIROCIN 20 MG/G
OINTMENT TOPICAL 2 TIMES DAILY
Status: COMPLETED | OUTPATIENT
Start: 2024-12-19 | End: 2024-12-23

## 2024-12-19 RX ADMIN — MEMANTINE 10 MG: 10 TABLET ORAL at 09:12

## 2024-12-19 RX ADMIN — BUSPIRONE HYDROCHLORIDE 10 MG: 10 TABLET ORAL at 08:12

## 2024-12-19 RX ADMIN — MEMANTINE 10 MG: 10 TABLET ORAL at 08:12

## 2024-12-19 RX ADMIN — BUSPIRONE HYDROCHLORIDE 10 MG: 10 TABLET ORAL at 09:12

## 2024-12-19 RX ADMIN — ACETAMINOPHEN 650 MG: 325 TABLET ORAL at 09:12

## 2024-12-19 RX ADMIN — RISPERIDONE 0.5 MG: 0.5 TABLET, FILM COATED ORAL at 09:12

## 2024-12-19 RX ADMIN — MUPIROCIN: 20 OINTMENT TOPICAL at 06:12

## 2024-12-19 RX ADMIN — MEROPENEM 2 G: 2 INJECTION, POWDER, FOR SOLUTION INTRAVENOUS at 09:12

## 2024-12-19 RX ADMIN — MUPIROCIN: 20 OINTMENT TOPICAL at 09:12

## 2024-12-19 RX ADMIN — MEROPENEM 2 G: 2 INJECTION, POWDER, FOR SOLUTION INTRAVENOUS at 08:12

## 2024-12-19 RX ADMIN — ACETAMINOPHEN 650 MG: 325 TABLET ORAL at 06:12

## 2024-12-19 RX ADMIN — HYPROMELLOSE 2910 2 DROP: 5 SOLUTION/ DROPS OPHTHALMIC at 09:12

## 2024-12-19 RX ADMIN — HYPROMELLOSE 2910 2 DROP: 5 SOLUTION/ DROPS OPHTHALMIC at 06:12

## 2024-12-19 RX ADMIN — THYROID, PORCINE 30 MG: 30 TABLET ORAL at 09:12

## 2024-12-19 RX ADMIN — HYPROMELLOSE 2910 2 DROP: 5 SOLUTION/ DROPS OPHTHALMIC at 08:12

## 2024-12-19 NOTE — PLAN OF CARE
FARZANEH spoke with pt's daughter, Myla, regarding SNF update. FARZANEH explained that [facility] is only accepting referral and can submit for authorization.  Myla inquired about placement with  [facility]; SW explained that facility had not responded to the referral yet.   FARZANEH explained per attending, pt stable to discharge to SNF once auth obtained. Myla reports being informed by ID that cultures still pending and wanted clarification on discharge date. MD notified.   FARZANEH later spoke with liasion with [facility] who states facility cannot accommodate pt's needs (related to drain care).   [place] submitted for auth. [facility] called regarding pt's case going to secondary review with medical director and obtained attending MD's contact information, if peer to peer is required.   SW to follow.

## 2024-12-19 NOTE — PROGRESS NOTES
[facility])  Infectious Disease  Progress Note    Patient Name: Irlanda Lopez  MRN: [id number]  Admission Date: 12/16/2024  Length of Stay: 2 days  Attending Physician: Eric Lynn MD  Primary Care Provider: Myla Arias MD    Isolation Status: Contact  Assessment/Plan:      Neuro  Dementia  Supportive care     Renal/  UTI due to extended-spectrum beta lactamase (ESBL) producing Escherichia coli  Will continue Meropenem -  Follow cultures from the liver.    GI  * Liver abscess  S/pIR guided drainage- cultures pending   Will continue Meropenem      12/18/24  Prelim cultures  Specimen: Abscess from Liver Updated: 12/17/24 1959      Gram Stain Result Moderate WBC's     Moderate Gram positive cocci       Will follow ID of this organism - can be enterococcus         Anticipated Disposition:     Thank you for your consult. I will follow-up with patient. Please contact us if you have any additional questions.    Bull Lopez MD, [facility]  Infectious Disease  [facility])    Subjective:     Principal Problem:Liver abscess    HPI:  84-year-old female with past medical history significant for  hypertension, hypothyroidism,, Alzheimer's dementia, anxiety, hyperlipidemia, colostomy, kidney stones, stroke, osteopenia, chronic urinary tract infections, right breast mas.  She had recent cystoscopy with stent placement on 11/05/2024 and was on Bactrim for UTI.  CT scan of the abdomen  Since admission- labs and imaging test reviewed-  CT renal stone- 12/16/24 -  Interval development of a masslike lesion in the right lobe of the liver contiguous with the right kidney concerning for a 4 cm abscess.  Malignancy not excluded.     3.3 cm right renal stone right ureteral stent in place without significant hydronephrosis.     Suspicious appearing medial lower quadrant right breast mass measuring 1.5 cm.  Correlation with diagnostic mammogram and ultrasound is recommended.   She had CT guided   drainage    Lab workup shows WBC 16.75,   Cultures from liver pending   Urine culture- -12/11- ESBL E coli     Interval History:   84 year old woman with liver abscess  Abd cultures- 12/17/24  Specimen: Abscess from Liver Updated: 12/17/24 1959      Gram Stain Result Moderate WBC's     Moderate Gram positive cocci       Review of Systems   Constitutional:  Negative for activity change, appetite change, chills, diaphoresis and fatigue.   Neurological:  Negative for dizziness, facial asymmetry and headaches.     Objective:     Vital Signs (Most Recent):  Temp: 98.1 °F (36.7 °C) (12/19/24 0451)  Pulse:  (Cardiac monitor DC) (12/19/24 0500)  Resp: 18 (12/19/24 0451)  BP: (!) 116/57 (12/19/24 0451)  SpO2: (!) 94 % (12/19/24 0451) Vital Signs (24h Range):  Temp:  [97.4 °F (36.3 °C)-99.3 °F (37.4 °C)] 98.1 °F (36.7 °C)  Pulse:  [48-68] 50  Resp:  [18] 18  SpO2:  [67 %-98 %] 94 %  BP: (110-149)/(53-68) 116/57     Weight: 72.1 kg (158 lb 15.2 oz)  Body mass index is 29.07 kg/m².    Estimated Creatinine Clearance: 49.5 mL/min (based on SCr of 0.8 mg/dL).     Physical Exam  Vitals and nursing note reviewed.   HENT:      Head: Normocephalic.   Eyes:      Pupils: Pupils are equal, round, and reactive to light.   Cardiovascular:      Rate and Rhythm: Normal rate.   Pulmonary:      Effort: Pulmonary effort is normal.   Abdominal:      General: Abdomen is flat.   Musculoskeletal:      Cervical back: Normal range of motion.   Skin:     General: Skin is warm.   Neurological:      General: No focal deficit present.      Mental Status: She is alert.          Significant Labs: Blood Culture:   Recent Labs   Lab 12/16/24  2230   LABBLOO No Growth to date  No Growth to date  No Growth to date  No Growth to date     BMP:   Recent Labs   Lab 12/18/24  0500 12/19/24  0430   * 93    134*   K 4.0 4.1    102   CO2 22* 24   BUN 9 9   CREATININE 0.9 0.8   CALCIUM 9.1 8.7   MG 1.7  --      CBC:   Recent Labs   Lab  12/18/24  0500 12/19/24  0430   WBC 11.13 5.93   HGB 11.4* 9.3*   HCT 37.0 29.9*   * 395     CMP:   Recent Labs   Lab 12/18/24  0500 12/19/24  0430    134*   K 4.0 4.1    102   CO2 22* 24   * 93   BUN 9 9   CREATININE 0.9 0.8   CALCIUM 9.1 8.7   PROT 6.3 5.3*   ALBUMIN 2.4* 2.1*   BILITOT 0.4 0.3   ALKPHOS 97 77   AST 34 23   ALT 17 17   ANIONGAP 13 8     Wound Culture:   Recent Labs   Lab 12/17/24  1034   LABAERO No growth     All pertinent labs within the past 24 hours have been reviewed.    Significant Imaging: I have reviewed all pertinent imaging results/findings within the past 24 hours.

## 2024-12-19 NOTE — ASSESSMENT & PLAN NOTE
S/pIR guided drainage- cultures pending   Will continue Meropenem      12/18/24  Prelim cultures  Specimen: Abscess from Liver Updated: 12/17/24 1959      Gram Stain Result Moderate WBC's     Moderate Gram positive cocci       Will follow ID of this organism - can be enterococcus

## 2024-12-19 NOTE — PT/OT/SLP PROGRESS
"Occupational Therapy   Treatment    Name: Irlanda Lopez  MRN: [id number]  Admitting Diagnosis:  Liver abscess       Recommendations:     Discharge Recommendations: Low Intensity Therapy (24/7 SPV and A)  Discharge Equipment Recommendations:  walker, rolling  Barriers to discharge:  None    Assessment:     Irlanda Lopez is a 84 y.o. female with a medical diagnosis of Liver abscess.  She presents with the following performance deficits affecting function are weakness, impaired endurance, impaired self care skills, impaired functional mobility, impaired balance, impaired cardiopulmonary response to activity, decreased safety awareness, impaired cognition, impaired fine motor.     Rehab Prognosis:  Good and Fair; patient would benefit from acute skilled OT services to address these deficits and reach maximum level of function.       Plan:     Patient to be seen 2 x/week to address the above listed problems via self-care/home management, therapeutic activities, therapeutic exercises  Plan of Care Expires: 01/01/25  Plan of Care Reviewed with: patient, daughter    Subjective     Chief Complaint: Reported "I feel better the more I move."  Patient/Family Comments/goals: increase independence  Pain/Comfort:  Pain Rating 1: 0/10    Objective:     Communicated with: NurseMyla, prior to session.  Patient found supine with telemetry, ZINA drain, colostomy, PICC line upon OT entry to room.    General Precautions: Standard, fall, contact    Orthopedic Precautions:N/A  Braces: N/A  Respiratory Status: Room air     Occupational Performance:     Bed Mobility:    Patient completed Supine to Sit with stand by assistance     Functional Mobility/Transfers:  Patient completed Sit <> Stand Transfer with contact guard assistance  with  rolling walker   Completed from EOB and bedside chair  Patient completed Bed <> Chair Transfer using Step Transfer technique with contact guard assistance with rolling walker  Completed x2 " trials  Functional Mobility: Patient completed x320ft functional mobility with RW and CGA to increase dynamic standing balance and activity tolerance needed for ADL completion.  Provided with v/c for technique with transfers to increase safety and independence with completion  V/c for appropriate RW management throughout    Activities of Daily Living:  Grooming: stand by assistance completed standing at sink side with v/c for improved quality and A for container management.  Stood at sink side x3 minutes with SBA for static standing balance    AMPAC 6 Click ADL: 20    Treatment & Education:  Encouraged completion of B UE AROM therex throughout the day to increase functional strength and activity tolerance needed for ADL completion. Educated on benefits of OOB activity and importance of calling for A to transfer back to bed. Patient and daughter stated understanding and in agreement with POC.    Patient left up in chair with all lines intact, call button in reach, chair alarm on, and daughter present    GOALS:   Multidisciplinary Problems       Occupational Therapy Goals          Problem: Occupational Therapy    Goal Priority Disciplines Outcome Interventions   Occupational Therapy Goal     OT, PT/OT Progressing    Description: Goals to be met by: 1/1/25     Patient will increase functional independence with ADLs by performing:    Grooming while standing at sink with [place].  Toileting from toilet with [place] for hygiene and clothing management.                          Time Tracking:     OT Date of Treatment: 12/19/24  OT Start Time: 0955  OT Stop Time: 1020  OT Total Time (min): 25 min    Billable Minutes:Self Care/Home Management 10  Therapeutic Activity 15    OT/NATHALIA: OT     Number of NATHALIA visits since last OT visit: 0    Sasha Connolly, OT  12/19/2024

## 2024-12-19 NOTE — PROGRESS NOTES
[facility]  Progress Note    Patient Name: Irlanda Lopez  MRN: [id number]  Patient Class: IP- Inpatient   Admission Date: 12/16/2024  Length of Stay: 2 days  Attending Physician: Eric Lynn MD  Primary Care Provider: Myla Arias MD        Subjective     Principal Problem:Liver abscess        HPI:   Patient is 84-year-old female with past medical history significant for  hypertension, hypothyroidism,, Alzheimer's dementia, anxiety, hyperlipidemia, colostomy, kidney stones, stroke, osteopenia, chronic urinary tract infections, right breast mass, and anemia who presented to ED as directed by Dr. Matute due to abnormal CT scan showing liver abscess.  She recently had cystoscopy with stent placement on 11/05/2024 and then was diagnosed with urinary tract infection at urgent care on 12/11, has been on Bactrim for E coli urinary tract infection.  Patient was scheduled for an ultrasound to be done which was ordered by Dr. Matute on 12/16, however  patient was unable to tolerate the ultrasound due to increased pain.  He then ordered CT scan and when results showed liver abscess he directed her to ED.  Due to her dementia, most information is obtained from her family at bedside. She does report chills, and intermittent abdominal pain. She denies dysuria, nausea, vomiting, chest pain, shortness of breath, cough, diarrhea.  on arrival to ED, temp 98.4°, heart rate 66, respirations 16, blood pressure 141/66, 97% SpO2 on room air.  Lab workup shows WBC 16.75, hemoglobin 9.8, hematocrit 29.1, sodium 126, CO2 18, BUN 8, creatinine 0.8, albumin 2.5, normal LFTs, lipase 14, lipase 0.8.  Urinalysis has been ordered, is pending.  Hospital Medicine was consulted for admission due to liver abscess for IV antibiotics and to consult IR for possible drainage of abscess in AM.    Overview/Hospital Course:  The patient is a 85 yo female with recent kidney stones s/p cystoscopy with stent  placement on 11/05/2024 and recent UTI on 12/11-on Bactrim for E coli urinary tract infection who was admitted for Sepsis due to UTI and Liver abscess on IV  Zosyn and Vanco. Temp 101F on admit. Urine culture 12/11/24 grew ESBL E coli. IV Abx changed to IV Meropenem. IR consulted and pt underwent CT guided drain placement per IR-20 ml of green pus sent to gram stain and culture.IR recommended monitoring drain output, Gentle flushing with 5mL NS every shift, and Drain can be removed after when less than 10mL output over 24 hours and patient is clinically improved.   ID consulted and recommended IV Meropenem x 3 weeks. Right sided ZINA drain had 100ml output overnight.   Afebrile, WBC 16>19>11>5.9. Blood cultures show NGTD. Repeat urine culture showed no growth (pt was on bactrim as OP). Liver abscess Aerobic culture showed no growth. Anaerobic culture pending.   Discussed the possibility of discharge with drain and IV abx with daughter. She does not feel they can manage the IV abx. Pt has dementia and is unable to manage IV abx. CM consulted for SNF  Pt pulled at drain and there was concern for dislodgement. Repeat CT abd showed decrease in size of suspected abscess in the inferior aspect of the right lobe of the liver, drainage catheter in good position.     Interval History: pt pleasantly confused. Pulled at ZINA drain - concern for dislodgement. IR consulted and replaced ZINA bulb and repositioned drain. Drain remains in place. Cont IV Meropenem x 3 weeks-per ID.     Review of Systems   Unable to perform ROS: Dementia     Objective:     Vital Signs (Most Recent):  Temp: 97.6 °F (36.4 °C) (12/19/24 1304)  Pulse: 60 (12/19/24 1304)  Resp: 18 (12/19/24 1304)  BP: 139/65 (12/19/24 1304)  SpO2: 98 % (12/19/24 1304) Vital Signs (24h Range):  Temp:  [97.4 °F (36.3 °C)-98.1 °F (36.7 °C)] 97.6 °F (36.4 °C)  Pulse:  [48-64] 60  Resp:  [18] 18  SpO2:  [94 %-98 %] 98 %  BP: (116-146)/(57-72) 139/65     Weight: 72.1 kg (158 lb 15.2  oz)  Body mass index is 29.07 kg/m².    Intake/Output Summary (Last 24 hours) at 12/19/2024 1323  Last data filed at 12/19/2024 0505  Gross per 24 hour   Intake 438.44 ml   Output 21 ml   Net 417.44 ml         Physical Exam  Vitals and nursing note reviewed.   Constitutional:       General: She is not in acute distress.     Appearance: She is well-developed. She is not diaphoretic.   HENT:      Head: Normocephalic and atraumatic.      Nose: Nose normal.   Eyes:      General: No scleral icterus.     Conjunctiva/sclera: Conjunctivae normal.   Neck:      Trachea: No tracheal deviation.   Cardiovascular:      Rate and Rhythm: Normal rate and regular rhythm.      Heart sounds: Normal heart sounds. No murmur heard.     No friction rub. No gallop.   Pulmonary:      Effort: Pulmonary effort is normal. No respiratory distress.      Breath sounds: Normal breath sounds. No stridor. No wheezing or rales.   Chest:      Chest wall: No tenderness.   Abdominal:      General: Bowel sounds are normal. There is no distension.      Palpations: Abdomen is soft. There is no mass.      Tenderness: There is abdominal tenderness (RUQ TTP- improving). There is no guarding or rebound.      Comments: ZINA percutaneous drain in place    Musculoskeletal:         General: No tenderness or deformity. Normal range of motion.      Cervical back: Normal range of motion and neck supple.   Skin:     General: Skin is warm and dry.      Coloration: Skin is not pale.      Findings: No erythema or rash.   Neurological:      Mental Status: She is alert.      Cranial Nerves: No cranial nerve deficit.      Motor: No abnormal muscle tone.      Coordination: Coordination normal.      Comments: Oriented to person only    Psychiatric:         Behavior: Behavior normal.         Cognition and Memory: Cognition is impaired. Memory is impaired. She exhibits impaired recent memory and impaired remote memory.             Significant Labs: All pertinent labs within the  past 24 hours have been reviewed.    Significant Imaging: I have reviewed all pertinent imaging results/findings within the past 24 hours.    Assessment and Plan     * Liver abscess  NPO x medications  Consult IR for drainage  Blood cultures ordered, pending  Lactic acid normal, WBC 16.75  Zosyn and vancomycin ordered  Hydrating with IV fluids  PRN acetaminophen for fever    12/17/24: IR consulted and pt underwent CT guided drain placement per IR-20 ml of green pus sent to gram stain and culture.IR recommended monitoring drain output, Gentle flushing with 5mL NS every shift, and Drain can be removed after when less than 10mL output over 24 hours and patient is clinically improved.     12/18/24: Afebrile, WBC 16>19>11. Blood cultures show NGTD. Repeat urine culture showed no growth (pt was on bactrim as OP). Liver abscess Aerobic culture showed no growth. Anaerobic culture pending.  ID consulted and recommended IV meropenem x 3 weeks Discussed the possibility of discharge with drain and IV abx with daughter. She does not feel they can manage the IV abx. Pt has dementia and is unable to manage IV abx. CM consulted for SNF.    12/19/24: Pt pulled at drain and there was concern for dislodgement. Repeat CT abd showed decrease in size of suspected abscess in the inferior aspect of the right lobe of the liver, drainage catheter in good position.     Sepsis  This patient does have evidence of infective focus  My overall impression is sepsis.  Source: Urinary Tract and Liver abscess  Antibiotics given-   Antibiotics (72h ago, onward)      Start     Stop Route Frequency Ordered    12/17/24 0845  meropenem 2 g in 0.9% NaCl 100 mL IVPB (MB+)         -- IV Every 12 hours (non-standard times) 12/17/24 0742          Latest lactate reviewed-  Recent Labs   Lab 12/17/24  0932   LACTATE 1.1     Organ dysfunction indicated by  none    Fluid challenge Not needed - patient is not hypotensive      Post- resuscitation assessment No - Post  resuscitation assessment not needed       Will Not start Pressors- Levophed for MAP of 65  Source control achieved by: IV Meropenem     UTI due to extended-spectrum beta lactamase (ESBL) producing Escherichia coli  Diagnosed with UTI per urgent care visit 12/11 -- has been on Bactrim  Urine culture 12/11/24 grew ESBL E coli   Repeat Urine culture 12/17/24 showed no growth   Abx changed to IV Meropenem  ID consutled and recommended IV Meropenem x 3 weeks     Hyponatremia   The patient's most recent sodium results are listed below.  Recent Labs     12/16/24  2223 12/17/24  0522 12/18/24  0500   * 129* 136       Plan  - Correct the sodium by 4-6mEq in 24 hours.   - Obtain the following studies: Urine sodium, urine osmolality, serum osmolality, AM cortisol, or TSH, T4.  - Will treat the hyponatremia with IV fluids as follows: NS    12/17/24: improving with NS IVFs   Metabolic acidosis with Bicarb 13, LA normal   IVFs changed to Bicarb drip     12/18/24: resolved     Hypothyroidism  TSH/Free T4 WNL  Continue armour thyroid per home med list      Anemia   Most recent hemoglobin and hematocrit are listed below.  Recent Labs     12/16/24  2223   HGB 9.8*   HCT 29.1*     Plan  - Monitor serial CBC: Daily  - Transfuse PRBC if patient becomes hemodynamically unstable, symptomatic or H/H drops below 7/21.      Mass of right breast  Seen on CT renal stone study   Will need OP f/u and mammography       MARTIN (generalized anxiety disorder)  Very anxious  Resume home Buspar      Dementia  Continue home medications  Fall precautions, use bed alarm  Requires frequent reorientation  Supportive care    Kidney stone  Had cystoscopy, stent placed per Dr. Matute on 11/5    Per Dr. Matute-stent appears to be in good position, no hydronephrosis, no further recommendations in regards to the treatment of the stone.  Will arrange for outpatient follow up.         VTE Risk Mitigation (From admission, onward)           Ordered     Reason  for No Pharmacological VTE Prophylaxis  Once        Comments: Procedure required   Question:  Reasons:  Answer:  Physician Provided (leave comment)    12/17/24 0110     IP VTE HIGH RISK PATIENT  Once         12/17/24 0110     Place sequential compression device  Until discontinued         12/17/24 0110                    Discharge Planning   MICHAEL:      Code Status: Full Code   Medical Readiness for Discharge Date: 12/19/2024  Discharge Plan A: Skilled Nursing Facility   Discharge Delays: None known at this time            Please place Justification for DME        Kayley Russell NP  Department of Hospital Medicine   [facility] [facility])

## 2024-12-19 NOTE — ASSESSMENT & PLAN NOTE
NPO x medications  Consult IR for drainage  Blood cultures ordered, pending  Lactic acid normal, WBC 16.75  Zosyn and vancomycin ordered  Hydrating with IV fluids  PRN acetaminophen for fever    12/17/24: IR consulted and pt underwent CT guided drain placement per IR-20 ml of green pus sent to gram stain and culture.IR recommended monitoring drain output, Gentle flushing with 5mL NS every shift, and Drain can be removed after when less than 10mL output over 24 hours and patient is clinically improved.     12/18/24: Afebrile, WBC 16>19>11. Blood cultures show NGTD. Repeat urine culture showed no growth (pt was on bactrim as OP). Liver abscess Aerobic culture showed no growth. Anaerobic culture pending.  ID consulted and recommended IV meropenem x 3 weeks Discussed the possibility of discharge with drain and IV abx with daughter. She does not feel they can manage the IV abx. Pt has dementia and is unable to manage IV abx. CM consulted for SNF.    12/19/24: Pt pulled at drain and there was concern for dislodgement. Repeat CT abd showed decrease in size of suspected abscess in the inferior aspect of the right lobe of the liver, drainage catheter in good position.

## 2024-12-19 NOTE — PLAN OF CARE
A245/A245 JEFERSON  Irlanda Lopez is a 84 y.o.female admitted on 12/16/2024 for Liver abscess   Code Status: Full Code MRN: [id number]   Review of patient's allergies indicates:   Allergen Reactions    Omnicef [cefdinir] Itching     Past Medical History:   Diagnosis Date    Clostridium difficile colitis     Dementia     Diverticulitis     s/p colostomy    High cholesterol     Hypertension     Hypothyroidism     Kidney stones       PRN meds    acetaminophen, 650 mg, Q4H PRN  dextrose 10%, 12.5 g, PRN  dextrose 10%, 25 g, PRN  glucagon (human recombinant), 1 mg, PRN  glucose, 16 g, PRN  glucose, 24 g, PRN  naloxone, 0.02 mg, PRN  ondansetron, 4 mg, Q6H PRN  oxyCODONE, 5 mg, Q6H PRN  sodium chloride 0.9%, 3 mL, Q8H PRN      Chart check completed. Will continue plan of care.      Orientation: disoriented to, time  [place] Coma Scale Score: 15     Lead Monitored: Lead II Rhythm: sinus bradycardia    Cardiac/Telemetry Box Number: [id number]  VTE Core Measure: Pharmacological prophylaxis initiated/maintained Last Bowel Movement: 12/17/24  Diet NPO Except for: Sips with Medication  Voiding Characteristics: frequency  [place] Score: 17  Fall Risk Score: 20  Accucheck []   Freq?      Lines/Drains/Airways       Peripherally Inserted Central Catheter Line  Duration             PICC Double Lumen 12/17/24 1620 right basilic 1 day              Drain  Duration                  Colostomy 05/23/21 LLQ 1306 days         Closed/Suction Drain 12/17/24 1034 Tube - 1 RUQ Bulb 8.5 Fr. 1 day

## 2024-12-19 NOTE — SUBJECTIVE & OBJECTIVE
Interval History:   84 year old woman with liver abscess  Abd cultures- 12/17/24  Specimen: Abscess from Liver Updated: 12/17/24 1959      Gram Stain Result Moderate WBC's     Moderate Gram positive cocci       Review of Systems   Constitutional:  Negative for activity change, appetite change, chills, diaphoresis and fatigue.   Neurological:  Negative for dizziness, facial asymmetry and headaches.     Objective:     Vital Signs (Most Recent):  Temp: 98.1 °F (36.7 °C) (12/19/24 0451)  Pulse:  (Cardiac monitor DC) (12/19/24 0500)  Resp: 18 (12/19/24 0451)  BP: (!) 116/57 (12/19/24 0451)  SpO2: (!) 94 % (12/19/24 0451) Vital Signs (24h Range):  Temp:  [97.4 °F (36.3 °C)-99.3 °F (37.4 °C)] 98.1 °F (36.7 °C)  Pulse:  [48-68] 50  Resp:  [18] 18  SpO2:  [67 %-98 %] 94 %  BP: (110-149)/(53-68) 116/57     Weight: 72.1 kg (158 lb 15.2 oz)  Body mass index is 29.07 kg/m².    Estimated Creatinine Clearance: 49.5 mL/min (based on SCr of 0.8 mg/dL).     Physical Exam  Vitals and nursing note reviewed.   HENT:      Head: Normocephalic.   Eyes:      Pupils: Pupils are equal, round, and reactive to light.   Cardiovascular:      Rate and Rhythm: Normal rate.   Pulmonary:      Effort: Pulmonary effort is normal.   Abdominal:      General: Abdomen is flat.   Musculoskeletal:      Cervical back: Normal range of motion.   Skin:     General: Skin is warm.   Neurological:      General: No focal deficit present.      Mental Status: She is alert.          Significant Labs: Blood Culture:   Recent Labs   Lab 12/16/24  2230   LABBLOO No Growth to date  No Growth to date  No Growth to date  No Growth to date     BMP:   Recent Labs   Lab 12/18/24  0500 12/19/24  0430   * 93    134*   K 4.0 4.1    102   CO2 22* 24   BUN 9 9   CREATININE 0.9 0.8   CALCIUM 9.1 8.7   MG 1.7  --      CBC:   Recent Labs   Lab 12/18/24  0500 12/19/24  0430   WBC 11.13 5.93   HGB 11.4* 9.3*   HCT 37.0 29.9*   * 395     CMP:   Recent Labs    Lab 12/18/24  0500 12/19/24  0430    134*   K 4.0 4.1    102   CO2 22* 24   * 93   BUN 9 9   CREATININE 0.9 0.8   CALCIUM 9.1 8.7   PROT 6.3 5.3*   ALBUMIN 2.4* 2.1*   BILITOT 0.4 0.3   ALKPHOS 97 77   AST 34 23   ALT 17 17   ANIONGAP 13 8     Wound Culture:   Recent Labs   Lab 12/17/24  1034   LABAERO No growth     All pertinent labs within the past 24 hours have been reviewed.    Significant Imaging: I have reviewed all pertinent imaging results/findings within the past 24 hours.

## 2024-12-19 NOTE — CONSULTS
Chart reviewed by Dr. Gabriel.       ASSESSMENT/PLAN:    Drain displacement    The order for a CT has been placed and demonstrated the drain is still in proper position.  The skin was cleaned and a new suture was placed.  Flush with 5 ccs of saline toward the body q12.          Thank you for the consult.         Is This A New Presentation, Or A Follow-Up?: Moles What Type Of Note Output Would You Prefer (Optional)?: Standard Output How Severe Is Your Skin Lesion?: mild Has Your Skin Lesion Been Treated?: not been treated

## 2024-12-19 NOTE — PT/OT/SLP PROGRESS
Physical Therapy  Treatment    Irlanda Lopez   MRN: [id number]   Admitting Diagnosis: Liver abscess    PT Received On: 12/19/24  PT Start Time: 1015     PT Stop Time: 1045    PT Total Time (min): 30 min       Billable Minutes:  Gait Training 10 and Therapeutic Activity 15    Treatment Type: Treatment  PT/PTA: PTA     Number of PTA visits since last PT visit: 1       General Precautions: Standard, fall, contact  Orthopedic Precautions: N/A  Braces: N/A  Respiratory Status: Room air    Spiritual, Cultural Beliefs, [employer] Practices, Values that Affect Care: no    Subjective:  Communicated with patient's nurse, Myla, and completed Epic chart review prior to session.  Patient agreed to PT session.     Pain/Comfort  Pain Rating 1: 0/10  Pain Rating Post-Intervention 1: 0/10    Objective:   Patient found with: peripheral IV, telemetry, ZINA drain, PICC line, colostomy    Supine > sit EOB: SBA    Forward scoot towards EOB: SBA    STS from EOB > RW: CGA    320ft w/ RW CGA    Stand at sink x3 min total completing self care ADL tasks with PRN single UE support on counter top  Required SBA to maintain standing balance    Stand pivot T/F to chair No AD: CGA    Reviewed AROM TE to BLE including: hip flex/ext, knee flex/ext, ankle PF/DF  To be completed a minimum of 10 reps for each LE in order to promote return of function, strength and ROM.     Educated patient on importance of increased tolerance to upright position and direct impact on CV endurance and strength. Patient encouraged to sit up in chair/ EOB, for a minimum of 2 consecutive hours, 3x per day. Encouraged patient to perform AROM TE to BLE throughout the day within all available planes of motion. Re enforced importance of utilizing call light to meet needs in room and not attempt to get up without staff assistance. Patient verbalized understanding and agreed to comply.       AM-PAC 6 CLICK MOBILITY  How much help from another person does this patient currently  need?   1 = Unable, Total/Dependent Assistance  2 = A lot, Maximum/Moderate Assistance  3 = A little, Minimum/Contact Guard/Supervision  4 = None, Modified [place]/Independent    Turning over in bed (including adjusting bedclothes, sheets and blankets)?: 3  Sitting down on and standing up from a chair with arms (e.g., wheelchair, bedside commode, etc.): 3  Moving from lying on back to sitting on the side of the bed?: 3  Moving to and from a bed to a chair (including a wheelchair)?: 3  Need to walk in hospital room?: 3  Climbing 3-5 steps with a railing?: 1 (NT)  Basic Mobility Total Score: 16    AM-PAC Raw Score CMS G-Code Modifier Level of Impairment Assistance   6 % Total / Unable   7 - 9 CM 80 - 100% Maximal Assist   10 - 14 CL 60 - 80% Moderate Assist   15 - 19 CK 40 - 60% Moderate Assist   20 - 22 CJ 20 - 40% Minimal Assist   23 CI 1-20% SBA / CGA   24 CH 0% Independent/ Mod I     Patient left up in chair with call button in reach, chair alarm on, and aide & daughter present.    Assessment:  Irlanda Lopez is a 84 y.o. female with a medical diagnosis of Liver abscess and presents with overall decline in functional mobility. Patient would continue to benefit from skilled PT to address functional limitations listed below in order to return to PLOF/decrease caregiver burden.     Rehab identified problem list/impairments: weakness, impaired endurance, impaired functional mobility, gait instability, impaired balance, decreased safety awareness, impaired cognition    Rehab potential is fair.    Activity tolerance: Fair    Discharge recommendations: Low Intensity Therapy      Barriers to discharge:      Equipment recommendations: none     GOALS:   Multidisciplinary Problems       Physical Therapy Goals          Problem: Physical Therapy    Goal Priority Disciplines Outcome Interventions   Physical Therapy Goal     PT, PT/OT Progressing    Description: Goals to be met by 12/31/24.  1. Pt will complete  bed mobility MOD I.  2. Pt will complete sit to stand MOD I.  3. Pt will ambulate 200ft MOD I using RW.  4. Pt will increase AMPAC score by 2 points to progress functional mobility.                       PLAN:    Patient to be seen 3 x/week to address the above listed problems via gait training, therapeutic activities, therapeutic exercises  Plan of Care expires: 12/31/24  Plan of Care reviewed with: patient, daughter         12/19/2024

## 2024-12-19 NOTE — NURSING
Pt d/o, removed telemetry box repeatedly. Pt removed PIV, pt removed ZINA drain from RUQ. Cleaned w/vashe. Wet to dry under mepilex dressing. Provider aware. Orders placed. Avasys placed in room for safety.

## 2024-12-19 NOTE — SUBJECTIVE & OBJECTIVE
Interval History: pt pleasantly confused. Pulled at ZINA drain - concern for dislodgement. IR consulted and replaced ZINA bulb and repositioned drain. Drain remains in place. Afebrile now. WBC 16>18>11. Cont IV Meropenem x 3 weeks-per ID.     Review of Systems   Unable to perform ROS: Dementia     Objective:     Vital Signs (Most Recent):  Temp: 97.6 °F (36.4 °C) (12/19/24 1304)  Pulse: 60 (12/19/24 1304)  Resp: 18 (12/19/24 1304)  BP: 139/65 (12/19/24 1304)  SpO2: 98 % (12/19/24 1304) Vital Signs (24h Range):  Temp:  [97.4 °F (36.3 °C)-98.1 °F (36.7 °C)] 97.6 °F (36.4 °C)  Pulse:  [48-64] 60  Resp:  [18] 18  SpO2:  [94 %-98 %] 98 %  BP: (116-146)/(57-72) 139/65     Weight: 72.1 kg (158 lb 15.2 oz)  Body mass index is 29.07 kg/m².    Intake/Output Summary (Last 24 hours) at 12/19/2024 1323  Last data filed at 12/19/2024 0505  Gross per 24 hour   Intake 438.44 ml   Output 21 ml   Net 417.44 ml         Physical Exam  Vitals and nursing note reviewed.   Constitutional:       General: She is not in acute distress.     Appearance: She is well-developed. She is not diaphoretic.   HENT:      Head: Normocephalic and atraumatic.      Nose: Nose normal.   Eyes:      General: No scleral icterus.     Conjunctiva/sclera: Conjunctivae normal.   Neck:      Trachea: No tracheal deviation.   Cardiovascular:      Rate and Rhythm: Normal rate and regular rhythm.      Heart sounds: Normal heart sounds. No murmur heard.     No friction rub. No gallop.   Pulmonary:      Effort: Pulmonary effort is normal. No respiratory distress.      Breath sounds: Normal breath sounds. No stridor. No wheezing or rales.   Chest:      Chest wall: No tenderness.   Abdominal:      General: Bowel sounds are normal. There is no distension.      Palpations: Abdomen is soft. There is no mass.      Tenderness: There is abdominal tenderness (RUQ TTP- improving). There is no guarding or rebound.      Comments: ZINA percutaneous drain in place    Musculoskeletal:          General: No tenderness or deformity. Normal range of motion.      Cervical back: Normal range of motion and neck supple.   Skin:     General: Skin is warm and dry.      Coloration: Skin is not pale.      Findings: No erythema or rash.   Neurological:      Mental Status: She is alert.      Cranial Nerves: No cranial nerve deficit.      Motor: No abnormal muscle tone.      Coordination: Coordination normal.      Comments: Oriented to person only    Psychiatric:         Behavior: Behavior normal.         Cognition and Memory: Cognition is impaired. Memory is impaired. She exhibits impaired recent memory and impaired remote memory.             Significant Labs: All pertinent labs within the past 24 hours have been reviewed.    Significant Imaging: I have reviewed all pertinent imaging results/findings within the past 24 hours.

## 2024-12-20 ENCOUNTER — PATIENT MESSAGE (OUTPATIENT)
Dept: NEUROLOGY | Facility: CLINIC | Age: 84
End: 2024-12-20
Payer: MEDICARE

## 2024-12-20 LAB
ALBUMIN SERPL BCP-MCNC: 2.4 G/DL (ref 3.5–5.2)
ALP SERPL-CCNC: 88 U/L (ref 40–150)
ALT SERPL W/O P-5'-P-CCNC: 22 U/L (ref 10–44)
ANION GAP SERPL CALC-SCNC: 14 MMOL/L (ref 8–16)
AST SERPL-CCNC: 36 U/L (ref 10–40)
BACTERIA SPEC AEROBE CULT: ABNORMAL
BASOPHILS # BLD AUTO: 0.04 K/UL (ref 0–0.2)
BASOPHILS NFR BLD: 0.5 % (ref 0–1.9)
BILIRUB SERPL-MCNC: 0.4 MG/DL (ref 0.1–1)
BUN SERPL-MCNC: 14 MG/DL (ref 8–23)
CALCIUM SERPL-MCNC: 8.7 MG/DL (ref 8.7–10.5)
CHLORIDE SERPL-SCNC: 101 MMOL/L (ref 95–110)
CO2 SERPL-SCNC: 18 MMOL/L (ref 23–29)
CREAT SERPL-MCNC: 0.9 MG/DL (ref 0.5–1.4)
DIFFERENTIAL METHOD BLD: ABNORMAL
EOSINOPHIL # BLD AUTO: 0.4 K/UL (ref 0–0.5)
EOSINOPHIL NFR BLD: 4.5 % (ref 0–8)
ERYTHROCYTE [DISTWIDTH] IN BLOOD BY AUTOMATED COUNT: 15.2 % (ref 11.5–14.5)
EST. GFR  (NO RACE VARIABLE): >60 ML/MIN/1.73 M^2
GLUCOSE SERPL-MCNC: 101 MG/DL (ref 70–110)
HCT VFR BLD AUTO: 36.8 % (ref 37–48.5)
HGB BLD-MCNC: 11.2 G/DL (ref 12–16)
IMM GRANULOCYTES # BLD AUTO: 0.18 K/UL (ref 0–0.04)
IMM GRANULOCYTES NFR BLD AUTO: 2.3 % (ref 0–0.5)
LYMPHOCYTES # BLD AUTO: 1.4 K/UL (ref 1–4.8)
LYMPHOCYTES NFR BLD: 17.4 % (ref 18–48)
MCH RBC QN AUTO: 25.3 PG (ref 27–31)
MCHC RBC AUTO-ENTMCNC: 30.4 G/DL (ref 32–36)
MCV RBC AUTO: 83 FL (ref 82–98)
MONOCYTES # BLD AUTO: 0.8 K/UL (ref 0.3–1)
MONOCYTES NFR BLD: 10.6 % (ref 4–15)
NEUTROPHILS # BLD AUTO: 5 K/UL (ref 1.8–7.7)
NEUTROPHILS NFR BLD: 64.7 % (ref 38–73)
NRBC BLD-RTO: 0 /100 WBC
PLATELET # BLD AUTO: 404 K/UL (ref 150–450)
PMV BLD AUTO: 8.2 FL (ref 9.2–12.9)
POTASSIUM SERPL-SCNC: 4.4 MMOL/L (ref 3.5–5.1)
PROT SERPL-MCNC: 5.9 G/DL (ref 6–8.4)
RBC # BLD AUTO: 4.42 M/UL (ref 4–5.4)
SODIUM SERPL-SCNC: 133 MMOL/L (ref 136–145)
WBC # BLD AUTO: 7.8 K/UL (ref 3.9–12.7)

## 2024-12-20 PROCEDURE — 25000003 PHARM REV CODE 250: Performed by: INTERNAL MEDICINE

## 2024-12-20 PROCEDURE — 85025 COMPLETE CBC W/AUTO DIFF WBC: CPT | Performed by: NURSE PRACTITIONER

## 2024-12-20 PROCEDURE — 97530 THERAPEUTIC ACTIVITIES: CPT

## 2024-12-20 PROCEDURE — 27000207 HC ISOLATION

## 2024-12-20 PROCEDURE — 36415 COLL VENOUS BLD VENIPUNCTURE: CPT | Performed by: NURSE PRACTITIONER

## 2024-12-20 PROCEDURE — 63600175 PHARM REV CODE 636 W HCPCS: Performed by: FAMILY MEDICINE

## 2024-12-20 PROCEDURE — 25000003 PHARM REV CODE 250: Performed by: FAMILY MEDICINE

## 2024-12-20 PROCEDURE — 36569 INSJ PICC 5 YR+ W/O IMAGING: CPT

## 2024-12-20 PROCEDURE — 63600175 PHARM REV CODE 636 W HCPCS: Performed by: NURSE PRACTITIONER

## 2024-12-20 PROCEDURE — 21400001 HC TELEMETRY ROOM

## 2024-12-20 PROCEDURE — 80053 COMPREHEN METABOLIC PANEL: CPT | Performed by: NURSE PRACTITIONER

## 2024-12-20 PROCEDURE — C1751 CATH, INF, PER/CENT/MIDLINE: HCPCS

## 2024-12-20 RX ORDER — CEFTRIAXONE 2 G/1
2 INJECTION, POWDER, FOR SOLUTION INTRAMUSCULAR; INTRAVENOUS
Status: DISCONTINUED | OUTPATIENT
Start: 2024-12-20 | End: 2024-12-26 | Stop reason: HOSPADM

## 2024-12-20 RX ORDER — DIPHENHYDRAMINE HCL 25 MG
25 CAPSULE ORAL ONCE
Status: COMPLETED | OUTPATIENT
Start: 2024-12-20 | End: 2024-12-20

## 2024-12-20 RX ADMIN — MEMANTINE 10 MG: 10 TABLET ORAL at 09:12

## 2024-12-20 RX ADMIN — MUPIROCIN: 20 OINTMENT TOPICAL at 09:12

## 2024-12-20 RX ADMIN — OXYCODONE HYDROCHLORIDE 5 MG: 5 TABLET ORAL at 04:12

## 2024-12-20 RX ADMIN — BUSPIRONE HYDROCHLORIDE 10 MG: 10 TABLET ORAL at 09:12

## 2024-12-20 RX ADMIN — MEROPENEM 2 G: 2 INJECTION, POWDER, FOR SOLUTION INTRAVENOUS at 09:12

## 2024-12-20 RX ADMIN — HYPROMELLOSE 2910 2 DROP: 5 SOLUTION/ DROPS OPHTHALMIC at 08:12

## 2024-12-20 RX ADMIN — THYROID, PORCINE 30 MG: 30 TABLET ORAL at 08:12

## 2024-12-20 RX ADMIN — MUPIROCIN: 20 OINTMENT TOPICAL at 08:12

## 2024-12-20 RX ADMIN — CEFTRIAXONE 2 G: 2 INJECTION, POWDER, FOR SOLUTION INTRAMUSCULAR; INTRAVENOUS at 03:12

## 2024-12-20 RX ADMIN — DIPHENHYDRAMINE HYDROCHLORIDE 25 MG: 25 CAPSULE ORAL at 12:12

## 2024-12-20 RX ADMIN — HYPROMELLOSE 2910 2 DROP: 5 SOLUTION/ DROPS OPHTHALMIC at 09:12

## 2024-12-20 RX ADMIN — RISPERIDONE 0.5 MG: 0.5 TABLET, FILM COATED ORAL at 08:12

## 2024-12-20 RX ADMIN — MEMANTINE 10 MG: 10 TABLET ORAL at 08:12

## 2024-12-20 RX ADMIN — BUSPIRONE HYDROCHLORIDE 10 MG: 10 TABLET ORAL at 08:12

## 2024-12-20 RX ADMIN — HYPROMELLOSE 2910 2 DROP: 5 SOLUTION/ DROPS OPHTHALMIC at 03:12

## 2024-12-20 NOTE — PLAN OF CARE
Pt refused all OOB/EOB activity and supine TherEx, despite max encouragement from therapist, due to fatigue. Educated on the importance of OOB/EOB activity for her recovery. Educated on importance of consistent participation with PT. Educated on importance of TherEx to maintain/regain strength, encouraged to complete supine TherEx (hip flex, hip abd/add, heel slides, quad sets, ankle pumps) throughout the day. Encouraged frequent position changes to reduce the risk of pressure injury. Encouraged to sit up in the chair for all meals. Recommending low intensity therapy upon d/c.

## 2024-12-20 NOTE — PROGRESS NOTES
[facility])  Infectious Disease  Progress Note    Patient Name: Irlanda Lopez  MRN: [id number]  Admission Date: 12/16/2024  Length of Stay: 3 days  Attending Physician: Eric Lynn MD  Primary Care Provider: Myla Arias MD    Isolation Status: Contact  Assessment/Plan:      Neuro  Dementia  Supportive care     Renal/  UTI due to extended-spectrum beta lactamase (ESBL) producing Escherichia coli  Treated .  Will add Fosfomycin one dose    GI  * Liver abscess  S/pIR guided drainage- cultures pending   Will continue Meropenem      12/18/24  Prelim cultures  Specimen: Abscess from Liver Updated: 12/17/24 1959      Gram Stain Result Moderate WBC's     Moderate Gram positive cocci     Outpatient Antibiotic Therapy Plan:     Please send referral to [facility].     1) Infection:  Liver abscess     2) Discharge Antibiotics:     Intravenous antibiotics:   IV Rocephin 2 gram daily for 3 weeks      3) Therapy Duration:  3 weeks      Estimated end date of IV antibiotics: 01/10/2025     4) Outpatient Weekly Labs:     Order the following labs to be drawn on Mondays:   CBC  CMP   CPK (when on Daptomycin)  ESR  CRP  -will need repeat CT scan of the abd before we pull the line.   Please send all labs to [facility] .           Anticipated Disposition:     Thank you for your consult. I will follow-up with patient. Please contact us if you have any additional questions.    Bull Lopez MD, [facility]  Infectious Disease  [facility])    Subjective:     Principal Problem:Liver abscess    HPI:  84-year-old female with past medical history significant for  hypertension, hypothyroidism,, Alzheimer's dementia, anxiety, hyperlipidemia, colostomy, kidney stones, stroke, osteopenia, chronic urinary tract infections, right breast mas.  She had recent cystoscopy with stent placement on 11/05/2024 and was on Bactrim for UTI.  CT scan of the abdomen  Since admission- labs and imaging test  reviewed-  CT renal stone- 12/16/24 -  Interval development of a masslike lesion in the right lobe of the liver contiguous with the right kidney concerning for a 4 cm abscess.  Malignancy not excluded.     3.3 cm right renal stone right ureteral stent in place without significant hydronephrosis.     Suspicious appearing medial lower quadrant right breast mass measuring 1.5 cm.  Correlation with diagnostic mammogram and ultrasound is recommended.   She had CT guided  drainage    Lab workup shows WBC 16.75,   Cultures from liver pending   Urine culture- -12/11- ESBL E coli     Interval History:   84 year old woman with liver abscess  Abd cultures- 12/17/24  Specimen: Abscess from Liver Updated: 12/17/24 1959      Gram Stain Result Moderate WBC's     Moderate Gram positive cocci   12/19-  Tolerating meds  Cultures-  From abdomen - strep constellatus        Review of Systems   Constitutional:  Negative for activity change, appetite change, chills, diaphoresis and fatigue.   Neurological:  Negative for dizziness, facial asymmetry and headaches.     Objective:     Vital Signs (Most Recent):  Temp: 97.3 °F (36.3 °C) (12/20/24 0458)  Pulse: 67 (12/20/24 0458)  Resp: 18 (12/20/24 0458)  BP: 136/64 (12/20/24 0458)  SpO2: 97 % (12/20/24 0458) Vital Signs (24h Range):  Temp:  [97.3 °F (36.3 °C)-99.3 °F (37.4 °C)] 97.3 °F (36.3 °C)  Pulse:  [60-72] 67  Resp:  [18-20] 18  SpO2:  [93 %-98 %] 97 %  BP: (100-146)/(53-72) 136/64     Weight: 72.1 kg (158 lb 15.2 oz)  Body mass index is 29.07 kg/m².    Estimated Creatinine Clearance: 44 mL/min (based on SCr of 0.9 mg/dL).     Physical Exam  Vitals and nursing note reviewed.   HENT:      Head: Normocephalic.   Eyes:      Pupils: Pupils are equal, round, and reactive to light.   Cardiovascular:      Rate and Rhythm: Normal rate.   Pulmonary:      Effort: Pulmonary effort is normal.   Abdominal:      General: Abdomen is flat.   Musculoskeletal:      Cervical back: Normal range of motion.    Skin:     General: Skin is warm.   Neurological:      General: No focal deficit present.      Mental Status: She is alert.          Significant Labs: Blood Culture:   Recent Labs   Lab 12/16/24  2230   LABBLOO No Growth to date  No Growth to date  No Growth to date  No Growth to date  No Growth to date  No Growth to date     BMP:   Recent Labs   Lab 12/20/24  0451      *   K 4.4      CO2 18*   BUN 14   CREATININE 0.9   CALCIUM 8.7     CBC:   Recent Labs   Lab 12/19/24  0430 12/19/24  1349 12/20/24  0451   WBC 5.93  --  7.80   HGB 9.3* 11.6* 11.2*   HCT 29.9* 36.9* 36.8*     --  404     CMP:   Recent Labs   Lab 12/19/24  0430 12/20/24  0451   * 133*   K 4.1 4.4    101   CO2 24 18*   GLU 93 101   BUN 9 14   CREATININE 0.8 0.9   CALCIUM 8.7 8.7   PROT 5.3* 5.9*   ALBUMIN 2.1* 2.4*   BILITOT 0.3 0.4   ALKPHOS 77 88   AST 23 36   ALT 17 22   ANIONGAP 8 14     Wound Culture:   Recent Labs   Lab 12/17/24  1034   LABAERO STREPTOCOCCUS CONSTELLATUS  Many  Susceptibility pending  *     All pertinent labs within the past 24 hours have been reviewed.    Significant Imaging: I have reviewed all pertinent imaging results/findings within the past 24 hours.

## 2024-12-20 NOTE — PROGRESS NOTES
[facility] ER Medicine  Progress Note    Patient Name: Irlanda Lopez  MRN: [id number]  Patient Class: IP- Inpatient   Admission Date: 12/16/2024  Length of Stay: 3 days  Attending Physician: Eric Lynn MD  Primary Care Provider: Myla Arias MD        Subjective     Principal Problem:Liver abscess        HPI:   Patient is 84-year-old female with past medical history significant for  hypertension, hypothyroidism,, Alzheimer's dementia, anxiety, hyperlipidemia, colostomy, kidney stones, stroke, osteopenia, chronic urinary tract infections, right breast mass, and anemia who presented to ED as directed by Dr. Matute due to abnormal CT scan showing liver abscess.  She recently had cystoscopy with stent placement on 11/05/2024 and then was diagnosed with urinary tract infection at urgent care on 12/11, has been on Bactrim for E coli urinary tract infection.  Patient was scheduled for an ultrasound to be done which was ordered by Dr. Matute on 12/16, however  patient was unable to tolerate the ultrasound due to increased pain.  He then ordered CT scan and when results showed liver abscess he directed her to ED.  Due to her dementia, most information is obtained from her family at bedside. She does report chills, and intermittent abdominal pain. She denies dysuria, nausea, vomiting, chest pain, shortness of breath, cough, diarrhea.  on arrival to ED, temp 98.4°, heart rate 66, respirations 16, blood pressure 141/66, 97% SpO2 on room air.  Lab workup shows WBC 16.75, hemoglobin 9.8, hematocrit 29.1, sodium 126, CO2 18, BUN 8, creatinine 0.8, albumin 2.5, normal LFTs, lipase 14, lipase 0.8.  Urinalysis has been ordered, is pending.  Hospital Medicine was consulted for admission due to liver abscess for IV antibiotics and to consult IR for possible drainage of abscess in AM.    Overview/Hospital Course:  The patient is a 83 yo female with recent kidney stones s/p cystoscopy with stent  placement on 11/05/2024 and recent UTI on 12/11-on Bactrim for E coli urinary tract infection who was admitted for Sepsis due to UTI and Liver abscess on IV  Zosyn and Vanco. Temp 101F on admit. Urine culture 12/11/24 grew ESBL E coli. IV Abx changed to IV Meropenem. IR consulted and pt underwent CT guided drain placement per IR-20 ml of green pus sent to gram stain and culture.IR recommended monitoring drain output, Gentle flushing with 5mL NS every shift, and Drain can be removed after when less than 10mL output over 24 hours and patient is clinically improved.   ID consulted and recommended IV Meropenem x 3 weeks. Right sided ZINA drain had 100ml output overnight.   Afebrile, WBC 16>19>11>5.9. Blood cultures show NGTD. Repeat urine culture showed no growth (pt was on bactrim as OP). Liver abscess Aerobic culture showed no growth. Anaerobic culture pending.   Discussed the possibility of discharge with drain and IV abx with daughter. She does not feel they can manage the IV abx. Pt has dementia and is unable to manage IV abx. CM consulted for SNF  Pt pulled at drain and there was concern for dislodgement. Repeat CT abd showed decrease in size of suspected abscess in the inferior aspect of the right lobe of the liver, drainage catheter in good position.   On 12/20/24, Liver abscess aerobic culture now growing STREPTOCOCCUS CONSTELLATUS. Urine culture on admit (12/17) still no growth. Urine culture 12/11 grew ESBL Intravenous antibiotics:  Dr Lopez recommended to change IV abx to IV Rocephin 2 gram daily for 3 weeks Estimated end date of IV antibiotics: 01/10/2025. Weekly Labs:CBC, CMP, ESR, CRP. Will need repeat CT abd prior to discontinuing drain. CM notified.       Interval History: pt remains pleasantly confused. No acute events. Drain only put out 5 ml. IV abx changed to IV Rocephin x 3 weeks.     Review of Systems   Unable to perform ROS: Dementia     Objective:     Vital Signs (Most Recent):  Temp: 98.1 °F  (36.7 °C) (12/20/24 0813)  Pulse: 80 (12/20/24 0813)  Resp: 18 (12/20/24 0813)  BP: (!) 149/75 (12/20/24 0813)  SpO2: 97 % (12/20/24 0813) Vital Signs (24h Range):  Temp:  [97.3 °F (36.3 °C)-99.3 °F (37.4 °C)] 98.1 °F (36.7 °C)  Pulse:  [62-80] 80  Resp:  [18-20] 18  SpO2:  [93 %-97 %] 97 %  BP: (100-149)/(53-75) 149/75     Weight: 72.1 kg (158 lb 15.2 oz)  Body mass index is 29.07 kg/m².    Intake/Output Summary (Last 24 hours) at 12/20/2024 1312  Last data filed at 12/20/2024 0733  Gross per 24 hour   Intake 98.82 ml   Output 5 ml   Net 93.82 ml         Physical Exam  Vitals and nursing note reviewed.   Constitutional:       General: She is not in acute distress.     Appearance: She is well-developed. She is not diaphoretic.   HENT:      Head: Normocephalic and atraumatic.      Nose: Nose normal.   Eyes:      General: No scleral icterus.     Conjunctiva/sclera: Conjunctivae normal.   Neck:      Trachea: No tracheal deviation.   Cardiovascular:      Rate and Rhythm: Normal rate and regular rhythm.      Heart sounds: Normal heart sounds. No murmur heard.     No friction rub. No gallop.   Pulmonary:      Effort: Pulmonary effort is normal. No respiratory distress.      Breath sounds: Normal breath sounds. No stridor. No wheezing or rales.   Chest:      Chest wall: No tenderness.   Abdominal:      General: Bowel sounds are normal. There is no distension.      Palpations: Abdomen is soft. There is no mass.      Tenderness: There is abdominal tenderness (RUQ TTP- improving). There is no guarding or rebound.      Comments: ZINA percutaneous drain in place    Musculoskeletal:         General: No tenderness or deformity. Normal range of motion.      Cervical back: Normal range of motion and neck supple.   Skin:     General: Skin is warm and dry.      Coloration: Skin is not pale.      Findings: No erythema or rash.   Neurological:      Mental Status: She is alert.      Cranial Nerves: No cranial nerve deficit.      Motor: No  abnormal muscle tone.      Coordination: Coordination normal.      Comments: Oriented to person only    Psychiatric:         Behavior: Behavior normal.         Cognition and Memory: Cognition is impaired. Memory is impaired. She exhibits impaired recent memory and impaired remote memory.             Significant Labs: All pertinent labs within the past 24 hours have been reviewed.    Significant Imaging: I have reviewed all pertinent imaging results/findings within the past 24 hours.    Assessment and Plan     * Liver abscess  NPO x medications  Consult IR for drainage  Blood cultures ordered, pending  Lactic acid normal, WBC 16.75  Zosyn and vancomycin ordered  Hydrating with IV fluids  PRN acetaminophen for fever    12/17/24: IR consulted and pt underwent CT guided drain placement per IR-20 ml of green pus sent to gram stain and culture.IR recommended monitoring drain output, Gentle flushing with 5mL NS every shift, and Drain can be removed after when less than 10mL output over 24 hours and patient is clinically improved.     12/18/24: Afebrile, WBC 16>19>11. Blood cultures show NGTD. Repeat urine culture showed no growth (pt was on bactrim as OP). Liver abscess Aerobic culture showed no growth. Anaerobic culture pending.  ID consulted and recommended IV meropenem x 3 weeks Discussed the possibility of discharge with drain and IV abx with daughter. She does not feel they can manage the IV abx. Pt has dementia and is unable to manage IV abx. CM consulted for SNF.    12/19/24: Pt pulled at drain and there was concern for dislodgement. Repeat CT abd showed decrease in size of suspected abscess in the inferior aspect of the right lobe of the liver, drainage catheter in good position.     12/20/24: On 12/20/24, Liver abscess aerobic culture now growing STREPTOCOCCUS CONSTELLATUS. Urine culture on admit (12/17) still no growth. Urine culture 12/11 grew ESBL Intravenous antibiotics:  Dr Lopez recommended to change IV abx  "to IV Rocephin 2 gram daily for 3 weeks Estimated end date of IV antibiotics: 01/10/2025. Weekly Labs:CBC, CMP, ESR, CRP. Will need repeat CT abd prior to discontinuing drain. CM notified.       Sepsis  This patient does have evidence of infective focus  My overall impression is sepsis.  Source: Urinary Tract and Liver abscess  Antibiotics given-   Antibiotics (72h ago, onward)      Start     Stop Route Frequency Ordered    12/19/24 1145  mupirocin 2 % ointment         12/24/24 0859 Nasl 2 times daily 12/19/24 1138    12/17/24 0845  meropenem 2 g in 0.9% NaCl 100 mL IVPB (MB+)         -- IV Every 12 hours (non-standard times) 12/17/24 0742          Latest lactate reviewed-  No results for input(s): "LACTATE", "POCLAC" in the last 72 hours.    Organ dysfunction indicated by  none    Fluid challenge Not needed - patient is not hypotensive      Post- resuscitation assessment No - Post resuscitation assessment not needed       Will Not start Pressors- Levophed for MAP of 65  Source control achieved by: IV Meropenem changed to IV Rocephin x 3 weeks     UTI due to extended-spectrum beta lactamase (ESBL) producing Escherichia coli  Diagnosed with UTI per urgent care visit 12/11 -- has been on Bactrim  Urine culture 12/11/24 grew ESBL E coli   Repeat Urine culture 12/17/24 showed no growth   Abx changed to IV Meropenem  ID consutled and recommended IV Meropenem x 3 weeks   On 12/20/24: ID recommended IV Rocephin x 3 weeks     Hyponatremia   The patient's most recent sodium results are listed below.  Recent Labs     12/16/24  2223 12/17/24  0522 12/18/24  0500   * 129* 136       Plan  - Correct the sodium by 4-6mEq in 24 hours.   - Obtain the following studies: Urine sodium, urine osmolality, serum osmolality, AM cortisol, or TSH, T4.  - Will treat the hyponatremia with IV fluids as follows: NS    12/17/24: improving with NS IVFs   Metabolic acidosis with Bicarb 13, LA normal   IVFs changed to Bicarb drip     12/18/24: " resolved     Hypothyroidism  TSH/Free T4 WNL  Continue armour thyroid per home med list      Anemia   Most recent hemoglobin and hematocrit are listed below.  Recent Labs     12/16/24  2223   HGB 9.8*   HCT 29.1*     Plan  - Monitor serial CBC: Daily  - Transfuse PRBC if patient becomes hemodynamically unstable, symptomatic or H/H drops below 7/21.      Mass of right breast  Seen on CT renal stone study   Will need OP f/u and mammography       MARTIN (generalized anxiety disorder)  Stable, cont home Buspar      Dementia  Continue home medications  Fall precautions, use bed alarm  Requires frequent reorientation  Supportive care    Kidney stone  Had cystoscopy, stent placed per Dr. Matute on 11/5    Per Dr. Matute-stent appears to be in good position, no hydronephrosis, no further recommendations in regards to the treatment of the stone.  Will arrange for outpatient follow up.         VTE Risk Mitigation (From admission, onward)           Ordered     Reason for No Pharmacological VTE Prophylaxis  Once        Comments: Procedure required   Question:  Reasons:  Answer:  Physician Provided (leave comment)    12/17/24 0110     IP VTE HIGH RISK PATIENT  Once         12/17/24 0110     Place sequential compression device  Until discontinued         12/17/24 0110                    Discharge Planning   MICHAEL: 12/20/2024     Code Status: Full Code   Medical Readiness for Discharge Date: 12/19/2024  Discharge Plan A: Skilled Nursing Facility   Discharge Delays: None known at this time            Please place Justification for DME        Kayley Russell NP  Department of Hospital Medicine   [facility] - Telemetry ([facility])

## 2024-12-20 NOTE — PT/OT/SLP PROGRESS
Physical Therapy      Patient Name:  Irlanda Lopez   MRN:  [id number]    Chart review complete. Presented to pt's room at 0926. Patient not seen today secondary to pt eating breakfast. Will continue efforts.    Faviola Deleon, PT, DPT  12/20/2024   0913

## 2024-12-20 NOTE — ASSESSMENT & PLAN NOTE
S/pIR guided drainage- cultures pending   Will continue Meropenem      12/18/24  Prelim cultures  Specimen: Abscess from Liver Updated: 12/17/24 1959      Gram Stain Result Moderate WBC's     Moderate Gram positive cocci     Outpatient Antibiotic Therapy Plan:     Please send referral to [facility].     1) Infection:  Liver abscess     2) Discharge Antibiotics:     Intravenous antibiotics:   IV Rocephin 2 gram daily for 3 weeks      3) Therapy Duration:  3 weeks      Estimated end date of IV antibiotics: 01/10/2025     4) Outpatient Weekly Labs:     Order the following labs to be drawn on Mondays:   CBC  CMP   CPK (when on Daptomycin)  ESR  CRP  -will need repeat CT scan of the abd before we pull the line.   Please send all labs to [facility] .

## 2024-12-20 NOTE — PHYSICIAN QUERY
Please clarify the infectious disease diagnosis.    Sepsis due to suspected organism (please specify): ESBL

## 2024-12-20 NOTE — PT/OT/SLP PROGRESS
"Occupational Therapy   Treatment    Name: Irlanda Lopez  MRN: [id number]  Admitting Diagnosis:  Liver abscess       Recommendations:     Discharge Recommendations: Low Intensity Therapy  Discharge Equipment Recommendations:  walker, rolling  Barriers to discharge:  None    Assessment:     Irlanda Lopez is a 84 y.o. female with a medical diagnosis of Liver abscess.  She presents with the following performance deficits affecting function are weakness, impaired endurance, impaired functional mobility, gait instability, decreased coordination, decreased lower extremity function, decreased safety awareness, decreased ROM, impaired cardiopulmonary response to activity.     Rehab Prognosis:  Fair; patient would benefit from acute skilled OT services to address these deficits and reach maximum level of function.       Plan:     Patient to be seen 2 x/week to address the above listed problems via therapeutic activities  Plan of Care Expires: 01/01/25  Plan of Care Reviewed with: patient, daughter    Subjective     Chief Complaint: "I need to rest"  Patient/Family Comments/goals: Daughter requested mother not attend Tx this date  Pain/Comfort:  Pain Rating 1: 0/10    Objective:     Communicated with: remi prior to session.  Patient found HOB elevated with ZINA drain, telemetry, colostomy upon OT entry to room.    General Precautions: Standard, fall, contact    Orthopedic Precautions:N/A  Braces: N/A  Respiratory Status: Room air     Occupational Performance:       [facility] 6 Click ADL: 20    Treatment & Education:  Pt and daughter educated on UB and LB Therex and using safe BM and transfer only with medical staff.  Encouraged completion of B UE AROM therex throughout the day to increase functional strength and activity tolerance needed for ADL completion.  OT role, plan of care, progression of goals, importance of continued OOB activity, ADL/functional transfer and mobility retraining, call don't fall, safety precautions, " fall prevention.      Patient left HOB elevated with all lines intact, call button in reach, and bed alarm on    GOALS:   Multidisciplinary Problems       Occupational Therapy Goals          Problem: Occupational Therapy    Goal Priority Disciplines Outcome Interventions   Occupational Therapy Goal     OT, PT/OT Not Progressing    Description: Goals to be met by: 1/1/25     Patient will increase functional independence with ADLs by performing:    Grooming while standing at sink with [place].  Toileting from toilet with [place] for hygiene and clothing management.                          Time Tracking:     OT Date of Treatment: 12/20/24  OT Start Time: 1030  OT Stop Time: 1038  OT Total Time (min): 8 min    Billable Minutes:Therapeutic Activity 8    OT/NATHALIA: NATHALIA     Number of NATHALIA visits since last OT visit: 1  A client care conference was performed between the LOTR and HANCOCK, prior to treatment by HANCOCK, to discuss the patient's status, treatment plan and established goals.    SANTI Wolfe  12/20/2024

## 2024-12-20 NOTE — SUBJECTIVE & OBJECTIVE
Interval History:   84 year old woman with liver abscess  Abd cultures- 12/17/24  Specimen: Abscess from Liver Updated: 12/17/24 1959      Gram Stain Result Moderate WBC's     Moderate Gram positive cocci   12/19-  Tolerating meds  Cultures-  From abdomen - strep constellatus        Review of Systems   Constitutional:  Negative for activity change, appetite change, chills, diaphoresis and fatigue.   Neurological:  Negative for dizziness, facial asymmetry and headaches.     Objective:     Vital Signs (Most Recent):  Temp: 97.3 °F (36.3 °C) (12/20/24 0458)  Pulse: 67 (12/20/24 0458)  Resp: 18 (12/20/24 0458)  BP: 136/64 (12/20/24 0458)  SpO2: 97 % (12/20/24 0458) Vital Signs (24h Range):  Temp:  [97.3 °F (36.3 °C)-99.3 °F (37.4 °C)] 97.3 °F (36.3 °C)  Pulse:  [60-72] 67  Resp:  [18-20] 18  SpO2:  [93 %-98 %] 97 %  BP: (100-146)/(53-72) 136/64     Weight: 72.1 kg (158 lb 15.2 oz)  Body mass index is 29.07 kg/m².    Estimated Creatinine Clearance: 44 mL/min (based on SCr of 0.9 mg/dL).     Physical Exam  Vitals and nursing note reviewed.   HENT:      Head: Normocephalic.   Eyes:      Pupils: Pupils are equal, round, and reactive to light.   Cardiovascular:      Rate and Rhythm: Normal rate.   Pulmonary:      Effort: Pulmonary effort is normal.   Abdominal:      General: Abdomen is flat.   Musculoskeletal:      Cervical back: Normal range of motion.   Skin:     General: Skin is warm.   Neurological:      General: No focal deficit present.      Mental Status: She is alert.          Significant Labs: Blood Culture:   Recent Labs   Lab 12/16/24  2230   LABBLOO No Growth to date  No Growth to date  No Growth to date  No Growth to date  No Growth to date  No Growth to date     BMP:   Recent Labs   Lab 12/20/24  0451      *   K 4.4      CO2 18*   BUN 14   CREATININE 0.9   CALCIUM 8.7     CBC:   Recent Labs   Lab 12/19/24  0430 12/19/24  1349 12/20/24  0451   WBC 5.93  --  7.80   HGB 9.3* 11.6* 11.2*    HCT 29.9* 36.9* 36.8*     --  404     CMP:   Recent Labs   Lab 12/19/24  0430 12/20/24  0451   * 133*   K 4.1 4.4    101   CO2 24 18*   GLU 93 101   BUN 9 14   CREATININE 0.8 0.9   CALCIUM 8.7 8.7   PROT 5.3* 5.9*   ALBUMIN 2.1* 2.4*   BILITOT 0.3 0.4   ALKPHOS 77 88   AST 23 36   ALT 17 22   ANIONGAP 8 14     Wound Culture:   Recent Labs   Lab 12/17/24  1034   LABAERO STREPTOCOCCUS CONSTELLATUS  Many  Susceptibility pending  *     All pertinent labs within the past 24 hours have been reviewed.    Significant Imaging: I have reviewed all pertinent imaging results/findings within the past 24 hours.

## 2024-12-20 NOTE — ASSESSMENT & PLAN NOTE
"This patient does have evidence of infective focus  My overall impression is sepsis.  Source: Urinary Tract and Liver abscess  Antibiotics given-   Antibiotics (72h ago, onward)      Start     Stop Route Frequency Ordered    12/19/24 1145  mupirocin 2 % ointment         12/24/24 0859 Nasl 2 times daily 12/19/24 1138    12/17/24 0845  meropenem 2 g in 0.9% NaCl 100 mL IVPB (MB+)         -- IV Every 12 hours (non-standard times) 12/17/24 0742          Latest lactate reviewed-  No results for input(s): "LACTATE", "POCLAC" in the last 72 hours.    Organ dysfunction indicated by  none    Fluid challenge Not needed - patient is not hypotensive      Post- resuscitation assessment No - Post resuscitation assessment not needed       Will Not start Pressors- Levophed for MAP of 65  Source control achieved by: IV Meropenem changed to IV Rocephin x 3 weeks   "

## 2024-12-20 NOTE — ASSESSMENT & PLAN NOTE
NPO x medications  Consult IR for drainage  Blood cultures ordered, pending  Lactic acid normal, WBC 16.75  Zosyn and vancomycin ordered  Hydrating with IV fluids  PRN acetaminophen for fever    12/17/24: IR consulted and pt underwent CT guided drain placement per IR-20 ml of green pus sent to gram stain and culture.IR recommended monitoring drain output, Gentle flushing with 5mL NS every shift, and Drain can be removed after when less than 10mL output over 24 hours and patient is clinically improved.     12/18/24: Afebrile, WBC 16>19>11. Blood cultures show NGTD. Repeat urine culture showed no growth (pt was on bactrim as OP). Liver abscess Aerobic culture showed no growth. Anaerobic culture pending.  ID consulted and recommended IV meropenem x 3 weeks Discussed the possibility of discharge with drain and IV abx with daughter. She does not feel they can manage the IV abx. Pt has dementia and is unable to manage IV abx. CM consulted for SNF.    12/19/24: Pt pulled at drain and there was concern for dislodgement. Repeat CT abd showed decrease in size of suspected abscess in the inferior aspect of the right lobe of the liver, drainage catheter in good position.     12/20/24: On 12/20/24, Liver abscess aerobic culture now growing STREPTOCOCCUS CONSTELLATUS. Urine culture on admit (12/17) still no growth. Urine culture 12/11 grew ESBL Intravenous antibiotics:  Dr Lopez recommended to change IV abx to IV Rocephin 2 gram daily for 3 weeks Estimated end date of IV antibiotics: 01/10/2025. Weekly Labs:CBC, CMP, ESR, CRP. Will need repeat CT abd prior to discontinuing drain. CM notified.

## 2024-12-20 NOTE — PT/OT/SLP PROGRESS
"Physical Therapy Treatment    Patient Name:  Irlanda Lopez   MRN:  [id number]    Recommendations:     Discharge Recommendations: Low Intensity Therapy  Discharge Equipment Recommendations: walker, rolling  Barriers to discharge: None    Assessment:     Irlanda Lopez is a 84 y.o. female admitted with a medical diagnosis of Liver abscess.  She presents with the following impairments/functional limitations: weakness, impaired endurance, impaired functional mobility, gait instability, impaired balance, decreased safety awareness, decreased lower extremity function, impaired cardiopulmonary response to activity, impaired cognition, decreased ROM, decreased coordination.    Rehab Prognosis: Good; patient would benefit from acute skilled PT services to address these deficits and reach maximum level of function.    Recent Surgery: * No surgery found *      Plan:     During this hospitalization, patient to be seen 3 x/week to address the identified rehab impairments via gait training, therapeutic activities, therapeutic exercises and progress toward the following goals:    Plan of Care Expires:  12/31/24    Subjective     Chief Complaint: Pt and family refused all PT intervention this date. Pt reported "I think I need to rest right now."  Patient/Family Comments/goals: none stated  Pain/Comfort:  Pain Rating 1: 0/10      Objective:     Communicated with nurse and epic chart review prior to session.  Patient found HOB elevated with ZINA drain, telemetry, colostomy, PICC line upon PT entry to room.     General Precautions: Standard, fall, contact  Orthopedic Precautions: N/A  Braces: N/A  Respiratory Status: Room air     Functional Mobility:  Pt refused all functional mobility at this time. Refused repositioning in bed.       AM-PAC 6 CLICK MOBILITY  Turning over in bed (including adjusting bedclothes, sheets and blankets)?: 3 (Scored from previous session, pt refused all mobility this date)  Sitting down on and standing " "up from a chair with arms (e.g., wheelchair, bedside commode, etc.): 3  Moving from lying on back to sitting on the side of the bed?: 3  Moving to and from a bed to a chair (including a wheelchair)?: 3  Need to walk in hospital room?: 3  Climbing 3-5 steps with a railing?: 1 (NT)  Basic Mobility Total Score: 16       Treatment & Education:  Reviewed role of PT in acute care and POC. Pt refused all OOB/EOB activity and supine TherEx, despite max encouragement from therapist, due to fatigue. Educated on the importance of OOB/EOB activity for her recovery. Educated on importance of consistent participation with PT. Educated on importance of TherEx to maintain/regain strength, encouraged to complete supine TherEx (hip flex, hip abd/add, heel slides, quad sets, ankle pumps) throughout the day. Encouraged frequent position changes to reduce the risk of pressure injury. Encouraged to sit up in the chair for all meals. Encouraged mobility with staff this PM. Reviewed "call don't fall" policy and increased risk of falling due to weakness, instructed to utilize call bell for assistance with all transfers. Pt agreeable to all requests.    Patient left HOB elevated with all lines intact, call button in reach, bed alarm on, and family present..    GOALS:   Multidisciplinary Problems       Physical Therapy Goals          Problem: Physical Therapy    Goal Priority Disciplines Outcome Interventions   Physical Therapy Goal     PT, PT/OT Progressing    Description: Goals to be met by 12/31/24.  1. Pt will complete bed mobility MOD I.  2. Pt will complete sit to stand MOD I.  3. Pt will ambulate 200ft MOD I using RW.  4. Pt will increase AMPAC score by 2 points to progress functional mobility.                       Time Tracking:     PT Received On: 12/20/24  PT Start Time: 1030     PT Stop Time: 1038  PT Total Time (min): 8 min     Billable Minutes: Therapeutic Activity 8min    Treatment Type: Treatment  PT/PTA: PT     Number of PTA " visits since last PT visit: 0     12/20/2024

## 2024-12-20 NOTE — SUBJECTIVE & OBJECTIVE
Interval History: pt remains pleasantly confused. No acute events. Drain only put out 5 ml. IV abx changed to IV Rocephin x 3 weeks.     Review of Systems   Unable to perform ROS: Dementia     Objective:     Vital Signs (Most Recent):  Temp: 98.1 °F (36.7 °C) (12/20/24 0813)  Pulse: 80 (12/20/24 0813)  Resp: 18 (12/20/24 0813)  BP: (!) 149/75 (12/20/24 0813)  SpO2: 97 % (12/20/24 0813) Vital Signs (24h Range):  Temp:  [97.3 °F (36.3 °C)-99.3 °F (37.4 °C)] 98.1 °F (36.7 °C)  Pulse:  [62-80] 80  Resp:  [18-20] 18  SpO2:  [93 %-97 %] 97 %  BP: (100-149)/(53-75) 149/75     Weight: 72.1 kg (158 lb 15.2 oz)  Body mass index is 29.07 kg/m².    Intake/Output Summary (Last 24 hours) at 12/20/2024 1312  Last data filed at 12/20/2024 0733  Gross per 24 hour   Intake 98.82 ml   Output 5 ml   Net 93.82 ml         Physical Exam  Vitals and nursing note reviewed.   Constitutional:       General: She is not in acute distress.     Appearance: She is well-developed. She is not diaphoretic.   HENT:      Head: Normocephalic and atraumatic.      Nose: Nose normal.   Eyes:      General: No scleral icterus.     Conjunctiva/sclera: Conjunctivae normal.   Neck:      Trachea: No tracheal deviation.   Cardiovascular:      Rate and Rhythm: Normal rate and regular rhythm.      Heart sounds: Normal heart sounds. No murmur heard.     No friction rub. No gallop.   Pulmonary:      Effort: Pulmonary effort is normal. No respiratory distress.      Breath sounds: Normal breath sounds. No stridor. No wheezing or rales.   Chest:      Chest wall: No tenderness.   Abdominal:      General: Bowel sounds are normal. There is no distension.      Palpations: Abdomen is soft. There is no mass.      Tenderness: There is abdominal tenderness (RUQ TTP- improving). There is no guarding or rebound.      Comments: ZINA percutaneous drain in place    Musculoskeletal:         General: No tenderness or deformity. Normal range of motion.      Cervical back: Normal range of  motion and neck supple.   Skin:     General: Skin is warm and dry.      Coloration: Skin is not pale.      Findings: No erythema or rash.   Neurological:      Mental Status: She is alert.      Cranial Nerves: No cranial nerve deficit.      Motor: No abnormal muscle tone.      Coordination: Coordination normal.      Comments: Oriented to person only    Psychiatric:         Behavior: Behavior normal.         Cognition and Memory: Cognition is impaired. Memory is impaired. She exhibits impaired recent memory and impaired remote memory.             Significant Labs: All pertinent labs within the past 24 hours have been reviewed.    Significant Imaging: I have reviewed all pertinent imaging results/findings within the past 24 hours.

## 2024-12-20 NOTE — PLAN OF CARE
Pt unable to participate in Tx this date. Daughter requested mother to rest. Pt and daughter educated on importance of therex even while in bed safe BM

## 2024-12-20 NOTE — PLAN OF CARE
A245/A245 JEFERSON  Irlanda Lopez is a 84 y.o.female admitted on 12/16/2024 for Liver abscess   Code Status: Full Code MRN: [id number]   Review of patient's allergies indicates:   Allergen Reactions    Omnicef [cefdinir] Itching     Past Medical History:   Diagnosis Date    Clostridium difficile colitis     Dementia     Diverticulitis     s/p colostomy    High cholesterol     Hypertension     Hypothyroidism     Kidney stones       PRN meds    acetaminophen, 650 mg, Q4H PRN  dextrose 10%, 12.5 g, PRN  dextrose 10%, 25 g, PRN  glucagon (human recombinant), 1 mg, PRN  glucose, 16 g, PRN  glucose, 24 g, PRN  naloxone, 0.02 mg, PRN  ondansetron, 4 mg, Q6H PRN  oxyCODONE, 5 mg, Q6H PRN  sodium chloride 0.9%, 3 mL, Q8H PRN      Chart check completed. Will continue plan of care.      Orientation: disoriented to, time  [place] Coma Scale Score: 15     Lead Monitored: Lead II Rhythm: sinus bradycardia    Cardiac/Telemetry Box Number: [id number]  VTE Core Measure: Pharmacological prophylaxis initiated/maintained Last Bowel Movement: 12/17/24  Diet Cardiac  Voiding Characteristics: frequency  [place] Score: 17  Fall Risk Score: 21  Accucheck []   Freq?      Lines/Drains/Airways       Drain  Duration                  Colostomy 05/23/21 LLQ 1307 days         Closed/Suction Drain 12/17/24 1034 Tube - 1 RUQ Bulb 8.5 Fr. 2 days

## 2024-12-20 NOTE — ASSESSMENT & PLAN NOTE
Diagnosed with UTI per urgent care visit 12/11 -- has been on Bactrim  Urine culture 12/11/24 grew ESBL E coli   Repeat Urine culture 12/17/24 showed no growth   Abx changed to IV Meropenem  ID consutled and recommended IV Meropenem x 3 weeks   On 12/20/24: ID recommended IV Rocephin x 3 weeks

## 2024-12-20 NOTE — PROCEDURES
"Irlanda Lopez is a 84 y.o. female patient.    Temp: 98.1 °F (36.7 °C) (12/20/24 0813)  Pulse: 80 (12/20/24 0813)  Resp: 18 (12/20/24 0813)  BP: (!) 149/75 (12/20/24 0813)  SpO2: 97 % (12/20/24 0813)  Weight: 72.1 kg (158 lb 15.2 oz) (12/18/24 0003)  Height: 5' 2" (157.5 cm) (12/16/24 1252)    PICC  Performed by: Daniel Mosqueda RN  Consent Done: Yes  Time out: Immediately prior to procedure a time out was called to verify the correct patient, procedure, equipment, support staff and site/side marked as required  Indications: med administration  Anesthesia: local infiltration  Local anesthetic: lidocaine 1% without epinephrine  Anesthetic Total (mL): 3  Preparation: skin prepped with ChloraPrep  Skin prep agent dried: skin prep agent completely dried prior to procedure  Sterile barriers: all five maximum sterile barriers used - cap, mask, sterile gown, sterile gloves, and large sterile sheet  Hand hygiene: hand hygiene performed prior to central venous catheter insertion  Location details: left basilic  Catheter type: double lumen  Catheter size: 4 Fr  Catheter Length: 39cm    Ultrasound guidance: yes  Vessel Caliber: medium and patent, compressibility normal  Vascular Doppler: not done  Needle advanced into vessel with real time Ultrasound guidance.  Guidewire confirmed in vessel.  Sterile sheath used.  no esophageal manometryNumber of attempts: 1  Post-procedure: blood return through all ports, chlorhexidine patch and sterile dressing applied  Estimated blood loss (mL): 0            Name INGRID Sanchez  12/20/2024    "

## 2024-12-21 PROBLEM — E87.1 HYPONATREMIA: Status: RESOLVED | Noted: 2024-12-17 | Resolved: 2024-12-21

## 2024-12-21 LAB
ALBUMIN SERPL BCP-MCNC: 2.3 G/DL (ref 3.5–5.2)
ALP SERPL-CCNC: 82 U/L (ref 40–150)
ALT SERPL W/O P-5'-P-CCNC: 18 U/L (ref 10–44)
ANION GAP SERPL CALC-SCNC: 9 MMOL/L (ref 8–16)
AST SERPL-CCNC: 30 U/L (ref 10–40)
BASOPHILS # BLD AUTO: 0.04 K/UL (ref 0–0.2)
BASOPHILS NFR BLD: 0.6 % (ref 0–1.9)
BILIRUB SERPL-MCNC: 0.2 MG/DL (ref 0.1–1)
BUN SERPL-MCNC: 11 MG/DL (ref 8–23)
CALCIUM SERPL-MCNC: 8.7 MG/DL (ref 8.7–10.5)
CHLORIDE SERPL-SCNC: 101 MMOL/L (ref 95–110)
CO2 SERPL-SCNC: 24 MMOL/L (ref 23–29)
CREAT SERPL-MCNC: 0.8 MG/DL (ref 0.5–1.4)
DIFFERENTIAL METHOD BLD: ABNORMAL
EOSINOPHIL # BLD AUTO: 0.6 K/UL (ref 0–0.5)
EOSINOPHIL NFR BLD: 9.7 % (ref 0–8)
ERYTHROCYTE [DISTWIDTH] IN BLOOD BY AUTOMATED COUNT: 15.3 % (ref 11.5–14.5)
EST. GFR  (NO RACE VARIABLE): >60 ML/MIN/1.73 M^2
GLUCOSE SERPL-MCNC: 98 MG/DL (ref 70–110)
HCT VFR BLD AUTO: 31.1 % (ref 37–48.5)
HGB BLD-MCNC: 9.7 G/DL (ref 12–16)
IMM GRANULOCYTES # BLD AUTO: 0.18 K/UL (ref 0–0.04)
IMM GRANULOCYTES NFR BLD AUTO: 2.7 % (ref 0–0.5)
LYMPHOCYTES # BLD AUTO: 1.9 K/UL (ref 1–4.8)
LYMPHOCYTES NFR BLD: 29.3 % (ref 18–48)
MCH RBC QN AUTO: 25.1 PG (ref 27–31)
MCHC RBC AUTO-ENTMCNC: 31.2 G/DL (ref 32–36)
MCV RBC AUTO: 81 FL (ref 82–98)
MONOCYTES # BLD AUTO: 0.9 K/UL (ref 0.3–1)
MONOCYTES NFR BLD: 13.5 % (ref 4–15)
NEUTROPHILS # BLD AUTO: 2.9 K/UL (ref 1.8–7.7)
NEUTROPHILS NFR BLD: 44.2 % (ref 38–73)
NRBC BLD-RTO: 0 /100 WBC
PLATELET # BLD AUTO: 314 K/UL (ref 150–450)
PMV BLD AUTO: 8.4 FL (ref 9.2–12.9)
POTASSIUM SERPL-SCNC: 4.1 MMOL/L (ref 3.5–5.1)
PROT SERPL-MCNC: 5.6 G/DL (ref 6–8.4)
RBC # BLD AUTO: 3.86 M/UL (ref 4–5.4)
SODIUM SERPL-SCNC: 134 MMOL/L (ref 136–145)
WBC # BLD AUTO: 6.59 K/UL (ref 3.9–12.7)

## 2024-12-21 PROCEDURE — 85025 COMPLETE CBC W/AUTO DIFF WBC: CPT | Performed by: NURSE PRACTITIONER

## 2024-12-21 PROCEDURE — 25000003 PHARM REV CODE 250: Performed by: INTERNAL MEDICINE

## 2024-12-21 PROCEDURE — 80053 COMPREHEN METABOLIC PANEL: CPT | Performed by: NURSE PRACTITIONER

## 2024-12-21 PROCEDURE — 27000207 HC ISOLATION

## 2024-12-21 PROCEDURE — 21400001 HC TELEMETRY ROOM

## 2024-12-21 PROCEDURE — 63600175 PHARM REV CODE 636 W HCPCS: Performed by: NURSE PRACTITIONER

## 2024-12-21 PROCEDURE — 25000003 PHARM REV CODE 250: Performed by: FAMILY MEDICINE

## 2024-12-21 RX ADMIN — RISPERIDONE 0.5 MG: 0.5 TABLET, FILM COATED ORAL at 08:12

## 2024-12-21 RX ADMIN — HYPROMELLOSE 2910 2 DROP: 5 SOLUTION/ DROPS OPHTHALMIC at 03:12

## 2024-12-21 RX ADMIN — MEMANTINE 10 MG: 10 TABLET ORAL at 08:12

## 2024-12-21 RX ADMIN — MUPIROCIN: 20 OINTMENT TOPICAL at 08:12

## 2024-12-21 RX ADMIN — HYPROMELLOSE 2910 2 DROP: 5 SOLUTION/ DROPS OPHTHALMIC at 08:12

## 2024-12-21 RX ADMIN — THYROID, PORCINE 30 MG: 30 TABLET ORAL at 08:12

## 2024-12-21 RX ADMIN — BUSPIRONE HYDROCHLORIDE 10 MG: 10 TABLET ORAL at 08:12

## 2024-12-21 RX ADMIN — CEFTRIAXONE 2 G: 2 INJECTION, POWDER, FOR SOLUTION INTRAMUSCULAR; INTRAVENOUS at 03:12

## 2024-12-21 NOTE — ASSESSMENT & PLAN NOTE
NPO x medications  Consult IR for drainage  Blood cultures ordered, pending  Lactic acid normal, WBC 16.75  Zosyn and vancomycin ordered  Hydrating with IV fluids  PRN acetaminophen for fever    12/17/24: IR consulted and pt underwent CT guided drain placement per IR-20 ml of green pus sent to gram stain and culture.IR recommended monitoring drain output, Gentle flushing with 5mL NS every shift, and Drain can be removed after when less than 10mL output over 24 hours and patient is clinically improved.     12/18/24: Afebrile, WBC 16>19>11. Blood cultures show NGTD. Repeat urine culture showed no growth (pt was on bactrim as OP). Liver abscess Aerobic culture showed no growth. Anaerobic culture pending.  ID consulted and recommended IV meropenem x 3 weeks Discussed the possibility of discharge with drain and IV abx with daughter. She does not feel they can manage the IV abx. Pt has dementia and is unable to manage IV abx. CM consulted for SNF.    12/19/24: Pt pulled at drain and there was concern for dislodgement. Repeat CT abd showed decrease in size of suspected abscess in the inferior aspect of the right lobe of the liver, drainage catheter in good position.     12/20/24: On 12/20/24, Liver abscess aerobic culture now growing STREPTOCOCCUS CONSTELLATUS. Urine culture on admit (12/17) still no growth. Urine culture 12/11 grew ESBL Intravenous antibiotics:  Dr Lopez recommended to change IV abx to IV Rocephin 2 gram daily for 3 weeks Estimated end date of IV antibiotics: 01/10/2025. Weekly Labs:CBC, CMP, ESR, CRP. Will need repeat CT abd prior to discontinuing drain. CM notified.     12/21/24: Pt still having pain to RUQ. Drain only put out 5 cc on 12/19 and 12cc on 12/20. Discussed with Dr. Gabriel with radiology on 12/20/21 who recommended continue flushing drain tube through the weekend and reassess with repeat CT abd on Monday. May need P2P for SNF placement

## 2024-12-21 NOTE — PROGRESS NOTES
[facility] Medicine  Progress Note    Patient Name: Irlanda Lopez  MRN: [id number]  Patient Class: IP- Inpatient   Admission Date: 12/16/2024  Length of Stay: 4 days  Attending Physician: Eric Lynn MD  Primary Care Provider: Myla Arias MD        Subjective     Principal Problem:Liver abscess        HPI:   Patient is 84-year-old female with past medical history significant for  hypertension, hypothyroidism,, Alzheimer's dementia, anxiety, hyperlipidemia, colostomy, kidney stones, stroke, osteopenia, chronic urinary tract infections, right breast mass, and anemia who presented to ED as directed by Dr. Matute due to abnormal CT scan showing liver abscess.  She recently had cystoscopy with stent placement on 11/05/2024 and then was diagnosed with urinary tract infection at urgent care on 12/11, has been on Bactrim for E coli urinary tract infection.  Patient was scheduled for an ultrasound to be done which was ordered by Dr. Matute on 12/16, however  patient was unable to tolerate the ultrasound due to increased pain.  He then ordered CT scan and when results showed liver abscess he directed her to ED.  Due to her dementia, most information is obtained from her family at bedside. She does report chills, and intermittent abdominal pain. She denies dysuria, nausea, vomiting, chest pain, shortness of breath, cough, diarrhea.  on arrival to ED, temp 98.4°, heart rate 66, respirations 16, blood pressure 141/66, 97% SpO2 on room air.  Lab workup shows WBC 16.75, hemoglobin 9.8, hematocrit 29.1, sodium 126, CO2 18, BUN 8, creatinine 0.8, albumin 2.5, normal LFTs, lipase 14, lipase 0.8.  Urinalysis has been ordered, is pending.  Hospital Medicine was consulted for admission due to liver abscess for IV antibiotics and to consult IR for possible drainage of abscess in AM.    Overview/Hospital Course:  The patient is a 85 yo female with recent kidney stones s/p cystoscopy with stent  placement on 11/05/2024 and recent UTI on 12/11-on Bactrim for E coli urinary tract infection who was admitted for Sepsis due to UTI and Liver abscess on IV  Zosyn and Vanco. Temp 101F on admit. Urine culture 12/11/24 grew ESBL E coli. IV Abx changed to IV Meropenem. IR consulted and pt underwent CT guided drain placement per IR-20 ml of green pus sent to gram stain and culture.IR recommended monitoring drain output, Gentle flushing with 5mL NS every shift, and Drain can be removed after when less than 10mL output over 24 hours and patient is clinically improved.   ID consulted and recommended IV Meropenem x 3 weeks. Right sided ZINA drain had 100ml output overnight.   Afebrile, WBC 16>19>11>5.9. Blood cultures show NGTD. Repeat urine culture showed no growth (pt was on bactrim as OP). Liver abscess Aerobic culture showed no growth. Anaerobic culture pending.   Discussed the possibility of discharge with drain and IV abx with daughter. She does not feel they can manage the IV abx. Pt has dementia and is unable to manage IV abx. CM consulted for SNF  Pt pulled at drain and there was concern for dislodgement. Repeat CT abd showed decrease in size of suspected abscess in the inferior aspect of the right lobe of the liver, drainage catheter in good position.   On 12/20/24, Liver abscess aerobic culture now growing STREPTOCOCCUS CONSTELLATUS. Urine culture on admit (12/17) still no growth. Urine culture 12/11 grew ESBL Intravenous antibiotics:  Dr Lopez recommended to change IV abx to IV Rocephin 2 gram daily for 3 weeks Estimated end date of IV antibiotics: 01/10/2025. Weekly Labs:CBC, CMP, ESR, CRP. Will need repeat CT abd prior to discontinuing drain. CM notified. Awaiting SNF insurance approval   Pt still having pain to RUQ. Drain only put out 5 cc on 12/19 and 12cc on 12/20. Dr. Gabriel with radiology recommended continue flushing drain tube through the weekend and reassess with repeat CT abd on Monday.     Interval  History: +RUQ TTP. Pt remains pleasantly confused. No acute events. Drain only put out 12 ml. Cont IV Rocephin x 3 weeks. SNF placement pending -may need P2P on Monday    Review of Systems   Unable to perform ROS: Dementia     Objective:     Vital Signs (Most Recent):  Temp: 97.9 °F (36.6 °C) (12/21/24 1343)  Pulse: 68 (12/21/24 1343)  Resp: 20 (12/21/24 1343)  BP: 137/65 (12/21/24 1343)  SpO2: 98 % (12/21/24 1343) Vital Signs (24h Range):  Temp:  [97.4 °F (36.3 °C)-97.9 °F (36.6 °C)] 97.9 °F (36.6 °C)  Pulse:  [64-70] 68  Resp:  [18-20] 20  SpO2:  [94 %-99 %] 98 %  BP: (124-145)/(64-77) 137/65     Weight: 72.1 kg (158 lb 15.2 oz)  Body mass index is 29.07 kg/m².    Intake/Output Summary (Last 24 hours) at 12/21/2024 1415  Last data filed at 12/21/2024 0705  Gross per 24 hour   Intake 362.11 ml   Output 12 ml   Net 350.11 ml         Physical Exam  Vitals and nursing note reviewed.   Constitutional:       General: She is not in acute distress.     Appearance: She is well-developed. She is not diaphoretic.   HENT:      Head: Normocephalic and atraumatic.      Nose: Nose normal.   Eyes:      General: No scleral icterus.     Conjunctiva/sclera: Conjunctivae normal.   Neck:      Trachea: No tracheal deviation.   Cardiovascular:      Rate and Rhythm: Normal rate and regular rhythm.      Heart sounds: Normal heart sounds. No murmur heard.     No friction rub. No gallop.   Pulmonary:      Effort: Pulmonary effort is normal. No respiratory distress.      Breath sounds: Normal breath sounds. No stridor. No wheezing or rales.   Chest:      Chest wall: No tenderness.   Abdominal:      General: Bowel sounds are normal. There is no distension.      Palpations: Abdomen is soft. There is no mass.      Tenderness: There is abdominal tenderness (RUQ TTP). There is no guarding or rebound.      Comments: ZINA percutaneous drain in place    Musculoskeletal:         General: No tenderness or deformity. Normal range of motion.      Cervical  back: Normal range of motion and neck supple.   Skin:     General: Skin is warm and dry.      Coloration: Skin is not pale.      Findings: No erythema or rash.   Neurological:      Mental Status: She is alert.      Cranial Nerves: No cranial nerve deficit.      Motor: No abnormal muscle tone.      Coordination: Coordination normal.      Comments: Oriented to person only    Psychiatric:         Behavior: Behavior normal.         Cognition and Memory: Cognition is impaired. Memory is impaired. She exhibits impaired recent memory and impaired remote memory.             Significant Labs: All pertinent labs within the past 24 hours have been reviewed.    Significant Imaging: I have reviewed all pertinent imaging results/findings within the past 24 hours.    Assessment and Plan     * Liver abscess  NPO x medications  Consult IR for drainage  Blood cultures ordered, pending  Lactic acid normal, WBC 16.75  Zosyn and vancomycin ordered  Hydrating with IV fluids  PRN acetaminophen for fever    12/17/24: IR consulted and pt underwent CT guided drain placement per IR-20 ml of green pus sent to gram stain and culture.IR recommended monitoring drain output, Gentle flushing with 5mL NS every shift, and Drain can be removed after when less than 10mL output over 24 hours and patient is clinically improved.     12/18/24: Afebrile, WBC 16>19>11. Blood cultures show NGTD. Repeat urine culture showed no growth (pt was on bactrim as OP). Liver abscess Aerobic culture showed no growth. Anaerobic culture pending.  ID consulted and recommended IV meropenem x 3 weeks Discussed the possibility of discharge with drain and IV abx with daughter. She does not feel they can manage the IV abx. Pt has dementia and is unable to manage IV abx. CM consulted for SNF.    12/19/24: Pt pulled at drain and there was concern for dislodgement. Repeat CT abd showed decrease in size of suspected abscess in the inferior aspect of the right lobe of the liver,  "drainage catheter in good position.     12/20/24: On 12/20/24, Liver abscess aerobic culture now growing STREPTOCOCCUS CONSTELLATUS. Urine culture on admit (12/17) still no growth. Urine culture 12/11 grew ESBL Intravenous antibiotics:  Dr Lopez recommended to change IV abx to IV Rocephin 2 gram daily for 3 weeks Estimated end date of IV antibiotics: 01/10/2025. Weekly Labs:CBC, CMP, ESR, CRP. Will need repeat CT abd prior to discontinuing drain. CM notified.     12/21/24: Pt still having pain to RUQ. Drain only put out 5 cc on 12/19 and 12cc on 12/20. Discussed with Dr. Gabriel with radiology on 12/20/21 who recommended continue flushing drain tube through the weekend and reassess with repeat CT abd on Monday. May need P2P for SNF placement     Sepsis  This patient does have evidence of infective focus  My overall impression is sepsis.  Source: Urinary Tract and Liver abscess  Antibiotics given-   Antibiotics (72h ago, onward)      Start     Stop Route Frequency Ordered    12/19/24 1145  mupirocin 2 % ointment         12/24/24 0859 Nasl 2 times daily 12/19/24 1138    12/17/24 0845  meropenem 2 g in 0.9% NaCl 100 mL IVPB (MB+)         -- IV Every 12 hours (non-standard times) 12/17/24 0742          Latest lactate reviewed-  No results for input(s): "LACTATE", "POCLAC" in the last 72 hours.    Organ dysfunction indicated by  none    Fluid challenge Not needed - patient is not hypotensive      Post- resuscitation assessment No - Post resuscitation assessment not needed       Will Not start Pressors- Levophed for MAP of 65  Source control achieved by: IV Meropenem changed to IV Rocephin x 3 weeks     UTI due to extended-spectrum beta lactamase (ESBL) producing Escherichia coli  Diagnosed with UTI per urgent care visit 12/11 -- has been on Bactrim  Urine culture 12/11/24 grew ESBL E coli   Repeat Urine culture 12/17/24 showed no growth   Abx changed to IV Meropenem  ID consutled and recommended IV Meropenem x 3 weeks "   On 12/20/24: ID recommended IV Rocephin x 3 weeks     Hypothyroidism  TSH/Free T4 WNL  Continue armour thyroid per home med list      Anemia   Most recent hemoglobin and hematocrit are listed below.  Recent Labs     12/16/24  2223   HGB 9.8*   HCT 29.1*     Plan  - Monitor serial CBC: Daily  - Transfuse PRBC if patient becomes hemodynamically unstable, symptomatic or H/H drops below 7/21.      Mass of right breast  Seen on CT renal stone study   Will need OP f/u and mammography       MARTIN (generalized anxiety disorder)  Stable, cont home Buspar      Dementia  Continue home medications  Fall precautions, use bed alarm  Requires frequent reorientation  Supportive care    Kidney stone  Had cystoscopy, stent placed per Dr. Matute on 11/5    Per Dr. Matute-stent appears to be in good position, no hydronephrosis, no further recommendations in regards to the treatment of the stone.  Will arrange for outpatient follow up.         VTE Risk Mitigation (From admission, onward)           Ordered     Reason for No Pharmacological VTE Prophylaxis  Once        Comments: Procedure required   Question:  Reasons:  Answer:  Physician Provided (leave comment)    12/17/24 0110     IP VTE HIGH RISK PATIENT  Once         12/17/24 0110     Place sequential compression device  Until discontinued         12/17/24 0110                    Discharge Planning   MICHAEL: 12/20/2024     Code Status: Full Code   Medical Readiness for Discharge Date: 12/19/2024  Discharge Plan A: Skilled Nursing Facility   Discharge Delays: None known at this time            Please place Justification for DME        Kayley Russell NP  Department of Hospital Medicine   [place] - Telemetry ([facility])

## 2024-12-21 NOTE — NURSING
Pt oriented x4.  VSS.  Pt remained afebrile throughout this shift.   All meds administered per order.   Pt remained free of falls this shift.   Plan of care reviewed. Patient verbalizes understanding.   Pt ambulates with assistance, up to chair for meals.  ZINA drain remains in place, reinforced to avoid patient manipulating with drain.  Colostomy remains in place, with adequate output this shift, see I&O's.   Bed low, side rails up x 2, wheels locked, call light in reach.   Patient instructed to call for assistance, patient needs reinforcement.

## 2024-12-21 NOTE — SUBJECTIVE & OBJECTIVE
Interval History: +RUQ TTP. Pt remains pleasantly confused. No acute events. Drain only put out 12 ml. Cont IV Rocephin x 3 weeks. SNF placement pending -may need P2P on Monday    Review of Systems   Unable to perform ROS: Dementia     Objective:     Vital Signs (Most Recent):  Temp: 97.9 °F (36.6 °C) (12/21/24 1343)  Pulse: 68 (12/21/24 1343)  Resp: 20 (12/21/24 1343)  BP: 137/65 (12/21/24 1343)  SpO2: 98 % (12/21/24 1343) Vital Signs (24h Range):  Temp:  [97.4 °F (36.3 °C)-97.9 °F (36.6 °C)] 97.9 °F (36.6 °C)  Pulse:  [64-70] 68  Resp:  [18-20] 20  SpO2:  [94 %-99 %] 98 %  BP: (124-145)/(64-77) 137/65     Weight: 72.1 kg (158 lb 15.2 oz)  Body mass index is 29.07 kg/m².    Intake/Output Summary (Last 24 hours) at 12/21/2024 1415  Last data filed at 12/21/2024 0705  Gross per 24 hour   Intake 362.11 ml   Output 12 ml   Net 350.11 ml         Physical Exam  Vitals and nursing note reviewed.   Constitutional:       General: She is not in acute distress.     Appearance: She is well-developed. She is not diaphoretic.   HENT:      Head: Normocephalic and atraumatic.      Nose: Nose normal.   Eyes:      General: No scleral icterus.     Conjunctiva/sclera: Conjunctivae normal.   Neck:      Trachea: No tracheal deviation.   Cardiovascular:      Rate and Rhythm: Normal rate and regular rhythm.      Heart sounds: Normal heart sounds. No murmur heard.     No friction rub. No gallop.   Pulmonary:      Effort: Pulmonary effort is normal. No respiratory distress.      Breath sounds: Normal breath sounds. No stridor. No wheezing or rales.   Chest:      Chest wall: No tenderness.   Abdominal:      General: Bowel sounds are normal. There is no distension.      Palpations: Abdomen is soft. There is no mass.      Tenderness: There is abdominal tenderness (RUQ TTP). There is no guarding or rebound.      Comments: ZINA percutaneous drain in place    Musculoskeletal:         General: No tenderness or deformity. Normal range of motion.       Cervical back: Normal range of motion and neck supple.   Skin:     General: Skin is warm and dry.      Coloration: Skin is not pale.      Findings: No erythema or rash.   Neurological:      Mental Status: She is alert.      Cranial Nerves: No cranial nerve deficit.      Motor: No abnormal muscle tone.      Coordination: Coordination normal.      Comments: Oriented to person only    Psychiatric:         Behavior: Behavior normal.         Cognition and Memory: Cognition is impaired. Memory is impaired. She exhibits impaired recent memory and impaired remote memory.             Significant Labs: All pertinent labs within the past 24 hours have been reviewed.    Significant Imaging: I have reviewed all pertinent imaging results/findings within the past 24 hours.

## 2024-12-21 NOTE — PLAN OF CARE
Problem: Adult Inpatient Plan of Care  Goal: Plan of Care Review  Outcome: Progressing  Goal: Patient-Specific Goal (Individualized)  Outcome: Progressing  Goal: Absence of Hospital-Acquired Illness or Injury  Outcome: Progressing  Goal: Optimal Comfort and Wellbeing  Outcome: Progressing  Goal: Readiness for Transition of Care  Outcome: Progressing     Problem: Infection  Goal: Absence of Infection Signs and Symptoms  Outcome: Progressing     Problem: Wound  Goal: Optimal Coping  Outcome: Progressing  Goal: Optimal Functional Ability  Outcome: Progressing  Goal: Absence of Infection Signs and Symptoms  Outcome: Progressing  Goal: Improved Oral Intake  Outcome: Progressing  Goal: Optimal Pain Control and Function  Outcome: Progressing  Goal: Skin Health and Integrity  Outcome: Progressing  Goal: Optimal Wound Healing  Outcome: Progressing     Problem: Skin Injury Risk Increased  Goal: Skin Health and Integrity  Outcome: Progressing     Problem: Sepsis/Septic Shock  Goal: Optimal Coping  Outcome: Progressing  Goal: Absence of Bleeding  Outcome: Progressing  Goal: Blood Glucose Level Within Targeted Range  Outcome: Progressing  Goal: Absence of Infection Signs and Symptoms  Outcome: Progressing  Goal: Optimal Nutrition Intake  Outcome: Progressing

## 2024-12-22 LAB
BACTERIA BLD CULT: NORMAL
BACTERIA BLD CULT: NORMAL

## 2024-12-22 PROCEDURE — 21400001 HC TELEMETRY ROOM

## 2024-12-22 PROCEDURE — 97116 GAIT TRAINING THERAPY: CPT | Mod: CQ

## 2024-12-22 PROCEDURE — 63600175 PHARM REV CODE 636 W HCPCS: Performed by: NURSE PRACTITIONER

## 2024-12-22 PROCEDURE — 25000003 PHARM REV CODE 250: Performed by: INTERNAL MEDICINE

## 2024-12-22 PROCEDURE — 27000207 HC ISOLATION

## 2024-12-22 PROCEDURE — 25000003 PHARM REV CODE 250: Performed by: FAMILY MEDICINE

## 2024-12-22 RX ADMIN — HYPROMELLOSE 2910 2 DROP: 5 SOLUTION/ DROPS OPHTHALMIC at 02:12

## 2024-12-22 RX ADMIN — HYPROMELLOSE 2910 2 DROP: 5 SOLUTION/ DROPS OPHTHALMIC at 10:12

## 2024-12-22 RX ADMIN — MEMANTINE 10 MG: 10 TABLET ORAL at 10:12

## 2024-12-22 RX ADMIN — THYROID, PORCINE 30 MG: 30 TABLET ORAL at 08:12

## 2024-12-22 RX ADMIN — BUSPIRONE HYDROCHLORIDE 10 MG: 10 TABLET ORAL at 10:12

## 2024-12-22 RX ADMIN — CEFTRIAXONE 2 G: 2 INJECTION, POWDER, FOR SOLUTION INTRAMUSCULAR; INTRAVENOUS at 02:12

## 2024-12-22 RX ADMIN — HYPROMELLOSE 2910 2 DROP: 5 SOLUTION/ DROPS OPHTHALMIC at 09:12

## 2024-12-22 RX ADMIN — MUPIROCIN: 20 OINTMENT TOPICAL at 08:12

## 2024-12-22 RX ADMIN — MUPIROCIN: 20 OINTMENT TOPICAL at 10:12

## 2024-12-22 RX ADMIN — BUSPIRONE HYDROCHLORIDE 10 MG: 10 TABLET ORAL at 08:12

## 2024-12-22 RX ADMIN — MEMANTINE 10 MG: 10 TABLET ORAL at 08:12

## 2024-12-22 RX ADMIN — RISPERIDONE 0.5 MG: 0.5 TABLET, FILM COATED ORAL at 08:12

## 2024-12-22 NOTE — ASSESSMENT & PLAN NOTE
NPO x medications  Consult IR for drainage  Blood cultures ordered, pending  Lactic acid normal, WBC 16.75  Zosyn and vancomycin ordered  Hydrating with IV fluids  PRN acetaminophen for fever    12/17/24: IR consulted and pt underwent CT guided drain placement per IR-20 ml of green pus sent to gram stain and culture.IR recommended monitoring drain output, Gentle flushing with 5mL NS every shift, and Drain can be removed after when less than 10mL output over 24 hours and patient is clinically improved.     12/18/24: Afebrile, WBC 16>19>11. Blood cultures show NGTD. Repeat urine culture showed no growth (pt was on bactrim as OP). Liver abscess Aerobic culture showed no growth. Anaerobic culture pending.  ID consulted and recommended IV meropenem x 3 weeks Discussed the possibility of discharge with drain and IV abx with daughter. She does not feel they can manage the IV abx. Pt has dementia and is unable to manage IV abx. CM consulted for SNF.    12/19/24: Pt pulled at drain and there was concern for dislodgement. Repeat CT abd showed decrease in size of suspected abscess in the inferior aspect of the right lobe of the liver, drainage catheter in good position.     12/20/24: On 12/20/24, Liver abscess aerobic culture now growing STREPTOCOCCUS CONSTELLATUS. Urine culture on admit (12/17) still no growth. Urine culture 12/11 grew ESBL Intravenous antibiotics:  Dr Lopez recommended to change IV abx to IV Rocephin 2 gram daily for 3 weeks Estimated end date of IV antibiotics: 01/10/2025. Weekly Labs:CBC, CMP, ESR, CRP. Will need repeat CT abd prior to discontinuing drain. CM notified.     12/21/24: Pt still having pain to RUQ. Drain only put out 5 cc on 12/19 and 12cc on 12/20. Discussed with Dr. Gabriel with radiology on 12/20/21 who recommended continue flushing drain tube through the weekend and reassess with repeat CT abd on Monday. May need P2P for SNF placement     12/22/24: improvement in TTP to RUQ. Drain put out  10ml/24 hours. CT abd scheduled for am. Continue IV Rocephin x 3 weeks. SNF placement pending.

## 2024-12-22 NOTE — SUBJECTIVE & OBJECTIVE
Interval History: +RUQ TTP- improving. Pt remains pleasantly confused. No acute events. Drain only put out 10 ml/24 hours. Repeat CT abd in am to check abscess and see if drain can be discontinued. Cont IV Rocephin x 3 weeks. SNF placement pending -may need P2P on Monday    Review of Systems   Unable to perform ROS: Dementia     Objective:     Vital Signs (Most Recent):  Temp: 97.9 °F (36.6 °C) (12/22/24 1129)  Pulse: 66 (12/22/24 1129)  Resp: 18 (12/22/24 1129)  BP: (!) 158/69 (12/22/24 1129)  SpO2: 96 % (12/22/24 1129) Vital Signs (24h Range):  Temp:  [97.2 °F (36.2 °C)-98.2 °F (36.8 °C)] 97.9 °F (36.6 °C)  Pulse:  [60-68] 66  Resp:  [18-20] 18  SpO2:  [93 %-98 %] 96 %  BP: (119-158)/(55-80) 158/69     Weight: 72.1 kg (158 lb 15.2 oz)  Body mass index is 29.07 kg/m².    Intake/Output Summary (Last 24 hours) at 12/22/2024 1141  Last data filed at 12/22/2024 1000  Gross per 24 hour   Intake 240 ml   Output 5 ml   Net 235 ml         Physical Exam  Vitals and nursing note reviewed.   Constitutional:       General: She is not in acute distress.     Appearance: She is well-developed. She is not diaphoretic.   HENT:      Head: Normocephalic and atraumatic.      Nose: Nose normal.   Eyes:      General: No scleral icterus.     Conjunctiva/sclera: Conjunctivae normal.   Neck:      Trachea: No tracheal deviation.   Cardiovascular:      Rate and Rhythm: Normal rate and regular rhythm.      Heart sounds: Normal heart sounds. No murmur heard.     No friction rub. No gallop.   Pulmonary:      Effort: Pulmonary effort is normal. No respiratory distress.      Breath sounds: Normal breath sounds. No stridor. No wheezing or rales.   Chest:      Chest wall: No tenderness.   Abdominal:      General: Bowel sounds are normal. There is no distension.      Palpations: Abdomen is soft. There is no mass.      Tenderness: There is abdominal tenderness (RUQ TTP-improved). There is no guarding or rebound.      Comments: ZINA percutaneous drain  in place    Musculoskeletal:         General: No tenderness or deformity. Normal range of motion.      Cervical back: Normal range of motion and neck supple.   Skin:     General: Skin is warm and dry.      Coloration: Skin is not pale.      Findings: No erythema or rash.   Neurological:      Mental Status: She is alert.      Cranial Nerves: No cranial nerve deficit.      Motor: No abnormal muscle tone.      Coordination: Coordination normal.      Comments: Oriented to person only    Psychiatric:         Behavior: Behavior normal.         Cognition and Memory: Cognition is impaired. Memory is impaired. She exhibits impaired recent memory and impaired remote memory.             Significant Labs: All pertinent labs within the past 24 hours have been reviewed.    Significant Imaging: I have reviewed all pertinent imaging results/findings within the past 24 hours.

## 2024-12-22 NOTE — PROGRESS NOTES
[facility]  Progress Note    Patient Name: Irlanda Lopez  MRN: [id number]  Patient Class: IP- Inpatient   Admission Date: 12/16/2024  Length of Stay: 5 days  Attending Physician: Eric Lynn MD  Primary Care Provider: Myla Arias MD        Subjective     Principal Problem:Liver abscess        HPI:   Patient is 84-year-old female with past medical history significant for  hypertension, hypothyroidism,, Alzheimer's dementia, anxiety, hyperlipidemia, colostomy, kidney stones, stroke, osteopenia, chronic urinary tract infections, right breast mass, and anemia who presented to ED as directed by Dr. Matute due to abnormal CT scan showing liver abscess.  She recently had cystoscopy with stent placement on 11/05/2024 and then was diagnosed with urinary tract infection at urgent care on 12/11, has been on Bactrim for E coli urinary tract infection.  Patient was scheduled for an ultrasound to be done which was ordered by Dr. Matute on 12/16, however  patient was unable to tolerate the ultrasound due to increased pain.  He then ordered CT scan and when results showed liver abscess he directed her to ED.  Due to her dementia, most information is obtained from her family at bedside. She does report chills, and intermittent abdominal pain. She denies dysuria, nausea, vomiting, chest pain, shortness of breath, cough, diarrhea.  on arrival to ED, temp 98.4°, heart rate 66, respirations 16, blood pressure 141/66, 97% SpO2 on room air.  Lab workup shows WBC 16.75, hemoglobin 9.8, hematocrit 29.1, sodium 126, CO2 18, BUN 8, creatinine 0.8, albumin 2.5, normal LFTs, lipase 14, lipase 0.8.  Urinalysis has been ordered, is pending.  Hospital Medicine was consulted for admission due to liver abscess for IV antibiotics and to consult IR for possible drainage of abscess in AM.    Overview/Hospital Course:  The patient is a 85 yo female with recent kidney stones s/p cystoscopy with stent  placement on 11/05/2024 and recent UTI on 12/11-on Bactrim for E coli urinary tract infection who was admitted for Sepsis due to UTI and Liver abscess on IV  Zosyn and Vanco. Temp 101F on admit. Urine culture 12/11/24 grew ESBL E coli. IV Abx changed to IV Meropenem. IR consulted and pt underwent CT guided drain placement per IR-20 ml of green pus sent to gram stain and culture.IR recommended monitoring drain output, Gentle flushing with 5mL NS every shift, and Drain can be removed after when less than 10mL output over 24 hours and patient is clinically improved.   ID consulted and recommended IV Meropenem x 3 weeks. Right sided ZINA drain had 100ml output overnight.   Afebrile, WBC 16>19>11>5.9. Blood cultures show NGTD. Repeat urine culture showed no growth (pt was on bactrim as OP). Liver abscess Aerobic culture showed no growth. Anaerobic culture pending.   Discussed the possibility of discharge with drain and IV abx with daughter. She does not feel they can manage the IV abx. Pt has dementia and is unable to manage IV abx. CM consulted for SNF.  Pt pulled at drain and there was concern for dislodgement. Repeat CT abd showed decrease in size of suspected abscess in the inferior aspect of the right lobe of the liver, drainage catheter in good position.   On 12/20/24, Liver abscess aerobic culture now growing STREPTOCOCCUS CONSTELLATUS. Urine culture on admit (12/17) still no growth. Urine culture 12/11 grew ESBL Intravenous antibiotics:  Dr Lopez recommended to change IV abx to IV Rocephin 2 gram daily for 3 weeks Estimated end date of IV antibiotics: 01/10/2025. Weekly Labs:CBC, CMP, ESR, CRP. Will need repeat CT abd prior to discontinuing drain  Pt still having pain to RUQ. Drain only put out 5 cc/24 hours on 12/19, 12cc on 12/20, and 10 cc/24 hours on 12/21. Dr. Gabriel with radiology recommended continue flushing drain tube through the weekend and reassess with repeat CT abd on Monday. Repeat CT abd ordered.    Awaiting SNF insurance approval -may need P2P    Interval History: +RUQ TTP- improving. Pt remains pleasantly confused. No acute events. Drain only put out 10 ml/24 hours. Repeat CT abd in am to check abscess and see if drain can be discontinued. Cont IV Rocephin x 3 weeks. SNF placement pending -may need P2P on Monday    Review of Systems   Unable to perform ROS: Dementia     Objective:     Vital Signs (Most Recent):  Temp: 97.9 °F (36.6 °C) (12/22/24 1129)  Pulse: 66 (12/22/24 1129)  Resp: 18 (12/22/24 1129)  BP: (!) 158/69 (12/22/24 1129)  SpO2: 96 % (12/22/24 1129) Vital Signs (24h Range):  Temp:  [97.2 °F (36.2 °C)-98.2 °F (36.8 °C)] 97.9 °F (36.6 °C)  Pulse:  [60-68] 66  Resp:  [18-20] 18  SpO2:  [93 %-98 %] 96 %  BP: (119-158)/(55-80) 158/69     Weight: 72.1 kg (158 lb 15.2 oz)  Body mass index is 29.07 kg/m².    Intake/Output Summary (Last 24 hours) at 12/22/2024 1141  Last data filed at 12/22/2024 1000  Gross per 24 hour   Intake 240 ml   Output 5 ml   Net 235 ml         Physical Exam  Vitals and nursing note reviewed.   Constitutional:       General: She is not in acute distress.     Appearance: She is well-developed. She is not diaphoretic.   HENT:      Head: Normocephalic and atraumatic.      Nose: Nose normal.   Eyes:      General: No scleral icterus.     Conjunctiva/sclera: Conjunctivae normal.   Neck:      Trachea: No tracheal deviation.   Cardiovascular:      Rate and Rhythm: Normal rate and regular rhythm.      Heart sounds: Normal heart sounds. No murmur heard.     No friction rub. No gallop.   Pulmonary:      Effort: Pulmonary effort is normal. No respiratory distress.      Breath sounds: Normal breath sounds. No stridor. No wheezing or rales.   Chest:      Chest wall: No tenderness.   Abdominal:      General: Bowel sounds are normal. There is no distension.      Palpations: Abdomen is soft. There is no mass.      Tenderness: There is abdominal tenderness (RUQ TTP-improved). There is no guarding  or rebound.      Comments: ZINA percutaneous drain in place    Musculoskeletal:         General: No tenderness or deformity. Normal range of motion.      Cervical back: Normal range of motion and neck supple.   Skin:     General: Skin is warm and dry.      Coloration: Skin is not pale.      Findings: No erythema or rash.   Neurological:      Mental Status: She is alert.      Cranial Nerves: No cranial nerve deficit.      Motor: No abnormal muscle tone.      Coordination: Coordination normal.      Comments: Oriented to person only    Psychiatric:         Behavior: Behavior normal.         Cognition and Memory: Cognition is impaired. Memory is impaired. She exhibits impaired recent memory and impaired remote memory.             Significant Labs: All pertinent labs within the past 24 hours have been reviewed.    Significant Imaging: I have reviewed all pertinent imaging results/findings within the past 24 hours.    Assessment and Plan     * Liver abscess  NPO x medications  Consult IR for drainage  Blood cultures ordered, pending  Lactic acid normal, WBC 16.75  Zosyn and vancomycin ordered  Hydrating with IV fluids  PRN acetaminophen for fever    12/17/24: IR consulted and pt underwent CT guided drain placement per IR-20 ml of green pus sent to gram stain and culture.IR recommended monitoring drain output, Gentle flushing with 5mL NS every shift, and Drain can be removed after when less than 10mL output over 24 hours and patient is clinically improved.     12/18/24: Afebrile, WBC 16>19>11. Blood cultures show NGTD. Repeat urine culture showed no growth (pt was on bactrim as OP). Liver abscess Aerobic culture showed no growth. Anaerobic culture pending.  ID consulted and recommended IV meropenem x 3 weeks Discussed the possibility of discharge with drain and IV abx with daughter. She does not feel they can manage the IV abx. Pt has dementia and is unable to manage IV abx. CM consulted for SNF.    12/19/24: Pt pulled at  "drain and there was concern for dislodgement. Repeat CT abd showed decrease in size of suspected abscess in the inferior aspect of the right lobe of the liver, drainage catheter in good position.     12/20/24: On 12/20/24, Liver abscess aerobic culture now growing STREPTOCOCCUS CONSTELLATUS. Urine culture on admit (12/17) still no growth. Urine culture 12/11 grew ESBL Intravenous antibiotics:  Dr Lopez recommended to change IV abx to IV Rocephin 2 gram daily for 3 weeks Estimated end date of IV antibiotics: 01/10/2025. Weekly Labs:CBC, CMP, ESR, CRP. Will need repeat CT abd prior to discontinuing drain. CM notified.     12/21/24: Pt still having pain to RUQ. Drain only put out 5 cc on 12/19 and 12cc on 12/20. Discussed with Dr. Gabriel with radiology on 12/20/21 who recommended continue flushing drain tube through the weekend and reassess with repeat CT abd on Monday. May need P2P for SNF placement     12/22/24: improvement in TTP to RUQ. Drain put out 10ml/24 hours. CT abd scheduled for am. Continue IV Rocephin x 3 weeks. SNF placement pending.     Sepsis  This patient does have evidence of infective focus  My overall impression is sepsis.  Source: Urinary Tract and Liver abscess  Antibiotics given-   Antibiotics (72h ago, onward)      Start     Stop Route Frequency Ordered    12/19/24 1145  mupirocin 2 % ointment         12/24/24 0859 Nasl 2 times daily 12/19/24 1138    12/17/24 0845  meropenem 2 g in 0.9% NaCl 100 mL IVPB (MB+)         -- IV Every 12 hours (non-standard times) 12/17/24 0742          Latest lactate reviewed-  No results for input(s): "LACTATE", "POCLAC" in the last 72 hours.    Organ dysfunction indicated by  none    Fluid challenge Not needed - patient is not hypotensive      Post- resuscitation assessment No - Post resuscitation assessment not needed       Will Not start Pressors- Levophed for MAP of 65  Source control achieved by: IV Meropenem changed to IV Rocephin x 3 weeks     UTI due to " extended-spectrum beta lactamase (ESBL) producing Escherichia coli  Diagnosed with UTI per urgent care visit 12/11 -- has been on Bactrim  Urine culture 12/11/24 grew ESBL E coli   Repeat Urine culture 12/17/24 showed no growth   Abx changed to IV Meropenem  ID consutled and recommended IV Meropenem x 3 weeks   On 12/20/24: ID recommended IV Rocephin x 3 weeks     Hypothyroidism  TSH/Free T4 WNL  Continue armour thyroid per home med list      Anemia   Most recent hemoglobin and hematocrit are listed below.  Recent Labs     12/16/24  2223   HGB 9.8*   HCT 29.1*     Plan  - Monitor serial CBC: Daily  - Transfuse PRBC if patient becomes hemodynamically unstable, symptomatic or H/H drops below 7/21.      Mass of right breast  Seen on CT renal stone study   Will need OP f/u and mammography       MARTIN (generalized anxiety disorder)  Stable, cont home Buspar      Dementia  Continue home medications  Fall precautions, use bed alarm  Requires frequent reorientation  Supportive care    Kidney stone  Had cystoscopy, stent placed per Dr. Matute on 11/5    Per Dr. Matute-stent appears to be in good position, no hydronephrosis, no further recommendations in regards to the treatment of the stone.  Will arrange for outpatient follow up.         VTE Risk Mitigation (From admission, onward)           Ordered     Reason for No Pharmacological VTE Prophylaxis  Once        Comments: Procedure required   Question:  Reasons:  Answer:  Physician Provided (leave comment)    12/17/24 0110     IP VTE HIGH RISK PATIENT  Once         12/17/24 0110     Place sequential compression device  Until discontinued         12/17/24 0110                    Discharge Planning   MICHAEL: 12/20/2024     Code Status: Full Code   Medical Readiness for Discharge Date: 12/19/2024  Discharge Plan A: Skilled Nursing Facility   Discharge Delays: None known at this time            Please place Justification for DME        Kayley Russell NP  Department of Hospital  Medicine   [facility] Tele[facility] ([facility])

## 2024-12-22 NOTE — PT/OT/SLP PROGRESS
Physical Therapy Treatment    Patient Name:  Irlanda Lopez   MRN:  [id number]    Recommendations:     Discharge Recommendations: Low Intensity Therapy  Discharge Equipment Recommendations: walker, rolling  Barriers to discharge: None    Assessment:     Irlanda Lopez is a 84 y.o. female admitted with a medical diagnosis of Liver abscess.  She presents with the following impairments/functional limitations: weakness, impaired endurance, impaired self care skills, impaired functional mobility, gait instability, impaired balance, decreased lower extremity function, decreased safety awareness, decreased coordination.    Pt demonstrates good effort and participation with activities on this date. Patient was able to resume gait training without the use of the AD. Patient continues to require contact guard assistance to ensure safety with activities.     Rehab Prognosis: Fair; patient would benefit from acute skilled PT services to address these deficits and reach maximum level of function.    Recent Surgery: * No surgery found *      Plan:     During this hospitalization, patient to be seen 3 x/week to address the identified rehab impairments via gait training, therapeutic activities, therapeutic exercises and progress toward the following goals:    Plan of Care Expires:  12/31/24    Subjective     Chief Complaint: none stated  Patient/Family Comments/goals: agreeable to tx  Pain/Comfort:  Pain Rating 1: 0/10  Pain Rating Post-Intervention 1: 0/10      Objective:     EPIC chart review performed and communicated with nursing prior to session.  Patient found HOB elevated with ZINA drain, telemetry, colostomy, bed alarm (AVASYS) upon PT entry to room.     General Precautions: Standard, fall, contact  Orthopedic Precautions: N/A  Braces: N/A  Respiratory Status: Room air     Functional Mobility:  Bed Mobility:     Scooting to EOB: stand by assistance  Scooting laterally in supine: stand by assistance  Supine to Sit: stand  by assistance  Sit to Supine: contact guard assistance  Transfers:     Sit to Stand from EOB: contact guard assistance with no AD  Gait: Patient ambulates 300 ft with contact guard assistance of 1 person and no AD. Patient requests to attempt gait trial without AD. Gait belt donned, and AD in tow to be used as needed. Patient presents with slight forward posture, little to no arm swing, decreased trunk rotation, short step length, and small TIERA. Pt with mild unsteadiness. Therapist assistant instructed patient for proper gait mechanics including gait speed, TIERA, and stride length. Pt with fair return demo. No LOB noted.  Balance: Fair      AM-PAC 6 CLICK MOBILITY  Turning over in bed (including adjusting bedclothes, sheets and blankets)?: 3  Sitting down on and standing up from a chair with arms (e.g., wheelchair, bedside commode, etc.): 3  Moving from lying on back to sitting on the side of the bed?: 3  Moving to and from a bed to a chair (including a wheelchair)?: 3  Need to walk in hospital room?: 3 (for safety)  Climbing 3-5 steps with a railing?: 1 (NT)  Basic Mobility Total Score: 16       Treatment & Education:  Pt educated on role of therapy, goals of the session, benefits of out of bed mobility, and role of RW to promote stability and reduce risk of falls.  Instructed on the use of call button and calling nursing staff for assistance with mobility. Pt verbalized understanding.      Patient left HOB elevated with all lines intact, call button in reach, and bed alarm on.    GOALS:   Multidisciplinary Problems       Physical Therapy Goals          Problem: Physical Therapy    Goal Priority Disciplines Outcome Interventions   Physical Therapy Goal     PT, PT/OT Progressing    Description: Goals to be met by 12/31/24.  1. Pt will complete bed mobility MOD I.  2. Pt will complete sit to stand MOD I.  3. Pt will ambulate 200ft MOD I using RW.  4. Pt will increase AMPAC score by 2 points to progress functional  mobility.                       Time Tracking:     PT Received On: 12/22/24  PT Start Time: 1019     PT Stop Time: 1029  PT Total Time (min): 10 min     Billable Minutes: Gait Training 10    Treatment Type: Treatment  PT/PTA: PTA     Number of PTA visits since last PT visit: 1 12/22/2024

## 2024-12-22 NOTE — PLAN OF CARE
Problem: Adult Inpatient Plan of Care  Goal: Plan of Care Review  12/22/2024 0503 by Daniel Paz RN  Outcome: Progressing  12/22/2024 0503 by Daniel Paz RN  Outcome: Progressing  Goal: Patient-Specific Goal (Individualized)  12/22/2024 0503 by Daniel Paz RN  Outcome: Progressing  12/22/2024 0503 by Daniel Paz RN  Outcome: Progressing  Goal: Absence of Hospital-Acquired Illness or Injury  12/22/2024 0503 by Daniel Paz RN  Outcome: Progressing  12/22/2024 0503 by Daniel Paz RN  Outcome: Progressing  Goal: Optimal Comfort and Wellbeing  12/22/2024 0503 by Daniel Paz RN  Outcome: Progressing  12/22/2024 0503 by Daniel Paz RN  Outcome: Progressing  Goal: Readiness for Transition of Care  12/22/2024 0503 by Daniel Paz RN  Outcome: Progressing  12/22/2024 0503 by Daniel Paz RN  Outcome: Progressing     Problem: Infection  Goal: Absence of Infection Signs and Symptoms  12/22/2024 0503 by Daniel Paz RN  Outcome: Progressing  12/22/2024 0503 by Daniel Paz RN  Outcome: Progressing     Problem: Wound  Goal: Optimal Coping  12/22/2024 0503 by Daniel Paz RN  Outcome: Progressing  12/22/2024 0503 by Daniel Paz RN  Outcome: Progressing  Goal: Optimal Functional Ability  12/22/2024 0503 by Daniel Paz RN  Outcome: Progressing  12/22/2024 0503 by Daniel Paz RN  Outcome: Progressing  Goal: Absence of Infection Signs and Symptoms  12/22/2024 0503 by Daniel Paz RN  Outcome: Progressing  12/22/2024 0503 by Daniel Paz RN  Outcome: Progressing  Goal: Improved Oral Intake  12/22/2024 0503 by Daniel Paz RN  Outcome: Progressing  12/22/2024 0503 by Daniel Paz RN  Outcome: Progressing  Goal: Optimal Pain Control and Function  12/22/2024 0503 by Daniel Paz RN  Outcome: Progressing  12/22/2024 0503 by Daniel Paz RN  Outcome: Progressing  Goal: Skin Health and Integrity  12/22/2024 0503 by Daniel Paz RN  Outcome: Progressing  12/22/2024 0503 by Daniel Paz RN  Outcome:  Progressing  Goal: Optimal Wound Healing  12/22/2024 0503 by Daniel Paz RN  Outcome: Progressing  12/22/2024 0503 by Daniel Paz RN  Outcome: Progressing     Problem: Skin Injury Risk Increased  Goal: Skin Health and Integrity  12/22/2024 0503 by Daniel Paz RN  Outcome: Progressing  12/22/2024 0503 by Daniel Paz RN  Outcome: Progressing     Problem: Sepsis/Septic Shock  Goal: Optimal Coping  12/22/2024 0503 by Daniel Paz RN  Outcome: Progressing  12/22/2024 0503 by Daniel Paz RN  Outcome: Progressing  Goal: Absence of Bleeding  12/22/2024 0503 by Daniel Paz RN  Outcome: Progressing  12/22/2024 0503 by Daniel Paz RN  Outcome: Progressing  Goal: Blood Glucose Level Within Targeted Range  12/22/2024 0503 by Daniel Paz RN  Outcome: Progressing  12/22/2024 0503 by Daniel Paz RN  Outcome: Progressing  Goal: Absence of Infection Signs and Symptoms  12/22/2024 0503 by Daniel Paz RN  Outcome: Progressing  12/22/2024 0503 by Daniel Paz RN  Outcome: Progressing  Goal: Optimal Nutrition Intake  12/22/2024 0503 by Daniel Paz RN  Outcome: Progressing  12/22/2024 0503 by Daniel Paz RN  Outcome: Progressing

## 2024-12-23 LAB
ALBUMIN SERPL BCP-MCNC: 2.6 G/DL (ref 3.5–5.2)
ALP SERPL-CCNC: 81 U/L (ref 40–150)
ALT SERPL W/O P-5'-P-CCNC: 23 U/L (ref 10–44)
ANION GAP SERPL CALC-SCNC: 9 MMOL/L (ref 8–16)
AST SERPL-CCNC: 29 U/L (ref 10–40)
BACTERIA SPEC ANAEROBE CULT: NORMAL
BASOPHILS # BLD AUTO: 0.06 K/UL (ref 0–0.2)
BASOPHILS NFR BLD: 0.8 % (ref 0–1.9)
BILIRUB SERPL-MCNC: 0.2 MG/DL (ref 0.1–1)
BUN SERPL-MCNC: 19 MG/DL (ref 8–23)
CALCIUM SERPL-MCNC: 8.7 MG/DL (ref 8.7–10.5)
CHLORIDE SERPL-SCNC: 101 MMOL/L (ref 95–110)
CO2 SERPL-SCNC: 23 MMOL/L (ref 23–29)
CREAT SERPL-MCNC: 0.8 MG/DL (ref 0.5–1.4)
DIFFERENTIAL METHOD BLD: ABNORMAL
EOSINOPHIL # BLD AUTO: 0.7 K/UL (ref 0–0.5)
EOSINOPHIL NFR BLD: 8.7 % (ref 0–8)
ERYTHROCYTE [DISTWIDTH] IN BLOOD BY AUTOMATED COUNT: 15.1 % (ref 11.5–14.5)
EST. GFR  (NO RACE VARIABLE): >60 ML/MIN/1.73 M^2
GLUCOSE SERPL-MCNC: 96 MG/DL (ref 70–110)
HCT VFR BLD AUTO: 33.4 % (ref 37–48.5)
HGB BLD-MCNC: 10.7 G/DL (ref 12–16)
IMM GRANULOCYTES # BLD AUTO: 0.14 K/UL (ref 0–0.04)
IMM GRANULOCYTES NFR BLD AUTO: 1.9 % (ref 0–0.5)
LYMPHOCYTES # BLD AUTO: 2.1 K/UL (ref 1–4.8)
LYMPHOCYTES NFR BLD: 27.8 % (ref 18–48)
MCH RBC QN AUTO: 25.3 PG (ref 27–31)
MCHC RBC AUTO-ENTMCNC: 32 G/DL (ref 32–36)
MCV RBC AUTO: 79 FL (ref 82–98)
MONOCYTES # BLD AUTO: 0.8 K/UL (ref 0.3–1)
MONOCYTES NFR BLD: 10 % (ref 4–15)
NEUTROPHILS # BLD AUTO: 3.8 K/UL (ref 1.8–7.7)
NEUTROPHILS NFR BLD: 50.8 % (ref 38–73)
NRBC BLD-RTO: 0 /100 WBC
PLATELET # BLD AUTO: 337 K/UL (ref 150–450)
PMV BLD AUTO: 8.7 FL (ref 9.2–12.9)
POTASSIUM SERPL-SCNC: 3.6 MMOL/L (ref 3.5–5.1)
PROT SERPL-MCNC: 6.1 G/DL (ref 6–8.4)
RBC # BLD AUTO: 4.23 M/UL (ref 4–5.4)
SODIUM SERPL-SCNC: 133 MMOL/L (ref 136–145)
WBC # BLD AUTO: 7.48 K/UL (ref 3.9–12.7)

## 2024-12-23 PROCEDURE — 25000003 PHARM REV CODE 250: Performed by: EMERGENCY MEDICINE

## 2024-12-23 PROCEDURE — 97535 SELF CARE MNGMENT TRAINING: CPT

## 2024-12-23 PROCEDURE — 97530 THERAPEUTIC ACTIVITIES: CPT

## 2024-12-23 PROCEDURE — 63600175 PHARM REV CODE 636 W HCPCS: Performed by: EMERGENCY MEDICINE

## 2024-12-23 PROCEDURE — 25500020 PHARM REV CODE 255: Performed by: EMERGENCY MEDICINE

## 2024-12-23 PROCEDURE — 25000003 PHARM REV CODE 250: Performed by: FAMILY MEDICINE

## 2024-12-23 PROCEDURE — 85025 COMPLETE CBC W/AUTO DIFF WBC: CPT | Performed by: NURSE PRACTITIONER

## 2024-12-23 PROCEDURE — 36415 COLL VENOUS BLD VENIPUNCTURE: CPT | Performed by: NURSE PRACTITIONER

## 2024-12-23 PROCEDURE — 21400001 HC TELEMETRY ROOM

## 2024-12-23 PROCEDURE — 80053 COMPREHEN METABOLIC PANEL: CPT | Performed by: NURSE PRACTITIONER

## 2024-12-23 PROCEDURE — 25000003 PHARM REV CODE 250: Performed by: INTERNAL MEDICINE

## 2024-12-23 PROCEDURE — 63600175 PHARM REV CODE 636 W HCPCS: Performed by: NURSE PRACTITIONER

## 2024-12-23 PROCEDURE — 27000207 HC ISOLATION

## 2024-12-23 PROCEDURE — 25000003 PHARM REV CODE 250: Performed by: NURSE PRACTITIONER

## 2024-12-23 RX ORDER — CYANOCOBALAMIN 1000 UG/ML
1000 INJECTION, SOLUTION INTRAMUSCULAR; SUBCUTANEOUS DAILY
Status: COMPLETED | OUTPATIENT
Start: 2024-12-23 | End: 2024-12-24

## 2024-12-23 RX ORDER — POLYETHYLENE GLYCOL 3350 17 G/17G
17 POWDER, FOR SOLUTION ORAL DAILY
Status: DISCONTINUED | OUTPATIENT
Start: 2024-12-23 | End: 2024-12-26 | Stop reason: HOSPADM

## 2024-12-23 RX ORDER — ACETAMINOPHEN 500 MG
5000 TABLET ORAL DAILY
Status: DISCONTINUED | OUTPATIENT
Start: 2024-12-24 | End: 2024-12-26 | Stop reason: HOSPADM

## 2024-12-23 RX ORDER — AMOXICILLIN 250 MG
2 CAPSULE ORAL ONCE
Status: COMPLETED | OUTPATIENT
Start: 2024-12-23 | End: 2024-12-23

## 2024-12-23 RX ADMIN — MUPIROCIN: 20 OINTMENT TOPICAL at 09:12

## 2024-12-23 RX ADMIN — THYROID, PORCINE 30 MG: 30 TABLET ORAL at 09:12

## 2024-12-23 RX ADMIN — CYANOCOBALAMIN 1000 MCG: 1000 INJECTION INTRAMUSCULAR; SUBCUTANEOUS at 05:12

## 2024-12-23 RX ADMIN — HYPROMELLOSE 2910 2 DROP: 5 SOLUTION/ DROPS OPHTHALMIC at 09:12

## 2024-12-23 RX ADMIN — HYPROMELLOSE 2910 2 DROP: 5 SOLUTION/ DROPS OPHTHALMIC at 02:12

## 2024-12-23 RX ADMIN — POLYETHYLENE GLYCOL 3350 17 G: 17 POWDER, FOR SOLUTION ORAL at 05:12

## 2024-12-23 RX ADMIN — CEFTRIAXONE 2 G: 2 INJECTION, POWDER, FOR SOLUTION INTRAMUSCULAR; INTRAVENOUS at 02:12

## 2024-12-23 RX ADMIN — BUSPIRONE HYDROCHLORIDE 10 MG: 10 TABLET ORAL at 10:12

## 2024-12-23 RX ADMIN — MEMANTINE 10 MG: 10 TABLET ORAL at 09:12

## 2024-12-23 RX ADMIN — RISPERIDONE 0.5 MG: 0.5 TABLET, FILM COATED ORAL at 09:12

## 2024-12-23 RX ADMIN — HYPROMELLOSE 2910 2 DROP: 5 SOLUTION/ DROPS OPHTHALMIC at 10:12

## 2024-12-23 RX ADMIN — SENNOSIDES AND DOCUSATE SODIUM 2 TABLET: 50; 8.6 TABLET ORAL at 05:12

## 2024-12-23 RX ADMIN — BUSPIRONE HYDROCHLORIDE 10 MG: 10 TABLET ORAL at 09:12

## 2024-12-23 RX ADMIN — MEMANTINE 10 MG: 10 TABLET ORAL at 10:12

## 2024-12-23 RX ADMIN — ACETAMINOPHEN 650 MG: 325 TABLET ORAL at 12:12

## 2024-12-23 RX ADMIN — IOHEXOL 100 ML: 350 INJECTION, SOLUTION INTRAVENOUS at 11:12

## 2024-12-23 RX ADMIN — MUPIROCIN: 20 OINTMENT TOPICAL at 10:12

## 2024-12-23 NOTE — PT/OT/SLP PROGRESS
"Occupational Therapy   Treatment    Name: Irlanda Lopez  MRN: [id number]  Admitting Diagnosis:  Liver abscess       Recommendations:     Discharge Recommendations: Low Intensity Therapy  Discharge Equipment Recommendations:  walker, rolling  Barriers to discharge:  None    Assessment:     Irlanda Lopez is a 84 y.o. female with a medical diagnosis of Liver abscess.  She presents with the following performance deficits affecting function are weakness, impaired endurance, impaired self care skills, impaired functional mobility, gait instability, impaired balance, impaired cognition, decreased lower extremity function, decreased safety awareness, decreased coordination.     Rehab Prognosis:  Good; patient would benefit from acute skilled OT services to address these deficits and reach maximum level of function.       Plan:     Patient to be seen 2 x/week to address the above listed problems via self-care/home management, therapeutic activities  Plan of Care Expires: 01/01/25  Plan of Care Reviewed with: patient, daughter    Subjective     Chief Complaint: "Ready to go"  Patient/Family Comments/goals: For mom to have a bath- daughter  Pain/Comfort:  Pain Rating 1: 0/10    Objective:     Communicated with: Govind and JUANITA prior to session.  Patient found ambulatory in room/seth with ZINA drain, telemetry, colostomy, bed alarm AVASYS upon OT entry to room.\  HANCOCK entered with the AVASYS alerting to Pt OOB    General Precautions: Standard, fall, contact    Orthopedic Precautions:N/A  Braces: N/A  Respiratory Status: Room air     Occupational Performance:   Pt completed 5 min of EOB dynamic seated to increase core stability and independence.     Functional Mobility/Transfers:  Patient completed Sit <> Stand Transfer with contact guard assistance  with  no assistive device   Pt completed Stand Pivot transfer to chair with CG assist and no AD  Functional Mobility: Patient completed x5ft functional mobility x2 with no " AD to increase dynamic standing balance and activity tolerance needed for ADL completion.   Pt required V/C for safety and remain in bed until having assistance from staff.      Activities of Daily Living:  Feeding:  stand by assistance eating prepared meal   Toileting: moderate assistance to complete all steps in toileting task.  Mod A for don/doff brief and cleaning step  Extra time needed for task      [facility] 6 Click ADL: 20    Treatment & Education:  Encouraged completion of B UE AROM therex throughout the day to increase functional strength and activity tolerance needed for ADL completion.  OT role, plan of care, progression of goals, importance of continued OOB activity, ADL/functional transfer and mobility retraining, call don't fall, safety precautions, fall prevention.  Pt and daughter stated understanding and agreement in POC    Patient left up in chair with all lines intact, call button in reach, chair alarm on, and daughter present    GOALS:   Multidisciplinary Problems       Occupational Therapy Goals          Problem: Occupational Therapy    Goal Priority Disciplines Outcome Interventions   Occupational Therapy Goal     OT, PT/OT Progressing    Description: Goals to be met by: 1/1/25     Patient will increase functional independence with ADLs by performing:    Grooming while standing at sink with [place].  Toileting from toilet with [place] for hygiene and clothing management.                          Time Tracking:     OT Date of Treatment: 12/23/24  OT Start Time: 0830  OT Stop Time: 0900  OT Total Time (min): 30 min    Billable Minutes:Self Care/Home Management 15  Therapeutic Activity 10    OT/NATHALIA: NATHALIA     Number of NATHALIA visits since last OT visit: 2  SANTI Wolfe  12/23/2024

## 2024-12-23 NOTE — PLAN OF CARE
Pt willing to participate in Tx this date. Pt requires reinforcement of safety measures and compliance. Pt engaged in toileting Mod A, feeding SBA, transfer from EOB to chair CG and education this date.

## 2024-12-23 NOTE — PLAN OF CARE
Problem: Adult Inpatient Plan of Care  Goal: Plan of Care Review  Outcome: Progressing     Problem: Adult Inpatient Plan of Care  Goal: Absence of Hospital-Acquired Illness or Injury  Outcome: Progressing     Problem: Wound  Goal: Optimal Coping  Outcome: Progressing     Problem: Infection  Goal: Absence of Infection Signs and Symptoms  Outcome: Progressing     Problem: Wound  Goal: Absence of Infection Signs and Symptoms  Outcome: Progressing    POC reviewed with family.   AAOx1  No orders for cardiac monitoring   Pt remains free of falls.  Safety measures in place. Will continue to monitor.  Informed pt to call for assistance before getting up. Pt verbalizes understanding.  Hourly rounding and chart check complete.

## 2024-12-24 PROCEDURE — 63600175 PHARM REV CODE 636 W HCPCS: Performed by: EMERGENCY MEDICINE

## 2024-12-24 PROCEDURE — 63600175 PHARM REV CODE 636 W HCPCS: Performed by: NURSE PRACTITIONER

## 2024-12-24 PROCEDURE — 25000003 PHARM REV CODE 250: Performed by: INTERNAL MEDICINE

## 2024-12-24 PROCEDURE — 27000207 HC ISOLATION

## 2024-12-24 PROCEDURE — 25000003 PHARM REV CODE 250: Performed by: EMERGENCY MEDICINE

## 2024-12-24 PROCEDURE — 21400001 HC TELEMETRY ROOM

## 2024-12-24 RX ADMIN — CYANOCOBALAMIN 1000 MCG: 1000 INJECTION INTRAMUSCULAR; SUBCUTANEOUS at 08:12

## 2024-12-24 RX ADMIN — Medication 1 TABLET: at 08:12

## 2024-12-24 RX ADMIN — HYPROMELLOSE 2910 2 DROP: 5 SOLUTION/ DROPS OPHTHALMIC at 08:12

## 2024-12-24 RX ADMIN — CEFTRIAXONE 2 G: 2 INJECTION, POWDER, FOR SOLUTION INTRAMUSCULAR; INTRAVENOUS at 02:12

## 2024-12-24 RX ADMIN — HYPROMELLOSE 2910 2 DROP: 5 SOLUTION/ DROPS OPHTHALMIC at 02:12

## 2024-12-24 RX ADMIN — CHOLECALCIFEROL TAB 125 MCG (5000 UNIT) 5000 UNITS: 125 TAB at 08:12

## 2024-12-24 RX ADMIN — HYPROMELLOSE 2910 2 DROP: 5 SOLUTION/ DROPS OPHTHALMIC at 09:12

## 2024-12-24 RX ADMIN — BUSPIRONE HYDROCHLORIDE 10 MG: 10 TABLET ORAL at 08:12

## 2024-12-24 RX ADMIN — MEMANTINE 10 MG: 10 TABLET ORAL at 08:12

## 2024-12-24 RX ADMIN — THYROID, PORCINE 30 MG: 30 TABLET ORAL at 08:12

## 2024-12-24 RX ADMIN — POLYETHYLENE GLYCOL 3350 17 G: 17 POWDER, FOR SOLUTION ORAL at 08:12

## 2024-12-24 RX ADMIN — THERA TABS 1 TABLET: TAB at 08:12

## 2024-12-24 RX ADMIN — RISPERIDONE 0.5 MG: 0.5 TABLET, FILM COATED ORAL at 08:12

## 2024-12-24 NOTE — PROGRESS NOTES
[facility] Emergency Room Medicine  Progress Note    Patient Name: Irlanda Lopez  MRN: [id number]  Patient Class: IP- Inpatient   Admission Date: 12/16/2024  Length of Stay: 6 days  Attending Physician: Vanessa Shipley MD  Primary Care Provider: Myla Arias MD        Subjective     Principal Problem:Liver abscess        HPI:   Patient is 84-year-old female with past medical history significant for  hypertension, hypothyroidism,, Alzheimer's dementia, anxiety, hyperlipidemia, colostomy, kidney stones, stroke, osteopenia, chronic urinary tract infections, right breast mass, and anemia who presented to ED as directed by Dr. Matute due to abnormal CT scan showing liver abscess.  She recently had cystoscopy with stent placement on 11/05/2024 and then was diagnosed with urinary tract infection at urgent care on 12/11, has been on Bactrim for E coli urinary tract infection.  Patient was scheduled for an ultrasound to be done which was ordered by Dr. Matute on 12/16, however  patient was unable to tolerate the ultrasound due to increased pain.  He then ordered CT scan and when results showed liver abscess he directed her to ED.  Due to her dementia, most information is obtained from her family at bedside. She does report chills, and intermittent abdominal pain. She denies dysuria, nausea, vomiting, chest pain, shortness of breath, cough, diarrhea.  on arrival to ED, temp 98.4°, heart rate 66, respirations 16, blood pressure 141/66, 97% SpO2 on room air.  Lab workup shows WBC 16.75, hemoglobin 9.8, hematocrit 29.1, sodium 126, CO2 18, BUN 8, creatinine 0.8, albumin 2.5, normal LFTs, lipase 14, lipase 0.8.  Urinalysis has been ordered, is pending.  Hospital Medicine was consulted for admission due to liver abscess for IV antibiotics and to consult IR for possible drainage of abscess in AM.    Overview/Hospital Course:  The patient is a 83 yo female with recent kidney stones s/p cystoscopy with stent  placement on 11/05/2024 and recent UTI on 12/11-on Bactrim for E coli urinary tract infection who was admitted for Sepsis due to UTI and Liver abscess on IV  Zosyn and Vanco. Temp 101F on admit. Urine culture 12/11/24 grew ESBL E coli. IV Abx changed to IV Meropenem. IR consulted and pt underwent CT guided drain placement per IR-20 ml of green pus sent to gram stain and culture.IR recommended monitoring drain output, Gentle flushing with 5mL NS every shift, and Drain can be removed after when less than 10mL output over 24 hours and patient is clinically improved.   ID consulted and recommended IV Meropenem x 3 weeks. Right sided ZINA drain had 100ml output overnight.   Afebrile, WBC 16>19>11>5.9. Blood cultures show NGTD. Repeat urine culture showed no growth (pt was on bactrim as OP). Liver abscess Aerobic culture showed no growth. Anaerobic culture pending.   Discussed the possibility of discharge with drain and IV abx with daughter. She does not feel they can manage the IV abx. Pt has dementia and is unable to manage IV abx. CM consulted for SNF.  Pt pulled at drain and there was concern for dislodgement. Repeat CT abd showed decrease in size of suspected abscess in the inferior aspect of the right lobe of the liver, drainage catheter in good position.   On 12/20/24, Liver abscess aerobic culture now growing STREPTOCOCCUS CONSTELLATUS. Urine culture on admit (12/17) still no growth. Urine culture 12/11 grew ESBL Intravenous antibiotics:  Dr Lopez recommended to change IV abx to IV Rocephin 2 gram daily for 3 weeks Estimated end date of IV antibiotics: 01/10/2025. Weekly Labs:CBC, CMP, ESR, CRP. Will need repeat CT abd prior to discontinuing drain  Pt still having pain to RUQ. Drain only put out 5 cc/24 hours on 12/19, 12cc on 12/20, and 10 cc/24 hours on 12/21. Dr. Gabriel with radiology recommended continue flushing drain tube through the weekend and reassess with repeat CT abd on Monday. Repeat CT abd ordered.    Awaiting SNF insurance approval -may need P2P    12/23- pt seen and examined, chart, imaging reviewed. Appreciate IR/ Dr. Gabriel. Pt doing better, lying in bed comfortably,. VSS afeb, no drainage from the Liver ZINA drain- hence IR pulled it out this adm, after repeat CT abd showed that the Liver abscess had almost resolved. Pt participated in the PT/Ot and needs mod assist. SW arranging SNF- await insurance auth. Cont Rocephin. Labs stable. Normal WBC. All Cx remain NGTD.     Interval History: pt seen and examined, chart, imaging reviewed. Appreciate IR/ Dr. Gabriel. Pt doing better, lying in bed comfortably,. VSS afeb, no drainage from the Liver ZINA drain- hence IR pulled it out this adm, after repeat CT abd showed that the Liver abscess had almost resolved. Pt participated in the PT/Ot and needs mod assist. SW arranging SNF- await insurance auth. Cont Rocephin. Labs stable. Normal WBC. All Cx remain NGTD.     Review of Systems   Unable to perform ROS: Dementia   Constitutional:  Positive for activity change, appetite change and fatigue.     Objective:     Vital Signs (Most Recent):  Temp: 97.3 °F (36.3 °C) (12/23/24 2049)  Pulse: 69 (12/23/24 2049)  Resp: 18 (12/23/24 2049)  BP: (!) 160/74 (12/23/24 2049)  SpO2: 97 % (12/23/24 2049) Vital Signs (24h Range):  Temp:  [96.8 °F (36 °C)-97.8 °F (36.6 °C)] 97.3 °F (36.3 °C)  Pulse:  [65-70] 69  Resp:  [16-18] 18  SpO2:  [95 %-97 %] 97 %  BP: (127-194)/(59-87) 160/74     Weight: 72.1 kg (158 lb 15.2 oz)  Body mass index is 29.07 kg/m².    Intake/Output Summary (Last 24 hours) at 12/23/2024 2227  Last data filed at 12/23/2024 0820  Gross per 24 hour   Intake --   Output 35 ml   Net -35 ml         Physical Exam  Vitals and nursing note reviewed.   Constitutional:       General: She is not in acute distress.     Appearance: She is well-developed. She is not diaphoretic.   HENT:      Head: Normocephalic and atraumatic.      Nose: Nose normal.   Eyes:      General: No scleral  icterus.     Conjunctiva/sclera: Conjunctivae normal.   Neck:      Trachea: No tracheal deviation.   Cardiovascular:      Rate and Rhythm: Normal rate and regular rhythm.      Heart sounds: Normal heart sounds. No murmur heard.     No friction rub. No gallop.   Pulmonary:      Effort: Pulmonary effort is normal. No respiratory distress.      Breath sounds: Normal breath sounds. No stridor. No wheezing or rales.   Chest:      Chest wall: No tenderness.   Abdominal:      General: Bowel sounds are normal. There is no distension.      Palpations: Abdomen is soft. There is no mass.      Tenderness: There is abdominal tenderness (RUQ TTP-improved). There is no guarding or rebound.      Comments: ZINA percutaneous drain in place RUQ, LLL colostomy bag present with stool.    Musculoskeletal:         General: No tenderness or deformity. Normal range of motion.      Cervical back: Normal range of motion and neck supple.   Skin:     General: Skin is warm and dry.      Capillary Refill: Capillary refill takes 2 to 3 seconds.      Coloration: Skin is not pale.      Findings: No erythema or rash.   Neurological:      General: No focal deficit present.      Mental Status: She is alert and oriented to person, place, and time.      Cranial Nerves: No cranial nerve deficit.      Motor: No abnormal muscle tone.      Coordination: Coordination normal.      Comments: Oriented to person only    Psychiatric:         Mood and Affect: Mood normal.         Behavior: Behavior normal.         Cognition and Memory: Cognition is impaired. Memory is impaired. She exhibits impaired recent memory and impaired remote memory.             Significant Labs: All pertinent labs within the past 24 hours have been reviewed.  CBC:   Recent Labs   Lab 12/23/24  0500   WBC 7.48   HGB 10.7*   HCT 33.4*        CMP:   Recent Labs   Lab 12/23/24  0500   *   K 3.6      CO2 23   GLU 96   BUN 19   CREATININE 0.8   CALCIUM 8.7   PROT 6.1   ALBUMIN  2.6*   BILITOT 0.2   ALKPHOS 81   AST 29   ALT 23   ANIONGAP 9       Significant Imaging: I have reviewed all pertinent imaging results/findings within the past 24 hours.    Assessment and Plan     * Liver abscess  NPO x medications  Consult IR for drainage  Blood cultures ordered, pending  Lactic acid normal, WBC 16.75  Zosyn and vancomycin ordered  Hydrating with IV fluids  PRN acetaminophen for fever    12/17/24: IR consulted and pt underwent CT guided drain placement per IR-20 ml of green pus sent to gram stain and culture.IR recommended monitoring drain output, Gentle flushing with 5mL NS every shift, and Drain can be removed after when less than 10mL output over 24 hours and patient is clinically improved.     12/18/24: Afebrile, WBC 16>19>11. Blood cultures show NGTD. Repeat urine culture showed no growth (pt was on bactrim as OP). Liver abscess Aerobic culture showed no growth. Anaerobic culture pending.  ID consulted and recommended IV meropenem x 3 weeks Discussed the possibility of discharge with drain and IV abx with daughter. She does not feel they can manage the IV abx. Pt has dementia and is unable to manage IV abx. PENNY consulted for SNF.    12/19/24: Pt pulled at drain and there was concern for dislodgement. Repeat CT abd showed decrease in size of suspected abscess in the inferior aspect of the right lobe of the liver, drainage catheter in good position.     12/20/24: On 12/20/24, Liver abscess aerobic culture now growing STREPTOCOCCUS CONSTELLATUS. Urine culture on admit (12/17) still no growth. Urine culture 12/11 grew ESBL Intravenous antibiotics:  Dr Lopez recommended to change IV abx to IV Rocephin 2 gram daily for 3 weeks Estimated end date of IV antibiotics: 01/10/2025. Weekly Labs:CBC, CMP, ESR, CRP. Will need repeat CT abd prior to discontinuing drain. CM notified.     12/21/24: Pt still having pain to RUQ. Drain only put out 5 cc on 12/19 and 12cc on 12/20. Discussed with Dr. Gabriel with  "radiology on 12/20/21 who recommended continue flushing drain tube through the weekend and reassess with repeat CT abd on Monday. May need P2P for SNF placement     12/22/24: improvement in TTP to RUQ. Drain put out 10ml/24 hours. CT abd scheduled for am. Continue IV Rocephin x 3 weeks. SNF placement pending.     12/23- almost resolved, ZINA drain removed  Cont IV reocephin    Sepsis  This patient does have evidence of infective focus  My overall impression is sepsis.  Source: Urinary Tract and Liver abscess  Antibiotics given-   Antibiotics (72h ago, onward)      Start     Stop Route Frequency Ordered    12/20/24 1430  cefTRIAXone injection 2 g         -- IV Every 24 hours (non-standard times) 12/20/24 1317          Latest lactate reviewed-  No results for input(s): "LACTATE", "POCLAC" in the last 72 hours.    Organ dysfunction indicated by  none    Fluid challenge Not needed - patient is not hypotensive      Post- resuscitation assessment No - Post resuscitation assessment not needed       Will Not start Pressors- Levophed for MAP of 65  Source control achieved by: IV Meropenem changed to IV Rocephin x 3 weeks     Sepsis resolved    Anemia   Most recent hemoglobin and hematocrit are listed below.  Recent Labs     12/16/24  2223   HGB 9.8*   HCT 29.1*     Plan  - Monitor serial CBC: Daily  - Transfuse PRBC if patient becomes hemodynamically unstable, symptomatic or H/H drops below 7/21.      Mass of right breast  Seen on CT renal stone study   Will need OP f/u and mammography       MARTIN (generalized anxiety disorder)  Stable, cont home Buspar      Dementia  Continue home medications  Fall precautions, use bed alarm  Requires frequent reorientation  Supportive care    UTI due to extended-spectrum beta lactamase (ESBL) producing Escherichia coli  Diagnosed with UTI per urgent care visit 12/11 -- has been on Bactrim  Urine culture 12/11/24 grew ESBL E coli   Repeat Urine culture 12/17/24 showed no growth   Abx changed to " IV Meropenem  ID consutled and recommended IV Meropenem x 3 weeks   On 12/20/24: ID recommended IV Rocephin x 3 weeks     Cont present care    Kidney stone  Had cystoscopy, stent placed per Dr. Matute on 11/5    Per Dr. Matute-stent appears to be in good position, no hydronephrosis, no further recommendations in regards to the treatment of the stone.  Will arrange for outpatient follow up. '    R renal stone chronic and stable      Hypothyroidism  TSH/Free T4 WNL  Continue armour thyroid per home med list        VTE Risk Mitigation (From admission, onward)           Ordered     Reason for No Pharmacological VTE Prophylaxis  Once        Comments: Procedure required   Question:  Reasons:  Answer:  Physician Provided (leave comment)    12/17/24 0110     IP VTE HIGH RISK PATIENT  Once         12/17/24 0110     Place sequential compression device  Until discontinued         12/17/24 0110                    Discharge Planning   MICHAEL: 12/20/2024     Code Status: Full Code   Medical Readiness for Discharge Date: 12/19/2024  Discharge Plan A: Skilled Nursing Facility   Discharge Delays: None known at this time      Please place Justification for DME      Vanessa Shipley MD  Department of Hospital Medicine   [facility] - Telemetry ([facility])

## 2024-12-24 NOTE — PLAN OF CARE
Problem: Adult Inpatient Plan of Care  Goal: Plan of Care Review  Outcome: Progressing     Problem: Adult Inpatient Plan of Care  Goal: Patient-Specific Goal (Individualized)  Outcome: Progressing     Problem: Infection  Goal: Absence of Infection Signs and Symptoms  Outcome: Progressing     Problem: Adult Inpatient Plan of Care  Goal: Optimal Comfort and Wellbeing  Outcome: Progressing     Problem: Wound  Goal: Optimal Coping  Outcome: Progressing     Problem: Wound  Goal: Absence of Infection Signs and Symptoms  Outcome: Progressing     Problem: Wound  Goal: Skin Health and Integrity  Outcome: Progressing     Problem: Skin Injury Risk Increased  Goal: Skin Health and Integrity  Outcome: Progressing     Problem: Sepsis/Septic Shock  Goal: Absence of Bleeding  Outcome: Progressing    POC reviewed with family.   AAOx2  No order for tele monitoring   Pt remains free of falls.  No complaints at this time.  Safety measures in place. Will continue to monitor.  Informed pt to call for assistance before getting up. Pt verbalizes understanding.  Hourly rounding and chart check complete.

## 2024-12-24 NOTE — PLAN OF CARE
12/24/24 1154   Post-Acute Status   Post-Acute Authorization Placement   Post-Acute Placement Status Pending Bed Availability   Discharge Plan   Discharge Plan A Skilled Nursing Facility     Unable to go to SNF today.  Family has not completed admission paperwork.  Cannot go until Thursday.  Notified Dr. Shipley by secure chat.

## 2024-12-24 NOTE — ASSESSMENT & PLAN NOTE
Diagnosed with UTI per urgent care visit 12/11 -- has been on Bactrim  Urine culture 12/11/24 grew ESBL E coli   Repeat Urine culture 12/17/24 showed no growth   Abx changed to IV Meropenem  ID consutled and recommended IV Meropenem x 3 weeks   On 12/20/24: ID recommended IV Rocephin x 3 weeks     Cont present care

## 2024-12-24 NOTE — PLAN OF CARE
SW notified by [facility] was P2P was unable to be completed d/t an error relaying attending MD's phone number; resulting in the SNF request being denied. [facility] rep advised that expedited appeal can be initiated to see if SNF decision could be overturned.   Expedited appeal submitted via phone; reference #: [id number]. Expedited appeal decision turnaround is 72 (business) hours.   SW to follow for outcome.

## 2024-12-24 NOTE — PLAN OF CARE
12/23/24 1859   Post-Acute Status   Post-Acute Authorization Placement   Post-Acute Placement Status Pending post-acute provider review/more information requested   Discharge Delays None known at this time   Discharge Plan   Discharge Plan A Skilled Nursing Facility   Discharge Plan B Skilled Nursing Facility     SW contacted [facility] provider's line @ [phone] to check status of expedited appeal (reference ID: [id number]) for SNF with [facility]. FARZANEH informed that decision was overturned and SNF is approved. FARZANEH informed that  nurse is updating case and will send notice to facility via fax, once auth letter is finalized.   FARZANEH provided updated clinicals, along with [id number], to [facility] via [employer] and requested facility check status of auth. FARZANEH informed that fax with authorization has not yet been received.  FARZANEH updated pt's daughter, Myla, at bedside who reports receiving call from [facility] with approval notice. Myla states her brother, Genaro ([phone]), will assist with transport to SNF since facility is unable to transport.   CM to f/u with [facility] in AM to ensure authorization is received for DC to SNF

## 2024-12-24 NOTE — SUBJECTIVE & OBJECTIVE
Interval History: pt seen and examined, chart, imaging reviewed. Appreciate IR/ Dr. Gabriel. Pt doing better, lying in bed comfortably,. VSS afeb, no drainage from the Liver ZINA drain- hence IR pulled it out this adm, after repeat CT abd showed that the Liver abscess had almost resolved. Pt participated in the PT/Ot and needs mod assist. SW arranging SNF- await insurance auth. Cont Rocephin. Labs stable. Normal WBC. All Cx remain NGTD.     Review of Systems   Unable to perform ROS: Dementia   Constitutional:  Positive for activity change, appetite change and fatigue.     Objective:     Vital Signs (Most Recent):  Temp: 97.3 °F (36.3 °C) (12/23/24 2049)  Pulse: 69 (12/23/24 2049)  Resp: 18 (12/23/24 2049)  BP: (!) 160/74 (12/23/24 2049)  SpO2: 97 % (12/23/24 2049) Vital Signs (24h Range):  Temp:  [96.8 °F (36 °C)-97.8 °F (36.6 °C)] 97.3 °F (36.3 °C)  Pulse:  [65-70] 69  Resp:  [16-18] 18  SpO2:  [95 %-97 %] 97 %  BP: (127-194)/(59-87) 160/74     Weight: 72.1 kg (158 lb 15.2 oz)  Body mass index is 29.07 kg/m².    Intake/Output Summary (Last 24 hours) at 12/23/2024 2227  Last data filed at 12/23/2024 0820  Gross per 24 hour   Intake --   Output 35 ml   Net -35 ml         Physical Exam  Vitals and nursing note reviewed.   Constitutional:       General: She is not in acute distress.     Appearance: She is well-developed. She is not diaphoretic.   HENT:      Head: Normocephalic and atraumatic.      Nose: Nose normal.   Eyes:      General: No scleral icterus.     Conjunctiva/sclera: Conjunctivae normal.   Neck:      Trachea: No tracheal deviation.   Cardiovascular:      Rate and Rhythm: Normal rate and regular rhythm.      Heart sounds: Normal heart sounds. No murmur heard.     No friction rub. No gallop.   Pulmonary:      Effort: Pulmonary effort is normal. No respiratory distress.      Breath sounds: Normal breath sounds. No stridor. No wheezing or rales.   Chest:      Chest wall: No tenderness.   Abdominal:      General:  Bowel sounds are normal. There is no distension.      Palpations: Abdomen is soft. There is no mass.      Tenderness: There is abdominal tenderness (RUQ TTP-improved). There is no guarding or rebound.      Comments: ZINA percutaneous drain in place RUQ, LLL colostomy bag present with stool.    Musculoskeletal:         General: No tenderness or deformity. Normal range of motion.      Cervical back: Normal range of motion and neck supple.   Skin:     General: Skin is warm and dry.      Capillary Refill: Capillary refill takes 2 to 3 seconds.      Coloration: Skin is not pale.      Findings: No erythema or rash.   Neurological:      General: No focal deficit present.      Mental Status: She is alert and oriented to person, place, and time.      Cranial Nerves: No cranial nerve deficit.      Motor: No abnormal muscle tone.      Coordination: Coordination normal.      Comments: Oriented to person only    Psychiatric:         Mood and Affect: Mood normal.         Behavior: Behavior normal.         Cognition and Memory: Cognition is impaired. Memory is impaired. She exhibits impaired recent memory and impaired remote memory.             Significant Labs: All pertinent labs within the past 24 hours have been reviewed.  CBC:   Recent Labs   Lab 12/23/24  0500   WBC 7.48   HGB 10.7*   HCT 33.4*        CMP:   Recent Labs   Lab 12/23/24  0500   *   K 3.6      CO2 23   GLU 96   BUN 19   CREATININE 0.8   CALCIUM 8.7   PROT 6.1   ALBUMIN 2.6*   BILITOT 0.2   ALKPHOS 81   AST 29   ALT 23   ANIONGAP 9       Significant Imaging: I have reviewed all pertinent imaging results/findings within the past 24 hours.

## 2024-12-24 NOTE — ASSESSMENT & PLAN NOTE
NPO x medications  Consult IR for drainage  Blood cultures ordered, pending  Lactic acid normal, WBC 16.75  Zosyn and vancomycin ordered  Hydrating with IV fluids  PRN acetaminophen for fever    12/17/24: IR consulted and pt underwent CT guided drain placement per IR-20 ml of green pus sent to gram stain and culture.IR recommended monitoring drain output, Gentle flushing with 5mL NS every shift, and Drain can be removed after when less than 10mL output over 24 hours and patient is clinically improved.     12/18/24: Afebrile, WBC 16>19>11. Blood cultures show NGTD. Repeat urine culture showed no growth (pt was on bactrim as OP). Liver abscess Aerobic culture showed no growth. Anaerobic culture pending.  ID consulted and recommended IV meropenem x 3 weeks Discussed the possibility of discharge with drain and IV abx with daughter. She does not feel they can manage the IV abx. Pt has dementia and is unable to manage IV abx. CM consulted for SNF.    12/19/24: Pt pulled at drain and there was concern for dislodgement. Repeat CT abd showed decrease in size of suspected abscess in the inferior aspect of the right lobe of the liver, drainage catheter in good position.     12/20/24: On 12/20/24, Liver abscess aerobic culture now growing STREPTOCOCCUS CONSTELLATUS. Urine culture on admit (12/17) still no growth. Urine culture 12/11 grew ESBL Intravenous antibiotics:  Dr Lopez recommended to change IV abx to IV Rocephin 2 gram daily for 3 weeks Estimated end date of IV antibiotics: 01/10/2025. Weekly Labs:CBC, CMP, ESR, CRP. Will need repeat CT abd prior to discontinuing drain. CM notified.     12/21/24: Pt still having pain to RUQ. Drain only put out 5 cc on 12/19 and 12cc on 12/20. Discussed with Dr. Gabriel with radiology on 12/20/21 who recommended continue flushing drain tube through the weekend and reassess with repeat CT abd on Monday. May need P2P for SNF placement     12/22/24: improvement in TTP to RUQ. Drain put out  10ml/24 hours. CT abd scheduled for am. Continue IV Rocephin x 3 weeks. SNF placement pending.     12/23- almost resolved, ZINA drain removed  Cont IV reocephin

## 2024-12-24 NOTE — ASSESSMENT & PLAN NOTE
"This patient does have evidence of infective focus  My overall impression is sepsis.  Source: Urinary Tract and Liver abscess  Antibiotics given-   Antibiotics (72h ago, onward)      Start     Stop Route Frequency Ordered    12/20/24 1430  cefTRIAXone injection 2 g         -- IV Every 24 hours (non-standard times) 12/20/24 1317          Latest lactate reviewed-  No results for input(s): "LACTATE", "POCLAC" in the last 72 hours.    Organ dysfunction indicated by  none    Fluid challenge Not needed - patient is not hypotensive      Post- resuscitation assessment No - Post resuscitation assessment not needed       Will Not start Pressors- Levophed for MAP of 65  Source control achieved by: IV Meropenem changed to IV Rocephin x 3 weeks     Sepsis resolved  "

## 2024-12-24 NOTE — ASSESSMENT & PLAN NOTE
Had cystoscopy, stent placed per Dr. Matute on 11/5    Per Dr. Matute-stent appears to be in good position, no hydronephrosis, no further recommendations in regards to the treatment of the stone.  Will arrange for outpatient follow up. '    R renal stone chronic and stable

## 2024-12-25 PROCEDURE — 25000003 PHARM REV CODE 250: Performed by: EMERGENCY MEDICINE

## 2024-12-25 PROCEDURE — 25000003 PHARM REV CODE 250: Performed by: INTERNAL MEDICINE

## 2024-12-25 PROCEDURE — 63600175 PHARM REV CODE 636 W HCPCS: Performed by: NURSE PRACTITIONER

## 2024-12-25 PROCEDURE — 21400001 HC TELEMETRY ROOM

## 2024-12-25 PROCEDURE — 27000207 HC ISOLATION

## 2024-12-25 RX ADMIN — CEFTRIAXONE 2 G: 2 INJECTION, POWDER, FOR SOLUTION INTRAMUSCULAR; INTRAVENOUS at 03:12

## 2024-12-25 RX ADMIN — MEMANTINE 10 MG: 10 TABLET ORAL at 08:12

## 2024-12-25 RX ADMIN — POLYETHYLENE GLYCOL 3350 17 G: 17 POWDER, FOR SOLUTION ORAL at 10:12

## 2024-12-25 RX ADMIN — THERA TABS 1 TABLET: TAB at 10:12

## 2024-12-25 RX ADMIN — THYROID, PORCINE 30 MG: 30 TABLET ORAL at 08:12

## 2024-12-25 RX ADMIN — BUSPIRONE HYDROCHLORIDE 10 MG: 10 TABLET ORAL at 08:12

## 2024-12-25 RX ADMIN — CHOLECALCIFEROL TAB 125 MCG (5000 UNIT) 5000 UNITS: 125 TAB at 10:12

## 2024-12-25 RX ADMIN — HYPROMELLOSE 2910 2 DROP: 5 SOLUTION/ DROPS OPHTHALMIC at 08:12

## 2024-12-25 RX ADMIN — RISPERIDONE 0.5 MG: 0.5 TABLET, FILM COATED ORAL at 08:12

## 2024-12-25 RX ADMIN — HYPROMELLOSE 2910 2 DROP: 5 SOLUTION/ DROPS OPHTHALMIC at 03:12

## 2024-12-25 RX ADMIN — Medication 1 TABLET: at 10:12

## 2024-12-25 NOTE — NURSING
A245/A245 JEFERSON  Irlanda Lopez is a 84 y.o.female admitted on 12/16/2024 for Liver abscess   Code Status: Full Code MRN: [id number]   Review of patient's allergies indicates:   Allergen Reactions    Omnicef [cefdinir] Itching     Past Medical History:   Diagnosis Date    Clostridium difficile colitis     Dementia     Diverticulitis     s/p colostomy    High cholesterol     Hypertension     Hypothyroidism     Kidney stones       PRN meds    acetaminophen, 650 mg, Q4H PRN  dextrose 10%, 12.5 g, PRN  dextrose 10%, 25 g, PRN  glucagon (human recombinant), 1 mg, PRN  glucose, 16 g, PRN  glucose, 24 g, PRN  naloxone, 0.02 mg, PRN  ondansetron, 4 mg, Q6H PRN  oxyCODONE, 5 mg, Q6H PRN  sodium chloride 0.9%, 3 mL, Q8H PRN      Chart check completed. Will continue plan of care.      Orientation: disoriented to, place, time, situation  Lisa Coma Scale Score: 14     Lead Monitored: other (see comments) (No orders for cardiac monitoring) Rhythm: normal sinus rhythm    Cardiac/Telemetry Box Number: [id number]  VTE Core Measure: Pharmacological prophylaxis initiated/maintained Last Bowel Movement: 12/25/24  Diet Cardiac  Voiding Characteristics: urgency  [place] Score: 18  Fall Risk Score: 14  Accucheck []   Freq?      Lines/Drains/Airways       Peripherally Inserted Central Catheter Line  Duration             PICC Double Lumen 12/20/24 0820 left basilic 5 days              Drain  Duration                  Colostomy 05/23/21 LLQ 1312 days

## 2024-12-25 NOTE — ASSESSMENT & PLAN NOTE
NPO x medications  Consult IR for drainage  Blood cultures ordered, pending  Lactic acid normal, WBC 16.75  Zosyn and vancomycin ordered  Hydrating with IV fluids  PRN acetaminophen for fever    12/17/24: IR consulted and pt underwent CT guided drain placement per IR-20 ml of green pus sent to gram stain and culture.IR recommended monitoring drain output, Gentle flushing with 5mL NS every shift, and Drain can be removed after when less than 10mL output over 24 hours and patient is clinically improved.     12/18/24: Afebrile, WBC 16>19>11. Blood cultures show NGTD. Repeat urine culture showed no growth (pt was on bactrim as OP). Liver abscess Aerobic culture showed no growth. Anaerobic culture pending.  ID consulted and recommended IV meropenem x 3 weeks Discussed the possibility of discharge with drain and IV abx with daughter. She does not feel they can manage the IV abx. Pt has dementia and is unable to manage IV abx. CM consulted for SNF.    12/19/24: Pt pulled at drain and there was concern for dislodgement. Repeat CT abd showed decrease in size of suspected abscess in the inferior aspect of the right lobe of the liver, drainage catheter in good position.     12/20/24: On 12/20/24, Liver abscess aerobic culture now growing STREPTOCOCCUS CONSTELLATUS. Urine culture on admit (12/17) still no growth. Urine culture 12/11 grew ESBL Intravenous antibiotics:  Dr Lopez recommended to change IV abx to IV Rocephin 2 gram daily for 3 weeks Estimated end date of IV antibiotics: 01/10/2025. Weekly Labs:CBC, CMP, ESR, CRP. Will need repeat CT abd prior to discontinuing drain. CM notified.     12/21/24: Pt still having pain to RUQ. Drain only put out 5 cc on 12/19 and 12cc on 12/20. Discussed with Dr. Gabriel with radiology on 12/20/21 who recommended continue flushing drain tube through the weekend and reassess with repeat CT abd on Monday. May need P2P for SNF placement     12/22/24: improvement in TTP to RUQ. Drain put out  10ml/24 hours. CT abd scheduled for am. Continue IV Rocephin x 3 weeks. SNF placement pending.     12/23- almost resolved, ZINA drain removed  Cont IV reocephin    Much better, ZINA drain out- cont IV rocephin till 1/10/2025 for 3 weeks    Improving- cont IV abx

## 2024-12-25 NOTE — PLAN OF CARE
Problem: Adult Inpatient Plan of Care  Goal: Plan of Care Review  Outcome: Progressing     Problem: Adult Inpatient Plan of Care  Goal: Absence of Hospital-Acquired Illness or Injury  Outcome: Progressing     Problem: Infection  Goal: Absence of Infection Signs and Symptoms  Outcome: Progressing     Problem: Wound  Goal: Optimal Coping  Outcome: Progressing     Problem: Skin Injury Risk Increased  Goal: Skin Health and Integrity  Outcome: Progressing    POC reviewed with family. family verbalizes understanding of POC. No questions at this time.  AAOx2 NADN.  No orders for cardiac monitoring   Pt remains free of falls.  No complaints at this time.  Safety measures in place. ABYSS in place. Will continue to monitor.  Informed pt to call for assistance before getting up. Pt verbalizes understanding.  Hourly rounding and chart check complete.

## 2024-12-25 NOTE — ASSESSMENT & PLAN NOTE
Most recent hemoglobin and hematocrit are listed below.  Recent Labs     12/23/24  0500   HGB 10.7*   HCT 33.4*       Plan  - Monitor serial CBC: Daily  - Transfuse PRBC if patient becomes hemodynamically unstable, symptomatic or H/H drops below 7/21.    Stable H/H

## 2024-12-25 NOTE — ASSESSMENT & PLAN NOTE
Diagnosed with UTI per urgent care visit 12/11 -- has been on Bactrim  Urine culture 12/11/24 grew ESBL E coli   Repeat Urine culture 12/17/24 showed no growth   Abx changed to IV Meropenem  ID consutled and recommended IV Meropenem x 3 weeks   On 12/20/24: ID recommended IV Rocephin x 3 weeks     Cont present care    Cont IV Rocephin till 1/10/2025

## 2024-12-25 NOTE — SUBJECTIVE & OBJECTIVE
Interval History: resting well in bed, no complaints, VSS afeb, exam unchanged. Eating drinking well, ambulating well. Await SNF placement for IV Abx till 1/10/2025 for the Liver Abscess.     Review of Systems   Unable to perform ROS: Dementia   Constitutional:  Positive for activity change, appetite change and fatigue.   HENT: Negative.     Eyes: Negative.    Respiratory: Negative.     Cardiovascular: Negative.    Gastrointestinal: Negative.    Endocrine: Negative.    Genitourinary: Negative.    Musculoskeletal: Negative.    Skin: Negative.    Allergic/Immunologic: Negative.    Neurological:  Positive for weakness.   Hematological: Negative.    Psychiatric/Behavioral: Negative.       Objective:     Vital Signs (Most Recent):  Temp: 98 °F (36.7 °C) (12/25/24 1621)  Pulse: 68 (12/25/24 1621)  Resp: 18 (12/25/24 1621)  BP: (!) 147/76 (12/25/24 1621)  SpO2: 97 % (12/25/24 1621) Vital Signs (24h Range):  Temp:  [97.6 °F (36.4 °C)-98.1 °F (36.7 °C)] 98 °F (36.7 °C)  Pulse:  [61-71] 68  Resp:  [18] 18  SpO2:  [93 %-97 %] 97 %  BP: (124-147)/(62-76) 147/76     Weight: 72.1 kg (158 lb 15.2 oz)  Body mass index is 29.07 kg/m².    Intake/Output Summary (Last 24 hours) at 12/25/2024 1745  Last data filed at 12/24/2024 2048  Gross per 24 hour   Intake --   Output 250 ml   Net -250 ml         Physical Exam  Vitals and nursing note reviewed.   Constitutional:       General: She is in acute distress.      Appearance: She is well-developed. She is ill-appearing. She is not toxic-appearing or diaphoretic.   HENT:      Head: Normocephalic and atraumatic.      Right Ear: External ear normal.      Left Ear: External ear normal.      Nose: Nose normal.      Mouth/Throat:      Mouth: Mucous membranes are moist.      Pharynx: Oropharynx is clear.   Eyes:      General: No scleral icterus.     Extraocular Movements: Extraocular movements intact.      Conjunctiva/sclera: Conjunctivae normal.      Pupils: Pupils are equal, round, and reactive  to light.   Neck:      Trachea: No tracheal deviation.   Cardiovascular:      Rate and Rhythm: Normal rate and regular rhythm.      Pulses: Normal pulses.      Heart sounds: Normal heart sounds. No murmur heard.     No friction rub. No gallop.   Pulmonary:      Effort: Pulmonary effort is normal. No respiratory distress.      Breath sounds: Normal breath sounds. No stridor. No wheezing or rales.   Chest:      Chest wall: No tenderness.   Abdominal:      General: Abdomen is flat. Bowel sounds are normal. There is no distension.      Palpations: Abdomen is soft. There is no mass.      Tenderness: There is abdominal tenderness (RUQ TTP-improved). There is no guarding or rebound.      Comments: ZINA percutaneous drain in place RUQ, LLL colostomy bag present with stool.     ZINA drain out   Musculoskeletal:         General: No tenderness or deformity. Normal range of motion.      Cervical back: Normal range of motion and neck supple.   Skin:     General: Skin is warm and dry.      Capillary Refill: Capillary refill takes 2 to 3 seconds.      Coloration: Skin is not pale.      Findings: No erythema or rash.   Neurological:      General: No focal deficit present.      Mental Status: She is alert and oriented to person, place, and time.      Cranial Nerves: No cranial nerve deficit.      Motor: No abnormal muscle tone.      Coordination: Coordination normal.      Comments: Oriented to person only    Psychiatric:         Mood and Affect: Mood normal.         Behavior: Behavior normal.         Thought Content: Thought content normal.         Cognition and Memory: Cognition is impaired. Memory is impaired. She exhibits impaired recent memory and impaired remote memory.         Judgment: Judgment normal.           Significant Labs: All pertinent labs within the past 24 hours have been reviewed.    Significant Imaging: I have reviewed all pertinent imaging results/findings within the past 24 hours.

## 2024-12-25 NOTE — PROGRESS NOTES
[facility]  Progress Note    Patient Name: Irlanda Lopez  MRN: [id number]  Patient Class: IP- Inpatient   Admission Date: 12/16/2024  Length of Stay: 8 days  Attending Physician: Vanessa Shipley MD  Primary Care Provider: Myla Arias MD        Subjective     Principal Problem:Liver abscess        HPI:   Patient is 84-year-old female with past medical history significant for  hypertension, hypothyroidism,, Alzheimer's dementia, anxiety, hyperlipidemia, colostomy, kidney stones, stroke, osteopenia, chronic urinary tract infections, right breast mass, and anemia who presented to ED as directed by Dr. Matute due to abnormal CT scan showing liver abscess.  She recently had cystoscopy with stent placement on 11/05/2024 and then was diagnosed with urinary tract infection at urgent care on 12/11, has been on Bactrim for E coli urinary tract infection.  Patient was scheduled for an ultrasound to be done which was ordered by Dr. Matute on 12/16, however  patient was unable to tolerate the ultrasound due to increased pain.  He then ordered CT scan and when results showed liver abscess he directed her to ED.  Due to her dementia, most information is obtained from her family at bedside. She does report chills, and intermittent abdominal pain. She denies dysuria, nausea, vomiting, chest pain, shortness of breath, cough, diarrhea.  on arrival to ED, temp 98.4°, heart rate 66, respirations 16, blood pressure 141/66, 97% SpO2 on room air.  Lab workup shows WBC 16.75, hemoglobin 9.8, hematocrit 29.1, sodium 126, CO2 18, BUN 8, creatinine 0.8, albumin 2.5, normal LFTs, lipase 14, lipase 0.8.  Urinalysis has been ordered, is pending.  Hospital Medicine was consulted for admission due to liver abscess for IV antibiotics and to consult IR for possible drainage of abscess in AM.    Overview/Hospital Course:  The patient is a 83 yo female with recent kidney stones s/p cystoscopy with stent  placement on 11/05/2024 and recent UTI on 12/11-on Bactrim for E coli urinary tract infection who was admitted for Sepsis due to UTI and Liver abscess on IV  Zosyn and Vanco. Temp 101F on admit. Urine culture 12/11/24 grew ESBL E coli. IV Abx changed to IV Meropenem. IR consulted and pt underwent CT guided drain placement per IR-20 ml of green pus sent to gram stain and culture.IR recommended monitoring drain output, Gentle flushing with 5mL NS every shift, and Drain can be removed after when less than 10mL output over 24 hours and patient is clinically improved.   ID consulted and recommended IV Meropenem x 3 weeks. Right sided ZINA drain had 100ml output overnight.   Afebrile, WBC 16>19>11>5.9. Blood cultures show NGTD. Repeat urine culture showed no growth (pt was on bactrim as OP). Liver abscess Aerobic culture showed no growth. Anaerobic culture pending.   Discussed the possibility of discharge with drain and IV abx with daughter. She does not feel they can manage the IV abx. Pt has dementia and is unable to manage IV abx. CM consulted for SNF.  Pt pulled at drain and there was concern for dislodgement. Repeat CT abd showed decrease in size of suspected abscess in the inferior aspect of the right lobe of the liver, drainage catheter in good position.   On 12/20/24, Liver abscess aerobic culture now growing STREPTOCOCCUS CONSTELLATUS. Urine culture on admit (12/17) still no growth. Urine culture 12/11 grew ESBL Intravenous antibiotics:  Dr Lopez recommended to change IV abx to IV Rocephin 2 gram daily for 3 weeks Estimated end date of IV antibiotics: 01/10/2025. Weekly Labs:CBC, CMP, ESR, CRP. Will need repeat CT abd prior to discontinuing drain  Pt still having pain to RUQ. Drain only put out 5 cc/24 hours on 12/19, 12cc on 12/20, and 10 cc/24 hours on 12/21. Dr. Gabriel with radiology recommended continue flushing drain tube through the weekend and reassess with repeat CT abd on Monday. Repeat CT abd ordered.    Awaiting SNF insurance approval -may need P2P    12/23- pt seen and examined, chart, imaging reviewed. Appreciate IR/ Dr. Gabriel. Pt doing better, lying in bed comfortably,. VSS afeb, no drainage from the Liver ZINA drain- hence IR pulled it out this adm, after repeat CT abd showed that the Liver abscess had almost resolved. Pt participated in the PT/Ot and needs mod assist. SW arranging SNF- await insurance auth. Cont Rocephin. Labs stable. Normal WBC. All Cx remain NGTD.     12/24- looks and feels better, sitting up in bed, eating and talking, ambulating on her own well as well. Talking easily but gets pleasantly confused at times. Son at her side. SNF could not be arranged at Admissions coordinator at the facility not present, hence likely after [facility]. Cont IV rocephin till 1/10/25. Started on triple Vit plus Inj b12 IM.     12/25- resting well in bed, no complaints, VSS afeb, exam unchanged. Eating drinking well, ambulating well. Await SNF placement for IV Abx till 1/10/2025 for the Liver Abscess.     Interval History: resting well in bed, no complaints, VSS afeb, exam unchanged. Eating drinking well, ambulating well. Await SNF placement for IV Abx till 1/10/2025 for the Liver Abscess.     Review of Systems   Unable to perform ROS: Dementia   Constitutional:  Positive for activity change, appetite change and fatigue.   HENT: Negative.     Eyes: Negative.    Respiratory: Negative.     Cardiovascular: Negative.    Gastrointestinal: Negative.    Endocrine: Negative.    Genitourinary: Negative.    Musculoskeletal: Negative.    Skin: Negative.    Allergic/Immunologic: Negative.    Neurological:  Positive for weakness.   Hematological: Negative.    Psychiatric/Behavioral: Negative.       Objective:     Vital Signs (Most Recent):  Temp: 98 °F (36.7 °C) (12/25/24 1621)  Pulse: 68 (12/25/24 1621)  Resp: 18 (12/25/24 1621)  BP: (!) 147/76 (12/25/24 1621)  SpO2: 97 % (12/25/24 1621) Vital Signs (24h Range):  Temp:   [97.6 °F (36.4 °C)-98.1 °F (36.7 °C)] 98 °F (36.7 °C)  Pulse:  [61-71] 68  Resp:  [18] 18  SpO2:  [93 %-97 %] 97 %  BP: (124-147)/(62-76) 147/76     Weight: 72.1 kg (158 lb 15.2 oz)  Body mass index is 29.07 kg/m².    Intake/Output Summary (Last 24 hours) at 12/25/2024 1745  Last data filed at 12/24/2024 2048  Gross per 24 hour   Intake --   Output 250 ml   Net -250 ml         Physical Exam  Vitals and nursing note reviewed.   Constitutional:       General: She is in acute distress.      Appearance: She is well-developed. She is ill-appearing. She is not toxic-appearing or diaphoretic.   HENT:      Head: Normocephalic and atraumatic.      Right Ear: External ear normal.      Left Ear: External ear normal.      Nose: Nose normal.      Mouth/Throat:      Mouth: Mucous membranes are moist.      Pharynx: Oropharynx is clear.   Eyes:      General: No scleral icterus.     Extraocular Movements: Extraocular movements intact.      Conjunctiva/sclera: Conjunctivae normal.      Pupils: Pupils are equal, round, and reactive to light.   Neck:      Trachea: No tracheal deviation.   Cardiovascular:      Rate and Rhythm: Normal rate and regular rhythm.      Pulses: Normal pulses.      Heart sounds: Normal heart sounds. No murmur heard.     No friction rub. No gallop.   Pulmonary:      Effort: Pulmonary effort is normal. No respiratory distress.      Breath sounds: Normal breath sounds. No stridor. No wheezing or rales.   Chest:      Chest wall: No tenderness.   Abdominal:      General: Abdomen is flat. Bowel sounds are normal. There is no distension.      Palpations: Abdomen is soft. There is no mass.      Tenderness: There is abdominal tenderness (RUQ TTP-improved). There is no guarding or rebound.      Comments: ZINA percutaneous drain in place RUQ, LLL colostomy bag present with stool.     ZINA drain out   Musculoskeletal:         General: No tenderness or deformity. Normal range of motion.      Cervical back: Normal range of  motion and neck supple.   Skin:     General: Skin is warm and dry.      Capillary Refill: Capillary refill takes 2 to 3 seconds.      Coloration: Skin is not pale.      Findings: No erythema or rash.   Neurological:      General: No focal deficit present.      Mental Status: She is alert and oriented to person, place, and time.      Cranial Nerves: No cranial nerve deficit.      Motor: No abnormal muscle tone.      Coordination: Coordination normal.      Comments: Oriented to person only    Psychiatric:         Mood and Affect: Mood normal.         Behavior: Behavior normal.         Thought Content: Thought content normal.         Cognition and Memory: Cognition is impaired. Memory is impaired. She exhibits impaired recent memory and impaired remote memory.         Judgment: Judgment normal.           Significant Labs: All pertinent labs within the past 24 hours have been reviewed.    Significant Imaging: I have reviewed all pertinent imaging results/findings within the past 24 hours.    Assessment and Plan     * Liver abscess  NPO x medications  Consult IR for drainage  Blood cultures ordered, pending  Lactic acid normal, WBC 16.75  Zosyn and vancomycin ordered  Hydrating with IV fluids  PRN acetaminophen for fever    12/17/24: IR consulted and pt underwent CT guided drain placement per IR-20 ml of green pus sent to gram stain and culture.IR recommended monitoring drain output, Gentle flushing with 5mL NS every shift, and Drain can be removed after when less than 10mL output over 24 hours and patient is clinically improved.     12/18/24: Afebrile, WBC 16>19>11. Blood cultures show NGTD. Repeat urine culture showed no growth (pt was on bactrim as OP). Liver abscess Aerobic culture showed no growth. Anaerobic culture pending.  ID consulted and recommended IV meropenem x 3 weeks Discussed the possibility of discharge with drain and IV abx with daughter. She does not feel they can manage the IV abx. Pt has dementia and  "is unable to manage IV abx. PENNY consulted for SNF.    12/19/24: Pt pulled at drain and there was concern for dislodgement. Repeat CT abd showed decrease in size of suspected abscess in the inferior aspect of the right lobe of the liver, drainage catheter in good position.     12/20/24: On 12/20/24, Liver abscess aerobic culture now growing STREPTOCOCCUS CONSTELLATUS. Urine culture on admit (12/17) still no growth. Urine culture 12/11 grew ESBL Intravenous antibiotics:  Dr Lopez recommended to change IV abx to IV Rocephin 2 gram daily for 3 weeks Estimated end date of IV antibiotics: 01/10/2025. Weekly Labs:CBC, CMP, ESR, CRP. Will need repeat CT abd prior to discontinuing drain. CM notified.     12/21/24: Pt still having pain to RUQ. Drain only put out 5 cc on 12/19 and 12cc on 12/20. Discussed with Dr. Gabriel with radiology on 12/20/21 who recommended continue flushing drain tube through the weekend and reassess with repeat CT abd on Monday. May need P2P for SNF placement     12/22/24: improvement in TTP to RUQ. Drain put out 10ml/24 hours. CT abd scheduled for am. Continue IV Rocephin x 3 weeks. SNF placement pending.     12/23- almost resolved, ZINA drain removed  Cont IV reocephin    Much better, ZINA drain out- cont IV rocephin till 1/10/2025 for 3 weeks    Improving- cont IV abx    Sepsis  This patient does have evidence of infective focus  My overall impression is sepsis.  Source: Urinary Tract and Liver abscess  Antibiotics given-   Antibiotics (72h ago, onward)      Start     Stop Route Frequency Ordered    12/20/24 1430  cefTRIAXone injection 2 g         -- IV Every 24 hours (non-standard times) 12/20/24 1317          Latest lactate reviewed-  No results for input(s): "LACTATE", "POCLAC" in the last 72 hours.    Organ dysfunction indicated by  none    Fluid challenge Not needed - patient is not hypotensive      Post- resuscitation assessment No - Post resuscitation assessment not needed       Will Not start " Pressors- Levophed for MAP of 65  Source control achieved by: IV Meropenem changed to IV Rocephin x 3 weeks     Sepsis resolved    Anemia   Most recent hemoglobin and hematocrit are listed below.  Recent Labs     12/23/24  0500   HGB 10.7*   HCT 33.4*       Plan  - Monitor serial CBC: Daily  - Transfuse PRBC if patient becomes hemodynamically unstable, symptomatic or H/H drops below 7/21.    Stable H/H    Mass of right breast  Seen on CT renal stone study   Will need OP f/u and mammography       MARTIN (generalized anxiety disorder)  Stable, cont home Buspar      Dementia  Continue home medications  Fall precautions, use bed alarm  Requires frequent reorientation  Supportive care    UTI due to extended-spectrum beta lactamase (ESBL) producing Escherichia coli  Diagnosed with UTI per urgent care visit 12/11 -- has been on Bactrim  Urine culture 12/11/24 grew ESBL E coli   Repeat Urine culture 12/17/24 showed no growth   Abx changed to IV Meropenem  ID consutled and recommended IV Meropenem x 3 weeks   On 12/20/24: ID recommended IV Rocephin x 3 weeks     Cont present care    Cont IV Rocephin till 1/10/2025    Kidney stone  Had cystoscopy, stent placed per Dr. Matute on 11/5    Per Dr. Matute-stent appears to be in good position, no hydronephrosis, no further recommendations in regards to the treatment of the stone.  Will arrange for outpatient follow up. '    R renal stone chronic and stable      Hypothyroidism  TSH/Free T4 WNL  Continue armour thyroid per home med list        VTE Risk Mitigation (From admission, onward)           Ordered     Reason for No Pharmacological VTE Prophylaxis  Once        Comments: Procedure required   Question:  Reasons:  Answer:  Physician Provided (leave comment)    12/17/24 0110     IP VTE HIGH RISK PATIENT  Once         12/17/24 0110     Place sequential compression device  Until discontinued         12/17/24 0110                    Discharge Planning   MICHAEL: 12/20/2024     Code  Status: Full Code   Medical Readiness for Discharge Date: 12/19/2024  Discharge Plan A: Skilled Nursing Facility   Discharge Delays: None known at this time    Please place Justification for DME    Vanessa Shipley MD  Department of Hospital Medicine   '[place] - Telemetry ([facility])

## 2024-12-25 NOTE — PLAN OF CARE
Problem: Adult Inpatient Plan of Care  Goal: Plan of Care Review  Outcome: Progressing  Goal: Patient-Specific Goal (Individualized)  Outcome: Progressing  Goal: Absence of Hospital-Acquired Illness or Injury  Outcome: Progressing  Goal: Optimal Comfort and Wellbeing  Outcome: Progressing  Goal: Readiness for Transition of Care  Outcome: Progressing     Problem: Infection  Goal: Absence of Infection Signs and Symptoms  Outcome: Progressing     Problem: Wound  Goal: Optimal Coping  Outcome: Progressing  Goal: Optimal Functional Ability  Outcome: Progressing  Goal: Absence of Infection Signs and Symptoms  Outcome: Progressing  Goal: Improved Oral Intake  Outcome: Progressing  Goal: Optimal Pain Control and Function  Outcome: Progressing  Goal: Skin Health and Integrity  Outcome: Progressing  Goal: Optimal Wound Healing  Outcome: Progressing     Problem: Skin Injury Risk Increased  Goal: Skin Health and Integrity  Outcome: Progressing     Problem: Sepsis/Septic Shock  Goal: Optimal Coping  Outcome: Progressing  Goal: Absence of Bleeding  Outcome: Progressing  Goal: Blood Glucose Level Within Targeted Range  Outcome: Progressing  Goal: Absence of Infection Signs and Symptoms  Outcome: Progressing  Goal: Optimal Nutrition Intake  Outcome: Progressing     Problem: Fall Injury Risk  Goal: Absence of Fall and Fall-Related Injury  Outcome: Progressing

## 2024-12-25 NOTE — PROGRESS NOTES
[facility]  Progress Note    Patient Name: Irlanda Lopez  MRN: [id number]  Patient Class: IP- Inpatient   Admission Date: 12/16/2024  Length of Stay: 7 days  Attending Physician: Vanessa Shipley MD  Primary Care Provider: Myla Arias MD        Subjective     Principal Problem:Liver abscess        HPI:   Patient is 84-year-old female with past medical history significant for  hypertension, hypothyroidism,, Alzheimer's dementia, anxiety, hyperlipidemia, colostomy, kidney stones, stroke, osteopenia, chronic urinary tract infections, right breast mass, and anemia who presented to ED as directed by Dr. Matute due to abnormal CT scan showing liver abscess.  She recently had cystoscopy with stent placement on 11/05/2024 and then was diagnosed with urinary tract infection at urgent care on 12/11, has been on Bactrim for E coli urinary tract infection.  Patient was scheduled for an ultrasound to be done which was ordered by Dr. Matute on 12/16, however  patient was unable to tolerate the ultrasound due to increased pain.  He then ordered CT scan and when results showed liver abscess he directed her to ED.  Due to her dementia, most information is obtained from her family at bedside. She does report chills, and intermittent abdominal pain. She denies dysuria, nausea, vomiting, chest pain, shortness of breath, cough, diarrhea.  on arrival to ED, temp 98.4°, heart rate 66, respirations 16, blood pressure 141/66, 97% SpO2 on room air.  Lab workup shows WBC 16.75, hemoglobin 9.8, hematocrit 29.1, sodium 126, CO2 18, BUN 8, creatinine 0.8, albumin 2.5, normal LFTs, lipase 14, lipase 0.8.  Urinalysis has been ordered, is pending.  Hospital Medicine was consulted for admission due to liver abscess for IV antibiotics and to consult IR for possible drainage of abscess in AM.    Overview/Hospital Course:  The patient is a 83 yo female with recent kidney stones s/p cystoscopy with stent  placement on 11/05/2024 and recent UTI on 12/11-on Bactrim for E coli urinary tract infection who was admitted for Sepsis due to UTI and Liver abscess on IV  Zosyn and Vanco. Temp 101F on admit. Urine culture 12/11/24 grew ESBL E coli. IV Abx changed to IV Meropenem. IR consulted and pt underwent CT guided drain placement per IR-20 ml of green pus sent to gram stain and culture.IR recommended monitoring drain output, Gentle flushing with 5mL NS every shift, and Drain can be removed after when less than 10mL output over 24 hours and patient is clinically improved.   ID consulted and recommended IV Meropenem x 3 weeks. Right sided ZINA drain had 100ml output overnight.   Afebrile, WBC 16>19>11>5.9. Blood cultures show NGTD. Repeat urine culture showed no growth (pt was on bactrim as OP). Liver abscess Aerobic culture showed no growth. Anaerobic culture pending.   Discussed the possibility of discharge with drain and IV abx with daughter. She does not feel they can manage the IV abx. Pt has dementia and is unable to manage IV abx. CM consulted for SNF.  Pt pulled at drain and there was concern for dislodgement. Repeat CT abd showed decrease in size of suspected abscess in the inferior aspect of the right lobe of the liver, drainage catheter in good position.   On 12/20/24, Liver abscess aerobic culture now growing STREPTOCOCCUS CONSTELLATUS. Urine culture on admit (12/17) still no growth. Urine culture 12/11 grew ESBL Intravenous antibiotics:  Dr Lopez recommended to change IV abx to IV Rocephin 2 gram daily for 3 weeks Estimated end date of IV antibiotics: 01/10/2025. Weekly Labs:CBC, CMP, ESR, CRP. Will need repeat CT abd prior to discontinuing drain  Pt still having pain to RUQ. Drain only put out 5 cc/24 hours on 12/19, 12cc on 12/20, and 10 cc/24 hours on 12/21. Dr. Gabriel with radiology recommended continue flushing drain tube through the weekend and reassess with repeat CT abd on Monday. Repeat CT abd ordered.    Awaiting SNF insurance approval -may need P2P    12/23- pt seen and examined, chart, imaging reviewed. Appreciate IR/ Dr. Gabriel. Pt doing better, lying in bed comfortably,. VSS afeb, no drainage from the Liver ZINA drain- hence IR pulled it out this adm, after repeat CT abd showed that the Liver abscess had almost resolved. Pt participated in the PT/Ot and needs mod assist. SW arranging SNF- await insurance auth. Cont Rocephin. Labs stable. Normal WBC. All Cx remain NGTD.     12/24- looks and feels better, sitting up in bed, eating and talking, ambulating on her own well as well. Talking easily but gets pleasantly confused at times. Son at her side. SNF could not be arranged at Admissions coordinator at the facility not present, hence likely after [facility]. Cont IV rocephin till 1/10/25. Started on triple Vit plus Inj b12 IM.     Interval History: looks and feels better, sitting up in bed, eating and talking, ambulating on her own well as well. Talking easily but gets pleasantly confused at times. Son at her side. SNF could not be arranged at Admissions coordinator at the facility not present, hence likely after [facility]. Cont IV rocephin till 1/10/25. Started on triple Vit plus Inj b12 IM.     Review of Systems   Unable to perform ROS: Dementia   Constitutional:  Positive for activity change, appetite change and fatigue.     Objective:     Vital Signs (Most Recent):  Temp: 97.8 °F (36.6 °C) (12/24/24 1642)  Pulse: 69 (12/24/24 1642)  Resp: 18 (12/24/24 1642)  BP: (!) 161/79 (12/24/24 1642)  SpO2: 99 % (12/24/24 1642) Vital Signs (24h Range):  Temp:  [97.3 °F (36.3 °C)-98.7 °F (37.1 °C)] 97.8 °F (36.6 °C)  Pulse:  [59-80] 69  Resp:  [16-18] 18  SpO2:  [92 %-99 %] 99 %  BP: (141-161)/(65-79) 161/79     Weight: 72.1 kg (158 lb 15.2 oz)  Body mass index is 29.07 kg/m².  No intake or output data in the 24 hours ending 12/24/24 1807      Physical Exam  Vitals and nursing note reviewed.   Constitutional:       General:  She is not in acute distress.     Appearance: She is well-developed. She is not diaphoretic.   HENT:      Head: Normocephalic and atraumatic.      Nose: Nose normal.   Eyes:      General: No scleral icterus.     Conjunctiva/sclera: Conjunctivae normal.   Neck:      Trachea: No tracheal deviation.   Cardiovascular:      Rate and Rhythm: Normal rate and regular rhythm.      Heart sounds: Normal heart sounds. No murmur heard.     No friction rub. No gallop.   Pulmonary:      Effort: Pulmonary effort is normal. No respiratory distress.      Breath sounds: Normal breath sounds. No stridor. No wheezing or rales.   Chest:      Chest wall: No tenderness.   Abdominal:      General: Bowel sounds are normal. There is no distension.      Palpations: Abdomen is soft. There is no mass.      Tenderness: There is abdominal tenderness (RUQ TTP-improved). There is no guarding or rebound.      Comments: ZINA percutaneous drain in place RUQ, LLL colostomy bag present with stool.     ZINA drain out   Musculoskeletal:         General: No tenderness or deformity. Normal range of motion.      Cervical back: Normal range of motion and neck supple.   Skin:     General: Skin is warm and dry.      Capillary Refill: Capillary refill takes 2 to 3 seconds.      Coloration: Skin is not pale.      Findings: No erythema or rash.   Neurological:      General: No focal deficit present.      Mental Status: She is alert and oriented to person, place, and time.      Cranial Nerves: No cranial nerve deficit.      Motor: No abnormal muscle tone.      Coordination: Coordination normal.      Comments: Oriented to person only    Psychiatric:         Mood and Affect: Mood normal.         Behavior: Behavior normal.         Cognition and Memory: Cognition is impaired. Memory is impaired. She exhibits impaired recent memory and impaired remote memory.           Significant Labs: All pertinent labs within the past 24 hours have been reviewed.  CBC:   Recent Labs   Lab  12/23/24  0500   WBC 7.48   HGB 10.7*   HCT 33.4*        CMP:   Recent Labs   Lab 12/23/24  0500   *   K 3.6      CO2 23   GLU 96   BUN 19   CREATININE 0.8   CALCIUM 8.7   PROT 6.1   ALBUMIN 2.6*   BILITOT 0.2   ALKPHOS 81   AST 29   ALT 23   ANIONGAP 9       Significant Imaging: I have reviewed all pertinent imaging results/findings within the past 24 hours.    Assessment and Plan     * Liver abscess  NPO x medications  Consult IR for drainage  Blood cultures ordered, pending  Lactic acid normal, WBC 16.75  Zosyn and vancomycin ordered  Hydrating with IV fluids  PRN acetaminophen for fever    12/17/24: IR consulted and pt underwent CT guided drain placement per IR-20 ml of green pus sent to gram stain and culture.IR recommended monitoring drain output, Gentle flushing with 5mL NS every shift, and Drain can be removed after when less than 10mL output over 24 hours and patient is clinically improved.     12/18/24: Afebrile, WBC 16>19>11. Blood cultures show NGTD. Repeat urine culture showed no growth (pt was on bactrim as OP). Liver abscess Aerobic culture showed no growth. Anaerobic culture pending.  ID consulted and recommended IV meropenem x 3 weeks Discussed the possibility of discharge with drain and IV abx with daughter. She does not feel they can manage the IV abx. Pt has dementia and is unable to manage IV abx. CM consulted for SNF.    12/19/24: Pt pulled at drain and there was concern for dislodgement. Repeat CT abd showed decrease in size of suspected abscess in the inferior aspect of the right lobe of the liver, drainage catheter in good position.     12/20/24: On 12/20/24, Liver abscess aerobic culture now growing STREPTOCOCCUS CONSTELLATUS. Urine culture on admit (12/17) still no growth. Urine culture 12/11 grew ESBL Intravenous antibiotics:  Dr Lopez recommended to change IV abx to IV Rocephin 2 gram daily for 3 weeks Estimated end date of IV antibiotics: 01/10/2025. Weekly Labs:CBC,  "CMP, ESR, CRP. Will need repeat CT abd prior to discontinuing drain. CM notified.     12/21/24: Pt still having pain to RUQ. Drain only put out 5 cc on 12/19 and 12cc on 12/20. Discussed with Dr. Gabriel with radiology on 12/20/21 who recommended continue flushing drain tube through the weekend and reassess with repeat CT abd on Monday. May need P2P for SNF placement     12/22/24: improvement in TTP to RUQ. Drain put out 10ml/24 hours. CT abd scheduled for am. Continue IV Rocephin x 3 weeks. SNF placement pending.     12/23- almost resolved, ZINA drain removed  Cont IV reocephin    Much better, ZINA drain out- cont IV rocephin till 1/10/2025 for 3 weeks    Sepsis  This patient does have evidence of infective focus  My overall impression is sepsis.  Source: Urinary Tract and Liver abscess  Antibiotics given-   Antibiotics (72h ago, onward)      Start     Stop Route Frequency Ordered    12/20/24 1430  cefTRIAXone injection 2 g         -- IV Every 24 hours (non-standard times) 12/20/24 1317          Latest lactate reviewed-  No results for input(s): "LACTATE", "POCLAC" in the last 72 hours.    Organ dysfunction indicated by  none    Fluid challenge Not needed - patient is not hypotensive      Post- resuscitation assessment No - Post resuscitation assessment not needed       Will Not start Pressors- Levophed for MAP of 65  Source control achieved by: IV Meropenem changed to IV Rocephin x 3 weeks     Sepsis resolved    Anemia   Most recent hemoglobin and hematocrit are listed below.  Recent Labs     12/23/24  0500   HGB 10.7*   HCT 33.4*       Plan  - Monitor serial CBC: Daily  - Transfuse PRBC if patient becomes hemodynamically unstable, symptomatic or H/H drops below 7/21.    Stable H/H    Mass of right breast  Seen on CT renal stone study   Will need OP f/u and mammography       MARTIN (generalized anxiety disorder)  Stable, cont home Buspar      Dementia  Continue home medications  Fall precautions, use bed alarm  Requires " frequent reorientation  Supportive care    UTI due to extended-spectrum beta lactamase (ESBL) producing Escherichia coli  Diagnosed with UTI per urgent care visit 12/11 -- has been on Bactrim  Urine culture 12/11/24 grew ESBL E coli   Repeat Urine culture 12/17/24 showed no growth   Abx changed to IV Meropenem  ID consutled and recommended IV Meropenem x 3 weeks   On 12/20/24: ID recommended IV Rocephin x 3 weeks     Cont present care    Cont IV Rocephin till 1/10/2025    Kidney stone  Had cystoscopy, stent placed per Dr. Matute on 11/5    Per Dr. Matute-stent appears to be in good position, no hydronephrosis, no further recommendations in regards to the treatment of the stone.  Will arrange for outpatient follow up. '    R renal stone chronic and stable      Hypothyroidism  TSH/Free T4 WNL  Continue armour thyroid per home med list        VTE Risk Mitigation (From admission, onward)           Ordered     Reason for No Pharmacological VTE Prophylaxis  Once        Comments: Procedure required   Question:  Reasons:  Answer:  Physician Provided (leave comment)    12/17/24 0110     IP VTE HIGH RISK PATIENT  Once         12/17/24 0110     Place sequential compression device  Until discontinued         12/17/24 0110                    Discharge Planning   MICHAEL: 12/20/2024     Code Status: Full Code   Medical Readiness for Discharge Date: 12/19/2024  Discharge Plan A: Skilled Nursing Facility   Discharge Delays: None known at this time      Please place Justification for DME    Vanessa Shipley MD  Department of Hospital Medicine   [facility] ([facility]

## 2024-12-25 NOTE — SUBJECTIVE & OBJECTIVE
Interval History: looks and feels better, sitting up in bed, eating and talking, ambulating on her own well as well. Talking easily but gets pleasantly confused at times. Son at her side. SNF could not be arranged at Admissions coordinator at the facility not present, hence likely after [facility]. Cont IV rocephin till 1/10/25. Started on triple Vit plus Inj b12 IM.     Review of Systems   Unable to perform ROS: Dementia   Constitutional:  Positive for activity change, appetite change and fatigue.     Objective:     Vital Signs (Most Recent):  Temp: 97.8 °F (36.6 °C) (12/24/24 1642)  Pulse: 69 (12/24/24 1642)  Resp: 18 (12/24/24 1642)  BP: (!) 161/79 (12/24/24 1642)  SpO2: 99 % (12/24/24 1642) Vital Signs (24h Range):  Temp:  [97.3 °F (36.3 °C)-98.7 °F (37.1 °C)] 97.8 °F (36.6 °C)  Pulse:  [59-80] 69  Resp:  [16-18] 18  SpO2:  [92 %-99 %] 99 %  BP: (141-161)/(65-79) 161/79     Weight: 72.1 kg (158 lb 15.2 oz)  Body mass index is 29.07 kg/m².  No intake or output data in the 24 hours ending 12/24/24 1808      Physical Exam  Vitals and nursing note reviewed.   Constitutional:       General: She is not in acute distress.     Appearance: She is well-developed. She is not diaphoretic.   HENT:      Head: Normocephalic and atraumatic.      Nose: Nose normal.   Eyes:      General: No scleral icterus.     Conjunctiva/sclera: Conjunctivae normal.   Neck:      Trachea: No tracheal deviation.   Cardiovascular:      Rate and Rhythm: Normal rate and regular rhythm.      Heart sounds: Normal heart sounds. No murmur heard.     No friction rub. No gallop.   Pulmonary:      Effort: Pulmonary effort is normal. No respiratory distress.      Breath sounds: Normal breath sounds. No stridor. No wheezing or rales.   Chest:      Chest wall: No tenderness.   Abdominal:      General: Bowel sounds are normal. There is no distension.      Palpations: Abdomen is soft. There is no mass.      Tenderness: There is abdominal tenderness (RUQ  TTP-improved). There is no guarding or rebound.      Comments: ZINA percutaneous drain in place RUQ, LLL colostomy bag present with stool.     ZINA drain out   Musculoskeletal:         General: No tenderness or deformity. Normal range of motion.      Cervical back: Normal range of motion and neck supple.   Skin:     General: Skin is warm and dry.      Capillary Refill: Capillary refill takes 2 to 3 seconds.      Coloration: Skin is not pale.      Findings: No erythema or rash.   Neurological:      General: No focal deficit present.      Mental Status: She is alert and oriented to person, place, and time.      Cranial Nerves: No cranial nerve deficit.      Motor: No abnormal muscle tone.      Coordination: Coordination normal.      Comments: Oriented to person only    Psychiatric:         Mood and Affect: Mood normal.         Behavior: Behavior normal.         Cognition and Memory: Cognition is impaired. Memory is impaired. She exhibits impaired recent memory and impaired remote memory.           Significant Labs: All pertinent labs within the past 24 hours have been reviewed.  CBC:   Recent Labs   Lab 12/23/24  0500   WBC 7.48   HGB 10.7*   HCT 33.4*        CMP:   Recent Labs   Lab 12/23/24  0500   *   K 3.6      CO2 23   GLU 96   BUN 19   CREATININE 0.8   CALCIUM 8.7   PROT 6.1   ALBUMIN 2.6*   BILITOT 0.2   ALKPHOS 81   AST 29   ALT 23   ANIONGAP 9       Significant Imaging: I have reviewed all pertinent imaging results/findings within the past 24 hours.

## 2024-12-25 NOTE — ASSESSMENT & PLAN NOTE
NPO x medications  Consult IR for drainage  Blood cultures ordered, pending  Lactic acid normal, WBC 16.75  Zosyn and vancomycin ordered  Hydrating with IV fluids  PRN acetaminophen for fever    12/17/24: IR consulted and pt underwent CT guided drain placement per IR-20 ml of green pus sent to gram stain and culture.IR recommended monitoring drain output, Gentle flushing with 5mL NS every shift, and Drain can be removed after when less than 10mL output over 24 hours and patient is clinically improved.     12/18/24: Afebrile, WBC 16>19>11. Blood cultures show NGTD. Repeat urine culture showed no growth (pt was on bactrim as OP). Liver abscess Aerobic culture showed no growth. Anaerobic culture pending.  ID consulted and recommended IV meropenem x 3 weeks Discussed the possibility of discharge with drain and IV abx with daughter. She does not feel they can manage the IV abx. Pt has dementia and is unable to manage IV abx. CM consulted for SNF.    12/19/24: Pt pulled at drain and there was concern for dislodgement. Repeat CT abd showed decrease in size of suspected abscess in the inferior aspect of the right lobe of the liver, drainage catheter in good position.     12/20/24: On 12/20/24, Liver abscess aerobic culture now growing STREPTOCOCCUS CONSTELLATUS. Urine culture on admit (12/17) still no growth. Urine culture 12/11 grew ESBL Intravenous antibiotics:  Dr Lopez recommended to change IV abx to IV Rocephin 2 gram daily for 3 weeks Estimated end date of IV antibiotics: 01/10/2025. Weekly Labs:CBC, CMP, ESR, CRP. Will need repeat CT abd prior to discontinuing drain. CM notified.     12/21/24: Pt still having pain to RUQ. Drain only put out 5 cc on 12/19 and 12cc on 12/20. Discussed with Dr. Gabriel with radiology on 12/20/21 who recommended continue flushing drain tube through the weekend and reassess with repeat CT abd on Monday. May need P2P for SNF placement     12/22/24: improvement in TTP to RUQ. Drain put out  10ml/24 hours. CT abd scheduled for am. Continue IV Rocephin x 3 weeks. SNF placement pending.     12/23- almost resolved, ZINA drain removed  Cont IV reocephin    Much better, ZINA drain out- cont IV rocephin till 1/10/2025 for 3 weeks

## 2024-12-26 ENCOUNTER — PATIENT MESSAGE (OUTPATIENT)
Dept: UROLOGY | Facility: CLINIC | Age: 84
End: 2024-12-26
Payer: MEDICARE

## 2024-12-26 VITALS
HEIGHT: 62 IN | TEMPERATURE: 98 F | SYSTOLIC BLOOD PRESSURE: 137 MMHG | RESPIRATION RATE: 16 BRPM | WEIGHT: 158.94 LBS | DIASTOLIC BLOOD PRESSURE: 60 MMHG | HEART RATE: 63 BPM | BODY MASS INDEX: 29.25 KG/M2 | OXYGEN SATURATION: 98 %

## 2024-12-26 PROBLEM — A41.9 SEPSIS: Status: RESOLVED | Noted: 2024-12-17 | Resolved: 2024-12-26

## 2024-12-26 PROBLEM — K75.0 LIVER ABSCESS: Status: RESOLVED | Noted: 2024-12-17 | Resolved: 2024-12-26

## 2024-12-26 PROBLEM — N39.0 UTI DUE TO EXTENDED-SPECTRUM BETA LACTAMASE (ESBL) PRODUCING ESCHERICHIA COLI: Status: RESOLVED | Noted: 2021-05-23 | Resolved: 2024-12-26

## 2024-12-26 PROBLEM — B96.29 UTI DUE TO EXTENDED-SPECTRUM BETA LACTAMASE (ESBL) PRODUCING ESCHERICHIA COLI: Status: RESOLVED | Noted: 2021-05-23 | Resolved: 2024-12-26

## 2024-12-26 PROBLEM — Z16.12 UTI DUE TO EXTENDED-SPECTRUM BETA LACTAMASE (ESBL) PRODUCING ESCHERICHIA COLI: Status: RESOLVED | Noted: 2021-05-23 | Resolved: 2024-12-26

## 2024-12-26 LAB — SARS-COV-2 RDRP RESP QL NAA+PROBE: NEGATIVE

## 2024-12-26 PROCEDURE — 63600175 PHARM REV CODE 636 W HCPCS: Performed by: NURSE PRACTITIONER

## 2024-12-26 PROCEDURE — 30200315 PPD INTRADERMAL TEST REV CODE 302: Performed by: EMERGENCY MEDICINE

## 2024-12-26 PROCEDURE — 87635 SARS-COV-2 COVID-19 AMP PRB: CPT | Performed by: EMERGENCY MEDICINE

## 2024-12-26 PROCEDURE — 25000003 PHARM REV CODE 250: Performed by: EMERGENCY MEDICINE

## 2024-12-26 PROCEDURE — 25000003 PHARM REV CODE 250: Performed by: INTERNAL MEDICINE

## 2024-12-26 PROCEDURE — 86580 TB INTRADERMAL TEST: CPT | Performed by: EMERGENCY MEDICINE

## 2024-12-26 RX ORDER — CEFTRIAXONE 2 G/1
2 INJECTION, POWDER, FOR SOLUTION INTRAMUSCULAR; INTRAVENOUS DAILY
Qty: 30 G | Refills: 0 | Status: SHIPPED | OUTPATIENT
Start: 2024-12-26 | End: 2025-01-10

## 2024-12-26 RX ORDER — ACETAMINOPHEN 500 MG
5000 TABLET ORAL DAILY
Start: 2024-12-27

## 2024-12-26 RX ADMIN — CHOLECALCIFEROL TAB 125 MCG (5000 UNIT) 5000 UNITS: 125 TAB at 10:12

## 2024-12-26 RX ADMIN — POLYETHYLENE GLYCOL 3350 17 G: 17 POWDER, FOR SOLUTION ORAL at 10:12

## 2024-12-26 RX ADMIN — BUSPIRONE HYDROCHLORIDE 10 MG: 10 TABLET ORAL at 10:12

## 2024-12-26 RX ADMIN — TUBERCULIN PURIFIED PROTEIN DERIVATIVE 5 UNITS: 5 INJECTION, SOLUTION INTRADERMAL at 10:12

## 2024-12-26 RX ADMIN — MEMANTINE 10 MG: 10 TABLET ORAL at 10:12

## 2024-12-26 RX ADMIN — THERA TABS 1 TABLET: TAB at 10:12

## 2024-12-26 RX ADMIN — Medication 1 TABLET: at 10:12

## 2024-12-26 RX ADMIN — HYPROMELLOSE 2910 2 DROP: 5 SOLUTION/ DROPS OPHTHALMIC at 10:12

## 2024-12-26 RX ADMIN — HYPROMELLOSE 2910 2 DROP: 5 SOLUTION/ DROPS OPHTHALMIC at 02:12

## 2024-12-26 RX ADMIN — CEFTRIAXONE 2 G: 2 INJECTION, POWDER, FOR SOLUTION INTRAMUSCULAR; INTRAVENOUS at 02:12

## 2024-12-26 NOTE — PLAN OF CARE
A245/A245 FREDOOsmin Lopez is a 84 y.o.female admitted on 12/16/2024 for Liver abscess   Code Status: Full Code MRN: [id number]   Review of patient's allergies indicates:   Allergen Reactions    Omnicef [cefdinir] Itching     Past Medical History:   Diagnosis Date    Clostridium difficile colitis     Dementia     Diverticulitis     s/p colostomy    High cholesterol     Hypertension     Hypothyroidism     Kidney stones       PRN meds    acetaminophen, 650 mg, Q4H PRN  dextrose 10%, 12.5 g, PRN  dextrose 10%, 25 g, PRN  glucagon (human recombinant), 1 mg, PRN  glucose, 16 g, PRN  glucose, 24 g, PRN  naloxone, 0.02 mg, PRN  ondansetron, 4 mg, Q6H PRN  oxyCODONE, 5 mg, Q6H PRN  sodium chloride 0.9%, 3 mL, Q8H PRN      Okay to discharge patient now per MD and .   Report called to Jayleen at [facility]. AVS Discharge instructions received and reviewed with pt and family at bedside.  Pt and family voiced understanding and all questions answered to satisfaction.  Stressed importance of making and keeping all follow up appointments.  Medications will be at receiving facility per MD and .   No tele monitor in place.   PICC line to remain in place per MD.   Pt transported via wheelchair to personal transportation at main entrance for her daughter to transport her to [facility].      Orientation: disoriented to, situation  [place] Coma Scale Score: 14     Lead Monitored: other (see comments) (no cardiac monitoring ordered) Rhythm: normal sinus rhythm    Cardiac/Telemetry Box Number: [id number]  VTE Core Measure: (SCDs) Sequential compression device initiated/maintained Last Bowel Movement: 12/26/24  Diet Cardiac  Diet Adult Regular  Voiding Characteristics: urgency  [place] Score: 18  Fall Risk Score: 14  Accucheck []   Freq?      Lines/Drains/Airways       Peripherally Inserted Central Catheter Line  Duration             PICC Double Lumen 12/20/24 0820 left basilic 6 days              Drain  Duration                   Colostomy 05/23/21 LLQ 1313 days                       Problem: Adult Inpatient Plan of Care  Goal: Plan of Care Review  Outcome: Progressing  Goal: Patient-Specific Goal (Individualized)  Outcome: Progressing  Goal: Absence of Hospital-Acquired Illness or Injury  Outcome: Progressing  Goal: Optimal Comfort and Wellbeing  Outcome: Progressing  Goal: Readiness for Transition of Care  Outcome: Progressing     Problem: Infection  Goal: Absence of Infection Signs and Symptoms  Outcome: Progressing     Problem: Wound  Goal: Optimal Coping  Outcome: Progressing  Goal: Optimal Functional Ability  Outcome: Progressing  Goal: Absence of Infection Signs and Symptoms  Outcome: Progressing  Goal: Improved Oral Intake  Outcome: Progressing  Goal: Optimal Pain Control and Function  Outcome: Progressing  Goal: Skin Health and Integrity  Outcome: Progressing  Goal: Optimal Wound Healing  Outcome: Progressing     Problem: Skin Injury Risk Increased  Goal: Skin Health and Integrity  Outcome: Progressing     Problem: Sepsis/Septic Shock  Goal: Optimal Coping  Outcome: Progressing  Goal: Absence of Bleeding  Outcome: Progressing  Goal: Blood Glucose Level Within Targeted Range  Outcome: Progressing  Goal: Absence of Infection Signs and Symptoms  Outcome: Progressing  Goal: Optimal Nutrition Intake  Outcome: Progressing     Problem: Fall Injury Risk  Goal: Absence of Fall and Fall-Related Injury  Outcome: Progressing

## 2024-12-26 NOTE — PLAN OF CARE
POC reviewed with pt. Pt verbalizes understanding of POC. No questions at this time.  Disoriented x3.. NADN.  Not on cardiac monitor.  Pt remains free of falls.  No complaints at this time.  Safety measures in place. Will continue to monitor.  Informed pt to call for assistance before getting up. Pt verbalizes understanding.  Hourly rounding and chart check complete.   Problem: Adult Inpatient Plan of Care  Goal: Plan of Care Review  Outcome: Progressing  Goal: Patient-Specific Goal (Individualized)  Outcome: Progressing  Goal: Absence of Hospital-Acquired Illness or Injury  Outcome: Progressing  Goal: Optimal Comfort and Wellbeing  Outcome: Progressing  Goal: Readiness for Transition of Care  Outcome: Progressing     Problem: Infection  Goal: Absence of Infection Signs and Symptoms  Outcome: Progressing     Problem: Wound  Goal: Optimal Coping  Outcome: Progressing  Goal: Optimal Functional Ability  Outcome: Progressing  Goal: Absence of Infection Signs and Symptoms  Outcome: Progressing  Goal: Improved Oral Intake  Outcome: Progressing  Goal: Optimal Pain Control and Function  Outcome: Progressing  Goal: Skin Health and Integrity  Outcome: Progressing  Goal: Optimal Wound Healing  Outcome: Progressing     Problem: Skin Injury Risk Increased  Goal: Skin Health and Integrity  Outcome: Progressing     Problem: Sepsis/Septic Shock  Goal: Optimal Coping  Outcome: Progressing  Goal: Absence of Bleeding  Outcome: Progressing  Goal: Blood Glucose Level Within Targeted Range  Outcome: Progressing  Goal: Absence of Infection Signs and Symptoms  Outcome: Progressing  Goal: Optimal Nutrition Intake  Outcome: Progressing     Problem: Fall Injury Risk  Goal: Absence of Fall and Fall-Related Injury  Outcome: Progressing

## 2024-12-26 NOTE — PLAN OF CARE
[facility] (Hospital)  Discharge Final Note    Primary Care Provider: Myla Arias MD    Expected Discharge Date: 12/26/2024    Final Discharge Note (most recent)       Final Note - 12/26/24 1611          Final Note    Assessment Type Final Discharge Note     Anticipated Discharge Disposition Skilled Nursing Facility     Hospital Resources/Appts/Education Provided Post-Acute resouces added to AVS        Post-Acute Status    Post-Acute Placement Status Set-up Complete/Auth obtained     Discharge Delays None known at this time                   Pt cleared to discharge to [facility] today. Pt and daughter, Myla, completed admissions paperwork at bedside and SW sent completed paperwork to SNF. Discharge clinicals provided via Epic. Nurse provided with number for report and daughter is ok transporting pt to SNF, since facility unable to do so.     Important Message from Medicare              Follow-up providers       Myla Arias MD   Specialty: Family Medicine   Relationship: PCP - General    [address] [facility]  [address]  [facility] [address]   Phone: [phone]       Next Steps: Schedule an appointment as soon as possible for a visit in 2 week(s)    Instructions: Hospital follow up    Bull Lopez MD, [facility]   Specialty: Infectious Diseases, Hospitalist    [address] [facility] [address]   Phone: [phone]       Next Steps: Schedule an appointment as soon as possible for a visit in 2 week(s)    Instructions: Hospital follow up, As needed              After-discharge care                Destination       *[facility]   Service: Skilled Nursing    [address] [address]   Phone: [phone]

## 2024-12-26 NOTE — PT/OT/SLP PROGRESS
Physical Therapy      Patient Name:  Irlanda Lopez   MRN:  [id number]    Chart review performed and attempted but patient not seen today secondary to Nursing care. ([id number])

## 2024-12-26 NOTE — PLAN OF CARE
SW spoke with pt's daughter, Myla, regarding anticipated DC. Myla states no one's contacted family for admissions paperwork. SW discussed possible delays with communication d/t holidays but will f/u with [employer] staff.  FARZANEH spoke with [facility]  and informed that admissions rep, Gokul, is not in today. FARZANEH sent message to facility rep, [place], via [employer] in-basket. [place] requesting COVID test, PPD, MAR and orders. FARZANEH awaiting response if facility has contacted family regarding paperwork yet.   SW to follow up.   MD and bedside nurse notified.

## 2024-12-26 NOTE — NURSING
TB test administered to right anterior forearm and site circled with marker. Test to be read 12/28 per order.

## 2024-12-26 NOTE — PLAN OF CARE
FARZANEH spoke with Sanjana with [facility] regarding admissions process. Sanjana spoke with pt's daughter, Myla, and Sanjana will email admissions paperwork to SW for Myla to complete at bedside. Discharge clinicals provided to facility via [facility].  SW awaiting paperwork to be completed to return to SNF electronically.  SW to f/u with nursing staff once facility ready for report.  SW to follow

## 2024-12-27 ENCOUNTER — PATIENT MESSAGE (OUTPATIENT)
Dept: PRIMARY CARE CLINIC | Facility: CLINIC | Age: 84
End: 2024-12-27
Payer: MEDICARE

## 2024-12-27 NOTE — DISCHARGE SUMMARY
[facility]  Discharge Summary      Patient Name: Irlanda Lopez  MRN: [id number]  RENU: [id number]  Patient Class: IP- Inpatient  Admission Date: 12/16/2024  Hospital Length of Stay: 9 days  Discharge Date and Time: 12/26/2024  4:46 PM  Attending Physician: Marika att. providers found   Discharging Provider: Vanessa Shipley MD  Primary Care Provider: Myla Arias MD    Primary Care Team: Networked reference to record PCT     HPI:    Patient is 84-year-old female with past medical history significant for  hypertension, hypothyroidism,, Alzheimer's dementia, anxiety, hyperlipidemia, colostomy, kidney stones, stroke, osteopenia, chronic urinary tract infections, right breast mass, and anemia who presented to ED as directed by Dr. Matute due to abnormal CT scan showing liver abscess.  She recently had cystoscopy with stent placement on 11/05/2024 and then was diagnosed with urinary tract infection at urgent care on 12/11, has been on Bactrim for E coli urinary tract infection.  Patient was scheduled for an ultrasound to be done which was ordered by Dr. Matute on 12/16, however  patient was unable to tolerate the ultrasound due to increased pain.  He then ordered CT scan and when results showed liver abscess he directed her to ED.  Due to her dementia, most information is obtained from her family at bedside. She does report chills, and intermittent abdominal pain. She denies dysuria, nausea, vomiting, chest pain, shortness of breath, cough, diarrhea.  on arrival to ED, temp 98.4°, heart rate 66, respirations 16, blood pressure 141/66, 97% SpO2 on room air.  Lab workup shows WBC 16.75, hemoglobin 9.8, hematocrit 29.1, sodium 126, CO2 18, BUN 8, creatinine 0.8, albumin 2.5, normal LFTs, lipase 14, lipase 0.8.  Urinalysis has been ordered, is pending.  Hospital Medicine was consulted for admission due to liver abscess for IV antibiotics and to consult IR for possible drainage of  abscess in AM.    * No surgery found *      Hospital Course:   The patient is a 83 yo female with recent kidney stones s/p cystoscopy with stent placement on 11/05/2024 and recent UTI on 12/11-on Bactrim for E coli urinary tract infection who was admitted for Sepsis due to UTI and Liver abscess on IV  Zosyn and Vanco. Temp 101F on admit. Urine culture 12/11/24 grew ESBL E coli. IV Abx changed to IV Meropenem. IR consulted and pt underwent CT guided drain placement per IR-20 ml of green pus sent to gram stain and culture.IR recommended monitoring drain output, Gentle flushing with 5mL NS every shift, and Drain can be removed after when less than 10mL output over 24 hours and patient is clinically improved.   ID consulted and recommended IV Meropenem x 3 weeks. Right sided ZINA drain had 100ml output overnight.   Afebrile, WBC 16>19>11>5.9. Blood cultures show NGTD. Repeat urine culture showed no growth (pt was on bactrim as OP). Liver abscess Aerobic culture showed no growth. Anaerobic culture pending.   Discussed the possibility of discharge with drain and IV abx with daughter. She does not feel they can manage the IV abx. Pt has dementia and is unable to manage IV abx. CM consulted for SNF.  Pt pulled at drain and there was concern for dislodgement. Repeat CT abd showed decrease in size of suspected abscess in the inferior aspect of the right lobe of the liver, drainage catheter in good position.   On 12/20/24, Liver abscess aerobic culture now growing STREPTOCOCCUS CONSTELLATUS. Urine culture on admit (12/17) still no growth. Urine culture 12/11 grew ESBL Intravenous antibiotics:  Dr Lopez recommended to change IV abx to IV Rocephin 2 gram daily for 3 weeks Estimated end date of IV antibiotics: 01/10/2025. Weekly Labs:CBC, CMP, ESR, CRP. Will need repeat CT abd prior to discontinuing drain  Pt still having pain to RUQ. Drain only put out 5 cc/24 hours on 12/19, 12cc on 12/20, and 10 cc/24 hours on 12/21. Dr. Gabriel  with radiology recommended continue flushing drain tube through the weekend and reassess with repeat CT abd on Monday. Repeat CT abd ordered.   Awaiting SNF insurance approval -may need P2P    12/23- pt seen and examined, chart, imaging reviewed. Appreciate IR/ Dr. Gabriel. Pt doing better, lying in bed comfortably,. VSS afeb, no drainage from the Liver ZINA drain- hence IR pulled it out this adm, after repeat CT abd showed that the Liver abscess had almost resolved. Pt participated in the PT/Ot and needs mod assist. SW arranging SNF- await insurance auth. Cont Rocephin. Labs stable. Normal WBC. All Cx remain NGTD.     12/24- looks and feels better, sitting up in bed, eating and talking, ambulating on her own well as well. Talking easily but gets pleasantly confused at times. Son at her side. SNF could not be arranged at Admissions coordinator at the facility not present, hence likely after Lexa. Cont IV rocephin till 1/10/25. Started on triple Vit plus Inj b12 IM.     12/25- resting well in bed, no complaints, VSS afeb, exam unchanged. Eating drinking well, ambulating well. Await SNF placement for IV Abx till 1/10/2025 for the Liver Abscess.     12/26- looks and feels much better, sitting up in bed comfortably on RA, eating drinking well, ambulating well. Daughter at her side. She has been accepted at the SNF for continuing IV abx till 1/10/25 and they feel ready to go now. She was seen and examined and deemed stable for discharge to SNF today.       Goals of Care Treatment Preferences:  Code Status: Full Code         Consults:   Consults (From admission, onward)          Status Ordering Provider     Inpatient consult to Interventional Radiology  Once        Provider:  Brandon Gabriel MD    Completed JUSTINE ZALDIVAR     Inpatient consult to Interventional Radiology  Once        Provider:  Brandon Gabriel MD    Completed JODI BRAR     Inpatient consult to Social Work  Once        Provider:  (Not yet  assigned)    Completed JUSTINE ZALDIVAR     Inpatient consult to Urology  Once        Provider:  Anselmo Matute MD    Completed EDDYOSEAS ASCENCIO            Anemia   Most recent hemoglobin and hematocrit are listed below.  Recent Labs     12/23/24  0500   HGB 10.7*   HCT 33.4*       Plan  - Monitor serial CBC: Daily  - Transfuse PRBC if patient becomes hemodynamically unstable, symptomatic or H/H drops below 7/21.    Stable H/H    Mass of right breast  Seen on CT renal stone study   Will need OP f/u and mammography       MARTIN (generalized anxiety disorder)  Stable, cont home Buspar      Dementia  Continue home medications  Fall precautions, use bed alarm  Requires frequent reorientation  Supportive care    Kidney stone  Had cystoscopy, stent placed per Dr. Matute on 11/5    Per Dr. Matute-stent appears to be in good position, no hydronephrosis, no further recommendations in regards to the treatment of the stone.  Will arrange for outpatient follow up. '    R renal stone chronic and stable      Hypothyroidism  TSH/Free T4 WNL  Continue armour thyroid per home med list        Final Active Diagnoses:    Diagnosis Date Noted POA    Anemia [D64.9] 12/17/2024 Yes    Mass of right breast [N63.10] 03/20/2023 Yes    MARTIN (generalized anxiety disorder) [F41.1] 06/14/2022 Yes    Dementia [F03.90] 05/24/2021 Yes     Chronic    Kidney stone [N20.0] 04/13/2020 Yes    Hypothyroidism [E03.9] 10/04/2016 Yes      Problems Resolved During this Admission:    Diagnosis Date Noted Date Resolved POA    PRINCIPAL PROBLEM:  Liver abscess [K75.0] 12/17/2024 12/26/2024 Yes    Hyponatremia [E87.1] 12/17/2024 12/21/2024 Yes    Sepsis [A41.9] 12/17/2024 12/26/2024 Yes    UTI due to extended-spectrum beta lactamase (ESBL) producing Escherichia coli [N39.0, B96.29, Z16.12] 05/23/2021 12/26/2024 Yes       Discharged Condition: stable    Disposition: Skilled Nursing Facility    Follow Up:   Contact information for follow-up providers        Myla Arias MD. Schedule an appointment as soon as possible for a visit in 2 week(s).    Specialty: Family Medicine  Why: Hospital follow up  Contact information:  [address]  [address]  [place] [address] [phone]               Bull Lopez MD, FIDSA. Schedule an appointment as soon as possible for a visit in 2 week(s).    Specialties: Infectious Diseases, Hospitalist  Why: Hospital follow up, As needed  Contact information:  [address]  [facility] [address] [phone]                       Contact information for after-discharge care       Destination       [facility] .    Service: Skilled Nursing  Contact information:  [address] [address] [phone]                                 Patient Instructions:      Diet Adult Regular     Activity as tolerated       Significant Diagnostic Studies: Labs:     Recent Results (from the past 2 weeks)   CBC Auto Differential    Collection Time: 12/23/24  5:00 AM   Result Value Ref Range    WBC 7.48 3.90 - 12.70 K/uL    Hemoglobin 10.7 (L) 12.0 - 16.0 g/dL    Hematocrit 33.4 (L) 37.0 - 48.5 %    Platelets 337 150 - 450 K/uL   CBC Auto Differential    Collection Time: 12/21/24  5:08 AM   Result Value Ref Range    WBC 6.59 3.90 - 12.70 K/uL    Hemoglobin 9.7 (L) 12.0 - 16.0 g/dL    Hematocrit 31.1 (L) 37.0 - 48.5 %    Platelets 314 150 - 450 K/uL   CBC Auto Differential    Collection Time: 12/20/24  4:51 AM   Result Value Ref Range    WBC 7.80 3.90 - 12.70 K/uL    Hemoglobin 11.2 (L) 12.0 - 16.0 g/dL    Hematocrit 36.8 (L) 37.0 - 48.5 %    Platelets 404 150 - 450 K/uL           All labs within the past 24 hours have been reviewed  Microbiology: Wound Culture:   Aerobic Bacterial Culture STREPTOCOCCUS CONSTELLATUS  Many  Non-viable for susceptibility     Radiology:   Imaging Results    None          Pending Diagnostic Studies:       None           Medications:  Reconciled Home Medications:       Medication List        START taking these medications      cefTRIAXone 2 gram injection  Commonly known as: ROCEPHIN  Inject 2 g into the vein once daily. for 15 days     folic acid-vit B6-vit B12 2.5-25-2 mg 2.5-25-2 mg Tab  Commonly known as: FOLBIC or Equiv  Take 1 tablet by mouth once daily.     multivitamin Tab  Take 1 tablet by mouth once daily.            CHANGE how you take these medications      cholecalciferol (vitamin D3) 125 mcg (5,000 unit) Tab  Take 1 tablet (5,000 Units total) by mouth once daily.  What changed:   medication strength  how much to take  when to take this            CONTINUE taking these medications      acetaminophen 500 MG tablet  Commonly known as: TYLENOL  Take 1,000 mg by mouth every 6 (six) hours as needed for Pain or Temperature greater than (101F, fever, headache, arthritis).     atorvastatin 10 MG tablet  Commonly known as: LIPITOR  10mg tablet po twice weekly to be given on Monday and Thursday     buPROPion 150 MG TB24 tablet  Commonly known as: WELLBUTRIN XL  Take 1 tablet (150 mg total) by mouth every morning.     busPIRone 10 MG tablet  Commonly known as: BUSPAR  Take 1 tablet (10 mg total) by mouth with lunch. May also take 1 tablet (10 mg total) daily as needed (anxiety, panic attacks, agitation).     carboxymethylcellulose 1 % ophthalmic solution  Commonly known as: REFRESH LIQUIGEL  Apply 2 drops to eye 2 (two) times daily. May also apply 1 drop 2 (two) times daily as needed (for dry eyes). Bilateral eyes ( Refresh eye drops/ saline).     dicyclomine 20 mg tablet  Commonly known as: BENTYL  Take 1 tablet (20 mg total) by mouth before meals and at bedtime as needed (abdominal cramping, belly pain, stomach pain around colostomy).     famotidine 40 MG tablet  Commonly known as: PEPCID  Take 1 tablet (40 mg total) by mouth 2 (two) times daily as needed for Heartburn (reflux, indigestion, flatulence, upset stomach).     furosemide 20 MG tablet  Commonly known as:  LASIX  Take 1 tablet (20 mg total) by mouth once daily. For leg edema, and blood pressure. Hold medication if blood pressure is < 100 systolic with AM BP readings     hyoscyamine 0.125 mg Subl  Place 2 tablets (0.25 mg total) under the tongue every 4 (four) hours as needed (flank pain, back pain, kidney spasms, abdoninal spasms).     memantine 10 MG Tab  Commonly known as: NAMENDA  Take 1 tablet (10 mg total) by mouth 2 (two) times daily.     ondansetron 8 MG Tbdl  Commonly known as: ZOFRAN-ODT  Take 1 tablet (8 mg total) by mouth every 12 (twelve) hours as needed (nausea, vomiting, upset stomach).     phenazopyridine 200 MG tablet  Commonly known as: PYRIDIUM  Take 1 tablet (200 mg total) by mouth 3 (three) times daily as needed (bladder spasms, urinary tract infection, bladder pain).     polyethylene glycol 17 gram/dose powder  Commonly known as: GLYCOLAX  Take 17 g by mouth once daily. For constipation     potassium chloride 10 MEQ Cpsr  Commonly known as: MICRO-K  Take 1 capsule (10 mEq total) by mouth once daily.     risperiDONE 0.5 MG Tab  Commonly known as: RisperDAL  Take 1 tablet (0.5 mg total) by mouth every evening.     Saccharomyces boulardii 250 mg capsule  Commonly known as: FLORASTOR  Take 1 capsule (250 mg total) by mouth Daily. For probiotic for GI tract with colostomy ( ok for generic for florastor)     thyroid (pork) 30 mg Tab  Commonly known as: ARMOUR THYROID  Take 1 tablet (30 mg total) by mouth every evening.            STOP taking these medications      b complex vitamins capsule     sulfamethoxazole-trimethoprim 800-160mg 800-160 mg Tab  Commonly known as: BACTRIM DS              Indwelling Lines/Drains at time of discharge:   Lines/Drains/Airways       Peripherally Inserted Central Catheter Line  Duration             PICC Double Lumen 12/20/24 0820 left basilic 7 days              Drain  Duration                  Colostomy 05/23/21 LLQ 1314 days                    Time spent on the  discharge of patient: 41 minutes         Vanessa Shipley MD  Department of Hospital Medicine  [facility] ([facility])

## 2024-12-31 ENCOUNTER — OUTSIDE PLACE OF SERVICE (OUTPATIENT)
Dept: ADMINISTRATIVE | Facility: OTHER | Age: 84
End: 2024-12-31
Payer: MEDICARE

## 2025-01-03 NOTE — PLAN OF CARE
Bed remains low and locked, side rails up x 2, call bell and belongings within reach. Pt's daughter  at bedside    Clutter removed from room, lighting adjusted when rounding.    Frequent repositioning encouraged to prevent pressure injury.    IV antibiotics administered as ordered.    Pain frequently monitored, interventions implemented as ordered and appropriate.    VS taken Q4 hours per unit routine.     Cardiac monitor in place: sinus bradycardia.    Educated pt and daughter on plan of care. Pt is agreeable and verbalized understanding. Will continue to monitor.      OK to return to day program starting 1/6/2025

## 2025-01-08 ENCOUNTER — PATIENT MESSAGE (OUTPATIENT)
Dept: UROLOGY | Facility: CLINIC | Age: 85
End: 2025-01-08
Payer: MEDICARE

## 2025-01-09 ENCOUNTER — OFFICE VISIT (OUTPATIENT)
Dept: UROLOGY | Facility: CLINIC | Age: 85
End: 2025-01-09
Payer: MEDICARE

## 2025-01-09 VITALS
DIASTOLIC BLOOD PRESSURE: 92 MMHG | HEART RATE: 62 BPM | WEIGHT: 152.56 LBS | BODY MASS INDEX: 28.07 KG/M2 | SYSTOLIC BLOOD PRESSURE: 169 MMHG | RESPIRATION RATE: 18 BRPM | HEIGHT: 62 IN

## 2025-01-09 DIAGNOSIS — N20.0 RENAL STONE: Primary | ICD-10-CM

## 2025-01-09 DIAGNOSIS — K75.0 LIVER ABSCESS: ICD-10-CM

## 2025-01-09 DIAGNOSIS — R30.0 DYSURIA: ICD-10-CM

## 2025-01-09 PROCEDURE — 3080F DIAST BP >= 90 MM HG: CPT | Mod: CPTII,S$GLB,, | Performed by: UROLOGY

## 2025-01-09 PROCEDURE — 1126F AMNT PAIN NOTED NONE PRSNT: CPT | Mod: CPTII,S$GLB,, | Performed by: UROLOGY

## 2025-01-09 PROCEDURE — 3288F FALL RISK ASSESSMENT DOCD: CPT | Mod: CPTII,S$GLB,, | Performed by: UROLOGY

## 2025-01-09 PROCEDURE — 3077F SYST BP >= 140 MM HG: CPT | Mod: CPTII,S$GLB,, | Performed by: UROLOGY

## 2025-01-09 PROCEDURE — 1101F PT FALLS ASSESS-DOCD LE1/YR: CPT | Mod: CPTII,S$GLB,, | Performed by: UROLOGY

## 2025-01-09 PROCEDURE — 99999 PR PBB SHADOW E&M-EST. PATIENT-LVL V: CPT | Mod: PBBFAC,,, | Performed by: UROLOGY

## 2025-01-09 PROCEDURE — 1159F MED LIST DOCD IN RCRD: CPT | Mod: CPTII,S$GLB,, | Performed by: UROLOGY

## 2025-01-09 PROCEDURE — 1111F DSCHRG MED/CURRENT MED MERGE: CPT | Mod: CPTII,S$GLB,, | Performed by: UROLOGY

## 2025-01-09 PROCEDURE — 99214 OFFICE O/P EST MOD 30 MIN: CPT | Mod: S$GLB,,, | Performed by: UROLOGY

## 2025-01-09 NOTE — PROGRESS NOTES
Chief Complaint:   Encounter Diagnoses   Name Primary?    Renal stone Yes    Dysuria     Liver abscess        HPI:   1/9/25- doing well, liver drain was removed 2 weeks ago, last antibiotics are due tomorrow.  No signs of recurrent infections or dysuria, otherwise doing well with only occasional use of Levsin.  Patient is not due for a stent exchange as of yet.    7/18/20- 79-year-old female who presents with severe abdominal pain and discomfort, she is being evaluated for colonic impaction diverticulitis.  She is seen to have an extremely large right renal pelvis stone.  This had been seen prior, and she was followed by partner in regards to possible surgery for this.  Patient though it is determined not pursuing not been seen in the last couple of years.  Patient is having upper abdominal pain, no colic at this point.  Patient has had no previous urological history per her and her son's report, her daughter is her primary care take care, she is currently not present at the bedside.  Family history of stones, no urological cancers per the family.  10/17/18: Mag3 shows 72/38 L/R function. -UCx last visit.  PCNL is the only viable treatment option; observation not recommended.  10/8/18: 79 yo woman referred for renal stone after workup for UTI/diverticulitis.  Having diarrhea on meds for diverticulitis.  No abd/pelvic pain and no exac/rel factors.  No hematuria.  No prior urolithiasis.  No urinary bother.  No  history.  Normal sexual function but inactive.  Some memory problems but functional at home.    Allergies:  Omnicef [cefdinir]    Medications:  has a current medication list which includes the following prescription(s): acetaminophen, atorvastatin, bupropion, buspirone, carboxymethylcellulose, ceftriaxone, cholecalciferol (vitamin d3), dicyclomine, famotidine, folic acid-vit b6-vit b12 2.5-25-2 mg, furosemide, hyoscyamine, memantine, multivitamin, ondansetron, phenazopyridine, polyethylene glycol, potassium  chloride, risperidone, saccharomyces boulardii, thyroid (pork), and [DISCONTINUED] triamcinolone acetonide 0.1%.    Review of Systems:  General: No fever, chills, fatigability, or weight loss.  Skin: No rashes, itching, or changes in color or texture of skin.  Chest: Denies JESUS, cyanosis, wheezing, cough, and sputum production.  Abdomen: Appetite fine. No weight loss. Denies diarrhea, abdominal pain, hematemesis, or blood in stool.  Musculoskeletal: No joint stiffness or swelling. Denies back pain.  : As above.  All other review of systems negative.    PMH:   has a past medical history of Clostridium difficile colitis, Dementia, Diverticulitis, High cholesterol, Hypertension, Hypothyroidism, and Kidney stones.    PSH:   has a past surgical history that includes Cataract extraction; Appendectomy (2008); Colonoscopy (N/A, 01/02/2019); Colostomy (Left, 01/02/2019); Maribel procedure (N/A, 01/02/2019); Lysis of adhesions (N/A, 01/02/2019); Colon surgery; Vitrectomy (Right, 09/2013); Colonoscopy (N/A, 06/07/2019); Esophagogastroduodenoscopy (N/A, 09/16/2020); Colonoscopy (N/A, 06/23/2021); Cystoscopy with ureteroscopy, retrograde pyelography, and insertion of stent (Right, 08/23/2022); Cystoureteroscopy with retrograde pyelography and insertion of stent into ureter (Right, 11/08/2022); cystoureteroscopy, with holmium laser lithotripsy of ureteral calculus and stent insertion (Right, 04/04/2023); Cystoscopy with ureteroscopy, retrograde pyelography, and insertion of stent (Right, 11/07/2023); cystoureteroscopy,with holmium laser lithotripsy of ureteral calculus (Right, 11/07/2023); Eye surgery (?? Dr Raygoza); Cystoscopy with ureteroscopy, retrograde pyelography, and insertion of stent (Right, 5/14/2024); Cystoscopic litholapaxy (Right, 5/14/2024); cystoureteroscopy, with holmium laser lithotripsy of ureteral calculus and stent insertion (Right, 11/5/2024); Cystoscopy w/ ureteral stent removal (Right, 11/5/2024); and  Retrograde pyelography (Right, 11/5/2024).    FamHx: family history includes Alzheimer's disease in her mother; Aneurysm in her father; Bladder Cancer in her grandchild; Parkinsonism in her brother; Stomach cancer in her brother.    SocHx:  reports that she has quit smoking. Her smoking use included cigarettes. She has never been exposed to tobacco smoke. She has never used smokeless tobacco. She reports that she does not drink alcohol and does not use drugs.      Physical Exam:  There were no vitals filed for this visit.  General: A&Ox3, no apparent distress, no deformities  Neck: No masses, normal ROM  Lungs: normal inspiration, no use of accessory muscles  Heart: normal pulse, no arrhythmias  Abdomen: Soft, NT, ND, no masses, no hernias, no hepatosplenomegaly  Skin: The skin is warm and dry. No jaundice.  Ext: No c/c/e.  : No pelvic floor prolapse.  Normal introitus, no urethral abnomralities. No Perineal abnormalities.    Labs/Studies:   Ucx E.Coli 2/23  Creatinine 0.9 1/24  CT with contrast liver abscess reduced with drain in place, right stent and stone stable 12/24  KUB/FIDEL persistent right renal stone, right stent, mild right hydro 10/24  CT large right renal pelvis stone 8/22  Lasix renogram 23% right/77% left 9/22  Creatinine 0.8 12/24     Impression/Plan:       1. Right renal stone-  right stent with vesicolithalopaxy around distal coil  11/5/24, 5/14/24, 11/28/23,  right ureteroscopy  4/4/23,  failed PCN  1/10/23,  right stent  11/8/22, 8/23/22    Patient possibly developed the infection following her last stent placement, therefore from now on we will pre and post treat her with antibiotics.  She is otherwise doing well with only intermittent Levsin as needed.  No complaints today.  See me back as previously scheduled for stent exchange.  Of note previous ureteroscopy did not yield stone clearance.    2. Dysuria- doing well with no recurrence, call with any issues prior to the next appointment.    3.  Liver abscess- please see above, doing well following drain removal 2 weeks ago, GI appointment next week, last antibiotics to be given tomorrow.

## 2025-01-16 ENCOUNTER — OFFICE VISIT (OUTPATIENT)
Dept: GASTROENTEROLOGY | Facility: CLINIC | Age: 85
End: 2025-01-16
Payer: MEDICARE

## 2025-01-16 VITALS
HEIGHT: 62 IN | SYSTOLIC BLOOD PRESSURE: 121 MMHG | WEIGHT: 151.88 LBS | DIASTOLIC BLOOD PRESSURE: 82 MMHG | BODY MASS INDEX: 27.95 KG/M2

## 2025-01-16 DIAGNOSIS — R10.9 ABDOMINAL CRAMPING: Primary | ICD-10-CM

## 2025-01-16 DIAGNOSIS — K21.9 GASTROESOPHAGEAL REFLUX DISEASE, UNSPECIFIED WHETHER ESOPHAGITIS PRESENT: ICD-10-CM

## 2025-01-16 DIAGNOSIS — Z93.3 COLOSTOMY IN PLACE: ICD-10-CM

## 2025-01-16 PROCEDURE — 99999 PR PBB SHADOW E&M-EST. PATIENT-LVL V: CPT | Mod: PBBFAC,,, | Performed by: NURSE PRACTITIONER

## 2025-01-16 PROCEDURE — 1126F AMNT PAIN NOTED NONE PRSNT: CPT | Mod: CPTII,S$GLB,, | Performed by: NURSE PRACTITIONER

## 2025-01-16 PROCEDURE — 1111F DSCHRG MED/CURRENT MED MERGE: CPT | Mod: CPTII,S$GLB,, | Performed by: NURSE PRACTITIONER

## 2025-01-16 PROCEDURE — 1159F MED LIST DOCD IN RCRD: CPT | Mod: CPTII,S$GLB,, | Performed by: NURSE PRACTITIONER

## 2025-01-16 PROCEDURE — 3079F DIAST BP 80-89 MM HG: CPT | Mod: CPTII,S$GLB,, | Performed by: NURSE PRACTITIONER

## 2025-01-16 PROCEDURE — 1160F RVW MEDS BY RX/DR IN RCRD: CPT | Mod: CPTII,S$GLB,, | Performed by: NURSE PRACTITIONER

## 2025-01-16 PROCEDURE — 1101F PT FALLS ASSESS-DOCD LE1/YR: CPT | Mod: CPTII,S$GLB,, | Performed by: NURSE PRACTITIONER

## 2025-01-16 PROCEDURE — 3288F FALL RISK ASSESSMENT DOCD: CPT | Mod: CPTII,S$GLB,, | Performed by: NURSE PRACTITIONER

## 2025-01-16 PROCEDURE — 99203 OFFICE O/P NEW LOW 30 MIN: CPT | Mod: S$GLB,,, | Performed by: NURSE PRACTITIONER

## 2025-01-16 PROCEDURE — 3074F SYST BP LT 130 MM HG: CPT | Mod: CPTII,S$GLB,, | Performed by: NURSE PRACTITIONER

## 2025-01-16 NOTE — PROGRESS NOTES
Clinic Follow Up:  [facility] Follow Up Note    Reason for Follow Up:  The primary encounter diagnosis was Abdominal cramping. Diagnoses of Gastroesophageal reflux disease, unspecified whether esophagitis present and Colostomy in place were also pertinent to this visit.    PCP: Myla Arias   [address] [place] [address] Dr. [address] / Aishwarya AQUINO [id number]    HPI:  This is a 84 y.o. female here for follow up.   She has dementia and is accompanied by her daughter who helps with history.  It has been over 3 years since her last visit with GI.   She is feeling very well currently. In December, she did have a UTI with complication of liver abscess. Has resolved. She had colectomy with jose's procedure 2/2 perforated diverticulitis in 2019 with complication of an abscess. 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 She does have occasional abdominal camping. She has Bentyl to take PRN. She has not needed it in a while as she has been doing well. She is also on Pepcid 40 mg BID for heartburn and is doing well on medication.     Review of Systems   Constitutional:  Negative for activity change and appetite change.        As per interval history above   Respiratory:  Negative for cough and shortness of breath.    Cardiovascular:  Negative for chest pain.   Gastrointestinal:  Negative for abdominal pain, nausea and vomiting.   Skin:  Negative for color change and rash.       Medical History:  Past Medical History:   Diagnosis Date    Clostridium difficile colitis     Dementia     Diverticulitis     s/p colostomy    High cholesterol     Hypertension     Hypothyroidism     Kidney stones        Surgical History:   Past Surgical History:   Procedure Laterality Date    APPENDECTOMY  2008    CATARACT EXTRACTION      Dr Raygoza    COLON SURGERY      COLONOSCOPY N/A 01/02/2019    Procedure: COLONOSCOPY;  Surgeon: Reece Hogan MD;  Location: [facility];  Service: Endoscopy;  Laterality: N/A;    COLONOSCOPY N/A  06/07/2019    Procedure: COLONOSCOPY;  Surgeon: Rishabh Connelly MD;  Location: [facility] ENDO;  Service: General;  Laterality: N/A;    COLONOSCOPY N/A 06/23/2021    Procedure: COLONOSCOPY;  Surgeon: Lucy Lafleur MD;  Location: [facility] ENDO;  Service: Endoscopy;  Laterality: N/A;    COLOSTOMY Left 01/02/2019    Procedure: CREATION, COLOSTOMY;  Surgeon: Reece Hogan MD;  Location: [facility] OR;  Service: General;  Laterality: Left;    CYSTOSCOPIC LITHOLAPAXY Right 5/14/2024    Procedure: CYSTOLITHOLAPAXY;  Surgeon: Anselmo Matute MD;  Location: [facility] OR;  Service: Urology;  Laterality: Right;    CYSTOSCOPY W/ URETERAL STENT REMOVAL Right 11/5/2024    Procedure: CYSTOSCOPY, WITH URETERAL STENT REMOVAL;  Surgeon: Anselmo Matute MD;  Location: [facility];  Service: Urology;  Laterality: Right;    CYSTOSCOPY WITH URETEROSCOPY, RETROGRADE PYELOGRAPHY, AND INSERTION OF STENT Right 08/23/2022    Procedure: CYSTOSCOPY, WITH RETROGRADE PYELOGRAM AND URETERAL STENT INSERTION;  Surgeon: Anselmo Matute MD;  Location: [facility];  Service: Urology;  Laterality: Right;    CYSTOSCOPY WITH URETEROSCOPY, RETROGRADE PYELOGRAPHY, AND INSERTION OF STENT Right 11/07/2023    Procedure: CYSTOSCOPY, WITH RETROGRADE PYELOGRAM AND URETERAL STENT INSERTION;  Surgeon: Anselmo Matute MD;  Location: [facility];  Service: Urology;  Laterality: Right;  stent exchange    CYSTOSCOPY WITH URETEROSCOPY, RETROGRADE PYELOGRAPHY, AND INSERTION OF STENT Right 5/14/2024    Procedure: CYSTOSCOPY, WITH RETROGRADE PYELOGRAM AND URETERAL STENT INSERTION;  Surgeon: Anselmo Matute MD;  Location: [facility] OR;  Service: Urology;  Laterality: Right;    CYSTOURETEROSCOPY WITH RETROGRADE PYELOGRAPHY AND INSERTION OF STENT INTO URETER Right 11/08/2022    Procedure: CYSTOURETEROSCOPY, WITH RETROGRADE PYELOGRAM AND URETERAL STENT INSERTION;  Surgeon: Anselmo Matute MD;  Location: [facility];  Service: Urology;  Laterality: Right;    CYSTOURETEROSCOPY,  WITH HOLMIUM LASER LITHOTRIPSY OF URETERAL CALCULUS AND STENT INSERTION Right 04/04/2023    Procedure: CYSTOURETEROSCOPY, WITH HOLMIUM LASER LITHOTRIPSY OF URETERAL CALCULUS AND STENT INSERTION;  Surgeon: Anselmo Matute MD;  Location: [facility];  Service: Urology;  Laterality: Right;    CYSTOURETEROSCOPY, WITH HOLMIUM LASER LITHOTRIPSY OF URETERAL CALCULUS AND STENT INSERTION Right 11/5/2024    Procedure: CYSTOURETEROSCOPY, WITH HOLMIUM LASER LITHOTRIPSY OF URETERAL CALCULUS AND STENT INSERTION;  Surgeon: Anselmo Matute MD;  Location: [facility] OR;  Service: Urology;  Laterality: Right;  vesical litholapaxy    CYSTOURETEROSCOPY,WITH HOLMIUM LASER LITHOTRIPSY OF URETERAL CALCULUS Right 11/07/2023    Procedure: CYSTOURETEROSCOPY,WITH HOLMIUM LASER LITHOTRIPSY OF URETERAL CALCULUS;  Surgeon: Anselmo Matute MD;  Location: [facility];  Service: Urology;  Laterality: Right;    ESOPHAGOGASTRODUODENOSCOPY N/A 09/16/2020    Procedure: ESOPHAGOGASTRODUODENOSCOPY (EGD)- Needs Rapid;  Surgeon: Lucy Lafleur MD;  Location: [facility];  Service: Endoscopy;  Laterality: N/A;    EYE SURGERY  ?? Dr Raygoza    SHAHIDA PROCEDURE N/A 01/02/2019    Procedure: SHAHIDA PROCEDURE;  Surgeon: Reece Hogan MD;  Location: [facility];  Service: General;  Laterality: N/A;    LYSIS OF ADHESIONS N/A 01/02/2019    Procedure: LYSIS, ADHESIONS;  Surgeon: Reece Hogan MD;  Location: [facility];  Service: General;  Laterality: N/A;    RETROGRADE PYELOGRAPHY Right 11/5/2024    Procedure: PYELOGRAM, RETROGRADE;  Surgeon: Anselmo Matute MD;  Location: [facility];  Service: Urology;  Laterality: Right;    VITRECTOMY Right 09/2013       Family History:   Family History   Problem Relation Name Age of Onset    Alzheimer's disease Mother      Aneurysm Father          brain    Stomach cancer Brother          50s    Parkinsonism Brother      Bladder Cancer Grandchild         Social History:   Social History     Tobacco Use    Smoking status: Former      Types: Cigarettes     Passive exposure: Never    Smokeless tobacco: Never    Tobacco comments:     Smoked in 20's. Quit soon   Substance Use Topics    Alcohol use: No    Drug use: No       Allergies:   Review of patient's allergies indicates:   Allergen Reactions    Omnicef [cefdinir] Itching       Home Medications:  Current Outpatient Medications on File Prior to Visit   Medication Sig Dispense Refill    acetaminophen (TYLENOL) 500 MG tablet Take 1,000 mg by mouth every 6 (six) hours as needed for Pain or Temperature greater than (101F, fever, headache, arthritis).      atorvastatin (LIPITOR) 10 MG tablet 10mg tablet po twice weekly to be given on Monday and Thursday 90 tablet 3    buPROPion (WELLBUTRIN XL) 150 MG TB24 tablet Take 1 tablet (150 mg total) by mouth every morning. 90 tablet 3    busPIRone (BUSPAR) 10 MG tablet Take 1 tablet (10 mg total) by mouth with lunch. May also take 1 tablet (10 mg total) daily as needed (anxiety, panic attacks, agitation). 180 tablet 3    carboxymethylcellulose (REFRESH LIQUIGEL) 1 % ophthalmic solution Apply 2 drops to eye 2 (two) times daily. May also apply 1 drop 2 (two) times daily as needed (for dry eyes). Bilateral eyes ( Refresh eye drops/ saline). 15 mL 3    cholecalciferol, vitamin D3, 125 mcg (5,000 unit) Tab Take 1 tablet (5,000 Units total) by mouth once daily.      dicyclomine (BENTYL) 20 mg tablet Take 1 tablet (20 mg total) by mouth before meals and at bedtime as needed (abdominal cramping, belly pain, stomach pain around colostomy). 180 tablet 1    famotidine (PEPCID) 40 MG tablet Take 1 tablet (40 mg total) by mouth 2 (two) times daily as needed for Heartburn (reflux, indigestion, flatulence, upset stomach). 180 tablet 3    folic acid-vit B6-vit B12 2.5-25-2 mg (FOLBIC OR EQUIV) 2.5-25-2 mg Tab Take 1 tablet by mouth once daily.      furosemide (LASIX) 20 MG tablet Take 1 tablet (20 mg total) by mouth once daily. For leg edema, and blood pressure. Hold  "medication if blood pressure is < 100 systolic with AM BP readings 90 tablet 3    hyoscyamine 0.125 mg Subl Place 2 tablets (0.25 mg total) under the tongue every 4 (four) hours as needed (flank pain, back pain, kidney spasms, abdoninal spasms). 30 tablet 3    memantine (NAMENDA) 10 MG Tab Take 1 tablet (10 mg total) by mouth 2 (two) times daily. 180 tablet 3    multivitamin Tab Take 1 tablet by mouth once daily.      ondansetron (ZOFRAN-ODT) 8 MG TbDL Take 1 tablet (8 mg total) by mouth every 12 (twelve) hours as needed (nausea, vomiting, upset stomach). 90 tablet 3    phenazopyridine (PYRIDIUM) 200 MG tablet Take 1 tablet (200 mg total) by mouth 3 (three) times daily as needed (bladder spasms, urinary tract infection, bladder pain). 30 tablet 1    polyethylene glycol (GLYCOLAX) 17 gram/dose powder Take 17 g by mouth once daily. For constipation 595 g 3    potassium chloride (MICRO-K) 10 MEQ CpSR Take 1 capsule (10 mEq total) by mouth once daily. 90 capsule 3    risperiDONE (RISPERDAL) 0.5 MG Tab Take 1 tablet (0.5 mg total) by mouth every evening. 90 tablet 3    Saccharomyces boulardii (FLORASTOR) 250 mg capsule Take 1 capsule (250 mg total) by mouth Daily. For probiotic for GI tract with colostomy ( ok for generic for florastor) 90 capsule 3    thyroid, pork, (ARMOUR THYROID) 30 mg Tab Take 1 tablet (30 mg total) by mouth every evening. 90 tablet 3    [DISCONTINUED] triamcinolone acetonide 0.1% (KENALOG) 0.1 % ointment APPLY TOPICALLY 4 (FOUR) TIMES DAILY. TO RASH ON FACE AND CHEST AND NOSE 80 g 1     No current facility-administered medications on file prior to visit.       /82 (BP Location: Left arm, Patient Position: Sitting)   Ht 5' 2" (1.575 m)   Wt 68.9 kg (151 lb 14.4 oz)   BMI 27.78 kg/m²   Body mass index is 27.78 kg/m².  Physical Exam  Constitutional:       General: She is not in acute distress.  HENT:      Head: Normocephalic.   Cardiovascular:      Rate and Rhythm: Normal rate and regular " rhythm.      Heart sounds: Normal heart sounds. No murmur heard.  Pulmonary:      Effort: Pulmonary effort is normal. No respiratory distress.      Breath sounds: Normal breath sounds.   Abdominal:      General: Bowel sounds are normal.   Neurological:      Mental Status: She is alert.   Psychiatric:         Mood and Affect: Mood normal.         Behavior: Behavior normal.         Labs: Pertinent labs reviewed.  CRC Screening: NA    Assessment:   1. Abdominal cramping    2. Gastroesophageal reflux disease, unspecified whether esophagitis present    3. Colostomy in place        Recommendations:   - continue current medications     Abdominal cramping  -     Ambulatory referral/consult to Gastroenterology    Gastroesophageal reflux disease, unspecified whether esophagitis present    Colostomy in place    Return to Clinic:  Follow up if symptoms worsen or fail to improve.    Thank you for the opportunity to participate in the care of this patient.  VINCENZO Telles

## 2025-01-27 ENCOUNTER — OFFICE VISIT (OUTPATIENT)
Dept: URGENT CARE | Facility: CLINIC | Age: 85
End: 2025-01-27
Payer: MEDICARE

## 2025-01-27 ENCOUNTER — PATIENT MESSAGE (OUTPATIENT)
Dept: UROLOGY | Facility: CLINIC | Age: 85
End: 2025-01-27
Payer: MEDICARE

## 2025-01-27 VITALS
BODY MASS INDEX: 28.2 KG/M2 | SYSTOLIC BLOOD PRESSURE: 169 MMHG | HEIGHT: 62 IN | TEMPERATURE: 98 F | OXYGEN SATURATION: 100 % | WEIGHT: 153.25 LBS | HEART RATE: 69 BPM | RESPIRATION RATE: 16 BRPM | DIASTOLIC BLOOD PRESSURE: 77 MMHG

## 2025-01-27 DIAGNOSIS — R03.0 ELEVATED BLOOD PRESSURE READING: ICD-10-CM

## 2025-01-27 DIAGNOSIS — L29.9 ITCHING: ICD-10-CM

## 2025-01-27 DIAGNOSIS — R41.0 DELIRIUM, ACUTE: ICD-10-CM

## 2025-01-27 DIAGNOSIS — R23.8 IRRITATION SYMPTOM OF SKIN: ICD-10-CM

## 2025-01-27 DIAGNOSIS — N39.0 URINARY TRACT INFECTION WITH HEMATURIA, SITE UNSPECIFIED: Primary | ICD-10-CM

## 2025-01-27 DIAGNOSIS — R31.9 URINARY TRACT INFECTION WITH HEMATURIA, SITE UNSPECIFIED: Primary | ICD-10-CM

## 2025-01-27 DIAGNOSIS — R21 RASH: ICD-10-CM

## 2025-01-27 LAB
BILIRUBIN, UA POC OHS: NEGATIVE
BLOOD, UA POC OHS: ABNORMAL
CLARITY, UA POC OHS: ABNORMAL
COLOR, UA POC OHS: YELLOW
GLUCOSE, UA POC OHS: NEGATIVE
KETONES, UA POC OHS: NEGATIVE
LEUKOCYTES, UA POC OHS: ABNORMAL
NITRITE, UA POC OHS: NEGATIVE
PH, UA POC OHS: 6
PROTEIN, UA POC OHS: ABNORMAL
SPECIFIC GRAVITY, UA POC OHS: 1.02
UROBILINOGEN, UA POC OHS: 0.2

## 2025-01-27 PROCEDURE — 87086 URINE CULTURE/COLONY COUNT: CPT | Performed by: PHYSICIAN ASSISTANT

## 2025-01-27 PROCEDURE — 87088 URINE BACTERIA CULTURE: CPT | Performed by: PHYSICIAN ASSISTANT

## 2025-01-27 PROCEDURE — 87102 FUNGUS ISOLATION CULTURE: CPT | Performed by: PHYSICIAN ASSISTANT

## 2025-01-27 PROCEDURE — 87075 CULTR BACTERIA EXCEPT BLOOD: CPT | Performed by: PHYSICIAN ASSISTANT

## 2025-01-27 PROCEDURE — 87186 SC STD MICRODIL/AGAR DIL: CPT | Performed by: PHYSICIAN ASSISTANT

## 2025-01-27 PROCEDURE — 81003 URINALYSIS AUTO W/O SCOPE: CPT | Mod: QW,S$GLB,, | Performed by: PHYSICIAN ASSISTANT

## 2025-01-27 PROCEDURE — 87070 CULTURE OTHR SPECIMN AEROBIC: CPT | Performed by: PHYSICIAN ASSISTANT

## 2025-01-27 PROCEDURE — 99214 OFFICE O/P EST MOD 30 MIN: CPT | Mod: S$GLB,,, | Performed by: PHYSICIAN ASSISTANT

## 2025-01-27 RX ORDER — DOXYCYCLINE HYCLATE 100 MG
100 TABLET ORAL 2 TIMES DAILY
Qty: 14 TABLET | Refills: 0 | Status: SHIPPED | OUTPATIENT
Start: 2025-01-27 | End: 2025-02-03

## 2025-01-27 RX ORDER — PETROLATUM,WHITE 41 %
OINTMENT (GRAM) TOPICAL DAILY
Qty: 20 G | Refills: 0 | Status: SHIPPED | OUTPATIENT
Start: 2025-01-27 | End: 2025-02-03

## 2025-01-27 RX ORDER — HYDROCORTISONE 25 MG/G
CREAM TOPICAL 2 TIMES DAILY
Qty: 20 G | Refills: 0 | Status: SHIPPED | OUTPATIENT
Start: 2025-01-27 | End: 2025-02-03

## 2025-01-27 NOTE — PATIENT INSTRUCTIONS
UTI  URINARY TRACT INFECTION:    -Take your antibiotics (doxy twice daily for 7 days) as directed and complete the entire course.  -Drink plenty fluids.  -Urine culture pending. We will call with results.  -If your symptoms are not improving or worsen, you will need to follow-up with primary care or go to the emergency department      2. RASH:  - Aquaphor at least once daily to irritation on face around eyes. Gently apply without too much pressure or rubbing.   --hydrocortisone cream to chest, breast, arm, and upper back rash as directed  --do not scratch  --keep skin moisturized with non scented lotion such as gold bond or Lubriderm daily  - daily shower with adequate drying of skin gently, taking caution not to irritate to tear skin.   --avoid allergens  --follow-up with PCP this week or consider seeing dermatology if this continues  - skin cultures are pending. We will update you with any changes to treatment plan as needed.       ELEVATED BLOOD PRESSURE READING:    - Your blood pressure was noted to be elevated in clinic today.-- please keep an eye on blood pressures.  - Record blood pressures and follow up with primary care doctor if blood pressures continue to be elevated.  - Here are a few generalized recommended lifestyle modifications proven to lower BPs--DASH diet, exercise, weight loss, reducing stress.  *Of note: above recommendations are generalized conservative lifestyle modifications that include but are not limited to above list.   Please do not attempt any of the above recommendations if they do not pertain to you or should cause overall detriment to your health.   Please call the clinic or your PCP with any questions you may have in regards to BP and lifestyle modifications.        If you have been discharged from the clinic prior to your point of care test results being completed, please make sure to [facility].  If there is a change in treatment, we will communicate with you  through here.  If your test is positive, and medications are ordered, these will be sent to your preferred pharmacy.   If your test is negative, no further steps needed. If you do not hear from us or have questions, please call the clinic.      - You must understand that you have received an Urgent Care treatment only and that you may be released before all of your medical problems are known or treated.   - You, the patient, will arrange for follow up care as instructed with your primary care provider or recommended specialist.   - If your condition worsens or fails to improve we recommend that you receive another evaluation at the ER immediately or contact your PCP to discuss your concerns, or return here.   - Please do not drive or make any important decisions for 24 hours if you have received any pain medications, sedatives or mood altering drugs during your visit.    Disclaimer: This document was drafted with the use of a voice recognition device and is likely to have sound alike errors.

## 2025-01-27 NOTE — PROGRESS NOTES
"Subjective:      Patient ID: Irlanda Lopez is a 84 y.o. female.    Vitals:  height is 5' 2" (1.575 m) and weight is 69.5 kg (153 lb 3.5 oz). Her oral temperature is 97.7 °F (36.5 °C). Her blood pressure is 169/77 (abnormal) and her pulse is 69. Her respiration is 16 and oxygen saturation is 100%.     Chief Complaint: Rash    Pt reports rash to chest, back, and arms x 1 week. Caregiver reports redness to bilateral eyes since today. Reports pt took benadryl at noon today.     Rash  This is a new problem. The current episode started in the past 7 days. The problem has been gradually worsening since onset. Pertinent negatives include no fever. Treatments tried: benadryl. The treatment provided no relief.       Constitution: Negative for fever.   Genitourinary: Negative.  Negative for dysuria.   Skin:  Positive for rash, lesion and erythema.   Psychiatric/Behavioral:  Positive for confusion.       Objective:     Vitals:    01/27/25 1354   BP: (!) 169/77   BP Location: Left arm   Patient Position: Sitting   Pulse: 69   Resp: 16   Temp: 97.7 °F (36.5 °C)   TempSrc: Oral   SpO2: 100%   Weight: 69.5 kg (153 lb 3.5 oz)   Height: 5' 2" (1.575 m)       Physical Exam   Constitutional: She is oriented to person, place, and time. She appears well-developed.   HENT:   Head: Normocephalic and atraumatic.   Ears:   Right Ear: External ear normal.   Left Ear: External ear normal.   Nose: Nose normal. No nasal deformity. No epistaxis.   Mouth/Throat: Oropharynx is clear and moist and mucous membranes are normal.   Eyes: Lids are normal.   Neck: Trachea normal and phonation normal. Neck supple.   Cardiovascular: Normal pulses.   Pulmonary/Chest: Effort normal.   Abdominal: Normal appearance and bowel sounds are normal. She exhibits no distension. Soft. There is no abdominal tenderness.   Neurological: She is alert and oriented to person, place, and time.   Skin: Skin is warm, dry, intact and rash. Capillary refill takes less than 2 " seconds. erythema   Psychiatric: Her speech is normal and behavior is normal.   Nursing note and vitals reviewed.      Assessment:     1. Urinary tract infection with hematuria, site unspecified    2. Delirium, acute    3. Rash    4. Elevated blood pressure reading    5. Itching    6. Irritation symptom of skin      Results for orders placed or performed in visit on 01/27/25   POCT Urinalysis(Instrument)    Collection Time: 01/27/25  2:07 PM   Result Value Ref Range    Color, POC UA Yellow Yellow, Straw, Colorless    Clarity, POC UA Slight Cloudy (A) Clear    Glucose, POC UA Negative Negative    Bilirubin, POC UA Negative Negative    Ketones, POC UA Negative Negative    Spec Grav POC UA 1.020 1.005 - 1.030    Blood, POC UA Moderate (A) Negative    pH, POC UA 6.0 5.0 - 8.0    Protein, POC UA Trace (A) Negative    Urobilinogen, POC UA 0.2 <=1.0    Nitrite, POC UA Negative Negative    WBC, POC UA Large (A) Negative     *Note: Due to a large number of results and/or encounters for the requested time period, some results have not been displayed. A complete set of results can be found in Results Review.       Plan:       Urinary tract infection with hematuria, site unspecified  -     Urine Culture High Risk  -     doxycycline (VIBRA-TABS) 100 MG tablet; Take 1 tablet (100 mg total) by mouth 2 (two) times daily. for 7 days  Dispense: 14 tablet; Refill: 0    Delirium, acute  -     POCT Urinalysis(Instrument)    Rash  -     CULTURE, AEROBIC  (SPECIFY SOURCE)  -     CULTURE, ANAEROBE  -     CULTURE, FUNGUS  -     hydrocortisone 2.5 % cream; Apply topically 2 (two) times daily. for 7 days  Dispense: 20 g; Refill: 0    Elevated blood pressure reading    Itching    Irritation symptom of skin  -     white petrolatum (AQUAPHOR HEALING) 41 % Oint; Apply topically once daily. for 7 days  Dispense: 20 g; Refill: 0          Medical Decision Making:   History:   Old Records Summarized: records from clinic visits.  Initial Assessment:    Pt here with daughter (Myla)  Pt lives in assisted living facility; daughter states that staff states that she has been more confused.   +UTI today.   Pt denies dysuria but does have dementia per daughter.  Pt has a kidney stent; recently saw nephrologist per daughter.  Clinical Tests:   Lab Tests: Reviewed and Ordered  The following lab test(s) were unremarkable: Urinalysis  Urgent Care Management:  See entire note for further details     Discussed with pt all pertinent UC information and results. Discussed pt dx and plan of tx.   Gave pt all f/u and return to the UC instructions. All questions and concerns were addressed at this time.   Pt expresses understanding of information and instructions, and is comfortable with plan to discharge.   Pt is stable for discharge.     I discussed with patient and/or family/caretaker that evaluation in the UC does not suggest any emergent or life threatening medical conditions requiring immediate intervention beyond what was provided in the UC, and I believe patient is safe for discharge.  Regardless, an unremarkable evaluation in the UC does not preclude the development or presence of a serious or life threatening condition. As such, patient was instructed to GO to ER immediately for any worsening or change in current symptoms.                 Patient Instructions   UTI  URINARY TRACT INFECTION:    -Take your antibiotics (doxy twice daily for 7 days) as directed and complete the entire course.  -Drink plenty fluids.  -Urine culture pending. We will call with results.  -If your symptoms are not improving or worsen, you will need to follow-up with primary care or go to the emergency department      2. RASH:  - Aquaphor at least once daily to irritation on face around eyes. Gently apply without too much pressure or rubbing.   --hydrocortisone cream to chest, breast, arm, and upper back rash as directed  --do not scratch  --keep skin moisturized with non scented lotion such as  gold bond or Lubriderm daily  - daily shower with adequate drying of skin gently, taking caution not to irritate to tear skin.   --avoid allergens  --follow-up with PCP this week or consider seeing dermatology if this continues  - skin cultures are pending. We will update you with any changes to treatment plan as needed.       ELEVATED BLOOD PRESSURE READING:    - Your blood pressure was noted to be elevated in clinic today.-- please keep an eye on blood pressures.  - Record blood pressures and follow up with primary care doctor if blood pressures continue to be elevated.  - Here are a few generalized recommended lifestyle modifications proven to lower BPs--DASH diet, exercise, weight loss, reducing stress.  *Of note: above recommendations are generalized conservative lifestyle modifications that include but are not limited to above list.   Please do not attempt any of the above recommendations if they do not pertain to you or should cause overall detriment to your health.   Please call the clinic or your PCP with any questions you may have in regards to BP and lifestyle modifications.        If you have been discharged from the clinic prior to your point of care test results being completed, please make sure to check your [employer] account.  If there is a change in treatment, we will communicate with you through here.  If your test is positive, and medications are ordered, these will be sent to your preferred pharmacy.   If your test is negative, no further steps needed. If you do not hear from us or have questions, please call the clinic.      - You must understand that you have received an Urgent Care treatment only and that you may be released before all of your medical problems are known or treated.   - You, the patient, will arrange for follow up care as instructed with your primary care provider or recommended specialist.   - If your condition worsens or fails to improve we recommend that you receive another  evaluation at the ER immediately or contact your PCP to discuss your concerns, or return here.   - Please do not drive or make any important decisions for 24 hours if you have received any pain medications, sedatives or mood altering drugs during your visit.    Disclaimer: This document was drafted with the use of a voice recognition device and is likely to have sound alike errors.

## 2025-01-28 LAB — FUNGUS SPEC CULT: NORMAL

## 2025-01-30 LAB
BACTERIA SPEC AEROBE CULT: NORMAL
BACTERIA UR CULT: ABNORMAL

## 2025-01-31 ENCOUNTER — TELEPHONE (OUTPATIENT)
Dept: URGENT CARE | Facility: CLINIC | Age: 85
End: 2025-01-31
Payer: MEDICARE

## 2025-01-31 NOTE — TELEPHONE ENCOUNTER
See last note.    Myla returned call.   Pt took doxy and Skin improving.  States that confusion is also better and she is in good spirits.

## 2025-01-31 NOTE — TELEPHONE ENCOUNTER
Power-of-, Mundo says call Myla.    No answer from Myla.    Attempted to call to see how patient is doing on doxycycline for symptomatic UTI.    Reviewed chart and note from urology, which may have indicated that patient did not complete doxycycline.  Calling and see if patient is better, if she took doxycycline, or if she is the same or worse and needs changes to antibiotic therapy.    Skin cultures still pending, which was another reason pt was on doxy.   Results for orders placed or performed in visit on 01/27/25   POCT Urinalysis(Instrument)    Collection Time: 01/27/25  2:07 PM   Result Value Ref Range    Color, POC UA Yellow Yellow, Straw, Colorless    Clarity, POC UA Slight Cloudy (A) Clear    Glucose, POC UA Negative Negative    Bilirubin, POC UA Negative Negative    Ketones, POC UA Negative Negative    Spec Grav POC UA 1.020 1.005 - 1.030    Blood, POC UA Moderate (A) Negative    pH, POC UA 6.0 5.0 - 8.0    Protein, POC UA Trace (A) Negative    Urobilinogen, POC UA 0.2 <=1.0    Nitrite, POC UA Negative Negative    WBC, POC UA Large (A) Negative   Urine Culture High Risk    Collection Time: 01/27/25  2:35 PM    Specimen: Urine, Clean Catch   Result Value Ref Range    Urine Culture, Routine ESCHERICHIA COLI ESBL  10,000 - 49,999 cfu/ml   (A)        Susceptibility    Escherichia coli ESBL - CULTURE, URINE     Amp/Sulbactam >16/8 Resistant mcg/mL     Ampicillin >16 Resistant mcg/mL     Ceftriaxone >2 Resistant mcg/mL     Ciprofloxacin >2 Resistant mcg/mL     Cefepime >16 Resistant mcg/mL     Ertapenem <=0.5 Sensitive mcg/mL     Nitrofurantoin <=32 Sensitive mcg/mL     Gentamicin >8 Resistant mcg/mL     Levofloxacin >4 Resistant mcg/mL     Meropenem <=1 Sensitive mcg/mL     Piperacillin/Tazo <=8 Resistant mcg/mL     Trimeth/Sulfa <=2/38 Sensitive mcg/mL     Tobramycin >8 Resistant mcg/mL   CULTURE, AEROBIC  (SPECIFY SOURCE)    Collection Time: 01/27/25  2:35 PM    Specimen: Chest, Right; Skin    Result Value Ref Range    Aerobic Bacterial Culture Skin jayson,  no predominant organism    CULTURE, ANAEROBE    Collection Time: 01/27/25  2:35 PM    Specimen: Chest, Right; Skin   Result Value Ref Range    Anaerobic Culture Culture in progress    CULTURE, FUNGUS    Collection Time: 01/27/25  2:36 PM    Specimen: Chest, Right; Wound   Result Value Ref Range    Fungus (Mycology) Culture Culture in progress      *Note: Due to a large number of results and/or encounters for the requested time period, some results have not been displayed. A complete set of results can be found in Results Review.

## 2025-02-03 LAB — BACTERIA SPEC ANAEROBE CULT: NORMAL

## 2025-03-11 ENCOUNTER — PATIENT MESSAGE (OUTPATIENT)
Dept: PRIMARY CARE CLINIC | Facility: CLINIC | Age: 85
End: 2025-03-11
Payer: MEDICARE

## 2025-03-18 ENCOUNTER — LAB VISIT (OUTPATIENT)
Dept: LAB | Facility: HOSPITAL | Age: 85
End: 2025-03-18
Attending: FAMILY MEDICINE
Payer: MEDICARE

## 2025-03-18 ENCOUNTER — OFFICE VISIT (OUTPATIENT)
Dept: PRIMARY CARE CLINIC | Facility: CLINIC | Age: 85
End: 2025-03-18
Payer: MEDICARE

## 2025-03-18 VITALS
BODY MASS INDEX: 28.5 KG/M2 | OXYGEN SATURATION: 97 % | DIASTOLIC BLOOD PRESSURE: 82 MMHG | SYSTOLIC BLOOD PRESSURE: 132 MMHG | RESPIRATION RATE: 18 BRPM | WEIGHT: 154.88 LBS | HEART RATE: 68 BPM | HEIGHT: 62 IN | TEMPERATURE: 97 F

## 2025-03-18 DIAGNOSIS — I10 ESSENTIAL HYPERTENSION: Chronic | ICD-10-CM

## 2025-03-18 DIAGNOSIS — F02.818 MAJOR NEUROCOGNITIVE DISORDER DUE TO ALZHEIMER'S DISEASE, WITH BEHAVIORAL DISTURBANCE: ICD-10-CM

## 2025-03-18 DIAGNOSIS — F41.1 GAD (GENERALIZED ANXIETY DISORDER): ICD-10-CM

## 2025-03-18 DIAGNOSIS — G30.9 MAJOR NEUROCOGNITIVE DISORDER DUE TO ALZHEIMER'S DISEASE, WITH BEHAVIORAL DISTURBANCE: ICD-10-CM

## 2025-03-18 DIAGNOSIS — Z86.73 HISTORY OF LACUNAR CEREBROVASCULAR ACCIDENT (CVA): ICD-10-CM

## 2025-03-18 DIAGNOSIS — M85.80 OSTEOPENIA, UNSPECIFIED LOCATION: ICD-10-CM

## 2025-03-18 DIAGNOSIS — I70.0 AORTIC ATHEROSCLEROSIS: ICD-10-CM

## 2025-03-18 DIAGNOSIS — F01.C4: ICD-10-CM

## 2025-03-18 DIAGNOSIS — E78.00 PURE HYPERCHOLESTEROLEMIA: ICD-10-CM

## 2025-03-18 DIAGNOSIS — F01.C4: Primary | ICD-10-CM

## 2025-03-18 DIAGNOSIS — I77.1 TORTUOUS AORTA: ICD-10-CM

## 2025-03-18 DIAGNOSIS — E03.9 HYPOTHYROIDISM, UNSPECIFIED TYPE: ICD-10-CM

## 2025-03-18 DIAGNOSIS — Z93.3 COLOSTOMY IN PLACE: ICD-10-CM

## 2025-03-18 DIAGNOSIS — R60.9 EDEMA, UNSPECIFIED TYPE: ICD-10-CM

## 2025-03-18 DIAGNOSIS — N30.90 CYSTITIS: ICD-10-CM

## 2025-03-18 LAB
ALBUMIN SERPL BCP-MCNC: 3.7 G/DL (ref 3.5–5.2)
ALP SERPL-CCNC: 91 U/L (ref 40–150)
ALT SERPL W/O P-5'-P-CCNC: 14 U/L (ref 10–44)
ANION GAP SERPL CALC-SCNC: 11 MMOL/L (ref 8–16)
AST SERPL-CCNC: 21 U/L (ref 10–40)
BASOPHILS # BLD AUTO: 0.04 K/UL (ref 0–0.2)
BASOPHILS NFR BLD: 0.6 % (ref 0–1.9)
BILIRUB SERPL-MCNC: 0.4 MG/DL (ref 0.1–1)
BNP SERPL-MCNC: 125 PG/ML (ref 0–99)
BUN SERPL-MCNC: 9 MG/DL (ref 8–23)
CALCIUM SERPL-MCNC: 9.4 MG/DL (ref 8.7–10.5)
CHLORIDE SERPL-SCNC: 103 MMOL/L (ref 95–110)
CHOLEST SERPL-MCNC: 210 MG/DL (ref 120–199)
CHOLEST/HDLC SERPL: 3.9 {RATIO} (ref 2–5)
CO2 SERPL-SCNC: 24 MMOL/L (ref 23–29)
CREAT SERPL-MCNC: 1 MG/DL (ref 0.5–1.4)
DIFFERENTIAL METHOD BLD: ABNORMAL
EOSINOPHIL # BLD AUTO: 0.2 K/UL (ref 0–0.5)
EOSINOPHIL NFR BLD: 2.5 % (ref 0–8)
ERYTHROCYTE [DISTWIDTH] IN BLOOD BY AUTOMATED COUNT: 14.6 % (ref 11.5–14.5)
EST. GFR  (NO RACE VARIABLE): 55.9 ML/MIN/1.73 M^2
GLUCOSE SERPL-MCNC: 88 MG/DL (ref 70–110)
HCT VFR BLD AUTO: 43 % (ref 37–48.5)
HDLC SERPL-MCNC: 54 MG/DL (ref 40–75)
HDLC SERPL: 25.7 % (ref 20–50)
HGB BLD-MCNC: 14 G/DL (ref 12–16)
IMM GRANULOCYTES # BLD AUTO: 0.03 K/UL (ref 0–0.04)
IMM GRANULOCYTES NFR BLD AUTO: 0.4 % (ref 0–0.5)
LDLC SERPL CALC-MCNC: 130 MG/DL (ref 63–159)
LYMPHOCYTES # BLD AUTO: 2.2 K/UL (ref 1–4.8)
LYMPHOCYTES NFR BLD: 30.7 % (ref 18–48)
MCH RBC QN AUTO: 27.7 PG (ref 27–31)
MCHC RBC AUTO-ENTMCNC: 32.6 G/DL (ref 32–36)
MCV RBC AUTO: 85 FL (ref 82–98)
MONOCYTES # BLD AUTO: 0.8 K/UL (ref 0.3–1)
MONOCYTES NFR BLD: 10.5 % (ref 4–15)
NEUTROPHILS # BLD AUTO: 3.9 K/UL (ref 1.8–7.7)
NEUTROPHILS NFR BLD: 55.3 % (ref 38–73)
NONHDLC SERPL-MCNC: 156 MG/DL
NRBC BLD-RTO: 0 /100 WBC
PLATELET # BLD AUTO: 233 K/UL (ref 150–450)
PMV BLD AUTO: 10.1 FL (ref 9.2–12.9)
POTASSIUM SERPL-SCNC: 4.1 MMOL/L (ref 3.5–5.1)
PROT SERPL-MCNC: 7.2 G/DL (ref 6–8.4)
RBC # BLD AUTO: 5.06 M/UL (ref 4–5.4)
SODIUM SERPL-SCNC: 138 MMOL/L (ref 136–145)
TRIGL SERPL-MCNC: 130 MG/DL (ref 30–150)
WBC # BLD AUTO: 7.11 K/UL (ref 3.9–12.7)

## 2025-03-18 PROCEDURE — 3075F SYST BP GE 130 - 139MM HG: CPT | Mod: CPTII,S$GLB,, | Performed by: FAMILY MEDICINE

## 2025-03-18 PROCEDURE — 80053 COMPREHEN METABOLIC PANEL: CPT | Performed by: FAMILY MEDICINE

## 2025-03-18 PROCEDURE — 1159F MED LIST DOCD IN RCRD: CPT | Mod: CPTII,S$GLB,, | Performed by: FAMILY MEDICINE

## 2025-03-18 PROCEDURE — 99999 PR PBB SHADOW E&M-EST. PATIENT-LVL V: CPT | Mod: PBBFAC,,, | Performed by: FAMILY MEDICINE

## 2025-03-18 PROCEDURE — 85025 COMPLETE CBC W/AUTO DIFF WBC: CPT | Performed by: FAMILY MEDICINE

## 2025-03-18 PROCEDURE — 1101F PT FALLS ASSESS-DOCD LE1/YR: CPT | Mod: CPTII,S$GLB,, | Performed by: FAMILY MEDICINE

## 2025-03-18 PROCEDURE — 36415 COLL VENOUS BLD VENIPUNCTURE: CPT | Mod: PN | Performed by: FAMILY MEDICINE

## 2025-03-18 PROCEDURE — 3079F DIAST BP 80-89 MM HG: CPT | Mod: CPTII,S$GLB,, | Performed by: FAMILY MEDICINE

## 2025-03-18 PROCEDURE — 3288F FALL RISK ASSESSMENT DOCD: CPT | Mod: CPTII,S$GLB,, | Performed by: FAMILY MEDICINE

## 2025-03-18 PROCEDURE — G2211 COMPLEX E/M VISIT ADD ON: HCPCS | Mod: S$GLB,,, | Performed by: FAMILY MEDICINE

## 2025-03-18 PROCEDURE — 1126F AMNT PAIN NOTED NONE PRSNT: CPT | Mod: CPTII,S$GLB,, | Performed by: FAMILY MEDICINE

## 2025-03-18 PROCEDURE — 80061 LIPID PANEL: CPT | Performed by: FAMILY MEDICINE

## 2025-03-18 PROCEDURE — 83880 ASSAY OF NATRIURETIC PEPTIDE: CPT | Performed by: FAMILY MEDICINE

## 2025-03-18 PROCEDURE — 99215 OFFICE O/P EST HI 40 MIN: CPT | Mod: S$GLB,,, | Performed by: FAMILY MEDICINE

## 2025-03-18 NOTE — PROGRESS NOTES
"Chief Complaint  Chief Complaint   Patient presents with    Annual Exam       HPI  Irlanda Lopez is a 84 y.o. female with multiple medical diagnoses as listed in the medical history and problem list that presents for  in person visit.     History of Present Illness    CHIEF COMPLAINT:  - Patient presents for an annual Medicare wellness visit.    HPI:  Patient is an 84-year-old female with severe dementia Alzheimer's, presenting for her annual Medicare wellness visit. In December 2024, she was hospitalized for sepsis due to a liver abscess, treated with IV antibiotics and CT-guided drainage, and subsequently discharged to a skilled nursing facility for continued care. In January 2025, she was evaluated at urgent care with a urinary tract infection, treated with doxycycline and hydrocortisone cream. Her urinalysis showed E. coli resistant to many antibiotics. Following treatment, she had improvement in confusion and skin condition.    Her recent medical encounters include an urgent care evaluation on January 27th for a rash and potential urinary tract infection, an evaluation by GI specialist Miss Sulema Dunacn on January 16th, and an appointment with her urologist, Dr. Matute, on January 9th. Dr. Matute noted her recent urine culture showed a relatively low bacterial count (10,000 to 50,000 CFUs/mL), which did not warrant treatment as it was below the concerning threshold of 100,000 CFUs/mL.    She currently resides in an assisted living facility called "[facility]" in [place], where she participates in activities such as chair exercises and arts and crafts. She reports satisfaction with the facility and the care she receives. Her primary complaint is forgetfulness, which fluctuates in severity. She has immediate memory loss after speaking.    Her caregiver mentioned a mole on her back that had been previously checked by a dermatologist and deemed not concerning. There was a red spot on her skin that had " "initially been brighter red but was now improving and drying up.    MEDICATIONS:  - Atorvastatin, for cholesterol management  - Hydrocortisone cream, as needed for skin irritation  - Vitamin D supplement (OTC), daily    PMH:  - Dementia: Severe, Alzheimer's type  - Vascular dementia: With severe anxiety  - GERD  - Aortic atherosclerosis  - Hypertension  - Hyperlipidemia  - Tortuous aorta  - Hypothyroidism  - Osteopenia  - Recurrent cystitis    RECENT/REMOTE SURGICAL HISTORY:  - Colostomy: Date not specified, indication not mentioned         No questionnaires on file.       Pmh, Psh, Family Hx, Social Hx, HM updated in Epic Tabs today.    Review of Systems   Constitutional:  Negative for activity change, appetite change, fatigue, fever and unexpected weight change.   HENT:  Negative for trouble swallowing and voice change.    Respiratory:  Negative for cough and shortness of breath.    Cardiovascular:  Negative for chest pain and palpitations.   Gastrointestinal:  Negative for abdominal distention and abdominal pain.   Genitourinary:  Negative for dysuria, flank pain, frequency and urgency.   Musculoskeletal:  Negative for arthralgias and back pain.   Psychiatric/Behavioral:  Positive for confusion. Negative for agitation, behavioral problems, dysphoric mood and sleep disturbance. The patient is not nervous/anxious.         Objective:     Vitals:    03/18/25 1024   BP: 132/82   BP Location: Right arm   Patient Position: Sitting   Pulse: 68   Resp: 18   Temp: 97 °F (36.1 °C)   TempSrc: Tympanic   SpO2: 97%   Weight: 70.2 kg (154 lb 14 oz)   Height: 5' 2" (1.575 m)     Wt Readings from Last 10 Encounters:   03/18/25 70.2 kg (154 lb 14 oz)   01/27/25 69.5 kg (153 lb 3.5 oz)   01/16/25 68.9 kg (151 lb 14.4 oz)   01/09/25 69.2 kg (152 lb 8.9 oz)   12/18/24 72.1 kg (158 lb 15.2 oz)   12/11/24 71.2 kg (157 lb)   11/26/24 71 kg (156 lb 8.4 oz)   11/04/24 70.8 kg (156 lb)   10/17/24 71 kg (156 lb 8.4 oz)   09/06/24 71.6 kg " (157 lb 13.6 oz)     Physical Exam    Extremities: Left leg swelling greater than right. Right leg swelling greater than left.  TEST RESULTS:  - Urinalysis: March 20, 2023, E. coli resistant to many antibiotics, greater than 100,000 CFUs/mL  - Urinalysis: January 2025, E. coli resistant to many antibiotics, 10,000-50,000 CFUs/mL  - Thyroid test: December 2024, normal  - Kidney function test: December 2024, GFR within normal range  - White blood cell count: December 2024, elevated (while treating abscess and infection)  IMAGING:  - CT-guided drainage: December 2024, for liver abscess       Physical Exam  Vitals and nursing note reviewed.   Constitutional:       General: She is awake.      Appearance: Normal appearance. She is well-developed, well-groomed and normal weight.   HENT:      Head: Normocephalic and atraumatic.      Right Ear: External ear normal.      Left Ear: External ear normal.      Nose: Nose normal.   Eyes:      Conjunctiva/sclera: Conjunctivae normal.   Neck:      Thyroid: No thyromegaly or thyroid tenderness.   Cardiovascular:      Rate and Rhythm: Normal rate and regular rhythm.      Heart sounds: Normal heart sounds.   Pulmonary:      Effort: Pulmonary effort is normal. No accessory muscle usage.      Breath sounds: Normal breath sounds.   Abdominal:      General: There is no distension.      Tenderness: There is no abdominal tenderness.      Comments: Colostomy in place    Musculoskeletal:      Cervical back: Normal range of motion and neck supple.   Skin:     Comments: Sk on back    Neurological:      Mental Status: She is alert. Mental status is at baseline.   Psychiatric:         Attention and Perception: Attention normal.         Mood and Affect: Mood normal.         Speech: Speech normal.         Behavior: Behavior normal. Behavior is cooperative.         Cognition and Memory: Cognition is impaired. Memory is impaired. She exhibits impaired recent memory and impaired remote memory.          Assessment:   LABS:   Lab Results   Component Value Date    HGBA1C 5.3 01/10/2024    HGBA1C 5.0 06/20/2023    HGBA1C 5.2 06/09/2022      Lab Results   Component Value Date    CHOL 159 01/10/2024    CHOL 166 06/20/2023    CHOL 154 06/09/2022     Lab Results   Component Value Date    LDLCALC 103.8 01/10/2024    LDLCALC 111.0 06/20/2023    LDLCALC 104.6 06/09/2022     Lab Results   Component Value Date    WBC 7.48 12/23/2024    HGB 10.7 (L) 12/23/2024    HCT 33.4 (L) 12/23/2024     12/23/2024    CHOL 159 01/10/2024    TRIG 86 01/10/2024    HDL 38 (L) 01/10/2024    ALT 23 12/23/2024    AST 29 12/23/2024     (L) 12/23/2024    K 3.6 12/23/2024     12/23/2024    CREATININE 0.8 12/23/2024    BUN 19 12/23/2024    CO2 23 12/23/2024    TSH 2.163 12/17/2024    INR 1.1 12/17/2024    HGBA1C 5.3 01/10/2024       Plan:   Assessment & Plan    F01.C4 Vascular dementia, severe, with anxiety  Z93.3 Colostomy in place  G30.9, F02.818 Major neurocognitive disorder due to Alzheimer's disease, with behavioral disturbance  Z86.73 History of lacunar CVA  F41.1 MARTIN (generalized anxiety disorder)  I70.0 Aortic atherosclerosis  I10 Essential hypertension  E78.00 Pure hypercholesterolemia  I77.1 Tortuous aorta  E03.9 Hypothyroidism, unspecified type  M85.80 Osteopenia, unspecified location  N30.90 Cystitis  R60.9 Edema, unspecified type    IMPRESSION:  - Assessed recent hospitalization for liver abscess and subsequent care.  - Evaluated recent urinary tract infection, noting E. coli resistance to many antibiotics.  - Considered dementia and associated memory issues.  - Examined skin lesions:  - Identified seborrheic keratosis (age spots) as benign.  - Assessed potentially infected follicle/cyst on back, recommending conservative management.  - Noted mild leg swelling, potentially due to increased salt intake.  - Determined thyroid function check unnecessary due to recent normal results in December.    PLAN SUMMARY:  -  Continue current medication for leg swelling  - Continue current blood pressure medication  - Continue atorvastatin at current dose  - Ordered fluid level assessment  - Ordered renal function test, liver function test, and complete blood count  - Ordered non-fasting lipid panel  - Recommend OTC vitamin D3 supplement  - Apply lotion or hydrocortisone cream to irritated skin areas  - Continue participation in assisted living facility activities  - Follow up in 6 months  - Follow up with Dr. Matute (urologist) on April 24th as scheduled    ESSENTIAL HYPERTENSION:  - Continue current blood pressure medication.  - Hold if morning systolic pressure is less than 100.    PURE HYPERLIPIDEMIA:  - Continue atorvastatin at current dose.  - Ordered non-fasting lipid panel, acknowledging potential elevation in triglycerides.    OSTEOPENIA, UNSPECIFIED LOCATION:  - Recommend OTC vitamin D3 supplement ([employer] or [employer] brands with yellow label and pharmacy verification symbol).    CYSTITIS:  - Explained urinalysis results interpretation, including CFU thresholds for treatment.  - Discussed antibiotic resistance and importance of appropriate use.  - Follow up with Dr. Matute (urologist) on April 24th as scheduled.    EDEMA, UNSPECIFIED TYPE:  - Continue current medication for leg swelling.  - Ordered fluid level assessment.    SKIN LESIONS AND DISORDERS:  - Apply lotion or hydrocortisone cream to irritated skin areas and monitor lesions, avoiding picking.  - Provided information on seborrheic keratosis (age spots) and their benign nature, along with potential causes and management of skin lesions.  - Contact office if skin lesion on back does not flatten within a month.    ASSISTED LIVING CARE:  - Continue participation in assisted living facility activities, including chair exercises and arts and crafts.    LABS:  - Ordered renal function test, liver function test, and complete blood count.    FOLLOW-UP:  -  Follow up in 6 months.       Irlanda was seen today for annual exam.    Diagnoses and all orders for this visit:    Vascular dementia, severe, with anxiety  -     Lipid Panel; Future  -     CBC Auto Differential; Future  -     Comprehensive Metabolic Panel; Future    Major neurocognitive disorder due to Alzheimer's disease, with behavioral disturbance  -     Lipid Panel; Future  -     CBC Auto Differential; Future  -     Comprehensive Metabolic Panel; Future    History of lacunar cerebrovascular accident (CVA)    MARTIN (generalized anxiety disorder)    Aortic atherosclerosis  -     Lipid Panel; Future  -     CBC Auto Differential; Future  -     Comprehensive Metabolic Panel; Future    Essential hypertension    Pure hypercholesterolemia    Tortuous aorta    Colostomy in place    Hypothyroidism, unspecified type    Osteopenia, unspecified location    Cystitis    Edema, unspecified type  -     BNP; Future        CT Abdomen Pelvis With IV Contrast Routine Oral Contrast  Narrative: EXAMINATION:  CT ABDOMEN PELVIS WITH IV CONTRAST    CLINICAL HISTORY:  Liver abscess -recheck;    TECHNIQUE:  Low dose axial images, sagittal and coronal reformations were obtained from the lung bases to the pubic symphysis following the IV administration of 100 mL of Omnipaque 350.  Oral contrast was not administered.    COMPARISON:  None    FINDINGS:  Heart: Normal size. No effusion.    Lung Bases: Clear.    Liver: Normal size and attenuation.  Segment 5 abscess decreased in size from prior without significant residual.  Drain remains in position.  Side hole likely projects just outside the liver.  Consider drain removal.  Trace perihepatic ascites.    Gallbladder: No calcified gallstones.    Bile Ducts: No dilatation.    Pancreas: No mass. No peripancreatic fat stranding.    Spleen: Normal.    Adrenals: Normal.    Kidneys/Ureters: Left kidney is grossly unremarkable with scarring upper pole.  Right kidney demonstrates severe renal cortical  thinning.  Double-J ureteral stent seen within the right sided collecting system and appears appropriately position.  Large stone seen within the renal pelvis measuring up to 3.5 cm.  No mass or  hydroureteronephrosis.    Bladder: Nonspecific wall thickening.    Reproductive organs: Suspect myomatous uterus.  Stable calcifications right ovary.    GI Tract/Mesentery: Small hiatal hernia.  No evidence of bowel obstruction or inflammation.  Post appendectomy.    Peritoneal Space: No ascites or free air.    Retroperitoneum: No significant adenopathy.    Chest/Abdominal wall: Nodule medial right breast measuring 18 mm.  Recommend diagnostic mammogram.    Peristomal hernia containing loops of bowel without obstruction left hemiabdomen.  Small right inguinal hernia.    Vasculature: No aneurysm.    Bones: No acute fracture.  Diffuse osteopenia and moderate scattered degenerative change.  No suspicious lytic or sclerotic lesions.  Impression: Segment 5 abscess decreased in size from prior without significant residual. Drain remains in position. Side hole likely projects just outside the liver. Consider drain removal.    All CT scans at this facility use dose modulation, iterative reconstruction, and/or weight based dosing when appropriate to reduce radiation dose to as low as reasonable achievable.    Electronically signed by: Brandon Gabriel MD  Date:    12/23/2024  Time:    11:47    The ASCVD Risk score ([place] DK, et al., 2019) failed to calculate for the following reasons:    The 2019 ASCVD risk score is only valid for ages 40 to 79    Follow-up: Follow up in about 6 months (around 9/18/2025) for f/u OV Dr. Arias.    I spent a total of   40    minutes face to face and non-face to face on the date of this visit.This includes time preparing to see the patient (eg, review of tests, notes), obtaining and/or reviewing additional history from an independent historian and/or outside medical records, documenting clinical  information in the electronic health record, independently interpreting results and/or communicating results to the patient/family/caregiver, or care coordinator.  Visit today included increased complexity associated with the care of the episodic problem addressed and managing the longitudinal care of the patient due to the serious and/or complex managed problem(s).    This note was generated with the assistance of ambient listening technology. Verbal consent was obtained by the patient and accompanying visitor(s) for the recording of patient appointment to facilitate this note. I attest to having reviewed and edited the generated note for accuracy, though some syntax or spelling errors may persist. Please contact the author of this note for any clarification.       There are no Patient Instructions on file for this visit.

## 2025-03-20 ENCOUNTER — PATIENT MESSAGE (OUTPATIENT)
Dept: PRIMARY CARE CLINIC | Facility: CLINIC | Age: 85
End: 2025-03-20
Payer: MEDICARE

## 2025-03-24 DIAGNOSIS — Z00.00 ENCOUNTER FOR MEDICARE ANNUAL WELLNESS EXAM: ICD-10-CM

## 2025-03-25 ENCOUNTER — TELEPHONE (OUTPATIENT)
Dept: UROLOGY | Facility: CLINIC | Age: 85
End: 2025-03-25
Payer: MEDICARE

## 2025-03-25 ENCOUNTER — PATIENT MESSAGE (OUTPATIENT)
Dept: UROLOGY | Facility: CLINIC | Age: 85
End: 2025-03-25
Payer: MEDICARE

## 2025-03-25 ENCOUNTER — PATIENT MESSAGE (OUTPATIENT)
Dept: PRIMARY CARE CLINIC | Facility: CLINIC | Age: 85
End: 2025-03-25
Payer: MEDICARE

## 2025-03-25 ENCOUNTER — RESULTS FOLLOW-UP (OUTPATIENT)
Dept: PRIMARY CARE CLINIC | Facility: CLINIC | Age: 85
End: 2025-03-25

## 2025-03-25 DIAGNOSIS — R30.0 DYSURIA: Primary | ICD-10-CM

## 2025-03-25 NOTE — PROGRESS NOTES
Irlanda Lopez,    Your labs have been reviewed. Your cholesterol, sugar,  blood counts, kidney functions, liver functions, are within acceptable ranges for your age and conditions. BNP is fluid marker from the heart and may explain why she was having a bit more leg swelling than before. This is why she takes the furosemide (Lasix) 20mg daily to help remove fluid/build up in the legs.   Any other abnormalities that you see at this time are not indicative of needing additional workup or concern.  Please continue on your current treatment plan. Please let me know if you have further questions.     Myla Arias MD

## 2025-03-26 ENCOUNTER — LAB VISIT (OUTPATIENT)
Dept: LAB | Facility: HOSPITAL | Age: 85
End: 2025-03-26
Attending: UROLOGY
Payer: MEDICARE

## 2025-03-26 DIAGNOSIS — R30.0 DYSURIA: ICD-10-CM

## 2025-03-26 PROCEDURE — 87186 SC STD MICRODIL/AGAR DIL: CPT

## 2025-03-29 LAB — BACTERIA UR CULT: ABNORMAL

## 2025-03-30 ENCOUNTER — RESULTS FOLLOW-UP (OUTPATIENT)
Dept: UROLOGY | Facility: CLINIC | Age: 85
End: 2025-03-30

## 2025-03-31 ENCOUNTER — PATIENT MESSAGE (OUTPATIENT)
Dept: UROLOGY | Facility: CLINIC | Age: 85
End: 2025-03-31
Payer: MEDICARE

## 2025-04-01 RX ORDER — SULFAMETHOXAZOLE AND TRIMETHOPRIM 800; 160 MG/1; MG/1
1 TABLET ORAL 2 TIMES DAILY
Qty: 20 TABLET | Refills: 0 | Status: SHIPPED | OUTPATIENT
Start: 2025-04-01 | End: 2025-04-11

## 2025-04-02 ENCOUNTER — DOCUMENT SCAN (OUTPATIENT)
Dept: HOME HEALTH SERVICES | Facility: HOSPITAL | Age: 85
End: 2025-04-02
Payer: MEDICARE

## 2025-04-17 ENCOUNTER — PATIENT MESSAGE (OUTPATIENT)
Dept: UROLOGY | Facility: CLINIC | Age: 85
End: 2025-04-17
Payer: MEDICARE

## 2025-04-23 ENCOUNTER — HOSPITAL ENCOUNTER (OUTPATIENT)
Dept: RADIOLOGY | Facility: HOSPITAL | Age: 85
Discharge: HOME | End: 2025-04-23
Attending: UROLOGY
Payer: MEDICARE

## 2025-04-23 DIAGNOSIS — N20.0 RENAL STONE: ICD-10-CM

## 2025-04-23 PROCEDURE — 76770 US EXAM ABDO BACK WALL COMP: CPT | Mod: 26,,, | Performed by: RADIOLOGY

## 2025-04-23 PROCEDURE — 74018 RADEX ABDOMEN 1 VIEW: CPT | Mod: 26,,, | Performed by: RADIOLOGY

## 2025-04-23 PROCEDURE — 76770 US EXAM ABDO BACK WALL COMP: CPT | Mod: TC,PN

## 2025-04-23 PROCEDURE — 74018 RADEX ABDOMEN 1 VIEW: CPT | Mod: TC,PN

## 2025-04-24 ENCOUNTER — OFFICE VISIT (OUTPATIENT)
Dept: UROLOGY | Facility: CLINIC | Age: 85
End: 2025-04-24
Payer: MEDICARE

## 2025-04-24 ENCOUNTER — PATIENT MESSAGE (OUTPATIENT)
Dept: UROLOGY | Facility: CLINIC | Age: 85
End: 2025-04-24

## 2025-04-24 VITALS — HEIGHT: 62 IN | WEIGHT: 154.31 LBS | BODY MASS INDEX: 28.39 KG/M2

## 2025-04-24 DIAGNOSIS — N20.0 RENAL STONE: Primary | ICD-10-CM

## 2025-04-24 DIAGNOSIS — Z01.818 PRE-OP TESTING: ICD-10-CM

## 2025-04-24 DIAGNOSIS — K75.0 LIVER ABSCESS: ICD-10-CM

## 2025-04-24 DIAGNOSIS — R30.0 DYSURIA: ICD-10-CM

## 2025-04-24 PROCEDURE — 99999 PR PBB SHADOW E&M-EST. PATIENT-LVL V: CPT | Mod: PBBFAC,,, | Performed by: UROLOGY

## 2025-04-24 RX ORDER — CLINDAMYCIN PHOSPHATE 900 MG/50ML
900 INJECTION, SOLUTION INTRAVENOUS
OUTPATIENT
Start: 2025-04-24

## 2025-04-24 NOTE — H&P (VIEW-ONLY)
Chief Complaint:   Encounter Diagnoses   Name Primary?    Renal stone Yes    Dysuria     Liver abscess        HPI:   4/24/25- schedule routine stent exchange, no evidence of infection.    7/18/20- 79-year-old female who presents with severe abdominal pain and discomfort, she is being evaluated for colonic impaction diverticulitis.  She is seen to have an extremely large right renal pelvis stone.  This had been seen prior, and she was followed by partner in regards to possible surgery for this.  Patient though it is determined not pursuing not been seen in the last couple of years.  Patient is having upper abdominal pain, no colic at this point.  Patient has had no previous urological history per her and her son's report, her daughter is her primary care take care, she is currently not present at the bedside.  Family history of stones, no urological cancers per the family.  10/17/18: Mag3 shows 72/38 L/R function. -UCx last visit.  PCNL is the only viable treatment option; observation not recommended.  10/8/18: 79 yo woman referred for renal stone after workup for UTI/diverticulitis.  Having diarrhea on meds for diverticulitis.  No abd/pelvic pain and no exac/rel factors.  No hematuria.  No prior urolithiasis.  No urinary bother.  No  history.  Normal sexual function but inactive.  Some memory problems but functional at home.    Allergies:  Omnicef [cefdinir]    Medications:  has a current medication list which includes the following prescription(s): acetaminophen, atorvastatin, bupropion, buspirone, carboxymethylcellulose, cholecalciferol (vitamin d3), dicyclomine, famotidine, folic acid-vit b6-vit b12 2.5-25-2 mg, furosemide, hyoscyamine, memantine, multivitamin, ondansetron, phenazopyridine, polyethylene glycol, potassium chloride, risperidone, saccharomyces boulardii, thyroid (pork), hydrocortisone, and [DISCONTINUED] triamcinolone acetonide 0.1%.    Review of Systems:  General: No fever, chills, fatigability,  or weight loss.  Skin: No rashes, itching, or changes in color or texture of skin.  Chest: Denies JESUS, cyanosis, wheezing, cough, and sputum production.  Abdomen: Appetite fine. No weight loss. Denies diarrhea, abdominal pain, hematemesis, or blood in stool.  Musculoskeletal: No joint stiffness or swelling. Denies back pain.  : As above.  All other review of systems negative.    PMH:   has a past medical history of Clostridium difficile colitis, Dementia, Diverticulitis, High cholesterol, Hypertension, Hypothyroidism, and Kidney stones.    PSH:   has a past surgical history that includes Cataract extraction; Appendectomy (2008); Colonoscopy (N/A, 01/02/2019); Colostomy (Left, 01/02/2019); Maribel procedure (N/A, 01/02/2019); Lysis of adhesions (N/A, 01/02/2019); Colon surgery; Vitrectomy (Right, 09/2013); Colonoscopy (N/A, 06/07/2019); Esophagogastroduodenoscopy (N/A, 09/16/2020); Colonoscopy (N/A, 06/23/2021); Cystoscopy with ureteroscopy, retrograde pyelography, and insertion of stent (Right, 08/23/2022); Cystoureteroscopy with retrograde pyelography and insertion of stent into ureter (Right, 11/08/2022); cystoureteroscopy, with holmium laser lithotripsy of ureteral calculus and stent insertion (Right, 04/04/2023); Cystoscopy with ureteroscopy, retrograde pyelography, and insertion of stent (Right, 11/07/2023); cystoureteroscopy,with holmium laser lithotripsy of ureteral calculus (Right, 11/07/2023); Eye surgery (?? Dr Raygoza); Cystoscopy with ureteroscopy, retrograde pyelography, and insertion of stent (Right, 5/14/2024); Cystoscopic litholapaxy (Right, 5/14/2024); cystoureteroscopy, with holmium laser lithotripsy of ureteral calculus and stent insertion (Right, 11/5/2024); Cystoscopy w/ ureteral stent removal (Right, 11/5/2024); and Retrograde pyelography (Right, 11/5/2024).    FamHx: family history includes Alzheimer's disease in her mother; Aneurysm in her father; Bladder Cancer in her grandchild; Parkinsonism  in her brother; Stomach cancer in her brother.    SocHx:  reports that she has quit smoking. Her smoking use included cigarettes. She has never been exposed to tobacco smoke. She has never used smokeless tobacco. She reports that she does not drink alcohol and does not use drugs.      Physical Exam:  There were no vitals filed for this visit.  General: A&Ox3, no apparent distress, no deformities  Neck: No masses, normal ROM  Lungs: normal inspiration, no use of accessory muscles  Heart: normal pulse, no arrhythmias  Abdomen: Soft, NT, ND, no masses, no hernias, no hepatosplenomegaly  Skin: The skin is warm and dry. No jaundice.  Ext: No c/c/e.  : No pelvic floor prolapse.  Normal introitus, no urethral abnomralities. No Perineal abnormalities.    Labs/Studies:   Ucx E.Coli 2/23  KUB/FIDEL persistent right renal stone, right stent, no hydro 4/25  CT with contrast liver abscess reduced with drain in place, right stent and stone stable 12/24  CT large right renal pelvis stone 8/22  Lasix renogram 23% right/77% left 9/22  Creatinine 1.0 3/25     Impression/Plan:       1. Right renal stone-  right stent with vesicolithalopaxy around distal coil  11/5/24, 5/14/24, 11/28/23,  right ureteroscopy  4/4/23,  failed PCN  1/10/23,  right stent  11/8/22, 8/23/22    Stones appear to be stable, patient is due for her routine stent exchange.  No evidence of UTI.  Will pursue cystoscopy, right retrograde and right stent exchange.  Please see below in regards to our discussion today.  Call with any other issues in the meantime.    2. Dysuria- doing well with no recurrence.    3. Liver abscess- no evidence of recurrence per clinical history.      Patient understands the risks, benefits and alternatives of the above-stated procedure.  These include but not limited to damage to the surrounding structures including the urethra, ureters and bladder.  Risk of perforation requiring open procedures, Reeys catheterization at the conclusion of  the procedure.  Risk of need for further surgeries.  Risk of pain, hematuria, infections.  Risk of heart attack, stroke, death, DVT and PE.  Patient understanding of all the above he has elected to pursue.

## 2025-04-24 NOTE — PROGRESS NOTES
Chief Complaint:   Encounter Diagnoses   Name Primary?    Renal stone Yes    Dysuria     Liver abscess        HPI:   4/24/25- schedule routine stent exchange, no evidence of infection.    7/18/20- 79-year-old female who presents with severe abdominal pain and discomfort, she is being evaluated for colonic impaction diverticulitis.  She is seen to have an extremely large right renal pelvis stone.  This had been seen prior, and she was followed by partner in regards to possible surgery for this.  Patient though it is determined not pursuing not been seen in the last couple of years.  Patient is having upper abdominal pain, no colic at this point.  Patient has had no previous urological history per her and her son's report, her daughter is her primary care take care, she is currently not present at the bedside.  Family history of stones, no urological cancers per the family.  10/17/18: Mag3 shows 72/38 L/R function. -UCx last visit.  PCNL is the only viable treatment option; observation not recommended.  10/8/18: 77 yo woman referred for renal stone after workup for UTI/diverticulitis.  Having diarrhea on meds for diverticulitis.  No abd/pelvic pain and no exac/rel factors.  No hematuria.  No prior urolithiasis.  No urinary bother.  No  history.  Normal sexual function but inactive.  Some memory problems but functional at home.    Allergies:  Omnicef [cefdinir]    Medications:  has a current medication list which includes the following prescription(s): acetaminophen, atorvastatin, bupropion, buspirone, carboxymethylcellulose, cholecalciferol (vitamin d3), dicyclomine, famotidine, folic acid-vit b6-vit b12 2.5-25-2 mg, furosemide, hyoscyamine, memantine, multivitamin, ondansetron, phenazopyridine, polyethylene glycol, potassium chloride, risperidone, saccharomyces boulardii, thyroid (pork), hydrocortisone, and [DISCONTINUED] triamcinolone acetonide 0.1%.    Review of Systems:  General: No fever, chills, fatigability,  or weight loss.  Skin: No rashes, itching, or changes in color or texture of skin.  Chest: Denies JESUS, cyanosis, wheezing, cough, and sputum production.  Abdomen: Appetite fine. No weight loss. Denies diarrhea, abdominal pain, hematemesis, or blood in stool.  Musculoskeletal: No joint stiffness or swelling. Denies back pain.  : As above.  All other review of systems negative.    PMH:   has a past medical history of Clostridium difficile colitis, Dementia, Diverticulitis, High cholesterol, Hypertension, Hypothyroidism, and Kidney stones.    PSH:   has a past surgical history that includes Cataract extraction; Appendectomy (2008); Colonoscopy (N/A, 01/02/2019); Colostomy (Left, 01/02/2019); Maribel procedure (N/A, 01/02/2019); Lysis of adhesions (N/A, 01/02/2019); Colon surgery; Vitrectomy (Right, 09/2013); Colonoscopy (N/A, 06/07/2019); Esophagogastroduodenoscopy (N/A, 09/16/2020); Colonoscopy (N/A, 06/23/2021); Cystoscopy with ureteroscopy, retrograde pyelography, and insertion of stent (Right, 08/23/2022); Cystoureteroscopy with retrograde pyelography and insertion of stent into ureter (Right, 11/08/2022); cystoureteroscopy, with holmium laser lithotripsy of ureteral calculus and stent insertion (Right, 04/04/2023); Cystoscopy with ureteroscopy, retrograde pyelography, and insertion of stent (Right, 11/07/2023); cystoureteroscopy,with holmium laser lithotripsy of ureteral calculus (Right, 11/07/2023); Eye surgery (?? Dr Raygoza); Cystoscopy with ureteroscopy, retrograde pyelography, and insertion of stent (Right, 5/14/2024); Cystoscopic litholapaxy (Right, 5/14/2024); cystoureteroscopy, with holmium laser lithotripsy of ureteral calculus and stent insertion (Right, 11/5/2024); Cystoscopy w/ ureteral stent removal (Right, 11/5/2024); and Retrograde pyelography (Right, 11/5/2024).    FamHx: family history includes Alzheimer's disease in her mother; Aneurysm in her father; Bladder Cancer in her grandchild; Parkinsonism  in her brother; Stomach cancer in her brother.    SocHx:  reports that she has quit smoking. Her smoking use included cigarettes. She has never been exposed to tobacco smoke. She has never used smokeless tobacco. She reports that she does not drink alcohol and does not use drugs.      Physical Exam:  There were no vitals filed for this visit.  General: A&Ox3, no apparent distress, no deformities  Neck: No masses, normal ROM  Lungs: normal inspiration, no use of accessory muscles  Heart: normal pulse, no arrhythmias  Abdomen: Soft, NT, ND, no masses, no hernias, no hepatosplenomegaly  Skin: The skin is warm and dry. No jaundice.  Ext: No c/c/e.  : No pelvic floor prolapse.  Normal introitus, no urethral abnomralities. No Perineal abnormalities.    Labs/Studies:   Ucx E.Coli 2/23  KUB/FIDEL persistent right renal stone, right stent, no hydro 4/25  CT with contrast liver abscess reduced with drain in place, right stent and stone stable 12/24  CT large right renal pelvis stone 8/22  Lasix renogram 23% right/77% left 9/22  Creatinine 1.0 3/25     Impression/Plan:       1. Right renal stone-  right stent with vesicolithalopaxy around distal coil  11/5/24, 5/14/24, 11/28/23,  right ureteroscopy  4/4/23,  failed PCN  1/10/23,  right stent  11/8/22, 8/23/22    Stones appear to be stable, patient is due for her routine stent exchange.  No evidence of UTI.  Will pursue cystoscopy, right retrograde and right stent exchange.  Please see below in regards to our discussion today.  Call with any other issues in the meantime.    2. Dysuria- doing well with no recurrence.    3. Liver abscess- no evidence of recurrence per clinical history.      Patient understands the risks, benefits and alternatives of the above-stated procedure.  These include but not limited to damage to the surrounding structures including the urethra, ureters and bladder.  Risk of perforation requiring open procedures, Reyes catheterization at the conclusion of  the procedure.  Risk of need for further surgeries.  Risk of pain, hematuria, infections.  Risk of heart attack, stroke, death, DVT and PE.  Patient understanding of all the above he has elected to pursue.

## 2025-04-29 ENCOUNTER — HOSPITAL ENCOUNTER (OUTPATIENT)
Dept: CARDIOLOGY | Facility: HOSPITAL | Age: 85
Discharge: HOME | End: 2025-04-29
Attending: UROLOGY
Payer: MEDICARE

## 2025-04-29 DIAGNOSIS — Z01.818 PRE-OP TESTING: ICD-10-CM

## 2025-04-29 LAB
OHS QRS DURATION: 82 MS
OHS QTC CALCULATION: 430 MS

## 2025-04-29 PROCEDURE — 93005 ELECTROCARDIOGRAM TRACING: CPT

## 2025-04-29 PROCEDURE — 93010 ELECTROCARDIOGRAM REPORT: CPT | Mod: ,,, | Performed by: INTERNAL MEDICINE

## 2025-05-01 ENCOUNTER — EXTERNAL HOME HEALTH (OUTPATIENT)
Dept: HOME HEALTH SERVICES | Facility: HOSPITAL | Age: 85
End: 2025-05-01
Payer: MEDICARE

## 2025-05-01 RX ORDER — BUSPIRONE HYDROCHLORIDE 5 MG/1
5 TABLET ORAL DAILY
COMMUNITY

## 2025-05-01 RX ORDER — ZINC SULFATE 50(220)MG
220 CAPSULE ORAL DAILY
COMMUNITY

## 2025-05-05 ENCOUNTER — TELEPHONE (OUTPATIENT)
Dept: PREADMISSION TESTING | Facility: HOSPITAL | Age: 85
End: 2025-05-05
Payer: MEDICARE

## 2025-05-05 NOTE — TELEPHONE ENCOUNTER
Called and spoke with patient's daughter about the following:     Please arrive to [facility] at 9:45am on 5/06/25 for your scheduled procedure.  Address: [address] [place] [address] [place] [address] (2nd Building on left, 1st Floor Lobby)    !!!NO FOOD after midnight! You may have clear liquids up to 3 hrs before your arrival to the Hospital!!!  Clear liquids include Gatorade, water, soda, black coffee or tea (no milk or creamer), and clear juices.  Clear liquids do NOT include anything with pulp or food particles (Chicken broth, ice cream, yogurt, Jello, etc.)    Thank you,  -[facility] Pre [facility]mit [facility].  Mon-Fri 7:30 am - 4 pm [phone]

## 2025-05-06 ENCOUNTER — ANESTHESIA EVENT (OUTPATIENT)
Dept: SURGERY | Facility: HOSPITAL | Age: 85
End: 2025-05-06
Payer: MEDICARE

## 2025-05-06 ENCOUNTER — HOSPITAL ENCOUNTER (OUTPATIENT)
Facility: HOSPITAL | Age: 85
Discharge: HOME | End: 2025-05-06
Attending: UROLOGY | Admitting: UROLOGY
Payer: MEDICARE

## 2025-05-06 ENCOUNTER — ANESTHESIA (OUTPATIENT)
Dept: SURGERY | Facility: HOSPITAL | Age: 85
End: 2025-05-06
Payer: MEDICARE

## 2025-05-06 ENCOUNTER — TELEPHONE (OUTPATIENT)
Dept: UROLOGY | Facility: CLINIC | Age: 85
End: 2025-05-06
Payer: MEDICARE

## 2025-05-06 VITALS
OXYGEN SATURATION: 100 % | DIASTOLIC BLOOD PRESSURE: 78 MMHG | RESPIRATION RATE: 19 BRPM | SYSTOLIC BLOOD PRESSURE: 152 MMHG | HEART RATE: 62 BPM | WEIGHT: 152 LBS | BODY MASS INDEX: 28.7 KG/M2 | HEIGHT: 61 IN | TEMPERATURE: 97 F

## 2025-05-06 DIAGNOSIS — N20.0 RENAL STONE: Primary | ICD-10-CM

## 2025-05-06 PROCEDURE — 71000015 HC POSTOP RECOV 1ST HR: Performed by: UROLOGY

## 2025-05-06 PROCEDURE — 71000033 HC RECOVERY, INTIAL HOUR: Performed by: UROLOGY

## 2025-05-06 PROCEDURE — 63600175 PHARM REV CODE 636 W HCPCS: Performed by: NURSE ANESTHETIST, CERTIFIED REGISTERED

## 2025-05-06 PROCEDURE — 27201423 OPTIME MED/SURG SUP & DEVICES STERILE SUPPLY: Performed by: UROLOGY

## 2025-05-06 PROCEDURE — 27200651 HC AIRWAY, LMA: Performed by: ANESTHESIOLOGY

## 2025-05-06 PROCEDURE — C1769 GUIDE WIRE: HCPCS | Performed by: UROLOGY

## 2025-05-06 PROCEDURE — C1758 CATHETER, URETERAL: HCPCS | Performed by: UROLOGY

## 2025-05-06 PROCEDURE — C2617 STENT, NON-COR, TEM W/O DEL: HCPCS | Performed by: UROLOGY

## 2025-05-06 PROCEDURE — 37000008 HC ANESTHESIA 1ST 15 MINUTES: Performed by: UROLOGY

## 2025-05-06 PROCEDURE — 63600175 PHARM REV CODE 636 W HCPCS: Performed by: UROLOGY

## 2025-05-06 PROCEDURE — 37000009 HC ANESTHESIA EA ADD 15 MINS: Performed by: UROLOGY

## 2025-05-06 PROCEDURE — 52317 REMOVE BLADDER STONE: CPT | Mod: ,,, | Performed by: UROLOGY

## 2025-05-06 PROCEDURE — 36000707: Performed by: UROLOGY

## 2025-05-06 PROCEDURE — 52332 CYSTOSCOPY AND TREATMENT: CPT | Mod: 51,RT,, | Performed by: UROLOGY

## 2025-05-06 PROCEDURE — 74420 UROGRAPHY RTRGR +-KUB: CPT | Mod: 26,,, | Performed by: UROLOGY

## 2025-05-06 PROCEDURE — 36000706: Performed by: UROLOGY

## 2025-05-06 PROCEDURE — 25500020 PHARM REV CODE 255: Performed by: UROLOGY

## 2025-05-06 DEVICE — STENT URETERAL UNIV 8FR 26CM: Type: IMPLANTABLE DEVICE | Site: URETER | Status: FUNCTIONAL

## 2025-05-06 RX ORDER — LIDOCAINE HYDROCHLORIDE 10 MG/ML
INJECTION, SOLUTION EPIDURAL; INFILTRATION; INTRACAUDAL; PERINEURAL
Status: DISCONTINUED | OUTPATIENT
Start: 2025-05-06 | End: 2025-05-06

## 2025-05-06 RX ORDER — FENTANYL CITRATE 50 UG/ML
25 INJECTION, SOLUTION INTRAMUSCULAR; INTRAVENOUS EVERY 5 MIN PRN
Status: DISCONTINUED | OUTPATIENT
Start: 2025-05-06 | End: 2025-05-06 | Stop reason: HOSPADM

## 2025-05-06 RX ORDER — ONDANSETRON HYDROCHLORIDE 2 MG/ML
4 INJECTION, SOLUTION INTRAVENOUS ONCE AS NEEDED
Status: DISCONTINUED | OUTPATIENT
Start: 2025-05-06 | End: 2025-05-06 | Stop reason: HOSPADM

## 2025-05-06 RX ORDER — OXYCODONE AND ACETAMINOPHEN 5; 325 MG/1; MG/1
1 TABLET ORAL
Status: DISCONTINUED | OUTPATIENT
Start: 2025-05-06 | End: 2025-05-06 | Stop reason: HOSPADM

## 2025-05-06 RX ORDER — FENTANYL CITRATE 50 UG/ML
INJECTION, SOLUTION INTRAMUSCULAR; INTRAVENOUS
Status: DISCONTINUED | OUTPATIENT
Start: 2025-05-06 | End: 2025-05-06

## 2025-05-06 RX ORDER — CLINDAMYCIN PHOSPHATE 900 MG/50ML
900 INJECTION, SOLUTION INTRAVENOUS
Status: COMPLETED | OUTPATIENT
Start: 2025-05-06 | End: 2025-05-06

## 2025-05-06 RX ORDER — SODIUM CHLORIDE, SODIUM LACTATE, POTASSIUM CHLORIDE, CALCIUM CHLORIDE 600; 310; 30; 20 MG/100ML; MG/100ML; MG/100ML; MG/100ML
INJECTION, SOLUTION INTRAVENOUS CONTINUOUS PRN
Status: DISCONTINUED | OUTPATIENT
Start: 2025-05-06 | End: 2025-05-06

## 2025-05-06 RX ORDER — PROPOFOL 10 MG/ML
VIAL (ML) INTRAVENOUS
Status: DISCONTINUED | OUTPATIENT
Start: 2025-05-06 | End: 2025-05-06

## 2025-05-06 RX ORDER — ONDANSETRON HYDROCHLORIDE 2 MG/ML
4 INJECTION, SOLUTION INTRAVENOUS DAILY PRN
Status: DISCONTINUED | OUTPATIENT
Start: 2025-05-06 | End: 2025-05-06 | Stop reason: HOSPADM

## 2025-05-06 RX ORDER — SULFAMETHOXAZOLE AND TRIMETHOPRIM 800; 160 MG/1; MG/1
1 TABLET ORAL 2 TIMES DAILY
Qty: 20 TABLET | Refills: 0 | Status: SHIPPED | OUTPATIENT
Start: 2025-05-06

## 2025-05-06 RX ORDER — OXYCODONE AND ACETAMINOPHEN 5; 325 MG/1; MG/1
1 TABLET ORAL EVERY 4 HOURS PRN
Qty: 10 TABLET | Refills: 0 | Status: SHIPPED | OUTPATIENT
Start: 2025-05-06

## 2025-05-06 RX ORDER — ONDANSETRON HYDROCHLORIDE 2 MG/ML
INJECTION, SOLUTION INTRAVENOUS
Status: DISCONTINUED | OUTPATIENT
Start: 2025-05-06 | End: 2025-05-06

## 2025-05-06 RX ADMIN — GLYCOPYRROLATE 0.2 MG: 0.2 INJECTION, SOLUTION INTRAMUSCULAR; INTRAVITREAL at 11:05

## 2025-05-06 RX ADMIN — PROPOFOL 70 MG: 10 INJECTION, EMULSION INTRAVENOUS at 11:05

## 2025-05-06 RX ADMIN — CLINDAMYCIN PHOSPHATE 900 MG: 900 INJECTION, SOLUTION INTRAVENOUS at 11:05

## 2025-05-06 RX ADMIN — LIDOCAINE HYDROCHLORIDE 50 MG: 10 SOLUTION INTRAVENOUS at 11:05

## 2025-05-06 RX ADMIN — ONDANSETRON 4 MG: 2 INJECTION INTRAMUSCULAR; INTRAVENOUS at 11:05

## 2025-05-06 RX ADMIN — FENTANYL CITRATE 50 MCG: 50 INJECTION, SOLUTION INTRAMUSCULAR; INTRAVENOUS at 11:05

## 2025-05-06 RX ADMIN — SODIUM CHLORIDE, SODIUM LACTATE, POTASSIUM CHLORIDE, AND CALCIUM CHLORIDE: 600; 310; 30; 20 INJECTION, SOLUTION INTRAVENOUS at 11:05

## 2025-05-06 NOTE — ANESTHESIA PROCEDURE NOTES
Intubation    Date/Time: 5/6/2025 11:11 AM    Performed by: Lenard Vargas CRNA  Authorized by: Adalberto Oliver MD    Intubation:     Induction:  Intravenous    Intubated:  Postinduction    Mask Ventilation:  Not attempted    Attempts:  1    Attempted By:  CRNA    Difficult Airway Encountered?: No      Complications:  None    Airway Device:  Supraglottic airway/LMA    Airway Device Size:  3.0    Style/Cuff Inflation:  Cuffed (inflated to minimal occlusive pressure)    Secured at:  The lips    Placement Verified By:  Capnometry    Complicating Factors:  None    Findings Post-Intubation:  BS equal bilateral and atraumatic/condition of teeth unchanged

## 2025-05-06 NOTE — ANESTHESIA PREPROCEDURE EVALUATION
05/06/2025  Irlanda Lopez is a 84 y.o., female.      Pre-op Assessment    I have reviewed the Patient Summary Reports.     I have reviewed the Nursing Notes. I have reviewed the NPO Status.      Review of Systems  Anesthesia Hx:  No problems with previous Anesthesia                Hematology/Oncology:  Hematology Normal   Oncology Normal                                   Cardiovascular:     Hypertension                                          Renal/:  Chronic Renal Disease                Hepatic/GI:      Liver Disease,               Neurological:  Neurology Normal                                      Endocrine:   Hypothyroidism          Dermatological:  Skin Normal    Psych:  Psychiatric History                  Physical Exam  General: Well nourished, Cooperative, Alert and Oriented    Airway:  Mallampati: II / II  Mouth Opening: Normal  TM Distance: Normal  Tongue: Normal  Neck ROM: Normal ROM    Dental:  Intact      Patient Active Problem List   Diagnosis    Hypothyroidism    Essential hypertension    Mild cognitive impairment with memory loss    High serum vitamin D    Hyperlipidemia    Colostomy in place    Tortuous aorta    Renal stone    Dementia    MARTIN (generalized anxiety disorder)    Dysuria    Small bowel anastomotic dilation    Mass of right breast    Cystitis    Aortic atherosclerosis    Osteopenia    Urge urinary incontinence    History of lacunar cerebrovascular accident (CVA)    Major neurocognitive disorder due to Alzheimer's disease, with behavioral disturbance    Anemia    Liver abscess    Vascular dementia, severe, with anxiety         Anesthesia Plan  Type of Anesthesia, risks & benefits discussed:    Anesthesia Type: Gen ETT, Gen Supraglottic Airway  Intra-op Monitoring Plan: Standard ASA Monitors  Post Op Pain Control Plan: multimodal analgesia  Induction:  IV  Airway Plan:  Direct  Informed Consent: Informed consent signed with the Patient and all parties understand the risks and agree with anesthesia plan.  All questions answered.   ASA Score: 3  Day of Surgery Review of History & Physical: H&P Update referred to the surgeon/provider.I have interviewed and examined the patient. I have reviewed the patient's H&P dated: There are no significant changes. H&P completed by Anesthesiologist.    Ready For Surgery From Anesthesia Perspective.     .

## 2025-05-06 NOTE — TELEPHONE ENCOUNTER
----- Message from Anselmo Matute MD sent at 5/6/2025 12:10 PM CDT -----  done  ----- Message -----  From: Tavia Maldonado  Sent: 5/6/2025  12:09 PM CDT  To: Anselmo Matute MD    Please place orders Thanks  ----- Message -----  From: Anselmo Matute MD  Sent: 5/6/2025  12:00 PM CDT  To: Tavia Maldonado    4-5 months with a KUB and renal ultrasound, thank you

## 2025-05-06 NOTE — DISCHARGE SUMMARY
[facility] ([facility])  Discharge Note  Short Stay    Procedure(s) (LRB):  CYSTOSCOPY, WITH RETROGRADE PYELOGRAM AND URETERAL STENT INSERTION (Right)  REMOVAL-STENT (Right)  URETEROSCOPY, WITH LASER LITHOTRIPSY (Right)      OUTCOME: Patient tolerated treatment/procedure well without complication and is now ready for discharge.    DISPOSITION: Home or Self Care    FINAL DIAGNOSIS:  right renal stone    FOLLOWUP: In clinic    DISCHARGE INSTRUCTIONS:    Discharge Procedure Orders   Diet Adult Regular     Notify your health care provider if you experience any of the following:  severe uncontrolled pain     Notify your health care provider if you experience any of the following:  persistent nausea and vomiting or diarrhea     Notify your health care provider if you experience any of the following:  temperature >100.4     Activity as tolerated        TIME SPENT ON DISCHARGE: 5 minutes

## 2025-05-06 NOTE — OP NOTE
Date of Procedure:  05/06/2025    Pre-operative Diagnosis:    1.  Right stone    Post-operative Diagnosis:   1.  Right stone    Surgeon:  Anselmo Matute MD    Assistants: None    Specimen: None    Anesthesia:  General    Blood loss:  Minimal    Findings:  Right 8 Albanian by 26 cm stent in good position, retrograde demonstrates extremely large right renal stone, no hydronephrosis.    Procedures:  1. Cysto with placement of right ureteral stent  2. Right retrograde pyelogram  3. Vesical litholapaxy- 1 cm  4. Fluoro less than one hour    Procedure in Detail:  Patient was brought to the operative suite and placed under general anesthesia.  After being positioned in dorsal lithotomy position, patient was sterilely prepped and draped.  Twenty-one Albanian sheath cystoscope was placed into a normal urethra.  Bilateral ureteral orifices were normal in size, shape, caliber and location.  The remainder of the bladder was otherwise unremarkable, no evidence of intravesical lesions or other abnormalities.  Stent was identified, we then used the combination of the laser and the alligator graspers to fracture the stone off of the stent, stent was removed.  Cystoscope was reinserted and a wire was placed into the right renal pelvis using fluoroscopic imaging.  Over the wire using Seldinger technique we placed a [place] catheter and removed the wire.  Retrograde was done demonstrating extremely large stone occupying the entire right renal pelvis, no hydronephrosis, otherwise unremarkable right retrograde nephrogram.  Wire was reinserted the [place] catheter was removed.  Then over the wire using Seldinger technique we placed an 8 Albanian by 26 cm double-J ureteral stents with placement in the right renal pelvis, difficulty coiling due to the size of the stone, coiled within the bladder per fluoroscopic imaging.  Patient will return in 4-5 months to reschedule a 5 to six-month stent exchange.  Patient was taken the PACU in stable  condition.    Complications: None.

## 2025-05-06 NOTE — TRANSFER OF CARE
"Anesthesia Transfer of Care Note    Patient: Irlanda Lopez    Procedure(s) Performed: Procedure(s) (LRB):  CYSTOSCOPY, WITH RETROGRADE PYELOGRAM AND URETERAL STENT INSERTION (Right)  REMOVAL-STENT (Right)  URETEROSCOPY, WITH LASER LITHOTRIPSY (Right)    Patient location: PACU    Anesthesia Type: general    Transport from OR: Transported from OR on room air with adequate spontaneous ventilation    Post pain: adequate analgesia    Post assessment: no apparent anesthetic complications and tolerated procedure well    Post vital signs: stable    Level of consciousness: responds to stimulation    Nausea/Vomiting: no nausea/vomiting    Complications: none    Transfer of care protocol was followed      Last vitals: Visit Vitals  BP (!) 179/91 (BP Location: Left arm, Patient Position: Sitting)   Pulse 68   Temp 36.8 °C (98.2 °F) (Tympanic)   Resp 18   Ht 5' 1" (1.549 m)   Wt 68.9 kg (152 lb 0.1 oz)   SpO2 96%   Breastfeeding No   BMI 28.72 kg/m²     "

## 2025-05-30 ENCOUNTER — DOCUMENT SCAN (OUTPATIENT)
Dept: HOME HEALTH SERVICES | Facility: HOSPITAL | Age: 85
End: 2025-05-30
Payer: MEDICARE

## 2025-05-30 ENCOUNTER — EXTERNAL HOME HEALTH (OUTPATIENT)
Dept: HOME HEALTH SERVICES | Facility: HOSPITAL | Age: 85
End: 2025-05-30
Payer: MEDICARE

## 2025-06-02 ENCOUNTER — PATIENT MESSAGE (OUTPATIENT)
Dept: PRIMARY CARE CLINIC | Facility: CLINIC | Age: 85
End: 2025-06-02
Payer: MEDICARE

## 2025-06-04 ENCOUNTER — PATIENT MESSAGE (OUTPATIENT)
Dept: NEUROLOGY | Facility: CLINIC | Age: 85
End: 2025-06-04
Payer: MEDICARE

## 2025-06-17 ENCOUNTER — PATIENT MESSAGE (OUTPATIENT)
Dept: NEUROLOGY | Facility: CLINIC | Age: 85
End: 2025-06-17
Payer: MEDICARE

## 2025-06-17 RX ORDER — RISPERIDONE 1 MG/1
1 TABLET ORAL NIGHTLY
Qty: 30 TABLET | Refills: 11 | Status: SHIPPED | OUTPATIENT
Start: 2025-06-17 | End: 2025-06-18 | Stop reason: SDUPTHER

## 2025-06-17 RX ORDER — BUSPIRONE HYDROCHLORIDE 7.5 MG/1
7.5 TABLET ORAL 3 TIMES DAILY
Qty: 90 TABLET | Refills: 11 | Status: SHIPPED | OUTPATIENT
Start: 2025-06-17 | End: 2025-06-18 | Stop reason: SDUPTHER

## 2025-06-18 RX ORDER — BUSPIRONE HYDROCHLORIDE 7.5 MG/1
7.5 TABLET ORAL 3 TIMES DAILY
Qty: 90 TABLET | Refills: 11 | Status: SHIPPED | OUTPATIENT
Start: 2025-06-18 | End: 2026-06-18

## 2025-06-18 RX ORDER — RISPERIDONE 1 MG/1
1 TABLET ORAL NIGHTLY
Qty: 30 TABLET | Refills: 11 | Status: SHIPPED | OUTPATIENT
Start: 2025-06-18 | End: 2026-06-18

## 2025-06-22 ENCOUNTER — DOCUMENT SCAN (OUTPATIENT)
Dept: HOME HEALTH SERVICES | Facility: HOSPITAL | Age: 85
End: 2025-06-22
Payer: MEDICARE

## 2025-07-17 NOTE — PLAN OF CARE
2025     Margarita Carranza MD  [address] [place] [id number]    Patient: Damien Brumfield   YOB: 1959   Date of Visit: 2025       Dear Dr. Margarita Carranza MD:    Thank you for referring Damien Brumfield to me for evaluation. Below are my notes for this consultation.    If you have questions, please do not hesitate to call me. I look forward to following your patient along with you.         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    Briefly, onset of RUE action/postural tremor in /2025. No progressive worsening. No tremor at rest. Also with history of hyposmia, RUE rigidity compared to L. Physical examination significant for RUE cogwheel rigidity, decreased RLE stride length, decreased RUE arm swing while ambulating, RUE dysdiadochokinesia.     Impression: Given RUE cogwheel rigidity, dysdiadochokinesia, decreased R arm swing and R leg stride length, overall c/f primary parkinson's disease. Considered also possible tremors 2/2 thyroid or renal dysfunction. No resting tremor noted though. RUE tremor tends to occur with postural hold and with action.     Plan:  Discussed and evaluated with neurology attending, Dr. Ian Lai trial - 1 tablet TID about 30 min prior to breakfast, lunch, dinner.  MRI Brain w/wo contrast to r/o structural pathology  TIEN scan ordered, w/ NM  Problem: Patient Care Overview  Goal: Plan of Care Review  Outcome: Ongoing (interventions implemented as appropriate)  Patient on room air tolerating well. No distress noted at this time.        consultation visit  Sleep study to evaluate for REM sleep behavior d/o. Also reportedly c/f sleep apnea per other provider, who provided him with referral to sleep medicine team.  Follow up in 3 months or sooner if needed.    Orders:  •  Ambulatory Referral to Sleep Medicine; Future  •  NM brain jose miguel scan w/Rx; Future  •  carbidopa-levodopa (Sinemet)  mg per tablet; Take 1 tablet by mouth 3 (three) times a day  •  MRI brain with and without contrast; Future  •  NM consultation for jose miguel scan; Future    Muscle twitching  As above.   Orders:  •  MRI brain with and without contrast; Future    REM sleep behavior disorder  As above.  Orders:  •  Ambulatory Referral to Sleep Medicine; Future      Patient Instructions   Contact [facility] Scheduling at [phone] or [phone](toll-free) (Hours: Monday to Friday: 7:30 am to 5:30 pm, Saturday: 8 am to 12 pm).    Ordered MRI brain  Ordered JOSE MIGUEL scan. Will need consultation appointment with radiologist prior to test  Ordered sleep study. Please also make an appointment with sleep medicine team  Ordered Sinemet 1 tablet three times a day.  -Often recommended to take Sinemet on an empty stomach or with a low-protein meal to maximize absorption. High protein diets can interfere with the absorption of Sinemet, especially close to the time of taking Sinemet. Some providers recommend avoiding any high-protein meals 30 min to 1 hour before and after taking Sinemet.  -Consider taking Sinemet at least 30 minutes before a meal or 1-2 hours after a meal to minimize protein interference. Can also consider consuming higher-protein foods, like red meat, in the evening to avoid interference with daytime doses of Sinemet.    Reach out to our office any questions or concerns. Please let me know in about 1 week how you are doing on Sinemet.        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    Current medications include Atorvastatin 10mg daily, Diltiazem 180mg qd, Lisinopril 40mg qd, Methimazole 15mg BID, Spironolactone 25mg daily.   Follows with endocrinology team, LOV 6/30/25. Increased methimazole from 10mg qd to 15mg qd.    INTERVAL HISTORY  Presents today with wife and granddaughter  Onset of RUE tremor (elbow to finger tips) around Jan/Feb 2025. Sudden onset, no known trigger. Did initially feel tingling, about 1/10 in severity, and thought it was a pinched nerve but even when tingling sensation subsided, continued to have RUE tremor.  Tremor present with postural hold and with action, though does not overly bother pt and does not interfere with ADLs.  No tremor noted at rest. Denied hypophonia, falls, gait dysfunction, constipation. Though wife has noticed RUE does not move as well as L, not as fluid.  Has not progressed in the past few months.  Does have chronic thyroid and renal dysfunction, meets with endocrinology and nephrology.  Drinks alcohol about 1-2 x a month. Notices no improvement.   Does have chronic neck pain, but no acute worsening in the past year.  +Anosmia. 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   No recent medication change prior to onset of sxs. Did recently switch from Amlodipine to Diltiazem in April 2025, but noted no change in sx.     Lab Results   Component Value Date    CREATININE 2.26 (H) 03/11/2025    CREATININE 2.3 (H) 03/11/2025    CREATININE 2.01 (H) 07/23/2024    CREATININE 2.32 (H) 04/29/2024    CREATININE 2.15 (H) 03/04/2024 6/20/25: TSH 0.05 low, free T3 3.3 WNL, free T4 1.3 WNL.    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   Respiratory:  Negative for shortness of breath.    Cardiovascular:  Negative for chest pain.   Gastrointestinal:  Negative for abdominal pain.   Musculoskeletal:  Negative for gait problem.   Neurological:  Positive for tremors. Negative for dizziness, seizures, syncope, facial asymmetry, speech difficulty, weakness, light-headedness, numbness and headaches.   All other systems reviewed and are negative.       Medical History Reviewed by provider this encounter:     .  Past Medical History  Past Medical History[1]  Past Surgical History[2]  Family History[3]   reports that he has quit smoking. He has never used smokeless tobacco. He reports that he does not currently use alcohol. He reports that he does not use drugs.  Current Outpatient Medications   Medication Instructions   • atorvastatin (LIPITOR) 10 mg, Daily   • carbidopa-levodopa (Sinemet)  mg per tablet 1 tablet, Oral, 3 times daily   • diltiazem (CARDIZEM CD) 180 mg, Oral, Daily   • lisinopril (ZESTRIL) 40 mg, Daily   • methimazole (TAPAZOLE) 15 mg, Oral, 2 times daily   • spironolactone (ALDACTONE) 25 mg, Oral, Daily   Allergies[4]   Medications Ordered Prior to Encounter[5]   Social History[6]     Objective  /70 (BP Location: Right arm, Patient Position: Sitting, Cuff Size: Standard)   Pulse 67   Ht 5' 4\" (1.626 m)   Wt 84.8 kg (187 lb)   SpO2 95%   BMI 32.10 kg/m²     Physical Exam   Vitals reviewed.   Constitutional:    Not in acute distress. Normal appearance. Not ill-appearing, toxic-appearing or diaphoretic.   HENT:   Normocephalic and atraumatic.  External ear normal b/l. Nose normal. No congestion or rhinorrhea. Mucous membranes are moist.  Oropharynx is clear. No oropharyngeal exudate or posterior oropharyngeal erythema.   Eyes:    No scleral icterus.  No " discharge b/l.  Conjunctivae normal.   Cardiovascular: no clear edema  Pulmonary:  No respiratory distress.   Musculoskeletal: no gross deformities  Skin:    Skin is not pale.   Psychiatric:      Mood normal. Affect congruent    Neurologic Exam   MENTAL STATUS: AAOx3. Follows simple/complex commands. Affect normal w/ congruent mood.    LANGUAGE: Speech clear, spontaneous, and fluent. No aphasia. No dysarthria - normal volume and intonation.    CRANIAL NERVES:  CN II: Visual acuity grossly intact. No visual field deficit. PERRLA.   CN III, IV, VI: EOM's intact w/o nystagmus, gaze palsy, or preference.   CN V: Normal masseter bulk. V1-V3 sensation intact and symmetric bilaterally.   CN VII: Face movement symmetric. Smile, eyebrow raise, eyelid bury symmetric. Nasolabial folds and palpebral folds symmetric.   CN VIII: Hearing grossly intact bilaterally.   CN IX-X: No dysarthria. Palate elevates symmetrically. Uvula midline.   CN XI: Shoulder shrug symmetric.   CN XII: Tongue midline. No deviation, atrophy, or fasciculations.    MOTOR: Normal muscle bulk. +RUE cogwheel rigidity. No pronator drift or orbiting. Mild tremor with postural hold and with FNF, no resting tremor noted. Formal strength testing as follows:  Upper extremity:   Shoulder abduction Elbow flexion Elbow extension Wrist flexion Wrist extension  strength   Right 5/5 5/5 5/5 5/5 5/5 5/5   Left 5/5 5/5 5/5 5/5 5/5 5/5     Lower extremity:   Hip flexion Knee flexion Knee extension Ankle dorsiflexion Ankle plantarflexion   Right 5/5 5/5 5/5 5/5 5/5   Left 5/5 5/5 5/5 5/5 5/5     SENSORY:  Light touch: Intact and symmetric throughout.    REFLEXES: Negative Glabellar test.    COORDINATION: Finger-to-nose w/ eyes open intact bilaterally w/o dysmetria or ataxia. Mild RUE tremor with FNF. No truncal ataxia. Negative fall test. Dysdiadochokinesia present in RUE compared to L with significantly decreased amplitude and speed.     GAIT: No shuffling noted.  Decreased stride length and arm swing with RLE and RUE, respectively. No tremor while ambulating. No enbloc turning.       Radiology Results Review : No pertinent imaging studies reviewed.    Administrative Statements  I have spent a total time of 45 minutes in caring for this patient on the day of the visit/encounter including Diagnostic results, Prognosis, Risks and benefits of tx options, Instructions for management, Patient and family education, Importance of tx compliance, Risk factor reductions, Impressions, Counseling / Coordination of care, Documenting in the medical record, Reviewing/placing orders in the medical record (including tests, medications, and/or procedures), Obtaining or reviewing history  , and Communicating with other healthcare professionals .       [1]   Past Medical History:  Diagnosis Date   • Chronic kidney disease    • Hypertension    • Kidney stone    [2]   Past Surgical History:  Procedure Laterality Date   • IR BIOPSY KIDNEY RANDOM  4/14/2025   • TOE SURGERY Left 1982    Big toe   [3]   Family History  Problem Relation Name Age of Onset   • Hypertension Mother Patricia    • Kidney disease Mother Patricia    • Diabetes unspecified Mother Patricia         Past Aug 2005 kidney failure   • Heart disease Father Chris    • Diabetes unspecified Father Chris         Past 6/1967 heart complications   • Heart disease Brother Chris Moncada    • Diabetes unspecified Brother Chris Moncada         2015 had heart attack put 2 stents in   [4]   Allergies  Allergen Reactions   • Penicillins Hives   [5]   Current Outpatient Medications on File Prior to Visit   Medication Sig Dispense Refill   • atorvastatin (LIPITOR) 10 mg tablet Take 10 mg by mouth in the morning.     • diltiazem (CARDIZEM CD) 180 mg 24 hr capsule Take 1 capsule (180 mg total) by mouth daily 90 capsule 3   • lisinopril (ZESTRIL) 40 mg tablet Take 40 mg by mouth in the morning.     • methimazole (TAPAZOLE) 5 mg tablet Take 3 tablets (15 mg total) by  mouth 2 (two) times a day 540 tablet 2   • spironolactone (ALDACTONE) 25 mg tablet TAKE 1 TABLET (25 MG TOTAL) BY MOUTH DAILY. 90 tablet 1     No current facility-administered medications on file prior to visit.   [6]   Social History  Tobacco Use   • Smoking status: Former   • Smokeless tobacco: Never   Vaping Use   • Vaping status: Never Used   Substance and Sexual Activity   • Alcohol use: Not Currently     Comment: Socially   • Drug use: Never   • Sexual activity: Yes     Partners: Female

## 2025-07-30 NOTE — PLAN OF CARE
Problem: Adult Inpatient Plan of Care  Goal: Plan of Care Review  Outcome: Ongoing, Progressing     POC reviewed, including indications and possible side effects of administered medications. Patient verbalized understanding and teach back, but needs reinforcement. No adverse reactions noted. Patient c/o mild abdominal pain upon palpitation. Colostomy care performed by patient. Tolerating diet well. Denies n/v. VSS. NADN. Patient remains free of falls and injuries during shift. Will continue to monitor.    Chart check complete.    >= 60 minutes

## 2025-08-06 ENCOUNTER — PATIENT MESSAGE (OUTPATIENT)
Dept: UROLOGY | Facility: CLINIC | Age: 85
End: 2025-08-06
Payer: MEDICARE

## 2025-08-06 ENCOUNTER — LAB VISIT (OUTPATIENT)
Dept: LAB | Facility: HOSPITAL | Age: 85
End: 2025-08-06
Attending: UROLOGY
Payer: MEDICARE

## 2025-08-06 ENCOUNTER — PATIENT MESSAGE (OUTPATIENT)
Dept: GASTROENTEROLOGY | Facility: CLINIC | Age: 85
End: 2025-08-06
Payer: MEDICARE

## 2025-08-06 ENCOUNTER — TELEPHONE (OUTPATIENT)
Dept: UROLOGY | Facility: CLINIC | Age: 85
End: 2025-08-06
Payer: MEDICARE

## 2025-08-06 DIAGNOSIS — R30.0 DYSURIA: ICD-10-CM

## 2025-08-06 DIAGNOSIS — R30.0 DYSURIA: Primary | ICD-10-CM

## 2025-08-06 DIAGNOSIS — K94.09 COLOSTOMY HERNIA: Primary | ICD-10-CM

## 2025-08-06 LAB
BACTERIA #/AREA URNS HPF: ABNORMAL /HPF
BILIRUB UR QL STRIP.AUTO: NEGATIVE
CLARITY UR: CLEAR
COLOR UR AUTO: YELLOW
GLUCOSE UR QL STRIP: NEGATIVE
HGB UR QL STRIP: ABNORMAL
HYALINE CASTS #/AREA URNS LPF: 0 /LPF (ref 0–1)
KETONES UR QL STRIP: NEGATIVE
LEUKOCYTE ESTERASE UR QL STRIP: ABNORMAL
MICROSCOPIC COMMENT: ABNORMAL
NITRITE UR QL STRIP: NEGATIVE
PH UR STRIP: 7 [PH]
PROT UR QL STRIP: ABNORMAL
RBC #/AREA URNS HPF: 35 /HPF (ref 0–4)
SP GR UR STRIP: 1.02
WBC #/AREA URNS HPF: >100 /HPF (ref 0–5)
WBC CLUMPS URNS QL MICRO: ABNORMAL

## 2025-08-06 PROCEDURE — 81001 URINALYSIS AUTO W/SCOPE: CPT

## 2025-08-06 PROCEDURE — 87086 URINE CULTURE/COLONY COUNT: CPT

## 2025-08-06 RX ORDER — SULFAMETHOXAZOLE AND TRIMETHOPRIM 800; 160 MG/1; MG/1
1 TABLET ORAL 2 TIMES DAILY
Qty: 20 TABLET | Refills: 0 | Status: SHIPPED | OUTPATIENT
Start: 2025-08-06 | End: 2025-08-16

## 2025-08-09 LAB
BACTERIA UR CULT: ABNORMAL
BACTERIA UR CULT: ABNORMAL

## 2025-08-12 ENCOUNTER — PATIENT MESSAGE (OUTPATIENT)
Dept: UROLOGY | Facility: CLINIC | Age: 85
End: 2025-08-12
Payer: MEDICARE

## 2025-08-12 DIAGNOSIS — K75.0 LIVER ABSCESS: Primary | ICD-10-CM

## 2025-08-21 ENCOUNTER — LAB VISIT (OUTPATIENT)
Dept: LAB | Facility: HOSPITAL | Age: 85
End: 2025-08-21
Attending: STUDENT IN AN ORGANIZED HEALTH CARE EDUCATION/TRAINING PROGRAM
Payer: MEDICARE

## 2025-08-21 ENCOUNTER — PATIENT MESSAGE (OUTPATIENT)
Dept: INFECTIOUS DISEASES | Facility: CLINIC | Age: 85
End: 2025-08-21

## 2025-08-21 ENCOUNTER — OFFICE VISIT (OUTPATIENT)
Dept: INFECTIOUS DISEASES | Facility: CLINIC | Age: 85
End: 2025-08-21
Payer: MEDICARE

## 2025-08-21 VITALS
BODY MASS INDEX: 28.08 KG/M2 | SYSTOLIC BLOOD PRESSURE: 132 MMHG | HEART RATE: 62 BPM | HEIGHT: 63 IN | DIASTOLIC BLOOD PRESSURE: 72 MMHG | WEIGHT: 158.5 LBS

## 2025-08-21 DIAGNOSIS — N30.90 CYSTITIS: Primary | ICD-10-CM

## 2025-08-21 DIAGNOSIS — I10 ESSENTIAL HYPERTENSION: Chronic | ICD-10-CM

## 2025-08-21 DIAGNOSIS — E78.00 PURE HYPERCHOLESTEROLEMIA: ICD-10-CM

## 2025-08-21 DIAGNOSIS — N30.90 CYSTITIS: ICD-10-CM

## 2025-08-21 DIAGNOSIS — B96.29 UTI DUE TO EXTENDED-SPECTRUM BETA LACTAMASE (ESBL) PRODUCING ESCHERICHIA COLI: ICD-10-CM

## 2025-08-21 DIAGNOSIS — N39.0 UTI DUE TO EXTENDED-SPECTRUM BETA LACTAMASE (ESBL) PRODUCING ESCHERICHIA COLI: ICD-10-CM

## 2025-08-21 DIAGNOSIS — Z16.12 UTI DUE TO EXTENDED-SPECTRUM BETA LACTAMASE (ESBL) PRODUCING ESCHERICHIA COLI: ICD-10-CM

## 2025-08-21 LAB
BACTERIA #/AREA URNS HPF: ABNORMAL /HPF
BILIRUB UR QL STRIP.AUTO: NEGATIVE
CLARITY UR: ABNORMAL
COLOR UR AUTO: YELLOW
GLUCOSE UR QL STRIP: NEGATIVE
HGB UR QL STRIP: ABNORMAL
HYALINE CASTS #/AREA URNS LPF: 0 /LPF (ref 0–1)
KETONES UR QL STRIP: NEGATIVE
LEUKOCYTE ESTERASE UR QL STRIP: ABNORMAL
MICROSCOPIC COMMENT: ABNORMAL
NITRITE UR QL STRIP: NEGATIVE
PH UR STRIP: 7 [PH]
PROT UR QL STRIP: ABNORMAL
RBC #/AREA URNS HPF: 8 /HPF (ref 0–4)
SP GR UR STRIP: 1.02
SQUAMOUS #/AREA URNS HPF: 2 /HPF
WBC #/AREA URNS HPF: 65 /HPF (ref 0–5)

## 2025-08-21 PROCEDURE — 81003 URINALYSIS AUTO W/O SCOPE: CPT

## 2025-08-21 PROCEDURE — 99999 PR PBB SHADOW E&M-EST. PATIENT-LVL V: CPT | Mod: PBBFAC,,, | Performed by: STUDENT IN AN ORGANIZED HEALTH CARE EDUCATION/TRAINING PROGRAM

## 2025-08-21 PROCEDURE — 87086 URINE CULTURE/COLONY COUNT: CPT

## 2025-08-21 RX ORDER — ESTRADIOL 0.1 MG/G
4 CREAM VAGINAL
Qty: 32 G | Refills: 11 | Status: SHIPPED | OUTPATIENT
Start: 2025-08-21 | End: 2026-08-21

## 2025-08-21 RX ORDER — GRANULES FOR ORAL 3 G/1
3 POWDER ORAL
Qty: 9 G | Refills: 5 | Status: SHIPPED | OUTPATIENT
Start: 2025-08-21 | End: 2026-02-17

## 2025-08-24 LAB — BACTERIA UR CULT: ABNORMAL

## 2025-08-25 ENCOUNTER — TELEPHONE (OUTPATIENT)
Dept: INFECTIOUS DISEASES | Facility: CLINIC | Age: 85
End: 2025-08-25
Payer: MEDICARE

## 2025-08-25 RX ORDER — LINEZOLID 600 MG/1
600 TABLET, FILM COATED ORAL EVERY 12 HOURS
Qty: 10 TABLET | Refills: 0 | Status: SHIPPED | OUTPATIENT
Start: 2025-08-25 | End: 2025-08-25

## 2025-08-25 RX ORDER — AMOXICILLIN AND CLAVULANATE POTASSIUM 875; 125 MG/1; MG/1
1 TABLET, FILM COATED ORAL 2 TIMES DAILY
Qty: 28 TABLET | Refills: 0 | Status: SHIPPED | OUTPATIENT
Start: 2025-08-25 | End: 2025-09-08

## 2025-08-29 ENCOUNTER — PATIENT MESSAGE (OUTPATIENT)
Dept: PRIMARY CARE CLINIC | Facility: CLINIC | Age: 85
End: 2025-08-29
Payer: MEDICARE

## (undated) DEVICE — CLOSURE SKIN STERI STRIP 1/2X4

## (undated) DEVICE — TRAY SKIN SCRUB WET PREMIUM

## (undated) DEVICE — SUT PROLENE 2-0 SH 36IN BLU

## (undated) DEVICE — JELLY LUBRICANT STERILE 4 OZ

## (undated) DEVICE — GOWN POLY REINF X-LONG XL

## (undated) DEVICE — SOL IRRI STRL WATER 1000ML

## (undated) DEVICE — BLLN ULTRAXX NEPHSTMY 6MM 15CM

## (undated) DEVICE — SET IRR URLGY 2LINE UNIV SPIKE

## (undated) DEVICE — TUBING MEDI-VAC 20FT .25IN

## (undated) DEVICE — BOWL STERILE LARGE 32OZ

## (undated) DEVICE — CANISTER OMNI-JUG SUCTION 16LT

## (undated) DEVICE — SUPPORT ULNA NERVE PROTECTOR

## (undated) DEVICE — UROVIEW 2600/2800

## (undated) DEVICE — SEE MEDLINE ITEM 157148

## (undated) DEVICE — CONTAINER SPECIMEN OR STER 4OZ

## (undated) DEVICE — GLOVE SIGNATURE ESSNTL LTX 7.5

## (undated) DEVICE — SOL IRR NACL .9% 3000ML

## (undated) DEVICE — DRAPE MOBILE C-ARM

## (undated) DEVICE — SUT SILK 2-0 STRANDS 30IN

## (undated) DEVICE — IRRIGATION SET Y-TYPE TUR/BLAD

## (undated) DEVICE — ADAPTER TUOHY BORST 6FR

## (undated) DEVICE — SUT SILK 3-0 SH 18IN BLACK

## (undated) DEVICE — SEE MEDLINE ITEM 157117

## (undated) DEVICE — CATH POLLACK OPEN-END FLEXI-TI

## (undated) DEVICE — CANISTER SUCTION JUMBO 12L

## (undated) DEVICE — RELOAD CONTOUR 40MM GREEN

## (undated) DEVICE — FIBER QUANTA OPT STD 1000UM

## (undated) DEVICE — SOLIDIFIER BOTTLE 3000CC

## (undated) DEVICE — GUIDE WIRE MOTION .035 X 150CM

## (undated) DEVICE — SHEARS HARMONIC 5CM 36CM

## (undated) DEVICE — SPONGE COTTON TRAY 4X4IN

## (undated) DEVICE — TAPE MEDIPORE 4IN X 2YDS

## (undated) DEVICE — SUT PDS II 96 1 VIO

## (undated) DEVICE — SUT VICRYL PLUS 3-0 SH 18IN

## (undated) DEVICE — COVER LIGHT HANDLE 80/CA

## (undated) DEVICE — FIBER QUANTA OPT SDT 272UM

## (undated) DEVICE — WIRE GUIDE .038 DIA 150CM

## (undated) DEVICE — DRAPE T CYSTOSCOPY STERILE

## (undated) DEVICE — SYR ONLY LUER LOCK 20CC

## (undated) DEVICE — ELECTRODE BLD EXT 6.50 ST DISP

## (undated) DEVICE — SUT SILK 3-0 STRANDS 30IN

## (undated) DEVICE — TRAY SKIN SCRUB WET 4 COMPART

## (undated) DEVICE — GLOVE SURG BIOGEL LATEX SZ 7.5

## (undated) DEVICE — ADHESIVE MASTISOL VIAL 48/BX

## (undated) DEVICE — SUT SILK 0 SUTUPAK SA86H

## (undated) DEVICE — SHEATH URETERAL FLEXOR 12X28CM

## (undated) DEVICE — GAUZE SPONGE 4X4 12PLY

## (undated) DEVICE — Device

## (undated) DEVICE — MANIFOLD 4 PORT

## (undated) DEVICE — SOL NACL IRR 3000ML

## (undated) DEVICE — PAD ABD 8X10 STERILE

## (undated) DEVICE — GUIDEWIRE PTFE .038INX145CM

## (undated) DEVICE — SOL WATER STRL IRR 1000ML

## (undated) DEVICE — SYR 30CC LUER LOCK

## (undated) DEVICE — NDL SAFETY 22G X 1.5 ECLIPSE

## (undated) DEVICE — SOL 9P NACL IRR PIC IL

## (undated) DEVICE — TOWEL OR DISP STRL BLUE 4/PK

## (undated) DEVICE — SKIN MARKER DEVON 160

## (undated) DEVICE — GLOVE PROTEXIS HYDROGEL SZ7

## (undated) DEVICE — SUT MONOCRYL 4.0 PS2 CP496G

## (undated) DEVICE — STAPLER SKIN PROXIMATE WIDE

## (undated) DEVICE — GOWN POLY REINF BRTH SLV XL

## (undated) DEVICE — STAPLER CONTOUR 40MM GREEN

## (undated) DEVICE — PACK BASIC SETUP SC BR

## (undated) DEVICE — BAG DRAIN URINARY CONT IRRG

## (undated) DEVICE — SET CYSTO IRRIGATION UNIV SPIK

## (undated) DEVICE — SUT VICRYL 3-0 27 SH

## (undated) DEVICE — COVER OVERHEAD SURG LT BLUE

## (undated) DEVICE — BASKET STNE RTRVL HLC 3F 4WR O

## (undated) DEVICE — DEVICE ENSEAL X1 LARGE JAW

## (undated) DEVICE — EVACUATOR WOUND BULB 100CC

## (undated) DEVICE — SUT ETHILON 2-0 BLK PS-2

## (undated) DEVICE — DRAIN PENRS STERILE LTX 18X1/2

## (undated) DEVICE — CUTTER PROXIMATE GREEN 75MM

## (undated) DEVICE — APPLICATOR CHLORAPREP ORN 26ML

## (undated) DEVICE — TRAY CATH FOL SIL URIMTR 16FR

## (undated) DEVICE — SET IRRIGATION WARMING NORMAL

## (undated) DEVICE — DRAPE NEPHRSCPY SURG 72X118IN

## (undated) DEVICE — SPONGE LAP 18X18 PREWASHED

## (undated) DEVICE — URETEROSCOPE LITHOVUE REVERSE

## (undated) DEVICE — GUIDEWIRE AMPLATZ .035X145CM

## (undated) DEVICE — CATH 5FR OPEN END URETERAL

## (undated) DEVICE — WARMER DRAPE STERILE LF

## (undated) DEVICE — SEE MEDLINE ITEM 157027

## (undated) DEVICE — SEE MEDLINE ITEM 152622

## (undated) DEVICE — ELECTRODE REM PLYHSV RETURN 9

## (undated) DEVICE — RELOAD PROXIMATE GREEN 75MM

## (undated) DEVICE — WIRE GUIDE 0.038OLD